# Patient Record
Sex: MALE | Race: WHITE | NOT HISPANIC OR LATINO | Employment: FULL TIME | URBAN - METROPOLITAN AREA
[De-identification: names, ages, dates, MRNs, and addresses within clinical notes are randomized per-mention and may not be internally consistent; named-entity substitution may affect disease eponyms.]

---

## 2017-01-16 DIAGNOSIS — E11.9 TYPE 2 DIABETES MELLITUS WITHOUT COMPLICATION, WITHOUT LONG-TERM CURRENT USE OF INSULIN (H): ICD-10-CM

## 2017-01-16 DIAGNOSIS — Z12.5 SPECIAL SCREENING FOR MALIGNANT NEOPLASM OF PROSTATE: ICD-10-CM

## 2017-01-16 LAB
HBA1C MFR BLD: 8.7 % (ref 4.3–6)
PSA SERPL-ACNC: 0.43 UG/L (ref 0–4)
TSH SERPL DL<=0.005 MIU/L-ACNC: 2.59 MU/L (ref 0.4–4)

## 2017-01-16 PROCEDURE — 36415 COLL VENOUS BLD VENIPUNCTURE: CPT | Performed by: INTERNAL MEDICINE

## 2017-01-16 PROCEDURE — 83036 HEMOGLOBIN GLYCOSYLATED A1C: CPT | Performed by: INTERNAL MEDICINE

## 2017-01-16 PROCEDURE — G0103 PSA SCREENING: HCPCS | Performed by: INTERNAL MEDICINE

## 2017-01-16 PROCEDURE — 84443 ASSAY THYROID STIM HORMONE: CPT | Performed by: INTERNAL MEDICINE

## 2017-01-20 DIAGNOSIS — E78.5 HYPERLIPIDEMIA LDL GOAL <100: ICD-10-CM

## 2017-01-20 DIAGNOSIS — E11.9 TYPE 2 DIABETES MELLITUS WITHOUT COMPLICATION, WITHOUT LONG-TERM CURRENT USE OF INSULIN (H): ICD-10-CM

## 2017-01-20 LAB
ALBUMIN SERPL-MCNC: 4.2 G/DL (ref 3.4–5)
ALP SERPL-CCNC: 79 U/L (ref 40–150)
ALT SERPL W P-5'-P-CCNC: 54 U/L (ref 0–70)
ANION GAP SERPL CALCULATED.3IONS-SCNC: 7 MMOL/L (ref 3–14)
AST SERPL W P-5'-P-CCNC: 38 U/L (ref 0–45)
BILIRUB SERPL-MCNC: 0.5 MG/DL (ref 0.2–1.3)
BUN SERPL-MCNC: 17 MG/DL (ref 7–30)
CALCIUM SERPL-MCNC: 9.2 MG/DL (ref 8.5–10.1)
CHLORIDE SERPL-SCNC: 107 MMOL/L (ref 94–109)
CHOLEST SERPL-MCNC: 109 MG/DL
CO2 SERPL-SCNC: 27 MMOL/L (ref 20–32)
CREAT SERPL-MCNC: 1 MG/DL (ref 0.66–1.25)
CREAT UR-MCNC: 237 MG/DL
GFR SERPL CREATININE-BSD FRML MDRD: 77 ML/MIN/1.7M2
GLUCOSE SERPL-MCNC: 193 MG/DL (ref 70–99)
HDLC SERPL-MCNC: 43 MG/DL
LDLC SERPL CALC-MCNC: 48 MG/DL
MICROALBUMIN UR-MCNC: 22 MG/L
MICROALBUMIN/CREAT UR: 9.37 MG/G CR (ref 0–17)
NONHDLC SERPL-MCNC: 66 MG/DL
POTASSIUM SERPL-SCNC: 4.5 MMOL/L (ref 3.4–5.3)
PROT SERPL-MCNC: 7.1 G/DL (ref 6.8–8.8)
SODIUM SERPL-SCNC: 141 MMOL/L (ref 133–144)
TRIGL SERPL-MCNC: 91 MG/DL

## 2017-01-20 PROCEDURE — 80061 LIPID PANEL: CPT | Performed by: INTERNAL MEDICINE

## 2017-01-20 PROCEDURE — 36415 COLL VENOUS BLD VENIPUNCTURE: CPT | Performed by: INTERNAL MEDICINE

## 2017-01-20 PROCEDURE — 82043 UR ALBUMIN QUANTITATIVE: CPT | Performed by: INTERNAL MEDICINE

## 2017-01-20 PROCEDURE — 80053 COMPREHEN METABOLIC PANEL: CPT | Performed by: INTERNAL MEDICINE

## 2017-01-30 ENCOUNTER — OFFICE VISIT (OUTPATIENT)
Dept: INTERNAL MEDICINE | Facility: CLINIC | Age: 57
End: 2017-01-30
Payer: COMMERCIAL

## 2017-01-30 VITALS
SYSTOLIC BLOOD PRESSURE: 134 MMHG | BODY MASS INDEX: 41.99 KG/M2 | WEIGHT: 293.3 LBS | HEART RATE: 89 BPM | DIASTOLIC BLOOD PRESSURE: 64 MMHG | HEIGHT: 70 IN | OXYGEN SATURATION: 95 % | TEMPERATURE: 97.8 F

## 2017-01-30 DIAGNOSIS — E78.5 HYPERLIPIDEMIA LDL GOAL <100: ICD-10-CM

## 2017-01-30 DIAGNOSIS — E66.01 MORBID OBESITY WITH BMI OF 40.0-44.9, ADULT (H): ICD-10-CM

## 2017-01-30 DIAGNOSIS — I10 BENIGN ESSENTIAL HYPERTENSION: ICD-10-CM

## 2017-01-30 DIAGNOSIS — E11.9 TYPE 2 DIABETES MELLITUS WITHOUT COMPLICATION, WITHOUT LONG-TERM CURRENT USE OF INSULIN (H): Primary | ICD-10-CM

## 2017-01-30 DIAGNOSIS — J98.01 POST-INFECTION BRONCHOSPASM: ICD-10-CM

## 2017-01-30 PROCEDURE — 99214 OFFICE O/P EST MOD 30 MIN: CPT | Performed by: INTERNAL MEDICINE

## 2017-01-30 RX ORDER — FENOFIBRATE 160 MG/1
160 TABLET ORAL DAILY
Qty: 90 TABLET | Refills: 0 | Status: SHIPPED | OUTPATIENT
Start: 2017-01-30 | End: 2017-05-18

## 2017-01-30 RX ORDER — LIRAGLUTIDE 6 MG/ML
1.8 INJECTION SUBCUTANEOUS DAILY
Qty: 27 ML | Refills: 0 | Status: SHIPPED | OUTPATIENT
Start: 2017-01-30 | End: 2017-04-21

## 2017-01-30 RX ORDER — PIOGLITAZONEHYDROCHLORIDE 45 MG/1
45 TABLET ORAL DAILY
Qty: 90 TABLET | Refills: 0 | Status: SHIPPED | OUTPATIENT
Start: 2017-01-30 | End: 2017-05-18

## 2017-01-30 RX ORDER — ALBUTEROL SULFATE 90 UG/1
2 AEROSOL, METERED RESPIRATORY (INHALATION) EVERY 4 HOURS PRN
Qty: 1 INHALER | Refills: 2 | Status: SHIPPED | OUTPATIENT
Start: 2017-01-30 | End: 2018-06-10

## 2017-01-30 NOTE — MR AVS SNAPSHOT
"              After Visit Summary   1/30/2017    Neymar Rhodes    MRN: [id number]           Patient Information     Date Of Birth          1960        Visit Information        Provider Department      1/30/2017 2:00 PM Jefry Harris MD [facility]        Today's Diagnoses     Type 2 diabetes mellitus without complication, without long-term current use of insulin (H)    -  1     Morbid obesity with BMI of 40.0-44.9, adult (H)         Post-infection bronchospasm         Benign essential hypertension         Hyperlipidemia LDL goal <100           Care Instructions    *  Continue all medications at the same doses.  Contact your usual pharmacy if you need refills.     *  Work hard on your diet to improve blood sugar control.     *  Return to see me in approximately 3 months, sooner if needed.  Please get nonfasting labs done in the [facility] lab 1-2 days before this appointment.  Call [phone] to schedule both appointments.       5 GOALS IN MANAGING DIABETES (i.e. to give the best chance to prevent diabetic complications and vascular disease):     1.  Aggressive blood pressure control, under 130/80 ideally.  Using medications if needed    Your blood pressure is under good control    BP Readings from Last 4 Encounters:   01/30/17 134/64   07/13/16 132/72   03/10/16 129/78   01/13/16 138/76       2.  Aggressive LDL cholesterol (bad cholesterol) lowering as needed.  Your goal is an LDL under 100 for sure, preferably under 70.     *  All patients with diabetes are recommended to be on a \"statin\" cholesterol lowering medication regardless of the cholesterol levels to give the best chance at avoiding vascular disease.      New guidelines identify four high-risk groups who could benefit from statins:   *people with pre-existing heart disease, such as those who have had a heart attack;   *people ages 40 to 75 who have diabetes of any type  *patients ages 40 to 75 with at least a 7.5% " risk of developing cardiovascular disease over the next decade, according to a formula described in the guidelines  *patients with the sort of super-high cholesterol that sometimes runs in families, as evidenced by an LDL of 190 milligrams per deciliter or higher    *  Your cholesterol levels are well controlled.    Recent Labs   Lab Test  01/20/17   0911  01/11/16   0727  01/26/15   0920  12/30/13   1315   CHOL  109  150  126  156   HDL  43  38*  45  41   LDL  48  73  55  Cannot estimate LDL when triglyceride exceeds 400 mg/dL  86   TRIG  91  194*  128  435*   CHOLHDLRATIO   --    --   2.8  3.8       3.  Aggressive diabetic management.  The target for A1C (3 months average blood sugar) is ideally below 6.5, preferably below 7.5    Your diabetes is NOT under good control.      A1C      8.7   1/16/2017  A1C      8.2   10/19/2016  A1C      8.7   6/30/2016  A1C      9.3   1/11/2016  A1C      7.7   7/17/2015  A1C      6.8   1/26/2015  A1C      6.4   10/16/2014  A1C      8.9   6/11/2014  A1C      7.8   12/30/2013  A1C      6.8   8/2/2013  A1C      7.0   2/14/2013  A1C      7.1   8/10/2012  A1C      8.4   3/30/2012  A1C      8.9   8/22/2011  A1C      8.4   3/8/2011  A1C      6.8   10/16/2010  A1C      6.8   4/9/2010  A1C      7.0   5/21/2009  A1C      7.0   12/23/2008  A1C      6.6   9/3/2008  A1C      7.1   6/9/2008  A1C      7.0   2/15/2008  A1C      7.2   10/24/2007  A1C      7.2   6/29/2007  A1C      6.9   3/29/2007  A1C      6.4   12/7/2006  A1C      6.6   6/1/2006  A1C      8.2   2/27/2006  A1C      8.3   12/15/2005  A1C      7.3   3/22/2005  A1C      7.3   11/29/2004  A1C      7.4   8/5/2004  A1C      6.9   10/27/2003  A1C      6.0   12/24/2002  A1C      6.0   8/7/2002    4.  No smoking    5.  Aspirin tablet once per day, every day unless there is a specific reason that you cannot take aspirin.       *You should take Aspirin 81 mg once per day.           Post infectious Bronchospasm  "(wheezing):  ===============================================      *  Bronchospasm is irritation of the airways that causes them to spasm and temporarily \"narrow\" which causes coughing (usually minimal sputum production), shortness of breath, dyspnea on exertion, wheezing.  Your airways will be more reactive to things such as temperature changes (too cold, too hot, too humid, etc.), activity, pollutants such as dander, smoke, air pollution, etc.  This will get better with time, but will produce lingering symptoms as described above for a couple weeks to a couple of months.     *  Antibiotics not indicated    *  Albuterol inhaler, use 2 puffs, 3-5 times per day as needed for any coughing, wheezing, tightness in the breathing, or shortness of breath.  This inhaled medication helps reduce the inflammation and spasm of the airways which will help the breathing become easier.  This is a similar medication we use to treat asthma, but you do not have asthma.      Consider using this inhaler before any known triggers for our coughing or wheezing (usually these include cold or hot temperatures, humidity, dust, air pollutants, smoke).      *  Expect that your airways may remain more easily irritated for a few weeks after upper respiratory infections.     If you require the albuterol rescue inhaler on a regular basis more than a few times per week, you may need additional medications to help control the breathing better.    These are the available forms of Albuterol, your insurance formulary will determine which one is preferred.  They all work the same.                 *  Cough suppressant Delsym, follow directions on bottle    *  Mucinex extended release, one or two tablets twice per day for the next 5-7 days.  This helps loosen the secretions to they can be passed more easily out of the body.     *  Be sure to drink lots of fluids.  If the appetite and intake of food is way down, then at leat try to eat soup.  Dehydration is " the thing that causes people to end up in the hospital with viral infections and the flu.     *  For sore throat:  Try lozenges (Cepostat, Cepacol, hard candies, Zinc lozenges, etc)    *  If you have thick secretions:  Mucinex extended release twice per day on a regular basis for the next few days, then twice per day as needed.  OK to take the regular Mucinex, you may just have to take it 2-3 times per day.      *  If you have dry nasal passages or frequent bloody noses during the winter time:  Saline nasal spray as often as needed for dry nose.     *  If you have a lot of congestion and/or runny noses:  OK to try decongestants as needed (e.g. pseudoephedrine or phenylephrine).  Be sure to take the lowest dose needed.  Take decongestants from only ONE source.  It is possible to inadvertantly take more than the recommended amounts if you take decongestants from multiple products (i.e. Do NOT take Mucinex-D and Claritin-D together)    *  If you have been told to NOT take decongestants or if you cannot tolerate decongestants due to effect on blood pressure, sleep or heart rate:  Try Coricidin HBP or Chlortrimeton (chlorpheniramine).  These can have a similar effect as some decongestants but will not affect your heart rate or blood pressure.      *  If you have SEVERE nasal congestion:  Affrin nasal spray as needed for severe nasal congestion (especially before the airplane trip), but do not use for more than one week.      *  Tylenol as needed for any headaches of low grade temperatures.     *  Ibuprofen as needed for body aches, fevers, headaches    *  Call the clinic if any changes in the symptoms such as worsening fevers, or the amount and quality of the sputum changes, or if you have any major difficulties breathing, or the symptoms fail to clear for a few weeks.    *  Most upper respiratory infection will clear 1-2 weeks, but it is not uncommon for post viral coughs to last for several weeks, even rarely the  "entire winter.                 Follow-ups after your visit        Future tests that were ordered for you today     Open Future Orders        Priority Expected Expires Ordered    Basic metabolic panel ASAP 2017    Hemoglobin A1c Routine 2017            Who to contact     If you have questions or need follow up information about today's clinic visit or your schedule please contact [facility] directly at [phone].  Normal or non-critical lab and imaging results will be communicated to you by [employer], letter or phone within 4 business days after the clinic has received the results. If you do not hear from us within 7 days, please contact the clinic through [employer] or phone. If you have a critical or abnormal lab result, we will notify you by phone as soon as possible.  Submit refill requests through [employer] or call your pharmacy and they will forward the refill request to us. Please allow 3 business days for your refill to be completed.          Additional Information About Your Visit        [employer] Information     [employer] lets you send messages to your doctor, view your test results, renew your prescriptions, schedule appointments and more. To sign up, go to [email or web address] . Click on \"Log in\" on the left side of the screen, which will take you to the Welcome page. Then click on \"Sign up Now\" on the right side of the page.     You will be asked to enter the access code listed below, as well as some personal information. Please follow the directions to create your username and password.     Your access code is: [email or web address]  Expires: 2017  3:46 PM     Your access code will  in 90 days. If you need help or a new code, please call your [place] clinic or [phone].        Care EveryWhere ID     This is your Care EveryWhere ID. This could be used by other organizations to access your [place] medical records  [id number]   " "     Your Vitals Were     Pulse Temperature Height BMI (Body Mass Index) Pulse Oximetry       89 97.8  F (36.6  C) (Oral) 5' 10\" (1.778 m) 42.08 kg/m2 95%        Blood Pressure from Last 3 Encounters:   01/30/17 134/64   07/13/16 132/72   03/10/16 129/78    Weight from Last 3 Encounters:   01/30/17 293 lb 4.8 oz (133.04 kg)   07/13/16 294 lb 1.6 oz (133.403 kg)   01/13/16 296 lb 12.8 oz (134.628 kg)                 Today's Medication Changes          These changes are accurate as of: 1/30/17  3:46 PM.  If you have any questions, ask your nurse or doctor.               Start taking these medicines.        Dose/Directions    albuterol 108 (90 BASE) MCG/ACT Inhaler   Commonly known as:  PROAIR HFA/PROVENTIL HFA/VENTOLIN HFA   Used for:  Post-infection bronchospasm   Started by:  Jefry Harris MD        Dose:  2 puff   Inhale 2 puffs into the lungs every 4 hours as needed for shortness of breath / dyspnea or wheezing   Quantity:  1 Inhaler   Refills:  2         These medicines have changed or have updated prescriptions.        Dose/Directions    metFORMIN 1000 MG tablet   Commonly known as:  GLUCOPHAGE   This may have changed:  additional instructions   Used for:  Type 2 diabetes mellitus without complication, without long-term current use of insulin (H)   Changed by:  Jefry Harris MD        Dose:  1000 mg   Take 1 tablet (1,000 mg) by mouth 2 times daily (with meals)   Quantity:  180 tablet   Refills:  0            Where to get your medicines      These medications were sent to [id number] - [place], [place] - [address]  [address] [address]     Phone:  [phone]    - albuterol 108 (90 BASE) MCG/ACT Inhaler  - fenofibrate 160 MG tablet  - liraglutide 18 MG/3ML soln  - metFORMIN 1000 MG tablet  - pioglitazone 45 MG tablet             Primary Care Provider Office Phone # Fax #    Jefry Harris [phone]       [facility]INIC [address] [facility] [address]        Thank you!     Thank you for choosing [facility]  for your care. Our goal is always to provide you with excellent care. Hearing back from our patients is one way we can continue to improve our services. Please take a few minutes to complete the written survey that you may receive in the mail after your visit with us. Thank you!             Your Updated Medication List - Protect others around you: Learn how to safely use, store and throw away your medicines at www.disposemymeds.org.          This list is accurate as of: 1/30/17  3:46 PM.  Always use your most recent med list.                   Brand Name Dispense Instructions for use    albuterol 108 (90 BASE) MCG/ACT Inhaler    PROAIR HFA/PROVENTIL HFA/VENTOLIN HFA    1 Inhaler    Inhale 2 puffs into the lungs every 4 hours as needed for shortness of breath / dyspnea or wheezing       aspirin 81 MG tablet      1 tab po QD (Once per day)       atorvastatin 80 MG tablet    LIPITOR    90 tablet    Take 0.5 tablets (40 mg) by mouth At Bedtime       * blood glucose calibration solution    no brand specified    1 Bottle    1 drop by Test Solution route every 30 days.       * blood glucose calibration solution    no brand specified    1 each    Use to calibrate blood glucose monitor as directed; ONE TOUCH       * blood glucose monitoring meter device kit    no brand specified    1 kit    1 each daily.       * blood glucose monitoring meter device kit     1 kit    Use to test blood sugar 1-3 times daily or as directed.       * blood glucose monitoring test strip    no brand specified    1 Box    by In Vitro route 2 times daily. Dispense whatever diabetic testing supplies as directed by formulary       * blood glucose monitoring test strip    ACCU-CHEK LUL PLUS    100 each    Use to test blood sugar 1-3 times daily or as directed.       * DIABETIC STERILE LANCETS device     1 Box    1 Device 2 times  "daily.       * blood glucose monitoring lancets     1 Box    Use to test blood sugar 1-3 times daily or as directed.       * blood glucose monitoring lancets     1 Box    Use to test blood sugar 1-3 times daily or as directed.       fenofibrate 160 MG tablet     90 tablet    Take 1 tablet (160 mg) by mouth daily       glipiZIDE 10 MG 24 hr tablet    GLUCOTROL XL    90 tablet    Take 1 tablet (10 mg) by mouth daily       indomethacin 25 MG capsule    INDOCIN    30 capsule    Take 1 capsule by mouth 2 times daily as needed. Take with meals       ketoconazole 2 % cream    NIZORAL    30 g    Apply topically 2 times daily Apply sparingly once or twice per day as needed to affected area until the skin is better, then stop       liraglutide 18 MG/3ML soln    VICTOZA    27 mL    Inject 1.8 mg Subcutaneous daily       lisinopril 20 MG tablet    PRINIVIL/ZESTRIL    90 tablet    Take 1 tablet (20 mg) by mouth daily       metFORMIN 1000 MG tablet    GLUCOPHAGE    180 tablet    Take 1 tablet (1,000 mg) by mouth 2 times daily (with meals)       * pen needles 5/16\" 31G X 8 MM Misc     100 each    1 each daily.       * insulin pen needle 29G X 12.7MM    BD ULTRA-FINE    100 each    1 each 2 times daily or as directed.       pioglitazone 45 MG tablet    ACTOS    90 tablet    Take 1 tablet (45 mg) by mouth daily       triamcinolone 0.5 % cream    KENALOG    30 g    Apply sparingly once or twice per day as needed to affected area until the skin is better, then stop       * Notice:  This list has 11 medication(s) that are the same as other medications prescribed for you. Read the directions carefully, and ask your doctor or other care provider to review them with you.      "

## 2017-01-30 NOTE — PATIENT INSTRUCTIONS
"*  Continue all medications at the same doses.  Contact your usual pharmacy if you need refills.     *  Work hard on your diet to improve blood sugar control.     *  Return to see me in approximately 3 months, sooner if needed.  Please get nonfasting labs done in the [facility] lab 1-2 days before this appointment.  Call [phone] to schedule both appointments.       5 GOALS IN MANAGING DIABETES (i.e. to give the best chance to prevent diabetic complications and vascular disease):     1.  Aggressive blood pressure control, under 130/80 ideally.  Using medications if needed    Your blood pressure is under good control    BP Readings from Last 4 Encounters:   01/30/17 134/64   07/13/16 132/72   03/10/16 129/78   01/13/16 138/76       2.  Aggressive LDL cholesterol (bad cholesterol) lowering as needed.  Your goal is an LDL under 100 for sure, preferably under 70.     *  All patients with diabetes are recommended to be on a \"statin\" cholesterol lowering medication regardless of the cholesterol levels to give the best chance at avoiding vascular disease.      New guidelines identify four high-risk groups who could benefit from statins:   *people with pre-existing heart disease, such as those who have had a heart attack;   *people ages 40 to 75 who have diabetes of any type  *patients ages 40 to 75 with at least a 7.5% risk of developing cardiovascular disease over the next decade, according to a formula described in the guidelines  *patients with the sort of super-high cholesterol that sometimes runs in families, as evidenced by an LDL of 190 milligrams per deciliter or higher    *  Your cholesterol levels are well controlled.    Recent Labs   Lab Test  01/20/17   0911  01/11/16   0727  01/26/15   0920  12/30/13   1315   CHOL  109  150  126  156   HDL  43  38*  45  41   LDL  48  73  55  Cannot estimate LDL when triglyceride exceeds 400 mg/dL  86   TRIG  91  194*  128  435*   CHOLHDLRATIO   --    --   2.8  3.8       3.  " "Aggressive diabetic management.  The target for A1C (3 months average blood sugar) is ideally below 6.5, preferably below 7.5    Your diabetes is NOT under good control.      A1C      8.7   1/16/2017  A1C      8.2   10/19/2016  A1C      8.7   6/30/2016  A1C      9.3   1/11/2016  A1C      7.7   7/17/2015  A1C      6.8   1/26/2015  A1C      6.4   10/16/2014  A1C      8.9   6/11/2014  A1C      7.8   12/30/2013  A1C      6.8   8/2/2013  A1C      7.0   2/14/2013  A1C      7.1   8/10/2012  A1C      8.4   3/30/2012  A1C      8.9   8/22/2011  A1C      8.4   3/8/2011  A1C      6.8   10/16/2010  A1C      6.8   4/9/2010  A1C      7.0   5/21/2009  A1C      7.0   12/23/2008  A1C      6.6   9/3/2008  A1C      7.1   6/9/2008  A1C      7.0   2/15/2008  A1C      7.2   10/24/2007  A1C      7.2   6/29/2007  A1C      6.9   3/29/2007  A1C      6.4   12/7/2006  A1C      6.6   6/1/2006  A1C      8.2   2/27/2006  A1C      8.3   12/15/2005  A1C      7.3   3/22/2005  A1C      7.3   11/29/2004  A1C      7.4   8/5/2004  A1C      6.9   10/27/2003  A1C      6.0   12/24/2002  A1C      6.0   8/7/2002    4.  No smoking    5.  Aspirin tablet once per day, every day unless there is a specific reason that you cannot take aspirin.       *You should take Aspirin 81 mg once per day.           Post infectious Bronchospasm (wheezing):  ===============================================      *  Bronchospasm is irritation of the airways that causes them to spasm and temporarily \"narrow\" which causes coughing (usually minimal sputum production), shortness of breath, dyspnea on exertion, wheezing.  Your airways will be more reactive to things such as temperature changes (too cold, too hot, too humid, etc.), activity, pollutants such as dander, smoke, air pollution, etc.  This will get better with time, but will produce lingering symptoms as described above for a couple weeks to a couple of months.     *  Antibiotics not indicated    *  Albuterol inhaler, use 2 " puffs, 3-5 times per day as needed for any coughing, wheezing, tightness in the breathing, or shortness of breath.  This inhaled medication helps reduce the inflammation and spasm of the airways which will help the breathing become easier.  This is a similar medication we use to treat asthma, but you do not have asthma.      Consider using this inhaler before any known triggers for our coughing or wheezing (usually these include cold or hot temperatures, humidity, dust, air pollutants, smoke).      *  Expect that your airways may remain more easily irritated for a few weeks after upper respiratory infections.     If you require the albuterol rescue inhaler on a regular basis more than a few times per week, you may need additional medications to help control the breathing better.    These are the available forms of Albuterol, your insurance formulary will determine which one is preferred.  They all work the same.                 *  Cough suppressant Delsym, follow directions on bottle    *  Mucinex extended release, one or two tablets twice per day for the next 5-7 days.  This helps loosen the secretions to they can be passed more easily out of the body.     *  Be sure to drink lots of fluids.  If the appetite and intake of food is way down, then at leat try to eat soup.  Dehydration is the thing that causes people to end up in the hospital with viral infections and the flu.     *  For sore throat:  Try lozenges (Cepostat, Cepacol, hard candies, Zinc lozenges, etc)    *  If you have thick secretions:  Mucinex extended release twice per day on a regular basis for the next few days, then twice per day as needed.  OK to take the regular Mucinex, you may just have to take it 2-3 times per day.      *  If you have dry nasal passages or frequent bloody noses during the winter time:  Saline nasal spray as often as needed for dry nose.     *  If you have a lot of congestion and/or runny noses:  OK to try decongestants as  needed (e.g. pseudoephedrine or phenylephrine).  Be sure to take the lowest dose needed.  Take decongestants from only ONE source.  It is possible to inadvertantly take more than the recommended amounts if you take decongestants from multiple products (i.e. Do NOT take Mucinex-D and Claritin-D together)    *  If you have been told to NOT take decongestants or if you cannot tolerate decongestants due to effect on blood pressure, sleep or heart rate:  Try Coricidin HBP or Chlortrimeton (chlorpheniramine).  These can have a similar effect as some decongestants but will not affect your heart rate or blood pressure.      *  If you have SEVERE nasal congestion:  Affrin nasal spray as needed for severe nasal congestion (especially before the airplane trip), but do not use for more than one week.      *  Tylenol as needed for any headaches of low grade temperatures.     *  Ibuprofen as needed for body aches, fevers, headaches    *  Call the clinic if any changes in the symptoms such as worsening fevers, or the amount and quality of the sputum changes, or if you have any major difficulties breathing, or the symptoms fail to clear for a few weeks.    *  Most upper respiratory infection will clear 1-2 weeks, but it is not uncommon for post viral coughs to last for several weeks, even rarely the entire winter.

## 2017-01-30 NOTE — PROGRESS NOTES
SUBJECTIVE:                                                    Neymar Rhodes is a 56 year old male who presents to clinic today for the following health issues:    Diabetes Follow-up      Patient is checking blood sugars: not at all    Diabetic concerns: None     Symptoms of hypoglycemia (low blood sugar): none     Paresthesias (numbness or burning in feet) or sores: No     Date of last diabetic eye exam: 9/2016    In regards specifically to the patient's diabetes, they reports no unusual symptoms.    No significnat or regular episodes of hypo or hyperglycemia    Medication compliance: compliant most of the time  Diabetic diet compliance: NOT compliant most of the time  Diabetic ROS: no polyuria or polydipsia, no chest pain, dyspnea or TIA's, no numbness, tingling or pain in extremities    Home blood sugar monitoring: are performed regulary    Review of patients self blood glucose monitoring shows fasting always < 130 and post prandial average under 170    Diabetic complications: none     Most  recent labs:    A1C      8.7   1/16/2017  A1C      8.2   10/19/2016  A1C      8.7   6/30/2016  A1C      9.3   1/11/2016  A1C      7.7   7/17/2015  A1C      6.8   1/26/2015  A1C      6.4   10/16/2014  A1C      8.9   6/11/2014  A1C      7.8   12/30/2013  A1C      6.8   8/2/2013  A1C      7.0   2/14/2013  A1C      7.1   8/10/2012  A1C      8.4   3/30/2012  A1C      8.9   8/22/2011  A1C      8.4   3/8/2011  A1C      6.8   10/16/2010  A1C      6.8   4/9/2010  A1C      7.0   5/21/2009  A1C      7.0   12/23/2008  A1C      6.6   9/3/2008  A1C      7.1   6/9/2008  A1C      7.0   2/15/2008  A1C      7.2   10/24/2007  A1C      7.2   6/29/2007  A1C      6.9   3/29/2007  A1C      6.4   12/7/2006  A1C      6.6   6/1/2006  A1C      8.2   2/27/2006  A1C      8.3   12/15/2005  A1C      7.3   3/22/2005  A1C      7.3   11/29/2004  A1C      7.4   8/5/2004  A1C      6.9   10/27/2003  A1C      6.0   12/24/2002  A1C      6.0   8/7/2002       Hyperlipidemia Follow-Up      Rate your low fat/cholesterol diet?: good    Taking statin?  Yes, no muscle aches from statin    Other lipid medications/supplements?:  None    Has history of hyperlipidemia.  On statin for this, denies any significant side effects of this medication.      Latest labs reviewed:    Recent Labs   Lab Test  01/20/17   0911  01/11/16   0727  01/26/15   0920  12/30/13   1315   CHOL  109  150  126  156   HDL  43  38*  45  41   LDL  48  73  55  Cannot estimate LDL when triglyceride exceeds 400 mg/dL  86   TRIG  91  194*  128  435*   CHOLHDLRATIO   --    --   2.8  3.8        AST       38   1/20/2017       Hypertension Follow-up      Outpatient blood pressures are not being checked.    Low Salt Diet: no added salt    Blood presure remains well controlled at home  Readings outside clinic are within normal limits.  Reviewed last 6 BP readings in chart:  BP Readings from Last 6 Encounters:   01/30/17 134/64   07/13/16 132/72   03/10/16 129/78   01/13/16 138/76   01/11/16 130/62   07/30/15 126/72     He has not experienced any significant side effects from medicaiotns for hypertension.    NO active cardiac complaints or symptoms with exercise.          Amount of exercise or physical activity: None    Problems taking medications regularly: No    Medication side effects: none  Diet: low salt, low fat/cholesterol and diabetic    Acute Illness   Acute illness concerns: cough  Onset: 1.5 weeks      Fever: no    Chills/Sweats: YES    Headache (location?): no    Sinus Pressure:YES    Conjunctivitis:  no    Ear Pain: no    Rhinorrhea: YES    Congestion: YES    Sore Throat: YES- dry, cotton mouth     Cough: YES-productive of clear sputum    Wheeze: no    Decreased Appetite: no    Nausea: no    Vomiting: no    Diarrhea:  YES- occ    Dysuria/Freq.: no    Fatigue/Achiness: no    Sick/Strep Exposure: no     Therapies Tried and outcome: vicks w/ relief          Problem list and histories reviewed & adjusted,  as indicated.  Additional history: as documented          Past Medical History:  ---------------------------  Past Medical History   Diagnosis Date     Gout, unspecified      Type II or unspecified type diabetes mellitus without mention of complication, not stated as uncontrolled      Diverticulosis of colon (without mention of hemorrhage)      Other and unspecified hyperlipidemia      Essential hypertension, benign      Morbid obesity (H)        Past Surgical History:  ---------------------------  Past Surgical History   Procedure Laterality Date     C appendectomy  1980's     Surgical history of -   2001     repair of colovesicular fistula       Current Medications:  ---------------------------  Current Outpatient Prescriptions   Medication Sig Dispense Refill     albuterol (PROAIR HFA/PROVENTIL HFA/VENTOLIN HFA) 108 (90 BASE) MCG/ACT Inhaler Inhale 2 puffs into the lungs every 4 hours as needed for shortness of breath / dyspnea or wheezing 1 Inhaler 2     fenofibrate 160 MG tablet Take 1 tablet (160 mg) by mouth daily 90 tablet 0     metFORMIN (GLUCOPHAGE) 1000 MG tablet Take 1 tablet (1,000 mg) by mouth 2 times daily (with meals) 180 tablet 0     pioglitazone (ACTOS) 45 MG tablet Take 1 tablet (45 mg) by mouth daily 90 tablet 0     liraglutide (VICTOZA) 18 MG/3ML soln Inject 1.8 mg Subcutaneous daily 27 mL 0     blood glucose monitoring (SOFTCLIX) lancets Use to test blood sugar 1-3 times daily or as directed. 1 Box 11     blood glucose monitoring (ACCU-CHEK LUL PLUS) test strip Use to test blood sugar 1-3 times daily or as directed. 100 each 11     blood glucose monitoring (ACCU-CHEK LUL PLUS) meter device kit Use to test blood sugar 1-3 times daily or as directed. 1 kit 0     blood glucose monitoring (ACCU-CHEK MULTICLIX) lancets Use to test blood sugar 1-3 times daily or as directed. 1 Box 11     atorvastatin (LIPITOR) 80 MG tablet Take 0.5 tablets (40 mg) by mouth At Bedtime 90 tablet 1     glipiZIDE  "(GLUCOTROL XL) 10 MG 24 hr tablet Take 1 tablet (10 mg) by mouth daily 90 tablet 1     lisinopril (PRINIVIL,ZESTRIL) 20 MG tablet Take 1 tablet (20 mg) by mouth daily 90 tablet 1     blood glucose calibration (NO BRAND SPECIFIED) solution Use to calibrate blood glucose monitor as directed; ONE TOUCH 1 each 3     triamcinolone (KENALOG) 0.5 % cream Apply sparingly once or twice per day as needed to affected area until the skin is better, then stop 30 g 0     ketoconazole (NIZORAL) 2 % cream Apply topically 2 times daily Apply sparingly once or twice per day as needed to affected area until the skin is better, then stop 30 g 0     indomethacin (INDOCIN) 25 MG capsule Take 1 capsule by mouth 2 times daily as needed. Take with meals 30 capsule 0     Blood Glucose Monitoring Suppl (BLOOD GLUCOSE METER) KIT 1 each daily. 1 kit 0     DIABETIC STERILE LANCETS device 1 Device 2 times daily. 1 Box 12     glucose blood VI test strips strip by In Vitro route 2 times daily. Dispense whatever diabetic testing supplies as directed by formulary 1 Box 12     Blood Glucose Calibration (GLUCOSE CONTROL) solution 1 drop by Test Solution route every 30 days. 1 Bottle 2     ASPIRIN 81 MG OR TABS 1 tab po QD (Once per day)       insulin pen needle (BD ULTRA-FINE) 29G X 12.7MM 1 each 2 times daily or as directed. 100 each 3     Insulin Pen Needle (PEN NEEDLES 5/16\") 31G X 8 MM MISC 1 each daily. 100 each 11       Allergies:  -------------  Allergies   Allergen Reactions     No Known Drug Allergies        Social History:  -------------------  Social History     Social History     Marital Status:      Spouse Name: N/A     Number of Children: N/A     Years of Education: N/A     Occupational History     Not on file.     Social History Main Topics     Smoking status: Never Smoker      Smokeless tobacco: Never Used     Alcohol Use: Yes      Comment: 1 glass of beer on weekend     Drug Use: No     Sexual Activity:     Partners: Female " "    Other Topics Concern     Not on file     Social History Narrative       Family Medical History:  ------------------------------  Family History   Problem Relation Age of Onset     CANCER Father 75     bladder cancer     Breast Cancer Sister      Breast Cancer Mother      Family History Negative Brother      Family History Negative Brother          ROS:  REVIEW OF SYSTEMS:    RESP: negative for  dyspnea, wheezing, hemoptysis; POS for nonproductive cough  CV: negative for chest pain, palpitations, PND, JOSHI, orthopnea; reports no changes in their ability to perform physical activity (from cardiovascular standpoint)  GI: negative for dysphagia, N/V, pain, melena, diarrhea and constipation  NEURO: negative for numbness/tingling, paralysis, incoordination, or focal weakness     OBJECTIVE:                                                    /64 mmHg  Pulse 89  Temp(Src) 97.8  F (36.6  C) (Oral)  Ht 5' 10\" (1.778 m)  Wt 293 lb 4.8 oz (133.04 kg)  BMI 42.08 kg/m2  SpO2 95%     GENERAL alert and no distress  EYES:  Normal sclera,conjunctiva, EOMI  HENT: oral and posterior pharynx without lesions or erythema, facies symmetric  NECK: Neck supple. No LAD, without thyroidmegaly or JVD., Carotids without bruits.  RESP: Scattered mild rhonci in all lung fields, no focal rales, scattered mild end expiraotry wheezes with forced expiration.  CV: RRR normal S1S2 without murmurs, rubs or gallops. PMI normal  LYMPH: no cervical lymph adenopathy appreciated  MS: extremities- no gross deformities of the visible extremities noted, no edema  PSYCH: Alert and oriented times 3; speech- coherent  SKIN:  No obvious significant skin lesions on visible portions of face          ASSESSMENT/PLAN:                                                      (E11.9) Type 2 diabetes mellitus without complication, without long-term current use of insulin (H)  (primary encounter diagnosis)  Comment: not controlled well. He is very resistant to " start insulin, even if it what i feel he needs.   He wants to retry diet changes because he admits that he ha not bee following low carb diet reliably.   Discussed low cabr diet, advised food choices, asked him to monitor the glucose readings.   contineu all meds.   He agreed that is he cannot lwoer this with diet and the same meds, then he would agree to insulin.   Discussed importance in compliance in all areas of diabetic control including diet, routine BS checks, absolute medication compliance, laboratory monitoring, and attending regular follow up appointments as ordered.  Failure to comply with instructions regarding diabetes will lead to a greater chance of poor diabetic control and therefore a greater chance of diabetes related complications such as CAD, CVA, PVD, and retinopathy/neuropathy/nephropathy.  Based on level of diabetes control: testing frequency THREE TIME PER DAY DUE TO POORLY CONTROLLED DIABETES AND HYPERGLYCEMIA.   Plan: fenofibrate 160 MG tablet, metFORMIN         (GLUCOPHAGE) 1000 MG tablet, liraglutide         (VICTOZA) 18 MG/3ML soln, Hemoglobin A1c, Basic        metabolic panel            (E66.01,  Z68.41) Morbid obesity with BMI of 40.0-44.9, adult (H)  Comment: The patient's current BMI is: Body mass index is 42.08 kg/(m^2).  Obesity is a biological preventable and treatable disease, which is associated with significantly increased risk of many acute and chronic health conditions. Obesity has now been recognized as a chronic disease by the American Medical Association.  A range of serious co-morbidities are associated with obesity including increased risk for hypertension, stroke, coronary artery disease, dyslipidemia, Type II diabetes, depression, sleep apnea, cancers of the colon, breast and endometrium, obstructive sleep apnea, osteoarthritis and female infertility. Therefore, obesity is not just a problem; it s a disease that warrants serious evidence based treatements.  Recommended  regular aerobic exercise, recommended improved diet aiming at lowering amount of processed foods, lower sugars, and lower wheat products, and moderation carbs and fat in the diet, establishing more regular meal times, always eating breakfast, front loading some of the calories and adding more protein to the diet.    Discussed the increased risks of developing or worsening all types of diabetes and other dysmetabolic syndromes.    Surgical therapy may be considered, especially in patients with BMI greater than or equal to 35 kg/m2 with multiple complications. Surgical treatments include lap-band, gastric sleeve or gastric bypass surgery.     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  discussed issues of bronchial disease/bronchospasm.  Recommended symptomatic treatment with bronchodilator (albuterol) as needed.  Continue to treat any congestion as needed with decongestants, any allergic components with nonsedating antihistamine.  If sx. recur or worsen, may need more chronic/regular medication such as Advair or similar.   Plan: albuterol (PROAIR HFA/PROVENTIL HFA/VENTOLIN         HFA) 108 (90 BASE) MCG/ACT Inhaler            (I10) Benign essential hypertension  Comment: This condition is currently controlled on the current medical regimen.  Continue current therapy.  Discussed hypertension in detail including JNC VIII guidelines for blood pressure goals.  Discussed indication for treatment and treatment options.  Discussed the importance for aggressive management of HTN to prevent vascular complications later.  Recommended lower fat, lower carbohydrate, and lower sodium (<2000 mg)diet.  Discussed required intervals for follow up on HTN, lab studies, and the need to aggresive management of other cardiac disease risk factors.  Recommened pt. follow their blood pressures outside the clinic to ensure that BPs are remaining within guidelines, and to contact me if the  readings are not within guidelines so we can adjust treatment as needed.   Plan:     (E78.5) Hyperlipidemia LDL goal <100  Comment: Discussed new guidelines recommending a statin cholesterol lowering medication for any patient with either diabetes and/or vascular disease, aiming for a LDL goal under 100 for sure, ideally under 70.    Reviewed statins and their side effects including muscle pain, muscle inflammation, GI upset.  Told the patient to stop the medication in question and to call if any side effects develop.   Recommended CoQ10 200-300 mg at the same time as taking the statin medication to help reduce the chance of muscle side effects from the statin.    Plan: fenofibrate 160 MG tablet, pioglitazone (ACTOS)        45 MG tablet                 See Patient Instructions    FRANCHESKA GRUBER M.D., MD  [facility]

## 2017-01-30 NOTE — NURSING NOTE
"Chief Complaint   Patient presents with     Hypertension     review recent labs     Diabetes     review recent labs     Lipids     review recent labs     Cough     X 1.5 weeks       Initial /64 mmHg  Pulse 89  Temp(Src) 97.8  F (36.6  C) (Oral)  Ht 5' 10\" (1.778 m)  Wt 293 lb 4.8 oz (133.04 kg)  BMI 42.08 kg/m2  SpO2 95% Estimated body mass index is 42.08 kg/(m^2) as calculated from the following:    Height as of this encounter: 5' 10\" (1.778 m).    Weight as of this encounter: 293 lb 4.8 oz (133.04 kg).  BP completed using cuff size: large\  Citlaly Carter CMA      "

## 2017-02-13 DIAGNOSIS — E11.9 TYPE 2 DIABETES MELLITUS WITHOUT COMPLICATION, WITHOUT LONG-TERM CURRENT USE OF INSULIN (H): Primary | ICD-10-CM

## 2017-02-13 DIAGNOSIS — I10 BENIGN ESSENTIAL HYPERTENSION: ICD-10-CM

## 2017-02-13 NOTE — TELEPHONE ENCOUNTER
glipizide xl extend rel 24 hr. 10mg         Last Written Prescription Date: 07/13/16  Last Fill Quantity: 90, # refills: 1  Last Office Visit with G, P or [facility] prescribing provider:  01/30/17        BP Readings from Last 3 Encounters:   01/30/17 134/64   07/13/16 132/72   03/10/16 129/78     Lab Results   Component Value Date    MICROL 22 01/20/2017     No results found for: MICROALBUMIN  Creatinine   Date Value Ref Range Status   01/20/2017 1.00 0.66 - 1.25 mg/dL Final   ]  GFR Estimate   Date Value Ref Range Status   01/20/2017 77 >60 mL/min/1.7m2 Final     Comment:     Non  GFR Calc   10/19/2016 >90  Non  GFR Calc   >60 mL/min/1.7m2 Final   01/11/2016 >90  Non  GFR Calc   >60 mL/min/1.7m2 Final     GFR Estimate If Black   Date Value Ref Range Status   01/20/2017 >90   GFR Calc   >60 mL/min/1.7m2 Final   10/19/2016 >90   GFR Calc   >60 mL/min/1.7m2 Final   01/11/2016 >90   GFR Calc   >60 mL/min/1.7m2 Final     Lab Results   Component Value Date    CHOL 109 01/20/2017     Lab Results   Component Value Date    HDL 43 01/20/2017     Lab Results   Component Value Date    LDL 48 01/20/2017    LDL 86 12/30/2013     Lab Results   Component Value Date    TRIG 91 01/20/2017     Lab Results   Component Value Date    CHOLHDLRATIO 2.8 01/26/2015     Lab Results   Component Value Date    AST 38 01/20/2017     Lab Results   Component Value Date    ALT 54 01/20/2017     Lab Results   Component Value Date    A1C 8.7 01/16/2017    A1C 8.2 10/19/2016    A1C 8.7 06/30/2016    A1C 9.3 01/11/2016    A1C 7.7 07/17/2015     Potassium   Date Value Ref Range Status   01/20/2017 4.5 3.4 - 5.3 mmol/L Final

## 2017-02-13 NOTE — TELEPHONE ENCOUNTER
lisinopril 20mg      Last Written Prescription Date: 07/13/16  Last Fill Quantity: 90, # refills: 1  Last Office Visit with G, P or [facility] prescribing provider: 01/30/17       Potassium   Date Value Ref Range Status   01/20/2017 4.5 3.4 - 5.3 mmol/L Final     Creatinine   Date Value Ref Range Status   01/20/2017 1.00 0.66 - 1.25 mg/dL Final     BP Readings from Last 3 Encounters:   01/30/17 134/64   07/13/16 132/72   03/10/16 129/78

## 2017-02-14 RX ORDER — GLIPIZIDE 10 MG/1
10 TABLET, FILM COATED, EXTENDED RELEASE ORAL DAILY
Qty: 90 TABLET | Refills: 0 | Status: SHIPPED | OUTPATIENT
Start: 2017-02-14 | End: 2017-05-30

## 2017-02-14 RX ORDER — LISINOPRIL 20 MG/1
20 TABLET ORAL DAILY
Qty: 90 TABLET | Refills: 3 | Status: SHIPPED | OUTPATIENT
Start: 2017-02-14 | End: 2017-12-22

## 2017-04-21 DIAGNOSIS — E11.9 TYPE 2 DIABETES MELLITUS WITHOUT COMPLICATION, WITHOUT LONG-TERM CURRENT USE OF INSULIN (H): ICD-10-CM

## 2017-04-21 RX ORDER — LIRAGLUTIDE 6 MG/ML
INJECTION SUBCUTANEOUS
Qty: 27 ML | Refills: 0 | Status: SHIPPED | OUTPATIENT
Start: 2017-04-21 | End: 2017-08-04

## 2017-04-21 NOTE — TELEPHONE ENCOUNTER
Medication is being filled for 1 time refill only due to:  Patient needs to be seen next month to check HgbA1C

## 2017-04-21 NOTE — TELEPHONE ENCOUNTER
keisha         Last Written Prescription Date: 01/30/17  Last Fill Quantity: 27mL, # refills: 0  Last Office Visit with G, P or [facility] prescribing provider:  01/30/17        BP Readings from Last 3 Encounters:   01/30/17 134/64   07/13/16 132/72   03/10/16 129/78     Lab Results   Component Value Date    MICROL 22 01/20/2017     Lab Results   Component Value Date    UMALCR 9.37 01/20/2017     Creatinine   Date Value Ref Range Status   01/20/2017 1.00 0.66 - 1.25 mg/dL Final   ]  GFR Estimate   Date Value Ref Range Status   01/20/2017 77 >60 mL/min/1.7m2 Final     Comment:     Non  GFR Calc   10/19/2016 >90  Non  GFR Calc   >60 mL/min/1.7m2 Final   01/11/2016 >90  Non  GFR Calc   >60 mL/min/1.7m2 Final     GFR Estimate If Black   Date Value Ref Range Status   01/20/2017 >90   GFR Calc   >60 mL/min/1.7m2 Final   10/19/2016 >90   GFR Calc   >60 mL/min/1.7m2 Final   01/11/2016 >90   GFR Calc   >60 mL/min/1.7m2 Final     Lab Results   Component Value Date    CHOL 109 01/20/2017     Lab Results   Component Value Date    HDL 43 01/20/2017     Lab Results   Component Value Date    LDL 48 01/20/2017     Lab Results   Component Value Date    TRIG 91 01/20/2017     Lab Results   Component Value Date    CHOLHDLRATIO 2.8 01/26/2015     Lab Results   Component Value Date    AST 38 01/20/2017     Lab Results   Component Value Date    ALT 54 01/20/2017     Lab Results   Component Value Date    A1C 8.7 01/16/2017    A1C 8.2 10/19/2016    A1C 8.7 06/30/2016    A1C 9.3 01/11/2016    A1C 7.7 07/17/2015     Potassium   Date Value Ref Range Status   01/20/2017 4.5 3.4 - 5.3 mmol/L Final

## 2017-05-15 DIAGNOSIS — E11.9 TYPE 2 DIABETES MELLITUS WITHOUT COMPLICATION, WITHOUT LONG-TERM CURRENT USE OF INSULIN (H): ICD-10-CM

## 2017-05-15 LAB
ANION GAP SERPL CALCULATED.3IONS-SCNC: 11 MMOL/L (ref 3–14)
BUN SERPL-MCNC: 15 MG/DL (ref 7–30)
CALCIUM SERPL-MCNC: 8.9 MG/DL (ref 8.5–10.1)
CHLORIDE SERPL-SCNC: 108 MMOL/L (ref 94–109)
CO2 SERPL-SCNC: 24 MMOL/L (ref 20–32)
CREAT SERPL-MCNC: 0.85 MG/DL (ref 0.66–1.25)
GFR SERPL CREATININE-BSD FRML MDRD: ABNORMAL ML/MIN/1.7M2
GLUCOSE SERPL-MCNC: 224 MG/DL (ref 70–99)
HBA1C MFR BLD: 9.3 % (ref 4.3–6)
POTASSIUM SERPL-SCNC: 4 MMOL/L (ref 3.4–5.3)
SODIUM SERPL-SCNC: 143 MMOL/L (ref 133–144)

## 2017-05-15 PROCEDURE — 83036 HEMOGLOBIN GLYCOSYLATED A1C: CPT | Performed by: INTERNAL MEDICINE

## 2017-05-15 PROCEDURE — 36415 COLL VENOUS BLD VENIPUNCTURE: CPT | Performed by: INTERNAL MEDICINE

## 2017-05-15 PROCEDURE — 80048 BASIC METABOLIC PNL TOTAL CA: CPT | Performed by: INTERNAL MEDICINE

## 2017-05-18 DIAGNOSIS — E78.5 HYPERLIPIDEMIA LDL GOAL <100: ICD-10-CM

## 2017-05-18 DIAGNOSIS — E11.9 TYPE 2 DIABETES MELLITUS WITHOUT COMPLICATION, WITHOUT LONG-TERM CURRENT USE OF INSULIN (H): ICD-10-CM

## 2017-05-18 RX ORDER — PIOGLITAZONEHYDROCHLORIDE 45 MG/1
45 TABLET ORAL DAILY
Qty: 30 TABLET | Refills: 0 | Status: SHIPPED | OUTPATIENT
Start: 2017-05-18 | End: 2017-06-11

## 2017-05-18 RX ORDER — FENOFIBRATE 160 MG/1
TABLET ORAL
Qty: 90 TABLET | Refills: 2 | Status: SHIPPED | OUTPATIENT
Start: 2017-05-18 | End: 2017-12-22

## 2017-05-18 NOTE — TELEPHONE ENCOUNTER
metformin         Last Written Prescription Date: 01/30/17  Last Fill Quantity: 180, # refills: 0  Last Office Visit with [facility], [facility] or  [facility] prescribing provider:  01/30/17        BP Readings from Last 3 Encounters:   01/30/17 134/64   07/13/16 132/72   03/10/16 129/78     Lab Results   Component Value Date    MICROL 22 01/20/2017     Lab Results   Component Value Date    UMALCR 9.37 01/20/2017     Creatinine   Date Value Ref Range Status   05/15/2017 0.85 0.66 - 1.25 mg/dL Final   ]  GFR Estimate   Date Value Ref Range Status   05/15/2017 >90  Non  GFR Calc   >60 mL/min/1.7m2 Final   01/20/2017 77 >60 mL/min/1.7m2 Final     Comment:     Non  GFR Calc   10/19/2016 >90  Non  GFR Calc   >60 mL/min/1.7m2 Final     GFR Estimate If Black   Date Value Ref Range Status   05/15/2017 >90   GFR Calc   >60 mL/min/1.7m2 Final   01/20/2017 >90   GFR Calc   >60 mL/min/1.7m2 Final   10/19/2016 >90   GFR Calc   >60 mL/min/1.7m2 Final     Lab Results   Component Value Date    CHOL 109 01/20/2017     Lab Results   Component Value Date    HDL 43 01/20/2017     Lab Results   Component Value Date    LDL 48 01/20/2017     Lab Results   Component Value Date    TRIG 91 01/20/2017     Lab Results   Component Value Date    CHOLHDLRATIO 2.8 01/26/2015     Lab Results   Component Value Date    AST 38 01/20/2017     Lab Results   Component Value Date    ALT 54 01/20/2017     Lab Results   Component Value Date    A1C 9.3 05/15/2017    A1C 8.7 01/16/2017    A1C 8.2 10/19/2016    A1C 8.7 06/30/2016    A1C 9.3 01/11/2016     Potassium   Date Value Ref Range Status   05/15/2017 4.0 3.4 - 5.3 mmol/L Final     fenofibrate       Last Written Prescription Date: 01/30/17  Last Fill Quantity: 90, # refills: 0    Last Office Visit with [facility], [facility] or  [facility] prescribing provider:  01/30/17   Future Office Visit:  0    Cholesterol   Date Value Ref Range Status    01/20/2017 109 <200 mg/dL Final     HDL Cholesterol   Date Value Ref Range Status   01/20/2017 43 >39 mg/dL Final     LDL Cholesterol Calculated   Date Value Ref Range Status   01/20/2017 48 <100 mg/dL Final     Comment:     Desirable:       <100 mg/dl     Triglycerides   Date Value Ref Range Status   01/20/2017 91 <150 mg/dL Final     Cholesterol/HDL Ratio   Date Value Ref Range Status   01/26/2015 2.8 0.0 - 5.0 Final     ALT   Date Value Ref Range Status   01/20/2017 54 0 - 70 U/L Final        pioglitazone         Last Written Prescription Date: 01/30/17  Last Fill Quantity: 90, # refills: 0  Last Office Visit with [facility] or [facility] prescribing provider:  01/30/17        BP Readings from Last 3 Encounters:   01/30/17 134/64   07/13/16 132/72   03/10/16 129/78     Lab Results   Component Value Date    MICROL 22 01/20/2017     Lab Results   Component Value Date    UMALCR 9.37 01/20/2017     Creatinine   Date Value Ref Range Status   05/15/2017 0.85 0.66 - 1.25 mg/dL Final   ]  GFR Estimate   Date Value Ref Range Status   05/15/2017 >90  Non  GFR Calc   >60 mL/min/1.7m2 Final   01/20/2017 77 >60 mL/min/1.7m2 Final     Comment:     Non  GFR Calc   10/19/2016 >90  Non  GFR Calc   >60 mL/min/1.7m2 Final     GFR Estimate If Black   Date Value Ref Range Status   05/15/2017 >90   GFR Calc   >60 mL/min/1.7m2 Final   01/20/2017 >90   GFR Calc   >60 mL/min/1.7m2 Final   10/19/2016 >90   GFR Calc   >60 mL/min/1.7m2 Final     Lab Results   Component Value Date    CHOL 109 01/20/2017     Lab Results   Component Value Date    HDL 43 01/20/2017     Lab Results   Component Value Date    LDL 48 01/20/2017     Lab Results   Component Value Date    TRIG 91 01/20/2017     Lab Results   Component Value Date    CHOLHDLRATIO 2.8 01/26/2015     Lab Results   Component Value Date    AST 38 01/20/2017     Lab Results   Component Value Date     ALT 54 01/20/2017     Lab Results   Component Value Date    A1C 9.3 05/15/2017    A1C 8.7 01/16/2017    A1C 8.2 10/19/2016    A1C 8.7 06/30/2016    A1C 9.3 01/11/2016     Potassium   Date Value Ref Range Status   05/15/2017 4.0 3.4 - 5.3 mmol/L Final

## 2017-05-18 NOTE — TELEPHONE ENCOUNTER
Metformin  Medication is being filled for 1 time refill only due to:  pt is due for 6 month follow-up. for diabetes.    Fenofibrate  Prescription approved per [facility] [place] [facility] Refill Protocol.    Pioglitazone  Medication is being filled for 1 time refill only due to:  pt is due for 6 month follow-up. for diabetes.

## 2017-05-30 DIAGNOSIS — E11.9 TYPE 2 DIABETES MELLITUS WITHOUT COMPLICATION, WITHOUT LONG-TERM CURRENT USE OF INSULIN (H): ICD-10-CM

## 2017-05-31 RX ORDER — GLIPIZIDE 10 MG/1
TABLET, EXTENDED RELEASE ORAL
Qty: 90 TABLET | Refills: 0 | Status: SHIPPED | OUTPATIENT
Start: 2017-05-31 | End: 2017-08-04

## 2017-05-31 NOTE — TELEPHONE ENCOUNTER
glipizide         Last Written Prescription Date: 02/14/17  Last Fill Quantity: 90, # refills: 0  Last Office Visit with G, [facility] or [facility] prescribing provider:  01/30/17        BP Readings from Last 3 Encounters:   01/30/17 134/64   07/13/16 132/72   03/10/16 129/78     Lab Results   Component Value Date    MICROL 22 01/20/2017     Lab Results   Component Value Date    UMALCR 9.37 01/20/2017     Creatinine   Date Value Ref Range Status   05/15/2017 0.85 0.66 - 1.25 mg/dL Final   ]  GFR Estimate   Date Value Ref Range Status   05/15/2017 >90  Non  GFR Calc   >60 mL/min/1.7m2 Final   01/20/2017 77 >60 mL/min/1.7m2 Final     Comment:     Non  GFR Calc   10/19/2016 >90  Non  GFR Calc   >60 mL/min/1.7m2 Final     GFR Estimate If Black   Date Value Ref Range Status   05/15/2017 >90   GFR Calc   >60 mL/min/1.7m2 Final   01/20/2017 >90   GFR Calc   >60 mL/min/1.7m2 Final   10/19/2016 >90   GFR Calc   >60 mL/min/1.7m2 Final     Lab Results   Component Value Date    CHOL 109 01/20/2017     Lab Results   Component Value Date    HDL 43 01/20/2017     Lab Results   Component Value Date    LDL 48 01/20/2017     Lab Results   Component Value Date    TRIG 91 01/20/2017     Lab Results   Component Value Date    CHOLHDLRATIO 2.8 01/26/2015     Lab Results   Component Value Date    AST 38 01/20/2017     Lab Results   Component Value Date    ALT 54 01/20/2017     Lab Results   Component Value Date    A1C 9.3 05/15/2017    A1C 8.7 01/16/2017    A1C 8.2 10/19/2016    A1C 8.7 06/30/2016    A1C 9.3 01/11/2016     Potassium   Date Value Ref Range Status   05/15/2017 4.0 3.4 - 5.3 mmol/L Final

## 2017-06-11 DIAGNOSIS — E11.9 TYPE 2 DIABETES MELLITUS WITHOUT COMPLICATION, WITHOUT LONG-TERM CURRENT USE OF INSULIN (H): Primary | ICD-10-CM

## 2017-06-11 DIAGNOSIS — E78.5 HYPERLIPIDEMIA LDL GOAL <100: ICD-10-CM

## 2017-06-11 NOTE — TELEPHONE ENCOUNTER
pioglitazone (ACTOS) 45 MG tablet         Last Written Prescription Date: 05/18/17  Last Fill Quantity: 30, # refills: 0  Last Office Visit with G, [facility] or [facility] prescribing provider:  01/30/17        BP Readings from Last 3 Encounters:   01/30/17 134/64   07/13/16 132/72   03/10/16 129/78     Lab Results   Component Value Date    MICROL 22 01/20/2017     Lab Results   Component Value Date    UMALCR 9.37 01/20/2017     Creatinine   Date Value Ref Range Status   05/15/2017 0.85 0.66 - 1.25 mg/dL Final   ]  GFR Estimate   Date Value Ref Range Status   05/15/2017 >90  Non  GFR Calc   >60 mL/min/1.7m2 Final   01/20/2017 77 >60 mL/min/1.7m2 Final     Comment:     Non  GFR Calc   10/19/2016 >90  Non  GFR Calc   >60 mL/min/1.7m2 Final     GFR Estimate If Black   Date Value Ref Range Status   05/15/2017 >90   GFR Calc   >60 mL/min/1.7m2 Final   01/20/2017 >90   GFR Calc   >60 mL/min/1.7m2 Final   10/19/2016 >90   GFR Calc   >60 mL/min/1.7m2 Final     Lab Results   Component Value Date    CHOL 109 01/20/2017     Lab Results   Component Value Date    HDL 43 01/20/2017     Lab Results   Component Value Date    LDL 48 01/20/2017     Lab Results   Component Value Date    TRIG 91 01/20/2017     Lab Results   Component Value Date    CHOLHDLRATIO 2.8 01/26/2015     Lab Results   Component Value Date    AST 38 01/20/2017     Lab Results   Component Value Date    ALT 54 01/20/2017     Lab Results   Component Value Date    A1C 9.3 05/15/2017    A1C 8.7 01/16/2017    A1C 8.2 10/19/2016    A1C 8.7 06/30/2016    A1C 9.3 01/11/2016     Potassium   Date Value Ref Range Status   05/15/2017 4.0 3.4 - 5.3 mmol/L Final

## 2017-06-11 NOTE — LETTER
[facility]  [address]  [address]  [phone]          Neymar Rhodes  [address] [facility] [address]      6/13/2017        Dear Mr. Neymar Rhodes:    In processing your recent request for a refill of Pioglitazone, I noticed that I have not seen you in an appointment since January 2017.  I sent you a letter about the abnormal labs in May and requested that you return to see me to review your diabetes.  Please return to see me.  I sent a one month prescription to your pharmacy, but will need to see you again before I can provide future refills.  Please return to see me in the next month or so, sooner if needed.  Call [phone] to schedule this appointment or schedule on [employer].      If you have any further questions or problems, please contact me via our nurse line at [phone].     Sincerely,          Jefry Harris M.D.  Department of Internal Medicine  [facility]

## 2017-06-13 RX ORDER — PIOGLITAZONEHYDROCHLORIDE 45 MG/1
45 TABLET ORAL DAILY
Qty: 30 TABLET | Refills: 0 | Status: SHIPPED | OUTPATIENT
Start: 2017-06-13 | End: 2017-07-14

## 2017-06-13 NOTE — TELEPHONE ENCOUNTER
1 month prescription sent electronically to the specified pharmacy.  LAST REFILL WITHOUT AN APPOINTMENT     Patient sent letter about ppoorly controlled DM in May, but has not returned yet.   LEtter sent again.

## 2017-07-14 DIAGNOSIS — E11.9 TYPE 2 DIABETES MELLITUS WITHOUT COMPLICATION, WITHOUT LONG-TERM CURRENT USE OF INSULIN (H): ICD-10-CM

## 2017-07-14 RX ORDER — PIOGLITAZONEHYDROCHLORIDE 45 MG/1
TABLET ORAL
Qty: 30 TABLET | Refills: 0 | Status: SHIPPED | OUTPATIENT
Start: 2017-07-14 | End: 2017-08-04

## 2017-07-14 NOTE — LETTER
[facility]  [address]  [address]  [phone]          Neymar Rhodes  [address] [facility] [address]      7/14/2017        Dear Mr. Neymar Rhodes:    In processing your recent request for a refill of Pioglitazone and Metformin, I noticed that I have not seen you in an appointment since January 2017.  Your labs from May 2017 showed that your diabetes was NOT under good control. You need to return to see me to discuss this further and make changes in your diabetic regimen.  I sent a one month prescription to your pharmacy, but will need to see you again before I can provide future refills.  Please return to see me in the next month or so, sooner if needed.  Call [phone] to schedule this appointment or schedule on [employer].  .     If you have any further questions or problems, please contact me via our nurse line at [phone].     Sincerely,          Jefry Harris M.D.  Department of Internal Medicine  [facility]

## 2017-07-14 NOTE — PATIENT INSTRUCTIONS
Please call the patient.     1 month prescription sent electronically to the specified pharmacy.  LAST REFILL WITHOUT AN APPOINTMENT     He needs to return to see me to discuss his diabetes, which is not under good control.     Letter sent

## 2017-07-14 NOTE — TELEPHONE ENCOUNTER
"Routing refill request to provider for review/approval because:  Patient needs to be seen.  Last appointment was cancelled.  Noted on last refill \"last refill without appointment.\"        "

## 2017-07-14 NOTE — TELEPHONE ENCOUNTER
pioglitazone         Last Written Prescription Date: 06/13/17  Last Fill Quantity: 30, # refills: 0  [facility] prescribing provider:  01/30/17        BP Readings from Last 3 Encounters:   01/30/17 134/64   07/13/16 132/72   03/10/16 129/78     Lab Results   Component Value Date    MICROL 22 01/20/2017     Lab Results   Component Value Date    UMALCR 9.37 01/20/2017     Creatinine   Date Value Ref Range Status   05/15/2017 0.85 0.66 - 1.25 mg/dL Final   ]  GFR Estimate   Date Value Ref Range Status   05/15/2017 >90  Non  GFR Calc   >60 mL/min/1.7m2 Final   01/20/2017 77 >60 mL/min/1.7m2 Final     Comment:     Non  GFR Calc   10/19/2016 >90  Non  GFR Calc   >60 mL/min/1.7m2 Final     GFR Estimate If Black   Date Value Ref Range Status   05/15/2017 >90   GFR Calc   >60 mL/min/1.7m2 Final   01/20/2017 >90   GFR Calc   >60 mL/min/1.7m2 Final   10/19/2016 >90   GFR Calc   >60 mL/min/1.7m2 Final     Lab Results   Component Value Date    CHOL 109 01/20/2017     Lab Results   Component Value Date    HDL 43 01/20/2017     Lab Results   Component Value Date    LDL 48 01/20/2017     Lab Results   Component Value Date    TRIG 91 01/20/2017     Lab Results   Component Value Date    CHOLHDLRATIO 2.8 01/26/2015     Lab Results   Component Value Date    AST 38 01/20/2017     Lab Results   Component Value Date    ALT 54 01/20/2017     Lab Results   Component Value Date    A1C 9.3 05/15/2017    A1C 8.7 01/16/2017    A1C 8.2 10/19/2016    A1C 8.7 06/30/2016    A1C 9.3 01/11/2016     Potassium   Date Value Ref Range Status   05/15/2017 4.0 3.4 - 5.3 mmol/L Final     metformin         Last Written Prescription Date: 06/20/17  Last Fill Quantity: 60, # refills: 0  Last Office [facility] or [facility] prescribing provider:  01/30/17        BP Readings from Last 3 Encounters:   01/30/17 134/64   07/13/16  132/72   03/10/16 129/78     Lab Results   Component Value Date    MICROL 22 01/20/2017     Lab Results   Component Value Date    UMALCR 9.37 01/20/2017     Creatinine   Date Value Ref Range Status   05/15/2017 0.85 0.66 - 1.25 mg/dL Final   ]  GFR Estimate   Date Value Ref Range Status   05/15/2017 >90  Non  GFR Calc   >60 mL/min/1.7m2 Final   01/20/2017 77 >60 mL/min/1.7m2 Final     Comment:     Non  GFR Calc   10/19/2016 >90  Non  GFR Calc   >60 mL/min/1.7m2 Final     GFR Estimate If Black   Date Value Ref Range Status   05/15/2017 >90   GFR Calc   >60 mL/min/1.7m2 Final   01/20/2017 >90   GFR Calc   >60 mL/min/1.7m2 Final   10/19/2016 >90   GFR Calc   >60 mL/min/1.7m2 Final     Lab Results   Component Value Date    CHOL 109 01/20/2017     Lab Results   Component Value Date    HDL 43 01/20/2017     Lab Results   Component Value Date    LDL 48 01/20/2017     Lab Results   Component Value Date    TRIG 91 01/20/2017     Lab Results   Component Value Date    CHOLHDLRATIO 2.8 01/26/2015     Lab Results   Component Value Date    AST 38 01/20/2017     Lab Results   Component Value Date    ALT 54 01/20/2017     Lab Results   Component Value Date    A1C 9.3 05/15/2017    A1C 8.7 01/16/2017    A1C 8.2 10/19/2016    A1C 8.7 06/30/2016    A1C 9.3 01/11/2016     Potassium   Date Value Ref Range Status   05/15/2017 4.0 3.4 - 5.3 mmol/L Final

## 2017-07-30 DIAGNOSIS — E11.9 TYPE 2 DIABETES MELLITUS WITHOUT COMPLICATION, WITHOUT LONG-TERM CURRENT USE OF INSULIN (H): ICD-10-CM

## 2017-08-01 ENCOUNTER — OFFICE VISIT (OUTPATIENT)
Dept: URGENT CARE | Facility: URGENT CARE | Age: 57
End: 2017-08-01
Payer: COMMERCIAL

## 2017-08-01 VITALS
SYSTOLIC BLOOD PRESSURE: 142 MMHG | BODY MASS INDEX: 41.21 KG/M2 | OXYGEN SATURATION: 98 % | WEIGHT: 287.2 LBS | HEART RATE: 70 BPM | TEMPERATURE: 98 F | DIASTOLIC BLOOD PRESSURE: 90 MMHG

## 2017-08-01 DIAGNOSIS — E11.9 TYPE 2 DIABETES MELLITUS WITHOUT COMPLICATION, WITHOUT LONG-TERM CURRENT USE OF INSULIN (H): ICD-10-CM

## 2017-08-01 DIAGNOSIS — K12.0 APHTHOUS ULCER OF MOUTH: Primary | ICD-10-CM

## 2017-08-01 PROCEDURE — 99214 OFFICE O/P EST MOD 30 MIN: CPT | Performed by: PHYSICIAN ASSISTANT

## 2017-08-01 RX ORDER — TRAMADOL HYDROCHLORIDE 50 MG/1
50-100 TABLET ORAL EVERY 6 HOURS PRN
Qty: 10 TABLET | Refills: 0 | Status: SHIPPED | OUTPATIENT
Start: 2017-08-01 | End: 2017-08-04

## 2017-08-01 NOTE — PROGRESS NOTES
SUBJECTIVE:   Neymar Rhodes is a 57 year old male presenting with a chief complaint of:  1) a painful sore in his mouth for the past week, worsening in the past few days.  Denies any trauma.    No fevers.    States that he was seen by his dentist yesterday.  Films were done and he was told it was not a dental issue.   2) elevated HgA1c 9.3 5/2017   Onset of symptoms was as above.  Course of illness is worsening.    Severity moderate  Current and Associated symptoms: as above  Treatment measures tried include salt water gargles.  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    Past Medical History:   Diagnosis Date     Diverticulosis of colon (without mention of hemorrhage)      Essential hypertension, benign      Gout, unspecified      Morbid obesity (H)      Other and unspecified hyperlipidemia      Type II or unspecified type diabetes mellitus without mention of complication, not stated as uncontrolled      Patient Active Problem List   Diagnosis     Diverticulosis of large intestine     Benign essential hypertension     Gout     Morbid obesity with BMI of 40.0-44.9, adult (H)     Type 2 diabetes mellitus without complication, without long-term current use of insulin (H)     HYPERLIPIDEMIA LDL GOAL <100     Social History   Substance Use Topics     Smoking status: Never Smoker     Smokeless tobacco: Never Used     Alcohol use Yes      Comment: 1 glass of beer on weekend       ROS:  CONSTITUTIONAL:NEGATIVE for fever, chills, change in weight  INTEGUMENTARY/SKIN: NEGATIVE for worrisome rashes, moles or lesions  ENT/MOUTH: as per HPI  RESP:NEGATIVE for significant cough or SOB  CV: NEGATIVE for chest pain, palpitations or peripheral edema    OBJECTIVE  :/90 (BP Location: Left arm, Patient Position: Chair, Cuff Size: Adult Regular)  Pulse 70  Temp 98  F (36.7  C) (Oral)  Wt 287 lb 3.2 oz (130.3 kg)  SpO2 98%  BMI 41.21 kg/m2  GENERAL APPEARANCE: healthy, alert  and no distress  HENT: ear canals and TM's normal.  Nose without erythema or lesions.  OP: shallow ulcerated lesion on left lower buccal mucosa 7mm x 3mm.    NECK: supple, with tender swelling at left jaw line approximately 1cm in diameter.    No other lymphadenopathy  SKIN: no suspicious lesions or rashes    (K12.0) Aphthous ulcer of mouth  (primary encounter diagnosis)  Comment:   Plan: traMADol (ULTRAM) 50 MG tablet        Avoid acidic foods.    Silver Nitrate treatment done today.   Follow up with dentist should symptoms persist or worsen.     (E11.9) Type 2 diabetes mellitus without complication, without long-term current use of insulin (H)  Comment: poorly controlled at this time, last HgA1c was 9.3 in May 2017  Plan: Follow up with Dr. Harris.  Nurse in  to make appointment for you.

## 2017-08-01 NOTE — MR AVS SNAPSHOT
After Visit Summary   8/1/2017    Neymar Rhodes    MRN: [id number]           Patient Information     Date Of Birth          1960        Visit Information        Provider Department      8/1/2017 9:20 AM Tosha Qiu PA-C [facility]        Today's Diagnoses     Aphthous ulcer of mouth    -  1    Type 2 diabetes mellitus without complication, without long-term current use of insulin (H)          Care Instructions    (K12.0) Aphthous ulcer of mouth  (primary encounter diagnosis)  Comment:   Plan: traMADol (ULTRAM) 50 MG tablet        Avoid acidic foods.    Silver Nitrate treatment done today.   Follow up with dentist should symptoms persist or worsen.     (E11.9) Type 2 diabetes mellitus without complication, without long-term current use of insulin (H)  Comment: poorly controlled at this time, last HgA1c was 9.3 in May 2017  Plan: Follow up with Dr. Harris.  Nurse in  to make appointment for you.                   Follow-ups after your visit        Who to contact     If you have questions or need follow up information about today's clinic visit or your schedule please contact [facility] directly at [phone].  Normal or non-critical lab and imaging results will be communicated to you by MyChart, letter or phone within 4 business days after the clinic has received the results. If you do not hear from us within 7 days, please contact the clinic through [employer] or phone. If you have a critical or abnormal lab result, we will notify you by phone as soon as possible.  Submit refill requests through [employer] or call your pharmacy and they will forward the refill request to us. Please allow 3 business days for your refill to be completed.          Additional Information About Your Visit        MyChart Information     [employer] lets you send messages to your doctor, view your test results, renew your prescriptions, schedule  "appointments and more. To sign up, go to [email or web address] . Click on \"Log in\" on the left side of the screen, which will take you to the Welcome page. Then click on \"Sign up Now\" on the right side of the page.     You will be asked to enter the access code listed below, as well as some personal information. Please follow the directions to create your username and password.     Your access code is: [email or web address]  Expires: 10/30/2017  9:57 AM     Your access code will  in 90 days. If you need help or a new code, please call your [place] clinic or [phone].        Care EveryWhere ID     This is your Care EveryWhere ID. This could be used by other organizations to access your [place] medical records  [id number]        Your Vitals Were     Pulse Temperature Pulse Oximetry BMI (Body Mass Index)          70 98  F (36.7  C) (Oral) 98% 41.21 kg/m2         Blood Pressure from Last 3 Encounters:   17 142/90   17 134/64   16 132/72    Weight from Last 3 Encounters:   17 287 lb 3.2 oz (130.3 kg)   17 293 lb 4.8 oz (133 kg)   16 294 lb 1.6 oz (133.4 kg)              We Performed the Following     Nursing Communication 1          Today's Medication Changes          These changes are accurate as of: 17  9:57 AM.  If you have any questions, ask your nurse or doctor.               Start taking these medicines.        Dose/Directions    traMADol 50 MG tablet   Commonly known as:  ULTRAM   Used for:  Aphthous ulcer of mouth   Started by:  Tosha Qiu PA-C        Dose:   mg   Take 1-2 tablets ( mg) by mouth every 6 hours as needed for pain maximum 2 tablet(s) per day   Quantity:  10 tablet   Refills:  0            Where to get your medicines      Some of these will need a paper prescription and others can be bought over the counter.  Ask your nurse if you have questions.     Bring a paper prescription for each of these medications     traMADol 50 MG " tablet                Primary Care Provider Office Phone # Fax #    Jefry Silvio Harris [phone]       [facility] [address] [facility] [address]        Equal Access to Services     WAYNE HERNANDES : Garuav lerma gweno Soewaali, waaxda luqadaha, qaybta kaalmada adeegyada, roberto montes mdaelin nolan. So [facility] [phone].    ATENCIÓN: Si habla español, tiene a garcia disposición servicios gratuitos de asistencia lingüística. Llame al [phone].    We comply with applicable federal civil rights laws and [place] laws. We do not discriminate on the basis of race, color, national origin, age, disability sex, sexual orientation or gender identity.            Thank you!     Thank you for choosing [facility]  for your care. Our goal is always to provide you with excellent care. Hearing back from our patients is one way we can continue to improve our services. Please take a few minutes to complete the written survey that you may receive in the mail after your visit with us. Thank you!             Your Updated Medication List - Protect others around you: Learn how to safely use, store and throw away your medicines at www.disposemymeds.org.          This list is accurate as of: 8/1/17  9:57 AM.  Always use your most recent med list.                   Brand Name Dispense Instructions for use Diagnosis    albuterol 108 (90 BASE) MCG/ACT Inhaler    PROAIR HFA/PROVENTIL HFA/VENTOLIN HFA    1 Inhaler    Inhale 2 puffs into the lungs every 4 hours as needed for shortness of breath / dyspnea or wheezing    Post-infection bronchospasm       aspirin 81 MG tablet      1 tab po QD (Once per day)    Essential hypertension, benign, Type II or unspecified type diabetes mellitus without mention of complication, not stated as uncontrolled, Type II or unspecified type diabetes mellitus without mention of complication, not stated as uncontrolled, Other and  unspecified hyperlipidemia, Other and unspecified hyperlipidemia       atorvastatin 80 MG tablet    LIPITOR    90 tablet    Take 0.5 tablets (40 mg) by mouth At Bedtime    Type 2 diabetes mellitus without complication (H), Hyperlipidemia LDL goal <100       * blood glucose calibration solution    no brand specified    1 Bottle    1 drop by Test Solution route every 30 days.    Type 2 diabetes, HbA1c goal < 7% (H)       * blood glucose calibration solution    no brand specified    1 each    Use to calibrate blood glucose monitor as directed; ONE TOUCH    Type 2 diabetes mellitus without complication (H)       * blood glucose monitoring meter device kit    no brand specified    1 kit    1 each daily.    Type 2 diabetes, HbA1c goal < 7% (H)       * blood glucose monitoring meter device kit     1 kit    Use to test blood sugar 1-3 times daily or as directed.    Type 2 diabetes mellitus without complication (H)       * blood glucose monitoring test strip    no brand specified    1 Box    by In Vitro route 2 times daily. Dispense whatever diabetic testing supplies as directed by formulary    Type 2 diabetes, HbA1c goal < 7% (H)       * blood glucose monitoring test strip    ACCU-CHEK LUL PLUS    100 each    Use to test blood sugar 1-3 times daily or as directed.    Type 2 diabetes mellitus without complication (H)       * DIABETIC STERILE LANCETS device     1 Box    1 Device 2 times daily.    Type 2 diabetes, HbA1c goal < 7% (H)       * blood glucose monitoring lancets     1 Box    Use to test blood sugar 1-3 times daily or as directed.    Type 2 diabetes mellitus without complication (H)       * blood glucose monitoring lancets     1 Box    Use to test blood sugar 1-3 times daily or as directed.    Type 2 diabetes mellitus without complication (H)       fenofibrate 160 MG tablet     90 tablet    TAKE 1 TABLET BY MOUTH DAILY    Hyperlipidemia LDL goal <100, Type 2 diabetes mellitus without complication, without  "long-term current use of insulin (H)       glipiZIDE XL 10 MG 24 hr tablet   Generic drug:  glipiZIDE     90 tablet    TAKE ONE TABLET BY MOUTH ONE TIME DAILY    Type 2 diabetes mellitus without complication, without long-term current use of insulin (H)       indomethacin 25 MG capsule    INDOCIN    30 capsule    Take 1 capsule by mouth 2 times daily as needed. Take with meals    Gout, unspecified       ketoconazole 2 % cream    NIZORAL    30 g    Apply topically 2 times daily Apply sparingly once or twice per day as needed to affected area until the skin is better, then stop    Tinea of the body       lisinopril 20 MG tablet    PRINIVIL/ZESTRIL    90 tablet    Take 1 tablet (20 mg) by mouth daily    Benign essential hypertension, Type 2 diabetes mellitus without complication, without long-term current use of insulin (H)       metFORMIN 1000 MG tablet    GLUCOPHAGE    60 tablet    Take 1 tablet (1,000 mg) by mouth 2 times daily (with meals) LAST REFILL WITHOUT AN APPOINTMENT    Type 2 diabetes mellitus without complication, without long-term current use of insulin (H)       * pen needles 5/16\" 31G X 8 MM Misc     100 each    1 each daily.    Type 2 diabetes, HbA1c goal < 7% (H)       * insulin pen needle 29G X 12.7MM    BD ULTRA-FINE    100 each    1 each 2 times daily or as directed.    Type 2 diabetes mellitus without complication (H)       pioglitazone 45 MG tablet    ACTOS    30 tablet    TAKE ONE TABLET BY MOUTH ONE TIME DAILY **last refill without an appointment**    Type 2 diabetes mellitus without complication, without long-term current use of insulin (H)       traMADol 50 MG tablet    ULTRAM    10 tablet    Take 1-2 tablets ( mg) by mouth every 6 hours as needed for pain maximum 2 tablet(s) per day    Aphthous ulcer of mouth       triamcinolone 0.5 % cream    KENALOG    30 g    Apply sparingly once or twice per day as needed to affected area until the skin is better, then stop    Eczema, unspecified " eczema       VICTOZA PEN 18 MG/3ML soln   Generic drug:  liraglutide     27 mL    INJECT 1.8 MG SUBCUTANEOUSLY DAILY    Type 2 diabetes mellitus without complication, without long-term current use of insulin (H)       * Notice:  This list has 11 medication(s) that are the same as other medications prescribed for you. Read the directions carefully, and ask your doctor or other care provider to review them with you.

## 2017-08-01 NOTE — TELEPHONE ENCOUNTER
keisha         Last Written Prescription Date: 04/21/17  Last Fill Quantity: 27mL, # refills: 0  Last Office Visit with G, P or [facility] prescribing provider:  01/30/17        BP Readings from Last 3 Encounters:   01/30/17 134/64   07/13/16 132/72   03/10/16 129/78     Lab Results   Component Value Date    MICROL 22 01/20/2017     Lab Results   Component Value Date    UMALCR 9.37 01/20/2017     Creatinine   Date Value Ref Range Status   05/15/2017 0.85 0.66 - 1.25 mg/dL Final   ]  GFR Estimate   Date Value Ref Range Status   05/15/2017 >90  Non  GFR Calc   >60 mL/min/1.7m2 Final   01/20/2017 77 >60 mL/min/1.7m2 Final     Comment:     Non  GFR Calc   10/19/2016 >90  Non  GFR Calc   >60 mL/min/1.7m2 Final     GFR Estimate If Black   Date Value Ref Range Status   05/15/2017 >90   GFR Calc   >60 mL/min/1.7m2 Final   01/20/2017 >90   GFR Calc   >60 mL/min/1.7m2 Final   10/19/2016 >90   GFR Calc   >60 mL/min/1.7m2 Final     Lab Results   Component Value Date    CHOL 109 01/20/2017     Lab Results   Component Value Date    HDL 43 01/20/2017     Lab Results   Component Value Date    LDL 48 01/20/2017     Lab Results   Component Value Date    TRIG 91 01/20/2017     Lab Results   Component Value Date    CHOLHDLRATIO 2.8 01/26/2015     Lab Results   Component Value Date    AST 38 01/20/2017     Lab Results   Component Value Date    ALT 54 01/20/2017     Lab Results   Component Value Date    A1C 9.3 05/15/2017    A1C 8.7 01/16/2017    A1C 8.2 10/19/2016    A1C 8.7 06/30/2016    A1C 9.3 01/11/2016     Potassium   Date Value Ref Range Status   05/15/2017 4.0 3.4 - 5.3 mmol/L Final

## 2017-08-01 NOTE — PATIENT INSTRUCTIONS
(K12.0) Aphthous ulcer of mouth  (primary encounter diagnosis)  Comment:   Plan: traMADol (ULTRAM) 50 MG tablet        Avoid acidic foods.    Silver Nitrate treatment done today.   Follow up with dentist should symptoms persist or worsen.     (E11.9) Type 2 diabetes mellitus without complication, without long-term current use of insulin (H)  Comment: poorly controlled at this time, last HgA1c was 9.3 in May 2017  Plan: Follow up with Dr. Harris.  Nurse in  to make appointment for you.

## 2017-08-01 NOTE — NURSING NOTE
"Chief Complaint   Patient presents with     Jaw Pain     pt states left side jaw swelling, pain and numbness 5x days        Initial /90 (BP Location: Left arm, Patient Position: Chair, Cuff Size: Adult Regular)  Pulse 70  Temp 98  F (36.7  C) (Oral)  Wt 287 lb 3.2 oz (130.3 kg)  SpO2 98%  BMI 41.21 kg/m2 Estimated body mass index is 41.21 kg/(m^2) as calculated from the following:    Height as of 1/30/17: 5' 10\" (1.778 m).    Weight as of this encounter: 287 lb 3.2 oz (130.3 kg).  Medication Reconciliation: complete    "

## 2017-08-04 ENCOUNTER — OFFICE VISIT (OUTPATIENT)
Dept: INTERNAL MEDICINE | Facility: CLINIC | Age: 57
End: 2017-08-04
Payer: COMMERCIAL

## 2017-08-04 VITALS
SYSTOLIC BLOOD PRESSURE: 118 MMHG | HEART RATE: 94 BPM | OXYGEN SATURATION: 98 % | WEIGHT: 281 LBS | DIASTOLIC BLOOD PRESSURE: 78 MMHG | BODY MASS INDEX: 40.32 KG/M2 | TEMPERATURE: 98.6 F

## 2017-08-04 DIAGNOSIS — E11.9 TYPE 2 DIABETES MELLITUS WITHOUT COMPLICATION, WITHOUT LONG-TERM CURRENT USE OF INSULIN (H): Primary | ICD-10-CM

## 2017-08-04 DIAGNOSIS — K12.0 APHTHOUS ULCER OF MOUTH: ICD-10-CM

## 2017-08-04 DIAGNOSIS — I10 BENIGN ESSENTIAL HYPERTENSION: ICD-10-CM

## 2017-08-04 DIAGNOSIS — K04.7 DENTAL INFECTION: ICD-10-CM

## 2017-08-04 PROCEDURE — 99214 OFFICE O/P EST MOD 30 MIN: CPT | Performed by: INTERNAL MEDICINE

## 2017-08-04 RX ORDER — TRAMADOL HYDROCHLORIDE 50 MG/1
50-100 TABLET ORAL EVERY 6 HOURS PRN
Qty: 10 TABLET | Refills: 0 | Status: SHIPPED | OUTPATIENT
Start: 2017-08-04 | End: 2017-08-04

## 2017-08-04 RX ORDER — PIOGLITAZONEHYDROCHLORIDE 45 MG/1
45 TABLET ORAL DAILY
Qty: 90 TABLET | Refills: 0 | Status: SHIPPED | OUTPATIENT
Start: 2017-08-04 | End: 2017-11-09

## 2017-08-04 RX ORDER — GLIPIZIDE 10 MG/1
10 TABLET, FILM COATED, EXTENDED RELEASE ORAL DAILY
Qty: 90 TABLET | Refills: 0 | Status: SHIPPED | OUTPATIENT
Start: 2017-08-04 | End: 2017-11-18

## 2017-08-04 RX ORDER — LIRAGLUTIDE 6 MG/ML
INJECTION SUBCUTANEOUS
Qty: 27 ML | Refills: 0 | OUTPATIENT
Start: 2017-08-04

## 2017-08-04 RX ORDER — LIRAGLUTIDE 6 MG/ML
1.8 INJECTION SUBCUTANEOUS DAILY
Qty: 27 ML | Refills: 0 | Status: SHIPPED | OUTPATIENT
Start: 2017-08-04 | End: 2017-10-21

## 2017-08-04 NOTE — MR AVS SNAPSHOT
"              After Visit Summary   8/4/2017    Neymar Rhodes    MRN: [id number]           Patient Information     Date Of Birth          1960        Visit Information        Provider Department      8/4/2017 2:40 PM Jefry Harris MD [facility]        Today's Diagnoses     Type 2 diabetes mellitus without complication, without long-term current use of insulin (H)    -  1    Aphthous ulcer of mouth        Benign essential hypertension        Dental infection          Care Instructions    *  Continue all medications at the same doses.  Contact your usual pharmacy if you need refills.     *  Return for nonfasting lab tests after 8/15/17    *  If the glucose levels do not come down with your better attempts at better diet, then we may need to consider insulin.     5 GOALS IN MANAGING DIABETES (i.e. to give the best chance to prevent diabetic complications and vascular disease):     1.  Aggressive blood pressure control, under 130/80 ideally.  Using medications if needed    Your blood pressure is under good control    BP Readings from Last 4 Encounters:   08/04/17 118/78   08/01/17 142/90   01/30/17 134/64   07/13/16 132/72       2.  Aggressive LDL cholesterol (bad cholesterol) lowering as needed.  Your goal is an LDL under 100 for sure, preferably under 70.     *  All patients with diabetes are recommended to be on a \"statin\" cholesterol lowering medication regardless of the cholesterol levels to give the best chance at avoiding vascular disease.      New guidelines identify four high-risk groups who could benefit from statins:   *people with pre-existing heart disease, such as those who have had a heart attack;   *people ages 40 to 75 who have diabetes of any type  *patients ages 40 to 75 with at least a 7.5% risk of developing cardiovascular disease over the next decade, according to a formula described in the guidelines  *patients with the sort of super-high cholesterol " that sometimes runs in families, as evidenced by an LDL of 190 milligrams per deciliter or higher    *  Your cholesterol levels are well controlled.    Recent Labs   Lab Test  01/20/17   0911  01/11/16   0727  01/26/15   0920  12/30/13   1315   CHOL  109  150  126  156   HDL  43  38*  45  41   LDL  48  73  55  Cannot estimate LDL when triglyceride exceeds 400 mg/dL  86   TRIG  91  194*  128  435*   CHOLHDLRATIO   --    --   2.8  3.8       3.  Aggressive diabetic management.  The target for A1C (3 months average blood sugar) is ideally below 6.5, preferably below 7.5    Your diabetes is NOT under good control.      *  Your diet is the most liekly reason for this, please try and improve it as much as you are able.      Lab Results   Component Value Date    A1C 9.3 05/15/2017    A1C 8.7 01/16/2017    A1C 8.2 10/19/2016    A1C 8.7 06/30/2016    A1C 9.3 01/11/2016    A1C 7.7 07/17/2015    A1C 6.8 01/26/2015    A1C 6.4 10/16/2014    A1C 8.9 06/11/2014    A1C 7.8 12/30/2013    A1C 6.8 08/02/2013    A1C 7.0 02/14/2013    A1C 7.1 08/10/2012    A1C 8.4 03/30/2012    A1C 8.9 08/22/2011    A1C 8.4 03/08/2011    A1C 6.8 10/16/2010    A1C 6.8 04/09/2010    A1C 7.0 05/21/2009    A1C 7.0 12/23/2008    A1C 6.6 09/03/2008    A1C 7.1 06/09/2008    A1C 7.0 02/15/2008    A1C 7.2 10/24/2007    A1C 7.2 06/29/2007    A1C 6.9 03/29/2007    A1C 6.4 12/07/2006    A1C 6.6 06/01/2006    A1C 8.2 02/27/2006    A1C 8.3 12/15/2005    A1C 7.3 03/22/2005    A1C 7.3 11/29/2004    A1C 7.4 08/05/2004    A1C 6.9 10/27/2003    A1C 6.0 12/24/2002    A1C 6.0 08/07/2002       4.  No smoking    5.  Aspirin tablet once per day, every day unless there is a specific reason that you cannot take aspirin.       *You should take Aspirin 81 mg once per day.                   Follow-ups after your visit        Future tests that were ordered for you today     Open Future Orders        Priority Expected Expires Ordered    Basic metabolic panel ASAP 8/16/2017 10/4/2017  "2017    Hemoglobin A1c Routine 2017 10/4/2017 2017            Who to contact     If you have questions or need follow up information about today's clinic visit or your schedule please contact [facility] directly at [phone].  Normal or non-critical lab and imaging results will be communicated to you by MyChart, letter or phone within 4 business days after the clinic has received the results. If you do not hear from us within 7 days, please contact the clinic through MyChart or phone. If you have a critical or abnormal lab result, we will notify you by phone as soon as possible.  Submit refill requests through [employer] or call your pharmacy and they will forward the refill request to us. Please allow 3 business days for your refill to be completed.          Additional Information About Your Visit        MyChart Information     [employer] lets you send messages to your doctor, view your test results, renew your prescriptions, schedule appointments and more. To sign up, go to [email or web address] . Click on \"Log in\" on the left side of the screen, which will take you to the Welcome page. Then click on \"Sign up Now\" on the right side of the page.     You will be asked to enter the access code listed below, as well as some personal information. Please follow the directions to create your username and password.     Your access code is: [email or web address]  Expires: 10/30/2017  9:57 AM     Your access code will  in 90 days. If you need help or a new code, please call your [facility] clinic or [phone].        Care EveryWhere ID     This is your Care EveryWhere ID. This could be used by other organizations to access your [facility] medical records  [id number]        Your Vitals Were     Pulse Temperature Pulse Oximetry BMI (Body Mass Index)          94 98.6  F (37  C) (Oral) 98% 40.32 kg/m2         Blood Pressure from Last 3 Encounters:   17 118/78   17 142/90   17 " 134/64    Weight from Last 3 Encounters:   08/04/17 281 lb (127.5 kg)   08/01/17 287 lb 3.2 oz (130.3 kg)   01/30/17 293 lb 4.8 oz (133 kg)                 Today's Medication Changes          These changes are accurate as of: 8/4/17  3:46 PM.  If you have any questions, ask your nurse or doctor.               These medicines have changed or have updated prescriptions.        Dose/Directions    glipiZIDE 10 MG 24 hr tablet   Commonly known as:  glipiZIDE XL   This may have changed:  See the new instructions.   Used for:  Type 2 diabetes mellitus without complication, without long-term current use of insulin (H)   Changed by:  Jefry Harris MD        Dose:  10 mg   Take 1 tablet (10 mg) by mouth daily   Quantity:  90 tablet   Refills:  0       liraglutide 18 MG/3ML soln   Commonly known as:  VICTOZA PEN   This may have changed:  See the new instructions.   Used for:  Type 2 diabetes mellitus without complication, without long-term current use of insulin (H)   Changed by:  Jefry Harris MD        Dose:  1.8 mg   Inject 1.8 mg Subcutaneous daily   Quantity:  27 mL   Refills:  0       metFORMIN 1000 MG tablet   Commonly known as:  GLUCOPHAGE   This may have changed:  additional instructions   Used for:  Type 2 diabetes mellitus without complication, without long-term current use of insulin (H)   Changed by:  Jefry Harris MD        Dose:  1000 mg   Take 1 tablet (1,000 mg) by mouth 2 times daily (with meals)   Quantity:  180 tablet   Refills:  0       pioglitazone 45 MG tablet   Commonly known as:  ACTOS   This may have changed:  See the new instructions.   Used for:  Type 2 diabetes mellitus without complication, without long-term current use of insulin (H)   Changed by:  Jefry Harris MD        Dose:  45 mg   Take 1 tablet (45 mg) by mouth daily   Quantity:  90 tablet   Refills:  0            Where to get your medicines      These medications were sent to [id number] -  [place], [place] - [address] [facility]  [address] [facility] [address]     Phone:  [phone]     blood glucose monitoring lancets    glipiZIDE 10 MG 24 hr tablet    liraglutide 18 MG/3ML soln    metFORMIN 1000 MG tablet    pioglitazone 45 MG tablet         Some of these will need a paper prescription and others can be bought over the counter.  Ask your nurse if you have questions.     Bring a paper prescription for each of these medications     traMADol 50 MG tablet                Primary Care Provider Office Phone # Fax #    Jefry Harris [phone]       [facility] [address]  [facility] [address]        Equal Access to Services     WAYNE HERNANDES : Hadii aad ku hadasho Soomaali, waaxda luqadaha, qaybta kaalmada adeegyada, roberto nolan. So [facility] [place] [phone].    ATENCIÓN: Si habla español, tiene a garcia disposición servicios gratuitos de asistencia lingüística. Llame al [phone].    We comply with applicable federal civil rights laws and [place] laws. We do not discriminate on the basis of race, color, national origin, age, disability sex, sexual orientation or gender identity.            Thank you!     Thank you for choosing [facility]  for your care. Our goal is always to provide you with excellent care. Hearing back from our patients is one way we can continue to improve our services. Please take a few minutes to complete the written survey that you may receive in the mail after your visit with us. Thank you!             Your Updated Medication List - Protect others around you: Learn how to safely use, store and throw away your medicines at www.disposemymeds.org.          This list is accurate as of: 8/4/17  3:46 PM.  Always use your most recent med list.                   Brand Name Dispense Instructions for use Diagnosis    albuterol 108 (90 BASE) MCG/ACT Inhaler    PROAIR HFA/PROVENTIL  HFA/VENTOLIN HFA    1 Inhaler    Inhale 2 puffs into the lungs every 4 hours as needed for shortness of breath / dyspnea or wheezing    Post-infection bronchospasm       aspirin 81 MG tablet      1 tab po QD (Once per day)    Essential hypertension, benign, Type II or unspecified type diabetes mellitus without mention of complication, not stated as uncontrolled, Type II or unspecified type diabetes mellitus without mention of complication, not stated as uncontrolled, Other and unspecified hyperlipidemia, Other and unspecified hyperlipidemia       atorvastatin 80 MG tablet    LIPITOR    90 tablet    Take 0.5 tablets (40 mg) by mouth At Bedtime    Type 2 diabetes mellitus without complication (H), Hyperlipidemia LDL goal <100       * blood glucose calibration solution    no brand specified    1 Bottle    1 drop by Test Solution route every 30 days.    Type 2 diabetes, HbA1c goal < 7% (H)       * blood glucose calibration solution    no brand specified    1 each    Use to calibrate blood glucose monitor as directed; ONE TOUCH    Type 2 diabetes mellitus without complication (H)       * blood glucose monitoring meter device kit    no brand specified    1 kit    1 each daily.    Type 2 diabetes, HbA1c goal < 7% (H)       * blood glucose monitoring meter device kit     1 kit    Use to test blood sugar 1-3 times daily or as directed.    Type 2 diabetes mellitus without complication (H)       * blood glucose monitoring test strip    no brand specified    1 Box    by In Vitro route 2 times daily. Dispense whatever diabetic testing supplies as directed by formulary    Type 2 diabetes, HbA1c goal < 7% (H)       * blood glucose monitoring test strip    ACCU-CHEK LUL PLUS    100 each    Use to test blood sugar 1-3 times daily or as directed.    Type 2 diabetes mellitus without complication (H)       * DIABETIC STERILE LANCETS device     1 Box    1 Device 2 times daily.    Type 2 diabetes, HbA1c goal < 7% (H)       * blood  "glucose monitoring lancets     100 each    Use to test blood sugar 1-3 times daily or as directed.    Type 2 diabetes mellitus without complication, without long-term current use of insulin (H)       fenofibrate 160 MG tablet     90 tablet    TAKE 1 TABLET BY MOUTH DAILY    Hyperlipidemia LDL goal <100, Type 2 diabetes mellitus without complication, without long-term current use of insulin (H)       glipiZIDE 10 MG 24 hr tablet    glipiZIDE XL    90 tablet    Take 1 tablet (10 mg) by mouth daily    Type 2 diabetes mellitus without complication, without long-term current use of insulin (H)       indomethacin 25 MG capsule    INDOCIN    30 capsule    Take 1 capsule by mouth 2 times daily as needed. Take with meals    Gout, unspecified       ketoconazole 2 % cream    NIZORAL    30 g    Apply topically 2 times daily Apply sparingly once or twice per day as needed to affected area until the skin is better, then stop    Tinea of the body       liraglutide 18 MG/3ML soln    VICTOZA PEN    27 mL    Inject 1.8 mg Subcutaneous daily    Type 2 diabetes mellitus without complication, without long-term current use of insulin (H)       lisinopril 20 MG tablet    PRINIVIL/ZESTRIL    90 tablet    Take 1 tablet (20 mg) by mouth daily    Benign essential hypertension, Type 2 diabetes mellitus without complication, without long-term current use of insulin (H)       metFORMIN 1000 MG tablet    GLUCOPHAGE    180 tablet    Take 1 tablet (1,000 mg) by mouth 2 times daily (with meals)    Type 2 diabetes mellitus without complication, without long-term current use of insulin (H)       * pen needles 5/16\" 31G X 8 MM Misc     100 each    1 each daily.    Type 2 diabetes, HbA1c goal < 7% (H)       * insulin pen needle 29G X 12.7MM    BD ULTRA-FINE    100 each    1 each 2 times daily or as directed.    Type 2 diabetes mellitus without complication (H)       pioglitazone 45 MG tablet    ACTOS    90 tablet    Take 1 tablet (45 mg) by mouth daily "    Type 2 diabetes mellitus without complication, without long-term current use of insulin (H)       traMADol 50 MG tablet    ULTRAM    10 tablet    Take 1-2 tablets ( mg) by mouth every 6 hours as needed for pain maximum 2 tablet(s) per day    Aphthous ulcer of mouth       triamcinolone 0.5 % cream    KENALOG    30 g    Apply sparingly once or twice per day as needed to affected area until the skin is better, then stop    Eczema, unspecified eczema       * Notice:  This list has 10 medication(s) that are the same as other medications prescribed for you. Read the directions carefully, and ask your doctor or other care provider to review them with you.

## 2017-08-04 NOTE — PROGRESS NOTES
"  SUBJECTIVE:                                                    Neymar Rhodes is a 57 year old male who presents to clinic today for the following health issues:      Diabetes Follow-up      Patient is checking blood sugars: rarely.  Results range from 160 to 200+    Diabetic concerns: blood sugar frequently over 200     Symptoms of hypoglycemia (low blood sugar): none     Paresthesias (numbness or burning in feet) or sores: No     Date of last diabetic eye exam: last yr    NOT following diabetei diet (eats \"poorly\" per his assessment)    Taking meds as ordered, including victoza.     Reviewed labs:    Lab Results   Component Value Date    A1C 9.3 05/15/2017    A1C 8.7 01/16/2017    A1C 8.2 10/19/2016    A1C 8.7 06/30/2016    A1C 9.3 01/11/2016    A1C 7.7 07/17/2015    A1C 6.8 01/26/2015    A1C 6.4 10/16/2014    A1C 8.9 06/11/2014    A1C 7.8 12/30/2013    A1C 6.8 08/02/2013    A1C 7.0 02/14/2013    A1C 7.1 08/10/2012    A1C 8.4 03/30/2012    A1C 8.9 08/22/2011    A1C 8.4 03/08/2011    A1C 6.8 10/16/2010    A1C 6.8 04/09/2010    A1C 7.0 05/21/2009    A1C 7.0 12/23/2008    A1C 6.6 09/03/2008    A1C 7.1 06/09/2008    A1C 7.0 02/15/2008    A1C 7.2 10/24/2007    A1C 7.2 06/29/2007    A1C 6.9 03/29/2007    A1C 6.4 12/07/2006    A1C 6.6 06/01/2006    A1C 8.2 02/27/2006    A1C 8.3 12/15/2005    A1C 7.3 03/22/2005    A1C 7.3 11/29/2004    A1C 7.4 08/05/2004    A1C 6.9 10/27/2003    A1C 6.0 12/24/2002    A1C 6.0 08/07/2002            Amount of exercise or physical activity: None    Problems taking medications regularly: No    Medication side effects: none  Diet: regular    ED/UC Followup:    [facility]ility[facility]  Date of visit: 8/1/17  Reason for visit: facial swelling/tooth pain  Current Status: swelling has receded, uses tramadol PRN, has root canal scheduled on 8/17         Blood presure remains well controlled at home  Readings outside clinic are within normal limits.  Reviewed last 6 BP readings in chart:  BP Readings " from Last 6 Encounters:   08/04/17 118/78   08/01/17 142/90   01/30/17 134/64   07/13/16 132/72   03/10/16 129/78   01/13/16 138/76     He has not experienced any significant side effects from medicaiotns for hypertension.    NO active cardiac complaints or symptoms with exercise.       Problem list and histories reviewed & adjusted, as indicated.  Additional history: as documented        Reviewed and updated as needed this visit by clinical staffTobacco  Allergies       Reviewed and updated as needed this visit by Provider           Past Medical History:  ---------------------------  Past Medical History:   Diagnosis Date     Diverticulosis of colon (without mention of hemorrhage)      Essential hypertension, benign      Gout, unspecified      Morbid obesity (H)      Other and unspecified hyperlipidemia      Type II or unspecified type diabetes mellitus without mention of complication, not stated as uncontrolled        Past Surgical History:  ---------------------------  Past Surgical History:   Procedure Laterality Date     C APPENDECTOMY  1980's     SURGICAL HISTORY OF -   2001    repair of colovesicular fistula       Current Medications:  ---------------------------  Current Outpatient Prescriptions   Medication Sig Dispense Refill     traMADol (ULTRAM) 50 MG tablet Take 1-2 tablets ( mg) by mouth every 6 hours as needed for pain maximum 2 tablet(s) per day 10 tablet 0     pioglitazone (ACTOS) 45 MG tablet TAKE ONE TABLET BY MOUTH ONE TIME DAILY **last refill without an appointment** 30 tablet 0     metFORMIN (GLUCOPHAGE) 1000 MG tablet Take 1 tablet (1,000 mg) by mouth 2 times daily (with meals) LAST REFILL WITHOUT AN APPOINTMENT 60 tablet 0     GLIPIZIDE XL 10 MG 24 hr tablet TAKE ONE TABLET BY MOUTH ONE TIME DAILY  90 tablet 0     fenofibrate 160 MG tablet TAKE 1 TABLET BY MOUTH DAILY 90 tablet 2     VICTOZA PEN 18 MG/3ML soln INJECT 1.8 MG SUBCUTANEOUSLY DAILY  27 mL 0     lisinopril  "(PRINIVIL/ZESTRIL) 20 MG tablet Take 1 tablet (20 mg) by mouth daily 90 tablet 3     albuterol (PROAIR HFA/PROVENTIL HFA/VENTOLIN HFA) 108 (90 BASE) MCG/ACT Inhaler Inhale 2 puffs into the lungs every 4 hours as needed for shortness of breath / dyspnea or wheezing 1 Inhaler 2     blood glucose monitoring (ACCU-CHEK LUL PLUS) test strip Use to test blood sugar 1-3 times daily or as directed. 100 each 11     blood glucose monitoring (ACCU-CHEK LUL PLUS) meter device kit Use to test blood sugar 1-3 times daily or as directed. 1 kit 0     blood glucose monitoring (ACCU-CHEK MULTICLIX) lancets Use to test blood sugar 1-3 times daily or as directed. 1 Box 11     atorvastatin (LIPITOR) 80 MG tablet Take 0.5 tablets (40 mg) by mouth At Bedtime 90 tablet 1     blood glucose calibration (NO BRAND SPECIFIED) solution Use to calibrate blood glucose monitor as directed; ONE TOUCH 1 each 3     insulin pen needle (BD ULTRA-FINE) 29G X 12.7MM 1 each 2 times daily or as directed. 100 each 3     triamcinolone (KENALOG) 0.5 % cream Apply sparingly once or twice per day as needed to affected area until the skin is better, then stop 30 g 0     ketoconazole (NIZORAL) 2 % cream Apply topically 2 times daily Apply sparingly once or twice per day as needed to affected area until the skin is better, then stop 30 g 0     indomethacin (INDOCIN) 25 MG capsule Take 1 capsule by mouth 2 times daily as needed. Take with meals 30 capsule 0     Insulin Pen Needle (PEN NEEDLES 5/16\") 31G X 8 MM MISC 1 each daily. 100 each 11     Blood Glucose Monitoring Suppl (BLOOD GLUCOSE METER) KIT 1 each daily. 1 kit 0     DIABETIC STERILE LANCETS device 1 Device 2 times daily. 1 Box 12     glucose blood VI test strips strip by In Vitro route 2 times daily. Dispense whatever diabetic testing supplies as directed by formulary 1 Box 12     Blood Glucose Calibration (GLUCOSE CONTROL) solution 1 drop by Test Solution route every 30 days. 1 Bottle 2     ASPIRIN 81 " MG OR TABS 1 tab po QD (Once per day)         Allergies:  -------------  Allergies   Allergen Reactions     No Known Drug Allergies        Social History:  -------------------  Social History     Social History     Marital status:      Spouse name: N/A     Number of children: N/A     Years of education: N/A     Occupational History     Not on file.     Social History Main Topics     Smoking status: Never Smoker     Smokeless tobacco: Never Used     Alcohol use Yes      Comment: 1 glass of beer on weekend     Drug use: No     Sexual activity: Yes     Partners: Female     Other Topics Concern     Not on file     Social History Narrative       Family Medical History:  ------------------------------  Family History   Problem Relation Age of Onset     CANCER Father 75     bladder cancer     Breast Cancer Sister      Breast Cancer Mother      Family History Negative Brother      Family History Negative Brother          ROS:  REVIEW OF SYSTEMS:    RESP: negative for cough, dyspnea, wheezing, hemoptysis  CV: negative for chest pain, palpitations, PND, JOSHI, orthopnea; reports no changes in their ability to perform physical activity (from cardiovascular standpoint)  GI: negative for dysphagia, N/V, pain, melena, diarrhea and constipation  NEURO: negative for numbness/tingling, paralysis, incoordination, or focal weakness     OBJECTIVE:                                                    /78  Pulse 94  Temp 98.6  F (37  C) (Oral)  Wt 281 lb (127.5 kg)  SpO2 98%  BMI 40.32 kg/m2     GENERAL alert and no distress  EYES:  Normal sclera,conjunctiva, EOMI  HENT: oral and posterior pharynx without lesions or erythema, facies symmetric  NECK: Neck supple. No LAD, without thyroidmegaly or JVD., Carotids without bruits.  RESP: Clear to ausculation bilaterally without wheezes or crackles. Normal BS in all fields.  CV: RRR normal S1S2 without murmurs, rubs or gallops. PMI normal  LYMPH: no cervical lymph adenopathy  "appreciated  MS: extremities- no gross deformities of the visible extremities noted, no edema  PSYCH: Alert and oriented times 3; speech- coherent  SKIN:  No obvious significant skin lesions on visible portions of face          ASSESSMENT/PLAN:                                                      (E11.9) Type 2 diabetes mellitus without complication, without long-term current use of insulin (H)  (primary encounter diagnosis)  Comment: NOT under good control.  Will do A1C after 3 months carlos, then advise further.   He admits his diet is horribel for diabetes.   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   I told him that using insulin should not interefere with any driving lisences that I am aware of, I would just have to sign special certificaiton for [place] for patients who use insulin, but that would mean that he has to follow our care plan (one of the quetsions on the form)  I todl him that continuing with the poor level of diabetes control will place him at much higher risk of diabetes related complciatiosn, inclkuding vascular events, nephropathy/renal failure, retinopathy, neuropathy.  He promised to work harder on his diet (he reports that when he \"eats right\" his glucose readings are usually 50-75 points lower.    I told him that there is no further to go on his oral medications ands victoza.   I told him that if he cannot lower the lucose readings suitably, then he will have no choice but to start insulin.    Plan: metFORMIN (GLUCOPHAGE) 1000 MG tablet,         pioglitazone (ACTOS) 45 MG tablet, liraglutide         (VICTOZA PEN) 18 MG/3ML soln, glipiZIDE         (GLIPIZIDE XL) 10 MG 24 hr tablet, blood         glucose monitoring (ACCU-CHEK MULTICLIX)         lancets, Hemoglobin A1c, Basic metabolic panel            (K12.0) Aphthous ulcer of mouth  Comment:   Plan: " DISCONTINUED: traMADol (ULTRAM) 50 MG tablet            (I10) Benign essential hypertension  Comment: This condition is currently controlled on the current medical regimen.  Continue current therapy.   Discussed hypertension in detail including JNC VIII guidelines for blood pressure goals.  Discussed indication for treatment and treatment options.  Discussed the importance for aggressive management of HTN to prevent vascular complications later.  Recommended lower fat, lower carbohydrate, and lower sodium (<2000 mg)diet.  Discussed required intervals for follow up on HTN, lab studies, and the need to aggresive management of other cardiac disease risk factors.  Recommened pt. follow their blood pressures outside the clinic to ensure that BPs are remaining within guidelines, and to contact me if the readings are not within guidelines so we can adjust treatment as needed.   Plan:     (K04.7) Dental infection  Comment: has dental appointment soon.   On oral abx already.   Will be getting root cnaal in veyr near future (which will raise the glucose levels temporarily, until this issue reoslved)  Plan:      See Patient Instructions    FRANCHESKA GRUBER M.D., MD  [facility]       Spent greater than 25 minutes with pt, greater than 50% of time was educational and counseling.

## 2017-08-04 NOTE — NURSING NOTE
"Chief Complaint   Patient presents with     ER F/U     UC f/u     Diabetes     f/u       Initial /78  Pulse 94  Temp 98.6  F (37  C) (Oral)  Wt 281 lb (127.5 kg)  SpO2 98%  BMI 40.32 kg/m2 Estimated body mass index is 40.32 kg/(m^2) as calculated from the following:    Height as of 1/30/17: 5' 10\" (1.778 m).    Weight as of this encounter: 281 lb (127.5 kg).  Medication Reconciliation: complete   Citlaly Carter CMA      "

## 2017-08-04 NOTE — PATIENT INSTRUCTIONS
"*  Continue all medications at the same doses.  Contact your usual pharmacy if you need refills.     *  Return for nonfasting lab tests after 8/15/17    *  If the glucose levels do not come down with your better attempts at better diet, then we may need to consider insulin.     5 GOALS IN MANAGING DIABETES (i.e. to give the best chance to prevent diabetic complications and vascular disease):     1.  Aggressive blood pressure control, under 130/80 ideally.  Using medications if needed    Your blood pressure is under good control    BP Readings from Last 4 Encounters:   08/04/17 118/78   08/01/17 142/90   01/30/17 134/64   07/13/16 132/72       2.  Aggressive LDL cholesterol (bad cholesterol) lowering as needed.  Your goal is an LDL under 100 for sure, preferably under 70.     *  All patients with diabetes are recommended to be on a \"statin\" cholesterol lowering medication regardless of the cholesterol levels to give the best chance at avoiding vascular disease.      New guidelines identify four high-risk groups who could benefit from statins:   *people with pre-existing heart disease, such as those who have had a heart attack;   *people ages 40 to 75 who have diabetes of any type  *patients ages 40 to 75 with at least a 7.5% risk of developing cardiovascular disease over the next decade, according to a formula described in the guidelines  *patients with the sort of super-high cholesterol that sometimes runs in families, as evidenced by an LDL of 190 milligrams per deciliter or higher    *  Your cholesterol levels are well controlled.    Recent Labs   Lab Test  01/20/17   0911  01/11/16   0727  01/26/15   0920  12/30/13   1315   CHOL  109  150  126  156   HDL  43  38*  45  41   LDL  48  73  55  Cannot estimate LDL when triglyceride exceeds 400 mg/dL  86   TRIG  91  194*  128  435*   CHOLHDLRATIO   --    --   2.8  3.8       3.  Aggressive diabetic management.  The target for A1C (3 months average blood sugar) is " ideally below 6.5, preferably below 7.5    Your diabetes is NOT under good control.      *  Your diet is the most liekly reason for this, please try and improve it as much as you are able.      Lab Results   Component Value Date    A1C 9.3 05/15/2017    A1C 8.7 01/16/2017    A1C 8.2 10/19/2016    A1C 8.7 06/30/2016    A1C 9.3 01/11/2016    A1C 7.7 07/17/2015    A1C 6.8 01/26/2015    A1C 6.4 10/16/2014    A1C 8.9 06/11/2014    A1C 7.8 12/30/2013    A1C 6.8 08/02/2013    A1C 7.0 02/14/2013    A1C 7.1 08/10/2012    A1C 8.4 03/30/2012    A1C 8.9 08/22/2011    A1C 8.4 03/08/2011    A1C 6.8 10/16/2010    A1C 6.8 04/09/2010    A1C 7.0 05/21/2009    A1C 7.0 12/23/2008    A1C 6.6 09/03/2008    A1C 7.1 06/09/2008    A1C 7.0 02/15/2008    A1C 7.2 10/24/2007    A1C 7.2 06/29/2007    A1C 6.9 03/29/2007    A1C 6.4 12/07/2006    A1C 6.6 06/01/2006    A1C 8.2 02/27/2006    A1C 8.3 12/15/2005    A1C 7.3 03/22/2005    A1C 7.3 11/29/2004    A1C 7.4 08/05/2004    A1C 6.9 10/27/2003    A1C 6.0 12/24/2002    A1C 6.0 08/07/2002       4.  No smoking    5.  Aspirin tablet once per day, every day unless there is a specific reason that you cannot take aspirin.       *You should take Aspirin 81 mg once per day.

## 2017-08-07 ENCOUNTER — TELEPHONE (OUTPATIENT)
Dept: INTERNAL MEDICINE | Facility: CLINIC | Age: 57
End: 2017-08-07

## 2017-08-07 DIAGNOSIS — K12.0 APHTHOUS ULCER OF MOUTH: ICD-10-CM

## 2017-08-07 RX ORDER — TRAMADOL HYDROCHLORIDE 50 MG/1
50-100 TABLET ORAL 3 TIMES DAILY PRN
Qty: 15 TABLET | Refills: 0 | Status: SHIPPED | OUTPATIENT
Start: 2017-08-07 | End: 2018-04-30

## 2017-08-07 NOTE — TELEPHONE ENCOUNTER
traMADol (ULTRAM) 50 MG tablet       Last Written Prescription Date:  08/04/2017  Last Fill Quantity: 10,   # refills: 0  Last Office Visit with [facility], [facility] or [facility] prescribing provider: 08/04/2017  Future Office visit:       Routing refill request to provider for review/approval because:  Drug not active on patient's medication list

## 2017-08-29 ENCOUNTER — TELEPHONE (OUTPATIENT)
Dept: INTERNAL MEDICINE | Facility: CLINIC | Age: 57
End: 2017-08-29

## 2017-08-29 DIAGNOSIS — Z23 NEED FOR SHINGLES VACCINE: Primary | ICD-10-CM

## 2017-08-29 NOTE — TELEPHONE ENCOUNTER
shingles shot; pt would like to get a shingles shot, he had the shingles last yr but because pt is under age 59yr; [facility] [address] [id number] will need rx if appropriate

## 2017-08-29 NOTE — TELEPHONE ENCOUNTER
I would agree.  The patient is at high risk for shingles given his diabetes.   I sent RX for shingles vaccine to [id number].    Be sure to check with the pharmacy to make sure that it is covered and that he knows what it will cost before he gets it.    They may have a very strict policy about not covering it below age 60.  This RX may be all they needed.

## 2017-09-02 DIAGNOSIS — E78.5 HYPERLIPIDEMIA LDL GOAL <100: ICD-10-CM

## 2017-09-02 DIAGNOSIS — E11.9 TYPE 2 DIABETES MELLITUS WITHOUT COMPLICATION (H): ICD-10-CM

## 2017-09-03 NOTE — TELEPHONE ENCOUNTER
atorvastatin (LIPITOR) 80 MG tablet     Last Written Prescription Date: 7/13/16  Last Fill Quantity: 90, # refills: 1  Last Office Visit with [facility], [facility] or [facility] prescribing provider: 8/4/17       Lab Results   Component Value Date    CHOL 109 01/20/2017     Lab Results   Component Value Date    HDL 43 01/20/2017     Lab Results   Component Value Date    LDL 48 01/20/2017     Lab Results   Component Value Date    TRIG 91 01/20/2017     Lab Results   Component Value Date    CHOLHDLRATIO 2.8 01/26/2015

## 2017-09-06 RX ORDER — ATORVASTATIN CALCIUM 80 MG/1
TABLET, FILM COATED ORAL
Qty: 45 TABLET | Refills: 0 | Status: SHIPPED | OUTPATIENT
Start: 2017-09-06 | End: 2017-12-22

## 2017-10-21 DIAGNOSIS — E11.9 TYPE 2 DIABETES MELLITUS WITHOUT COMPLICATION, WITHOUT LONG-TERM CURRENT USE OF INSULIN (H): ICD-10-CM

## 2017-10-24 RX ORDER — LIRAGLUTIDE 6 MG/ML
INJECTION SUBCUTANEOUS
Qty: 27 ML | Refills: 0 | Status: SHIPPED | OUTPATIENT
Start: 2017-10-24 | End: 2017-12-22

## 2017-11-09 DIAGNOSIS — E11.9 TYPE 2 DIABETES MELLITUS WITHOUT COMPLICATION, WITHOUT LONG-TERM CURRENT USE OF INSULIN (H): ICD-10-CM

## 2017-11-09 RX ORDER — PIOGLITAZONEHYDROCHLORIDE 45 MG/1
45 TABLET ORAL DAILY
Qty: 30 TABLET | Refills: 0 | Status: SHIPPED | OUTPATIENT
Start: 2017-11-09 | End: 2017-12-14

## 2017-11-09 NOTE — TELEPHONE ENCOUNTER
1 month prescription sent electronically to the specified pharmacy.  LAST REFILL WITHOUT AN APPOINTMENT     Overdue for follow up  Was supposed to have returned for labs after AUg[facility] appointment, but has not done so  Diabetes in poor control, needs meds adjusted and frankly needs insulin.     Return to see me in approximately 1 month, sooner if needed.  Please get nonfasting labs done in the [facility] lab 1-2 days before this appointment.  Call [phone] to schedule both appointments.

## 2017-11-18 DIAGNOSIS — E11.9 TYPE 2 DIABETES MELLITUS WITHOUT COMPLICATION, WITHOUT LONG-TERM CURRENT USE OF INSULIN (H): ICD-10-CM

## 2017-11-18 NOTE — LETTER
[facility]  [address] [id number] [phone]            Neymar Rhodes  [address] [place] [id number]        November 21, 2017    Dear Neymar,    While refilling your prescription today, we noticed that you are due to have labs drawn.  We will refill your prescription for 30 days, but a follow-up appointment must be made before any additional refills can be approved.     Taking care of your health is important to us and we look forward to seeing you in the near future.  Please call us at [phone] or [phone] (or use [place]) to schedule an appointment.     Please disregard this notice if you have already made an appointment.    Sincerely,        [facility]

## 2017-11-21 RX ORDER — GLIPIZIDE 10 MG/1
TABLET, EXTENDED RELEASE ORAL
Qty: 30 TABLET | Refills: 0 | Status: SHIPPED | OUTPATIENT
Start: 2017-11-21 | End: 2017-12-22

## 2017-11-21 NOTE — TELEPHONE ENCOUNTER
Medication is being filled for 1 time refill only due to:  Patient needs labs a1c.  letter sent.

## 2017-12-01 DIAGNOSIS — E11.9 TYPE 2 DIABETES MELLITUS WITHOUT COMPLICATION, WITHOUT LONG-TERM CURRENT USE OF INSULIN (H): ICD-10-CM

## 2017-12-01 LAB
ANION GAP SERPL CALCULATED.3IONS-SCNC: 8 MMOL/L (ref 3–14)
BUN SERPL-MCNC: 21 MG/DL (ref 7–30)
CALCIUM SERPL-MCNC: 9.4 MG/DL (ref 8.5–10.1)
CHLORIDE SERPL-SCNC: 108 MMOL/L (ref 94–109)
CO2 SERPL-SCNC: 26 MMOL/L (ref 20–32)
CREAT SERPL-MCNC: 0.94 MG/DL (ref 0.66–1.25)
GFR SERPL CREATININE-BSD FRML MDRD: 82 ML/MIN/1.7M2
GLUCOSE SERPL-MCNC: 143 MG/DL (ref 70–99)
HBA1C MFR BLD: 6.9 % (ref 4.3–6)
POTASSIUM SERPL-SCNC: 4.2 MMOL/L (ref 3.4–5.3)
SODIUM SERPL-SCNC: 142 MMOL/L (ref 133–144)

## 2017-12-01 PROCEDURE — 83036 HEMOGLOBIN GLYCOSYLATED A1C: CPT | Performed by: INTERNAL MEDICINE

## 2017-12-01 PROCEDURE — 80048 BASIC METABOLIC PNL TOTAL CA: CPT | Performed by: INTERNAL MEDICINE

## 2017-12-01 PROCEDURE — 36415 COLL VENOUS BLD VENIPUNCTURE: CPT | Performed by: INTERNAL MEDICINE

## 2017-12-14 DIAGNOSIS — E11.9 TYPE 2 DIABETES MELLITUS WITHOUT COMPLICATION, WITHOUT LONG-TERM CURRENT USE OF INSULIN (H): ICD-10-CM

## 2017-12-14 RX ORDER — PIOGLITAZONEHYDROCHLORIDE 45 MG/1
TABLET ORAL
Qty: 30 TABLET | Refills: 1 | Status: SHIPPED | OUTPATIENT
Start: 2017-12-14 | End: 2017-12-22

## 2017-12-22 ENCOUNTER — OFFICE VISIT (OUTPATIENT)
Dept: INTERNAL MEDICINE | Facility: CLINIC | Age: 57
End: 2017-12-22
Payer: COMMERCIAL

## 2017-12-22 VITALS
DIASTOLIC BLOOD PRESSURE: 70 MMHG | RESPIRATION RATE: 16 BRPM | WEIGHT: 285 LBS | HEART RATE: 61 BPM | OXYGEN SATURATION: 95 % | SYSTOLIC BLOOD PRESSURE: 130 MMHG | TEMPERATURE: 97.7 F | BODY MASS INDEX: 40.89 KG/M2

## 2017-12-22 DIAGNOSIS — E11.9 TYPE 2 DIABETES MELLITUS WITHOUT COMPLICATION, WITHOUT LONG-TERM CURRENT USE OF INSULIN (H): ICD-10-CM

## 2017-12-22 PROCEDURE — 99214 OFFICE O/P EST MOD 30 MIN: CPT | Performed by: INTERNAL MEDICINE

## 2017-12-22 NOTE — NURSING NOTE
"Chief Complaint   Patient presents with     Diabetes       Initial /70  Pulse 61  Temp 97.7  F (36.5  C) (Oral)  Resp 16  Wt 285 lb (129.3 kg)  SpO2 95%  BMI 40.89 kg/m2 Estimated body mass index is 40.89 kg/(m^2) as calculated from the following:    Height as of 1/30/17: 5' 10\" (1.778 m).    Weight as of this encounter: 285 lb (129.3 kg).  Blood pressure completed using cuff size: Large    "

## 2017-12-22 NOTE — MR AVS SNAPSHOT
"              After Visit Summary   12/22/2017    Neymar Rhodes    MRN: [id number]           Patient Information     Date Of Birth          1960        Visit Information        Provider Department      12/22/2017 10:20 AM Jefry Harris MD [facility]        Today's Diagnoses     Type 2 diabetes mellitus without complication, without long-term current use of insulin (H)          Care Instructions    *  Your diabetes is under MUCH better control, almost entirely due to your efforts in eating better.     *  Continue all medications at the same doses.  Contact your usual pharmacy if you need refills.     *  Return to see me in approximately 6 month, sooner if needed.  Please get fasting labs done in the [facility] lab 1-2 days before this appointment.  Eat nothing for at least 8 hours prior to having these labs drawn.  Call [phone] to schedule both appointments.         5 GOALS IN MANAGING DIABETES (i.e. to give the best chance to prevent diabetic complications and vascular disease):     1.  Aggressive blood pressure control, under 130/80 ideally.  Using medications if needed    Your blood pressure is under good control    BP Readings from Last 4 Encounters:   12/22/17 130/70   08/04/17 118/78   08/01/17 142/90   01/30/17 134/64       2.  Aggressive LDL cholesterol (bad cholesterol) lowering as needed.  Your goal is an LDL under 100 for sure, preferably under 70.     *  All patients with diabetes are recommended to be on a \"statin\" cholesterol lowering medication regardless of the cholesterol levels to give the best chance at avoiding vascular disease.      New guidelines identify four high-risk groups who could benefit from statins:   *people with pre-existing heart disease, such as those who have had a heart attack;   *people ages 40 to 75 who have diabetes of any type  *patients ages 40 to 75 with at least a 7.5% risk of developing cardiovascular disease over the next " decade, according to a formula described in the guidelines  *patients with the sort of super-high cholesterol that sometimes runs in families, as evidenced by an LDL of 190 milligrams per deciliter or higher    *  Your cholesterol levels are well controlled.    Recent Labs   Lab Test  01/20/17   0911  01/11/16   0727  01/26/15   0920  12/30/13   1315   CHOL  109  150  126  156   HDL  43  38*  45  41   LDL  48  73  55  Cannot estimate LDL when triglyceride exceeds 400 mg/dL  86   TRIG  91  194*  128  435*   CHOLHDLRATIO   --    --   2.8  3.8       3.  Aggressive diabetic management.  The target for A1C (3 months average blood sugar) is ideally below 6.5, preferably below 7.5    Your diabetes is under good control.      Lab Results   Component Value Date    A1C 6.9 12/01/2017    A1C 9.3 05/15/2017    A1C 8.7 01/16/2017    A1C 8.2 10/19/2016    A1C 8.7 06/30/2016    A1C 9.3 01/11/2016    A1C 7.7 07/17/2015    A1C 6.8 01/26/2015    A1C 6.4 10/16/2014    A1C 8.9 06/11/2014    A1C 7.8 12/30/2013    A1C 6.8 08/02/2013    A1C 7.0 02/14/2013    A1C 7.1 08/10/2012    A1C 8.4 03/30/2012    A1C 8.9 08/22/2011    A1C 8.4 03/08/2011    A1C 6.8 10/16/2010    A1C 6.8 04/09/2010    A1C 7.0 05/21/2009    A1C 7.0 12/23/2008    A1C 6.6 09/03/2008    A1C 7.1 06/09/2008    A1C 7.0 02/15/2008    A1C 7.2 10/24/2007    A1C 7.2 06/29/2007    A1C 6.9 03/29/2007    A1C 6.4 12/07/2006    A1C 6.6 06/01/2006    A1C 8.2 02/27/2006    A1C 8.3 12/15/2005    A1C 7.3 03/22/2005    A1C 7.3 11/29/2004    A1C 7.4 08/05/2004    A1C 6.9 10/27/2003    A1C 6.0 12/24/2002    A1C 6.0 08/07/2002       4.  No smoking    5.  Aspirin tablet once per day, every day unless there is a specific reason that you cannot take aspirin.       *You should take Aspirin 81 mg once per day.           Hemoglobin A1C (3 month average blood sugar)  "values:    5.0 97  5.5 111  6.0 126  ---------------  6.5 140  7.0 154  7.5 169  8.0 183  ---------------  8.5 197  9.0 215  9.5 226  10.0 240  10.5 255  11.0 269  11.5 283  12.0 298  12.5 312  13.0 326  13.5 341  14.0 355  >14 YIKES!            Follow-ups after your visit        Future tests that were ordered for you today     Open Future Orders        Priority Expected Expires Ordered    **A1C FUTURE 6mo Routine 6/22/2018 8/22/2018 12/22/2017    Lipid panel reflex to direct LDL Fasting Routine 6/22/2018 8/22/2018 12/22/2017    **Basic metabolic panel FUTURE 6mo Routine 6/22/2018 8/22/2018 12/22/2017    **ALT FUTURE 6mo Routine 6/22/2018 8/22/2018 12/22/2017    **TSH with free T4 reflex FUTURE 6mo Routine 6/22/2018 8/22/2018 12/22/2017    **CBC with platelets FUTURE 6mo Routine 6/22/2018 8/22/2018 12/22/2017            Who to contact     If you have questions or need follow up information about today's clinic visit or your schedule please contact [facility] directly at [phone].  Normal or non-critical lab and imaging results will be communicated to you by XYZEhart, letter or phone within 4 business days after the clinic has received the results. If you do not hear from us within 7 days, please contact the clinic through Jaspert or phone. If you have a critical or abnormal lab result, we will notify you by phone as soon as possible.  Submit refill requests through Sapheneia or call your pharmacy and they will forward the refill request to us. Please allow 3 business days for your refill to be completed.          Additional Information About Your Visit        XYZEhart Information     Sapheneia lets you send messages to your doctor, view your test results, renew your prescriptions, schedule appointments and more. To sign up, go to [email or web address] . Click on \"Log in\" on the left side of the screen, which will take you to the Welcome page. Then click on \"Sign up Now\" on the right side of " the page.     You will be asked to enter the access code listed below, as well as some personal information. Please follow the directions to create your username and password.     Your access code is: [email or web address]  Expires: 3/22/2018 11:08 AM     Your access code will  in 90 days. If you need help or a new code, please call your [place] clinic or [phone].        Care EveryWhere ID     This is your Care EveryWhere ID. This could be used by other organizations to access your [place] medical records  [id number]        Your Vitals Were     Pulse Temperature Respirations Pulse Oximetry BMI (Body Mass Index)       61 97.7  F (36.5  C) (Oral) 16 95% 40.89 kg/m2        Blood Pressure from Last 3 Encounters:   17 130/70   17 118/78   17 142/90    Weight from Last 3 Encounters:   17 285 lb (129.3 kg)   17 281 lb (127.5 kg)   17 287 lb 3.2 oz (130.3 kg)               Primary Care Provider Office Phone # Fax #    Jefry Harris [phone]       [address] [place] [id number]        Equal Access to Services     WAYNE HERNANDES AH: Hadii denae ku hadasho Soomaali, waaxda luqadaha, qaybta kaalmada adeegyada, waxay chelsie nolan. So [facility] [phone].    ATENCIÓN: Si habla español, tiene a garcia disposición servicios gratuitos de asistencia lingüística. Llame al [phone].    We comply with applicable federal civil rights laws and [place] laws. We do not discriminate on the basis of race, color, national origin, age, disability, sex, sexual orientation, or gender identity.            Thank you!     Thank you for choosing [facility]  for your care. Our goal is always to provide you with excellent care. Hearing back from our patients is one way we can continue to improve our services. Please take a few minutes to complete the written survey that you may receive in the mail after your visit with us. Thank you!              Your Updated Medication List - Protect others around you: Learn how to safely use, store and throw away your medicines at www.disposemymeds.org.          This list is accurate as of: 12/22/17 11:08 AM.  Always use your most recent med list.                   Brand Name Dispense Instructions for use Diagnosis    albuterol 108 (90 BASE) MCG/ACT Inhaler    PROAIR HFA/PROVENTIL HFA/VENTOLIN HFA    1 Inhaler    Inhale 2 puffs into the lungs every 4 hours as needed for shortness of breath / dyspnea or wheezing    Post-infection bronchospasm       aspirin 81 MG tablet      1 tab po QD (Once per day)    Essential hypertension, benign, Type II or unspecified type diabetes mellitus without mention of complication, not stated as uncontrolled, Type II or unspecified type diabetes mellitus without mention of complication, not stated as uncontrolled, Other and unspecified hyperlipidemia, Other and unspecified hyperlipidemia       atorvastatin 80 MG tablet    LIPITOR    45 tablet    take 1/2 tablet by mouth at bedtime    Type 2 diabetes mellitus without complication (H), Hyperlipidemia LDL goal <100       * blood glucose calibration solution    no brand specified    1 Bottle    1 drop by Test Solution route every 30 days.    Type 2 diabetes, HbA1c goal < 7% (H)       * blood glucose calibration solution    no brand specified    1 each    Use to calibrate blood glucose monitor as directed; ONE TOUCH    Type 2 diabetes mellitus without complication (H)       * blood glucose monitoring meter device kit    no brand specified    1 kit    1 each daily.    Type 2 diabetes, HbA1c goal < 7% (H)       * blood glucose monitoring meter device kit     1 kit    Use to test blood sugar 1-3 times daily or as directed.    Type 2 diabetes mellitus without complication (H)       * blood glucose monitoring test strip    no brand specified    1 Box    by In Vitro route 2 times daily. Dispense whatever diabetic testing supplies as directed by  "formulary    Type 2 diabetes, HbA1c goal < 7% (H)       * blood glucose monitoring test strip    ACCU-CHEK LUL PLUS    100 each    Use to test blood sugar 1-3 times daily or as directed.    Type 2 diabetes mellitus without complication (H)       * DIABETIC STERILE LANCETS device     1 Box    1 Device 2 times daily.    Type 2 diabetes, HbA1c goal < 7% (H)       * blood glucose monitoring lancets     100 each    Use to test blood sugar 1-3 times daily or as directed.    Type 2 diabetes mellitus without complication, without long-term current use of insulin (H)       fenofibrate 160 MG tablet     90 tablet    TAKE 1 TABLET BY MOUTH DAILY    Hyperlipidemia LDL goal <100, Type 2 diabetes mellitus without complication, without long-term current use of insulin (H)       glipiZIDE XL 10 MG 24 hr tablet   Generic drug:  glipiZIDE     30 tablet    Take 1 tablet (10mg) by mouth daily    Type 2 diabetes mellitus without complication, without long-term current use of insulin (H)       indomethacin 25 MG capsule    INDOCIN    30 capsule    Take 1 capsule by mouth 2 times daily as needed. Take with meals    Gout, unspecified       ketoconazole 2 % cream    NIZORAL    30 g    Apply topically 2 times daily Apply sparingly once or twice per day as needed to affected area until the skin is better, then stop    Tinea of the body       lisinopril 20 MG tablet    PRINIVIL/ZESTRIL    90 tablet    Take 1 tablet (20 mg) by mouth daily    Benign essential hypertension, Type 2 diabetes mellitus without complication, without long-term current use of insulin (H)       metFORMIN 1000 MG tablet    GLUCOPHAGE    60 tablet    TAKE ONE TABLET BY MOUTH TWICE A DAY WITH MEALS **last refill without an appt**    Type 2 diabetes mellitus without complication, without long-term current use of insulin (H)       * pen needles 5/16\" 31G X 8 MM Misc     100 each    1 each daily.    Type 2 diabetes, HbA1c goal < 7% (H)       * insulin pen needle 29G X 12.7MM "    BD ULTRA-FINE    100 each    1 each daily With Victoza pen    Type 2 diabetes mellitus without complication, without long-term current use of insulin (H)       pioglitazone 45 MG tablet    ACTOS    30 tablet    TAKE ONE TABLET BY MOUTH ONE TIME DAILY **last refill without an appt**    Type 2 diabetes mellitus without complication, without long-term current use of insulin (H)       traMADol 50 MG tablet    ULTRAM    15 tablet    Take 1-2 tablets ( mg) by mouth 3 times daily as needed for moderate pain or severe pain maximum 2 tablet(s) per day    Aphthous ulcer of mouth       triamcinolone 0.5 % cream    KENALOG    30 g    Apply sparingly once or twice per day as needed to affected area until the skin is better, then stop    Eczema, unspecified eczema       VICTOZA PEN 18 MG/3ML soln   Generic drug:  liraglutide     27 mL    Inject 1.8 mg under skin once daily    Type 2 diabetes mellitus without complication, without long-term current use of insulin (H)       * Notice:  This list has 10 medication(s) that are the same as other medications prescribed for you. Read the directions carefully, and ask your doctor or other care provider to review them with you.

## 2017-12-22 NOTE — PATIENT INSTRUCTIONS
"*  Your diabetes is under MUCH better control, almost entirely due to your efforts in eating better.     *  Continue all medications at the same doses.  Contact your usual pharmacy if you need refills.     *  Return to see me in approximately 6 month, sooner if needed.  [facility] this appointment.  Eat nothing for at least 8 hours prior to having these labs drawn.  Call [phone] to schedule both appointments.         5 GOALS IN MANAGING DIABETES (i.e. to give the best chance to prevent diabetic complications and vascular disease):     1.  Aggressive blood pressure control, under 130/80 ideally.  Using medications if needed    Your blood pressure is under good control    BP Readings from Last 4 Encounters:   12/22/17 130/70   08/04/17 118/78   08/01/17 142/90   01/30/17 134/64       2.  Aggressive LDL cholesterol (bad cholesterol) lowering as needed.  Your goal is an LDL under 100 for sure, preferably under 70.     *  All patients with diabetes are recommended to be on a \"statin\" cholesterol lowering medication regardless of the cholesterol levels to give the best chance at avoiding vascular disease.      New guidelines identify four high-risk groups who could benefit from statins:   *people with pre-existing heart disease, such as those who have had a heart attack;   *people ages 40 to 75 who have diabetes of any type  *patients ages 40 to 75 with at least a 7.5% risk of developing cardiovascular disease over the next decade, according to a formula described in the guidelines  *patients with the sort of super-high cholesterol that sometimes runs in families, as evidenced by an LDL of 190 milligrams per deciliter or higher    *  Your cholesterol levels are well controlled.    Recent Labs   Lab Test  01/20/17   0911  01/11/16   0727  01/26/15   0920  12/30/13   1315   CHOL  109  150  126  156   HDL  43  38*  45  41   LDL  48  73  55  Cannot estimate LDL when " triglyceride exceeds 400 mg/dL  86   TRIG  91  194*  128  435*   CHOLHDLRATIO   --    --   2.8  3.8       3.  Aggressive diabetic management.  The target for A1C (3 months average blood sugar) is ideally below 6.5, preferably below 7.5    Your diabetes is under good control.      Lab Results   Component Value Date    A1C 6.9 12/01/2017    A1C 9.3 05/15/2017    A1C 8.7 01/16/2017    A1C 8.2 10/19/2016    A1C 8.7 06/30/2016    A1C 9.3 01/11/2016    A1C 7.7 07/17/2015    A1C 6.8 01/26/2015    A1C 6.4 10/16/2014    A1C 8.9 06/11/2014    A1C 7.8 12/30/2013    A1C 6.8 08/02/2013    A1C 7.0 02/14/2013    A1C 7.1 08/10/2012    A1C 8.4 03/30/2012    A1C 8.9 08/22/2011    A1C 8.4 03/08/2011    A1C 6.8 10/16/2010    A1C 6.8 04/09/2010    A1C 7.0 05/21/2009    A1C 7.0 12/23/2008    A1C 6.6 09/03/2008    A1C 7.1 06/09/2008    A1C 7.0 02/15/2008    A1C 7.2 10/24/2007    A1C 7.2 06/29/2007    A1C 6.9 03/29/2007    A1C 6.4 12/07/2006    A1C 6.6 06/01/2006    A1C 8.2 02/27/2006    A1C 8.3 12/15/2005    A1C 7.3 03/22/2005    A1C 7.3 11/29/2004    A1C 7.4 08/05/2004    A1C 6.9 10/27/2003    A1C 6.0 12/24/2002    A1C 6.0 08/07/2002       4.  No smoking    5.  Aspirin tablet once per day, every day unless there is a specific reason that you cannot take aspirin.       *You should take Aspirin 81 mg once per day.           Hemoglobin A1C (3 month average blood sugar) values:    5.0 97  5.5 111  6.0 126  ---------------  6.5 140  7.0 154  7.5 169  8.0 183  ---------------  8.5 197  9.0 215  9.5 226  10.0 240  10.5 255  11.0 269  11.5 283  12.0 298  12.5 312  13.0 326  13.5 341  14.0 355  >14 YIKES!

## 2017-12-22 NOTE — PROGRESS NOTES
SUBJECTIVE:   Neymar Rhodes is a 57 year old male who presents to clinic today for the following health issues:            Diabetes Follow-up      Patient is checking blood sugars: not at all    Diabetic concerns: None     Symptoms of hypoglycemia (low blood sugar): none     Paresthesias (numbness or burning in feet) or sores: No     Date of last diabetic eye exam: 2017    BP Readings from Last 2 Encounters:   12/22/17 130/70   08/04/17 118/78     Hemoglobin A1C (%)   Date Value   12/01/2017 6.9 (H)   05/15/2017 9.3 (H)     LDL Cholesterol Calculated (mg/dL)   Date Value   01/20/2017 48   01/11/2016 73       Lab Results   Component Value Date    A1C 6.9 12/01/2017    A1C 9.3 05/15/2017    A1C 8.7 01/16/2017    A1C 8.2 10/19/2016    A1C 8.7 06/30/2016    A1C 9.3 01/11/2016    A1C 7.7 07/17/2015    A1C 6.8 01/26/2015    A1C 6.4 10/16/2014    A1C 8.9 06/11/2014    A1C 7.8 12/30/2013    A1C 6.8 08/02/2013    A1C 7.0 02/14/2013    A1C 7.1 08/10/2012    A1C 8.4 03/30/2012    A1C 8.9 08/22/2011    A1C 8.4 03/08/2011    A1C 6.8 10/16/2010    A1C 6.8 04/09/2010    A1C 7.0 05/21/2009    A1C 7.0 12/23/2008    A1C 6.6 09/03/2008    A1C 7.1 06/09/2008    A1C 7.0 02/15/2008    A1C 7.2 10/24/2007    A1C 7.2 06/29/2007    A1C 6.9 03/29/2007    A1C 6.4 12/07/2006    A1C 6.6 06/01/2006    A1C 8.2 02/27/2006    A1C 8.3 12/15/2005    A1C 7.3 03/22/2005    A1C 7.3 11/29/2004    A1C 7.4 08/05/2004    A1C 6.9 10/27/2003    A1C 6.0 12/24/2002    A1C 6.0 08/07/2002        2.    Blood presure remains well controlled at home  Readings outside clinic are within normal limits.  Reviewed last 6 BP readings in chart:  BP Readings from Last 6 Encounters:   12/22/17 130/70   08/04/17 118/78   08/01/17 142/90   01/30/17 134/64   07/13/16 132/72   03/10/16 129/78     He has not experienced any significant side effects from medicaiotns for hypertension.    NO active cardiac complaints or symptoms with exercise.     3.    The patient has had a  history of ongoing obesity.  Reviewed the weigth curves.   Their current BMI is:  Body mass index is 40.89 kg/(m^2).  Reviewed previous attempts at weight loss which have not been successful in producing prolonged weigth loss.   Discussed current eating and exercise habits.     Reviewed weights in chart:  Wt Readings from Last 10 Encounters:   12/22/17 285 lb (129.3 kg)   08/04/17 281 lb (127.5 kg)   08/01/17 287 lb 3.2 oz (130.3 kg)   01/30/17 293 lb 4.8 oz (133 kg)   07/13/16 294 lb 1.6 oz (133.4 kg)   01/13/16 296 lb 12.8 oz (134.6 kg)   01/11/16 (!) 302 lb 1.6 oz (137 kg)   07/30/15 288 lb 1.6 oz (130.7 kg)   01/28/15 290 lb 14.4 oz (132 kg)   06/30/14 296 lb 8 oz (134.5 kg)          Problem list and histories reviewed & adjusted, as indicated.  Additional history: as documented        Reviewed and updated as needed this visit by clinical staffTobacco  Allergies  Meds  Problems  Med Hx  Surg Hx  Fam Hx  Soc Hx        Reviewed and updated as needed this visit by Provider  Allergies  Meds  Problems           Past Medical History:  ---------------------------  Past Medical History:   Diagnosis Date     Diverticulosis of colon (without mention of hemorrhage)      Essential hypertension, benign      Gout, unspecified      Morbid obesity (H)      Other and unspecified hyperlipidemia      Type II or unspecified type diabetes mellitus without mention of complication, not stated as uncontrolled        Past Surgical History:  ---------------------------  Past Surgical History:   Procedure Laterality Date     C APPENDECTOMY  1980's     SURGICAL HISTORY OF -   2001    repair of colovesicular fistula       Current Medications:  ---------------------------  Current Outpatient Prescriptions   Medication Sig Dispense Refill     pioglitazone (ACTOS) 45 MG tablet TAKE ONE TABLET BY MOUTH ONE TIME DAILY **last refill without an appt** 30 tablet 1     metFORMIN (GLUCOPHAGE) 1000 MG tablet TAKE ONE TABLET BY MOUTH TWICE A  "DAY WITH MEALS **last refill without an appt** 60 tablet 1     GLIPIZIDE XL 10 MG 24 hr tablet Take 1 tablet (10mg) by mouth daily 30 tablet 0     VICTOZA PEN 18 MG/3ML soln Inject 1.8 mg under skin once daily 27 mL 0     atorvastatin (LIPITOR) 80 MG tablet take 1/2 tablet by mouth at bedtime 45 tablet 0     traMADol (ULTRAM) 50 MG tablet Take 1-2 tablets ( mg) by mouth 3 times daily as needed for moderate pain or severe pain maximum 2 tablet(s) per day 15 tablet 0     insulin pen needle (BD ULTRA-FINE) 29G X 12.7MM 1 each daily With Victoza pen 100 each 3     blood glucose monitoring (ACCU-CHEK MULTICLIX) lancets Use to test blood sugar 1-3 times daily or as directed. 100 each 11     fenofibrate 160 MG tablet TAKE 1 TABLET BY MOUTH DAILY 90 tablet 2     lisinopril (PRINIVIL/ZESTRIL) 20 MG tablet Take 1 tablet (20 mg) by mouth daily 90 tablet 3     albuterol (PROAIR HFA/PROVENTIL HFA/VENTOLIN HFA) 108 (90 BASE) MCG/ACT Inhaler Inhale 2 puffs into the lungs every 4 hours as needed for shortness of breath / dyspnea or wheezing 1 Inhaler 2     blood glucose monitoring (ACCU-CHEK LUL PLUS) test strip Use to test blood sugar 1-3 times daily or as directed. 100 each 11     blood glucose monitoring (ACCU-CHEK LUL PLUS) meter device kit Use to test blood sugar 1-3 times daily or as directed. 1 kit 0     blood glucose calibration (NO BRAND SPECIFIED) solution Use to calibrate blood glucose monitor as directed; ONE TOUCH 1 each 3     triamcinolone (KENALOG) 0.5 % cream Apply sparingly once or twice per day as needed to affected area until the skin is better, then stop 30 g 0     ketoconazole (NIZORAL) 2 % cream Apply topically 2 times daily Apply sparingly once or twice per day as needed to affected area until the skin is better, then stop 30 g 0     indomethacin (INDOCIN) 25 MG capsule Take 1 capsule by mouth 2 times daily as needed. Take with meals 30 capsule 0     Insulin Pen Needle (PEN NEEDLES 5/16\") 31G X 8 MM " MISC 1 each daily. 100 each 11     Blood Glucose Monitoring Suppl (BLOOD GLUCOSE METER) KIT 1 each daily. 1 kit 0     DIABETIC STERILE LANCETS device 1 Device 2 times daily. 1 Box 12     glucose blood VI test strips strip by In Vitro route 2 times daily. Dispense whatever diabetic testing supplies as directed by formulary 1 Box 12     Blood Glucose Calibration (GLUCOSE CONTROL) solution 1 drop by Test Solution route every 30 days. 1 Bottle 2     ASPIRIN 81 MG OR TABS 1 tab po QD (Once per day)         Allergies:  -------------  Allergies   Allergen Reactions     No Known Drug Allergies        Social History:  -------------------  Social History     Social History     Marital status:      Spouse name: N/A     Number of children: N/A     Years of education: N/A     Occupational History     Not on file.     Social History Main Topics     Smoking status: Never Smoker     Smokeless tobacco: Never Used     Alcohol use Yes      Comment: 1 glass of beer on weekend     Drug use: No     Sexual activity: Yes     Partners: Female     Other Topics Concern     Not on file     Social History Narrative       Family Medical History:  ------------------------------  Family History   Problem Relation Age of Onset     CANCER Father 75     bladder cancer     Breast Cancer Mother      Breast Cancer Sister      Family History Negative Brother      Family History Negative Brother          ROS:  REVIEW OF SYSTEMS:    RESP: negative for cough, dyspnea, wheezing, hemoptysis  CV: negative for chest pain, palpitations, PND, JOSHI, orthopnea; reports no changes in their ability to perform physical activity (from cardiovascular standpoint)  GI: negative for dysphagia, N/V, pain, melena, diarrhea and constipation  NEURO: negative for numbness/tingling, paralysis, incoordination, or focal weakness     OBJECTIVE:                                                    /70  Pulse 61  Temp 97.7  F (36.5  C) (Oral)  Resp 16  Wt 285 lb (129.3  kg)  SpO2 95%  BMI 40.89 kg/m2     GENERAL alert and no distress  EYES:  Normal sclera,conjunctiva, EOMI  HENT: oral and posterior pharynx without lesions or erythema, facies symmetric  NECK: Neck supple. No LAD, without thyroidmegaly or JVD., Carotids without bruits.  RESP: Clear to ausculation bilaterally without wheezes or crackles. Normal BS in all fields.  CV: RRR normal S1S2 without murmurs, rubs or gallops. PMI normal  LYMPH: no cervical lymph adenopathy appreciated  MS: extremities- no gross deformities of the visible extremities noted, no edema  PSYCH: Alert and oriented times 3; speech- coherent  SKIN:  No obvious significant skin lesions on visible portions of face          ASSESSMENT/PLAN:                                                      (E11.9) Type 2 diabetes mellitus without complication, without long-term current use of insulin (H)  Comment: doing much better since takignbetter control of his diet.   Discussed importance in compliance in all areas of diabetic control including diet, routine BS checks, absolute medication compliance, laboratory monitoring, and attending regular follow up appointments as ordered.  Failure to comply with instructions regarding diabetes will lead to a greater chance of poor diabetic control and therefore a greater chance of diabetes related complications such as CAD, CVA, PVD, and retinopathy/neuropathy/nephropathy.  Based on level of diabetes control: testing frequency ONE TIME PER DAY   Plan: **A1C FUTURE 6mo, Lipid panel reflex to direct         LDL Fasting, **Basic metabolic panel FUTURE         6mo, **ALT FUTURE 6mo, **TSH with free T4         reflex FUTURE 6mo, **CBC with platelets FUTURE         6mo, pioglitazone (ACTOS) 45 MG tablet,         metFORMIN (GLUCOPHAGE) 1000 MG tablet,         glipiZIDE (GLIPIZIDE XL) 10 MG 24 hr tablet,         liraglutide (VICTOZA PEN) 18 MG/3ML soln,         atorvastatin (LIPITOR) 80 MG tablet,         fenofibrate 160 MG  tablet, lisinopril         (PRINIVIL/ZESTRIL) 20 MG tablet              See Patient Instructions    FRANCHSEKA GRUBER M.D., MD  [facility]

## 2017-12-31 DIAGNOSIS — E11.9 TYPE 2 DIABETES MELLITUS WITHOUT COMPLICATION, WITHOUT LONG-TERM CURRENT USE OF INSULIN (H): ICD-10-CM

## 2018-01-01 RX ORDER — GLIPIZIDE 10 MG/1
TABLET, FILM COATED, EXTENDED RELEASE ORAL
Qty: 90 TABLET | Refills: 3 | Status: SHIPPED | OUTPATIENT
Start: 2018-01-01 | End: 2018-10-12

## 2018-01-01 RX ORDER — PIOGLITAZONEHYDROCHLORIDE 45 MG/1
TABLET ORAL
Qty: 90 TABLET | Refills: 1 | Status: ON HOLD | OUTPATIENT
Start: 2018-01-01 | End: 2018-06-13

## 2018-01-01 RX ORDER — FENOFIBRATE 160 MG/1
160 TABLET ORAL DAILY
Qty: 90 TABLET | Refills: 2 | Status: SHIPPED | OUTPATIENT
Start: 2018-01-01 | End: 2018-10-30

## 2018-01-01 RX ORDER — ATORVASTATIN CALCIUM 80 MG/1
TABLET, FILM COATED ORAL
Qty: 45 TABLET | Refills: 1 | Status: SHIPPED | OUTPATIENT
Start: 2018-01-01 | End: 2018-06-15

## 2018-01-01 RX ORDER — LIRAGLUTIDE 6 MG/ML
INJECTION SUBCUTANEOUS
Qty: 27 ML | Refills: 0 | Status: SHIPPED | OUTPATIENT
Start: 2018-01-01 | End: 2019-04-15

## 2018-01-01 RX ORDER — LISINOPRIL 20 MG/1
20 TABLET ORAL DAILY
Qty: 90 TABLET | Refills: 3 | Status: ON HOLD | OUTPATIENT
Start: 2018-01-01 | End: 2018-06-13

## 2018-01-02 RX ORDER — GLIPIZIDE 10 MG/1
TABLET, FILM COATED, EXTENDED RELEASE ORAL
Qty: 30 TABLET | Refills: 0 | OUTPATIENT
Start: 2018-01-02

## 2018-01-23 DIAGNOSIS — E11.9 TYPE 2 DIABETES MELLITUS WITHOUT COMPLICATION, WITHOUT LONG-TERM CURRENT USE OF INSULIN (H): ICD-10-CM

## 2018-01-23 NOTE — TELEPHONE ENCOUNTER
Requested Prescriptions   Pending Prescriptions Disp Refills     VICTOZA PEN 18 MG/3ML soln [Pharmacy Med Name: Victoza Subcutaneous Solution Pen-injector 18 MG/3ML] 27 mL 0     Sig: Inject 1.8 mg under skin once daily    GLP-1 Agonists Protocol Failed    1/23/2018  7:00 AM       Failed - LDL on file in past 12 months    Recent Labs   Lab Test  01/20/17   0911   LDL  48            Failed - Microalbumin on file in past 12 months    Recent Labs   Lab Test  01/20/17   0911   MICROL  22   UMALCR  9.37            Passed - Blood pressure less than 140/90 in past 6 months    BP Readings from Last 3 Encounters:   12/22/17 130/70   08/04/17 118/78   08/01/17 142/90     Last Written Prescription Date:  01/01/18  Last Fill Quantity: 27,  # refills: 0   Last Office Visit with G, P or [facility] prescribing provider:  12/22/17   Future Office Visit:   0           Passed - HgbA1C in past 3 or 6 months    Recent Labs   Lab Test  12/01/17   0847   A1C  6.9*            Passed - Patient is age 18 or older       Passed - A normal serum creatinine on file in past 12 months    Recent Labs   Lab Test  12/01/17   0847   CR  0.94            Passed - Recent visit with authorizing provider's specialty in past 6 months    IV to PO - Antibiotics     None

## 2018-01-24 RX ORDER — LIRAGLUTIDE 6 MG/ML
INJECTION SUBCUTANEOUS
Qty: 27 ML | Refills: 1 | Status: SHIPPED | OUTPATIENT
Start: 2018-01-24 | End: 2018-04-30

## 2018-04-30 ENCOUNTER — NURSE TRIAGE (OUTPATIENT)
Dept: NURSING | Facility: CLINIC | Age: 58
End: 2018-04-30

## 2018-04-30 ENCOUNTER — OFFICE VISIT (OUTPATIENT)
Dept: URGENT CARE | Facility: URGENT CARE | Age: 58
End: 2018-04-30
Payer: COMMERCIAL

## 2018-04-30 VITALS
DIASTOLIC BLOOD PRESSURE: 60 MMHG | SYSTOLIC BLOOD PRESSURE: 110 MMHG | RESPIRATION RATE: 22 BRPM | HEART RATE: 73 BPM | OXYGEN SATURATION: 96 % | BODY MASS INDEX: 40.75 KG/M2 | WEIGHT: 284 LBS | TEMPERATURE: 97.8 F

## 2018-04-30 DIAGNOSIS — R05.8 PRODUCTIVE COUGH: Primary | ICD-10-CM

## 2018-04-30 DIAGNOSIS — E11.9 TYPE 2 DIABETES MELLITUS WITHOUT COMPLICATION, WITHOUT LONG-TERM CURRENT USE OF INSULIN (H): ICD-10-CM

## 2018-04-30 DIAGNOSIS — R06.2 WHEEZING: ICD-10-CM

## 2018-04-30 DIAGNOSIS — Z91.148 NON COMPLIANCE W MEDICATION REGIMEN: ICD-10-CM

## 2018-04-30 DIAGNOSIS — R09.89 CHEST CONGESTION: ICD-10-CM

## 2018-04-30 PROCEDURE — 99214 OFFICE O/P EST MOD 30 MIN: CPT | Performed by: PHYSICIAN ASSISTANT

## 2018-04-30 RX ORDER — ALBUTEROL SULFATE 90 UG/1
2 AEROSOL, METERED RESPIRATORY (INHALATION) EVERY 6 HOURS
Qty: 1 INHALER | Refills: 0 | Status: ON HOLD | OUTPATIENT
Start: 2018-04-30 | End: 2018-06-11

## 2018-04-30 RX ORDER — CODEINE PHOSPHATE AND GUAIFENESIN 10; 100 MG/5ML; MG/5ML
5-10 SOLUTION ORAL
Qty: 120 ML | Refills: 0 | Status: SHIPPED | OUTPATIENT
Start: 2018-04-30 | End: 2018-10-12

## 2018-04-30 NOTE — MR AVS SNAPSHOT
"              After Visit Summary   4/30/2018    Neymar Rhodes    MRN: [id number]           Patient Information     Date Of Birth          1960        Visit Information        Provider Department      4/30/2018 10:30 AM Luis Partida PA-C [facility]        Today's Diagnoses     Productive cough    -  1    Chest congestion        Type 2 diabetes mellitus without complication, without long-term current use of insulin (H)        Non compliance w medication regimen        Wheezing           Follow-ups after your visit        Who to contact     If you have questions or need follow up information about today's clinic visit or your schedule please contact [facility] directly at [phone].  Normal or non-critical lab and imaging results will be communicated to you by MyChart, letter or phone within 4 business days after the clinic has received the results. If you do not hear from us within 7 days, please contact the clinic through [employer] or phone. If you have a critical or abnormal lab result, we will notify you by phone as soon as possible.  Submit refill requests through [email or web address] or call your pharmacy and they will forward the refill request to us. Please allow 3 business days for your refill to be completed.          Additional Information About Your Visit        MyChart Information     [email or web address] lets you send messages to your doctor, view your test results, renew your prescriptions, schedule appointments and more. To sign up, go to [email or web address] . Click on \"Log in\" on the left side of the screen, which will take you to the Welcome page. Then click on \"Sign up Now\" on the right side of the page.     You will be asked to enter the access code listed below, as well as some personal information. Please follow the directions to create your username and password.     Your access code is: [email or web address]  Expires: 7/29/2018  1:19 PM     Your access " code will  in 90 days. If you need help or a new code, please call your [place] [facility] clinic or [phone].        Care EveryWhere ID     This is your Care EveryWhere ID. This could be used by other organizations to access your [place] [facility] medical records  [id number]        Your Vitals Were     Pulse Temperature Respirations Pulse Oximetry BMI (Body Mass Index)       73 97.8  F (36.6  C) 22 96% 40.75 kg/m2        Blood Pressure from Last 3 Encounters:   18 110/60   17 130/70   17 118/78    Weight from Last 3 Encounters:   18 284 lb (128.8 kg)   17 285 lb (129.3 kg)   17 281 lb (127.5 kg)              Today, you had the following     No orders found for display         Today's Medication Changes          These changes are accurate as of 18  1:19 PM.  If you have any questions, ask your nurse or doctor.               Start taking these medicines.        Dose/Directions    amoxicillin-clavulanate 875-125 MG per tablet   Commonly known as:  AUGMENTIN   Used for:  Productive cough, Chest congestion   Started by:  Luis Partida PA-C        Dose:  1 tablet   Take 1 tablet by mouth 2 times daily   Quantity:  20 tablet   Refills:  0       guaiFENesin-codeine 100-10 MG/5ML Soln solution   Commonly known as:  ROBITUSSIN AC   Used for:  Chest congestion   Started by:  Luis Partida PA-C        Dose:  5-10 mL   Take 5-10 mLs by mouth nightly as needed for cough   Quantity:  120 mL   Refills:  0         These medicines have changed or have updated prescriptions.        Dose/Directions    * albuterol 108 (90 Base) MCG/ACT Inhaler   Commonly known as:  PROAIR HFA/PROVENTIL HFA/VENTOLIN HFA   This may have changed:  Another medication with the same name was added. Make sure you understand how and when to take each.   Used for:  Post-infection bronchospasm   Changed by:  Luis Partida PA-C        Dose:  2 puff   Inhale 2 puffs into the lungs every 4 hours as needed for shortness of  breath / dyspnea or wheezing   Quantity:  1 Inhaler   Refills:  2       * albuterol 108 (90 Base) MCG/ACT Inhaler   Commonly known as:  PROVENTIL HFA   This may have changed:  You were already taking a medication with the same name, and this prescription was added. Make sure you understand how and when to take each.   Used for:  Wheezing   Changed by:  Luis Partida PA-C        Dose:  2 puff   Inhale 2 puffs into the lungs every 6 hours   Quantity:  1 Inhaler   Refills:  0       * Notice:  This list has 2 medication(s) that are the same as other medications prescribed for you. Read the directions carefully, and ask your doctor or other care provider to review them with you.         Where to get your medicines      These medications were sent to [id number] - [place], [place] - [address]  [address] [address]     Phone:  [phone]     albuterol 108 (90 Base) MCG/ACT Inhaler    amoxicillin-clavulanate 875-125 MG per tablet         Some of these will need a paper prescription and others can be bought over the counter.  Ask your nurse if you have questions.     Bring a paper prescription for each of these medications     guaiFENesin-codeine 100-10 MG/5ML Soln solution                Primary Care Provider Office Phone # Fax #    Jefry Harris [phone]       [address] [facility] [address]        Equal Access to Services     [facility]: Hadii denae ku hadasho Soomaali, waaxda luqadaha, qaybta kaalmada adeegyada, roberto holguin haybella yusuf . So [facility] [phone].    ATENCIÓN: Si habla español, tiene a garcia disposición servicios gratuitos de asistencia lingüística. Llame al [phone].    We comply with applicable federal civil rights laws and [place] laws. We do not discriminate on the basis of race, color, national origin, age, disability, sex, sexual orientation, or gender identity.            Thank you!     Thank you for  choosing [facility]  for your care. Our goal is always to provide you with excellent care. Hearing back from our patients is one way we can continue to improve our services. Please take a few minutes to complete the written survey that you may receive in the mail after your visit with us. Thank you!             Your Updated Medication List - Protect others around you: Learn how to safely use, store and throw away your medicines at www.disposemymeds.org.          This list is accurate as of 4/30/18  1:19 PM.  Always use your most recent med list.                   Brand Name Dispense Instructions for use Diagnosis    * albuterol 108 (90 Base) MCG/ACT Inhaler    PROAIR HFA/PROVENTIL HFA/VENTOLIN HFA    1 Inhaler    Inhale 2 puffs into the lungs every 4 hours as needed for shortness of breath / dyspnea or wheezing    Post-infection bronchospasm       * albuterol 108 (90 Base) MCG/ACT Inhaler    PROVENTIL HFA    1 Inhaler    Inhale 2 puffs into the lungs every 6 hours    Wheezing       amoxicillin-clavulanate 875-125 MG per tablet    AUGMENTIN    20 tablet    Take 1 tablet by mouth 2 times daily    Productive cough, Chest congestion       aspirin 81 MG tablet      1 tab po QD (Once per day)    Essential hypertension, benign, Type II or unspecified type diabetes mellitus without mention of complication, not stated as uncontrolled, Type II or unspecified type diabetes mellitus without mention of complication, not stated as uncontrolled, Other and unspecified hyperlipidemia, Other and unspecified hyperlipidemia       atorvastatin 80 MG tablet    LIPITOR    45 tablet    take 1/2 tablet by mouth at bedtime    Type 2 diabetes mellitus without complication, without long-term current use of insulin (H)       * blood glucose calibration solution    no brand specified    1 Bottle    1 drop by Test Solution route every 30 days.    Type 2 diabetes, HbA1c goal < 7% (H)       * blood glucose calibration  solution    no brand specified    1 each    Use to calibrate blood glucose monitor as directed; ONE TOUCH    Type 2 diabetes mellitus without complication (H)       * blood glucose monitoring meter device kit    no brand specified    1 kit    1 each daily.    Type 2 diabetes, HbA1c goal < 7% (H)       * blood glucose monitoring meter device kit     1 kit    Use to test blood sugar 1-3 times daily or as directed.    Type 2 diabetes mellitus without complication (H)       * blood glucose monitoring test strip    no brand specified    1 Box    by In Vitro route 2 times daily. Dispense whatever diabetic testing supplies as directed by formulary    Type 2 diabetes, HbA1c goal < 7% (H)       * blood glucose monitoring test strip    ACCU-CHEK LUL PLUS    100 each    Use to test blood sugar 1-3 times daily or as directed.    Type 2 diabetes mellitus without complication (H)       * DIABETIC STERILE LANCETS device     1 Box    1 Device 2 times daily.    Type 2 diabetes, HbA1c goal < 7% (H)       * blood glucose monitoring lancets     100 each    Use to test blood sugar 1-3 times daily or as directed.    Type 2 diabetes mellitus without complication, without long-term current use of insulin (H)       fenofibrate 160 MG tablet     90 tablet    Take 1 tablet (160 mg) by mouth daily    Type 2 diabetes mellitus without complication, without long-term current use of insulin (H)       glipiZIDE 10 MG 24 hr tablet    glipiZIDE XL    90 tablet    Take 1 tablet (10mg) by mouth daily    Type 2 diabetes mellitus without complication, without long-term current use of insulin (H)       guaiFENesin-codeine 100-10 MG/5ML Soln solution    ROBITUSSIN AC    120 mL    Take 5-10 mLs by mouth nightly as needed for cough    Chest congestion       indomethacin 25 MG capsule    INDOCIN    30 capsule    Take 1 capsule by mouth 2 times daily as needed. Take with meals    Gout, unspecified       ketoconazole 2 % cream    NIZORAL    30 g    Apply  "topically 2 times daily Apply sparingly once or twice per day as needed to affected area until the skin is better, then stop    Tinea of the body       liraglutide 18 MG/3ML soln    VICTOZA PEN    27 mL    Inject 1.8 mg under skin once daily    Type 2 diabetes mellitus without complication, without long-term current use of insulin (H)       lisinopril 20 MG tablet    PRINIVIL/ZESTRIL    90 tablet    Take 1 tablet (20 mg) by mouth daily    Type 2 diabetes mellitus without complication, without long-term current use of insulin (H)       metFORMIN 1000 MG tablet    GLUCOPHAGE    180 tablet    TAKE ONE TABLET BY MOUTH TWICE A DAY WITH MEALS    Type 2 diabetes mellitus without complication, without long-term current use of insulin (H)       * pen needles 5/16\" 31G X 8 MM Misc     100 each    1 each daily.    Type 2 diabetes, HbA1c goal < 7% (H)       * insulin pen needle 29G X 12.7MM    BD ULTRA-FINE    100 each    1 each daily With Victoza pen    Type 2 diabetes mellitus without complication, without long-term current use of insulin (H)       pioglitazone 45 MG tablet    ACTOS    90 tablet    TAKE ONE TABLET BY MOUTH ONE TIME DAILY    Type 2 diabetes mellitus without complication, without long-term current use of insulin (H)       triamcinolone 0.5 % cream    KENALOG    30 g    Apply sparingly once or twice per day as needed to affected area until the skin is better, then stop    Eczema, unspecified eczema       * Notice:  This list has 12 medication(s) that are the same as other medications prescribed for you. Read the directions carefully, and ask your doctor or other care provider to review them with you.      "

## 2018-04-30 NOTE — TELEPHONE ENCOUNTER
[facility] pharmacy calling to state Albuterol inhaler ordered in  today is not in stock, requesting therapeutic substitution.  Will fill with either Proair or Ventolin, both equivalent per pharmacist.  Authorized per RN protocol.      Additional Information    Pharmacy calling with prescription question and triager answers question    Protocols used: MEDICATION QUESTION CALL-ADULT-

## 2018-04-30 NOTE — PROGRESS NOTES
SUBJECTIVE:   Neymar Rhodes is a 58 year old male presenting with a chief complaint of chest congestion, productive cough, wheezing.  Onset of symptoms was 5 day(s) ago.  Course of illness is worsening.    Severity moderate  Current and Associated symptoms: chest congestion, coughing  Treatment measures tried include OTC medications.  Predisposing factors include recent illness.    Past Medical History:   Diagnosis Date     Diverticulosis of colon (without mention of hemorrhage)      Essential hypertension, benign      Gout, unspecified      Morbid obesity (H)      Other and unspecified hyperlipidemia      Type II or unspecified type diabetes mellitus without mention of complication, not stated as uncontrolled         Allergies   Allergen Reactions     No Known Drug Allergies          Social History   Substance Use Topics     Smoking status: Never Smoker     Smokeless tobacco: Never Used     Alcohol use Yes      Comment: 1 glass of beer on weekend       ROS:  CONSTITUTIONAL:NEGATIVE for fever, chills, change in weight  INTEGUMENTARY/SKIN: NEGATIVE for worrisome rashes, moles or lesions  ENT/MOUTH: POSITIVE for sinus congestion, draingae  RESP:Postiive for coughing, chest congestion  CV: NEGATIVE for chest pain, palpitations or peripheral edema  GI: NEGATIVE for nausea, abdominal pain, heartburn, or change in bowel habits  MS: NEGATIVE for leg pain  NEURO: Negative for weakness or dizziness    OBJECTIVE  :/60  Pulse 73  Temp 97.8  F (36.6  C)  Resp 22  Wt 284 lb (128.8 kg)  SpO2 96%  BMI 40.75 kg/m2  GENERAL APPEARANCE: healthy, alert and no distress  EYES: EOMI,  PERRL, conjunctiva clear  HENT: TM's normal bilaterally, nasal turbinates erythematous, swollen, frontal sinus tenderness  and maxillary sinus tenderness   NECK: supple, nontender, no lymphadenopathy  RESP: Positive for mild wheezing  CV: regular rates and rhythm, normal S1 S2, no murmur noted  ABDOMEN:  soft, nontender, no HSM or masses and  bowel sounds normal  NEURO: Normal strength and tone, sensory exam grossly normal,  normal speech and mentation  SKIN: no suspicious lesions or rashes    ASSESSMENT/PLAN:    ICD-10-CM    1. Productive cough R05 amoxicillin-clavulanate (AUGMENTIN) 875-125 MG per tablet   2. Chest congestion R09.89 amoxicillin-clavulanate (AUGMENTIN) 875-125 MG per tablet     guaiFENesin-codeine (ROBITUSSIN AC) 100-10 MG/5ML SOLN solution   3. Type 2 diabetes mellitus without complication, without long-term current use of insulin (H) E11.9 Monitor blood sugar   4. Non compliance w medication regimen Z91.14 Take medications for diabetes as recommended by PCP    5. Wheezing R06.2 albuterol (PROVENTIL HFA) 108 (90 Base) MCG/ACT Inhaler         Take medications as directed by PCP for diabetes  Continue to monitor blood sugars  augmentin for infection  Robitussin ac for coughing  See orders in Epic

## 2018-06-10 ENCOUNTER — APPOINTMENT (OUTPATIENT)
Dept: GENERAL RADIOLOGY | Facility: CLINIC | Age: 58
DRG: 280 | End: 2018-06-10
Attending: EMERGENCY MEDICINE
Payer: COMMERCIAL

## 2018-06-10 ENCOUNTER — HOSPITAL ENCOUNTER (INPATIENT)
Facility: CLINIC | Age: 58
LOS: 2 days | Discharge: HOME | DRG: 280 | End: 2018-06-13
Attending: EMERGENCY MEDICINE | Admitting: INTERNAL MEDICINE
Payer: COMMERCIAL

## 2018-06-10 DIAGNOSIS — E11.9 TYPE 2 DIABETES MELLITUS WITHOUT COMPLICATION, WITHOUT LONG-TERM CURRENT USE OF INSULIN (H): ICD-10-CM

## 2018-06-10 DIAGNOSIS — R79.89 ELEVATED TROPONIN: ICD-10-CM

## 2018-06-10 DIAGNOSIS — R06.02 SHORTNESS OF BREATH: ICD-10-CM

## 2018-06-10 DIAGNOSIS — I25.5 ISCHEMIC CARDIOMYOPATHY: ICD-10-CM

## 2018-06-10 DIAGNOSIS — J96.01 ACUTE RESPIRATORY FAILURE WITH HYPOXIA (H): Primary | ICD-10-CM

## 2018-06-10 DIAGNOSIS — I10 ESSENTIAL HYPERTENSION, BENIGN: ICD-10-CM

## 2018-06-10 DIAGNOSIS — I42.8 NONISCHEMIC CARDIOMYOPATHY (H): ICD-10-CM

## 2018-06-10 LAB
ANION GAP SERPL CALCULATED.3IONS-SCNC: 16 MMOL/L (ref 3–14)
BASOPHILS # BLD AUTO: 0.1 10E9/L (ref 0–0.2)
BASOPHILS NFR BLD AUTO: 0.5 %
BUN SERPL-MCNC: 18 MG/DL (ref 7–30)
CALCIUM SERPL-MCNC: 9.2 MG/DL (ref 8.5–10.1)
CHLORIDE SERPL-SCNC: 109 MMOL/L (ref 94–109)
CO2 SERPL-SCNC: 18 MMOL/L (ref 20–32)
CREAT SERPL-MCNC: 1.11 MG/DL (ref 0.66–1.25)
DIFFERENTIAL METHOD BLD: NORMAL
EOSINOPHIL # BLD AUTO: 0.5 10E9/L (ref 0–0.7)
EOSINOPHIL NFR BLD AUTO: 4.1 %
ERYTHROCYTE [DISTWIDTH] IN BLOOD BY AUTOMATED COUNT: 13.6 % (ref 10–15)
GFR SERPL CREATININE-BSD FRML MDRD: 68 ML/MIN/1.7M2
GLUCOSE SERPL-MCNC: 310 MG/DL (ref 70–99)
HCT VFR BLD AUTO: 42.4 % (ref 40–53)
HGB BLD-MCNC: 14 G/DL (ref 13.3–17.7)
IMM GRANULOCYTES # BLD: 0.1 10E9/L (ref 0–0.4)
IMM GRANULOCYTES NFR BLD: 0.5 %
INTERPRETATION ECG - MUSE: NORMAL
LYMPHOCYTES # BLD AUTO: 4.9 10E9/L (ref 0.8–5.3)
LYMPHOCYTES NFR BLD AUTO: 44.9 %
MAGNESIUM SERPL-MCNC: 2 MG/DL (ref 1.6–2.3)
MCH RBC QN AUTO: 31.3 PG (ref 26.5–33)
MCHC RBC AUTO-ENTMCNC: 33 G/DL (ref 31.5–36.5)
MCV RBC AUTO: 95 FL (ref 78–100)
MONOCYTES # BLD AUTO: 0.7 10E9/L (ref 0–1.3)
MONOCYTES NFR BLD AUTO: 6.7 %
NEUTROPHILS # BLD AUTO: 4.7 10E9/L (ref 1.6–8.3)
NEUTROPHILS NFR BLD AUTO: 43.3 %
NRBC # BLD AUTO: 0 10*3/UL
NRBC BLD AUTO-RTO: 0 /100
NT-PROBNP SERPL-MCNC: 478 PG/ML (ref 0–900)
PLATELET # BLD AUTO: 218 10E9/L (ref 150–450)
POTASSIUM SERPL-SCNC: 3.4 MMOL/L (ref 3.4–5.3)
RBC # BLD AUTO: 4.47 10E12/L (ref 4.4–5.9)
SODIUM SERPL-SCNC: 143 MMOL/L (ref 133–144)
TROPONIN I BLD-MCNC: 0.07 UG/L (ref 0–0.1)
TROPONIN I SERPL-MCNC: 0.09 UG/L (ref 0–0.04)
WBC # BLD AUTO: 11 10E9/L (ref 4–11)

## 2018-06-10 PROCEDURE — 83880 ASSAY OF NATRIURETIC PEPTIDE: CPT | Performed by: EMERGENCY MEDICINE

## 2018-06-10 PROCEDURE — 25000128 H RX IP 250 OP 636

## 2018-06-10 PROCEDURE — 99292 CRITICAL CARE ADDL 30 MIN: CPT

## 2018-06-10 PROCEDURE — 25000132 ZZH RX MED GY IP 250 OP 250 PS 637

## 2018-06-10 PROCEDURE — 96365 THER/PROPH/DIAG IV INF INIT: CPT

## 2018-06-10 PROCEDURE — 25000128 H RX IP 250 OP 636: Performed by: EMERGENCY MEDICINE

## 2018-06-10 PROCEDURE — 96368 THER/DIAG CONCURRENT INF: CPT

## 2018-06-10 PROCEDURE — 40000275 ZZH STATISTIC RCP TIME EA 10 MIN

## 2018-06-10 PROCEDURE — 93005 ELECTROCARDIOGRAM TRACING: CPT | Mod: 76

## 2018-06-10 PROCEDURE — 93005 ELECTROCARDIOGRAM TRACING: CPT

## 2018-06-10 PROCEDURE — 84484 ASSAY OF TROPONIN QUANT: CPT

## 2018-06-10 PROCEDURE — 84484 ASSAY OF TROPONIN QUANT: CPT | Performed by: EMERGENCY MEDICINE

## 2018-06-10 PROCEDURE — 96366 THER/PROPH/DIAG IV INF ADDON: CPT

## 2018-06-10 PROCEDURE — 71045 X-RAY EXAM CHEST 1 VIEW: CPT

## 2018-06-10 PROCEDURE — 96375 TX/PRO/DX INJ NEW DRUG ADDON: CPT

## 2018-06-10 PROCEDURE — 83735 ASSAY OF MAGNESIUM: CPT | Performed by: EMERGENCY MEDICINE

## 2018-06-10 PROCEDURE — 99291 CRITICAL CARE FIRST HOUR: CPT | Mod: 25

## 2018-06-10 PROCEDURE — 85025 COMPLETE CBC W/AUTO DIFF WBC: CPT | Performed by: EMERGENCY MEDICINE

## 2018-06-10 PROCEDURE — 99223 1ST HOSP IP/OBS HIGH 75: CPT | Mod: AI | Performed by: INTERNAL MEDICINE

## 2018-06-10 PROCEDURE — 80048 BASIC METABOLIC PNL TOTAL CA: CPT | Performed by: EMERGENCY MEDICINE

## 2018-06-10 RX ORDER — ASPIRIN 81 MG/1
TABLET, CHEWABLE ORAL
Status: COMPLETED
Start: 2018-06-10 | End: 2018-06-10

## 2018-06-10 RX ORDER — ASPIRIN 300 MG/1
300 SUPPOSITORY RECTAL ONCE
Status: DISCONTINUED | OUTPATIENT
Start: 2018-06-10 | End: 2018-06-11

## 2018-06-10 RX ORDER — FUROSEMIDE 10 MG/ML
40 INJECTION INTRAMUSCULAR; INTRAVENOUS ONCE
Status: COMPLETED | OUTPATIENT
Start: 2018-06-10 | End: 2018-06-10

## 2018-06-10 RX ORDER — SULFAMETHOXAZOLE/TRIMETHOPRIM 800-160 MG
1 TABLET ORAL 2 TIMES DAILY
Qty: 20 TABLET | Refills: 0 | Status: SHIPPED | OUTPATIENT
Start: 2018-06-10 | End: 2018-06-10

## 2018-06-10 RX ORDER — NITROGLYCERIN 20 MG/100ML
INJECTION INTRAVENOUS
Status: COMPLETED
Start: 2018-06-10 | End: 2018-06-10

## 2018-06-10 RX ORDER — NITROGLYCERIN 20 MG/100ML
.07-1.55 INJECTION INTRAVENOUS CONTINUOUS
Status: DISCONTINUED | OUTPATIENT
Start: 2018-06-10 | End: 2018-06-11

## 2018-06-10 RX ORDER — MAGNESIUM SULFATE HEPTAHYDRATE 40 MG/ML
2 INJECTION, SOLUTION INTRAVENOUS ONCE
Status: COMPLETED | OUTPATIENT
Start: 2018-06-10 | End: 2018-06-10

## 2018-06-10 RX ORDER — CEPHALEXIN 500 MG/1
500 CAPSULE ORAL 4 TIMES DAILY
Qty: 40 CAPSULE | Refills: 0 | Status: SHIPPED | OUTPATIENT
Start: 2018-06-10 | End: 2018-06-10

## 2018-06-10 RX ADMIN — NITROGLYCERIN 0.1 MCG/KG/MIN: 20 INJECTION INTRAVENOUS at 20:44

## 2018-06-10 RX ADMIN — FUROSEMIDE 40 MG: 10 INJECTION, SOLUTION INTRAVENOUS at 20:51

## 2018-06-10 RX ADMIN — Medication 5000 UNITS: at 21:56

## 2018-06-10 RX ADMIN — HEPARIN SODIUM 1150 UNITS/HR: 10000 INJECTION, SOLUTION INTRAVENOUS at 21:56

## 2018-06-10 RX ADMIN — ASPIRIN 81 MG 324 MG: 81 TABLET ORAL at 20:39

## 2018-06-10 RX ADMIN — MAGNESIUM SULFATE HEPTAHYDRATE 2 G: 40 INJECTION, SOLUTION INTRAVENOUS at 20:51

## 2018-06-10 ASSESSMENT — ENCOUNTER SYMPTOMS
ABDOMINAL PAIN: 0
COUGH: 0
NAUSEA: 0
FEVER: 0
SHORTNESS OF BREATH: 1
DIAPHORESIS: 1
VOMITING: 0

## 2018-06-10 NOTE — IP AVS SNAPSHOT
MRN:[id number]                      After Visit Summary   6/10/2018    Neymar Rhodes    MRN: [id number]           Thank you!     Thank you for choosing [facility] for your care. Our goal is always to provide you with excellent care. Hearing back from our patients is one way we can continue to improve our services. Please take a few minutes to complete the written survey that you may receive in the mail after you visit with us. Thank you!        Patient Information     Date Of Birth          1960        Designated Caregiver       Most Recent Value    Caregiver    Will someone help with your care after discharge? no      About your hospital stay     You were admitted on:  June 11, 2018 You last received care in the:  [facility]    You were discharged on:  June 13, 2018        Reason for your hospital stay       Congestive Heart Failure                  Who to Call     For medical emergencies, please call 911.  For non-urgent questions about your medical care, please call your primary care provider or clinic, [phone]          Attending Provider     Provider Specialty    Farooq Barajas DO Emergency Medicine    Marcus, Luis Mitchell MD Internal Medicine       Primary Care Provider Office Phone # Fax #    Jefry Harris [phone]      After Care Instructions     Activity       Your activity upon discharge: activity as tolerated, no heavy lifting/aggressive activity            Diet       Follow this diet upon discharge:  Diabetic, 2 gram sodium restricted            Monitor and record       weight every day (If weight increases more than 4 lbs from base weight when you first get home and weigh yourself, contact cardiology)                  Follow-up Appointments     Follow-up and recommended labs and tests        Follow-up with cardiology, see their orders.  Also recommend you see your primary provider in the next 1-2 weeks.                   Your next 10 appointments already scheduled     Jun 13, 2018  3:30 PM CDT   IP Cardiac Rehab Satellite Treatment with Jose F Stone OT   [facility] ([facility])    [facility] [address]  [facility] [address]   [phone]            Jun 19, 2018 10:00 AM CDT   [facility]ef[facility]   [facility]    [address] [address] [phone]           Bring a current list of meds and any records pertaining to this visit. For Physicals, please bring immunization records and any forms needing to be filled out. Please arrive 10 minutes early to complete paperwork.            Jun 20, 2018 12:20 PM CDT   LAB with [facility]   [facility] [place] ([facility])    [address] [place] [address] [address]  [facility] [id number] [phone]           Please do not eat 10-12 hours before your appointment if you are coming in fasting for labs on lipids, cholesterol, or glucose (sugar). This does not apply to pregnant women. Water, hot tea and black coffee (with nothing added) are okay. Do not drink other fluids, diet soda or chew gum.            Jun 20, 2018  1:10 PM CDT   CORE Enrollment with ARMAND Field CNP   [facility] ([facility])    [address] [place] [address] [address]  [facility] [address] [phone] OPT 2            Jul 05, 2018 11:00 AM CDT   MR MYOCARDIUM W CONTRAST with SCIMR1   [facility] ([facility])    [address] [place] [address]  [address]  [facility] [address] [phone]           Take your medicines as usual, unless your doctor tells you not to. Bring a list of your current medicines to your exam (including vitamins, minerals and over-the-counter drugs).  You may or may not receive  intravenous (IV) contrast for this exam pending the discretion of the Radiologist.  You do not need to do anything special to prepare.  The MRI machine uses a strong magnet. Please wear clothes without metal (snaps, zippers). A sweatsuit works well, or we may give you a hospital gown.  Please remove any body piercings and hair extensions before you arrive. You will also remove watches, jewelry, hairpins, wallets, dentures, partial dental plates and hearing aids. You may wear contact lenses, and you may be able to wear your rings. We have a safe place to keep your personal items, but it is safer to leave them at home.  **IMPORTANT** THE INSTRUCTIONS BELOW ARE ONLY FOR THOSE PATIENTS WHO HAVE BEEN PRESCRIBED SEDATION OR GENERAL ANESTHESIA DURING THEIR MRI PROCEDURE:  IF YOUR DOCTOR PRESCRIBED ORAL SEDATION (take medicine to help you relax during your exam):   You must get the medicine from your doctor (oral medication) before you arrive. Bring the medicine to the exam. Do not take it at home. You ll be told when to take it upon arriving for your exam.   Arrive one hour early. Bring someone who can take you home after the test. Your medicine will make you sleepy. After the exam, you may not drive, take a bus or take a taxi by yourself.  IF YOUR DOCTOR PRESCRIBED IV SEDATION:   Arrive one hour early. Bring someone who can take you home after the test. Your medicine will make you sleepy. After the exam, you may not drive, take a bus or take a taxi by yourself.   No eating 6 hours before your exam. You may have clear liquids up until 4 hours before your exam. (Clear liquids include water, clear tea, black coffee and fruit juice without pulp.)  IF YOUR DOCTOR PRESCRIBED ANESTHESIA (be asleep for your exam):   Arrive 1 1/2 hours early. Bring someone who can take you home after the test. You may not drive, take a bus or take a taxi by yourself.   No eating 8 hours before your exam. You may have clear liquids up until 4 hours  before your exam. (Clear liquids include water, clear tea, black coffee and fruit juice without pulp.)   You will spend four to five hours in the recovery room.  Please call the Imaging Department at your exam site with any questions.              Additional Services     CARDIAC REHAB REFERRAL       Please be aware that coverage of these services is subject to the terms and limitations of your health insurance plan. Call member services at your health plan with any benefit or coverage questions.    Order is sent electronically to central rehab scheduling. Call [phone] if you haven't been contacted regarding these appointments within 2 business days of discharge.                  Future tests that were ordered for you     Basic metabolic panel           Hemoglobin       Last Lab Result: Hemoglobin (g/dL)       Date                     Value                 06/11/2018               12.1 (L)         ----------            MRI Cardiac w/contrast                 Further instructions from your care team       Cardiac Angiogram Discharge Instructions - Radial    After you go home:      Have an adult stay with you until tomorrow.    Drink extra fluids for 2 days.    You may resume your normal diet.    No smoking       For 24 hours - due to the sedation you received:    Relax and take it easy.    Do NOT make any important or legal decisions.    Do NOT drive or operate machines at home or at work.    Do NOT drink alcohol.    Care of Wrist Puncture Site:      For the first 24 hrs - check the puncture site every 1-2 hours while awake.    It is normal to have soreness at the puncture site and mild tingling in your hand for up to 3 days.    Remove the bandaid after 24 hours. If there is minor oozing, apply another bandaid and remove it after 12 hours.    You may shower tomorrow.  Do NOT take a bath, or use a hot tub or pool for at least 3 days. Do NOT scrub the site. Do not use lotion or powder near the puncture site.            Activity:        For 2 days:     do not use your hand or arm to support your weight (such as rising from a chair)     do not bend your wrist (such as lifting a garage door).    do not lift more than 5 pounds or exercise your arm (such as tennis, golf or bowling).    Do NOT do any heavy activity such as exercise, lifting, or straining.     Bleeding:      If you start bleeding from the site in your wrist, sit down and press firmly on/above the site for 10 minutes.     Once bleeding stops, keep arm still for 2 hours.     Call [facility] as soon as you can.       Call 911 right away if you have heavy bleeding or bleeding that does not stop.      Medicines:      If you are taking antiplatelet medications such as Plavix, Brilinta or Effient, do not stop taking it until you talk to your cardiologist.        If you are on Metformin (Glucophage), do not restart it until you have blood tests (within 2 to 3 days after discharge).  After you have your blood drawn, you may restart the Metformin.     Take your medications, including blood thinners, unless your provider tells you not to.  If you take Coumadin (Warfarin), have your INR checked by your provider in  3-5 days. Call your clinic to schedule this.    If you have stopped any medicines, check with your provider about when to restart them.    Follow Up Appointments:      Follow up with [facility] Nurse Practitioner at [facility] of patient preference in 7-10 days.    Call the clinic if:      You have a large or growing hard lump around the site.    The site is red, swollen, hot or tender.    Blood or fluid is draining from the site.    You have chills or a fever greater than 101 F (38 C).    Your arm feels numb, cool or changes color.    You have hives, a rash or unusual itching.    Any questions or concerns.          [facility] [place]:    [phone] [facility] (7 days a week)            General Recommendations To Control Heart Failure  When You Get Home       Heart Failure Instructions for Patients and Families: Please read and check off each of these important instructions as you do them when you get home.          Weight and Symptoms       ___ Put a scale in your bathroom.    ___ Post a weight chart or calendar next to your scale.    ___ Weigh yourself everyday as soon as you get up in the morning (before breakfast).  You should only be wearing your pajamas.  Write your weight on the chart/calendar.  ___ Bring your weight chart/calendar with you to all appointments.  ___ Call your doctor or nurse practitioner if you gain 2 pounds (in 1 day) or 5 pounds in (1 week) from your goal  good  weight.  Your good weight is also called your  dry  weight.  Your doctor or nurse will tell you what your good weight should be.    ___ Call your doctor or nurse practitioner if you have shortness of breath that gets worse over time, leg swelling or fatigue.       Medications and Diet       ___ Make sure to take your medication as prescribed.    ___ Bring a current list of your medication and all of your medicine bottles with you to all appointments.    ___ Limit fluids if you still have swelling or shortness of breath, or if your doctor tells you to do so.    ___ Eat less than 2000 mg of sodium (salt) every day. Read food labels, and do not add salt to meals. Remember, if you eat less salt you retain less fluid.  ___ Follow a heart healthy diet that is low in saturated fat.        Activity and Suggested Lifestyle Changes      ___ Stay active. Talk to your doctor about an exercise program that is safe for your heart.  ___ Stop smoking. Reduce alcohol use.      ___ Lose weight if you are overweight. Extra weight puts a lot of stress on the heart.                 Control for Leg Swelling     ___ Keep your legs elevated (raised) as needed for swelling. If swelling is uncomfortable or elevation doesn t help, ask your doctor about using ACE wraps or support stockings.         What is the [facility] Clinic?  Cardiomyopathy  Optimization  Rehabilitation  Education    The [facility].R[facility] Clinic is a heart failure specialty clinic within [facility].  It is an outpatient disease management program that is based on a phase-by-phase approach, which is tailored to each patient s individual needs.  The cardiologist, nurse practitioner, physician assistant and nurses provide an ongoing outpatient care and treatment plan that guides heart failure and cardiomyopathy patients from evaluation and education to stabilization. This team works with your current primary care doctor and cardiologist to help you:    - Avoid hospitalizations  - Slow the progression of the disease  - Improve length and quality of life  - Know who and when to call if heart failure symptoms appear  - Receive easy access to quality health care and advice  - Better understand your condition and treatment  - Decrease the tremendous cost burden of heart failure care  - Detect future heart problems before they become life threatening                                 Follow-up Appointments     ___ An appointment with the [facility] Clinic may already have been made for you (see section   Your next appointments already scheduled ).  If you have a question about your appointment, would like to speak with a C.O.R.E. nurse, or would like to become a [facility] Clinic patient, please call one of the following locations:     - [facility]):                                             [phone]  - [facility]):                                            [phone]  - [facility] ([place]):                 [phone]      ___ Your C.O.R.E. Clinic Team will continue to educate you on your heart failure and may adjust medications based on your vital signs, lab work, and how you are feeling.  Therefore, it is very important to bring the following to all  "C.O.R.E. appointments:    - An accurate list of your medications  - Your medicine bottles  - Your weight chart/calendar                   Pending Results     Date and Time Order Name Status Description    2018 0457 EKG 12-lead, tracing only Preliminary             Statement of Approval     Ordered          18 1044  I have reviewed and agree with all the recommendations and orders detailed in this document.  EFFECTIVE NOW     Approved and electronically signed by:  Oscar Paul DO             Admission Information     Date & Time Provider Department Dept. Phone    6/10/2018 Marcus, Luis Mitchell MD [facility] [phone]      Your Vitals Were     Blood Pressure Pulse Temperature Respirations Height Weight    123/68 128 98  F (36.7  C) (Oral) 18 1.803 m (5' 11\") 125.1 kg (275 lb 11.2 oz)    Pulse Oximetry BMI (Body Mass Index)                98% 38.45 kg/m2          MyChart Information     [employer] lets you send messages to your doctor, view your test results, renew your prescriptions, schedule appointments and more. To sign up, go to [email or web address] . Click on \"Log in\" on the left side of the screen, which will take you to the Welcome page. Then click on \"Sign up Now\" on the right side of the page.     You will be asked to enter the access code listed below, as well as some personal information. Please follow the directions to create your username and password.     Your access code is: [email or web address]  Expires: 2018  1:19 PM     Your access code will  in 90 days. If you need help or a new code, please call your [facility] clinic or [phone].        Care EveryWhere ID     This is your Care EveryWhere ID. This could be used by other organizations to access your [facility] medical records  [id number]        Equal Access to Services     WAYNE EHRNANDES : Gaurav Peraza, wakarolynda luqadaha, qaybta kaalmada francheska, roberto yusuf " ah. So [facility] [phone].    ATENCIÓN: Si cr doran, tiene a garcia disposición servicios gratuitos de asistencia lingüística. Yvette al [phone].    We comply with applicable federal civil rights laws and [place] laws. We do not discriminate on the basis of race, color, national origin, age, disability, sex, sexual orientation, or gender identity.               Review of your medicines      START taking        Dose / Directions    metoprolol succinate 25 MG 24 hr tablet   Commonly known as:  TOPROL-XL   Used for:  Nonischemic cardiomyopathy (H)        Dose:  25 mg   Take 1 tablet (25 mg) by mouth every evening   Quantity:  30 tablet   Refills:  1       spironolactone 25 MG tablet   Commonly known as:  ALDACTONE   Used for:  Nonischemic cardiomyopathy (H)        Dose:  25 mg   Start taking on:  6/14/2018   Take 1 tablet (25 mg) by mouth daily   Quantity:  30 tablet   Refills:  1         CONTINUE these medicines which may have CHANGED, or have new prescriptions. If we are uncertain of the size of tablets/capsules you have at home, strength may be listed as something that might have changed.        Dose / Directions    aspirin 81 MG tablet   This may have changed:  See the new instructions.   Used for:  Essential hypertension, benign        Dose:  81 mg   Take 1 tablet (81 mg) by mouth daily   Quantity:  30 tablet   Refills:  0       lisinopril 10 MG tablet   Commonly known as:  PRINIVIL/ZESTRIL   This may have changed:    - medication strength  - how much to take  - when to take this   Used for:  Type 2 diabetes mellitus without complication, without long-term current use of insulin (H)        Dose:  10 mg   Take 1 tablet (10 mg) by mouth 2 times daily   Quantity:  60 tablet   Refills:  1         CONTINUE these medicines which have NOT CHANGED        Dose / Directions    albuterol 108 (90 Base) MCG/ACT Inhaler   Commonly known as:  PROAIR HFA/PROVENTIL HFA/VENTOLIN HFA        Dose:  2 puff   Inhale 2 puffs into  the lungs every 6 hours as needed for shortness of breath / dyspnea or wheezing   Refills:  0       atorvastatin 80 MG tablet   Commonly known as:  LIPITOR   Used for:  Type 2 diabetes mellitus without complication, without long-term current use of insulin (H)        take 1/2 tablet by mouth at bedtime   Quantity:  45 tablet   Refills:  1       blood glucose monitoring test strip   Commonly known as:  ACCU-CHEK LUL PLUS   Used for:  Type 2 diabetes mellitus without complication (H)        Use to test blood sugar 1-3 times daily or as directed.   Quantity:  100 each   Refills:  11       fenofibrate 160 MG tablet   Used for:  Type 2 diabetes mellitus without complication, without long-term current use of insulin (H)        Dose:  160 mg   Take 1 tablet (160 mg) by mouth daily   Quantity:  90 tablet   Refills:  2       glipiZIDE 10 MG 24 hr tablet   Commonly known as:  glipiZIDE XL   Used for:  Type 2 diabetes mellitus without complication, without long-term current use of insulin (H)        Take 1 tablet (10mg) by mouth daily   Quantity:  90 tablet   Refills:  3       guaiFENesin-codeine 100-10 MG/5ML Soln solution   Commonly known as:  ROBITUSSIN AC   Used for:  Chest congestion        Dose:  5-10 mL   Take 5-10 mLs by mouth nightly as needed for cough   Quantity:  120 mL   Refills:  0       liraglutide 18 MG/3ML soln   Commonly known as:  VICTOZA PEN   Used for:  Type 2 diabetes mellitus without complication, without long-term current use of insulin (H)        Inject 1.8 mg under skin once daily   Quantity:  27 mL   Refills:  0       metFORMIN 1000 MG tablet   Commonly known as:  GLUCOPHAGE   Used for:  Type 2 diabetes mellitus without complication, without long-term current use of insulin (H)        TAKE ONE TABLET BY MOUTH TWICE A DAY WITH MEALS   Quantity:  180 tablet   Refills:  1       triamcinolone 0.5 % cream   Commonly known as:  KENALOG   Used for:  Eczema, unspecified eczema        Apply sparingly once  or twice per day as needed to affected area until the skin is better, then stop   Quantity:  30 g   Refills:  0         STOP taking     indomethacin 25 MG capsule   Commonly known as:  INDOCIN           ketoconazole 2 % cream   Commonly known as:  NIZORAL           pioglitazone 45 MG tablet   Commonly known as:  ACTOS                Where to get your medicines      These medications were sent to [facility] [place], [place] - [address]  [address], [place] [address]     Phone:  [phone]     lisinopril 10 MG tablet    metoprolol succinate 25 MG 24 hr tablet    spironolactone 25 MG tablet         Some of these will need a paper prescription and others can be bought over the counter. Ask your nurse if you have questions.     You don't need a prescription for these medications     aspirin 81 MG tablet                Protect others around you: Learn how to safely use, store and throw away your medicines at www.disposemymeds.org.             Medication List: This is a list of all your medications and when to take them. Check marks below indicate your daily home schedule. Keep this list as a reference.      Medications           Morning Afternoon Evening Bedtime As Needed    albuterol 108 (90 Base) MCG/ACT Inhaler   Commonly known as:  PROAIR HFA/PROVENTIL HFA/VENTOLIN HFA   Inhale 2 puffs into the lungs every 6 hours as needed for shortness of breath / dyspnea or wheezing                                   aspirin 81 MG tablet   Take 1 tablet (81 mg) by mouth daily   Last time this was given:  6/13/18  8:30am   Next Dose Due:  Tomorrow morning, 6/14/18                                   atorvastatin 80 MG tablet   Commonly known as:  LIPITOR   take 1/2 tablet by mouth at bedtime   Last time this was given:  40 mg on 6/13/2018  8:30 AM   Next Dose Due:  Tomorrow night at bedtime, 6/14/18                                   blood glucose monitoring test strip   Commonly known as:  ACCU-CHEK  LUL PLUS   Use to test blood sugar 1-3 times daily or as directed.                                fenofibrate 160 MG tablet   Take 1 tablet (160 mg) by mouth daily   Last time this was given:  160 mg on 6/11/2018  9:49 AM   Next Dose Due:  Tomorrow morning, 6/14/18                                   glipiZIDE 10 MG 24 hr tablet   Commonly known as:  glipiZIDE XL   Take 1 tablet (10mg) by mouth daily   Next Dose Due:  Tomorrow morning, 6/14/18                                   guaiFENesin-codeine 100-10 MG/5ML Soln solution   Commonly known as:  ROBITUSSIN AC   Take 5-10 mLs by mouth nightly as needed for cough                                   liraglutide 18 MG/3ML soln   Commonly known as:  VICTOZA PEN   Inject 1.8 mg under skin once daily   Next Dose Due:  Start back today, 6/13/18                                   lisinopril 10 MG tablet   Commonly known as:  PRINIVIL/ZESTRIL   Take 1 tablet (10 mg) by mouth 2 times daily   Last time this was given:  10 mg on 6/13/2018  8:30 AM   Next Dose Due:  Tonight at bedtime, 6/13/18                                      metFORMIN 1000 MG tablet   Commonly known as:  GLUCOPHAGE   TAKE ONE TABLET BY MOUTH TWICE A DAY WITH MEALS   Next Dose Due:  Start back tonight at dinner time, 6/13/18                                      metoprolol succinate 25 MG 24 hr tablet   Commonly known as:  TOPROL-XL   Take 1 tablet (25 mg) by mouth every evening   Next Dose Due:  Tonight at bedtime, 6/13/18                                   spironolactone 25 MG tablet   Commonly known as:  ALDACTONE   Take 1 tablet (25 mg) by mouth daily   Start taking on:  6/14/2018   Last time this was given:  25 mg on 6/13/2018  8:30 AM   Next Dose Due:  Tomorrow morning, 6/14/18                                   triamcinolone 0.5 % cream   Commonly known as:  KENALOG   Apply sparingly once or twice per day as needed to affected area until the skin is better, then stop                                              More Information        Metoprolol tablets  Brand Name: Lopressor  What is this medicine?  METOPROLOL (me TOE proe lole) is a beta-blocker. Beta-blockers reduce the workload on the heart and help it to beat more regularly. This medicine is used to treat high blood pressure and to prevent chest pain. It is also used to after a heart attack and to prevent an additional heart attack from occurring.  How should I use this medicine?  Take this medicine by mouth with a drink of water. Follow the directions on the prescription label. Take this medicine immediately after meals. Take your doses at regular intervals. Do not take more medicine than directed. Do not stop taking this medicine suddenly. This could lead to serious heart-related effects.  Talk to your pediatrician regarding the use of this medicine in children. Special care may be needed.  What side effects may I notice from receiving this medicine?  Side effects that you should report to your doctor or health care professional as soon as possible:    allergic reactions like skin rash, itching or hives    cold or numb hands or feet    depression    difficulty breathing    faint    fever with sore throat    irregular heartbeat, chest pain    rapid weight gain    swollen legs or ankles  Side effects that usually do not require medical attention (report to your doctor or health care professional if they continue or are bothersome):    anxiety or nervousness    change in sex drive or performance    dry skin    headache    nightmares or trouble sleeping    short term memory loss    stomach upset or diarrhea    unusually tired  What may interact with this medicine?  This medicine may interact with the following medications:    certain medicines for blood pressure, heart disease, irregular heart beat    certain medicines for depression like monoamine oxidase (MAO) inhibitors, fluoxetine, or  paroxetine    clonidine    dobutamine    epinephrine    isoproterenol    reserpine  What if I miss a dose?  If you miss a dose, take it as soon as you can. If it is almost time for your next dose, take only that dose. Do not take double or extra doses.  Where should I keep my medicine?  Keep out of the reach of children.  Store at room temperature between 15 and 30 degrees C (59 and 86 degrees F). Throw away any unused medicine after the expiration date.  What should I tell my health care provider before I take this medicine?  They need to know if you have any of these conditions:    diabetes    heart or vessel disease like slow heart rate, worsening heart failure, heart block, sick sinus syndrome or Raynaud's disease    kidney disease    liver disease    lung or breathing disease, like asthma or emphysema    pheochromocytoma    thyroid disease    an unusual or allergic reaction to metoprolol, other beta-blockers, medicines, foods, dyes, or preservatives    pregnant or trying to get pregnant    breast-feeding  What should I watch for while using this medicine?  Visit your doctor or health care professional for regular check ups. Contact your doctor right away if your symptoms worsen. Check your blood pressure and pulse rate regularly. Ask your health care professional what your blood pressure and pulse rate should be, and when you should contact them.  You may get drowsy or dizzy. Do not drive, use machinery, or do anything that needs mental alertness until you know how this medicine affects you. Do not sit or stand up quickly, especially if you are an older patient. This reduces the risk of dizzy or fainting spells. Contact your doctor if these symptoms continue. Alcohol may interfere with the effect of this medicine. Avoid alcoholic drinks.  NOTE:This sheet is a summary. It may not cover all possible information. If you have questions about this medicine, talk to your doctor, pharmacist, or health care provider.  Copyright  2018 [employer]                Spironolactone Oral tablet  What is this medicine?  SPIRONOLACTONE (michelle on [place] LAK tone) is a diuretic. It helps you make more urine and to lose excess water from your body. This medicine is used to treat high blood pressure, and edema or swelling from heart, kidney, or liver disease. It is also used to treat patients who make too much aldosterone or have low potassium.  This medicine may be used for other purposes; ask your health care provider or pharmacist if you have questions.  What should I tell my health care provider before I take this medicine?  They need to know if you have any of these conditions:    high blood level of potassium    kidney disease or trouble making urine    liver disease    an unusual or allergic reaction to spironolactone, other medicines, foods, dyes, or preservatives    pregnant or trying to get pregnant    breast-feeding  How should I use this medicine?  Take this medicine by mouth with a drink of water. Follow the directions on your prescription label. You can take it with or without food. If it upsets your stomach, take it with food. Do not take your medicine more often than directed. Remember that you will need to pass more urine after taking this medicine. Do not take your doses at a time of day that will cause you problems. Do not take at bedtime.  Talk to your pediatrician regarding the use of this medicine in children. While this drug may be prescribed for selected conditions, precautions do apply.  Overdosage: If you think you have taken too much of this medicine contact a poison control center or emergency room at once.  NOTE: This medicine is only for you. Do not share this medicine with others.  What if I miss a dose?  If you miss a dose, take it as soon as you can. If it is almost time for your next dose, take only that dose. Do not take double or extra doses.  What may interact with this medicine?  Do not take this medicine with  any of the following medications:    eplerenone  This medicine may also interact with the following medications:    corticosteroids    digoxin    lithium    medicines for high blood pressure like ACE inhibitors    skeletal muscle relaxants like tubocurarine    NSAIDs, medicines for pain and inflammation, like ibuprofen or naproxen    potassium products like salt substitute or supplements    pressor amines like norepinephrine    some diuretics  This list may not describe all possible interactions. Give your health care provider a list of all the medicines, herbs, non-prescription drugs, or dietary supplements you use. Also tell them if you smoke, drink alcohol, or use illegal drugs. Some items may interact with your medicine.  What should I watch for while using this medicine?  Visit your doctor or health care professional for regular checks on your progress. Check your blood pressure as directed. Ask your doctor what your blood pressure should be, and when you should contact them.  You may need to be on a special diet while taking this medicine. Ask your doctor. Also, ask how many glasses of fluid you need to drink a day. You must not get dehydrated.  This medicine may make you feel confused, dizzy or lightheaded. Drinking alcohol and taking some medicines can make this worse. Do not drive, use machinery, or do anything that needs mental alertness until you know how this medicine affects you. Do not sit or stand up quickly.  What side effects may I notice from receiving this medicine?  Side effects that you should report to your doctor or health care professional as soon as possible:    allergic reactions such as skin rash or itching, hives, swelling of the lips, mouth, tongue, or throat    black or tarry stools    fast, irregular heartbeat    fever    muscle pain, cramps    numbness, tingling in hands or feet    trouble breathing    trouble passing urine    unusual bleeding    unusually weak or tired  Side effects  that usually do not require medical attention (report to your doctor or health care professional if they continue or are bothersome):    change in voice or hair growth    confusion    dizzy, drowsy    dry mouth, increased thirst    enlarged or tender breasts    headache    irregular menstrual periods    sexual difficulty, unable to have an erection    stomach upset  This list may not describe all possible side effects. Call your doctor for medical advice about side effects. You may report side effects to FDA at 9-537-PHM-1791.  Where should I keep my medicine?  Keep out of the reach of children.  Store below 25 degrees C (77 degrees F). Throw away any unused medicine after the expiration date.  NOTE:This sheet is a summary. It may not cover all possible information. If you have questions about this medicine, talk to your doctor, pharmacist, or health care provider. Copyright  2016 [employer]                Discharge Instructions for Cardiomyopathy  Cardiomyopathy means that your heart is not working as it normally should. This condition can make it more difficult to do things that may have been easy for you in the past. But with proper treatment and some lifestyle changes, you and your healthcare provider can help your heart do its job.  Home care  Work hard to remove the salt from your diet. Here are tips:    Limit canned, dried, packaged, and fast foods.    Don t add salt to your food at the table.    Season foods with herbs instead of salt when you cook.    When you eat out, ask that the  not add any salt to your dish.    Don't eat fried or greasy foods.    Be careful of bottled beverages. They can contain a lot of salt.  Also check the labels of over-the-counter medicines and supplements. They may be high in sodium. Ask your pharmacist or provider if you need help finding a low-salt product.  Be as active as you can. Ask your healthcare provider how to get started:    Simple activities such as walking or  gardening can help.    Find activities you enjoy and make them a priority.    Cardiac rehabilitation programs can help you reach your activity goals. You exercise while staff closely watches the stress on your heart. These programs may be covered by insurance.  Other tips for home care:    Limit how much fluid you have each day. Your healthcare provider will tell you how much is safe.    Break the smoking habit. Enroll in a stop-smoking program to improve your chances of success. Join smoking cessation support groups or ask your healthcare provider about nicotine replacement products.    Take your medicines exactly as directed. Don t skip doses. Don t stop taking your medicines without talking to your healthcare provider first.    Some over-the-counter medicines and herbal supplements can increase your heart rate or blood pressure. This can put extra stress on your heart. Check with your pharmacist to see if products are heart-safe and won't interact with other medicines you take.    Visit your healthcare provider regularly. Mention any problems with your treatment plan. Together you can find a plan that works for you.    Weigh yourself at the same time each day. The best time is in the morning after you wake up and after urinating. Wear the same clothing each time. Keep a written record of your daily weight.    Limit how much alcohol you drink. Too much alcohol isn't good for the heart. Healthcare providers advise no more than 1 drink per day for women and 2 drinks per day for men.  Follow-up care  Make a follow-up appointment as directed by our staff.     When to call your healthcare provider  Call your healthcare provider right away if you have any of the following:    You gain more than 2 pounds in 1 day, more than 5 pounds in 1 week, or whatever weight gain you were told to report by your healthcare provider    New or increased chest pain that doesn't get better with medicine    New or increased shortness of  breath or coughing    Weakness in the muscles of your face, arms, or legs    Trouble speaking    Rapid pulse or pounding heartbeat    Fainting, or feeling dizzy or lightheaded    New or increased swelling in your hands, feet, or ankles   Date Last Reviewed: 2/1/2017 2000-2017 [employer]. [address] [place] [address]. All rights reserved. This information is not intended as a substitute for professional medical care. Always follow your healthcare professional's instructions.                Low-Salt Choices  Eating salt (sodium) can make your body retain too much water. Excess water makes your heart work harder. Canned, packaged, and frozen foods are easy to prepare. But they are often high in sodium. Here are some ideas for low-salt foods you can easily make yourself.    For breakfast    Fruit or 100% fruit juice    Whole-wheat bread or an English muffin. Look for sodium content on Nutrition Facts labels.    Low-fat milk or yogurt    Unsalted eggs    Shredded wheat    Corn tortillas    Unsalted steamed rice    Regular (not instant) hot cereal, made without salt  Stay away from:    Sausage, dial, and ham    Flour tortillas    Packaged muffins, pancakes, and biscuits    Instant hot cereals    Cottage cheese  For lunch and dinner    Fresh fish, chicken, turkey, or meat--baked, broiled, or roasted without salt    Dry beans, cooked without salt    Tofu, stir-fried without salt    Unsalted fresh fruit and vegetables, or frozen or canned fruit and vegetables with no added salt  Stay away from:    Lunch or deli meat that is cured or smoked    Cheese    Tomato juice and ketchup    Canned vegetables, soups, and fish not labeled as no-salt-added or reduced sodium    Packaged gravies and sauces    Olives, pickles, and relish    Bottled salad dressings  For snacks and desserts    Yogurt    Unsalted, air-popped popcorn    Unsalted nuts or seeds  Stay away from:    Pies and cakes    Packaged dessert  mixes    Pizza    Canned and packaged puddings    Pretzels, chips, crackers, and nuts--unless the label says unsalted  Date Last Reviewed: 6/1/2017 2000-2017 [employer]. [address] [place] [address], [place] [address]. All rights reserved. This information is not intended as a substitute for professional medical care. Always follow your healthcare professional's instructions.                Diabetes and Heart Disease     Take your medicines as directed each day, even if you feel fine.     If you have diabetes, you are two to four times more likely to have heart disease than someone without diabetes. This higher risk is due to diabetes, but it is also due to other risk factors for heart disease that happen in people with diabetes. But there s good news. You can help control your health risks by making some changes in your life. You can take steps to reduce your risk of heart disease by half--similar to the risk in people who don't have diabetes.  Your main risk factors  Three major risk factors for heart disease are high blood sugar, high blood pressure, and high levels of lipids. By keeping risk factors under control, you can help keep your heart and arteries healthy. This may reduce your chances of a heart attack.    Blood sugar. High blood sugar can make artery walls tough and rough. Plaque (waxy material in the blood) can then build up along the artery walls, making it harder for blood to flow through the arteries. Having high blood sugar increases the chances of having high blood pressure and high cholesterol.    Blood pressure. When blood pressure is high all the time it causes your heart to work harder to pump blood. Artery walls become damaged. This increases the risk for plaque build up.    Lipids. The body needs some lipids in the blood to stay healthy. But lipid levels that are too high can damage the artery walls. Lipids include cholesterol and triglycerides. There are two kinds of  cholesterol. LDL ( bad ) cholesterol can damage the arteries. But HDL ( good ) cholesterol helps clear LDL cholesterol from the blood vessels. This helps keep the arteries healthy. When blood sugar is high, the level of triglycerides in the blood may also be high. High blood triglyceride levels can cause plaque to form.   Other risk factors  Certain lifestyle factors can increase levels of your blood sugar, blood pressure, and lipids. Such increases raise your risk of heart disease:    Smoking damages the lining of your arteries. This allows plaque to build up in the artery walls. Smoking also constricts (narrows) the arteries. This can raise blood pressure and cause chest pain or angina. Smoking also increases your risk of getting type 2 diabetes.    Not being active makes it harder for your heart to do its work. Inactivity is linked to many other risk factors, such as high blood pressure and poor cholesterol levels. Inactivity also increases your risk of getting type 2 diabetes.    Being overweight makes it harder for your body to use insulin. It also makes your heart work too hard. Being overweight is also the main contributor to the development of type 2 diabetes,   Changes you can make  Following a few simple steps can help keep your risk factors under control. Work with your healthcare team to reach your goals.    Quitting smoking could save your life. Smoking damages the lining of the blood vessels and raises blood pressure. Smoking also affects how your body uses insulin. This makes it harder to keep blood sugar under control. If you smoke and need help quitting, talk to your healthcare team.     Testing your blood sugar is the only way to know whether it is under control. Be sure to test your blood sugar yourself. Also get your blood tested in the lab, as directed.    Monitoring your blood pressure and lipid levels can help you achieve safe levels. Visit your healthcare team as scheduled.    Taking  medicines as directed can help control blood sugar, blood pressure, blood clotting, and/or cholesterol levels.    Eating right can reduce your risk factors and help you lose weight. Try to limit the amount of processed or refined carbohydrates you eat at one time. Cut back on your total calorie intake. Eat foods low in saturated fat and cholesterol. Eat fiber, including vegetables and whole grains, and cut down on salt. A dietitian or diabetes educator can help form a meal plan that works for you--even if you are on a low budget.     Being active can help reduce your weight, strengthen your heart, and lower your lipid levels and blood pressure. Exercise and activity are good for your whole body. Talk to your healthcare team about increasing your activity safely over time.    Keeping your appointments with your healthcare provider helps you stay healthy. Go in for checkups and lab tests as scheduled.  Date Last Reviewed: 5/19/2016 2000-2017 [employer]. [address] [place] [address], [place] [address]. All rights reserved. This information is not intended as a substitute for professional medical care. Always follow your healthcare professional's instructions.

## 2018-06-10 NOTE — IP AVS SNAPSHOT
[facility]    [address]    [place] [address]    Phone:  [phone]                                       After Visit Summary   6/10/2018    Neymar Rhodes    MRN: [id number]           After Visit Summary Signature Page     I have received my discharge instructions, and my questions have been answered. I have discussed any challenges I see with this plan with the nurse or doctor.    ..........................................................................................................................................  Patient/Patient Representative Signature      ..........................................................................................................................................  Patient Representative Print Name and Relationship to Patient    ..................................................               ................................................  Date                                            Time    ..........................................................................................................................................  Reviewed by Signature/Title    ...................................................              ..............................................  Date                                                            Time

## 2018-06-10 NOTE — LETTER
[facility]  [address]  [place] [address]  [phone]    2018    Re: Neymar Rhodes  [address]  [facility] [address] [phone] (home) [phone] (work)    : 1960      To Whom It May Concern:      Neymar Rhodes was hospitalized from 6/10/2018 until 2018 due to medical illness.  He may return to work when cleared at outpatient follow up with restrictions to be determined by outpatient physician at follow up.  He should be off work at least 2 weeks.      Sincerely,        Oscar Paul DO, [facility]  Internal Medicine/Hospitalist

## 2018-06-11 ENCOUNTER — APPOINTMENT (OUTPATIENT)
Dept: CARDIOLOGY | Facility: CLINIC | Age: 58
DRG: 280 | End: 2018-06-11
Attending: INTERNAL MEDICINE
Payer: COMMERCIAL

## 2018-06-11 PROBLEM — I24.9 ACS (ACUTE CORONARY SYNDROME) (H): Status: ACTIVE | Noted: 2018-06-11

## 2018-06-11 PROBLEM — J96.01 ACUTE RESPIRATORY FAILURE WITH HYPOXIA (H): Status: ACTIVE | Noted: 2018-06-11

## 2018-06-11 LAB
ALBUMIN UR-MCNC: 10 MG/DL
ANION GAP SERPL CALCULATED.3IONS-SCNC: 7 MMOL/L (ref 3–14)
APPEARANCE UR: CLEAR
BILIRUB UR QL STRIP: NEGATIVE
BUN SERPL-MCNC: 19 MG/DL (ref 7–30)
CALCIUM SERPL-MCNC: 8.7 MG/DL (ref 8.5–10.1)
CHLORIDE SERPL-SCNC: 112 MMOL/L (ref 94–109)
CHOLEST SERPL-MCNC: 128 MG/DL
CO2 SERPL-SCNC: 25 MMOL/L (ref 20–32)
COLOR UR AUTO: YELLOW
CREAT SERPL-MCNC: 0.91 MG/DL (ref 0.66–1.25)
D DIMER PPP FEU-MCNC: 0.5 UG/ML FEU (ref 0–0.5)
ERYTHROCYTE [DISTWIDTH] IN BLOOD BY AUTOMATED COUNT: 13.8 % (ref 10–15)
GFR SERPL CREATININE-BSD FRML MDRD: 86 ML/MIN/1.7M2
GLUCOSE BLDC GLUCOMTR-MCNC: 106 MG/DL (ref 70–99)
GLUCOSE BLDC GLUCOMTR-MCNC: 115 MG/DL (ref 70–99)
GLUCOSE BLDC GLUCOMTR-MCNC: 120 MG/DL (ref 70–99)
GLUCOSE BLDC GLUCOMTR-MCNC: 131 MG/DL (ref 70–99)
GLUCOSE BLDC GLUCOMTR-MCNC: 142 MG/DL (ref 70–99)
GLUCOSE BLDC GLUCOMTR-MCNC: 162 MG/DL (ref 70–99)
GLUCOSE BLDC GLUCOMTR-MCNC: 217 MG/DL (ref 70–99)
GLUCOSE SERPL-MCNC: 140 MG/DL (ref 70–99)
GLUCOSE UR STRIP-MCNC: 150 MG/DL
HBA1C MFR BLD: 7.6 % (ref 0–5.6)
HCT VFR BLD AUTO: 35.9 % (ref 40–53)
HDLC SERPL-MCNC: 46 MG/DL
HGB BLD-MCNC: 12.1 G/DL (ref 13.3–17.7)
HGB UR QL STRIP: NEGATIVE
HYALINE CASTS #/AREA URNS LPF: 8 /LPF (ref 0–2)
KETONES UR STRIP-MCNC: NEGATIVE MG/DL
LDLC SERPL CALC-MCNC: 59 MG/DL
LEUKOCYTE ESTERASE UR QL STRIP: NEGATIVE
LMWH PPP CHRO-ACNC: 0.16 IU/ML
LMWH PPP CHRO-ACNC: 0.22 IU/ML
MAGNESIUM SERPL-MCNC: 2.1 MG/DL (ref 1.6–2.3)
MCH RBC QN AUTO: 31 PG (ref 26.5–33)
MCHC RBC AUTO-ENTMCNC: 33.7 G/DL (ref 31.5–36.5)
MCV RBC AUTO: 92 FL (ref 78–100)
NITRATE UR QL: NEGATIVE
NONHDLC SERPL-MCNC: 82 MG/DL
PH UR STRIP: 5 PH (ref 5–7)
PLATELET # BLD AUTO: 183 10E9/L (ref 150–450)
POTASSIUM SERPL-SCNC: 3.6 MMOL/L (ref 3.4–5.3)
RBC # BLD AUTO: 3.9 10E12/L (ref 4.4–5.9)
RBC #/AREA URNS AUTO: 0 /HPF (ref 0–2)
SODIUM SERPL-SCNC: 144 MMOL/L (ref 133–144)
SOURCE: ABNORMAL
SP GR UR STRIP: 1.01 (ref 1–1.03)
TRIGL SERPL-MCNC: 113 MG/DL
TROPONIN I SERPL-MCNC: 2.16 UG/L (ref 0–0.04)
TROPONIN I SERPL-MCNC: 3.79 UG/L (ref 0–0.04)
TSH SERPL DL<=0.005 MIU/L-ACNC: 1.78 MU/L (ref 0.4–4)
UROBILINOGEN UR STRIP-MCNC: NORMAL MG/DL (ref 0–2)
WBC # BLD AUTO: 8.8 10E9/L (ref 4–11)
WBC #/AREA URNS AUTO: 3 /HPF (ref 0–5)

## 2018-06-11 PROCEDURE — 93458 L HRT ARTERY/VENTRICLE ANGIO: CPT

## 2018-06-11 PROCEDURE — C1769 GUIDE WIRE: HCPCS

## 2018-06-11 PROCEDURE — 84443 ASSAY THYROID STIM HORMONE: CPT | Performed by: INTERNAL MEDICINE

## 2018-06-11 PROCEDURE — 25500064 ZZH RX 255 OP 636: Performed by: INTERNAL MEDICINE

## 2018-06-11 PROCEDURE — 85027 COMPLETE CBC AUTOMATED: CPT | Performed by: INTERNAL MEDICINE

## 2018-06-11 PROCEDURE — 99222 1ST HOSP IP/OBS MODERATE 55: CPT | Mod: 25 | Performed by: INTERNAL MEDICINE

## 2018-06-11 PROCEDURE — 27210856 ZZH ACCESS HEART CATH CR2

## 2018-06-11 PROCEDURE — 27211089 ZZH KIT ACIST INJECTOR CR3

## 2018-06-11 PROCEDURE — 4A023N7 MEASUREMENT OF CARDIAC SAMPLING AND PRESSURE, LEFT HEART, PERCUTANEOUS APPROACH: ICD-10-PCS | Performed by: INTERNAL MEDICINE

## 2018-06-11 PROCEDURE — 25000132 ZZH RX MED GY IP 250 OP 250 PS 637: Performed by: INTERNAL MEDICINE

## 2018-06-11 PROCEDURE — 36415 COLL VENOUS BLD VENIPUNCTURE: CPT | Performed by: INTERNAL MEDICINE

## 2018-06-11 PROCEDURE — 93010 ELECTROCARDIOGRAM REPORT: CPT | Performed by: INTERNAL MEDICINE

## 2018-06-11 PROCEDURE — 21000000 ZZH R&B IMCU HEART CARE

## 2018-06-11 PROCEDURE — 83735 ASSAY OF MAGNESIUM: CPT | Performed by: INTERNAL MEDICINE

## 2018-06-11 PROCEDURE — 00000146 ZZHCL STATISTIC GLUCOSE BY METER IP

## 2018-06-11 PROCEDURE — 27210914 ZZH SHEATH CR8

## 2018-06-11 PROCEDURE — 93458 L HRT ARTERY/VENTRICLE ANGIO: CPT | Mod: 26 | Performed by: INTERNAL MEDICINE

## 2018-06-11 PROCEDURE — B2111ZZ FLUOROSCOPY OF MULTIPLE CORONARY ARTERIES USING LOW OSMOLAR CONTRAST: ICD-10-PCS | Performed by: INTERNAL MEDICINE

## 2018-06-11 PROCEDURE — 85379 FIBRIN DEGRADATION QUANT: CPT | Performed by: INTERNAL MEDICINE

## 2018-06-11 PROCEDURE — 25000131 ZZH RX MED GY IP 250 OP 636 PS 637: Performed by: INTERNAL MEDICINE

## 2018-06-11 PROCEDURE — 40000886 ZZH STATISTIC STEP DOWN HRS DAY

## 2018-06-11 PROCEDURE — 80048 BASIC METABOLIC PNL TOTAL CA: CPT | Performed by: INTERNAL MEDICINE

## 2018-06-11 PROCEDURE — 27210787 ZZH MANIFOLD CR2

## 2018-06-11 PROCEDURE — 84484 ASSAY OF TROPONIN QUANT: CPT | Performed by: INTERNAL MEDICINE

## 2018-06-11 PROCEDURE — 80061 LIPID PANEL: CPT | Performed by: INTERNAL MEDICINE

## 2018-06-11 PROCEDURE — 25000125 ZZHC RX 250: Performed by: INTERNAL MEDICINE

## 2018-06-11 PROCEDURE — 99152 MOD SED SAME PHYS/QHP 5/>YRS: CPT | Performed by: INTERNAL MEDICINE

## 2018-06-11 PROCEDURE — 99152 MOD SED SAME PHYS/QHP 5/>YRS: CPT

## 2018-06-11 PROCEDURE — 25000128 H RX IP 250 OP 636: Performed by: INTERNAL MEDICINE

## 2018-06-11 PROCEDURE — 27210795 ZZH PAD DEFIB QUICK CR4

## 2018-06-11 PROCEDURE — 83036 HEMOGLOBIN GLYCOSYLATED A1C: CPT | Performed by: INTERNAL MEDICINE

## 2018-06-11 PROCEDURE — 85520 HEPARIN ASSAY: CPT | Performed by: INTERNAL MEDICINE

## 2018-06-11 PROCEDURE — 40000884 ZZH STATISTIC STEP DOWN HRS NIGHT

## 2018-06-11 PROCEDURE — 93306 TTE W/DOPPLER COMPLETE: CPT

## 2018-06-11 PROCEDURE — 93306 TTE W/DOPPLER COMPLETE: CPT | Mod: 26 | Performed by: INTERNAL MEDICINE

## 2018-06-11 PROCEDURE — 27210946 ZZH KIT HC TOTES DISP CR8

## 2018-06-11 PROCEDURE — 81001 URINALYSIS AUTO W/SCOPE: CPT | Performed by: INTERNAL MEDICINE

## 2018-06-11 PROCEDURE — 99233 SBSQ HOSP IP/OBS HIGH 50: CPT | Performed by: INTERNAL MEDICINE

## 2018-06-11 PROCEDURE — 93005 ELECTROCARDIOGRAM TRACING: CPT

## 2018-06-11 RX ORDER — CLOPIDOGREL BISULFATE 75 MG/1
75 TABLET ORAL
Status: DISCONTINUED | OUTPATIENT
Start: 2018-06-11 | End: 2018-06-11 | Stop reason: HOSPADM

## 2018-06-11 RX ORDER — ACETAMINOPHEN 325 MG/1
325-650 TABLET ORAL EVERY 4 HOURS PRN
Status: DISCONTINUED | OUTPATIENT
Start: 2018-06-11 | End: 2018-06-11

## 2018-06-11 RX ORDER — PROTAMINE SULFATE 10 MG/ML
25-100 INJECTION, SOLUTION INTRAVENOUS EVERY 5 MIN PRN
Status: DISCONTINUED | OUTPATIENT
Start: 2018-06-11 | End: 2018-06-11 | Stop reason: HOSPADM

## 2018-06-11 RX ORDER — PRASUGREL 10 MG/1
10-60 TABLET, FILM COATED ORAL
Status: DISCONTINUED | OUTPATIENT
Start: 2018-06-11 | End: 2018-06-11 | Stop reason: HOSPADM

## 2018-06-11 RX ORDER — NICARDIPINE HYDROCHLORIDE 2.5 MG/ML
100 INJECTION INTRAVENOUS
Status: DISCONTINUED | OUTPATIENT
Start: 2018-06-11 | End: 2018-06-11 | Stop reason: HOSPADM

## 2018-06-11 RX ORDER — FLUMAZENIL 0.1 MG/ML
0.2 INJECTION, SOLUTION INTRAVENOUS
Status: DISCONTINUED | OUTPATIENT
Start: 2018-06-11 | End: 2018-06-11 | Stop reason: HOSPADM

## 2018-06-11 RX ORDER — ATROPINE SULFATE 0.1 MG/ML
0.5 INJECTION INTRAVENOUS EVERY 5 MIN PRN
Status: DISCONTINUED | OUTPATIENT
Start: 2018-06-11 | End: 2018-06-11

## 2018-06-11 RX ORDER — SODIUM CHLORIDE 9 MG/ML
INJECTION, SOLUTION INTRAVENOUS CONTINUOUS
Status: DISCONTINUED | OUTPATIENT
Start: 2018-06-11 | End: 2018-06-11

## 2018-06-11 RX ORDER — POTASSIUM CHLORIDE 1500 MG/1
20-40 TABLET, EXTENDED RELEASE ORAL
Status: DISCONTINUED | OUTPATIENT
Start: 2018-06-11 | End: 2018-06-13 | Stop reason: HOSPADM

## 2018-06-11 RX ORDER — AMOXICILLIN 250 MG
1 CAPSULE ORAL 2 TIMES DAILY PRN
Status: DISCONTINUED | OUTPATIENT
Start: 2018-06-11 | End: 2018-06-13 | Stop reason: HOSPADM

## 2018-06-11 RX ORDER — FENTANYL CITRATE 50 UG/ML
25-50 INJECTION, SOLUTION INTRAMUSCULAR; INTRAVENOUS
Status: ACTIVE | OUTPATIENT
Start: 2018-06-11 | End: 2018-06-12

## 2018-06-11 RX ORDER — HYDROMORPHONE HYDROCHLORIDE 1 MG/ML
0.2 INJECTION, SOLUTION INTRAMUSCULAR; INTRAVENOUS; SUBCUTANEOUS
Status: DISCONTINUED | OUTPATIENT
Start: 2018-06-11 | End: 2018-06-13 | Stop reason: HOSPADM

## 2018-06-11 RX ORDER — DEXTROSE MONOHYDRATE 25 G/50ML
25-50 INJECTION, SOLUTION INTRAVENOUS
Status: DISCONTINUED | OUTPATIENT
Start: 2018-06-11 | End: 2018-06-12

## 2018-06-11 RX ORDER — HYDROCODONE BITARTRATE AND ACETAMINOPHEN 5; 325 MG/1; MG/1
1-2 TABLET ORAL EVERY 4 HOURS PRN
Status: DISCONTINUED | OUTPATIENT
Start: 2018-06-11 | End: 2018-06-11

## 2018-06-11 RX ORDER — LORAZEPAM 0.5 MG/1
0.5 TABLET ORAL
Status: DISCONTINUED | OUTPATIENT
Start: 2018-06-11 | End: 2018-06-11 | Stop reason: HOSPADM

## 2018-06-11 RX ORDER — NITROGLYCERIN 0.4 MG/1
0.4 TABLET SUBLINGUAL EVERY 5 MIN PRN
Status: DISCONTINUED | OUTPATIENT
Start: 2018-06-11 | End: 2018-06-11

## 2018-06-11 RX ORDER — POTASSIUM CL/LIDO/0.9 % NACL 10MEQ/0.1L
10 INTRAVENOUS SOLUTION, PIGGYBACK (ML) INTRAVENOUS
Status: DISCONTINUED | OUTPATIENT
Start: 2018-06-11 | End: 2018-06-13 | Stop reason: HOSPADM

## 2018-06-11 RX ORDER — SODIUM CHLORIDE 9 MG/ML
INJECTION, SOLUTION INTRAVENOUS CONTINUOUS
Status: DISCONTINUED | OUTPATIENT
Start: 2018-06-11 | End: 2018-06-12

## 2018-06-11 RX ORDER — DOBUTAMINE HYDROCHLORIDE 200 MG/100ML
2-20 INJECTION INTRAVENOUS CONTINUOUS PRN
Status: DISCONTINUED | OUTPATIENT
Start: 2018-06-11 | End: 2018-06-11 | Stop reason: HOSPADM

## 2018-06-11 RX ORDER — AMOXICILLIN 250 MG
2 CAPSULE ORAL 2 TIMES DAILY PRN
Status: DISCONTINUED | OUTPATIENT
Start: 2018-06-11 | End: 2018-06-11

## 2018-06-11 RX ORDER — LIDOCAINE 40 MG/G
CREAM TOPICAL
Status: DISCONTINUED | OUTPATIENT
Start: 2018-06-11 | End: 2018-06-11

## 2018-06-11 RX ORDER — ASPIRIN 81 MG/1
81 TABLET ORAL DAILY
Status: DISCONTINUED | OUTPATIENT
Start: 2018-06-12 | End: 2018-06-13 | Stop reason: HOSPADM

## 2018-06-11 RX ORDER — HEPARIN SODIUM 1000 [USP'U]/ML
1000-10000 INJECTION, SOLUTION INTRAVENOUS; SUBCUTANEOUS EVERY 5 MIN PRN
Status: DISCONTINUED | OUTPATIENT
Start: 2018-06-11 | End: 2018-06-11 | Stop reason: HOSPADM

## 2018-06-11 RX ORDER — ASPIRIN 81 MG/1
81 TABLET ORAL DAILY
Status: DISCONTINUED | OUTPATIENT
Start: 2018-06-11 | End: 2018-06-11

## 2018-06-11 RX ORDER — BISACODYL 10 MG
10 SUPPOSITORY, RECTAL RECTAL DAILY PRN
Status: DISCONTINUED | OUTPATIENT
Start: 2018-06-11 | End: 2018-06-11

## 2018-06-11 RX ORDER — CLOPIDOGREL 300 MG/1
300-600 TABLET, FILM COATED ORAL
Status: DISCONTINUED | OUTPATIENT
Start: 2018-06-11 | End: 2018-06-11 | Stop reason: HOSPADM

## 2018-06-11 RX ORDER — FENTANYL CITRATE 50 UG/ML
25-50 INJECTION, SOLUTION INTRAMUSCULAR; INTRAVENOUS
Status: DISCONTINUED | OUTPATIENT
Start: 2018-06-11 | End: 2018-06-11

## 2018-06-11 RX ORDER — NALOXONE HYDROCHLORIDE 0.4 MG/ML
.2-.4 INJECTION, SOLUTION INTRAMUSCULAR; INTRAVENOUS; SUBCUTANEOUS
Status: DISCONTINUED | OUTPATIENT
Start: 2018-06-11 | End: 2018-06-11

## 2018-06-11 RX ORDER — ASPIRIN 81 MG/1
81-324 TABLET, CHEWABLE ORAL
Status: DISCONTINUED | OUTPATIENT
Start: 2018-06-11 | End: 2018-06-11 | Stop reason: HOSPADM

## 2018-06-11 RX ORDER — NALOXONE HYDROCHLORIDE 0.4 MG/ML
.1-.4 INJECTION, SOLUTION INTRAMUSCULAR; INTRAVENOUS; SUBCUTANEOUS
Status: DISCONTINUED | OUTPATIENT
Start: 2018-06-11 | End: 2018-06-11

## 2018-06-11 RX ORDER — SODIUM CHLORIDE 9 MG/ML
INJECTION, SOLUTION INTRAVENOUS CONTINUOUS
Status: DISCONTINUED | OUTPATIENT
Start: 2018-06-11 | End: 2018-06-11 | Stop reason: HOSPADM

## 2018-06-11 RX ORDER — LORAZEPAM 2 MG/ML
0.5 INJECTION INTRAMUSCULAR
Status: DISCONTINUED | OUTPATIENT
Start: 2018-06-11 | End: 2018-06-11 | Stop reason: HOSPADM

## 2018-06-11 RX ORDER — METOPROLOL TARTRATE 1 MG/ML
2.5 INJECTION, SOLUTION INTRAVENOUS ONCE
Status: COMPLETED | OUTPATIENT
Start: 2018-06-11 | End: 2018-06-11

## 2018-06-11 RX ORDER — FLUMAZENIL 0.1 MG/ML
0.2 INJECTION, SOLUTION INTRAVENOUS
Status: ACTIVE | OUTPATIENT
Start: 2018-06-11 | End: 2018-06-12

## 2018-06-11 RX ORDER — ATROPINE SULFATE 0.1 MG/ML
0.5 INJECTION INTRAVENOUS EVERY 5 MIN PRN
Status: ACTIVE | OUTPATIENT
Start: 2018-06-11 | End: 2018-06-12

## 2018-06-11 RX ORDER — LORAZEPAM 2 MG/ML
.5-2 INJECTION INTRAMUSCULAR EVERY 4 HOURS PRN
Status: DISCONTINUED | OUTPATIENT
Start: 2018-06-11 | End: 2018-06-11 | Stop reason: HOSPADM

## 2018-06-11 RX ORDER — LISINOPRIL 10 MG/1
10 TABLET ORAL DAILY
Status: DISCONTINUED | OUTPATIENT
Start: 2018-06-11 | End: 2018-06-11

## 2018-06-11 RX ORDER — POTASSIUM CHLORIDE 1500 MG/1
20 TABLET, EXTENDED RELEASE ORAL
Status: COMPLETED | OUTPATIENT
Start: 2018-06-11 | End: 2018-06-11

## 2018-06-11 RX ORDER — BUPIVACAINE HYDROCHLORIDE 2.5 MG/ML
1-10 INJECTION, SOLUTION EPIDURAL; INFILTRATION; INTRACAUDAL
Status: DISCONTINUED | OUTPATIENT
Start: 2018-06-11 | End: 2018-06-11 | Stop reason: HOSPADM

## 2018-06-11 RX ORDER — LISINOPRIL 20 MG/1
20 TABLET ORAL DAILY
Status: DISCONTINUED | OUTPATIENT
Start: 2018-06-11 | End: 2018-06-12

## 2018-06-11 RX ORDER — PROTAMINE SULFATE 10 MG/ML
1-5 INJECTION, SOLUTION INTRAVENOUS
Status: DISCONTINUED | OUTPATIENT
Start: 2018-06-11 | End: 2018-06-11 | Stop reason: HOSPADM

## 2018-06-11 RX ORDER — PHENYLEPHRINE HCL IN 0.9% NACL 1 MG/10 ML
20-100 SYRINGE (ML) INTRAVENOUS
Status: DISCONTINUED | OUTPATIENT
Start: 2018-06-11 | End: 2018-06-11 | Stop reason: HOSPADM

## 2018-06-11 RX ORDER — LIDOCAINE 40 MG/G
CREAM TOPICAL
Status: DISCONTINUED | OUTPATIENT
Start: 2018-06-11 | End: 2018-06-11 | Stop reason: HOSPADM

## 2018-06-11 RX ORDER — ACETAMINOPHEN 325 MG/1
650 TABLET ORAL EVERY 4 HOURS PRN
Status: DISCONTINUED | OUTPATIENT
Start: 2018-06-11 | End: 2018-06-11

## 2018-06-11 RX ORDER — PROCHLORPERAZINE 25 MG
25 SUPPOSITORY, RECTAL RECTAL EVERY 12 HOURS PRN
Status: DISCONTINUED | OUTPATIENT
Start: 2018-06-11 | End: 2018-06-13 | Stop reason: HOSPADM

## 2018-06-11 RX ORDER — FLUMAZENIL 0.1 MG/ML
0.2 INJECTION, SOLUTION INTRAVENOUS
Status: DISCONTINUED | OUTPATIENT
Start: 2018-06-11 | End: 2018-06-11

## 2018-06-11 RX ORDER — FENTANYL CITRATE 50 UG/ML
25-50 INJECTION, SOLUTION INTRAMUSCULAR; INTRAVENOUS
Status: DISCONTINUED | OUTPATIENT
Start: 2018-06-11 | End: 2018-06-11 | Stop reason: HOSPADM

## 2018-06-11 RX ORDER — NALOXONE HYDROCHLORIDE 0.4 MG/ML
.1-.4 INJECTION, SOLUTION INTRAMUSCULAR; INTRAVENOUS; SUBCUTANEOUS
Status: DISCONTINUED | OUTPATIENT
Start: 2018-06-11 | End: 2018-06-13 | Stop reason: HOSPADM

## 2018-06-11 RX ORDER — MAGNESIUM SULFATE HEPTAHYDRATE 40 MG/ML
4 INJECTION, SOLUTION INTRAVENOUS EVERY 4 HOURS PRN
Status: DISCONTINUED | OUTPATIENT
Start: 2018-06-11 | End: 2018-06-13 | Stop reason: HOSPADM

## 2018-06-11 RX ORDER — NITROGLYCERIN 0.4 MG/1
0.4 TABLET SUBLINGUAL EVERY 5 MIN PRN
Status: DISCONTINUED | OUTPATIENT
Start: 2018-06-11 | End: 2018-06-11 | Stop reason: HOSPADM

## 2018-06-11 RX ORDER — NICOTINE POLACRILEX 4 MG
15-30 LOZENGE BUCCAL
Status: DISCONTINUED | OUTPATIENT
Start: 2018-06-11 | End: 2018-06-12

## 2018-06-11 RX ORDER — METOPROLOL TARTRATE 1 MG/ML
5 INJECTION, SOLUTION INTRAVENOUS EVERY 5 MIN PRN
Status: DISCONTINUED | OUTPATIENT
Start: 2018-06-11 | End: 2018-06-11 | Stop reason: HOSPADM

## 2018-06-11 RX ORDER — ACETAMINOPHEN 325 MG/1
325-650 TABLET ORAL EVERY 4 HOURS PRN
Status: DISCONTINUED | OUTPATIENT
Start: 2018-06-11 | End: 2018-06-13 | Stop reason: HOSPADM

## 2018-06-11 RX ORDER — NITROGLYCERIN 5 MG/ML
100-500 VIAL (ML) INTRAVENOUS
Status: COMPLETED | OUTPATIENT
Start: 2018-06-11 | End: 2018-06-11

## 2018-06-11 RX ORDER — MORPHINE SULFATE 4 MG/ML
1-2 INJECTION, SOLUTION INTRAMUSCULAR; INTRAVENOUS EVERY 5 MIN PRN
Status: DISCONTINUED | OUTPATIENT
Start: 2018-06-11 | End: 2018-06-11 | Stop reason: HOSPADM

## 2018-06-11 RX ORDER — ATORVASTATIN CALCIUM 40 MG/1
40 TABLET, FILM COATED ORAL DAILY
Status: DISCONTINUED | OUTPATIENT
Start: 2018-06-11 | End: 2018-06-13 | Stop reason: HOSPADM

## 2018-06-11 RX ORDER — NALOXONE HYDROCHLORIDE 0.4 MG/ML
.2-.4 INJECTION, SOLUTION INTRAMUSCULAR; INTRAVENOUS; SUBCUTANEOUS
Status: ACTIVE | OUTPATIENT
Start: 2018-06-11 | End: 2018-06-12

## 2018-06-11 RX ORDER — POLYETHYLENE GLYCOL 3350 17 G/17G
17 POWDER, FOR SOLUTION ORAL DAILY PRN
Status: DISCONTINUED | OUTPATIENT
Start: 2018-06-11 | End: 2018-06-13 | Stop reason: HOSPADM

## 2018-06-11 RX ORDER — IOPAMIDOL 755 MG/ML
105 INJECTION, SOLUTION INTRAVASCULAR ONCE
Status: COMPLETED | OUTPATIENT
Start: 2018-06-11 | End: 2018-06-11

## 2018-06-11 RX ORDER — ONDANSETRON 2 MG/ML
4 INJECTION INTRAMUSCULAR; INTRAVENOUS EVERY 4 HOURS PRN
Status: DISCONTINUED | OUTPATIENT
Start: 2018-06-11 | End: 2018-06-11 | Stop reason: HOSPADM

## 2018-06-11 RX ORDER — AMOXICILLIN 250 MG
1 CAPSULE ORAL 2 TIMES DAILY PRN
Status: DISCONTINUED | OUTPATIENT
Start: 2018-06-11 | End: 2018-06-11

## 2018-06-11 RX ORDER — NALOXONE HYDROCHLORIDE 0.4 MG/ML
0.4 INJECTION, SOLUTION INTRAMUSCULAR; INTRAVENOUS; SUBCUTANEOUS EVERY 5 MIN PRN
Status: DISCONTINUED | OUTPATIENT
Start: 2018-06-11 | End: 2018-06-11 | Stop reason: HOSPADM

## 2018-06-11 RX ORDER — ALUMINA, MAGNESIA, AND SIMETHICONE 2400; 2400; 240 MG/30ML; MG/30ML; MG/30ML
30 SUSPENSION ORAL EVERY 4 HOURS PRN
Status: DISCONTINUED | OUTPATIENT
Start: 2018-06-11 | End: 2018-06-13 | Stop reason: HOSPADM

## 2018-06-11 RX ORDER — POTASSIUM CHLORIDE 29.8 MG/ML
20 INJECTION INTRAVENOUS
Status: DISCONTINUED | OUTPATIENT
Start: 2018-06-11 | End: 2018-06-13 | Stop reason: HOSPADM

## 2018-06-11 RX ORDER — ALBUTEROL SULFATE 90 UG/1
2 AEROSOL, METERED RESPIRATORY (INHALATION) EVERY 6 HOURS PRN
Status: ON HOLD | COMMUNITY
End: 2023-01-01

## 2018-06-11 RX ORDER — ATROPINE SULFATE 0.1 MG/ML
.5-1 INJECTION INTRAVENOUS
Status: DISCONTINUED | OUTPATIENT
Start: 2018-06-11 | End: 2018-06-11 | Stop reason: HOSPADM

## 2018-06-11 RX ORDER — LIDOCAINE 40 MG/G
CREAM TOPICAL
Status: DISCONTINUED | OUTPATIENT
Start: 2018-06-11 | End: 2018-06-13 | Stop reason: HOSPADM

## 2018-06-11 RX ORDER — NITROGLYCERIN 20 MG/100ML
.07-1.56 INJECTION INTRAVENOUS CONTINUOUS PRN
Status: DISCONTINUED | OUTPATIENT
Start: 2018-06-11 | End: 2018-06-11 | Stop reason: HOSPADM

## 2018-06-11 RX ORDER — AMOXICILLIN 250 MG
2 CAPSULE ORAL 2 TIMES DAILY PRN
Status: DISCONTINUED | OUTPATIENT
Start: 2018-06-11 | End: 2018-06-13 | Stop reason: HOSPADM

## 2018-06-11 RX ORDER — PROCHLORPERAZINE MALEATE 5 MG
10 TABLET ORAL EVERY 6 HOURS PRN
Status: DISCONTINUED | OUTPATIENT
Start: 2018-06-11 | End: 2018-06-13 | Stop reason: HOSPADM

## 2018-06-11 RX ORDER — DEXTROSE MONOHYDRATE 25 G/50ML
12.5-5 INJECTION, SOLUTION INTRAVENOUS EVERY 30 MIN PRN
Status: DISCONTINUED | OUTPATIENT
Start: 2018-06-11 | End: 2018-06-11 | Stop reason: HOSPADM

## 2018-06-11 RX ORDER — NITROGLYCERIN 5 MG/ML
100-200 VIAL (ML) INTRAVENOUS
Status: DISCONTINUED | OUTPATIENT
Start: 2018-06-11 | End: 2018-06-11 | Stop reason: HOSPADM

## 2018-06-11 RX ORDER — POLYETHYLENE GLYCOL 3350 17 G/17G
17 POWDER, FOR SOLUTION ORAL DAILY PRN
Status: DISCONTINUED | OUTPATIENT
Start: 2018-06-11 | End: 2018-06-11

## 2018-06-11 RX ORDER — DOPAMINE HYDROCHLORIDE 160 MG/100ML
2-20 INJECTION, SOLUTION INTRAVENOUS CONTINUOUS PRN
Status: DISCONTINUED | OUTPATIENT
Start: 2018-06-11 | End: 2018-06-11 | Stop reason: HOSPADM

## 2018-06-11 RX ORDER — POTASSIUM CHLORIDE 7.45 MG/ML
10 INJECTION INTRAVENOUS
Status: DISCONTINUED | OUTPATIENT
Start: 2018-06-11 | End: 2018-06-13 | Stop reason: HOSPADM

## 2018-06-11 RX ORDER — ASPIRIN 325 MG
325 TABLET ORAL
Status: DISCONTINUED | OUTPATIENT
Start: 2018-06-11 | End: 2018-06-11 | Stop reason: HOSPADM

## 2018-06-11 RX ORDER — ENALAPRILAT 1.25 MG/ML
1.25-2.5 INJECTION INTRAVENOUS
Status: DISCONTINUED | OUTPATIENT
Start: 2018-06-11 | End: 2018-06-11 | Stop reason: HOSPADM

## 2018-06-11 RX ORDER — POTASSIUM CHLORIDE 29.8 MG/ML
20 INJECTION INTRAVENOUS
Status: DISCONTINUED | OUTPATIENT
Start: 2018-06-11 | End: 2018-06-11 | Stop reason: HOSPADM

## 2018-06-11 RX ORDER — EPINEPHRINE 1 MG/ML
0.3 INJECTION, SOLUTION, CONCENTRATE INTRAVENOUS
Status: DISCONTINUED | OUTPATIENT
Start: 2018-06-11 | End: 2018-06-11 | Stop reason: HOSPADM

## 2018-06-11 RX ORDER — ACETAMINOPHEN 650 MG/1
650 SUPPOSITORY RECTAL EVERY 4 HOURS PRN
Status: DISCONTINUED | OUTPATIENT
Start: 2018-06-11 | End: 2018-06-13 | Stop reason: HOSPADM

## 2018-06-11 RX ORDER — BISACODYL 10 MG
10 SUPPOSITORY, RECTAL RECTAL DAILY PRN
Status: DISCONTINUED | OUTPATIENT
Start: 2018-06-11 | End: 2018-06-13 | Stop reason: HOSPADM

## 2018-06-11 RX ORDER — POTASSIUM CHLORIDE 7.45 MG/ML
10 INJECTION INTRAVENOUS
Status: DISCONTINUED | OUTPATIENT
Start: 2018-06-11 | End: 2018-06-11 | Stop reason: HOSPADM

## 2018-06-11 RX ORDER — FENOFIBRATE 160 MG/1
160 TABLET ORAL DAILY
Status: DISCONTINUED | OUTPATIENT
Start: 2018-06-11 | End: 2018-06-11

## 2018-06-11 RX ORDER — HYDROCODONE BITARTRATE AND ACETAMINOPHEN 5; 325 MG/1; MG/1
1-2 TABLET ORAL EVERY 4 HOURS PRN
Status: DISCONTINUED | OUTPATIENT
Start: 2018-06-11 | End: 2018-06-13 | Stop reason: HOSPADM

## 2018-06-11 RX ORDER — POTASSIUM CHLORIDE 1.5 G/1.58G
20-40 POWDER, FOR SOLUTION ORAL
Status: DISCONTINUED | OUTPATIENT
Start: 2018-06-11 | End: 2018-06-13 | Stop reason: HOSPADM

## 2018-06-11 RX ORDER — ADENOSINE 3 MG/ML
12-12000 INJECTION, SOLUTION INTRAVENOUS
Status: DISCONTINUED | OUTPATIENT
Start: 2018-06-11 | End: 2018-06-11 | Stop reason: HOSPADM

## 2018-06-11 RX ORDER — DIPHENHYDRAMINE HYDROCHLORIDE 50 MG/ML
25-50 INJECTION INTRAMUSCULAR; INTRAVENOUS
Status: DISCONTINUED | OUTPATIENT
Start: 2018-06-11 | End: 2018-06-11 | Stop reason: HOSPADM

## 2018-06-11 RX ORDER — METOPROLOL TARTRATE 25 MG/1
25 TABLET, FILM COATED ORAL 2 TIMES DAILY
Status: DISCONTINUED | OUTPATIENT
Start: 2018-06-11 | End: 2018-06-12

## 2018-06-11 RX ORDER — SODIUM NITROPRUSSIDE 25 MG/ML
100-200 INJECTION INTRAVENOUS
Status: DISCONTINUED | OUTPATIENT
Start: 2018-06-11 | End: 2018-06-11 | Stop reason: HOSPADM

## 2018-06-11 RX ORDER — ACETAMINOPHEN 650 MG/1
650 SUPPOSITORY RECTAL EVERY 4 HOURS PRN
Status: DISCONTINUED | OUTPATIENT
Start: 2018-06-11 | End: 2018-06-11

## 2018-06-11 RX ORDER — NITROGLYCERIN 20 MG/100ML
.07-1.55 INJECTION INTRAVENOUS CONTINUOUS
Status: DISCONTINUED | OUTPATIENT
Start: 2018-06-11 | End: 2018-06-11

## 2018-06-11 RX ORDER — ASPIRIN 81 MG/1
81 TABLET ORAL DAILY
Status: DISCONTINUED | OUTPATIENT
Start: 2018-06-12 | End: 2018-06-11

## 2018-06-11 RX ORDER — VERAPAMIL HYDROCHLORIDE 2.5 MG/ML
1-2.5 INJECTION, SOLUTION INTRAVENOUS
Status: COMPLETED | OUTPATIENT
Start: 2018-06-11 | End: 2018-06-11

## 2018-06-11 RX ORDER — NIFEDIPINE 10 MG/1
10 CAPSULE ORAL
Status: DISCONTINUED | OUTPATIENT
Start: 2018-06-11 | End: 2018-06-11 | Stop reason: HOSPADM

## 2018-06-11 RX ORDER — METHYLPREDNISOLONE SODIUM SUCCINATE 125 MG/2ML
125 INJECTION, POWDER, LYOPHILIZED, FOR SOLUTION INTRAMUSCULAR; INTRAVENOUS
Status: DISCONTINUED | OUTPATIENT
Start: 2018-06-11 | End: 2018-06-11 | Stop reason: HOSPADM

## 2018-06-11 RX ORDER — LIDOCAINE HYDROCHLORIDE 10 MG/ML
1-10 INJECTION, SOLUTION INFILTRATION; PERINEURAL
Status: COMPLETED | OUTPATIENT
Start: 2018-06-11 | End: 2018-06-11

## 2018-06-11 RX ORDER — LIDOCAINE HYDROCHLORIDE 10 MG/ML
30 INJECTION, SOLUTION EPIDURAL; INFILTRATION; INTRACAUDAL; PERINEURAL
Status: DISCONTINUED | OUTPATIENT
Start: 2018-06-11 | End: 2018-06-11 | Stop reason: HOSPADM

## 2018-06-11 RX ORDER — HYDRALAZINE HYDROCHLORIDE 20 MG/ML
10-20 INJECTION INTRAMUSCULAR; INTRAVENOUS
Status: DISCONTINUED | OUTPATIENT
Start: 2018-06-11 | End: 2018-06-11 | Stop reason: HOSPADM

## 2018-06-11 RX ORDER — FUROSEMIDE 10 MG/ML
20-100 INJECTION INTRAMUSCULAR; INTRAVENOUS
Status: DISCONTINUED | OUTPATIENT
Start: 2018-06-11 | End: 2018-06-11 | Stop reason: HOSPADM

## 2018-06-11 RX ADMIN — ATORVASTATIN CALCIUM 40 MG: 40 TABLET, FILM COATED ORAL at 09:48

## 2018-06-11 RX ADMIN — FENTANYL CITRATE 50 MCG: 50 INJECTION, SOLUTION INTRAMUSCULAR; INTRAVENOUS at 15:23

## 2018-06-11 RX ADMIN — NITROGLYCERIN 200 MCG: 5 INJECTION, SOLUTION INTRAVENOUS at 15:14

## 2018-06-11 RX ADMIN — IOPAMIDOL 105 ML: 755 INJECTION, SOLUTION INTRAVASCULAR at 15:45

## 2018-06-11 RX ADMIN — INSULIN ASPART 2 UNITS: 100 INJECTION, SOLUTION INTRAVENOUS; SUBCUTANEOUS at 01:08

## 2018-06-11 RX ADMIN — LIDOCAINE HYDROCHLORIDE 1 ML: 10 INJECTION, SOLUTION INFILTRATION; PERINEURAL at 15:13

## 2018-06-11 RX ADMIN — SODIUM CHLORIDE: 9 INJECTION, SOLUTION INTRAVENOUS at 16:21

## 2018-06-11 RX ADMIN — FENOFIBRATE 160 MG: 160 TABLET ORAL at 09:49

## 2018-06-11 RX ADMIN — METOPROLOL TARTRATE 25 MG: 25 TABLET ORAL at 09:49

## 2018-06-11 RX ADMIN — SODIUM CHLORIDE: 9 INJECTION, SOLUTION INTRAVENOUS at 12:32

## 2018-06-11 RX ADMIN — METOPROLOL TARTRATE 25 MG: 25 TABLET ORAL at 00:53

## 2018-06-11 RX ADMIN — MIDAZOLAM 2 MG: 1 INJECTION INTRAMUSCULAR; INTRAVENOUS at 15:24

## 2018-06-11 RX ADMIN — METOPROLOL TARTRATE 2.5 MG: 5 INJECTION, SOLUTION INTRAVENOUS at 01:55

## 2018-06-11 RX ADMIN — HUMAN ALBUMIN MICROSPHERES AND PERFLUTREN 9 ML: 10; .22 INJECTION, SOLUTION INTRAVENOUS at 09:15

## 2018-06-11 RX ADMIN — HEPARIN SODIUM 5000 UNITS: 1000 INJECTION, SOLUTION INTRAVENOUS; SUBCUTANEOUS at 15:15

## 2018-06-11 RX ADMIN — POTASSIUM CHLORIDE 20 MEQ: 1500 TABLET, EXTENDED RELEASE ORAL at 12:56

## 2018-06-11 RX ADMIN — INSULIN ASPART 1 UNITS: 100 INJECTION, SOLUTION INTRAVENOUS; SUBCUTANEOUS at 10:03

## 2018-06-11 RX ADMIN — ASPIRIN 81 MG: 81 TABLET, COATED ORAL at 09:50

## 2018-06-11 RX ADMIN — LISINOPRIL 20 MG: 20 TABLET ORAL at 09:50

## 2018-06-11 RX ADMIN — VERAPAMIL HYDROCHLORIDE 2.5 MG: 2.5 INJECTION, SOLUTION INTRAVENOUS at 15:14

## 2018-06-11 RX ADMIN — METOPROLOL TARTRATE 25 MG: 25 TABLET ORAL at 20:31

## 2018-06-11 RX ADMIN — ASPIRIN 325 MG: 325 TABLET, DELAYED RELEASE ORAL at 12:57

## 2018-06-11 ASSESSMENT — PAIN DESCRIPTION - DESCRIPTORS: DESCRIPTORS: SORE;ACHING

## 2018-06-11 NOTE — H&P
[facility]    History and Physical  Hospitalist       Date of Admission:  6/10/2018    Assessment & Plan   Neymar Rhodes is a 58 year old male who presents with acute diaphoresis and shortness of breath found to have acute hypoxic respiratory failure with new left bundle branch block, significant hypertension concerning for flash pulmonary edema    Acute hypoxic respiratory failure: Presented with oxygen saturations in the 50s with systolic blood pressure in the 180s requiring initial BiPAP support for respiratory failure, and still requiring significant nasal cannula oxygen.  Appears to be consistent with flash pulmonary edema in the setting of significant hypertension, though given patient's description of time course, I do have concern that this represents an acute cardiac event.  Patient is a , and though he is on antihypertensive medications, states that his typical blood pressures in the 120 systolic range.  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  Presented with hypertension into the 180s as well as hypoxia concerning for flash pulmonary edema, though found to have a new left bundle branch block as compared to historical EKGs.  No chest pain.  -Nitroglycerin drip to off load and decrease blood pressure  -Attempt low-dose beta-blockade as blood pressure allows in an attempt to reduce cardiac metabolism.  If it appears that patient is having worsening related to decreased rate, consider need for additional cardiac output and would hold additional beta-blockade.  -Receive 40 mg IV Lasix in the emergency department.  No additional diuretic therapy currently ordered as patient's respiratory status is improving with improved control of blood pressure (more suspicious for flash pulmonary edema).  -Daily weights, intake and output    Acute myocardial infarction: Troponin elevation, new left bundle branch block, concern for cardiomegaly on chest x-ray.   Troponin elevation could be secondary to hypoxia, though it is concerning that patient was exerting himself when he had abrupt onset diaphoresis and respiratory distress with laboratory and EKG findings as described.  With new left bundle branch block, unable to adequately assess any localizing ST changes. Concerning presentation with BEE score indicating ~10% 6 month mortality, Heart score of 7 equating to an estimated 50-65% risk of all cause mortality, myocardial infarction, or coronary revascularization in the coming 6 weeks.  -Cardiology consulted in the setting of what could potentially represent an acute coronary event precipitating acute hypoxic respiratory failure and new left bundle branch block.  Both rounding cardiology service and interventional cardiology aware of patient from emergency department per ED signout.  -Heparin drip  -Nitroglycerin drip as above  -Continuing troponin trend.  -Recommend outpatient sleep study as below  -Lipid panel pending  -Continue Lipitor 40 milligrams daily, fenofibrate 160 daily  -Lisinopril 20 mg daily continued from home regimen  -TTE pending  -Continue daily aspirin, received a 300 mg suppository in the emergency department  -N.p.o. at this time anticipating early coronary angiography given risk profile as above  -Indocin 25 mg 3 times daily held  -Metoprolol tartrate 25 mg twice daily    Type 2 diabetes, fairly well controlled: Hemoglobin A1c of 7.6.  -Every 4 hour blood glucose checks with sliding scale insulin given n.p.o. status  -Prior to admission Victoza 1.8, metformin 1 g twice daily, Actos 45 mg, glipizide 10 milligrams all held    Obesity: Patient with a BMI of greater than 39.  Near morbid obesity.  Likely confers an increased risk of all cause mortality.  -Weight loss as able.  This was discussed with patient in the emergency department.  Patient states that he has been able to lose weight with dietary changes , and is no longer morbidly  obese  -Recommend outpatient sleep study no  Hypertension, hyperlipidemia:  -Continue prior to admission fenofibrate and Lipitor  -Continue lisinopril 20 mg daily  -Have added metoprolol tartrate 25 mg twice daily; monitor closely with this to ensure no need for additional cardiac output (indication to hold Bblocker)    # Pain Assessment:  Current Pain Score 6/10/2018   Pain score (0-10) 0   Neymar orozco pain level was assessed and he currently denies pain.        DVT Prophylaxis: currently on a heparin drip    Code Status: Full Code    Disposition: Expected discharge in likely 3 days pending cardiac evaluation including TTE and anticipated angiogram.    [facility]    Primary Care Physician   Jefry Harris    Chief Complaint   Shortness of breath    History is obtained from the patient, chart review, discussion with Dr. Barajas in the emergency department    History of Present Illness   Neymar Rhodes is a 58 year old male who presents with acute onset diaphoresis and shortness of breath.    Patient is an obese 58-year-old male with a history largely consistent with metabolic syndrome including gout, type 2 diabetes, hyperlipidemia, hypertension. Patient is unaware of any history of obstructive sleep apnea, though is told that he snores.     Patient works as a , and his hypertension is typically well controlled on lisinopril 20 mg daily in the 120 systolic range (patient undergoes annual DMV physicals to maintain his license).  Patient states that he is adherent with his medications.  Diabetes is well controlled.  Patient states that he was mowing his lawn today using his push mower as he typically does, was walking up a small hill in his yard when he developed sudden onset diaphoresis and shortness of breath.  Patient states that his diaphoresis was unlike anything he had previously experienced.  He immediately needed to stop and rest.  Patient's wife was at work, and as he was  experiencing extreme dyspnea with his diaphoresis, he called 911.  EMS arrived, patient with diffuse rhonchi throughout lung fields, transported lights and sirens to [facility] emergency department where he was treated in the stabilization room for acute respiratory failure.  Blood pressures were noted to be in the 180s systolic range, oxygen saturation in the 50% range.  Patient was placed on BiPAP as well as a nitroglycerin drip with improvement in blood pressure and oxygenation.  After approximately 2 hours, patient was actually able to be weaned off of BiPAP onto nasal cannula oxygen.  Blood pressures at this time improved to the 120-110 range.    EKG demonstrated left bundle branch block which is new from prior imaging.  Initial troponin was elevated in the 0.09 range.  Given new Left bundle branch block with acute onset symptoms and what appears to be cardiomegaly on chest x-ray, cardiology was contacted by emergency department provider and recommended discussion with interventional cardiology.  Dr. Barajas discussed with Dr. Almaguer of interventional cardiology who recommended supportive cares and heparin drip with cardiology to follow.    At baseline, patient states he is able to mow his yard with a push mower without similar shortness of breath or dyspnea, no prior or current chest discomfort.  States that he has 1.5 lots, equating to approximately 0.6-0.75 acres.  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  When asked if he would be able to walk up several flights of stairs as of a week ago, patient believes he would be able to walk up the entire 8 flights of stairs in the hospital going slowly without having to stop.  Believes he can walk 6 blocks without difficulty, and [place]  [place]y.  Again, patient's onset of symptoms was abrupt and he had not had similar symptoms in the past.  Patient has bilateral lower extremity edema likely related to his morbid obesity.  States that his swelling is stable and unchanged.  Has not noted orthopnea in the preceding days or any other respiratory symptoms other than acute onset of symptoms while mowing his lawn today.    Patient has a history of hypertension, type 2 diabetes.  Is a non-smoker, though his wife smokes.  Wife does not smoke in the house.  States that his parents did smoke.    Takes a daily aspirin 81 mg at baseline in the setting of Diabetes with hypertension/hyperlipidemia.  Denies any family history of coronary artery disease.  Patient has never undergone a stress test.  Tolerated historical operative interventions including hemicolectomy for diverticulitis and perforated appendicitis, though these procedures were multiple decades ago.    Past Medical History    I have reviewed this patient's medical history and updated it with pertinent information if needed.   Past Medical History:   Diagnosis Date     Diverticulosis of colon (without mention of hemorrhage)      Essential hypertension, benign      Gout, unspecified      Morbid obesity (H)      Other and unspecified hyperlipidemia      Type II or unspecified type diabetes mellitus without mention of complication, not stated as uncontrolled        Past Surgical History   I have reviewed this patient's surgical history and updated it with pertinent information if needed.  Past Surgical History:   Procedure Laterality Date     C APPENDECTOMY       SURGICAL HISTORY OF -       repair of colovesicular fistula       Prior to Admission Medications   Prior to Admission Medications   Prescriptions Last Dose Informant Patient Reported? Taking?   ASPIRIN 81 MG OR TABS   No No   Si tab po QD (Once per day)   albuterol (PROVENTIL HFA) 108 (90 Base) MCG/ACT Inhaler   No No   Sig:  Inhale 2 puffs into the lungs every 6 hours   atorvastatin (LIPITOR) 80 MG tablet   No No   Sig: take 1/2 tablet by mouth at bedtime   blood glucose monitoring (ACCU-CHEK LUL PLUS) test strip   No No   Sig: Use to test blood sugar 1-3 times daily or as directed.   fenofibrate 160 MG tablet   No No   Sig: Take 1 tablet (160 mg) by mouth daily   glipiZIDE (GLIPIZIDE XL) 10 MG 24 hr tablet   No No   Sig: Take 1 tablet (10mg) by mouth daily   guaiFENesin-codeine (ROBITUSSIN AC) 100-10 MG/5ML SOLN solution   No No   Sig: Take 5-10 mLs by mouth nightly as needed for cough   indomethacin (INDOCIN) 25 MG capsule   No No   Sig: Take 1 capsule by mouth 2 times daily as needed. Take with meals   ketoconazole (NIZORAL) 2 % cream   No No   Sig: Apply topically 2 times daily Apply sparingly once or twice per day as needed to affected area until the skin is better, then stop   liraglutide (VICTOZA PEN) 18 MG/3ML soln   No No   Sig: Inject 1.8 mg under skin once daily   lisinopril (PRINIVIL/ZESTRIL) 20 MG tablet   No No   Sig: Take 1 tablet (20 mg) by mouth daily   metFORMIN (GLUCOPHAGE) 1000 MG tablet   No No   Sig: TAKE ONE TABLET BY MOUTH TWICE A DAY WITH MEALS   pioglitazone (ACTOS) 45 MG tablet   No No   Sig: TAKE ONE TABLET BY MOUTH ONE TIME DAILY   triamcinolone (KENALOG) 0.5 % cream   No No   Sig: Apply sparingly once or twice per day as needed to affected area until the skin is better, then stop      Facility-Administered Medications: None     Allergies   Allergies   Allergen Reactions     No Known Drug Allergies        Social History   I have reviewed this patient's social history and updated it with pertinent information if needed. Neymar A Carmelo  reports that he has never smoked. He has never used smokeless tobacco. He reports that he drinks alcohol. He reports that he does not use illicit drugs.  Patient's wife smokes, though outside of the home.  Acute onset diaphoresis and    Family History   I have reviewed  this patient's family history and updated it with pertinent information if needed.   Family History   Problem Relation Age of Onset     CANCER Father 75     bladder cancer     Breast Cancer Mother      Breast Cancer Sister      Family History Negative Brother      Family History Negative Brother    Family history of colon cancer reported by patient.  No family history of coronary artery disease    Review of Systems   The 10 point Review of Systems is negative other than noted in the HPI or here.  Acute onset diaphoresis and dyspnea during exertion today which is not typical for him.  No fevers or chills  No cough or shortness of breath prior to today's events  Denies any change in chronic mild lower extremity swelling    Physical Exam   Temp: 98.5  F (36.9  C) Temp src: Oral BP: 105/76 Pulse: 128 Heart Rate: 112 Resp: 12 SpO2: 94 %      Vital Signs with Ranges  Temp:  [98.5  F (36.9  C)] 98.5  F (36.9  C)  Pulse:  [128] 128  Heart Rate:  [112-123] 112  Resp:  [7-34] 12  BP: ()/() 105/76  SpO2:  [91 %-99 %] 94 %  285 lbs 0 oz    Constitutional: no acute distress, alert, conversant morbidly obese male  Eyes: no scleral icterus or injection  HEENT: moist mucous membranes  Respiratory: breath sounds somewhat distant bilaterally.  Few inspiratory crackles in bases bilaterally without significant wheezes or rhonchi as initially reported.  No prolonged expiration phase.  Mild tachypnea on 5 L nasal cannula oxygen following patient being weaned from BiPAP.  No longer in extremis.  Cardiovascular: Patient is tachycardic to the 110 range, no appreciable murmur.  Shirley is distant to auscultation  GI: abdomen soft, non-tender, obese.  Bowel sounds present.  No palpable mass.  Patient with an umbilical hernia as well as multiple scars from prior laparotomy and appendectomy.  Lymph/Hematologic: Patient with 1+ pitting edema of bilateral lower extremities which he states is stable  Genitourinary: not examined  Skin: no  rashes  Musculoskeletal: muscular tone intact in all extremities  Neurologic: mental status grossly intact, no focal deficits, alert  Psychiatric: normal affect    Data   Data reviewed today:  I personally reviewed EKG with left bundle branch block.    Recent Labs  Lab 06/10/18  2103 06/10/18  2050   WBC  --  11.0   HGB  --  14.0   MCV  --  95   PLT  --  218   NA  --  143   POTASSIUM  --  3.4   CHLORIDE  --  109   CO2  --  18*   BUN  --  18   CR  --  1.11   ANIONGAP  --  16*   EDITH  --  9.2   GLC  --  310*   TROPI  --  0.091*   TROPONIN 0.07  --        Recent Results (from the past 24 hour(s))   Chest  XR, 1 view PORTABLE    Narrative    CHEST PORTABLE ONE VIEW     6/10/2018 8:56 PM     HISTORY: Shortness of breath.     COMPARISON: None.      Impression    IMPRESSION: Portable chest obtained. This accentuates heart size but  there does appear to be cardiomegaly. The patient is rotated to the  left. Mild pulmonary vascular prominence. No focal infiltrates or  definite pleural fluid. Recommend clinical correlation for possible  mild congestive heart failure.     DENI OLEA MD

## 2018-06-11 NOTE — PROVIDER NOTIFICATION
MD Notification    Notified Person: MD    Notified Person Name: Macrus    Notification Date/Time: 0530    Purpose of Notification: C/o left shoulder pain 2/10, denies CP or radiation of pain. EKG done, unable to titrate Nitro as BP's intermittently soft (SBP low 90's). Trop continuing to trend up.     Orders Received: [facility] cardiology to update.     Comments: Cardiology paged x3, spoke with Hira @ [phone].

## 2018-06-11 NOTE — CONSULTS
[facility]  Cardiology Consultation     Date of Admission:  6/10/2018    Primary Care Physician   Jefry Harris    Reason for Consult   Reason for consult: I was asked to evaluate this patient for flash pulmonary edema and positive troponins in the setting of a new left bundle branch block.    History of Present Illness   Neymar Rhodes is a 58 year old male with past medical history significant for diabetes mellitus, hypertension, hyperlipidemia and a family history of his father having a myocardial infarction in his 60s.  He also has obesity.      Yesterday he was mowing his lawn which is something he does normally without problems, it is a hilly lawn but yesterday care home through he suddenly developed shortness of breath, diaphoresis and called 911.  He then had some left upper arm discomfort which he attributed his to the blood pressure cuff that the paramedics were using. Iit did persist after blood pressure cuff was switched to the other arm.  In the emergency room is found to be in severe respiratory distress.  Initial saturations were only 50%.  EKG demonstrated a new left bundle branch block from last EKG of 2013.  It appears to be sinus tachycardia.  Patient was treated with BiPAP mask and diuresis with gradual improvement.  Troponins have turned positive now peaking at 3.8.  EKG still demonstrates normal sinus rhythm with left bundle branch block however heart rate is decreased from 120 now down to 72 bpm.  Patient is comfortable at this time.    Patient denies having any previous history of exertional chest discomfort or shortness of breath.  He is obese and does not exercise on a regular basis.  He is a .  He denies having any problems with peripheral edema or leg pain.        Assessment & Plan   Neymar Rhodes is a 58 year old male who was admitted on 6/10/2018.  With troponins positive for a non-ST segment elevation myocardial infarction.  He does have  multiple risk factors.  At this time I think we need to proceed to the cardiac catheterization lab for further evaluation and treatment.  His echocardiogram is still pending at this time.  I have discussed risks benefits and alternatives the patient appears to understand and desires to proceed.    Hypertension.  Blood pressure was initially quite elevated on admission but now is well controlled.    Hypercholesterolemia.  Total cholesterol is 128, HDL 46, LDL 59, triglycerides 113.  I will discontinue fenofibrate.  Does not appear to have any clinical benefits and has potential for significant side effects.  Continue atorvastatin.    Diabetes mellitus per hospitalist.    Respiratory distress.  Now much improved.  It appears that this was due to flash pulmonary edema indicating possibly severe coronary artery disease or poorly controlled hypertension which does not appear to be the case.  If this is not the case I will send off a d-dimer to look into the possibility of a pulmonary embolus given the fact that he is a .    Obesity.  I recommended patient walk on a daily basis.  This will help him with his hypertension, diabetes mellitus, obesity, and cardiovascular fitness.    Further evaluation treatment of depend upon the above results.    Harris Iniguez MD    Patient Active Problem List   Diagnosis     Diverticulosis of large intestine     Benign essential hypertension     Gout     Morbid obesity with BMI of 40.0-44.9, adult (H)     Type 2 diabetes mellitus without complication, without long-term current use of insulin (H)     HYPERLIPIDEMIA LDL GOAL <100     Acute respiratory failure with hypoxia (H)     ACS (acute coronary syndrome) (H)       Past Medical History   I have reviewed this patient's medical history and updated it with pertinent information if needed.   Past Medical History:   Diagnosis Date     Diverticulosis of colon (without mention of hemorrhage)      Essential hypertension,  benign      Gout, unspecified      Morbid obesity (H)      Other and unspecified hyperlipidemia      Type II or unspecified type diabetes mellitus without mention of complication, not stated as uncontrolled        Past Surgical History   I have reviewed this patient's surgical history and updated it with pertinent information if needed.  Past Surgical History:   Procedure Laterality Date     C APPENDECTOMY       SURGICAL HISTORY OF -       repair of colovesicular fistula       Prior to Admission Medications   Prior to Admission Medications   Prescriptions Last Dose Informant Patient Reported? Taking?   ASPIRIN 81 MG OR TABS  Self No Yes   Si tab po QD (Once per day)   albuterol (PROAIR HFA/PROVENTIL HFA/VENTOLIN HFA) 108 (90 Base) MCG/ACT Inhaler PRN Self Yes Yes   Sig: Inhale 2 puffs into the lungs every 6 hours as needed for shortness of breath / dyspnea or wheezing   atorvastatin (LIPITOR) 80 MG tablet  Self No Yes   Sig: take 1/2 tablet by mouth at bedtime   blood glucose monitoring (ACCU-CHEK LUL PLUS) test strip  Self No No   Sig: Use to test blood sugar 1-3 times daily or as directed.   fenofibrate 160 MG tablet  Self No Yes   Sig: Take 1 tablet (160 mg) by mouth daily   glipiZIDE (GLIPIZIDE XL) 10 MG 24 hr tablet  Self No Yes   Sig: Take 1 tablet (10mg) by mouth daily   guaiFENesin-codeine (ROBITUSSIN AC) 100-10 MG/5ML SOLN solution PRN Self No Yes   Sig: Take 5-10 mLs by mouth nightly as needed for cough   indomethacin (INDOCIN) 25 MG capsule  Self No Yes   Sig: Take 1 capsule by mouth 2 times daily as needed. Take with meals   ketoconazole (NIZORAL) 2 % cream PRN Self No No   Sig: Apply topically 2 times daily Apply sparingly once or twice per day as needed to affected area until the skin is better, then stop   Patient taking differently: Apply topically 2 times daily as needed Apply sparingly once or twice per day as needed to affected area until the skin is better, then stop   liraglutide  (VICTOZA PEN) 18 MG/3ML soln  Self No Yes   Sig: Inject 1.8 mg under skin once daily   lisinopril (PRINIVIL/ZESTRIL) 20 MG tablet  Self No Yes   Sig: Take 1 tablet (20 mg) by mouth daily   metFORMIN (GLUCOPHAGE) 1000 MG tablet  Self No Yes   Sig: TAKE ONE TABLET BY MOUTH TWICE A DAY WITH MEALS   pioglitazone (ACTOS) 45 MG tablet  Self No Yes   Sig: TAKE ONE TABLET BY MOUTH ONE TIME DAILY   triamcinolone (KENALOG) 0.5 % cream  Self No Yes   Sig: Apply sparingly once or twice per day as needed to affected area until the skin is better, then stop      Facility-Administered Medications: None     Current Facility-Administered Medications   Medication Dose Route Frequency     aspirin  81 mg Oral Daily     aspirin  300 mg Rectal Once     atorvastatin  40 mg Oral Daily     insulin aspart  1-6 Units Subcutaneous Q4H     lisinopril  20 mg Oral Daily     metoprolol tartrate  25 mg Oral BID     sodium chloride (PF)  10 mL Intravenous Once     Current Facility-Administered Medications   Medication Last Rate     - MEDICATION INSTRUCTIONS -       - MEDICATION INSTRUCTIONS -       HEParin 1,150 Units/hr (06/11/18 0810)     - MEDICATION INSTRUCTIONS -       nitroGLYcerin 0.07 mcg/kg/min (06/11/18 4858)     - MEDICATION INSTRUCTIONS -       - MEDICATION INSTRUCTIONS -       Allergies   Allergies   Allergen Reactions     No Known Drug Allergies        Social History    reports that he has never smoked. He has never used smokeless tobacco. He reports that he drinks alcohol. He reports that he does not use illicit drugs.    Family History   Family History   Problem Relation Age of Onset     CANCER Father 75     bladder cancer     Breast Cancer Mother      Breast Cancer Sister      Family History Negative Brother      Family History Negative Brother        Review of Systems   The comprehensive 10 point Review of Systems is negative other than noted in the HPI or here.     Physical Exam   Vital Signs with Ranges  Temp:  [98.5  F (36.9  " C)-99.8  F (37.7  C)] 99.8  F (37.7  C)  Pulse:  [128] 128  Heart Rate:  [] 63  Resp:  [7-34] 21  BP: ()/() 103/65  SpO2:  [91 %-99 %] 99 %  Wt Readings from Last 4 Encounters:   06/11/18 127.8 kg (281 lb 12 oz)   04/30/18 128.8 kg (284 lb)   12/22/17 129.3 kg (285 lb)   08/04/17 127.5 kg (281 lb)     I/O last 3 completed shifts:  In: -   Out: 1225 [Urine:1225]      Vitals: /65  Pulse 128  Temp 99.8  F (37.7  C) (Oral)  Resp 21  Ht 1.803 m (5' 11\")  Wt 127.8 kg (281 lb 12 oz)  SpO2 99%  BMI 39.3 kg/m2    Patient is comfortable and in no apparent distress. Awake, alert and oriented ×3  Pupils are equal round and reactive.  Sclerae anicteric conjunctiva is noninjected  Neck is supple there are no carotid bruits or jugular venous distention.  Chest there are a few crackles in the left base.    There is no kyphosis or scoliosis  Cardiac exam reveals a regular rate and rhythm I hear no murmur, rub or gallop.  Abdomen is obese soft nontender with normoactive bowel sounds.  Extremities have trace bilateral edema.  There are 2+ pulses.  Neurologic exam is nonfocal.  Skin is warm and dry.        Recent Labs  Lab 06/11/18 0420 06/11/18  0040 06/10/18  2050   TROPI 3.786* 2.156* 0.091*         Recent Labs  Lab 06/11/18  0420 06/11/18  0040 06/10/18  2050   WBC 8.8  --  11.0   HGB 12.1*  --  14.0   MCV 92  --  95     --  218     --  143   POTASSIUM 3.6  --  3.4   CHLORIDE 112*  --  109   CO2 25  --  18*   BUN 19  --  18   CR 0.91  --  1.11   GFRESTIMATED 86  --  68   GFRESTBLACK >90  --  82   ANIONGAP 7  --  16*   EDITH 8.7  --  9.2   *  --  310*   TROPI 3.786* 2.156* 0.091*     Recent Labs   Lab Test  06/11/18   0420  01/20/17   0911   01/26/15   0920  12/30/13   1315   CHOL  128  109   < >  126  156   HDL  46  43   < >  45  41   LDL  59  48   < >  55  Cannot estimate LDL when triglyceride exceeds 400 mg/dL  86   TRIG  113  91   < >  128  435*   CHOLHDLRATIO   --    --    --  "  2.8  3.8    < > = values in this interval not displayed.       Recent Labs  Lab 06/11/18  0420 06/10/18  2050   WBC 8.8 11.0   HGB 12.1* 14.0   HCT 35.9* 42.4   MCV 92 95    218     No results for input(s): PH, PHV, PO2, PO2V, SAT, PCO2, PCO2V, HCO3, HCO3V in the last 168 hours.    Recent Labs  Lab 06/10/18  2050   NTBNPI 478       Recent Labs  Lab 06/11/18 0420   DD 0.5     No results for input(s): SED, CRP in the last 168 hours.    Recent Labs  Lab 06/11/18  0420 06/10/18  2050    218       Recent Labs  Lab 06/11/18  0040   TSH 1.78       Recent Labs  Lab 06/11/18  0120   COLOR Yellow   APPEARANCE Clear   URINEGLC 150*   URINEBILI Negative   URINEKETONE Negative   SG 1.010   UBLD Negative   URINEPH 5.0   PROTEIN 10*   NITRITE Negative   LEUKEST Negative   RBCU 0   WBCU 3       Imaging:  Recent Results (from the past 48 hour(s))   Chest  XR, 1 view PORTABLE    Narrative    CHEST PORTABLE ONE VIEW     6/10/2018 8:56 PM     HISTORY: Shortness of breath.     COMPARISON: None.      Impression    IMPRESSION: Portable chest obtained. This accentuates heart size but  there does appear to be cardiomegaly. The patient is rotated to the  left. Mild pulmonary vascular prominence. No focal infiltrates or  definite pleural fluid. Recommend clinical correlation for possible  mild congestive heart failure.     DENI OLEA MD       Echo:  No results found for this or any previous visit (from the past 4320 hour(s)).

## 2018-06-11 NOTE — PLAN OF CARE
Problem: Patient Care Overview  Goal: Plan of Care/Patient Progress Review  Outcome: No Change  Pt A/O x4. Denies all pain. VSS with soft BP. Angio done today with no intervention. No issues with coronary arteries. RRA site CDI. Trace edema in legs. Fine crackles in bases of lungs. Up SBA/ independent. Echo shows EF at 42%. Metoprolol 25mg bid is new.

## 2018-06-11 NOTE — PROVIDER NOTIFICATION
Brief update:    Paged re: pt with L shoulder pain.    Troponin now in the 4 range.   Cardiology aware of patient from emergency department.     EKG still LBBB, but rate improved, O2 needs decreased    Request cardiology be updated re: shoulder pain.   Anticipate that this will not necessarily result in emergent angiography, though may be reasonable to consider possible coronary evaluation prior to awaiting TTE results later in the day.  Additionally, would request patient be evaluated earlier in the rounding schedule by cardiology team this morning.    Luis Marcus MD  5:37 AM

## 2018-06-11 NOTE — ED NOTES
Bed: ST03  Expected date:   Expected time:   Means of arrival:   Comments:  445 58m flash pulm edema

## 2018-06-11 NOTE — PROVIDER NOTIFICATION
MD Notification    Notified Person: MD    Notified Person Name: Hitesh     Notification Date/Time: paged [phone]    Purpose of Notification: Pt c/o Left shoulder pain 2/10, appears to have ST elevation in multiple leads     Orders Received:    Comments:

## 2018-06-11 NOTE — ED PROVIDER NOTES
History     Chief Complaint:  Shortness of Breath     HPI   Neymar Rhodes is a 58 year old male, with a history of diabetes, hypertension, and hyperlipidemia, who presents to the emergency room for evaluation of shortness of breath.  Per EMS, the patient developed acute onset of shortness of breath and diaphoresis while mowing the lawn just prior to arrival. He had diffuse wheezing and rhonchi. He was sating at 52% on room air and was placed on CPAP on EMS arrival. His blood pressure was 152 systolic and he was provided 2 Nitro. His blood pressure subsequently dropped to 64/22 per EMS. His blood glucose was 229. He was in sinus tachycardia. The patient had a left AV 18 gauge placed.     Upon evaluation, the patient reports that he was doing fine prior to onset of shortness of breath with no recent illness or cardiac history. He denies any chest pain, abdominal pain, rash, skin changes, leg swelling, new medications, eating new foods, long flights/trips, fever, cough, vomiting, or nausea. The patient denies any history of heart failure, heart problems, PE, DVT, or recent sickness. The patient denies a family history of heart disease. Of note, the patient reports he follows-up with Dr. Harris at [facility] every 3 months.  He is a never smoker.    CARDIAC RISK FACTORS:  Sex:    M  Tobacco:   No  Hypertension:   Yes  Hyperlipidemia:  Yes  Diabetes:   Yes  Family History:  No    PE/DVT RISK FACTORS:  Sex:    M  Hormones:   No  Tobacco:   No  Cancer:   No  Travel:   No  Surgery:   No  Other immobilization: No  Personal history:  No  Family history:  No    Allergies:  No known drug allergies    Medications:    Albuterol inhaler  Aspirin 81mg  Lipitor  Fenofibrate  Glipizide  Indocin  Victoza  Lisinopril  Metformin  Actos    Past Medical History:    Type 2 DM  HLD  Obesity  Gout  HTN  Diverticulosis     Past Surgical History:    Appendectomy  Colovesicular fistula repair     Family History:    Bladder cancer  Breast  cancer    Social History:  Smoking status: Never smoker   Alcohol use: Yes   Presents to ED alone    Marital Status:   [2]     Review of Systems   Constitutional: Positive for diaphoresis. Negative for fever.   Respiratory: Positive for shortness of breath. Negative for cough.    Cardiovascular: Negative for chest pain and leg swelling.   Gastrointestinal: Negative for abdominal pain, nausea and vomiting.   Skin: Negative for rash.   All other systems reviewed and are negative.    Physical Exam     Patient Vitals for the past 24 hrs:   BP Temp Temp src Pulse Heart Rate Resp SpO2 Weight   06/10/18 2300 117/79 - - - 121 25 91 % -   06/10/18 2255 115/75 - - - 115 - 98 % -   06/10/18 2250 107/78 - - - 116 21 98 % -   06/10/18 2245 114/71 - - - 112 17 98 % -   06/10/18 2240 126/77 - - - 116 - 99 % -   06/10/18 2235 102/68 - - - 112 - 99 % -   06/10/18 2230 104/72 - - - 113 26 99 % -   06/10/18 2225 106/70 - - - 112 (!) 7 99 % -   06/10/18 2220 121/72 - - - 112 16 98 % -   06/10/18 2215 124/69 - - - 112 - 99 % -   06/10/18 2200 109/66 - - - 112 - 98 % -   06/10/18 2135 99/66 - - - 112 - 99 % -   06/10/18 2057 121/83 - - - 113 22 99 % -   06/10/18 2054 (!) 151/109 - - - 118 (!) 34 97 % -   06/10/18 2051 151/87 - - - 123 (!) 31 97 % -   06/10/18 2048 (!) 141/93 - - - 122 26 96 % -   06/10/18 2037 (!) 178/104 98.5  F (36.9  C) Oral 128 - 30 92 % 129.3 kg (285 lb)     Physical Exam  General: Alert and cooperative with exam. Patient in severe respiratory distress. Normal mentation.  Head:  Scalp is NC/AT  Eyes:  No scleral icterus, PERRL   ENT:  The external nose and ears are normal. The oropharynx is normal and without erythema; mucus membranes are moist.   Neck:  Normal range of motion without rigidity.  CV:  Tachycardic, regular rhythm  Resp:  Diffuse coarse lung sounds.  CPAP in place    Moderately labored with associated retractions or accessory muscle use  GI:  Abdomen is soft, no distension, no tenderness. No  peritoneal signs.  Obese  MS:  No lower extremity edema   Skin:  Warm and diaphoretic, No rash or lesions noted.  Neuro: Oriented x 3. No gross motor deficits.       Emergency Department Course     ECG #1 (20:39:28):  Rate 129 bpm. MN interval *. QRS duration 176. QT/QTc 400/586. P-R-T axes * -46 94. Wide QRS tachycardia. Left axis deviation. Left bundle branch block. Abnormal ECG. Interpreted at 2039 by Farooq Barajas OD.    ECG #2 (20:47:05):  Rate 125 bpm. MN interval *. QRS duration 166. QT/QTc 404/583. P-R-T axes * -44 74. Wide QRS tachycardia. Left axis deviation. Left bundle branch block. Abnormal ECG. No significant change compared to EKG dated 6/10/18. Interpreted at 2047 by Farooq Barajas OD.    ECG #3 (21:00:55):  Rate 112 bpm. MN interval *. QRS duration 176. QT/QTc 446/608. P-R-T axes * -44 92. Wide QRS rhythm with frequent premature ventricular complexes. Left axis deviation. Left bundle branch block. Abnormal ECG. No significant change compared to EKG dated 6/10/18. Interpreted at 2110 by Farooq Barajas DO.    ECG #4 (21:59:21):  Rate 110 bpm. MN interval *. QRS duration 170. QT/QTc 458/619. P-R-T axes * -21 64. Wide QRS rhythm with occasional premature ventriclar complexes. Left bundle branch block. Abnormal ECG. No significant change compared to EKG dated 6/10/18. Interpreted at 2200 by Farooq Baraajs DO.    Imaging:  Radiographic findings were communicated with the patient who voiced understanding of the findings.    Chest XR, 1 view Portable  IMPRESSION: Portable chest obtained. This accentuates heart size but  there does appear to be cardiomegaly. The patient is rotated to the  left. Mild pulmonary vascular prominence. No focal infiltrates or  definite pleural fluid. Recommend clinical correlation for possible  mild congestive heart failure.  As read by Radiology.    Laboratory:  Troponin I (2050): 0.091 (H)  Troponin POCT (2103): 0.07  Nt  probnp inpatient (2050): 478  Magnesium: 2.0    BMP: Glucose 310 (H), Carbon Dioxide 18 (L), Anion Gap 16 (H), o/w WNL (Creatinine 1.11)  CBC: WNL (WBC 11.0, HGB 14.0, )     Interventions:  2039 Aspirin 324 mg PO  2044 Nitroglycerin 0.1 mcg/kg/min Infusion IV  2051 Magnesium sulfate 2 g IV  2051 Lasix 40 mg   2057 Nitroglycerin 0.2 mcg/kg/min Infusion IV  2153 Nitroglycerin 0.1 mcg/kg/min Infusion IV  2156 Heparin Loading Dose bolus 5,000 Units IV  2156 Heparin 1150 Units/hr Infusion IV     Emergency Department Course:  Patient arrived by EMS.     Upon arriving to the ED, the patient was immediately placed in a critical care room.    2032: I performed an exam of the patient and obtained history, as documented above.    2035: The patient was placed on continuous cardiac monitoring and pulse oximetry.    2040: Patient transitioned over to BiPAP.    2043: The patient had a portable chest x-ray while in the emergency department, findings above.    2044: Patient reports he feels improved.     2047: IV inserted and blood drawn.    2059: I performed a bedside portable ultrasound.     2123: I rechecked the patient. Explained findings to patient.    2137: I spoke with Dr. Mckenna of cardiology.     2147: I rechecked the patient.     2158: I spoke with Dr. Almaguer of interventional cardiology.     2241: I discussed the patient with the admitting hospitalist, Dr. Marcus.    2318: I rechecked the patient.     Findings and plan explained to the Patient who consents to admission. Discussed the patient with Dr. Marcus, who will admit the patient to a Inpt-CICU bed for further monitoring, evaluation, and treatment.     Impression & Plan      Medical Decision Making:  Patient is a 58-year-old male who presents by EMS with sudden onset diaphoresis and shortness of breath just prior to ED arrival; denies chest pain.  Patient's medical history and records were reviewed.  Initial consideration for, but not limited to, arrhythmia, ACS/MI,  flash pulmonary edema, infectious process, PE, pericardial effusion, cardiomyopathy, electrolyte abnormality, among others.  Labs, EKG, and imaging was obtained.  Patient was seen in the stabilization room and was transitioned to BiPAP immediately after arrival with noted improvement in symptoms.  He was very tachypneic, tachycardic, and diaphoretic on initial evaluation.  Informal bedside ultrasound demonstrates no evidence of pericardial effusion.  Lung sounds were extremely coarse and concerning for pulmonary edema.  Patient was noted to be hypertensive to the 180's systolic and started on a nitroglycerin infusion.  Additionally is provided 40 mg of Lasix and 324 mg Aspirin. Portable chest x-ray demonstrated cardiomegaly with vascular congestion.  Initial EKG demonstrates wide complex tachycardia with left bundle branch block which is new, as compared to EKG from 2013; additionally QTC was noted to be prolonged, provided 2 g IV magnesium.  Subsequent EKGs demonstrated improvement in heart rate without other significant changes.  I-STAT troponin was noted to be elevated at 0.07.  Patient continued to deny chest pain.  Case discussed with Dr. Mckenna of cardiology who recommended discussion with the cardiac intensivist given the sudden onset nature of symptoms.  Case was discussed with Dr. Almaguer of interventional cardiology who recommended continued supportive care, echocardiogram, and agreed with initiation of heparin infusion.  Patient demonstrated significant clinical improvement throughout ED course.   Patient will be admitted to CICU secondary to concern for new onset/decompensated cardiomyopathy; underlying ACS not excluded.  Patient admitted to the hospitalist service for further evaluation and care.  Prior to admission patient was able to be transitioned off of BiPAP and was noted to be breathing comfortably.    Critical Care time:  was 45 minutes for this patient excluding procedures.    Diagnosis:     ICD-10-CM    1. Acute respiratory failure with hypoxia (H) J96.01    2. Shortness of breath R06.02    3. Elevated troponin R74.8        Disposition:  Admitted to [facility]    Tutu Rubio  6/10/2018    EMERGENCY DEPARTMENT    Scribe Disclosure:  I, Tutu Rubio, am serving as a scribe at 8:32 PM on 6/10/2018 to document services personally performed by Farooq Barajas DO based on my observations and the provider's statements to me.        Farooq Barajas DO  06/11/18 0013

## 2018-06-11 NOTE — PROGRESS NOTES
Bilateral groin sites auscultated. No bruit noted. Pedal pulses normal strength, left weaker than right.

## 2018-06-11 NOTE — ED NOTES
Patient requested to be off bipap ,Dr De Paz notified and bipap was d/c for now.Patient voided 500 cc clear yellow urine.

## 2018-06-11 NOTE — PHARMACY-ADMISSION MEDICATION HISTORY
Admission medication history interview status for the 6/10/2018  admission is complete. See EPIC admission navigator for prior to admission medications     Medication history source reliability:Good    Actions taken by pharmacist (provider contacted, etc):interviewed pt     Additional medication history information not noted on PTA med list :None    Medication reconciliation/reorder completed by provider prior to medication history? Yes    Time spent in this activity: 10 minutes    Prior to Admission medications    Medication Sig Last Dose Taking? Auth Provider   albuterol (PROAIR HFA/PROVENTIL HFA/VENTOLIN HFA) 108 (90 Base) MCG/ACT Inhaler Inhale 2 puffs into the lungs every 6 hours as needed for shortness of breath / dyspnea or wheezing PRN Yes Unknown, Entered By History   ASPIRIN 81 MG OR TABS 1 tab po QD (Once per day)  Yes Jefry Harris MD   atorvastatin (LIPITOR) 80 MG tablet take 1/2 tablet by mouth at bedtime  Yes Jefry Harris MD   fenofibrate 160 MG tablet Take 1 tablet (160 mg) by mouth daily  Yes Jefry Harris MD   glipiZIDE (GLIPIZIDE XL) 10 MG 24 hr tablet Take 1 tablet (10mg) by mouth daily  Yes Jefry Harris MD   guaiFENesin-codeine (ROBITUSSIN AC) 100-10 MG/5ML SOLN solution Take 5-10 mLs by mouth nightly as needed for cough PRN Yes Luis Partida, PAPatriciaC   indomethacin (INDOCIN) 25 MG capsule Take 1 capsule by mouth 2 times daily as needed. Take with meals  Yes Jefry Harris MD   liraglutide (VICTOZA PEN) 18 MG/3ML soln Inject 1.8 mg under skin once daily  Yes Jefry Harris MD   lisinopril (PRINIVIL/ZESTRIL) 20 MG tablet Take 1 tablet (20 mg) by mouth daily  Yes Jefry Harris MD   metFORMIN (GLUCOPHAGE) 1000 MG tablet TAKE ONE TABLET BY MOUTH TWICE A DAY WITH MEALS  Yes Jefry Harris MD   pioglitazone (ACTOS) 45 MG tablet TAKE ONE TABLET BY MOUTH ONE TIME DAILY  Yes Jefry Harris MD   triamcinolone  (KENALOG) 0.5 % cream Apply sparingly once or twice per day as needed to affected area until the skin is better, then stop  Yes Jefry Harris MD   blood glucose monitoring (ACCU-CHEK LUL PLUS) test strip Use to test blood sugar 1-3 times daily or as directed.   Jefry Harris MD   ketoconazole (NIZORAL) 2 % cream Apply topically 2 times daily Apply sparingly once or twice per day as needed to affected area until the skin is better, then stop  Patient taking differently: Apply topically 2 times daily as needed Apply sparingly once or twice per day as needed to affected area until the skin is better, then stop PRN  Jefry Harris MD

## 2018-06-11 NOTE — PLAN OF CARE
"Problem: Patient Care Overview  Goal: Plan of Care/Patient Progress Review  CR/PT: Pt continued with work up  \"cardiac evaluation including TTE and anticipated angiogram.\" Hold CR evaluation until plan in place by cardiology and further work up completed.          "

## 2018-06-11 NOTE — PLAN OF CARE
Problem: Patient Care Overview  Goal: Plan of Care/Patient Progress Review  Outcome: No Change  Arrived in CCU from ED around 0020 with Heparin and Nitro gtt infusing. HR low 100's improved to 70's after initiation of p.o. Metoprolol, and 1x dose IV metoprolol. BP's soft at times. Denies CP/SOB but very JOSHI. O2 sats mid/upper 90's on 3L NC. A&O up with SBA. Pt c/o left shoulder pain, EKG done, Trops continuing to trend up (peak 3.78). Plan for: Cards consult, Echo, and probable Angio.

## 2018-06-11 NOTE — PROGRESS NOTES
[facility]    Hospitalist Progress Note    Assessment & Plan   Neymar Rhodes is a 58 year old male with a history of HTN who presented with acute diaphoresis and shortness of breath found to have acute hypoxic respiratory failure with new left bundle branch block, significant hypertension concerning for flash pulmonary edema     Acute hypoxic respiratory failure 2/2 suspected flash pulmonary edema.  Improving  Hypertensive emergency.  Resolved   NSTEMI, suspect type I   Presented with oxygen saturations in the 50s with systolic blood pressure in the 180s requiring initial BiPAP support for respiratory failure.  Appears to be consistent with flash pulmonary edema in the setting of significant hypertension though there were still concerns of an acute cardiac event.  Patient's initial tropoinin was 0.091 and benjamin to 3.786.  EKG is also concerning as he has a new left BBB.  Has been chest pain free while here.   On admission was started on nitro drip and was given Lasix 40 mg IV.   - Heparin drip   - Nitro drip   - Attempt low-dose beta-blockade as blood pressure allows in an attempt to reduce cardiac metabolism.  If it appears that patient is having worsening related to decreased rate, consider need for additional cardiac output and would hold additional beta-blockade  - Daily weights and strict I/Os  - Echo pending   - Cardiology following and appreciate their recommendations.  Will likely need angiogram    HLP   HTN   - PTA Lipitor 40 milligrams daily, Fenofibrate 160 daily  - PTA Lisinopril 20 mg   - Nitro drip as above   - Continue daily aspirin, received a 300 mg suppository in the emergency department  - Lopressor 25 mg BID      Type 2 diabetes, fairly well controlled  Hemoglobin A1c of 7.6.  PTA Victoza 1.8, metformin 1 g twice daily, Actos 45 mg, glipizide 10 milligrams  - Holding PTA meds  - SSI      Morbid Obesity  BMI of 39  - Weight loss as able.  This was discussed with patient in the  emergency department.  Patient states that he has been able to lose weight with dietary changes , and is no longer morbidly obese  - Will likely need outpatient sleep study      # Pain Assessment:  Current Pain Score 6/11/2018   Patient currently in pain? denies   Pain score (0-10) 0   Pain location -   Pain descriptors -   Neymar orozco pain level was assessed and he currently denies pain.        DVT Prophylaxis: Heparin drip   Code Status: Full Code    Disposition: Expected discharge TBD.  Still undergoing cardiac work-up.    Desmond Lynn DO  Text Page (7am to 6pm)    Interval History   Patient seen and examined.  Breathing has improved.  Continues to remain chest pain free.  No significant swelling.  No fevers or chills.     -Data reviewed today: I reviewed all new labs and imaging results over the last 24 hours. I personally reviewed no images or EKG's today.    Physical Exam   Temp: 99.8  F (37.7  C) Temp src: Oral BP: 107/70 Pulse: 128 Heart Rate: 70 Resp: 18 SpO2: 98 % O2 Device: Nasal cannula Oxygen Delivery: 2.5 LPM  Vitals:    06/10/18 2037 06/11/18 0019   Weight: 129.3 kg (285 lb) 127.8 kg (281 lb 12 oz)     Vital Signs with Ranges  Temp:  [98.5  F (36.9  C)-99.8  F (37.7  C)] 99.8  F (37.7  C)  Pulse:  [128] 128  Heart Rate:  [] 70  Resp:  [7-34] 18  BP: ()/() 107/70  SpO2:  [91 %-99 %] 98 %  I/O last 3 completed shifts:  In: -   Out: 1225 [Urine:1225]    Constitutional: Awake, alert, cooperative, no apparent distress  Respiratory: Faint crackles.  No wheezing.  No respiratory distress  Cardiovascular: Regular rate and rhythm, normal S1 and S2, and no murmur noted  GI: Normal bowel sounds, soft, non-distended, non-tender  Skin/Integumen: No rashes, no cyanosis  MSK: Trace lower extremity edema     Medications     - MEDICATION INSTRUCTIONS -       - MEDICATION INSTRUCTIONS -       HEParin 1,150 Units/hr (06/11/18 0846)     - MEDICATION INSTRUCTIONS -       nitroGLYcerin 0.07 mcg/kg/min  (06/11/18 0844)     - MEDICATION INSTRUCTIONS -       - MEDICATION INSTRUCTIONS -         aspirin  81 mg Oral Daily     aspirin  300 mg Rectal Once     atorvastatin  40 mg Oral Daily     fenofibrate  160 mg Oral Daily     insulin aspart  1-6 Units Subcutaneous Q4H     lisinopril  20 mg Oral Daily     metoprolol tartrate  25 mg Oral BID     sodium chloride (PF)  10 mL Intravenous Once     sodium chloride (PF)  3 mL Intracatheter Q8H       Data     Recent Labs  Lab 06/11/18  0420 06/11/18  0040 06/10/18  2103 06/10/18  2050   WBC 8.8  --   --  11.0   HGB 12.1*  --   --  14.0   MCV 92  --   --  95     --   --  218     --   --  143   POTASSIUM 3.6  --   --  3.4   CHLORIDE 112*  --   --  109   CO2 25  --   --  18*   BUN 19  --   --  18   CR 0.91  --   --  1.11   ANIONGAP 7  --   --  16*   EDITH 8.7  --   --  9.2   *  --   --  310*   TROPI 3.786* 2.156*  --  0.091*   TROPONIN  --   --  0.07  --        Imaging:   Recent Results (from the past 24 hour(s))   Chest  XR, 1 view PORTABLE    Narrative    CHEST PORTABLE ONE VIEW     6/10/2018 8:56 PM     HISTORY: Shortness of breath.     COMPARISON: None.      Impression    IMPRESSION: Portable chest obtained. This accentuates heart size but  there does appear to be cardiomegaly. The patient is rotated to the  left. Mild pulmonary vascular prominence. No focal infiltrates or  definite pleural fluid. Recommend clinical correlation for possible  mild congestive heart failure.     DENI OLEA MD

## 2018-06-12 ENCOUNTER — APPOINTMENT (OUTPATIENT)
Dept: OCCUPATIONAL THERAPY | Facility: CLINIC | Age: 58
DRG: 280 | End: 2018-06-12
Attending: INTERNAL MEDICINE
Payer: COMMERCIAL

## 2018-06-12 ENCOUNTER — APPOINTMENT (OUTPATIENT)
Dept: OCCUPATIONAL THERAPY | Facility: CLINIC | Age: 58
DRG: 280 | End: 2018-06-12
Payer: COMMERCIAL

## 2018-06-12 LAB
ANION GAP SERPL CALCULATED.3IONS-SCNC: 8 MMOL/L (ref 3–14)
BUN SERPL-MCNC: 19 MG/DL (ref 7–30)
CALCIUM SERPL-MCNC: 8.3 MG/DL (ref 8.5–10.1)
CHLORIDE SERPL-SCNC: 110 MMOL/L (ref 94–109)
CO2 SERPL-SCNC: 24 MMOL/L (ref 20–32)
CREAT SERPL-MCNC: 0.87 MG/DL (ref 0.66–1.25)
GFR SERPL CREATININE-BSD FRML MDRD: 90 ML/MIN/1.7M2
GLUCOSE BLDC GLUCOMTR-MCNC: 123 MG/DL (ref 70–99)
GLUCOSE BLDC GLUCOMTR-MCNC: 142 MG/DL (ref 70–99)
GLUCOSE BLDC GLUCOMTR-MCNC: 165 MG/DL (ref 70–99)
GLUCOSE BLDC GLUCOMTR-MCNC: 168 MG/DL (ref 70–99)
GLUCOSE SERPL-MCNC: 140 MG/DL (ref 70–99)
POTASSIUM SERPL-SCNC: 3.8 MMOL/L (ref 3.4–5.3)
SODIUM SERPL-SCNC: 142 MMOL/L (ref 133–144)

## 2018-06-12 PROCEDURE — 25000132 ZZH RX MED GY IP 250 OP 250 PS 637: Performed by: INTERNAL MEDICINE

## 2018-06-12 PROCEDURE — 99231 SBSQ HOSP IP/OBS SF/LOW 25: CPT | Performed by: INTERNAL MEDICINE

## 2018-06-12 PROCEDURE — 21000000 ZZH R&B IMCU HEART CARE

## 2018-06-12 PROCEDURE — 80048 BASIC METABOLIC PNL TOTAL CA: CPT | Performed by: INTERNAL MEDICINE

## 2018-06-12 PROCEDURE — 99207 ZZC CDG-MDM COMPONENT: MEETS LOW - DOWN CODED: CPT | Performed by: INTERNAL MEDICINE

## 2018-06-12 PROCEDURE — 99233 SBSQ HOSP IP/OBS HIGH 50: CPT | Performed by: INTERNAL MEDICINE

## 2018-06-12 PROCEDURE — 36415 COLL VENOUS BLD VENIPUNCTURE: CPT | Performed by: INTERNAL MEDICINE

## 2018-06-12 PROCEDURE — 40000133 ZZH STATISTIC OT WARD VISIT

## 2018-06-12 PROCEDURE — 97165 OT EVAL LOW COMPLEX 30 MIN: CPT | Mod: GO

## 2018-06-12 PROCEDURE — 97110 THERAPEUTIC EXERCISES: CPT | Mod: GO

## 2018-06-12 PROCEDURE — 00000146 ZZHCL STATISTIC GLUCOSE BY METER IP

## 2018-06-12 PROCEDURE — 97535 SELF CARE MNGMENT TRAINING: CPT | Mod: GO

## 2018-06-12 RX ORDER — METOPROLOL SUCCINATE 25 MG/1
25 TABLET, EXTENDED RELEASE ORAL EVERY EVENING
Status: DISCONTINUED | OUTPATIENT
Start: 2018-06-13 | End: 2018-06-13 | Stop reason: HOSPADM

## 2018-06-12 RX ORDER — SPIRONOLACTONE 25 MG/1
25 TABLET ORAL DAILY
Status: DISCONTINUED | OUTPATIENT
Start: 2018-06-12 | End: 2018-06-13 | Stop reason: HOSPADM

## 2018-06-12 RX ORDER — NICOTINE POLACRILEX 4 MG
15-30 LOZENGE BUCCAL
Status: DISCONTINUED | OUTPATIENT
Start: 2018-06-12 | End: 2018-06-13 | Stop reason: HOSPADM

## 2018-06-12 RX ORDER — LISINOPRIL 10 MG/1
10 TABLET ORAL 2 TIMES DAILY
Status: DISCONTINUED | OUTPATIENT
Start: 2018-06-12 | End: 2018-06-13 | Stop reason: HOSPADM

## 2018-06-12 RX ORDER — DEXTROSE MONOHYDRATE 25 G/50ML
25-50 INJECTION, SOLUTION INTRAVENOUS
Status: DISCONTINUED | OUTPATIENT
Start: 2018-06-12 | End: 2018-06-13 | Stop reason: HOSPADM

## 2018-06-12 RX ADMIN — ATORVASTATIN CALCIUM 40 MG: 40 TABLET, FILM COATED ORAL at 08:03

## 2018-06-12 RX ADMIN — METOPROLOL TARTRATE 25 MG: 25 TABLET ORAL at 08:03

## 2018-06-12 RX ADMIN — INSULIN ASPART 1 UNITS: 100 INJECTION, SOLUTION INTRAVENOUS; SUBCUTANEOUS at 05:22

## 2018-06-12 RX ADMIN — ASPIRIN 81 MG: 81 TABLET, COATED ORAL at 08:03

## 2018-06-12 RX ADMIN — LISINOPRIL 10 MG: 10 TABLET ORAL at 08:03

## 2018-06-12 RX ADMIN — LISINOPRIL 10 MG: 10 TABLET ORAL at 23:31

## 2018-06-12 RX ADMIN — SPIRONOLACTONE 25 MG: 25 TABLET ORAL at 08:03

## 2018-06-12 NOTE — PROGRESS NOTES
"   06/12/18 1100   Quick Adds   Type of Visit Initial Occupational Therapy Evaluation   Living Environment   Lives With spouse   Living Arrangements house   Home Accessibility stairs to enter home;stairs within home   Number of Stairs to Enter Home 8   Number of Stairs Within Home 20   Stair Railings at Home outside, present at both sides  (inside none on long set of stairs)   Transportation Available car;family or friend will provide   Self-Care   Usual Activity Tolerance good   Current Activity Tolerance moderate   Regular Exercise no   Equipment Currently Used at Home none   Functional Level Prior   Ambulation 0-->independent   Transferring 0-->independent   Toileting 0-->independent   Bathing 0-->independent   Dressing 0-->independent   Eating 0-->independent   Communication 0-->understands/communicates without difficulty   Swallowing 0-->swallows foods/liquids without difficulty   Cognition 0 - no cognition issues reported   Fall history within last six months no   Prior Functional Level Comment , 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 Indep all IADL, was mowing his lawn when had onset Sx.    General Information   Onset of Illness/Injury or Date of Surgery - Date 06/11/18   Referring Physician Dr. Luis Marcus   Patient/Family Goals Statement return home   Additional Occupational Profile Info/Pertinent History of Current Problem 59yo male admitted with onset SOB,diaphoresis while mowing his lawn, dx'd NSTEMI and underwent cardiac angio however no arterial ds found, EF 42% and dx'd with non-ischemic cardiomyopathy to be medically managed. PMH includes DM II, HLP, HTN, obesity.    Precautions/Limitations no known precautions/limitations   Cognitive Status Examination   Orientation orientation to person, place and time   Level of Consciousness alert   Able to Follow Commands WNL/WFL   Range of Motion (ROM)   ROM Quick Adds No deficits were identified " "  Strength   Manual Muscle Testing Quick Adds No deficits were identified   Mobility   Bed Mobility Comments Independent   Transfer Skills   Transfer Comments Independent   Activities of Daily Living Analysis   Impairments Contributing to Impaired Activities of Daily Living strength decreased  (endurance)   ADL Comments pt able to perform basic self-cares indep in room   General Therapy Interventions   Planned Therapy Interventions ADL retraining;home program guidelines;progressive activity/exercise;risk factor education   Clinical Impression   Criteria for Skilled Therapeutic Interventions Met yes, treatment indicated   OT Diagnosis decreased IADL performance   Influenced by the following impairments cardiac endurance   Assessment of Occupational Performance 1-3 Performance Deficits   Identified Performance Deficits work, social, exercise, household mgmt   Clinical Decision Making (Complexity) Low complexity   Therapy Frequency 2 times/day   Predicted Duration of Therapy Intervention (days/wks) 2 days   Anticipated Discharge Disposition Other (see comments)  (CORE clinic)   Risks and Benefits of Treatment have been explained. Yes   Patient, Family & other staff in agreement with plan of care Yes   [facility] TM \"6 Clicks\"   2016, Trustees of [facility], under license to [employer].  All rights reserved.   6 Clicks Short Forms Daily Activity Inpatient Short Form   [facility]  \"6 Clicks\" Daily Activity Inpatient Short Form   1. Putting on and taking off regular lower body clothing? 4 - None   2. Bathing (including washing, rinsing, drying)? 4 - None   3. Toileting, which includes using toilet, bedpan or urinal? 4 - None   4. Putting on and taking off regular upper body clothing? 4 - None   5. Taking care of personal grooming such as brushing teeth? 4 - None   6. Eating meals? 4 - None   Daily Activity Raw Score (Score out of 24.Lower scores equate to lower levels of function) 24 "   Total Evaluation Time   Total Evaluation Time (Minutes) 15

## 2018-06-12 NOTE — PLAN OF CARE
Problem: Patient Care Overview  Goal: Plan of Care/Patient Progress Review  Discharge Planner OT   Patient plan for discharge: home w/spouse and return to work on light duty (pt requesting note from MD)  Current status: Pt tolerated amb SBA approx 200ft, then 12min on treadmill at 1.5mph. During treadmill activity per monitor noted BBB w/occasional PVC, pt denied symptoms. /64, HR 76-87 throughout.   Barriers to return to prior living situation: none noted  Recommendations for discharge: home w/spouse and follow through with home walking program.  Would also benefit from participation in CORE clinic.   Rationale for recommendations: Pt has new dx of cardiomyopathy.        Entered by: Jose F Stone 06/12/2018 3:44 PM

## 2018-06-12 NOTE — PROGRESS NOTES
[facility]  Hospitalist Progress Note  Name: Neymar Rhodes    MRN: [id number]  Physician:  Oscar Paul DO [facility] (Text Page)    Assessment & Plan   Summary of Stay:  Neymar Rhodes is a 58 year old male who presents with acute diaphoresis and shortness of breath found to have acute hypoxic respiratory failure with new left bundle branch block, significant hypertension concerning for flash pulmonary edema.  After further evaluation he was found to have a non-ischemic cardiomyopathy.     Acute hypoxic respiratory failure with systolic CHF exacerbation:  -  Now improved, off oxygen.  Not on diuretics at this point.    Non-ischemic cardiomyopathy:  -  Cardiac cath this admission did not show any severe CAD.  Cardiology assistance appreciated.  On ASA, statin, ACEI, Beta Blocker, and spironolactone.  -  Cardiac rehab, had some hypotension this AM but did better this afternoon.  -  Metoprolol moved to the evening.  Given recent changes to meds we will watch him one more night in the hospital.    DM:  -  Has been off oral meds, resuming on metformin at discharge.  Continue SSI.  Holding glipizide and actos for now.      Obesity:  -  Weight loss encouraged this admission.      DVT Prophylaxis: Pneumatic Compression Devices  Code Status: Full Code    Disposition Plan   Expected discharge tomorrow if tolerates new med changes and continues to feel well.     Entered: Oscar Paul 06/12/2018, 5:12 PM     # Pain Assessment:  Current Pain Score 6/11/2018   Patient currently in pain? denies   Pain score (0-10) -   Pain location -   Pain descriptors -   Neymar orozco pain level was assessed and he currently denies pain.        Interval History   Assumed care, history reviewed.  No chest pain, sob, nausea.  He did not feel particularly dizzy this AM despite BP decline with rehab.    -Data reviewed today: I reviewed all new labs and imaging reports over the last 24 hours. I personally reviewed no images or  EKG's today.    Physical Exam   Temp: 98  F (36.7  C) Temp src: Oral BP: 147/64   Heart Rate: 87 Resp: 17 SpO2: 95 % O2 Device: None (Room air)    Vitals:    06/10/18 2037 06/11/18 0019 06/12/18 0500   Weight: 129.3 kg (285 lb) 127.8 kg (281 lb 12 oz) 126.1 kg (278 lb 1.6 oz)     Vital Signs with Ranges  Temp:  [98  F (36.7  C)-99  F (37.2  C)] 98  F (36.7  C)  Heart Rate:  [55-87] 87  Resp:  [7-27] 17  BP: ()/(41-83) 147/64  SpO2:  [86 %-99 %] 95 %  I/O last 3 completed shifts:  In: 857.63 [P.O.:200; I.V.:657.63]  Out: 1100 [Urine:1100]    GEN:  Alert, oriented x 3, appears comfortable, NAD.  HEENT:  Normocephalic/atraumatic, no scleral icterus, no nasal discharge, mouth moist.  CV:  Regular rate and rhythm, no loud murmur or rub.  LUNGS:  Clear to auscultation bilaterally without rales/rhonchi/wheezing/retractions.  Symmetric chest rise on inhalation noted.  ABD:  Active bowel sounds, soft, non-tender/non-distended.  No rebound/guarding/rigidity.  EXT:  Trace edema.  No cyanosis.  No acute joint synovitis noted.  SKIN:  Dry to touch, no exanthems noted in the visualized areas.    Medications     - MEDICATION INSTRUCTIONS -       - MEDICATION INSTRUCTIONS -       - MEDICATION INSTRUCTIONS -       - MEDICATION INSTRUCTIONS -       sodium chloride 75 mL/hr at 06/11/18 1756       aspirin  81 mg Oral Daily     atorvastatin  40 mg Oral Daily     insulin aspart  1-6 Units Subcutaneous Q4H     lisinopril  10 mg Oral BID     [START ON 6/13/2018] metoprolol succinate  25 mg Oral QPM     sodium chloride (PF)  3 mL Intracatheter Q8H     spironolactone  25 mg Oral Daily     Data       Recent Labs  Lab 06/11/18  0420 06/10/18  2050   WBC 8.8 11.0   HGB 12.1* 14.0   HCT 35.9* 42.4   MCV 92 95    218       Recent Labs  Lab 06/12/18  0535 06/11/18  0420 06/10/18  2050    144 143   POTASSIUM 3.8 3.6 3.4   CHLORIDE 110* 112* 109   CO2 24 25 18*   ANIONGAP 8 7 16*   * 140* 310*   BUN 19 19 18   CR 0.87 0.91  1.11   GFRESTIMATED 90 86 68   GFRESTBLACK >90 >90 82   EDITH 8.3* 8.7 9.2   MAG  --  2.1 2.0       Recent Labs  Lab 06/12/18  0535 06/12/18  0517 06/12/18  0052 06/11/18  2203 06/11/18  1946 06/11/18  1552  06/11/18  0420  06/10/18  2050   *  --   --   --   --   --   --  140*  --  310*   BGM  --  142* 123* 115* 142* 106*  < >  --   < >  --    < > = values in this interval not displayed.    No results found for this or any previous visit (from the past 24 hour(s)).

## 2018-06-12 NOTE — PLAN OF CARE
Problem: Cardiac: ACS (Acute Coronary Syndrome) (Adult)  Goal: Signs and Symptoms of Listed Potential Problems Will be Absent, Minimized or Managed (Cardiac: ACS)  Signs and symptoms of listed potential problems will be absent, minimized or managed by discharge/transition of care (reference Cardiac: ACS (Acute Coronary Syndrome) (Adult) CPG).   Outcome: Improving   No event overnight. Denies chest pain, tolerating care, BG Q4 hr, left wrist angio site CDI, -hematoma,+CMS. Nsg will cont to monitor.

## 2018-06-12 NOTE — PLAN OF CARE
Problem: Patient Care Overview  Goal: Plan of Care/Patient Progress Review  OT/cardiac rehab eval completed and treatment initiated in am.   Discharge Planner PT   Patient plan for discharge: home w/spouse, resumption of work possibly on light duty if cardiologist recommends  Current status: 59yo male admitted yesterday with onset SOB and diaphoresis when mowing his lawn, dx'd NSTEMI and underwent cardiac angio. No arterial ds found. New dx non-ischemic cardiomyopathy. 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 Today he tolerated 7min amb SBA, completed 20 steps, denied symptoms. BP did drop from 114/61 to 88/72 with this activity, recovered to 100/65 with 2min seated rest. HR 59-60. Education initiated in HEP, CAD S/S and risk factor mgmt.   Barriers to return to prior living situation: none noted  Recommendations for discharge: home - may benefit from CORE clinic   Rationale for recommendations: New dx cardiomyopathy       Entered by: Jose F Stone 06/12/2018 1:14 PM

## 2018-06-12 NOTE — PROGRESS NOTES
[facility]  Cardiology Progress Note    Date of Service (when I saw the patient): 06/12/2018  Primary Cardiologist: Establish care with CORE clinic  Rounding Cardiologist: Dr. Iniguez       Assessment & Plan   Neymaramry Rhodes is a 58 year old male who was admitted on 6/10/2018 with sudden onset of SOB.     NSTEMI   --Troponin     -- Admission ECG NSR with new onset of LBBB  -- Underwent coronary angiogram 6/11 which showed mild CAD without any obstructive lesions. LV gram showed EF 35-40%    Nonischemic CM   -- EF reduced around 40% with borderline concentric LVH with moderate global hypokinesis  -- No obstructive CAD to explain for reduction in EF  -- Continue with BB, Acei, and Spironolactone (BB and spironolactone new on this admission)  -- TSH and BNP normal  -- CORE consult  -- Plan for outpatient Cardiac MRI  -- Switch BB to metoprolol succinate 25 mg every evening  -- Repeat rehab this afternoon  -- Avoid heavy lifting at work until he is seen in cardiology clinic    Respiratory Distress  -- in the setting of flash pulmonary edema  -- IV Lasix 40 mg x1 (-8 Ibs since admission)  -- No significant CAD to explain for this   -- D-dimer negative  -- Continue with spironolactone    Mild to Moderate MR    HTN   -- controlled with current regimen    HLD   -- Last LDL 59   -- Continue with pta Atorvastatin  -- Fenofibrate discontinued 6/11    DMT2    Obesity      Interval History   Denies SOB, palpitations, or chest discomfort. Per nurse report, BP dropped significantly with activity. Patient had some nausea and diaphoresis. No other concerns or questions.    Physical Exam   Temp: 98.7  F (37.1  C) Temp src: Oral BP: 115/70   Heart Rate: 62 Resp: 17 SpO2: 95 % O2 Device: None (Room air)    Vitals:    06/10/18 2037 06/11/18 0019 06/12/18 0500   Weight: 129.3 kg (285 lb) 127.8 kg (281 lb 12 oz) 126.1 kg (278 lb 1.6 oz)     GEN:  NAD  HEENT: Mucous membranes moist.  NECK:  No JVD.  C/V:  Regular rate  and rhythm, no murmur, rub or gallop.   RESP: CTA, bilaterally.  GI: Abdomen soft, nontender, nondistended.    EXTREM: No LE edema.   NEURO: Alert and oriented, cooperative.   PSYCH: Normal affect.  SKIN: Warm and dry. No ecchymosis or hematoma at radial site.  VASC: 2+ peripheral pulses bilaterally.      Medications     - MEDICATION INSTRUCTIONS -       - MEDICATION INSTRUCTIONS -       - MEDICATION INSTRUCTIONS -       - MEDICATION INSTRUCTIONS -       sodium chloride 75 mL/hr at 06/11/18 1751       aspirin  81 mg Oral Daily     atorvastatin  40 mg Oral Daily     insulin aspart  1-6 Units Subcutaneous Q4H     lisinopril  10 mg Oral BID     metoprolol tartrate  25 mg Oral BID     sodium chloride (PF)  3 mL Intracatheter Q8H     spironolactone  25 mg Oral Daily       Data    Labs, imaging studies, ECG, and vitals reviewed by me.     Tele: Sinus bradycardia with LBBB      Mike Leger PA-C   6/12/2018  Pager: [phone]

## 2018-06-12 NOTE — CONSULTS
CORE Clinic Referral received from Dr. Iniguez. Patient is not currently established in the CORE Clinic. Patient has HFrEF. CORE education to be given by hospital nurse. Recommend nutrition consult. Hospital follow up appointments arranged.    CORE Clinic appointment made for:   6/20/18 at 1:10pm for CORE Enrollment appointment with Moses Perez NP and non-fasting lab appointment at  12:20pm   Location: [address], [place] [address]      Ana María Roque RN  CORE Clinic nurse  [phone]      CORE Clinic: Cardiomyopathy, Optimization, Rehabilitation, Education   The CORE Clinic is a heart failure specialty clinic within the [facility] where you will work with your cardiologist, nurse practitioners, physician assistants and registered nurses who specialize in heart failure care. They are dedicated to helping patients with heart failure to carefully adjust medications, receive education, and learn who and when to call if symptoms develop. They specialize in helping you better understand your condition, slow the progression of your disease, improve the length and quality of your life, help you detect future heart problems before they become life threatening, and avoid hospitalizations.

## 2018-06-13 ENCOUNTER — APPOINTMENT (OUTPATIENT)
Dept: OCCUPATIONAL THERAPY | Facility: CLINIC | Age: 58
DRG: 280 | End: 2018-06-13
Payer: COMMERCIAL

## 2018-06-13 VITALS
DIASTOLIC BLOOD PRESSURE: 68 MMHG | SYSTOLIC BLOOD PRESSURE: 123 MMHG | RESPIRATION RATE: 18 BRPM | BODY MASS INDEX: 38.6 KG/M2 | HEIGHT: 71 IN | WEIGHT: 275.7 LBS | OXYGEN SATURATION: 98 % | TEMPERATURE: 98 F | HEART RATE: 128 BPM

## 2018-06-13 LAB
ANION GAP SERPL CALCULATED.3IONS-SCNC: 10 MMOL/L (ref 3–14)
BUN SERPL-MCNC: 18 MG/DL (ref 7–30)
CALCIUM SERPL-MCNC: 8.9 MG/DL (ref 8.5–10.1)
CHLORIDE SERPL-SCNC: 105 MMOL/L (ref 94–109)
CO2 SERPL-SCNC: 23 MMOL/L (ref 20–32)
CREAT SERPL-MCNC: 0.67 MG/DL (ref 0.66–1.25)
GFR SERPL CREATININE-BSD FRML MDRD: >90 ML/MIN/1.7M2
GLUCOSE BLDC GLUCOMTR-MCNC: 147 MG/DL (ref 70–99)
GLUCOSE BLDC GLUCOMTR-MCNC: 164 MG/DL (ref 70–99)
GLUCOSE BLDC GLUCOMTR-MCNC: 172 MG/DL (ref 70–99)
GLUCOSE SERPL-MCNC: 219 MG/DL (ref 70–99)
MAGNESIUM SERPL-MCNC: 1.7 MG/DL (ref 1.6–2.3)
POTASSIUM SERPL-SCNC: 3.9 MMOL/L (ref 3.4–5.3)
SODIUM SERPL-SCNC: 138 MMOL/L (ref 133–144)

## 2018-06-13 PROCEDURE — 99239 HOSP IP/OBS DSCHRG MGMT >30: CPT | Performed by: INTERNAL MEDICINE

## 2018-06-13 PROCEDURE — 83735 ASSAY OF MAGNESIUM: CPT | Performed by: INTERNAL MEDICINE

## 2018-06-13 PROCEDURE — 25000132 ZZH RX MED GY IP 250 OP 250 PS 637: Performed by: INTERNAL MEDICINE

## 2018-06-13 PROCEDURE — 00000146 ZZHCL STATISTIC GLUCOSE BY METER IP

## 2018-06-13 PROCEDURE — 80048 BASIC METABOLIC PNL TOTAL CA: CPT | Performed by: INTERNAL MEDICINE

## 2018-06-13 PROCEDURE — 97535 SELF CARE MNGMENT TRAINING: CPT | Mod: GO

## 2018-06-13 PROCEDURE — 40000133 ZZH STATISTIC OT WARD VISIT

## 2018-06-13 PROCEDURE — 84132 ASSAY OF SERUM POTASSIUM: CPT | Performed by: INTERNAL MEDICINE

## 2018-06-13 PROCEDURE — 99232 SBSQ HOSP IP/OBS MODERATE 35: CPT | Performed by: INTERNAL MEDICINE

## 2018-06-13 PROCEDURE — 36415 COLL VENOUS BLD VENIPUNCTURE: CPT | Performed by: INTERNAL MEDICINE

## 2018-06-13 RX ORDER — SPIRONOLACTONE 25 MG/1
25 TABLET ORAL DAILY
Qty: 30 TABLET | Refills: 1 | Status: SHIPPED | OUTPATIENT
Start: 2018-06-14 | End: 2018-08-06

## 2018-06-13 RX ORDER — LISINOPRIL 10 MG/1
10 TABLET ORAL 2 TIMES DAILY
Qty: 60 TABLET | Refills: 1 | Status: SHIPPED | OUTPATIENT
Start: 2018-06-13 | End: 2018-07-09

## 2018-06-13 RX ORDER — METOPROLOL SUCCINATE 25 MG/1
25 TABLET, EXTENDED RELEASE ORAL EVERY EVENING
Qty: 30 TABLET | Refills: 1 | Status: SHIPPED | OUTPATIENT
Start: 2018-06-13 | End: 2018-08-06

## 2018-06-13 RX ADMIN — ASPIRIN 81 MG: 81 TABLET, COATED ORAL at 08:30

## 2018-06-13 RX ADMIN — SPIRONOLACTONE 25 MG: 25 TABLET ORAL at 08:30

## 2018-06-13 RX ADMIN — ATORVASTATIN CALCIUM 40 MG: 40 TABLET, FILM COATED ORAL at 08:30

## 2018-06-13 RX ADMIN — LISINOPRIL 10 MG: 10 TABLET ORAL at 08:30

## 2018-06-13 NOTE — PLAN OF CARE
Problem: Cardiac: ACS (Acute Coronary Syndrome) (Adult)  Goal: Signs and Symptoms of Listed Potential Problems Will be Absent, Minimized or Managed (Cardiac: ACS)  Signs and symptoms of listed potential problems will be absent, minimized or managed by discharge/transition of care (reference Cardiac: ACS (Acute Coronary Syndrome) (Adult) CPG).   Outcome: No Change  Pt is  Alert and oriented, stable except once that HR dropped to 49 from already known bradycardia (HR in 50's) but non consistent, asymptomatic,  bp soft at times. Possible plan for d/c today, SR/SB on the monitor. NSG to cont. To monitor.

## 2018-06-13 NOTE — PROGRESS NOTES
[facility]  Cardiology Progress Note    Date of Service (when I saw the patient): 06/13/2018  Primary Cardiologist: Dr. Iniguez recommends establishing care with CORE clinic  Rounding Cardiologist: Dr. Iniguez    Assessment:  Neymar Rhodes is a 58 year old male who was admitted on 6/10/2018 with sudden onset of SOB.     NSTEMI   --Troponin     -- Admission ECG NSR with new onset of LBBB  -- Underwent coronary angiogram 6/11 which showed mild CAD without any obstructive lesions. LV gram showed EF 35-40%    Nonischemic CM   -- EF reduced around 40% with borderline concentric LVH with moderate global hypokinesis  -- No obstructive CAD to explain for reduction in EF  -- Continue with BB, Acei, and Spironolactone (BB and spironolactone new on this admission)  -- TSH and BNP normal  -- No recent viral illness  -- IV Lasix 40 mg x1 and started on spironolactone  -- I/O: -1.5 L since admission  -- Wt: - 10 Ibs since admission      Respiratory Distress  -- in the setting of flash pulmonary edema  -- IV Lasix 40 mg x1   -- No significant CAD to explain for this   -- D-dimer negative  -- Continue with spironolactone    Mild to Moderate MR    Hx of HTN   -- Hypotensive intermittently on 6/12  -- Switched BB to metoprolol succinate 25 mg every evening 6/12 due to hypotension    HLD   -- Last LDL 59   -- Continue with pta Atorvastatin  -- Fenofibrate discontinued 6/11    DMT2    Obesity    Plan:   -- CORE consult and education  -- Consider further titration of cardiomyopathy medications if BP allows  -- Plan for outpatient Cardiac MRI in 1-2 weeks  -- Avoid heavy lifting at work until he is seen in cardiology clinic, provided work letter  -- Continue with Spironolactone    Interval History   No new symptoms or concerns. Eager to go home today.    Physical Exam   Temp: 97.9  F (36.6  C) Temp src: Oral BP: 107/70   Heart Rate: 87 Resp: 18 SpO2: 94 %      Vitals:    06/11/18 0019 06/12/18 0500 06/13/18 0545    Weight: 127.8 kg (281 lb 12 oz) 126.1 kg (278 lb 1.6 oz) 125.1 kg (275 lb 11.2 oz)     GEN:  NAD  HEENT: Mucous membranes moist.  NECK:  No JVD.  C/V:  Regular rate and rhythm, no murmur, rub or gallop.   RESP: CTA, bilaterally.  GI: Abdomen soft, nontender, nondistended.    EXTREM: No LE edema.   NEURO: Alert and oriented, cooperative.   PSYCH: Normal affect.  SKIN: Warm and dry. No ecchymosis or hematoma at radial site.  VASC: 2+ peripheral pulses bilaterally.      Medications     - MEDICATION INSTRUCTIONS -       - MEDICATION INSTRUCTIONS -       - MEDICATION INSTRUCTIONS -       - MEDICATION INSTRUCTIONS -         aspirin  81 mg Oral Daily     atorvastatin  40 mg Oral Daily     insulin aspart  1-7 Units Subcutaneous TID AC     insulin aspart  1-5 Units Subcutaneous At Bedtime     lisinopril  10 mg Oral BID     metoprolol succinate  25 mg Oral QPM     sodium chloride (PF)  3 mL Intracatheter Q8H     spironolactone  25 mg Oral Daily       Data    Labs, imaging studies, ECG, and vitals reviewed by me.     Tele: NSR with LBBB (bradycardic overnight with HR intermittently in the mid 40's, asymptomatic)      Mike Leger PA-C   6/13/2018  Pager: [phone]

## 2018-06-13 NOTE — PROGRESS NOTES
VSS. Tele shows SB/SR with 1st degree AVB and BBB. Pt denies any CP or SOB. Discharge instructions, medication/side effects, and follow up instructions discussed with pt. Understanding CAD booklet personalized and discussed with pt. HF pathway book given and discussed with pt. All questions answered. All pt belongings are accounted for and sent home w/ pt. Pt left floor via foot and was driven home by spouse.

## 2018-06-13 NOTE — PLAN OF CARE
Problem: Patient Care Overview  Goal: Plan of Care/Patient Progress Review  Discharge Planner OT  Cardiac Rehab  Patient plan for discharge: home today  Current status: Education provided in new diagnosis, S/S and risk factor mgmt of cardiomyopathy/heart failure.Self Help Guide for Patients with Heart Failure issued for reinforcement. Follow up provided to check for qualification of OPCR and as patient is privately insured (v. Medicare pt) it is likely that he will qualify. OT recommended that patient call his insurance company to verify and pt agreed to follow though. Pt would like to attend OPCR here at [facility].  Encouraged patient to ambulate on own on unit.   Barriers to return to prior living situation: none  Noted   Recommendations for discharge: home w/OPCR here at [facility] as well as CORE clinic participation  Rationale for recommendations: pt will benefit from progressive monitored exercise and further education in OPCR setting to maximize cardiac health       Entered by: Jose F Stone 06/13/2018 10:48 AM

## 2018-06-13 NOTE — DISCHARGE INSTRUCTIONS
Cardiac Angiogram Discharge Instructions - Radial    After you go home:      Have an adult stay with you until tomorrow.    Drink extra fluids for 2 days.    You may resume your normal diet.    No smoking       For 24 hours - due to the sedation you received:    Relax and take it easy.    Do NOT make any important or legal decisions.    Do NOT drive or operate machines at home or at work.    Do NOT drink alcohol.    Care of Wrist Puncture Site:      For the first 24 hrs - check the puncture site every 1-2 hours while awake.    It is normal to have soreness at the puncture site and mild tingling in your hand for up to 3 days.    Remove the bandaid after 24 hours. If there is minor oozing, apply another bandaid and remove it after 12 hours.    You may shower tomorrow.  Do NOT take a bath, or use a hot tub or pool for at least 3 days. Do NOT scrub the site. Do not use lotion or powder near the puncture site.           Activity:        For 2 days:     do not use your hand or arm to support your weight (such as rising from a chair)     do not bend your wrist (such as lifting a garage door).    do not lift more than 5 pounds or exercise your arm (such as tennis, golf or bowling).    Do NOT do any heavy activity such as exercise, lifting, or straining.     Bleeding:      If you start bleeding from the site in your wrist, sit down and press firmly on/above the site for 10 minutes.     Once bleeding stops, keep arm still for 2 hours.     Call [facility] as soon as you can.       Call 911 right away if you have heavy bleeding or bleeding that does not stop.      Medicines:      If you are taking antiplatelet medications such as Plavix, Brilinta or Effient, do not stop taking it until you talk to your cardiologist.        If you are on Metformin (Glucophage), do not restart it until you have blood tests (within 2 to 3 days after discharge).  After you have your blood drawn, you may restart the Metformin.     Take your  medications, including blood thinners, unless your provider tells you not to.  If you take Coumadin (Warfarin), have your INR checked by your provider in  3-5 days. Call your clinic to schedule this.    If you have stopped any medicines, check with your provider about when to restart them.    Follow Up Appointments:      Follow up with [facility] Nurse Practitioner at [facility] of patient preference in 7-10 days.    Call the clinic if:      You have a large or growing hard lump around the site.    The site is red, swollen, hot or tender.    Blood or fluid is draining from the site.    You have chills or a fever greater than 101 F (38 C).    Your arm feels numb, cool or changes color.    You have hives, a rash or unusual itching.    Any questions or concerns.          [facility] at [place]:    [phone] [facility] (7 days a week)

## 2018-06-13 NOTE — PROGRESS NOTES
Mr. Rhodes feels well.  Tolerated med changes without dizziness or marked hypotension.  Exam stable.  He wants to discharge today.

## 2018-06-13 NOTE — DISCHARGE SUMMARY
[facility]  Discharge Summary  Hospitalist    Date of Admission:  6/10/2018  Date of Discharge:  6/13/2018  1:53 PM  Provider:  Oscar Paul DO, [facility]    Discharge Diagnoses   1.  Nonischemic cardiomyopathy  2.  Initial acute hypoxic respiratory failure with flash pulmonary edema related to #1      Other medical issues:  Past Medical History:   Diagnosis Date     Diverticulosis of colon (without mention of hemorrhage)      Essential hypertension, benign      Gout, unspecified      Morbid obesity (H)      Other and unspecified hyperlipidemia      Type II or unspecified type diabetes mellitus without mention of complication, not stated as uncontrolled        History of Present Illness   Neymar Rhodes is an 58 year old male who presented with SOB.  Please see the admission history and physical for full details.    Hospital Course   Neymar Rhodes was admitted on 6/10/2018.  The following problems were addressed during his hospitalization:    Mr. Rhodes presented with findings concerning for acute pulmonary edema.  He initially required BiPAP and then transitioned to nasal canula oxygen which was weaned off by discharge.  He was initially felt to have a NSTEMI and treated with ASA, heparin, beta blockers, statin, and ACEI.  Cardiology saw him and an echo and eventual cardiac cath was completed.  The results of these are listed below.  He was found to not have major CAD and his cardiomyopathy appears non-ischemic.  He continued to improve but had some low normal range blood pressures with further titration of his medications.  By discharge he was maintining his BP and felt well walking around the unit.  He worked with cardiac rehab during the hospital stay.  For his diabetes, he was kept on insulin here but transitioned back toward his baseline medications at discharge.   The one exception was his actos which was stopped with his fairly severe CHF.  He was advised to remain off work a couple weeks  "and even once back needs to not perform any heavy lifting.  Follow-up is as outlined below.  The patient felt well and was comfortable with the plan for discharge.  All his questions were answered.      Significant Results and Procedures   Cardiac angiogram    Pending Results     Unresulted Labs Ordered in the Past 30 Days of this Admission     No orders found from 4/11/2018 to 6/11/2018.          Code Status   Full Code       Primary Care Physician   Jefry Harris    Blood pressure 123/68, pulse 128, temperature 98  F (36.7  C), temperature source Oral, resp. rate 18, height 1.803 m (5' 11\"), weight 125.1 kg (275 lb 11.2 oz), SpO2 98 %.    AO, appeared comfortable up walking in the unit today.  Heart regular, lungs clear.    Discharge Disposition   Discharged to home    Consultations This Hospital Stay   PHARMACY TO DOSE HEPARIN  CARDIAC REHAB IP CONSULT  CARDIOLOGY IP CONSULT  PHARMACY TO DOSE HEPARIN  PHARMACY IP CONSULT  PHARMACY IP CONSULT  PHARMACY IP CONSULT  PHARMACY IP CONSULT  CORE CLINIC EVALUATION IP CONSULT  SMOKING CESSATION PROGRAM IP CONSULT  SMOKING CESSATION PROGRAM IP CONSULT  SMOKING CESSATION PROGRAM IP CONSULT    Time Spent on this Encounter   I, Oscar Paul, personally saw the patient today and spent greater than 30 minutes discharging this patient.    Discharge Orders     MRI Cardiac w/contrast     Basic metabolic panel     Hemoglobin   Last Lab Result: Hemoglobin (g/dL)      Date                     Value                06/11/2018               12.1 (L)         ----------     CARDIAC REHAB REFERRAL     Reason for your hospital stay   Congestive Heart Failure     Follow-up and recommended labs and tests    Follow-up with cardiology, see their orders.  Also recommend you see your primary provider in the next 1-2 weeks.     Activity   Your activity upon discharge: activity as tolerated, no heavy lifting/aggressive activity     Monitor and record   weight every day (If weight " increases more than 4 lbs from base weight when you first get home and weigh yourself, contact cardiology)     Full Code     Diet   Follow this diet upon discharge:  Diabetic, 2 gram sodium restricted       Discharge Medications   Current Discharge Medication List      START taking these medications    Details   metoprolol succinate (TOPROL-XL) 25 MG 24 hr tablet Take 1 tablet (25 mg) by mouth every evening  Qty: 30 tablet, Refills: 1    Associated Diagnoses: Nonischemic cardiomyopathy (H)      spironolactone (ALDACTONE) 25 MG tablet Take 1 tablet (25 mg) by mouth daily  Qty: 30 tablet, Refills: 1    Associated Diagnoses: Nonischemic cardiomyopathy (H)         CONTINUE these medications which have CHANGED    Details   aspirin 81 MG tablet Take 1 tablet (81 mg) by mouth daily  Qty: 30 tablet    Associated Diagnoses: Essential hypertension, benign      lisinopril (PRINIVIL/ZESTRIL) 10 MG tablet Take 1 tablet (10 mg) by mouth 2 times daily  Qty: 60 tablet, Refills: 1    Associated Diagnoses: Type 2 diabetes mellitus without complication, without long-term current use of insulin (H)         CONTINUE these medications which have NOT CHANGED    Details   albuterol (PROAIR HFA/PROVENTIL HFA/VENTOLIN HFA) 108 (90 Base) MCG/ACT Inhaler Inhale 2 puffs into the lungs every 6 hours as needed for shortness of breath / dyspnea or wheezing      atorvastatin (LIPITOR) 80 MG tablet take 1/2 tablet by mouth at bedtime  Qty: 45 tablet, Refills: 1    Associated Diagnoses: Type 2 diabetes mellitus without complication, without long-term current use of insulin (H)      fenofibrate 160 MG tablet Take 1 tablet (160 mg) by mouth daily  Qty: 90 tablet, Refills: 2    Associated Diagnoses: Type 2 diabetes mellitus without complication, without long-term current use of insulin (H)      glipiZIDE (GLIPIZIDE XL) 10 MG 24 hr tablet Take 1 tablet (10mg) by mouth daily  Qty: 90 tablet, Refills: 3    Associated Diagnoses: Type 2 diabetes mellitus  without complication, without long-term current use of insulin (H)      guaiFENesin-codeine (ROBITUSSIN AC) 100-10 MG/5ML SOLN solution Take 5-10 mLs by mouth nightly as needed for cough  Qty: 120 mL, Refills: 0    Associated Diagnoses: Chest congestion      liraglutide (VICTOZA PEN) 18 MG/3ML soln Inject 1.8 mg under skin once daily  Qty: 27 mL, Refills: 0    Associated Diagnoses: Type 2 diabetes mellitus without complication, without long-term current use of insulin (H)      metFORMIN (GLUCOPHAGE) 1000 MG tablet TAKE ONE TABLET BY MOUTH TWICE A DAY WITH MEALS  Qty: 180 tablet, Refills: 1    Associated Diagnoses: Type 2 diabetes mellitus without complication, without long-term current use of insulin (H)      triamcinolone (KENALOG) 0.5 % cream Apply sparingly once or twice per day as needed to affected area until the skin is better, then stop  Qty: 30 g, Refills: 0    Associated Diagnoses: Eczema, unspecified eczema      blood glucose monitoring (ACCU-CHEK LUL PLUS) test strip Use to test blood sugar 1-3 times daily or as directed.  Qty: 100 each, Refills: 11    Associated Diagnoses: Type 2 diabetes mellitus without complication (H)         STOP taking these medications       indomethacin (INDOCIN) 25 MG capsule Comments:   Reason for Stopping:         ketoconazole (NIZORAL) 2 % cream Comments:   Reason for Stopping:         pioglitazone (ACTOS) 45 MG tablet Comments:   Reason for Stopping:               Allergies   Allergies   Allergen Reactions     No Known Drug Allergies      Data     Recent Labs  Lab 06/11/18 0420 06/10/18  2050   WBC 8.8 11.0   HGB 12.1* 14.0   HCT 35.9* 42.4   MCV 92 95    218       Recent Labs  Lab 06/13/18  1132 06/13/18  0845 06/13/18  0721 06/13/18  0153 06/12/18  2102 06/12/18  1732 06/12/18  0535  06/11/18  0420  06/10/18  2050   GLC  --  219*  --   --   --   --  140*  --  140*  --  310*   *  --  164* 147* 165* 168*  --   < >  --   < >  --    < > = values in this  interval not displayed.    Recent Labs  Lab 06/11/18  0420 06/11/18  0040 06/10/18  2103 06/10/18  2050   TROPONIN  --   --  0.07  --    TROPI 3.786* 2.156*  --  0.091*         Recent Labs  Lab 06/13/18  0845 06/12/18  0535 06/11/18  0420 06/10/18  2050    142 144 143   POTASSIUM 3.9 3.8 3.6 3.4   CHLORIDE 105 110* 112* 109   CO2 23 24 25 18*   ANIONGAP 10 8 7 16*   * 140* 140* 310*   BUN 18 19 19 18   CR 0.67 0.87 0.91 1.11   GFRESTIMATED >90 90 86 68   GFRESTBLACK >90 >90 >90 82   EDITH 8.9 8.3* 8.7 9.2   MAG 1.7  --  2.1 2.0     Results for orders placed or performed during the hospital encounter of 06/10/18   Chest  XR, 1 view PORTABLE    Narrative    CHEST PORTABLE ONE VIEW     6/10/2018 8:56 PM     HISTORY: Shortness of breath.     COMPARISON: None.      Impression    IMPRESSION: Portable chest obtained. This accentuates heart size but  there does appear to be cardiomegaly. The patient is rotated to the  left. Mild pulmonary vascular prominence. No focal infiltrates or  definite pleural fluid. Recommend clinical correlation for possible  mild congestive heart failure.     DENI OLEA MD       Echo:  Interpretation Summary     The ascending aorta is Mildly dilated.  The visual ejection fraction is estimated at 42%.  There are regional wall motion abnormalities as specified.  There is mild to moderate (1-2+) mitral regurgitation.  The study was technically difficult.    Cardiac Cath:  Procedure note:  6 F slender sheath was placed in the left radial  artery with ultrasound guidance. 6F JR4 and JL4 and pigtail diagnostic  catheters were used. Conscience sedation was used throughout the  procedure under my supervision with 2mg Versed and 50mcg Fentanyl  given over 20 minutes. 2.5 Verapamil and 2 Nitroglycerin were given  through the side arm of the sheath and 5000U of heparin was given at  the start of the case. There were no complications.     SUMMARY OF CORONARY ANGIOGRAM:  1) Normal left main  2)  LAD is a large vessel that gives rise to D1 and D2, all without  significant disease.    3) Circumflex gives rise to OM1 and OM2 and distal circumflex, all  without disease  4) RCA is dominant. Gives rise to PDA and PLB. There is 10% disease  proximal and distal RCA.     SUMMARY OF [facility]:  1) LVEDP 15 mmHg, no gradient on pull-back  LVEF 35-40% with global hypokinesis    PLAN OF CARE:  Medical management of non-ischemic cardiomyopathy

## 2018-06-14 ENCOUNTER — CARE COORDINATION (OUTPATIENT)
Dept: CARDIOLOGY | Facility: CLINIC | Age: 58
End: 2018-06-14

## 2018-06-14 ENCOUNTER — TELEPHONE (OUTPATIENT)
Dept: INTERNAL MEDICINE | Facility: CLINIC | Age: 58
End: 2018-06-14

## 2018-06-14 LAB — INTERPRETATION ECG - MUSE: NORMAL

## 2018-06-14 NOTE — PROGRESS NOTES
Patient was evaluated by cardiology while inpatient for NSTEMI and non-ischemic CM. Patient underwent coronary angiogram that did not result in any mechanical intervention.. Called patient to discuss any post hospital d/c questions he may have, review medication changes, and confirm f/u appts. Patient denied any questions regarding new medications or changes to some of his current medications that he was taking prior to admission. Patient denied any increased SOB, chest pain, or light headedness. RN reviewed with patient the importance of weighing himself daily, recording weight, and bringing record of weights to apt. RN also reminded patient to call clinic with a weight gain of >2lb overnight or 5lb in a week. RN confirmed with patient that he has an apt scheduled on 6/20/18 with SERGIO Moses Perez in our CORE clinic (1220pm lab and 110pm). Patient advised to call clinic with any cardiac related questions or concerns prior to this sergio't. Patient verbalized understanding and agreed with plan.

## 2018-06-14 NOTE — TELEPHONE ENCOUNTER
"Hospital/TCU/ED for chronic condition Discharge Protocol    \"Hi, my name is Johanna Whitney, a registered nurse, and I am calling from [facility] ([facility]  I am calling to follow up and see how things are going for you after your recent emergency visit/hospital/TCU stay.\"    Tell me how you are doing now that you are home?\" Patient stated to be feeling well.  Denies SOB or diaphoresis.        Discharge Instructions    \"Let's review your discharge instructions.  What is/are the follow-up recommendations?  Pt. Response: Follow up with cardiologist, cardiac rehab, Dr. Harris, and have a cardiac MRI    \"Has an appointment with your primary care provider been scheduled?\"   Yes. (confirm)    \"When you see the provider, I would recommend that you bring your medications with you.\"    Medications    \"Tell me what changed about your medicines when you discharged?\"    Changes to chronic meds?    2 or more - Epic MTM referral needed    \"What questions do you have about your medications?\"    None     New diagnoses of heart failure, COPD, diabetes, or MI?    No     On insulin: \"Did you start on insulin in the hospital or did you have your insulin dose changed?\"  No         Medication reconciliation completed? Yes  Was MTM referral placed (*Make sure to put transitions as reason for referral)?   No    Call Summary    \"What questions or concerns do you have about your recent visit and your follow-up care?\"     none    \"If you have questions or things don't continue to improve, we encourage you contact us through the main clinic number (give number).  Even if the clinic is not open, triage nurses are available 24/7 to help you.     We would like you to know that our clinic has extended hours (provide information).  We also have urgent care (provide details on closest location and hours/contact info)\"      \"Thank you for your time and take care!\"             "

## 2018-06-15 ENCOUNTER — OFFICE VISIT (OUTPATIENT)
Dept: INTERNAL MEDICINE | Facility: CLINIC | Age: 58
End: 2018-06-15
Payer: COMMERCIAL

## 2018-06-15 VITALS
SYSTOLIC BLOOD PRESSURE: 112 MMHG | TEMPERATURE: 98.1 F | BODY MASS INDEX: 39.2 KG/M2 | WEIGHT: 273.8 LBS | HEIGHT: 70 IN | HEART RATE: 59 BPM | OXYGEN SATURATION: 98 % | DIASTOLIC BLOOD PRESSURE: 60 MMHG

## 2018-06-15 DIAGNOSIS — E66.01 MORBID OBESITY WITH BMI OF 40.0-44.9, ADULT (H): ICD-10-CM

## 2018-06-15 DIAGNOSIS — B35.4 DERMATOPHYTOSIS OF BODY: Primary | ICD-10-CM

## 2018-06-15 DIAGNOSIS — E11.9 TYPE 2 DIABETES MELLITUS WITHOUT COMPLICATION, WITHOUT LONG-TERM CURRENT USE OF INSULIN (H): ICD-10-CM

## 2018-06-15 PROCEDURE — 99213 OFFICE O/P EST LOW 20 MIN: CPT | Performed by: INTERNAL MEDICINE

## 2018-06-15 RX ORDER — CLOTRIMAZOLE 1 %
CREAM (GRAM) TOPICAL 2 TIMES DAILY
Qty: 15 G | Refills: 1 | Status: SHIPPED | OUTPATIENT
Start: 2018-06-15 | End: 2019-10-08

## 2018-06-15 RX ORDER — ATORVASTATIN CALCIUM 80 MG/1
TABLET, FILM COATED ORAL
Qty: 45 TABLET | Refills: 0 | Status: SHIPPED | OUTPATIENT
Start: 2018-06-15 | End: 2018-12-15

## 2018-06-15 NOTE — PROGRESS NOTES
"  SUBJECTIVE:   Neymar Rhodes is a 58 year old male who presents to clinic today for the following health issues:      Patient is here for 4 day history of a left axillary rash.  He states he was recently hospitalized had angiography done through the left radial artery and noted this rash when he got home.  He states it is minimally pruritic but otherwise nonpainful.    Rash      Duration: 4 days    Description  Location: Under left arm   Itching: no    Intensity:  moderate    Accompanying signs and symptoms: Tingly and weird feeling and has a smell of \"raw meat\"    History (similar episodes/previous evaluation): None    Precipitating or alleviating factors:  New exposures:  None  Recent travel: no      Therapies tried and outcome: none    Problem list and histories reviewed & adjusted, as indicated.  Additional history: as documented    Reviewed and updated as needed this visit by clinical staff  Allergies  Meds       Reviewed and updated as needed this visit by Provider         ROS:  Constitutional, HEENT, cardiovascular, pulmonary, gi and gu systems are negative, except as otherwise noted.    OBJECTIVE:                                                    /60 (Cuff Size: Adult Regular)  Pulse 59  Temp 98.1  F (36.7  C) (Oral)  Ht 5' 10\" (1.778 m)  Wt 273 lb 12.8 oz (124.2 kg)  SpO2 98%  BMI 39.29 kg/m2  Body mass index is 39.29 kg/(m^2).  GENERAL APPEARANCE: alert, no distress and over weight  SKIN: Left axilla has multiple skin tags.  There is no edema swelling or lymphadenopathy.  There is a mildly erythematous but not warm rash is more darker appearance along the edge.    Diagnostic test results:  none      ASSESSMENT/PLAN:                                                    1. Dermatophytosis of body  This could be just a heat rash or mild dermatophyte infection   It is not worrisome at all and I recommended use some over-the-counter antifungal cream.  If this did not help but he has follow-up " next week with his PCP and they can take a another look at it.  - clotrimazole (LOTRIMIN) 1 % cream; Apply topically 2 times daily To axillary rash  Dispense: 15 g; Refill: 1    Mitchell Joseph MD  [facility]

## 2018-06-15 NOTE — MR AVS SNAPSHOT
After Visit Summary   6/15/2018    Neymar Rhodes    MRN: [id number]           Patient Information     Date Of Birth          1960        Visit Information        Provider Department      6/15/2018 7:40 AM Mitchell Joseph MD [facility]        Today's Diagnoses     Dermatophytosis of body    -  1       Follow-ups after your visit        Your next 10 appointments already scheduled     Jun 19, 2018 10:00 AM CDT   Office Visit with Jefry Harris MD   [facility]    [address] [id number] [phone]           Bring a current list of meds and any records pertaining to this visit. For Physicals, please bring immunization records and any forms needing to be filled out. Please arrive 10 minutes early to complete paperwork.            Jun 20, 2018 12:20 PM CDT   LAB with [facility]   [facility] [place] ([facility])    [address] [place] [address] [address]  [facility] [id number] [phone]           Please do not eat 10-12 hours before your appointment if you are coming in fasting for labs on lipids, cholesterol, or glucose (sugar). This does not apply to pregnant women. Water, hot tea and black coffee (with nothing added) are okay. Do not drink other fluids, diet soda or chew gum.            Jun 20, 2018  1:10 PM CDT   CORE Enrollment with ARMAND Field CNP   [facility] ([facility])    [address] [facility] [address]  [facility] [id number] [phone] OPT 2            Jul 05, 2018 11:00 AM CDT   MR MYOCARDIUM W CONTRAST with SCIMR1   [facility] ([facility])    [address] [place] [address] [address]  [facility] [id number] [phone]           Take your medicines as usual, unless your doctor tells you not to. Bring a list of  your current medicines to your exam (including vitamins, minerals and over-the-counter drugs).  You may or may not receive intravenous (IV) contrast for this exam pending the discretion of the Radiologist.  You do not need to do anything special to prepare.  The MRI machine uses a strong magnet. Please wear clothes without metal (snaps, zippers). A sweatsuit works well, or we may give you a hospital gown.  Please remove any body piercings and hair extensions before you arrive. You will also remove watches, jewelry, hairpins, wallets, dentures, partial dental plates and hearing aids. You may wear contact lenses, and you may be able to wear your rings. We have a safe place to keep your personal items, but it is safer to leave them at home.  **IMPORTANT** THE INSTRUCTIONS BELOW ARE ONLY FOR THOSE PATIENTS WHO HAVE BEEN PRESCRIBED SEDATION OR GENERAL ANESTHESIA DURING THEIR MRI PROCEDURE:  IF YOUR DOCTOR PRESCRIBED ORAL SEDATION (take medicine to help you relax during your exam):   You must get the medicine from your doctor (oral medication) before you arrive. Bring the medicine to the exam. Do not take it at home. You ll be told when to take it upon arriving for your exam.   Arrive one hour early. Bring someone who can take you home after the test. Your medicine will make you sleepy. After the exam, you may not drive, take a bus or take a taxi by yourself.  IF YOUR DOCTOR PRESCRIBED IV SEDATION:   Arrive one hour early. Bring someone who can take you home after the test. Your medicine will make you sleepy. After the exam, you may not drive, take a bus or take a taxi by yourself.   No eating 6 hours before your exam. You may have clear liquids up until 4 hours before your exam. (Clear liquids include water, clear tea, black coffee and fruit juice without pulp.)  IF YOUR DOCTOR PRESCRIBED ANESTHESIA (be asleep for your exam):   Arrive 1 1/2 hours early. Bring someone who can take you home after the test. You may not  "drive, take a bus or take a taxi by yourself.   No eating 8 hours before your exam. You may have clear liquids up until 4 hours before your exam. (Clear liquids include water, clear tea, black coffee and fruit juice without pulp.)   You will spend four to five hours in the recovery room.  Please call the Imaging Department at your exam site with any questions.              Who to contact     If you have questions or need follow up information about today's clinic visit or your schedule please contact [facility] directly at [phone].  Normal or non-critical lab and imaging results will be communicated to you by MyChart, letter or phone within 4 business days after the clinic has received the results. If you do not hear from us within 7 days, please contact the clinic through MyChart or phone. If you have a critical or abnormal lab result, we will notify you by phone as soon as possible.  Submit refill requests through [employer] or call your pharmacy and they will forward the refill request to us. Please allow 3 business days for your refill to be completed.          Additional Information About Your Visit        Care EveryWhere ID     This is your Care EveryWhere ID. This could be used by other organizations to access your [facility] medical records  [id number]        Your Vitals Were     Pulse Temperature Height Pulse Oximetry BMI (Body Mass Index)       59 98.1  F (36.7  C) (Oral) 5' 10\" (1.778 m) 98% 39.29 kg/m2        Blood Pressure from Last 3 Encounters:   06/15/18 112/60   06/13/18 123/68   04/30/18 110/60    Weight from Last 3 Encounters:   06/15/18 273 lb 12.8 oz (124.2 kg)   06/13/18 275 lb 11.2 oz (125.1 kg)   04/30/18 284 lb (128.8 kg)              Today, you had the following     No orders found for display         Today's Medication Changes          These changes are accurate as of 6/15/18  8:27 AM.  If you have any questions, ask your nurse or doctor.               Start " taking these medicines.        Dose/Directions    clotrimazole 1 % cream   Commonly known as:  LOTRIMIN   Used for:  Dermatophytosis of body   Started by:  Mitchell Joseph MD        Apply topically 2 times daily To axillary rash   Quantity:  15 g   Refills:  1            Where to get your medicines      These medications were sent to [id number] - [place], [place] - [address]  [address] [facility] [address]     Phone:  [phone]     clotrimazole 1 % cream                Primary Care Provider Office Phone # Fax #    Jefry Silvio Harris [phone]       [address] [facility] [address]        Equal Access to Services     WAYNE HERNANDES : Hadii denae ku hadasho Soomaali, waaxda luqadaha, qaybta kaalmada adeegyada, roberto yusuf . So [facility] [phone].    ATENCIÓN: Si habla español, tiene a garcia disposición servicios gratuitos de asistencia lingüística. [facility] [phone].    We comply with applicable federal civil rights laws and [place] laws. We do not discriminate on the basis of race, color, national origin, age, disability, sex, sexual orientation, or gender identity.            Thank you!     Thank you for choosing [facility]  for your care. Our goal is always to provide you with excellent care. Hearing back from our patients is one way we can continue to improve our services. Please take a few minutes to complete the written survey that you may receive in the mail after your visit with us. Thank you!             Your Updated Medication List - Protect others around you: Learn how to safely use, store and throw away your medicines at www.disposemymeds.org.          This list is accurate as of 6/15/18  8:27 AM.  Always use your most recent med list.                   Brand Name Dispense Instructions for use Diagnosis    albuterol 108 (90 Base) MCG/ACT Inhaler    PROAIR HFA/PROVENTIL HFA/VENTOLIN HFA      Inhale 2 puffs into the lungs every 6 hours as needed for shortness of breath / dyspnea or wheezing        aspirin 81 MG tablet     30 tablet    Take 1 tablet (81 mg) by mouth daily    Essential hypertension, benign       atorvastatin 80 MG tablet    LIPITOR    45 tablet    take 1/2 tablet by mouth at bedtime    Type 2 diabetes mellitus without complication, without long-term current use of insulin (H)       blood glucose monitoring test strip    ACCU-CHEK LUL PLUS    100 each    Use to test blood sugar 1-3 times daily or as directed.    Type 2 diabetes mellitus without complication (H)       clotrimazole 1 % cream    LOTRIMIN    15 g    Apply topically 2 times daily To axillary rash    Dermatophytosis of body       fenofibrate 160 MG tablet     90 tablet    Take 1 tablet (160 mg) by mouth daily    Type 2 diabetes mellitus without complication, without long-term current use of insulin (H)       glipiZIDE 10 MG 24 hr tablet    glipiZIDE XL    90 tablet    Take 1 tablet (10mg) by mouth daily    Type 2 diabetes mellitus without complication, without long-term current use of insulin (H)       guaiFENesin-codeine 100-10 MG/5ML Soln solution    ROBITUSSIN AC    120 mL    Take 5-10 mLs by mouth nightly as needed for cough    Chest congestion       liraglutide 18 MG/3ML soln    VICTOZA PEN    27 mL    Inject 1.8 mg under skin once daily    Type 2 diabetes mellitus without complication, without long-term current use of insulin (H)       lisinopril 10 MG tablet    PRINIVIL/ZESTRIL    60 tablet    Take 1 tablet (10 mg) by mouth 2 times daily    Type 2 diabetes mellitus without complication, without long-term current use of insulin (H)       metFORMIN 1000 MG tablet    GLUCOPHAGE    180 tablet    TAKE ONE TABLET BY MOUTH TWICE A DAY WITH MEALS    Type 2 diabetes mellitus without complication, without long-term current use of insulin (H)       metoprolol succinate 25 MG 24 hr tablet    TOPROL-XL    30 tablet    Take 1 tablet  (25 mg) by mouth every evening    Nonischemic cardiomyopathy (H)       spironolactone 25 MG tablet    ALDACTONE    30 tablet    Take 1 tablet (25 mg) by mouth daily    Nonischemic cardiomyopathy (H)       triamcinolone 0.5 % cream    KENALOG    30 g    Apply sparingly once or twice per day as needed to affected area until the skin is better, then stop    Eczema, unspecified eczema

## 2018-06-15 NOTE — TELEPHONE ENCOUNTER
"Requested Prescriptions   Pending Prescriptions Disp Refills     atorvastatin (LIPITOR) 80 MG tablet [Pharmacy Med Name: Atorvastatin Calcium Oral Tablet 80 MG] 45 tablet 0      Last Written Prescription Date:  01/01/18  Last Fill Quantity: 45,  # refills: 1   Last office visit: No previous visit found with prescribing provider:  06/15/18   Future Office Visit: 06/19/18  Next 5 appointments (look out 90 days)     Jun 19, 2018 10:00 AM CDT   Office Visit with Jefry Harris MD   [facility]    [address] [address] [phone]                Sig: take one-half tablet by mouth at bedtime    Statins Protocol Passed    6/15/2018  7:01 AM       Passed - LDL on file in past 12 months    Recent Labs   Lab Test  06/11/18   0420   LDL  59            Passed - No abnormal creatine kinase in past 12 months    No lab results found.            Passed - Recent (12 mo) or future (30 days) visit within the authorizing provider's specialty    Patient had office visit in the last 12 months or has a visit in the next 30 days with authorizing provider or within the authorizing provider's specialty.  See \"Patient Info\" tab in inbasket, or \"Choose Columns\" in Meds & Orders section of the refill encounter.           Passed - Patient is age 18 or older        "

## 2018-06-19 ENCOUNTER — OFFICE VISIT (OUTPATIENT)
Dept: INTERNAL MEDICINE | Facility: CLINIC | Age: 58
End: 2018-06-19
Payer: COMMERCIAL

## 2018-06-19 VITALS
SYSTOLIC BLOOD PRESSURE: 122 MMHG | OXYGEN SATURATION: 98 % | BODY MASS INDEX: 39.31 KG/M2 | HEART RATE: 58 BPM | WEIGHT: 274 LBS | DIASTOLIC BLOOD PRESSURE: 62 MMHG | TEMPERATURE: 98.3 F

## 2018-06-19 DIAGNOSIS — I42.8 NONISCHEMIC CARDIOMYOPATHY (H): ICD-10-CM

## 2018-06-19 DIAGNOSIS — J96.01 ACUTE RESPIRATORY FAILURE WITH HYPOXIA (H): Primary | ICD-10-CM

## 2018-06-19 DIAGNOSIS — I10 BENIGN ESSENTIAL HYPERTENSION: ICD-10-CM

## 2018-06-19 DIAGNOSIS — E11.9 TYPE 2 DIABETES MELLITUS WITHOUT COMPLICATION, WITHOUT LONG-TERM CURRENT USE OF INSULIN (H): ICD-10-CM

## 2018-06-19 PROCEDURE — 99496 TRANSJ CARE MGMT HIGH F2F 7D: CPT | Performed by: INTERNAL MEDICINE

## 2018-06-19 NOTE — PATIENT INSTRUCTIONS
*  Continue all medications at the same doses.  Contact your usual pharmacy if you need refills.     *  Follow up with the Cardiology Clinic as planned    *  Do the Cardiac rehab as planned.     *  MRI of the heart as planned in July    *  Probable return to work July 9    *  Return to see either the Cardiology Clinic or myself in 2-3 months, sooner if needed.  Call [phone] to schedule this appointment.        5 GOALS IN MANAGING DIABETES (i.e. to give the best chance to prevent diabetic complications and vascular disease):     1.  Aggressive diabetic management.  The target for A1C (3 months average blood sugar) is ideally below 6.5, preferably below 7.5    Your diabetes is under good control.      Lab Results   Component Value Date    A1C 7.6 06/11/2018    A1C 6.9 12/01/2017    A1C 9.3 05/15/2017    A1C 8.7 01/16/2017    A1C 8.2 10/19/2016    A1C 8.7 06/30/2016    A1C 9.3 01/11/2016    A1C 7.7 07/17/2015    A1C 6.8 01/26/2015    A1C 6.4 10/16/2014    A1C 8.9 06/11/2014    A1C 7.8 12/30/2013    A1C 6.8 08/02/2013    A1C 7.0 02/14/2013    A1C 7.1 08/10/2012    A1C 8.4 03/30/2012    A1C 8.9 08/22/2011    A1C 8.4 03/08/2011    A1C 6.8 10/16/2010    A1C 6.8 04/09/2010    A1C 7.0 05/21/2009    A1C 7.0 12/23/2008    A1C 6.6 09/03/2008    A1C 7.1 06/09/2008    A1C 7.0 02/15/2008    A1C 7.2 10/24/2007    A1C 7.2 06/29/2007    A1C 6.9 03/29/2007    A1C 6.4 12/07/2006    A1C 6.6 06/01/2006    A1C 8.2 02/27/2006    A1C 8.3 12/15/2005    A1C 7.3 03/22/2005    A1C 7.3 11/29/2004    A1C 7.4 08/05/2004    A1C 6.9 10/27/2003    A1C 6.0 12/24/2002    A1C 6.0 08/07/2002         2.  Aggressive blood pressure control, under 130/80 ideally.  Using medications if needed.    Your blood pressure is under good control    BP Readings from Last 4 Encounters:   06/19/18 122/62   06/15/18 112/60   06/13/18 123/68   04/30/18 110/60       3.  Aggressive LDL cholesterol (bad cholesterol) lowering as needed.  Your goal is an LDL under 100 for  "sure, preferably under 70.     *  All patients with diabetes are recommended to be on a \"statin\" cholesterol lowering medication regardless of the cholesterol levels to give the best chance at avoiding vascular disease.      New guidelines identify four high-risk groups who could benefit from statins:   *people with pre-existing heart disease, such as those who have had a heart attack;   *people ages 40 to 75 who have diabetes of any type  *patients ages 40 to 75 with at least a 7.5% risk of developing cardiovascular disease over the next decade, according to a formula described in the guidelines  *patients with the sort of super-high cholesterol that sometimes runs in families, as evidenced by an LDL of 190 milligrams per deciliter or higher    *  Your cholesterol levels are well controlled.    Recent Labs   Lab Test  06/11/18   0420  01/20/17   0911   01/26/15   0920  12/30/13   1315   CHOL  128  109   < >  126  156   HDL  46  43   < >  45  41   LDL  59  48   < >  55  Cannot estimate LDL when triglyceride exceeds 400 mg/dL  86   TRIG  113  91   < >  128  435*   CHOLHDLRATIO   --    --    --   2.8  3.8    < > = values in this interval not displayed.       4.  No smoking    5.  Aspirin tablet once per day, every day unless there is a specific reason that you cannot take aspirin.       *You should take Aspirin 81 mg once per day, unless you cannot tolerate it.           "

## 2018-06-19 NOTE — PROGRESS NOTES
"  SUBJECTIVE:   Neymar Rhodes is a 58 year old male who presents to clinic today for the following health issues:      ED/UC Followup:    Facility:  [facility]   Date of visit: 6/10/18  Reason for visit: SOB   Current Status: No more SOB, \"feeling back to myself\"     Hospital Follow-up Visit:    Hospital/Nursing Home/IP Rehab Facility: [facility]  Date of Admission: 6/10/18  Date of Discharge: 6/13/18  Reason(s) for Admission: acute pulmonary edema, respiratory failure            Problems taking medications regularly:  None       Medication changes since discharge: None       Problems adhering to non-medication therapy:  None      Summary of hospitalization:  [facility] discharge summary reviewed  Diagnostic Tests/Treatments reviewed.  Follow up needed: none  Other Healthcare Providers Involved in Patient s Care:         None  Update since discharge: improved.     Post Discharge Medication Reconciliation: discharge medications reconciled, continue medications without change.  Plan of care communicated with patient     Coding guidelines for this visit:  Type of Medical   Decision Making Face-to-Face Visit       within 7 Days of discharge Face-to-Face Visit        within 14 days of discharge   Moderate Complexity [id number]   High Complexity [id number]                   In regards specifically to the patient's diabetes, they reports no unusual symptoms.    No significnat or regular episodes of hypo or hyperglycemia    Medication compliance: compliant most of the time  Diabetic diet compliance: compliant most of the time  Diabetic ROS: no polyuria or polydipsia, no chest pain, dyspnea or TIA's, no numbness, tingling or pain in extremities    Home blood sugar monitoring: are performed regulary    Review of patients self blood glucose monitoring shows fasting always < 130 and post prandial average under 170      Most  recent labs:    Lab Results   Component Value Date    A1C " 7.6 06/11/2018    A1C 6.9 12/01/2017    A1C 9.3 05/15/2017    A1C 8.7 01/16/2017    A1C 8.2 10/19/2016    A1C 8.7 06/30/2016    A1C 9.3 01/11/2016    A1C 7.7 07/17/2015    A1C 6.8 01/26/2015    A1C 6.4 10/16/2014    A1C 8.9 06/11/2014    A1C 7.8 12/30/2013    A1C 6.8 08/02/2013    A1C 7.0 02/14/2013    A1C 7.1 08/10/2012    A1C 8.4 03/30/2012    A1C 8.9 08/22/2011    A1C 8.4 03/08/2011    A1C 6.8 10/16/2010    A1C 6.8 04/09/2010    A1C 7.0 05/21/2009    A1C 7.0 12/23/2008    A1C 6.6 09/03/2008    A1C 7.1 06/09/2008    A1C 7.0 02/15/2008    A1C 7.2 10/24/2007    A1C 7.2 06/29/2007    A1C 6.9 03/29/2007    A1C 6.4 12/07/2006    A1C 6.6 06/01/2006    A1C 8.2 02/27/2006    A1C 8.3 12/15/2005    A1C 7.3 03/22/2005    A1C 7.3 11/29/2004    A1C 7.4 08/05/2004    A1C 6.9 10/27/2003    A1C 6.0 12/24/2002    A1C 6.0 08/07/2002          3.    The patient has had a history of ongoing obesity.  Reviewed the weigth curves.   Their current BMI is:  Body mass index is 39.31 kg/(m^2).  Reviewed previous attempts at weight loss which have not been successful in producing prolonged weigth loss.   Discussed current eating and exercise habits.     Reviewed weights in chart:  Wt Readings from Last 10 Encounters:   06/20/18 271 lb 6.4 oz (123.1 kg)   06/19/18 274 lb (124.3 kg)   06/15/18 273 lb 12.8 oz (124.2 kg)   06/13/18 275 lb 11.2 oz (125.1 kg)   04/30/18 284 lb (128.8 kg)   12/22/17 285 lb (129.3 kg)   08/04/17 281 lb (127.5 kg)   08/01/17 287 lb 3.2 oz (130.3 kg)   01/30/17 293 lb 4.8 oz (133 kg)   07/13/16 294 lb 1.6 oz (133.4 kg)        4.    Blood presure remains well controlled at home  Readings outside clinic are within normal limits.  Reviewed last 6 BP readings in chart:  BP Readings from Last 6 Encounters:   06/20/18 102/64   06/19/18 122/62   06/15/18 112/60   06/13/18 123/68   04/30/18 110/60   12/22/17 130/70     He has not experienced any significant side effects from medicaiotns for hypertension.    NO active cardiac  complaints or symptoms with exercise.         Problem list and histories reviewed & adjusted, as indicated.  Additional history: as documented        Reviewed and updated as needed this visit by clinical staff       Reviewed and updated as needed this visit by Provider           Past Medical History:  ---------------------------  Past Medical History:   Diagnosis Date     Diverticulosis of colon (without mention of hemorrhage)      Essential hypertension, benign      Gout, unspecified      Morbid obesity (H)      Other and unspecified hyperlipidemia      Type II or unspecified type diabetes mellitus without mention of complication, not stated as uncontrolled        Past Surgical History:  ---------------------------  Past Surgical History:   Procedure Laterality Date     C APPENDECTOMY  1980's     HEART CATH CORONARY ANGIOGRAM WITHOUT PRESSURES  06/10/2018    normal coronary angiogram, nothing more than 10%     SURGICAL HISTORY OF -   2001    repair of colovesicular fistula       Current Medications:  ---------------------------  Current Outpatient Prescriptions   Medication Sig Dispense Refill     albuterol (PROAIR HFA/PROVENTIL HFA/VENTOLIN HFA) 108 (90 Base) MCG/ACT Inhaler Inhale 2 puffs into the lungs every 6 hours as needed for shortness of breath / dyspnea or wheezing       aspirin 81 MG tablet Take 1 tablet (81 mg) by mouth daily 30 tablet      atorvastatin (LIPITOR) 80 MG tablet take one-half tablet by mouth at bedtime 45 tablet 0     blood glucose monitoring (ACCU-CHEK LUL PLUS) test strip Use to test blood sugar 1-3 times daily or as directed. 100 each 11     clotrimazole (LOTRIMIN) 1 % cream Apply topically 2 times daily To axillary rash 15 g 1     fenofibrate 160 MG tablet Take 1 tablet (160 mg) by mouth daily 90 tablet 2     glipiZIDE (GLIPIZIDE XL) 10 MG 24 hr tablet Take 1 tablet (10mg) by mouth daily 90 tablet 3     guaiFENesin-codeine (ROBITUSSIN AC) 100-10 MG/5ML SOLN solution Take 5-10 mLs by  mouth nightly as needed for cough 120 mL 0     liraglutide (VICTOZA PEN) 18 MG/3ML soln Inject 1.8 mg under skin once daily 27 mL 0     lisinopril (PRINIVIL/ZESTRIL) 10 MG tablet Take 1 tablet (10 mg) by mouth 2 times daily 60 tablet 1     metoprolol succinate (TOPROL-XL) 25 MG 24 hr tablet Take 1 tablet (25 mg) by mouth every evening 30 tablet 1     spironolactone (ALDACTONE) 25 MG tablet Take 1 tablet (25 mg) by mouth daily 30 tablet 1     triamcinolone (KENALOG) 0.5 % cream Apply sparingly once or twice per day as needed to affected area until the skin is better, then stop (Patient not taking: Reported on 6/20/2018) 30 g 0     BD ULTRA-FINE 29G X 12.7MM insulin pen needle use once daily with victoza pen 100 each 2     metFORMIN (GLUCOPHAGE) 1000 MG tablet TAKE ONE TABLET BY MOUTH TWICE A DAY WITH MEALS  60 tablet 0     pioglitazone (ACTOS) 45 MG tablet TAKE ONE TABLET BY MOUTH ONE TIME DAILY  90 tablet 0       Allergies:  -------------  Allergies   Allergen Reactions     No Known Drug Allergies        Social History:  -------------------  Social History     Social History     Marital status:      Spouse name: N/A     Number of children: N/A     Years of education: N/A     Occupational History     Not on file.     Social History Main Topics     Smoking status: Never Smoker     Smokeless tobacco: Never Used     Alcohol use Yes      Comment: 1 glass of beer on Fri     Drug use: No     Sexual activity: Yes     Partners: Female     Other Topics Concern     Not on file     Social History Narrative       Family Medical History:  ------------------------------  Family History   Problem Relation Age of Onset     Cancer Father 75     bladder cancer     Myocardial Infarction Father      Other - See Comments Father      smoking     Breast Cancer Mother      Breast Cancer Sister      Family History Negative Brother      Family History Negative Son      Family History Negative Son      fluttering/murmur     Asthma Son           ROS:  REVIEW OF SYSTEMS:  (since discharge)    RESP: negative for cough, dyspnea, wheezing, hemoptysis  CV: negative for chest pain, palpitations, PND, JOSHI, orthopnea; reports no changes in their ability to perform physical activity (from cardiovascular standpoint)  GI: negative for dysphagia, N/V, pain, melena, diarrhea and constipation  NEURO: negative for numbness/tingling, paralysis, incoordination, or focal weakness     OBJECTIVE:                                                    /62  Pulse 58  Temp 98.3  F (36.8  C) (Oral)  Wt 274 lb (124.3 kg)  SpO2 98%  BMI 39.31 kg/m2     GENERAL alert and no distress  EYES:  Normal sclera,conjunctiva, EOMI  HENT: oral and posterior pharynx without lesions or erythema, facies symmetric  NECK: Neck supple. No LAD, without thyroidmegaly or JVD., Carotids without bruits.  RESP: Clear to ausculation bilaterally without wheezes or crackles. Normal BS in all fields.  CV: RRR normal S1S2 without murmurs, rubs or gallops. PMI normal  LYMPH: no cervical lymph adenopathy appreciated  MS: extremities- no gross deformities of the visible extremities noted, no edema  PSYCH: Alert and oriented times 3; speech- coherent  SKIN:  No obvious significant skin lesions on visible portions of face          ASSESSMENT/PLAN:                                                      (J96.01) Acute respiratory failure with hypoxia (H)  (primary encounter diagnosis)  Comment: resolved.   Plan:     *  Continue all medications at the same doses.  Contact your usual pharmacy if you need refills.     *  Follow up with the Cardiology Clinic as planned    *  Do the Cardiac rehab as planned.     *  MRI of the heart as planned in July    *  Probable return to work July 9    *  Return to see either the Cardiology Clinic or myself in 2-3 months, sooner if needed.  Call [phone] to schedule this appointment.        (Q08.0) Nonischemic cardiomyopathy (H)  Comment: reviewed data  Has cardiology  follow up next week.   Plan:     (E11.9) Type 2 diabetes mellitus without complication, without long-term current use of insulin (H)  Comment: stopped actos due to possible role in heart failure.   Will continue off this medication for now.   He is very motivated to continue lowering the cabrs in his diet and has noticed susbstantial improvement in his glucose radings, weight,s and waist size.   Discussed importance in compliance in all areas of diabetic control including diet, routine BS checks, absolute medication compliance, laboratory monitoring, and attending regular follow up appointments as ordered.  Failure to comply with instructions regarding diabetes will lead to a greater chance of poor diabetic control and therefore a greater chance of diabetes related complications such as CAD, CVA, PVD, and retinopathy/neuropathy/nephropathy.  Based on level of diabetes control: testing frequency ONE TIME PER DAY   Plan:     (I10) Benign essential hypertension  Comment: This condition is currently controlled on the current medical regimen.  Continue current therapy.  Discussed current hypertension treatment guidelines, including indications for treatment and treatment options.  Discussed the importance for aggressive management of HTN to prevent vascular complications later.  Recommended lower fat, lower carbohydrate, and lower sodium (<2000 mg)diet.  Discussed required intervals for follow up on HTN, lab studies.  Recommened pt. follow their blood pressures outside the clinic to ensure that BPs are remaining within guidelines, and to contact me if the readings are not within guidelines on a regular basis so we can adjust treatment as needed.   Plan:       See Patient Instructions    FRANCHESKA GRUBER M.D., MD  [facility]

## 2018-06-19 NOTE — LETTER
June 19, 2018      [facility]  [id number] [facility] [id number]        To whom it may concern,     I am writing on behalf of Neymar Rhodes who was hospitalized for an acute cardiac event.  He has responded well to his therapies and continues to improve.  Due to the extent of his symptoms and expected recovery, I would recommend remaining out of work until Monday July 9, 2018.  Assuming that he continues to do well, he may return to work without restrictions on Monday July 9, 2018    If you have any questions or concerns, please call the clinic at the number listed above.       Sincerely,        Jefry Harris MD

## 2018-06-19 NOTE — MR AVS SNAPSHOT
After Visit Summary   6/19/2018    Neymar Rhodes    MRN: [id number]           Patient Information     Date Of Birth          1960        Visit Information        Provider Department      6/19/2018 10:00 AM Jefry Harris MD [facility]        Today's Diagnoses     Acute respiratory failure with hypoxia (H)    -  1    Nonischemic cardiomyopathy (H)        Type 2 diabetes mellitus without complication, without long-term current use of insulin (H)        Benign essential hypertension          Care Instructions    *  Continue all medications at the same doses.  Contact your usual pharmacy if you need refills.     *  Follow up with the Cardiology Clinic as planned    *  Do the Cardiac rehab as planned.     *  MRI of the heart as planned in July    *  Probable return to work July 9    *  Return to see either the Cardiology Clinic or myself in 2-3 months, sooner if needed.  Call [phone] to schedule this appointment.        5 GOALS IN MANAGING DIABETES (i.e. to give the best chance to prevent diabetic complications and vascular disease):     1.  Aggressive diabetic management.  The target for A1C (3 months average blood sugar) is ideally below 6.5, preferably below 7.5    Your diabetes is under good control.      Lab Results   Component Value Date    A1C 7.6 06/11/2018    A1C 6.9 12/01/2017    A1C 9.3 05/15/2017    A1C 8.7 01/16/2017    A1C 8.2 10/19/2016    A1C 8.7 06/30/2016    A1C 9.3 01/11/2016    A1C 7.7 07/17/2015    A1C 6.8 01/26/2015    A1C 6.4 10/16/2014    A1C 8.9 06/11/2014    A1C 7.8 12/30/2013    A1C 6.8 08/02/2013    A1C 7.0 02/14/2013    A1C 7.1 08/10/2012    A1C 8.4 03/30/2012    A1C 8.9 08/22/2011    A1C 8.4 03/08/2011    A1C 6.8 10/16/2010    A1C 6.8 04/09/2010    A1C 7.0 05/21/2009    A1C 7.0 12/23/2008    A1C 6.6 09/03/2008    A1C 7.1 06/09/2008    A1C 7.0 02/15/2008    A1C 7.2 10/24/2007    A1C 7.2 06/29/2007    A1C 6.9 03/29/2007    A1C 6.4  "12/07/2006    A1C 6.6 06/01/2006    A1C 8.2 02/27/2006    A1C 8.3 12/15/2005    A1C 7.3 03/22/2005    A1C 7.3 11/29/2004    A1C 7.4 08/05/2004    A1C 6.9 10/27/2003    A1C 6.0 12/24/2002    A1C 6.0 08/07/2002         2.  Aggressive blood pressure control, under 130/80 ideally.  Using medications if needed.    Your blood pressure is under good control    BP Readings from Last 4 Encounters:   06/19/18 122/62   06/15/18 112/60   06/13/18 123/68   04/30/18 110/60       3.  Aggressive LDL cholesterol (bad cholesterol) lowering as needed.  Your goal is an LDL under 100 for sure, preferably under 70.     *  All patients with diabetes are recommended to be on a \"statin\" cholesterol lowering medication regardless of the cholesterol levels to give the best chance at avoiding vascular disease.      New guidelines identify four high-risk groups who could benefit from statins:   *people with pre-existing heart disease, such as those who have had a heart attack;   *people ages 40 to 75 who have diabetes of any type  *patients ages 40 to 75 with at least a 7.5% risk of developing cardiovascular disease over the next decade, according to a formula described in the guidelines  *patients with the sort of super-high cholesterol that sometimes runs in families, as evidenced by an LDL of 190 milligrams per deciliter or higher    *  Your cholesterol levels are well controlled.    Recent Labs   Lab Test  06/11/18   0420  01/20/17   0911   01/26/15   0920  12/30/13   1315   CHOL  128  109   < >  126  156   HDL  46  43   < >  45  41   LDL  59  48   < >  55  Cannot estimate LDL when triglyceride exceeds 400 mg/dL  86   TRIG  113  91   < >  128  435*   CHOLHDLRATIO   --    --    --   2.8  3.8    < > = values in this interval not displayed.       4.  No smoking    5.  Aspirin tablet once per day, every day unless there is a specific reason that you cannot take aspirin.       *You should take Aspirin 81 mg once per day, unless you cannot " tolerate it.                   Follow-ups after your visit        Follow-up notes from your care team     Return in about 2 months (around 8/19/2018).      Your next 10 appointments already scheduled     Jun 20, 2018 12:20 PM CDT   LAB with [facility]   [facility] [place] ([facility])    [address]  [place]  [place] [address]   [phone]           Please do not eat 10-12 hours before your appointment if you are coming in fasting for labs on lipids, cholesterol, or glucose (sugar). This does not apply to pregnant women. Water, hot tea and black coffee (with nothing added) are okay. Do not drink other fluids, diet soda or chew gum.            Jun 20, 2018  1:10 PM CDT   CORE Enrollment with ARMAND Field CNP   [facility] ([facility])    [address]  [place] [address]   [phone] OPT 2            Jun 28, 2018  9:30 AM CDT   Cardiac Evaluation with  Cardiac Rehab 3   [facility] ([facility])    [address]  [facility] [address]   [phone]            Jul 05, 2018 11:00 AM CDT   MR MYOCARDIUM W CONTRAST with SCIMR1   [facility] ([facility] at [facility])    [address]  [address]  [place] [address]   [phone]           Take your medicines as usual, unless your doctor tells you not to. Bring a list of your current medicines to your exam (including vitamins, minerals and over-the-counter drugs).  You may or may not receive intravenous (IV) contrast for this exam pending the discretion of the Radiologist.  You do not need to do anything special to prepare.  The MRI machine uses a strong magnet. Please wear clothes without metal (snaps, zippers). A sweatsuit works well, or we may give you a hospital gown.  Please remove any body piercings and hair extensions  before you arrive. You will also remove watches, jewelry, hairpins, wallets, dentures, partial dental plates and hearing aids. You may wear contact lenses, and you may be able to wear your rings. We have a safe place to keep your personal items, but it is safer to leave them at home.  **IMPORTANT** THE INSTRUCTIONS BELOW ARE ONLY FOR THOSE PATIENTS WHO HAVE BEEN PRESCRIBED SEDATION OR GENERAL ANESTHESIA DURING THEIR MRI PROCEDURE:  IF YOUR DOCTOR PRESCRIBED ORAL SEDATION (take medicine to help you relax during your exam):   You must get the medicine from your doctor (oral medication) before you arrive. Bring the medicine to the exam. Do not take it at home. You ll be told when to take it upon arriving for your exam.   Arrive one hour early. Bring someone who can take you home after the test. Your medicine will make you sleepy. After the exam, you may not drive, take a bus or take a taxi by yourself.  IF YOUR DOCTOR PRESCRIBED IV SEDATION:   Arrive one hour early. Bring someone who can take you home after the test. Your medicine will make you sleepy. After the exam, you may not drive, take a bus or take a taxi by yourself.   No eating 6 hours before your exam. You may have clear liquids up until 4 hours before your exam. (Clear liquids include water, clear tea, black coffee and fruit juice without pulp.)  IF YOUR DOCTOR PRESCRIBED ANESTHESIA (be asleep for your exam):   Arrive 1 1/2 hours early. Bring someone who can take you home after the test. You may not drive, take a bus or take a taxi by yourself.   No eating 8 hours before your exam. You may have clear liquids up until 4 hours before your exam. (Clear liquids include water, clear tea, black coffee and fruit juice without pulp.)   You will spend four to five hours in the recovery room.  Please call the Imaging Department at your exam site with any questions.              Who to contact     If you have questions or need follow up information about today's clinic  visit or your schedule please contact [facility] directly at [phone].  Normal or non-critical lab and imaging results will be communicated to you by MyChart, letter or phone within 4 business days after the clinic has received the results. If you do not hear from us within 7 days, please contact the clinic through MyChart or phone. If you have a critical or abnormal lab result, we will notify you by phone as soon as possible.  Submit refill requests through [employer] or call your pharmacy and they will forward the refill request to us. Please allow 3 business days for your refill to be completed.          Additional Information About Your Visit        Care EveryWhere ID     This is your Care EveryWhere ID. This could be used by other organizations to access your [place] medical records  [id number]        Your Vitals Were     Pulse Temperature Pulse Oximetry BMI (Body Mass Index)          58 98.3  F (36.8  C) (Oral) 98% 39.31 kg/m2         Blood Pressure from Last 3 Encounters:   06/19/18 122/62   06/15/18 112/60   06/13/18 123/68    Weight from Last 3 Encounters:   06/19/18 274 lb (124.3 kg)   06/15/18 273 lb 12.8 oz (124.2 kg)   06/13/18 275 lb 11.2 oz (125.1 kg)              Today, you had the following     No orders found for display       Primary Care Provider Office Phone # Fax #    Jefry Harris [phone]       [address] [facility] [address]        Equal Access to Services     WAYNE HERNANDES : Hadii aad ku hadasho Soomaali, waaxda luqadaha, qaybta kaalmada adeegyada, roberto yusuf . So [facility] [phone].    ATENCIÓN: Si habla español, tiene a garcia disposición servicios gratuitos de asistencia lingüística. Llame al [phone].    We comply with applicable federal civil rights laws and [place] laws. We do not discriminate on the basis of race, color, national origin, age, disability, sex, sexual orientation, or gender  identity.            Thank you!     Thank you for choosing [facility]  for your care. Our goal is always to provide you with excellent care. Hearing back from our patients is one way we can continue to improve our services. Please take a few minutes to complete the written survey that you may receive in the mail after your visit with us. Thank you!             Your Updated Medication List - Protect others around you: Learn how to safely use, store and throw away your medicines at www.disposemymeds.org.          This list is accurate as of 6/19/18 10:54 AM.  Always use your most recent med list.                   Brand Name Dispense Instructions for use Diagnosis    albuterol 108 (90 Base) MCG/ACT Inhaler    PROAIR HFA/PROVENTIL HFA/VENTOLIN HFA     Inhale 2 puffs into the lungs every 6 hours as needed for shortness of breath / dyspnea or wheezing        aspirin 81 MG tablet     30 tablet    Take 1 tablet (81 mg) by mouth daily    Essential hypertension, benign       atorvastatin 80 MG tablet    LIPITOR    45 tablet    take one-half tablet by mouth at bedtime    Type 2 diabetes mellitus without complication, without long-term current use of insulin (H)       blood glucose monitoring test strip    ACCU-CHEK LUL PLUS    100 each    Use to test blood sugar 1-3 times daily or as directed.    Type 2 diabetes mellitus without complication (H)       clotrimazole 1 % cream    LOTRIMIN    15 g    Apply topically 2 times daily To axillary rash    Dermatophytosis of body       fenofibrate 160 MG tablet     90 tablet    Take 1 tablet (160 mg) by mouth daily    Type 2 diabetes mellitus without complication, without long-term current use of insulin (H)       glipiZIDE 10 MG 24 hr tablet    glipiZIDE XL    90 tablet    Take 1 tablet (10mg) by mouth daily    Type 2 diabetes mellitus without complication, without long-term current use of insulin (H)       guaiFENesin-codeine 100-10 MG/5ML Soln solution     ROBITUSSIN AC    120 mL    Take 5-10 mLs by mouth nightly as needed for cough    Chest congestion       liraglutide 18 MG/3ML soln    VICTOZA PEN    27 mL    Inject 1.8 mg under skin once daily    Type 2 diabetes mellitus without complication, without long-term current use of insulin (H)       lisinopril 10 MG tablet    PRINIVIL/ZESTRIL    60 tablet    Take 1 tablet (10 mg) by mouth 2 times daily    Type 2 diabetes mellitus without complication, without long-term current use of insulin (H)       metFORMIN 1000 MG tablet    GLUCOPHAGE    180 tablet    TAKE ONE TABLET BY MOUTH TWICE A DAY WITH MEALS    Type 2 diabetes mellitus without complication, without long-term current use of insulin (H)       metoprolol succinate 25 MG 24 hr tablet    TOPROL-XL    30 tablet    Take 1 tablet (25 mg) by mouth every evening    Nonischemic cardiomyopathy (H)       spironolactone 25 MG tablet    ALDACTONE    30 tablet    Take 1 tablet (25 mg) by mouth daily    Nonischemic cardiomyopathy (H)       triamcinolone 0.5 % cream    KENALOG    30 g    Apply sparingly once or twice per day as needed to affected area until the skin is better, then stop    Eczema, unspecified eczema

## 2018-06-20 ENCOUNTER — OFFICE VISIT (OUTPATIENT)
Dept: CARDIOLOGY | Facility: CLINIC | Age: 58
End: 2018-06-20
Payer: COMMERCIAL

## 2018-06-20 VITALS
HEART RATE: 86 BPM | SYSTOLIC BLOOD PRESSURE: 102 MMHG | BODY MASS INDEX: 38.85 KG/M2 | WEIGHT: 271.4 LBS | HEIGHT: 70 IN | DIASTOLIC BLOOD PRESSURE: 64 MMHG | OXYGEN SATURATION: 98 %

## 2018-06-20 DIAGNOSIS — I50.22 CHRONIC SYSTOLIC HEART FAILURE (H): Primary | ICD-10-CM

## 2018-06-20 DIAGNOSIS — I25.5 ISCHEMIC CARDIOMYOPATHY: ICD-10-CM

## 2018-06-20 LAB
ANION GAP SERPL CALCULATED.3IONS-SCNC: 14.6 MMOL/L (ref 6–17)
BUN SERPL-MCNC: 26 MG/DL (ref 7–30)
CALCIUM SERPL-MCNC: 10.4 MG/DL (ref 8.5–10.5)
CHLORIDE SERPL-SCNC: 104 MMOL/L (ref 98–107)
CO2 SERPL-SCNC: 22 MMOL/L (ref 23–29)
CREAT SERPL-MCNC: 0.96 MG/DL (ref 0.7–1.3)
GFR SERPL CREATININE-BSD FRML MDRD: 80 ML/MIN/1.7M2
GLUCOSE SERPL-MCNC: 210 MG/DL (ref 70–105)
HGB BLD-MCNC: 13.4 G/DL (ref 13.3–17.7)
POTASSIUM SERPL-SCNC: 4.6 MMOL/L (ref 3.5–5.1)
SODIUM SERPL-SCNC: 136 MMOL/L (ref 136–145)

## 2018-06-20 PROCEDURE — 80048 BASIC METABOLIC PNL TOTAL CA: CPT | Performed by: NURSE PRACTITIONER

## 2018-06-20 PROCEDURE — 85018 HEMOGLOBIN: CPT | Performed by: NURSE PRACTITIONER

## 2018-06-20 PROCEDURE — 36415 COLL VENOUS BLD VENIPUNCTURE: CPT | Performed by: NURSE PRACTITIONER

## 2018-06-20 PROCEDURE — 99214 OFFICE O/P EST MOD 30 MIN: CPT | Performed by: NURSE PRACTITIONER

## 2018-06-20 NOTE — PATIENT INSTRUCTIONS
Call CORE nurse for any questions or concerns:  [phone]   *If you have concerns after hours, please call [phone], option 2 to speak with on call Cardiologist.    1. Medication changes from today:  No changes     2. Weigh yourself daily and write it down. Call if weight > 275#     3. Call CORE nurse if your weight is up more than 2 pounds in one day or 5 pounds in one week.     4. Call CORE nurse if you feel more short of breath, have more abdominal bloating, or leg swelling.     5. Continue low sodium diet (less than 2000 mg daily). If you eat less salt, you will retain less fluid.     6. Alcohol can weaken your heart further. You should avoid alcohol or limit its use to special times, such as a holiday or birthday.      7. Do NOT take Aleve or ibuprofen without talking to your doctor first.      8. Lab Results: **     [facility]: Cardiomyopathy, Optimization, Rehabilitation, Education  The [facility] Clinic is a heart failure specialty clinic within the [facility] where you will work with specialized nurse practitioners, physician assistants, doctors, and registered nurses. They are dedicated to helping patients with heart failure to carefully adjust medications, receive education, and learn who and when to call if symptoms develop. They specialize in helping you better understand your condition, slow the progression of your disease, improve the length and quality of your life, help you detect future heart problems before they become life threatening, and avoid hospitalizations.

## 2018-06-20 NOTE — LETTER
6/20/2018    Jefry Harris MD  [address] [facility] [address]    RE: Neymar Rhodes       Dear Colleague,    I had the pleasure of seeing Neymar Rhodes in the [facility].    HPI and Plan:   See [id number]    Orders Placed This Encounter   Procedures     Basic metabolic panel     Basic metabolic panel     N terminal pro BNP outpatient     Lipid Profile     ALT     Follow-Up with [facility]     Follow-Up with Cardiologist       No orders of the defined types were placed in this encounter.      There are no discontinued medications.      Encounter Diagnosis   Name Primary?     Chronic systolic heart failure (H) Yes       CURRENT MEDICATIONS:  Current Outpatient Prescriptions   Medication Sig Dispense Refill     albuterol (PROAIR HFA/PROVENTIL HFA/VENTOLIN HFA) 108 (90 Base) MCG/ACT Inhaler Inhale 2 puffs into the lungs every 6 hours as needed for shortness of breath / dyspnea or wheezing       aspirin 81 MG tablet Take 1 tablet (81 mg) by mouth daily 30 tablet      atorvastatin (LIPITOR) 80 MG tablet take one-half tablet by mouth at bedtime 45 tablet 0     blood glucose monitoring (ACCU-CHEK LUL PLUS) test strip Use to test blood sugar 1-3 times daily or as directed. 100 each 11     clotrimazole (LOTRIMIN) 1 % cream Apply topically 2 times daily To axillary rash 15 g 1     fenofibrate 160 MG tablet Take 1 tablet (160 mg) by mouth daily 90 tablet 2     glipiZIDE (GLIPIZIDE XL) 10 MG 24 hr tablet Take 1 tablet (10mg) by mouth daily 90 tablet 3     guaiFENesin-codeine (ROBITUSSIN AC) 100-10 MG/5ML SOLN solution Take 5-10 mLs by mouth nightly as needed for cough 120 mL 0     liraglutide (VICTOZA PEN) 18 MG/3ML soln Inject 1.8 mg under skin once daily 27 mL 0     lisinopril (PRINIVIL/ZESTRIL) 10 MG tablet Take 1 tablet (10 mg) by mouth 2 times daily 60 tablet 1     metFORMIN (GLUCOPHAGE) 1000 MG tablet TAKE ONE TABLET BY MOUTH TWICE A DAY WITH MEALS 180 tablet 1      metoprolol succinate (TOPROL-XL) 25 MG 24 hr tablet Take 1 tablet (25 mg) by mouth every evening 30 tablet 1     spironolactone (ALDACTONE) 25 MG tablet Take 1 tablet (25 mg) by mouth daily 30 tablet 1     triamcinolone (KENALOG) 0.5 % cream Apply sparingly once or twice per day as needed to affected area until the skin is better, then stop (Patient not taking: Reported on 6/20/2018) 30 g 0       ALLERGIES     Allergies   Allergen Reactions     No Known Drug Allergies        PAST MEDICAL HISTORY:  Past Medical History:   Diagnosis Date     Diverticulosis of colon (without mention of hemorrhage)      Essential hypertension, benign      Gout, unspecified      Morbid obesity (H)      Other and unspecified hyperlipidemia      Type II or unspecified type diabetes mellitus without mention of complication, not stated as uncontrolled        PAST SURGICAL HISTORY:  Past Surgical History:   Procedure Laterality Date     C APPENDECTOMY  1980's     HEART CATH CORONARY ANGIOGRAM WITHOUT PRESSURES  06/10/2018    normal coronary angiogram, nothing more than 10%     SURGICAL HISTORY OF -   2001    repair of colovesicular fistula       FAMILY HISTORY:  Family History   Problem Relation Age of Onset     Cancer Father 75     bladder cancer     Myocardial Infarction Father      Other - See Comments Father      smoking     Breast Cancer Mother      Breast Cancer Sister      Family History Negative Brother      Family History Negative Son      Family History Negative Son      fluttering/murmur     Asthma Son        SOCIAL HISTORY:  Social History     Social History     Marital status:      Spouse name: N/A     Number of children: N/A     Years of education: N/A     Social History Main Topics     Smoking status: Never Smoker     Smokeless tobacco: Never Used     Alcohol use Yes      Comment: 1 glass of beer on Fri     Drug use: No     Sexual activity: Yes     Partners: Female     Other Topics Concern     None     Social History  "Narrative       Review of Systems:  Skin:  Negative       Eyes:  Negative      ENT:  Negative      Respiratory:  Negative       Cardiovascular:  Negative      Gastroenterology: Negative      Genitourinary:  Negative      Musculoskeletal:  Negative      Neurologic:  Negative      Psychiatric:  Negative      Heme/Lymph/Imm:  Negative      Endocrine:  Negative        Physical Exam:  Vitals: /64  Pulse 86  Ht 1.778 m (5' 10\")  Wt 123.1 kg (271 lb 6.4 oz)  SpO2 98%  BMI 38.94 kg/m2    Constitutional:  cooperative, alert and oriented, well developed, well nourished, in no acute distress        Skin:  warm and dry to the touch, no apparent skin lesions or masses noted          Head:  normocephalic, no masses or lesions        Eyes:  pupils equal and round, conjunctivae and lids unremarkable, sclera white, no xanthalasma, EOMS intact, no nystagmus        Lymph:      ENT:           Neck:  carotid pulses are full and equal bilaterally, JVP normal, no carotid bruit        Respiratory:  clear to auscultation         Cardiac: regular rhythm, normal S1/S2, no S3 or S4, apical impulse not displaced, no murmurs, gallops or rubs                                                         GI:  abdomen soft, non-tender, BS normoactive, no mass, no HSM, no bruits        Extremities and Muscular Skeletal:  no deformities, clubbing, cyanosis, erythema observed              Neurological:  no gross motor deficits        Psych:  Alert and Oriented x 3        CC  No referring provider defined for this encounter.                Service Date: 06/20/2018      HISTORY OF PRESENT ILLNESS:  Neymar Rhodes is a pleasant 58-year-old male with a past medical history significant for diabetes mellitus, hypertension, hyperlipidemia and a family history of his father having myocardial infarction in his 60s.  Neymar also has obesity.      He recently was mowing his lawn which he normally does without problems and he was going up a hill when he " suddenly developed shortness of breath, diaphoresis and walked in the house and called 911.  He then had some left upper arm discomfort which he attributed to his blood pressure cuff that the paramedics were using.  It did persist and the blood pressure cuff was moved to the other side.  In the emergency room, he was having severe respiratory distress.  Initial saturations were only in the 50s.  EKG demonstrated a new left bundle branch block from last EKG of 2013.  It appeared to be sinus tachycardia.  The patient was treated with BiPAP mask and diuresis with gradual improvement.  He was diagnosed with a non-ST segment elevation myocardial infarction with troponins positive.  He does have multiple risk factors.  He did proceed with coronary angiography which showed mild coronary artery disease.  At discharge, Dr. Iniguez, the consulting cardiologist, suggested an outpatient cardiac MRI to look for etiology.      He was started on heart failure medications.  He had been on lisinopril in the past, but was discontinued in the past.  Actos was discontinued.  He has been referred to us for sleep study in the past, but has declined.      [facility].  He states that his admission weight was 285 pounds and discharge weight was 275 pounds.  His home weight today is 267 pounds.  He is reading every label, he is weighing every day and setting up his medications without any difficulties.  He denies shortness of breath at rest or with exertion.  He denies orthopnea or PND.  He denies syncope or near-syncope.      In the hospital, his echocardiogram showed an LVEF of 42%.  Mild to moderate 1-2+ mitral regurgitation.  His right heart showed normal systolic function and normal size.  His LV end-diastolic dimension was measured at 6.8.  His EKG showed a new left bundle branch block in normal sinus rhythm with a QRS duration at 160 milliseconds.      PHYSICAL EXAMINATION:   VITAL SIGNS:  Blood pressure  102/64, pulse is 86, weight is 271 pounds in the clinic and 267 pounds at home.        Please see complete physical examination below.      ASSESSMENT:  Mr. Rhodes is a pleasant 58-year-old patient who was recently hospitalized for shortness of breath.  It was initially felt he was a non-STEMI and treated with aspirin, heparin, beta blocker, statin and ACE inhibitor.  Dr. Iniguez was consulted.  Echocardiogram showed the above results and eventually he proceeded with coronary angiography showing mild coronary artery disease.  It appeared it was nonischemic, however, still unknown etiology.  Dr. Iniguez suggested an outpatient cardiac MRI to look for etiology.  He does have difficulty sleeping.  I spoke to him in length about a sleep study.  He declines at this time.  He recently was seen by his primary care doctor for his diabetes.  Actos was stopped in the hospital.      He is currently on lisinopril 10 mg twice a day and metoprolol XL and spironolactone.  His potassium has increased from 3.9 to 4.6.  He is not on a diuretic, only spironolactone 25 mg a day.  He will have a repeat basic metabolic panel in 2 weeks to look at his potassium again.  I have made no medication changes today.      PLAN:    1.  Possible sleep apnea.  Referral in the future hopefully.   2.  Cardiomyopathy, etiology unknown.  Referral for cardiac MRI.   3.  Diabetes mellitus, follows with Dr. Harris.     4.  Repeat basic metabolic panel on .     5.  Follow up with myself, Msoes Perez CNP on .         ARMAND BRITT, CNP             D: 2018   T: 2018   MT: CHIRAG      Name:     JACI RHODES   MRN:      [id number]        Account:      [id number]   :      1960           Service Date: 2018      Document: [id number]       Thank you for allowing me to participate in the care of your patient.      Sincerely,     ARMAND Britt CNP     [facility]  Care    cc:   No referring provider defined for this encounter.

## 2018-06-20 NOTE — PROGRESS NOTES
Service Date: 06/20/2018      HISTORY OF PRESENT ILLNESS:  Neymar Rhodes is a pleasant 58-year-old male with a past medical history significant for diabetes mellitus, hypertension, hyperlipidemia and a family history of his father having myocardial infarction in his 60s.  Neymar also has obesity.      He recently was mowing his lawn which he normally does without problems and he was going up a hill when he suddenly developed shortness of breath, diaphoresis and walked in the house and called 911.  He then had some left upper arm discomfort which he attributed to his blood pressure cuff that the paramedics were using.  It did persist and the blood pressure cuff was moved to the other side.  In the emergency room, he was having severe respiratory distress.  Initial saturations were only in the 50s.  EKG demonstrated a new left bundle branch block from last EKG of 2013.  It appeared to be sinus tachycardia.  The patient was treated with BiPAP mask and diuresis with gradual improvement.  He was diagnosed with a non-ST segment elevation myocardial infarction with troponins positive.  He does have multiple risk factors.  He did proceed with coronary angiography which showed mild coronary artery disease.  At discharge, Dr. Iniguez, the consulting cardiologist suggested an outpatient cardiac MRI to look for etiology.     Admission weight was 285 pounds and discharge weight was 275 pounds.     He was started on heart failure medications.  He had been on lisinopril in the past, but was discontinued in the recent past.  Actos was discontinued.  He has been referred to us for sleep study in the past, but has declined.      [facility] enroll[facility] in the [facility] today.  Home weight today is 267 pounds.  He is reading every label, he is weighing every day and setting up his medications without any difficulties.  He denies shortness of breath at rest or with exertion.  He denies orthopnea or PND.  He denies syncope or near-syncope.       In the hospital, his echocardiogram showed an LVEF of 42%.  Mild to moderate 1-2+ mitral regurgitation.  His right heart showed normal systolic function and normal size.  His LV end-diastolic dimension was measured at 6.8.  His EKG showed a new left bundle branch block in normal sinus rhythm with a QRS duration at 160 milliseconds.      PHYSICAL EXAMINATION:   VITAL SIGNS:  Blood pressure 102/64, pulse is 86, weight is 271 pounds in the clinic and 267 pounds at home.        Please see complete physical examination below.      ASSESSMENT:  Mr. Rhodes is a pleasant 58-year-old patient who was recently hospitalized for shortness of breath.  It was initially felt he was a non-STEMI and treated with aspirin, heparin, beta blocker, statin and ACE inhibitor.  Dr. Iniguez was consulted.  Echocardiogram showed the above results and eventually he proceeded with coronary angiography showing mild coronary artery disease.  It appeared it was nonischemic, however, still unknown etiology.  Dr. Iniguez suggested an outpatient cardiac MRI to look for etiology.  He does have difficulty sleeping.  I spoke to him in length about a sleep study.  He declines at this time.  He recently was seen by his primary care doctor for his diabetes.  Actos was stopped in the hospital.      He is currently on lisinopril 10 mg twice a day and metoprolol XL and spironolactone.  His potassium has increased from 3.9 to 4.6.  He is not on a diuretic, only spironolactone 25 mg a day.  He will have a repeat basic metabolic panel in 2 weeks to look at his potassium again.  I have made no medication changes today.      PLAN:    1.  Possible sleep apnea.  Referral in the future hopefully.   2.  Cardiomyopathy, etiology unknown.  Referral for cardiac MRI.   3.  Diabetes mellitus, follows with Dr. Harris.     4.  Repeat basic metabolic panel on 07/02.     5.  Follow up with myself, Moses Perez, KARLO on 07/11.         AMBER PEREZ, ARMAND, CNP              D: 2018   T: 2018   MT: CHIRAG      Name:     JACI WICK   MRN:      [id number]        Account:      [id number]   :      1960           Service Date: 2018      Document: [id number]

## 2018-06-20 NOTE — MR AVS SNAPSHOT
After Visit Summary   6/20/2018    Neymar Rhodes    MRN: [id number]           Patient Information     Date Of Birth          1960        Visit Information        Provider Department      6/20/2018 1:10 PM Latanya Perez APRN CNP [facility]        Today's Diagnoses     Chronic systolic heart failure (H)    -  1      Care Instructions    Call C[facility] nurse for any questions or concerns:  [phone]   *If you have concerns after hours, please call [phone], option 2 to speak with on call Cardiologist.    1. Medication changes from today:  No changes     2. Weigh yourself daily and write it down. Call if weight > 275#     3. Call C[facility] nurse if your weight is up more than 2 pounds in one day or 5 pounds in one week.     4. Call CORE nurse if you feel more short of breath, have more abdominal bloating, or leg swelling.     5. Continue low sodium diet (less than 2000 mg daily). If you eat less salt, you will retain less fluid.     6. Alcohol can weaken your heart further. You should avoid alcohol or limit its use to special times, such as a holiday or birthday.      7. Do NOT take Aleve or ibuprofen without talking to your doctor first.      8. Lab Results: **     [facility]: Cardiomyopathy, Optimization, Rehabilitation, Education  The [facility] is a heart failure specialty clinic within the [facility] where you will work with specialized nurse practitioners, physician assistants, doctors, and registered nurses. They are dedicated to helping patients with heart failure to carefully adjust medications, receive education, and learn who and when to call if symptoms develop. They specialize in helping you better understand your condition, slow the progression of your disease, improve the length and quality of your life, help you detect future heart problems before they become life threatening, and avoid hospitalizations.               Follow-ups after your visit        Additional Services     Follow-Up with [facility] Clinic           Follow-Up with Cardiologist                 Your next 10 appointments already scheduled     Jun 28, 2018  9:30 AM CDT   Cardiac Evaluation with  Cardiac Rehab 3   [facility] ([facility])    [address], [address]  [place] [address]   [phone]            Jul 05, 2018 11:00 AM CDT   MR MYOCARDIUM W CONTRAST with SCIMR1   [facility] (Cardiovascular Imaging at [facility])    [address]  [address]  [place] [address]   [phone]           Take your medicines as usual, unless your doctor tells you not to. Bring a list of your current medicines to your exam (including vitamins, minerals and over-the-counter drugs).  You may or may not receive intravenous (IV) contrast for this exam pending the discretion of the Radiologist.  You do not need to do anything special to prepare.  The MRI machine uses a strong magnet. Please wear clothes without metal (snaps, zippers). A sweatsuit works well, or we may give you a hospital gown.  Please remove any body piercings and hair extensions before you arrive. You will also remove watches, jewelry, hairpins, wallets, dentures, partial dental plates and hearing aids. You may wear contact lenses, and you may be able to wear your rings. We have a safe place to keep your personal items, but it is safer to leave them at home.  **IMPORTANT** THE INSTRUCTIONS BELOW ARE ONLY FOR THOSE PATIENTS WHO HAVE BEEN PRESCRIBED SEDATION OR GENERAL ANESTHESIA DURING THEIR MRI PROCEDURE:  IF YOUR DOCTOR PRESCRIBED ORAL SEDATION (take medicine to help you relax during your exam):   You must get the medicine from your doctor (oral medication) before you arrive. Bring the medicine to the exam. Do not take it at home. You ll be told when to take it upon arriving for your exam.   Arrive one hour early. Bring  someone who can take you home after the test. Your medicine will make you sleepy. After the exam, you may not drive, take a bus or take a taxi by yourself.  IF YOUR DOCTOR PRESCRIBED IV SEDATION:   Arrive one hour early. Bring someone who can take you home after the test. Your medicine will make you sleepy. After the exam, you may not drive, take a bus or take a taxi by yourself.   No eating 6 hours before your exam. You may have clear liquids up until 4 hours before your exam. (Clear liquids include water, clear tea, black coffee and fruit juice without pulp.)  IF YOUR DOCTOR PRESCRIBED ANESTHESIA (be asleep for your exam):   Arrive 1 1/2 hours early. Bring someone who can take you home after the test. You may not drive, take a bus or take a taxi by yourself.   No eating 8 hours before your exam. You may have clear liquids up until 4 hours before your exam. (Clear liquids include water, clear tea, black coffee and fruit juice without pulp.)   You will spend four to five hours in the recovery room.  Please call the Imaging Department at your exam site with any questions.              Future tests that were ordered for you today     Open Future Orders        Priority Expected Expires Ordered    Basic metabolic panel Routine 9/18/2018 6/20/2019 6/20/2018    N terminal pro BNP outpatient Routine 9/18/2018 6/20/2019 6/20/2018    Lipid Profile Routine 9/18/2018 6/20/2019 6/20/2018    ALT Routine 9/18/2018 6/20/2019 6/20/2018    Follow-Up with Cardiologist Routine 9/18/2018 6/20/2019 6/20/2018    Basic metabolic panel Routine 7/10/2018 6/20/2019 6/20/2018    Follow-Up with [facility] Clinic Routine 7/10/2018 6/20/2019 6/20/2018            Who to contact     If you have questions or need follow up information about today's clinic visit or your schedule please contact [facility] directly at [phone].  Normal or non-critical lab and imaging results will be communicated to you by Jairo  "letter or phone within 4 business days after the clinic has received the results. If you do not hear from us within 7 days, please contact the clinic through [employer] or phone. If you have a critical or abnormal lab result, we will notify you by phone as soon as possible.  Submit refill requests through [employer] or call your pharmacy and they will forward the refill request to us. Please allow 3 business days for your refill to be completed.          Additional Information About Your Visit        Care EveryWhere ID     This is your Care EveryWhere ID. This could be used by other organizations to access your [facility] medical records  [id number]        Your Vitals Were     Pulse Height Pulse Oximetry BMI (Body Mass Index)          86 1.778 m (5' 10\") 98% 38.94 kg/m2         Blood Pressure from Last 3 Encounters:   06/20/18 102/64   06/19/18 122/62   06/15/18 112/60    Weight from Last 3 Encounters:   06/20/18 123.1 kg (271 lb 6.4 oz)   06/19/18 124.3 kg (274 lb)   06/15/18 124.2 kg (273 lb 12.8 oz)               Primary Care Provider Office Phone # Fax #    Jefry Harris [phone]       [address] [place] [address]        Equal Access to Services     WAYNE HERNANDES AH: Hadii denae ku hadasho Soomaali, waaxda luqadaha, qaybta kaalmada adeegyada, roberto nolan. So [facility] [phone].    ATENCIÓN: Si habla español, tiene a garcia disposición servicios gratuitos de asistencia lingüística. ame al [phone].    We comply with applicable federal civil rights laws and [place] laws. We do not discriminate on the basis of race, color, national origin, age, disability, sex, sexual orientation, or gender identity.            Thank you!     Thank you for choosing [facility]  for your care. Our goal is always to provide you with excellent care. Hearing back from our patients is one way we can continue to improve our services. Please " take a few minutes to complete the written survey that you may receive in the mail after your visit with us. Thank you!             Your Updated Medication List - Protect others around you: Learn how to safely use, store and throw away your medicines at www.disposemymeds.org.          This list is accurate as of 6/20/18  1:50 PM.  Always use your most recent med list.                   Brand Name Dispense Instructions for use Diagnosis    albuterol 108 (90 Base) MCG/ACT Inhaler    PROAIR HFA/PROVENTIL HFA/VENTOLIN HFA     Inhale 2 puffs into the lungs every 6 hours as needed for shortness of breath / dyspnea or wheezing        aspirin 81 MG tablet     30 tablet    Take 1 tablet (81 mg) by mouth daily    Essential hypertension, benign       atorvastatin 80 MG tablet    LIPITOR    45 tablet    take one-half tablet by mouth at bedtime    Type 2 diabetes mellitus without complication, without long-term current use of insulin (H)       blood glucose monitoring test strip    ACCU-CHEK LUL PLUS    100 each    Use to test blood sugar 1-3 times daily or as directed.    Type 2 diabetes mellitus without complication (H)       clotrimazole 1 % cream    LOTRIMIN    15 g    Apply topically 2 times daily To axillary rash    Dermatophytosis of body       fenofibrate 160 MG tablet     90 tablet    Take 1 tablet (160 mg) by mouth daily    Type 2 diabetes mellitus without complication, without long-term current use of insulin (H)       glipiZIDE 10 MG 24 hr tablet    glipiZIDE XL    90 tablet    Take 1 tablet (10mg) by mouth daily    Type 2 diabetes mellitus without complication, without long-term current use of insulin (H)       guaiFENesin-codeine 100-10 MG/5ML Soln solution    ROBITUSSIN AC    120 mL    Take 5-10 mLs by mouth nightly as needed for cough    Chest congestion       liraglutide 18 MG/3ML soln    VICTOZA PEN    27 mL    Inject 1.8 mg under skin once daily    Type 2 diabetes mellitus without complication, without  long-term current use of insulin (H)       lisinopril 10 MG tablet    PRINIVIL/ZESTRIL    60 tablet    Take 1 tablet (10 mg) by mouth 2 times daily    Type 2 diabetes mellitus without complication, without long-term current use of insulin (H)       metFORMIN 1000 MG tablet    GLUCOPHAGE    180 tablet    TAKE ONE TABLET BY MOUTH TWICE A DAY WITH MEALS    Type 2 diabetes mellitus without complication, without long-term current use of insulin (H)       metoprolol succinate 25 MG 24 hr tablet    TOPROL-XL    30 tablet    Take 1 tablet (25 mg) by mouth every evening    Nonischemic cardiomyopathy (H)       spironolactone 25 MG tablet    ALDACTONE    30 tablet    Take 1 tablet (25 mg) by mouth daily    Nonischemic cardiomyopathy (H)       triamcinolone 0.5 % cream    KENALOG    30 g    Apply sparingly once or twice per day as needed to affected area until the skin is better, then stop    Eczema, unspecified eczema

## 2018-06-20 NOTE — LETTER
6/20/2018      Jefry Harris MD  [address] [facility] [address]      RE: Neymar Rhodes       Dear Colleague,    I had the pleasure of seeing Neymar Rhodes in the [facility].    Service Date: 06/20/2018      HISTORY OF PRESENT ILLNESS:  Neymar Rhodes is a pleasant 58-year-old male with a past medical history significant for diabetes mellitus, hypertension, hyperlipidemia and a family history of his father having myocardial infarction in his 60s.  Neymar also has obesity.      He recently was mowing his lawn which he normally does without problems and he was going up a hill when he suddenly developed shortness of breath, diaphoresis and walked in the house and called 911.  He then had some left upper arm discomfort which he attributed to his blood pressure cuff that the paramedics were using.  It did persist and the blood pressure cuff was moved to the other side.  In the emergency room, he was having severe respiratory distress.  Initial saturations were only in the 50s.  EKG demonstrated a new left bundle branch block from last EKG of 2013.  It appeared to be sinus tachycardia.  The patient was treated with BiPAP mask and diuresis with gradual improvement.  He was diagnosed with a non-ST segment elevation myocardial infarction with troponins positive.  He does have multiple risk factors.  He did proceed with coronary angiography which showed mild coronary artery disease.  At discharge, Dr. Iniguez, the consulting cardiologist suggested an outpatient cardiac MRI to look for etiology.     Admission weight was 285 pounds and discharge weight was 275 pounds.     He was started on heart failure medications.  He had been on lisinopril in the past, but was discontinued in the recent past.  Actos was discontinued.  He has been referred to us for sleep study in the past, but has declined.      He [facility] in the [facility] today.  Home weight today is 267 pounds.   He is reading every label, he is weighing every day and setting up his medications without any difficulties.  He denies shortness of breath at rest or with exertion.  He denies orthopnea or PND.  He denies syncope or near-syncope.      In the hospital, his echocardiogram showed an LVEF of 42%.  Mild to moderate 1-2+ mitral regurgitation.  His right heart showed normal systolic function and normal size.  His LV end-diastolic dimension was measured at 6.8.  His EKG showed a new left bundle branch block in normal sinus rhythm with a QRS duration at 160 milliseconds.      PHYSICAL EXAMINATION:   VITAL SIGNS:  Blood pressure 102/64, pulse is 86, weight is 271 pounds in the clinic and 267 pounds at home.        Please see complete physical examination below.      ASSESSMENT:  Mr. Rhodes is a pleasant 58-year-old patient who was recently hospitalized for shortness of breath.  It was initially felt he was a non-STEMI and treated with aspirin, heparin, beta blocker, statin and ACE inhibitor.  Dr. Iniguez was consulted.  Echocardiogram showed the above results and eventually he proceeded with coronary angiography showing mild coronary artery disease.  It appeared it was nonischemic, however, still unknown etiology.  Dr. Iniguez suggested an outpatient cardiac MRI to look for etiology.  He does have difficulty sleeping.  I spoke to him in length about a sleep study.  He declines at this time.  He recently was seen by his primary care doctor for his diabetes.  Actos was stopped in the hospital.      He is currently on lisinopril 10 mg twice a day and metoprolol XL and spironolactone.  His potassium has increased from 3.9 to 4.6.  He is not on a diuretic, only spironolactone 25 mg a day.  He will have a repeat basic metabolic panel in 2 weeks to look at his potassium again.  I have made no medication changes today.      PLAN:    1.  Possible sleep apnea.  Referral in the future hopefully.   2.  Cardiomyopathy, etiology  unknown.  Referral for cardiac MRI.   3.  Diabetes mellitus, follows with Dr. Harris.     4.  Repeat basic metabolic panel on .     5.  Follow up with myself, Moses Perez CNP on .         ARMAND MONIQUE, CNP             D: 2018   T: 2018   MT: CHIRAG      Name:     JACI WICK   MRN:      [id number]        Account:      [id number]   :      1960           Service Date: 2018      Document: [id number]           Outpatient Encounter Prescriptions as of 2018   Medication Sig Dispense Refill     albuterol (PROAIR HFA/PROVENTIL HFA/VENTOLIN HFA) 108 (90 Base) MCG/ACT Inhaler Inhale 2 puffs into the lungs every 6 hours as needed for shortness of breath / dyspnea or wheezing       aspirin 81 MG tablet Take 1 tablet (81 mg) by mouth daily 30 tablet      atorvastatin (LIPITOR) 80 MG tablet take one-half tablet by mouth at bedtime 45 tablet 0     blood glucose monitoring (ACCU-CHEK LUL PLUS) test strip Use to test blood sugar 1-3 times daily or as directed. 100 each 11     clotrimazole (LOTRIMIN) 1 % cream Apply topically 2 times daily To axillary rash 15 g 1     fenofibrate 160 MG tablet Take 1 tablet (160 mg) by mouth daily 90 tablet 2     glipiZIDE (GLIPIZIDE XL) 10 MG 24 hr tablet Take 1 tablet (10mg) by mouth daily 90 tablet 3     guaiFENesin-codeine (ROBITUSSIN AC) 100-10 MG/5ML SOLN solution Take 5-10 mLs by mouth nightly as needed for cough 120 mL 0     liraglutide (VICTOZA PEN) 18 MG/3ML soln Inject 1.8 mg under skin once daily 27 mL 0     lisinopril (PRINIVIL/ZESTRIL) 10 MG tablet Take 1 tablet (10 mg) by mouth 2 times daily 60 tablet 1     metoprolol succinate (TOPROL-XL) 25 MG 24 hr tablet Take 1 tablet (25 mg) by mouth every evening 30 tablet 1     spironolactone (ALDACTONE) 25 MG tablet Take 1 tablet (25 mg) by mouth daily 30 tablet 1     [DISCONTINUED] metFORMIN (GLUCOPHAGE) 1000 MG tablet TAKE ONE TABLET BY MOUTH TWICE A DAY WITH MEALS 180 tablet 1      triamcinolone (KENALOG) 0.5 % cream Apply sparingly once or twice per day as needed to affected area until the skin is better, then stop (Patient not taking: Reported on 6/20/2018) 30 g 0     No facility-administered encounter medications on file as of 6/20/2018.        Again, thank you for allowing me to participate in the care of your patient.      Sincerely,    ARMAND [facility]

## 2018-06-20 NOTE — PROGRESS NOTES
HPI and Plan:   See [id number]    Orders Placed This Encounter   Procedures     Basic metabolic panel     Basic metabolic panel     N terminal pro BNP outpatient     Lipid Profile     ALT     Follow-Up with [facility]     Follow-Up with Cardiologist       No orders of the defined types were placed in this encounter.      There are no discontinued medications.      Encounter Diagnosis   Name Primary?     Chronic systolic heart failure (H) Yes       CURRENT MEDICATIONS:  Current Outpatient Prescriptions   Medication Sig Dispense Refill     albuterol (PROAIR HFA/PROVENTIL HFA/VENTOLIN HFA) 108 (90 Base) MCG/ACT Inhaler Inhale 2 puffs into the lungs every 6 hours as needed for shortness of breath / dyspnea or wheezing       aspirin 81 MG tablet Take 1 tablet (81 mg) by mouth daily 30 tablet      atorvastatin (LIPITOR) 80 MG tablet take one-half tablet by mouth at bedtime 45 tablet 0     blood glucose monitoring (ACCU-CHEK LUL PLUS) test strip Use to test blood sugar 1-3 times daily or as directed. 100 each 11     clotrimazole (LOTRIMIN) 1 % cream Apply topically 2 times daily To axillary rash 15 g 1     fenofibrate 160 MG tablet Take 1 tablet (160 mg) by mouth daily 90 tablet 2     glipiZIDE (GLIPIZIDE XL) 10 MG 24 hr tablet Take 1 tablet (10mg) by mouth daily 90 tablet 3     guaiFENesin-codeine (ROBITUSSIN AC) 100-10 MG/5ML SOLN solution Take 5-10 mLs by mouth nightly as needed for cough 120 mL 0     liraglutide (VICTOZA PEN) 18 MG/3ML soln Inject 1.8 mg under skin once daily 27 mL 0     lisinopril (PRINIVIL/ZESTRIL) 10 MG tablet Take 1 tablet (10 mg) by mouth 2 times daily 60 tablet 1     metFORMIN (GLUCOPHAGE) 1000 MG tablet TAKE ONE TABLET BY MOUTH TWICE A DAY WITH MEALS 180 tablet 1     metoprolol succinate (TOPROL-XL) 25 MG 24 hr tablet Take 1 tablet (25 mg) by mouth every evening 30 tablet 1     spironolactone (ALDACTONE) 25 MG tablet Take 1 tablet (25 mg) by mouth daily 30 tablet 1     triamcinolone  (KENALOG) 0.5 % cream Apply sparingly once or twice per day as needed to affected area until the skin is better, then stop (Patient not taking: Reported on 6/20/2018) 30 g 0       ALLERGIES     Allergies   Allergen Reactions     No Known Drug Allergies        PAST MEDICAL HISTORY:  Past Medical History:   Diagnosis Date     Diverticulosis of colon (without mention of hemorrhage)      Essential hypertension, benign      Gout, unspecified      Morbid obesity (H)      Other and unspecified hyperlipidemia      Type II or unspecified type diabetes mellitus without mention of complication, not stated as uncontrolled        PAST SURGICAL HISTORY:  Past Surgical History:   Procedure Laterality Date     C APPENDECTOMY  1980's     HEART CATH CORONARY ANGIOGRAM WITHOUT PRESSURES  06/10/2018    normal coronary angiogram, nothing more than 10%     SURGICAL HISTORY OF -   2001    repair of colovesicular fistula       FAMILY HISTORY:  Family History   Problem Relation Age of Onset     Cancer Father 75     bladder cancer     Myocardial Infarction Father      Other - See Comments Father      smoking     Breast Cancer Mother      Breast Cancer Sister      Family History Negative Brother      Family History Negative Son      Family History Negative Son      fluttering/murmur     Asthma Son        SOCIAL HISTORY:  Social History     Social History     Marital status:      Spouse name: N/A     Number of children: N/A     Years of education: N/A     Social History Main Topics     Smoking status: Never Smoker     Smokeless tobacco: Never Used     Alcohol use Yes      Comment: 1 glass of beer on Fri     Drug use: No     Sexual activity: Yes     Partners: Female     Other Topics Concern     None     Social History Narrative       Review of Systems:  Skin:  Negative       Eyes:  Negative      ENT:  Negative      Respiratory:  Negative       Cardiovascular:  Negative      Gastroenterology: Negative      Genitourinary:  Negative     "  Musculoskeletal:  Negative      Neurologic:  Negative      Psychiatric:  Negative      Heme/Lymph/Imm:  Negative      Endocrine:  Negative        Physical Exam:  Vitals: /64  Pulse 86  Ht 1.778 m (5' 10\")  Wt 123.1 kg (271 lb 6.4 oz)  SpO2 98%  BMI 38.94 kg/m2    Constitutional:  cooperative, alert and oriented, well developed, well nourished, in no acute distress        Skin:  warm and dry to the touch, no apparent skin lesions or masses noted          Head:  normocephalic, no masses or lesions        Eyes:  pupils equal and round, conjunctivae and lids unremarkable, sclera white, no xanthalasma, EOMS intact, no nystagmus        Lymph:      ENT:           Neck:  carotid pulses are full and equal bilaterally, JVP normal, no carotid bruit        Respiratory:  clear to auscultation         Cardiac: regular rhythm, normal S1/S2, no S3 or S4, apical impulse not displaced, no murmurs, gallops or rubs                                                         GI:  abdomen soft, non-tender, BS normoactive, no mass, no HSM, no bruits        Extremities and Muscular Skeletal:  no deformities, clubbing, cyanosis, erythema observed              Neurological:  no gross motor deficits        Psych:  Alert and Oriented x 3        CC  No referring provider defined for this encounter.              "

## 2018-06-21 ENCOUNTER — CARE COORDINATION (OUTPATIENT)
Dept: CARDIOLOGY | Facility: CLINIC | Age: 58
End: 2018-06-21

## 2018-06-21 NOTE — PROGRESS NOTES
Moses Perez CNP ordered a BMP for 7/2/18 because of spironolactone recently being started. Called patient and reviewed the recommendation. Patient agreed with the plan. BMP scheduled for 7/2/18 in [place] and a confirmation letter was mailed. Dangelo TAYLOR

## 2018-06-28 ENCOUNTER — HOSPITAL ENCOUNTER (OUTPATIENT)
Dept: CARDIAC REHAB | Facility: CLINIC | Age: 58
End: 2018-06-28
Attending: INTERNAL MEDICINE
Payer: COMMERCIAL

## 2018-06-28 DIAGNOSIS — I25.5 ISCHEMIC CARDIOMYOPATHY: ICD-10-CM

## 2018-06-28 PROCEDURE — 93797 PHYS/QHP OP CAR RHAB WO ECG: CPT | Mod: XU | Performed by: OCCUPATIONAL THERAPIST

## 2018-06-28 PROCEDURE — 93798 PHYS/QHP OP CAR RHAB W/ECG: CPT | Performed by: OCCUPATIONAL THERAPIST

## 2018-06-28 PROCEDURE — 40000575 ZZH STATISTIC OP CARDIAC VISIT #2: Performed by: OCCUPATIONAL THERAPIST

## 2018-06-28 PROCEDURE — 40000116 ZZH STATISTIC OP CR VISIT: Performed by: OCCUPATIONAL THERAPIST

## 2018-07-02 DIAGNOSIS — I50.22 CHRONIC SYSTOLIC HEART FAILURE (H): ICD-10-CM

## 2018-07-02 LAB
ANION GAP SERPL CALCULATED.3IONS-SCNC: 13.6 MMOL/L (ref 6–17)
BUN SERPL-MCNC: 28 MG/DL (ref 7–30)
CALCIUM SERPL-MCNC: 10.2 MG/DL (ref 8.5–10.5)
CHLORIDE SERPL-SCNC: 103 MMOL/L (ref 98–107)
CO2 SERPL-SCNC: 25 MMOL/L (ref 23–29)
CREAT SERPL-MCNC: 1.01 MG/DL (ref 0.7–1.3)
GFR SERPL CREATININE-BSD FRML MDRD: 76 ML/MIN/1.7M2
GLUCOSE SERPL-MCNC: 199 MG/DL (ref 70–105)
POTASSIUM SERPL-SCNC: 4.6 MMOL/L (ref 3.5–5.1)
SODIUM SERPL-SCNC: 137 MMOL/L (ref 136–145)

## 2018-07-02 PROCEDURE — 80048 BASIC METABOLIC PNL TOTAL CA: CPT | Performed by: NURSE PRACTITIONER

## 2018-07-02 PROCEDURE — 36415 COLL VENOUS BLD VENIPUNCTURE: CPT | Performed by: NURSE PRACTITIONER

## 2018-07-05 ENCOUNTER — HOSPITAL ENCOUNTER (OUTPATIENT)
Dept: CARDIOLOGY | Facility: CLINIC | Age: 58
Discharge: HOME | End: 2018-07-05
Attending: PHYSICIAN ASSISTANT | Admitting: PHYSICIAN ASSISTANT
Payer: COMMERCIAL

## 2018-07-05 DIAGNOSIS — I42.8 NONISCHEMIC CARDIOMYOPATHY (H): ICD-10-CM

## 2018-07-05 PROCEDURE — A9585 GADOBUTROL INJECTION: HCPCS | Performed by: PHYSICIAN ASSISTANT

## 2018-07-05 PROCEDURE — 75561 CARDIAC MRI FOR MORPH W/DYE: CPT | Mod: 26 | Performed by: INTERNAL MEDICINE

## 2018-07-05 PROCEDURE — 25000128 H RX IP 250 OP 636: Performed by: PHYSICIAN ASSISTANT

## 2018-07-05 PROCEDURE — 75561 CARDIAC MRI FOR MORPH W/DYE: CPT

## 2018-07-05 RX ORDER — GADOBUTROL 604.72 MG/ML
5-65 INJECTION INTRAVENOUS ONCE
Status: COMPLETED | OUTPATIENT
Start: 2018-07-05 | End: 2018-07-05

## 2018-07-05 RX ADMIN — GADOBUTROL 16 ML: 604.72 INJECTION INTRAVENOUS at 12:21

## 2018-07-05 NOTE — PROGRESS NOTES
Cardiac Core protocol with T2FS and T2* images  Contrast administered per weight based protocol.  Per Dr Rowland

## 2018-07-09 ENCOUNTER — TELEPHONE (OUTPATIENT)
Dept: INTERNAL MEDICINE | Facility: CLINIC | Age: 58
End: 2018-07-09

## 2018-07-09 ENCOUNTER — CARE COORDINATION (OUTPATIENT)
Dept: CARDIOLOGY | Facility: CLINIC | Age: 58
End: 2018-07-09

## 2018-07-09 DIAGNOSIS — I10 BENIGN ESSENTIAL HYPERTENSION: Primary | ICD-10-CM

## 2018-07-09 DIAGNOSIS — E11.9 TYPE 2 DIABETES MELLITUS WITHOUT COMPLICATION, WITHOUT LONG-TERM CURRENT USE OF INSULIN (H): ICD-10-CM

## 2018-07-09 DIAGNOSIS — I24.9 ACS (ACUTE CORONARY SYNDROME) (H): ICD-10-CM

## 2018-07-09 RX ORDER — LISINOPRIL 20 MG/1
10 TABLET ORAL 2 TIMES DAILY
Qty: 90 TABLET | Refills: 1 | Status: SHIPPED | OUTPATIENT
Start: 2018-07-09 | End: 2019-04-15 | Stop reason: DRUGHIGH

## 2018-07-09 NOTE — TELEPHONE ENCOUNTER
Chane Lisinopril to 10 mg ( 1/2 of 20 mg tablet) twice per day.   New RX sent.   This should cost no more than $4 for one month, or $10 for three months.   If he is paying more than that, then get this RX at [facility].   The cash price for this is $4/month, or $10/3 month.

## 2018-07-09 NOTE — TELEPHONE ENCOUNTER
Pharmacy called and stated insurance will not cover rx for lisinopril (PRINIVIL/ZESTRIL) 10 MG tablet as prescribed per hospital MD so pharmacy is asking if pcp would like to do 20mg 1/2 tab daily or go back to 1 tab daily. Please advise. Call back # is [phone]

## 2018-07-09 NOTE — PROGRESS NOTES
[facility] received VM from a Marcella from [facility] asking for information related to Pt's EF and treatment plan for cardiomyopathy. Voice mail stated pt was in denial about having heart condition as well as a MI. Writer called and spoke to pt's wife. She is not aware of ins calling pt. He is at work and she will ask him when he gets home. Writer asked wife to have pt call CORE valdislav. Wife states she will give him the message.  Fay Huerta RN 4:24 PM 07/09/18

## 2018-07-09 NOTE — PROGRESS NOTES
Received VM from BRANDON Lawrence with [employer]. Requesting call back to review pt's diagnosis and LVEF, stating patient confused about his dx and doesn't seem to be aware that he has HF or had an MI. She would like to facilitate teaching with him but needs more info on patient's dx first. Call back number is [phone].

## 2018-07-23 ENCOUNTER — DOCUMENTATION ONLY (OUTPATIENT)
Dept: CARDIOLOGY | Facility: CLINIC | Age: 58
End: 2018-07-23

## 2018-07-23 NOTE — TELEPHONE ENCOUNTER
Sorry I thought you were questioning whether we needed it. Yes refer him as he has a CM disproportionate to CAD. Thanks

## 2018-08-06 DIAGNOSIS — I42.8 NONISCHEMIC CARDIOMYOPATHY (H): ICD-10-CM

## 2018-08-07 NOTE — TELEPHONE ENCOUNTER
"Requested Prescriptions   Pending Prescriptions Disp Refills     spironolactone (ALDACTONE) 25 MG tablet  Last Written Prescription Date:  06/14/2018  Last Fill Quantity: 30,  # refills: 01   Last office visit: 6/19/2018 with prescribing provider:  06/19/2018   Future Office Visit:   Next 5 appointments (look out 90 days)     Oct 02, 2018  4:15 PM CDT   Return Visit with Harris Iniguez MD   [facility] ([facility])    [address] [place] [id number] [phone] OPT 2                  30 tablet 1     Sig: Take 1 tablet (25 mg) by mouth daily    Diuretics (Including Combos) Protocol Passed    8/6/2018  8:09 PM       Passed - Blood pressure under 140/90 in past 12 months    BP Readings from Last 3 Encounters:   06/20/18 102/64   06/19/18 122/62   06/15/18 112/60                Passed - Recent (12 mo) or future (30 days) visit within the authorizing provider's specialty    Patient had office visit in the last 12 months or has a visit in the next 30 days with authorizing provider or within the authorizing provider's specialty.  See \"Patient Info\" tab in inbasket, or \"Choose Columns\" in Meds & Orders section of the refill encounter.           Passed - Patient is age 18 or older       Passed - Normal serum creatinine on file in past 12 months    Recent Labs   Lab Test  07/02/18   0925   CR  1.01             Passed - Normal serum potassium on file in past 12 months    Recent Labs   Lab Test  07/02/18   0925   POTASSIUM  4.6                   Passed - Normal serum sodium on file in past 12 months    Recent Labs   Lab Test  07/02/18   0925   NA  137              metoprolol succinate (TOPROL-XL) 25 MG 24 hr tablet  Last Written Prescription Date:  06/13/2018  Last Fill Quantity: 30,  # refills: 01   Last office visit: 6/19/2018 with prescribing provider:  06/19/2018   Future Office Visit:   Next 5 appointments (look out 90 days)     Oct 02, 2018  4:15 " "PM CDT   Return Visit with Harris Iniguez MD   [facility] ([facility])    [address] [address]  [place] [id number] [phone] OPT 2                  30 tablet 1     Sig: Take 1 tablet (25 mg) by mouth every evening    Beta-Blockers Protocol Passed    8/6/2018  8:09 PM       Passed - Blood pressure under 140/90 in past 12 months    BP Readings from Last 3 Encounters:   06/20/18 102/64   06/19/18 122/62   06/15/18 112/60                Passed - Patient is age 6 or older       Passed - Recent (12 mo) or future (30 days) visit within the authorizing provider's specialty    Patient had office visit in the last 12 months or has a visit in the next 30 days with authorizing provider or within the authorizing provider's specialty.  See \"Patient Info\" tab in inbasket, or \"Choose Columns\" in Meds & Orders section of the refill encounter.              "

## 2018-08-08 RX ORDER — METOPROLOL SUCCINATE 25 MG/1
25 TABLET, EXTENDED RELEASE ORAL EVERY EVENING
Qty: 30 TABLET | Refills: 9 | Status: SHIPPED | OUTPATIENT
Start: 2018-08-08 | End: 2019-05-08

## 2018-08-08 RX ORDER — SPIRONOLACTONE 25 MG/1
25 TABLET ORAL DAILY
Qty: 30 TABLET | Refills: 9 | Status: SHIPPED | OUTPATIENT
Start: 2018-08-08 | End: 2019-05-08

## 2018-08-30 DIAGNOSIS — E11.9 TYPE 2 DIABETES MELLITUS WITHOUT COMPLICATION, WITHOUT LONG-TERM CURRENT USE OF INSULIN (H): ICD-10-CM

## 2018-08-30 NOTE — ADDENDUM NOTE
Encounter addended by: Acacia Mcgill on: 8/30/2018  8:16 AM<BR>     Actions taken: Sign clinical note

## 2018-08-30 NOTE — TELEPHONE ENCOUNTER
Requested Prescriptions   Pending Prescriptions Disp Refills     metFORMIN (GLUCOPHAGE) 1000 MG tablet [Pharmacy Med Name: MetFORMIN HCl Oral Tablet 1000 MG]  Last Written Prescription Date:  06/21/2018  Last Fill Quantity: 60,  # refills: 0   Last Office Visit: 6/19/2018   Future Office Visit:    Next 5 appointments (look out 90 days)     Oct 02, 2018  4:15 PM CDT   Return Visit with Harris Iniguez MD   [facility] ([facility])    [address] [place] [id number] [phone] OPT 2                  60 tablet 0     Sig: TAKE ONE TABLET BY MOUTH TWICE A DAY WITH MEALS    Biguanide Agents Failed    8/30/2018  7:02 AM       Failed - Patient has had a Microalbumin in the past 15 mos.    Recent Labs   Lab Test  01/20/17   0911   MICROL  22   UMALCR  9.37            Passed - Blood pressure less than 140/90 in past 6 months    BP Readings from Last 3 Encounters:   06/20/18 102/64   06/19/18 122/62   06/15/18 112/60                Passed - Patient has documented LDL within the past 12 mos.    Recent Labs   Lab Test  06/11/18   0420   LDL  59            Passed - Patient is age 10 or older       Passed - Patient has documented A1c within the specified period of time.    If HgbA1C is 8 or greater, it needs to be on file within the past 3 months.  If less than 8, must be on file within the past 6 months.     Recent Labs   Lab Test  06/11/18   0040   A1C  7.6*            Passed - Patient's CR is NOT>1.4 OR Patient's EGFR is NOT<45 within past 12 mos.    Recent Labs   Lab Test  07/02/18   0925   GFRESTIMATED  76   GFRESTBLACK  >90       Recent Labs   Lab Test  07/02/18   0925   CR  1.01            Passed - Patient does NOT have a diagnosis of CHF.       Passed - Recent (6 mo) or future (30 days) visit within the authorizing provider's specialty    Patient had office visit in the last 6 months or has a visit in the next 30 days with authorizing provider or  "within the authorizing provider's specialty.  See \"Patient Info\" tab in inbasket, or \"Choose Columns\" in Meds & Orders section of the refill encounter.              "

## 2018-08-30 NOTE — PROGRESS NOTES
Cardiac Rehab Discharge Summary    Reason for discharge:    Patient/family request discontinuation of services.    Progress towards goals:  Goals partially met.  Barriers to achieving goals:   discharge from facility.    Recommendation(s):    Continue home exercise program.

## 2018-09-17 DIAGNOSIS — E11.9 TYPE 2 DIABETES MELLITUS WITHOUT COMPLICATION, WITHOUT LONG-TERM CURRENT USE OF INSULIN (H): ICD-10-CM

## 2018-09-18 RX ORDER — LIRAGLUTIDE 6 MG/ML
INJECTION SUBCUTANEOUS
Qty: 27 ML | Refills: 0 | Status: SHIPPED | OUTPATIENT
Start: 2018-09-18 | End: 2018-10-12

## 2018-09-18 NOTE — TELEPHONE ENCOUNTER
"Requested Prescriptions   Pending Prescriptions Disp Refills     VICTOZA PEN 18 MG/3ML soln [Pharmacy Med Name: Victoza Subcutaneous Solution Pen-injector 18 MG/3ML]  Last Written Prescription Date:  01/01/2018  Last Fill Quantity: 27mL,  # refills: 0   Last Office Visit: 6/19/2018   Future Office Visit:    Next 5 appointments (look out 90 days)     Oct 02, 2018  4:15 PM CDT   Return Visit with Harris Iniguez MD   [facility] ([facility])    [address] [place] [id number] [phone] OPT 2                  27 mL 0     Sig: Inject 1.8 mg under skin once daily    GLP-1 Agonists Protocol Failed    9/17/2018  7:00 AM       Failed - Microalbumin on file in past 12 months    Recent Labs   Lab Test  01/20/17   0911   MICROL  22   UMALCR  9.37            Passed - Blood pressure less than 140/90 in past 6 months    BP Readings from Last 3 Encounters:   06/20/18 102/64   06/19/18 122/62   06/15/18 112/60                Passed - LDL on file in past 12 months    Recent Labs   Lab Test  06/11/18   0420   LDL  59            Passed - HgbA1C in past 3 or 6 months    If HgbA1C is 8 or greater, it needs to be on file within the past 3 months.  If less than 8, must be on file within the past 6 months.     Recent Labs   Lab Test  06/11/18   0040   A1C  7.6*            Passed - Patient is age 18 or older       Passed - Normal serum creatinine on file in past 12 months    Recent Labs   Lab Test  07/02/18   0925   CR  1.01            Passed - Recent (6 mo) or future (30 days) visit within the authorizing provider's specialty    Patient had office visit in the last 6 months or has a visit in the next 30 days with authorizing provider.  See \"Patient Info\" tab in inbasket, or \"Choose Columns\" in Meds & Orders section of the refill encounter.              "

## 2018-09-21 ENCOUNTER — PRE VISIT (OUTPATIENT)
Dept: CARDIOLOGY | Facility: CLINIC | Age: 58
End: 2018-09-21

## 2018-09-21 DIAGNOSIS — I34.0 NONRHEUMATIC MITRAL VALVE REGURGITATION: ICD-10-CM

## 2018-09-21 DIAGNOSIS — I42.8 NON-ISCHEMIC CARDIOMYOPATHY (H): ICD-10-CM

## 2018-09-21 DIAGNOSIS — I44.7 LBBB (LEFT BUNDLE BRANCH BLOCK): ICD-10-CM

## 2018-09-21 DIAGNOSIS — I77.810 ASCENDING AORTA DILATATION (H): ICD-10-CM

## 2018-09-21 DIAGNOSIS — I21.4 NSTEMI (NON-ST ELEVATED MYOCARDIAL INFARCTION) (H): ICD-10-CM

## 2018-09-26 DIAGNOSIS — E11.9 TYPE 2 DIABETES MELLITUS WITHOUT COMPLICATION, WITHOUT LONG-TERM CURRENT USE OF INSULIN (H): ICD-10-CM

## 2018-09-26 NOTE — TELEPHONE ENCOUNTER
1 month prescription sent electronically to the specified pharmacy.  LAST REFILL WITHOUT AN APPOINTMENT   OVERDUE FOR OFFICE VISIT

## 2018-09-26 NOTE — TELEPHONE ENCOUNTER
"Requested Prescriptions   Pending Prescriptions Disp Refills     metFORMIN (GLUCOPHAGE) 1000 MG tablet [Pharmacy Med Name: MetFORMIN HCl Oral Tablet 1000 MG] 60 tablet 0     Sig: TAKE ONE TABLET BY MOUTH TWICE A DAY WITH MEALS    Biguanide Agents Failed    9/26/2018  5:53 AM       Failed - Patient has had a Microalbumin in the past 15 mos.    Recent Labs   Lab Test  01/20/17   0911   MICROL  22   UMALCR  9.37            Passed - Blood pressure less than 140/90 in past 6 months    BP Readings from Last 3 Encounters:   06/20/18 102/64   06/19/18 122/62   06/15/18 112/60                Passed - Patient has documented LDL within the past 12 mos.    Recent Labs   Lab Test  06/11/18   0420   LDL  59            Passed - Patient is age 10 or older       Passed - Patient has documented A1c within the specified period of time.    If HgbA1C is 8 or greater, it needs to be on file within the past 3 months.  If less than 8, must be on file within the past 6 months.     Recent Labs   Lab Test  06/11/18   0040   A1C  7.6*            Passed - Patient's CR is NOT>1.4 OR Patient's EGFR is NOT<45 within past 12 mos.    Recent Labs   Lab Test  07/02/18   0925   GFRESTIMATED  76   GFRESTBLACK  >90       Recent Labs   Lab Test  07/02/18   0925   CR  1.01            Passed - Patient does NOT have a diagnosis of CHF.       Passed - Recent (6 mo) or future (30 days) visit within the authorizing provider's specialty    Patient had office visit in the last 6 months or has a visit in the next 30 days with authorizing provider or within the authorizing provider's specialty.  See \"Patient Info\" tab in inbasket, or \"Choose Columns\" in Meds & Orders section of the refill encounter.            Last Written Prescription Date:  08/31/2018  Last Fill Quantity: 60,  # refills: 0   Last office visit: 6/19/2018 with prescribing provider:  Dr. Harris   Future Office Visit:   Next 5 appointments (look out 90 days)     Oct 02, 2018  4:15 PM CDT   Return " Visit with Harris Iniguez MD   [facility] ([facility])    [address]  [facility] [address] [phone] OPT 2

## 2018-09-26 NOTE — TELEPHONE ENCOUNTER
Routing refill request to provider for review/approval because:  Labs not current:  Microalbumin  Patient also due for diabetes follow up, per 6/19/2018 LOV note.    Debbie SETHI RN, BSN, PHN

## 2018-10-03 ENCOUNTER — OFFICE VISIT (OUTPATIENT)
Dept: URGENT CARE | Facility: URGENT CARE | Age: 58
End: 2018-10-03
Payer: COMMERCIAL

## 2018-10-03 VITALS
BODY MASS INDEX: 39.95 KG/M2 | WEIGHT: 278.4 LBS | RESPIRATION RATE: 20 BRPM | TEMPERATURE: 97.2 F | HEART RATE: 63 BPM | SYSTOLIC BLOOD PRESSURE: 120 MMHG | DIASTOLIC BLOOD PRESSURE: 70 MMHG | OXYGEN SATURATION: 97 %

## 2018-10-03 DIAGNOSIS — I50.22 CHRONIC SYSTOLIC HEART FAILURE (H): ICD-10-CM

## 2018-10-03 DIAGNOSIS — R05.8 PRODUCTIVE COUGH: Primary | ICD-10-CM

## 2018-10-03 DIAGNOSIS — E11.9 TYPE 2 DIABETES MELLITUS WITHOUT COMPLICATION, WITHOUT LONG-TERM CURRENT USE OF INSULIN (H): ICD-10-CM

## 2018-10-03 DIAGNOSIS — R06.2 WHEEZING: ICD-10-CM

## 2018-10-03 LAB
ALT SERPL W P-5'-P-CCNC: 33 U/L (ref 0–70)
ANION GAP SERPL CALCULATED.3IONS-SCNC: 6 MMOL/L (ref 3–14)
BUN SERPL-MCNC: 19 MG/DL (ref 7–30)
CALCIUM SERPL-MCNC: 8.9 MG/DL (ref 8.5–10.1)
CHLORIDE SERPL-SCNC: 104 MMOL/L (ref 94–109)
CHOLEST SERPL-MCNC: 104 MG/DL
CO2 SERPL-SCNC: 26 MMOL/L (ref 20–32)
CREAT SERPL-MCNC: 0.77 MG/DL (ref 0.66–1.25)
GFR SERPL CREATININE-BSD FRML MDRD: >90 ML/MIN/1.7M2
GLUCOSE SERPL-MCNC: 262 MG/DL (ref 70–99)
HBA1C MFR BLD: 8.7 % (ref 0–5.6)
HDLC SERPL-MCNC: 48 MG/DL
LDLC SERPL CALC-MCNC: 40 MG/DL
NONHDLC SERPL-MCNC: 56 MG/DL
POTASSIUM SERPL-SCNC: 4.7 MMOL/L (ref 3.4–5.3)
SODIUM SERPL-SCNC: 136 MMOL/L (ref 133–144)
TRIGL SERPL-MCNC: 81 MG/DL
TSH SERPL DL<=0.005 MIU/L-ACNC: 3.5 MU/L (ref 0.4–4)

## 2018-10-03 PROCEDURE — 84443 ASSAY THYROID STIM HORMONE: CPT | Performed by: INTERNAL MEDICINE

## 2018-10-03 PROCEDURE — 83036 HEMOGLOBIN GLYCOSYLATED A1C: CPT | Performed by: INTERNAL MEDICINE

## 2018-10-03 PROCEDURE — 36415 COLL VENOUS BLD VENIPUNCTURE: CPT | Performed by: INTERNAL MEDICINE

## 2018-10-03 PROCEDURE — 99214 OFFICE O/P EST MOD 30 MIN: CPT | Mod: 25 | Performed by: PHYSICIAN ASSISTANT

## 2018-10-03 PROCEDURE — 94640 AIRWAY INHALATION TREATMENT: CPT | Performed by: PHYSICIAN ASSISTANT

## 2018-10-03 PROCEDURE — 80048 BASIC METABOLIC PNL TOTAL CA: CPT | Performed by: INTERNAL MEDICINE

## 2018-10-03 PROCEDURE — 84460 ALANINE AMINO (ALT) (SGPT): CPT | Performed by: INTERNAL MEDICINE

## 2018-10-03 PROCEDURE — 80061 LIPID PANEL: CPT | Performed by: INTERNAL MEDICINE

## 2018-10-03 RX ORDER — DOXYCYCLINE 100 MG/1
100 CAPSULE ORAL 2 TIMES DAILY
Qty: 28 CAPSULE | Refills: 0 | Status: SHIPPED | OUTPATIENT
Start: 2018-10-03 | End: 2019-04-15

## 2018-10-03 RX ORDER — PREDNISONE 20 MG/1
20 TABLET ORAL DAILY
Qty: 5 TABLET | Refills: 0 | Status: SHIPPED | OUTPATIENT
Start: 2018-10-03 | End: 2018-10-12

## 2018-10-03 RX ORDER — ALBUTEROL SULFATE 90 UG/1
2 AEROSOL, METERED RESPIRATORY (INHALATION) EVERY 6 HOURS PRN
Qty: 1 INHALER | Refills: 0 | Status: SHIPPED | OUTPATIENT
Start: 2018-10-03 | End: 2018-10-12

## 2018-10-03 RX ORDER — IPRATROPIUM BROMIDE AND ALBUTEROL SULFATE 2.5; .5 MG/3ML; MG/3ML
SOLUTION RESPIRATORY (INHALATION)
Qty: 3 ML | Refills: 0
Start: 2018-10-03 | End: 2018-10-12

## 2018-10-03 NOTE — PATIENT INSTRUCTIONS
(R05) Productive cough  (primary encounter diagnosis)  Comment:   Plan: doxycycline monohydrate 100 MG capsule            (R06.2) Wheezing  Comment:   Plan: INHALATION/NEBULIZER TREATMENT, INITIAL,         ipratropium - albuterol 0.5 mg/2.5 mg/3 mL         (DUONEB) 0.5-2.5 (3) MG/3ML neb solution,         ALBUTEROL/IPRATROPIUM 3ML NEB, predniSONE         (DELTASONE) 20 MG tablet, albuterol (PROAIR         HFA/PROVENTIL HFA/VENTOLIN HFA) 108 (90 Base)         MCG/ACT inhaler            Rest.    Follow up with primary clinic for re-check in one week, sooner should symptoms persist or worsen.

## 2018-10-03 NOTE — LETTER
[place] [facility]  [address] [place] [address] [address]  [phone]      October 3, 2018    RE:  Neymar Rhodes                                                                                                                                                       [address]  [facility] [address]            To whom it may concern:    Neymar Rhodes was seen in clinic today for illness.  He may return to work when he has been fever free for 24 hours.        Sincerely,        Tosha Haro [facility]

## 2018-10-03 NOTE — MR AVS SNAPSHOT
After Visit Summary   10/3/2018    Neymar Rhodes    MRN: [id number]           Patient Information     Date Of Birth          1960        Visit Information        Provider Department      10/3/2018 9:15 AM Tosha Qiu PA-C [facility]        Today's Diagnoses     Productive cough    -  1    Wheezing          Care Instructions    (R05) Productive cough  (primary encounter diagnosis)  Comment:   Plan: doxycycline monohydrate 100 MG capsule            (R06.2) Wheezing  Comment:   Plan: INHALATION/NEBULIZER TREATMENT, INITIAL,         ipratropium - albuterol 0.5 mg/2.5 mg/3 mL         (DUONEB) 0.5-2.5 (3) MG/3ML neb solution,         ALBUTEROL/IPRATROPIUM 3ML NEB, predniSONE         (DELTASONE) 20 MG tablet, albuterol (PROAIR         HFA/PROVENTIL HFA/VENTOLIN HFA) 108 (90 Base)         MCG/ACT inhaler            Rest.    Follow up with primary clinic for re-check in one week, sooner should symptoms persist or worsen.                  Follow-ups after your visit        Who to contact     If you have questions or need follow up information about today's clinic visit or your schedule please contact [facility] directly at [phone].  Normal or non-critical lab and imaging results will be communicated to you by MyChart, letter or phone within 4 business days after the clinic has received the results. If you do not hear from us within 7 days, please contact the clinic through MyChart or phone. If you have a critical or abnormal lab result, we will notify you by phone as soon as possible.  Submit refill requests through [employer] or call your pharmacy and they will forward the refill request to us. Please allow 3 business days for your refill to be completed.          Additional Information About Your Visit        Care EveryWhere ID     This is your Care EveryWhere ID. This could be used by other organizations to access your  [facility] medical records  [id number]        Your Vitals Were     Pulse Temperature Respirations Pulse Oximetry BMI (Body Mass Index)       63 97.2  F (36.2  C) 20 97% 39.95 kg/m2        Blood Pressure from Last 3 Encounters:   10/03/18 120/70   06/20/18 102/64   06/19/18 122/62    Weight from Last 3 Encounters:   10/03/18 278 lb 6.4 oz (126.3 kg)   06/20/18 271 lb 6.4 oz (123.1 kg)   06/19/18 274 lb (124.3 kg)              We Performed the Following     ALBUTEROL/IPRATROPIUM 3ML NEB     INHALATION/NEBULIZER TREATMENT, INITIAL          Today's Medication Changes          These changes are accurate as of 10/3/18 10:58 AM.  If you have any questions, ask your nurse or doctor.               Start taking these medicines.        Dose/Directions    doxycycline monohydrate 100 MG capsule   Used for:  Productive cough   Started by:  Tosha Qiu PA-C        Dose:  100 mg   Take 1 capsule (100 mg) by mouth 2 times daily for 14 days   Quantity:  28 capsule   Refills:  0       ipratropium - albuterol 0.5 mg/2.5 mg/3 mL 0.5-2.5 (3) MG/3ML neb solution   Commonly known as:  DUONEB   Used for:  Wheezing   Started by:  Tosha Qiu PA-C        One in neb in clinic   Quantity:  3 mL   Refills:  0       predniSONE 20 MG tablet   Commonly known as:  DELTASONE   Used for:  Wheezing   Started by:  Tosha Qiu PA-C        Dose:  20 mg   Take 1 tablet (20 mg) by mouth daily   Quantity:  5 tablet   Refills:  0         These medicines have changed or have updated prescriptions.        Dose/Directions    * albuterol 108 (90 Base) MCG/ACT inhaler   Commonly known as:  PROAIR HFA/PROVENTIL HFA/VENTOLIN HFA   This may have changed:  Another medication with the same name was added. Make sure you understand how and when to take each.   Changed by:  Tosha Qiu PA-C        Dose:  2 puff   Inhale 2 puffs into the lungs every 6 hours as needed for shortness of breath / dyspnea or wheezing    Refills:  0       * albuterol 108 (90 Base) MCG/ACT inhaler   Commonly known as:  PROAIR HFA/PROVENTIL HFA/VENTOLIN HFA   This may have changed:  You were already taking a medication with the same name, and this prescription was added. Make sure you understand how and when to take each.   Used for:  Wheezing   Changed by:  Tosha Qiu PA-C        Dose:  2 puff   Inhale 2 puffs into the lungs every 6 hours as needed for shortness of breath / dyspnea or wheezing   Quantity:  1 Inhaler   Refills:  0       * Notice:  This list has 2 medication(s) that are the same as other medications prescribed for you. Read the directions carefully, and ask your doctor or other care provider to review them with you.         Where to get your medicines      These medications were sent to [id number] - [place], [place] - [address]  [address] [address]     Phone:  [phone]     albuterol 108 (90 Base) MCG/ACT inhaler    doxycycline monohydrate 100 MG capsule    predniSONE 20 MG tablet         Some of these will need a paper prescription and others can be bought over the counter.  Ask your nurse if you have questions.     You don't need a prescription for these medications     ipratropium - albuterol 0.5 mg/2.5 mg/3 mL 0.5-2.5 (3) MG/3ML neb solution                Primary Care Provider Office Phone # Fax #    Jefry Harris [phone]       [address] [facility] [address]        Equal Access to Services     [facility] HospitalZHANE AH: Hadii aad ku hadasho Soomaali, waaxda luqadaha, qaybta kaalmada adeegyada, roberto yusuf . So [facility] [phone].    ATENCIÓN: Si habla español, tiene a garcia disposición servicios gratuitos de asistencia lingüística. Yvette al [phone].    We comply with applicable federal civil rights laws and [place] laws. We do not discriminate on the basis of race, color, national origin, age, disability, sex,  sexual orientation, or gender identity.            Thank you!     Thank you for choosing [place] [facility]  for your care. Our goal is always to provide you with excellent care. Hearing back from our patients is one way we can continue to improve our services. Please take a few minutes to complete the written survey that you may receive in the mail after your visit with us. Thank you!             Your Updated Medication List - Protect others around you: Learn how to safely use, store and throw away your medicines at www.disposemymeds.org.          This list is accurate as of 10/3/18 10:58 AM.  Always use your most recent med list.                   Brand Name Dispense Instructions for use Diagnosis    * albuterol 108 (90 Base) MCG/ACT inhaler    PROAIR HFA/PROVENTIL HFA/VENTOLIN HFA     Inhale 2 puffs into the lungs every 6 hours as needed for shortness of breath / dyspnea or wheezing        * albuterol 108 (90 Base) MCG/ACT inhaler    PROAIR HFA/PROVENTIL HFA/VENTOLIN HFA    1 Inhaler    Inhale 2 puffs into the lungs every 6 hours as needed for shortness of breath / dyspnea or wheezing    Wheezing       aspirin 81 MG tablet     30 tablet    Take 1 tablet (81 mg) by mouth daily    Essential hypertension, benign       atorvastatin 80 MG tablet    LIPITOR    45 tablet    take one-half tablet by mouth at bedtime    Type 2 diabetes mellitus without complication, without long-term current use of insulin (H)       BD ULTRA-FINE 29G X 12.7MM   Generic drug:  insulin pen needle     100 each    use once daily with victoza pen    Type 2 diabetes mellitus without complication, without long-term current use of insulin (H)       blood glucose monitoring test strip    ACCU-CHEK LUL PLUS    100 each    Use to test blood sugar 1-3 times daily or as directed.    Type 2 diabetes mellitus without complication (H)       clotrimazole 1 % cream    LOTRIMIN    15 g    Apply topically 2 times daily To axillary rash     Dermatophytosis of body       doxycycline monohydrate 100 MG capsule     28 capsule    Take 1 capsule (100 mg) by mouth 2 times daily for 14 days    Productive cough       fenofibrate 160 MG tablet     90 tablet    Take 1 tablet (160 mg) by mouth daily    Type 2 diabetes mellitus without complication, without long-term current use of insulin (H)       glipiZIDE 10 MG 24 hr tablet    glipiZIDE XL    90 tablet    Take 1 tablet (10mg) by mouth daily    Type 2 diabetes mellitus without complication, without long-term current use of insulin (H)       guaiFENesin-codeine 100-10 MG/5ML Soln solution    ROBITUSSIN AC    120 mL    Take 5-10 mLs by mouth nightly as needed for cough    Chest congestion       ipratropium - albuterol 0.5 mg/2.5 mg/3 mL 0.5-2.5 (3) MG/3ML neb solution    DUONEB    3 mL    One in neb in clinic    Wheezing       * liraglutide 18 MG/3ML soln    VICTOZA PEN    27 mL    Inject 1.8 mg under skin once daily    Type 2 diabetes mellitus without complication, without long-term current use of insulin (H)       * VICTOZA PEN 18 MG/3ML soln   Generic drug:  liraglutide     27 mL    Inject 1.8 mg under skin once daily    Type 2 diabetes mellitus without complication, without long-term current use of insulin (H)       lisinopril 20 MG tablet    PRINIVIL/ZESTRIL    90 tablet    Take 0.5 tablets (10 mg) by mouth 2 times daily    Type 2 diabetes mellitus without complication, without long-term current use of insulin (H), Benign essential hypertension, ACS (acute coronary syndrome) (H)       metFORMIN 1000 MG tablet    GLUCOPHAGE    60 tablet    Take 1 tablet (1,000 mg) by mouth 2 times daily (with meals) LAST REFILL WITHOUT AN APPOINTMENT    Type 2 diabetes mellitus without complication, without long-term current use of insulin (H)       metoprolol succinate 25 MG 24 hr tablet    TOPROL-XL    30 tablet    Take 1 tablet (25 mg) by mouth every evening    Nonischemic cardiomyopathy (H)       pioglitazone 45 MG  tablet    ACTOS    90 tablet    TAKE ONE TABLET BY MOUTH ONE TIME DAILY    Type 2 diabetes mellitus without complication, without long-term current use of insulin (H)       predniSONE 20 MG tablet    DELTASONE    5 tablet    Take 1 tablet (20 mg) by mouth daily    Wheezing       spironolactone 25 MG tablet    ALDACTONE    30 tablet    Take 1 tablet (25 mg) by mouth daily    Nonischemic cardiomyopathy (H)       triamcinolone 0.5 % cream    KENALOG    30 g    Apply sparingly once or twice per day as needed to affected area until the skin is better, then stop    Eczema, unspecified eczema       * Notice:  This list has 4 medication(s) that are the same as other medications prescribed for you. Read the directions carefully, and ask your doctor or other care provider to review them with you.

## 2018-10-03 NOTE — PROGRESS NOTES
SUBJECTIVE:   Neymar Rhodes is a 58 year old male presenting with a chief complaint of:  1) watery eyes, itching  2) runny nose and sneezing  3) productive cough for over a week.    Onset of symptoms was as above.  Course of illness is worsening.    Severity moderate  Current and Associated symptoms: as above  Treatment measures tried include otc meds.  Predisposing factors include multiple underlying medical problems.    Non smoker..    Past Medical History:   Diagnosis Date     Ascending aorta dilatation (H)      Diverticulosis of colon (without mention of hemorrhage)      Essential hypertension, benign      Gout, unspecified      LBBB (left bundle branch block)      Morbid obesity (H)      Non-ischemic cardiomyopathy (H)      Nonrheumatic mitral valve regurgitation      NSTEMI (non-ST elevated myocardial infarction) (H)     cardiac cath 6/2018: mild non-obstructive CAD     Other and unspecified hyperlipidemia      Type II or unspecified type diabetes mellitus without mention of complication, not stated as uncontrolled      Patient Active Problem List   Diagnosis     Diverticulosis of large intestine     Benign essential hypertension     Gout     Morbid obesity with BMI of 40.0-44.9, adult (H)     Type 2 diabetes mellitus without complication, without long-term current use of insulin (H)     Mixed hyperlipidemia     Acute respiratory failure with hypoxia (H)     NSTEMI (non-ST elevated myocardial infarction) (H)     Non-ischemic cardiomyopathy (H)     LBBB (left bundle branch block)     Nonrheumatic mitral valve regurgitation     Ascending aorta dilatation (H)     Social History   Substance Use Topics     Smoking status: Never Smoker     Smokeless tobacco: Never Used     Alcohol use Yes      Comment: 1 glass of beer on Fri       ROS:  CONSTITUTIONAL:NEGATIVE for fever, chills, change in weight  INTEGUMENTARY/SKIN: NEGATIVE for worrisome rashes, moles or lesions  EYES: NEGATIVE for vision changes or  irritation  ENT/MOUTH: as per HPI  RESP:as per HPI  CV: Negative  GI: NEGATIVE for nausea, abdominal pain, heartburn, or change in bowel habits  MUSCULOSKELETAL: NEGATIVE for significant arthralgias or myalgia  Review of systems negative except as stated above.    OBJECTIVE  :/70  Pulse 63  Temp 97.2  F (36.2  C)  Resp 20  Wt 278 lb 6.4 oz (126.3 kg)  SpO2 97%  BMI 39.95 kg/m2  GENERAL APPEARANCE: healthy, alert and no distress  EYES: EOMI,  PERRL, conjunctiva clear  HENT: ear canals and TM's normal.  Nose and mouth without ulcers, erythema or lesions  NECK: supple, nontender, no lymphadenopathy  RESP: wheezing throughout which improves but does not clear completely with neb in clinic  CV: regular rates and rhythm, normal S1 S2, no murmur noted  ABDOMEN:  soft, nontender, no HSM or masses and bowel sounds normal  NEURO: Normal strength and tone, sensory exam grossly normal,  normal speech and mentation  SKIN: no suspicious lesions or rashes    (R05) Productive cough  (primary encounter diagnosis)  Comment:   Plan: doxycycline monohydrate 100 MG capsule            (R06.2) Wheezing  Comment:   Plan: INHALATION/NEBULIZER TREATMENT, INITIAL,         ipratropium - albuterol 0.5 mg/2.5 mg/3 mL         (DUONEB) 0.5-2.5 (3) MG/3ML neb solution,         ALBUTEROL/IPRATROPIUM 3ML NEB, predniSONE         (DELTASONE) 20 MG tablet, albuterol (PROAIR         HFA/PROVENTIL HFA/VENTOLIN HFA) 108 (90 Base)         MCG/ACT inhaler            Rest.    Follow up with primary clinic for re-check in one week, sooner should symptoms persist or worsen.

## 2018-10-09 ENCOUNTER — TELEPHONE (OUTPATIENT)
Dept: CARDIOLOGY | Facility: CLINIC | Age: 58
End: 2018-10-09

## 2018-10-09 NOTE — LETTER
October 11, 2018       TO: Neymar ELADIO Carmelo   [address]  [place] [address]       Dear Mr. Rhodes,    The results of your recent basic metabolic panel and lipid panel have been reviewed by Latanya Perez CNP and are copied below. Please repeat a basic metabolic panel on 11/28/18 as planned.    Component      Latest Ref Rng & Units 10/3/2018   Cholesterol      <200 mg/dL 104   Triglycerides      <150 mg/dL 81   HDL Cholesterol      >39 mg/dL 48   LDL Cholesterol Calculated      <100 mg/dL 40   Non HDL Cholesterol      <130 mg/dL 56       Component      Latest Ref Rng & Units 10/3/2018   Sodium      133 - 144 mmol/L 136   Potassium      3.4 - 5.3 mmol/L 4.7   Chloride      94 - 109 mmol/L 104   Carbon Dioxide      20 - 32 mmol/L 26   Anion Gap      3 - 14 mmol/L 6   Glucose      70 - 99 mg/dL 262 (H)   Urea Nitrogen      7 - 30 mg/dL 19   Creatinine      0.66 - 1.25 mg/dL 0.77   GFR Estimate      >60 mL/min/1.7m2 >90   GFR Estimate If Black      >60 mL/min/1.7m2 >90   Calcium      8.5 - 10.1 mg/dL 8.9             Sincerely,    [facility]

## 2018-10-09 NOTE — TELEPHONE ENCOUNTER
Called patient with BMP results. Left a message requesting a return phone call. BMP was drawn at PCP visit and Latanya Perez CNP requests recheck at office visit as planned. BMP order placed for office visit 11/28/18 with Dr. Iniguez as already scheduled. Dangelo TAYLOR

## 2018-10-12 ENCOUNTER — OFFICE VISIT (OUTPATIENT)
Dept: INTERNAL MEDICINE | Facility: CLINIC | Age: 58
End: 2018-10-12
Payer: COMMERCIAL

## 2018-10-12 VITALS
DIASTOLIC BLOOD PRESSURE: 68 MMHG | SYSTOLIC BLOOD PRESSURE: 116 MMHG | OXYGEN SATURATION: 97 % | WEIGHT: 264.9 LBS | HEART RATE: 65 BPM | TEMPERATURE: 98.1 F | BODY MASS INDEX: 38.01 KG/M2

## 2018-10-12 DIAGNOSIS — E11.9 TYPE 2 DIABETES MELLITUS WITHOUT COMPLICATION, WITHOUT LONG-TERM CURRENT USE OF INSULIN (H): Primary | ICD-10-CM

## 2018-10-12 DIAGNOSIS — Z13.89 SCREENING FOR DIABETIC PERIPHERAL NEUROPATHY: ICD-10-CM

## 2018-10-12 DIAGNOSIS — E66.01 MORBID OBESITY WITH BMI OF 40.0-44.9, ADULT (H): ICD-10-CM

## 2018-10-12 DIAGNOSIS — I42.8 NON-ISCHEMIC CARDIOMYOPATHY (H): ICD-10-CM

## 2018-10-12 DIAGNOSIS — I10 BENIGN ESSENTIAL HYPERTENSION: ICD-10-CM

## 2018-10-12 PROCEDURE — 99214 OFFICE O/P EST MOD 30 MIN: CPT | Performed by: INTERNAL MEDICINE

## 2018-10-12 RX ORDER — LISINOPRIL 10 MG/1
10 TABLET ORAL 2 TIMES DAILY
Qty: 180 TABLET | Refills: 1 | Status: SHIPPED | OUTPATIENT
Start: 2018-10-12 | End: 2018-10-15 | Stop reason: ALTCHOICE

## 2018-10-12 RX ORDER — GLIPIZIDE 10 MG/1
10 TABLET, FILM COATED, EXTENDED RELEASE ORAL DAILY
Qty: 90 TABLET | Refills: 1 | Status: SHIPPED | OUTPATIENT
Start: 2018-10-12 | End: 2019-06-03

## 2018-10-12 RX ORDER — FLASH GLUCOSE SENSOR
KIT MISCELLANEOUS
Qty: 3 EACH | Refills: 3 | Status: SHIPPED | OUTPATIENT
Start: 2018-10-12

## 2018-10-12 RX ORDER — LIRAGLUTIDE 6 MG/ML
1.8 INJECTION SUBCUTANEOUS DAILY
Qty: 27 ML | Refills: 1 | Status: SHIPPED | OUTPATIENT
Start: 2018-10-12 | End: 2019-08-14

## 2018-10-12 NOTE — PROGRESS NOTES
SUBJECTIVE:   Neymar Rhodes is a 58 year old male who presents to clinic today for the following health issues:      ED/UC Followup:    Facility:  [facility]   Date of visit: 10/03/18  Reason for visit: cough   Current Status: breathing is better, cough is better still kind of stuffy        1.  In regards specifically to the patient's diabetes, they reports no unusual symptoms.    No significnat or regular episodes of hypo or hyperglycemia    Medication compliance: compliant most of the time  Diabetic diet compliance: compliant most of the time  Diabetic ROS: no polyuria or polydipsia, no chest pain, dyspnea or TIA's, no numbness, tingling or pain in extremities    **patient stopped Glipizide instead of Pioglitazone    Home blood sugar monitoring: are performed regulary    Review of patients self blood glucose monitoring shows fasting always < 130 and post prandial average under 170    Diabetic complications: cardiomyopathy     Most  recent labs:    Lab Results   Component Value Date    A1C 8.7 10/03/2018    A1C 7.6 06/11/2018    A1C 6.9 12/01/2017    A1C 9.3 05/15/2017    A1C 8.7 01/16/2017    A1C 8.2 10/19/2016    A1C 8.7 06/30/2016    A1C 9.3 01/11/2016    A1C 7.7 07/17/2015    A1C 6.8 01/26/2015    A1C 6.4 10/16/2014    A1C 8.9 06/11/2014    A1C 7.8 12/30/2013    A1C 6.8 08/02/2013    A1C 7.0 02/14/2013    A1C 7.1 08/10/2012    A1C 8.4 03/30/2012    A1C 8.9 08/22/2011    A1C 8.4 03/08/2011    A1C 6.8 10/16/2010    A1C 6.8 04/09/2010    A1C 7.0 05/21/2009    A1C 7.0 12/23/2008    A1C 6.6 09/03/2008    A1C 7.1 06/09/2008    A1C 7.0 02/15/2008    A1C 7.2 10/24/2007    A1C 7.2 06/29/2007    A1C 6.9 03/29/2007    A1C 6.4 12/07/2006    A1C 6.6 06/01/2006    A1C 8.2 02/27/2006    A1C 8.3 12/15/2005    A1C 7.3 03/22/2005    A1C 7.3 11/29/2004    A1C 7.4 08/05/2004    A1C 6.9 10/27/2003    A1C 6.0 12/24/2002    A1C 6.0 08/07/2002                Problem list and histories reviewed & adjusted, as  indicated.  Additional history: as documented        Reviewed and updated as needed this visit by clinical staff       Reviewed and updated as needed this visit by Provider           Past Medical History:  ---------------------------  Past Medical History:   Diagnosis Date     Ascending aorta dilatation (H)      Diverticulosis of colon (without mention of hemorrhage)      Essential hypertension, benign      Gout, unspecified      LBBB (left bundle branch block)      Morbid obesity (H)      Non-ischemic cardiomyopathy (H)      Nonrheumatic mitral valve regurgitation      NSTEMI (non-ST elevated myocardial infarction) (H)     cardiac cath 6/2018: mild non-obstructive CAD     Other and unspecified hyperlipidemia      Type II or unspecified type diabetes mellitus without mention of complication, not stated as uncontrolled        Past Surgical History:  ---------------------------  Past Surgical History:   Procedure Laterality Date     C APPENDECTOMY  1980's     HEART CATH CORONARY ANGIOGRAM WITHOUT PRESSURES  06/10/2018    normal coronary angiogram, nothing more than 10%     SURGICAL HISTORY OF -   2001    repair of colovesicular fistula       Current Medications:  ---------------------------  Current Outpatient Prescriptions   Medication Sig Dispense Refill     aspirin 81 MG tablet Take 1 tablet (81 mg) by mouth daily 30 tablet      atorvastatin (LIPITOR) 80 MG tablet take one-half tablet by mouth at bedtime 45 tablet 0     BD ULTRA-FINE 29G X 12.7MM insulin pen needle use once daily with victoza pen 100 each 2     blood glucose monitoring (ACCU-CHEK LUL PLUS) test strip Use to test blood sugar 1-3 times daily or as directed. 100 each 11     clotrimazole (LOTRIMIN) 1 % cream Apply topically 2 times daily To axillary rash 15 g 1     doxycycline monohydrate 100 MG capsule Take 1 capsule (100 mg) by mouth 2 times daily for 14 days 28 capsule 0     fenofibrate 160 MG tablet Take 1 tablet (160 mg) by mouth daily 90 tablet  2     liraglutide (VICTOZA PEN) 18 MG/3ML soln Inject 1.8 mg under skin once daily 27 mL 0     lisinopril (PRINIVIL/ZESTRIL) 20 MG tablet Take 0.5 tablets (10 mg) by mouth 2 times daily 90 tablet 1     metFORMIN (GLUCOPHAGE) 1000 MG tablet Take 1 tablet (1,000 mg) by mouth 2 times daily (with meals) LAST REFILL WITHOUT AN APPOINTMENT 60 tablet 0     metoprolol succinate (TOPROL-XL) 25 MG 24 hr tablet Take 1 tablet (25 mg) by mouth every evening 30 tablet 9     pioglitazone (ACTOS) 45 MG tablet TAKE ONE TABLET BY MOUTH ONE TIME DAILY  90 tablet 0     spironolactone (ALDACTONE) 25 MG tablet Take 1 tablet (25 mg) by mouth daily 30 tablet 9     triamcinolone (KENALOG) 0.5 % cream Apply sparingly once or twice per day as needed to affected area until the skin is better, then stop 30 g 0     VICTOZA PEN 18 MG/3ML soln Inject 1.8 mg under skin once daily  27 mL 0     albuterol (PROAIR HFA/PROVENTIL HFA/VENTOLIN HFA) 108 (90 Base) MCG/ACT Inhaler Inhale 2 puffs into the lungs every 6 hours as needed for shortness of breath / dyspnea or wheezing       glipiZIDE (GLIPIZIDE XL) 10 MG 24 hr tablet Take 1 tablet (10mg) by mouth daily (Patient not taking: Reported on 10/12/2018) 90 tablet 3     [DISCONTINUED] albuterol (PROAIR HFA/PROVENTIL HFA/VENTOLIN HFA) 108 (90 Base) MCG/ACT inhaler Inhale 2 puffs into the lungs every 6 hours as needed for shortness of breath / dyspnea or wheezing 1 Inhaler 0       Allergies:  -------------  Allergies   Allergen Reactions     No Known Drug Allergies        Social History:  -------------------  Social History     Social History     Marital status:      Spouse name: N/A     Number of children: N/A     Years of education: N/A     Occupational History     Not on file.     Social History Main Topics     Smoking status: Never Smoker     Smokeless tobacco: Never Used     Alcohol use Yes      Comment: 1 glass of beer on Fri     Drug use: No     Sexual activity: Yes     Partners: Female      Other Topics Concern     Not on file     Social History Narrative       Family Medical History:  ------------------------------  Family History   Problem Relation Age of Onset     Cancer Father 75     bladder cancer     Myocardial Infarction Father      Other - See Comments Father      smoking     Breast Cancer Mother      Breast Cancer Sister      Family History Negative Brother      Family History Negative Son      Family History Negative Son      fluttering/murmur     Asthma Son          ROS:  REVIEW OF SYSTEMS:    RESP: negative for cough, dyspnea, wheezing, hemoptysis  CV: negative for chest pain, palpitations, PND, JOSHI, orthopnea; reports no changes in their ability to perform physical activity (from cardiovascular standpoint)  GI: negative for dysphagia, N/V, pain, melena, diarrhea and constipation  NEURO: negative for numbness/tingling, paralysis, incoordination, or focal weakness     OBJECTIVE:                                                    /68  Pulse 65  Temp 98.1  F (36.7  C) (Oral)  Wt 264 lb 14.4 oz (120.2 kg)  SpO2 97%  BMI 38.01 kg/m2     GENERAL alert and no distress  EYES:  Normal sclera,conjunctiva, EOMI  HENT: oral and posterior pharynx without lesions or erythema, facies symmetric  NECK: Neck supple. No LAD, without thyroidmegaly or JVD., Carotids without bruits.  RESP: Clear to ausculation bilaterally without wheezes or crackles. Normal BS in all fields.  CV: RRR normal S1S2 without murmurs, rubs or gallops. PMI normal  LYMPH: no cervical lymph adenopathy appreciated  MS: extremities- no gross deformities of the visible extremities noted, no edema  PSYCH: Alert and oriented times 3; speech- coherent  SKIN:  No obvious significant skin lesions on visible portions of face          ASSESSMENT/PLAN:                                                      (E11.9) Type 2 diabetes mellitus without complication, without long-term current use of insulin (H)  (primary encounter  diagnosis)  Comment:     *  Stop the Pioglitazone, this medication should not be used with any kind of heart failure.     *  Resume Glipizide 10 mg once per day with the largest meal of the day.     *  Continue all other medications at the same doses.  Contact your usual pharmacy if you need refills.     *  Consider the Freestyle Jeffrey monitor system that will also more detailed glucose monitoring at home.  I would anticipate using this 10 days every 1-2 months.     *  Contact me with an update on the glucose levels in 3-4 weeks.      *  Return to see me in approximately 6 months, sooner if needed.  Please get nonfasting labs done in the [facility] any other [facility] lab 1-2 days before this appointment.  If you get the labs done at another clinic, make arrangements with that clinic directly.  The orders will be in place.  Call [phone] to schedule appointments at [facility].              Plan: glipiZIDE (GLIPIZIDE XL) 10 MG 24 hr tablet,         liraglutide (VICTOZA PEN) 18 MG/3ML soln,         metFORMIN (GLUCOPHAGE) 1000 MG tablet,         continuous blood glucose monitoring (FREESTYLE         JEFFREY) sensor, DISCONTINUED: lisinopril         (PRINIVIL/ZESTRIL) 10 MG tablet            (I42.8) Non-ischemic cardiomyopathy (H)  Comment: This condition is currently controlled on the current medical regimen.  Continue current therapy.   NO signs and symptoms OF CHF at this time  Plan:     (E66.01,  Z68.41) Morbid obesity with BMI of 40.0-44.9, adult (H)  Comment: The patient's current BMI is: Body mass index is 38.01 kg/(m^2).  Obesity is a biological preventable and treatable disease, which is associated with significantly increased risk of many acute and chronic health conditions. Obesity has now been recognized as a chronic disease by the American Medical Association.  A range of serious co-morbidities are associated with obesity including increased risk for hypertension, stroke, coronary artery  disease, dyslipidemia, Type II diabetes, depression, sleep apnea, cancers of the colon, breast and endometrium, obstructive sleep apnea, osteoarthritis and female infertility. Therefore, obesity is not just a problem; it s a disease that warrants serious evidence based treatements.  Recommended regular aerobic exercise, recommended improved diet aiming at lowering amount of processed foods, lower sugars, and lower wheat products, and moderation carbs and fat in the diet, establishing more regular meal times, always eating breakfast, front loading some of the calories and adding more protein to the diet.    Discussed the increased risks of developing or worsening all types of diabetes and other dysmetabolic syndromes.    Surgical therapy may be considered, especially in patients with BMI greater than or equal to 35 kg/m2 with multiple complications. Surgical treatments include lap-band, gastric sleeve or gastric bypass surgery.     Plan:     (Z13.89) Screening for diabetic peripheral neuropathy  Comment:   Plan:     (I10) Benign essential hypertension  Comment: This condition is currently controlled on the current medical regimen.  Continue current therapy.  Discussed current hypertension treatment guidelines, including indications for treatment and treatment options.  Discussed the importance for aggressive management of HTN to prevent vascular complications later.  Recommended lower fat, lower carbohydrate, and lower sodium (<2000 mg)diet.  Discussed required intervals for follow up on HTN, lab studies.  Recommened pt. follow their blood pressures outside the clinic to ensure that BPs are remaining within guidelines, and to contact me if the readings are not within guidelines on a regular basis so we can adjust treatment as needed.   Plan:        See Patient Instructions    FRANCHESKA GRUBER M.D., MD  [facility]

## 2018-10-12 NOTE — MR AVS SNAPSHOT
After Visit Summary   10/12/2018    Neymar Rhodes    MRN: [id number]           Patient Information     Date Of Birth          1960        Visit Information        Provider Department      10/12/2018 3:20 PM Jefry Harris MD [facility]        Today's Diagnoses     Type 2 diabetes mellitus without complication, without long-term current use of insulin (H)    -  1    Non-ischemic cardiomyopathy (H)        Morbid obesity with BMI of 40.0-44.9, adult (H)        Screening for diabetic peripheral neuropathy        Benign essential hypertension          Care Instructions    *  Stop the Pioglitazone, this medication should not be used with any kind of heart failure.     *  Resume Glipizide 10 mg once per day with the largest meal of the day.     *  Continue all other medications at the same doses.  Contact your usual pharmacy if you need refills.     *  Consider the Freestyle Jeffrey monitor system that will also more detailed glucose monitoring at home.  I would anticipate using this 10 days every 1-2 months.     *  Contact me with an update on the glucose levels in 3-4 weeks.      *  Return to see me in approximately 6 months, sooner if needed.  Please get nonfasting labs done in the [facility] any other [facility] lab 1-2 days before this appointment.  If you get the labs done at another clinic, make arrangements with that clinic directly.  The orders will be in place.  Call [phone] to schedule appointments at [facility].                    Follow-ups after your visit        Your next 10 appointments already scheduled     Nov 28, 2018 10:10 AM CST   LAB with [facility]   [facility] [place] ([facility])    [address] [place] [address] [phone]           Please do not eat 10-12 hours before your appointment if you are coming in fasting for labs on lipids, cholesterol, or glucose  "(sugar). This does not apply to pregnant women. Water, hot tea and black coffee (with nothing added) are okay. Do not drink other fluids, diet soda or chew gum.            2018 11:15 AM CST   Return Visit with Harris Iniguez MD   [facility] ([facility])    [address] [place], [place] [address]  [facility] [address] [phone] OPT 2              Who to contact     If you have questions or need follow up information about today's clinic visit or your schedule please contact [facility] directly at [phone].  Normal or non-critical lab and imaging results will be communicated to you by MyChart, letter or phone within 4 business days after the clinic has received the results. If you do not hear from us within 7 days, please contact the clinic through MyChart or phone. If you have a critical or abnormal lab result, we will notify you by phone as soon as possible.  Submit refill requests through [employer] or call your pharmacy and they will forward the refill request to us. Please allow 3 business days for your refill to be completed.          Additional Information About Your Visit        MyChart Information     [employer] lets you send messages to your doctor, view your test results, renew your prescriptions, schedule appointments and more. To sign up, go to [email or web address] . Click on \"Log in\" on the left side of the screen, which will take you to the Welcome page. Then click on \"Sign up Now\" on the right side of the page.     You will be asked to enter the access code listed below, as well as some personal information. Please follow the directions to create your username and password.     Your access code is: [email or web address]  Expires: 1/10/2019  4:10 PM     Your access code will  in 90 days. If you need help or a new code, please call your [place] clinic or [phone].        Care EveryWhere ID     This is your Care " EveryWhere ID. This could be used by other organizations to access your [facility] medical records  [id number]        Your Vitals Were     Pulse Temperature Pulse Oximetry BMI (Body Mass Index)          65 98.1  F (36.7  C) (Oral) 97% 38.01 kg/m2         Blood Pressure from Last 3 Encounters:   10/12/18 116/68   10/03/18 120/70   06/20/18 102/64    Weight from Last 3 Encounters:   10/12/18 264 lb 14.4 oz (120.2 kg)   10/03/18 278 lb 6.4 oz (126.3 kg)   06/20/18 271 lb 6.4 oz (123.1 kg)              Today, you had the following     No orders found for display         Today's Medication Changes          These changes are accurate as of 10/12/18  4:15 PM.  If you have any questions, ask your nurse or doctor.               Start taking these medicines.        Dose/Directions    continuous blood glucose monitoring sensor   Used for:  Type 2 diabetes mellitus without complication, without long-term current use of insulin (H)   Started by:  Jefry Harris MD        For use with Freestyle Jeffrey Flash  for continuous monitioring of blood glucose levels. Replace sensor every 10 days.   Quantity:  3 each   Refills:  3         These medicines have changed or have updated prescriptions.        Dose/Directions    glipiZIDE 10 MG 24 hr tablet   Commonly known as:  glipiZIDE XL   This may have changed:    - how much to take  - how to take this  - when to take this  - additional instructions   Used for:  Type 2 diabetes mellitus without complication, without long-term current use of insulin (H)   Changed by:  Jefry Harris MD        Dose:  10 mg   Take 1 tablet (10 mg) by mouth daily TAKE WITH LARGEST MEAL OF THE DAY; ALWAYS TAKE WITH FOOD   Quantity:  90 tablet   Refills:  1       * liraglutide 18 MG/3ML soln   Commonly known as:  VICTOZA PEN   This may have changed:  Another medication with the same name was changed. Make sure you understand how and when to take each.   Used for:  Type 2 diabetes  mellitus without complication, without long-term current use of insulin (H)   Changed by:  Jefry Harris MD        Inject 1.8 mg under skin once daily   Quantity:  27 mL   Refills:  0       * liraglutide 18 MG/3ML soln   Commonly known as:  VICTOZA PEN   This may have changed:  See the new instructions.   Used for:  Type 2 diabetes mellitus without complication, without long-term current use of insulin (H)   Changed by:  Jefry Harris MD        Dose:  1.8 mg   Inject 1.8 mg Subcutaneous daily   Quantity:  27 mL   Refills:  1       * lisinopril 20 MG tablet   Commonly known as:  PRINIVIL/ZESTRIL   This may have changed:  Another medication with the same name was added. Make sure you understand how and when to take each.   Used for:  Type 2 diabetes mellitus without complication, without long-term current use of insulin (H), Benign essential hypertension, ACS (acute coronary syndrome) (H)   Changed by:  Jefry Harris MD        Dose:  10 mg   Take 0.5 tablets (10 mg) by mouth 2 times daily   Quantity:  90 tablet   Refills:  1       * lisinopril 10 MG tablet   Commonly known as:  PRINIVIL/ZESTRIL   This may have changed:  You were already taking a medication with the same name, and this prescription was added. Make sure you understand how and when to take each.   Used for:  Type 2 diabetes mellitus without complication, without long-term current use of insulin (H)   Changed by:  Jefry Harris MD        Dose:  10 mg   Take 1 tablet (10 mg) by mouth 2 times daily   Quantity:  180 tablet   Refills:  1       metFORMIN 1000 MG tablet   Commonly known as:  GLUCOPHAGE   This may have changed:  additional instructions   Used for:  Type 2 diabetes mellitus without complication, without long-term current use of insulin (H)   Changed by:  Jefry Harris MD        Dose:  1000 mg   Take 1 tablet (1,000 mg) by mouth 2 times daily (with meals)   Quantity:  180 tablet   Refills:  1        * Notice:  This list has 4 medication(s) that are the same as other medications prescribed for you. Read the directions carefully, and ask your doctor or other care provider to review them with you.      Stop taking these medicines if you haven't already. Please contact your care team if you have questions.     pioglitazone 45 MG tablet   Commonly known as:  ACTOS   Stopped by:  Jefry Harris MD                Where to get your medicines      These medications were sent to [id number] - [place], [place] - [address]  [address] [address]     Phone:  [phone]     glipiZIDE 10 MG 24 hr tablet    liraglutide 18 MG/3ML soln    lisinopril 10 MG tablet    metFORMIN 1000 MG tablet         Some of these will need a paper prescription and others can be bought over the counter.  Ask your nurse if you have questions.     Bring a paper prescription for each of these medications     continuous blood glucose monitoring sensor                Primary Care Provider Office Phone # Fax #    Jefry Harris [phone]       [address] [address]        Equal Access to Services     SANNA [facility] SystemZHANE : Hadii aad ku hadasho Soomaali, waaxda luqadaha, qaybta kaalmada adeegyada, waxhannah yusuf . So [facility] [phone].    ATENCIÓN: Si habla español, tiene a garcia disposición servicios gratuitos de asistencia lingüística. [facility] [phone].    We comply with applicable federal civil rights laws and [place] laws. We do not discriminate on the basis of race, color, national origin, age, disability, sex, sexual orientation, or gender identity.            Thank you!     [facility] you [facility] choosing [facility]. Our goal is always to provide you with excellent care. Hearing back from our patients is one way we can continue to improve our services. Please take a few minutes to complete the  written survey that you may receive in the mail after your visit with us. Thank you!             Your Updated Medication List - Protect others around you: Learn how to safely use, store and throw away your medicines at www.disposemymeds.org.          This list is accurate as of 10/12/18  4:15 PM.  Always use your most recent med list.                   Brand Name Dispense Instructions for use Diagnosis    albuterol 108 (90 Base) MCG/ACT inhaler    PROAIR HFA/PROVENTIL HFA/VENTOLIN HFA     Inhale 2 puffs into the lungs every 6 hours as needed for shortness of breath / dyspnea or wheezing        aspirin 81 MG tablet     30 tablet    Take 1 tablet (81 mg) by mouth daily    Essential hypertension, benign       atorvastatin 80 MG tablet    LIPITOR    45 tablet    take one-half tablet by mouth at bedtime    Type 2 diabetes mellitus without complication, without long-term current use of insulin (H)       BD ULTRA-FINE 29G X 12.7MM   Generic drug:  insulin pen needle     100 each    use once daily with victoza pen    Type 2 diabetes mellitus without complication, without long-term current use of insulin (H)       blood glucose monitoring test strip    ACCU-CHEK LUL PLUS    100 each    Use to test blood sugar 1-3 times daily or as directed.    Type 2 diabetes mellitus without complication (H)       clotrimazole 1 % cream    LOTRIMIN    15 g    Apply topically 2 times daily To axillary rash    Dermatophytosis of body       continuous blood glucose monitoring sensor     3 each    For use with Freestyle Jeffrey Flash  for continuous monitioring of blood glucose levels. Replace sensor every 10 days.    Type 2 diabetes mellitus without complication, without long-term current use of insulin (H)       doxycycline monohydrate 100 MG capsule     28 capsule    Take 1 capsule (100 mg) by mouth 2 times daily for 14 days    Productive cough       fenofibrate 160 MG tablet     90 tablet    Take 1 tablet (160 mg) by mouth daily     Type 2 diabetes mellitus without complication, without long-term current use of insulin (H)       glipiZIDE 10 MG 24 hr tablet    glipiZIDE XL    90 tablet    Take 1 tablet (10 mg) by mouth daily TAKE WITH LARGEST MEAL OF THE DAY; ALWAYS TAKE WITH FOOD    Type 2 diabetes mellitus without complication, without long-term current use of insulin (H)       * liraglutide 18 MG/3ML soln    VICTOZA PEN    27 mL    Inject 1.8 mg under skin once daily    Type 2 diabetes mellitus without complication, without long-term current use of insulin (H)       * liraglutide 18 MG/3ML soln    VICTOZA PEN    27 mL    Inject 1.8 mg Subcutaneous daily    Type 2 diabetes mellitus without complication, without long-term current use of insulin (H)       * lisinopril 20 MG tablet    PRINIVIL/ZESTRIL    90 tablet    Take 0.5 tablets (10 mg) by mouth 2 times daily    Type 2 diabetes mellitus without complication, without long-term current use of insulin (H), Benign essential hypertension, ACS (acute coronary syndrome) (H)       * lisinopril 10 MG tablet    PRINIVIL/ZESTRIL    180 tablet    Take 1 tablet (10 mg) by mouth 2 times daily    Type 2 diabetes mellitus without complication, without long-term current use of insulin (H)       metFORMIN 1000 MG tablet    GLUCOPHAGE    180 tablet    Take 1 tablet (1,000 mg) by mouth 2 times daily (with meals)    Type 2 diabetes mellitus without complication, without long-term current use of insulin (H)       metoprolol succinate 25 MG 24 hr tablet    TOPROL-XL    30 tablet    Take 1 tablet (25 mg) by mouth every evening    Nonischemic cardiomyopathy (H)       spironolactone 25 MG tablet    ALDACTONE    30 tablet    Take 1 tablet (25 mg) by mouth daily    Nonischemic cardiomyopathy (H)       triamcinolone 0.5 % cream    KENALOG    30 g    Apply sparingly once or twice per day as needed to affected area until the skin is better, then stop    Eczema, unspecified eczema       * Notice:  This list has 4  medication(s) that are the same as other medications prescribed for you. Read the directions carefully, and ask your doctor or other care provider to review them with you.

## 2018-10-15 ENCOUNTER — TELEPHONE (OUTPATIENT)
Dept: INTERNAL MEDICINE | Facility: CLINIC | Age: 58
End: 2018-10-15

## 2018-10-15 DIAGNOSIS — I42.8 NON-ISCHEMIC CARDIOMYOPATHY (H): Primary | ICD-10-CM

## 2018-10-15 DIAGNOSIS — E11.9 TYPE 2 DIABETES MELLITUS WITHOUT COMPLICATION, WITHOUT LONG-TERM CURRENT USE OF INSULIN (H): ICD-10-CM

## 2018-10-15 DIAGNOSIS — I10 BENIGN ESSENTIAL HYPERTENSION: ICD-10-CM

## 2018-10-15 RX ORDER — LISINOPRIL 20 MG/1
20 TABLET ORAL DAILY
Qty: 90 TABLET | Refills: 3 | Status: SHIPPED | OUTPATIENT
Start: 2018-10-15 | End: 2019-09-14

## 2018-10-15 NOTE — TELEPHONE ENCOUNTER
Pharmacy called and stated that patients insurance company will not cover the 10 mg twice a day but will cover 20 mg once a day.  Please call pharmacy with new script. [phone]

## 2018-10-30 DIAGNOSIS — E11.9 TYPE 2 DIABETES MELLITUS WITHOUT COMPLICATION, WITHOUT LONG-TERM CURRENT USE OF INSULIN (H): ICD-10-CM

## 2018-10-30 RX ORDER — FENOFIBRATE 160 MG/1
TABLET ORAL
Qty: 90 TABLET | Refills: 3 | Status: SHIPPED | OUTPATIENT
Start: 2018-10-30 | End: 2019-10-18

## 2018-10-30 NOTE — TELEPHONE ENCOUNTER
"Requested Prescriptions   Pending Prescriptions Disp Refills     fenofibrate 160 MG tablet [Pharmacy Med Name: Fenofibrate Oral Tablet 160 MG]  Last Written Prescription Date:  01/01/2018  Last Fill Quantity: 90,  # refills: 2   Last office visit: 10/12/2018 with prescribing provider:   ALLYN  Future Office Visit:   Next 5 appointments (look out 90 days)     Nov 28, 2018 11:15 AM CST   Return Visit with Harris Iniguez MD   [facility] ([facility])    [address] [place] [address] [phone] OPT 2                  90 tablet 1     Sig: TAKE ONE TABLET BY MOUTH ONE TIME DAILY    Fibrates Passed    10/30/2018  7:00 AM       Passed - Lipid panel on file in past 12 months    Recent Labs   Lab Test  10/03/18   0849   01/26/15   0920   CHOL  104   < >  126   TRIG  81   < >  128   HDL  48   < >  45   LDL  40   < >  55   NHDL  56   < >   --    VLDL   --    --   26   CHOLHDLRATIO   --    --   2.8    < > = values in this interval not displayed.              Passed - No abnormal creatine kinase in past 12 months    No lab results found.            Passed - Recent (12 mo) or future (30 days) visit within the authorizing provider's specialty    Patient had office visit in the last 12 months or has a visit in the next 30 days with authorizing provider or within the authorizing provider's specialty.  See \"Patient Info\" tab in inbasket, or \"Choose Columns\" in Meds & Orders section of the refill encounter.             Passed - Patient is age 18 or older          "

## 2018-11-15 ENCOUNTER — PRE VISIT (OUTPATIENT)
Dept: CARDIOLOGY | Facility: CLINIC | Age: 58
End: 2018-11-15

## 2018-11-15 PROBLEM — I25.10 CORONARY ARTERY DISEASE INVOLVING NATIVE CORONARY ARTERY OF NATIVE HEART WITHOUT ANGINA PECTORIS: Status: ACTIVE | Noted: 2018-11-15

## 2018-12-15 DIAGNOSIS — E11.9 TYPE 2 DIABETES MELLITUS WITHOUT COMPLICATION, WITHOUT LONG-TERM CURRENT USE OF INSULIN (H): ICD-10-CM

## 2018-12-15 NOTE — TELEPHONE ENCOUNTER
"Requested Prescriptions   Pending Prescriptions Disp Refills     atorvastatin (LIPITOR) 80 MG tablet [Pharmacy Med Name: Atorvastatin Calcium Oral Tablet 80 MG] 45 tablet 0    Last Written Prescription Date:  6/15/2018  Last Fill Quantity: 45,  # refills: 0   Last office visit: 10/12/2018 with prescribing provider:  10/12/2018   Future Office Visit:     Sig: Take 1/2 (one-half) tablet by mouth at bedtime.    Statins Protocol Passed - 12/15/2018 10:21 AM       Passed - LDL on file in past 12 months    Recent Labs   Lab Test 10/03/18  0849   LDL 40            Passed - No abnormal creatine kinase in past 12 months    No lab results found.            Passed - Recent (12 mo) or future (30 days) visit within the authorizing provider's specialty    Patient had office visit in the last 12 months or has a visit in the next 30 days with authorizing provider or within the authorizing provider's specialty.  See \"Patient Info\" tab in inbasket, or \"Choose Columns\" in Meds & Orders section of the refill encounter.             Passed - Patient is age 18 or older          "

## 2018-12-17 RX ORDER — ATORVASTATIN CALCIUM 80 MG/1
TABLET, FILM COATED ORAL
Qty: 45 TABLET | Refills: 1 | Status: SHIPPED | OUTPATIENT
Start: 2018-12-17 | End: 2019-08-23

## 2019-02-19 ENCOUNTER — APPOINTMENT (OUTPATIENT)
Dept: LAB | Facility: CLINIC | Age: 59
End: 2019-02-19
Payer: COMMERCIAL

## 2019-03-14 DIAGNOSIS — E11.9 TYPE 2 DIABETES MELLITUS WITHOUT COMPLICATION, WITHOUT LONG-TERM CURRENT USE OF INSULIN (H): ICD-10-CM

## 2019-03-14 RX ORDER — PEN NEEDLE, DIABETIC 29 G X1/2"
NEEDLE, DISPOSABLE MISCELLANEOUS
Qty: 100 EACH | Refills: 1 | Status: SHIPPED | OUTPATIENT
Start: 2019-03-14 | End: 2020-03-06

## 2019-03-14 NOTE — TELEPHONE ENCOUNTER
"Requested Prescriptions   Pending Prescriptions Disp Refills     BD ULTRA-FINE 29G X 12.7MM insulin pen needle [Pharmacy Med Name: BD Pen Needle Original U/F Miscellaneous 29G X 12.7MM]  Last Written Prescription Date:  06/21/2018  Last Fill Quantity: 100,  # refills: 02   Last Office Visit: 10/12/2018   Future Office Visit:    Next 5 appointments (look out 90 days)    Apr 15, 2019  7:20 AM CDT  Office Visit with Jefry Harris MD  [facility] ([facility]) [address] [address] [phone]          100 each 1     Sig: use once daily with victoza pen    Diabetic Supplies Protocol Passed - 3/14/2019  7:01 AM       Passed - Medication is active on med list       Passed - Patient is 18 years of age or older       Passed - Recent (6 mo) or future (30 days) visit within the authorizing provider's specialty    Patient had office visit in the last 6 months or has a visit in the next 30 days with authorizing provider.  See \"Patient Info\" tab in inbasket, or \"Choose Columns\" in Meds & Orders section of the refill encounter.              "

## 2019-04-06 ENCOUNTER — DOCUMENTATION ONLY (OUTPATIENT)
Dept: LAB | Facility: CLINIC | Age: 59
End: 2019-04-06

## 2019-04-06 DIAGNOSIS — E11.9 TYPE 2 DIABETES MELLITUS WITHOUT COMPLICATION, WITHOUT LONG-TERM CURRENT USE OF INSULIN (H): Primary | ICD-10-CM

## 2019-04-06 DIAGNOSIS — I25.10 CORONARY ARTERY DISEASE INVOLVING NATIVE CORONARY ARTERY OF NATIVE HEART WITHOUT ANGINA PECTORIS: ICD-10-CM

## 2019-04-06 DIAGNOSIS — E66.01 MORBID OBESITY WITH BMI OF 40.0-44.9, ADULT (H): ICD-10-CM

## 2019-04-10 DIAGNOSIS — E11.9 TYPE 2 DIABETES MELLITUS WITHOUT COMPLICATION, WITHOUT LONG-TERM CURRENT USE OF INSULIN (H): ICD-10-CM

## 2019-04-10 DIAGNOSIS — I25.10 CORONARY ARTERY DISEASE INVOLVING NATIVE CORONARY ARTERY OF NATIVE HEART WITHOUT ANGINA PECTORIS: ICD-10-CM

## 2019-04-10 DIAGNOSIS — E66.01 MORBID OBESITY WITH BMI OF 40.0-44.9, ADULT (H): ICD-10-CM

## 2019-04-10 LAB
ANION GAP SERPL CALCULATED.3IONS-SCNC: 7 MMOL/L (ref 3–14)
BUN SERPL-MCNC: 21 MG/DL (ref 7–30)
CALCIUM SERPL-MCNC: 9.1 MG/DL (ref 8.5–10.1)
CHLORIDE SERPL-SCNC: 108 MMOL/L (ref 94–109)
CO2 SERPL-SCNC: 24 MMOL/L (ref 20–32)
CREAT SERPL-MCNC: 0.91 MG/DL (ref 0.66–1.25)
CREAT UR-MCNC: 128 MG/DL
GFR SERPL CREATININE-BSD FRML MDRD: >90 ML/MIN/{1.73_M2}
GLUCOSE SERPL-MCNC: 249 MG/DL (ref 70–99)
HBA1C MFR BLD: 9.7 % (ref 0–5.6)
MICROALBUMIN UR-MCNC: 10 MG/L
MICROALBUMIN/CREAT UR: 7.71 MG/G CR (ref 0–17)
POTASSIUM SERPL-SCNC: 4.4 MMOL/L (ref 3.4–5.3)
SODIUM SERPL-SCNC: 139 MMOL/L (ref 133–144)

## 2019-04-10 PROCEDURE — 36415 COLL VENOUS BLD VENIPUNCTURE: CPT | Performed by: INTERNAL MEDICINE

## 2019-04-10 PROCEDURE — 82043 UR ALBUMIN QUANTITATIVE: CPT | Performed by: INTERNAL MEDICINE

## 2019-04-10 PROCEDURE — 83036 HEMOGLOBIN GLYCOSYLATED A1C: CPT | Performed by: INTERNAL MEDICINE

## 2019-04-10 PROCEDURE — 80048 BASIC METABOLIC PNL TOTAL CA: CPT | Performed by: INTERNAL MEDICINE

## 2019-04-14 DIAGNOSIS — E11.9 TYPE 2 DIABETES MELLITUS WITHOUT COMPLICATION, WITHOUT LONG-TERM CURRENT USE OF INSULIN (H): ICD-10-CM

## 2019-04-14 NOTE — TELEPHONE ENCOUNTER
Requested Prescriptions   Pending Prescriptions Disp Refills     metFORMIN (GLUCOPHAGE) 1000 MG tablet [Pharmacy Med Name: metFORMIN HCl Oral Tablet 1000 MG] 180 tablet 0     Sig: Take 1 tablet (1,000 mg) by mouth 2 times daily (with meals)   Last Written Prescription Date:  10/12/2018  Last Fill Quantity: 180,  # refills: 1   Last office visit: 10/12/2018 with prescribing provider:  10/12/2018   Future Office Visit:   Next 5 appointments (look out 90 days)    Apr 15, 2019  8:20 AM CDT  Office Visit with Jefry Harris MD  [facility] ([facility] [address] [address]  [phone]             Biguanide Agents Failed - 4/14/2019  7:01 AM        Failed - Blood pressure less than 140/90 in past 6 months     BP Readings from Last 3 Encounters:   10/12/18 116/68   10/03/18 120/70   06/20/18 102/64                 Passed - Patient has documented LDL within the past 12 mos.     Recent Labs   Lab Test 10/03/18  0849   LDL 40             Passed - Patient has had a Microalbumin in the past 15 mos.     Recent Labs   Lab Test 04/10/19  0729   MICROL 10   UMALCR 7.71             Passed - Patient is age 10 or older        Passed - Patient has documented A1c within the specified period of time.     If HgbA1C is 8 or greater, it needs to be on file within the past 3 months.  If less than 8, must be on file within the past 6 months.     Recent Labs   Lab Test 04/10/19  0728   A1C 9.7*             Passed - Patient's CR is NOT>1.4 OR Patient's EGFR is NOT<45 within past 12 mos.     Recent Labs   Lab Test 04/10/19  0728   GFRESTIMATED >90   GFRESTBLACK >90       Recent Labs   Lab Test 04/10/19  0728   CR 0.91             Passed - Patient does NOT have a diagnosis of CHF.        Passed - Medication is active on med list        Passed - Recent (6 mo) or future (30 days) visit within the authorizing provider's specialty     Patient had office visit in  "the last 6 months or has a visit in the next 30 days with authorizing provider or within the authorizing provider's specialty.  See \"Patient Info\" tab in inbasket, or \"Choose Columns\" in Meds & Orders section of the refill encounter.              "

## 2019-04-15 ENCOUNTER — OFFICE VISIT (OUTPATIENT)
Dept: INTERNAL MEDICINE | Facility: CLINIC | Age: 59
End: 2019-04-15
Payer: COMMERCIAL

## 2019-04-15 ENCOUNTER — TELEPHONE (OUTPATIENT)
Dept: INTERNAL MEDICINE | Facility: CLINIC | Age: 59
End: 2019-04-15

## 2019-04-15 VITALS
DIASTOLIC BLOOD PRESSURE: 60 MMHG | HEART RATE: 68 BPM | WEIGHT: 280 LBS | HEIGHT: 70 IN | RESPIRATION RATE: 16 BRPM | SYSTOLIC BLOOD PRESSURE: 108 MMHG | BODY MASS INDEX: 40.09 KG/M2 | TEMPERATURE: 98.1 F | OXYGEN SATURATION: 98 %

## 2019-04-15 DIAGNOSIS — E11.9 TYPE 2 DIABETES MELLITUS WITHOUT COMPLICATION, WITHOUT LONG-TERM CURRENT USE OF INSULIN (H): ICD-10-CM

## 2019-04-15 DIAGNOSIS — I10 BENIGN ESSENTIAL HYPERTENSION: ICD-10-CM

## 2019-04-15 DIAGNOSIS — I25.10 CORONARY ARTERY DISEASE INVOLVING NATIVE CORONARY ARTERY OF NATIVE HEART WITHOUT ANGINA PECTORIS: ICD-10-CM

## 2019-04-15 DIAGNOSIS — E66.01 MORBID OBESITY WITH BMI OF 40.0-44.9, ADULT (H): ICD-10-CM

## 2019-04-15 DIAGNOSIS — E11.9 TYPE 2 DIABETES MELLITUS WITHOUT COMPLICATION, WITH LONG-TERM CURRENT USE OF INSULIN (H): Primary | ICD-10-CM

## 2019-04-15 DIAGNOSIS — Z11.4 SCREENING FOR HIV (HUMAN IMMUNODEFICIENCY VIRUS): ICD-10-CM

## 2019-04-15 DIAGNOSIS — Z13.89 SCREENING FOR DIABETIC PERIPHERAL NEUROPATHY: ICD-10-CM

## 2019-04-15 DIAGNOSIS — Z12.11 SCREEN FOR COLON CANCER: ICD-10-CM

## 2019-04-15 DIAGNOSIS — Z79.4 TYPE 2 DIABETES MELLITUS WITHOUT COMPLICATION, WITH LONG-TERM CURRENT USE OF INSULIN (H): Primary | ICD-10-CM

## 2019-04-15 DIAGNOSIS — I42.8 NON-ISCHEMIC CARDIOMYOPATHY (H): ICD-10-CM

## 2019-04-15 DIAGNOSIS — I77.810 ASCENDING AORTA DILATATION (H): ICD-10-CM

## 2019-04-15 DIAGNOSIS — E78.5 HYPERLIPIDEMIA LDL GOAL <100: ICD-10-CM

## 2019-04-15 PROCEDURE — 99215 OFFICE O/P EST HI 40 MIN: CPT | Performed by: INTERNAL MEDICINE

## 2019-04-15 PROCEDURE — 99207 C FOOT EXAM  NO CHARGE: CPT | Mod: 25 | Performed by: INTERNAL MEDICINE

## 2019-04-15 ASSESSMENT — MIFFLIN-ST. JEOR: SCORE: 2086.32

## 2019-04-15 NOTE — TELEPHONE ENCOUNTER
Pharmacy is calling about RX for:  insulin glargine (LANTUS SOLOSTAR PEN) 100 UNIT/ML pen.  This brand, (Lantus Solostar), is not covered by insurance. But either Basaglar or Levemir are covered, so they need a new RX sent over for either Basaglar or Levemir.

## 2019-04-15 NOTE — PATIENT INSTRUCTIONS
"*  Diabetes not well controlled    *  Start Lantus insulin 10 units once per day (either at supper or bedtime)    *  Continue all other medications at the same doses.  Contact your usual pharmacy if you need refills.     *  Work hard on your diet, reduce the intake of \"simple carbohydrates\" (e.g. White bread, white rice, pasta, noodles, potatoes, snack foods, regular soda, juices (except fresh squeezed), cakes, cookies, candy, etc.) as much as possible.      *  Diabetes educators:      --[facility] Diabetes Education - All [facility] [phone]   [email or web address]     *  They diabetes educators will place a continuous glucose monitor.    *  Please see Cardiology Clinic this spring, make an appointment:  [facility] [formerly [place] [facility]] phone [phone]  (Fax [phone])     *  Please get Echocardiogram before the cardiology appointment to recheck the heart pumping function.     *  Return to see me in approximately 3 months regardless of how you feel, sooner if needed.  Please get nonfasting labs done in the [facility] lab 1-2 days before this appointment.  Call [phone] to schedule both appointments.        COLONOSCOPY:     *  All patients over age 50 are recommended to have formal colon cancer screening (starting in the early 40's if there is a family history of colon cancer, 1 first degree relative or 2 second degree relatives)  *  I would recommend a colonoscopy at [facility]  for colon cancer.  They will contact you to arrange this at your convenience.    If you have not been contacted by them within a week, then you can contact them [facility] KARI Danielson [phone] .  *  Colonoscopy is the gold standard recommended procedure for colon cancer screening.    *  If the colonoscopy is normal and no family history of colon cancer, then repeat colonoscopy " "every 10 years.   *  Colonoscopy recommended every 5 years with any family history of colon cancer, regardless of the findings on your colonoscopy.  *  Colonoscopy may need to be done at shorter intervals if any pre-cancerous polyps are found.          Consider using the SuPrep colonoscopy prep to prepare the colon for the colonoscopy.  This requires much less liquid to be drunk to clean you out.  It may not be covered by insurance (approx $60-65), but it is much better tolerated and easier to use, therefore may be worth the extra expense.            --Good Grains:  Oats, brown rice, Quinoa (these do not raise the blood sugar as much)     --Bad grains:  Anything made from wheat or white rice     (because these raise the blood sugars significantly, and the possible gluten issue from wheat for some people).      --Proteins:  Aim for \"lean proteins\" including chicken, fish, seafood, pork, turkey, and eggs (in moderation); Eat red meat only occasionally      "

## 2019-04-15 NOTE — PROGRESS NOTES
SUBJECTIVE:   Neymar Rhodes is a 59 year old male who presents to clinic today for the following   health issues:   not sure what meds he is taking     Diabetes Follow-up      Patient is checking blood sugars: not at all    Diabetic concerns: None     Symptoms of hypoglycemia (low blood sugar): none     Paresthesias (numbness or burning in feet) or sores: No     Date of last diabetic eye exam: last year    In regards specifically to the patient's diabetes, they reports no unusual symptoms.    No significnat or regular episodes of hypo or hyperglycemia    Medication compliance: compliant most of the time  Diabetic diet compliance: NOT compliant most of the time  Diabetic ROS: no polyuria or polydipsia, no chest pain, dyspnea or TIA's, no numbness, tingling or pain in extremities    Does not check his glucose readings at home.    Diabetic complications: coronary artery disease     Most  recent labs:    Lab Results   Component Value Date    A1C 9.7 04/10/2019    A1C 8.7 10/03/2018    A1C 7.6 06/11/2018    A1C 6.9 12/01/2017    A1C 9.3 05/15/2017    A1C 8.7 01/16/2017    A1C 8.2 10/19/2016    A1C 8.7 06/30/2016    A1C 9.3 01/11/2016    A1C 7.7 07/17/2015    A1C 6.8 01/26/2015    A1C 6.4 10/16/2014    A1C 8.9 06/11/2014    A1C 7.8 12/30/2013    A1C 6.8 08/02/2013    A1C 7.0 02/14/2013    A1C 7.1 08/10/2012    A1C 8.4 03/30/2012    A1C 8.9 08/22/2011    A1C 8.4 03/08/2011    A1C 6.8 10/16/2010    A1C 6.8 04/09/2010    A1C 7.0 05/21/2009    A1C 7.0 12/23/2008    A1C 6.6 09/03/2008    A1C 7.1 06/09/2008    A1C 7.0 02/15/2008    A1C 7.2 10/24/2007    A1C 7.2 06/29/2007    A1C 6.9 03/29/2007    A1C 6.4 12/07/2006    A1C 6.6 06/01/2006    A1C 8.2 02/27/2006    A1C 8.3 12/15/2005    A1C 7.3 03/22/2005    A1C 7.3 11/29/2004    A1C 7.4 08/05/2004    A1C 6.9 10/27/2003    A1C 6.0 12/24/2002    A1C 6.0 08/07/2002          Diabetes Management Resources    Hyperlipidemia Follow-Up      Rate your low fat/cholesterol diet?:  good    Taking statin?  Yes, no muscle aches from statin    Other lipid medications/supplements?:  None    Has history of hyperlipidemia.  On statin for this, denies any significant side effects of this medication.      Latest labs reviewed:    Recent Labs   Lab Test 10/03/18  0849 06/11/18  0420  01/26/15  0920 12/30/13  1315   CHOL 104 128   < > 126 156   HDL 48 46   < > 45 41   LDL 40 59   < > 55 Cannot estimate LDL when triglyceride exceeds 400 mg/dL  86   TRIG 81 113   < > 128 435*   CHOLHDLRATIO  --   --   --  2.8 3.8    < > = values in this interval not displayed.        Lab Results   Component Value Date    AST 38 01/20/2017         Hypertension Follow-up      Outpatient blood pressures are being checked at home.  Results are 120/68.    Low Salt Diet: no added salt    BP Readings from Last 2 Encounters:   10/12/18 116/68   10/03/18 120/70     Hemoglobin A1C (%)   Date Value   04/10/2019 9.7 (H)   10/03/2018 8.7 (H)     LDL Cholesterol Calculated (mg/dL)   Date Value   10/03/2018 40   06/11/2018 59       Amount of exercise or physical activity: None    Problems taking medications regularly: No    Medication side effects: none    Diet: regular (no restrictions)      4.  History of cardiomyopathy not due to coronary artery disease (angiogram normal).  Denies orthopnea, no PND, no fluid retention.  Denies shortness of breath with activity.  He is overdue for cardiology follow-up appointment.    5.  Ascending aorta mildly dilated according to last echocardiogram of June 2018.      Additional history: as documented    Reviewed  and updated as needed this visit by clinical staff         Reviewed and updated as needed this visit by Provider               Past Medical History:  ---------------------------  Past Medical History:   Diagnosis Date     Ascending aorta dilatation (H)      Diverticulosis of colon (without mention of hemorrhage)      Essential hypertension, benign      Gout, unspecified      LBBB (left  bundle branch block)      Morbid obesity (H)      Non-ischemic cardiomyopathy (H)      Nonrheumatic mitral valve regurgitation      NSTEMI (non-ST elevated myocardial infarction) (H)     cardiac cath 6/2018: mild non-obstructive CAD     Other and unspecified hyperlipidemia      Type II or unspecified type diabetes mellitus without mention of complication, not stated as uncontrolled        Past Surgical History:  ---------------------------  Past Surgical History:   Procedure Laterality Date     C APPENDECTOMY  1980's     HEART CATH CORONARY ANGIOGRAM WITHOUT PRESSURES  06/10/2018    normal coronary angiogram, nothing more than 10%     SURGICAL HISTORY OF -   2001    repair of colovesicular fistula       Current Medications:  ---------------------------  Current Outpatient Medications   Medication Sig Dispense Refill     albuterol (PROAIR HFA/PROVENTIL HFA/VENTOLIN HFA) 108 (90 Base) MCG/ACT Inhaler Inhale 2 puffs into the lungs every 6 hours as needed for shortness of breath / dyspnea or wheezing       aspirin 81 MG tablet Take 1 tablet (81 mg) by mouth daily 30 tablet      atorvastatin (LIPITOR) 80 MG tablet Take 1/2 (one-half) tablet by mouth at bedtime. 45 tablet 1     BD ULTRA-FINE 29G X 12.7MM insulin pen needle use once daily with victoza pen 100 each 1     blood glucose monitoring (ACCU-CHEK LUL PLUS) test strip Use to test blood sugar 1-3 times daily or as directed. 100 each 11     clotrimazole (LOTRIMIN) 1 % cream Apply topically 2 times daily To axillary rash 15 g 1     continuous blood glucose monitoring (FREESTYLE JOCELIN) sensor For use with Freestyle Jocelin Flash  for continuous monitioring of blood glucose levels. Replace sensor every 10 days. 3 each 3     fenofibrate 160 MG tablet TAKE ONE TABLET BY MOUTH ONE TIME DAILY  90 tablet 3     glipiZIDE (GLIPIZIDE XL) 10 MG 24 hr tablet Take 1 tablet (10 mg) by mouth daily TAKE WITH LARGEST MEAL OF THE DAY; ALWAYS TAKE WITH FOOD 90 tablet 1             liraglutide (VICTOZA PEN) 18 MG/3ML soln Inject 1.8 mg Subcutaneous daily 27 mL 1     lisinopril (PRINIVIL/ZESTRIL) 20 MG tablet Take 1 tablet (20 mg) by mouth daily 90 tablet 3     metFORMIN (GLUCOPHAGE) 1000 MG tablet Take 1 tablet (1,000 mg) by mouth 2 times daily (with meals) 180 tablet 1     metoprolol succinate (TOPROL-XL) 25 MG 24 hr tablet Take 1 tablet (25 mg) by mouth every evening 30 tablet 9     spironolactone (ALDACTONE) 25 MG tablet Take 1 tablet (25 mg) by mouth daily 30 tablet 9     triamcinolone (KENALOG) 0.5 % cream Apply sparingly once or twice per day as needed to affected area until the skin is better, then stop 30 g 0       Allergies:  -------------  Allergies   Allergen Reactions     No Known Drug Allergies        Social History:  -------------------  Social History     Socioeconomic History     Marital status:      Spouse name: Not on file     Number of children: Not on file     Years of education: Not on file     Highest education level: Not on file   Occupational History     Not on file   Social Needs     Financial resource strain: Not on file     Food insecurity:     Worry: Not on file     Inability: Not on file     Transportation needs:     Medical: Not on file     Non-medical: Not on file   Tobacco Use     Smoking status: Never Smoker     Smokeless tobacco: Never Used   Substance and Sexual Activity     Alcohol use: Yes     Comment: 1 glass of beer on Fri     Drug use: No     Sexual activity: Yes     Partners: Female   Lifestyle     Physical activity:     Days per week: Not on file     Minutes per session: Not on file     Stress: Not on file   Relationships     Social connections:     Talks on phone: Not on file     Gets together: Not on file     Attends [employer] service: Not on file     Active member of club or organization: Not on file     Attends meetings of clubs or organizations: Not on file     Relationship status: Not on file     Intimate partner violence:     Fear of  "current or ex partner: Not on file     Emotionally abused: Not on file     Physically abused: Not on file     Forced sexual activity: Not on file   Other Topics Concern     Parent/sibling w/ CABG, MI or angioplasty before 65F 55M? Not Asked   Social History Narrative     Not on file       Family Medical History:  ------------------------------  Family History   Problem Relation Age of Onset     Cancer Father 75        bladder cancer     Myocardial Infarction Father      Other - See Comments Father         smoking     Breast Cancer Mother      Breast Cancer Sister      Family History Negative Brother      Family History Negative Son      Family History Negative Son         fluttering/murmur     Asthma Son          ROS:  REVIEW OF SYSTEMS:    REVIEW OF SYSTEMS:    CONSTITUTIONAL:negative for fever, chills, sweats, weight loss  EYES: negative for double vision, pain, blurring  ENT/MOUTH: negative for congestion, rhinorrhea, PND, ear pain, sore throat  RESP: negative for cough, dyspnea, wheezing, hemoptysis  CV: negative for chest pain, palpitations, PND, JOSHI, orthopnea, palpitations  GI: negative for dysphagia, N/V, pain, melena, diarrhea and constipation  : negative for dysuria, polyuria, hematuria, genital d/c  MUSCULOSKELATAL: negative for arthralgias, joint swelling or stiffness, weakness, muscular weakness  INTEGUMENTARY/SKIN: negative for new rashes, pruritus, scaling, alopecia  NEURO: negative for numbness/tingling, paralysis, incoordination or weakness  ENDOCRINE: negative for goiter, cold or heat intolerance, polydipsia, night sweats.     OBJECTIVE:                                                    /60   Pulse 68   Temp 98.1  F (36.7  C) (Oral)   Resp 16   Ht 1.778 m (5' 10\")   Wt 127 kg (280 lb)   SpO2 98%   BMI 40.18 kg/m       GENERAL alert and no distress  EYES:  Normal sclera,conjunctiva, EOMI  HENT: oral and posterior pharynx without lesions or erythema, facies symmetric  NECK: Neck " "supple. No LAD, without thyroidmegaly.  RESP: Clear to ausculation bilaterally without wheezes or crackles. Normal BS in all fields.  CV: RRR normal S1S2 without murmurs, rubs or gallops.  LYMPH: no cervical lymph adenopathy appreciated  MS: extremities- no gross deformities of the visible extremities noted,   EXT:  no lower extremity edema  PSYCH: Alert and oriented times 3; speech- coherent  SKIN:  No obvious significant skin lesions on visible portions of face          ASSESSMENT/PLAN:                                                      (E11.9,  Z79.4) Type 2 diabetes mellitus without complication, with long-term current use of insulin (H)  (primary encounter diagnosis)  Comment:   *  Diabetes not well controlled despite numerous medications including Victoza.    *There is no way were going to control his diabetes without the use of insulin.  Patient was somewhat distraught by this, spoke at great length about the use of insulin and why it is helpful.    *  Start Lantus (or similar formulary alternative)) insulin 10 units once per day (either at supper or bedtime)    *  Continue all other medications at the same doses.  Contact your usual pharmacy if you need refills.     *  Work hard on your diet, reduce the intake of \"simple carbohydrates\" (e.g. White bread, white rice, pasta, noodles, potatoes, snack foods, regular soda, juices (except fresh squeezed), cakes, cookies, candy, etc.) as much as possible.      *  Diabetes educators:      --[facility] Diabetes Education - All [facility] [phone]   [email or web address]     *  They diabetes educators will place a continuous glucose monitor.    Discussed importance in compliance in all areas of diabetic control including diet, routine BS checks, absolute medication compliance, laboratory monitoring, and attending regular follow up appointments as ordered.  Failure to comply with instructions regarding " diabetes will lead to a greater chance of poor diabetic control and therefore a greater chance of diabetes related complications such as CAD, CVA, PVD, and retinopathy/neuropathy/nephropathy.  Based on level of diabetes control: testing frequency THREE TIME PER DAY   REASON FOR MORE FREQUENT TESTING: FLUCTUATING GLUCOSE LEVELS WITH RECURRENT HYPER- AND/OR HYPOGLYCEMIA, ATTEMPTING LIFESTYLE CHANGES, MEDICATION ADJUSTMENTS   Plan: FOOT EXAM  NO CHARGE [83205.456], Hemoglobin         A1c, Basic metabolic panel, DIABETES EDUCATOR         REFERRAL, insulin detemir (LEVEMIR PEN) 100         UNIT/ML pen, DISCONTINUED: insulin glargine         (LANTUS SOLOSTAR PEN) 100 UNIT/ML pen                *  Return to see me in approximately 3 months regardless of how you feel, sooner if needed.  Please get nonfasting labs done in the [facility] lab 1-2 days before this appointment.  Call [phone] to schedule both appointments.          (E66.87,  E09.99) Obesity with BMI of 40.0-44.9, adult (H)  Comment: The patient's current BMI is: Body mass index is 40.18 kg/m .  Obesity is a biological preventable and treatable disease, which is associated with significantly increased risk of many acute and chronic health conditions. Obesity has now been recognized as a chronic disease by the American Medical Association.  A range of serious co-morbidities are associated with obesity including increased risk for hypertension, stroke, coronary artery disease, dyslipidemia, Type II diabetes, depression, sleep apnea, cancers of the colon, breast and endometrium, obstructive sleep apnea, osteoarthritis and female infertility. Therefore, obesity is not just a problem; it s a disease that warrants serious evidence based treatements.  Recommended regular aerobic exercise, recommended improved diet aiming at lowering amount of processed foods, lower sugars, and lower wheat products, and moderation carbs and fat in the diet, establishing more regular meal  times, always eating breakfast, front loading some of the calories and adding more protein to the diet.    Discussed the increased risks of developing or worsening all types of diabetes and other dysmetabolic syndromes.    Surgical therapy may be considered, especially in patients with BMI greater than or equal to 35 kg/m2 with multiple complications. Surgical treatments include lap-band, gastric sleeve or gastric bypass surgery.      Plan:     (I42.8) Non-ischemic cardiomyopathy (H)  Comment: *  Please see Cardiology Clinic this spring, make an appointment:  [facility] [formerly [facility]] phone [phone]  (Fax [phone])     *  Please get Echocardiogram before the cardiology appointment to recheck the heart pumping function.   Denies signs and symptoms of CHF  Plan:     (I68.050) Ascending aorta dilatation (H)  Comment: repeat Echocardiogram, monitor for any changes.   Plan:     (I10) Benign essential hypertension  Comment: This condition is currently controlled on the current medical regimen.  Continue current therapy.   Discussed current hypertension treatment guidelines, including indications for treatment and treatment options.  Discussed the importance for aggressive management of HTN to prevent vascular complications later.  Recommended lower fat, lower carbohydrate, and lower sodium (<2000 mg)diet.  Discussed required intervals for follow up on HTN, lab studies.  Recommened pt. follow their blood pressures outside the clinic to ensure that BPs are remaining within guidelines, and to contact me if the readings are not within guidelines on a regular basis so we can adjust treatment as needed.   Plan:     (I25.10) Coronary artery disease involving native coronary artery of native heart without angina pectoris  Comment: The patient does not report any signs or symptoms of angina or active cardiac ischemia.   They do not report any relative changes in  their ability to perform physical activities over the past year.    We discussed aggressive secondary risk factor modification, including aggressive BP control (under 130/ideally), aggressive LDL lowering with statin (goal under 100 for sure/under 70 ideally), no smoking, diabetes prevention/management, no smoking, and use of either ASA or similar anti platelet agent if tolerated.     Plan:     (E78.5) Hyperlipidemia LDL goal <100  Comment: Discussed guidelines recommending a statin cholesterol lowering medication for any patient with either diabetes and/or vascular disease, aiming for a LDL goal under 100 for sure, ideally under 70.    Reviewed statins and their side effects including muscle pain, muscle inflammation, GI upset.  Told the patient to stop the medication in question and to call if any side effects develop.   Recommended CoQ10 200-300 mg at the same time as taking the statin medication to help reduce the chance of muscle side effects from the statin.    Plan:     (Z11.4) Screening for HIV (human immunodeficiency virus)  Comment:   Plan: **HIV Antigen Antibody Combo FUTURE anytime,         CANCELED: HIV Screening            (Z13.89) Screening for diabetic peripheral neuropathy  Comment:   Plan: FOOT EXAM  NO CHARGE [13941.114]            (Z12.11) Screen for colon cancer  Comment: Discussed current colon cancer screening recommendations calling for colonoscopy as gold standard for colon cancer screening.  Instructed them to check with their insurance coverage to see what is covered, and then referral given for colonoscopy.    If colonoscopy not covered or the patient does not want to do this study, then we can do a FIT test annually, but I told them that this is a much inferior screening method for colon cancer.     Plan: GASTROENTEROLOGY ADULT REF PROCEDURE ONLY         Mahesh Knapp [phone]; No Provider        Preference                 See Patient Instructions    FRANCHESKA GRUBER M.D.,  MD  [facility]    I spent greater than 45 minutes with pt, greater than 50% of time was educational and counseling.     (Chart documentation may have been completed, in part, with [employer] voice-recognition software. Even though reviewed, some grammatical, spelling, and word errors may remain.)

## 2019-04-17 PROBLEM — J96.01 ACUTE RESPIRATORY FAILURE WITH HYPOXIA (H): Status: RESOLVED | Noted: 2018-06-11 | Resolved: 2019-04-17

## 2019-04-25 ENCOUNTER — TELEPHONE (OUTPATIENT)
Dept: INTERNAL MEDICINE | Facility: CLINIC | Age: 59
End: 2019-04-25

## 2019-04-25 NOTE — TELEPHONE ENCOUNTER
Noted.     I am disappointed, I have been really hounding him to be seen by diabetes education because he clearly needs it.     not much I can do to get him to go if he does not want to, Ill just keep bothering him.

## 2019-04-25 NOTE — TELEPHONE ENCOUNTER
Diabetes Education Scheduling Outreach #1:    Call to patient to schedule. Patient declined to schedule and would like to be called back in June.        Frederick Shane  [facility]  Diabetes and Nutrition Scheduling

## 2019-05-08 DIAGNOSIS — I42.8 NONISCHEMIC CARDIOMYOPATHY (H): ICD-10-CM

## 2019-05-08 RX ORDER — METOPROLOL SUCCINATE 25 MG/1
TABLET, EXTENDED RELEASE ORAL
Qty: 30 TABLET | Refills: 10 | Status: SHIPPED | OUTPATIENT
Start: 2019-05-08 | End: 2020-03-13

## 2019-05-08 RX ORDER — SPIRONOLACTONE 25 MG/1
TABLET ORAL
Qty: 30 TABLET | Refills: 10 | Status: SHIPPED | OUTPATIENT
Start: 2019-05-08 | End: 2019-10-09

## 2019-05-08 NOTE — TELEPHONE ENCOUNTER
"Requested Prescriptions   Pending Prescriptions Disp Refills     spironolactone (ALDACTONE) 25 MG tablet [Pharmacy Med Name: Spironolactone Oral Tablet 25 MG] 30 tablet 8     Sig: TAKE ONE TABLET BY MOUTH ONE TIME DAILY       Diuretics (Including Combos) Protocol Passed - 5/8/2019  7:01 AM        Passed - Blood pressure under 140/90 in past 12 months     BP Readings from Last 3 Encounters:   04/15/19 108/60   10/12/18 116/68   10/03/18 120/70                 Passed - Recent (12 mo) or future (30 days) visit within the authorizing provider's specialty     Patient had office visit in the last 12 months or has a visit in the next 30 days with authorizing provider or within the authorizing provider's specialty.  See \"Patient Info\" tab in inbasket, or \"Choose Columns\" in Meds & Orders section of the refill encounter.              Passed - Medication is active on med list        Passed - Patient is age 18 or older        Passed - Normal serum creatinine on file in past 12 months     Recent Labs   Lab Test 04/10/19  0728   CR 0.91              Passed - Normal serum potassium on file in past 12 months     Recent Labs   Lab Test 04/10/19  0728   POTASSIUM 4.4                    Passed - Normal serum sodium on file in past 12 months     Recent Labs   Lab Test 04/10/19  0728                 metoprolol succinate ER (TOPROL-XL) 25 MG 24 hr tablet [Pharmacy Med Name: Metoprolol Succinate ER Oral Tablet Extended Release 24 Hour 25 MG] 30 tablet 8     Sig: TAKE ONE TABLET BY MOUTH IN THE EVENING       Beta-Blockers Protocol Passed - 5/8/2019  7:01 AM        Passed - Blood pressure under 140/90 in past 12 months     BP Readings from Last 3 Encounters:   04/15/19 108/60   10/12/18 116/68   10/03/18 120/70                 Passed - Patient is age 6 or older        Passed - Recent (12 mo) or future (30 days) visit within the authorizing provider's specialty     Patient had office visit in the last 12 months or has a visit in the " "next 30 days with authorizing provider or within the authorizing provider's specialty.  See \"Patient Info\" tab in inbasket, or \"Choose Columns\" in Meds & Orders section of the refill encounter.              Passed - Medication is active on med list          "

## 2019-06-01 DIAGNOSIS — E11.9 TYPE 2 DIABETES MELLITUS WITHOUT COMPLICATION, WITHOUT LONG-TERM CURRENT USE OF INSULIN (H): ICD-10-CM

## 2019-06-02 NOTE — TELEPHONE ENCOUNTER
"Requested Prescriptions   Pending Prescriptions Disp Refills     BD ULTRA-FINE 29G X 12.7MM insulin pen needle [Pharmacy Med Name: BD Pen Needle Original U/F Miscellaneous 29G X 12.7MM] 100 each 1     Sig: use once daily with victoza pen   Last Written Prescription Date:  3/14/2019  Last Fill Quantity: 100,  # refills: 1   Last Office Visit: 4/15/2019   Future Office Visit:         Diabetic Supplies Protocol Passed - 6/1/2019 10:02 AM        Passed - Medication is active on med list        Passed - Patient is 18 years of age or older        Passed - Recent (6 mo) or future (30 days) visit within the authorizing provider's specialty     Patient had office visit in the last 6 months or has a visit in the next 30 days with authorizing provider.  See \"Patient Info\" tab in inbasket, or \"Choose Columns\" in Meds & Orders section of the refill encounter.              "

## 2019-06-03 DIAGNOSIS — E11.9 TYPE 2 DIABETES MELLITUS WITHOUT COMPLICATION, WITHOUT LONG-TERM CURRENT USE OF INSULIN (H): ICD-10-CM

## 2019-06-04 RX ORDER — PEN NEEDLE, DIABETIC 29 G X1/2"
NEEDLE, DISPOSABLE MISCELLANEOUS
Qty: 100 EACH | Refills: 1 | Status: SHIPPED | OUTPATIENT
Start: 2019-06-04 | End: 2019-10-26

## 2019-06-04 RX ORDER — GLIPIZIDE 10 MG/1
10 TABLET, FILM COATED, EXTENDED RELEASE ORAL DAILY
Qty: 90 TABLET | Refills: 0 | Status: SHIPPED | OUTPATIENT
Start: 2019-06-04 | End: 2019-09-10

## 2019-06-04 NOTE — TELEPHONE ENCOUNTER
"Requested Prescriptions   Pending Prescriptions Disp Refills     glipiZIDE (GLUCOTROL XL) 10 MG 24 hr tablet [Pharmacy Med Name: glipiZIDE ER Oral Tablet Extended Release 24 Hour 10 MG]  Last Written Prescription Date:  10/12/2018  Last Fill Quantity: 90,  # refills: 01   Last Office Visit: 4/15/2019   Future Office Visit:      90 tablet 0     Sig: TAKE 1 TABLET (10 MG) BY MOUTH DAILY. TAKE WITH LARGEST MEAL OF THE DAY. ALWAYS TAKE WITH FOOD       Sulfonylurea Agents Passed - 6/3/2019  7:00 AM        Passed - Blood pressure less than 140/90 in past 6 months     BP Readings from Last 3 Encounters:   04/15/19 108/60   10/12/18 116/68   10/03/18 120/70                 Passed - Patient has documented LDL within the past 12 mos.     Recent Labs   Lab Test 10/03/18  0849   LDL 40             Passed - Patient has had a Microalbumin in the past 15 mos.     Recent Labs   Lab Test 04/10/19  0729   MICROL 10   UMALCR 7.71             Passed - Patient has documented A1c within the specified period of time.     If HgbA1C is 8 or greater, it needs to be on file within the past 3 months.  If less than 8, must be on file within the past 6 months.     Recent Labs   Lab Test 04/10/19  0728   A1C 9.7*             Passed - Medication is active on med list        Passed - Patient is age 18 or older        Passed - Patient has a recent creatinine (normal) within the past 12 mos.     Recent Labs   Lab Test 04/10/19  0728   CR 0.91             Passed - Recent (6 mo) or future (30 days) visit within the authorizing provider's specialty     Patient had office visit in the last 6 months or has a visit in the next 30 days with authorizing provider or within the authorizing provider's specialty.  See \"Patient Info\" tab in inbasket, or \"Choose Columns\" in Meds & Orders section of the refill encounter.              "

## 2019-07-02 ENCOUNTER — ANCILLARY PROCEDURE (OUTPATIENT)
Dept: GENERAL RADIOLOGY | Facility: CLINIC | Age: 59
End: 2019-07-02
Attending: PHYSICIAN ASSISTANT
Payer: COMMERCIAL

## 2019-07-02 ENCOUNTER — OFFICE VISIT (OUTPATIENT)
Dept: URGENT CARE | Facility: URGENT CARE | Age: 59
End: 2019-07-02
Payer: COMMERCIAL

## 2019-07-02 VITALS
WEIGHT: 280 LBS | DIASTOLIC BLOOD PRESSURE: 58 MMHG | OXYGEN SATURATION: 98 % | RESPIRATION RATE: 18 BRPM | BODY MASS INDEX: 40.18 KG/M2 | HEART RATE: 75 BPM | SYSTOLIC BLOOD PRESSURE: 102 MMHG | TEMPERATURE: 98.4 F

## 2019-07-02 DIAGNOSIS — J01.90 ACUTE SINUSITIS WITH COEXISTING CONDITION, NEED PROPHYLACTIC TREATMENT: ICD-10-CM

## 2019-07-02 DIAGNOSIS — R05.8 PRODUCTIVE COUGH: ICD-10-CM

## 2019-07-02 DIAGNOSIS — R05.8 PRODUCTIVE COUGH: Primary | ICD-10-CM

## 2019-07-02 PROCEDURE — 71046 X-RAY EXAM CHEST 2 VIEWS: CPT

## 2019-07-02 PROCEDURE — 99214 OFFICE O/P EST MOD 30 MIN: CPT | Performed by: PHYSICIAN ASSISTANT

## 2019-07-02 RX ORDER — CODEINE PHOSPHATE AND GUAIFENESIN 10; 100 MG/5ML; MG/5ML
1-2 SOLUTION ORAL EVERY 4 HOURS PRN
Qty: 240 ML | Refills: 0 | Status: SHIPPED | OUTPATIENT
Start: 2019-07-02 | End: 2019-10-01

## 2019-07-02 RX ORDER — FLUTICASONE PROPIONATE 50 MCG
2 SPRAY, SUSPENSION (ML) NASAL DAILY
Qty: 16 G | Refills: 0 | Status: SHIPPED | OUTPATIENT
Start: 2019-07-02 | End: 2022-01-14

## 2019-07-02 RX ORDER — DOXYCYCLINE 100 MG/1
100 CAPSULE ORAL 2 TIMES DAILY
Qty: 20 CAPSULE | Refills: 0 | Status: SHIPPED | OUTPATIENT
Start: 2019-07-02 | End: 2019-10-01

## 2019-07-02 NOTE — PROGRESS NOTES
Patient presents with:  URI: Pt states he has been having a lot of green mucus and coughing X 1 week. OTC medications have not been working       SUBJECTIVE:   Neymar Rhodes is a 59 year old male presenting with a chief complaint of   1)  sinus congestion for over a week, now with sinus pressure  2) productive cough for a week, purulent phlegm.    No fevers  Denies any shortness of breath.      Onset of symptoms was as above.  Course of illness is worsening.    Severity moderate  Current and Associated symptoms: as above  Treatment measures tried include None tried.  Predisposing factors include multiple medical diagnoses.    Past Medical History:   Diagnosis Date     Ascending aorta dilatation (H)      Diverticulosis of colon (without mention of hemorrhage)      Essential hypertension, benign      Gout, unspecified      LBBB (left bundle branch block)      Morbid obesity (H)      Non-ischemic cardiomyopathy (H)      Nonrheumatic mitral valve regurgitation      NSTEMI (non-ST elevated myocardial infarction) (H)     cardiac cath 6/2018: mild non-obstructive CAD     Other and unspecified hyperlipidemia      Type II or unspecified type diabetes mellitus without mention of complication, not stated as uncontrolled      Patient Active Problem List   Diagnosis     Diverticulosis of large intestine     Benign essential hypertension     Gout     Obesity with BMI of 40.0-44.9, adult (H)     Type 2 diabetes mellitus without complication, without long-term current use of insulin (H)     Hyperlipidemia LDL goal <100     NSTEMI (non-ST elevated myocardial infarction) (H)     Non-ischemic cardiomyopathy (H)     LBBB (left bundle branch block)     Nonrheumatic mitral valve regurgitation     Ascending aorta dilatation (H)     Coronary artery disease involving native coronary artery of native heart without angina pectoris     Social History     Tobacco Use     Smoking status: Never Smoker     Smokeless tobacco: Never Used    Substance Use Topics     Alcohol use: Yes     Comment: 1 glass of beer on Fri       ROS:  CONSTITUTIONAL:NEGATIVE for fever, chills, change in weight  INTEGUMENTARY/SKIN: NEGATIVE for worrisome rashes, moles or lesions  EYES: NEGATIVE for vision changes or irritation  ENT/MOUTH: as per HPI  RESP:as per HPI  CV: NEGATIVE for chest pain, palpitations or peripheral edema  GI: NEGATIVE for nausea, abdominal pain, heartburn, or change in bowel habits  : negative for dysuria, hematuria, decreased urinary stream, erectile dysfunction  MUSCULOSKELETAL: NEGATIVE for significant arthralgias or myalgia  NEURO: NEGATIVE for weakness, dizziness or paresthesias  Review of systems negative except as stated above.    OBJECTIVE  :/58 (BP Location: Right arm, Patient Position: Sitting, Cuff Size: Adult Regular)   Pulse 75   Temp 98.4  F (36.9  C) (Oral)   Resp 18   Wt 127 kg (280 lb)   SpO2 98%   BMI 40.18 kg/m    GENERAL APPEARANCE: healthy, alert and no distress  EYES: EOMI,  PERRL, conjunctiva clear  HENT: ear canals and TM's normal.  Nose and mouth without ulcers, erythema or lesions  HENT: nasal turbinates erythematous, swollen and rhinorrhea yellow  NECK: supple, nontender, no lymphadenopathy  RESP: lungs clear to auscultation - no rales, rhonchi or wheezes  CV: regular rates and rhythm, normal S1 S2, no murmur noted  ABDOMEN:  soft, nontender, no HSM or masses and bowel sounds normal  NEURO: Normal strength and tone, sensory exam grossly normal,  normal speech and mentation  SKIN: no suspicious lesions or rashes    (R05) Productive cough  (primary encounter diagnosis)  Comment:   Plan: XR Chest 2 Views, doxycycline hyclate         (VIBRAMYCIN) 100 MG capsule,         guaiFENesin-codeine (ROBITUSSIN AC) 100-10         MG/5ML solution            (J01.90) Acute sinusitis with coexisting condition, need prophylactic treatment  Comment:   Plan: fluticasone (FLONASE) 50 MCG/ACT nasal spray,         doxycycline  hyclate (VIBRAMYCIN) 100 MG capsule            F/U with PCP for re-check in one week, sooner should symptoms persist or worsen.    Patient expresses understanding and agreement with the assessment and plan as above.  .

## 2019-07-02 NOTE — PATIENT INSTRUCTIONS
(R05) Productive cough  (primary encounter diagnosis)  Comment:   Plan: XR Chest 2 Views, doxycycline hyclate         (VIBRAMYCIN) 100 MG capsule,         guaiFENesin-codeine (ROBITUSSIN AC) 100-10         MG/5ML solution            (J01.90) Acute sinusitis with coexisting condition, need prophylactic treatment  Comment:   Plan: fluticasone (FLONASE) 50 MCG/ACT nasal spray,         doxycycline hyclate (VIBRAMYCIN) 100 MG capsule

## 2019-08-14 DIAGNOSIS — E11.9 TYPE 2 DIABETES MELLITUS WITHOUT COMPLICATION, WITHOUT LONG-TERM CURRENT USE OF INSULIN (H): ICD-10-CM

## 2019-08-14 RX ORDER — LIRAGLUTIDE 6 MG/ML
1.8 INJECTION SUBCUTANEOUS DAILY
Qty: 9 ML | Refills: 0 | Status: SHIPPED | OUTPATIENT
Start: 2019-08-14 | End: 2019-09-14

## 2019-08-14 NOTE — LETTER
[facility]  [address] [id number] [phone]            Neymar Rhodes  [address] [place] [id number]        August 14, 2019    Dear Neymar,    While refilling your prescription today, we noticed that you are due to have labs drawn.  We will refill your prescription for 30 days, but a follow-up appointment must be made before any additional refills can be approved.     Taking care of your health is important to us and we look forward to seeing you in the near future.  Please call us at [phone] or [phone] (or use [employer]) to schedule an appointment.     Please disregard this notice if you have already made an appointment.    Sincerely,        [facility]

## 2019-08-14 NOTE — TELEPHONE ENCOUNTER
"Requested Prescriptions   Pending Prescriptions Disp Refills     VICTOZA PEN 18 MG/3ML soln [Pharmacy Med Name: Victoza Subcutaneous Solution Pen-injector 18 MG/3ML] 27 mL 0     Sig: Inject 1.8 mg under the skin daily       GLP-1 Agonists Protocol Failed - 8/14/2019  5:47 AM        Failed - HgbA1C in past 3 or 6 months     If HgbA1C is 8 or greater, it needs to be on file within the past 3 months.  If less than 8, must be on file within the past 6 months.     Recent Labs   Lab Test 04/10/19  0728   A1C 9.7*             Passed - Blood pressure less than 140/90 in past 6 months     BP Readings from Last 3 Encounters:   07/02/19 102/58   04/15/19 108/60   10/12/18 116/68                 Passed - LDL on file in past 12 months     Recent Labs   Lab Test 10/03/18  0849   LDL 40             Passed - Microalbumin on file in past 12 months     Recent Labs   Lab Test 04/10/19  0729   MICROL 10   UMALCR 7.71             Passed - Medication is active on med list        Passed - Patient is age 18 or older        Passed - Normal serum creatinine on file in past 12 months     Recent Labs   Lab Test 04/10/19  0728   CR 0.91             Passed - Recent (6 mo) or future (30 days) visit within the authorizing provider's specialty     Patient had office visit in the last 6 months or has a visit in the next 30 days with authorizing provider.  See \"Patient Info\" tab in inbasket, or \"Choose Columns\" in Meds & Orders section of the refill encounter.            Medication is being filled for 1 time refill only due to:  Patient needs labs a1c.    "

## 2019-08-23 DIAGNOSIS — E11.9 TYPE 2 DIABETES MELLITUS WITHOUT COMPLICATION, WITHOUT LONG-TERM CURRENT USE OF INSULIN (H): ICD-10-CM

## 2019-08-23 RX ORDER — ATORVASTATIN CALCIUM 80 MG/1
TABLET, FILM COATED ORAL
Qty: 45 TABLET | Refills: 0 | Status: SHIPPED | OUTPATIENT
Start: 2019-08-23 | End: 2019-11-09

## 2019-08-23 NOTE — TELEPHONE ENCOUNTER
Prescription approved per [facility] Refill Protocol.    Debbie SETHI RN, BSN, PHN

## 2019-08-23 NOTE — TELEPHONE ENCOUNTER
"Requested Prescriptions   Pending Prescriptions Disp Refills     atorvastatin (LIPITOR) 80 MG tablet [Pharmacy Med Name: Atorvastatin Calcium Oral Tablet 80 MG]  Last Written Prescription Date:  12/17/2018  Last Fill Quantity: 45,  # refills: 01   Last Office Visit: 4/15/2019   Future Office Visit:      45 tablet 0     Sig: Take 1/2 (one-half) tablet by mouth at bedtime.       Statins Protocol Passed - 8/23/2019  5:50 AM        Passed - LDL on file in past 12 months     Recent Labs   Lab Test 10/03/18  0849   LDL 40             Passed - No abnormal creatine kinase in past 12 months     No lab results found.             Passed - Recent (12 mo) or future (30 days) visit within the authorizing provider's specialty     Patient had office visit in the last 12 months or has a visit in the next 30 days with authorizing provider or within the authorizing provider's specialty.  See \"Patient Info\" tab in inbasket, or \"Choose Columns\" in Meds & Orders section of the refill encounter.              Passed - Medication is active on med list        Passed - Patient is age 18 or older          "

## 2019-08-26 ENCOUNTER — TELEPHONE (OUTPATIENT)
Dept: INTERNAL MEDICINE | Facility: CLINIC | Age: 59
End: 2019-08-26

## 2019-08-26 DIAGNOSIS — Z11.4 SCREENING FOR HIV (HUMAN IMMUNODEFICIENCY VIRUS): ICD-10-CM

## 2019-08-26 DIAGNOSIS — E11.9 TYPE 2 DIABETES MELLITUS WITHOUT COMPLICATION, WITH LONG-TERM CURRENT USE OF INSULIN (H): ICD-10-CM

## 2019-08-26 DIAGNOSIS — Z79.4 TYPE 2 DIABETES MELLITUS WITHOUT COMPLICATION, WITH LONG-TERM CURRENT USE OF INSULIN (H): ICD-10-CM

## 2019-08-26 DIAGNOSIS — Z79.4 TYPE 2 DIABETES MELLITUS WITHOUT COMPLICATION, WITH LONG-TERM CURRENT USE OF INSULIN (H): Primary | ICD-10-CM

## 2019-08-26 DIAGNOSIS — E11.9 TYPE 2 DIABETES MELLITUS WITHOUT COMPLICATION, WITH LONG-TERM CURRENT USE OF INSULIN (H): Primary | ICD-10-CM

## 2019-08-26 LAB
ANION GAP SERPL CALCULATED.3IONS-SCNC: 5 MMOL/L (ref 3–14)
BUN SERPL-MCNC: 21 MG/DL (ref 7–30)
CALCIUM SERPL-MCNC: 9.1 MG/DL (ref 8.5–10.1)
CHLORIDE SERPL-SCNC: 109 MMOL/L (ref 94–109)
CO2 SERPL-SCNC: 24 MMOL/L (ref 20–32)
CREAT SERPL-MCNC: 0.94 MG/DL (ref 0.66–1.25)
GFR SERPL CREATININE-BSD FRML MDRD: 87 ML/MIN/{1.73_M2}
GLUCOSE SERPL-MCNC: 247 MG/DL (ref 70–99)
HBA1C MFR BLD: 8.8 % (ref 0–5.6)
POTASSIUM SERPL-SCNC: 4.7 MMOL/L (ref 3.4–5.3)
SODIUM SERPL-SCNC: 138 MMOL/L (ref 133–144)

## 2019-08-26 PROCEDURE — 36415 COLL VENOUS BLD VENIPUNCTURE: CPT | Performed by: INTERNAL MEDICINE

## 2019-08-26 PROCEDURE — 80048 BASIC METABOLIC PNL TOTAL CA: CPT | Performed by: INTERNAL MEDICINE

## 2019-08-26 PROCEDURE — 83036 HEMOGLOBIN GLYCOSYLATED A1C: CPT | Performed by: INTERNAL MEDICINE

## 2019-08-26 PROCEDURE — 87389 HIV-1 AG W/HIV-1&-2 AB AG IA: CPT | Performed by: INTERNAL MEDICINE

## 2019-08-26 NOTE — TELEPHONE ENCOUNTER
"Please call the patient.  His recent blood test showed that his diabetes is slightly better controlled but still needs additional medication.  Increase the dose of Levemir insulin to 20 units once per day.  Continue all other medications at the same doses.  Contact your usual pharmacy if you need refills.     Return to see me in 3 months, sooner if needed.  Please get fasting labs done at the [facility] or any other [facility] Lab lab 1-2 days before this appointment (schedule a \"lab appointment\").  If you get the labs done at another clinic, make arrangements with them directly.  The orders will be in place.  Eat nothing for at least 8 hours prior to having these labs drawn.  Use [employer] or Call [phone] to schedule the appointment with me and lab appointment.         "

## 2019-08-26 NOTE — LETTER
"August 26, 2019      Neymar Rhodes  [address]  [place] [place] [facility] [id number]        Dear MichelleCarmelo,    We are writing to inform you of your test results.    Test results indicate you may require additional follow up, see comment below.    The diabetes is slightly better controlled but still needs additional medication.  Increase the dose of Levemir insulin to 20 units once per day.  Continue all other medications at the same doses.  Contact your usual pharmacy if you need refills.     Return to see me in 3 months, sooner if needed.  Please get fasting labs done at the [facility] or any other [facility] Lab lab 1-2 days before this appointment (schedule a \"lab appointment\").  If you get the labs done at another clinic, make arrangements with them directly.  The orders will be in place.  Eat nothing for at least 8 hours prior to having these labs drawn.  Use [employer] or Call [phone] to schedule the appointment with me and lab appointment.               Resulted Orders   Basic metabolic panel   Result Value Ref Range    Sodium 138 133 - 144 mmol/L    Potassium 4.7 3.4 - 5.3 mmol/L    Chloride 109 94 - 109 mmol/L    Carbon Dioxide 24 20 - 32 mmol/L    Anion Gap 5 3 - 14 mmol/L    Glucose 247 (H) 70 - 99 mg/dL    Urea Nitrogen 21 7 - 30 mg/dL    Creatinine 0.94 0.66 - 1.25 mg/dL    GFR Estimate 87 >60 mL/min/[1.73_m2]      Comment:      Non  GFR Calc  Starting 12/18/2018, serum creatinine based estimated GFR (eGFR) will be   calculated using the Chronic Kidney Disease Epidemiology Collaboration   (CKD-EPI) equation.      GFR Estimate If Black >90 >60 mL/min/[1.73_m2]      Comment:       GFR Calc  Starting 12/18/2018, serum creatinine based estimated GFR (eGFR) will be   calculated using the Chronic Kidney Disease Epidemiology Collaboration   (CKD-EPI) equation.      Calcium 9.1 8.5 - 10.1 mg/dL   Hemoglobin A1c   Result Value Ref Range    Hemoglobin A1C 8.8 (H) 0 " - 5.6 %      Comment:      Normal <5.7% Prediabetes 5.7-6.4%  Diabetes 6.5% or higher - adopted from ADA   consensus guidelines.         If you have any questions or concerns, please call the clinic at the number listed above.       Sincerely,        Jefry Harris MD

## 2019-08-27 LAB — HIV 1+2 AB+HIV1 P24 AG SERPL QL IA: NONREACTIVE

## 2019-08-28 NOTE — TELEPHONE ENCOUNTER
Patient informed. He is wondering what time to take his insulin. He was taking 10 units usually at bedtime-right before bed. Informed to increase to 20 units Levemir at bedtime. This is how sig is written as well.

## 2019-08-30 ENCOUNTER — OFFICE VISIT (OUTPATIENT)
Dept: URGENT CARE | Facility: URGENT CARE | Age: 59
End: 2019-08-30
Payer: COMMERCIAL

## 2019-08-30 VITALS
WEIGHT: 280 LBS | HEART RATE: 66 BPM | DIASTOLIC BLOOD PRESSURE: 60 MMHG | SYSTOLIC BLOOD PRESSURE: 114 MMHG | OXYGEN SATURATION: 98 % | BODY MASS INDEX: 40.18 KG/M2 | RESPIRATION RATE: 16 BRPM | TEMPERATURE: 98.8 F

## 2019-08-30 DIAGNOSIS — H61.21 IMPACTED CERUMEN OF RIGHT EAR: Primary | ICD-10-CM

## 2019-08-30 PROCEDURE — 99213 OFFICE O/P EST LOW 20 MIN: CPT | Performed by: PHYSICIAN ASSISTANT

## 2019-08-30 NOTE — PROGRESS NOTES
SUBJECTIVE:  Neymar Rhodes is a 59 year old male who presents with right ear fullness and hearing loss for 4 day(s). Has a history of wax build up and tried to remove it at home. Now has fullness sensation into his jaw.   Severity: moderate   Timing:gradual onset  Additional symptoms include none.      Patient denies fever, chills, drainage from ears, tinnitus, pain on the outer ear, recent URI symptoms or recent swimming.       Past Medical History:   Diagnosis Date     Ascending aorta dilatation (H)      Diverticulosis of colon (without mention of hemorrhage)      Essential hypertension, benign      Gout, unspecified      LBBB (left bundle branch block)      Morbid obesity (H)      Non-ischemic cardiomyopathy (H)      Nonrheumatic mitral valve regurgitation      NSTEMI (non-ST elevated myocardial infarction) (H)     cardiac cath 6/2018: mild non-obstructive CAD     Other and unspecified hyperlipidemia      Type II or unspecified type diabetes mellitus without mention of complication, not stated as uncontrolled      Current Outpatient Medications   Medication Sig Dispense Refill     albuterol (PROAIR HFA/PROVENTIL HFA/VENTOLIN HFA) 108 (90 Base) MCG/ACT Inhaler Inhale 2 puffs into the lungs every 6 hours as needed for shortness of breath / dyspnea or wheezing       aspirin 81 MG tablet Take 1 tablet (81 mg) by mouth daily 30 tablet      atorvastatin (LIPITOR) 80 MG tablet Take 1/2 (one-half) tablet by mouth at bedtime. 45 tablet 0     BD ULTRA-FINE 29G X 12.7MM insulin pen needle use once daily with victoza pen 100 each 1     BD ULTRA-FINE 29G X 12.7MM insulin pen needle use once daily with victoza pen 100 each 1     blood glucose monitoring (ACCU-CHEK LUL PLUS) test strip Use to test blood sugar 1-3 times daily or as directed. 100 each 11     clotrimazole (LOTRIMIN) 1 % cream Apply topically 2 times daily To axillary rash 15 g 1     continuous blood glucose monitoring (FREESTYLE JOCELIN) sensor For use with  Freestyle Jeffrey Flash  for continuous monitioring of blood glucose levels. Replace sensor every 10 days. 3 each 3     fenofibrate 160 MG tablet TAKE ONE TABLET BY MOUTH ONE TIME DAILY  90 tablet 3     fluticasone (FLONASE) 50 MCG/ACT nasal spray Spray 2 sprays into both nostrils daily 16 g 0     glipiZIDE (GLUCOTROL XL) 10 MG 24 hr tablet TAKE 1 TABLET (10 MG) BY MOUTH DAILY. TAKE WITH LARGEST MEAL OF THE DAY. ALWAYS TAKE WITH FOOD 90 tablet 0     guaiFENesin-codeine (ROBITUSSIN AC) 100-10 MG/5ML solution Take 5-10 mLs by mouth every 4 hours as needed for cough 240 mL 0     insulin detemir (LEVEMIR PEN) 100 UNIT/ML pen Inject 20 Units Subcutaneous At Bedtime 18 mL 0     liraglutide (VICTOZA PEN) 18 MG/3ML solution Inject 1.8 mg Subcutaneous daily 9 mL 0     lisinopril (PRINIVIL/ZESTRIL) 20 MG tablet Take 1 tablet (20 mg) by mouth daily 90 tablet 3     metFORMIN (GLUCOPHAGE) 1000 MG tablet Take 1 tablet (1,000 mg) by mouth 2 times daily (with meals) 180 tablet 3     metoprolol succinate ER (TOPROL-XL) 25 MG 24 hr tablet TAKE ONE TABLET BY MOUTH IN THE EVENING  30 tablet 10     spironolactone (ALDACTONE) 25 MG tablet TAKE ONE TABLET BY MOUTH ONE TIME DAILY  30 tablet 10     triamcinolone (KENALOG) 0.5 % cream Apply sparingly once or twice per day as needed to affected area until the skin is better, then stop 30 g 0     Social History     Tobacco Use     Smoking status: Never Smoker     Smokeless tobacco: Never Used   Substance Use Topics     Alcohol use: Yes     Comment: 1 glass of beer on Fri       ROS:   Review of systems negative except as stated above.    OBJECTIVE:  /60 (BP Location: Left arm, Patient Position: Sitting, Cuff Size: Adult Regular)   Pulse 66   Temp 98.8  F (37.1  C) (Oral)   Resp 16   Wt 127 kg (280 lb)   SpO2 98%   BMI 40.18 kg/m     EXAM:  The right TM is not visualized secondary to cerumen     The right auditory canal is obstructed with cerumen  The left TM is normal: no  effusions, no erythema, and normal landmarks  The left auditory canal is normal and without drainage, edema or erythema  Oropharynx exam is normal: no lesions, erythema, adenopathy or exudate.  GENERAL: no acute distress  EYES: EOMI,  PERRL, conjunctiva clear  NECK: supple, non-tender to palpation, no adenopathy noted  RESP: lungs clear to auscultation - no rales, rhonchi or wheezes  CV: regular rates and rhythm, normal S1 S2, no murmur noted  SKIN: no suspicious lesions or rashes     ASSESSMENT / PLAN:  1. Impacted cerumen of right ear  Removed in clinic by nurse. Patient tolerated procedure well.    Farhana Ramsey PA-C

## 2019-09-10 DIAGNOSIS — E11.9 TYPE 2 DIABETES MELLITUS WITHOUT COMPLICATION, WITHOUT LONG-TERM CURRENT USE OF INSULIN (H): ICD-10-CM

## 2019-09-11 RX ORDER — GLIPIZIDE 10 MG/1
10 TABLET, FILM COATED, EXTENDED RELEASE ORAL DAILY
Qty: 90 TABLET | Refills: 0 | Status: SHIPPED | OUTPATIENT
Start: 2019-09-11 | End: 2020-03-11

## 2019-09-11 NOTE — TELEPHONE ENCOUNTER
"Requested Prescriptions   Pending Prescriptions Disp Refills     glipiZIDE (GLUCOTROL XL) 10 MG 24 hr tablet [Pharmacy Med Name: glipiZIDE ER Oral Tablet Extended Release 24 Hour 10 MG] 90 tablet 0     Sig: TAKE 1 TABLET (10 MG) BY MOUTH DAILY. TAKE WITH LARGEST MEAL OF THE DAY. ALWAYS TAKE WITH FOOD       Sulfonylurea Agents Passed - 9/10/2019  9:15 PM        Passed - Blood pressure less than 140/90 in past 6 months     BP Readings from Last 3 Encounters:   08/30/19 114/60   07/02/19 102/58   04/15/19 108/60                 Passed - Patient has documented LDL within the past 12 mos.     Recent Labs   Lab Test 10/03/18  0849   LDL 40             Passed - Patient has had a Microalbumin in the past 15 mos.     Recent Labs   Lab Test 04/10/19  0729   MICROL 10   UMALCR 7.71             Passed - Patient has documented A1c within the specified period of time.     If HgbA1C is 8 or greater, it needs to be on file within the past 3 months.  If less than 8, must be on file within the past 6 months.     Recent Labs   Lab Test 08/26/19  0832   A1C 8.8*             Passed - Medication is active on med list        Passed - Patient is age 18 or older        Passed - Patient has a recent creatinine (normal) within the past 12 mos.     Recent Labs   Lab Test 08/26/19  0832   CR 0.94             Passed - Recent (6 mo) or future (30 days) visit within the authorizing provider's specialty     Patient had office visit in the last 6 months or has a visit in the next 30 days with authorizing provider or within the authorizing provider's specialty.  See \"Patient Info\" tab in inbasket, or \"Choose Columns\" in Meds & Orders section of the refill encounter.              "

## 2019-09-14 DIAGNOSIS — I10 BENIGN ESSENTIAL HYPERTENSION: ICD-10-CM

## 2019-09-14 DIAGNOSIS — E11.9 TYPE 2 DIABETES MELLITUS WITHOUT COMPLICATION, WITHOUT LONG-TERM CURRENT USE OF INSULIN (H): ICD-10-CM

## 2019-09-14 DIAGNOSIS — I42.8 NON-ISCHEMIC CARDIOMYOPATHY (H): ICD-10-CM

## 2019-09-14 NOTE — TELEPHONE ENCOUNTER
"Requested Prescriptions   Pending Prescriptions Disp Refills     VICTOZA PEN 18 MG/3ML soln [Pharmacy Med Name: Victoza Subcutaneous Solution Pen-injector 18 MG/3ML] 9 mL 0     Sig: Inject 1.8 mg Subcutaneous daily (PATIENT NEEDS NONFASTING LABS)   Last Written Prescription Date:  8/14/2019  Last Fill Quantity: 9mL,  # refills: 0   Last Office Visit: 4/15/2019   Future Office Visit:         GLP-1 Agonists Protocol Passed - 9/14/2019  9:39 AM        Passed - Blood pressure less than 140/90 in past 6 months     BP Readings from Last 3 Encounters:   08/30/19 114/60   07/02/19 102/58   04/15/19 108/60                 Passed - LDL on file in past 12 months     Recent Labs   Lab Test 10/03/18  0849   LDL 40             Passed - Microalbumin on file in past 12 months     Recent Labs   Lab Test 04/10/19  0729   MICROL 10   UMALCR 7.71             Passed - HgbA1C in past 3 or 6 months     If HgbA1C is 8 or greater, it needs to be on file within the past 3 months.  If less than 8, must be on file within the past 6 months.     Recent Labs   Lab Test 08/26/19  0832   A1C 8.8*             Passed - Medication is active on med list        Passed - Patient is age 18 or older        Passed - Normal serum creatinine on file in past 12 months     Recent Labs   Lab Test 08/26/19  0832   CR 0.94             Passed - Recent (6 mo) or future (30 days) visit within the authorizing provider's specialty     Patient had office visit in the last 6 months or has a visit in the next 30 days with authorizing provider.  See \"Patient Info\" tab in inbasket, or \"Choose Columns\" in Meds & Orders section of the refill encounter.            lisinopril (PRINIVIL/ZESTRIL) 20 MG tablet [Pharmacy Med Name: Lisinopril Oral Tablet 20 MG] 90 tablet 2     Sig: Take 1 tablet (20 mg) by mouth daily   Last Written Prescription Date:  10/15/2018  Last Fill Quantity: 90,  # refills: 3   Last Office Visit: 4/15/2019   Future Office Visit:         ACE Inhibitors " "(Including Combos) Protocol Passed - 9/14/2019  9:39 AM        Passed - Blood pressure under 140/90 in past 12 months     BP Readings from Last 3 Encounters:   08/30/19 114/60   07/02/19 102/58   04/15/19 108/60                 Passed - Recent (12 mo) or future (30 days) visit within the authorizing provider's specialty     Patient had office visit in the last 12 months or has a visit in the next 30 days with authorizing provider or within the authorizing provider's specialty.  See \"Patient Info\" tab in inbasket, or \"Choose Columns\" in Meds & Orders section of the refill encounter.              Passed - Medication is active on med list        Passed - Patient is age 18 or older        Passed - Normal serum creatinine on file in past 12 months     Recent Labs   Lab Test 08/26/19  0832   CR 0.94             Passed - Normal serum potassium on file in past 12 months     Recent Labs   Lab Test 08/26/19  0832   POTASSIUM 4.7               "

## 2019-09-16 RX ORDER — LIRAGLUTIDE 6 MG/ML
1.8 INJECTION SUBCUTANEOUS DAILY
Qty: 9 ML | Refills: 0 | Status: SHIPPED | OUTPATIENT
Start: 2019-09-16 | End: 2019-12-17

## 2019-09-16 RX ORDER — LISINOPRIL 20 MG/1
20 TABLET ORAL DAILY
Qty: 90 TABLET | Refills: 1 | Status: SHIPPED | OUTPATIENT
Start: 2019-09-16 | End: 2019-10-08

## 2019-10-01 ENCOUNTER — OFFICE VISIT (OUTPATIENT)
Dept: URGENT CARE | Facility: URGENT CARE | Age: 59
End: 2019-10-01
Payer: COMMERCIAL

## 2019-10-01 ENCOUNTER — HOSPITAL ENCOUNTER (INPATIENT)
Facility: CLINIC | Age: 59
LOS: 4 days | Discharge: HOME | DRG: 225 | End: 2019-10-05
Attending: EMERGENCY MEDICINE | Admitting: HOSPITALIST
Payer: COMMERCIAL

## 2019-10-01 ENCOUNTER — APPOINTMENT (OUTPATIENT)
Dept: GENERAL RADIOLOGY | Facility: CLINIC | Age: 59
DRG: 225 | End: 2019-10-01
Attending: EMERGENCY MEDICINE
Payer: COMMERCIAL

## 2019-10-01 VITALS
HEART RATE: 76 BPM | SYSTOLIC BLOOD PRESSURE: 100 MMHG | OXYGEN SATURATION: 99 % | WEIGHT: 275.8 LBS | BODY MASS INDEX: 39.57 KG/M2 | DIASTOLIC BLOOD PRESSURE: 66 MMHG

## 2019-10-01 DIAGNOSIS — I44.7 LBBB (LEFT BUNDLE BRANCH BLOCK): ICD-10-CM

## 2019-10-01 DIAGNOSIS — I47.29 PAROXYSMAL VENTRICULAR TACHYCARDIA (H): ICD-10-CM

## 2019-10-01 DIAGNOSIS — R06.00 DYSPNEA, UNSPECIFIED TYPE: ICD-10-CM

## 2019-10-01 DIAGNOSIS — R51.9 ACUTE NONINTRACTABLE HEADACHE, UNSPECIFIED HEADACHE TYPE: ICD-10-CM

## 2019-10-01 DIAGNOSIS — R06.09 EXERTIONAL DYSPNEA: ICD-10-CM

## 2019-10-01 DIAGNOSIS — I42.8 NON-ISCHEMIC CARDIOMYOPATHY (H): Primary | ICD-10-CM

## 2019-10-01 DIAGNOSIS — R55 NEAR SYNCOPE: ICD-10-CM

## 2019-10-01 DIAGNOSIS — H53.9 VISUAL DISTURBANCE: Primary | ICD-10-CM

## 2019-10-01 DIAGNOSIS — I21.4 NSTEMI (NON-ST ELEVATED MYOCARDIAL INFARCTION) (H): ICD-10-CM

## 2019-10-01 DIAGNOSIS — R42 DIZZINESS: ICD-10-CM

## 2019-10-01 LAB
ALBUMIN SERPL-MCNC: 3.9 G/DL (ref 3.4–5)
ALP SERPL-CCNC: 52 U/L (ref 40–150)
ALT SERPL W P-5'-P-CCNC: 43 U/L (ref 0–70)
ANION GAP SERPL CALCULATED.3IONS-SCNC: 5 MMOL/L (ref 3–14)
AST SERPL W P-5'-P-CCNC: 25 U/L (ref 0–45)
BASOPHILS # BLD AUTO: 0 10E9/L (ref 0–0.2)
BASOPHILS NFR BLD AUTO: 0.6 %
BILIRUB SERPL-MCNC: 0.4 MG/DL (ref 0.2–1.3)
BUN SERPL-MCNC: 23 MG/DL (ref 7–30)
CALCIUM SERPL-MCNC: 9 MG/DL (ref 8.5–10.1)
CHLORIDE SERPL-SCNC: 112 MMOL/L (ref 94–109)
CO2 SERPL-SCNC: 21 MMOL/L (ref 20–32)
CREAT SERPL-MCNC: 0.95 MG/DL (ref 0.66–1.25)
D DIMER PPP FEU-MCNC: 0.3 UG/ML FEU (ref 0–0.5)
DIFFERENTIAL METHOD BLD: ABNORMAL
EOSINOPHIL # BLD AUTO: 0.2 10E9/L (ref 0–0.7)
EOSINOPHIL NFR BLD AUTO: 3.7 %
ERYTHROCYTE [DISTWIDTH] IN BLOOD BY AUTOMATED COUNT: 13.2 % (ref 10–15)
GFR SERPL CREATININE-BSD FRML MDRD: 87 ML/MIN/{1.73_M2}
GLUCOSE BLDC GLUCOMTR-MCNC: 109 MG/DL (ref 70–99)
GLUCOSE BLDC GLUCOMTR-MCNC: 111 MG/DL (ref 70–99)
GLUCOSE SERPL-MCNC: 178 MG/DL (ref 70–99)
HCT VFR BLD AUTO: 34.7 % (ref 40–53)
HGB BLD-MCNC: 11.7 G/DL (ref 13.3–17.7)
IMM GRANULOCYTES # BLD: 0 10E9/L (ref 0–0.4)
IMM GRANULOCYTES NFR BLD: 0.2 %
INTERPRETATION ECG - MUSE: NORMAL
LMWH PPP CHRO-ACNC: 0.24 IU/ML
LYMPHOCYTES # BLD AUTO: 1.7 10E9/L (ref 0.8–5.3)
LYMPHOCYTES NFR BLD AUTO: 31.1 %
MAGNESIUM SERPL-MCNC: 1.2 MG/DL (ref 1.6–2.3)
MCH RBC QN AUTO: 31.1 PG (ref 26.5–33)
MCHC RBC AUTO-ENTMCNC: 33.7 G/DL (ref 31.5–36.5)
MCV RBC AUTO: 92 FL (ref 78–100)
MONOCYTES # BLD AUTO: 0.4 10E9/L (ref 0–1.3)
MONOCYTES NFR BLD AUTO: 7.8 %
NEUTROPHILS # BLD AUTO: 3 10E9/L (ref 1.6–8.3)
NEUTROPHILS NFR BLD AUTO: 56.6 %
NRBC # BLD AUTO: 0 10*3/UL
NRBC BLD AUTO-RTO: 0 /100
NT-PROBNP SERPL-MCNC: 957 PG/ML (ref 0–900)
PLATELET # BLD AUTO: 196 10E9/L (ref 150–450)
POTASSIUM SERPL-SCNC: 4.7 MMOL/L (ref 3.4–5.3)
PROT SERPL-MCNC: 6.8 G/DL (ref 6.8–8.8)
RBC # BLD AUTO: 3.76 10E12/L (ref 4.4–5.9)
SODIUM SERPL-SCNC: 138 MMOL/L (ref 133–144)
TROPONIN I SERPL-MCNC: 0.07 UG/L (ref 0–0.04)
WBC # BLD AUTO: 5.4 10E9/L (ref 4–11)

## 2019-10-01 PROCEDURE — 71046 X-RAY EXAM CHEST 2 VIEWS: CPT

## 2019-10-01 PROCEDURE — 83880 ASSAY OF NATRIURETIC PEPTIDE: CPT | Performed by: EMERGENCY MEDICINE

## 2019-10-01 PROCEDURE — 93005 ELECTROCARDIOGRAM TRACING: CPT

## 2019-10-01 PROCEDURE — 25000128 H RX IP 250 OP 636: Performed by: EMERGENCY MEDICINE

## 2019-10-01 PROCEDURE — 21000001 ZZH R&B HEART CARE

## 2019-10-01 PROCEDURE — 80053 COMPREHEN METABOLIC PANEL: CPT | Performed by: EMERGENCY MEDICINE

## 2019-10-01 PROCEDURE — 25000132 ZZH RX MED GY IP 250 OP 250 PS 637: Performed by: HOSPITALIST

## 2019-10-01 PROCEDURE — 83735 ASSAY OF MAGNESIUM: CPT | Performed by: EMERGENCY MEDICINE

## 2019-10-01 PROCEDURE — 00000146 ZZHCL STATISTIC GLUCOSE BY METER IP

## 2019-10-01 PROCEDURE — 99207 ZZC OFFICE-HOSPITAL ADMIT: CPT | Performed by: PHYSICIAN ASSISTANT

## 2019-10-01 PROCEDURE — 36415 COLL VENOUS BLD VENIPUNCTURE: CPT | Performed by: HOSPITALIST

## 2019-10-01 PROCEDURE — 84484 ASSAY OF TROPONIN QUANT: CPT | Performed by: EMERGENCY MEDICINE

## 2019-10-01 PROCEDURE — 84484 ASSAY OF TROPONIN QUANT: CPT | Performed by: HOSPITALIST

## 2019-10-01 PROCEDURE — 99223 1ST HOSP IP/OBS HIGH 75: CPT | Mod: AI | Performed by: HOSPITALIST

## 2019-10-01 PROCEDURE — 25000128 H RX IP 250 OP 636: Performed by: HOSPITALIST

## 2019-10-01 PROCEDURE — 85520 HEPARIN ASSAY: CPT | Performed by: HOSPITALIST

## 2019-10-01 PROCEDURE — 99285 EMERGENCY DEPT VISIT HI MDM: CPT | Mod: 25

## 2019-10-01 PROCEDURE — 85025 COMPLETE CBC W/AUTO DIFF WBC: CPT | Performed by: EMERGENCY MEDICINE

## 2019-10-01 PROCEDURE — 85379 FIBRIN DEGRADATION QUANT: CPT | Performed by: EMERGENCY MEDICINE

## 2019-10-01 PROCEDURE — 96374 THER/PROPH/DIAG INJ IV PUSH: CPT

## 2019-10-01 PROCEDURE — 25000132 ZZH RX MED GY IP 250 OP 250 PS 637: Performed by: EMERGENCY MEDICINE

## 2019-10-01 RX ORDER — SPIRONOLACTONE 25 MG/1
25 TABLET ORAL DAILY
Status: DISCONTINUED | OUTPATIENT
Start: 2019-10-02 | End: 2019-10-05 | Stop reason: HOSPADM

## 2019-10-01 RX ORDER — ACETAMINOPHEN 325 MG/1
650 TABLET ORAL EVERY 4 HOURS PRN
Status: DISCONTINUED | OUTPATIENT
Start: 2019-10-01 | End: 2019-10-02

## 2019-10-01 RX ORDER — ASPIRIN 81 MG/1
81 TABLET ORAL DAILY
Status: DISCONTINUED | OUTPATIENT
Start: 2019-10-02 | End: 2019-10-05 | Stop reason: HOSPADM

## 2019-10-01 RX ORDER — AMOXICILLIN 250 MG
2 CAPSULE ORAL 2 TIMES DAILY PRN
Status: DISCONTINUED | OUTPATIENT
Start: 2019-10-01 | End: 2019-10-05 | Stop reason: HOSPADM

## 2019-10-01 RX ORDER — METOPROLOL SUCCINATE 25 MG/1
25 TABLET, EXTENDED RELEASE ORAL EVERY EVENING
Status: DISCONTINUED | OUTPATIENT
Start: 2019-10-01 | End: 2019-10-02

## 2019-10-01 RX ORDER — LIDOCAINE 40 MG/G
CREAM TOPICAL
Status: DISCONTINUED | OUTPATIENT
Start: 2019-10-01 | End: 2019-10-02

## 2019-10-01 RX ORDER — PROCHLORPERAZINE 25 MG
25 SUPPOSITORY, RECTAL RECTAL EVERY 12 HOURS PRN
Status: DISCONTINUED | OUTPATIENT
Start: 2019-10-01 | End: 2019-10-05 | Stop reason: HOSPADM

## 2019-10-01 RX ORDER — LISINOPRIL 20 MG/1
20 TABLET ORAL DAILY
Status: DISCONTINUED | OUTPATIENT
Start: 2019-10-02 | End: 2019-10-02

## 2019-10-01 RX ORDER — NICOTINE POLACRILEX 4 MG
15-30 LOZENGE BUCCAL
Status: DISCONTINUED | OUTPATIENT
Start: 2019-10-01 | End: 2019-10-05 | Stop reason: HOSPADM

## 2019-10-01 RX ORDER — ASPIRIN 81 MG/1
162 TABLET, CHEWABLE ORAL ONCE
Status: COMPLETED | OUTPATIENT
Start: 2019-10-01 | End: 2019-10-01

## 2019-10-01 RX ORDER — FENOFIBRATE 160 MG/1
160 TABLET ORAL DAILY
Status: DISCONTINUED | OUTPATIENT
Start: 2019-10-02 | End: 2019-10-05 | Stop reason: HOSPADM

## 2019-10-01 RX ORDER — AMOXICILLIN 250 MG
1 CAPSULE ORAL 2 TIMES DAILY PRN
Status: DISCONTINUED | OUTPATIENT
Start: 2019-10-01 | End: 2019-10-05 | Stop reason: HOSPADM

## 2019-10-01 RX ORDER — ONDANSETRON 2 MG/ML
4 INJECTION INTRAMUSCULAR; INTRAVENOUS EVERY 6 HOURS PRN
Status: DISCONTINUED | OUTPATIENT
Start: 2019-10-01 | End: 2019-10-05 | Stop reason: HOSPADM

## 2019-10-01 RX ORDER — PROCHLORPERAZINE MALEATE 5 MG
10 TABLET ORAL EVERY 6 HOURS PRN
Status: DISCONTINUED | OUTPATIENT
Start: 2019-10-01 | End: 2019-10-05 | Stop reason: HOSPADM

## 2019-10-01 RX ORDER — ALBUTEROL SULFATE 90 UG/1
2 AEROSOL, METERED RESPIRATORY (INHALATION) EVERY 6 HOURS PRN
Status: DISCONTINUED | OUTPATIENT
Start: 2019-10-01 | End: 2019-10-05 | Stop reason: HOSPADM

## 2019-10-01 RX ORDER — BISACODYL 10 MG
10 SUPPOSITORY, RECTAL RECTAL DAILY PRN
Status: DISCONTINUED | OUTPATIENT
Start: 2019-10-01 | End: 2019-10-05 | Stop reason: HOSPADM

## 2019-10-01 RX ORDER — NALOXONE HYDROCHLORIDE 0.4 MG/ML
.1-.4 INJECTION, SOLUTION INTRAMUSCULAR; INTRAVENOUS; SUBCUTANEOUS
Status: DISCONTINUED | OUTPATIENT
Start: 2019-10-01 | End: 2019-10-02

## 2019-10-01 RX ORDER — ONDANSETRON 4 MG/1
4 TABLET, ORALLY DISINTEGRATING ORAL EVERY 6 HOURS PRN
Status: DISCONTINUED | OUTPATIENT
Start: 2019-10-01 | End: 2019-10-05 | Stop reason: HOSPADM

## 2019-10-01 RX ORDER — DEXTROSE MONOHYDRATE 25 G/50ML
25-50 INJECTION, SOLUTION INTRAVENOUS
Status: DISCONTINUED | OUTPATIENT
Start: 2019-10-01 | End: 2019-10-05 | Stop reason: HOSPADM

## 2019-10-01 RX ORDER — ATORVASTATIN CALCIUM 40 MG/1
40 TABLET, FILM COATED ORAL EVERY EVENING
Status: DISCONTINUED | OUTPATIENT
Start: 2019-10-01 | End: 2019-10-05 | Stop reason: HOSPADM

## 2019-10-01 RX ORDER — MAGNESIUM SULFATE HEPTAHYDRATE 40 MG/ML
4 INJECTION, SOLUTION INTRAVENOUS EVERY 4 HOURS PRN
Status: DISCONTINUED | OUTPATIENT
Start: 2019-10-01 | End: 2019-10-05 | Stop reason: HOSPADM

## 2019-10-01 RX ADMIN — MAGNESIUM SULFATE HEPTAHYDRATE 4 G: 40 INJECTION, SOLUTION INTRAVENOUS at 22:00

## 2019-10-01 RX ADMIN — HEPARIN SODIUM 1100 UNITS/HR: 10000 INJECTION, SOLUTION INTRAVENOUS at 14:53

## 2019-10-01 RX ADMIN — ASPIRIN 81 MG 162 MG: 81 TABLET ORAL at 12:18

## 2019-10-01 RX ADMIN — METOPROLOL SUCCINATE 25 MG: 25 TABLET, EXTENDED RELEASE ORAL at 19:45

## 2019-10-01 RX ADMIN — Medication 5600 UNITS: at 14:52

## 2019-10-01 RX ADMIN — ATORVASTATIN CALCIUM 40 MG: 40 TABLET, FILM COATED ORAL at 19:45

## 2019-10-01 ASSESSMENT — ENCOUNTER SYMPTOMS
LIGHT-HEADEDNESS: 1
WEAKNESS: 0
NAUSEA: 0
DIARRHEA: 0
SHORTNESS OF BREATH: 1
CONSTIPATION: 0
FEVER: 0
HEADACHES: 1
CHILLS: 0
DIAPHORESIS: 1
HEADACHES: 1
SHORTNESS OF BREATH: 1
ABDOMINAL PAIN: 0
DIZZINESS: 1
DIARRHEA: 0
FOCAL WEAKNESS: 0
VOMITING: 0
CHEST TIGHTNESS: 0
ABDOMINAL PAIN: 0

## 2019-10-01 ASSESSMENT — ACTIVITIES OF DAILY LIVING (ADL)
TRANSFERRING: 0-->INDEPENDENT
RETIRED_EATING: 0-->INDEPENDENT
DRESS: 0-->INDEPENDENT
COGNITION: 0 - NO COGNITION ISSUES REPORTED
ADLS_ACUITY_SCORE: 11
AMBULATION: 0-->INDEPENDENT
BATHING: 0-->INDEPENDENT
TOILETING: 0-->INDEPENDENT
FALL_HISTORY_WITHIN_LAST_SIX_MONTHS: NO
RETIRED_COMMUNICATION: 0-->UNDERSTANDS/COMMUNICATES WITHOUT DIFFICULTY
SWALLOWING: 0-->SWALLOWS FOODS/LIQUIDS WITHOUT DIFFICULTY

## 2019-10-01 NOTE — PHARMACY-ADMISSION MEDICATION HISTORY
Admission medication history interview status for the 10/1/2019  admission is complete. See EPIC admission navigator for prior to admission medications     Medication history source reliability:Good    Actions taken by pharmacist (provider contacted, etc): interviewed patient, sure scripts     Additional medication history information not noted on PTA med list :None    Medication reconciliation/reorder completed by provider prior to medication history? No    Time spent in this activity: 25 min    Prior to Admission medications    Medication Sig Last Dose Taking? Auth Provider   albuterol (PROAIR HFA/PROVENTIL HFA/VENTOLIN HFA) 108 (90 Base) MCG/ACT Inhaler Inhale 2 puffs into the lungs every 6 hours as needed for shortness of breath / dyspnea or wheezing prn Yes Unknown, Entered By History   aspirin 81 MG tablet Take 1 tablet (81 mg) by mouth daily 10/1/2019 at Unknown time Yes Oscar Paul,    atorvastatin (LIPITOR) 80 MG tablet Take 1/2 (one-half) tablet by mouth at bedtime. 9/30/2019 at hs Yes Jefry Harris MD   clotrimazole (LOTRIMIN) 1 % cream Apply topically 2 times daily To axillary rash  Patient taking differently: Apply topically 2 times daily as needed To axillary rash prn Yes Mitchell Joseph MD   fenofibrate 160 MG tablet TAKE ONE TABLET BY MOUTH ONE TIME DAILY  10/1/2019 at am Yes Jefry Harris MD   fluticasone (FLONASE) 50 MCG/ACT nasal spray Spray 2 sprays into both nostrils daily  Patient taking differently: Spray 2 sprays into both nostrils daily as needed  prn Yes Tosha Qiu, PAPatriciaC   glipiZIDE (GLUCOTROL XL) 10 MG 24 hr tablet TAKE 1 TABLET (10 MG) BY MOUTH DAILY. TAKE WITH LARGEST MEAL OF THE DAY. ALWAYS TAKE WITH FOOD 10/1/2019 at Unknown time Yes Jefry Harris MD   liraglutide (VICTOZA PEN) 18 MG/3ML solution Inject 1.8 mg Subcutaneous daily 10/1/2019 at Unknown time Yes Jefry Harris MD   lisinopril (PRINIVIL/ZESTRIL) 20 MG  tablet Take 1 tablet (20 mg) by mouth daily 10/1/2019 at Unknown time Yes Jefry Harris MD   metFORMIN (GLUCOPHAGE) 1000 MG tablet Take 1 tablet (1,000 mg) by mouth 2 times daily (with meals) 10/1/2019 at Unknown time Yes Jefry Harris MD   metoprolol succinate ER (TOPROL-XL) 25 MG 24 hr tablet TAKE ONE TABLET BY MOUTH IN THE EVENING  9/30/2019 at Unknown time Yes Jefry Harris MD   spironolactone (ALDACTONE) 25 MG tablet TAKE ONE TABLET BY MOUTH ONE TIME DAILY  10/1/2019 at Unknown time Yes Jefry Harris MD   triamcinolone (KENALOG) 0.5 % cream Apply sparingly once or twice per day as needed to affected area until the skin is better, then stop prn Yes Jefry Harris MD   BD ULTRA-FINE 29G X 12.7MM insulin pen needle use once daily with vicMelva Gautam MD   BD ULTRA-FINE 29G X 12.7MM insulin pen needle use once daily with victoza pen   Melva Bernal MD   blood glucose monitoring (ACCU-CHEK LUL PLUS) test strip Use to test blood sugar 1-3 times daily or as directed.   Jefry Harris MD   continuous blood glucose monitoring (FREESTYLE JEFFREY) sensor For use with Freestyle Jeffrey Flash  for continuous monitioring of blood glucose levels. Replace sensor every 10 days.   Jefry Harris MD

## 2019-10-01 NOTE — ED NOTES
"[facility]  ED Nurse Handoff Report    ED Chief complaint: Loss of Consciousness      ED Diagnosis:   Final diagnoses:   NSTEMI (non-ST elevated myocardial infarction) (H)   Exertional dyspnea   Near syncope       Code Status: Full Code    Allergies:   Allergies   Allergen Reactions     No Known Drug Allergies        Activity level - Baseline/Home:  Independent  Activity Level - Current:   Independent    Patient's Preferred language: engl   Needed?: No    Isolation: No  Infection: Not Applicable  Bariatric?: No    Vital Signs:   Vitals:    10/01/19 1109 10/01/19 1219   BP: 107/58    Pulse: 68    Resp: 18    Temp: 98  F (36.7  C)    TempSrc: Oral    SpO2: 98%    Weight:  125.1 kg (275 lb 12.8 oz)       Cardiac Rhythm: ,        Pain level:      Is this patient confused?: No   Does this patient have a guardian?  No         If yes, is there guardianship documents in the Epic \"Code/ACP\" activity?  N/A         Guardian Notified?  N/A  [place] - Suicide Severity Rating Scale Completed?  Yes  If yes, what color did the patient score?  White    Patient Report: Initial Complaint: 59 year old type II diabetic male with a history of a myocardia infarction and CAD who presents to the emergency department today for evaluation of shortness of breath. The patient reports an onset of shortness of breath three days ago while walking to a concert. He says he felt like he \"couldn't get enough air in\", he denies any chest pain or tightness.  Over the past few days the patient reports continuing to experience shortness of breath that is alleviated by relaxing.  Focused Assessment: A/O x4 no Cp, no SOB, lungs clear, BP low normal but asymptomatic  Tests Performed: labs, ekg, chest xray  Abnormal Results:   Results for orders placed or performed during the hospital encounter of 10/01/19 (from the past 24 hour(s))   EKG 12-lead, tracing only   Result Value Ref Range    Interpretation ECG Click View Image link to " view waveform and result    CBC with platelets differential   Result Value Ref Range    WBC 5.4 4.0 - 11.0 10e9/L    RBC Count 3.76 (L) 4.4 - 5.9 10e12/L    Hemoglobin 11.7 (L) 13.3 - 17.7 g/dL    Hematocrit 34.7 (L) 40.0 - 53.0 %    MCV 92 78 - 100 fl    MCH 31.1 26.5 - 33.0 pg    MCHC 33.7 31.5 - 36.5 g/dL    RDW 13.2 10.0 - 15.0 %    Platelet Count 196 150 - 450 10e9/L    Diff Method Automated Method     % Neutrophils 56.6 %    % Lymphocytes 31.1 %    % Monocytes 7.8 %    % Eosinophils 3.7 %    % Basophils 0.6 %    % Immature Granulocytes 0.2 %    Nucleated RBCs 0 0 /100    Absolute Neutrophil 3.0 1.6 - 8.3 10e9/L    Absolute Lymphocytes 1.7 0.8 - 5.3 10e9/L    Absolute Monocytes 0.4 0.0 - 1.3 10e9/L    Absolute Eosinophils 0.2 0.0 - 0.7 10e9/L    Absolute Basophils 0.0 0.0 - 0.2 10e9/L    Abs Immature Granulocytes 0.0 0 - 0.4 10e9/L    Absolute Nucleated RBC 0.0    Comprehensive metabolic panel   Result Value Ref Range    Sodium 138 133 - 144 mmol/L    Potassium 4.7 3.4 - 5.3 mmol/L    Chloride 112 (H) 94 - 109 mmol/L    Carbon Dioxide 21 20 - 32 mmol/L    Anion Gap 5 3 - 14 mmol/L    Glucose 178 (H) 70 - 99 mg/dL    Urea Nitrogen 23 7 - 30 mg/dL    Creatinine 0.95 0.66 - 1.25 mg/dL    GFR Estimate 87 >60 mL/min/[1.73_m2]    GFR Estimate If Black >90 >60 mL/min/[1.73_m2]    Calcium 9.0 8.5 - 10.1 mg/dL    Bilirubin Total 0.4 0.2 - 1.3 mg/dL    Albumin 3.9 3.4 - 5.0 g/dL    Protein Total 6.8 6.8 - 8.8 g/dL    Alkaline Phosphatase 52 40 - 150 U/L    ALT 43 0 - 70 U/L    AST 25 0 - 45 U/L   Magnesium   Result Value Ref Range    Magnesium 1.2 (L) 1.6 - 2.3 mg/dL   Nt probnp inpatient (BNP)   Result Value Ref Range    N-Terminal Pro BNP Inpatient 957 (H) 0 - 900 pg/mL   Troponin I   Result Value Ref Range    Troponin I ES 0.069 (H) 0.000 - 0.045 ug/L   D dimer quantitative   Result Value Ref Range    D Dimer 0.3 0.0 - 0.50 ug/ml FEU     Treatments provided: heparin    Family Comments: wife at bedside    OBS  brochure/video discussed/provided to patient/family: Yes              Name of person given brochure if not patient:               Relationship to patient:     ED Medications:   Medications   heparin Loading Dose bolus dose from infusion pump 5,600 Units (has no administration in time range)   HEParin drip 25,000 units in 0.45% NaCl 250 mL (has no administration in time range)   aspirin (ASA) chewable tablet 162 mg (162 mg Oral Given 10/1/19 1218)       Drips infusing?:  Yes    For the majority of the shift this patient was Green.   Interventions performed were.    Severe Sepsis OR Septic Shock Diagnosis Present: No    To be done/followed up on inpatient unit:      ED NURSE PHONE NUMBER: *[phone]

## 2019-10-01 NOTE — H&P
"Admitted:     10/01/2019      PRIMARY CARE PHYSICIAN:  Jefry Harris MD      CHIEF COMPLAINT:  Shortness of breath.      HISTORY OF PRESENT ILLNESS:  History was reviewed from the patient and his wife present at the bedside.  Mr. Rhodes is a 59-year-old pleasant, morbidly obese male with diabetes, hypertension, dyslipidemia, nonischemic cardiomyopathy, and chronic left bundle branch block who presented to the ER today with complaints of shortness of breath.  He states about 3 days ago, he was going to a concert when he felt short of breath while walking.  Then yesterday, he reported feeling dizzy and felt like his \"brain was on fire.\"  He simultaneously had some difficulty breathing, felt diaphoretic.  He tried to relax, and the symptoms subsided on their own.  He denied any chest pain or palpitation.  No fever, chills or rigors.  Denies pain in abdomen.  Reports a normal bowel and bladder habit.  Denies any leg swelling but has been having on and off shortness of breath, sometimes aggravated by exertion over the past few days, so he presented to the ER for further evaluation.  Here in the ER, he was seen by Dr. Barcenas.  Routine workup was noted with elevated troponin.  He was started on heparin drip, given aspirin, and hospitalist was requested admission for further evaluation.      Of note, he was admitted in 06/2018 with flash pulmonary edema.  Had cardiac catheterization done at that time, which showed nonischemic cardiomyopathy.  He subsequently had a cardiac MRI done, which showed EF of 40% and moderate global hypokinesis, suggestive of ischemic etiology.  It seems he last followed up with Cardiology after that, and he reports being compliant with his medications except that he does not want insulin as recommended.      REVIEW OF SYSTEMS:  A 10-point review of system was done and was negative apart from those mentioned in the history of present illness.      PAST MEDICAL HISTORY:   1.  Diabetes mellitus type " 2.  Recent A1c from 08/2019 was 8.8.   2.  Hypertension.   3.  Dyslipidemia.   4.  Nonischemic cardiomyopathy.  Cardiac catheterization and cardiac MRI in 06/2018.   5.  Chronic left bundle branch block.   6.  Morbid obesity.      MEDICATIONS PRIOR TO ADMISSION:  Medications Prior to Admission   Medication Sig Dispense Refill Last Dose     albuterol (PROAIR HFA/PROVENTIL HFA/VENTOLIN HFA) 108 (90 Base) MCG/ACT Inhaler Inhale 2 puffs into the lungs every 6 hours as needed for shortness of breath / dyspnea or wheezing   prn     aspirin 81 MG tablet Take 1 tablet (81 mg) by mouth daily 30 tablet  10/1/2019 at Unknown time     atorvastatin (LIPITOR) 80 MG tablet Take 1/2 (one-half) tablet by mouth at bedtime. 45 tablet 0 9/30/2019 at hs     clotrimazole (LOTRIMIN) 1 % cream Apply topically 2 times daily To axillary rash (Patient taking differently: Apply topically 2 times daily as needed To axillary rash) 15 g 1 prn     fenofibrate 160 MG tablet TAKE ONE TABLET BY MOUTH ONE TIME DAILY  90 tablet 3 10/1/2019 at am     fluticasone (FLONASE) 50 MCG/ACT nasal spray Spray 2 sprays into both nostrils daily (Patient taking differently: Spray 2 sprays into both nostrils daily as needed ) 16 g 0 prn     glipiZIDE (GLUCOTROL XL) 10 MG 24 hr tablet TAKE 1 TABLET (10 MG) BY MOUTH DAILY. TAKE WITH LARGEST MEAL OF THE DAY. ALWAYS TAKE WITH FOOD 90 tablet 0 10/1/2019 at Unknown time     liraglutide (VICTOZA PEN) 18 MG/3ML solution Inject 1.8 mg Subcutaneous daily 9 mL 0 10/1/2019 at Unknown time     lisinopril (PRINIVIL/ZESTRIL) 20 MG tablet Take 1 tablet (20 mg) by mouth daily 90 tablet 1 10/1/2019 at Unknown time     metFORMIN (GLUCOPHAGE) 1000 MG tablet Take 1 tablet (1,000 mg) by mouth 2 times daily (with meals) 180 tablet 3 10/1/2019 at Unknown time     metoprolol succinate ER (TOPROL-XL) 25 MG 24 hr tablet TAKE ONE TABLET BY MOUTH IN THE EVENING  30 tablet 10 9/30/2019 at Unknown time     spironolactone (ALDACTONE) 25 MG tablet  TAKE ONE TABLET BY MOUTH ONE TIME DAILY  30 tablet 10 10/1/2019 at Unknown time     triamcinolone (KENALOG) 0.5 % cream Apply sparingly once or twice per day as needed to affected area until the skin is better, then stop 30 g 0 prn     BD ULTRA-FINE 29G X 12.7MM insulin pen needle use once daily with victoza pen 100 each 1 Taking     BD ULTRA-FINE 29G X 12.7MM insulin pen needle use once daily with victoza pen 100 each 1 Taking     blood glucose monitoring (ACCU-CHEK LUL PLUS) test strip Use to test blood sugar 1-3 times daily or as directed. 100 each 11 Taking     continuous blood glucose monitoring (FREESTYLE JOCELIN) sensor For use with Freestyle Jocelin Flash  for continuous monitioring of blood glucose levels. Replace sensor every 10 days. 3 each 3 Taking         ALLERGIES:  NO KNOWN DRUG ALLERGIES.      SOCIAL HISTORY:  He denies smoking, takes occasional beer.  No illicit drug use.      FAMILY HISTORY:  Reviewed and not pertinent to current presentation.      PHYSICAL EXAMINATION:   GENERAL:  The patient is conscious, alert, oriented x3, lying comfortably in bed in no apparent distress.   VITAL SIGNS:  Temperature 98 degrees Fahrenheit, heart rate of 76, blood pressure 107/58, saturation 98% on room air.   HEENT:  Pupils are equal and reactive to light and accommodation.  Extraocular movements are intact.  Oral mucosa is moist.   NECK:  Supple, no raised JVD.   RESPIRATORY:  Lung sounds bilaterally clear to auscultation, no wheezes or crepitation.   CARDIOVASCULAR:  Normal S1-S2, regular rate and rhythm, no murmur.   ABDOMEN:  Soft, nontender, nondistended.  No guarding, rigidity or rebound tenderness.   LOWER EXTREMITIES:  With no edema.   NEUROLOGIC:  No focal neurological deficits noted.  Cranial nerves II-XII grossly intact.   PSYCHIATRIC:  Normal mood and affect.      LABORATORY AND IMAGING:  Reviewed in Epic.  CMP mostly unremarkable except for slightly elevated troponin of 0.069.  BNP within  normal limits in the 900s.  Magnesium low at 1.2.  Potassium normal at 4.7.  Blood glucose 178.  CBC with WBC of 5.4, hemoglobin 11.7, platelet 196.  D-dimer normal.  Chest x-ray reviewed by me shows no acute abnormality, no acute, infiltrate or pneumothorax.  EKG reviewed by me shows normal sinus rhythm, left bundle branch block, no significant change from prior EKG.      ASSESSMENT AND PLAN:  Mr. Neymar Rhodes is a 59-year-old male with morbid obesity, diabetes, hypertension, dyslipidemia, nonischemic cardiomyopathy, chronic left bundle branch block, who presented to the ER with atypical symptoms of near-syncope, exertional shortness of breath.     1.  Near-syncope, intermittent exertional shortness of breath.     2.  Elevated troponin.   3.  History of nonischemic cardiomyopathy.    - As noted in HPI, he was admitted with nonischemic cardiomyopathy in 06/2018.  Cardiac catheterization at that time showed nonobstructive coronary artery disease (CAD), but subsequent cardiac MRI was noted with ejection fraction (EF) of 40% and moderate global hypokinesia.  Will admit him to [facility] as inpatient, monitor on telemetry, follow serial troponins.  Will continue aspirin, heparin drip, pharmacy to dose.  We will consult Cardiology.  We will obtain an echocardiogram.  Clinically, he does not look volume overloaded.  We will keep him n.p.o. after midnight for probable cardiac catheterization in a.m.  We will also check orthostatic vitals.  Continue with lisinopril, Toprol-XL, Lipitor and fibrate.     4.  Hypomagnesemia.    - Magnesium 1.2.  We will supplement.  Monitor on telemetry and monitor BMP.     5.  Diabetes mellitus.    - Recent A1c was 8.8.  Will hold off on his oral hypoglycemics including glipizide, metformin and will also hold off on Victoza for now as he will be n.p.o. after midnight.  Will start him on sliding scale insulin.     6.  Hypertension.  Continue lisinopril, Toprol-XL and Aldactone.   7.  Deep venous  thrombosis prophylaxis, on heparin for likely non-ST elevation myocardial infarction.      CODE STATUS:  Full code.         CHASE FORREST MD             D: 10/01/2019   T: 10/01/2019   MT:       Name:     JACI WICK   MRN:      [id number]        Account:      [id number]   :      1960        Admitted:     10/01/2019                   Document: [id number]       cc: Jefry Harris MD

## 2019-10-01 NOTE — ED PROVIDER NOTES
"  History     Chief Complaint:  Shortness of breath     The history is provided by the patient.      Neymar Rhodes is a 59 year old type II diabetic male with a history of a myocardia infarction and CAD who presents to the emergency department today for evaluation of shortness of breath. The patient reports an onset of shortness of breath three days ago while walking to a concert. He says he felt like he \"couldn't get enough air in\", he says his friends stated he appeared hypoglycemic, and he denies any chest pain or tightness. The patient reports he hadn't eaten much in the hours leading up to the onset, and he says drinking a Red Bull and water helped ease the shortness of breath at that time. Over the past few days the patient reports continuing to experience shortness of breath that is alleviated by relaxing. Last night the patient describes an episode where his \"brain was on fire\" for approximately five minutes. He says he had a difficult time breathing, and that he had to sit down until his symptoms subsided and his \"brain could cool down\". He took his blood sugar at that time which was 200, he reports that his sugars tend to \"go up and down a bit\", but rarely below 110. The patient also endorses some lightheadedness with exertion and standing up. He reports he insulin for a month recently, but he's since stopped and is only using PO medications to control his diabetes. The patient denies abdominal pain, constipation, diarrhea, urinary symptoms, leg pain or swelling, weakness, vision changes, or tingling.  He states he has no symptoms of shortness of breath or chest discomfort at this time.     Allergies:  No Known Drug Allergies     Medications:    Albuterol inhaler  Aspirin  Atorvastatin  Fenofibrate  Glipizide  Spironolactone  Metoprolol  Metformin  Lisinopril  Victoza    Past Medical History:    Ascending aorta dilation  Diverticulosis  Hypertension  Gout  Left bundle branch " block  Obesity  Non-ischemic cardiomyopathy  Non rheumatic mitral valve regurgitation  NSTEMI  Hyperlipidemia  Diabetes, type II  CAD    Past Surgical History:    Appendectomy  Colovesicular fistula repair  Coronary angiogram    Family History:    Cancer- bladder, breast  Myocardia Infarction; father  Asthma     Social History:  The patient was accompanied to the ED by his wife.  The patient works as a .   Smoking Status: Never Smoker  Smokeless Tobacco: Never Used  Alcohol Use: Positive   Drug Use: Negative   Marital Status:       Review of Systems   Eyes: Negative for visual disturbance.   Respiratory: Positive for shortness of breath. Negative for chest tightness.    Cardiovascular: Negative for chest pain and leg swelling.   Gastrointestinal: Negative for abdominal pain, constipation and diarrhea.   Musculoskeletal:        No leg pain   Neurological: Positive for light-headedness and headaches. Negative for weakness.        No tingling   All other systems reviewed and are negative.    Physical Exam     Patient Vitals for the past 24 hrs:   BP Temp Temp src Pulse Heart Rate Resp SpO2 Weight   10/01/19 1219 -- -- -- -- -- -- -- 125.1 kg (275 lb 12.8 oz)   10/01/19 1109 107/58 98  F (36.7  C) Oral 68 68 18 98 % --      Physical Exam  General: Well-nourished, appears to be resting comfortably when I enter the room  Eyes: PERRL, conjunctivae pink no scleral icterus or conjunctival injection  ENT:  Moist mucus membranes, posterior oropharynx clear without erythema or exudates  Respiratory:  Lungs clear to auscultation bilaterally, no crackles/rubs/wheezes.  Good air movement  CV: Normal rate and rhythm, no murmurs/rubs/gallops  GI:  Abdomen soft and non-distended.  Normoactive BS.  No tenderness, guarding or rebound  Skin: Warm, dry.  No rashes or petechiae  Musculoskeletal: No peripheral edema or calf tenderness  Neuro: Alert and oriented to person/place/time  Psychiatric: Normal  affect      Emergency Department Course     ECG:  ECG taken at 1154, ECG read at 1155  Sinus rhythm with 1st degree AV block  Left axis deviation  Left bundle branch block  Abnormal ECG   Rate 70 bpm. OR interval 260. QRS duration 174. QT/QTc 460/496. P-R-T axes 2 -50 39.  No significant change compared to 06/11/2018.     Imaging:  Radiology findings were communicated with the patient who voiced understanding of the findings.  XR, Chest PA & LAT  No radiographic evidence of acute chest abnormality.  Reading per radiology    Laboratory:  Laboratory findings were communicated with the patient who voiced understanding of the findings.  CBC: WNL (WBC 5.4, HGB 11.7, )  CMP: Chloride 112 (H), Glucose 178 (H). O/w WNL (Creatinine 0.95)  Troponin (Collected 1209): 0.069 (H)   BNP: 957 (H)  Magnesium: 1.2 (L)  D Dimer (Collected 1209): 0.3     Interventions:  1218 Aspirin 162 mg PO  1452 Heparin loading dose bolus 5,600 units IV  1453 Heparin drip 1,100 units / hour IV    Emergency Department Course:  1115 Nursing notes and vitals reviewed.  1125 I performed an exam of the patient as documented above.   1154 An ECG was performed, results above.   1209 IV was inserted and blood was drawn for laboratory testing, results above.   1410 Patient was rechecked and updated.   1415 I spoke with the Hospitalist, Dr. Mcnamara from [facility] regarding patient's presentation, findings, and plan of care.    1434 The patient was sent for a chest x-ray while in the emergency department, results above.    1455 Patient was rechecked and updated with laboratory and imaging results as well as the plan for admission.    Findings and plan explained to the Patient and spouse who consents to admission. Discussed the patient with Dr. Mcnamara, who will admit the patient to a cardiac telemetry bed for further monitoring, evaluation, and treatment.     I personally reviewed the laboratory and imaging results with the Patient and spouse and  answered all related questions prior to admission.    Impression & Plan      Medical Decision Making:  Neymar Rhodes is a 59 year old male who presents with intermittent exertional dyspnea, diaphoresis and near syncope. His history and risk factor analysis are significant for known CAD, NSTEMI, cardiomyopathy, hypertension, mitral valve regurgitation, dyslipidemia and diabetes.  The workup in the Emergency Department (see above for cardiac enzymes and EKG) shows a pre-existing left bundle branch block.  It EKG does not meet Sgarbossa criteria and he has no ongoing chest pain or shortness of breath.  Troponin is slightly elevated.  Given his significant risk factors and the nature of his symptoms I am very concerned that this represents unstable angina.  We started him on a heparin drip.  We gave him some additional aspirin.  He will be admitted to the hospital for further evaluation and cardiology consultation. There is no clinical, laboratory, or radiographic evidence of pulmonary embolism, AAA, aortic dissection, pneumonia or pneumothorax.  The patient agrees to be admitted and all questions were answered.      Total Critical Care time: was 30 minutes for this patient excluding procedures.     Diagnosis:    ICD-10-CM    1. NSTEMI (non-ST elevated myocardial infarction) (H) I21.4    2. Exertional dyspnea R06.09    3. Near syncope R55        Disposition:  The patient is admitted into the care of Dr. Mcnamara.     Scribe Disclosure:  Priya GONSALVES, am serving as a scribe at 11:16 AM on 10/1/2019 to document services personally performed by Melanie Barcenas MD based on my observations and the provider's statements to me.    10/1/2019    EMERGENCY DEPARTMENT       Melanie Barcenas MD  10/01/19 2632

## 2019-10-01 NOTE — PROGRESS NOTES
"HPI  October 1, 2019    HPI: Neymar Rhodes is a 59 year old male with hx DM, HDL, HTN, CAD, NSTEMI, & ascending aorta dilatation who complains of 2 episodes of dizziness and SOB over the past 3 days. The first occurred when he was walking to a concert on Saturday night where he felt hot & sweaty, SOB, and dizzy. He reports he drank a Red Bull and felt better after that. Then yesterday he was working outside and got a sudden pain in his head like his \"brain was on fire\", as well as feeling SOB and dizzy, and his vision \"went white\" temporarily. After he took a seat and rested, symptoms resolved. Reports he checked his BS yesterday after that episode and it was about 200.     A1c was 8.8% on 8/26/19. Pt states his PCP wants him to start insulin but he is hesitant to do so.    Past Medical History:   Diagnosis Date     Ascending aorta dilatation (H)      Diverticulosis of colon (without mention of hemorrhage)      Essential hypertension, benign      Gout, unspecified      LBBB (left bundle branch block)      Morbid obesity (H)      Non-ischemic cardiomyopathy (H)      Nonrheumatic mitral valve regurgitation      NSTEMI (non-ST elevated myocardial infarction) (H)     cardiac cath 6/2018: mild non-obstructive CAD     Other and unspecified hyperlipidemia      Type II or unspecified type diabetes mellitus without mention of complication, not stated as uncontrolled      Past Surgical History:   Procedure Laterality Date     C APPENDECTOMY  1980's     HEART CATH CORONARY ANGIOGRAM WITHOUT PRESSURES  06/10/2018    normal coronary angiogram, nothing more than 10%     SURGICAL HISTORY OF -   2001    repair of colovesicular fistula     Social History     Tobacco Use     Smoking status: Never Smoker     Smokeless tobacco: Never Used   Substance Use Topics     Alcohol use: Yes     Comment: 1 glass of beer on Fri     Drug use: No     Patient Active Problem List   Diagnosis     Diverticulosis of large intestine     Benign " essential hypertension     Gout     Obesity with BMI of 40.0-44.9, adult (H)     Type 2 diabetes mellitus without complication, without long-term current use of insulin (H)     Hyperlipidemia LDL goal <100     NSTEMI (non-ST elevated myocardial infarction) (H)     Non-ischemic cardiomyopathy (H)     LBBB (left bundle branch block)     Nonrheumatic mitral valve regurgitation     Ascending aorta dilatation (H)     Coronary artery disease involving native coronary artery of native heart without angina pectoris     Family History   Problem Relation Age of Onset     Cancer Father 75        bladder cancer     Myocardial Infarction Father      Other - See Comments Father         smoking     Breast Cancer Mother      Breast Cancer Sister      Family History Negative Brother      Family History Negative Son      Family History Negative Son         fluttering/murmur     Asthma Son         Problem list, Medication list, Allergies, and Medical/Social/Surgical histories reviewed in [facility] and updated as appropriate.        Review of Systems   Constitutional: Positive for diaphoresis. Negative for chills and fever.   Eyes:        Visual disturbance     Respiratory: Positive for shortness of breath.    Cardiovascular: Negative for chest pain.   Gastrointestinal: Negative for abdominal pain, diarrhea, nausea and vomiting.   Skin: Negative for rash.   Neurological: Positive for dizziness and headaches. Negative for focal weakness.   All other systems reviewed and are negative.        Physical Exam  Vitals signs and nursing note reviewed.   HENT:      Head: Normocephalic and atraumatic.   Eyes:      Extraocular Movements: Extraocular movements intact.      Pupils: Pupils are equal, round, and reactive to light.   Cardiovascular:      Rate and Rhythm: Normal rate and regular rhythm.      Heart sounds: Normal heart sounds.   Pulmonary:      Effort: Pulmonary effort is normal.      Breath sounds: Normal breath sounds.   Musculoskeletal:  Normal range of motion.   Skin:     General: Skin is warm and dry.   Neurological:      Mental Status: He is alert and oriented to person, place, and time.      Sensory: Sensation is intact.      Motor: Motor function is intact.      Coordination: Coordination is intact.      Gait: Gait is intact.       Vital Signs  /66   Pulse 76   Wt 125.1 kg (275 lb 12.8 oz)   SpO2 99%   BMI 39.57 kg/m       Diagnostic Test Results:  none     ASSESSMENT/PLAN      ICD-10-CM    1. Visual disturbance H53.9    2. Acute nonintractable headache, unspecified headache type R51    3. Dizziness R42    4. Dyspnea, unspecified type R06.00       Pt with significant comorbidities and concerning symptoms such as visual disturbances, dizziness, diaphoresis, and SOB. I have advised he be seen in the ER at [facility] to have acute abnormalities such as brain aneurysm, intracranial bleed, ACS, and electrolyte abnormalities ruled out. He will go by private care as he is stable currently and reports feeling fine right now.      I have discussed any lab or imaging results, the patient's diagnosis, and my plan of treatment with the patient and/or family. Patient is aware to come back in if with worsening symptoms or if no relief despite treatment plan.  Patient voiced understanding and had no further questions.       Follow Up: Return in about 2 weeks (around 10/15/2019) for Follow up w/ primary care provider after ER visit.    DANA Chamberlain, PAMOISÉS  [facility]

## 2019-10-01 NOTE — PLAN OF CARE
RECEIVING UNIT ED HANDOFF REVIEW    ED Nurse Handoff Report was reviewed by: Edgard Ruiz RN on October 1, 2019 at 3:34 PM

## 2019-10-02 ENCOUNTER — SURGERY (OUTPATIENT)
Age: 59
End: 2019-10-02
Payer: COMMERCIAL

## 2019-10-02 ENCOUNTER — APPOINTMENT (OUTPATIENT)
Dept: CARDIOLOGY | Facility: CLINIC | Age: 59
DRG: 225 | End: 2019-10-02
Attending: HOSPITALIST
Payer: COMMERCIAL

## 2019-10-02 PROBLEM — I47.29 PAROXYSMAL VENTRICULAR TACHYCARDIA (H): Status: ACTIVE | Noted: 2019-10-01

## 2019-10-02 LAB
ALBUMIN SERPL-MCNC: 3.4 G/DL (ref 3.4–5)
ALP SERPL-CCNC: 36 U/L (ref 40–150)
ALT SERPL W P-5'-P-CCNC: 56 U/L (ref 0–70)
ANION GAP SERPL CALCULATED.3IONS-SCNC: 8 MMOL/L (ref 3–14)
AST SERPL W P-5'-P-CCNC: 65 U/L (ref 0–45)
BILIRUB SERPL-MCNC: 0.6 MG/DL (ref 0.2–1.3)
BUN SERPL-MCNC: 23 MG/DL (ref 7–30)
CA-I SERPL ISE-MCNC: 4.7 MG/DL (ref 4.4–5.2)
CALCIUM SERPL-MCNC: 8.6 MG/DL (ref 8.5–10.1)
CHLORIDE SERPL-SCNC: 106 MMOL/L (ref 94–109)
CO2 BLDCOV-SCNC: 19 MMOL/L (ref 21–28)
CO2 BLDCOV-SCNC: 21 MMOL/L (ref 21–28)
CO2 SERPL-SCNC: 22 MMOL/L (ref 20–32)
CREAT SERPL-MCNC: 0.81 MG/DL (ref 0.66–1.25)
CREAT SERPL-MCNC: 1.06 MG/DL (ref 0.66–1.25)
ERYTHROCYTE [DISTWIDTH] IN BLOOD BY AUTOMATED COUNT: 13.2 % (ref 10–15)
GFR SERPL CREATININE-BSD FRML MDRD: 76 ML/MIN/{1.73_M2}
GFR SERPL CREATININE-BSD FRML MDRD: >90 ML/MIN/{1.73_M2}
GLUCOSE BLDC GLUCOMTR-MCNC: 183 MG/DL (ref 70–99)
GLUCOSE BLDC GLUCOMTR-MCNC: 230 MG/DL (ref 70–99)
GLUCOSE BLDC GLUCOMTR-MCNC: 233 MG/DL (ref 70–99)
GLUCOSE BLDC GLUCOMTR-MCNC: 255 MG/DL (ref 70–99)
GLUCOSE SERPL-MCNC: 150 MG/DL (ref 70–99)
GLUCOSE SERPL-MCNC: 260 MG/DL (ref 70–99)
HCT VFR BLD AUTO: 35.1 % (ref 40–53)
HGB BLD-MCNC: 11.6 G/DL (ref 13.3–17.7)
LACTATE BLD-SCNC: 2.9 MMOL/L (ref 0.7–2)
LMWH PPP CHRO-ACNC: 0.14 IU/ML
MAGNESIUM SERPL-MCNC: 2.1 MG/DL (ref 1.6–2.3)
MAGNESIUM SERPL-MCNC: 2.4 MG/DL (ref 1.6–2.3)
MCH RBC QN AUTO: 30.6 PG (ref 26.5–33)
MCHC RBC AUTO-ENTMCNC: 33 G/DL (ref 31.5–36.5)
MCV RBC AUTO: 93 FL (ref 78–100)
PCO2 BLDV: 35 MM HG (ref 40–50)
PCO2 BLDV: 39 MM HG (ref 40–50)
PH BLDV: 7.34 PH (ref 7.32–7.43)
PH BLDV: 7.35 PH (ref 7.32–7.43)
PHOSPHATE SERPL-MCNC: 4 MG/DL (ref 2.5–4.5)
PLATELET # BLD AUTO: 194 10E9/L (ref 150–450)
PO2 BLDV: 108 MM HG (ref 25–47)
PO2 BLDV: 34 MM HG (ref 25–47)
POTASSIUM SERPL-SCNC: 4.1 MMOL/L (ref 3.4–5.3)
PROT SERPL-MCNC: 6.4 G/DL (ref 6.8–8.8)
RBC # BLD AUTO: 3.79 10E12/L (ref 4.4–5.9)
SAO2 % BLDV FROM PO2: 63 %
SAO2 % BLDV FROM PO2: 98 %
SODIUM SERPL-SCNC: 136 MMOL/L (ref 133–144)
WBC # BLD AUTO: 5.9 10E9/L (ref 4–11)

## 2019-10-02 PROCEDURE — 83735 ASSAY OF MAGNESIUM: CPT | Performed by: NURSE PRACTITIONER

## 2019-10-02 PROCEDURE — 85520 HEPARIN ASSAY: CPT | Performed by: HOSPITALIST

## 2019-10-02 PROCEDURE — C1894 INTRO/SHEATH, NON-LASER: HCPCS | Performed by: INTERNAL MEDICINE

## 2019-10-02 PROCEDURE — 00000146 ZZHCL STATISTIC GLUCOSE BY METER IP

## 2019-10-02 PROCEDURE — 20000003 ZZH R&B ICU

## 2019-10-02 PROCEDURE — 99207 ZZC CDG-MDM COMPONENT: MEETS MODERATE - UP CODED: CPT | Performed by: HOSPITALIST

## 2019-10-02 PROCEDURE — 85027 COMPLETE CBC AUTOMATED: CPT | Performed by: NURSE PRACTITIONER

## 2019-10-02 PROCEDURE — 25000132 ZZH RX MED GY IP 250 OP 250 PS 637: Performed by: INTERNAL MEDICINE

## 2019-10-02 PROCEDURE — 25000131 ZZH RX MED GY IP 250 OP 636 PS 637: Performed by: HOSPITALIST

## 2019-10-02 PROCEDURE — 25000128 H RX IP 250 OP 636: Performed by: NURSE PRACTITIONER

## 2019-10-02 PROCEDURE — 99152 MOD SED SAME PHYS/QHP 5/>YRS: CPT

## 2019-10-02 PROCEDURE — 82803 BLOOD GASES ANY COMBINATION: CPT

## 2019-10-02 PROCEDURE — 25500064 ZZH RX 255 OP 636: Performed by: HOSPITALIST

## 2019-10-02 PROCEDURE — 25000125 ZZHC RX 250: Performed by: INTERNAL MEDICINE

## 2019-10-02 PROCEDURE — 93458 L HRT ARTERY/VENTRICLE ANGIO: CPT | Performed by: INTERNAL MEDICINE

## 2019-10-02 PROCEDURE — 99233 SBSQ HOSP IP/OBS HIGH 50: CPT | Performed by: HOSPITALIST

## 2019-10-02 PROCEDURE — 5A2204Z RESTORATION OF CARDIAC RHYTHM, SINGLE: ICD-10-PCS | Performed by: NURSE PRACTITIONER

## 2019-10-02 PROCEDURE — 25000128 H RX IP 250 OP 636: Performed by: INTERNAL MEDICINE

## 2019-10-02 PROCEDURE — 25000128 H RX IP 250 OP 636: Performed by: HOSPITALIST

## 2019-10-02 PROCEDURE — 25800030 ZZH RX IP 258 OP 636: Performed by: INTERNAL MEDICINE

## 2019-10-02 PROCEDURE — 82565 ASSAY OF CREATININE: CPT | Performed by: INTERNAL MEDICINE

## 2019-10-02 PROCEDURE — 4A023N7 MEASUREMENT OF CARDIAC SAMPLING AND PRESSURE, LEFT HEART, PERCUTANEOUS APPROACH: ICD-10-PCS | Performed by: INTERNAL MEDICINE

## 2019-10-02 PROCEDURE — B2111ZZ FLUOROSCOPY OF MULTIPLE CORONARY ARTERIES USING LOW OSMOLAR CONTRAST: ICD-10-PCS | Performed by: INTERNAL MEDICINE

## 2019-10-02 PROCEDURE — 99291 CRITICAL CARE FIRST HOUR: CPT | Performed by: NURSE PRACTITIONER

## 2019-10-02 PROCEDURE — 25000132 ZZH RX MED GY IP 250 OP 250 PS 637: Performed by: HOSPITALIST

## 2019-10-02 PROCEDURE — 82565 ASSAY OF CREATININE: CPT | Performed by: HOSPITALIST

## 2019-10-02 PROCEDURE — 4A023N6 MEASUREMENT OF CARDIAC SAMPLING AND PRESSURE, RIGHT HEART, PERCUTANEOUS APPROACH: ICD-10-PCS | Performed by: INTERNAL MEDICINE

## 2019-10-02 PROCEDURE — 93451 RIGHT HEART CATH: CPT | Mod: 26 | Performed by: INTERNAL MEDICINE

## 2019-10-02 PROCEDURE — 80053 COMPREHEN METABOLIC PANEL: CPT | Performed by: NURSE PRACTITIONER

## 2019-10-02 PROCEDURE — 83605 ASSAY OF LACTIC ACID: CPT | Performed by: NURSE PRACTITIONER

## 2019-10-02 PROCEDURE — 82330 ASSAY OF CALCIUM: CPT | Performed by: NURSE PRACTITIONER

## 2019-10-02 PROCEDURE — 99152 MOD SED SAME PHYS/QHP 5/>YRS: CPT | Performed by: INTERNAL MEDICINE

## 2019-10-02 PROCEDURE — 82947 ASSAY GLUCOSE BLOOD QUANT: CPT | Performed by: HOSPITALIST

## 2019-10-02 PROCEDURE — 36415 COLL VENOUS BLD VENIPUNCTURE: CPT | Performed by: HOSPITALIST

## 2019-10-02 PROCEDURE — 83735 ASSAY OF MAGNESIUM: CPT | Performed by: HOSPITALIST

## 2019-10-02 PROCEDURE — 93458 L HRT ARTERY/VENTRICLE ANGIO: CPT | Mod: XE

## 2019-10-02 PROCEDURE — C1760 CLOSURE DEV, VASC: HCPCS | Performed by: INTERNAL MEDICINE

## 2019-10-02 PROCEDURE — 93458 L HRT ARTERY/VENTRICLE ANGIO: CPT | Mod: 26 | Performed by: INTERNAL MEDICINE

## 2019-10-02 PROCEDURE — 84100 ASSAY OF PHOSPHORUS: CPT | Performed by: NURSE PRACTITIONER

## 2019-10-02 PROCEDURE — 25800030 ZZH RX IP 258 OP 636: Performed by: NURSE PRACTITIONER

## 2019-10-02 PROCEDURE — 99222 1ST HOSP IP/OBS MODERATE 55: CPT | Mod: 25 | Performed by: INTERNAL MEDICINE

## 2019-10-02 PROCEDURE — 93306 TTE W/DOPPLER COMPLETE: CPT | Mod: 26 | Performed by: INTERNAL MEDICINE

## 2019-10-02 PROCEDURE — 93451 RIGHT HEART CATH: CPT | Performed by: INTERNAL MEDICINE

## 2019-10-02 PROCEDURE — 27210794 ZZH OR GENERAL SUPPLY STERILE: Performed by: INTERNAL MEDICINE

## 2019-10-02 PROCEDURE — 40000264 ECHOCARDIOGRAM COMPLETE

## 2019-10-02 RX ORDER — ATROPINE SULFATE 0.1 MG/ML
0.5 INJECTION INTRAVENOUS EVERY 5 MIN PRN
Status: DISCONTINUED | OUTPATIENT
Start: 2019-10-02 | End: 2019-10-02

## 2019-10-02 RX ORDER — LISINOPRIL 20 MG/1
20 TABLET ORAL DAILY
Status: DISCONTINUED | OUTPATIENT
Start: 2019-10-03 | End: 2019-10-05 | Stop reason: HOSPADM

## 2019-10-02 RX ORDER — LORAZEPAM 0.5 MG/1
0.5 TABLET ORAL
Status: DISCONTINUED | OUTPATIENT
Start: 2019-10-02 | End: 2019-10-02 | Stop reason: HOSPADM

## 2019-10-02 RX ORDER — LORAZEPAM 2 MG/ML
0.5 INJECTION INTRAMUSCULAR
Status: DISCONTINUED | OUTPATIENT
Start: 2019-10-02 | End: 2019-10-02

## 2019-10-02 RX ORDER — NALOXONE HYDROCHLORIDE 0.4 MG/ML
.2-.4 INJECTION, SOLUTION INTRAMUSCULAR; INTRAVENOUS; SUBCUTANEOUS
Status: DISCONTINUED | OUTPATIENT
Start: 2019-10-02 | End: 2019-10-02

## 2019-10-02 RX ORDER — FUROSEMIDE 40 MG
40 TABLET ORAL DAILY
Status: DISCONTINUED | OUTPATIENT
Start: 2019-10-02 | End: 2019-10-04

## 2019-10-02 RX ORDER — DOBUTAMINE HYDROCHLORIDE 200 MG/100ML
2-20 INJECTION INTRAVENOUS CONTINUOUS PRN
Status: DISCONTINUED | OUTPATIENT
Start: 2019-10-02 | End: 2019-10-02 | Stop reason: HOSPADM

## 2019-10-02 RX ORDER — AMOXICILLIN 250 MG
2 CAPSULE ORAL 2 TIMES DAILY PRN
Status: DISCONTINUED | OUTPATIENT
Start: 2019-10-02 | End: 2019-10-02

## 2019-10-02 RX ORDER — LISINOPRIL 20 MG/1
20 TABLET ORAL 2 TIMES DAILY
Status: DISCONTINUED | OUTPATIENT
Start: 2019-10-02 | End: 2019-10-02

## 2019-10-02 RX ORDER — EPTIFIBATIDE 2 MG/ML
180 INJECTION, SOLUTION INTRAVENOUS EVERY 10 MIN PRN
Status: DISCONTINUED | OUTPATIENT
Start: 2019-10-02 | End: 2019-10-02 | Stop reason: HOSPADM

## 2019-10-02 RX ORDER — IOPAMIDOL 755 MG/ML
INJECTION, SOLUTION INTRAVASCULAR
Status: DISCONTINUED | OUTPATIENT
Start: 2019-10-02 | End: 2019-10-02 | Stop reason: HOSPADM

## 2019-10-02 RX ORDER — NALOXONE HYDROCHLORIDE 0.4 MG/ML
.2-.4 INJECTION, SOLUTION INTRAMUSCULAR; INTRAVENOUS; SUBCUTANEOUS
Status: ACTIVE | OUTPATIENT
Start: 2019-10-02 | End: 2019-10-03

## 2019-10-02 RX ORDER — SODIUM CHLORIDE 9 MG/ML
INJECTION, SOLUTION INTRAVENOUS CONTINUOUS
Status: DISCONTINUED | OUTPATIENT
Start: 2019-10-02 | End: 2019-10-02

## 2019-10-02 RX ORDER — PROCHLORPERAZINE 25 MG
25 SUPPOSITORY, RECTAL RECTAL EVERY 12 HOURS PRN
Status: DISCONTINUED | OUTPATIENT
Start: 2019-10-02 | End: 2019-10-02

## 2019-10-02 RX ORDER — POLYETHYLENE GLYCOL 3350 17 G/17G
17 POWDER, FOR SOLUTION ORAL DAILY PRN
Status: DISCONTINUED | OUTPATIENT
Start: 2019-10-02 | End: 2019-10-05 | Stop reason: HOSPADM

## 2019-10-02 RX ORDER — EPTIFIBATIDE 2 MG/ML
2 INJECTION, SOLUTION INTRAVENOUS CONTINUOUS PRN
Status: DISCONTINUED | OUTPATIENT
Start: 2019-10-02 | End: 2019-10-02 | Stop reason: HOSPADM

## 2019-10-02 RX ORDER — NOREPINEPHRINE BITARTRATE 0.06 MG/ML
.03-.4 INJECTION, SOLUTION INTRAVENOUS CONTINUOUS PRN
Status: DISCONTINUED | OUTPATIENT
Start: 2019-10-02 | End: 2019-10-02 | Stop reason: HOSPADM

## 2019-10-02 RX ORDER — LIRAGLUTIDE 6 MG/ML
1.8 INJECTION SUBCUTANEOUS DAILY
Status: DISCONTINUED | OUTPATIENT
Start: 2019-10-02 | End: 2019-10-02

## 2019-10-02 RX ORDER — PROCHLORPERAZINE MALEATE 5 MG
10 TABLET ORAL EVERY 6 HOURS PRN
Status: DISCONTINUED | OUTPATIENT
Start: 2019-10-02 | End: 2019-10-02

## 2019-10-02 RX ORDER — LORAZEPAM 2 MG/ML
0.5 INJECTION INTRAMUSCULAR
Status: DISCONTINUED | OUTPATIENT
Start: 2019-10-02 | End: 2019-10-02 | Stop reason: HOSPADM

## 2019-10-02 RX ORDER — LIDOCAINE 40 MG/G
CREAM TOPICAL
Status: DISCONTINUED | OUTPATIENT
Start: 2019-10-02 | End: 2019-10-02

## 2019-10-02 RX ORDER — NALOXONE HYDROCHLORIDE 0.4 MG/ML
.1-.4 INJECTION, SOLUTION INTRAMUSCULAR; INTRAVENOUS; SUBCUTANEOUS
Status: DISCONTINUED | OUTPATIENT
Start: 2019-10-02 | End: 2019-10-02

## 2019-10-02 RX ORDER — ARGATROBAN 1 MG/ML
350 INJECTION, SOLUTION INTRAVENOUS
Status: DISCONTINUED | OUTPATIENT
Start: 2019-10-02 | End: 2019-10-02 | Stop reason: HOSPADM

## 2019-10-02 RX ORDER — POTASSIUM CHLORIDE 1500 MG/1
20 TABLET, EXTENDED RELEASE ORAL
Status: DISCONTINUED | OUTPATIENT
Start: 2019-10-02 | End: 2019-10-02 | Stop reason: HOSPADM

## 2019-10-02 RX ORDER — ACETAMINOPHEN 325 MG/1
650 TABLET ORAL EVERY 4 HOURS PRN
Status: DISCONTINUED | OUTPATIENT
Start: 2019-10-02 | End: 2019-10-02

## 2019-10-02 RX ORDER — NITROGLYCERIN 20 MG/100ML
.07-1.6 INJECTION INTRAVENOUS CONTINUOUS PRN
Status: DISCONTINUED | OUTPATIENT
Start: 2019-10-02 | End: 2019-10-02 | Stop reason: HOSPADM

## 2019-10-02 RX ORDER — LIDOCAINE 40 MG/G
CREAM TOPICAL
Status: DISCONTINUED | OUTPATIENT
Start: 2019-10-02 | End: 2019-10-02 | Stop reason: HOSPADM

## 2019-10-02 RX ORDER — FENTANYL CITRATE 50 UG/ML
INJECTION, SOLUTION INTRAMUSCULAR; INTRAVENOUS
Status: DISCONTINUED
Start: 2019-10-02 | End: 2019-10-02 | Stop reason: HOSPADM

## 2019-10-02 RX ORDER — DIAZEPAM 5 MG
5 TABLET ORAL
Status: DISCONTINUED | OUTPATIENT
Start: 2019-10-02 | End: 2019-10-05 | Stop reason: HOSPADM

## 2019-10-02 RX ORDER — ARGATROBAN 1 MG/ML
150 INJECTION, SOLUTION INTRAVENOUS
Status: DISCONTINUED | OUTPATIENT
Start: 2019-10-02 | End: 2019-10-02 | Stop reason: HOSPADM

## 2019-10-02 RX ORDER — GLIPIZIDE 10 MG/1
10 TABLET, FILM COATED, EXTENDED RELEASE ORAL DAILY
Status: DISCONTINUED | OUTPATIENT
Start: 2019-10-02 | End: 2019-10-02

## 2019-10-02 RX ORDER — POTASSIUM CHLORIDE 1500 MG/1
20 TABLET, EXTENDED RELEASE ORAL
Status: DISCONTINUED | OUTPATIENT
Start: 2019-10-02 | End: 2019-10-02

## 2019-10-02 RX ORDER — LORAZEPAM 0.5 MG/1
0.5 TABLET ORAL
Status: DISCONTINUED | OUTPATIENT
Start: 2019-10-02 | End: 2019-10-02

## 2019-10-02 RX ORDER — NALOXONE HYDROCHLORIDE 0.4 MG/ML
.1-.4 INJECTION, SOLUTION INTRAMUSCULAR; INTRAVENOUS; SUBCUTANEOUS
Status: DISCONTINUED | OUTPATIENT
Start: 2019-10-02 | End: 2019-10-05 | Stop reason: HOSPADM

## 2019-10-02 RX ORDER — PHENYLEPHRINE HCL IN 0.9% NACL 50MG/250ML
.5-6 PLASTIC BAG, INJECTION (ML) INTRAVENOUS CONTINUOUS PRN
Status: DISCONTINUED | OUTPATIENT
Start: 2019-10-02 | End: 2019-10-02 | Stop reason: HOSPADM

## 2019-10-02 RX ORDER — FLUMAZENIL 0.1 MG/ML
0.2 INJECTION, SOLUTION INTRAVENOUS
Status: DISCONTINUED | OUTPATIENT
Start: 2019-10-02 | End: 2019-10-02

## 2019-10-02 RX ORDER — EPTIFIBATIDE 2 MG/ML
15 INJECTION, SOLUTION INTRAVENOUS CONTINUOUS PRN
Status: DISCONTINUED | OUTPATIENT
Start: 2019-10-02 | End: 2019-10-02 | Stop reason: HOSPADM

## 2019-10-02 RX ORDER — ATROPINE SULFATE 0.1 MG/ML
0.5 INJECTION INTRAVENOUS EVERY 5 MIN PRN
Status: ACTIVE | OUTPATIENT
Start: 2019-10-02 | End: 2019-10-03

## 2019-10-02 RX ORDER — AMOXICILLIN 250 MG
1 CAPSULE ORAL 2 TIMES DAILY PRN
Status: DISCONTINUED | OUTPATIENT
Start: 2019-10-02 | End: 2019-10-02

## 2019-10-02 RX ORDER — FENTANYL CITRATE 50 UG/ML
25-50 INJECTION, SOLUTION INTRAMUSCULAR; INTRAVENOUS
Status: DISCONTINUED | OUTPATIENT
Start: 2019-10-02 | End: 2019-10-02

## 2019-10-02 RX ORDER — LIDOCAINE 40 MG/G
CREAM TOPICAL
Status: DISCONTINUED | OUTPATIENT
Start: 2019-10-02 | End: 2019-10-05 | Stop reason: HOSPADM

## 2019-10-02 RX ORDER — LIDOCAINE 40 MG/G
CREAM TOPICAL
Status: DISCONTINUED | OUTPATIENT
Start: 2019-10-02 | End: 2019-10-03

## 2019-10-02 RX ORDER — DOPAMINE HYDROCHLORIDE 160 MG/100ML
2-20 INJECTION, SOLUTION INTRAVENOUS CONTINUOUS PRN
Status: DISCONTINUED | OUTPATIENT
Start: 2019-10-02 | End: 2019-10-02 | Stop reason: HOSPADM

## 2019-10-02 RX ORDER — FLUMAZENIL 0.1 MG/ML
0.2 INJECTION, SOLUTION INTRAVENOUS
Status: ACTIVE | OUTPATIENT
Start: 2019-10-02 | End: 2019-10-03

## 2019-10-02 RX ORDER — FENTANYL CITRATE 50 UG/ML
INJECTION, SOLUTION INTRAMUSCULAR; INTRAVENOUS
Status: DISCONTINUED | OUTPATIENT
Start: 2019-10-02 | End: 2019-10-02 | Stop reason: HOSPADM

## 2019-10-02 RX ORDER — ACETAMINOPHEN 325 MG/1
650 TABLET ORAL EVERY 4 HOURS PRN
Status: DISCONTINUED | OUTPATIENT
Start: 2019-10-02 | End: 2019-10-05 | Stop reason: HOSPADM

## 2019-10-02 RX ORDER — DIAZEPAM 5 MG
5 TABLET ORAL
Status: DISCONTINUED | OUTPATIENT
Start: 2019-10-02 | End: 2019-10-02 | Stop reason: HOSPADM

## 2019-10-02 RX ORDER — ASPIRIN 81 MG/1
244 TABLET ORAL DAILY
Status: DISCONTINUED | OUTPATIENT
Start: 2019-10-02 | End: 2019-10-02

## 2019-10-02 RX ORDER — FENTANYL CITRATE 50 UG/ML
25-50 INJECTION, SOLUTION INTRAMUSCULAR; INTRAVENOUS
Status: ACTIVE | OUTPATIENT
Start: 2019-10-02 | End: 2019-10-03

## 2019-10-02 RX ORDER — SODIUM CHLORIDE 9 MG/ML
INJECTION, SOLUTION INTRAVENOUS CONTINUOUS
Status: DISCONTINUED | OUTPATIENT
Start: 2019-10-02 | End: 2019-10-03

## 2019-10-02 RX ADMIN — MIDAZOLAM 1 MG: 1 INJECTION INTRAMUSCULAR; INTRAVENOUS at 10:54

## 2019-10-02 RX ADMIN — LISINOPRIL 20 MG: 20 TABLET ORAL at 09:23

## 2019-10-02 RX ADMIN — ATORVASTATIN CALCIUM 40 MG: 40 TABLET, FILM COATED ORAL at 19:53

## 2019-10-02 RX ADMIN — FENTANYL CITRATE 50 MCG: 50 INJECTION, SOLUTION INTRAMUSCULAR; INTRAVENOUS at 11:03

## 2019-10-02 RX ADMIN — IOPAMIDOL 40 ML: 755 INJECTION, SOLUTION INTRAVENOUS at 15:12

## 2019-10-02 RX ADMIN — HEPARIN SODIUM 1200 UNITS/HR: 10000 INJECTION, SOLUTION INTRAVENOUS at 07:21

## 2019-10-02 RX ADMIN — FUROSEMIDE 40 MG: 40 TABLET ORAL at 12:37

## 2019-10-02 RX ADMIN — LIDOCAINE HYDROCHLORIDE 10 ML: 10 INJECTION, SOLUTION EPIDURAL; INFILTRATION; INTRACAUDAL; PERINEURAL at 10:58

## 2019-10-02 RX ADMIN — SPIRONOLACTONE 25 MG: 25 TABLET ORAL at 09:23

## 2019-10-02 RX ADMIN — SODIUM CHLORIDE: 9 INJECTION, SOLUTION INTRAVENOUS at 20:40

## 2019-10-02 RX ADMIN — AMIODARONE HYDROCHLORIDE 0.5 MG/MIN: 50 INJECTION, SOLUTION INTRAVENOUS at 20:37

## 2019-10-02 RX ADMIN — FENTANYL CITRATE 50 MCG: 50 INJECTION, SOLUTION INTRAMUSCULAR; INTRAVENOUS at 10:53

## 2019-10-02 RX ADMIN — ACETAMINOPHEN 650 MG: 325 TABLET, FILM COATED ORAL at 12:37

## 2019-10-02 RX ADMIN — MIDAZOLAM 1 MG: 1 INJECTION INTRAMUSCULAR; INTRAVENOUS at 11:03

## 2019-10-02 RX ADMIN — HUMAN ALBUMIN MICROSPHERES AND PERFLUTREN 9 ML: 10; .22 INJECTION, SOLUTION INTRAVENOUS at 09:11

## 2019-10-02 RX ADMIN — FENOFIBRATE 160 MG: 160 TABLET ORAL at 09:23

## 2019-10-02 RX ADMIN — ASPIRIN 81 MG: 81 TABLET, COATED ORAL at 09:23

## 2019-10-02 RX ADMIN — INSULIN ASPART 2 UNITS: 100 INJECTION, SOLUTION INTRAVENOUS; SUBCUTANEOUS at 16:59

## 2019-10-02 RX ADMIN — LIDOCAINE HYDROCHLORIDE 10 ML: 10 INJECTION, SOLUTION EPIDURAL; INFILTRATION; INTRACAUDAL; PERINEURAL at 14:58

## 2019-10-02 ASSESSMENT — ACTIVITIES OF DAILY LIVING (ADL)
ADLS_ACUITY_SCORE: 11

## 2019-10-02 NOTE — CONSULTS
[facility]    Cardiology Consultation     Date of Admission:  10/1/2019    Assessment & Plan   Neymar Rhodes is a 59 year old male who was admitted on 10/1/2019.    At this point I have reviewed the echocardiography images.  The LV is clearly dilated.  The EF is close to around 20-25%.  I am awaiting the formal read.  Right ventricle is not dilated and is not terrible.  There is some degree of mitral regurgitation.  Image quality is not the best.  In either case there is apical rocking and signs are consistent with left bundle branch block and chronic nonischemic cardiomyopathy.  Patient's volume status is quite difficult to discern.  He does not have any classic symptoms of angina and his coronary angina last year was nonobstructive with mild disease.  His troponin is minimally elevated and nonspecific non-ACS pattern.    Differential diagnosis includes worsening left bundle branch block related cardiomyopathy versus underlying sarcoidosis.  In any case at this point I recommend a right heart catheterization to objectively assess his hemodynamics.  In addition to that I also want to repeat a cardiac MRI to see what his LV size and function is and to see if there is any new developments of LV scarring that would favor more diagnosis of infiltrative disease such as sarcoidosis.  The patient may need a PET scan.    In either case given the fact that he has had deterioration in his LV size and function over the last 1 year despite being on triple therapy with metoprolol lisinopril and spironolactone, this patient qualifies for getting a CRT with a defibrillator.  I will place an EP consultation.  I would like to do this device after the MRI is done.  We will try to get the MRI done in a day or so.     We will follow-up the results of these testing today. Cardiology will continue to follow along.    Risks and benefits of the procedure were discussed with the patient (and/or his family) in detail  including but not limited to the risk of neck discomfort, vascular arterial injury and stroke, bleeding, need for blood transfusion, pneumothorax needing urgent interventions, heart block needing temporary or permanent pacemaker, cardiac perforation needing emergent interventions, PA rupture needing emergent surgery, sedation related complications, and death. In certain occassions, there may be a need to change access site from neck to groin or right to left or vice-versa. Patient understands the overall risks of the procedure and wishes to proceed.    Umm Hernandes MD    Primary Care Physician   Jefry Harris    Reason for Consult   Reason for consult: I was asked by  to evaluate this patient for shortness of breath.    History of Present Illness   Neymar Rhodes is a 59 year old male who presents with shortness of breath.  The patient has a history of cardiomyopathy diagnosed in 2018.  He was seen by Dr. Rosen at that time.  He has a history of hypertension and diabetes and morbid obesity. Last year in June 2018 he was diagnosed to have an EF of 40%.  He had a wide left bundle branch block.  He was started on heart failure therapy but due to work and scheduling issues could not follow-up in the core clinic for scheduled follow-ups after that.  In any case coronary angiography done during that admission showed minimal disease.  No history of heavy alcohol use.  Subsequently underwent a cardiac MRI which showed some scarring and EF of close to 40%.  There was delayed gadolinium enhancement consistent with signs of possible sarcoidosis.  Again this was not followed up due to patient's work schedules.    Over the last year the patient says he is done okay but over the last 1 week has been having progressive dyspnea.  Denies syncope or presyncope but had one episode of lightheadedness.  He intermittently takes his blood pressure at home and his readings are in the 120s.  He has been compliant with  metoprolol lisinopril and spironolactone at home.  No symptoms consistent with angina.  Now he is  admitted with worsening shortness of breath slightly elevated BNP levels and signs of volume overload and nonspecific troponin elevation without classic symptoms of angina.  ECG showed chronic left bundle branch block in the setting of normal sinus rhythm.  Repeat echocardiography done this morning shows worsening EF and cardiology has been consulted.    Patient Active Problem List   Diagnosis     Diverticulosis of large intestine     Benign essential hypertension     Gout     Obesity with BMI of 40.0-44.9, adult (H)     Type 2 diabetes mellitus without complication, without long-term current use of insulin (H)     Hyperlipidemia LDL goal <100     NSTEMI (non-ST elevated myocardial infarction) (H)     Non-ischemic cardiomyopathy (H)     LBBB (left bundle branch block)     Nonrheumatic mitral valve regurgitation     Ascending aorta dilatation (H)     Coronary artery disease involving native coronary artery of native heart without angina pectoris     Near syncope       Past Medical History   I have reviewed this patient's medical history and updated it with pertinent information if needed.   Past Medical History:   Diagnosis Date     Ascending aorta dilatation (H)      Diverticulosis of colon (without mention of hemorrhage)      Essential hypertension, benign      Gout, unspecified      LBBB (left bundle branch block)      Morbid obesity (H)      Non-ischemic cardiomyopathy (H)      Nonrheumatic mitral valve regurgitation      NSTEMI (non-ST elevated myocardial infarction) (H)     cardiac cath 6/2018: mild non-obstructive CAD     Other and unspecified hyperlipidemia      Type II or unspecified type diabetes mellitus without mention of complication, not stated as uncontrolled        Past Surgical History   I have reviewed this patient's surgical history and updated it with pertinent information if needed.  Past Surgical  History:   Procedure Laterality Date     C APPENDECTOMY  1980's     HEART CATH CORONARY ANGIOGRAM WITHOUT PRESSURES  06/10/2018    normal coronary angiogram, nothing more than 10%     SURGICAL HISTORY OF -   2001    repair of colovesicular fistula       Prior to Admission Medications   Prior to Admission Medications   Prescriptions Last Dose Informant Patient Reported? Taking?   BD ULTRA-FINE 29G X 12.7MM insulin pen needle  Self No No   Sig: use once daily with victoza pen   BD ULTRA-FINE 29G X 12.7MM insulin pen needle  Self No No   Sig: use once daily with victoza pen   albuterol (PROAIR HFA/PROVENTIL HFA/VENTOLIN HFA) 108 (90 Base) MCG/ACT Inhaler prn Self Yes Yes   Sig: Inhale 2 puffs into the lungs every 6 hours as needed for shortness of breath / dyspnea or wheezing   aspirin 81 MG tablet 10/1/2019 at Unknown time Self No Yes   Sig: Take 1 tablet (81 mg) by mouth daily   atorvastatin (LIPITOR) 80 MG tablet 9/30/2019 at hs Self No Yes   Sig: Take 1/2 (one-half) tablet by mouth at bedtime.   blood glucose monitoring (ACCU-CHEK LUL PLUS) test strip  Self No No   Sig: Use to test blood sugar 1-3 times daily or as directed.   clotrimazole (LOTRIMIN) 1 % cream prn Self No Yes   Sig: Apply topically 2 times daily To axillary rash   Patient taking differently: Apply topically 2 times daily as needed To axillary rash   continuous blood glucose monitoring (FREESTYLE JOCELIN) sensor  Self No No   Sig: For use with Freestyle Jocelin Flash  for continuous monitioring of blood glucose levels. Replace sensor every 10 days.   fenofibrate 160 MG tablet 10/1/2019 at am Self No Yes   Sig: TAKE ONE TABLET BY MOUTH ONE TIME DAILY    fluticasone (FLONASE) 50 MCG/ACT nasal spray prn Self No Yes   Sig: Spray 2 sprays into both nostrils daily   Patient taking differently: Spray 2 sprays into both nostrils daily as needed    glipiZIDE (GLUCOTROL XL) 10 MG 24 hr tablet 10/1/2019 at Unknown time Self No Yes   Sig: TAKE 1 TABLET  (10 MG) BY MOUTH DAILY. TAKE WITH LARGEST MEAL OF THE DAY. ALWAYS TAKE WITH FOOD   liraglutide (VICTOZA PEN) 18 MG/3ML solution 10/1/2019 at Unknown time Self No Yes   Sig: Inject 1.8 mg Subcutaneous daily   lisinopril (PRINIVIL/ZESTRIL) 20 MG tablet 10/1/2019 at Unknown time Self No Yes   Sig: Take 1 tablet (20 mg) by mouth daily   metFORMIN (GLUCOPHAGE) 1000 MG tablet 10/1/2019 at Unknown time Self No Yes   Sig: Take 1 tablet (1,000 mg) by mouth 2 times daily (with meals)   metoprolol succinate ER (TOPROL-XL) 25 MG 24 hr tablet 9/30/2019 at Unknown time Self No Yes   Sig: TAKE ONE TABLET BY MOUTH IN THE EVENING    spironolactone (ALDACTONE) 25 MG tablet 10/1/2019 at Unknown time Self No Yes   Sig: TAKE ONE TABLET BY MOUTH ONE TIME DAILY    triamcinolone (KENALOG) 0.5 % cream prn Self No Yes   Sig: Apply sparingly once or twice per day as needed to affected area until the skin is better, then stop      Facility-Administered Medications: None     Current Facility-Administered Medications   Medication Dose Route Frequency     aspirin  81 mg Oral Daily     atorvastatin  40 mg Oral QPM     fenofibrate  160 mg Oral Daily     insulin aspart  1-7 Units Subcutaneous TID AC     insulin aspart  1-5 Units Subcutaneous At Bedtime     lisinopril  20 mg Oral Daily     metoprolol succinate ER  25 mg Oral QPM     sodium chloride (PF)  3 mL Intracatheter Q8H     spironolactone  25 mg Oral Daily     Current Facility-Administered Medications   Medication Last Rate     heparin 100 unit/mL in 0.45% NaCl 1,200 Units/hr (10/02/19 0722)     - MEDICATION INSTRUCTIONS -       Allergies   Allergies   Allergen Reactions     No Known Drug Allergies        Social History    reports that he has never smoked. He has never used smokeless tobacco. He reports current alcohol use. He reports that he does not use drugs.    Family History   Family History   Problem Relation Age of Onset     Cancer Father 75        bladder cancer     Myocardial  Infarction Father      Other - See Comments Father         smoking     Breast Cancer Mother      Breast Cancer Sister      Family History Negative Brother      Family History Negative Son      Family History Negative Son         fluttering/murmur     Asthma Son        Review of Systems   The comprehensive 10 point Review of Systems is negative other than noted in the HPI or here.     Physical Exam   Vital Signs with Ranges  Temp:  [98  F (36.7  C)-98.7  F (37.1  C)] 98.7  F (37.1  C)  Pulse:  [62-72] 68  Heart Rate:  [60-68] 65  Resp:  [0-25] 16  BP: ()/(58-87) 101/65  SpO2:  [98 %-99 %] 98 %  Wt Readings from Last 4 Encounters:   10/02/19 120.6 kg (265 lb 14.4 oz)   10/01/19 125.1 kg (275 lb 12.8 oz)   08/30/19 127 kg (280 lb)   07/02/19 127 kg (280 lb)     No intake/output data recorded.      Vitals: /65 (BP Location: Right arm)   Pulse 68   Temp 98.7  F (37.1  C) (Oral)   Resp 16   Wt 120.6 kg (265 lb 14.4 oz)   SpO2 98%   BMI 38.15 kg/m      General alert oriented x3 no acute distress   Neck is supple JVP is difficult to see given the neck size  Cardiac sounds are regular there is a soft systolic murmur without rubs or gallops but heart sounds are somewhat distant   Abdomen is nontender and obese  Lungs are relatively clear with minimal bibasilar rales   Psych appropriate affect  Extremities are warm without edema  HEENT no pallor  Neuro limited non focal motor      Recent Labs   Lab 10/01/19  2121 10/01/19  1745 10/01/19  1209   TROPI 0.071* 0.075* 0.069*       Recent Labs   Lab 10/02/19  0143 10/01/19  2121 10/01/19  1745 10/01/19  1209   WBC  --   --   --  5.4   HGB  --   --   --  11.7*   MCV  --   --   --  92   PLT  --   --   --  196   NA  --   --   --  138   POTASSIUM  --   --   --  4.7   CHLORIDE  --   --   --  112*   CO2  --   --   --  21   BUN  --   --   --  23   CR  --   --   --  0.95   GFRESTIMATED  --   --   --  87   GFRESTBLACK  --   --   --  >90   ANIONGAP  --   --   --  5   EDITH   --   --   --  9.0   *  --   --  178*   ALBUMIN  --   --   --  3.9   PROTTOTAL  --   --   --  6.8   BILITOTAL  --   --   --  0.4   ALKPHOS  --   --   --  52   ALT  --   --   --  43   AST  --   --   --  25   TROPI  --  0.071* 0.075* 0.069*     Recent Labs   Lab Test 10/03/18  0849 06/11/18  0420  01/26/15  0920 12/30/13  1315   CHOL 104 128   < > 126 156   HDL 48 46   < > 45 41   LDL 40 59   < > 55 Cannot estimate LDL when triglyceride exceeds 400 mg/dL  86   TRIG 81 113   < > 128 435*   CHOLHDLRATIO  --   --   --  2.8 3.8    < > = values in this interval not displayed.     Recent Labs   Lab 10/01/19  1209   WBC 5.4   HGB 11.7*   HCT 34.7*   MCV 92        No results for input(s): PH, PHV, PO2, PO2V, SAT, PCO2, PCO2V, HCO3, HCO3V in the last 168 hours.  Recent Labs   Lab 10/01/19  1209   NTBNPI 957*     Recent Labs   Lab 10/01/19  1209   DD 0.3     No results for input(s): SED, CRP in the last 168 hours.  Recent Labs   Lab 10/01/19  1209        No results for input(s): TSH in the last 168 hours.  No results for input(s): COLOR, APPEARANCE, URINEGLC, URINEBILI, URINEKETONE, SG, UBLD, URINEPH, PROTEIN, UROBILINOGEN, NITRITE, LEUKEST, RBCU, WBCU in the last 168 hours.    Imaging:  Recent Results (from the past 48 hour(s))   Chest XR,  PA & LAT    Narrative    CHEST TWO VIEWS October 1, 2019 2:44 PM     HISTORY: Shortness of breath.    COMPARISON: 7/2/2019.    FINDINGS: Mild cardiomegaly. Pulmonary vascularity is within normal  limits. The lungs are clear. No pneumothorax or pleural effusion.       Impression    IMPRESSION: No radiographic evidence of acute chest abnormality.     JENNY MOLINA MD       Echo:  No results found for this or any previous visit (from the past 4320 hour(s)).

## 2019-10-02 NOTE — PLAN OF CARE
[id number]:    Neymar denies chest pain, nausea, dyspnea. He did have one 9 beat run of v-tach overnight which resolved spontaneously. Otherwise his tele reads SR with AVB and BBB. He is up independently. Magnesium check after supplementation was WDL. VSS on room air. NPO for possible intervention today.

## 2019-10-02 NOTE — PROGRESS NOTES
Angiogram was non obstructive as expected. Continue amiodarone IV at 1 mg/min for 6 hours and then 0.5 mg/min for 18 hours. EP to see tomorrow. MRI tomorrow. If more VT overnight, please call on call cardiology. IV lidocaine vs. Sedation/intubation +/- IABP (although his EDP after VT in the cath lab now was 21 only, was 15 on the wedge earlier this am with low normal CI). If more VT happens, IV steroids should be considered for presumed diagnosis of CS. Depending on the course will discuss inpatient biopsy vs. Outpatient PET scan etc and sarcoid clinic follow up. Keep in unit overnight. Back of lisinopril to 20 mg daily (had increased to 20 BID this am). PICC line implantation may be reasonable given relatively tenuous state.

## 2019-10-02 NOTE — CONSULTS
[facility]  EP Cardiology Progress Note  Date of Service: 10/02/2019   Cardiologist: Dr. Hernandes      Assessment & Plan    Neymar ELADIO Rhodes is a 59 year old male with past medical history significant for mitral valve regurgitation, cardiomyopathy, ascending aorta dilation, HTN, LBBB, morbid obesity, DMII, coronary artery disease admitted on 10/1/2019 for unstable angina due to a 3 day history of increasing shortness of breath, diaphoresis, and presyncope. At admission his ECG revealed sinus rhythm with LBBB of 174 ms.  Since admission, he has had asymptomatic run of NSVT.  General cardiology was consulted to manage cardiomyopathy, they recommended ECHO, R heart cath, cardiac MRI to determine function and development of scarring.  EP was consulted to determine if patient is appropriate for CRT-D as he has been on guideline directed medical therapy for at least 1 year with no improvement in his EF        On 10/2/19, pt underwent a RHC which revealed mildly elevated wedge and RA pressure. Pt was having NSVT, on 10/2/19 he had sustained monomorphic VT, a code was called and patient was shocked, converted to NSR, and he subsequently went to a coronary angiogram which revealed mild coronary artery disease.     Social history: He is , non smoker and social drinker of alcohol.    Family history: Father had an MI in this 60s.     Assessment  1.  Non ischemic cardiomyopathy    Coronary catheterization (2018) revealed no significant coronary disease.  Distal and proximal RCA had 10% stenosis.      MRI (7/2018) revealed LVEF 40%, RVEF is 57%, Both atria are mildly increased in size, mild mitral insufficiency, moderate global hypokinesis and dyssynchronous septal motion    On guideline directed medical therapy of lisinopril 20 mg daily, metoprolol XL 25 mg daily, and spironolactone 25 mg daily.  His blood pressure does not allow for more medication.      ECG on 10/1/19 revealed sinus rhythm with LBBB and  QRS of 174 ms.      Pt being followed by Dr. Hernandes who has recommended a PET scan for possible cardiac sarcoidosis.      2. Sustained VT    Has had 9 runs of NSVT.  Longest 19 beat run of NSVT at 198 bpm     On 10/2/19 had sustained monomorphic VT at 180-200 bpm, 1 shock at 200 J, converted to NSR    Currently taking metoprolol XL     Plan for MRI    On Amiodarone IV, if no VT change to oral per general cardiology    3. Amiodarone therapy    TSH 3.5 (10/3/2019)    ALT 56 (10/2/2019)    AST 65 (10/2/2019)      Plan  1. Once VT controlled medically and MRI completed will plan on CRT-D          Sandie Wisdom, APRN CNP  [facility]  Text Page  (M-F 7:30 am - 4:00 pm)        Physical Exam   Temp: 98.7  F (37.1  C) Temp src: Oral BP: 101/65 Pulse: 68 Heart Rate: 65 Resp: 16 SpO2: 98 % O2 Device: None (Room air)    Vitals:    10/01/19 1219 10/02/19 0546   Weight: 125.1 kg (275 lb 12.8 oz) 120.6 kg (265 lb 14.4 oz)       GEN:  In general, this is a well nourished male in no acute distress.    HEENT:  Pupils normal size, equal, and nround. Sclerae nonicteric. Clear oropharynx. Mucous membranes moist,  no cyanosis.  NECK: Supple, no asymmetry, masses, or scars appreciated. Trachea midline.  C/V:  Regular rate and rhythm, no murmur, rub or gallop. No S3 or RV heave.   RESP: Respirations are unlabored. No use of accessory muscles. Clear to auscultation bilaterally without wheezing, rales, or rhonchi.  GI: Abdomen soft, nontender, nondistended. No hepatosplenomegaly appreciated. No masses palpable, bowel sounds normal.  EXTREM: no LE edema. No cyanosis or clubbing.  MS: Large joints without obvious swelling or erythema.   VASC: Radial and dorsalis pedis are normal in volumes and symmetric bilaterally.   NEURO: Alert and oriented, cooperative.  No obvious focal deficits.   PSYCH: Normal affect.  SKIN: Warm and dry. No rashes or petechiae appreciated.  Medications     - MEDICATION INSTRUCTIONS -         aspirin  81 mg Oral Daily      atorvastatin  40 mg Oral QPM     fenofibrate  160 mg Oral Daily     insulin aspart  1-7 Units Subcutaneous TID AC     insulin aspart  1-5 Units Subcutaneous At Bedtime     lisinopril  20 mg Oral Daily     metoprolol succinate ER  25 mg Oral QPM     sodium chloride (PF)  3 mL Intracatheter Q8H     spironolactone  25 mg Oral Daily       Data   Most Recent 3 CBC's:  Recent Labs   Lab Test 10/01/19  1209 06/20/18  1212 06/11/18  0420 06/10/18  2050   WBC 5.4  --  8.8 11.0   HGB 11.7* 13.4 12.1* 14.0   MCV 92  --  92 95     --  183 218     Most Recent 3 BMP's:  Recent Labs   Lab Test 10/02/19  0143 10/01/19  1209 08/26/19  0832 04/10/19  0728   NA  --  138 138 139   POTASSIUM  --  4.7 4.7 4.4   CHLORIDE  --  112* 109 108   CO2  --  21 24 24   BUN  --  23 21 21   CR  --  0.95 0.94 0.91   ANIONGAP  --  5 5 7   EDITH  --  9.0 9.1 9.1   * 178* 247* 249*     Most Recent 3 BNP's:  Recent Labs   Lab Test 10/01/19  1209 06/10/18  2050   NTBNPI 957* 478     Most Recent D-dimer:  Recent Labs   Lab Test 10/01/19  1209   DD 0.3     Most Recent TSH and T4:  Recent Labs   Lab Test 10/03/18  0849  01/11/16  0727   TSH 3.50   < > 4.04*   T4  --   --  1.13    < > = values in this interval not displayed.

## 2019-10-02 NOTE — PLAN OF CARE
Pt is a new admit today. A&OX4, VSS on RA. BP soft. Orthostatic BP negative. Denies chest pain, SOB, dizziness and nausea. Up independently. Tele has been SR with 1st degree AVB and BBB. Few runs of V-tach this shift. Cardiology aware. Mg+2 low at 1.2. Being replaced now.Needs to be rechecked 2 hrs after completion. Night RN to follow up. On mod carb diet. Most recent BG of 111.  Hep gtt at 11mL/hr. Next recheck in the AM. Trops trending up with most recent level at 0.075.NPO after midnight. Echo and Cards consult pending. Continue to monitor.

## 2019-10-02 NOTE — CODE/RAPID RESPONSE
[facility]    Code Blue Note  10/2/2019   Time Called: 1420      Assessment & Plan     At 1420 I was called to a code blue for unstable monomorphic ventricular tachycardia rates 180-200 with a pulse. Patient was very drowsy with low O2 saturations at time of event however he continued to respond to questions. Patient was very diaphoretic throughout event with hypotension. Synchronized cardioversion at 200J performed with successful conversion to sinus rhythm with a first degree AVB, plan to proceed to cath lab for left heart cath. Patient had improved respiratory effort after cardioversion however he continued to appear shocky. Blood glucose 255. I had planned to electively intubate this patient prior to transfer to cath lab for left heart cath but Neymar was adamant that he did not want to be intubated. He was able to maintain his O2 saturations on 2L O2 per NC >95% so plan to hold off on intubation at this time. Neymar was agreeable that if he were to decompensate from a respiratory standpoint that he would want to be intubated. Patient transported to cath lab.    INTERVENTIONS:  -Synchronized cardioversion 200J  -Amiodarone infusion protocol  -Magnesium 2gm IV once  -CBC, CMP, Mg, Phos, Ionized Calcium, Lactic Acid, ABG  -Glipizide 10mg PO discontinued  -Liraguitide 1.8mg subcutaneously discontinue    At the end of the code patient transported to cath lab and ICU.    Discussed with and defer further cares to Hospitalist and Cardiology.    Interval History     Neymar Rhodes is a 59 year old male who was admitted on 10/1/2019 for near syncope and exertional shortness of breath.    Medical history significant for:   Past Medical History:   Diagnosis Date     Ascending aorta dilatation (H)      Diverticulosis of colon (without mention of hemorrhage)      Essential hypertension, benign      Gout, unspecified      LBBB (left bundle branch block)      Morbid obesity (H)      Non-ischemic  cardiomyopathy (H)      Nonrheumatic mitral valve regurgitation      NSTEMI (non-ST elevated myocardial infarction) (H)     cardiac cath 6/2018: mild non-obstructive CAD     Other and unspecified hyperlipidemia      Type II or unspecified type diabetes mellitus without mention of complication, not stated as uncontrolled      Past Surgical History:   Procedure Laterality Date     C APPENDECTOMY  1980's     CV RIGHT HEART CATH N/A 10/2/2019    Procedure: Right Heart Cath;  Surgeon: Kathy Almaguer MD;  Location:  HEART CARDIAC CATH LAB     HEART CATH CORONARY ANGIOGRAM WITHOUT PRESSURES  06/10/2018    normal coronary angiogram, nothing more than 10%     SURGICAL HISTORY OF -   2001    repair of colovesicular fistula       Code Status: Full Code    Allergies   Allergies   Allergen Reactions     No Known Drug Allergies        Physical Exam   Vital Signs with Ranges:  Temp:  [98.4  F (36.9  C)-98.7  F (37.1  C)] 98.7  F (37.1  C)  Pulse:  [59-73] 73  Heart Rate:  [67] 67  Resp:  [13-19] (P) 18  BP: ()/(63-87) 113/72  SpO2:  [95 %-99 %] 97 %  No intake/output data recorded.    Physical Exam  Constitutional:       Appearance: He is ill-appearing, toxic-appearing and diaphoretic.   HENT:      Head: Normocephalic and atraumatic.      Mouth/Throat:      Mouth: Mucous membranes are moist.   Eyes:      Extraocular Movements: Extraocular movements intact.   Cardiovascular:      Rate and Rhythm: Normal rate and regular rhythm.      Heart sounds: Normal heart sounds. No murmur.      Comments: After cardioversion    Pulmonary:      Effort: Pulmonary effort is normal.      Breath sounds: Normal breath sounds.   Abdominal:      Palpations: Abdomen is soft.   Musculoskeletal: Normal range of motion.   Skin:     Comments: Right femoral groin site, intact without sign of hematoma   Neurological:      Mental Status: He is oriented to person, place, and time.   Psychiatric:         Thought Content: Thought content normal.        Data     EKG:  Interpreted by ARMAND Connell CNP    Telemetry:  Monomorphic VT to SR with 1 degree AVB      Troponin:    Recent Labs   Lab Test 10/01/19  2121  06/10/18  2103   TROPI 0.071*   < >  --    TROPONIN  --   --  0.07    < > = values in this interval not displayed.       CBC with Diff:  Recent Labs   Lab Test 10/02/19  1506   WBC 5.9   HGB 11.6*   MCV 93           Lactic Acid:    Lab Results   Component Value Date    LACT 2.9 10/02/2019           Comprehensive Metabolic Panel:  Recent Labs   Lab 10/02/19  1506      POTASSIUM 4.1   CHLORIDE 106   CO2 22   ANIONGAP 8   *   BUN 23   CR 1.06   GFRESTIMATED 76   GFRESTBLACK 88   EDITH 8.6   MAG 2.4*   PHOS 4.0   PROTTOTAL 6.4*   ALBUMIN 3.4   BILITOTAL 0.6   ALKPHOS 36*   AST 65*   ALT 56           Time Spent on this Encounter   I spent 60 minutes of critical care time on the unit/floor managing the care of Neymar Rhodes. Upon evaluation, this patient had a high probability of imminent or life-threatening deterioration due to VT, which required my direct attention, intervention, and personal management. 100% of my time was spent at the bedside counseling the patient and/or coordinating care regarding services listed in this note.    ARMAND Connell CNP

## 2019-10-02 NOTE — PROGRESS NOTES
C data reviewed. Will start lasix 40 mg daily. Increase lisinopril to 20 mg daily. Mildly elevated wedge and RA pressure in the NPO state. MRI and EP consultation for CRT-D consultation. If MRI shows DGE, may need [facility] sarcoid consultation with possible PET scan. We will follow along tomorrow.

## 2019-10-02 NOTE — PROVIDER NOTIFICATION
Pt having runs of V tach. Asymptomatic and stable. SAMMY Brambila from Cardiology notified. Scheduled metoprolol given early and will continue to monitor.

## 2019-10-02 NOTE — PROGRESS NOTES
Around 2:15 PM this afternoon patient went into fast sustained monomorphic ventricular tachycardia.  He did not lose a pulse and CPR was not done but code was called.  I was emergently called by my nurse practitioner at the bedside.  Code team was already there and anesthesia was there at that time.  The patient was conversive but in fast VT that was monomorphic at 180 to 200 bpm.  After little bit of sedation anesthesia tried to bag the patient but could not get the sats up.  Patient started becoming confused and almost lost consciousness and at that point 200 J of synchronized shock was given.      The VT spontaneously terminated into normal sinus rhythm with first-degree AV block and left bundle branch block after a temporary pause and a little bit of heart block immediately after shock.  Patient did not have to be intubated and he was quite conversive and with it immediately after cardioversion.  He denies chest discomfort except for the shocking part.  I discussed with him and with interventional cardiologist.  The odds of this being coronary disease related is quite low given that he had normal coronaries last year.  However we will do an emergent coronary angiography to rule out obstructive CAD.  My guess is that this is nonischemic cardiomyopathy related VT from potentially sarcoid.      At this point I do need EP consultation.  We will keep him in the ICU overnight despite the fact that is not intubated and he is on a few liters of nasal oxygen.  We will leave him on IV amiodarone overnight.  We will plan on cardiac MRI tomorrow or day after. And perhaps even a chest CT to look for lymphadenopathy and other etiologies for possible sarcoid.  He may need pulmonary consultation as well but will see how things shape.  Irrespectively he will need a defibrillator this admission.      Critical care time in direct patient care and coordinating care and to the Cath Lab.  Family has been updated.    Risks and  benefits of the procedure were discussed with the patient in detail that includes but not limited to the risk of stroke, heart attack, death, cardiac or vascular perforation, emergent stenting or bypass, contrast induced allergic reaction, renal dysfunction (including risks of temporary or permanent dialysis), and pulmonary, sedation, and vascular complications (including bleeding and transfusion). Patient denies any major active bleeding issues and is willing to take and comply with dual antiplatelet therapy and understands associated bleeding risks. Patient understands the overall risks of the procedure and wishes to proceed.

## 2019-10-02 NOTE — PROGRESS NOTES
"[facility]  Hospitalist Progress Note        Jose Mcnamara MD   10/02/2019        Interval History:      - had brief run of V tach yesterday which spontaneously subsided; had right heart cath today; denies any chest pain or shortness of breath; no other acute issues overnight    - Code/RRT called around 1400 for sustained monomorphic Vtach          Assessment and Plan:        Mr. Neymar Rhodes is a 59-year-old male with morbid obesity, diabetes, hypertension, dyslipidemia, nonischemic cardiomyopathy, chronic left bundle branch block, who presented to the ER with atypical symptoms of near-syncope, exertional shortness of breath.     # Near-syncope, intermittent exertional shortness of breath.     # Elevated troponin.   # Likely LBBB related nonischemic cardiomyopathy, decline in EF to 20 %  - he has h/o NICM in 06/2018, cardiac cath at that time showed nonobstructive coronary artery disease (CAD), but subsequent cardiac MRI was noted with ejection fraction (EF) of 40% and moderate global hypokinesia  - elevated troponin on admission but subsequent trops flat with no significant trend; discontinued heparin drip  - ECHO noted with severely dilated left ventricle and decline in EF to 20 %  - cardiology following; had RHC 10/2/19, noted \"normal filling pressures along with PA pressure.  Upper normal pulmonary capillary wedge pressure\"-- started on Lasix 40 mg po daily (not on diuretics PTA), increased Lisinopril to 20 mg po bid  - d/w Dr Hernandes, likely LBBB related non-ischemic cardiomyopathy; will probably need CRT ; EP consulted  - planned for cardiac MRI 10/2/19   - noted brief run of NSVT 10/1, likely related to above; supplementing Mg; continue telemetry  - continue ASA, Lipitor and fibrate.     # Ventricular tachycardia  - around 1400 on 10/2/19 he was noted with sustained monomorphic Vtach, did not loose a pulse; RRT/Code was called, did not require CPR (see code note)  - Dr Hernandes was " present during the code, amiodarone bolus given and started on drip  - taken emergently to cath lab again    #  Hypomagnesemia  - corrected with supple protocol; Mg 1.2---2.1  - monitor BMP with start of diuresis    # Diabetes mellitus.    - Recent A1c was 8.8  - refusing sliding scale insulin; will resume PTA glipizide and Victoza  - continue to hold PTA metformin    # Hypertension.  Continue lisinopril, Toprol-XL and Aldactone.   # DVT prophylaxis: will start PCDs     CODE STATUS:  Full code.     Disposition: likely in 1-2 days pending cardiology clearance                     Physical Exam:      Blood pressure 103/66, pulse 63, temperature 98.7  F (37.1  C), temperature source Oral, resp. rate 13, weight 120.6 kg (265 lb 14.4 oz), SpO2 97 %.  Vitals:    10/01/19 1219 10/02/19 0546   Weight: 125.1 kg (275 lb 12.8 oz) 120.6 kg (265 lb 14.4 oz)     Vital Signs with Ranges  Temp:  [98.4  F (36.9  C)-98.7  F (37.1  C)] 98.7  F (37.1  C)  Pulse:  [62-72] 63  Heart Rate:  [60-67] 67  Resp:  [0-25] 13  BP: ()/(63-87) 103/66  SpO2:  [96 %-99 %] 97 %  I/O's Last 24 hours  No intake/output data recorded.    Constitutional: Alert, awake and oriented X 3; lying comfortably in bed in no apparent distress   HEENT: Pupils equal and reactive to light and accomodation, EOMI intact; neck supple no raised JVD or rigidity    Oral cavity: Moist mucosa   Cardiovascular: Normal s1 s2, regular rate and rhythm, no murmur   Lungs: B/l clear to auscultation, no wheezes or crepitations   Abdomen: Soft, nt, nd, no guarding, rigidity or rebound; BS +   LE : No edema   Musculoskeletal: Power 5/5 in all extremities   Neuro: No focal neurological deficits noted, CN II to XII grossly intact   Psychiatry: normal mood and affect                Medications:          aspirin  81 mg Oral Daily     atorvastatin  40 mg Oral QPM     fenofibrate  160 mg Oral Daily     furosemide  40 mg Oral Daily     insulin aspart  1-7 Units Subcutaneous TID AC      insulin aspart  1-5 Units Subcutaneous At Bedtime     lisinopril  20 mg Oral BID     metoprolol succinate ER  25 mg Oral QPM     sodium chloride (PF)  3 mL Intracatheter Q8H     spironolactone  25 mg Oral Daily     PRN Meds: acetaminophen, albuterol, atropine, bisacodyl, glucose **OR** dextrose **OR** glucagon, fentaNYL, flumazenil, HOLD MEDICATION, lidocaine 4%, lidocaine (buffered or not buffered), magnesium hydroxide, magnesium sulfate, melatonin, midazolam, naloxone, ondansetron **OR** ondansetron, - MEDICATION INSTRUCTIONS -, prochlorperazine **OR** prochlorperazine **OR** prochlorperazine, senna-docusate **OR** senna-docusate, sodium chloride (PF)         Data:      All new lab and imaging data was reviewed.   Recent Labs   Lab Test 10/01/19  1209 06/20/18  1212 06/11/18  0420 06/10/18  2050   WBC 5.4  --  8.8 11.0   HGB 11.7* 13.4 12.1* 14.0   MCV 92  --  92 95     --  183 218      Recent Labs   Lab Test 10/02/19  0540 10/02/19  0143 10/01/19  1209 08/26/19  0832 04/10/19  0728   NA  --   --  138 138 139   POTASSIUM  --   --  4.7 4.7 4.4   CHLORIDE  --   --  112* 109 108   CO2  --   --  21 24 24   BUN  --   --  23 21 21   CR 0.81  --  0.95 0.94 0.91   ANIONGAP  --   --  5 5 7   EDITH  --   --  9.0 9.1 9.1   GLC  --  150* 178* 247* 249*     Recent Labs   Lab Test 10/01/19  2121 10/01/19  1745 10/01/19  1209  06/10/18  2103   TROPI 0.071* 0.075* 0.069*   < >  --    TROPONIN  --   --   --   --  0.07    < > = values in this interval not displayed.

## 2019-10-03 ENCOUNTER — APPOINTMENT (OUTPATIENT)
Dept: CARDIOLOGY | Facility: CLINIC | Age: 59
DRG: 225 | End: 2019-10-03
Attending: INTERNAL MEDICINE
Payer: COMMERCIAL

## 2019-10-03 LAB
ANION GAP SERPL CALCULATED.3IONS-SCNC: 2 MMOL/L (ref 3–14)
BUN SERPL-MCNC: 20 MG/DL (ref 7–30)
CALCIUM SERPL-MCNC: 8.6 MG/DL (ref 8.5–10.1)
CHLORIDE SERPL-SCNC: 111 MMOL/L (ref 94–109)
CO2 SERPL-SCNC: 26 MMOL/L (ref 20–32)
CREAT SERPL-MCNC: 0.9 MG/DL (ref 0.66–1.25)
GFR SERPL CREATININE-BSD FRML MDRD: >90 ML/MIN/{1.73_M2}
GLUCOSE BLDC GLUCOMTR-MCNC: 192 MG/DL (ref 70–99)
GLUCOSE BLDC GLUCOMTR-MCNC: 214 MG/DL (ref 70–99)
GLUCOSE SERPL-MCNC: 214 MG/DL (ref 70–99)
POTASSIUM SERPL-SCNC: 4.4 MMOL/L (ref 3.4–5.3)
SODIUM SERPL-SCNC: 139 MMOL/L (ref 133–144)

## 2019-10-03 PROCEDURE — 25800030 ZZH RX IP 258 OP 636: Performed by: NURSE PRACTITIONER

## 2019-10-03 PROCEDURE — 25500064 ZZH RX 255 OP 636: Performed by: HOSPITALIST

## 2019-10-03 PROCEDURE — A9585 GADOBUTROL INJECTION: HCPCS | Performed by: HOSPITALIST

## 2019-10-03 PROCEDURE — 75561 CARDIAC MRI FOR MORPH W/DYE: CPT

## 2019-10-03 PROCEDURE — 25000132 ZZH RX MED GY IP 250 OP 250 PS 637: Performed by: INTERNAL MEDICINE

## 2019-10-03 PROCEDURE — 00000146 ZZHCL STATISTIC GLUCOSE BY METER IP

## 2019-10-03 PROCEDURE — 21000001 ZZH R&B HEART CARE

## 2019-10-03 PROCEDURE — 99233 SBSQ HOSP IP/OBS HIGH 50: CPT | Performed by: INTERNAL MEDICINE

## 2019-10-03 PROCEDURE — 80048 BASIC METABOLIC PNL TOTAL CA: CPT | Performed by: HOSPITALIST

## 2019-10-03 PROCEDURE — 99233 SBSQ HOSP IP/OBS HIGH 50: CPT | Mod: 25 | Performed by: INTERNAL MEDICINE

## 2019-10-03 PROCEDURE — 75561 CARDIAC MRI FOR MORPH W/DYE: CPT | Mod: 26 | Performed by: INTERNAL MEDICINE

## 2019-10-03 PROCEDURE — 25000132 ZZH RX MED GY IP 250 OP 250 PS 637: Performed by: HOSPITALIST

## 2019-10-03 PROCEDURE — 25000128 H RX IP 250 OP 636: Performed by: NURSE PRACTITIONER

## 2019-10-03 PROCEDURE — 36415 COLL VENOUS BLD VENIPUNCTURE: CPT | Performed by: HOSPITALIST

## 2019-10-03 RX ORDER — GADOBUTROL 604.72 MG/ML
5-65 INJECTION INTRAVENOUS ONCE
Status: COMPLETED | OUTPATIENT
Start: 2019-10-03 | End: 2019-10-03

## 2019-10-03 RX ORDER — AMIODARONE HYDROCHLORIDE 200 MG/1
400 TABLET ORAL 2 TIMES DAILY
Status: DISCONTINUED | OUTPATIENT
Start: 2019-10-03 | End: 2019-10-05 | Stop reason: HOSPADM

## 2019-10-03 RX ADMIN — ATORVASTATIN CALCIUM 40 MG: 40 TABLET, FILM COATED ORAL at 21:56

## 2019-10-03 RX ADMIN — FENOFIBRATE 160 MG: 160 TABLET ORAL at 09:40

## 2019-10-03 RX ADMIN — GADOBUTROL 16 ML: 604.72 INJECTION INTRAVENOUS at 13:59

## 2019-10-03 RX ADMIN — AMIODARONE HYDROCHLORIDE 0.5 MG/MIN: 50 INJECTION, SOLUTION INTRAVENOUS at 04:44

## 2019-10-03 RX ADMIN — AMIODARONE HYDROCHLORIDE 400 MG: 200 TABLET ORAL at 21:56

## 2019-10-03 RX ADMIN — FUROSEMIDE 40 MG: 40 TABLET ORAL at 09:40

## 2019-10-03 RX ADMIN — LISINOPRIL 20 MG: 20 TABLET ORAL at 09:40

## 2019-10-03 RX ADMIN — ACETAMINOPHEN 650 MG: 160 SUSPENSION ORAL at 17:09

## 2019-10-03 RX ADMIN — ASPIRIN 81 MG: 81 TABLET, COATED ORAL at 09:40

## 2019-10-03 RX ADMIN — AMIODARONE HYDROCHLORIDE 400 MG: 200 TABLET ORAL at 09:40

## 2019-10-03 RX ADMIN — SPIRONOLACTONE 25 MG: 25 TABLET ORAL at 09:40

## 2019-10-03 ASSESSMENT — ACTIVITIES OF DAILY LIVING (ADL)
ADLS_ACUITY_SCORE: 11

## 2019-10-03 NOTE — PROGRESS NOTES
Pt received to ICU by cart accompanied by cath lab staff.  Pt A&Ox3, neuro intact.  Pt denies pain, palpitations or SOB.  VSS, on 5L NC.  R groin site dressing CDI, site wnl, cms to RLE wnl.  Rhythm sinus w/1st degree av block, BBB, and prolonged QT.  Dr. Abreu notified of pt transfer to ICU.  Pt to remain on bed rest until 1730.

## 2019-10-03 NOTE — PLAN OF CARE
Pt VSS, sinus rhythm 1st degree AVB, BBB rate 65-90.  Pt denies pain.  Pt tolerated regular diet, plan for NPO after MN for AICD placement tomorrow.  Pt up to BSC w/standby x3, asymptomatic.

## 2019-10-03 NOTE — PROGRESS NOTES
"[facility]  Hospitalist Progress Note  Denis Desai MD  10/03/2019    Assessment & Plan   Mr. Neymar Rhodes is a 59-year-old male with morbid obesity, diabetes, hypertension, dyslipidemia, nonischemic cardiomyopathy, chronic left bundle branch block, who presented to the ER with atypical symptoms of near-syncope, exertional shortness of breath.     1.  Non-ischemic CMO     -Elevated troponin.   -chronic LBBB related nonischemic cardiomyopathy with EF to 20 %  - he has h/o NICM in 06/2018, cardiac cath at that time showed nonobstructive coronary artery disease (CAD), but subsequent cardiac MRI was noted with ejection fraction (EF) of 40% and moderate global hypokinesia  - elevated troponin on admission but subsequent trops flat with no significant trend;   - ECHO noted with severely dilated left ventricle and decline in EF to 20 %  - cardiology following; had RHC 10/2/19, noted \"normal filling pressures along with PA pressure.  Upper normal pulmonary capillary wedge pressure\"-- started on Lasix 40 mg po daily (not on diuretics PTA), increased Lisinopril to 20 mg po bid  - d/w Dr Hernandes, likely LBBB related non-ischemic cardiomyopathy; will probably need CRT ; EP consulted  - noted brief run of NSVT 10/1, likely related to above; supplementing Mg; continue telemetry  - continue ASA, Lipitor and fibrate.   - after run of monomorphic VT that required shock, underwent LHC again which shows no significant coronary stenosis.  - cardiac MRI today, to rule out infiltrative disease, may get cardiac biopsy as well  - continue daily lasix, ace, bb     2. Ventricular tachycardia  - around 1400 on 10/2/19 he was noted with sustained monomorphic Vtach, did not loose a pulse; RRT/Code was called, did not require CPR (see code note)  - transitioned to oral amiodarone  - EP consulted  - taken emergently to cath lab again with minimal non-obstructive CAD  - plan for CRT- D on 10/4     3.  Hypomagnesemia  - " corrected with supple protocol; Mg 1.2---2.1  - monitor BMP with diuresis     4. Diabetes mellitus.    - Recent A1c was 8.8  - refusing sliding scale insulin; will resume PTA glipizide and Victoza once studies completed  - continue to hold PTA metformin  - continue SSI     5. Hypertension.   - Continue lisinopril, Toprol-XL and Aldactone.      6. DVT prophylaxis:  - continue PCDs     CODE STATUS:  Full code.      Disposition:   -- anticipate in 2 days      Interval History   -- chart reviewed  -- discussed with RN  -- no acute overnight events  -- denies any chest pain or sob or nausea    -Data reviewed today: I reviewed all new labs and imaging over the last 24 hours. I personally reviewed no images or EKG's today.    Physical Exam   Heart Rate: 65, Blood pressure 118/67, pulse 57, temperature 98  F (36.7  C), temperature source Oral, resp. rate 18, weight 120.8 kg (266 lb 5.1 oz), SpO2 99 %.  Vitals:    10/02/19 0546 10/02/19 1600 10/03/19 0445   Weight: 120.6 kg (265 lb 14.4 oz) 121.9 kg (268 lb 11.9 oz) 120.8 kg (266 lb 5.1 oz)     Vital Signs with Ranges  Temp:  [97.7  F (36.5  C)-98.9  F (37.2  C)] 98  F (36.7  C)  Pulse:  [56-79] 57  Heart Rate:  [57-78] 65  Resp:  [9-38] 18  BP: ()/(49-79) 118/67  SpO2:  [95 %-100 %] 99 %  I/O's Last 24 hours  I/O last 3 completed shifts:  In: 1201.5 [I.V.:1201.5]  Out: 1100 [Urine:1100]    Constitutional: Awake, alert, cooperative, no apparent distress  Respiratory: Clear to auscultation bilaterally, no crackles or wheezing  Cardiovascular: Regular rate and rhythm, normal S1 and S2, and no murmur noted  GI: Normal bowel sounds, soft, non-distended, non-tender  Skin/Integumen: No rashes, no cyanosis, no edema  Other:      Medications   All medications were reviewed.    - MEDICATION INSTRUCTIONS -       - MEDICATION INSTRUCTIONS -         amiodarone  400 mg Oral BID     aspirin  81 mg Oral Daily     atorvastatin  40 mg Oral QPM     fenofibrate  160 mg Oral Daily      furosemide  40 mg Oral Daily     insulin aspart  1-7 Units Subcutaneous TID AC     insulin aspart  1-5 Units Subcutaneous At Bedtime     lisinopril  20 mg Oral Daily     metoprolol succinate ER  12.5 mg Oral Daily     sodium chloride (PF)  10 mL Intravenous Once     sodium chloride (PF)  3 mL Intracatheter Q8H     spironolactone  25 mg Oral Daily        Data   Recent Labs   Lab 10/03/19  0556 10/02/19  1506 10/02/19  0540 10/02/19  0143 10/01/19  2121 10/01/19  1745 10/01/19  1209   WBC  --  5.9  --   --   --   --  5.4   HGB  --  11.6*  --   --   --   --  11.7*   MCV  --  93  --   --   --   --  92   PLT  --  194  --   --   --   --  196    136  --   --   --   --  138   POTASSIUM 4.4 4.1  --   --   --   --  4.7   CHLORIDE 111* 106  --   --   --   --  112*   CO2 26 22  --   --   --   --  21   BUN 20 23  --   --   --   --  23   CR 0.90 1.06 0.81  --   --   --  0.95   ANIONGAP 2* 8  --   --   --   --  5   EDITH 8.6 8.6  --   --   --   --  9.0   * 260*  --  150*  --   --  178*   ALBUMIN  --  3.4  --   --   --   --  3.9   PROTTOTAL  --  6.4*  --   --   --   --  6.8   BILITOTAL  --  0.6  --   --   --   --  0.4   ALKPHOS  --  36*  --   --   --   --  52   ALT  --  56  --   --   --   --  43   AST  --  65*  --   --   --   --  25   TROPI  --   --   --   --  0.071* 0.075* 0.069*       No results found for this or any previous visit (from the past 24 hour(s)).    Denis Desai MD  Text Page  (7am to 6pm)

## 2019-10-03 NOTE — PLAN OF CARE
Remains SR w/ 1st Degree AVB and LBBB.  No ectopy.  BP soft at times, but maintains a stable MAP.  C/o headache x1 with relief after receiving Tylenol.  Up independently.  Spouse, an RN, in the room with patient and appears supportive.  Cardiac MRI today.  Possible ICD tomorrow-NPO after midnight.

## 2019-10-03 NOTE — CONSULTS
[facility] evaluation referral received from Greta.     Patient is currently established in the [facility]. He was last seen 6/2018 by Moses Perez.    [facility] appointment made for:  10/9/2019  9:10 AM RODRIGUEZ LAB SULAB [facility]   10/9/2019 10:10 AM Nicole Hayes PA-C SUUMHT [facility]     We look forward to seeing him in the clinic.   Please call with questions.     Ting Castle RN  [facility] RN Care Coordinator  [facility]  [phone]    [facility]: Cardiomyopathy, Optimization, Rehabilitation, Education   The [facility] is a heart failure specialty clinic within the [facility] where you will work with your cardiologist, nurse practitioners, physician assistants and registered nurses who specialize in heart failure care. They are dedicated to helping patients with heart failure to carefully adjust medications, receive education, and learn who and when to call if symptoms develop. They specialize in helping you better understand your condition, slow the progression of your disease, improve the length and quality of your life, help you detect future heart problems before they become life threatening, and avoid hospitalizations.

## 2019-10-03 NOTE — PROGRESS NOTES
Assessment and Plan:     This a very pleasant 59-year-old gentleman who was first seen in 2018 with new diagnosis of heart failure with reduced ejection fraction when his EF was noted to be around 40% by Dr. Iniguez.  Coronary angiography at that time had shown nonobstructive coronary artery disease.  MRI had confirmed an EF of 40% in the setting of normal sinus rhythm with a large left bundle branch block.  He was started on guideline directed therapy with beta-blockers ACE inhibitors and mineralocorticoid receptor antagonist.  He had an MRI subsequently which showed that he had scar concerning for sarcoidosis.  He never followed up.  While on guideline directed medical therapy for heart failure a year later he started having more shortness of breath because of which he was admitted to the hospital on 10/1/2019.  He had one episode of lightheadedness but mainly his symptoms or shortness of breath.      During this admission he initially underwent a right heart catheterization which showed mildly elevated filling pressures and a low normal cardiac output.  Initial plans were to not do a coronary angiogram because he had one done last year which was negative.  Unfortunately on 10/2/2019 at rest the patient went into fast sustained monomorphic  bpm.  This was cardioverted under mild to moderate sedation emergently into normal sinus rhythm with first shock at 200 J. No CPR or intubation was needed.  Emergent coronary angiography was unchanged from last year.    1. Severe nonischemic cardia myopathy with EF of 15% with marked LV dilatation  2. Sustained monomorphic ventricular tachycardia  3. Potential cardiac sarcoidosis  4. Large LBBB likely contributing to if not the reason for cardiomyopathy  5. Nonobstructive coronary artery disease in 2018 and in 2019 this admission  6. Mildly/ borderline elevated biventricular filling pressures with normal cardiac output on right heart catheterization  10/2/2019  7. Obesity    Plan  1. EP has seen him today and plans for a CRT-D implantation this admission.  2. Patient is getting a cardiac MRI later this afternoon.  With contrast without stress.  3. Depending on the cardiac MRI findings we may consider doing outpatient PET scan at the [facility] ER or potentially a cardiac biopsy tomorrow morning prior to CRT-D implant.  We will make those decisions later.  4. Change IV amiodarone to oral amiodarone per my discussion with cardiac EP  5. Continue other heart failure therapy.  6. Patient can come out of the ICU into stepdown    Umm Hernandes MD        Interval History:   No overnight issues.  Patient denies shortness of breath or chest pain.  No further ventricular tachycardia on intravenous amiodarone infusion.          Medications:       aspirin  81 mg Oral Daily     atorvastatin  40 mg Oral QPM     fenofibrate  160 mg Oral Daily     furosemide  40 mg Oral Daily     insulin aspart  1-7 Units Subcutaneous TID AC     insulin aspart  1-5 Units Subcutaneous At Bedtime     lisinopril  20 mg Oral Daily     sodium chloride (PF)  10 mL Intravenous Once     sodium chloride (PF)  3 mL Intracatheter Q8H     spironolactone  25 mg Oral Daily            Physical Exam:     Patient Vitals for the past 24 hrs:   BP Temp Temp src Pulse Heart Rate Resp SpO2 Weight   10/03/19 0800 104/65 98  F (36.7  C) Oral 57 64 17 98 % --   10/03/19 0700 110/79 -- -- 67 66 15 99 % --   10/03/19 0600 103/62 -- -- 56 62 17 98 % --   10/03/19 0500 97/70 -- -- 59 61 17 100 % --   10/03/19 0445 -- -- -- -- -- -- -- 120.8 kg (266 lb 5.1 oz)   10/03/19 0400 -- 97.7  F (36.5  C) Oral 58 57 13 97 % --   10/03/19 0300 103/66 -- -- -- 70 18 99 % --   10/03/19 0200 99/63 -- -- 57 57 14 99 % --   10/03/19 0100 107/66 -- -- 59 58 9 100 % --   10/03/19 0000 101/66 98.5  F (36.9  C) Oral 57 57 16 98 % --   10/02/19 2300 96/61 -- -- 59 63 16 99 % --   10/02/19 2200 99/59 -- -- 64 66 13 99 % --   10/02/19  2100 97/57 98.6  F (37  C) Oral 62 64 29 99 % --   10/02/19 2000 94/57 -- -- 62 64 22 98 % --   10/02/19 1930 -- -- -- -- -- -- 99 % --   10/02/19 1900 90/76 -- -- 68 71 16 99 % --   10/02/19 1830 94/64 -- -- -- 65 -- 99 % --   10/02/19 1800 95/61 -- -- -- 65 16 -- --   10/02/19 1730 95/59 -- -- -- 63 15 98 % --   10/02/19 1700 90/56 -- -- -- 66 12 98 % --   10/02/19 1645 91/52 -- -- -- 68 -- 98 % --   10/02/19 1630 (!) 88/49 -- -- -- 75 -- 95 % --   10/02/19 1615 (!) 86/53 -- -- -- 78 -- 96 % --   10/02/19 1600 (!) 75/55 98.9  F (37.2  C) -- 79 78 12 -- 121.9 kg (268 lb 11.9 oz)   10/02/19 1545 (!) 89/55 -- -- -- 70 -- 97 % --   10/02/19 1400 107/60 -- -- 73 -- -- -- --   10/02/19 1330 113/72 -- -- 73 -- -- -- --   10/02/19 1300 100/69 -- -- 60 -- -- -- --   10/02/19 1230 103/66 -- -- 63 -- 18 97 % --   10/02/19 1200 103/64 -- -- 59 -- 18 95 % --   10/02/19 1145 102/67 -- -- 62 -- 18 97 % --   10/02/19 1130 103/66 -- -- 63 -- 13 98 % --   10/02/19 1120 96/63 -- -- -- 67 14 96 % --     Vitals:    10/01/19 1219 10/02/19 0546 10/02/19 1600 10/03/19 0445   Weight: 125.1 kg (275 lb 12.8 oz) 120.6 kg (265 lb 14.4 oz) 121.9 kg (268 lb 11.9 oz) 120.8 kg (266 lb 5.1 oz)         Intake/Output Summary (Last 24 hours) at 10/3/2019 0902  Last data filed at 10/3/2019 0800  Gross per 24 hour   Intake 1411.5 ml   Output 1500 ml   Net -88.5 ml       09/28 0700 - 10/03 0659  In: 1201.5 [I.V.:1201.5]  Out: 1100 [Urine:1100]  Net: 101.5    Exam:  GENERAL APPEARANCE ADULT: Alert, in no acute distress  RESP: lungs clear to auscultation   CV: regular rate and rhythm, no murmur, rub, or gallop.  Soft systolic murmur is noted.  ABDOMEN: no guarding or rebound  EXTREMITIES: No edema         Data:   LABS (Last four results)  CMP  Recent Labs   Lab 10/03/19  0556 10/02/19  1506 10/02/19  0540 10/02/19  0143 10/01/19  1209    136  --   --  138   POTASSIUM 4.4 4.1  --   --  4.7   CHLORIDE 111* 106  --   --  112*   CO2 26 22  --   --  21    ANIONGAP 2* 8  --   --  5   * 260*  --  150* 178*   BUN 20 23  --   --  23   CR 0.90 1.06 0.81  --  0.95   GFRESTIMATED >90 76 >90  --  87   GFRESTBLACK >90 88 >90  --  >90   EDITH 8.6 8.6  --   --  9.0   MAG  --  2.4*  --  2.1 1.2*   PHOS  --  4.0  --   --   --    PROTTOTAL  --  6.4*  --   --  6.8   ALBUMIN  --  3.4  --   --  3.9   BILITOTAL  --  0.6  --   --  0.4   ALKPHOS  --  36*  --   --  52   AST  --  65*  --   --  25   ALT  --  56  --   --  43     CBC  Recent Labs   Lab 10/02/19  1506 10/01/19  1209   WBC 5.9 5.4   RBC 3.79* 3.76*   HGB 11.6* 11.7*   HCT 35.1* 34.7*   MCV 93 92   MCH 30.6 31.1   MCHC 33.0 33.7   RDW 13.2 13.2    196     INRNo lab results found in last 7 days.  TROPONINS   Lab Results   Component Value Date    TROPI 0.071 (H) 10/01/2019    TROPI 0.075 (H) 10/01/2019    TROPI 0.069 (H) 10/01/2019    TROPI 3.786 (HH) 06/11/2018    TROPI 2.156 (HH) 06/11/2018    TROPONIN 0.07 06/10/2018                                                                                                               Umm Hernandes MD

## 2019-10-03 NOTE — PROGRESS NOTES
Reviewed with tam Hooper seen and examined on 10/1/2019.  Admitted for SOB. Diagnosed to have CHF. Had fast VT on 10/2/2019. Had a cardioversion shock (200 J) around 3:30 PM on 10/2/2019. Telemetry tracings showed multiple runs of very fast VT. Now on iv amiodarone. No  Major complaints. Vitals stable.  First diagnosed to have nonischemic cardiomyopathy in 6/2018. Did not return for regular cardiology follow up.  SOB improved after the admission. Now on po Lasix 40 mg daily.  LBBB.  Coronary angiography showed no apparent CAD.  EF<20%.  History of binge drinking, obesity, diverticulosis, hypertension, gout, type II diabetes.    Ok for switching from iv to po amiodarone.  Plan for biv ICD implantation tomorrow if he has no recurrent VT on po amiodarone.

## 2019-10-03 NOTE — PLAN OF CARE
Pt is A&O. Denies CP/SOB at beginning of shift. Tele showed SR with 1st degree AVB and BBB. Pt sent to cath lab and returned on bedrest with venous access to R groin. Groin site stable and pt off bedrest about 1400. Pt up to bathroom with marita CELESTE RN noted VT on tele monitor. Pt was alert but diaphoretic and very pale. Assisted pt to sit and RRT called. Pt to cath lab for emergent L heart cath then transferred to ICU. All pt's belonging sent to ICU. Pt's family updated.

## 2019-10-03 NOTE — PROGRESS NOTES
Pt VSS, sinus rhythm 1st degree AVB, BBB rate 65-80.  Pt denies pain.  Pt NPO after 0000 for AICD placement tomorrow.  Pt up to BSC w/standby x1, asymptomatic. Will CTM and support. Maria Elena Mcmahan RN on 10/3/2019 at 5:43 AM

## 2019-10-04 ENCOUNTER — SURGERY (OUTPATIENT)
Age: 59
End: 2019-10-04
Payer: COMMERCIAL

## 2019-10-04 LAB
ANION GAP SERPL CALCULATED.3IONS-SCNC: 7 MMOL/L (ref 3–14)
B-HCG SERPL-ACNC: 1 IU/L
BUN SERPL-MCNC: 30 MG/DL (ref 7–30)
CALCIUM SERPL-MCNC: 9.2 MG/DL (ref 8.5–10.1)
CHLORIDE SERPL-SCNC: 108 MMOL/L (ref 94–109)
CO2 SERPL-SCNC: 23 MMOL/L (ref 20–32)
CREAT SERPL-MCNC: 1.18 MG/DL (ref 0.66–1.25)
ERYTHROCYTE [DISTWIDTH] IN BLOOD BY AUTOMATED COUNT: 13.2 % (ref 10–15)
ERYTHROCYTE [DISTWIDTH] IN BLOOD BY AUTOMATED COUNT: 13.2 % (ref 10–15)
GFR SERPL CREATININE-BSD FRML MDRD: 66 ML/MIN/{1.73_M2}
GLUCOSE BLDC GLUCOMTR-MCNC: 170 MG/DL (ref 70–99)
GLUCOSE BLDC GLUCOMTR-MCNC: 195 MG/DL (ref 70–99)
GLUCOSE BLDC GLUCOMTR-MCNC: 207 MG/DL (ref 70–99)
GLUCOSE BLDC GLUCOMTR-MCNC: 312 MG/DL (ref 70–99)
GLUCOSE BLDC GLUCOMTR-MCNC: 327 MG/DL (ref 70–99)
GLUCOSE SERPL-MCNC: 203 MG/DL (ref 70–99)
HCT VFR BLD AUTO: 35.1 % (ref 40–53)
HCT VFR BLD AUTO: 37.8 % (ref 40–53)
HGB BLD-MCNC: 11.9 G/DL (ref 13.3–17.7)
HGB BLD-MCNC: 12.7 G/DL (ref 13.3–17.7)
INR PPP: 1.16 (ref 0.86–1.14)
MCH RBC QN AUTO: 30.8 PG (ref 26.5–33)
MCH RBC QN AUTO: 31.2 PG (ref 26.5–33)
MCHC RBC AUTO-ENTMCNC: 33.6 G/DL (ref 31.5–36.5)
MCHC RBC AUTO-ENTMCNC: 33.9 G/DL (ref 31.5–36.5)
MCV RBC AUTO: 92 FL (ref 78–100)
MCV RBC AUTO: 92 FL (ref 78–100)
PLATELET # BLD AUTO: 188 10E9/L (ref 150–450)
PLATELET # BLD AUTO: 217 10E9/L (ref 150–450)
POTASSIUM SERPL-SCNC: 4.3 MMOL/L (ref 3.4–5.3)
RBC # BLD AUTO: 3.81 10E12/L (ref 4.4–5.9)
RBC # BLD AUTO: 4.13 10E12/L (ref 4.4–5.9)
SODIUM SERPL-SCNC: 138 MMOL/L (ref 133–144)
WBC # BLD AUTO: 6.6 10E9/L (ref 4–11)
WBC # BLD AUTO: 6.7 10E9/L (ref 4–11)

## 2019-10-04 PROCEDURE — 21000001 ZZH R&B HEART CARE

## 2019-10-04 PROCEDURE — 33249 INSJ/RPLCMT DEFIB W/LEAD(S): CPT | Performed by: INTERNAL MEDICINE

## 2019-10-04 PROCEDURE — C1898 LEAD, PMKR, OTHER THAN TRANS: HCPCS | Performed by: INTERNAL MEDICINE

## 2019-10-04 PROCEDURE — 85027 COMPLETE CBC AUTOMATED: CPT | Performed by: INTERNAL MEDICINE

## 2019-10-04 PROCEDURE — 25000132 ZZH RX MED GY IP 250 OP 250 PS 637: Performed by: INTERNAL MEDICINE

## 2019-10-04 PROCEDURE — C1769 GUIDE WIRE: HCPCS | Performed by: INTERNAL MEDICINE

## 2019-10-04 PROCEDURE — 02HL3KZ INSERTION OF DEFIBRILLATOR LEAD INTO LEFT VENTRICLE, PERCUTANEOUS APPROACH: ICD-10-PCS | Performed by: INTERNAL MEDICINE

## 2019-10-04 PROCEDURE — 84702 CHORIONIC GONADOTROPIN TEST: CPT | Performed by: INTERNAL MEDICINE

## 2019-10-04 PROCEDURE — 40000893 ZZH STATISTIC PT IP EVAL DEFER

## 2019-10-04 PROCEDURE — 36415 COLL VENOUS BLD VENIPUNCTURE: CPT | Performed by: INTERNAL MEDICINE

## 2019-10-04 PROCEDURE — 25000128 H RX IP 250 OP 636: Performed by: INTERNAL MEDICINE

## 2019-10-04 PROCEDURE — C1777 LEAD, AICD, ENDO SINGLE COIL: HCPCS | Performed by: INTERNAL MEDICINE

## 2019-10-04 PROCEDURE — 02H63KZ INSERTION OF DEFIBRILLATOR LEAD INTO RIGHT ATRIUM, PERCUTANEOUS APPROACH: ICD-10-PCS | Performed by: INTERNAL MEDICINE

## 2019-10-04 PROCEDURE — 27210794 ZZH OR GENERAL SUPPLY STERILE: Performed by: INTERNAL MEDICINE

## 2019-10-04 PROCEDURE — C1900 LEAD, CORONARY VENOUS: HCPCS | Performed by: INTERNAL MEDICINE

## 2019-10-04 PROCEDURE — 85610 PROTHROMBIN TIME: CPT | Performed by: INTERNAL MEDICINE

## 2019-10-04 PROCEDURE — 33225 L VENTRIC PACING LEAD ADD-ON: CPT | Performed by: INTERNAL MEDICINE

## 2019-10-04 PROCEDURE — 25500064 ZZH RX 255 OP 636: Performed by: INTERNAL MEDICINE

## 2019-10-04 PROCEDURE — 25800029 ZZH RX IP 258 OP 250: Performed by: INTERNAL MEDICINE

## 2019-10-04 PROCEDURE — 99152 MOD SED SAME PHYS/QHP 5/>YRS: CPT | Performed by: INTERNAL MEDICINE

## 2019-10-04 PROCEDURE — 0JH609Z INSERTION OF CARDIAC RESYNCHRONIZATION DEFIBRILLATOR PULSE GENERATOR INTO CHEST SUBCUTANEOUS TISSUE AND FASCIA, OPEN APPROACH: ICD-10-PCS | Performed by: INTERNAL MEDICINE

## 2019-10-04 PROCEDURE — 99233 SBSQ HOSP IP/OBS HIGH 50: CPT | Mod: 25 | Performed by: INTERNAL MEDICINE

## 2019-10-04 PROCEDURE — 00000146 ZZHCL STATISTIC GLUCOSE BY METER IP

## 2019-10-04 PROCEDURE — C1887 CATHETER, GUIDING: HCPCS | Performed by: INTERNAL MEDICINE

## 2019-10-04 PROCEDURE — 99153 MOD SED SAME PHYS/QHP EA: CPT | Performed by: INTERNAL MEDICINE

## 2019-10-04 PROCEDURE — C1882 AICD, OTHER THAN SING/DUAL: HCPCS | Performed by: INTERNAL MEDICINE

## 2019-10-04 PROCEDURE — 02HK3KZ INSERTION OF DEFIBRILLATOR LEAD INTO RIGHT VENTRICLE, PERCUTANEOUS APPROACH: ICD-10-PCS | Performed by: INTERNAL MEDICINE

## 2019-10-04 PROCEDURE — 25000125 ZZHC RX 250: Performed by: INTERNAL MEDICINE

## 2019-10-04 PROCEDURE — C1730 CATH, EP, 19 OR FEW ELECT: HCPCS | Performed by: INTERNAL MEDICINE

## 2019-10-04 PROCEDURE — C1894 INTRO/SHEATH, NON-LASER: HCPCS | Performed by: INTERNAL MEDICINE

## 2019-10-04 PROCEDURE — 80048 BASIC METABOLIC PNL TOTAL CA: CPT | Performed by: INTERNAL MEDICINE

## 2019-10-04 PROCEDURE — 99232 SBSQ HOSP IP/OBS MODERATE 35: CPT | Performed by: INTERNAL MEDICINE

## 2019-10-04 DEVICE — LEAD RELIANCE GORE DF4 0292: Type: IMPLANTABLE DEVICE | Status: FUNCTIONAL

## 2019-10-04 DEVICE — CRT-D RESONATE X4 IS4 DF4 G447: Type: IMPLANTABLE DEVICE | Status: FUNCTIONAL

## 2019-10-04 DEVICE — IMPLANTABLE DEVICE: Type: IMPLANTABLE DEVICE | Status: FUNCTIONAL

## 2019-10-04 RX ORDER — GLIPIZIDE 10 MG/1
10 TABLET, FILM COATED, EXTENDED RELEASE ORAL DAILY
Status: DISCONTINUED | OUTPATIENT
Start: 2019-10-05 | End: 2019-10-05 | Stop reason: HOSPADM

## 2019-10-04 RX ORDER — FLUTICASONE PROPIONATE 50 MCG
2 SPRAY, SUSPENSION (ML) NASAL DAILY PRN
Status: DISCONTINUED | OUTPATIENT
Start: 2019-10-04 | End: 2019-10-05 | Stop reason: HOSPADM

## 2019-10-04 RX ORDER — BUPIVACAINE HYDROCHLORIDE 2.5 MG/ML
INJECTION, SOLUTION EPIDURAL; INFILTRATION; INTRACAUDAL
Status: DISCONTINUED | OUTPATIENT
Start: 2019-10-04 | End: 2019-10-04 | Stop reason: HOSPADM

## 2019-10-04 RX ORDER — LIRAGLUTIDE 6 MG/ML
1.8 INJECTION SUBCUTANEOUS DAILY
Status: DISCONTINUED | OUTPATIENT
Start: 2019-10-05 | End: 2019-10-05 | Stop reason: HOSPADM

## 2019-10-04 RX ORDER — SODIUM CHLORIDE 450 MG/100ML
INJECTION, SOLUTION INTRAVENOUS CONTINUOUS
Status: DISCONTINUED | OUTPATIENT
Start: 2019-10-04 | End: 2019-10-04 | Stop reason: HOSPADM

## 2019-10-04 RX ORDER — FUROSEMIDE 20 MG
20 TABLET ORAL DAILY
Status: DISCONTINUED | OUTPATIENT
Start: 2019-10-05 | End: 2019-10-05 | Stop reason: HOSPADM

## 2019-10-04 RX ORDER — LIDOCAINE 40 MG/G
CREAM TOPICAL
Status: DISCONTINUED | OUTPATIENT
Start: 2019-10-04 | End: 2019-10-04 | Stop reason: HOSPADM

## 2019-10-04 RX ORDER — NALOXONE HYDROCHLORIDE 0.4 MG/ML
.1-.4 INJECTION, SOLUTION INTRAMUSCULAR; INTRAVENOUS; SUBCUTANEOUS
Status: DISCONTINUED | OUTPATIENT
Start: 2019-10-04 | End: 2019-10-05 | Stop reason: HOSPADM

## 2019-10-04 RX ORDER — FENTANYL CITRATE 50 UG/ML
INJECTION, SOLUTION INTRAMUSCULAR; INTRAVENOUS
Status: DISCONTINUED | OUTPATIENT
Start: 2019-10-04 | End: 2019-10-04 | Stop reason: HOSPADM

## 2019-10-04 RX ORDER — CEFAZOLIN SODIUM 2 G/100ML
2 INJECTION, SOLUTION INTRAVENOUS
Status: COMPLETED | OUTPATIENT
Start: 2019-10-04 | End: 2019-10-04

## 2019-10-04 RX ORDER — LIDOCAINE 40 MG/G
CREAM TOPICAL
Status: DISCONTINUED | OUTPATIENT
Start: 2019-10-04 | End: 2019-10-05 | Stop reason: HOSPADM

## 2019-10-04 RX ORDER — OXYCODONE AND ACETAMINOPHEN 5; 325 MG/1; MG/1
1 TABLET ORAL EVERY 4 HOURS PRN
Status: DISCONTINUED | OUTPATIENT
Start: 2019-10-04 | End: 2019-10-05 | Stop reason: HOSPADM

## 2019-10-04 RX ADMIN — FENOFIBRATE 160 MG: 160 TABLET ORAL at 08:36

## 2019-10-04 RX ADMIN — FUROSEMIDE 40 MG: 40 TABLET ORAL at 08:37

## 2019-10-04 RX ADMIN — FENTANYL CITRATE 50 MCG: 50 INJECTION, SOLUTION INTRAMUSCULAR; INTRAVENOUS at 11:50

## 2019-10-04 RX ADMIN — FENTANYL CITRATE 50 MCG: 50 INJECTION, SOLUTION INTRAMUSCULAR; INTRAVENOUS at 11:25

## 2019-10-04 RX ADMIN — LIDOCAINE HYDROCHLORIDE 15 ML: 10 INJECTION, SOLUTION INFILTRATION; PERINEURAL at 11:55

## 2019-10-04 RX ADMIN — INSULIN ASPART 2 UNITS: 100 INJECTION, SOLUTION INTRAVENOUS; SUBCUTANEOUS at 08:41

## 2019-10-04 RX ADMIN — Medication 5 ML: at 12:26

## 2019-10-04 RX ADMIN — INSULIN ASPART 1 UNITS: 100 INJECTION, SOLUTION INTRAVENOUS; SUBCUTANEOUS at 14:46

## 2019-10-04 RX ADMIN — CEFAZOLIN SODIUM 2 G: 2 INJECTION, SOLUTION INTRAVENOUS at 10:28

## 2019-10-04 RX ADMIN — MIDAZOLAM 1 MG: 1 INJECTION INTRAMUSCULAR; INTRAVENOUS at 11:50

## 2019-10-04 RX ADMIN — ACETAMINOPHEN 650 MG: 325 TABLET, FILM COATED ORAL at 21:58

## 2019-10-04 RX ADMIN — MIDAZOLAM 1 MG: 1 INJECTION INTRAMUSCULAR; INTRAVENOUS at 11:20

## 2019-10-04 RX ADMIN — ATORVASTATIN CALCIUM 40 MG: 40 TABLET, FILM COATED ORAL at 20:39

## 2019-10-04 RX ADMIN — ASPIRIN 81 MG: 81 TABLET, COATED ORAL at 08:36

## 2019-10-04 RX ADMIN — ACETAMINOPHEN 650 MG: 325 TABLET, FILM COATED ORAL at 17:53

## 2019-10-04 RX ADMIN — BUPIVACAINE HYDROCHLORIDE 15 ML: 2.5 INJECTION, SOLUTION EPIDURAL; INFILTRATION; INTRACAUDAL at 11:55

## 2019-10-04 RX ADMIN — MIDAZOLAM 2 MG: 1 INJECTION INTRAMUSCULAR; INTRAVENOUS at 12:10

## 2019-10-04 RX ADMIN — AMIODARONE HYDROCHLORIDE 400 MG: 200 TABLET ORAL at 08:35

## 2019-10-04 RX ADMIN — SODIUM CHLORIDE: 4.5 INJECTION, SOLUTION INTRAVENOUS at 09:40

## 2019-10-04 RX ADMIN — Medication 25000 UNITS: at 12:31

## 2019-10-04 RX ADMIN — INSULIN ASPART 4 UNITS: 100 INJECTION, SOLUTION INTRAVENOUS; SUBCUTANEOUS at 18:52

## 2019-10-04 RX ADMIN — AMIODARONE HYDROCHLORIDE 400 MG: 200 TABLET ORAL at 20:39

## 2019-10-04 RX ADMIN — MIDAZOLAM 2 MG: 1 INJECTION INTRAMUSCULAR; INTRAVENOUS at 12:25

## 2019-10-04 NOTE — PROGRESS NOTES
Cardiology Progress Note          Assessment and Plan:   No more VT since switching from iv to po amiodarone. No complaints. Vitals stable.  BW changed from 275 to 262 lb.   Aware of cardiac MRI results, EF 25%, 13% myocardial scar. Suspected but unconfirmed cardiac sarcoidosis.    Admitted for SOB. Diagnosed to have CHF. Had fast VT on 10/2/2019. Had a cardioversion shock (200 J) around 3:30 PM on 10/2/2019. Telemetry tracings showed multiple runs of very fast VT.   First diagnosed to have nonischemic cardiomyopathy in 6/2018. Did not return for regular cardiology follow up.  SOB improved after the admission. Now on po Lasix 40 mg daily.  LBBB.  Coronary angiography showed no apparent CAD.  EF<20%.  History of binge drinking, obesity, diverticulosis, hypertension, gout, type II diabetes.     Agree for biv ICD implantation. Risk and benefit explained. Scheduled for today. Will delay ICD implantation if pt is to have cardiac biopsy today.  Thierry Stewart MD  Cardiology   [phone]                Diagnosis:     Patient Active Problem List   Diagnosis     Diverticulosis of large intestine     Benign essential hypertension     Gout     Obesity with BMI of 40.0-44.9, adult (H)     Type 2 diabetes mellitus without complication, without long-term current use of insulin (H)     Hyperlipidemia LDL goal <100     NSTEMI (non-ST elevated myocardial infarction) (H)     Non-ischemic cardiomyopathy (H)     LBBB (left bundle branch block)     Nonrheumatic mitral valve regurgitation     Ascending aorta dilatation (H)     Coronary artery disease involving native coronary artery of native heart without angina pectoris     Near syncope     Paroxysmal ventricular tachycardia (H)                Physical Exam:   Blood pressure 102/64, pulse 60, temperature 98.2  F (36.8  C), temperature source Oral, resp. rate 14, weight 118.9 kg (262 lb 1.6 oz), SpO2 100 %.  Wt Readings from Last 4 Encounters:   10/04/19 118.9 kg (262 lb 1.6 oz)    10/01/19 125.1 kg (275 lb 12.8 oz)   08/30/19 127 kg (280 lb)   07/02/19 127 kg (280 lb)      I/O last 3 completed shifts:  In: 570 [P.O.:360; I.V.:210]  Out: 700 [Urine:700]    Constitutional: Alert, no apparent distress,    Lungs: No crackles or wheezing,    Cardiovascular: Regular rate and rhythm, normal S1 and S2, and no murmur,    Lymphm node  Neck  ENT  Neurologic  Abdomen: No enlargement  No jugular vein extension or carotid bruit  No apparent abnormality  No focal deficit  Normal bowel sounds, soft, no distension, no tender   Skin: No rashes, no cyanosis   Extremity: No edema          Medications:     Current Facility-Administered Medications:      acetaminophen (TYLENOL) tablet 650 mg, 650 mg, Oral, Q4H PRN **OR** acetaminophen (TYLENOL) solution 650 mg, 650 mg, Per NG tube, Q4H PRN, Denis Desai MD, 650 mg at 10/03/19 1709     albuterol (PROAIR HFA/PROVENTIL HFA/VENTOLIN HFA) 108 (90 Base) MCG/ACT inhaler 2 puff, 2 puff, Inhalation, Q6H PRN, Denis Desai MD     amiodarone (PACERONE/CODARONE) tablet 400 mg, 400 mg, Oral, BID, Denis Desai MD, 400 mg at 10/03/19 2156     aspirin EC tablet 81 mg, 81 mg, Oral, Daily, Denis Desai MD, 81 mg at 10/03/19 0940     atorvastatin (LIPITOR) tablet 40 mg, 40 mg, Oral, QPM, Denis Desai MD, 40 mg at 10/03/19 2156     bisacodyl (DULCOLAX) Suppository 10 mg, 10 mg, Rectal, Daily PRN, Denis Desai MD     glucose gel 15-30 g, 15-30 g, Oral, Q15 Min PRN **OR** dextrose 50 % injection 25-50 mL, 25-50 mL, Intravenous, Q15 Min PRN **OR** glucagon injection 1 mg, 1 mg, Subcutaneous, Q15 Min PRN, Denis Desai MD     diazepam (VALIUM) tablet 5 mg, 5 mg, Oral, Once PRN, Denis Desai MD     fenofibrate (TRIGLIDE/LOFIBRA) tablet 160 mg, 160 mg, Oral, Daily, Denis Desai MD, 160 mg at 10/03/19 0940     furosemide (LASIX) tablet 40 mg, 40 mg, Oral, Daily, Denis Desai MD, 40 mg at 10/03/19 0940     HOLD:  Metformin  and metformin containing medications if patient received IV contrast with acute kidney injury or severe chronic kidney disease (stage IV or stage V; i.e., eGFR less than 30), , Does not apply, Giselle HOFFMAN Paul Dayan, MD     HOLD:  Metformin and metformin containing medications if patient received IV contrast with acute kidney injury or severe chronic kidney disease (stage IV or stage V; i.e., eGFR less than 30), , Does not apply, Giselle HOFFMAN Paul Dayan, MD     IF patient diabetic - HOLD: ALL ORAL HYPOGLYCEMICS and include: glipizide, glyburide, glimepiride, gliclazide, metformin (Glucophage), any metformin (Glucophage) containing medication, rosiglitazone (Avandia), pioglitazone (Actos), or sitagliptin (Januvia) on day of the procedure, , Does not apply, Giselle HOFFMAN Paul Dayan, MD     insulin aspart (NovoLOG) inj (RAPID ACTING), 1-7 Units, Subcutaneous, TID AC, Denis Desai MD, 2 Units at 10/02/19 1659     insulin aspart (NovoLOG) inj (RAPID ACTING), 1-5 Units, Subcutaneous, At Bedtime, Denis Desai MD, 1 Units at 10/03/19 2157     lidocaine (LMX4) cream, , Topical, Q1H PRN, Denis Desai MD     lidocaine 1 % 0.1-1 mL, 0.1-1 mL, Other, Q1H PRN, Denis Desai MD     lisinopril (PRINIVIL/ZESTRIL) tablet 20 mg, 20 mg, Oral, Daily, Denis Desai MD, 20 mg at 10/03/19 0940     magnesium hydroxide (MILK OF MAGNESIA) suspension 30 mL, 30 mL, Oral, Daily PRN, Denis Desai MD     magnesium sulfate 4 g in 100 mL sterile water (premade), 4 g, Intravenous, Q4H PRN, Denis Desai MD, 4 g at 10/01/19 2200     melatonin tablet 1 mg, 1 mg, Oral, At Bedtime PRN, Denis Desai MD     metoprolol succinate (TOPROL-XL) half-tab 12.5 mg, 12.5 mg, Oral, Daily, Denis Desai MD     naloxone (NARCAN) injection 0.1-0.4 mg, 0.1-0.4 mg, Intravenous, Q2 Min PRN, Denis Desai MD     ondansetron (ZOFRAN-ODT) ODT tab 4 mg, 4 mg, Oral, Q6H PRN **OR** ondansetron (ZOFRAN)  injection 4 mg, 4 mg, Intravenous, Q6H PRN, Denis Desai MD     Patient is already receiving anticoagulation with heparin, enoxaparin (LOVENOX), warfarin (COUMADIN)  or other anticoagulant medication, , Does not apply, Continuous PRN, Denis Desai MD     Patient is already receiving anticoagulation with heparin, enoxaparin (LOVENOX), warfarin (COUMADIN)  or other anticoagulant medication, , Does not apply, Continuous PRN, Denis Desai MD     polyethylene glycol (MIRALAX/GLYCOLAX) Packet 17 g, 17 g, Oral, Daily PRN, Denis Desai MD     prochlorperazine (COMPAZINE) injection 10 mg, 10 mg, Intravenous, Q6H PRN **OR** prochlorperazine (COMPAZINE) tablet 10 mg, 10 mg, Oral, Q6H PRN **OR** prochlorperazine (COMPAZINE) Suppository 25 mg, 25 mg, Rectal, Q12H PRN, Denis Desai MD     senna-docusate (SENOKOT-S/PERICOLACE) 8.6-50 MG per tablet 1 tablet, 1 tablet, Oral, BID PRN **OR** senna-docusate (SENOKOT-S/PERICOLACE) 8.6-50 MG per tablet 2 tablet, 2 tablet, Oral, BID PRN, Denis Desai MD     sodium chloride (PF) 0.9% PF flush 10 mL, 10 mL, Intravenous, Q10 Min PRN, Denis Desai MD     sodium chloride (PF) 0.9% PF flush 10 mL, 10 mL, Intravenous, Once, Denis Desai MD     sodium chloride (PF) 0.9% PF flush 3 mL, 3 mL, Intracatheter, q1 min prn, Denis Desai MD     sodium chloride (PF) 0.9% PF flush 3 mL, 3 mL, Intracatheter, Q8H, Denis Desai MD, 3 mL at 10/04/19 0137     spironolactone (ALDACTONE) tablet 25 mg, 25 mg, Oral, Daily, Denis Desai MD, 25 mg at 10/03/19 0940           Lab results:        Recent Labs   Lab Test 10/04/19  0516 10/03/19  0556 10/02/19  1506    139 136   POTASSIUM 4.3 4.4 4.1   CHLORIDE 108 111* 106   EDITH 9.2 8.6 8.6   CO2 23 26 22   BUN 30 20 23   CR 1.18 0.90 1.06   * 214* 260*     Recent Labs   Lab Test 10/04/19  0516 10/02/19  1506 10/01/19  1209   HGB 11.9* 11.6* 11.7*   WBC 6.6 5.9 5.4    194 196      No lab results found.  Recent Labs   Lab Test 10/01/19  2121 10/01/19  1745 10/01/19  1209  06/10/18  2103   TROPONIN  --   --   --   --  0.07   TROPI 0.071* 0.075* 0.069*   < >  --     < > = values in this interval not displayed.

## 2019-10-04 NOTE — PROCEDURES
[facility]    Procedure: *Device Implantation  Date/Time: 10/4/2019 12:40 PM  Performed by: Jayy Dorman MD  Authorized by: Jayy Dorman MD     UNIVERSAL PROTOCOL   Site Marked: Yes  Prior Images Obtained and Reviewed:  Yes  Required items: Required blood products, implants, devices and special equipment available    Patient identity confirmed:  Verbally with patient  Patient was reevaluated immediately before administering moderate or deep sedation or anesthesia  Confirmation Checklist:  Patient's identity using two indicators, relevant allergies, procedure was appropriate and matched the consent or emergent situation and correct equipment/implants were available  Time out: Immediately prior to the procedure a time out was called    Preparation: Patient was prepped and draped in usual sterile fashion       ANESTHESIA    Anesthesia: See MAR for details      SEDATION    Patient Sedated: Yes    Vital signs: Vital signs monitored during sedation    PROCEDURE   Patient Tolerance:  Patient tolerated the procedure well with no immediate complications  Describe Procedure: Uneventful implantation of a CRT-D.  Time of Sedation in Minutes by Physician:  45

## 2019-10-04 NOTE — PLAN OF CARE
Patient A/O. Up independently. Voiding. VSS but can be soft. Amio gtt switched to oral. Npo since mid.  and 195. R. Groin site C/D/I. Pulses weak in bilat LE. Denies CP, and SOB. LS clear/dimin. Tele in SR w/ 1 AVB and BBB. HR been in 60's-80's. Plan is for possible biopsy and ICD placement today. Will continue to monitor.

## 2019-10-04 NOTE — PLAN OF CARE
Discharge Planner PT   Patient plan for discharge: Home  Current status: PT orders received, chart reviewed, spoke with RN caring for pt and PT screen performed with patient. Patient appears to be at baseline level of function, denies any difficulty with bed mobility and demonstrates ability to move around room independently and tolerate functional challenges to balance. Pt is to have ICD placement today and then likely discharge to home. Pt with questions about restrictions after ICD placement, encouraged pt to speak with doctor about specific work and driving restrictions. No skilled PT indicated at this time, orders completed. Please re-consult PT if pt's functional status changes after ICD placement.  Barriers to return to prior living situation: Defer to medical team  Recommendations for discharge: Defer to medical team, anticipate discharge to home  Rationale for recommendations: Pt appears to be at baseline level of function, no skilled PT indicated at this time, orders completed.       Entered by: Surekha Stein 10/04/2019 9:23 AM

## 2019-10-04 NOTE — PROGRESS NOTES
"[facility]  Hospitalist Progress Note  Denis Desai MD  10/04/2019    Assessment & Plan   Mr. Neymar Rhodes is a 59-year-old male with morbid obesity, diabetes, hypertension, dyslipidemia, nonischemic cardiomyopathy, chronic left bundle branch block, who presented to the ER with atypical symptoms of near-syncope, exertional shortness of breath.     1.  Non-ischemic CMO     -Elevated troponin.   -chronic LBBB related nonischemic cardiomyopathy with EF to 20 %  -rule out cardiac sarcoid  - he has h/o NICM in 06/2018, cardiac cath at that time showed nonobstructive coronary artery disease (CAD), but subsequent cardiac MRI was noted with ejection fraction (EF) of 40% and moderate global hypokinesia  - elevated troponin on admission but subsequent trops flat with no significant trend;   - ECHO noted with severely dilated left ventricle and decline in EF to 20 %  - cardiology following; had RHC 10/2/19, noted \"normal filling pressures along with PA pressure.  Upper normal pulmonary capillary wedge pressure\"-- started on Lasix 40 mg po daily (not on diuretics PTA), increased Lisinopril to 20 mg po bid  - d/w Dr Hernandes, likely LBBB related non-ischemic cardiomyopathy; will probably need CRT ; EP consulted  - noted brief run of NSVT 10/1, likely related to above; supplementing Mg; continue telemetry  - continue ASA, Lipitor and fibrate.   - after run of monomorphic VT that required shock, underwent LHC again which shows no significant coronary stenosis.  - cardiac MRI today, to rule out infiltrative disease, may get cardiac biopsy as well  - continue daily  -decrased lasix dose  - continue metoprolol, lisinopril  - plan for outpatient PET, sarcoid clinic as outpatient     2. Ventricular tachycardia  S/p CRT-D placement  - around 1400 on 10/2/19 he was noted with sustained monomorphic Vtach, did not loose a pulse; RRT/Code was called, did not require CPR (see code note)  - transitioned to oral " amiodarone 400 mg bid x7 days and then daily  - taken emergently to cath lab again with minimal non-obstructive CAD  - EP follow up in AM     3.  Hypomagnesemia  - corrected with supple protocol  - monitor BMP with diuresis     4. Diabetes mellitus.    - Recent A1c was 8.8  - resume PTA glipizide and Victoza    - continue to hold PTA metformin  - continue SSI     5. Hypertension.   - Continue lisinopril, Toprol-XL and Aldactone.      6. DVT prophylaxis:  - continue PCDs     CODE STATUS:  Full code.      Disposition:   -- anticipate home tomorrow      Interval History   -- denies any chest pain or sob or nausea  -- tolerating diet    -Data reviewed today: I reviewed all new labs and imaging over the last 24 hours. I personally reviewed no images or EKG's today.    Physical Exam   Heart Rate: 60, Blood pressure 96/71, pulse 60, temperature 97.7  F (36.5  C), temperature source Oral, resp. rate 14, weight 118.9 kg (262 lb 1.6 oz), SpO2 98 %.  Vitals:    10/02/19 1600 10/03/19 0445 10/04/19 0256   Weight: 121.9 kg (268 lb 11.9 oz) 120.8 kg (266 lb 5.1 oz) 118.9 kg (262 lb 1.6 oz)     Vital Signs with Ranges  Temp:  [97.7  F (36.5  C)-98.6  F (37  C)] 97.7  F (36.5  C)  Pulse:  [60-73] 60  Heart Rate:  [58-81] 60  Resp:  [8-20] 14  BP: ()/(50-71) 96/71  SpO2:  [97 %-100 %] 98 %  I/O's Last 24 hours  I/O last 3 completed shifts:  In: 360 [P.O.:360]  Out: -     Constitutional: Awake, alert, cooperative, no apparent distress  Respiratory: Clear to auscultation bilaterally, no crackles or wheezing  Cardiovascular: Regular rate and rhythm, normal S1 and S2, and no murmur noted  GI: Normal bowel sounds, soft, non-distended, non-tender  Skin/Integumen: No rashes, no cyanosis, no edema  Other:      Medications   All medications were reviewed.    - MEDICATION INSTRUCTIONS -       - MEDICATION INSTRUCTIONS -         amiodarone  400 mg Oral BID     aspirin  81 mg Oral Daily     atorvastatin  40 mg Oral QPM     fenofibrate   160 mg Oral Daily     [START ON 10/5/2019] furosemide  20 mg Oral Daily     insulin aspart  1-7 Units Subcutaneous TID AC     insulin aspart  1-5 Units Subcutaneous At Bedtime     lisinopril  20 mg Oral Daily     metoprolol succinate ER  12.5 mg Oral Daily     sodium chloride (PF)  10 mL Intravenous Once     sodium chloride (PF)  3 mL Intracatheter Q8H     spironolactone  25 mg Oral Daily        Data   Recent Labs   Lab 10/04/19  0948 10/04/19  0516 10/03/19  0556 10/02/19  1506  10/01/19  2121 10/01/19  1745 10/01/19  1209   WBC 6.7 6.6  --  5.9  --   --   --  5.4   HGB 12.7* 11.9*  --  11.6*  --   --   --  11.7*   MCV 92 92  --  93  --   --   --  92    188  --  194  --   --   --  196   INR 1.16*  --   --   --   --   --   --   --    NA  --  138 139 136  --   --   --  138   POTASSIUM  --  4.3 4.4 4.1  --   --   --  4.7   CHLORIDE  --  108 111* 106  --   --   --  112*   CO2  --  23 26 22  --   --   --  21   BUN  --  30 20 23  --   --   --  23   CR  --  1.18 0.90 1.06   < >  --   --  0.95   ANIONGAP  --  7 2* 8  --   --   --  5   EDITH  --  9.2 8.6 8.6  --   --   --  9.0   GLC  --  203* 214* 260*   < >  --   --  178*   ALBUMIN  --   --   --  3.4  --   --   --  3.9   PROTTOTAL  --   --   --  6.4*  --   --   --  6.8   BILITOTAL  --   --   --  0.6  --   --   --  0.4   ALKPHOS  --   --   --  36*  --   --   --  52   ALT  --   --   --  56  --   --   --  43   AST  --   --   --  65*  --   --   --  25   TROPI  --   --   --   --   --  0.071* 0.075* 0.069*    < > = values in this interval not displayed.       No results found for this or any previous visit (from the past 24 hour(s)).    Denis Desai MD  Text Page  (7am to 6pm)

## 2019-10-04 NOTE — PLAN OF CARE
Discharge Planner OT   Patient plan for discharge: home  Current status: Orders received and patient seen by physical therapy who reported that patient is up independently in room and is performing his self-cares independently as well. Therefore no skilled OT intervention needed at this time; OT will sign off and complete order.   Barriers to return to prior living situation: none anticipated  Recommendations for discharge: plan is home  Rationale for recommendations: see PT note       Entered by: Jose F Stone 10/04/2019 9:22 AM

## 2019-10-04 NOTE — PROGRESS NOTES
Assessment and Plan:     This a very pleasant 59-year-old gentleman who was first seen in 2018 with new diagnosis of heart failure with reduced ejection fraction when his EF was noted to be around 40% by Dr. Iniguez.  Coronary angiography at that time had shown nonobstructive coronary artery disease.  MRI had confirmed an EF of 40% in the setting of normal sinus rhythm with a large left bundle branch block.  He was started on guideline directed therapy with beta-blockers ACE inhibitors and mineralocorticoid receptor antagonist.  He had an MRI subsequently which showed that he had scar concerning for sarcoidosis.  He never followed up.  While on guideline directed medical therapy for heart failure a year later he started having more shortness of breath because of which he was admitted to the hospital on 10/1/2019.  He had one episode of lightheadedness but mainly his symptoms or shortness of breath.      During this admission he initially underwent a right heart catheterization which showed mildly elevated filling pressures and a low normal cardiac output.  Initial plans were to not do a coronary angiogram because he had one done last year which was negative.  Unfortunately on 10/2/2019 at rest the patient went into fast sustained monomorphic  bpm.  This was cardioverted under mild to moderate sedation emergently into normal sinus rhythm with first shock at 200 J. No CPR or intubation was needed.  Emergent coronary angiography was unchanged from last year. Cardiac MRI showed worsening scar burden to 13% on DGE.     1. Severe nonischemic cardia myopathy with EF of 15% with marked LV dilatation  2. Sustained monomorphic ventricular tachycardia  3. Potential cardiac sarcoidosis  4. Large LBBB likely contributing to if not the reason for cardiomyopathy  5. Nonobstructive coronary artery disease in 2018 and in 2019 this admission  6. Mildly/ borderline elevated biventricular filling pressures with normal  cardiac output on right heart catheterization 10/2/2019  7. Obesity    Plan  1. EP has seen him today and plans for a CRT-D.  2. Putpatient PET scan at the [facility] and follow up in sarcoid clinic there and with me at [facility] in HF clinic.   3. Oral amiodarone per my discussion with cardiac  mg BID for 7 days and then 200 mg daily thereafter.   4. Continue other heart failure therapy. Back off lasix to 20 mg daily at discharge. Mild creatinine bump.  5. After CRT-D today, EP will follow up patient tomorrow. Cardiology will sign off. Core clinic in 1 week at discharge.     Umm Hernandes MD        Interval History:   No overnight issues.  Patient denies shortness of breath or chest pain.  No further ventricular tachycardia on PO amiodarone.          Medications:       amiodarone  400 mg Oral BID     aspirin  81 mg Oral Daily     atorvastatin  40 mg Oral QPM     fenofibrate  160 mg Oral Daily     [START ON 10/5/2019] furosemide  20 mg Oral Daily     insulin aspart  1-7 Units Subcutaneous TID AC     insulin aspart  1-5 Units Subcutaneous At Bedtime     lisinopril  20 mg Oral Daily     metoprolol succinate ER  12.5 mg Oral Daily     sodium chloride (PF)  10 mL Intravenous Once     sodium chloride (PF)  3 mL Intracatheter Q8H     spironolactone  25 mg Oral Daily            Physical Exam:     Patient Vitals for the past 24 hrs:   BP Temp Temp src Pulse Heart Rate Resp SpO2 Weight   10/04/19 0837 96/63 -- -- -- 67 -- -- --   10/04/19 0819 -- 98.2  F (36.8  C) Oral -- -- -- -- --   10/04/19 0400 102/64 -- -- 60 58 14 -- --   10/04/19 0256 -- -- -- -- -- -- -- 118.9 kg (262 lb 1.6 oz)   10/04/19 0135 105/64 98.3  F (36.8  C) Oral 68 69 10 100 % --   10/03/19 1936 92/50 98.6  F (37  C) Oral -- 81 20 -- --   10/03/19 1800 (!) 80/57 -- -- 73 74 13 100 % --   10/03/19 1559 (!) 84/52 98  F (36.7  C) Oral 77 80 21 100 % --   10/03/19 1412 102/63 98  F (36.7  C) Oral 66 68 20 99 % --   10/03/19 1345 91/59 --  -- -- 69 -- 95 % --   10/03/19 1337 (!) 84/50 -- -- -- 68 -- 97 % --   10/03/19 1320 (!) 88/54 -- -- -- 62 -- 99 % --   10/03/19 1313 91/52 -- -- -- 65 -- 95 % --   10/03/19 1144 104/60 98  F (36.7  C) Oral 67 67 16 100 % --   10/03/19 1000 118/67 -- -- -- 65 18 99 % --     Vitals:    10/01/19 1219 10/02/19 0546 10/02/19 1600 10/03/19 0445   Weight: 125.1 kg (275 lb 12.8 oz) 120.6 kg (265 lb 14.4 oz) 121.9 kg (268 lb 11.9 oz) 120.8 kg (266 lb 5.1 oz)    10/04/19 0256   Weight: 118.9 kg (262 lb 1.6 oz)         Intake/Output Summary (Last 24 hours) at 10/3/2019 0902  Last data filed at 10/3/2019 0800  Gross per 24 hour   Intake 1411.5 ml   Output 1500 ml   Net -88.5 ml       09/29 0700 - 10/04 0659  In: 1771.5 [P.O.:360; I.V.:1411.5]  Out: 1800 [Urine:1800]  Net: -28.5    Exam:  GENERAL APPEARANCE ADULT: Alert, in no acute distress  RESP: lungs clear to auscultation   CV: regular rate and rhythm, no murmur, rub, or gallop.  Soft systolic murmur is noted.  ABDOMEN: no guarding or rebound  EXTREMITIES: No edema         Data:   LABS (Last four results)  CMP  Recent Labs   Lab 10/04/19  0516 10/03/19  0556 10/02/19  1506 10/02/19  0540 10/02/19  0143 10/01/19  1209    139 136  --   --  138   POTASSIUM 4.3 4.4 4.1  --   --  4.7   CHLORIDE 108 111* 106  --   --  112*   CO2 23 26 22  --   --  21   ANIONGAP 7 2* 8  --   --  5   * 214* 260*  --  150* 178*   BUN 30 20 23  --   --  23   CR 1.18 0.90 1.06 0.81  --  0.95   GFRESTIMATED 66 >90 76 >90  --  87   GFRESTBLACK 77 >90 88 >90  --  >90   EDITH 9.2 8.6 8.6  --   --  9.0   MAG  --   --  2.4*  --  2.1 1.2*   PHOS  --   --  4.0  --   --   --    PROTTOTAL  --   --  6.4*  --   --  6.8   ALBUMIN  --   --  3.4  --   --  3.9   BILITOTAL  --   --  0.6  --   --  0.4   ALKPHOS  --   --  36*  --   --  52   AST  --   --  65*  --   --  25   ALT  --   --  56  --   --  43     CBC  Recent Labs   Lab 10/04/19  0516 10/02/19  1506 10/01/19  1209   WBC 6.6 5.9 5.4   RBC 3.81* 3.79*  3.76*   HGB 11.9* 11.6* 11.7*   HCT 35.1* 35.1* 34.7*   MCV 92 93 92   MCH 31.2 30.6 31.1   MCHC 33.9 33.0 33.7   RDW 13.2 13.2 13.2    194 196     INRNo lab results found in last 7 days.  TROPONINS   Lab Results   Component Value Date    TROPI 0.071 (H) 10/01/2019    TROPI 0.075 (H) 10/01/2019    TROPI 0.069 (H) 10/01/2019    TROPI 3.786 (HH) 06/11/2018    TROPI 2.156 (HH) 06/11/2018    TROPONIN 0.07 06/10/2018                                                                                                               Umm Hernandes MD

## 2019-10-04 NOTE — PLAN OF CARE
A&OX4, Vss, denies any pain, Teli SR with 1 degree AVB. Pt had ICD Placed on left upper chest.  & 170 Covered appropriately. Plan to discharge home in 1-2 days.

## 2019-10-05 ENCOUNTER — APPOINTMENT (OUTPATIENT)
Dept: GENERAL RADIOLOGY | Facility: CLINIC | Age: 59
DRG: 225 | End: 2019-10-05
Attending: INTERNAL MEDICINE
Payer: COMMERCIAL

## 2019-10-05 VITALS
BODY MASS INDEX: 37.23 KG/M2 | DIASTOLIC BLOOD PRESSURE: 62 MMHG | WEIGHT: 259.5 LBS | OXYGEN SATURATION: 95 % | HEART RATE: 70 BPM | SYSTOLIC BLOOD PRESSURE: 104 MMHG | RESPIRATION RATE: 21 BRPM | TEMPERATURE: 98.1 F

## 2019-10-05 LAB
ANION GAP SERPL CALCULATED.3IONS-SCNC: 5 MMOL/L (ref 3–14)
BUN SERPL-MCNC: 31 MG/DL (ref 7–30)
CALCIUM SERPL-MCNC: 9.4 MG/DL (ref 8.5–10.1)
CHLORIDE SERPL-SCNC: 108 MMOL/L (ref 94–109)
CO2 SERPL-SCNC: 24 MMOL/L (ref 20–32)
CREAT SERPL-MCNC: 0.99 MG/DL (ref 0.66–1.25)
ERYTHROCYTE [DISTWIDTH] IN BLOOD BY AUTOMATED COUNT: 13 % (ref 10–15)
GFR SERPL CREATININE-BSD FRML MDRD: 82 ML/MIN/{1.73_M2}
GLUCOSE BLDC GLUCOMTR-MCNC: 213 MG/DL (ref 70–99)
GLUCOSE BLDC GLUCOMTR-MCNC: 280 MG/DL (ref 70–99)
GLUCOSE SERPL-MCNC: 227 MG/DL (ref 70–99)
HCT VFR BLD AUTO: 35.9 % (ref 40–53)
HGB BLD-MCNC: 12.3 G/DL (ref 13.3–17.7)
MCH RBC QN AUTO: 31.2 PG (ref 26.5–33)
MCHC RBC AUTO-ENTMCNC: 34.3 G/DL (ref 31.5–36.5)
MCV RBC AUTO: 91 FL (ref 78–100)
PLATELET # BLD AUTO: 176 10E9/L (ref 150–450)
POTASSIUM SERPL-SCNC: 4.5 MMOL/L (ref 3.4–5.3)
RBC # BLD AUTO: 3.94 10E12/L (ref 4.4–5.9)
SODIUM SERPL-SCNC: 137 MMOL/L (ref 133–144)
WBC # BLD AUTO: 7.1 10E9/L (ref 4–11)

## 2019-10-05 PROCEDURE — 85027 COMPLETE CBC AUTOMATED: CPT | Performed by: INTERNAL MEDICINE

## 2019-10-05 PROCEDURE — 25000125 ZZHC RX 250: Performed by: INTERNAL MEDICINE

## 2019-10-05 PROCEDURE — 40000986 XR CHEST 2 VW

## 2019-10-05 PROCEDURE — 25000132 ZZH RX MED GY IP 250 OP 250 PS 637: Performed by: INTERNAL MEDICINE

## 2019-10-05 PROCEDURE — 00000146 ZZHCL STATISTIC GLUCOSE BY METER IP

## 2019-10-05 PROCEDURE — 80048 BASIC METABOLIC PNL TOTAL CA: CPT | Performed by: INTERNAL MEDICINE

## 2019-10-05 PROCEDURE — 93010 ELECTROCARDIOGRAM REPORT: CPT | Performed by: INTERNAL MEDICINE

## 2019-10-05 PROCEDURE — 36415 COLL VENOUS BLD VENIPUNCTURE: CPT | Performed by: INTERNAL MEDICINE

## 2019-10-05 PROCEDURE — 99239 HOSP IP/OBS DSCHRG MGMT >30: CPT | Performed by: INTERNAL MEDICINE

## 2019-10-05 PROCEDURE — 93005 ELECTROCARDIOGRAM TRACING: CPT

## 2019-10-05 PROCEDURE — 99232 SBSQ HOSP IP/OBS MODERATE 35: CPT | Performed by: INTERNAL MEDICINE

## 2019-10-05 RX ORDER — AMIODARONE HYDROCHLORIDE 400 MG/1
400 TABLET ORAL 2 TIMES DAILY
Qty: 14 TABLET | Refills: 0 | Status: SHIPPED | OUTPATIENT
Start: 2019-10-05 | End: 2019-10-17

## 2019-10-05 RX ORDER — FUROSEMIDE 20 MG
20 TABLET ORAL DAILY
Qty: 30 TABLET | Refills: 1 | Status: SHIPPED | OUTPATIENT
Start: 2019-10-06 | End: 2019-10-09

## 2019-10-05 RX ORDER — AMIODARONE HYDROCHLORIDE 400 MG/1
400 TABLET ORAL DAILY
Qty: 30 TABLET | Refills: 1 | Status: SHIPPED | OUTPATIENT
Start: 2019-10-13 | End: 2019-10-17

## 2019-10-05 RX ORDER — AMIODARONE HYDROCHLORIDE 200 MG/1
400 TABLET ORAL DAILY
Status: DISCONTINUED | OUTPATIENT
Start: 2019-10-13 | End: 2019-10-05 | Stop reason: HOSPADM

## 2019-10-05 RX ADMIN — LISINOPRIL 20 MG: 20 TABLET ORAL at 11:54

## 2019-10-05 RX ADMIN — METOPROLOL SUCCINATE 12.5 MG: 25 TABLET, EXTENDED RELEASE ORAL at 08:38

## 2019-10-05 RX ADMIN — GLIPIZIDE 10 MG: 10 TABLET, FILM COATED, EXTENDED RELEASE ORAL at 08:39

## 2019-10-05 RX ADMIN — INSULIN ASPART 3 UNITS: 100 INJECTION, SOLUTION INTRAVENOUS; SUBCUTANEOUS at 08:52

## 2019-10-05 RX ADMIN — FUROSEMIDE 20 MG: 20 TABLET ORAL at 08:39

## 2019-10-05 RX ADMIN — LIRAGLUTIDE 1.8 MG: 6 INJECTION SUBCUTANEOUS at 08:53

## 2019-10-05 RX ADMIN — ACETAMINOPHEN 650 MG: 325 TABLET, FILM COATED ORAL at 05:38

## 2019-10-05 RX ADMIN — SPIRONOLACTONE 25 MG: 25 TABLET ORAL at 08:39

## 2019-10-05 RX ADMIN — FENOFIBRATE 160 MG: 160 TABLET ORAL at 08:39

## 2019-10-05 RX ADMIN — ASPIRIN 81 MG: 81 TABLET, COATED ORAL at 08:39

## 2019-10-05 RX ADMIN — AMIODARONE HYDROCHLORIDE 400 MG: 200 TABLET ORAL at 08:39

## 2019-10-05 NOTE — PROGRESS NOTES
[facility] DISCHARGE NOTE    Patient discharged to home at 4:51 PM via ambulation. Accompanied by spouse and staff. Discharge instructions reviewed with patient and spouse, opportunity offered to ask questions. Prescriptions sent to patients preferred pharmacy. All belongings sent with patient.    Megan Queen RN

## 2019-10-05 NOTE — DISCHARGE INSTRUCTIONS
Cardioversion Discharge Instructions    After you go home:       For 24 hours - due to the sedation you received:      Have an adult stay with you for 24 hours.     Relax and take it easy.    Do NOT make any important or legal decisions.    Do NOT drive or operate machines at home or at work.    Do NOT drink alcohol.    Diet:      Start with clear liquids and progress to your normal diet as you feel able.    Medicines:      Take your medications, including blood thinners, unless your provider tells you not to.    If you have stopped any medications, check with your provider about when to restart them.    Follow Up Appointments:      Follow up with your cardiologist at [facility] of patient preference as instructed.    Follow up with your primary care provider as needed.    Post cardioversion:    The skin on your chest or back may feel tender for 48 hours.  If your skin is tender, you may:      Use a cold pack on the site. Never use ice directly on your skin. Use the cold pack for 20 minutes. Remove it for at least 30 minutes before re-using.    Apply 1% hydrocortisone cream to the skin (sold at drug stores)    Take Advil (Ibuprofen) or Tylenol (Acetaminophen) per your provider's recommendations.      Call your provider if you have:      Weakness, dizziness, lightheadedness, or fainting.    Shortness of breath.    Irregular heartbeat, feelings of your heart fluttering or beating fast, hard or palpitations.     More than minor skin discomfort or redness where the cardioversion pads were placed.    Questions or concerns.      Call 911 if you have:      Pain in your chest, arm, shoulder, neck, or upper back.    You have problems speaking or seeing.    Weakness in your arm or leg.    You are unable to move your arm or leg.    You have uncontrolled bleeding.         [facility] [place]:    [phone] [facility] (7 days a         Home Care After  Coronary Angiography     The catheter can  be placed into the groin, arm, or wrist.     Angiography is a special type of X-ray that lets your doctor view your coronary arteries to see if the blood vessels to your heart are narrowed or blocked.   Before the procedure    Tell your doctor what medicines you take and any allergies you may have.    Tell your doctor if you've had a reaction to contrast dye or have had any kidney problems.    Don t eat or drink anything for at least 6 to 8 hours before the procedure. You will likely be told not to have anything after midnight, the night before the procedure.    A nurse will place an IV catheter in your vein to give fluids, and medicine to relieve pain and help you feel less anxious.    He or she will clean your skin and, if necessary, shave the area where the catheter will be inserted.  During the procedure    Your doctor will place a long, thin tube called a catheter inside an artery in your groin or arm and guide it into your heart.    He or she will inject a contrast dye through the catheter into your blood vessels or heart chambers.    X-rays are taken to show images of the inside of your heart and coronary arteries.  After the procedure    Your doctor or nurse will tell you how long to lie down and keep the insertion site still.    If the insertion site was in your groin, you may need to lie down with your leg still for several hours. If bleeding occurs, a nurse will apply pressure to the area to control it.    A nurse will check your blood pressure and the insertion site frequently to make sure you remain stable after the procedure.    You may be asked to drink fluid to help flush the contrast liquid out of your system.    Have someone drive you home from the hospital.    If your doctor uses angioplasty to treat a blocked artery, you will stay the night in the hospital.    It s normal to find a small bruise or lump at the insertion site. The lump may be the collagen plug or stitch that you feel, or a small  bruise. These common side effects should disappear within a few weeks.  When to call your healthcare provider  Contact your healthcare provider right away if you have any of these:    Chest pain    Pain, swelling, redness, bleeding, or drainage at the insertion site    Severe pain, coldness, or a bluish color in the leg or arm that held the catheter    Fever over 100.4 F (38 C)   Date Last Reviewed: 6/1/2016 2000-2018 [employer]. [address] [place] [address]. All rights reserved. This information is not intended as a substitute for professional medical care. Always follow your healthcare professional's instructions.       a Pacemaker or ICD Procedure_FSH     For informational purposes only. Not to replace the advice of your health care provider.  Copyright   2018 [employer] All rights reserved.

## 2019-10-05 NOTE — PROGRESS NOTES
Cardiac Electrophysiology Progress Note          Assessment and Plan:     Non-ischemic cardiomyopathy (H)    LBBB (left bundle branch block)    Near syncope    Paroxysmal ventricular tachycardia (H) required acute defibrillation  S/p CRT-D    No events overnight. Normal device check. cXR pending. Continue with amio po loading at 400 mg bid for 7 days then daily until further notice. VT detection reduced to 180 bpm from 200 bpm with longer duration of 20 seconds due to possibility of slower VT on amio. Ok to be discharged provided normal cxr. F/u appts have been arranged, if not we will contact pt next week for them. Continue all current cardiac meds for now.                 Interval History:   doing well; no cp, sob, n/v/d, or abd pain.              Review of Systems:   As per subjective, otherwise 5 systems reviewed and negative.           Physical Exam:   Blood pressure 100/67, pulse 68, temperature 97.9  F (36.6  C), temperature source Oral, resp. rate 27, weight 117.7 kg (259 lb 8 oz), SpO2 98 %.          Intake/Output Summary (Last 24 hours) at 10/5/2019 1244  Last data filed at 10/5/2019 1240  Gross per 24 hour   Intake 620 ml   Output --   Net 620 ml       Constitutional:   NAD   Skin:   Warm and dry   Head:   Nontraumatic   Neck:   Supple, symmetrical, trachea midline, no adenopathy, thyroid symmetric, not enlarged and no tenderness, skin normal   Lungs:   normal   Cardiovascular:   Normal apical impulse, regular rate and rhythm, normal S1 and S2, no S3 or S4, and no murmur noted    Abdomen:   Benign   Extremities and Back:   Symmetric, no curvature, spinous processes are non-tender on palpation, paraspinous muscles are non-tender on palpation, no costal vertebral tenderness   Neurological:   Grossly nonfocal            Medications:       [START ON 10/13/2019] amiodarone  400 mg Oral Daily     amiodarone  400 mg Oral BID     aspirin  81 mg Oral Daily     atorvastatin  40 mg Oral QPM     fenofibrate  160 mg  Oral Daily     furosemide  20 mg Oral Daily     glipiZIDE  10 mg Oral Daily     insulin aspart  1-7 Units Subcutaneous TID AC     insulin aspart  1-5 Units Subcutaneous At Bedtime     liraglutide  1.8 mg Subcutaneous Daily     lisinopril  20 mg Oral Daily     metFORMIN  1,000 mg Oral BID w/meals     metoprolol succinate ER  12.5 mg Oral Daily     sodium chloride (PF)  10 mL Intravenous Once     sodium chloride (PF)  3 mL Intracatheter Q8H     spironolactone  25 mg Oral Daily     Orders Only on 08/26/2019   Component Date Value Ref Range Status     HIV Antigen Antibody Combo 08/26/2019 Nonreactive  NR^Nonreactive     Final    HIV-1 p24 Ag & HIV-1/HIV-2 Ab Not Detected     Sodium 08/26/2019 138  133 - 144 mmol/L Final     Potassium 08/26/2019 4.7  3.4 - 5.3 mmol/L Final     Chloride 08/26/2019 109  94 - 109 mmol/L Final     Carbon Dioxide 08/26/2019 24  20 - 32 mmol/L Final     Anion Gap 08/26/2019 5  3 - 14 mmol/L Final     Glucose 08/26/2019 247* 70 - 99 mg/dL Final     Urea Nitrogen 08/26/2019 21  7 - 30 mg/dL Final     Creatinine 08/26/2019 0.94  0.66 - 1.25 mg/dL Final     GFR Estimate 08/26/2019 87  >60 mL/min/[1.73_m2] Final    Comment: Non  GFR Calc  Starting 12/18/2018, serum creatinine based estimated GFR (eGFR) will be   calculated using the Chronic Kidney Disease Epidemiology Collaboration   (CKD-EPI) equation.       GFR Estimate If Black 08/26/2019 >90  >60 mL/min/[1.73_m2] Final    Comment:  GFR Calc  Starting 12/18/2018, serum creatinine based estimated GFR (eGFR) will be   calculated using the Chronic Kidney Disease Epidemiology Collaboration   (CKD-EPI) equation.       Calcium 08/26/2019 9.1  8.5 - 10.1 mg/dL Final     Hemoglobin A1C 08/26/2019 8.8* 0 - 5.6 % Final    Comment: Normal <5.7% Prediabetes 5.7-6.4%  Diabetes 6.5% or higher - adopted from ADA   consensus guidelines.                    Jayy Dorman MD

## 2019-10-05 NOTE — PLAN OF CARE
Patient A&O x4 Denies chest pain/tightness/pressure but L chest site sore and Tylenol 650 mg given,  on tele 100 % V paced and VSS,  Lungs are clear and bases are diminished, up IND in room , on cardiac diet, skin is R groin C/D/I and L chest site with pressure dressing on , voiding well and last BM 10/3. IV is in R arm x2 and SL. Plan to continue to monitor.

## 2019-10-05 NOTE — DISCHARGE SUMMARY
"   [facility]  Discharge Summary        Neymar Rhodes MRN# [id number]   YOB: 1960 Age: 59 year old     Date of Admission:  10/1/2019  Date of Discharge:  10/5/2019  Admitting Physician:  Jose Mcnamara MD  Discharge Physician: Denis Desai MD  Discharging Service: Hospitalist     Primary Provider:  Jefry Harris  Primary Care Physician Phone Number: [phone]         Discharge Diagnoses/Problem Oriented Hospital Course (Providers):    Neymar Rhodes was admitted on 10/1/2019 by Jose Mcnamara MD and I would refer you to their history and physical.  The following problems were addressed during his hospitalization:    Mr. Neymar Rhodes is a 59-year-old male with morbid obesity, diabetes, hypertension, dyslipidemia, nonischemic cardiomyopathy, chronic left bundle branch block, who presented to the ER with atypical symptoms of near-syncope, exertional shortness of breath.     1.  Non-ischemic CMO     -Elevated troponin.   -chronic LBBB related nonischemic cardiomyopathy with EF to 20 %  -rule out cardiac sarcoid  - he has h/o NICM in 06/2018, cardiac cath at that time showed nonobstructive coronary artery disease (CAD), but subsequent cardiac MRI was noted with ejection fraction (EF) of 40% and moderate global hypokinesia  - elevated troponin on admission but subsequent trops flat with no significant trend;   - ECHO noted with severely dilated left ventricle and decline in EF to 20 %  - cardiology following; had RHC 10/2/19, noted \"normal filling pressures along with PA pressure.  Upper normal pulmonary capillary wedge pressure\"-- started on Lasix 40 mg po daily (not on diuretics PTA), increased Lisinopril to 20 mg po bid  - likely LBBB related non-ischemic cardiomyopathy; will probably need CRT ; EP consulted  - noted brief run of NSVT 10/1, likely related to above; supplementing Mg; continue telemetry  - continue ASA, Lipitor and fibrate.   - " after run of monomorphic VT that required shock, underwent LHC again which shows no significant coronary stenosis.  - cardiac MRI showed   Late gadolinium enhancement is present in the septal. inferior and lateral base. There is non-CAD scar in  the anterolateral mid-wall. Scar is more extensive (13%) as compared to the prior MRI (6%).  The LV is severely dilated and the global systolic function is moderately severely to severely reduced with  an LVEF of 25%. There is severe global LV dysfunction with dyssynchronous septal motion consistent with a  LBBB.  Collectively, these findings are most consistent with a non-ischemic cardiomyopathy. Possible etiologies  include prior LBBB, cardiac sarcoidosis or prior myocarditis.    - decreased lasix dose with low Na diet  - continue metoprolol, lisinopril  - plan for outpatient PET, sarcoid clinic as outpatient     2. Ventricular tachycardia  S/p CRT-D placement  - around 1400 on 10/2/19 he was noted with sustained monomorphic Vtach, did not loose a pulse; RRT/Code was called, did not require CPR (see code note)  - transitioned to oral amiodarone 400 mg bid x7 days and then daily  - taken emergently to cath lab again with minimal non-obstructive CAD  - follow cxr ok     3.  Hypomagnesemia  - corrected with supple protocol     4. Diabetes mellitus.    - Recent A1c was 8.8  - resume PTA glipizide and Victoza    - resume metformin     5. Hypertension.   - Continue lisinopril, Toprol-XL and Aldactone.          Code Status:      Full Code         Important Results:      NAD  HEENT NORMAL  PULM CTA  CV RRR  GI SOFT OBESE +BS  MS NO EDEMA  NEURO NON FOCAL  DERM WARM AND PERFUSED         Pending Results:        Unresulted Labs Ordered in the Past 30 Days of this Admission     No orders found from 9/1/2019 to 10/2/2019.               Discharge Instructions and Follow-Up:      Follow-up Appointments     Follow-up and recommended labs and tests       Follow up with primary care  provider, Jefry Harris, within 7   days for hospital follow- up.  No follow up labs or test are needed.  Follow up with cardiology as directed  Follow up with EP as directed at device clinic                  Discharge Disposition:      Discharged to home         Discharge Medications:        Current Discharge Medication List      START taking these medications    Details   !! amiodarone (PACERONE) 400 MG tablet Take 1 tablet (400 mg) by mouth 2 times daily for 7 days  Qty: 14 tablet, Refills: 0    Associated Diagnoses: Non-ischemic cardiomyopathy (H)      !! amiodarone (PACERONE) 400 MG tablet Take 1 tablet (400 mg) by mouth daily  Qty: 30 tablet, Refills: 1    Comments: To be started after 400 mg of po bid for 7 days.  Associated Diagnoses: Non-ischemic cardiomyopathy (H)      furosemide (LASIX) 20 MG tablet Take 1 tablet (20 mg) by mouth daily  Qty: 30 tablet, Refills: 1    Associated Diagnoses: Non-ischemic cardiomyopathy (H)       !! - Potential duplicate medications found. Please discuss with provider.      CONTINUE these medications which have NOT CHANGED    Details   albuterol (PROAIR HFA/PROVENTIL HFA/VENTOLIN HFA) 108 (90 Base) MCG/ACT Inhaler Inhale 2 puffs into the lungs every 6 hours as needed for shortness of breath / dyspnea or wheezing      aspirin 81 MG tablet Take 1 tablet (81 mg) by mouth daily  Qty: 30 tablet    Associated Diagnoses: Essential hypertension, benign      atorvastatin (LIPITOR) 80 MG tablet Take 1/2 (one-half) tablet by mouth at bedtime.  Qty: 45 tablet, Refills: 0    Associated Diagnoses: Type 2 diabetes mellitus without complication, without long-term current use of insulin (H)      clotrimazole (LOTRIMIN) 1 % cream Apply topically 2 times daily To axillary rash  Qty: 15 g, Refills: 1    Associated Diagnoses: Dermatophytosis of body      fenofibrate 160 MG tablet TAKE ONE TABLET BY MOUTH ONE TIME DAILY   Qty: 90 tablet, Refills: 3    Associated Diagnoses: Type 2 diabetes  mellitus without complication, without long-term current use of insulin (H)      fluticasone (FLONASE) 50 MCG/ACT nasal spray Spray 2 sprays into both nostrils daily  Qty: 16 g, Refills: 0    Associated Diagnoses: Acute sinusitis with coexisting condition, need prophylactic treatment      glipiZIDE (GLUCOTROL XL) 10 MG 24 hr tablet TAKE 1 TABLET (10 MG) BY MOUTH DAILY. TAKE WITH LARGEST MEAL OF THE DAY. ALWAYS TAKE WITH FOOD  Qty: 90 tablet, Refills: 0    Associated Diagnoses: Type 2 diabetes mellitus without complication, without long-term current use of insulin (H)      liraglutide (VICTOZA PEN) 18 MG/3ML solution Inject 1.8 mg Subcutaneous daily  Qty: 9 mL, Refills: 0    Associated Diagnoses: Type 2 diabetes mellitus without complication, without long-term current use of insulin (H)      lisinopril (PRINIVIL/ZESTRIL) 20 MG tablet Take 1 tablet (20 mg) by mouth daily  Qty: 90 tablet, Refills: 1    Associated Diagnoses: Type 2 diabetes mellitus without complication, without long-term current use of insulin (H); Non-ischemic cardiomyopathy (H); Benign essential hypertension      metFORMIN (GLUCOPHAGE) 1000 MG tablet Take 1 tablet (1,000 mg) by mouth 2 times daily (with meals)  Qty: 180 tablet, Refills: 3    Associated Diagnoses: Type 2 diabetes mellitus without complication, without long-term current use of insulin (H)      metoprolol succinate ER (TOPROL-XL) 25 MG 24 hr tablet TAKE ONE TABLET BY MOUTH IN THE EVENING   Qty: 30 tablet, Refills: 10    Associated Diagnoses: Nonischemic cardiomyopathy (H)      spironolactone (ALDACTONE) 25 MG tablet TAKE ONE TABLET BY MOUTH ONE TIME DAILY   Qty: 30 tablet, Refills: 10    Associated Diagnoses: Nonischemic cardiomyopathy (H)      triamcinolone (KENALOG) 0.5 % cream Apply sparingly once or twice per day as needed to affected area until the skin is better, then stop  Qty: 30 g, Refills: 0    Associated Diagnoses: Eczema, unspecified eczema      !! BD ULTRA-FINE 29G X 12.7MM  insulin pen needle use once daily with victoza pen  Qty: 100 each, Refills: 1    Associated Diagnoses: Type 2 diabetes mellitus without complication, without long-term current use of insulin (H)      !! BD ULTRA-FINE 29G X 12.7MM insulin pen needle use once daily with victoza pen  Qty: 100 each, Refills: 1    Associated Diagnoses: Type 2 diabetes mellitus without complication, without long-term current use of insulin (H)      blood glucose monitoring (ACCU-CHEK LUL PLUS) test strip Use to test blood sugar 1-3 times daily or as directed.  Qty: 100 each, Refills: 11    Associated Diagnoses: Type 2 diabetes mellitus without complication (H)      continuous blood glucose monitoring (FREESTYLE JOCELIN) sensor For use with Freestyle Jocelin Flash  for continuous monitioring of blood glucose levels. Replace sensor every 10 days.  Qty: 3 each, Refills: 3    Associated Diagnoses: Type 2 diabetes mellitus without complication, without long-term current use of insulin (H)       !! - Potential duplicate medications found. Please discuss with provider.               Allergies:         Allergies   Allergen Reactions     No Known Drug Allergies             Consultations This Hospital Stay:      Consultation during this admission received from cardiology          Discharge Orders       After Care Instructions     Activity      Your activity upon discharge: activity as tolerated         Diet      Follow this diet upon discharge: Orders Placed This Encounter      2 gram Na, diabetic diet         Discharge Instructions - Follow up with Device Check RN       Follow up with Device Check RN in 7-10 days.         Discharge Instructions - Keep incision dry for 72 hours      Keep incision dry for 72 hours (unless Derma Sarmiento was applied)             Future tests that were ordered for you     Basic metabolic panel      N terminal pro BNP outpatient      TSH with free T4 reflex      Last Lab Result: TSH (mU/L)       Date                      Value                 10/03/2018               3.50             ----------                  Rehab orders    Referrals     Future Labs/Procedures    Follow-Up with [facility]              Discharge Time:      Greater than 30 minutes.        Image Results From This Hospital Stay (For Non-EPIC Providers):        Results for orders placed or performed during the hospital encounter of 10/01/19   Chest XR,  PA & LAT    Narrative    CHEST TWO VIEWS 2019 2:44 PM     HISTORY: Shortness of breath.    COMPARISON: 2019.    FINDINGS: Mild cardiomegaly. Pulmonary vascularity is within normal  limits. The lungs are clear. No pneumothorax or pleural effusion.       Impression    IMPRESSION: No radiographic evidence of acute chest abnormality.     JENNY MOLINA MD   MRI Cardiac w/contrast    Narrative                                                  [facility]                                                     CMR Report      MRN:                  [id number]                                  Name:         JACI WICK                                  :                  1960                                  Scan Date:   2019-10-03 12:50:43                                  Electronically signed by Taylor Rowland 2019-Oct-03 17:31:29    SUMMARY   ==========================================================================================================    Clinical history: 59 year old man with LBBB, progressive cardiomyopathy (LVEF 15%) and nonobstructive CAD  on angiogram has had monomorphic VT in the hospital.  Comparison CMR:  2018.    1. The LV is severely dilated. The global systolic function is moderately severely to severely reduced. The  LVEF is 25%. There is severe global LV dysfunction with dyssynchronous septal motion consistent with a  LBBB.    2. The RV is normal in cavity size. The global systolic function is normal. The RVEF is 57%.     3. Both atria are dilated.    4. There  is mild mitral insufficiency.     5. Late gadolinium enhancement is present in the septal. inferior and lateral base. There is non-CAD scar  in the anterolateral mid-wall. Scar is more extensive (13%) as compared to the prior MRI (6%).    CONCLUSIONS:   Late gadolinium enhancement is present in the septal. inferior and lateral base. There is non-CAD scar in  the anterolateral mid-wall. Scar is more extensive (13%) as compared to the prior MRI (6%).  The LV is severely dilated and the global systolic function is moderately severely to severely reduced with  an LVEF of 25%. There is severe global LV dysfunction with dyssynchronous septal motion consistent with a  LBBB.  Collectively, these findings are most consistent with a non-ischemic cardiomyopathy. Possible etiologies  include prior LBBB, cardiac sarcoidosis or prior myocarditis.    Results called to [facility] [place] cardiologist.    CORE EXAM   ==========================================================================================================    MEASUREMENTS   ----------------------------------------------------------------------------------------      VOLUMETRIC ANALYSIS       ----------------------------------------------  .-----------------------------------------------------------.                    LV     Reference   RV    Reference    +------+-----------+-------+------------+------+------------+   EDV   ml           443   (109-191)   175   (105-205)          ml/m^2     188.5   (60-95)    74.5   ()     ESV   ml           332   (27-72)      75   (20-80)            ml/m^2     141.3   (14-36)    31.9   (11-40)      CO    L/min       6.88              6.20                      L/min/m^2    2.9               2.6                MASS  g            260   (107-183)                            g/m^2      110.6   (57-90)                        SV    ml           111   ()    100   ()            ml/m^2      47.2   (40-64)    42.6   (37-69)      EF    %             25   (58-76)      57   (55-81)     '------+-----------+-------+------------+------+------------'            CARDIAC OUTPUT HR:  62 BPM      LV DIMENSIONS       ----------------------------------------------          WALL THICKNESS - ANTEROSEPTAL:  9 cm          WALL THICKNESS - INFEROLATERAL:  10 cm          WALL THICKNESS - MAXIMUM:  13 cm        LA DIMENSIONS (LV SYSTOLE)       ----------------------------------------------          DIAMETER:  46 cm        AORTIC ROOT DIMENSIONS       ----------------------------------------------          ANNULUS:  28 cm          SINUS OF VALSALVA:  39 cm          SINOTUBULAR JUNCTION:  29 cm          AORTIC ROOT SIZE:  Normal        IRON QUANTIFICATION       ----------------------------------------------          MYOCARDIAL T2*:  47 msec          LIVER T2*:  37 msec      VALVES   ----------------------------------------------------------------------------------------      MITRAL VALVE       ----------------------------------------------          MITRAL REGURGITATION:  MILD        TRICUSPID VALVE       ----------------------------------------------          TRICUSPID REGURGITATION:  Trivial      17 SEGMENT   ----------------------------------------------------------------------------------------  .------------------------------------------------------------------------------------------.   Segments            Wall Motion   Hyperenhancement  Stress Perfusion  Interpretation   +--------------------+--------------+------------------+------------------+----------------+   Base Anterior       Severe Hypo                                                         Base Anteroseptal   Severe Hypo                                                         Base Inferoseptal   Severe Hypo   26-50%                                                Base Inferior       Severe Hypo    26-50%                                                Base Inferolateral  Severe Hypo   26-50%                                                Base Anterolateral  Severe Hypo   51-75%                                                Mid Anterior        Severe Hypo                                                         Mid Anteroseptal    Severe Hypo                                                         Mid Inferoseptal    Severe Hypo                                                         Mid Inferior        Severe Hypo                                                         Mid Inferolateral   Severe Hypo   26-50%                                                Mid Anterolateral   Severe Hypo   1-25%                                                 Apical Anterior     Severe Hypo                                                         Apical Septal       Severe Hypo                                                         Apical Inferior     Severe Hypo                                                         Apical Lateral      Severe Hypo                                                         [place]                Severe Hypo                                                        +--------------------+--------------+------------------+------------------+----------------+   RV Segments         Wall Motion   Hyperenhancement  Stress Perfusion  Interpretation   +--------------------+--------------+------------------+------------------+----------------+   RV Basal Anterior   Normal/Hyper                                                        RV Basal Inferior   Normal/Hyper                                                        RV Mid              Normal/Hyper                                                        RV Apical           Normal/Hyper                                                        '--------------------+--------------+------------------+------------------+----------------'        FINDINGS       ----------------------------------------------          INFARCT/SCAR SIZE:  13 %      SCAN INFO   ==========================================================================================================    GENERAL   ----------------------------------------------------------------------------------------      CONTRAST AGENT       ----------------------------------------------          TYPE:  Gadavist          VOLUME ADMINISTERED:  16 ml          DOSAGE FOR 0.5M:  0.07 mmol/kg        VITALS       ----------------------------------------------          HEIGHT:  70.00 in          HEIGHT:  177.80 cm          WEIGHT:  265.00 lbs          WEIGHT:  120.20 kgs          BSA:  2.35 m^2        PULSE SEQUENCES       ----------------------------------------------          IR SSFP - Single Shot, T2 Turbo Spin Echo w/fat sat, T2 Turbo Spin Echo, T1 Turbo Spin Echo, SSFP          Cine         SETUP       ----------------------------------------------          REASON(S) FOR SCAN:  Cardiomyopathy           REFERRING PHYSICIAN:  BRIDGETT MCKINLEY          ATTENDING PHYSICIAN:  DAVID JUARES      Report generated by [employer], a product of [employer]   XR Chest 2 Views    Narrative    CHEST TWO VIEWS    10/5/2019 1:02 PM     HISTORY: Check for pneumothorax.    COMPARISON: Chest x-rays dated 10/1/2019.    FINDINGS:  Interval placement of left chest wall battery  pack/generator and three cardiac leads. Lungs are clear. Heart size is  top normal. Mediastinum and pulmonary vascularity are within normal  limits. No pneumothorax, significant pleural fluid collection, or  acute fracture.      Impression    IMPRESSION: No pneumothorax status post defibrillator/pacemaker  placement.           Most Recent Lab Results In EPIC (For Non-EPIC Providers):    Most Recent 3 CBC's:  Recent Labs   Lab Test 10/05/19  0526  10/04/19  0948 10/04/19  0516   WBC 7.1 6.7 6.6   HGB 12.3* 12.7* 11.9*   MCV 91 92 92    217 188      Most Recent 3 BMP's:  Recent Labs   Lab Test 10/05/19  0527 10/04/19  0516 10/03/19  0556    138 139   POTASSIUM 4.5 4.3 4.4   CHLORIDE 108 108 111*   CO2 24 23 26   BUN 31* 30 20   CR 0.99 1.18 0.90   ANIONGAP 5 7 2*   EDITH 9.4 9.2 8.6   * 203* 214*     Most Recent 3 Troponin's:  Recent Labs   Lab Test 10/01/19  2121 10/01/19  1745 10/01/19  1209  06/10/18  2103   TROPI 0.071* 0.075* 0.069*   < >  --    TROPONIN  --   --   --   --  0.07    < > = values in this interval not displayed.     Most Recent 3 INR's:  Recent Labs   Lab Test 10/04/19  0948   INR 1.16*     Most Recent 2 LFT's:  Recent Labs   Lab Test 10/02/19  1506 10/01/19  1209   AST 65* 25   ALT 56 43   ALKPHOS 36* 52   BILITOTAL 0.6 0.4     Most Recent Cholesterol Panel:  Recent Labs   Lab Test 10/03/18  0849   CHOL 104   LDL 40   HDL 48   TRIG 81     Most Recent 6 Bacteria Isolates From Any Culture (See EPIC Reports for Culture Details):No lab results found.  Most Recent TSH, T4 and HgbA1c:  Recent Labs   Lab Test 08/26/19  0832  10/03/18  0849  01/11/16  0727   TSH  --   --  3.50   < > 4.04*   T4  --   --   --   --  1.13   A1C 8.8*   < > 8.7*   < > 9.3*    < > = values in this interval not displayed.

## 2019-10-05 NOTE — PLAN OF CARE
Pt complains of soreness to his pacer site, taking tylenol with good relief, up independently, voiding, angio site is CD&I, pacer site is covered, VSS, afebrile, Tele 100% V paced, possible discharge today after chest X ray.

## 2019-10-05 NOTE — PLAN OF CARE
A&OX4, denies pain, Teli 100% V-paced, Vss. Up independently in room. Left upper chest/ ICD site is CDI. Post CXR was normal. Plan to discharge home today.

## 2019-10-07 ENCOUNTER — TELEPHONE (OUTPATIENT)
Dept: CARDIOLOGY | Facility: CLINIC | Age: 59
End: 2019-10-07

## 2019-10-07 ENCOUNTER — TELEPHONE (OUTPATIENT)
Dept: INTERNAL MEDICINE | Facility: CLINIC | Age: 59
End: 2019-10-07

## 2019-10-07 DIAGNOSIS — I21.4 NSTEMI (NON-ST ELEVATED MYOCARDIAL INFARCTION) (H): Primary | ICD-10-CM

## 2019-10-07 DIAGNOSIS — Z95.810 ICD (IMPLANTABLE CARDIOVERTER-DEFIBRILLATOR) IN PLACE: Primary | ICD-10-CM

## 2019-10-07 NOTE — TELEPHONE ENCOUNTER
Post device implant discharge phone call.  Reviewed the following:  No raising arm above shoulder on the side of implant for 3 weeks (10-26-19)  Remove outer dressing 3 days after implant.   May shower after outer dressing removed. Leave steri-strips in place, will be removed at 1 week device check  Limit driving for: 3 days  Watch for redness, drainage, warmth, or fever. Call device clinic if any signs of infection.     1 week device check scheduled: Friday October 11th, @ 1pm in [place]     Pt states understanding of all instructions.     [email or web address]

## 2019-10-07 NOTE — TELEPHONE ENCOUNTER
"Hospital/TCU/ED for chronic condition Discharge Protocol    \"Hi, my name is Debbie Negrete RN, a registered nurse, and I am calling from [facility], [place].  I am calling to follow up and see how things are going for you after your recent emergency visit/hospital/TCU stay.\"    Tell me how you are doing now that you are home?\" yeah they put a defibrillator on me. No dizziness. No tenderness. Taking tylenol for mild discomfort. Tolerating medication pretty well. No reports of nausea or vomiting. Reports some excoriated skin under arms and using old Lotrimin cream. Advised to f/u with pcp to discuss and eval. May require stronger steroid cream- patient expressed understanding.      Discharge Instructions    \"Let's review your discharge instructions.  What is/are the follow-up recommendations?  Pt. Response: Follow-up Appointments     Follow-up and recommended labs and tests       Follow up with primary care provider, Jefry Harris, within 7   days for hospital follow- up.  No follow up labs or test are needed.  Follow up with cardiology as directed  Follow up with EP as directed at device clinic       \"Has an appointment with your primary care provider been scheduled?\"   Yes. (confirm)    \"When you see the provider, I would recommend that you bring your medications with you.\"    Medications    \"Tell me what changed about your medicines when you discharged?\"    Changes to chronic meds?    0-1    \"What questions do you have about your medications?\"    None     New diagnoses of heart failure, COPD, diabetes, or MI?    Yes - Care Coordination Referral needed- [facility] f/u scheduled for Wednesday.              Post Discharge Medication Reconciliation Status: discharge medications reconciled and changed, per note/orders (see AVS).    Was MTM referral placed (*Make sure to put transitions as reason for referral)?   No    Call Summary    \"What questions or concerns do you have about your recent visit and " "your follow-up care?\"     none    \"If you have questions or things don't continue to improve, we encourage you contact us through the main clinic number (give number).  Even if the clinic is not open, triage nurses are available 24/7 to help you.     We would like you to know that our clinic has extended hours (provide information).  We also have urgent care (provide details on closest location and hours/contact info)\"      \"Thank you for your time and take care!\"           Debbie SETHI RN, BSN, PHN      "

## 2019-10-08 ENCOUNTER — OFFICE VISIT (OUTPATIENT)
Dept: INTERNAL MEDICINE | Facility: CLINIC | Age: 59
End: 2019-10-08
Payer: COMMERCIAL

## 2019-10-08 VITALS
DIASTOLIC BLOOD PRESSURE: 58 MMHG | OXYGEN SATURATION: 97 % | HEART RATE: 63 BPM | HEIGHT: 69 IN | SYSTOLIC BLOOD PRESSURE: 90 MMHG | TEMPERATURE: 96.6 F | BODY MASS INDEX: 38.76 KG/M2 | WEIGHT: 261.7 LBS

## 2019-10-08 DIAGNOSIS — I21.4 NSTEMI (NON-ST ELEVATED MYOCARDIAL INFARCTION) (H): ICD-10-CM

## 2019-10-08 DIAGNOSIS — E11.9 TYPE 2 DIABETES MELLITUS WITHOUT COMPLICATION, WITH LONG-TERM CURRENT USE OF INSULIN (H): ICD-10-CM

## 2019-10-08 DIAGNOSIS — B35.4 DERMATOPHYTOSIS OF BODY: ICD-10-CM

## 2019-10-08 DIAGNOSIS — Z79.4 TYPE 2 DIABETES MELLITUS WITHOUT COMPLICATION, WITH LONG-TERM CURRENT USE OF INSULIN (H): ICD-10-CM

## 2019-10-08 DIAGNOSIS — I47.29 PAROXYSMAL VENTRICULAR TACHYCARDIA (H): ICD-10-CM

## 2019-10-08 DIAGNOSIS — I10 BENIGN ESSENTIAL HYPERTENSION: ICD-10-CM

## 2019-10-08 DIAGNOSIS — E66.01 SEVERE OBESITY (BMI 35.0-39.9) WITH COMORBIDITY (H): ICD-10-CM

## 2019-10-08 DIAGNOSIS — I42.8 NON-ISCHEMIC CARDIOMYOPATHY (H): Primary | ICD-10-CM

## 2019-10-08 DIAGNOSIS — Z12.11 SCREEN FOR COLON CANCER: ICD-10-CM

## 2019-10-08 DIAGNOSIS — I77.810 ASCENDING AORTA DILATATION (H): ICD-10-CM

## 2019-10-08 LAB — INTERPRETATION ECG - MUSE: NORMAL

## 2019-10-08 PROCEDURE — 99495 TRANSJ CARE MGMT MOD F2F 14D: CPT | Performed by: INTERNAL MEDICINE

## 2019-10-08 RX ORDER — FLASH GLUCOSE SENSOR
1 KIT MISCELLANEOUS
Qty: 6 EACH | Refills: 3 | Status: SHIPPED | OUTPATIENT
Start: 2019-10-08 | End: 2020-11-12

## 2019-10-08 RX ORDER — CLOTRIMAZOLE 1 %
CREAM (GRAM) TOPICAL
Qty: 30 G | Refills: 1 | Status: SHIPPED | OUTPATIENT
Start: 2019-10-08 | End: 2022-06-24

## 2019-10-08 RX ORDER — FLASH GLUCOSE SCANNING READER
1 EACH MISCELLANEOUS SEE ADMIN INSTRUCTIONS
Qty: 1 DEVICE | Refills: 0 | Status: SHIPPED | OUTPATIENT
Start: 2019-10-08 | End: 2019-12-18

## 2019-10-08 RX ORDER — LISINOPRIL 20 MG/1
10 TABLET ORAL DAILY
Qty: 90 TABLET | Refills: 1
Start: 2019-10-08 | End: 2019-10-17

## 2019-10-08 ASSESSMENT — MIFFLIN-ST. JEOR: SCORE: 1987.44

## 2019-10-08 NOTE — PROGRESS NOTES
Subjective     Neymar Rhodes is a 59 year old male who presents to clinic today for the following health issues:    HPI       Hospital Follow-up Visit:    Hospital/Nursing Home/IP Rehab Facility: [facility]  Date of Admission: 10/1/2019  Date of Discharge: 10/5/2019  Reason(s) for Admission:   1.  Non-ischemic CMO     -Elevated troponin.   -chronic LBBB related nonischemic cardiomyopathy with EF to 20 %  -rule out cardiac sarcoid  2. Ventricular tachycardia  S/p CRT-D placement  3.  Hypomagnesemia  4. Diabetes mellitus.    5. Hypertension.           Problems taking medications regularly:  None       Medication changes since discharge: Lasix 20mg and Pacerone 400mg       Problems adhering to non-medication therapy:  None    Summary of hospitalization:  [facility] discharge summary reviewed  Diagnostic Tests/Treatments reviewed.  Follow up needed: none  Other Healthcare Providers Involved in Patient s Care:         None  Update since discharge: improved.     Post Discharge Medication Reconciliation: discharge medications reconciled, continue medications without change.  Plan of care communicated with patient     Coding guidelines for this visit:  Type of Medical   Decision Making Face-to-Face Visit       within 7 Days of discharge Face-to-Face Visit        within 14 days of discharge   Moderate Complexity [id number]   High Complexity [id number]     Since home from hospital, they report that they are feeling better.   Energy level and stamina are slowly improving.    Appetite and intake are improving.   No fevers, no chills  No shortness of breath.   No abdominal pain.   They have not returned to prior routine and activities.           2.  In regards specifically to the patient's diabetes, they reports no unusual symptoms.    *  Was receiving sliding scale insulin while in hiospital  Was only ttaking 10 units once per day priro to hospitla, not specifically resumed at discharge.        Medication compliance: compliant most of the time  Diabetic diet compliance: compliant most of the time  Diabetic ROS: no polyuria or polydipsia, no chest pain, dyspnea or TIA's, no numbness, tingling or pain in extremities    Home blood sugar monitoring: are performed regularly    No significant or regular episodes of hypo- or hyperglycemia    Diabetic complications: coronary artery disease      Most  recent labs:    Lab Results   Component Value Date    A1C 8.8 08/26/2019    A1C 9.7 04/10/2019    A1C 8.7 10/03/2018    A1C 7.6 06/11/2018    A1C 6.9 12/01/2017    A1C 9.3 05/15/2017    A1C 8.7 01/16/2017    A1C 8.2 10/19/2016    A1C 8.7 06/30/2016    A1C 9.3 01/11/2016    A1C 7.7 07/17/2015    A1C 6.8 01/26/2015    A1C 6.4 10/16/2014    A1C 8.9 06/11/2014    A1C 7.8 12/30/2013    A1C 6.8 08/02/2013    A1C 7.0 02/14/2013    A1C 7.1 08/10/2012    A1C 8.4 03/30/2012    A1C 8.9 08/22/2011    A1C 8.4 03/08/2011    A1C 6.8 10/16/2010    A1C 6.8 04/09/2010    A1C 7.0 05/21/2009    A1C 7.0 12/23/2008    A1C 6.6 09/03/2008    A1C 7.1 06/09/2008    A1C 7.0 02/15/2008    A1C 7.2 10/24/2007    A1C 7.2 06/29/2007    A1C 6.9 03/29/2007    A1C 6.4 12/07/2006    A1C 6.6 06/01/2006    A1C 8.2 02/27/2006    A1C 8.3 12/15/2005    A1C 7.3 03/22/2005    A1C 7.3 11/29/2004    A1C 7.4 08/05/2004    A1C 6.9 10/27/2003    A1C 6.0 12/24/2002    A1C 6.0 08/07/2002        3.    The patient has had a history of ongoing obesity.  Reviewed the weigth curves.   Their current BMI is:  Body mass index is 38.65 kg/m .  Reviewed previous attempts at weight loss which have not been successful in producing prolonged weigth loss.   Discussed current eating and exercise habits.     Reviewed weights in chart:  Wt Readings from Last 10 Encounters:   10/09/19 117.9 kg (260 lb)   10/08/19 118.7 kg (261 lb 11.2 oz)   10/05/19 117.7 kg (259 lb 8 oz)   10/01/19 125.1 kg (275 lb 12.8 oz)   08/30/19 127 kg (280 lb)   07/02/19 127 kg (280 lb)   04/15/19 127 kg  "(280 lb)   10/12/18 120.2 kg (264 lb 14.4 oz)   10/03/18 126.3 kg (278 lb 6.4 oz)   06/20/18 123.1 kg (271 lb 6.4 oz)        4.    Blood presure remains well controlled at home  Readings outside clinic are within normal limits.  Reviewed last 6 BP readings in chart:  BP Readings from Last 6 Encounters:   10/09/19 96/60   10/08/19 90/58   10/05/19 104/62   10/01/19 100/66   08/30/19 114/60   07/02/19 102/58     He has not experienced any significant side effects from medicaiotns for hypertension.    NO active cardiac complaints or symptoms with exercise.       Reviewed and updated as needed this visit by Provider         Review of Systems         Objective    BP 90/58   Pulse 63   Temp 96.6  F (35.9  C) (Temporal)   Ht 1.753 m (5' 9\")   Wt 118.7 kg (261 lb 11.2 oz)   SpO2 97%   BMI 38.65 kg/m    Body mass index is 38.65 kg/m .  Physical Exam                 Past Medical History:  ---------------------------  Past Medical History:   Diagnosis Date     Ascending aorta dilatation (H)      Diverticulosis of colon (without mention of hemorrhage)      Essential hypertension, benign      Gout, unspecified      LBBB (left bundle branch block)      Morbid obesity (H)      Non-ischemic cardiomyopathy (H)      Nonrheumatic mitral valve regurgitation      NSTEMI (non-ST elevated myocardial infarction) (H)     cardiac cath 6/2018: mild non-obstructive CAD     Other and unspecified hyperlipidemia      Type II or unspecified type diabetes mellitus without mention of complication, not stated as uncontrolled        Past Surgical History:  ---------------------------  Past Surgical History:   Procedure Laterality Date     C APPENDECTOMY  1980's     CV RIGHT HEART CATH N/A 10/2/2019    Procedure: Right Heart Cath;  Surgeon: Kathy Almaguer MD;  Location: [facility] CARDIAC CATH LAB     EP ICD N/A 10/4/2019    Procedure: EP ICD;  Surgeon: Jayy Dorman MD;  Location:  HEART CARDIAC CATH LAB     EP LEAD PLCMNT LV NEW ICD " N/A 10/4/2019    Procedure: EP Lead Plcmnt LV New ICD;  Surgeon: Jayy Dorman MD;  Location:  HEART CARDIAC CATH LAB     HEART CATH CORONARY ANGIOGRAM WITHOUT PRESSURES  06/10/2018    normal coronary angiogram, nothing more than 10%     SURGICAL HISTORY OF -   2001    repair of colovesicular fistula       Current Medications:  ---------------------------  Current Outpatient Medications   Medication Sig Dispense Refill     albuterol (PROAIR HFA/PROVENTIL HFA/VENTOLIN HFA) 108 (90 Base) MCG/ACT Inhaler Inhale 2 puffs into the lungs every 6 hours as needed for shortness of breath / dyspnea or wheezing       amiodarone (PACERONE) 400 MG tablet Take 1 tablet (400 mg) by mouth 2 times daily for 7 days 14 tablet 0     [START ON 10/13/2019] amiodarone (PACERONE) 400 MG tablet Take 1 tablet (400 mg) by mouth daily 30 tablet 1     aspirin 81 MG tablet Take 1 tablet (81 mg) by mouth daily 30 tablet      atorvastatin (LIPITOR) 80 MG tablet Take 1/2 (one-half) tablet by mouth at bedtime. 45 tablet 0     BD ULTRA-FINE 29G X 12.7MM insulin pen needle use once daily with victoza pen 100 each 1     blood glucose monitoring (ACCU-CHEK LUL PLUS) test strip Use to test blood sugar 1-3 times daily or as directed. 100 each 11     clotrimazole (LOTRIMIN) 1 % cream Apply topically 2 times daily To axillary rash (Patient taking differently: Apply topically 2 times daily as needed To axillary rash) 15 g 1     continuous blood glucose monitoring (FREESTYLE JOCELIN) sensor For use with Freestyle Jocelin Flash  for continuous monitioring of blood glucose levels. Replace sensor every 10 days. 3 each 3     fenofibrate 160 MG tablet TAKE ONE TABLET BY MOUTH ONE TIME DAILY  90 tablet 3     fluticasone (FLONASE) 50 MCG/ACT nasal spray Spray 2 sprays into both nostrils daily (Patient taking differently: Spray 2 sprays into both nostrils daily as needed ) 16 g 0     furosemide (LASIX) 20 MG tablet Take 1 tablet (20 mg) by mouth daily 30  tablet 1     glipiZIDE (GLUCOTROL XL) 10 MG 24 hr tablet TAKE 1 TABLET (10 MG) BY MOUTH DAILY. TAKE WITH LARGEST MEAL OF THE DAY. ALWAYS TAKE WITH FOOD 90 tablet 0     liraglutide (VICTOZA PEN) 18 MG/3ML solution Inject 1.8 mg Subcutaneous daily 9 mL 0     lisinopril (PRINIVIL/ZESTRIL) 20 MG tablet Take 1 tablet (20 mg) by mouth daily 90 tablet 1     metFORMIN (GLUCOPHAGE) 1000 MG tablet Take 1 tablet (1,000 mg) by mouth 2 times daily (with meals) 180 tablet 3     metoprolol succinate ER (TOPROL-XL) 25 MG 24 hr tablet TAKE ONE TABLET BY MOUTH IN THE EVENING  30 tablet 10     spironolactone (ALDACTONE) 25 MG tablet TAKE ONE TABLET BY MOUTH ONE TIME DAILY  30 tablet 10     triamcinolone (KENALOG) 0.5 % cream Apply sparingly once or twice per day as needed to affected area until the skin is better, then stop 30 g 0     BD ULTRA-FINE 29G X 12.7MM insulin pen needle use once daily with victoza pen 100 each 1       Allergies:  -------------  Allergies   Allergen Reactions     No Known Drug Allergies        Social History:  -------------------  Social History     Socioeconomic History     Marital status:      Spouse name: Not on file     Number of children: Not on file     Years of education: Not on file     Highest education level: Not on file   Occupational History     Not on file   Social Needs     Financial resource strain: Not on file     Food insecurity:     Worry: Not on file     Inability: Not on file     Transportation needs:     Medical: Not on file     Non-medical: Not on file   Tobacco Use     Smoking status: Never Smoker     Smokeless tobacco: Never Used   Substance and Sexual Activity     Alcohol use: Yes     Comment: 1 glass of beer on Fri     Drug use: No     Sexual activity: Yes     Partners: Female   Lifestyle     Physical activity:     Days per week: Not on file     Minutes per session: Not on file     Stress: Not on file   Relationships     Social connections:     Talks on phone: Not on file      "Gets together: Not on file     Attends [employer] service: Not on file     Active member of club or organization: Not on file     Attends meetings of clubs or organizations: Not on file     Relationship status: Not on file     Intimate partner violence:     Fear of current or ex partner: Not on file     Emotionally abused: Not on file     Physically abused: Not on file     Forced sexual activity: Not on file   Other Topics Concern     Parent/sibling w/ CABG, MI or angioplasty before 65F 55M? Not Asked   Social History Narrative     Not on file       Family Medical History:  ------------------------------  Family History   Problem Relation Age of Onset     Cancer Father 75        bladder cancer     Myocardial Infarction Father      Other - See Comments Father         smoking     Breast Cancer Mother      Breast Cancer Sister      Family History Negative Brother      Family History Negative Son      Family History Negative Son         fluttering/murmur     Asthma Son          ROS:  REVIEW OF SYSTEMS: (since discharge from hospital)    RESP: negative for cough, dyspnea, wheezing, hemoptysis  CV: negative for chest pain, palpitations, PND, JOSHI, orthopnea; reports no significant changes in their ability to perform physical activity (from cardiovascular standpoint)  GI: negative for dysphagia, N/V, pain, melena, diarrhea and constipation  NEURO: negative for new numbness/tingling, paralysis, incoordination, or focal weakness     OBJECTIVE:                                                    BP 90/58   Pulse 63   Temp 96.6  F (35.9  C) (Temporal)   Ht 1.753 m (5' 9\")   Wt 118.7 kg (261 lb 11.2 oz)   SpO2 97%   BMI 38.65 kg/m       GENERAL alert and no distress  EYES:  Normal sclera,conjunctiva, EOMI  HENT: oral and posterior pharynx without lesions or erythema, facies symmetric  NECK: Neck supple. No LAD, without thyroidmegaly.  RESP: Clear to ausculation bilaterally without wheezes or crackles. Normal BS in all " fields.  CV: RRR normal S1S2 without murmurs, rubs or gallops.  LYMPH: no cervical lymph adenopathy appreciated  MS: extremities- no gross deformities of the visible extremities noted,   EXT:  no lower extremity edema  PSYCH: Alert and oriented times 3; speech- coherent  SKIN:  No obvious significant skin lesions on visible portions of face          ASSESSMENT/PLAN:                                                      (I42.8) Non-ischemic cardiomyopathy (H)  (primary encounter diagnosis)  Comment: This condition is currently controlled on the current medical regimen.  Continue current therapy.  No CHF at this time./   Continue all medications at the same doses.  Contact your usual pharmacy if you need refills.   Plan: lisinopril (PRINIVIL/ZESTRIL) 20 MG tablet            (E11.9,  Z79.4) Type 2 diabetes mellitus without complication, with long-term current use of insulin (H)  Comment: Reduce Levemir, watch glucose levels closely.  Otherwise relatively well controlled  Discussed importance in compliance in all areas of diabetic control including diet, routine BS checks, absolute medication compliance, laboratory monitoring, and attending regular follow up appointments as ordered.  Failure to comply with instructions regarding diabetes will lead to a greater chance of poor diabetic control and therefore a greater chance of diabetes related complications such as CAD, CVA, PVD, and retinopathy/neuropathy/nephropathy.  Based on level of diabetes control: testing frequency UP TO THREE TIME PER DAY   Plan: insulin detemir (LEVEMIR PEN) 100 UNIT/ML pen,         Continuous Blood Gluc  (FREESTYLE JOCELIN        14 DAY READER) LUTHER, Continuous Blood Gluc         Sensor (FREESTYLE JOCELIN 14 DAY SENSOR) MISC,         Hemoglobin A1c, Basic metabolic panel            (I10) Benign essential hypertension  Comment: Reduce dose of lisinopril.  Continue all other medications at the same doses.  Discussed current hypertension  treatment guidelines, including indications for treatment and treatment options.  Discussed the importance for aggressive management of HTN to prevent vascular complications later.  Recommended lower fat, lower carbohydrate, and lower sodium (<2000 mg)diet.  Discussed required intervals for follow up on HTN, lab studies.  Recommened pt. follow their blood pressures outside the clinic to ensure that BPs are remaining within guidelines, and to contact me if the readings are not within guidelines on a regular basis so we can adjust treatment as needed.   Plan: lisinopril (PRINIVIL/ZESTRIL) 20 MG tablet            (I77.810) Ascending aorta dilatation (H)  Comment: continue to monitor, ,Discussed secondary risk factor modification and recommended continuing aggressive management of these items.   Plan:     (I47.2) Paroxysmal ventricular tachycardia (H)  Comment: This condition is currently controlled on the current medical regimen.  Continue current therapy.   AICD in place now.   Plan:     (E66.01) Severe obesity (BMI 35.0-39.9) with comorbidity (H)  Comment: The patient's current BMI is: Body mass index is 38.65 kg/m ..  Reviewed their weight patterns.   Discussed obesity as a biological preventable and treatable disease, which is associated with significantly increased risk of many acute and chronic health conditions. Obesity has now been recognized as a chronic disease by the American Medical Association.  Discussed serious co-morbidities associated with obesity including increased risk for hypertension, stroke, coronary artery disease, dyslipidemia, Type II diabetes, depression, sleep apnea, cancers of the colon, breast and endometrium, obstructive sleep apnea, osteoarthritis and female infertility.  Recommended regular aerobic exercise, recommended improved diet aiming at lowering amount of processed foods, lower sugars, and lower wheat products, and moderation carbs and fat in the diet, establishing more regular  meal times, always eating breakfast, front loading some of the calories and adding more protein to the diet.     Briefly discussed bariatric surgery as a consideration, especially in patients with BMI greater than or equal to 35 kg/m2 with multiple complications.     Plan:     (I21.4) NSTEMI (non-ST elevated myocardial infarction) (H)  Comment: resovled, Discussed secondary risk factor modification and recommended continuing aggressive management of these items.   Plan:     (B35.4) Dermatophytosis of body  Comment:   Plan: clotrimazole (LOTRIMIN) 1 % external cream            (Z12.11) Screen for colon cancer  Comment: Discussed current colon cancer screening recommendations.  Recommended colonoscopy as the gold standard for colon cancer screening.  Instructed them to check with their insurance coverage to see what is covered, and then referral given for colonoscopy.    If colonoscopy not covered or the patient does not want to do this study and the patient is average risk for colon cancer, then I recommended either the ColoGuard stool test every 3 years and the FIT test annually.  I explained that a positive test for either of these tests does not necessarily mean colon cancer, it just means that they will need a more advanced test like colonoscopy.      I discussed current recommendations about colon cancer screening recommending colonoscopy as gold standard test, starting age 50 (age 40 for family history of colon cancer).  The patient is declining to do colonoscopy at this time.   They will agree to FIT testing annually.   Would reconsider colonoscopy or ColoGuard if the FIT test is positive.      Plan:        See Patient Instructions    FRANCHESKA GRUBER M.D., MD  [facility]    (Chart documentation may have been completed, in part, with [employer] voice-recognition software. Even though reviewed, some grammatical, spelling, and word errors may remain.)

## 2019-10-08 NOTE — NURSING NOTE
"Chief Complaint   Patient presents with     Hospital F/U     BP 90/58   Pulse 63   Temp 96.6  F (35.9  C) (Temporal)   Ht 1.753 m (5' 9\")   Wt 118.7 kg (261 lb 11.2 oz)   SpO2 97%   BMI 38.65 kg/m   Estimated body mass index is 38.65 kg/m  as calculated from the following:    Height as of this encounter: 1.753 m (5' 9\").    Weight as of this encounter: 118.7 kg (261 lb 11.2 oz).  Medication Reconciliation: complete      Health Maintenance that is potentially due pending provider review:  Colonoscopy/FIT    Pt will discuss Colonoscopy with provider.    RODRIGUEZ Panchal  "

## 2019-10-08 NOTE — PATIENT INSTRUCTIONS
*  Reduce the Lisinopril to 10 mg once per day    *  Continue the Levemir Insulin 10 units once per day    *  Monitor the glucose levels closely.  Let us know if they are below 100 or above 200 on a regular basis.     *  Continue all other medications at the same doses.  Contact your usual pharmacy if you need refills.     *  Investigate Freestyle Jeffrey glucose monitoring system.  This system allows for continuous glucose monitoring up to 10 or 14 days and will show you the ways your glucose will fluctuate over this period by measuring your glucose every minute.  The sensor is applied to the back of your arm, and sits in place for up to 10 or 14 days, depending on which version you get.  You can really see how your diet and lifestyle changes affect your glucose levels.    If covered or reasonable expense, please consider getting it.    Check their web site for more details:  [email or web address]     *  Follow up with the Cardiology Clinic as planned tomorrow.        Colon Cancer Screening:  ============================================================  *  All men and women are recommended to have some sort of formal colon cancer screening starting at age of 50 (or earlier if indicated based on family history of colon cancer.     *  Colon cancer is a preventable type of cancer that if caught early can be easily treated even before it becomes cancer by removing the precancerous.      *  Common Colon cancer screening options:     --Colonoscopy (every 10 years if normal exam, no family history of colon cancer)  I place order and you will be contacted to arrange this procedure.   Pros: most complete and thorough exam, identify and remove any pre-cancerous polyps.   Cons: prep before procedure, sedation required, possible cost     --ColoGuard Stool test every 3 years (be sure to confirm coverage by insurance).  This test checks for colon cancer specific DNA in the stool.  You will receive the collection kit in  "the mail.  Return the samples via mail.  If positive, you do not necessarily have colon cancer, but will need a colonoscopy.  Most insurance cover this test, but please check with your insurance to confirm this.    Go their web site for more information:  [email or web address]  Pros: easy to collect and return, decent specific screening test, newer test  Cons: cost (if not covered by insurance)         --\"FIT\" Stool test once per year.    Return the \"FIT\" stool card test to us via mail.  This is a basic level screening for colon cancer by detecting trace amount of occult blood in the intestinal tract.  If the FIT test shows any traces of occult blood, it does NOT necessarily mean that you have colon cancer, it just means that we should probably consider doing the colonoscopy.   Pros: easy to collect, cheap  Cons: not very specific, high false positive rate          "

## 2019-10-09 ENCOUNTER — OFFICE VISIT (OUTPATIENT)
Dept: CARDIOLOGY | Facility: CLINIC | Age: 59
End: 2019-10-09
Payer: COMMERCIAL

## 2019-10-09 ENCOUNTER — CARE COORDINATION (OUTPATIENT)
Dept: CARDIOLOGY | Facility: CLINIC | Age: 59
End: 2019-10-09

## 2019-10-09 ENCOUNTER — PATIENT OUTREACH (OUTPATIENT)
Dept: CARE COORDINATION | Facility: CLINIC | Age: 59
End: 2019-10-09

## 2019-10-09 VITALS
DIASTOLIC BLOOD PRESSURE: 60 MMHG | SYSTOLIC BLOOD PRESSURE: 96 MMHG | WEIGHT: 260 LBS | HEART RATE: 60 BPM | OXYGEN SATURATION: 98 % | HEIGHT: 69 IN | BODY MASS INDEX: 38.51 KG/M2

## 2019-10-09 DIAGNOSIS — Z79.899 ON AMIODARONE THERAPY: Primary | ICD-10-CM

## 2019-10-09 DIAGNOSIS — I42.8 NONISCHEMIC CARDIOMYOPATHY (H): ICD-10-CM

## 2019-10-09 DIAGNOSIS — I10 BENIGN ESSENTIAL HYPERTENSION: ICD-10-CM

## 2019-10-09 DIAGNOSIS — I47.29 PAROXYSMAL VENTRICULAR TACHYCARDIA (H): ICD-10-CM

## 2019-10-09 DIAGNOSIS — I42.8 NON-ISCHEMIC CARDIOMYOPATHY (H): Primary | ICD-10-CM

## 2019-10-09 DIAGNOSIS — I42.8 NON-ISCHEMIC CARDIOMYOPATHY (H): ICD-10-CM

## 2019-10-09 DIAGNOSIS — I42.8 NONISCHEMIC CARDIOMYOPATHY (H): Primary | ICD-10-CM

## 2019-10-09 LAB
ANION GAP SERPL CALCULATED.3IONS-SCNC: 13.2 MMOL/L (ref 6–17)
BUN SERPL-MCNC: 55 MG/DL (ref 7–30)
CALCIUM SERPL-MCNC: 10.5 MG/DL (ref 8.5–10.5)
CHLORIDE SERPL-SCNC: 104 MMOL/L (ref 98–107)
CO2 SERPL-SCNC: 22 MMOL/L (ref 23–29)
CREAT SERPL-MCNC: 1.57 MG/DL (ref 0.7–1.3)
GFR SERPL CREATININE-BSD FRML MDRD: 45 ML/MIN/{1.73_M2}
GLUCOSE SERPL-MCNC: 219 MG/DL (ref 70–105)
NT-PROBNP SERPL-MCNC: 589 PG/ML (ref 0–125)
POTASSIUM SERPL-SCNC: 5.2 MMOL/L (ref 3.5–5.1)
SODIUM SERPL-SCNC: 134 MMOL/L (ref 136–145)
T4 FREE SERPL-MCNC: 1.14 NG/DL (ref 0.76–1.46)
TSH SERPL DL<=0.005 MIU/L-ACNC: 6.88 MU/L (ref 0.4–4)

## 2019-10-09 PROCEDURE — 36415 COLL VENOUS BLD VENIPUNCTURE: CPT | Performed by: PHYSICIAN ASSISTANT

## 2019-10-09 PROCEDURE — 83880 ASSAY OF NATRIURETIC PEPTIDE: CPT | Performed by: PHYSICIAN ASSISTANT

## 2019-10-09 PROCEDURE — 84443 ASSAY THYROID STIM HORMONE: CPT | Performed by: PHYSICIAN ASSISTANT

## 2019-10-09 PROCEDURE — 99214 OFFICE O/P EST MOD 30 MIN: CPT | Performed by: PHYSICIAN ASSISTANT

## 2019-10-09 PROCEDURE — 84439 ASSAY OF FREE THYROXINE: CPT | Performed by: PHYSICIAN ASSISTANT

## 2019-10-09 PROCEDURE — 80048 BASIC METABOLIC PNL TOTAL CA: CPT | Performed by: PHYSICIAN ASSISTANT

## 2019-10-09 RX ORDER — SPIRONOLACTONE 25 MG/1
12.5 TABLET ORAL DAILY
Qty: 30 TABLET | Refills: 10 | COMMUNITY
Start: 2019-10-09 | End: 2019-10-17

## 2019-10-09 RX ORDER — FUROSEMIDE 20 MG
10 TABLET ORAL DAILY
Qty: 30 TABLET | Refills: 1 | COMMUNITY
Start: 2019-10-09 | End: 2019-10-24

## 2019-10-09 ASSESSMENT — MIFFLIN-ST. JEOR: SCORE: 1979.73

## 2019-10-09 NOTE — PROGRESS NOTES
[facility]    Received VORB from Nicole CHRISTIANSON for patient to have PET scan to eval for cardiac sarcoidosis in setting of abnormal CMRI, with follow-up with Dr. Cedeno to review imaging and plan. Orders placed for same.    Messaged Collins Foley CMA at [facility] to request his help with making arrangements for above. Appreciate help of [facility] team!    Adelita Messer RN BSN   2:10 PM 10/09/19      Received response from Collins HAYES w/ [facility]: he agreed to assist with above.     Adelita Messer RN BSN   5:00 PM 10/09/19

## 2019-10-09 NOTE — PROGRESS NOTES
Cardiology Clinic Progress Note    Neymar Rhodes MRN# [id number]   YOB: 1960 Age: 59 year old   Primary cardiologist: Dr. Hernandes         Assessment and Plan:     In summary, Neymar Rhodes presents today for a CORE clinic enrollment and hospital f/u visit. He was just discharged four days ago on his PTA meds + Lasix 20. His lisinopril was reduced yesterday by his PCP due to hypotension. Today, he remains mildly hypotensive, and labs are suggestive of pre-renal azotemia. He's asymptomatic with this. He has significantly altered his diet and also mistakenly fasted for his labs today which is probably contributing.    1. HFrEF    CMR: EF 25%, LVEDd 6.9 cm    ACC/AHA Stage: D; FC II-III     Etiology: suspect sarcoid; ETOH, LBBB likely contributing.    RV: nml    Valves: OK    Volume: Dry  - Admit Wt: 275 lbs  - Current Wt: 260 lbs   - Dry Wt: suspect ~ 263-265 lbs  - Diuretics: Lasix 20 mg daily, Cameron 25  - proBNP: 589    Rx: lisinopril 10 (reduced 10/8), Toprol 25 at bedtime, Cameron 25    Rhythm: SR / paroxysmal VT - on AMIO    QRS: native wide 174 ms (LBBB)    Device? CRT-D    Code status: Full    Creatinine: 1.5 (baseline ~1), K+ 5.2 - lisinopril reduced 10/8    CHF Admissions: 6/2018, 10/2019    Contributing  - Anemia / Fe studies: Mildly anemic, no Fe studies noted  - TFT's: TSH 6.8 (high), T4F WNL  - BELA: unknown - pt declined PSG in past    Plan:  - Reduce Lasix to 10 and Mcclusky to 12.5 mg daily for now.    Next visit will aim to get him back on his PTA regimen of lisinopril 20 + Toprol 25 + Mcclusky 25. He may not need Lasix long-term if he remains compliant with dietary recommendations.     - Device check 10/11. May need to empirically start steroids if he's having more arrhythmias. No symptoms to suggest this.   - Will review TFT's with Dr. Hernandes. Plan is to reduce amio to 200 mg daily on 10/13.  - Will order PET scan and referral to Dr. Cedeno for probable cardiac sarcoidosis.  - Must  abstain from driving x 3 months. Advised to obtain short-term disability paperwork from his employer.   - Complete ETOH cessation reinforced. Low sodium diet, daily wts. He will call me with 2+ lb wt gain overnight.     Follow-up:  - Myself or Marika in 1 week + BMP. Advised not to fast and to take all meds prior to that visit.  - Dr. Hernandes on 12/2.         History of Presenting Illness:      Neymar Rhodes is a pleasant 59 year old patient who presents today for a CORE clinic enrollment visit.    The patient has a history of the following -   # Severe nonischemic cardiomyopathy with EF of 15% with marked LV dilatation  # Sustained monomorphic ventricular tachycardia, s/p CRT-D  # Potential cardiac sarcoidosis  # Large LBBB  # Nonobstructive coronary artery disease  # DMII  # Obesity - Body mass index is 38.4 kg/m .    Neymar was first seen in 2018 with new diagnosis of heart failure with reduced ejection fraction when his EF was noted to be around 40% by Dr. Iniguez.  Coronary angiography at that time had shown nonobstructive coronary artery disease.  MRI had confirmed an EF of 40% in the setting of normal sinus rhythm with a large left bundle branch block.  He was started on guideline directed therapy with beta-blockers, ACE inhibitors and mineralocorticoid receptor antagonist.  He had an MRI subsequently which showed that he had scar concerning for sarcoidosis.  He failed to follow up after that as he was feeling fairly well, but did remain on his medications. He did well for about a year until he began to develop progressive dyspnea which resulted in his admission on 10/1. ECHO showed an EF<20% with an LVEDd of 6.9 cm. The following day, while seated, he developed a sustained monomorphic VT ~180 bpm.  This was cardioverted emergently into normal sinus rhythm with first shock at 200J. From there he went to the cath lab where his coronary anatomy was unchanged. RHC showed mildly elevated filling pressures with  "an LVEDP of 21 and a low-normal cardiac output. Cardiac MRI was repeated and showed LGE in septal, inferior, lateral base with a non-CAD scar in the anterolateral mid-wall with a worsening scar burden to 13%. He was loaded with amiodarone and received CRT-D on 10/4. He was discharged the following day on his PTA regimen with the addition of furosemide 20 mg daily.    Yesterday he saw his PCP who reduced his lisinopril from 20 to 10 mg daily due to hypotension.     Today, he presents with his wife Julisa. He is mildly hypotensive and hasn't taken any of his meds yet today. His weight has slowly declined since he's been home. He is understandably a little deconditioned from his hospital stay but was able to do some yardwork yesterday without difficulty. He denies chest pain, PND, orthopnea, edema, claudication, palpitations, near syncope or syncope since discharge. His pacemaker site is healing well. He's significantly altered his diet since home. For example, he was eating 4 pieces of toast with peanut butter for breakfast PTA. Now he's eating oatmeal for breakfast with a little sugar. He is reading labels / monitoring his sodium intake with the help of his wife, and weighing himself daily. He's avoiding ETOH, but prior to admission was drinking heavily (sallie aguilar and at least 20 beers every weekend - tells me this is after he \"cut back\"). He works as a .  BMP today shows a creatinine of 1.5 (up from 1) and a BUN of 55. K+ is 5.2, Na 134. TSH is a little high at 6.8, which is up from 3.5 during admission. T4F WNL.         Review of Systems:     12-pt ROS is negative except for as noted in the HPI.          Physical Exam:     Vitals: BP 96/60   Pulse 60   Ht 1.753 m (5' 9\")   Wt 117.9 kg (260 lb)   SpO2 98%   BMI 38.40 kg/m    Wt Readings from Last 10 Encounters:   10/09/19 117.9 kg (260 lb)   10/08/19 118.7 kg (261 lb 11.2 oz)   10/05/19 117.7 kg (259 lb 8 oz)   10/01/19 125.1 kg (275 lb 12.8 " oz)   08/30/19 127 kg (280 lb)   07/02/19 127 kg (280 lb)   04/15/19 127 kg (280 lb)   10/12/18 120.2 kg (264 lb 14.4 oz)   10/03/18 126.3 kg (278 lb 6.4 oz)   06/20/18 123.1 kg (271 lb 6.4 oz)       Constitutional:  Patient is pleasant, alert, cooperative, and in NAD.  HEENT:  NCAT. PERRLA. EOM's intact.   Neck:  CVP is difficult to assess due to body habitus.    Pulmonary: Normal respiratory effort. CTAB.   Cardiac: RRR, normal S1/S2, no S3/S4, no murmur or rub. Distant heart tones. L upper chest device incision C/D/I, with surrounding ecchymosis but no erythema or edema, minimal TTP.   Abdomen:  Non-tender abdomen, no hepatosplenomegaly appreciated.   Vascular: Pulses in the upper and lower extremities are 2+ and equal bilaterally.  Extremities: No edema, erythema, cyanosis or tenderness appreciated.  Skin:  No rashes or lesions appreciated.   Neurological:  No gross motor or sensory deficits.   Psych: Appropriate affect.        Data:     Labs reviewed:  Recent Labs   Lab Test 10/03/18  0849 06/11/18  0420 06/11/18  0040 01/20/17  0911 01/16/17  1408   LDL 40 59  --  48  --    HDL 48 46  --  43  --    NHDL 56 82  --  66  --    CHOL 104 128  --  109  --    TRIG 81 113  --  91  --    TSH 3.50  --  1.78  --  2.59       Lab Results   Component Value Date    WBC 7.1 10/05/2019    RBC 3.94 (L) 10/05/2019    HGB 12.3 (L) 10/05/2019    HCT 35.9 (L) 10/05/2019    MCV 91 10/05/2019    MCH 31.2 10/05/2019    MCHC 34.3 10/05/2019    RDW 13.0 10/05/2019     10/05/2019       Lab Results   Component Value Date     (L) 10/09/2019    POTASSIUM 5.2 (H) 10/09/2019    CHLORIDE 104 10/09/2019    CO2 22 (L) 10/09/2019    ANIONGAP 13.2 10/09/2019     (H) 10/09/2019    BUN 55 (H) 10/09/2019    CR 1.57 (H) 10/09/2019    GFRESTIMATED 45 (L) 10/09/2019    GFRESTBLACK 55 (L) 10/09/2019    EDITH 10.5 10/09/2019      Lab Results   Component Value Date    AST 65 (H) 10/02/2019    ALT 56 10/02/2019       Lab Results    Component Value Date    A1C 8.8 (H) 08/26/2019       Lab Results   Component Value Date    INR 1.16 (H) 10/04/2019           Problem List:     Patient Active Problem List   Diagnosis     Diverticulosis of large intestine     Benign essential hypertension     Gout     Severe obesity (BMI 35.0-39.9) with comorbidity (H)     Type 2 diabetes mellitus without complication, without long-term current use of insulin (H)     Hyperlipidemia LDL goal <100     NSTEMI (non-ST elevated myocardial infarction) (H)     Non-ischemic cardiomyopathy (H)     LBBB (left bundle branch block)     Nonrheumatic mitral valve regurgitation     Ascending aorta dilatation (H)     Coronary artery disease involving native coronary artery of native heart without angina pectoris     Near syncope     Paroxysmal ventricular tachycardia (H)           Medications:     Current Outpatient Medications   Medication Sig Dispense Refill     amiodarone (PACERONE) 400 MG tablet Take 1 tablet (400 mg) by mouth 2 times daily for 7 days 14 tablet 0     aspirin 81 MG tablet Take 1 tablet (81 mg) by mouth daily 30 tablet      atorvastatin (LIPITOR) 80 MG tablet Take 1/2 (one-half) tablet by mouth at bedtime. 45 tablet 0     BD ULTRA-FINE 29G X 12.7MM insulin pen needle use once daily with victoza pen 100 each 1     BD ULTRA-FINE 29G X 12.7MM insulin pen needle use once daily with victoza pen 100 each 1     blood glucose monitoring (ACCU-CHEK LUL PLUS) test strip Use to test blood sugar 1-3 times daily or as directed. 100 each 11     clotrimazole (LOTRIMIN) 1 % external cream Apply sparingly once or twice per day as needed to affected area until the skin is better, then stop; REPEAT AS NEEDED 30 g 1     Continuous Blood Gluc  (FREESTYLE JOCELIN 14 DAY READER) LUTHER 1 each See Admin Instructions 1 Device 0     Continuous Blood Gluc Sensor (FREESTYLE JOCELIN 14 DAY SENSOR) MISC 1 each every 14 days 6 each 3     continuous blood glucose monitoring (FREESTYLE  JOCELIN) sensor For use with Freestyle Jocelin Flash  for continuous monitioring of blood glucose levels. Replace sensor every 10 days. 3 each 3     fenofibrate 160 MG tablet TAKE ONE TABLET BY MOUTH ONE TIME DAILY  90 tablet 3     fluticasone (FLONASE) 50 MCG/ACT nasal spray Spray 2 sprays into both nostrils daily (Patient taking differently: Spray 2 sprays into both nostrils daily as needed ) 16 g 0     furosemide (LASIX) 20 MG tablet Take 1 tablet (20 mg) by mouth daily 30 tablet 1     glipiZIDE (GLUCOTROL XL) 10 MG 24 hr tablet TAKE 1 TABLET (10 MG) BY MOUTH DAILY. TAKE WITH LARGEST MEAL OF THE DAY. ALWAYS TAKE WITH FOOD 90 tablet 0     insulin detemir (LEVEMIR PEN) 100 UNIT/ML pen Inject 10 Units Subcutaneous At Bedtime 18 mL 0     liraglutide (VICTOZA PEN) 18 MG/3ML solution Inject 1.8 mg Subcutaneous daily 9 mL 0     lisinopril (PRINIVIL/ZESTRIL) 20 MG tablet Take 0.5 tablets (10 mg) by mouth daily 90 tablet 1     metFORMIN (GLUCOPHAGE) 1000 MG tablet Take 1 tablet (1,000 mg) by mouth 2 times daily (with meals) 180 tablet 3     metoprolol succinate ER (TOPROL-XL) 25 MG 24 hr tablet TAKE ONE TABLET BY MOUTH IN THE EVENING  30 tablet 10     spironolactone (ALDACTONE) 25 MG tablet TAKE ONE TABLET BY MOUTH ONE TIME DAILY  30 tablet 10     triamcinolone (KENALOG) 0.5 % cream Apply sparingly once or twice per day as needed to affected area until the skin is better, then stop 30 g 0     albuterol (PROAIR HFA/PROVENTIL HFA/VENTOLIN HFA) 108 (90 Base) MCG/ACT Inhaler Inhale 2 puffs into the lungs every 6 hours as needed for shortness of breath / dyspnea or wheezing       [START ON 10/13/2019] amiodarone (PACERONE) 400 MG tablet Take 1 tablet (400 mg) by mouth daily (Patient not taking: Reported on 10/9/2019) 30 tablet 1           Past Medical History:     Past Medical History:   Diagnosis Date     Ascending aorta dilatation (H)      Diverticulosis of colon (without mention of hemorrhage)      Essential  hypertension, benign      Gout, unspecified      LBBB (left bundle branch block)      Morbid obesity (H)      Non-ischemic cardiomyopathy (H)      Nonrheumatic mitral valve regurgitation      NSTEMI (non-ST elevated myocardial infarction) (H)     cardiac cath 6/2018: mild non-obstructive CAD     Other and unspecified hyperlipidemia      Type II or unspecified type diabetes mellitus without mention of complication, not stated as uncontrolled      Past Surgical History:   Procedure Laterality Date     C APPENDECTOMY  1980's     CV RIGHT HEART CATH N/A 10/2/2019    Procedure: Right Heart Cath;  Surgeon: Kathy Almaguer MD;  Location:  HEART CARD[facility] CATH LAB     EP ICD N/A 10/4/2019    Procedure: EP ICD;  Surgeon: Jayy Dorman MD;  Location:  [facility]     EP LEAD PLCMNT LV NEW ICD N/A 10/4/2019    Procedure: EP Lead Plcmnt LV New ICD;  Surgeon: Jayy Dorman MD;  Location:  HE[facility]ARD[facility] CATH LAB     HEART CATH CORONARY ANGIOGRAM WITHOUT PRESSURES  06/10/2018    normal coronary angiogram, nothing more than 10%     SURGICAL HISTORY OF -   2001    repair of colovesicular fistula     Family History   Problem Relation Age of Onset     Cancer Father 75        bladder cancer     Myocardial Infarction Father      Other - See Comments Father         smoking     Breast Cancer Mother      Breast Cancer Sister      Family History Negative Brother      Family History Negative Son      Family History Negative Son         fluttering/murmur     Asthma Son      Colon Cancer No family hx of      Social History     Socioeconomic History     Marital status:      Spouse name: Not on file     Number of children: Not on file     Years of education: Not on file     Highest education level: Not on file   Occupational History     Not on file   Social Needs     Financial resource strain: Not on file     Food insecurity:     Worry: Not on file     Inability: Not on file     Transportation needs:      Medical: Not on file     Non-medical: Not on file   Tobacco Use     Smoking status: Never Smoker     Smokeless tobacco: Never Used   Substance and Sexual Activity     Alcohol use: Yes     Comment: 1 glass of beer on Fri     Drug use: No     Sexual activity: Yes     Partners: Female   Lifestyle     Physical activity:     Days per week: Not on file     Minutes per session: Not on file     Stress: Not on file   Relationships     Social connections:     Talks on phone: Not on file     Gets together: Not on file     Attends [employer] service: Not on file     Active member of club or organization: Not on file     Attends meetings of clubs or organizations: Not on file     Relationship status: Not on file     Intimate partner violence:     Fear of current or ex partner: Not on file     Emotionally abused: Not on file     Physically abused: Not on file     Forced sexual activity: Not on file   Other Topics Concern     Parent/sibling w/ CABG, MI or angioplasty before 65F 55M? Not Asked   Social History Narrative     Not on file           Allergies:   No known drug allergies      Nicole Hayes PA-C, ANNABELLA  [facility]  Pager: [phone]

## 2019-10-09 NOTE — LETTER
10/9/2019    Jefry Harris MD  [address] [facility] [address]    RE: Neymar Rhodes       Dear Colleague,    I had the pleasure of seeing Neymar Rhodes in the [facility].      Cardiology Clinic Progress Note    Neymar Rhodes MRN# [id number]   YOB: 1960 Age: 59 year old   Primary cardiologist: Dr. Hernandes         Assessment and Plan:     In summary, Neymar Rhodes presents today for a CORE clinic enrollment and hospital f/u visit. He was just discharged four days ago on his PTA meds + Lasix 20. His lisinopril was reduced yesterday by his PCP due to hypotension. Today, he remains mildly hypotensive, and labs are suggestive of pre-renal azotemia. He's asymptomatic with this. He has significantly altered his diet and also mistakenly fasted for his labs today which is probably contributing.    1. HFrEF    CMR: EF 25%, LVEDd 6.9 cm    ACC/AHA Stage: D; FC II-III     Etiology: suspect sarcoid; ETOH, LBBB likely contributing.    RV: nml    Valves: OK    Volume: Dry  - Admit Wt: 275 lbs  - Current Wt: 260 lbs   - Dry Wt: suspect ~ 263-265 lbs  - Diuretics: Lasix 20 mg daily, Cameron 25  - proBNP: 589    Rx: lisinopril 10 (reduced 10/8), Toprol 25 at bedtime, Cameron 25    Rhythm: SR / paroxysmal VT - on AMIO    QRS: native wide 174 ms (LBBB)    Device? CRT-D    Code status: Full    Creatinine: 1.5 (baseline ~1), K+ 5.2 - lisinopril reduced 10/8    CHF Admissions: 6/2018, 10/2019    Contributing  - Anemia / Fe studies: Mildly anemic, no Fe studies noted  - TFT's: TSH 6.8 (high), T4F WNL  - BELA: unknown - pt declined PSG in past    Plan:  - Reduce Lasix to 10 and Cameron to 12.5 mg daily for now.    Next visit will aim to get him back on his PTA regimen of lisinopril 20 + Toprol 25 + [place] 25. He may not need Lasix long-term if he remains compliant with dietary recommendations.     - Device check 10/11. May need to empirically start steroids if he's having more  arrhythmias. No symptoms to suggest this.   - Will review TFT's with Dr. Hernandes. Plan is to reduce amio to 200 mg daily on 10/13.  - Will order PET scan and referral to Dr. Cedeno for probable cardiac sarcoidosis.  - Must abstain from driving x 3 months. Advised to obtain short-term disability paperwork from his employer.   - Complete ETOH cessation reinforced. Low sodium diet, daily wts. He will call me with 2+ lb wt gain overnight.     Follow-up:  - Myself or Marika in 1 week + BMP. Advised not to fast and to take all meds prior to that visit.  - Dr. Hernandes on 12/2.         History of Presenting Illness:      Neymar Rhodes is a pleasant 59 year old patient who presents today for a CORE clinic enrollment visit.    The patient has a history of the following -   # Severe nonischemic cardiomyopathy with EF of 15% with marked LV dilatation  # Sustained monomorphic ventricular tachycardia, s/p CRT-D  # Potential cardiac sarcoidosis  # Large LBBB  # Nonobstructive coronary artery disease  # DMII  # Obesity - Body mass index is 38.4 kg/m .    Neymar was first seen in 2018 with new diagnosis of heart failure with reduced ejection fraction when his EF was noted to be around 40% by Dr. Iniguez.  Coronary angiography at that time had shown nonobstructive coronary artery disease.  MRI had confirmed an EF of 40% in the setting of normal sinus rhythm with a large left bundle branch block.  He was started on guideline directed therapy with beta-blockers, ACE inhibitors and mineralocorticoid receptor antagonist.  He had an MRI subsequently which showed that he had scar concerning for sarcoidosis.  He  failed to follow up after that as he was feeling fairly well, but did remain on his medications. He did well for about a year until he began to develop progressive dyspnea which resulted in his admission on 10/1. ECHO showed an EF<20% with an LVEDd of 6.9 cm. The following day, while seated, he developed a sustained monomorphic  "VT ~180 bpm.  This was cardioverted emergently into normal sinus rhythm with first shock at 200J. From there he went to the cath lab where his coronary anatomy was unchanged. RHC showed mildly elevated filling pressures with an LVEDP of 21 and a low-normal cardiac output. Cardiac MRI was repeated and showed LGE in septal, inferior, lateral base with a non-CAD scar in the anterolateral mid-wall with a worsening scar burden to 13%.  He was loaded with amiodarone and received CRT-D on 10/4. He was discharged the following day on his PTA regimen with the addition of furosemide 20 mg daily.    Yesterday he saw his PCP who reduced his lisinopril from 20 to 10 mg daily due to hypotension.     Today, he presents with his wife Julisa. He is mildly hypotensive and hasn't taken any of his meds yet today. His weight has slowly declined since he's been home. He is understandably a little deconditioned from his hospital stay but was able to do some yardwork yesterday without difficulty. He denies chest pain, PND, orthopnea, edema, claudication, palpitations, near syncope or syncope since discharge. His pacemaker site is healing well. He's significantly altered his diet since home. For example, he was eating 4 pieces of toast with peanut butter for breakfast PTA. Now he's eating oatmeal for breakfast with a little sugar. He is reading labels / monitoring his sodium intake with the help of his wife, and weighing himself daily. He's avoiding ETOH, but prior to admission was drinking heavily (sallie aguilar and at least 20 beers every weekend - tells me this is after he \"cut back\"). He works as a .  BMP today shows a creatinine of 1.5 (up from 1) and a BUN of 55. K+ is 5.2, Na 134. TSH is a little high at 6.8, which is up from 3.5 during admission. T4F WNL.         Review of Systems:     12-pt ROS is negative except for as noted in the HPI.          Physical Exam:     Vitals: BP 96/60   Pulse 60   Ht 1.753 m (5' 9\")   " Wt 117.9 kg (260 lb)   SpO2 98%   BMI 38.40 kg/m     Wt Readings from Last 10 Encounters:   10/09/19 117.9 kg (260 lb)   10/08/19 118.7 kg (261 lb 11.2 oz)   10/05/19 117.7 kg (259 lb 8 oz)   10/01/19 125.1 kg (275 lb 12.8 oz)   08/30/19 127 kg (280 lb)   07/02/19 127 kg (280 lb)   04/15/19 127 kg (280 lb)   10/12/18 120.2 kg (264 lb 14.4 oz)   10/03/18 126.3 kg (278 lb 6.4 oz)   06/20/18 123.1 kg (271 lb 6.4 oz)       Constitutional:  Patient is pleasant, alert, cooperative, and in NAD.  HEENT:  NCAT. PERRLA. EOM's intact.   Neck:  CVP is difficult to assess due to body habitus.    Pulmonary: Normal respiratory effort. CTAB.   Cardiac: RRR, normal S1/S2, no S3/S4, no murmur or rub. Distant heart tones. L upper chest device incision C/D/I, with surrounding ecchymosis but no erythema or edema, minimal TTP.   Abdomen:  Non-tender abdomen, no hepatosplenomegaly appreciated.   Vascular: Pulses in the upper and lower extremities are 2+ and equal bilaterally.  Extremities: No edema, erythema, cyanosis or tenderness appreciated.  Skin:  No rashes or lesions appreciated.   Neurological:  No gross motor or sensory deficits.   Psych: Appropriate affect.        Data:     Labs reviewed:  Recent Labs   Lab Test 10/03/18  0849 06/11/18  0420 06/11/18  0040 01/20/17  0911 01/16/17  1408   LDL 40 59  --  48  --    HDL 48 46  --  43  --    NHDL 56 82  --  66  --    CHOL 104 128  --  109  --    TRIG 81 113  --  91  --    TSH 3.50  --  1.78  --  2.59       Lab Results   Component Value Date    WBC 7.1 10/05/2019    RBC 3.94 (L) 10/05/2019    HGB 12.3 (L) 10/05/2019    HCT 35.9 (L) 10/05/2019    MCV 91 10/05/2019    MCH 31.2 10/05/2019    MCHC 34.3 10/05/2019    RDW 13.0 10/05/2019     10/05/2019       Lab Results   Component Value Date     (L) 10/09/2019    POTASSIUM 5.2 (H) 10/09/2019    CHLORIDE 104 10/09/2019    CO2 22 (L) 10/09/2019    ANIONGAP 13.2 10/09/2019     (H) 10/09/2019    BUN 55 (H) 10/09/2019    CR  1.57 (H) 10/09/2019    GFRESTIMATED 45 (L) 10/09/2019    GFRESTBLACK 55 (L) 10/09/2019    EDITH 10.5 10/09/2019      Lab Results   Component Value Date    AST 65 (H) 10/02/2019    ALT 56 10/02/2019       Lab Results   Component Value Date    A1C 8.8 (H) 08/26/2019       Lab Results   Component Value Date    INR 1.16 (H) 10/04/2019           Problem List:     Patient Active Problem List   Diagnosis     Diverticulosis of large intestine     Benign essential hypertension     Gout     Severe obesity (BMI 35.0-39.9) with comorbidity (H)     Type 2 diabetes mellitus without complication, without long-term current use of insulin (H)     Hyperlipidemia LDL goal <100     NSTEMI (non-ST elevated myocardial infarction) (H)     Non-ischemic cardiomyopathy (H)     LBBB (left bundle branch block)     Nonrheumatic mitral valve regurgitation     Ascending aorta dilatation (H)     Coronary artery disease involving native coronary artery of native heart without angina pectoris     Near syncope     Paroxysmal ventricular tachycardia (H)           Medications:     Current Outpatient Medications   Medication Sig Dispense Refill     amiodarone (PACERONE) 400 MG tablet Take 1 tablet (400 mg) by mouth 2 times daily for 7 days 14 tablet 0     aspirin 81 MG tablet Take 1 tablet (81 mg) by mouth daily 30 tablet      atorvastatin (LIPITOR) 80 MG tablet Take 1/2 (one-half) tablet by mouth at bedtime. 45 tablet 0     BD ULTRA-FINE 29G X 12.7MM insulin pen needle use once daily with victoza pen 100 each 1     BD ULTRA-FINE 29G X 12.7MM insulin pen needle use once daily with victoza pen 100 each 1     blood glucose monitoring (ACCU-CHEK LUL PLUS) test strip Use to test blood sugar 1-3 times daily or as directed. 100 each 11     clotrimazole (LOTRIMIN) 1 % external cream Apply sparingly once or twice per day as needed to affected area until the skin is better, then stop; REPEAT AS NEEDED 30 g 1     Continuous Blood Gluc  (FREESTYLE JOCELIN 14  DAY READER) LUTHER 1 each See Admin Instructions 1 Device 0     Continuous Blood Gluc Sensor (FREESTYLE JOCELIN 14 DAY SENSOR) [facility] 1 each every 14 days 6 each 3     continuous blood glucose monitoring (FREESTYLE JOCELIN) sensor For use with Freestyle Jocelin Flash  for continuous monitioring of blood glucose levels. Replace sensor every 10 days. 3 each 3     fenofibrate 160 MG tablet TAKE ONE TABLET BY MOUTH ONE TIME DAILY  90 tablet 3     fluticasone (FLONASE) 50 MCG/ACT nasal spray Spray 2 sprays into both nostrils daily (Patient taking differently: Spray 2 sprays into both nostrils daily as needed ) 16 g 0     furosemide (LASIX) 20 MG tablet Take 1 tablet (20 mg) by mouth daily 30 tablet 1     glipiZIDE (GLUCOTROL XL) 10 MG 24 hr tablet TAKE 1 TABLET (10 MG) BY MOUTH DAILY. TAKE WITH LARGEST MEAL OF THE DAY. ALWAYS TAKE WITH FOOD 90 tablet 0     insulin detemir (LEVEMIR PEN) 100 UNIT/ML pen Inject 10 Units Subcutaneous At Bedtime 18 mL 0     liraglutide (VICTOZA PEN) 18 MG/3ML solution Inject 1.8 mg Subcutaneous daily 9 mL 0     lisinopril (PRINIVIL/ZESTRIL) 20 MG tablet Take 0.5 tablets (10 mg) by mouth daily 90 tablet 1     metFORMIN (GLUCOPHAGE) 1000 MG tablet Take 1 tablet (1,000 mg) by mouth 2 times daily (with meals) 180 tablet 3     metoprolol succinate ER (TOPROL-XL) 25 MG 24 hr tablet TAKE ONE TABLET BY MOUTH IN THE EVENING  30 tablet 10     spironolactone (ALDACTONE) 25 MG tablet TAKE ONE TABLET BY MOUTH ONE TIME DAILY  30 tablet 10     triamcinolone (KENALOG) 0.5 % cream Apply sparingly once or twice per day as needed to affected area until the skin is better, then stop 30 g 0     albuterol (PROAIR HFA/PROVENTIL HFA/VENTOLIN HFA) 108 (90 Base) MCG/ACT Inhaler Inhale 2 puffs into the lungs every 6 hours as needed for shortness of breath / dyspnea or wheezing       [START ON 10/13/2019] amiodarone (PACERONE) 400 MG tablet Take 1 tablet (400 mg) by mouth daily (Patient not taking: Reported on 10/9/2019)  30 tablet 1           Past Medical History:     Past Medical History:   Diagnosis Date     Ascending aorta dilatation (H)      Diverticulosis of colon (without mention of hemorrhage)      Essential hypertension, benign      Gout, unspecified      LBBB (left bundle branch block)      Morbid obesity (H)      Non-ischemic cardiomyopathy (H)      Nonrheumatic mitral valve regurgitation      NSTEMI (non-ST elevated myocardial infarction) (H)     cardiac cath 6/2018: mild non-obstructive CAD     Other and unspecified hyperlipidemia      Type II or unspecified type diabetes mellitus without mention of complication, not stated as uncontrolled      Past Surgical History:   Procedure Laterality Date     C APPENDECTOMY  1980's     CV RIGHT HEART CATH N/A 10/2/2019    Procedure: Right Heart Cath;  Surgeon: Kathy Almaguer MD;  Location:  HEART CARDIAC CATH LAB     EP ICD N/A 10/4/2019    Procedure: EP ICD;  Surgeon: Jayy Dorman MD;  Location:  HEART CARDIAC CATH LAB     EP LEAD PLCMNT LV NEW ICD N/A 10/4/2019    Procedure: EP Lead Plcmnt LV New ICD;  Surgeon: Jayy Dorman MD;  Location:  HEART CARDIAC CATH LAB     HEART CATH CORONARY ANGIOGRAM WITHOUT PRESSURES  06/10/2018    normal coronary angiogram, nothing more than 10%     SURGICAL HISTORY OF -   2001    repair of colovesicular fistula     Family History   Problem Relation Age of Onset     Cancer Father 75        bladder cancer     Myocardial Infarction Father      Other - See Comments Father         smoking     Breast Cancer Mother      Breast Cancer Sister      Family History Negative Brother      Family History Negative Son      Family History Negative Son         fluttering/murmur     Asthma Son      Colon Cancer No family hx of      Social History     Socioeconomic History     Marital status:      Spouse name: Not on file     Number of children: Not on file     Years of education: Not on file     Highest education level: Not on file    Occupational History     Not on file   Social Needs     Financial resource strain: Not on file     Food insecurity:     Worry: Not on file     Inability: Not on file     Transportation needs:     Medical: Not on file     Non-medical: Not on file   Tobacco Use     Smoking status: Never Smoker     Smokeless tobacco: Never Used   Substance and Sexual Activity     Alcohol use: Yes     Comment: 1 glass of beer on Fri     Drug use: No     Sexual activity: Yes     Partners: Female   Lifestyle     Physical activity:     Days per week: Not on file     Minutes per session: Not on file     Stress: Not on file   Relationships     Social connections:     Talks on phone: Not on file     Gets together: Not on file     Attends Mandaeism service: Not on file     Active member of club or organization: Not on file     Attends meetings of clubs or organizations: Not on file     Relationship status: Not on file     Intimate partner violence:     Fear of current or ex partner: Not on file     Emotionally abused: Not on file     Physically abused: Not on file     Forced sexual activity: Not on file   Other Topics Concern     Parent/sibling w/ CABG, MI or angioplasty before 65F 55M? Not Asked   Social History Narrative     Not on file           Allergies:   No known drug allergies      Nicole Hayes PA-C, ANNABELLA  [facility]  Pager: [phone]    Thank you for allowing me to participate in the care of your patient.    Sincerely,     Nicole Hayes PA-C     [facility]

## 2019-10-09 NOTE — PATIENT INSTRUCTIONS
"Today, we discussed the following:   - Results: The blood test looks like you are a little \"dry.\"    - Medication changes:     Please cut the Lasix and Spironolactone in half for the time being.    Reduce the amiodarone to 200 mg daily on the .    - Follow up:    Device check 10/11.   Come back in one week to see myself or Marika with a repeat blood test. Don't fast for those labs. Take all of your medications at least one hour before your appointment.   We will get you set up also for a PET scan (which checks the heart for sarcoidosis) and follow up at the [place] with a sarcoidosis specialist there.    Return to see Dr. Hernandes on .     At your convenience ask your workplace to send your paperwork for short-term disability.     Please, remember to continue the followin.  Weigh yourself daily. Call if your weight is up > than 2 pounds in one day, or 5 pounds in one week; if you feel more short of breath or have worsening swelling in your legs or abdomen.  2.  Eat a low sodium diet (less than 2,000mg or 2g daily). If you eat less salt, you will retain less fluid.   3.  Avoid alcohol, as this can worsen heart failure.   4.  Avoid NSAIDs as able (For example, Ibuprofen / aleve / advil / naprosen / diclofenac).    Please call CORE nurse for any questions or concerns Mon-Fri 8am-4pm:   [phone]  For concerns after hours:   [phone]   Scheduling phone number:   [phone]    Thank you for visiting with us today.   Nicole Hayes PA-C  ______________________________________________________________  "

## 2019-10-09 NOTE — NURSING NOTE
"[facility]    Met with Neymar and his wife Julisa after their visit with Nicole CHRISTIANSON. Neymar is a  and is currently on medical leave after recent admission. Neymar and Julisa have been together for 35 years and have 2 adult sons. Neymar likes to listen to live music.    Living situation:   With spouse in a house    Med set up:   Wife Julisa sets up    Barriers to HF follow-up:   Previous non compliance with follow-up. Pt noted that he stopped following up because appointments were scheduled at times that interfered with his work. We discussed option of intermittent FMLA once he's back at work, so that he can come to follow up as needed; and working with him to utilize appt dates/times that fit with his schedule when possible.    CM/HF education topics we reviewed:   Sodium: has likely been eating high salt diet, frequent fast food; we reviewed realistic, sustainable strategies to decrease salt intake (decreasing portions, limiting sauces, having a plan before eating out). He told me that he's started using Mrs. Suresh and asked him to stop as it can have added potassium (K today 5.2, on spironolactone) and use spices ala carte. Updated Nicole CHRISTIANSON on Mrs. Dash usage.    ETOH: pt admitted to drinking \"a gallon\" of Flaco Martínez most weekends prior to hospitalization, he had recently cut back to ~8-10 beers and 3-4 shots on Fridays and Saturdays and is now abstaining. He did not that he plans to resume ETOH use when he becomes stable. We reviewed the effects of ETOH on the heart and I asked him to consider limiting as he's able and be aware of how he feels after drinking if he does resume.   Daily weights   Symptoms of HF to report to CORE    Education materials provided:   Guide to HF   HF stoplight tool      Return appointment:   Future Appointments   Date Time Provider Department Center   10/11/2019  1:00 PM RODRIGUEZ DCR2 [facility] PSA CLIN   10/17/2019 10:00 AM RODRIGUEZ LAB " [facility] PSA CLIN   10/17/2019 10:50 AM Nicole Hayes PA-C [facility] PSA CLIN   12/2/2019  8:10 AM [facility]   12/2/2019  9:15 AM Umm Hernandes MD [facility] PSA CLIN          Medication changes:   See patient instructions    Labs: Lab results from today were reviewed with the patient during the office visit. A copy of the results were provided on the After Visit Summary.     It was a pleasure meeting with Chan ludwig.     Adelita Messer RN BSN   5:30 PM 10/09/19

## 2019-10-09 NOTE — LETTER
Health Care Home - Access Care Plan    About Me:    Patient Name:  Neymar Rhodes    YOB: 1960  Age:                      59 year old   Swathi MRN:     [id number] Telephone Information:   Home Phone [phone]   Mobile [phone]       [place]  [address] [facility] [address] Email address:  No e-mail address on record      Emergency Contact(s)   Name Relationship Lgl Grd Work Phone Home Phone Mobile Phone   1. CHANDRA RHODES Spouse  [phone]             Health Maintenance: Routine Health maintenance Reviewed: Due/Overdue   Health Maintenance Due   Topic Date Due     HF ACTION PLAN  1960     ADVANCE CARE PLANNING  1960     EYE EXAM  1960     COLONOSCOPY  02/27/1969     PREVENTIVE CARE VISIT  12/13/2003     ZOSTER IMMUNIZATION (2 of 3) 10/26/2017     PHQ-2  01/01/2019     LIPID  10/03/2019     My Access Plan  Medical Emergency 911   Questions or concerns during clinic hours Primary Clinic Line, I will call the clinic directly: [facility] - [phone]   24 Hour Appointment Line [phone] or  [phone]) (toll free)   24 Hour Nurse Line [phone] (toll free)   Questions or concerns outside clinic hours 24 Hour Appointment Line, I will call the after-hours on-call line:   [facility] [phone] or [phone]) (toll-free)   Preferred Urgent Care [facility], [phone]   Preferred Hospital [facility]  [phone]   Preferred Pharmacy [id number] - [place], [place] - [address]     Behavioral Health Crisis Line The National Suicide Prevention Lifeline at 1-857.743.1230 or 912       My Care Team Members  Patient Care Team       Relationship Specialty Notifications Start End    Jefry Harris MD PCP - General   2/15/02     Phone: [phone] Pager: [phone] Fax: [phone]         [address] [place] [address]    Jefry Harris MD Assigned PCP   10/4/12     Phone: [phone] Pager: [phone] Fax: [phone]        [address] [place] [address]    Umm Hernandes MD MD Cardiology  10/11/19     Phone: [phone] Pager: [phone] Fax: [phone] [address] [facility] [address]           My Medical and Care Information  Problem List   Patient Active Problem List   Diagnosis     Diverticulosis of large intestine     Benign essential hypertension     Gout     Severe obesity (BMI 35.0-39.9) with comorbidity (H)     Type 2 diabetes mellitus without complication, without long-term current use of insulin (H)     Hyperlipidemia LDL goal <100     NSTEMI (non-ST elevated myocardial infarction) (H)     Non-ischemic cardiomyopathy (H)     LBBB (left bundle branch block)     Nonrheumatic mitral valve regurgitation     Ascending aorta dilatation (H)     Coronary artery disease involving native coronary artery of native heart without angina pectoris     Near syncope     Paroxysmal ventricular tachycardia (H)      Current Medications:  Current Outpatient Medications   Medication     albuterol (PROAIR HFA/PROVENTIL HFA/VENTOLIN HFA) 108 (90 Base) MCG/ACT Inhaler     amiodarone (PACERONE) 400 MG tablet     [START ON 10/13/2019] amiodarone (PACERONE) 400 MG tablet     aspirin 81 MG tablet     atorvastatin (LIPITOR) 80 MG tablet     BD ULTRA-FINE 29G X 12.7MM insulin pen needle     BD ULTRA-FINE 29G X 12.7MM insulin pen needle     blood glucose monitoring (ACCU-CHEK LUL PLUS) test strip     clotrimazole (LOTRIMIN) 1 % external cream     Continuous Blood Gluc  (FREESTYLE JOCELIN 14 DAY READER) LUTHER     Continuous Blood Gluc Sensor (FREESTYLE JOCELIN 14 DAY SENSOR) MISC     continuous blood glucose monitoring (FREESTYLE JOCELIN) sensor     fenofibrate 160 MG tablet     fluticasone (FLONASE) 50 MCG/ACT nasal spray     furosemide (LASIX) 20 MG tablet     glipiZIDE  (GLUCOTROL XL) 10 MG 24 hr tablet     insulin detemir (LEVEMIR PEN) 100 UNIT/ML pen     liraglutide (VICTOZA PEN) 18 MG/3ML solution     lisinopril (PRINIVIL/ZESTRIL) 20 MG tablet     metFORMIN (GLUCOPHAGE) 1000 MG tablet     metoprolol succinate ER (TOPROL-XL) 25 MG 24 hr tablet     spironolactone (ALDACTONE) 25 MG tablet     triamcinolone (KENALOG) 0.5 % cream     No current facility-administered medications for this visit.

## 2019-10-09 NOTE — PROGRESS NOTES
Reviewed TFT results with Dr. Hernandes. He recommends reducing AMIO dose to 200 mg daily now, repeat TSH/T4F in 2-3 weeks. It appears Dr. Stewart will be his electrophysiologist, so will also route this to him for ongoing management of amio/VT. Will need f/u arranged in his clinic. Pt returns for device check in two days. Thank you.

## 2019-10-09 NOTE — LETTER
[place] CARE COORDINATION  [facility]  [address], [place] [address]    October 11, 2019    Neymar Rhodes  [address]  [facility] [address]      Dear Neymar,    I am a clinic care coordinator who works with Jefry Harris MD at [facility]. I wanted to thank you for spending the time to talk with me and provide you with my contact information so that you can call me with questions or concerns about your health care. Below is a description of clinic care coordination and how I can further assist you.     The clinic care coordinator is a registered nurse and/or  who understand the health care system. The goal of clinic care coordination is to help you manage your health and improve access to the [facility] system in the most efficient manner. The registered nurse can assist you in meeting your health care goals by providing education, coordinating services, and strengthening the communication among your providers. The  can assist you with financial, behavioral, psychosocial, chemical dependency, counseling, and/or psychiatric resources.    Please feel free to contact me at [phone] with any questions or concerns. We at [facility] are focused on providing you with the highest-quality healthcare experience possible and that all starts with you.     Sincerely,     Lalo Salinas RN  Clinic Care Coordinator  [facility]  Ph: [phone]      Enclosed: I have enclosed a copy of a 24 Hour Access Plan. This has helpful phone numbers for you to call when needed. Please keep this in an easy to access place to use as needed.

## 2019-10-09 NOTE — PROGRESS NOTES
"Called pt to review Nicole CHRISTIANSON's msg:  \"Reviewed TFT results with Dr. Hernandes. He recommends reducing AMIO dose to 200 mg daily now, repeat TSH/T4F in 2-3 weeks. It appears Dr. Stewart will be his electrophysiologist, so will also route this to him for ongoing management of amio/VT. Will need f/u arranged in his clinic. Pt returns for device check in two days. Thank you.\"    No answer at both pt's and his wife Julisa's numbers; left VMs at both numbers requesting call back to review new recommendations.     Will not update med list until change confirmed w/ pt. Placed order for TSH w/ reflex to be drawn ~2 wks.     Adelita Messer RN BSN   3:01 PM 10/09/19        "

## 2019-10-09 NOTE — PROGRESS NOTES
Clinic Care Coordination Contact  [facility] [[email or web address]]/Voicemail    Referral Source: PCP  New MI diagnosis (NSTEMI, s/p ICD placement)    Clinical Data: Care Coordinator Outreach  Outreach attempted x 1.  Left message on patient's voicemail with call back information and requested return call.  Plan: Care Coordinator will try to reach patient again in 1-2 business days.    Lalo Salinas RN  Clinic Care Coordinator  [facility]  Ph: [phone]

## 2019-10-10 ENCOUNTER — CARE COORDINATION (OUTPATIENT)
Dept: CARDIOLOGY | Facility: CLINIC | Age: 59
End: 2019-10-10

## 2019-10-10 NOTE — PROGRESS NOTES
Referral- Heart Failure      [facility] Notes:       October 10, 2019 Received Staff message regarding pt. He is a CORE patient of Dr. Hernandes and Nicole CHRISTIANSON with suspected cardiac amyloidosis. PET has been ordered.    Need to schedule PET and appointment with Dr. Cedeno.     Upcoming dates that will not work is Thursday the 17th and Mon the 28th.      Insurance - Entered    Patient Contact - LVM on cell phone to call back.      Sending to [facility] Nurse to Triage    [facility]  CORE/Heart Failure   Heart Failure Referral Coordinator  Phone: [phone]

## 2019-10-10 NOTE — PROGRESS NOTES
Called and spoke with patient about Nicole Hayes' recommendations of reducing amio to 200mg daily. Patient and home care nurse confirmed understanding. No other questions at this time, patient will call if any issues or concerns.     Future Appointments   Date Time Provider Department Center   10/11/2019  1:00 PM [facility]   10/17/2019 10:00 AM RODRIGUEZ LAB [facility] CL[facility]   10/17/2019 10:50 AM Nicole Hayes PA-C [facility] PSA CLIN   12/2/2019  8:10 AM [facility]   12/2/2019  9:15 AM Umm Hernandes MD [facility] PSA CLIN          BRANDON Chatman October 10, 2019 9:40 AM

## 2019-10-11 ENCOUNTER — ANCILLARY PROCEDURE (OUTPATIENT)
Dept: CARDIOLOGY | Facility: CLINIC | Age: 59
End: 2019-10-11
Attending: INTERNAL MEDICINE
Payer: COMMERCIAL

## 2019-10-11 DIAGNOSIS — Z95.810 ICD (IMPLANTABLE CARDIOVERTER-DEFIBRILLATOR) IN PLACE: ICD-10-CM

## 2019-10-11 DIAGNOSIS — I42.8 NON-ISCHEMIC CARDIOMYOPATHY (H): Primary | ICD-10-CM

## 2019-10-11 PROCEDURE — 93284 PRGRMG EVAL IMPLANTABLE DFB: CPT | Performed by: INTERNAL MEDICINE

## 2019-10-11 ASSESSMENT — ACTIVITIES OF DAILY LIVING (ADL): DEPENDENT_IADLS:: INDEPENDENT

## 2019-10-11 NOTE — PROGRESS NOTES
Clinic Care Coordination Contact    Clinic Care Coordination Contact  OUTREACH    Referral Information:  Referral Source: PCP  Primary Diagnosis: Cardiovascular - other  Chief Complaint   Patient presents with     Clinic Care Coordination - Initial     PCP referral: new MI diagnosis     Clinical Concerns:  CCC RN outreach to patient to follow-up on recent hospitalization for chest pain and ventricular tachycardia; he underwent a cardiac MRI and was found to have severe LV dysfunction.  He had an ICD placed during his hospitalization.  He was discharged home with instructions to follow up with PCP and Cardiology.  He will undergo outpatient work-up for sarcoid.    Today the patient reports to be feeling well.  He denied any new/worsening symptoms at this time.  He saw PCP on 10/8/19 and Cardiology on 10/9/19 and denied questions from either appointment.    The patient confirmed he is taking his medications daily as prescribed.  Additionally, he is checking his BP, weight, BG, HR, and O2 daily.  He reported today's BP of 100/55 prior to taking his medications.  CCC RN will clarify with Cardiology if patient has any BP parameters for his medications.    At this time, the patient reports adequate support and close follow-up with Cardiology/CORE clinic.  He denied outstanding questions/concerns or need for additional assistance from Care Coordination, but agreed to contact CCC RN should any issues arise.    Medication Management:  The patient's wife, who is a nurse, helps him to manage his medications.    Functional Status:  Independent    Living Situation:  Current living arrangement: I live in a private home with spouse    Diet/Exercise/Sleep:  Diet: Diabetic diet, No added salt (2 gm/day salt)  The patient reports his appetite has been fairly good.  He is trying to make good choices.  This was discussed with him in detail at his Cardiology appointment.    Transportation:  Transportation concerns (GOAL): No  The  patient is not to drive for several weeks.      Psychosocial:  Informal Support system: Spouse  The patient is a .  Due to restrictions following his hospitalization, the patient won't be able to return to work, most likely, for at least several weeks.  He updated that he did leave a message for his work to discuss short-term disability and is awaiting a call back.     Resources and Interventions:  Community Resources: [facility] Clinic  Supplies used at home: Diabetic Supplies  Equipment Currently Used at Home: glucometer    Patient/Caregiver understanding: Yes     Future Appointments              Today RODRIGUEZ DCR2 [facility] PSA CLIN    In 6 days RODRIGUEZ LAB [facility] PSA CLIN    In 6 days Nicole Hayes PA-C [facility] PSA CLIN    In 1 month RODRIGUEZ LAB [facility] PSA CLIN    In 1 month Umm Hernandes MD [facility] PSA CLIN        Plan: CCC RN will inquire with Cardiology about whether or not the patient has BP parameters for any of his medications.  The patient will continue taking his medications daily as prescribed and will update his care team with any changes or concerns.  He will attend his upcoming appointments as scheduled.  The patient will contact CCC RN with additional questions or concerns.  CCC RN will make no further scheduled patient outreaches at this time but will remain available should any patient needs arise.    Lalo Salinas RN  Clinic Care Coordinator  [facility]  Ph: [phone]

## 2019-10-16 ENCOUNTER — CARE COORDINATION (OUTPATIENT)
Dept: CARDIOLOGY | Facility: CLINIC | Age: 59
End: 2019-10-16

## 2019-10-16 NOTE — PROGRESS NOTES
Referral for Advance Heart Failure for follow up. Reviewed with provider. Patient has scheduled appointment on November 2nd with Dr. Batres, PET scan will be ordered following appointment. See note below.             BRANDON Chatman October 16, 2019 4:32 PM

## 2019-10-17 ENCOUNTER — OFFICE VISIT (OUTPATIENT)
Dept: CARDIOLOGY | Facility: CLINIC | Age: 59
End: 2019-10-17
Payer: COMMERCIAL

## 2019-10-17 VITALS
BODY MASS INDEX: 38.72 KG/M2 | HEART RATE: 62 BPM | WEIGHT: 261.4 LBS | OXYGEN SATURATION: 99 % | DIASTOLIC BLOOD PRESSURE: 40 MMHG | SYSTOLIC BLOOD PRESSURE: 94 MMHG | HEIGHT: 69 IN

## 2019-10-17 DIAGNOSIS — Z79.899 ON AMIODARONE THERAPY: ICD-10-CM

## 2019-10-17 DIAGNOSIS — I50.22 CHRONIC SYSTOLIC HEART FAILURE (H): ICD-10-CM

## 2019-10-17 DIAGNOSIS — I47.29 PAROXYSMAL VENTRICULAR TACHYCARDIA (H): ICD-10-CM

## 2019-10-17 DIAGNOSIS — I10 BENIGN ESSENTIAL HYPERTENSION: Primary | ICD-10-CM

## 2019-10-17 DIAGNOSIS — I42.8 NON-ISCHEMIC CARDIOMYOPATHY (H): ICD-10-CM

## 2019-10-17 LAB
ANION GAP SERPL CALCULATED.3IONS-SCNC: 15.3 MMOL/L (ref 6–17)
BUN SERPL-MCNC: 40 MG/DL (ref 7–30)
CALCIUM SERPL-MCNC: 10.6 MG/DL (ref 8.5–10.5)
CHLORIDE SERPL-SCNC: 104 MMOL/L (ref 98–107)
CO2 SERPL-SCNC: 24 MMOL/L (ref 23–29)
CREAT SERPL-MCNC: 1.64 MG/DL (ref 0.7–1.3)
GFR SERPL CREATININE-BSD FRML MDRD: 43 ML/MIN/{1.73_M2}
GLUCOSE SERPL-MCNC: 159 MG/DL (ref 70–105)
POTASSIUM SERPL-SCNC: 5.3 MMOL/L (ref 3.5–5.1)
SODIUM SERPL-SCNC: 138 MMOL/L (ref 136–145)
T4 FREE SERPL-MCNC: 1.02 NG/DL (ref 0.76–1.46)
TSH SERPL DL<=0.005 MIU/L-ACNC: 9.74 MU/L (ref 0.4–4)

## 2019-10-17 PROCEDURE — 84443 ASSAY THYROID STIM HORMONE: CPT | Performed by: PHYSICIAN ASSISTANT

## 2019-10-17 PROCEDURE — 84439 ASSAY OF FREE THYROXINE: CPT | Performed by: PHYSICIAN ASSISTANT

## 2019-10-17 PROCEDURE — 99215 OFFICE O/P EST HI 40 MIN: CPT | Mod: 24 | Performed by: PHYSICIAN ASSISTANT

## 2019-10-17 PROCEDURE — 80048 BASIC METABOLIC PNL TOTAL CA: CPT | Performed by: PHYSICIAN ASSISTANT

## 2019-10-17 PROCEDURE — 36415 COLL VENOUS BLD VENIPUNCTURE: CPT | Performed by: PHYSICIAN ASSISTANT

## 2019-10-17 RX ORDER — LISINOPRIL 10 MG/1
10 TABLET ORAL DAILY
Qty: 30 TABLET | Refills: 5 | Status: SHIPPED | OUTPATIENT
Start: 2019-10-17 | End: 2019-11-20

## 2019-10-17 RX ORDER — AMIODARONE HYDROCHLORIDE 200 MG/1
200 TABLET ORAL DAILY
Qty: 30 TABLET | Refills: 1 | Status: SHIPPED | OUTPATIENT
Start: 2019-10-17 | End: 2019-12-18

## 2019-10-17 ASSESSMENT — MIFFLIN-ST. JEOR: SCORE: 1986.08

## 2019-10-17 NOTE — PROGRESS NOTES
Cardiology Clinic Progress Note    Neymar Rhodes MRN# [id number]   YOB: 1960 Age: 59 year old   Primary cardiologist: Dr. Hernandes         Assessment and Plan:     In summary, Neymar Rhodes presents today for a CORE clinic f/u visit. He recently developed hypotension and pre-renal azotemia following inpatient diuresis. His lisinopril, Lasix and spironolactone were reduced. However he's mistakenly taking a higher dose of lisinopril. His presentation today is essentially stable.     1. HFrEF    CMR: EF 25%, LVEDd 6.9 cm    ACC/AHA Stage: D; FC II-III     Etiology: suspect sarcoid; ETOH, LBBB likely contributing.    RV: nml    Valves: OK    Volume: Dry  - Admit Wt: 275 lbs  - Current Wt: 261 lbs   - Dry Wt: suspect ~ 263-265 lbs  - Diuretics: Lasix 10 mg daily, [place] 12.5  - proBNP: not performed today    Rx: lisinopril 10 (mistakenly taking 20), Toprol 25 at bedtime, [place] 12.5    Rhythm: SR / paroxysmal VT - on AMIO    QRS: native wide 174 ms (LBBB)    Device? CRT-D - no arrhythmias noted on 10/11 device check    Code status: Full    Creatinine: 1.6 (baseline 1.1)    CHF Admissions: 6/2018, 10/2019    Contributing  - Anemia / Fe studies: Mildly anemic, no Fe studies noted  - TFT's: TSH 6.8 (high), T4F WNL - AMIO reduced 10/10 to 200 mg daily  - BELA: unknown - pt declined PSG in past    Plan:  - Stop spironolactone. Go back to lisinopril 10 mg daily.  - Must abstain from driving x 3 months. Will fill out disability paperwork.  - Advised to push an ICD transmission in the case of recurrent chest tightness.   - Complete ETOH cessation reinforced. Low sodium diet, daily wts. He will call me with 2+ lb wt gain overnight.     Follow-up:  - Return to see me in one week with repeat BMP and Fe studies.   - Repeat TFT's and visit with Dr. Stewart in two weeks for ongoing management of AMIO/VT.  - [facility]res [facility]. PET scan will be arranged at that time.   - Dr. Hernandes on 12/2.         History  "of Presenting Illness:      Neymar Rhodes is a pleasant 59 year old patient who presents today for a CORE clinic f/u  visit.    The patient has a history of the following -   # Severe nonischemic cardiomyopathy with EF of 15% with marked LV dilatation  # Sustained monomorphic ventricular tachycardia, s/p CRT-D  # Potential cardiac sarcoidosis  # Large LBBB  # Nonobstructive coronary artery disease  # DMII  # Long-standing ETOH abuse - prior to admission was drinking heavily (sallie aguilar and at least 20 beers every weekend - tells me this is after he \"cut back\").  # Obesity - Body mass index is 38.6 kg/m .   # Social - Lives with wife Julisa. He works as a . He is reading labels / monitoring his sodium intake with the help of his wife, and weighing himself daily.     Neymar was first seen in 2018 with new diagnosis of heart failure with reduced ejection fraction when his EF was noted to be around 40% by Dr. Iniguez.  Coronary angiography at that time had shown nonobstructive coronary artery disease.  MRI had confirmed an EF of 40% in the setting of normal sinus rhythm with a large left bundle branch block.  He was started on guideline directed therapy with beta-blockers, ACE inhibitors and mineralocorticoid receptor antagonist.  He had an MRI subsequently which showed that he had scar concerning for sarcoidosis.  He failed to follow up after that as he was feeling fairly well, but did remain on his medications. He did well for about a year until he began to develop progressive dyspnea which resulted in his admission on 10/1/19. ECHO showed an EF<20% with an LVEDd of 6.9 cm. The following day, while seated, he developed a sustained monomorphic VT ~180 bpm.  This was cardioverted emergently into normal sinus rhythm with first shock at 200J. From there he went to the cath lab where his coronary anatomy was unchanged. RHC showed mildly elevated filling pressures with an LVEDP of 21 and a low-normal " "cardiac output. Cardiac MRI was repeated and showed LGE in septal, inferior, lateral base with a non-CAD scar in the anterolateral mid-wall with a worsening scar burden to 13%. He was loaded with amiodarone and received CRT-D on 10/4. He was discharged the following day on his PTA regimen with the addition of spironolactone 25 mg and furosemide 20 mg daily.    After that, he became hypotensive, and his PCP reduced his lisinopril. Despite this he was still hypotensive and labs showed YASMEEN with hyperkalemia when I met him on 10/9. Significant alteration in his diet (ETOH and sodium reduction) was likely contributing. I reduced both Lasix and Spironolactone by half.     Of note, TSH was high at 6.8, T4F WNL. On 10/11, he had a device check which showed no arrhythmias and 97% BiV pace. His AMIO was reduced from 400 BID to 200 mg daily.     Today, he presents with his wife Julisa for close follow-up. He remains hypotensive. His K+ remains high at 5.3, creat at 1.6 and BUN at 40. Weight is stable. He mistakenly went back to the higher dose of lisinopril 20 mg daily, he tells me, unclear why. Overall, he feels about the same - still deconditioned. He's been tolerating some light yardwork at home. He gets a little lightheaded when bending down and standing up quickly, but otherwise has had no near-syncope. He tells me he had one episode of \"tightness in his lungs\" while talking on the phone to his boss the other day. It resolved after he hung up and lay down in bed. He denies PND, orthopnea, edema, claudication, palpitations.          Review of Systems:     12-pt ROS is negative except for as noted in the HPI.          Physical Exam:     Vitals: BP 94/40   Pulse 62   Ht 1.753 m (5' 9\")   Wt 118.6 kg (261 lb 6.4 oz)   SpO2 99%   BMI 38.60 kg/m    Wt Readings from Last 10 Encounters:   10/17/19 118.6 kg (261 lb 6.4 oz)   10/09/19 117.9 kg (260 lb)   10/08/19 118.7 kg (261 lb 11.2 oz)   10/05/19 117.7 kg (259 lb 8 oz) "   10/01/19 125.1 kg (275 lb 12.8 oz)   08/30/19 127 kg (280 lb)   07/02/19 127 kg (280 lb)   04/15/19 127 kg (280 lb)   10/12/18 120.2 kg (264 lb 14.4 oz)   10/03/18 126.3 kg (278 lb 6.4 oz)       Constitutional:  Patient is pleasant, alert, cooperative, and in NAD.  HEENT:  NCAT. PERRLA. EOM's intact.   Neck:  CVP is difficult to assess due to body habitus.    Pulmonary: Normal respiratory effort. CTAB.   Cardiac: RRR, normal S1/S2, no S3/S4, no murmur or rub. Distant heart tones. L upper chest device incision C/D/I, with surrounding ecchymosis but no erythema or edema, minimal TTP.   Abdomen:  Non-tender abdomen, no hepatosplenomegaly appreciated.   Vascular: Pulses in the upper and lower extremities are 2+ and equal bilaterally.  Extremities: No edema, erythema, cyanosis or tenderness appreciated.  Skin:  No rashes or lesions appreciated.   Neurological:  No gross motor or sensory deficits.   Psych: Appropriate affect.        Data:     Labs reviewed:  Recent Labs   Lab Test 10/17/19  1002 10/09/19  0906 10/03/18  0849 06/11/18  0420  01/20/17  0911   LDL  --   --  40 59  --  48   HDL  --   --  48 46  --  43   NHDL  --   --  56 82  --  66   CHOL  --   --  104 128  --  109   TRIG  --   --  81 113  --  91   TSH 9.74* 6.88* 3.50  --    < >  --    NTBNP  --  589*  --   --   --   --     < > = values in this interval not displayed.       Lab Results   Component Value Date    WBC 7.1 10/05/2019    RBC 3.94 (L) 10/05/2019    HGB 12.3 (L) 10/05/2019    HCT 35.9 (L) 10/05/2019    MCV 91 10/05/2019    MCH 31.2 10/05/2019    MCHC 34.3 10/05/2019    RDW 13.0 10/05/2019     10/05/2019       Lab Results   Component Value Date     10/17/2019    POTASSIUM 5.3 (H) 10/17/2019    CHLORIDE 104 10/17/2019    CO2 24 10/17/2019    ANIONGAP 15.3 10/17/2019     (H) 10/17/2019    BUN 40 (H) 10/17/2019    CR 1.64 (H) 10/17/2019    GFRESTIMATED 43 (L) 10/17/2019    GFRESTBLACK 52 (L) 10/17/2019    EDITH 10.6 (H) 10/17/2019       Lab Results   Component Value Date    AST 65 (H) 10/02/2019    ALT 56 10/02/2019       Lab Results   Component Value Date    A1C 8.8 (H) 08/26/2019       Lab Results   Component Value Date    INR 1.16 (H) 10/04/2019           Problem List:     Patient Active Problem List   Diagnosis     Diverticulosis of large intestine     Benign essential hypertension     Gout     Severe obesity (BMI 35.0-39.9) with comorbidity (H)     Type 2 diabetes mellitus without complication, without long-term current use of insulin (H)     Hyperlipidemia LDL goal <100     NSTEMI (non-ST elevated myocardial infarction) (H)     Non-ischemic cardiomyopathy (H)     LBBB (left bundle branch block)     Nonrheumatic mitral valve regurgitation     Ascending aorta dilatation (H)     Coronary artery disease involving native coronary artery of native heart without angina pectoris     Near syncope     Paroxysmal ventricular tachycardia (H)           Medications:     Current Outpatient Medications   Medication Sig Dispense Refill     albuterol (PROAIR HFA/PROVENTIL HFA/VENTOLIN HFA) 108 (90 Base) MCG/ACT Inhaler Inhale 2 puffs into the lungs every 6 hours as needed for shortness of breath / dyspnea or wheezing       amiodarone (PACERONE/CODARONE) 200 MG tablet Take 1 tablet (200 mg) by mouth daily 30 tablet 1     aspirin 81 MG tablet Take 1 tablet (81 mg) by mouth daily 30 tablet      atorvastatin (LIPITOR) 80 MG tablet Take 1/2 (one-half) tablet by mouth at bedtime. 45 tablet 0     BD ULTRA-FINE 29G X 12.7MM insulin pen needle use once daily with victoza pen 100 each 1     BD ULTRA-FINE 29G X 12.7MM insulin pen needle use once daily with victoza pen 100 each 1     blood glucose monitoring (ACCU-CHEK LUL PLUS) test strip Use to test blood sugar 1-3 times daily or as directed. 100 each 11     clotrimazole (LOTRIMIN) 1 % external cream Apply sparingly once or twice per day as needed to affected area until the skin is better, then stop; REPEAT AS  NEEDED 30 g 1     fenofibrate 160 MG tablet TAKE ONE TABLET BY MOUTH ONE TIME DAILY  90 tablet 3     fluticasone (FLONASE) 50 MCG/ACT nasal spray Spray 2 sprays into both nostrils daily (Patient taking differently: Spray 2 sprays into both nostrils daily as needed ) 16 g 0     furosemide (LASIX) 20 MG tablet Take 0.5 tablets (10 mg) by mouth daily 30 tablet 1     glipiZIDE (GLUCOTROL XL) 10 MG 24 hr tablet TAKE 1 TABLET (10 MG) BY MOUTH DAILY. TAKE WITH LARGEST MEAL OF THE DAY. ALWAYS TAKE WITH FOOD 90 tablet 0     insulin detemir (LEVEMIR PEN) 100 UNIT/ML pen Inject 10 Units Subcutaneous At Bedtime 18 mL 0     liraglutide (VICTOZA PEN) 18 MG/3ML solution Inject 1.8 mg Subcutaneous daily 9 mL 0     lisinopril (PRINIVIL/ZESTRIL) 10 MG tablet Take 1 tablet (10 mg) by mouth daily 30 tablet 5     metFORMIN (GLUCOPHAGE) 1000 MG tablet Take 1 tablet (1,000 mg) by mouth 2 times daily (with meals) 180 tablet 3     metoprolol succinate ER (TOPROL-XL) 25 MG 24 hr tablet TAKE ONE TABLET BY MOUTH IN THE EVENING  30 tablet 10     triamcinolone (KENALOG) 0.5 % cream Apply sparingly once or twice per day as needed to affected area until the skin is better, then stop 30 g 0     Continuous Blood Gluc  (FREESTYLE JOCELIN 14 DAY READER) LUTHER 1 each See Admin Instructions (Patient not taking: Reported on 10/17/2019) 1 Device 0     Continuous Blood Gluc Sensor (FREESTYLE JOCELIN 14 DAY SENSOR) [facility] [place] 1 each every 14 days (Patient not taking: Reported on 10/17/2019) 6 each 3     continuous blood glucose monitoring (FREESTYLE JOCELIN) sensor For use with Freestyle Jocelin Flash  for continuous monitioring of blood glucose levels. Replace sensor every 10 days. (Patient not taking: Reported on 10/17/2019) 3 each 3           Past Medical History:     Past Medical History:   Diagnosis Date     Ascending aorta dilatation (H)      Diverticulosis of colon (without mention of hemorrhage)      Essential hypertension, benign      Gout,  unspecified      LBBB (left bundle branch block)      Morbid obesity (H)      Non-ischemic cardiomyopathy (H)      Nonrheumatic mitral valve regurgitation      NSTEMI (non-ST elevated myocardial infarction) (H)     cardiac cath 6/2018: mild non-obstructive CAD     Other and unspecified hyperlipidemia      Type II or unspecified type diabetes mellitus without mention of complication, not stated as uncontrolled      Past Surgical History:   Procedure Laterality Date     C APPENDECTOMY  1980's     CV CORONARY ANGIOGRAM N/A 10/2/2019    Procedure: Coronary Angiogram;  Surgeon: Kathy Almaguer MD;  Location:  HEART CARDIAC CATH LAB     CV LEFT HEART CATH N/A 10/2/2019    Procedure: Left Heart Cath;  Surgeon: Kathy Almaguer MD;  Location:  HEART CARDIAC CATH LAB     CV RIGHT HEART CATH N/A 10/2/2019    Procedure: Right Heart Cath;  Surgeon: Kathy Almaguer MD;  Location:  HEART CARDIAC CATH LAB     EP ICD N/A 10/4/2019    Procedure: EP ICD;  Surgeon: Jayy Dorman MD;  Location:  HEART CARDIAC CATH LAB     EP LEAD PLCMNT LV NEW ICD N/A 10/4/2019    Procedure: EP Lead Plcmnt LV New ICD;  Surgeon: Jayy Dorman MD;  Location:  HEART CARDIAC CATH LAB     HEART CATH CORONARY ANGIOGRAM WITHOUT PRESSURES  06/10/2018    normal coronary angiogram, nothing more than 10%     SURGICAL HISTORY OF -   2001    repair of colovesicular fistula     Family History   Problem Relation Age of Onset     Cancer Father 75        bladder cancer     Myocardial Infarction Father      Other - See Comments Father         smoking     Breast Cancer Mother      Breast Cancer Sister      Family History Negative Brother      Family History Negative Son      Family History Negative Son         fluttering/murmur     Asthma Son      Colon Cancer No family hx of      Social History     Socioeconomic History     Marital status:      Spouse name: Not on file     Number of children: Not on file     Years of  education: Not on file     Highest education level: Not on file   Occupational History     Not on file   Social Needs     Financial resource strain: Not on file     Food insecurity:     Worry: Not on file     Inability: Not on file     Transportation needs:     Medical: Not on file     Non-medical: Not on file   Tobacco Use     Smoking status: Never Smoker     Smokeless tobacco: Never Used   Substance and Sexual Activity     Alcohol use: Not Currently     Drug use: No     Sexual activity: Yes     Partners: Female   Lifestyle     Physical activity:     Days per week: Not on file     Minutes per session: Not on file     Stress: Not on file   Relationships     Social connections:     Talks on phone: Not on file     Gets together: Not on file     Attends Jainism service: Not on file     Active member of club or organization: Not on file     Attends meetings of clubs or organizations: Not on file     Relationship status: Not on file     Intimate partner violence:     Fear of current or ex partner: Not on file     Emotionally abused: Not on file     Physically abused: Not on file     Forced sexual activity: Not on file   Other Topics Concern     Parent/sibling w/ CABG, MI or angioplasty before 65F 55M? Not Asked   Social History Narrative     Not on file           Allergies:   No known drug allergies      Nicole Hayes PA-C, [facility]  [facility]  Pager: [phone]

## 2019-10-17 NOTE — PATIENT INSTRUCTIONS
Today, we discussed the following:   - Results: The kidneys still appear dehydrated today.  - Medication changes:     1. Stop spironolactone.   2. Go back to the lower dose of lisinopril (10 mg daily - Rx sent).    See new med list.   - Follow up:    - Return to see me in one week with repeat BMP (kidney function test)  Future Appointments   Date Time Provider Department Center   10/23/2019 10:30 AM [facility]   10/24/2019  7:30 AM Nicole Hayes PA-C [facility]   2019 11:00 [facility]   2019  8:10 AM [facility]   2019  9:15 AM Umm Hernandes MD [facility]        - Repeat TFT's (thyroid test) and visit with Dr. Stewart in two weeks for ongoing management of amiodarone/VT.   - Dr. Batres at the  on .    - Dr. Hernandes on .     Please, remember to continue the followin.  Weigh yourself daily. Call if your weight is up > than 2 pounds in one day, or 5 pounds in one week; if you feel more short of breath or have worsening swelling in your legs or abdomen.  2.  Eat a low sodium diet (less than 2,000mg or 2g daily). If you eat less salt, you will retain less fluid.   3.  Avoid alcohol, as this can worsen heart failure.   4.  Avoid NSAIDs as able (For example, Ibuprofen / aleve / advil / naprosen / diclofenac).    Please call CORE nurse for any questions or concerns Mon-Fri 8am-4pm:   [phone]  For concerns after hours:   [phone]   Scheduling phone number:   [phone]    Thank you for visiting with us today.   Nicole Hayes PA-C  ______________________________________________________________

## 2019-10-17 NOTE — LETTER
10/17/2019    Jefry Harris MD  [address] [facility] [address]    RE: Neymar Rhodes       Dear Colleague,    I had the pleasure of seeing Neymar Rhodes in the [facility].      Cardiology Clinic Progress Note    Neymar Rhodes MRN# [id number]   YOB: 1960 Age: 59 year old   Primary cardiologist: Dr. Hernandes         Assessment and Plan:     In summary, Neymar Rhodes presents today for a CORE clinic f/u visit. He recently developed hypotension and pre-renal azotemia following inpatient diuresis. His lisinopril, Lasix and spironolactone were reduced. However he's mistakenly taking a higher dose of lisinopril. His presentation today is essentially stable.     1. HFrEF    CMR: EF 25%, LVEDd 6.9 cm    ACC/AHA Stage: D; FC II-III     Etiology: suspect sarcoid; ETOH, LBBB likely contributing.    RV: nml    Valves: OK    Volume: Dry  - Admit Wt: 275 lbs  - Current Wt: 261 lbs   - Dry Wt: suspect ~ 263-265 lbs  - Diuretics: Lasix 10 mg daily, Cameron 12.5  - proBNP: not performed today    Rx: lisinopril 10 (mistakenly taking 20), Toprol 25 at bedtime, Cameron 12.5    Rhythm: SR / paroxysmal VT - on AMIO    QRS: native wide 174 ms (LBBB)    Device? CRT-D - no arrhythmias noted on 10/11 device check    Code status: Full    Creatinine: 1.6 (baseline 1.1)    CHF Admissions: 6/2018, 10/2019    Contributing  - Anemia / Fe studies: Mildly anemic, no Fe studies noted  - TFT's: TSH 6.8 (high), T4F WNL - AMIO reduced 10/10 to 200 mg daily  - BELA: unknown - pt declined PSG in past    Plan:  - Stop spironolactone. Go back to lisinopril 10 mg daily.  - Must abstain from driving x 3 months. Will fill out disability paperwork.  - Advised to push an ICD transmission in the case of recurrent chest tightness.   - Complete ETOH cessation reinforced. Low sodium diet, daily wts. He will call me with 2+ lb wt gain overnight.     Follow-up:  - Return to see me in one week with  "repeat BMP and Fe studies.   - Repeat TFT's and visit with Dr. Stewart in two weeks for ongoing management of AMIO/VT.  - Dr. [facility]res [facility] on [facility]nd. PET scan will be arranged at that time.   - Dr. Hernandes on 12/2.         History of Presenting Illness:      Neymar Rhodes is a pleasant 59 year old patient who presents today for a CORE clinic f/u  visit.    The patient has a history of the following -   # Severe nonischemic cardiomyopathy with EF of 15% with marked LV dilatation  # Sustained monomorphic ventricular tachycardia, s/p CRT-D  # Potential cardiac sarcoidosis  # Large LBBB  # Nonobstructive coronary artery disease  # DMII  # Long-standing ETOH abuse - prior to admission was drinking heavily (sallie aguilar and at least 20 beers every weekend - tells me this is after he \"cut back\").  # Obesity - Body mass index is 38.6 kg/m .   # Social - Lives with wife Julisa. He works as a . He is reading labels / monitoring his sodium intake with the help of his wife, and weighing himself daily.     Neymar was first seen in 2018 with new diagnosis of heart failure with reduced ejection fraction when his EF was noted to be around 40% by Dr. Iniguez.  Coronary angiography at that time had shown nonobstructive coronary artery disease.  MRI had confirmed an EF of 40% in the setting of normal sinus rhythm with a large left bundle branch block.  He was started on guideline directed therapy with beta-blockers, ACE inhibitors and mineralocorticoid receptor antagonist.  He had an MRI subsequently which showed that he had scar concerning for sarcoidosis.  He failed to follow up after that as he was feeling fairly well, but did remain on his medications. He did well for about a year until he began to develop progressive dyspnea which resulted in his admission on 10/1/19. ECHO showed an EF<20% with an LVEDd of 6.9 cm. The following day, while seated, he developed a sustained monomorphic VT ~180 bpm.  This was " "cardioverted emergently into normal sinus rhythm with first shock at 200J. From there he went to the cath lab where his coronary anatomy was unchanged. RHC showed mildly elevated filling pressures with an LVEDP of 21 and a low-normal cardiac output. Cardiac MRI was repeated and showed LGE in septal, inferior, lateral base with a non-CAD scar in the anterolateral mid-wall with a worsening scar burden to 13%. He was loaded with amiodarone and received CRT-D on 10/4. He was discharged the following day on his PTA regimen with the addition of spironolactone 25 mg and furosemide 20 mg daily.    After that, he became hypotensive, and his PCP reduced his lisinopril. Despite this he was still hypotensive and labs showed YASMEEN with hyperkalemia when I met him on 10/9. Significant alteration in his diet (ETOH and sodium reduction) was likely contributing. I reduced both Lasix and Spironolactone by half.     Of note, TSH was high at 6.8, T4F WNL. On 10/11, he had a device check which showed no arrhythmias and 97% BiV pace. His AMIO was reduced from 400 BID to 200 mg daily.     Today, he presents with his wife Julisa for close follow-up. He remains hypotensive. His K+ remains high at 5.3, creat at 1.6 and BUN at 40. Weight is stable. He mistakenly went back to the higher dose of lisinopril 20 mg daily, he tells me, unclear why. Overall, he feels about the same - still deconditioned. He's been tolerating some light yardwork at home. He gets a little lightheaded when bending down and standing up quickly, but otherwise has had no near-syncope. He tells me he had one episode of \"tightness in his lungs\" while talking on the phone to his boss the other day. It resolved after he hung up and lay down in bed. He denies PND, orthopnea, edema, claudication, palpitations.          Review of Systems:     12-pt ROS is negative except for as noted in the HPI.          Physical Exam:     Vitals: BP 94/40   Pulse 62   Ht 1.753 m (5' 9\")   Wt " 118.6 kg (261 lb 6.4 oz)   SpO2 99%   BMI 38.60 kg/m     Wt Readings from Last 10 Encounters:   10/17/19 118.6 kg (261 lb 6.4 oz)   10/09/19 117.9 kg (260 lb)   10/08/19 118.7 kg (261 lb 11.2 oz)   10/05/19 117.7 kg (259 lb 8 oz)   10/01/19 125.1 kg (275 lb 12.8 oz)   08/30/19 127 kg (280 lb)   07/02/19 127 kg (280 lb)   04/15/19 127 kg (280 lb)   10/12/18 120.2 kg (264 lb 14.4 oz)   10/03/18 126.3 kg (278 lb 6.4 oz)       Constitutional:  Patient is pleasant, alert, cooperative, and in NAD.  HEENT:  NCAT. PERRLA. EOM's intact.   Neck:  CVP is difficult to assess due to body habitus.    Pulmonary: Normal respiratory effort. CTAB.   Cardiac: RRR, normal S1/S2, no S3/S4, no murmur or rub. Distant heart tones. L upper chest device incision C/D/I, with surrounding ecchymosis but no erythema or edema, minimal TTP.   Abdomen:  Non-tender abdomen, no hepatosplenomegaly appreciated.   Vascular: Pulses in the upper and lower extremities are 2+ and equal bilaterally.  Extremities: No edema, erythema, cyanosis or tenderness appreciated.  Skin:  No rashes or lesions appreciated.   Neurological:  No gross motor or sensory deficits.   Psych: Appropriate affect.        Data:     Labs reviewed:  Recent Labs   Lab Test 10/17/19  1002 10/09/19  0906 10/03/18  0849 06/11/18  0420  01/20/17  0911   LDL  --   --  40 59  --  48   HDL  --   --  48 46  --  43   NHDL  --   --  56 82  --  66   CHOL  --   --  104 128  --  109   TRIG  --   --  81 113  --  91   TSH 9.74* 6.88* 3.50  --    < >  --    NTBNP  --  589*  --   --   --   --     < > = values in this interval not displayed.       Lab Results   Component Value Date    WBC 7.1 10/05/2019    RBC 3.94 (L) 10/05/2019    HGB 12.3 (L) 10/05/2019    HCT 35.9 (L) 10/05/2019    MCV 91 10/05/2019    MCH 31.2 10/05/2019    MCHC 34.3 10/05/2019    RDW 13.0 10/05/2019     10/05/2019       Lab Results   Component Value Date     10/17/2019    POTASSIUM 5.3 (H) 10/17/2019    CHLORIDE 104  10/17/2019    CO2 24 10/17/2019    ANIONGAP 15.3 10/17/2019     (H) 10/17/2019    BUN 40 (H) 10/17/2019    CR 1.64 (H) 10/17/2019    GFRESTIMATED 43 (L) 10/17/2019    GFRESTBLACK 52 (L) 10/17/2019    EDITH 10.6 (H) 10/17/2019      Lab Results   Component Value Date    AST 65 (H) 10/02/2019    ALT 56 10/02/2019       Lab Results   Component Value Date    A1C 8.8 (H) 08/26/2019       Lab Results   Component Value Date    INR 1.16 (H) 10/04/2019           Problem List:     Patient Active Problem List   Diagnosis     Diverticulosis of large intestine     Benign essential hypertension     Gout     Severe obesity (BMI 35.0-39.9) with comorbidity (H)     Type 2 diabetes mellitus without complication, without long-term current use of insulin (H)     Hyperlipidemia LDL goal <100     NSTEMI (non-ST elevated myocardial infarction) (H)     Non-ischemic cardiomyopathy (H)     LBBB (left bundle branch block)     Nonrheumatic mitral valve regurgitation     Ascending aorta dilatation (H)     Coronary artery disease involving native coronary artery of native heart without angina pectoris     Near syncope     Paroxysmal ventricular tachycardia (H)           Medications:     Current Outpatient Medications   Medication Sig Dispense Refill     albuterol (PROAIR HFA/PROVENTIL HFA/VENTOLIN HFA) 108 (90 Base) MCG/ACT Inhaler Inhale 2 puffs into the lungs every 6 hours as needed for shortness of breath / dyspnea or wheezing       amiodarone (PACERONE/CODARONE) 200 MG tablet Take 1 tablet (200 mg) by mouth daily 30 tablet 1     aspirin 81 MG tablet Take 1 tablet (81 mg) by mouth daily 30 tablet      atorvastatin (LIPITOR) 80 MG tablet Take 1/2 (one-half) tablet by mouth at bedtime. 45 tablet 0     BD ULTRA-FINE 29G X 12.7MM insulin pen needle use once daily with victoza pen 100 each 1     BD ULTRA-FINE 29G X 12.7MM insulin pen needle use once daily with victoza pen 100 each 1     blood glucose monitoring (ACCU-CHEK LUL PLUS) test  strip Use to test blood sugar 1-3 times daily or as directed. 100 each 11     clotrimazole (LOTRIMIN) 1 % external cream Apply sparingly once or twice per day as needed to affected area until the skin is better, then stop; REPEAT AS NEEDED 30 g 1     fenofibrate 160 MG tablet TAKE ONE TABLET BY MOUTH ONE TIME DAILY  90 tablet 3     fluticasone (FLONASE) 50 MCG/ACT nasal spray Spray 2 sprays into both nostrils daily (Patient taking differently: Spray 2 sprays into both nostrils daily as needed ) 16 g 0     furosemide (LASIX) 20 MG tablet Take 0.5 tablets (10 mg) by mouth daily 30 tablet 1     glipiZIDE (GLUCOTROL XL) 10 MG 24 hr tablet TAKE 1 TABLET (10 MG) BY MOUTH DAILY. TAKE WITH LARGEST MEAL OF THE DAY. ALWAYS TAKE WITH FOOD 90 tablet 0     insulin detemir (LEVEMIR PEN) 100 UNIT/ML pen Inject 10 Units Subcutaneous At Bedtime 18 mL 0     liraglutide (VICTOZA PEN) 18 MG/3ML solution Inject 1.8 mg Subcutaneous daily 9 mL 0     lisinopril (PRINIVIL/ZESTRIL) 10 MG tablet Take 1 tablet (10 mg) by mouth daily 30 tablet 5     metFORMIN (GLUCOPHAGE) 1000 MG tablet Take 1 tablet (1,000 mg) by mouth 2 times daily (with meals) 180 tablet 3     metoprolol succinate ER (TOPROL-XL) 25 MG 24 hr tablet TAKE ONE TABLET BY MOUTH IN THE EVENING  30 tablet 10     triamcinolone (KENALOG) 0.5 % cream Apply sparingly once or twice per day as needed to affected area until the skin is better, then stop 30 g 0     Continuous Blood Gluc  (FREESTYLE JOCELIN 14 DAY READER) LUTHER 1 each See Admin Instructions (Patient not taking: Reported on 10/17/2019) 1 Device 0     Continuous Blood Gluc Sensor (FREESTYLE JOCELIN 14 DAY SENSOR) [facility] [place] 1 each every 14 days (Patient not taking: Reported on 10/17/2019) 6 each 3     continuous blood glucose monitoring (FREESTYLE JOCELIN) sensor For use with Freestyle Jocelin Flash  for continuous monitioring of blood glucose levels. Replace sensor every 10 days. (Patient not taking: Reported on 10/17/2019)  3 each 3           Past Medical History:     Past Medical History:   Diagnosis Date     Ascending aorta dilatation (H)      Diverticulosis of colon (without mention of hemorrhage)      Essential hypertension, benign      Gout, unspecified      LBBB (left bundle branch block)      Morbid obesity (H)      Non-ischemic cardiomyopathy (H)      Nonrheumatic mitral valve regurgitation      NSTEMI (non-ST elevated myocardial infarction) (H)     cardiac cath 6/2018: mild non-obstructive CAD     Other and unspecified hyperlipidemia      Type II or unspecified type diabetes mellitus without mention of complication, not stated as uncontrolled      Past Surgical History:   Procedure Laterality Date     C APPENDECTOMY  1980's     CV CORONARY ANGIOGRAM N/A 10/2/2019    Procedure: Coronary Angiogram;  Surgeon: Kathy Almaguer MD;  Location:  HEART CARDIAC CATH LAB     CV LEFT HEART CATH N/A 10/2/2019    Procedure: Left Heart Cath;  Surgeon: Kathy Almaguer MD;  Location:  HEART CARDIAC CATH LAB     CV RIGHT HEART CATH N/A 10/2/2019    Procedure: Right Heart Cath;  Surgeon: Kathy Almaguer MD;  Location:  HEART CARDIAC CATH LAB     EP ICD N/A 10/4/2019    Procedure: EP ICD;  Surgeon: Jayy Dorman MD;  Location:  HEART CARDIAC CATH LAB     EP LEAD PLCMNT LV NEW ICD N/A 10/4/2019    Procedure: EP Lead Plcmnt LV New ICD;  Surgeon: Jayy Dorman MD;  Location:  HEART CARDIAC CATH LAB     HEART CATH CORONARY ANGIOGRAM WITHOUT PRESSURES  06/10/2018    normal coronary angiogram, nothing more than 10%     SURGICAL HISTORY OF -   2001    repair of colovesicular fistula     Family History   Problem Relation Age of Onset     Cancer Father 75        bladder cancer     Myocardial Infarction Father      Other - See Comments Father         smoking     Breast Cancer Mother      Breast Cancer Sister      Family History Negative Brother      Family History Negative Son      Family History Negative Son          fluttering/murmur     Asthma Son      Colon Cancer No family hx of      Social History     Socioeconomic History     Marital status:      Spouse name: Not on file     Number of children: Not on file     Years of education: Not on file     Highest education level: Not on file   Occupational History     Not on file   Social Needs     Financial resource strain: Not on file     Food insecurity:     Worry: Not on file     Inability: Not on file     Transportation needs:     Medical: Not on file     Non-medical: Not on file   Tobacco Use     Smoking status: Never Smoker     Smokeless tobacco: Never Used   Substance and Sexual Activity     Alcohol use: Not Currently     Drug use: No     Sexual activity: Yes     Partners: Female   Lifestyle     Physical activity:     Days per week: Not on file     Minutes per session: Not on file     Stress: Not on file   Relationships     Social connections:     Talks on phone: Not on file     Gets together: Not on file     Attends Religion service: Not on file     Active member of club or organization: Not on file     Attends meetings of clubs or organizations: Not on file     Relationship status: Not on file     Intimate partner violence:     Fear of current or ex partner: Not on file     Emotionally abused: Not on file     Physically abused: Not on file     Forced sexual activity: Not on file   Other Topics Concern     Parent/sibling w/ CABG, MI or angioplasty before 65F 55M? Not Asked   Social History Narrative     Not on file           Allergies:   No known drug allergies      Nicole Hayes PA-C, ANNABELLA  [facility]  Pager: [phone]    Thank you for allowing me to participate in the care of your patient.      Sincerely,     Nicole Hayes PA-C     [facility]

## 2019-10-17 NOTE — NURSING NOTE
"[facility]    Met with patient and his wife after their visit with Nicole Freddy.    Living situation:   Independent  Med set up:   Self   Barriers to HF follow-up:   possibly some incomplete understanding as he was on the incorrect dose of lisinopril. When asked why/what happened he stated, \"I don't know.\"    CM/HF education topics we reviewed:   Sodium   Fluids   Daily weights   Symptoms of HF to report to CORE    Education materials provided:   Reviewed AVS      Return appointment:   Future Appointments   Date Time Provider Department Center   10/23/2019 10:30 AM [facility]   10/24/2019  7:30 AM Nicole Hayes PA-C [facility]     12/2/2019  8:10 AM [facility]   12/2/2019  9:15 AM Umm Hernandes MD [facility]     Patient to see EP(Pat) or EP TAMMY in 2 weeks, patient escorted to scheduling to make this appt.     Medication changes:   1.  discontinue spironolactone  2.  Resume lisinopril 10mg/d     Labs: Lab results from today were reviewed with the patient during the office visit. A copy of the results were provided on the After Visit Summary.     It was a pleasure meeting with Neymar and his wife today. Ting Castle RN on 10/17/2019 at 12:21 PM            "

## 2019-10-18 DIAGNOSIS — E11.9 TYPE 2 DIABETES MELLITUS WITHOUT COMPLICATION, WITHOUT LONG-TERM CURRENT USE OF INSULIN (H): ICD-10-CM

## 2019-10-18 RX ORDER — FENOFIBRATE 160 MG/1
TABLET ORAL
Qty: 90 TABLET | Refills: 1 | Status: SHIPPED | OUTPATIENT
Start: 2019-10-18 | End: 2020-05-08

## 2019-10-18 NOTE — TELEPHONE ENCOUNTER
"Requested Prescriptions   Pending Prescriptions Disp Refills     fenofibrate (TRIGLIDE/LOFIBRA) 160 MG tablet [Pharmacy Med Name: Fenofibrate Oral Tablet 160 MG] 90 tablet 2     Sig: TAKE ONE TABLET BY MOUTH ONE TIME DAILY   Last Written Prescription Date:  10/30/2018  Last Fill Quantity: 90,  # refills: 3   Last Office Visit: 10/8/2019   Future Office Visit:         Fibrates Failed - 10/18/2019  7:00 AM        Failed - Lipid panel on file in past 12 months     Recent Labs   Lab Test 10/03/18  0849  01/26/15  0920   CHOL 104   < > 126   TRIG 81   < > 128   HDL 48   < > 45   LDL 40   < > 55   NHDL 56   < >  --    VLDL  --   --  26   CHOLHDLRATIO  --   --  2.8    < > = values in this interval not displayed.               Passed - No abnormal creatine kinase in past 12 months     No lab results found.             Passed - Recent (12 mo) or future (30 days) visit within the authorizing provider's specialty     Patient has had an office visit with the authorizing provider or a provider within the authorizing providers department within the previous 12 mos or has a future within next 30 days. See \"Patient Info\" tab in inbasket, or \"Choose Columns\" in Meds & Orders section of the refill encounter.              Passed - Medication is active on med list        Passed - Patient is age 18 or older          "

## 2019-10-21 ENCOUNTER — DOCUMENTATION ONLY (OUTPATIENT)
Dept: CARE COORDINATION | Facility: CLINIC | Age: 59
End: 2019-10-21

## 2019-10-23 ENCOUNTER — TRANSFERRED RECORDS (OUTPATIENT)
Dept: HEALTH INFORMATION MANAGEMENT | Facility: CLINIC | Age: 59
End: 2019-10-23

## 2019-10-23 DIAGNOSIS — I47.29 PAROXYSMAL VENTRICULAR TACHYCARDIA (H): ICD-10-CM

## 2019-10-23 DIAGNOSIS — I42.8 NON-ISCHEMIC CARDIOMYOPATHY (H): ICD-10-CM

## 2019-10-23 LAB
ANION GAP SERPL CALCULATED.3IONS-SCNC: 13.4 MMOL/L (ref 6–17)
BUN SERPL-MCNC: 29 MG/DL (ref 7–30)
CALCIUM SERPL-MCNC: 9.9 MG/DL (ref 8.5–10.5)
CHLORIDE SERPL-SCNC: 105 MMOL/L (ref 98–107)
CO2 SERPL-SCNC: 24 MMOL/L (ref 23–29)
COLOGUARD-ABSTRACT: NEGATIVE
CREAT SERPL-MCNC: 1.46 MG/DL (ref 0.7–1.3)
FERRITIN SERPL-MCNC: 140 NG/ML (ref 26–388)
GFR SERPL CREATININE-BSD FRML MDRD: 49 ML/MIN/{1.73_M2}
GLUCOSE SERPL-MCNC: 233 MG/DL (ref 70–105)
IRON SATN MFR SERPL: 23 % (ref 15–46)
IRON SERPL-MCNC: 86 UG/DL (ref 35–180)
NT-PROBNP SERPL-MCNC: 710 PG/ML (ref 0–125)
POTASSIUM SERPL-SCNC: 4.4 MMOL/L (ref 3.5–5.1)
SODIUM SERPL-SCNC: 138 MMOL/L (ref 136–145)
T4 FREE SERPL-MCNC: 0.98 NG/DL (ref 0.76–1.46)
TIBC SERPL-MCNC: 378 UG/DL (ref 240–430)
TSH SERPL DL<=0.005 MIU/L-ACNC: 7.68 MU/L (ref 0.4–4)

## 2019-10-23 PROCEDURE — 83540 ASSAY OF IRON: CPT | Performed by: INTERNAL MEDICINE

## 2019-10-23 PROCEDURE — 82728 ASSAY OF FERRITIN: CPT | Performed by: INTERNAL MEDICINE

## 2019-10-23 PROCEDURE — 83550 IRON BINDING TEST: CPT | Performed by: INTERNAL MEDICINE

## 2019-10-23 PROCEDURE — 84439 ASSAY OF FREE THYROXINE: CPT | Performed by: INTERNAL MEDICINE

## 2019-10-23 PROCEDURE — 36415 COLL VENOUS BLD VENIPUNCTURE: CPT | Performed by: INTERNAL MEDICINE

## 2019-10-23 PROCEDURE — 83880 ASSAY OF NATRIURETIC PEPTIDE: CPT | Performed by: INTERNAL MEDICINE

## 2019-10-23 PROCEDURE — 84443 ASSAY THYROID STIM HORMONE: CPT | Performed by: INTERNAL MEDICINE

## 2019-10-23 PROCEDURE — 80048 BASIC METABOLIC PNL TOTAL CA: CPT | Performed by: INTERNAL MEDICINE

## 2019-10-24 ENCOUNTER — OFFICE VISIT (OUTPATIENT)
Dept: CARDIOLOGY | Facility: CLINIC | Age: 59
End: 2019-10-24
Payer: COMMERCIAL

## 2019-10-24 ENCOUNTER — DOCUMENTATION ONLY (OUTPATIENT)
Dept: CARDIOLOGY | Facility: CLINIC | Age: 59
End: 2019-10-24

## 2019-10-24 VITALS
HEIGHT: 69 IN | SYSTOLIC BLOOD PRESSURE: 100 MMHG | BODY MASS INDEX: 39.55 KG/M2 | HEART RATE: 68 BPM | DIASTOLIC BLOOD PRESSURE: 62 MMHG | WEIGHT: 267 LBS | OXYGEN SATURATION: 98 %

## 2019-10-24 DIAGNOSIS — I50.23 ACUTE ON CHRONIC SYSTOLIC HEART FAILURE (H): ICD-10-CM

## 2019-10-24 DIAGNOSIS — I42.8 NON-ISCHEMIC CARDIOMYOPATHY (H): ICD-10-CM

## 2019-10-24 DIAGNOSIS — I10 BENIGN ESSENTIAL HYPERTENSION: ICD-10-CM

## 2019-10-24 DIAGNOSIS — I44.7 LBBB (LEFT BUNDLE BRANCH BLOCK): ICD-10-CM

## 2019-10-24 DIAGNOSIS — I25.10 CORONARY ARTERY DISEASE INVOLVING NATIVE CORONARY ARTERY OF NATIVE HEART WITHOUT ANGINA PECTORIS: ICD-10-CM

## 2019-10-24 DIAGNOSIS — I47.29 PAROXYSMAL VENTRICULAR TACHYCARDIA (H): Primary | ICD-10-CM

## 2019-10-24 DIAGNOSIS — I77.810 ASCENDING AORTA DILATATION (H): ICD-10-CM

## 2019-10-24 PROCEDURE — 99214 OFFICE O/P EST MOD 30 MIN: CPT | Mod: 24 | Performed by: PHYSICIAN ASSISTANT

## 2019-10-24 RX ORDER — FUROSEMIDE 20 MG
20 TABLET ORAL PRN
Qty: 30 TABLET | Refills: 1 | COMMUNITY
Start: 2019-10-24 | End: 2020-03-06

## 2019-10-24 ASSESSMENT — MIFFLIN-ST. JEOR: SCORE: 2011.48

## 2019-10-24 NOTE — PATIENT INSTRUCTIONS
"Today, we discussed the following:   - Results: The kidney function is improving.   - Medication changes:  Stop the Lasix. Take 20 mg \"as needed\" for weight gain of 2+ lbs overnight.  - Follow up: See appointments.     Please, remember to continue the followin.  Weigh yourself daily. Call if your weight is up > than 2 pounds in one day, or 5 pounds in one week; if you feel more short of breath or have worsening swelling in your legs or abdomen.  2.  Eat a low sodium diet (less than 2,000mg or 2g daily). If you eat less salt, you will retain less fluid.   3.  Avoid alcohol, as this can worsen heart failure.   4.  Avoid NSAIDs as able (For example, Ibuprofen / aleve / advil / naprosen / diclofenac).    Please call CORE nurse for any questions or concerns Mon-Fri 8am-4pm:   [phone]  For concerns after hours:   [phone]   Scheduling phone number:   [phone]    Thank you for visiting with us today.   Nicole Hayes PA-C  ______________________________________________________________  "

## 2019-10-24 NOTE — PROGRESS NOTES
Cardiology Clinic Progress Note    Neymar Rhodes MRN# [id number]   YOB: 1960 Age: 59 year old   Primary cardiologist: Dr. Hernandes         Assessment and Plan:     In summary, Neymar Rhodes presents today for a CORE clinic f/u visit. He recently developed hypotension and pre-renal azotemia with hyperkalemia following inpatient diuresis and addition of cameron + Lasix. His lisinopril and Lasix were reduced, Cameron was stopped with some improvement in his labs today. His weight today is up 6 lbs in clinic however only 2 lbs at home and he has no symptoms consistent with volume overload. BP is marginal, he's mildly symptomatic with this.    1. HFrEF    CMR: EF 25%, LVEDd 6.9 cm    ACC/AHA Stage: D; FC II-III     Etiology: suspect sarcoid; ETOH, LBBB likely contributing.    RV: nml    Valves: OK    Volume: Probably near-euvolemic  - Admit Wt: 275 lbs  - Current Wt: 267 lbs  - Dry Wt: suspect ~ 265 lbs  - Diuretics: Lasix 10 mg daily  - proBNP: 710    Rx: lisinopril 10, Toprol 25    Rhythm: SR / paroxysmal VT - on AMIO    QRS: native wide 174 ms (LBBB)    Device? CRT-D - no arrhythmias noted on 10/11 device check    Code status: Full    Creatinine: 1.46 (baseline 1.1)    CHF Admissions: 6/2018, 10/2019    Contributing  - Anemia / Fe studies: Mildly anemic, Fe studies do not qualify for IV Fe in CHF  - TFT's: TSH 7.6 (high), T4F WNL - AMIO reduced 10/10 to 200 mg daily  - BELA: unknown - pt declined PSG in past    Plan:  - Stop Lasix. I have asked him to take 20 mg PRN for weight gain of 2+ lbs overnight.   - Will plan to switch Lisinopril to Entresto at next visit - will look into coverage.   - Disability paperwork will be completed, he is aware that he must abstain from driving.   - Complete ETOH cessation reinforced. Low sodium diet, daily wts.     Follow-up:  - [facility]. PET scan will be arranged at that time.   - Repeat TFT's and visit with Delaney Sanabria in EP 11/4 for ongoing  "management of AMIO/VT.  - Me on 11/20 with repeat BMP, proBNP.   - Dr. Hernandes on 12/2.         History of Presenting Illness:      Neymar Rhodes is a pleasant 59 year old patient who presents today for a CORE clinic f/u  visit.    The patient has a history of the following -   # Severe nonischemic cardiomyopathy with EF of 15% with marked LV dilatation  # Sustained monomorphic ventricular tachycardia, s/p CRT-D  # Potential cardiac sarcoidosis  # Large LBBB  # Nonobstructive coronary artery disease  # DMII  # Long-standing ETOH abuse - prior to admission was drinking heavily (sallie aguilar and at least 20 beers every weekend - tells me this is after he \"cut back\").  # Obesity - Body mass index is 39.43 kg/m .   # Social - Lives with wife Julisa. He works as a . He is reading labels / monitoring his sodium intake with the help of his wife, and weighing himself daily.     Neymar was first seen in 2018 with new diagnosis of heart failure when his EF was noted to be around 40% by Dr. Iniguez, in the setting of normal sinus rhythm with a large LBBB.  Coronary angiography at that time had shown nonobstructive coronary artery disease. He was started on guideline directed therapy with beta-blockers, ACE inhibitors and mineralocorticoid receptor antagonist.  Follow-up cardiac MRI showed that he had scar concerning for sarcoidosis. He unfortunately failed to follow up after that as he was feeling fairly well, but did remain on his medications. He did well for about a year until he began to develop progressive dyspnea which resulted in his admission on 10/1/19. ECHO showed an EF<20% with an LVEDd of 6.9 cm. The following day, while seated, he developed a sustained monomorphic VT ~180 bpm.  This was cardioverted emergently into normal sinus rhythm with first shock at 200J. From there he went to the cath lab where his coronary anatomy was unchanged. RHC showed mildly elevated filling pressures with an LVEDP of " 21 and a low-normal cardiac output. Cardiac MRI was repeated and showed LGE in septal, inferior, lateral base with a non-CAD scar in the anterolateral mid-wall with a worsening scar burden to 13%. He was loaded with amiodarone and received CRT-D on 10/4. He was discharged the following day on his PTA regimen with the addition of spironolactone 25 mg and furosemide 20 mg daily.    After that, he became hypotensive, and his PCP reduced his lisinopril. Despite this he was still hypotensive and labs showed YASMEEN with hyperkalemia when I met him on 10/9. Significant alteration in his diet (ETOH and sodium reduction) was likely contributing. I reduced both Lasix and Spironolactone but ultimately had to hold the Cameron due to persistent hyperkalemia although it should be noted that he'd mistakenly also gone back to the higher dose of lisinopril at that point.   Also, his TSH has been elevated with a normal T4F on AMIO. On 10/11, he had a device check which showed no arrhythmias and 97% BiV pace. His AMIO was reduced from 400 BID to 200 mg daily. He has an upcoming appointment with EP for ongoing management.     Today, he presents to clinic alone. Labs performed yesterday show an improving creatinine from 1.64 --> 1.46, BUN normal at 29, and potassium is now WNL at 4.4. proBNP has risen slightly, from 589 --> 710 and his weight is up six lbs over the past week per our clinic scale however he's up only 2 lbs per his home scale. Iron studies are acceptable.   Overall, he feels about the same - still deconditioned. He's been tolerating some light yardwork at home. He gets a little lightheaded when bending down and standing up quickly, but otherwise has had no near-syncope. He's had no further episodes of chest tightness since we last met. He denies PND, orthopnea, edema, palpitations. He's continuing to work at following a low sodium and low-carb diet although admits he ate pizza last night.          Review of Systems:     12-pt  "ROS is negative except for as noted in the HPI.          Physical Exam:     Vitals: /62   Pulse 68   Ht 1.753 m (5' 9\")   Wt 121.1 kg (267 lb)   SpO2 98%   BMI 39.43 kg/m    Wt Readings from Last 10 Encounters:   10/24/19 121.1 kg (267 lb)   10/17/19 118.6 kg (261 lb 6.4 oz)   10/09/19 117.9 kg (260 lb)   10/08/19 118.7 kg (261 lb 11.2 oz)   10/05/19 117.7 kg (259 lb 8 oz)   10/01/19 125.1 kg (275 lb 12.8 oz)   08/30/19 127 kg (280 lb)   07/02/19 127 kg (280 lb)   04/15/19 127 kg (280 lb)   10/12/18 120.2 kg (264 lb 14.4 oz)       Constitutional:  Patient is pleasant, alert, cooperative, and in NAD.  HEENT:  NCAT. PERRLA. EOM's intact.   Neck:  CVP is difficult to assess due to body habitus.    Pulmonary: Normal respiratory effort. CTAB.   Cardiac: RRR, normal S1/S2, no S3/S4, no murmur or rub. Distant heart tones. L upper chest device incision C/D/I.  Abdomen:  Non-tender abdomen, no hepatosplenomegaly appreciated.   Vascular: Pulses in the upper and lower extremities are 2+ and equal bilaterally.  Extremities: No edema, erythema, cyanosis or tenderness appreciated.  Skin:  No rashes or lesions appreciated.   Neurological:  No gross motor or sensory deficits.   Psych: Appropriate affect.        Data:     Labs reviewed:  Recent Labs   Lab Test 10/23/19  1027 10/17/19  1002 10/09/19  0906 10/03/18  0849 06/11/18  0420  01/20/17  0911   LDL  --   --   --  40 59  --  48   HDL  --   --   --  48 46  --  43   NHDL  --   --   --  56 82  --  66   CHOL  --   --   --  104 128  --  109   TRIG  --   --   --  81 113  --  91   TSH 7.68* 9.74* 6.88* 3.50  --    < >  --    NTBNP 710*  --  589*  --   --   --   --    IRON 86  --   --   --   --   --   --      --   --   --   --   --   --    IRONSAT 23  --   --   --   --   --   --    KEVIN 140  --   --   --   --   --   --     < > = values in this interval not displayed.       Lab Results   Component Value Date    WBC 7.1 10/05/2019    RBC 3.94 (L) 10/05/2019    HGB 12.3 " (L) 10/05/2019    HCT 35.9 (L) 10/05/2019    MCV 91 10/05/2019    MCH 31.2 10/05/2019    MCHC 34.3 10/05/2019    RDW 13.0 10/05/2019     10/05/2019       Lab Results   Component Value Date     10/23/2019    POTASSIUM 4.4 10/23/2019    CHLORIDE 105 10/23/2019    CO2 24 10/23/2019    ANIONGAP 13.4 10/23/2019     (H) 10/23/2019    BUN 29 10/23/2019    CR 1.46 (H) 10/23/2019    GFRESTIMATED 49 (L) 10/23/2019    GFRESTBLACK 60 (L) 10/23/2019    EDITH 9.9 10/23/2019      Lab Results   Component Value Date    AST 65 (H) 10/02/2019    ALT 56 10/02/2019       Lab Results   Component Value Date    A1C 8.8 (H) 08/26/2019       Lab Results   Component Value Date    INR 1.16 (H) 10/04/2019           Problem List:     Patient Active Problem List   Diagnosis     Diverticulosis of large intestine     Benign essential hypertension     Gout     Severe obesity (BMI 35.0-39.9) with comorbidity (H)     Type 2 diabetes mellitus without complication, without long-term current use of insulin (H)     Hyperlipidemia LDL goal <100     NSTEMI (non-ST elevated myocardial infarction) (H)     Non-ischemic cardiomyopathy (H)     LBBB (left bundle branch block)     Nonrheumatic mitral valve regurgitation     Ascending aorta dilatation (H)     Coronary artery disease involving native coronary artery of native heart without angina pectoris     Near syncope     Paroxysmal ventricular tachycardia (H)           Medications:     Current Outpatient Medications   Medication Sig Dispense Refill     albuterol (PROAIR HFA/PROVENTIL HFA/VENTOLIN HFA) 108 (90 Base) MCG/ACT Inhaler Inhale 2 puffs into the lungs every 6 hours as needed for shortness of breath / dyspnea or wheezing       amiodarone (PACERONE/CODARONE) 200 MG tablet Take 1 tablet (200 mg) by mouth daily 30 tablet 1     aspirin 81 MG tablet Take 1 tablet (81 mg) by mouth daily 30 tablet      atorvastatin (LIPITOR) 80 MG tablet Take 1/2 (one-half) tablet by mouth at bedtime. 45  tablet 0     BD ULTRA-FINE 29G X 12.7MM insulin pen needle use once daily with victoza pen 100 each 1     BD ULTRA-FINE 29G X 12.7MM insulin pen needle use once daily with victoza pen 100 each 1     blood glucose monitoring (ACCU-CHEK ULL PLUS) test strip Use to test blood sugar 1-3 times daily or as directed. 100 each 11     clotrimazole (LOTRIMIN) 1 % external cream Apply sparingly once or twice per day as needed to affected area until the skin is better, then stop; REPEAT AS NEEDED 30 g 1     Continuous Blood Gluc  (FREESTYLE JOCELIN 14 DAY READER) LUTHER 1 each See Admin Instructions 1 Device 0     Continuous Blood Gluc Sensor (FREESTYLE JOCELIN 14 DAY SENSOR) MISC 1 each every 14 days 6 each 3     continuous blood glucose monitoring (FREESTYLE JOCELIN) sensor For use with Freestyle Jocelin Flash  for continuous monitioring of blood glucose levels. Replace sensor every 10 days. 3 each 3     fenofibrate (TRIGLIDE/LOFIBRA) 160 MG tablet TAKE ONE TABLET BY MOUTH ONE TIME DAILY 90 tablet 1     fluticasone (FLONASE) 50 MCG/ACT nasal spray Spray 2 sprays into both nostrils daily (Patient taking differently: Spray 2 sprays into both nostrils daily as needed ) 16 g 0     furosemide (LASIX) 20 MG tablet Take 0.5 tablets (10 mg) by mouth daily 30 tablet 1     glipiZIDE (GLUCOTROL XL) 10 MG 24 hr tablet TAKE 1 TABLET (10 MG) BY MOUTH DAILY. TAKE WITH LARGEST MEAL OF THE DAY. ALWAYS TAKE WITH FOOD 90 tablet 0     insulin detemir (LEVEMIR PEN) 100 UNIT/ML pen Inject 10 Units Subcutaneous At Bedtime 18 mL 0     liraglutide (VICTOZA PEN) 18 MG/3ML solution Inject 1.8 mg Subcutaneous daily 9 mL 0     lisinopril (PRINIVIL/ZESTRIL) 10 MG tablet Take 1 tablet (10 mg) by mouth daily 30 tablet 5     metFORMIN (GLUCOPHAGE) 1000 MG tablet Take 1 tablet (1,000 mg) by mouth 2 times daily (with meals) 180 tablet 3     metoprolol succinate ER (TOPROL-XL) 25 MG 24 hr tablet TAKE ONE TABLET BY MOUTH IN THE EVENING  30 tablet 10      triamcinolone (KENALOG) 0.5 % cream Apply sparingly once or twice per day as needed to affected area until the skin is better, then stop 30 g 0           Past Medical History:     Past Medical History:   Diagnosis Date     Ascending aorta dilatation (H)      Diverticulosis of colon (without mention of hemorrhage)      Essential hypertension, benign      Gout, unspecified      LBBB (left bundle branch block)      Morbid obesity (H)      Non-ischemic cardiomyopathy (H)      Nonrheumatic mitral valve regurgitation      NSTEMI (non-ST elevated myocardial infarction) (H)     cardiac cath 6/2018: mild non-obstructive CAD     Other and unspecified hyperlipidemia      Type II or unspecified type diabetes mellitus without mention of complication, not stated as uncontrolled      Past Surgical History:   Procedure Laterality Date     C APPENDECTOMY  1980's     CV CORONARY ANGIOGRAM N/A 10/2/2019    Procedure: Coronary Angiogram;  Surgeon: Kathy Almaguer MD;  Location:  HEART CARDIAC CATH LAB     CV LEFT HEART CATH N/A 10/2/2019    Procedure: Left Heart Cath;  Surgeon: Kathy Almaguer MD;  Location:  HE[facility]ARD[facility] CATH LAB     CV RIGHT HEART CATH N/A 10/2/2019    Procedure: Right Heart Cath;  Surgeon: Kathy Almaguer MD;  Location:  HEART CARDIAC CATH LAB     EP ICD N/A 10/4/2019    Procedure: EP ICD;  Surgeon: Jayy Dorman MD;  Location:  HEART CARDIAC CATH LAB     EP LEAD PLCMNT LV NEW ICD N/A 10/4/2019    Procedure: EP Lead Plcmnt LV New ICD;  Surgeon: Jayy Dorman MD;  Location: [facility] CARDIAC CATH LAB     HEART CATH CORONARY ANGIOGRAM WITHOUT PRESSURES  06/10/2018    normal coronary angiogram, nothing more than 10%     SURGICAL HISTORY OF -   2001    repair of colovesicular fistula     Family History   Problem Relation Age of Onset     Cancer Father 75        bladder cancer     Myocardial Infarction Father      Other - See Comments Father         smoking     Breast Cancer Mother       Breast Cancer Sister      Family History Negative Brother      Family History Negative Son      Family History Negative Son         fluttering/murmur     Asthma Son      Colon Cancer No family hx of      Social History     Socioeconomic History     Marital status:      Spouse name: Not on file     Number of children: Not on file     Years of education: Not on file     Highest education level: Not on file   Occupational History     Not on file   Social Needs     Financial resource strain: Not on file     Food insecurity:     Worry: Not on file     Inability: Not on file     Transportation needs:     Medical: Not on file     Non-medical: Not on file   Tobacco Use     Smoking status: Never Smoker     Smokeless tobacco: Never Used   Substance and Sexual Activity     Alcohol use: Not Currently     Drug use: No     Sexual activity: Yes     Partners: Female   Lifestyle     Physical activity:     Days per week: Not on file     Minutes per session: Not on file     Stress: Not on file   Relationships     Social connections:     Talks on phone: Not on file     Gets together: Not on file     Attends Buddhist service: Not on file     Active member of club or organization: Not on file     Attends meetings of clubs or organizations: Not on file     Relationship status: Not on file     Intimate partner violence:     Fear of current or ex partner: Not on file     Emotionally abused: Not on file     Physically abused: Not on file     Forced sexual activity: Not on file   Other Topics Concern     Parent/sibling w/ CABG, MI or angioplasty before 65F 55M? Not Asked   Social History Narrative     Not on file           Allergies:   No known drug allergies      Nicole Hayes PA-C, ANNABELLA  [facility]  Pager: [phone]

## 2019-10-24 NOTE — PROGRESS NOTES
Completed and Faxed patients FMLA paperwork, copy sent to scan.   Sánchez HAYES([facility]) 10/24/19  9:31 AM

## 2019-10-24 NOTE — LETTER
10/24/2019    Jefry Harris MD  [address] [facility] [address]    RE: Neymar Rhodes       Dear Colleague,    I had the pleasure of seeing Neymar Rhodes in the [facility].      Cardiology Clinic Progress Note    Neymar Rhodes MRN# [id number]   YOB: 1960 Age: 59 year old   Primary cardiologist: Dr. Hernandes         Assessment and Plan:     In summary, Neymar Rhodes presents today for a CORE clinic f/u visit. He recently developed hypotension and pre-renal azotemia with hyperkalemia following inpatient diuresis and addition of amelia + Lasix. His lisinopril and Lasix were reduced, Freedom was stopped with some improvement in his labs today. His weight today is up 6 lbs in clinic however only 2 lbs at home and he has no symptoms consistent with volume overload. BP is marginal, he's mildly symptomatic with this.    1. HFrEF    CMR: EF 25%, LVEDd 6.9 cm    ACC/AHA Stage: D; FC II-III     Etiology: suspect sarcoid; ETOH, LBBB likely contributing.    RV: nml    Valves: OK    Volume: Probably near-euvolemic  - Admit Wt: 275 lbs  - Current Wt: 267 lbs  - Dry Wt: suspect ~ 265 lbs  - Diuretics: Lasix 10 mg daily  - proBNP: 710    Rx: lisinopril 10, Toprol 25    Rhythm: SR / paroxysmal VT - on AMIO    QRS: native wide 174 ms (LBBB)    Device? CRT-D - no arrhythmias noted on 10/11 device check    Code status: Full    Creatinine: 1.46 (baseline 1.1)    CHF Admissions: 6/2018, 10/2019    Contributing  - Anemia / Fe studies: Mildly anemic, Fe studies do not qualify for IV Fe in CHF  - TFT's: TSH 7.6 (high), T4F WNL - AMIO reduced 10/10 to 200 mg daily  - BELA: unknown - pt declined PSG in past    Plan:  - Stop Lasix. I have asked him to take 20 mg PRN for weight gain of 2+ lbs overnight.   - Will plan to switch Lisinopril to Entresto at next visit - will look into coverage.   - Disability paperwork will be completed, he is aware that he must abstain from driving.  "  - Complete ETOH cessation reinforced. Low sodium diet, daily wts.     Follow-up:  - [facility]res [facility]. PET scan will be arranged at that time.   - Repeat TFT's and visit with Delaney Sanabria in  11/4 for ongoing management of AMIO/VT.  - Me on 11/20 with repeat BMP, proBNP.   - Dr. Hernandes on 12/2.         History of Presenting Illness:      Neymar Rhodes is a pleasant 59 year old patient who presents today for a CORE clinic f/u  visit.    The patient has a history of the following -   # Severe nonischemic cardiomyopathy with EF of 15% with marked LV dilatation  # Sustained monomorphic ventricular tachycardia, s/p CRT-D  # Potential cardiac sarcoidosis  # Large LBBB  # Nonobstructive coronary artery disease  # DMII  # Long-standing ETOH abuse - prior to admission was drinking heavily (sallie aguilar and at least 20 beers every weekend - tells me this is after he \"cut back\").  # Obesity - Body mass index is 39.43 kg/m .   # Social - Lives with wife Julisa. He works as a . He is reading labels / monitoring his sodium intake with the help of his wife, and weighing himself daily.     Neymar was first seen in 2018 with new diagnosis of heart failure when his EF was noted to be around 40% by Dr. Iniguez, in the setting of normal sinus rhythm with a large LBBB.  Coronary angiography at that time had shown nonobstructive coronary artery disease. He was started on guideline directed therapy with beta-blockers, ACE inhibitors and mineralocorticoid receptor antagonist.   Follow-up cardiac MRI showed that he had scar concerning for sarcoidosis. He unfortunately failed to follow up after that as he was feeling fairly well, but did remain on his medications. He did well for about a year until he began to develop progressive dyspnea which resulted in his admission on 10/1/19. ECHO showed an EF<20% with an LVEDd of 6.9 cm. The following day, while seated, he developed a sustained monomorphic VT ~180 bpm.  " This was cardioverted emergently into normal sinus rhythm with first shock at 200J. From there he went to the cath lab where his coronary anatomy was unchanged. RHC showed mildly elevated filling pressures with an LVEDP of 21 and a low-normal cardiac output. Cardiac MRI was repeated and showed LGE in septal, inferior, lateral base with a non-CAD scar in the anterolateral mid-wall with a worsening scar burden to 13%. He was loaded with amiodarone and received CRT-D on 10/4. He was discharged the following day on his PTA regimen with the addition of spironolactone 25 mg and furosemide 20 mg daily.    After that, he became hypotensive, and his PCP reduced his lisinopril. Despite this he was still hypotensive and labs showed YASMEEN with hyperkalemia when I met him on 10/9. Significant alteration in his diet (ETOH and sodium reduction) was likely contributing. I reduced both Lasix and Spironolactone but ultimately had to hold the Convoy due to persistent hyperkalemia although it should be noted that he'd mistakenly also gone back to the higher dose of lisinopril at that point.   Also, his TSH has been elevated with a normal T4F on AMIO. On 10/11, he had a device check which showed no arrhythmias and 97% BiV pace. His AMIO was reduced from 400 BID to 200 mg daily. He has an upcoming appointment with EP for ongoing management.     Today, he presents to clinic alone. Labs performed yesterday show an improving creatinine from 1.64 --> 1.46, BUN normal at 29, and potassium is now WNL at 4.4. proBNP has risen slightly, from 589 --> 710 and his weight is up six lbs over the past week per our clinic scale however he's up only 2 lbs per his home scale. Iron studies are acceptable.   Overall, he feels about the same - still deconditioned. He's been tolerating some light yardwork at home. He gets a little lightheaded when bending down and standing up quickly, but otherwise has had no near-syncope. He's had no further episodes of chest  "tightness since we last met. He denies PND, orthopnea, edema, palpitations. He's continuing to work at following a low sodium and low-carb diet although admits he ate pizza last night.          Review of Systems:     12-pt ROS is negative except for as noted in the HPI.          Physical Exam:     Vitals: /62   Pulse 68   Ht 1.753 m (5' 9\")   Wt 121.1 kg (267 lb)   SpO2 98%   BMI 39.43 kg/m     Wt Readings from Last 10 Encounters:   10/24/19 121.1 kg (267 lb)   10/17/19 118.6 kg (261 lb 6.4 oz)   10/09/19 117.9 kg (260 lb)   10/08/19 118.7 kg (261 lb 11.2 oz)   10/05/19 117.7 kg (259 lb 8 oz)   10/01/19 125.1 kg (275 lb 12.8 oz)   08/30/19 127 kg (280 lb)   07/02/19 127 kg (280 lb)   04/15/19 127 kg (280 lb)   10/12/18 120.2 kg (264 lb 14.4 oz)       Constitutional:  Patient is pleasant, alert, cooperative, and in NAD.  HEENT:  NCAT. PERRLA. EOM's intact.   Neck:  CVP is difficult to assess due to body habitus.    Pulmonary: Normal respiratory effort. CTAB.   Cardiac: RRR, normal S1/S2, no S3/S4, no murmur or rub. Distant heart tones. L upper chest device incision C/D/I.  Abdomen:  Non-tender abdomen, no hepatosplenomegaly appreciated.   Vascular: Pulses in the upper and lower extremities are 2+ and equal bilaterally.  Extremities: No edema, erythema, cyanosis or tenderness appreciated.  Skin:  No rashes or lesions appreciated.   Neurological:  No gross motor or sensory deficits.   Psych: Appropriate affect.        Data:     Labs reviewed:  Recent Labs   Lab Test 10/23/19  1027 10/17/19  1002 10/09/19  0906 10/03/18  0849 06/11/18  0420  01/20/17  0911   LDL  --   --   --  40 59  --  48   HDL  --   --   --  48 46  --  43   NHDL  --   --   --  56 82  --  66   CHOL  --   --   --  104 128  --  109   TRIG  --   --   --  81 113  --  91   TSH 7.68* 9.74* 6.88* 3.50  --    < >  --    NTLUISANA 710*  --  589*  --   --   --   --    IRON 86  --   --   --   --   --   --      --   --   --   --   --   --    IRONSAT " 23  --   --   --   --   --   --    KEVIN 140  --   --   --   --   --   --     < > = values in this interval not displayed.       Lab Results   Component Value Date    WBC 7.1 10/05/2019    RBC 3.94 (L) 10/05/2019    HGB 12.3 (L) 10/05/2019    HCT 35.9 (L) 10/05/2019    MCV 91 10/05/2019    MCH 31.2 10/05/2019    MCHC 34.3 10/05/2019    RDW 13.0 10/05/2019     10/05/2019       Lab Results   Component Value Date     10/23/2019    POTASSIUM 4.4 10/23/2019    CHLORIDE 105 10/23/2019    CO2 24 10/23/2019    ANIONGAP 13.4 10/23/2019     (H) 10/23/2019    BUN 29 10/23/2019    CR 1.46 (H) 10/23/2019    GFRESTIMATED 49 (L) 10/23/2019    GFRESTBLACK 60 (L) 10/23/2019    EDITH 9.9 10/23/2019      Lab Results   Component Value Date    AST 65 (H) 10/02/2019    ALT 56 10/02/2019       Lab Results   Component Value Date    A1C 8.8 (H) 08/26/2019       Lab Results   Component Value Date    INR 1.16 (H) 10/04/2019           Problem List:     Patient Active Problem List   Diagnosis     Diverticulosis of large intestine     Benign essential hypertension     Gout     Severe obesity (BMI 35.0-39.9) with comorbidity (H)     Type 2 diabetes mellitus without complication, without long-term current use of insulin (H)     Hyperlipidemia LDL goal <100     NSTEMI (non-ST elevated myocardial infarction) (H)     Non-ischemic cardiomyopathy (H)     LBBB (left bundle branch block)     Nonrheumatic mitral valve regurgitation     Ascending aorta dilatation (H)     Coronary artery disease involving native coronary artery of native heart without angina pectoris     Near syncope     Paroxysmal ventricular tachycardia (H)           Medications:     Current Outpatient Medications   Medication Sig Dispense Refill     albuterol (PROAIR HFA/PROVENTIL HFA/VENTOLIN HFA) 108 (90 Base) MCG/ACT Inhaler Inhale 2 puffs into the lungs every 6 hours as needed for shortness of breath / dyspnea or wheezing       amiodarone (PACERONE/CODARONE) 200 MG  tablet Take 1 tablet (200 mg) by mouth daily 30 tablet 1     aspirin 81 MG tablet Take 1 tablet (81 mg) by mouth daily 30 tablet      atorvastatin (LIPITOR) 80 MG tablet Take 1/2 (one-half) tablet by mouth at bedtime. 45 tablet 0     BD ULTRA-FINE 29G X 12.7MM insulin pen needle use once daily with victoza pen 100 each 1     BD ULTRA-FINE 29G X 12.7MM insulin pen needle use once daily with victoza pen 100 each 1     blood glucose monitoring (ACCU-CHEK LUL PLUS) test strip Use to test blood sugar 1-3 times daily or as directed. 100 each 11     clotrimazole (LOTRIMIN) 1 % external cream Apply sparingly once or twice per day as needed to affected area until the skin is better, then stop; REPEAT AS NEEDED 30 g 1     Continuous Blood Gluc  (FREESTYLE JOCELIN 14 DAY READER) LUTHER 1 each See Admin Instructions 1 Device 0     Continuous Blood Gluc Sensor (FREESTYLE JOCELIN 14 DAY SENSOR) [facility] 1 each every 14 days 6 each 3     continuous blood glucose monitoring (FREESTYLE JOCELIN) sensor For use with Freestyle Jocelin Flash  for continuous monitioring of blood glucose levels. Replace sensor every 10 days. 3 each 3     fenofibrate (TRIGLIDE/LOFIBRA) 160 MG tablet TAKE ONE TABLET BY MOUTH ONE TIME DAILY 90 tablet 1     fluticasone (FLONASE) 50 MCG/ACT nasal spray Spray 2 sprays into both nostrils daily (Patient taking differently: Spray 2 sprays into both nostrils daily as needed ) 16 g 0     furosemide (LASIX) 20 MG tablet Take 0.5 tablets (10 mg) by mouth daily 30 tablet 1     glipiZIDE (GLUCOTROL XL) 10 MG 24 hr tablet TAKE 1 TABLET (10 MG) BY MOUTH DAILY. TAKE WITH LARGEST MEAL OF THE DAY. ALWAYS TAKE WITH FOOD 90 tablet 0     insulin detemir (LEVEMIR PEN) 100 UNIT/ML pen Inject 10 Units Subcutaneous At Bedtime 18 mL 0     liraglutide (VICTOZA PEN) 18 MG/3ML solution Inject 1.8 mg Subcutaneous daily 9 mL 0     lisinopril (PRINIVIL/ZESTRIL) 10 MG tablet Take 1 tablet (10 mg) by mouth daily 30 tablet 5      metFORMIN (GLUCOPHAGE) 1000 MG tablet Take 1 tablet (1,000 mg) by mouth 2 times daily (with meals) 180 tablet 3     metoprolol succinate ER (TOPROL-XL) 25 MG 24 hr tablet TAKE ONE TABLET BY MOUTH IN THE EVENING  30 tablet 10     triamcinolone (KENALOG) 0.5 % cream Apply sparingly once or twice per day as needed to affected area until the skin is better, then stop 30 g 0           Past Medical History:     Past Medical History:   Diagnosis Date     Ascending aorta dilatation (H)      Diverticulosis of colon (without mention of hemorrhage)      Essential hypertension, benign      Gout, unspecified      LBBB (left bundle branch block)      Morbid obesity (H)      Non-ischemic cardiomyopathy (H)      Nonrheumatic mitral valve regurgitation      NSTEMI (non-ST elevated myocardial infarction) (H)     cardiac cath 6/2018: mild non-obstructive CAD     Other and unspecified hyperlipidemia      Type II or unspecified type diabetes mellitus without mention of complication, not stated as uncontrolled      Past Surgical History:   Procedure Laterality Date     C APPENDECTOMY  1980's     CV CORONARY ANGIOGRAM N/A 10/2/2019    Procedure: Coronary Angiogram;  Surgeon: Kathy Almaguer MD;  Location:  HEART CARDIAC CATH LAB     CV LEFT HEART CATH N/A 10/2/2019    Procedure: Left Heart Cath;  Surgeon: Kathy Almaguer MD;  Location:  HEART CARDIAC CATH LAB     CV RIGHT HEART CATH N/A 10/2/2019    Procedure: Right Heart Cath;  Surgeon: Kathy Almaguer MD;  Location:  HEART CARDIAC CATH LAB     EP ICD N/A 10/4/2019    Procedure: EP ICD;  Surgeon: Jayy Dorman MD;  Location:  HEART CARDIAC CATH LAB     EP LEAD PLCMNT LV NEW ICD N/A 10/4/2019    Procedure: EP Lead Plcmnt LV New ICD;  Surgeon: Jayy Dorman MD;  Location:  HEART CARDIAC CATH LAB     HEART CATH CORONARY ANGIOGRAM WITHOUT PRESSURES  06/10/2018    normal coronary angiogram, nothing more than 10%     SURGICAL HISTORY OF -   2001    repair  of colovesicular fistula     Family History   Problem Relation Age of Onset     Cancer Father 75        bladder cancer     Myocardial Infarction Father      Other - See Comments Father         smoking     Breast Cancer Mother      Breast Cancer Sister      Family History Negative Brother      Family History Negative Son      Family History Negative Son         fluttering/murmur     Asthma Son      Colon Cancer No family hx of      Social History     Socioeconomic History     Marital status:      Spouse name: Not on file     Number of children: Not on file     Years of education: Not on file     Highest education level: Not on file   Occupational History     Not on file   Social Needs     Financial resource strain: Not on file     Food insecurity:     Worry: Not on file     Inability: Not on file     Transportation needs:     Medical: Not on file     Non-medical: Not on file   Tobacco Use     Smoking status: Never Smoker     Smokeless tobacco: Never Used   Substance and Sexual Activity     Alcohol use: Not Currently     Drug use: No     Sexual activity: Yes     Partners: Female   Lifestyle     Physical activity:     Days per week: Not on file     Minutes per session: Not on file     Stress: Not on file   Relationships     Social connections:     Talks on phone: Not on file     Gets together: Not on file     Attends Church service: Not on file     Active member of club or organization: Not on file     Attends meetings of clubs or organizations: Not on file     Relationship status: Not on file     Intimate partner violence:     Fear of current or ex partner: Not on file     Emotionally abused: Not on file     Physically abused: Not on file     Forced sexual activity: Not on file   Other Topics Concern     Parent/sibling w/ CABG, MI or angioplasty before 65F 55M? Not Asked   Social History Narrative     Not on file           Allergies:   No known drug allergies      Nicole Hayes PA-C, ANNABELLA  [facility]  Pager: [phone]    Thank you for allowing me to participate in the care of your patient.      Sincerely,     Nicole Hayes PA-C     [facility]    cc:   No referring provider defined for this encounter.

## 2019-10-24 NOTE — LETTER
10/24/2019    Jefry Harris MD  [address] [facility] [address]    RE: Neymar Rhodes       Dear Colleague,    I had the pleasure of seeing Neymar Rhodes in the [facility].      Cardiology Clinic Progress Note    Neymar Rhodes MRN# [id number]   YOB: 1960 Age: 59 year old   Primary cardiologist: Dr. Hernandes         Assessment and Plan:     In summary, Neymar Rhodes presents today for a CORE clinic f/u visit. He recently developed hypotension and pre-renal azotemia with hyperkalemia following inpatient diuresis and addition of amelia + Lasix. His lisinopril and Lasix were reduced, Crandall was stopped with some improvement in his labs today. His weight today is up 6 lbs in clinic however only 2 lbs at home and he has no symptoms consistent with volume overload. BP is marginal, he's mildly symptomatic with this.    1. HFrEF    CMR: EF 25%, LVEDd 6.9 cm    ACC/AHA Stage: D; FC II-III     Etiology: suspect sarcoid; ETOH, LBBB likely contributing.    RV: nml    Valves: OK    Volume: Probably near-euvolemic  - Admit Wt: 275 lbs  - Current Wt: 267 lbs  - Dry Wt: suspect ~ 265 lbs  - Diuretics: Lasix 10 mg daily  - proBNP: 710    Rx: lisinopril 10, Toprol 25    Rhythm: SR / paroxysmal VT - on AMIO    QRS: native wide 174 ms (LBBB)    Device? CRT-D - no arrhythmias noted on 10/11 device check    Code status: Full    Creatinine: 1.46 (baseline 1.1)    CHF Admissions: 6/2018, 10/2019    Contributing  - Anemia / Fe studies: Mildly anemic, Fe studies do not qualify for IV Fe in CHF  - TFT's: TSH 7.6 (high), T4F WNL - AMIO reduced 10/10 to 200 mg daily  - BELA: unknown - pt declined PSG in past    Plan:  - Stop Lasix. I have asked him to take 20 mg PRN for weight gain of 2+ lbs overnight.   - Will plan to switch Lisinopril to Entresto at next visit - will look into coverage.   - Disability paperwork will be completed, he is aware that he must abstain from driving.  "  - Complete ETOH cessation reinforced. Low sodium diet, daily wts.     Follow-up:  - [facility]res [facility]. PET scan will be arranged at that time.   - Repeat TFT's and visit with Delaney Sanabria in  11/4 for ongoing management of AMIO/VT.  - Me on 11/20 with repeat BMP, proBNP.   - Dr. Hernandes on 12/2.         History of Presenting Illness:      Neymar Rhodes is a pleasant 59 year old patient who presents today for a CORE clinic f/u  visit.    The patient has a history of the following -   # Severe nonischemic cardiomyopathy with EF of 15% with marked LV dilatation  # Sustained monomorphic ventricular tachycardia, s/p CRT-D  # Potential cardiac sarcoidosis  # Large LBBB  # Nonobstructive coronary artery disease  # DMII  # Long-standing ETOH abuse - prior to admission was drinking heavily (sallie aguilar and at least 20 beers every weekend - tells me this is after he \"cut back\").  # Obesity - Body mass index is 39.43 kg/m .   # Social - Lives with wife Julisa. He works as a . He is reading labels / monitoring his sodium intake with the help of his wife, and weighing himself daily.     Neymar was first seen in 2018 with new diagnosis of heart failure when his EF was noted to be around 40% by Dr. Iniguez, in the setting of normal sinus rhythm with a large LBBB.  Coronary angiography at that time had shown nonobstructive coronary artery disease. He was started on guideline directed therapy with beta-blockers, ACE inhibitors and mineralocorticoid receptor antagonist.   Follow-up cardiac MRI showed that he had scar concerning for sarcoidosis. He unfortunately failed to follow up after that as he was feeling fairly well, but did remain on his medications. He did well for about a year until he began to develop progressive dyspnea which resulted in his admission on 10/1/19. ECHO showed an EF<20% with an LVEDd of 6.9 cm. The following day, while seated, he developed a sustained monomorphic VT ~180 bpm.  " This was cardioverted emergently into normal sinus rhythm with first shock at 200J. From there he went to the cath lab where his coronary anatomy was unchanged. RHC showed mildly elevated filling pressures with an LVEDP of 21 and a low-normal cardiac output. Cardiac MRI was repeated and showed LGE in septal, inferior, lateral base with a non-CAD scar in the anterolateral mid-wall with a worsening scar burden to 13%. He was loaded with amiodarone and received CRT-D on 10/4. He was discharged the following day on his PTA regimen with the addition of spironolactone 25 mg and furosemide 20 mg daily.    After that, he became hypotensive, and his PCP reduced his lisinopril. Despite this he was still hypotensive and labs showed YASMEEN with hyperkalemia when I met him on 10/9. Significant alteration in his diet (ETOH and sodium reduction) was likely contributing. I reduced both Lasix and Spironolactone but ultimately had to hold the Johnson due to persistent hyperkalemia although it should be noted that he'd mistakenly also gone back to the higher dose of lisinopril at that point.   Also, his TSH has been elevated with a normal T4F on AMIO. On 10/11, he had a device check which showed no arrhythmias and 97% BiV pace. His AMIO was reduced from 400 BID to 200 mg daily. He has an upcoming appointment with EP for ongoing management.     Today, he presents to clinic alone. Labs performed yesterday show an improving creatinine from 1.64 --> 1.46, BUN normal at 29, and potassium is now WNL at 4.4. proBNP has risen slightly, from 589 --> 710 and his weight is up six lbs over the past week per our clinic scale however he's up only 2 lbs per his home scale. Iron studies are acceptable.   Overall, he feels about the same - still deconditioned. He's been tolerating some light yardwork at home. He gets a little lightheaded when bending down and standing up quickly, but otherwise has had no near-syncope. He's had no further episodes of chest  "tightness since we last met. He denies PND, orthopnea, edema, palpitations. He's continuing to work at following a low sodium and low-carb diet although admits he ate pizza last night.          Review of Systems:     12-pt ROS is negative except for as noted in the HPI.          Physical Exam:     Vitals: /62   Pulse 68   Ht 1.753 m (5' 9\")   Wt 121.1 kg (267 lb)   SpO2 98%   BMI 39.43 kg/m     Wt Readings from Last 10 Encounters:   10/24/19 121.1 kg (267 lb)   10/17/19 118.6 kg (261 lb 6.4 oz)   10/09/19 117.9 kg (260 lb)   10/08/19 118.7 kg (261 lb 11.2 oz)   10/05/19 117.7 kg (259 lb 8 oz)   10/01/19 125.1 kg (275 lb 12.8 oz)   08/30/19 127 kg (280 lb)   07/02/19 127 kg (280 lb)   04/15/19 127 kg (280 lb)   10/12/18 120.2 kg (264 lb 14.4 oz)       Constitutional:  Patient is pleasant, alert, cooperative, and in NAD.  HEENT:  NCAT. PERRLA. EOM's intact.   Neck:  CVP is difficult to assess due to body habitus.    Pulmonary: Normal respiratory effort. CTAB.   Cardiac: RRR, normal S1/S2, no S3/S4, no murmur or rub. Distant heart tones. L upper chest device incision C/D/I.  Abdomen:  Non-tender abdomen, no hepatosplenomegaly appreciated.   Vascular: Pulses in the upper and lower extremities are 2+ and equal bilaterally.  Extremities: No edema, erythema, cyanosis or tenderness appreciated.  Skin:  No rashes or lesions appreciated.   Neurological:  No gross motor or sensory deficits.   Psych: Appropriate affect.        Data:     Labs reviewed:  Recent Labs   Lab Test 10/23/19  1027 10/17/19  1002 10/09/19  0906 10/03/18  0849 06/11/18  0420  01/20/17  0911   LDL  --   --   --  40 59  --  48   HDL  --   --   --  48 46  --  43   NHDL  --   --   --  56 82  --  66   CHOL  --   --   --  104 128  --  109   TRIG  --   --   --  81 113  --  91   TSH 7.68* 9.74* 6.88* 3.50  --    < >  --    NTLUISANA 710*  --  589*  --   --   --   --    IRON 86  --   --   --   --   --   --      --   --   --   --   --   --    IRONSAT " 23  --   --   --   --   --   --    KEVIN 140  --   --   --   --   --   --     < > = values in this interval not displayed.       Lab Results   Component Value Date    WBC 7.1 10/05/2019    RBC 3.94 (L) 10/05/2019    HGB 12.3 (L) 10/05/2019    HCT 35.9 (L) 10/05/2019    MCV 91 10/05/2019    MCH 31.2 10/05/2019    MCHC 34.3 10/05/2019    RDW 13.0 10/05/2019     10/05/2019       Lab Results   Component Value Date     10/23/2019    POTASSIUM 4.4 10/23/2019    CHLORIDE 105 10/23/2019    CO2 24 10/23/2019    ANIONGAP 13.4 10/23/2019     (H) 10/23/2019    BUN 29 10/23/2019    CR 1.46 (H) 10/23/2019    GFRESTIMATED 49 (L) 10/23/2019    GFRESTBLACK 60 (L) 10/23/2019    EDITH 9.9 10/23/2019      Lab Results   Component Value Date    AST 65 (H) 10/02/2019    ALT 56 10/02/2019       Lab Results   Component Value Date    A1C 8.8 (H) 08/26/2019       Lab Results   Component Value Date    INR 1.16 (H) 10/04/2019           Problem List:     Patient Active Problem List   Diagnosis     Diverticulosis of large intestine     Benign essential hypertension     Gout     Severe obesity (BMI 35.0-39.9) with comorbidity (H)     Type 2 diabetes mellitus without complication, without long-term current use of insulin (H)     Hyperlipidemia LDL goal <100     NSTEMI (non-ST elevated myocardial infarction) (H)     Non-ischemic cardiomyopathy (H)     LBBB (left bundle branch block)     Nonrheumatic mitral valve regurgitation     Ascending aorta dilatation (H)     Coronary artery disease involving native coronary artery of native heart without angina pectoris     Near syncope     Paroxysmal ventricular tachycardia (H)           Medications:     Current Outpatient Medications   Medication Sig Dispense Refill     albuterol (PROAIR HFA/PROVENTIL HFA/VENTOLIN HFA) 108 (90 Base) MCG/ACT Inhaler Inhale 2 puffs into the lungs every 6 hours as needed for shortness of breath / dyspnea or wheezing       amiodarone (PACERONE/CODARONE) 200 MG  tablet Take 1 tablet (200 mg) by mouth daily 30 tablet 1     aspirin 81 MG tablet Take 1 tablet (81 mg) by mouth daily 30 tablet      atorvastatin (LIPITOR) 80 MG tablet Take 1/2 (one-half) tablet by mouth at bedtime. 45 tablet 0     BD ULTRA-FINE 29G X 12.7MM insulin pen needle use once daily with victoza pen 100 each 1     BD ULTRA-FINE 29G X 12.7MM insulin pen needle use once daily with victoza pen 100 each 1     blood glucose monitoring (ACCU-CHEK LUL PLUS) test strip Use to test blood sugar 1-3 times daily or as directed. 100 each 11     clotrimazole (LOTRIMIN) 1 % external cream Apply sparingly once or twice per day as needed to affected area until the skin is better, then stop; REPEAT AS NEEDED 30 g 1     Continuous Blood Gluc  (FREESTYLE JOCELIN 14 DAY READER) LUTHER 1 each See Admin Instructions 1 Device 0     Continuous Blood Gluc Sensor (FREESTYLE JOCELIN 14 DAY SENSOR) [facility] 1 each every 14 days 6 each 3     continuous blood glucose monitoring (FREESTYLE JOCELIN) sensor For use with Freestyle Jocelin Flash  for continuous monitioring of blood glucose levels. Replace sensor every 10 days. 3 each 3     fenofibrate (TRIGLIDE/LOFIBRA) 160 MG tablet TAKE ONE TABLET BY MOUTH ONE TIME DAILY 90 tablet 1     fluticasone (FLONASE) 50 MCG/ACT nasal spray Spray 2 sprays into both nostrils daily (Patient taking differently: Spray 2 sprays into both nostrils daily as needed ) 16 g 0     furosemide (LASIX) 20 MG tablet Take 0.5 tablets (10 mg) by mouth daily 30 tablet 1     glipiZIDE (GLUCOTROL XL) 10 MG 24 hr tablet TAKE 1 TABLET (10 MG) BY MOUTH DAILY. TAKE WITH LARGEST MEAL OF THE DAY. ALWAYS TAKE WITH FOOD 90 tablet 0     insulin detemir (LEVEMIR PEN) 100 UNIT/ML pen Inject 10 Units Subcutaneous At Bedtime 18 mL 0     liraglutide (VICTOZA PEN) 18 MG/3ML solution Inject 1.8 mg Subcutaneous daily 9 mL 0     lisinopril (PRINIVIL/ZESTRIL) 10 MG tablet Take 1 tablet (10 mg) by mouth daily 30 tablet 5      metFORMIN (GLUCOPHAGE) 1000 MG tablet Take 1 tablet (1,000 mg) by mouth 2 times daily (with meals) 180 tablet 3     metoprolol succinate ER (TOPROL-XL) 25 MG 24 hr tablet TAKE ONE TABLET BY MOUTH IN THE EVENING  30 tablet 10     triamcinolone (KENALOG) 0.5 % cream Apply sparingly once or twice per day as needed to affected area until the skin is better, then stop 30 g 0           Past Medical History:     Past Medical History:   Diagnosis Date     Ascending aorta dilatation (H)      Diverticulosis of colon (without mention of hemorrhage)      Essential hypertension, benign      Gout, unspecified      LBBB (left bundle branch block)      Morbid obesity (H)      Non-ischemic cardiomyopathy (H)      Nonrheumatic mitral valve regurgitation      NSTEMI (non-ST elevated myocardial infarction) (H)     cardiac cath 6/2018: mild non-obstructive CAD     Other and unspecified hyperlipidemia      Type II or unspecified type diabetes mellitus without mention of complication, not stated as uncontrolled      Past Surgical History:   Procedure Laterality Date     C APPENDECTOMY  1980's     CV CORONARY ANGIOGRAM N/A 10/2/2019    Procedure: Coronary Angiogram;  Surgeon: Kathy Almaguer MD;  Location:  HEART CARDIAC CATH LAB     CV LEFT HEART CATH N/A 10/2/2019    Procedure: Left Heart Cath;  Surgeon: Kathy Almaguer MD;  Location:  HEART CARDIAC CATH LAB     CV RIGHT HEART CATH N/A 10/2/2019    Procedure: Right Heart Cath;  Surgeon: Kathy Almaguer MD;  Location:  HEART CARDIAC CATH LAB     EP ICD N/A 10/4/2019    Procedure: EP ICD;  Surgeon: Jayy Dorman MD;  Location:  HEART CARDIAC CATH LAB     EP LEAD PLCMNT LV NEW ICD N/A 10/4/2019    Procedure: EP Lead Plcmnt LV New ICD;  Surgeon: Jayy Dorman MD;  Location:  HEART CARDIAC CATH LAB     HEART CATH CORONARY ANGIOGRAM WITHOUT PRESSURES  06/10/2018    normal coronary angiogram, nothing more than 10%     SURGICAL HISTORY OF -   2001    repair  of colovesicular fistula     Family History   Problem Relation Age of Onset     Cancer Father 75        bladder cancer     Myocardial Infarction Father      Other - See Comments Father         smoking     Breast Cancer Mother      Breast Cancer Sister      Family History Negative Brother      Family History Negative Son      Family History Negative Son         fluttering/murmur     Asthma Son      Colon Cancer No family hx of      Social History     Socioeconomic History     Marital status:      Spouse name: Not on file     Number of children: Not on file     Years of education: Not on file     Highest education level: Not on file   Occupational History     Not on file   Social Needs     Financial resource strain: Not on file     Food insecurity:     Worry: Not on file     Inability: Not on file     Transportation needs:     Medical: Not on file     Non-medical: Not on file   Tobacco Use     Smoking status: Never Smoker     Smokeless tobacco: Never Used   Substance and Sexual Activity     Alcohol use: Not Currently     Drug use: No     Sexual activity: Yes     Partners: Female   Lifestyle     Physical activity:     Days per week: Not on file     Minutes per session: Not on file     Stress: Not on file   Relationships     Social connections:     Talks on phone: Not on file     Gets together: Not on file     Attends [employer] service: Not on file     Active member of club or organization: Not on file     Attends meetings of clubs or organizations: Not on file     Relationship status: Not on file     Intimate partner violence:     Fear of current or ex partner: Not on file     Emotionally abused: Not on file     Physically abused: Not on file     Forced sexual activity: Not on file   Other Topics Concern     Parent/sibling w/ CABG, MI or angioplasty before 65F 55M? Not Asked   Social History Narrative     Not on file           Allergies:   No known drug allergies      Nicole Hayes PA-C, ANNABELLA  [facility]  Pager: [phone]    Thank you for allowing me to participate in the care of your patient.    Sincerely,     Nicole Hayes PA-C     [facility]

## 2019-10-26 DIAGNOSIS — E11.9 TYPE 2 DIABETES MELLITUS WITHOUT COMPLICATION, WITHOUT LONG-TERM CURRENT USE OF INSULIN (H): ICD-10-CM

## 2019-10-26 NOTE — TELEPHONE ENCOUNTER
"Requested Prescriptions   Pending Prescriptions Disp Refills     BD ULTRA-FINE 29G X 12.7MM insulin pen needle [Pharmacy Med Name: BD Pen Needle Original U/F Miscellaneous 29G X 12.7MM] 100 each 0     Sig: USE ONCE DAILY WITH VICTOZA PEN   Last Written Prescription Date:  6/4/2019  Last Fill Quantity: 100,  # refills: 1   Last Office Visit: 10/8/2019   Future Office Visit:         Diabetic Supplies Protocol Passed - 10/26/2019  8:23 AM        Passed - Medication is active on med list        Passed - Patient is 18 years of age or older        Passed - Recent (6 mo) or future (30 days) visit within the authorizing provider's specialty     Patient had office visit in the last 6 months or has a visit in the next 30 days with authorizing provider.  See \"Patient Info\" tab in inbasket, or \"Choose Columns\" in Meds & Orders section of the refill encounter.              "

## 2019-10-28 ENCOUNTER — TRANSFERRED RECORDS (OUTPATIENT)
Dept: HEALTH INFORMATION MANAGEMENT | Facility: CLINIC | Age: 59
End: 2019-10-28

## 2019-10-28 RX ORDER — PEN NEEDLE, DIABETIC 29 G X1/2"
NEEDLE, DISPOSABLE MISCELLANEOUS
Qty: 100 EACH | Refills: 1 | Status: SHIPPED | OUTPATIENT
Start: 2019-10-28 | End: 2020-03-06

## 2019-10-30 ENCOUNTER — TELEPHONE (OUTPATIENT)
Dept: INTERNAL MEDICINE | Facility: CLINIC | Age: 59
End: 2019-10-30

## 2019-10-30 NOTE — LETTER
October 30, 2019      Neymar Rhodes  [address]  [place] [id number]        Dear ,    We are writing to inform you of your test results.    The ColoGuard stool test was NEGATIVE.  This means that there is no indication for colon cancer at this time.    We should reconsider colon cancer screening in 3 years, either ColoGuard every 3 years, colonoscopy every 5--10 years, or our FIT stool test annually.      If you have any questions or concerns, please call the clinic at the number listed above.       Sincerely,        Jefry Harris MD

## 2019-10-30 NOTE — PROGRESS NOTES
"HPI:   I had the pleasure of meeting Neymar when he came for follow up of VT and amiodarone therapy.  This 59 year old saw Dr. Hernandes and Dr. Stewart while hospitalized, and has been under the excellent care of SAMMY Rinaldi since discharge for his history of:    1. Severe non-ischemic CM, HFrEF with EF 40% in 2018, then down to 15%, up to 25% on most recent testing  2. VT noted while hospitalized 10/1/2019 s/p [employer] CRT-D 10/4/2019 with Dr. Dorman, loaded with Amiodarone, now on 200 mg daily since 10/13/2019  3. Potential cardiac sarcoid first noted in 2018. LGE noted on cMRI 10/2019 showing more extensive LGE (13%) compared with 2018 MRI (6%).   4. DM2  5. Heavy EtOH use now without EtOH x 1 month (10/2019)     Nicole saw Shravan 10/24 at which time she reviewed his h/o cardiomyopathy first dx'd 2018. EF was 40% in setting of NSR and LBBB. Follow-up cardiac MRI showed scar concerning for sarcoidosis. He did not follow up as he was feeling well, but was admitted 10/1/2019 due to progressive dyspnea. EF down to <20% with LVEDd 6.9 cm. While hospitalized, had VT @ 180 bpm, which was cardioverted emergently.  Loaded with Amiodarone IV and then po (400 mg BID x 7 days then 400 mg daily).  He underwent placement of CRT-D on 10/4/2019.VT detection rate decreased from 200 to 180 bpm with duration of 20\" due to concern for slow VT below detection rate.    Nicole has seen him multiple times since his discharge and addressed his hypotension and hyperkalemia.  She decreased her amiodarone from 400 mg daily to 200 mg daily on 10/10 due to a rise in his TSH. At her last OV 10/24 she stopped daily Lasix and had him take PRN dosing. Routine EP follow-up was recommended and I'm meeting him today.    Interval History:  Has not had to take the Lasix at all since he saw Nicole 10/24/2019. Weight at home ~256-257# since Lasix was moved to prn. Does not feel like he's retaining any fluid. No edema, orthopnea or PND.     No alcohol x " 1 month.     Feels a bit woozy when stands up quickly. No falls/faints. No palpitations. No problems with ICD site.    No complaints of chest pain, pressure or tightness. No change in exercise tolerance or breathing. Overall, he is feeling well.     Diagnostic Testing:  EKG today, which I overread, showed AP/BiV P at 60 bpm ( bpm as computer has read)    Device interrogation 10/11/2019 showed 28% AP and 97% BiV Paced in DDD  bpm. Underlying SR 60s. No atrial arrhythmias. No ventricular arrhythmias, ATP or shocks    Echocardiogam 10/2019 showed EF severely reduced <20% with LVEDd 6.9 c and WMA. Thinning/akinesis of inferior/inferolateral walls and apex. Septal thinning and dyskinesis c/w known LBBB. Significant hypokinesis of the anterior and anterolateral wall. RVSP 13+ RAP. Mild MR. Trace TR.   R/L Heart Cath 10/2019 showed noting more than luminal irregularities. LV filling pressures mildly elevated. Normal filling pressures and PA pressure. ULN wedge pressure    Cardiac MRI 10/2019 showed severely dilated LV with LVEF 25%. Dyssynchronous septal motion c/w LBBB. RV normal in size and function 57%. Biatrial enlargement. Mild MR. LGE in septal, inferior and lateral base. Non-CAD scar in anterolateral mid-wall. Scar is more extensive (13%) c/w prior MRI 2018 (6%).      Component      Latest Ref Rng & Units 10/9/2019 10/17/2019 10/23/2019 11/4/2019   TSH      0.40 - 4.00 mU/L 6.88 (H) 9.74 (H) 7.68 (H) 8.05 (H)   T4 Free      0.76 - 1.46 ng/dL 1.14 1.02 0.98 0.88     Component      Latest Ref Rng & Units 10/9/2019 10/17/2019 10/23/2019 11/4/2019   Sodium      133 - 144 mmol/L 134 (L) 138 138 139   Potassium      3.4 - 5.3 mmol/L 5.2 (H) 5.3 (H) 4.4 4.3   Chloride      94 - 109 mmol/L 104 104 105 110 (H)   Carbon Dioxide      20 - 32 mmol/L 22 (L) 24 24 26   Anion Gap      3 - 14 mmol/L 13.2 15.3 13.4 3   Glucose      70 - 99 mg/dL 219 (H) 159 (H) 233 (H) 184 (H)   Urea Nitrogen      7 - 30 mg/dL 55 (H) 40  (H) 29 21   Creatinine      0.66 - 1.25 mg/dL 1.57 (H) 1.64 (H) 1.46 (H) 1.07   GFR Estimate      >60 mL/min/1.73:m2 45 (L) 43 (L) 49 (L) 75   GFR Estimate If Black      >60 mL/min/1.73:m2 55 (L) 52 (L) 60 (L) 87   Calcium      8.5 - 10.1 mg/dL 10.5 10.6 (H) 9.9 9.0     Component      Latest Ref Rng & Units 10/9/2019 10/23/2019 11/4/2019   N-Terminal Pro Bnp      0 - 125 pg/mL 589 (H) 710 (H) 973 (H)     Component      Latest Ref Rng & Units 10/1/2019 10/2/2019   Bilirubin Total      0.2 - 1.3 mg/dL 0.4 0.6   Albumin      3.4 - 5.0 g/dL 3.9 3.4   Protein Total      6.8 - 8.8 g/dL 6.8 6.4 (L)   Alkaline Phosphatase      40 - 150 U/L 52 36 (L)   ALT      0 - 70 U/L 43 56   AST      0 - 45 U/L 25 65 (H)     Assessment & Plan:    1. VT in setting of severe non-ischemic CM    EF cMRI 10/2019 was 25% with dilated LV cavity. Known LBBB    He was loaded with amiodarone and was to be continued on 400 mg daily to prevent recurrence, but dose was decreased to 200 mg daily due to elevated TSH. T4 has been wnl.    Now s/p [employer] CRT-D 10/4/2019 and device interrogations have shown no recurrent VT. No therapies required     PLAN:    Continue amiodarone. Will keep him on the lower dose despite VT arrest as he's not had any additional therapies/VT noted on device interrogation    Labs as above    Continue routine device interrogations.      2. Amiodarone use    IV, then 400 mg BID x 7 days then 400 mg daily. Decreased to 200 mg daily on 10/10 due to TSH elevation    TSH/T4 and hepatic panel as above    PLAN:    Continue routine device interrogations    Will get baseline PFTs. Amiodarone will likely be a long term medication despite young age due to his VT and CM    CXR    Will plan labs again in 6 months (ordered)    Explained that as risks a/w VT >> hypothyroidism, would likely just continue amiodarone and he may need a thyroid supplement. Will message Dr. Harris re: abnl TSH in setting of normal T4.     Warned him of  irreversible pulmonary fibrosis. He is to call for worsening SOB, tremor or sudden severe vision changes.    3. Possible sarcoidosis    LGE noted on cMRI 7/2018 and again 10/2019    Saw Dr. Batres 11/2 at the . Note PET scan is likely    PLAN:    Await Dr. Batres's note    4. HFrEF    Seeing Nicole Hayes routinely    On exam appears euvolemic. Weight is down since last visit. Note BNP is up.    Has not had to take additional Lasix due to rapid weight gain    Labs as above    Remains on ACEi/BB    PLAN:    See Nicole 11/2019 and Dr. Hernandes 12/2019 as planned    I will let Nicole know that the labs are back.    Delaney Sanabria PA-C, MSPAS      Orders Placed This Encounter   Procedures     XR Chest 2 Views     Hepatic panel     TSH with free T4 reflex     Basic metabolic panel     EKG 12-lead complete w/read - Clinics (performed today)     Pulmonary function test     No orders of the defined types were placed in this encounter.    There are no discontinued medications.      Encounter Diagnoses   Name Primary?     NSTEMI (non-ST elevated myocardial infarction) (H)      On amiodarone therapy Yes       CURRENT MEDICATIONS:  Current Outpatient Medications   Medication Sig Dispense Refill     albuterol (PROAIR HFA/PROVENTIL HFA/VENTOLIN HFA) 108 (90 Base) MCG/ACT Inhaler Inhale 2 puffs into the lungs every 6 hours as needed for shortness of breath / dyspnea or wheezing       amiodarone (PACERONE/CODARONE) 200 MG tablet Take 1 tablet (200 mg) by mouth daily 30 tablet 1     aspirin 81 MG tablet Take 1 tablet (81 mg) by mouth daily 30 tablet      atorvastatin (LIPITOR) 80 MG tablet Take 1/2 (one-half) tablet by mouth at bedtime. 45 tablet 0     BD ULTRA-FINE 29G X 12.7MM insulin pen needle USE ONCE DAILY WITH VICTOZA  each 1     BD ULTRA-FINE 29G X 12.7MM insulin pen needle use once daily with victoza pen 100 each 1     blood glucose monitoring (ACCU-CHEK LUL PLUS) test strip Use to test blood sugar 1-3 times daily or as  directed. 100 each 11     clotrimazole (LOTRIMIN) 1 % external cream Apply sparingly once or twice per day as needed to affected area until the skin is better, then stop; REPEAT AS NEEDED 30 g 1     Continuous Blood Gluc  (FREESTYLE JOCELIN 14 DAY READER) LUTHER 1 each See Admin Instructions 1 Device 0     Continuous Blood Gluc Sensor (FREESTYLE JOCELIN 14 DAY SENSOR) MIS 1 each every 14 days 6 each 3     continuous blood glucose monitoring (FREESTYLE JOCELIN) sensor For use with Freestyle Jocelin Flash  for continuous monitioring of blood glucose levels. Replace sensor every 10 days. 3 each 3     fenofibrate (TRIGLIDE/LOFIBRA) 160 MG tablet TAKE ONE TABLET BY MOUTH ONE TIME DAILY 90 tablet 1     fluticasone (FLONASE) 50 MCG/ACT nasal spray Spray 2 sprays into both nostrils daily (Patient taking differently: Spray 2 sprays into both nostrils daily as needed ) 16 g 0     furosemide (LASIX) 20 MG tablet Take 1 tablet (20 mg) by mouth as needed (for weight gain of 2+ lbs overnight) 30 tablet 1     glipiZIDE (GLUCOTROL XL) 10 MG 24 hr tablet TAKE 1 TABLET (10 MG) BY MOUTH DAILY. TAKE WITH LARGEST MEAL OF THE DAY. ALWAYS TAKE WITH FOOD 90 tablet 0     insulin detemir (LEVEMIR PEN) 100 UNIT/ML pen Inject 10 Units Subcutaneous At Bedtime 18 mL 0     liraglutide (VICTOZA PEN) 18 MG/3ML solution Inject 1.8 mg Subcutaneous daily 9 mL 0     lisinopril (PRINIVIL/ZESTRIL) 10 MG tablet Take 1 tablet (10 mg) by mouth daily 30 tablet 5     metFORMIN (GLUCOPHAGE) 1000 MG tablet Take 1 tablet (1,000 mg) by mouth 2 times daily (with meals) 180 tablet 3     metoprolol succinate ER (TOPROL-XL) 25 MG 24 hr tablet TAKE ONE TABLET BY MOUTH IN THE EVENING  30 tablet 10     triamcinolone (KENALOG) 0.5 % cream Apply sparingly once or twice per day as needed to affected area until the skin is better, then stop 30 g 0       ALLERGIES     Allergies   Allergen Reactions     No Known Drug Allergies        PAST MEDICAL HISTORY:  Past Medical  History:   Diagnosis Date     Ascending aorta dilatation (H)      Diverticulosis of colon (without mention of hemorrhage)      Essential hypertension, benign      Gout, unspecified      LBBB (left bundle branch block)      Morbid obesity (H)      Non-ischemic cardiomyopathy (H)      Nonrheumatic mitral valve regurgitation      NSTEMI (non-ST elevated myocardial infarction) (H)     cardiac cath 6/2018: mild non-obstructive CAD     Other and unspecified hyperlipidemia      Type II or unspecified type diabetes mellitus without mention of complication, not stated as uncontrolled        PAST SURGICAL HISTORY:  Past Surgical History:   Procedure Laterality Date     C APPENDECTOMY  1980's     CV CORONARY ANGIOGRAM N/A 10/2/2019    Procedure: Coronary Angiogram;  Surgeon: Kathy Almaguer MD;  Location:  [facility]     CV LEFT HEART CATH N/A 10/2/2019    Procedure: Left Heart Cath;  Surgeon: Kathy Almaguer MD;  Location:  [facility]     CV RIGHT HEART CATH N/A 10/2/2019    Procedure: Right Heart Cath;  Surgeon: Kathy Almaguer MD;  Location:  [facility]     EP ICD N/A 10/4/2019    Procedure: EP ICD;  Surgeon: Jayy Dorman MD;  Location:  [facility]     EP LEAD PLCMNT LV NEW ICD N/A 10/4/2019    Procedure: EP Lead Plcmnt LV New ICD;  Surgeon: Jayy Dorman MD;  Location:  [facility]     HEART CATH CORONARY ANGIOGRAM WITHOUT PRESSURES  06/10/2018    normal coronary angiogram, nothing more than 10%     SURGICAL HISTORY OF -   2001    repair of colovesicular fistula       FAMILY HISTORY:  Family History   Problem Relation Age of Onset     Cancer Father 75        bladder cancer     Myocardial Infarction Father      Other - See Comments Father         smoking     Breast Cancer Mother      Breast Cancer Sister      Family History Negative Brother      Family History Negative Son      Family History Negative Son          fluttering/murmur     Asthma Son      Colon Cancer No family hx of        SOCIAL HISTORY:  Social History     Socioeconomic History     Marital status:      Spouse name: Not on file     Number of children: Not on file     Years of education: Not on file     Highest education level: Not on file   Occupational History     Not on file   Social Needs     Financial resource strain: Not on file     Food insecurity:     Worry: Not on file     Inability: Not on file     Transportation needs:     Medical: Not on file     Non-medical: Not on file   Tobacco Use     Smoking status: Never Smoker     Smokeless tobacco: Never Used   Substance and Sexual Activity     Alcohol use: Not Currently     Drug use: No     Sexual activity: Yes     Partners: Female   Lifestyle     Physical activity:     Days per week: Not on file     Minutes per session: Not on file     Stress: Not on file   Relationships     Social connections:     Talks on phone: Not on file     Gets together: Not on file     Attends [employer] service: Not on file     Active member of club or organization: Not on file     Attends meetings of clubs or organizations: Not on file     Relationship status: Not on file     Intimate partner violence:     Fear of current or ex partner: Not on file     Emotionally abused: Not on file     Physically abused: Not on file     Forced sexual activity: Not on file   Other Topics Concern     Parent/sibling w/ CABG, MI or angioplasty before 65F 55M? Not Asked   Social History Narrative     Not on file       Review of Systems:  Skin:  Negative     Eyes:  Positive for glasses  ENT:  Negative    Respiratory:  Negative for dyspnea on exertion;shortness of breath;cough  Cardiovascular:  Negative for;palpitations;chest pain;edema;exercise intolerance;fatigue;dizziness Positive for;lightheadedness  Gastroenterology: Negative    Genitourinary:  Negative    Musculoskeletal:  Negative    Neurologic:  Negative    Psychiatric:  Negative     Heme/Lymph/Imm:  Negative    Endocrine:  Positive for diabetes    Physical Exam:  Vitals: /72   Pulse 60   Ht 1.829 m (6')   Wt 118.6 kg (261 lb 8 oz)   BMI 35.47 kg/m      Constitutional:  cooperative, alert and oriented, well developed, well nourished, in no acute distress        Skin:  warm and dry to the touch, no apparent skin lesions or masses noted   Small (1mm) area of scab mid-incision. Well approximated. No drainage or hematoma    Head:  normocephalic, no masses or lesions        Eyes:  pupils equal and round;conjunctivae and lids unremarkable;sclera white        ENT:  no pallor or cyanosis, dentition good        Neck:  no carotid bruit        Chest:  clear to auscultation        Cardiac: regular rhythm, normal S1/S2, no S3 or S4, apical impulse not displaced, no murmurs, gallops or rubs                  Abdomen:  abdomen soft obese      Vascular: pulses full and equal                                      Extremities and Back:  no deformities, clubbing, cyanosis, erythema observed;no edema        Neurological:  no gross motor deficits          Recent Lab Results:  LIPID RESULTS:  Lab Results   Component Value Date    CHOL 104 10/03/2018    HDL 48 10/03/2018    LDL 40 10/03/2018    TRIG 81 10/03/2018    CHOLHDLRATIO 2.8 01/26/2015     CBC RESULTS:  Lab Results   Component Value Date    WBC 7.1 10/05/2019    RBC 3.94 (L) 10/05/2019    HGB 12.3 (L) 10/05/2019    HCT 35.9 (L) 10/05/2019    MCV 91 10/05/2019    MCH 31.2 10/05/2019    MCHC 34.3 10/05/2019    RDW 13.0 10/05/2019     10/05/2019       A1C RESULTS:  Lab Results   Component Value Date    A1C 8.8 (H) 08/26/2019     CC  Jefry Harris MD  [address] [place] [address]

## 2019-11-02 ENCOUNTER — OFFICE VISIT (OUTPATIENT)
Dept: CARDIOLOGY | Facility: CLINIC | Age: 59
End: 2019-11-02
Attending: INTERNAL MEDICINE
Payer: COMMERCIAL

## 2019-11-02 VITALS
BODY MASS INDEX: 35.65 KG/M2 | HEIGHT: 72 IN | DIASTOLIC BLOOD PRESSURE: 69 MMHG | SYSTOLIC BLOOD PRESSURE: 108 MMHG | OXYGEN SATURATION: 98 % | HEART RATE: 70 BPM | WEIGHT: 263.2 LBS

## 2019-11-02 DIAGNOSIS — N18.30 CKD (CHRONIC KIDNEY DISEASE) STAGE 3, GFR 30-59 ML/MIN (H): Primary | ICD-10-CM

## 2019-11-02 DIAGNOSIS — I42.8 NONISCHEMIC CARDIOMYOPATHY (H): ICD-10-CM

## 2019-11-02 DIAGNOSIS — D86.9 SARCOIDOSIS: ICD-10-CM

## 2019-11-02 DIAGNOSIS — E78.2 MIXED HYPERLIPIDEMIA: ICD-10-CM

## 2019-11-02 DIAGNOSIS — I50.20 HEART FAILURE WITH REDUCED EJECTION FRACTION, NYHA CLASS III (H): ICD-10-CM

## 2019-11-02 DIAGNOSIS — I10 BENIGN ESSENTIAL HYPERTENSION: ICD-10-CM

## 2019-11-02 PROCEDURE — G0463 HOSPITAL OUTPT CLINIC VISIT: HCPCS | Mod: 25,ZF

## 2019-11-02 PROCEDURE — 99215 OFFICE O/P EST HI 40 MIN: CPT | Mod: ZP | Performed by: INTERNAL MEDICINE

## 2019-11-02 ASSESSMENT — MIFFLIN-ST. JEOR: SCORE: 2041.87

## 2019-11-02 ASSESSMENT — PAIN SCALES - GENERAL: PAINLEVEL: NO PAIN (0)

## 2019-11-02 NOTE — LETTER
11/2/2019      RE: Neymar Rhodes  [address]  [facility] [address]       Dear Colleague,    Thank you for the opportunity to participate in the care of your patient, Neymar Rhodes, at the [facility]. Please see a copy of my visit note below.    November 2, 2019     Neymar Rhodes is a very pleasant 59-year-old gentleman who presented with his wife today to the advanced heart failure clinic for further evaluation for possible cardiac sarcoidosis and cardiomyopathy.  He does have a complex past medical history with a dilated 6.9 cm ventricle heart failure reduced ejection fraction in the 20-30% range as well as sustained VT status post device implantation.  He had a recent admission for inpatient diuresis and developed hyperkalemia prerenal azotemia.  He regularly follows at [facility] and core clinic there.   His history is summarized from prior notes: He was first seen in 2018 with new diagnosis of HF with an EF of 40% by Dr. Dr. Iniguez. He had normal sinus rhythm with LBBB;  coronary angiography showed nonobstructive disease. He was started on guideline directed therapy with beta-blockers, ACE inhibitors and mineralocorticoid receptor antagonist.  Follow-up cardiac MRI showed that he had a small scar concerning for sarcoidosis. He unfortunately failed to follow-up afterwards but remained on medications. He did well for about a year until he began to develop progressive dyspnea which resulted in his admission on 10/1/19. ECHO showed an EF<20% with an LVEDd of 6.9 cm. The following day, while seated, he developed a sustained monomorphic VT ~180 bpm.  This was cardioverted emergently into normal sinus rhythm with first shock at 200J. From there he went to the cath lab where his coronary anatomy was unchanged. RHC showed mildly elevated filling pressures with an LVEDP of 21 and a low-normal cardiac output. Cardiac MRI was repeated  and showed LGE in septal, inferior, lateral base with a non-CAD scar in the anterolateral mid-wall with a worsening scar burden to 13%. He was loaded with amiodarone and received CRT-D on 10/4. He was discharged the following day on his PTA regimen with the addition of spironolactone 25 mg and furosemide 20 mg daily. Afterwards he became hypotensive, and his PCP reduced his lisinopril. Despite this he was still hypotensive and labs showed YASMEEN with hyperkalemia on 10/9. Significant alteration in his diet (ETOH and sodium reduction) was likely contributing.   Again He presents to the clinic today for further evaluation of possible cardiac sarcoidosis. He confirms a history and notes that he has been doing relatively well since his recent visits. He denies any episodes of chest pain, no dizziness or lightheadedness, no shocks delivered. He remains somewhat short of breath with class III symptoms however not worse than prior. Takes all medications as prescribed and cut etoh back significantly . Denies any illicit use or smoking.  3 generation family history was also taken and reviewed on todays visit. There are multiple family members with cardiac disease however all appear to be related to CAD. There was no SCD in family.     PMH as reviewed from notes:  # Severe nonischemic cardiomyopathy with EF of 15% with marked LV dilatation  # Sustained monomorphic ventricular tachycardia, s/p CRT-D  # Potential cardiac sarcoidosis  # Nonobstructive coronary artery disease  # DMII  # Long-standing ETOH abuse - prior to admission was drinking heavily (sallie aguilar and at least 20 beers every weekend - cut back).  # Social - Lives with wife Julisa. He works as a . He is reading labels / monitoring his sodium intake with the help of his wife, and weighing himself daily.      PAST MEDICAL HISTORY:  Past Medical History:   Diagnosis Date     Ascending aorta dilatation (H)      Diverticulosis of colon (without mention of  hemorrhage)      Essential hypertension, benign      Gout, unspecified      LBBB (left bundle branch block)      Morbid obesity (H)      Non-ischemic cardiomyopathy (H)      Nonrheumatic mitral valve regurgitation      NSTEMI (non-ST elevated myocardial infarction) (H)     cardiac cath 6/2018: mild non-obstructive CAD     Other and unspecified hyperlipidemia      Type II or unspecified type diabetes mellitus without mention of complication, not stated as uncontrolled      FAMILY HISTORY:  Family History   Problem Relation Age of Onset     Cancer Father 75        bladder cancer     Myocardial Infarction Father      Other - See Comments Father         smoking     Breast Cancer Mother      Breast Cancer Sister      Family History Negative Brother      Family History Negative Son      Family History Negative Son         fluttering/murmur     Asthma Son      Colon Cancer No family hx of      SOCIAL HISTORY:  Social History     Socioeconomic History     Marital status:      Spouse name: None     Number of children: None     Years of education: None     Highest education level: None   Occupational History     None   Social Needs     Financial resource strain: None     Food insecurity:     Worry: None     Inability: None     Transportation needs:     Medical: None     Non-medical: None   Tobacco Use     Smoking status: Never Smoker     Smokeless tobacco: Never Used   Substance and Sexual Activity     Alcohol use: Not Currently     Drug use: No     Sexual activity: Yes     Partners: Female   Lifestyle     Physical activity:     Days per week: None     Minutes per session: None     Stress: None   Relationships     Social connections:     Talks on phone: None     Gets together: None     Attends Denominational service: None     Active member of club or organization: None     Attends meetings of clubs or organizations: None     Relationship status: None     Intimate partner violence:     Fear of current or ex partner: None      Emotionally abused: None     Physically abused: None     Forced sexual activity: None   Other Topics Concern     Parent/sibling w/ CABG, MI or angioplasty before 65F 55M? Not Asked   Social History Narrative     None     CURRENT MEDICATIONS:  Current Outpatient Medications   Medication     albuterol (PROAIR HFA/PROVENTIL HFA/VENTOLIN HFA) 108 (90 Base) MCG/ACT Inhaler     amiodarone (PACERONE/CODARONE) 200 MG tablet     aspirin 81 MG tablet     atorvastatin (LIPITOR) 80 MG tablet     BD ULTRA-FINE 29G X 12.7MM insulin pen needle     BD ULTRA-FINE 29G X 12.7MM insulin pen needle     blood glucose monitoring (ACCU-CHEK LUL PLUS) test strip     clotrimazole (LOTRIMIN) 1 % external cream     Continuous Blood Gluc  (FREESTYLE JOCELIN 14 DAY READER) LUTHER     Continuous Blood Gluc Sensor (FREESTYLE JOCELIN 14 DAY SENSOR) MISC     continuous blood glucose monitoring (FREESTYLE JOCELIN) sensor     fenofibrate (TRIGLIDE/LOFIBRA) 160 MG tablet     fluticasone (FLONASE) 50 MCG/ACT nasal spray     furosemide (LASIX) 20 MG tablet     glipiZIDE (GLUCOTROL XL) 10 MG 24 hr tablet     insulin detemir (LEVEMIR PEN) 100 UNIT/ML pen     liraglutide (VICTOZA PEN) 18 MG/3ML solution     lisinopril (PRINIVIL/ZESTRIL) 10 MG tablet     metFORMIN (GLUCOPHAGE) 1000 MG tablet     metoprolol succinate ER (TOPROL-XL) 25 MG 24 hr tablet     triamcinolone (KENALOG) 0.5 % cream     No current facility-administered medications for this visit.      ROS:   Constitutional: No fever, chills, or sweats. Weight is 263 lbs 3.2 oz  ENT: No visual disturbance, ear ache, epistaxis, sore throat.   Allergies/Immunologic: Negative.   Respiratory: No cough, hemoptysis.   Cardiovascular: As per HPI.   GI: No nausea, vomiting, hematemesis, melena, or hematochezia.   : No urinary frequency, dysuria, or hematuria.   Integument: Negative.   Psychiatric: Pleasant, no major depression noted  Neuro: No focal neurological deficits noted  Endocrinology: Negative.    Musculoskeletal: As per HPI.      EXAM:  /69 (BP Location: Left arm, Patient Position: Chair, Cuff Size: Adult Large)   Pulse 70   Ht 1.829 m (6')   Wt 119.4 kg (263 lb 3.2 oz)   SpO2 98%   BMI 35.70 kg/m     Constitutional:  Patient is pleasant, alert, cooperative, and in NAD.  HEENT:  NCAT. PERRLA. EOM's intact.   Neck:  CVP is difficult to assess due to body habitus.    Pulmonary: Normal respiratory effort. CTAB.   Cardiac: RRR, normal S1/S2, no S3/S4, no murmur or rub. Distant heart tones. L upper chest device incision C/D/I.  Abdomen:  Non-tender abdomen, no hepatosplenomegaly appreciated.   Vascular: Pulses in the upper and lower extremities are 2+ and equal bilaterally.  Extremities: No edema, erythema, cyanosis or tenderness appreciated.  Skin:  No rashes or lesions appreciated.   Neurological:  No gross motor or sensory deficits.   Psych: Appropriate affect.      Labs:  Lab Results   Component Value Date    WBC 7.1 10/05/2019    HGB 12.3 (L) 10/05/2019    HCT 35.9 (L) 10/05/2019     10/05/2019     10/23/2019    POTASSIUM 4.4 10/23/2019    CHLORIDE 105 10/23/2019    CO2 24 10/23/2019    BUN 29 10/23/2019    CR 1.46 (H) 10/23/2019     (H) 10/23/2019    DD 0.3 10/01/2019    NTBNPI 957 (H) 10/01/2019    NTBNP 710 (H) 10/23/2019    TROPONIN 0.07 06/10/2018    TROPI 0.071 (H) 10/01/2019    AST 65 (H) 10/02/2019    ALT 56 10/02/2019    ALKPHOS 36 (L) 10/02/2019    BILITOTAL 0.6 10/02/2019    INR 1.16 (H) 10/04/2019     Cardiac MRI 10/1/2019:  Late gadolinium enhancement is present in the septal. inferior and lateral base. There is non-CAD scar in  the anterolateral mid-wall. Scar is more extensive (13%) as compared to the prior MRI (6%).  The LV is severely dilated and the global systolic function is moderately severely to severely reduced with  an LVEF of 25%. There is severe global LV dysfunction with dyssynchronous septal motion consistent with a  LBBB.  Collectively, these  findings are most consistent with a non-ischemic cardiomyopathy. Possible etiologies  include prior LBBB, cardiac sarcoidosis or prior myocarditis.    Coronary angiogram 10/1/2019:    Minimal non-obstructive coronary artery disease    Mildly elevated LVEDP    Successful closure of the RFA access site with a 6F Angioseal device    RHC 10/1/2019:  Normal filling pressures along with PA pressure.  Upper normal pulmonary capillary wedge pressure (13mmHg).    /1/2019:  1. Severely dilated left ventricle (end-diastolic diameter 6.9 cm) with  severely reduced systolic function.  2. Visually estimated LVEF < 20%.  3. Significant left ventricular wall motion abnormalities.  4. Thinning and akinesis of the inferior and inferolateral walls and apex. Septal thinning and dyskinesis consistent with known conduction abnormality. Significant hypokinesis of the anterior and anterolateral walls.  5. Normal right ventricular size and systolic function. Estimated right  ventricular systolic pressure is 13 mmHg plus the right atrial pressure.  4. Mild mitral regurgitation, trace tricuspid valve regurgitation. It with previous study dated 6/11/2018 and cardiac MRI dated 6/13/2018, LVEF has reduced from 40% to <20%.    Cardiac MRI 7/5/2018  1. The global left ventricular systolic function is moderately decreased. The LVEF is 40%. There is mild LV  hypertrophy.  The LV is moderately enlarged.    2.  There is moderate global hypokinesis and dyssynchronous septal motion.  Regional wall motion  abnormalities include moderately severe hypokinesis of the basilar inferoseptum and basilar inferior wall..  3.  Late gadolinium enhancement imaging predominantly subendocardial enhancement in the base of the  inferior and inferoseptal walls.  The pattern is most consistent  with an ischemic etiology. Other  etiologies such as sarcoidosis are also possible. The degree of LV dysfunction is out of proportion to the  degree of scar  (6%).    ASSESSMENT AND PLAN:  In summary, patient is a 59 year old gentleman with complex past cardiac history as detailed above including VT cardiac arrest, significantly reduced ejection fraction nonischemic cardiomyopathy class III symptoms who was referred for further evaluation for possible cardiac sarcoid.    His presentation could definitely be consistent with cardiac sarcoid especially in the setting of wide left bundle branch block with VT arrest.  LVIDD and ejection fraction however a little bit lower than what I would have expected from sarcoidosis alone.  I also discussed imaging findings with our MRI expert and based on the findings the MRI may not be completely consistent with sarcoid in the genetic component may be considered.  As the next test he is going to continue his current medication regimen  And on next clinic visit we could consider changing his ACE inhibitor to Entresto provided that insurance will cover it.  In addition we will obtain a PET scan to look for inflammation.  If it comes back negative then I discussed with him that we may consider endomyocardial biopsy.  Given his relatively low involvement and the patchy nature of sarcoidosis we will attempt to do an electrophysiologic guided right ventricular septum biopsy.  We discussed that the success rate with this approach improved from 20% to about 50% based on very limited experience so far we have.  All risks and benefits were discussed and family was in agreement as well as patient proceeding with this should other test returned negative.      In addition patient has a [employer] ICD and he agreed to participate in the preempt trial which was essentially monitoring patient for heart failure admissions and no further interventions are indicated or needed.  Standard of care should be delivered to patient.  We will follow-up with him after the PET scan is completed.     I appreciate the opportunity to participate in the care of  Neymar Rhodes . Please do not hesitate to contact me with any further questions.    Sincerely,   Roberto Batres MD     [facility] [place] Division of Cardiology

## 2019-11-02 NOTE — PROGRESS NOTES
November 2, 2019     Neymar Rhodes is a very pleasant 59-year-old gentleman who presented with his wife today to the advanced heart failure clinic for further evaluation for possible cardiac sarcoidosis and cardiomyopathy.  He does have a complex past medical history with a dilated 6.9 cm ventricle heart failure reduced ejection fraction in the 20-30% range as well as sustained VT status post device implantation.  He had a recent admission for inpatient diuresis and developed hyperkalemia prerenal azotemia.  He regularly follows at [facility] and core clinic there.   His history is summarized from prior notes: He was first seen in 2018 with new diagnosis of HF with an EF of 40% by Dr. Dr. Iniguez. He had normal sinus rhythm with LBBB;  coronary angiography showed nonobstructive disease. He was started on guideline directed therapy with beta-blockers, ACE inhibitors and mineralocorticoid receptor antagonist.  Follow-up cardiac MRI showed that he had a small scar concerning for sarcoidosis. He unfortunately failed to follow-up afterwards but remained on medications. He did well for about a year until he began to develop progressive dyspnea which resulted in his admission on 10/1/19. ECHO showed an EF<20% with an LVEDd of 6.9 cm. The following day, while seated, he developed a sustained monomorphic VT ~180 bpm.  This was cardioverted emergently into normal sinus rhythm with first shock at 200J. From there he went to the cath lab where his coronary anatomy was unchanged. RHC showed mildly elevated filling pressures with an LVEDP of 21 and a low-normal cardiac output. Cardiac MRI was repeated and showed LGE in septal, inferior, lateral base with a non-CAD scar in the anterolateral mid-wall with a worsening scar burden to 13%. He was loaded with amiodarone and received CRT-D on 10/4. He was discharged the following day on his PTA regimen with the addition of spironolactone 25 mg and furosemide 20 mg daily.  Afterwards he became hypotensive, and his PCP reduced his lisinopril. Despite this he was still hypotensive and labs showed YASMEEN with hyperkalemia on 10/9. Significant alteration in his diet (ETOH and sodium reduction) was likely contributing.   Again He presents to the clinic today for further evaluation of possible cardiac sarcoidosis. He confirms a history and notes that he has been doing relatively well since his recent visits. He denies any episodes of chest pain, no dizziness or lightheadedness, no shocks delivered. He remains somewhat short of breath with class III symptoms however not worse than prior. Takes all medications as prescribed and cut etoh back significantly . Denies any illicit use or smoking.  3 generation family history was also taken and reviewed on todays visit. There are multiple family members with cardiac disease however all appear to be related to CAD. There was no SCD in family.     PMH as reviewed from notes:  # Severe nonischemic cardiomyopathy with EF of 15% with marked LV dilatation  # Sustained monomorphic ventricular tachycardia, s/p CRT-D  # Potential cardiac sarcoidosis  # Nonobstructive coronary artery disease  # DMII  # Long-standing ETOH abuse - prior to admission was drinking heavily (sallie aguilar and at least 20 beers every weekend - cut back).  # Social - Lives with wife Julisa. He works as a . He is reading labels / monitoring his sodium intake with the help of his wife, and weighing himself daily.      PAST MEDICAL HISTORY:  Past Medical History:   Diagnosis Date     Ascending aorta dilatation (H)      Diverticulosis of colon (without mention of hemorrhage)      Essential hypertension, benign      Gout, unspecified      LBBB (left bundle branch block)      Morbid obesity (H)      Non-ischemic cardiomyopathy (H)      Nonrheumatic mitral valve regurgitation      NSTEMI (non-ST elevated myocardial infarction) (H)     cardiac cath 6/2018: mild non-obstructive  CAD     Other and unspecified hyperlipidemia      Type II or unspecified type diabetes mellitus without mention of complication, not stated as uncontrolled      FAMILY HISTORY:  Family History   Problem Relation Age of Onset     Cancer Father 75        bladder cancer     Myocardial Infarction Father      Other - See Comments Father         smoking     Breast Cancer Mother      Breast Cancer Sister      Family History Negative Brother      Family History Negative Son      Family History Negative Son         fluttering/murmur     Asthma Son      Colon Cancer No family hx of      SOCIAL HISTORY:  Social History     Socioeconomic History     Marital status:      Spouse name: None     Number of children: None     Years of education: None     Highest education level: None   Occupational History     None   Social Needs     Financial resource strain: None     Food insecurity:     Worry: None     Inability: None     Transportation needs:     Medical: None     Non-medical: None   Tobacco Use     Smoking status: Never Smoker     Smokeless tobacco: Never Used   Substance and Sexual Activity     Alcohol use: Not Currently     Drug use: No     Sexual activity: Yes     Partners: Female   Lifestyle     Physical activity:     Days per week: None     Minutes per session: None     Stress: None   Relationships     Social connections:     Talks on phone: None     Gets together: None     Attends Christian service: None     Active member of club or organization: None     Attends meetings of clubs or organizations: None     Relationship status: None     Intimate partner violence:     Fear of current or ex partner: None     Emotionally abused: None     Physically abused: None     Forced sexual activity: None   Other Topics Concern     Parent/sibling w/ CABG, MI or angioplasty before 65F 55M? Not Asked   Social History Narrative     None     CURRENT MEDICATIONS:  Current Outpatient Medications   Medication     albuterol (PROAIR  HFA/PROVENTIL HFA/VENTOLIN HFA) 108 (90 Base) MCG/ACT Inhaler     amiodarone (PACERONE/CODARONE) 200 MG tablet     aspirin 81 MG tablet     atorvastatin (LIPITOR) 80 MG tablet     BD ULTRA-FINE 29G X 12.7MM insulin pen needle     BD ULTRA-FINE 29G X 12.7MM insulin pen needle     blood glucose monitoring (ACCU-CHEK LUL PLUS) test strip     clotrimazole (LOTRIMIN) 1 % external cream     Continuous Blood Gluc  (FREESTYLE JOCELIN 14 DAY READER) LUTHER     Continuous Blood Gluc Sensor (FREESTYLE JOCELIN 14 DAY SENSOR) MISC     continuous blood glucose monitoring (FREESTYLE JOCELIN) sensor     fenofibrate (TRIGLIDE/LOFIBRA) 160 MG tablet     fluticasone (FLONASE) 50 MCG/ACT nasal spray     furosemide (LASIX) 20 MG tablet     glipiZIDE (GLUCOTROL XL) 10 MG 24 hr tablet     insulin detemir (LEVEMIR PEN) 100 UNIT/ML pen     liraglutide (VICTOZA PEN) 18 MG/3ML solution     lisinopril (PRINIVIL/ZESTRIL) 10 MG tablet     metFORMIN (GLUCOPHAGE) 1000 MG tablet     metoprolol succinate ER (TOPROL-XL) 25 MG 24 hr tablet     triamcinolone (KENALOG) 0.5 % cream     No current facility-administered medications for this visit.      ROS:   Constitutional: No fever, chills, or sweats. Weight is 263 lbs 3.2 oz  ENT: No visual disturbance, ear ache, epistaxis, sore throat.   Allergies/Immunologic: Negative.   Respiratory: No cough, hemoptysis.   Cardiovascular: As per HPI.   GI: No nausea, vomiting, hematemesis, melena, or hematochezia.   : No urinary frequency, dysuria, or hematuria.   Integument: Negative.   Psychiatric: Pleasant, no major depression noted  Neuro: No focal neurological deficits noted  Endocrinology: Negative.   Musculoskeletal: As per HPI.      EXAM:  /69 (BP Location: Left arm, Patient Position: Chair, Cuff Size: Adult Large)   Pulse 70   Ht 1.829 m (6')   Wt 119.4 kg (263 lb 3.2 oz)   SpO2 98%   BMI 35.70 kg/m    Constitutional:  Patient is pleasant, alert, cooperative, and in NAD.  HEENT:  NCAT. PERRLA.  EOM's intact.   Neck:  CVP is difficult to assess due to body habitus.    Pulmonary: Normal respiratory effort. CTAB.   Cardiac: RRR, normal S1/S2, no S3/S4, no murmur or rub. Distant heart tones. L upper chest device incision C/D/I.  Abdomen:  Non-tender abdomen, no hepatosplenomegaly appreciated.   Vascular: Pulses in the upper and lower extremities are 2+ and equal bilaterally.  Extremities: No edema, erythema, cyanosis or tenderness appreciated.  Skin:  No rashes or lesions appreciated.   Neurological:  No gross motor or sensory deficits.   Psych: Appropriate affect.      Labs:  Lab Results   Component Value Date    WBC 7.1 10/05/2019    HGB 12.3 (L) 10/05/2019    HCT 35.9 (L) 10/05/2019     10/05/2019     10/23/2019    POTASSIUM 4.4 10/23/2019    CHLORIDE 105 10/23/2019    CO2 24 10/23/2019    BUN 29 10/23/2019    CR 1.46 (H) 10/23/2019     (H) 10/23/2019    DD 0.3 10/01/2019    NTBNPI 957 (H) 10/01/2019    NTBNP 710 (H) 10/23/2019    TROPONIN 0.07 06/10/2018    TROPI 0.071 (H) 10/01/2019    AST 65 (H) 10/02/2019    ALT 56 10/02/2019    ALKPHOS 36 (L) 10/02/2019    BILITOTAL 0.6 10/02/2019    INR 1.16 (H) 10/04/2019     Cardiac MRI 10/1/2019:  Late gadolinium enhancement is present in the septal. inferior and lateral base. There is non-CAD scar in  the anterolateral mid-wall. Scar is more extensive (13%) as compared to the prior MRI (6%).  The LV is severely dilated and the global systolic function is moderately severely to severely reduced with  an LVEF of 25%. There is severe global LV dysfunction with dyssynchronous septal motion consistent with a  LBBB.  Collectively, these findings are most consistent with a non-ischemic cardiomyopathy. Possible etiologies  include prior LBBB, cardiac sarcoidosis or prior myocarditis.    Coronary angiogram 10/1/2019:    Minimal non-obstructive coronary artery disease    Mildly elevated LVEDP    Successful closure of the RFA access site with a 6F  Angioseal device    RHC 10/1/2019:  Normal filling pressures along with PA pressure.  Upper normal pulmonary capillary wedge pressure (13mmHg).    /1/2019:  1. Severely dilated left ventricle (end-diastolic diameter 6.9 cm) with  severely reduced systolic function.  2. Visually estimated LVEF < 20%.  3. Significant left ventricular wall motion abnormalities.  4. Thinning and akinesis of the inferior and inferolateral walls and apex. Septal thinning and dyskinesis consistent with known conduction abnormality. Significant hypokinesis of the anterior and anterolateral walls.  5. Normal right ventricular size and systolic function. Estimated right  ventricular systolic pressure is 13 mmHg plus the right atrial pressure.  4. Mild mitral regurgitation, trace tricuspid valve regurgitation. It with previous study dated 6/11/2018 and cardiac MRI dated 6/13/2018, LVEF has reduced from 40% to <20%.    Cardiac MRI 7/5/2018  1. The global left ventricular systolic function is moderately decreased. The LVEF is 40%. There is mild LV  hypertrophy.  The LV is moderately enlarged.    2.  There is moderate global hypokinesis and dyssynchronous septal motion.  Regional wall motion  abnormalities include moderately severe hypokinesis of the basilar inferoseptum and basilar inferior wall..  3.  Late gadolinium enhancement imaging predominantly subendocardial enhancement in the base of the  inferior and inferoseptal walls.  The pattern is most consistent  with an ischemic etiology. Other  etiologies such as sarcoidosis are also possible. The degree of LV dysfunction is out of proportion to the  degree of scar (6%).    ASSESSMENT AND PLAN:  In summary, patient is a 59 year old gentleman with complex past cardiac history as detailed above including VT cardiac arrest, significantly reduced ejection fraction nonischemic cardiomyopathy class III symptoms who was referred for further evaluation for possible cardiac sarcoid.    His  presentation could definitely be consistent with cardiac sarcoid especially in the setting of wide left bundle branch block with VT arrest.  LVIDD and ejection fraction however a little bit lower than what I would have expected from sarcoidosis alone.  I also discussed imaging findings with our MRI expert and based on the findings the MRI may not be completely consistent with sarcoid in the genetic component may be considered.  As the next test he is going to continue his current medication regimen  And on next clinic visit we could consider changing his ACE inhibitor to Entresto provided that insurance will cover it.  In addition we will obtain a PET scan to look for inflammation.  If it comes back negative then I discussed with him that we may consider endomyocardial biopsy.  Given his relatively low involvement and the patchy nature of sarcoidosis we will attempt to do an electrophysiologic guided right ventricular septum biopsy.  We discussed that the success rate with this approach improved from 20% to about 50% based on very limited experience so far we have.  All risks and benefits were discussed and family was in agreement as well as patient proceeding with this should other test returned negative.      In addition patient has a [employer] ICD and he agreed to participate in the preempt trial which was essentially monitoring patient for heart failure admissions and no further interventions are indicated or needed.  Standard of care should be delivered to patient.  We will follow-up with him after the PET scan is completed.     I appreciate the opportunity to participate in the care of Neymar Rhodes . Please do not hesitate to contact me with any further questions.    Sincerely,   Roberto Batres MD     [facility] Division of Cardiology

## 2019-11-02 NOTE — NURSING NOTE
Chief Complaint   Patient presents with     New Patient     Patient is referred by Dr. Hernandes for cardiac evaluation related to possible cardiac sarcoidosis. Hx of cardiomyopathy.     Vitals were taken and medications were reconciled.     Jelly Wilburn RMA  8:49 AM

## 2019-11-04 ENCOUNTER — OFFICE VISIT (OUTPATIENT)
Dept: CARDIOLOGY | Facility: CLINIC | Age: 59
End: 2019-11-04
Payer: COMMERCIAL

## 2019-11-04 ENCOUNTER — HOSPITAL ENCOUNTER (OUTPATIENT)
Dept: GENERAL RADIOLOGY | Facility: CLINIC | Age: 59
Discharge: HOME | End: 2019-11-04
Attending: PHYSICIAN ASSISTANT | Admitting: PHYSICIAN ASSISTANT
Payer: COMMERCIAL

## 2019-11-04 ENCOUNTER — RESEARCH ENCOUNTER (OUTPATIENT)
Dept: RESEARCH | Facility: CLINIC | Age: 59
End: 2019-11-04

## 2019-11-04 ENCOUNTER — DOCUMENTATION ONLY (OUTPATIENT)
Dept: CARDIOLOGY | Facility: CLINIC | Age: 59
End: 2019-11-04

## 2019-11-04 VITALS
HEIGHT: 72 IN | WEIGHT: 261.5 LBS | DIASTOLIC BLOOD PRESSURE: 72 MMHG | HEART RATE: 60 BPM | SYSTOLIC BLOOD PRESSURE: 117 MMHG | BODY MASS INDEX: 35.42 KG/M2

## 2019-11-04 DIAGNOSIS — E03.8 SUBCLINICAL HYPOTHYROIDISM: Primary | ICD-10-CM

## 2019-11-04 DIAGNOSIS — I50.23 ACUTE ON CHRONIC SYSTOLIC HEART FAILURE (H): ICD-10-CM

## 2019-11-04 DIAGNOSIS — I21.4 NSTEMI (NON-ST ELEVATED MYOCARDIAL INFARCTION) (H): ICD-10-CM

## 2019-11-04 DIAGNOSIS — Z79.899 ON AMIODARONE THERAPY: ICD-10-CM

## 2019-11-04 DIAGNOSIS — Z79.899 ON AMIODARONE THERAPY: Primary | ICD-10-CM

## 2019-11-04 DIAGNOSIS — I50.23 ACUTE ON CHRONIC SYSTOLIC HEART FAILURE (H): Primary | ICD-10-CM

## 2019-11-04 LAB
ANION GAP SERPL CALCULATED.3IONS-SCNC: 3 MMOL/L (ref 3–14)
BUN SERPL-MCNC: 21 MG/DL (ref 7–30)
CALCIUM SERPL-MCNC: 9 MG/DL (ref 8.5–10.1)
CHLORIDE SERPL-SCNC: 110 MMOL/L (ref 94–109)
CO2 SERPL-SCNC: 26 MMOL/L (ref 20–32)
CREAT SERPL-MCNC: 1.07 MG/DL (ref 0.66–1.25)
GFR SERPL CREATININE-BSD FRML MDRD: 75 ML/MIN/{1.73_M2}
GLUCOSE SERPL-MCNC: 184 MG/DL (ref 70–99)
MDC_IDC_LEAD_IMPLANT_DT: NORMAL
MDC_IDC_LEAD_LOCATION: NORMAL
MDC_IDC_LEAD_LOCATION_DETAIL_1: NORMAL
MDC_IDC_LEAD_MFG: NORMAL
MDC_IDC_LEAD_MODEL: NORMAL
MDC_IDC_LEAD_POLARITY_TYPE: NORMAL
MDC_IDC_LEAD_SERIAL: NORMAL
MDC_IDC_MSMT_BATTERY_STATUS: NORMAL
MDC_IDC_MSMT_CAP_CHARGE_DTM: NORMAL
MDC_IDC_MSMT_CAP_CHARGE_ENERGY: 0 J
MDC_IDC_MSMT_CAP_CHARGE_TIME: 0 S
MDC_IDC_MSMT_CAP_CHARGE_TIME: 8.78
MDC_IDC_MSMT_CAP_CHARGE_TYPE: NORMAL
MDC_IDC_MSMT_CAP_CHARGE_TYPE: NORMAL
MDC_IDC_MSMT_LEADCHNL_LV_IMPEDANCE_VALUE: 505 OHM
MDC_IDC_MSMT_LEADCHNL_LV_PACING_THRESHOLD_AMPLITUDE: 1.7 V
MDC_IDC_MSMT_LEADCHNL_LV_PACING_THRESHOLD_PULSEWIDTH: 0.5 MS
MDC_IDC_MSMT_LEADCHNL_LV_SENSING_INTR_AMPL: 22.8 MV
MDC_IDC_MSMT_LEADCHNL_RA_IMPEDANCE_VALUE: 651 OHM
MDC_IDC_MSMT_LEADCHNL_RA_PACING_THRESHOLD_AMPLITUDE: 0.9 V
MDC_IDC_MSMT_LEADCHNL_RA_PACING_THRESHOLD_PULSEWIDTH: 0.5 MS
MDC_IDC_MSMT_LEADCHNL_RA_SENSING_INTR_AMPL: 5.5 MV
MDC_IDC_MSMT_LEADCHNL_RV_IMPEDANCE_VALUE: 578 OHM
MDC_IDC_MSMT_LEADCHNL_RV_PACING_THRESHOLD_AMPLITUDE: 0.8 V
MDC_IDC_MSMT_LEADCHNL_RV_PACING_THRESHOLD_PULSEWIDTH: 0.5 MS
MDC_IDC_MSMT_LEADCHNL_RV_SENSING_INTR_AMPL: 24.4 MV
MDC_IDC_PG_IMPLANT_DTM: NORMAL
MDC_IDC_PG_MFG: NORMAL
MDC_IDC_PG_MODEL: NORMAL
MDC_IDC_PG_SERIAL: NORMAL
MDC_IDC_PG_TYPE: NORMAL
MDC_IDC_SESS_CLINIC_NAME: NORMAL
MDC_IDC_SESS_DTM: NORMAL
MDC_IDC_SESS_TYPE: NORMAL
MDC_IDC_SET_BRADY_AT_MODE_SWITCH_MODE: NORMAL
MDC_IDC_SET_BRADY_AT_MODE_SWITCH_RATE: 170 {BEATS}/MIN
MDC_IDC_SET_BRADY_HYSTRATE: NORMAL
MDC_IDC_SET_BRADY_LOWRATE: 60 {BEATS}/MIN
MDC_IDC_SET_BRADY_MAX_SENSOR_RATE: 130 {BEATS}/MIN
MDC_IDC_SET_BRADY_MAX_TRACKING_RATE: 130 {BEATS}/MIN
MDC_IDC_SET_BRADY_MODE: NORMAL
MDC_IDC_SET_BRADY_PAV_DELAY_HIGH: 200 MS
MDC_IDC_SET_BRADY_PAV_DELAY_LOW: 270 MS
MDC_IDC_SET_BRADY_SAV_DELAY_HIGH: 140 MS
MDC_IDC_SET_BRADY_SAV_DELAY_LOW: 190 MS
MDC_IDC_SET_CRT_LVRV_DELAY: 35 MS
MDC_IDC_SET_CRT_PACED_CHAMBERS: NORMAL
MDC_IDC_SET_LEADCHNL_LV_PACING_AMPLITUDE: 3.5 V
MDC_IDC_SET_LEADCHNL_LV_PACING_CAPTURE_MODE: NORMAL
MDC_IDC_SET_LEADCHNL_LV_PACING_POLARITY: NORMAL
MDC_IDC_SET_LEADCHNL_LV_PACING_PULSEWIDTH: 0.5 MS
MDC_IDC_SET_LEADCHNL_LV_SENSING_ADAPTATION_MODE: NORMAL
MDC_IDC_SET_LEADCHNL_LV_SENSING_POLARITY: NORMAL
MDC_IDC_SET_LEADCHNL_LV_SENSING_SENSITIVITY: 1 MV
MDC_IDC_SET_LEADCHNL_RA_PACING_AMPLITUDE: 3.5 V
MDC_IDC_SET_LEADCHNL_RA_PACING_ANODE_ELECTRODE_1: NORMAL
MDC_IDC_SET_LEADCHNL_RA_PACING_ANODE_LOCATION_1: NORMAL
MDC_IDC_SET_LEADCHNL_RA_PACING_CAPTURE_MODE: NORMAL
MDC_IDC_SET_LEADCHNL_RA_PACING_CATHODE_ELECTRODE_1: NORMAL
MDC_IDC_SET_LEADCHNL_RA_PACING_CATHODE_LOCATION_1: NORMAL
MDC_IDC_SET_LEADCHNL_RA_PACING_POLARITY: NORMAL
MDC_IDC_SET_LEADCHNL_RA_PACING_PULSEWIDTH: 0.5 MS
MDC_IDC_SET_LEADCHNL_RA_SENSING_ADAPTATION_MODE: NORMAL
MDC_IDC_SET_LEADCHNL_RA_SENSING_ANODE_ELECTRODE_1: NORMAL
MDC_IDC_SET_LEADCHNL_RA_SENSING_ANODE_LOCATION_1: NORMAL
MDC_IDC_SET_LEADCHNL_RA_SENSING_CATHODE_ELECTRODE_1: NORMAL
MDC_IDC_SET_LEADCHNL_RA_SENSING_CATHODE_LOCATION_1: NORMAL
MDC_IDC_SET_LEADCHNL_RA_SENSING_POLARITY: NORMAL
MDC_IDC_SET_LEADCHNL_RA_SENSING_SENSITIVITY: 0.25 MV
MDC_IDC_SET_LEADCHNL_RV_PACING_AMPLITUDE: 3.5 V
MDC_IDC_SET_LEADCHNL_RV_PACING_ANODE_ELECTRODE_1: NORMAL
MDC_IDC_SET_LEADCHNL_RV_PACING_ANODE_LOCATION_1: NORMAL
MDC_IDC_SET_LEADCHNL_RV_PACING_CAPTURE_MODE: NORMAL
MDC_IDC_SET_LEADCHNL_RV_PACING_CATHODE_ELECTRODE_1: NORMAL
MDC_IDC_SET_LEADCHNL_RV_PACING_CATHODE_LOCATION_1: NORMAL
MDC_IDC_SET_LEADCHNL_RV_PACING_POLARITY: NORMAL
MDC_IDC_SET_LEADCHNL_RV_PACING_PULSEWIDTH: 0.5 MS
MDC_IDC_SET_LEADCHNL_RV_SENSING_ADAPTATION_MODE: NORMAL
MDC_IDC_SET_LEADCHNL_RV_SENSING_ANODE_ELECTRODE_1: NORMAL
MDC_IDC_SET_LEADCHNL_RV_SENSING_ANODE_LOCATION_1: NORMAL
MDC_IDC_SET_LEADCHNL_RV_SENSING_CATHODE_ELECTRODE_1: NORMAL
MDC_IDC_SET_LEADCHNL_RV_SENSING_CATHODE_LOCATION_1: NORMAL
MDC_IDC_SET_LEADCHNL_RV_SENSING_POLARITY: NORMAL
MDC_IDC_SET_LEADCHNL_RV_SENSING_SENSITIVITY: 0.6 MV
MDC_IDC_SET_ZONE_DETECTION_INTERVAL: 250 MS
MDC_IDC_SET_ZONE_DETECTION_INTERVAL: 333 MS
MDC_IDC_SET_ZONE_DETECTION_INTERVAL: 375 MS
MDC_IDC_SET_ZONE_TYPE: NORMAL
MDC_IDC_SET_ZONE_VENDOR_TYPE: NORMAL
MDC_IDC_STAT_EPISODE_RECENT_COUNT: 0
MDC_IDC_STAT_EPISODE_RECENT_COUNT_DTM_END: NORMAL
MDC_IDC_STAT_EPISODE_RECENT_COUNT_DTM_START: NORMAL
MDC_IDC_STAT_EPISODE_TOTAL_COUNT: 0
MDC_IDC_STAT_EPISODE_TOTAL_COUNT_DTM_END: NORMAL
MDC_IDC_STAT_EPISODE_TYPE: NORMAL
MDC_IDC_STAT_EPISODE_VENDOR_TYPE: NORMAL
MDC_IDC_STAT_TACHYTHERAPY_ATP_DELIVERED_RECENT: 0
MDC_IDC_STAT_TACHYTHERAPY_ATP_DELIVERED_TOTAL: 0
MDC_IDC_STAT_TACHYTHERAPY_RECENT_DTM_END: NORMAL
MDC_IDC_STAT_TACHYTHERAPY_RECENT_DTM_START: NORMAL
MDC_IDC_STAT_TACHYTHERAPY_SHOCKS_ABORTED_RECENT: 0
MDC_IDC_STAT_TACHYTHERAPY_SHOCKS_ABORTED_TOTAL: 0
MDC_IDC_STAT_TACHYTHERAPY_SHOCKS_DELIVERED_RECENT: 0
MDC_IDC_STAT_TACHYTHERAPY_SHOCKS_DELIVERED_TOTAL: 0
MDC_IDC_STAT_TACHYTHERAPY_TOTAL_DTM_END: NORMAL
NT-PROBNP SERPL-MCNC: 973 PG/ML (ref 0–125)
POTASSIUM SERPL-SCNC: 4.3 MMOL/L (ref 3.4–5.3)
SODIUM SERPL-SCNC: 139 MMOL/L (ref 133–144)
T4 FREE SERPL-MCNC: 0.88 NG/DL (ref 0.76–1.46)
TSH SERPL DL<=0.005 MIU/L-ACNC: 8.05 MU/L (ref 0.4–4)

## 2019-11-04 PROCEDURE — 36415 COLL VENOUS BLD VENIPUNCTURE: CPT | Performed by: PHYSICIAN ASSISTANT

## 2019-11-04 PROCEDURE — 80048 BASIC METABOLIC PNL TOTAL CA: CPT | Performed by: PHYSICIAN ASSISTANT

## 2019-11-04 PROCEDURE — 83880 ASSAY OF NATRIURETIC PEPTIDE: CPT | Performed by: PHYSICIAN ASSISTANT

## 2019-11-04 PROCEDURE — 93000 ELECTROCARDIOGRAM COMPLETE: CPT | Performed by: PHYSICIAN ASSISTANT

## 2019-11-04 PROCEDURE — 84439 ASSAY OF FREE THYROXINE: CPT | Performed by: PHYSICIAN ASSISTANT

## 2019-11-04 PROCEDURE — 99214 OFFICE O/P EST MOD 30 MIN: CPT | Mod: 25 | Performed by: PHYSICIAN ASSISTANT

## 2019-11-04 PROCEDURE — 84443 ASSAY THYROID STIM HORMONE: CPT | Performed by: PHYSICIAN ASSISTANT

## 2019-11-04 PROCEDURE — 71046 X-RAY EXAM CHEST 2 VIEWS: CPT

## 2019-11-04 ASSESSMENT — MIFFLIN-ST. JEOR: SCORE: 2034.16

## 2019-11-04 NOTE — LETTER
"11/4/2019    Jefry Harris MD  [address] [facility] [address]    RE: Neymar Rhodes       Dear Colleague,    I had the pleasure of seeing Neymar Rhodes in the [facility].    HPI:   I had the pleasure of meeting Neymar when he came for follow up of VT and amiodarone therapy.  This 59 year old saw Dr. Hernandes and Dr. Stewart while hospitalized, and has been under the excellent care of SAMMY Rinaldi since discharge for his history of:    1. Severe non-ischemic CM, HFrEF with EF 40% in 2018, then down to 15%, up to 25% on most recent testing  2. VT noted while hospitalized 10/1/2019 s/p [employer] CRT-D 10/4/2019 with Dr. Dorman, loaded with Amiodarone, now on 200 mg daily since 10/13/2019  3. Potential cardiac sarcoid first noted in 2018. LGE noted on cMRI 10/2019 showing more extensive LGE (13%) compared with 2018 MRI (6%).   4. DM2  5. Heavy EtOH use now without EtOH x 1 month (10/2019)     Nicole saw Shravan 10/24 at which time she reviewed his h/o cardiomyopathy first dx'd 2018. EF was 40% in setting of NSR and LBBB. Follow-up cardiac MRI showed scar concerning for sarcoidosis. He did not follow up as he was feeling well, but was admitted 10/1/2019 due to progressive dyspnea. EF down to <20% with LVEDd 6.9 cm. While hospitalized, had VT @ 180 bpm, which was cardioverted emergently.  Loaded with Amiodarone IV and then po (400 mg BID x 7 days then 400 mg daily).  He underwent placement of CRT-D on 10/4/2019.VT detection rate decreased from 200 to 180 bpm with duration of 20\" due to concern for slow VT below detection rate.    Nicole has seen him multiple times since his discharge and addressed his hypotension and hyperkalemia.  She decreased her amiodarone from 400 mg daily to 200 mg daily on 10/10 due to a rise in his TSH. At her last OV 10/24 she stopped daily Lasix and had him take PRN dosing. Routine EP follow-up was recommended and I'm meeting him " today.    Interval History:  Has not had to take the Lasix at all since he saw Nicole 10/24/2019. Weight at home ~256-257# since Lasix was moved to prn. Does not feel like he's retaining any fluid. No edema, orthopnea or PND.     No alcohol x 1 month.     Feels a bit woozy when stands up quickly. No falls/faints. No palpitations. No problems with ICD site.    No complaints of chest pain, pressure or tightness. No change in exercise tolerance or breathing. Overall, he is feeling well.     Diagnostic Testing:  EKG today, which I overread, showed AP/BiV P at 60 bpm ( bpm as computer has read)    Device interrogation 10/11/2019 showed 28% AP and 97% BiV Paced in DDD  bpm. Underlying SR 60s. No atrial arrhythmias. No ventricular arrhythmias, ATP or shocks    Echocardiogam 10/2019 showed EF severely reduced <20% with LVEDd 6.9 c and WMA. Thinning/akinesis of inferior/inferolateral walls and apex. Septal thinning and dyskinesis c/w known LBBB. Significant hypokinesis of the anterior and anterolateral wall. RVSP 13+ RAP. Mild MR. Trace TR.   R/L Heart Cath 10/2019 showed noting more than luminal irregularities. LV filling pressures mildly elevated. Normal filling pressures and PA pressure. ULN wedge pressure    Cardiac MRI 10/2019 showed severely dilated LV with LVEF 25%. Dyssynchronous septal motion c/w LBBB. RV normal in size and function 57%. Biatrial enlargement. Mild MR. LGE in septal, inferior and lateral base. Non-CAD scar in anterolateral mid-wall. Scar is more extensive (13%) c/w prior MRI 2018 (6%).      Component      Latest Ref Rng & Units 10/9/2019 10/17/2019 10/23/2019 11/4/2019   TSH      0.40 - 4.00 mU/L 6.88 (H) 9.74 (H) 7.68 (H) 8.05 (H)   T4 Free      0.76 - 1.46 ng/dL 1.14 1.02 0.98 0.88     Component      Latest Ref Rng & Units 10/9/2019 10/17/2019 10/23/2019 11/4/2019   Sodium      133 - 144 mmol/L 134 (L) 138 138 139   Potassium      3.4 - 5.3 mmol/L 5.2 (H) 5.3 (H) 4.4 4.3   Chloride       94 - 109 mmol/L 104 104 105 110 (H)   Carbon Dioxide      20 - 32 mmol/L 22 (L) 24 24 26   Anion Gap      3 - 14 mmol/L 13.2 15.3 13.4 3   Glucose      70 - 99 mg/dL 219 (H) 159 (H) 233 (H) 184 (H)   Urea Nitrogen      7 - 30 mg/dL 55 (H) 40 (H) 29 21   Creatinine      0.66 - 1.25 mg/dL 1.57 (H) 1.64 (H) 1.46 (H) 1.07   GFR Estimate      >60 mL/min/1.73:m2 45 (L) 43 (L) 49 (L) 75   GFR Estimate If Black      >60 mL/min/1.73:m2 55 (L) 52 (L) 60 (L) 87   Calcium      8.5 - 10.1 mg/dL 10.5 10.6 (H) 9.9 9.0     Component      Latest Ref Rng & Units 10/9/2019 10/23/2019 11/4/2019   N-Terminal Pro Bnp      0 - 125 pg/mL 589 (H) 710 (H) 973 (H)     Component      Latest Ref Rng & Units 10/1/2019 10/2/2019   Bilirubin Total      0.2 - 1.3 mg/dL 0.4 0.6   Albumin      3.4 - 5.0 g/dL 3.9 3.4   Protein Total      6.8 - 8.8 g/dL 6.8 6.4 (L)   Alkaline Phosphatase      40 - 150 U/L 52 36 (L)   ALT      0 - 70 U/L 43 56   AST      0 - 45 U/L 25 65 (H)     Assessment & Plan:    1. VT in setting of severe non-ischemic CM    EF cMRI 10/2019 was 25% with dilated LV cavity. Known LBBB    He was loaded with amiodarone and was to be continued on 400 mg daily to prevent recurrence, but dose was decreased to 200 mg daily due to elevated TSH. T4 has been wnl.    Now s/p [employer] CRT-D 10/4/2019 and device interrogations have shown no recurrent VT. No therapies required     PLAN:    Continue amiodarone. Will keep him on the lower dose despite VT arrest as he's not had any additional therapies/VT noted on device interrogation    Labs as above    Continue routine device interrogations.      2. Amiodarone use    IV, then 400 mg BID x 7 days then 400 mg daily. Decreased to 200 mg daily on 10/10 due to TSH elevation    TSH/T4 and hepatic panel as above    PLAN:    Continue routine device interrogations    Will get baseline PFTs. Amiodarone will likely be a long term medication despite young age due to his VT and CM    CXR    Will plan  labs again in 6 months (ordered)    Explained that as risks a/w VT >> hypothyroidism, would likely just continue amiodarone and he may need a thyroid supplement. Will message Dr. Harris re: abnl TSH in setting of normal T4.     Warned him of irreversible pulmonary fibrosis. He is to call for worsening SOB, tremor or sudden severe vision changes.    3. Possible sarcoidosis    LGE noted on cMRI 7/2018 and again 10/2019    Saw Dr. Batres 11/2 at the . Note PET scan is likely    PLAN:    Await Dr. Batres's note    4. HFrEF    Seeing Nicole Freddy routinely    On exam appears euvolemic. Weight is down since last visit. Note BNP is up.    Has not had to take additional Lasix due to rapid weight gain    Labs as above    Remains on ACEi/BB    PLAN:    See Nicole 11/2019 and Dr. Hernandes 12/2019 as planned    I will let Nicole know that the labs are back.    Delaney Sanabria PA-C, MSPAS      Orders Placed This Encounter   Procedures     XR Chest 2 Views     Hepatic panel     TSH with free T4 reflex     Basic metabolic panel     EKG 12-lead complete w/read - Clinics (performed today)     Pulmonary function test     No orders of the defined types were placed in this encounter.    There are no discontinued medications.      Encounter Diagnoses   Name Primary?     NSTEMI (non-ST elevated myocardial infarction) (H)      On amiodarone therapy Yes       CURRENT MEDICATIONS:  Current Outpatient Medications   Medication Sig Dispense Refill     albuterol (PROAIR HFA/PROVENTIL HFA/VENTOLIN HFA) 108 (90 Base) MCG/ACT Inhaler Inhale 2 puffs into the lungs every 6 hours as needed for shortness of breath / dyspnea or wheezing       amiodarone (PACERONE/CODARONE) 200 MG tablet Take 1 tablet (200 mg) by mouth daily 30 tablet 1     aspirin 81 MG tablet Take 1 tablet (81 mg) by mouth daily 30 tablet      atorvastatin (LIPITOR) 80 MG tablet Take 1/2 (one-half) tablet by mouth at bedtime. 45 tablet 0     BD ULTRA-FINE 29G X 12.7MM insulin pen needle USE ONCE  DAILY WITH VICTOZA  each 1     BD ULTRA-FINE 29G X 12.7MM insulin pen needle use once daily with victoza pen 100 each 1     blood glucose monitoring (ACCU-CHEK LUL PLUS) test strip Use to test blood sugar 1-3 times daily or as directed. 100 each 11     clotrimazole (LOTRIMIN) 1 % external cream Apply sparingly once or twice per day as needed to affected area until the skin is better, then stop; REPEAT AS NEEDED 30 g 1     Continuous Blood Gluc  (FREESTYLE JOCELIN 14 DAY READER) LUTHER 1 each See Admin Instructions 1 Device 0     Continuous Blood Gluc Sensor (FREESTYLE JOCELIN 14 DAY SENSOR) MISC 1 each every 14 days 6 each 3     continuous blood glucose monitoring (FREESTYLE JOCELIN) sensor For use with Freestyle Jocelin Flash  for continuous monitioring of blood glucose levels. Replace sensor every 10 days. 3 each 3     fenofibrate (TRIGLIDE/LOFIBRA) 160 MG tablet TAKE ONE TABLET BY MOUTH ONE TIME DAILY 90 tablet 1     fluticasone (FLONASE) 50 MCG/ACT nasal spray Spray 2 sprays into both nostrils daily (Patient taking differently: Spray 2 sprays into both nostrils daily as needed ) 16 g 0     furosemide (LASIX) 20 MG tablet Take 1 tablet (20 mg) by mouth as needed (for weight gain of 2+ lbs overnight) 30 tablet 1     glipiZIDE (GLUCOTROL XL) 10 MG 24 hr tablet TAKE 1 TABLET (10 MG) BY MOUTH DAILY. TAKE WITH LARGEST MEAL OF THE DAY. ALWAYS TAKE WITH FOOD 90 tablet 0     insulin detemir (LEVEMIR PEN) 100 UNIT/ML pen Inject 10 Units Subcutaneous At Bedtime 18 mL 0     liraglutide (VICTOZA PEN) 18 MG/3ML solution Inject 1.8 mg Subcutaneous daily 9 mL 0     lisinopril (PRINIVIL/ZESTRIL) 10 MG tablet Take 1 tablet (10 mg) by mouth daily 30 tablet 5     metFORMIN (GLUCOPHAGE) 1000 MG tablet Take 1 tablet (1,000 mg) by mouth 2 times daily (with meals) 180 tablet 3     metoprolol succinate ER (TOPROL-XL) 25 MG 24 hr tablet TAKE ONE TABLET BY MOUTH IN THE EVENING  30 tablet 10     triamcinolone (KENALOG) 0.5 %  cream Apply sparingly once or twice per day as needed to affected area until the skin is better, then stop 30 g 0       ALLERGIES     Allergies   Allergen Reactions     No Known Drug Allergies        PAST MEDICAL HISTORY:  Past Medical History:   Diagnosis Date     Ascending aorta dilatation (H)      Diverticulosis of colon (without mention of hemorrhage)      Essential hypertension, benign      Gout, unspecified      LBBB (left bundle branch block)      Morbid obesity (H)      Non-ischemic cardiomyopathy (H)      Nonrheumatic mitral valve regurgitation      NSTEMI (non-ST elevated myocardial infarction) (H)     cardiac cath 6/2018: mild non-obstructive CAD     Other and unspecified hyperlipidemia      Type II or unspecified type diabetes mellitus without mention of complication, not stated as uncontrolled        PAST SURGICAL HISTORY:  Past Surgical History:   Procedure Laterality Date     C APPENDECTOMY  1980's     CV CORONARY ANGIOGRAM N/A 10/2/2019    Procedure: Coronary Angiogram;  Surgeon: Kathy Almaguer MD;  Location:  HEART CARDIAC CATH LAB     CV LEFT HEART CATH N/A 10/2/2019    Procedure: Left Heart Cath;  Surgeon: Kathy Almaguer MD;  Location:  HEART CARDIAC CATH LAB     CV RIGHT HEART CATH N/A 10/2/2019    Procedure: Right Heart Cath;  Surgeon: Kathy Almaguer MD;  Location:  HEART CARDIAC CATH LAB     EP ICD N/A 10/4/2019    Procedure: EP ICD;  Surgeon: Jayy Dorman MD;  Location:  HEART CARDIAC CATH LAB     EP LEAD PLCMNT LV NEW ICD N/A 10/4/2019    Procedure: EP Lead Plcmnt LV New ICD;  Surgeon: Jayy Dorman MD;  Location: [facility] CARDIAC CATH LAB     HEART CATH CORONARY ANGIOGRAM WITHOUT PRESSURES  06/10/2018    normal coronary angiogram, nothing more than 10%     SURGICAL HISTORY OF -   2001    repair of colovesicular fistula       FAMILY HISTORY:  Family History   Problem Relation Age of Onset     Cancer Father 75        bladder cancer     Myocardial  Infarction Father      Other - See Comments Father         smoking     Breast Cancer Mother      Breast Cancer Sister      Family History Negative Brother      Family History Negative Son      Family History Negative Son         fluttering/murmur     Asthma Son      Colon Cancer No family hx of        SOCIAL HISTORY:  Social History     Socioeconomic History     Marital status:      Spouse name: Not on file     Number of children: Not on file     Years of education: Not on file     Highest education level: Not on file   Occupational History     Not on file   Social Needs     Financial resource strain: Not on file     Food insecurity:     Worry: Not on file     Inability: Not on file     Transportation needs:     Medical: Not on file     Non-medical: Not on file   Tobacco Use     Smoking status: Never Smoker     Smokeless tobacco: Never Used   Substance and Sexual Activity     Alcohol use: Not Currently     Drug use: No     Sexual activity: Yes     Partners: Female   Lifestyle     Physical activity:     Days per week: Not on file     Minutes per session: Not on file     Stress: Not on file   Relationships     Social connections:     Talks on phone: Not on file     Gets together: Not on file     Attends [employer] service: Not on file     Active member of club or organization: Not on file     Attends meetings of clubs or organizations: Not on file     Relationship status: Not on file     Intimate partner violence:     Fear of current or ex partner: Not on file     Emotionally abused: Not on file     Physically abused: Not on file     Forced sexual activity: Not on file   Other Topics Concern     Parent/sibling w/ CABG, MI or angioplasty before 65F 55M? Not Asked   Social History Narrative     Not on file       Review of Systems:  Skin:  Negative     Eyes:  Positive for glasses  ENT:  Negative    Respiratory:  Negative for dyspnea on exertion;shortness of breath;cough  Cardiovascular:  Negative  for;palpitations;chest pain;edema;exercise intolerance;fatigue;dizziness Positive for;lightheadedness  Gastroenterology: Negative    Genitourinary:  Negative    Musculoskeletal:  Negative    Neurologic:  Negative    Psychiatric:  Negative    Heme/Lymph/Imm:  Negative    Endocrine:  Positive for diabetes    Physical Exam:  Vitals: /72   Pulse 60   Ht 1.829 m (6')   Wt 118.6 kg (261 lb 8 oz)   BMI 35.47 kg/m       Constitutional:  cooperative, alert and oriented, well developed, well nourished, in no acute distress        Skin:  warm and dry to the touch, no apparent skin lesions or masses noted   Small (1mm) area of scab mid-incision. Well approximated. No drainage or hematoma    Head:  normocephalic, no masses or lesions        Eyes:  pupils equal and round;conjunctivae and lids unremarkable;sclera white        ENT:  no pallor or cyanosis, dentition good        Neck:  no carotid bruit        Chest:  clear to auscultation        Cardiac: regular rhythm, normal S1/S2, no S3 or S4, apical impulse not displaced, no murmurs, gallops or rubs                  Abdomen:  abdomen soft obese      Vascular: pulses full and equal                                      Extremities and Back:  no deformities, clubbing, cyanosis, erythema observed;no edema        Neurological:  no gross motor deficits          Recent Lab Results:  LIPID RESULTS:  Lab Results   Component Value Date    CHOL 104 10/03/2018    HDL 48 10/03/2018    LDL 40 10/03/2018    TRIG 81 10/03/2018    CHOLHDLRATIO 2.8 01/26/2015     CBC RESULTS:  Lab Results   Component Value Date    WBC 7.1 10/05/2019    RBC 3.94 (L) 10/05/2019    HGB 12.3 (L) 10/05/2019    HCT 35.9 (L) 10/05/2019    MCV 91 10/05/2019    MCH 31.2 10/05/2019    MCHC 34.3 10/05/2019    RDW 13.0 10/05/2019     10/05/2019       A1C RESULTS:  Lab Results   Component Value Date    A1C 8.8 (H) 08/26/2019         Thank you for allowing me to participate in the care of your  patient.    Sincerely,     Roseann Sanabria PA-C     [facility]

## 2019-11-04 NOTE — PROGRESS NOTES
Called Neymar re: testing that returned after our OV.    - Let him know I sent Dr. Harris a message re: TSH  - Will let Nicole know that BNP was up but weight down and euvolmic on exam.  - BMP OK  - CXR OK (baseline for amiodarone)    Pt voiced understanding.

## 2019-11-04 NOTE — PROGRESS NOTES
Hi Dr. Harris! I met Neymar today to discuss his Amiodarone use for VT.    Hepatic panel wnl. I've ordered PFTs/CXR.    His TSH has been elevated (with normal T4). I am unsure of why these have been checked on an almost weekly basis ...    I told Neymar that I would defer any treatment of ?subclinical hypothyroidism? to you as we're unable to stop the amiodarone given his VT requiring emergent cardioversion while hospitalized. Currently, he's on Amiodarone 200 mg daily (low dose despite VT).    Component      Latest Ref Rng & Units 10/9/2019 10/17/2019 10/23/2019 11/4/2019   TSH      0.40 - 4.00 mU/L 6.88 (H) 9.74 (H) 7.68 (H) 8.05 (H)   T4 Free      0.76 - 1.46 ng/dL 1.14 1.02 0.98 0.88     Pls let him know if you'd like to see him to address abnl labs.  I did let him know you might need to start a thyroid supplement.    TYLER Be PA

## 2019-11-04 NOTE — PROGRESS NOTES
PREEMPT-HF : PRECISION EVENT MONITORING FOR PATIENTS WITH HEART FAILURE USING HEARTLOGIC    IRB ID: [id number]  PI: Roberto Batres M.D., Ph.D - Phone: [phone]  Primary : Tosha Real RN - Phone: [phone] Pager: [phone]  : Nan Araujo - Phone: [phone]    Patient was approached on 11/2/2019  for possible participation for the above study. Inclusion and exclusion criteria reviewed. Patient meets all of the inclusion criteria and does not meet any of the exclusion criteria. The current approved IRB consent form was discussed and explained to the patient.  It was discussed that involvement with the study is voluntary and refusal to participate would not involve penalty or decrease benefits at which the patient is entitled, and the subject may discontinue his/her involvement at any time without penalty or loss in benefits. The consent form was reviewed including purpose, research hypothesis, nature of the research, risk & benefits. Also reviewed were confidentiality issues, compensation for injury, and alternative procedures available. The patient was given time to review and ask any questions about the consent. Patient was shown contact information for PI and study staff in consent for future questions. Patient verbalized understanding of consent and study by restating the purpose, procedures, duration, risk, confidentiality of PHI, and voluntarily participation. Questions and concerns were addressed. Patient printed, signed and dated the consent and HIPAA form prior to study involvement. A copy was given to the patient for their records.     Subject Consent/HIPAA : SIGNED ON 11/2/2019

## 2019-11-05 ENCOUNTER — CARE COORDINATION (OUTPATIENT)
Dept: CARDIOLOGY | Facility: CLINIC | Age: 59
End: 2019-11-05

## 2019-11-05 NOTE — PROGRESS NOTES
proBNP rising, however weight is stable and creat has normalized. Continue PRN Lasix as ordered. Remind patient to call with weight gain.   TFT's were reviewed at  with Delaney Sanabria today.   Thank you! Freddy          Called and spoke with patient about recommendations going forward. Patient confirmed understanding of calling in with increased weight gain or symptoms.     BRANDON Chatman November 5, 2019 11:30 AM

## 2019-11-06 RX ORDER — LEVOTHYROXINE SODIUM 50 UG/1
50 TABLET ORAL DAILY
Qty: 30 TABLET | Refills: 2 | Status: SHIPPED | OUTPATIENT
Start: 2019-11-06 | End: 2020-01-30

## 2019-11-06 NOTE — PROGRESS NOTES
"Please call the patient.     His thyroid labs from the cardiology clinic are trending lower and now looking like he needs a low dose thyroid supplement.   This would be best termed \"subclinical hypothyroidism\".   This could be related to some of the medications he is taking, sometimes it just happens in diabetic patients.     Start Levothyroxine 50 mcg once per day.   Prescription(s) sent electronically to his [facility] pharmacy.     We will continue to monitor the thyroid function and adjust the dose based on the blood tests.      Continue all other medications at the same doses.      Return to see me in approximately December 2019 or January 2020, sooner if needed.  Please get fasting labs done in the [place] lab 1-2 days before this appointment (make a  \"lab appointment\").  Eat nothing for at least 8 hours prior to having these labs drawn.  Use [employer] or Call [phone] to schedule the appointment with me and lab appointment.   "

## 2019-11-09 DIAGNOSIS — E11.9 TYPE 2 DIABETES MELLITUS WITHOUT COMPLICATION, WITHOUT LONG-TERM CURRENT USE OF INSULIN (H): ICD-10-CM

## 2019-11-09 NOTE — LETTER
[facility]  [address] [id number] [phone]            Neymar Rhodes  [address] [place] [id number]        November 11, 2019    Dear Neymar,    While refilling your prescription today, we noticed that you are due to have labs drawn.  We will refill your prescription for 30 days, but a follow-up appointment must be made before any additional refills can be approved.     Taking care of your health is important to us and we look forward to seeing you in the near future.  Please call us at [phone] or [phone] (or use [facility]) to schedule an appointment.     Please disregard this notice if you have already made an appointment.    Sincerely,        [facility]

## 2019-11-10 NOTE — TELEPHONE ENCOUNTER
"Requested Prescriptions   Pending Prescriptions Disp Refills     atorvastatin (LIPITOR) 80 MG tablet [Pharmacy Med Name: Atorvastatin Calcium Oral Tablet 80 MG] 45 tablet 0     Sig: TAKE ONE HALF TABLET BY MOUTH at bedtime   Last Written Prescription Date:  8/23/2019  Last Fill Quantity: 45,  # refills: 0   Last Office Visit: 10/8/2019   Future Office Visit:         Statins Protocol Failed - 11/9/2019  2:18 PM        Failed - LDL on file in past 12 months     Recent Labs   Lab Test 10/03/18  0849   LDL 40             Passed - No abnormal creatine kinase in past 12 months     No lab results found.             Passed - Recent (12 mo) or future (30 days) visit within the authorizing provider's specialty     Patient has had an office visit with the authorizing provider or a provider within the authorizing providers department within the previous 12 mos or has a future within next 30 days. See \"Patient Info\" tab in inbasket, or \"Choose Columns\" in Meds & Orders section of the refill encounter.              Passed - Medication is active on med list        Passed - Patient is age 18 or older          "

## 2019-11-11 RX ORDER — ATORVASTATIN CALCIUM 80 MG/1
TABLET, FILM COATED ORAL
Qty: 15 TABLET | Refills: 0 | Status: SHIPPED | OUTPATIENT
Start: 2019-11-11 | End: 2019-12-30

## 2019-11-14 DIAGNOSIS — E11.9 TYPE 2 DIABETES MELLITUS WITHOUT COMPLICATION, WITH LONG-TERM CURRENT USE OF INSULIN (H): ICD-10-CM

## 2019-11-14 DIAGNOSIS — Z79.4 TYPE 2 DIABETES MELLITUS WITHOUT COMPLICATION, WITH LONG-TERM CURRENT USE OF INSULIN (H): ICD-10-CM

## 2019-11-14 LAB
ALT SERPL W P-5'-P-CCNC: 45 U/L (ref 0–70)
ANION GAP SERPL CALCULATED.3IONS-SCNC: 9 MMOL/L (ref 3–14)
AST SERPL W P-5'-P-CCNC: 32 U/L (ref 0–45)
BUN SERPL-MCNC: 21 MG/DL (ref 7–30)
CALCIUM SERPL-MCNC: 9.3 MG/DL (ref 8.5–10.1)
CHLORIDE SERPL-SCNC: 109 MMOL/L (ref 94–109)
CHOLEST SERPL-MCNC: 124 MG/DL
CO2 SERPL-SCNC: 22 MMOL/L (ref 20–32)
CREAT SERPL-MCNC: 1.04 MG/DL (ref 0.66–1.25)
GFR SERPL CREATININE-BSD FRML MDRD: 77 ML/MIN/{1.73_M2}
GLUCOSE SERPL-MCNC: 118 MG/DL (ref 70–99)
HBA1C MFR BLD: 7 % (ref 0–5.6)
HDLC SERPL-MCNC: 43 MG/DL
LDLC SERPL CALC-MCNC: 58 MG/DL
NONHDLC SERPL-MCNC: 81 MG/DL
POTASSIUM SERPL-SCNC: 4.4 MMOL/L (ref 3.4–5.3)
SODIUM SERPL-SCNC: 140 MMOL/L (ref 133–144)
T4 FREE SERPL-MCNC: 1.06 NG/DL (ref 0.76–1.46)
TRIGL SERPL-MCNC: 116 MG/DL
TSH SERPL DL<=0.005 MIU/L-ACNC: 8.98 MU/L (ref 0.4–4)

## 2019-11-14 PROCEDURE — 84443 ASSAY THYROID STIM HORMONE: CPT | Performed by: INTERNAL MEDICINE

## 2019-11-14 PROCEDURE — 36415 COLL VENOUS BLD VENIPUNCTURE: CPT | Performed by: INTERNAL MEDICINE

## 2019-11-14 PROCEDURE — 84460 ALANINE AMINO (ALT) (SGPT): CPT | Performed by: INTERNAL MEDICINE

## 2019-11-14 PROCEDURE — 80048 BASIC METABOLIC PNL TOTAL CA: CPT | Performed by: INTERNAL MEDICINE

## 2019-11-14 PROCEDURE — 80061 LIPID PANEL: CPT | Performed by: INTERNAL MEDICINE

## 2019-11-14 PROCEDURE — 84439 ASSAY OF FREE THYROXINE: CPT | Performed by: INTERNAL MEDICINE

## 2019-11-14 PROCEDURE — 84450 TRANSFERASE (AST) (SGOT): CPT | Performed by: INTERNAL MEDICINE

## 2019-11-14 PROCEDURE — 83036 HEMOGLOBIN GLYCOSYLATED A1C: CPT | Performed by: INTERNAL MEDICINE

## 2019-11-20 ENCOUNTER — OFFICE VISIT (OUTPATIENT)
Dept: CARDIOLOGY | Facility: CLINIC | Age: 59
End: 2019-11-20
Payer: COMMERCIAL

## 2019-11-20 VITALS
DIASTOLIC BLOOD PRESSURE: 70 MMHG | SYSTOLIC BLOOD PRESSURE: 111 MMHG | BODY MASS INDEX: 34.98 KG/M2 | WEIGHT: 258.3 LBS | HEART RATE: 64 BPM | HEIGHT: 72 IN

## 2019-11-20 DIAGNOSIS — I42.8 NON-ISCHEMIC CARDIOMYOPATHY (H): ICD-10-CM

## 2019-11-20 DIAGNOSIS — I50.23 ACUTE ON CHRONIC SYSTOLIC HEART FAILURE (H): ICD-10-CM

## 2019-11-20 PROCEDURE — 99214 OFFICE O/P EST MOD 30 MIN: CPT | Performed by: PHYSICIAN ASSISTANT

## 2019-11-20 ASSESSMENT — MIFFLIN-ST. JEOR: SCORE: 2019.64

## 2019-11-20 NOTE — LETTER
11/20/2019    Jefry Harris MD  [address] [facility] [address]    RE: Neymar Rhodes       Dear Colleague,    I had the pleasure of seeing Neymar Rhodes in the [facility].      Cardiology Clinic Progress Note    Neymar Rhodes MRN# [id number]   YOB: 1960 Age: 59 year old   Primary cardiologist: Dr. Hernandes         Assessment and Plan:     In summary, Neymar Rhodes presents today for a CORE clinic f/u visit.     1. HFrEF    CMR: EF 25%, LVEDd 6.9 cm    ACC/AHA Stage: D; FC II-III     Etiology: suspect sarcoid; ETOH, LBBB likely contributing.    RV: nml    Valves: OK    Volume: Appears euvolemic  - Admit Wt: 275 lbs  - Current Wt: 258 lbs  - Dry Wt: suspect ~ 255-260 lbs  - Diuretics: None (has PRN Lasix at home)  - proBNP: not performed today    Rx: lisinopril 10, Toprol 25 (no amelia d/t YASMEEN with med)    Rhythm: SR / paroxysmal VT - on AMIO    QRS: native wide 174 ms (LBBB)    Device? CRT-D - no arrhythmias noted on 10/11 device check    Code status: Full    Creatinine: 1    CHF Admissions: 6/2018, 10/2019    Contributing  - Anemia / Fe studies: Mildly anemic, Fe studies do not qualify for IV Fe in CHF  - TFT's: TSH 9 (high), T4F WNL - on AMIO 200 mg daily - followed by EP  - BELA: unknown - pt declined PSG in past    Plan:  - Stop Lisinopril, after 48h start Entresto (medium dose).  - Congratulated on complete ETOH cessation, encouraged to continue, counseling provided today. Continue low sodium diet, daily wts.   - Will await further instruction from Dr. Batres.     Follow-up:  - Dr. Hernandes on 12/2 + BMP, proBNP.   - Dr. Batres on 2/12.        History of Presenting Illness:      Neymar Rhodes is a pleasant 59 year old patient who presents today for a CORE clinic f/u  visit.    The patient has a history of the following -   # Severe nonischemic cardiomyopathy with EF of 15% with marked LV dilatation  # Sustained monomorphic ventricular  "tachycardia, s/p CRT-D, on AMIO   # Probable cardiac sarcoidosis with more extensive LGE (13%) on 10/2019 MRI compared with 6% on 2018 MRI.   # Large LBBB  # Nonobstructive coronary artery disease  # DMII  # Long-standing ETOH abuse - prior to admission was drinking heavily (sallie aguilar and at least 20 beers every weekend - tells me this is after he \"cut back\") - sober since 10/2019.  # Obesity - Body mass index is 35.03 kg/m .   # Social - Lives with wife Julisa. He works as a , now on disability. He is reading labels / monitoring his sodium intake with the help of his wife, and weighing himself daily.     Neymar was first seen in 2018 with new diagnosis of heart failure when his EF was noted to be around 40% by Dr. Iniguez, in the setting of normal sinus rhythm with a large LBBB.  Coronary angiography at that time had shown nonobstructive coronary artery disease. He was started on guideline directed therapy with beta-blockers, ACE inhibitors and mineralocorticoid receptor antagonist.  Follow-up cardiac MRI showed that he had scar concerning for sarcoidosis. He unfortunately failed to follow up after that as he was feeling fairly well, but did remain on his medications. He did well for about a year until he began to develop progressive dyspnea which resulted in his admission on 10/1/19. ECHO showed an EF<20% with an LVEDd of 6.9 cm. The following day, while seated, he developed a sustained monomorphic VT ~180 bpm.  This was cardioverted emergently into normal sinus rhythm with first shock at 200J. From there he went to the cath lab where his coronary anatomy was unchanged. RHC showed mildly elevated filling pressures with an LVEDP of 21 and a low-normal cardiac output. Cardiac MRI was repeated and showed LGE in septal, inferior, lateral base with a non-CAD scar in the anterolateral mid-wall with a worsening scar burden to 13%. He was loaded with amiodarone and received CRT-D on 10/4. He was " discharged the following day on his PTA regimen with the addition of spironolactone 25 mg and furosemide 20 mg daily.    After that, he became hypotensive, and his PCP reduced his lisinopril. Despite this he was still hypotensive and labs showed YASMEEN with hyperkalemia when I met him on 10/9. Significant alteration in his diet (ETOH and sodium reduction) was likely contributing. I reduced both Lasix and Spironolactone but ultimately had to stop the Cameron due to persistent hyperkalemia. His renal function and potassium have now normalized.   Also, his TSH has been elevated with a normal T4F on AMIO. On 10/11, he had a device check which showed no arrhythmias and 97% BiV pace. His AMIO was reduced from 400 BID to 200 mg daily. He is following with EP for ongoing management.     He met Dr. Batres on November 2nd. His note is incomplete, however it appears he is in agreement that sarcoid is probably responsible for his cardiac dysfunction. It does not appear that a PET has yet been scheduled, or therapies initiated for this.     Today, he presents to clinic alone. He remains with FC II-III symptoms. He has persistent mild positional lightheadedness, but can ameliorate this with slow positional changes, and otherwise has had no near-syncope or syncope. He denies chest pain, PND, orthopnea, edema, palpitations. He's continuing to work at following a low sodium and low-carb diet. He's now been completely sober x 1 month and admits he's struggling with that. He worries that upon eventual return to work he may fall off the wagon. We discussed this at length today.          Review of Systems:     12-pt ROS is negative except for as noted in the HPI.          Physical Exam:     Vitals: /70 (BP Location: Left arm, Cuff Size: Adult Large)   Pulse 64   Ht 1.829 m (6')   Wt 117.2 kg (258 lb 4.8 oz)   BMI 35.03 kg/m     Wt Readings from Last 10 Encounters:   11/20/19 117.2 kg (258 lb 4.8 oz)   11/04/19 118.6 kg (261 lb 8 oz)    11/02/19 119.4 kg (263 lb 3.2 oz)   10/24/19 121.1 kg (267 lb)   10/17/19 118.6 kg (261 lb 6.4 oz)   10/09/19 117.9 kg (260 lb)   10/08/19 118.7 kg (261 lb 11.2 oz)   10/05/19 117.7 kg (259 lb 8 oz)   10/01/19 125.1 kg (275 lb 12.8 oz)   08/30/19 127 kg (280 lb)       Constitutional:  Patient is pleasant, alert, cooperative, and in NAD.  HEENT:  NCAT. PERRLA. EOM's intact.   Neck:  CVP is difficult to assess due to body habitus.    Pulmonary: Normal respiratory effort. CTAB.   Cardiac: RRR, normal S1/S2, no S3/S4, no murmur or rub. Distant heart tones.   Abdomen:  Non-tender abdomen, no hepatosplenomegaly appreciated.   Vascular: Pulses in the upper and lower extremities are 2+ and equal bilaterally.  Extremities: No edema, erythema, cyanosis or tenderness appreciated.  Skin:  No rashes or lesions appreciated.   Neurological:  No gross motor or sensory deficits.   Psych: Appropriate affect.        Data:     Labs reviewed:  Recent Labs   Lab Test 11/14/19  0953 11/04/19  1519 10/23/19  1027  10/09/19  0906 10/03/18  0849 06/11/18  0420   LDL 58  --   --   --   --  40 59   HDL 43  --   --   --   --  48 46   NHDL 81  --   --   --   --  56 82   CHOL 124  --   --   --   --  104 128   TRIG 116  --   --   --   --  81 113   TSH 8.98* 8.05* 7.68*   < > 6.88* 3.50  --    NTBNP  --  973* 710*  --  589*  --   --    IRON  --   --  86  --   --   --   --    FEB  --   --  378  --   --   --   --    IRONSAT  --   --  23  --   --   --   --    KVEIN  --   --  140  --   --   --   --     < > = values in this interval not displayed.       Lab Results   Component Value Date    WBC 7.1 10/05/2019    RBC 3.94 (L) 10/05/2019    HGB 12.3 (L) 10/05/2019    HCT 35.9 (L) 10/05/2019    MCV 91 10/05/2019    MCH 31.2 10/05/2019    MCHC 34.3 10/05/2019    RDW 13.0 10/05/2019     10/05/2019       Lab Results   Component Value Date     11/14/2019    POTASSIUM 4.4 11/14/2019    CHLORIDE 109 11/14/2019    CO2 22 11/14/2019    ANIONGAP 9  11/14/2019     (H) 11/14/2019    BUN 21 11/14/2019    CR 1.04 11/14/2019    GFRESTIMATED 77 11/14/2019    GFRESTBLACK 90 11/14/2019    EDITH 9.3 11/14/2019      Lab Results   Component Value Date    AST 32 11/14/2019    ALT 45 11/14/2019       Lab Results   Component Value Date    A1C 7.0 (H) 11/14/2019       Lab Results   Component Value Date    INR 1.16 (H) 10/04/2019           Problem List:     Patient Active Problem List   Diagnosis     Diverticulosis of large intestine     Benign essential hypertension     Gout     Severe obesity (BMI 35.0-39.9) with comorbidity (H)     Type 2 diabetes mellitus without complication, without long-term current use of insulin (H)     Hyperlipidemia LDL goal <100     NSTEMI (non-ST elevated myocardial infarction) (H)     Non-ischemic cardiomyopathy (H)     LBBB (left bundle branch block)     Nonrheumatic mitral valve regurgitation     Ascending aorta dilatation (H)     Coronary artery disease involving native coronary artery of native heart without angina pectoris     Near syncope     Paroxysmal ventricular tachycardia (H)           Medications:     Current Outpatient Medications   Medication Sig Dispense Refill     amiodarone (PACERONE/CODARONE) 200 MG tablet Take 1 tablet (200 mg) by mouth daily 30 tablet 1     aspirin 81 MG tablet Take 1 tablet (81 mg) by mouth daily 30 tablet      atorvastatin (LIPITOR) 80 MG tablet TAKE ONE HALF TABLET BY MOUTH at bedtime 15 tablet 0     BD ULTRA-FINE 29G X 12.7MM insulin pen needle USE ONCE DAILY WITH VICTOZA  each 1     BD ULTRA-FINE 29G X 12.7MM insulin pen needle use once daily with victoza pen 100 each 1     blood glucose monitoring (ACCU-CHEK LUL PLUS) test strip Use to test blood sugar 1-3 times daily or as directed. 100 each 11     clotrimazole (LOTRIMIN) 1 % external cream Apply sparingly once or twice per day as needed to affected area until the skin is better, then stop; REPEAT AS NEEDED 30 g 1     Continuous Blood  Gluc  (FREESTYLE JOCELIN 14 DAY READER) LUTHER 1 each See Admin Instructions 1 Device 0     Continuous Blood Gluc Sensor (FREESTYLE JOCELIN 14 DAY SENSOR) MISC 1 each every 14 days 6 each 3     continuous blood glucose monitoring (FREESTYLE JOCELIN) sensor For use with Freestyle Jocelin Flash  for continuous monitioring of blood glucose levels. Replace sensor every 10 days. 3 each 3     fenofibrate (TRIGLIDE/LOFIBRA) 160 MG tablet TAKE ONE TABLET BY MOUTH ONE TIME DAILY 90 tablet 1     fluticasone (FLONASE) 50 MCG/ACT nasal spray Spray 2 sprays into both nostrils daily (Patient taking differently: Spray 2 sprays into both nostrils daily as needed ) 16 g 0     furosemide (LASIX) 20 MG tablet Take 1 tablet (20 mg) by mouth as needed (for weight gain of 2+ lbs overnight) 30 tablet 1     glipiZIDE (GLUCOTROL XL) 10 MG 24 hr tablet TAKE 1 TABLET (10 MG) BY MOUTH DAILY. TAKE WITH LARGEST MEAL OF THE DAY. ALWAYS TAKE WITH FOOD 90 tablet 0     insulin detemir (LEVEMIR PEN) 100 UNIT/ML pen Inject 10 Units Subcutaneous At Bedtime 18 mL 0     levothyroxine (SYNTHROID/LEVOTHROID) 50 MCG tablet Take 1 tablet (50 mcg) by mouth daily 30 tablet 2     liraglutide (VICTOZA PEN) 18 MG/3ML solution Inject 1.8 mg Subcutaneous daily 9 mL 0     metFORMIN (GLUCOPHAGE) 1000 MG tablet Take 1 tablet (1,000 mg) by mouth 2 times daily (with meals) 180 tablet 3     metoprolol succinate ER (TOPROL-XL) 25 MG 24 hr tablet TAKE ONE TABLET BY MOUTH IN THE EVENING  30 tablet 10     sacubitril-valsartan (ENTRESTO) 49-51 MG per tablet Take 1 tablet by mouth 2 times daily 60 tablet 5     triamcinolone (KENALOG) 0.5 % cream Apply sparingly once or twice per day as needed to affected area until the skin is better, then stop 30 g 0     albuterol (PROAIR HFA/PROVENTIL HFA/VENTOLIN HFA) 108 (90 Base) MCG/ACT Inhaler Inhale 2 puffs into the lungs every 6 hours as needed for shortness of breath / dyspnea or wheezing             Past Medical History:      Past Medical History:   Diagnosis Date     Ascending aorta dilatation (H)      Diverticulosis of colon (without mention of hemorrhage)      Essential hypertension, benign      Gout, unspecified      LBBB (left bundle branch block)      Morbid obesity (H)      Non-ischemic cardiomyopathy (H)      Nonrheumatic mitral valve regurgitation      NSTEMI (non-ST elevated myocardial infarction) (H)     cardiac cath 6/2018: mild non-obstructive CAD     Other and unspecified hyperlipidemia      Type II or unspecified type diabetes mellitus without mention of complication, not stated as uncontrolled      Past Surgical History:   Procedure Laterality Date     C APPENDECTOMY  1980's     CV CORONARY ANGIOGRAM N/A 10/2/2019    Procedure: Coronary Angiogram;  Surgeon: Kathy Almaguer MD;  Location:  HEART CARDIAC CATH LAB     CV LEFT HEART CATH N/A 10/2/2019    Procedure: Left Heart Cath;  Surgeon: Kathy Almaguer MD;  Location:  HEART CARDIAC CATH LAB     CV RIGHT HEART CATH N/A 10/2/2019    Procedure: Right Heart Cath;  Surgeon: Kathy Almaguer MD;  Location:  HEART CARDIAC CATH LAB     EP ICD N/A 10/4/2019    Procedure: EP ICD;  Surgeon: Jayy Dorman MD;  Location:  HEART CARDIAC CATH LAB     EP LEAD PLCMNT LV NEW ICD N/A 10/4/2019    Procedure: EP Lead Plcmnt LV New ICD;  Surgeon: Jayy Dorman MD;  Location: [facility] CARDIAC CATH LAB     HEART CATH CORONARY ANGIOGRAM WITHOUT PRESSURES  06/10/2018    normal coronary angiogram, nothing more than 10%     SURGICAL HISTORY OF -   2001    repair of colovesicular fistula     Family History   Problem Relation Age of Onset     Cancer Father 75        bladder cancer     Myocardial Infarction Father      Other - See Comments Father         smoking     Breast Cancer Mother      Breast Cancer Sister      Family History Negative Brother      Family History Negative Son      Family History Negative Son         fluttering/murmur     Asthma Son       Colon Cancer No family hx of      Social History     Socioeconomic History     Marital status:      Spouse name: Not on file     Number of children: Not on file     Years of education: Not on file     Highest education level: Not on file   Occupational History     Not on file   Social Needs     Financial resource strain: Not on file     Food insecurity:     Worry: Not on file     Inability: Not on file     Transportation needs:     Medical: Not on file     Non-medical: Not on file   Tobacco Use     Smoking status: Never Smoker     Smokeless tobacco: Never Used   Substance and Sexual Activity     Alcohol use: Not Currently     Drug use: No     Sexual activity: Yes     Partners: Female   Lifestyle     Physical activity:     Days per week: Not on file     Minutes per session: Not on file     Stress: Not on file   Relationships     Social connections:     Talks on phone: Not on file     Gets together: Not on file     Attends [employer] service: Not on file     Active member of club or organization: Not on file     Attends meetings of clubs or organizations: Not on file     Relationship status: Not on file     Intimate partner violence:     Fear of current or ex partner: Not on file     Emotionally abused: Not on file     Physically abused: Not on file     Forced sexual activity: Not on file   Other Topics Concern     Parent/sibling w/ CABG, MI or angioplasty before 65F 55M? Not Asked   Social History Narrative     Not on file           Allergies:   No known drug allergies      Nicole Hayes PA-C, ANNABELLA  [facility]  Pager: [phone]    Thank you for allowing me to participate in the care of your patient.    Sincerely,     Nicole Hayes PA-C     [facility]

## 2019-11-20 NOTE — PROGRESS NOTES
Cardiology Clinic Progress Note    Neymar Rhodes MRN# [id number]   YOB: 1960 Age: 59 year old   Primary cardiologist: Dr. Hernandes         Assessment and Plan:     In summary, Neymar Rhodes presents today for a CORE clinic f/u visit.     1. HFrEF    CMR: EF 25%, LVEDd 6.9 cm    ACC/AHA Stage: D; FC II-III     Etiology: suspect sarcoid; ETOH, LBBB likely contributing.    RV: nml    Valves: OK    Volume: Appears euvolemic  - Admit Wt: 275 lbs  - Current Wt: 258 lbs  - Dry Wt: suspect ~ 255-260 lbs  - Diuretics: None (has PRN Lasix at home)  - proBNP: not performed today    Rx: lisinopril 10, Toprol 25 (no amelia d/t YASMEEN with med)    Rhythm: SR / paroxysmal VT - on AMIO    QRS: native wide 174 ms (LBBB)    Device? CRT-D - no arrhythmias noted on 10/11 device check    Code status: Full    Creatinine: 1    CHF Admissions: 6/2018, 10/2019    Contributing  - Anemia / Fe studies: Mildly anemic, Fe studies do not qualify for IV Fe in CHF  - TFT's: TSH 9 (high), T4F WNL - on AMIO 200 mg daily - followed by EP  - BELA: unknown - pt declined PSG in past    Plan:  - Stop Lisinopril, after 48h start Entresto (medium dose).  - Congratulated on complete ETOH cessation, encouraged to continue, counseling provided today. Continue low sodium diet, daily wts.   - Will await further instruction from Dr. Batres.     Follow-up:  - Dr. Hernandes on 12/2 + BMP, proBNP.   - Dr. Batres on 2/12.        History of Presenting Illness:      Neymar Rhodes is a pleasant 59 year old patient who presents today for a CORE clinic f/u  visit.    The patient has a history of the following -   # Severe nonischemic cardiomyopathy with EF of 15% with marked LV dilatation  # Sustained monomorphic ventricular tachycardia, s/p CRT-D, on AMIO   # Probable cardiac sarcoidosis with more extensive LGE (13%) on 10/2019 MRI compared with 6% on 2018 MRI.   # Large LBBB  # Nonobstructive coronary artery disease  # DMII  # Long-standing ETOH abuse -  "prior to admission was drinking heavily (sallie aguilar and at least 20 beers every weekend - tells me this is after he \"cut back\") - sober since 10/2019.  # Obesity - Body mass index is 35.03 kg/m .   # Social - Lives with wife Julisa. He works as a , now on disability. He is reading labels / monitoring his sodium intake with the help of his wife, and weighing himself daily.     Neymar was first seen in 2018 with new diagnosis of heart failure when his EF was noted to be around 40% by Dr. Iniguez, in the setting of normal sinus rhythm with a large LBBB.  Coronary angiography at that time had shown nonobstructive coronary artery disease. He was started on guideline directed therapy with beta-blockers, ACE inhibitors and mineralocorticoid receptor antagonist.  Follow-up cardiac MRI showed that he had scar concerning for sarcoidosis. He unfortunately failed to follow up after that as he was feeling fairly well, but did remain on his medications. He did well for about a year until he began to develop progressive dyspnea which resulted in his admission on 10/1/19. ECHO showed an EF<20% with an LVEDd of 6.9 cm. The following day, while seated, he developed a sustained monomorphic VT ~180 bpm.  This was cardioverted emergently into normal sinus rhythm with first shock at 200J. From there he went to the cath lab where his coronary anatomy was unchanged. RHC showed mildly elevated filling pressures with an LVEDP of 21 and a low-normal cardiac output. Cardiac MRI was repeated and showed LGE in septal, inferior, lateral base with a non-CAD scar in the anterolateral mid-wall with a worsening scar burden to 13%. He was loaded with amiodarone and received CRT-D on 10/4. He was discharged the following day on his PTA regimen with the addition of spironolactone 25 mg and furosemide 20 mg daily.    After that, he became hypotensive, and his PCP reduced his lisinopril. Despite this he was still hypotensive and labs " showed YASMEEN with hyperkalemia when I met him on 10/9. Significant alteration in his diet (ETOH and sodium reduction) was likely contributing. I reduced both Lasix and Spironolactone but ultimately had to stop the Mosier due to persistent hyperkalemia. His renal function and potassium have now normalized.   Also, his TSH has been elevated with a normal T4F on AMIO. On 10/11, he had a device check which showed no arrhythmias and 97% BiV pace. His AMIO was reduced from 400 BID to 200 mg daily. He is following with EP for ongoing management.     He met Dr. Batres on November 2nd. His note is incomplete, however it appears he is in agreement that sarcoid is probably responsible for his cardiac dysfunction. It does not appear that a PET has yet been scheduled, or therapies initiated for this.     Today, he presents to clinic alone. He remains with FC II-III symptoms. He has persistent mild positional lightheadedness, but can ameliorate this with slow positional changes, and otherwise has had no near-syncope or syncope. He denies chest pain, PND, orthopnea, edema, palpitations. He's continuing to work at following a low sodium and low-carb diet. He's now been completely sober x 1 month and admits he's struggling with that. He worries that upon eventual return to work he may fall off the wagon. We discussed this at length today.          Review of Systems:     12-pt ROS is negative except for as noted in the HPI.          Physical Exam:     Vitals: /70 (BP Location: Left arm, Cuff Size: Adult Large)   Pulse 64   Ht 1.829 m (6')   Wt 117.2 kg (258 lb 4.8 oz)   BMI 35.03 kg/m    Wt Readings from Last 10 Encounters:   11/20/19 117.2 kg (258 lb 4.8 oz)   11/04/19 118.6 kg (261 lb 8 oz)   11/02/19 119.4 kg (263 lb 3.2 oz)   10/24/19 121.1 kg (267 lb)   10/17/19 118.6 kg (261 lb 6.4 oz)   10/09/19 117.9 kg (260 lb)   10/08/19 118.7 kg (261 lb 11.2 oz)   10/05/19 117.7 kg (259 lb 8 oz)   10/01/19 125.1 kg (275 lb 12.8 oz)    08/30/19 127 kg (280 lb)       Constitutional:  Patient is pleasant, alert, cooperative, and in NAD.  HEENT:  NCAT. PERRLA. EOM's intact.   Neck:  CVP is difficult to assess due to body habitus.    Pulmonary: Normal respiratory effort. CTAB.   Cardiac: RRR, normal S1/S2, no S3/S4, no murmur or rub. Distant heart tones.   Abdomen:  Non-tender abdomen, no hepatosplenomegaly appreciated.   Vascular: Pulses in the upper and lower extremities are 2+ and equal bilaterally.  Extremities: No edema, erythema, cyanosis or tenderness appreciated.  Skin:  No rashes or lesions appreciated.   Neurological:  No gross motor or sensory deficits.   Psych: Appropriate affect.        Data:     Labs reviewed:  Recent Labs   Lab Test 11/14/19  0953 11/04/19  1519 10/23/19  1027  10/09/19  0906 10/03/18  0849 06/11/18  0420   LDL 58  --   --   --   --  40 59   HDL 43  --   --   --   --  48 46   NHDL 81  --   --   --   --  56 82   CHOL 124  --   --   --   --  104 128   TRIG 116  --   --   --   --  81 113   TSH 8.98* 8.05* 7.68*   < > 6.88* 3.50  --    NTBNP  --  973* 710*  --  589*  --   --    IRON  --   --  86  --   --   --   --    FEB  --   --  378  --   --   --   --    IRONSAT  --   --  23  --   --   --   --    KEVIN  --   --  140  --   --   --   --     < > = values in this interval not displayed.       Lab Results   Component Value Date    WBC 7.1 10/05/2019    RBC 3.94 (L) 10/05/2019    HGB 12.3 (L) 10/05/2019    HCT 35.9 (L) 10/05/2019    MCV 91 10/05/2019    MCH 31.2 10/05/2019    MCHC 34.3 10/05/2019    RDW 13.0 10/05/2019     10/05/2019       Lab Results   Component Value Date     11/14/2019    POTASSIUM 4.4 11/14/2019    CHLORIDE 109 11/14/2019    CO2 22 11/14/2019    ANIONGAP 9 11/14/2019     (H) 11/14/2019    BUN 21 11/14/2019    CR 1.04 11/14/2019    GFRESTIMATED 77 11/14/2019    GFRESTBLACK 90 11/14/2019    EDITH 9.3 11/14/2019      Lab Results   Component Value Date    AST 32 11/14/2019    ALT 45 11/14/2019        Lab Results   Component Value Date    A1C 7.0 (H) 11/14/2019       Lab Results   Component Value Date    INR 1.16 (H) 10/04/2019           Problem List:     Patient Active Problem List   Diagnosis     Diverticulosis of large intestine     Benign essential hypertension     Gout     Severe obesity (BMI 35.0-39.9) with comorbidity (H)     Type 2 diabetes mellitus without complication, without long-term current use of insulin (H)     Hyperlipidemia LDL goal <100     NSTEMI (non-ST elevated myocardial infarction) (H)     Non-ischemic cardiomyopathy (H)     LBBB (left bundle branch block)     Nonrheumatic mitral valve regurgitation     Ascending aorta dilatation (H)     Coronary artery disease involving native coronary artery of native heart without angina pectoris     Near syncope     Paroxysmal ventricular tachycardia (H)           Medications:     Current Outpatient Medications   Medication Sig Dispense Refill     amiodarone (PACERONE/CODARONE) 200 MG tablet Take 1 tablet (200 mg) by mouth daily 30 tablet 1     aspirin 81 MG tablet Take 1 tablet (81 mg) by mouth daily 30 tablet      atorvastatin (LIPITOR) 80 MG tablet TAKE ONE HALF TABLET BY MOUTH at bedtime 15 tablet 0     BD ULTRA-FINE 29G X 12.7MM insulin pen needle USE ONCE DAILY WITH VICTOZA  each 1     BD ULTRA-FINE 29G X 12.7MM insulin pen needle use once daily with victoza pen 100 each 1     blood glucose monitoring (ACCU-CHEK LUL PLUS) test strip Use to test blood sugar 1-3 times daily or as directed. 100 each 11     clotrimazole (LOTRIMIN) 1 % external cream Apply sparingly once or twice per day as needed to affected area until the skin is better, then stop; REPEAT AS NEEDED 30 g 1     Continuous Blood Gluc  (FREESTYLE JOCELIN 14 DAY READER) LUTHER 1 each See Admin Instructions 1 Device 0     Continuous Blood Gluc Sensor (FREESTYLE JOCELIN 14 DAY SENSOR) MISC 1 each every 14 days 6 each 3     continuous blood glucose monitoring (FREESTYLE  JOCELIN) sensor For use with Freestyle Jocelin Flash  for continuous monitioring of blood glucose levels. Replace sensor every 10 days. 3 each 3     fenofibrate (TRIGLIDE/LOFIBRA) 160 MG tablet TAKE ONE TABLET BY MOUTH ONE TIME DAILY 90 tablet 1     fluticasone (FLONASE) 50 MCG/ACT nasal spray Spray 2 sprays into both nostrils daily (Patient taking differently: Spray 2 sprays into both nostrils daily as needed ) 16 g 0     furosemide (LASIX) 20 MG tablet Take 1 tablet (20 mg) by mouth as needed (for weight gain of 2+ lbs overnight) 30 tablet 1     glipiZIDE (GLUCOTROL XL) 10 MG 24 hr tablet TAKE 1 TABLET (10 MG) BY MOUTH DAILY. TAKE WITH LARGEST MEAL OF THE DAY. ALWAYS TAKE WITH FOOD 90 tablet 0     insulin detemir (LEVEMIR PEN) 100 UNIT/ML pen Inject 10 Units Subcutaneous At Bedtime 18 mL 0     levothyroxine (SYNTHROID/LEVOTHROID) 50 MCG tablet Take 1 tablet (50 mcg) by mouth daily 30 tablet 2     liraglutide (VICTOZA PEN) 18 MG/3ML solution Inject 1.8 mg Subcutaneous daily 9 mL 0     metFORMIN (GLUCOPHAGE) 1000 MG tablet Take 1 tablet (1,000 mg) by mouth 2 times daily (with meals) 180 tablet 3     metoprolol succinate ER (TOPROL-XL) 25 MG 24 hr tablet TAKE ONE TABLET BY MOUTH IN THE EVENING  30 tablet 10     sacubitril-valsartan (ENTRESTO) 49-51 MG per tablet Take 1 tablet by mouth 2 times daily 60 tablet 5     triamcinolone (KENALOG) 0.5 % cream Apply sparingly once or twice per day as needed to affected area until the skin is better, then stop 30 g 0     albuterol (PROAIR HFA/PROVENTIL HFA/VENTOLIN HFA) 108 (90 Base) MCG/ACT Inhaler Inhale 2 puffs into the lungs every 6 hours as needed for shortness of breath / dyspnea or wheezing             Past Medical History:     Past Medical History:   Diagnosis Date     Ascending aorta dilatation (H)      Diverticulosis of colon (without mention of hemorrhage)      Essential hypertension, benign      Gout, unspecified      LBBB (left bundle branch block)      Morbid  obesity (H)      Non-ischemic cardiomyopathy (H)      Nonrheumatic mitral valve regurgitation      NSTEMI (non-ST elevated myocardial infarction) (H)     cardiac cath 6/2018: mild non-obstructive CAD     Other and unspecified hyperlipidemia      Type II or unspecified type diabetes mellitus without mention of complication, not stated as uncontrolled      Past Surgical History:   Procedure Laterality Date     C APPENDECTOMY  1980's     CV CORONARY ANGIOGRAM N/A 10/2/2019    Procedure: Coronary Angiogram;  Surgeon: Kathy Almaguer MD;  Location:  [facility]     CV LEFT HEART CATH N/A 10/2/2019    Procedure: Left Heart Cath;  Surgeon: Kathy Almaguer MD;  Location:  [facility]     CV RIGHT HEART CATH N/A 10/2/2019    Procedure: Right Heart Cath;  Surgeon: Kathy Almaguer MD;  Location:  [facility]     EP ICD N/A 10/4/2019    Procedure: EP ICD;  Surgeon: Jayy Dorman MD;  Location:  [facility]ATH [facility]     EP LEAD PLCMNT LV NEW ICD N/A 10/4/2019    Procedure: EP Lead Plcmnt LV New ICD;  Surgeon: Jayy Dorman MD;  Location:  HE[facility]ARD[facility] CATH LAB     HEART CATH CORONARY ANGIOGRAM WITHOUT PRESSURES  06/10/2018    normal coronary angiogram, nothing more than 10%     SURGICAL HISTORY OF -   2001    repair of colovesicular fistula     Family History   Problem Relation Age of Onset     Cancer Father 75        bladder cancer     Myocardial Infarction Father      Other - See Comments Father         smoking     Breast Cancer Mother      Breast Cancer Sister      Family History Negative Brother      Family History Negative Son      Family History Negative Son         fluttering/murmur     Asthma Son      Colon Cancer No family hx of      Social History     Socioeconomic History     Marital status:      Spouse name: Not on file     Number of children: Not on file     Years of education: Not on file     Highest education level: Not on file    Occupational History     Not on file   Social Needs     Financial resource strain: Not on file     Food insecurity:     Worry: Not on file     Inability: Not on file     Transportation needs:     Medical: Not on file     Non-medical: Not on file   Tobacco Use     Smoking status: Never Smoker     Smokeless tobacco: Never Used   Substance and Sexual Activity     Alcohol use: Not Currently     Drug use: No     Sexual activity: Yes     Partners: Female   Lifestyle     Physical activity:     Days per week: Not on file     Minutes per session: Not on file     Stress: Not on file   Relationships     Social connections:     Talks on phone: Not on file     Gets together: Not on file     Attends Islam service: Not on file     Active member of club or organization: Not on file     Attends meetings of clubs or organizations: Not on file     Relationship status: Not on file     Intimate partner violence:     Fear of current or ex partner: Not on file     Emotionally abused: Not on file     Physically abused: Not on file     Forced sexual activity: Not on file   Other Topics Concern     Parent/sibling w/ CABG, MI or angioplasty before 65F 55M? Not Asked   Social History Narrative     Not on file           Allergies:   No known drug allergies      Nicole Hayes PA-C, [facility]  [facility]  Pager: [phone]

## 2019-11-20 NOTE — LETTER
11/20/2019    Jefry Harris MD  [address] [facility] [place] [address]    RE: Neymar Rhodes       Dear Colleague,    I had the pleasure of seeing Neymar Rhodes in the [facility].      Cardiology Clinic Progress Note    Neymar Rhodes MRN# [id number]   YOB: 1960 Age: 59 year old   Primary cardiologist: Dr. Hernandes         Assessment and Plan:     In summary, Neymar Rhodes presents today for a CORE clinic f/u visit.     1. HFrEF    CMR: EF 25%, LVEDd 6.9 cm    ACC/AHA Stage: D; FC II-III     Etiology: suspect sarcoid; ETOH, LBBB likely contributing.    RV: nml    Valves: OK    Volume: Appears euvolemic  - Admit Wt: 275 lbs  - Current Wt: 258 lbs  - Dry Wt: suspect ~ 255-260 lbs  - Diuretics: None (has PRN Lasix at home)  - proBNP: not performed today    Rx: lisinopril 10, Toprol 25 (no amelia d/t YASMEEN with med)    Rhythm: SR / paroxysmal VT - on AMIO    QRS: native wide 174 ms (LBBB)    Device? CRT-D - no arrhythmias noted on 10/11 device check    Code status: Full    Creatinine: 1    CHF Admissions: 6/2018, 10/2019    Contributing  - Anemia / Fe studies: Mildly anemic, Fe studies do not qualify for IV Fe in CHF  - TFT's: TSH 9 (high), T4F WNL - on AMIO 200 mg daily - followed by EP  - BELA: unknown - pt declined PSG in past    Plan:  - Stop Lisinopril, after 48h start Entresto (medium dose).  - Congratulated on complete ETOH cessation, encouraged to continue, counseling provided today. Continue low sodium diet, daily wts.   - Will await further instruction from Dr. Batres.     Follow-up:  - Dr. Hernandes on 12/2 + BMP, proBNP.   - Dr. Batres on 2/12.        History of Presenting Illness:      Neymar Rhodes is a pleasant 59 year old patient who presents today for a CORE clinic f/u  visit.    The patient has a history of the following -   # Severe nonischemic cardiomyopathy with EF of 15% with marked LV dilatation  # Sustained monomorphic ventricular  "tachycardia, s/p CRT-D, on AMIO   # Probable cardiac sarcoidosis with more extensive LGE (13%) on 10/2019 MRI compared with 6% on 2018 MRI.   # Large LBBB  # Nonobstructive coronary artery disease  # DMII  # Long-standing ETOH abuse - prior to admission was drinking heavily (sallie aguilar and at least 20 beers every weekend - tells me this is after he \"cut back\") - sober since 10/2019.  # Obesity - Body mass index is 35.03 kg/m .   # Social - Lives with wife Julisa. He works as a , now on disability. He is reading labels / monitoring his sodium intake with the help of his wife, and weighing himself daily.     Neymar was first seen in 2018 with new diagnosis of heart failure when his EF was noted to be around 40% by Dr. Iniguez, in the setting of normal sinus rhythm with a large LBBB.  Coronary angiography at that time had shown nonobstructive coronary artery disease. He was started on guideline directed therapy with beta-blockers, ACE inhibitors and mineralocorticoid receptor antagonist.  Follow-up cardiac MRI showed that he had scar concerning for sarcoidosis. He unfortunately failed to follow up after that as he was feeling fairly well, but did remain on his medications. He did well for about a year until he began to develop progressive dyspnea which resulted in his admission on 10/1/19. ECHO showed an EF<20% with an LVEDd of 6.9 cm. The following day, while seated, he developed a sustained monomorphic VT ~180 bpm.  This was cardioverted emergently into normal sinus rhythm with first shock at 200J. From there he went to the cath lab where his coronary anatomy was unchanged. RHC showed mildly elevated filling pressures with an LVEDP of 21 and a low-normal cardiac output. Cardiac MRI was repeated and showed LGE in septal, inferior, lateral base with a non-CAD scar in the anterolateral mid-wall with a worsening scar burden to 13%. He was loaded with amiodarone and received CRT-D on 10/4. He was " discharged the following day on his PTA regimen with the addition of spironolactone 25 mg and furosemide 20 mg daily.    After that, he became hypotensive, and his PCP reduced his lisinopril. Despite this he was still hypotensive and labs showed YASMEEN with hyperkalemia when I met him on 10/9. Significant alteration in his diet (ETOH and sodium reduction) was likely contributing. I reduced both Lasix and Spironolactone but ultimately had to stop the Cameron due to persistent hyperkalemia. His renal function and potassium have now normalized.   Also, his TSH has been elevated with a normal T4F on AMIO. On 10/11, he had a device check which showed no arrhythmias and 97% BiV pace. His AMIO was reduced from 400 BID to 200 mg daily. He is following with EP for ongoing management.     He met Dr. Batres on November 2nd. His note is incomplete, however it appears he is in agreement that sarcoid is probably responsible for his cardiac dysfunction. It does not appear that a PET has yet been scheduled, or therapies initiated for this.     Today, he presents to clinic alone. He remains with FC II-III symptoms. He has persistent mild positional lightheadedness, but can ameliorate this with slow positional changes, and otherwise has had no near-syncope or syncope. He denies chest pain, PND, orthopnea, edema, palpitations. He's continuing to work at following a low sodium and low-carb diet. He's now been completely sober x 1 month and admits he's struggling with that. He worries that upon eventual return to work he may fall off the wagon. We discussed this at length today.          Review of Systems:     12-pt ROS is negative except for as noted in the HPI.          Physical Exam:     Vitals: /70 (BP Location: Left arm, Cuff Size: Adult Large)   Pulse 64   Ht 1.829 m (6')   Wt 117.2 kg (258 lb 4.8 oz)   BMI 35.03 kg/m     Wt Readings from Last 10 Encounters:   11/20/19 117.2 kg (258 lb 4.8 oz)   11/04/19 118.6 kg (261 lb 8 oz)    11/02/19 119.4 kg (263 lb 3.2 oz)   10/24/19 121.1 kg (267 lb)   10/17/19 118.6 kg (261 lb 6.4 oz)   10/09/19 117.9 kg (260 lb)   10/08/19 118.7 kg (261 lb 11.2 oz)   10/05/19 117.7 kg (259 lb 8 oz)   10/01/19 125.1 kg (275 lb 12.8 oz)   08/30/19 127 kg (280 lb)       Constitutional:  Patient is pleasant, alert, cooperative, and in NAD.  HEENT:  NCAT. PERRLA. EOM's intact.   Neck:  CVP is difficult to assess due to body habitus.    Pulmonary: Normal respiratory effort. CTAB.   Cardiac: RRR, normal S1/S2, no S3/S4, no murmur or rub. Distant heart tones.   Abdomen:  Non-tender abdomen, no hepatosplenomegaly appreciated.   Vascular: Pulses in the upper and lower extremities are 2+ and equal bilaterally.  Extremities: No edema, erythema, cyanosis or tenderness appreciated.  Skin:  No rashes or lesions appreciated.   Neurological:  No gross motor or sensory deficits.   Psych: Appropriate affect.        Data:     Labs reviewed:  Recent Labs   Lab Test 11/14/19  0953 11/04/19  1519 10/23/19  1027  10/09/19  0906 10/03/18  0849 06/11/18  0420   LDL 58  --   --   --   --  40 59   HDL 43  --   --   --   --  48 46   NHDL 81  --   --   --   --  56 82   CHOL 124  --   --   --   --  104 128   TRIG 116  --   --   --   --  81 113   TSH 8.98* 8.05* 7.68*   < > 6.88* 3.50  --    NTBNP  --  973* 710*  --  589*  --   --    IRON  --   --  86  --   --   --   --    FEB  --   --  378  --   --   --   --    IRONSAT  --   --  23  --   --   --   --    KEVIN  --   --  140  --   --   --   --     < > = values in this interval not displayed.       Lab Results   Component Value Date    WBC 7.1 10/05/2019    RBC 3.94 (L) 10/05/2019    HGB 12.3 (L) 10/05/2019    HCT 35.9 (L) 10/05/2019    MCV 91 10/05/2019    MCH 31.2 10/05/2019    MCHC 34.3 10/05/2019    RDW 13.0 10/05/2019     10/05/2019       Lab Results   Component Value Date     11/14/2019    POTASSIUM 4.4 11/14/2019    CHLORIDE 109 11/14/2019    CO2 22 11/14/2019    ANIONGAP 9  11/14/2019     (H) 11/14/2019    BUN 21 11/14/2019    CR 1.04 11/14/2019    GFRESTIMATED 77 11/14/2019    GFRESTBLACK 90 11/14/2019    EDITH 9.3 11/14/2019      Lab Results   Component Value Date    AST 32 11/14/2019    ALT 45 11/14/2019       Lab Results   Component Value Date    A1C 7.0 (H) 11/14/2019       Lab Results   Component Value Date    INR 1.16 (H) 10/04/2019           Problem List:     Patient Active Problem List   Diagnosis     Diverticulosis of large intestine     Benign essential hypertension     Gout     Severe obesity (BMI 35.0-39.9) with comorbidity (H)     Type 2 diabetes mellitus without complication, without long-term current use of insulin (H)     Hyperlipidemia LDL goal <100     NSTEMI (non-ST elevated myocardial infarction) (H)     Non-ischemic cardiomyopathy (H)     LBBB (left bundle branch block)     Nonrheumatic mitral valve regurgitation     Ascending aorta dilatation (H)     Coronary artery disease involving native coronary artery of native heart without angina pectoris     Near syncope     Paroxysmal ventricular tachycardia (H)           Medications:     Current Outpatient Medications   Medication Sig Dispense Refill     amiodarone (PACERONE/CODARONE) 200 MG tablet Take 1 tablet (200 mg) by mouth daily 30 tablet 1     aspirin 81 MG tablet Take 1 tablet (81 mg) by mouth daily 30 tablet      atorvastatin (LIPITOR) 80 MG tablet TAKE ONE HALF TABLET BY MOUTH at bedtime 15 tablet 0     BD ULTRA-FINE 29G X 12.7MM insulin pen needle USE ONCE DAILY WITH VICTOZA  each 1     BD ULTRA-FINE 29G X 12.7MM insulin pen needle use once daily with victoza pen 100 each 1     blood glucose monitoring (ACCU-CHEK LUL PLUS) test strip Use to test blood sugar 1-3 times daily or as directed. 100 each 11     clotrimazole (LOTRIMIN) 1 % external cream Apply sparingly once or twice per day as needed to affected area until the skin is better, then stop; REPEAT AS NEEDED 30 g 1     Continuous Blood  Gluc  (FREESTYLE JOCELIN 14 DAY READER) LUTHER 1 each See Admin Instructions 1 Device 0     Continuous Blood Gluc Sensor (FREESTYLE JOCELIN 14 DAY SENSOR) MISC 1 each every 14 days 6 each 3     continuous blood glucose monitoring (FREESTYLE JOCELIN) sensor For use with Freestyle Jocelin Flash  for continuous monitioring of blood glucose levels. Replace sensor every 10 days. 3 each 3     fenofibrate (TRIGLIDE/LOFIBRA) 160 MG tablet TAKE ONE TABLET BY MOUTH ONE TIME DAILY 90 tablet 1     fluticasone (FLONASE) 50 MCG/ACT nasal spray Spray 2 sprays into both nostrils daily (Patient taking differently: Spray 2 sprays into both nostrils daily as needed ) 16 g 0     furosemide (LASIX) 20 MG tablet Take 1 tablet (20 mg) by mouth as needed (for weight gain of 2+ lbs overnight) 30 tablet 1     glipiZIDE (GLUCOTROL XL) 10 MG 24 hr tablet TAKE 1 TABLET (10 MG) BY MOUTH DAILY. TAKE WITH LARGEST MEAL OF THE DAY. ALWAYS TAKE WITH FOOD 90 tablet 0     insulin detemir (LEVEMIR PEN) 100 UNIT/ML pen Inject 10 Units Subcutaneous At Bedtime 18 mL 0     levothyroxine (SYNTHROID/LEVOTHROID) 50 MCG tablet Take 1 tablet (50 mcg) by mouth daily 30 tablet 2     liraglutide (VICTOZA PEN) 18 MG/3ML solution Inject 1.8 mg Subcutaneous daily 9 mL 0     metFORMIN (GLUCOPHAGE) 1000 MG tablet Take 1 tablet (1,000 mg) by mouth 2 times daily (with meals) 180 tablet 3     metoprolol succinate ER (TOPROL-XL) 25 MG 24 hr tablet TAKE ONE TABLET BY MOUTH IN THE EVENING  30 tablet 10     sacubitril-valsartan (ENTRESTO) 49-51 MG per tablet Take 1 tablet by mouth 2 times daily 60 tablet 5     triamcinolone (KENALOG) 0.5 % cream Apply sparingly once or twice per day as needed to affected area until the skin is better, then stop 30 g 0     albuterol (PROAIR HFA/PROVENTIL HFA/VENTOLIN HFA) 108 (90 Base) MCG/ACT Inhaler Inhale 2 puffs into the lungs every 6 hours as needed for shortness of breath / dyspnea or wheezing             Past Medical History:      Past Medical History:   Diagnosis Date     Ascending aorta dilatation (H)      Diverticulosis of colon (without mention of hemorrhage)      Essential hypertension, benign      Gout, unspecified      LBBB (left bundle branch block)      Morbid obesity (H)      Non-ischemic cardiomyopathy (H)      Nonrheumatic mitral valve regurgitation      NSTEMI (non-ST elevated myocardial infarction) (H)     cardiac cath 6/2018: mild non-obstructive CAD     Other and unspecified hyperlipidemia      Type II or unspecified type diabetes mellitus without mention of complication, not stated as uncontrolled      Past Surgical History:   Procedure Laterality Date     C APPENDECTOMY  1980's     CV CORONARY ANGIOGRAM N/A 10/2/2019    Procedure: Coronary Angiogram;  Surgeon: Kathy Almaguer MD;  Location:  HEART CARDIAC CATH LAB     CV LEFT HEART CATH N/A 10/2/2019    Procedure: Left Heart Cath;  Surgeon: Kathy Almaguer MD;  Location:  HEART [facility]ARDIAC CATH LAB     CV RIGHT HEART CATH N/A 10/2/2019    Procedure: Right Heart Cath;  Surgeon: Kathy Almaguer MD;  Location:  HEART CARDIAC CATH LAB     EP ICD N/A 10/4/2019    Procedure: EP ICD;  Surgeon: Jayy Dorman MD;  Location:  HEART CARDIAC CATH LAB     EP LEAD PLCMNT LV NEW ICD N/A 10/4/2019    Procedure: EP Lead Plcmnt LV New ICD;  Surgeon: Jayy Dorman MD;  Location: [facility] CARDIAC CATH LAB     HEART CATH CORONARY ANGIOGRAM WITHOUT PRESSURES  06/10/2018    normal coronary angiogram, nothing more than 10%     SURGICAL HISTORY OF -   2001    repair of colovesicular fistula     Family History   Problem Relation Age of Onset     Cancer Father 75        bladder cancer     Myocardial Infarction Father      Other - See Comments Father         smoking     Breast Cancer Mother      Breast Cancer Sister      Family History Negative Brother      Family History Negative Son      Family History Negative Son         fluttering/murmur     Asthma Son       Colon Cancer No family hx of      Social History     Socioeconomic History     Marital status:      Spouse name: Not on file     Number of children: Not on file     Years of education: Not on file     Highest education level: Not on file   Occupational History     Not on file   Social Needs     Financial resource strain: Not on file     Food insecurity:     Worry: Not on file     Inability: Not on file     Transportation needs:     Medical: Not on file     Non-medical: Not on file   Tobacco Use     Smoking status: Never Smoker     Smokeless tobacco: Never Used   Substance and Sexual Activity     Alcohol use: Not Currently     Drug use: No     Sexual activity: Yes     Partners: Female   Lifestyle     Physical activity:     Days per week: Not on file     Minutes per session: Not on file     Stress: Not on file   Relationships     Social connections:     Talks on phone: Not on file     Gets together: Not on file     Attends Buddhist service: Not on file     Active member of club or organization: Not on file     Attends meetings of clubs or organizations: Not on file     Relationship status: Not on file     Intimate partner violence:     Fear of current or ex partner: Not on file     Emotionally abused: Not on file     Physically abused: Not on file     Forced sexual activity: Not on file   Other Topics Concern     Parent/sibling w/ CABG, MI or angioplasty before 65F 55M? Not Asked   Social History Narrative     Not on file           Allergies:   No known drug allergies      Nicole Hayes PA-C, ANNABELLA  [facility]  Pager: [phone]    Thank you for allowing me to participate in the care of your patient.      Sincerely,     Nicole Hayes PA-C     [facility]    cc:   No referring provider defined for this encounter.

## 2019-11-20 NOTE — PATIENT INSTRUCTIONS
Today, we discussed the following:   - Results: Your recent labs look good.   - Medication changes:  Please stop the lisinopril after today. On Friday morning, start the new medication called Entresto, which is twice daily.   - Follow up: As scheduled with Dr. Hernandes.   Please call me in the meantime if your lightheadedness worsens.   Keep up the good work with no alcohol!!    Please, remember to continue the followin.  Weigh yourself daily. Call if your weight is up > than 2 pounds in one day, or 5 pounds in one week; if you feel more short of breath or have worsening swelling in your legs or abdomen.  2.  Eat a low sodium diet (less than 2,000mg or 2g daily). If you eat less salt, you will retain less fluid.   3.  Avoid alcohol, as this can worsen heart failure.   4.  Avoid NSAIDs as able (For example, Ibuprofen / aleve / advil / naprosen / diclofenac).    Please call CORE nurse for any questions or concerns Mon-Fri 8am-4pm:   [phone]  For concerns after hours:   [phone]   Scheduling phone number:   [phone]    Thank you for visiting with us today.   Nicole Hayes PA-C  ______________________________________________________________

## 2019-11-22 ENCOUNTER — ANCILLARY PROCEDURE (OUTPATIENT)
Dept: CARDIOLOGY | Facility: CLINIC | Age: 59
End: 2019-11-22
Attending: INTERNAL MEDICINE
Payer: COMMERCIAL

## 2019-11-22 DIAGNOSIS — I42.8 NON-ISCHEMIC CARDIOMYOPATHY (H): ICD-10-CM

## 2019-11-22 DIAGNOSIS — Z95.810 ICD (IMPLANTABLE CARDIOVERTER-DEFIBRILLATOR) IN PLACE: ICD-10-CM

## 2019-11-22 PROCEDURE — 93284 PRGRMG EVAL IMPLANTABLE DFB: CPT | Performed by: INTERNAL MEDICINE

## 2019-11-25 ENCOUNTER — TELEPHONE (OUTPATIENT)
Dept: CARDIOLOGY | Facility: CLINIC | Age: 59
End: 2019-11-25

## 2019-11-25 NOTE — TELEPHONE ENCOUNTER
Prior Authorization Retail Medication Request    Medication/Dose: changed over to Entresto 49-51 mg  ICD code (if different than what is on RX):  CHF,LBBB  Previously Tried and Failed:  lisinopril    Rationale:  CHF,Severe CM,low EF~LOV 10-~CMR: EF 25%, LVEDd 6.9 cm    ACC/AHA Stage: D; FC II-III     Etiology: suspect sarcoid; ETOH, LBBB likely contributing.    RV: nml    Valves: OK    Volume: Appears euvolemic  - Admit Wt: 275 lbs  - Current Wt: 258 lbs  - Dry Wt: suspect ~ 255-260 lbs  - Diuretics: None (has PRN Lasix at home)  - proBNP: not performed today    Rx: lisinopril 10, Toprol 25 (no amelia d/t YASMEEN with med)    Rhythm: SR / paroxysmal VT - on AMIO    QRS: native wide 174 ms (LBBB)    Device? CRT-D - no arrhythmias noted on 10/11 device check    Code status: Full    Creatinine: 1  CHF Admissions: 6/2018, 10/2019  Plan:  - Stop Lisinopril, after 48h start Entresto (medium dose).  SEE FULL note in chart    Insurance Name:  [employer]  Insurance ID:  [id number]      Pharmacy Information (if different than what is on RX)  Name:  [facility]  Phone:  [phone]

## 2019-11-25 NOTE — TELEPHONE ENCOUNTER
[facility]      Received VM from pt noting that he needs assistance with entresto in regards to insurance.    I note that a PA for entresto was initiated today.    Called pt and left VM giving rationale for entresto PA and asked him to call CORE RN back to discuss possibility of issuing him entresto samples while we await response from insurance.     Adelita Messer RN BSN   4:04 PM 11/25/19      Note that PA for entresto was approved. Called pt and left VM noting this, and asking him to call CORE RN if he has difficulty obtaining medication.    Adelita Messer RN BSN   4:07 PM 11/26/19

## 2019-11-26 NOTE — TELEPHONE ENCOUNTER
Left message for pt that PA jade valentin approved.    Adelita Messer RN BSN   4:13 PM 11/26/19

## 2019-11-26 NOTE — TELEPHONE ENCOUNTER
Prior Authorization Approval    Medication: Entresto 49-51 mg - APPROVED was approved on 11/26/2019  Effective: 11/26/2019 to 11/26/2022  Reference #:    Approved Dose/Quantity:   Insurance Company: [employer] - Phone [phone] Fax [phone]  Expected CoPay:    Pharmacy Filling the Rx: [id number] - [place], [place] [facility] [address]  Pharmacy Notified: Yes  Patient Notified: Comment:  **Instructed pharmacy to notify patient when script is ready to /ship.**

## 2019-11-26 NOTE — TELEPHONE ENCOUNTER
PA Initiation    Medication: Entresto 49-51 mg -   Insurance Company: [employer] - Phone [phone] Fax [phone]  Pharmacy Filling the Rx: [id number] - [place], [place] - [address] [facility]  Filling Pharmacy Phone: [phone]  Filling Pharmacy Fax: [phone]  Start Date: 11/26/2019

## 2019-12-01 LAB
MDC_IDC_LEAD_IMPLANT_DT: NORMAL
MDC_IDC_LEAD_LOCATION: NORMAL
MDC_IDC_LEAD_LOCATION_DETAIL_1: NORMAL
MDC_IDC_LEAD_MFG: NORMAL
MDC_IDC_LEAD_MODEL: NORMAL
MDC_IDC_LEAD_POLARITY_TYPE: NORMAL
MDC_IDC_LEAD_SERIAL: NORMAL
MDC_IDC_MSMT_BATTERY_STATUS: NORMAL
MDC_IDC_MSMT_CAP_CHARGE_DTM: NORMAL
MDC_IDC_MSMT_CAP_CHARGE_ENERGY: 0 J
MDC_IDC_MSMT_CAP_CHARGE_TIME: 0 S
MDC_IDC_MSMT_CAP_CHARGE_TIME: 8.78
MDC_IDC_MSMT_CAP_CHARGE_TYPE: NORMAL
MDC_IDC_MSMT_CAP_CHARGE_TYPE: NORMAL
MDC_IDC_MSMT_LEADCHNL_LV_IMPEDANCE_VALUE: 552 OHM
MDC_IDC_MSMT_LEADCHNL_LV_PACING_THRESHOLD_AMPLITUDE: 1.2 V
MDC_IDC_MSMT_LEADCHNL_LV_PACING_THRESHOLD_PULSEWIDTH: 0.5 MS
MDC_IDC_MSMT_LEADCHNL_LV_SENSING_INTR_AMPL: 23.7 MV
MDC_IDC_MSMT_LEADCHNL_RA_IMPEDANCE_VALUE: 657 OHM
MDC_IDC_MSMT_LEADCHNL_RA_PACING_THRESHOLD_AMPLITUDE: 0.7 V
MDC_IDC_MSMT_LEADCHNL_RA_PACING_THRESHOLD_PULSEWIDTH: 0.5 MS
MDC_IDC_MSMT_LEADCHNL_RA_SENSING_INTR_AMPL: 3.9 MV
MDC_IDC_MSMT_LEADCHNL_RV_IMPEDANCE_VALUE: 560 OHM
MDC_IDC_MSMT_LEADCHNL_RV_PACING_THRESHOLD_AMPLITUDE: 0.5 V
MDC_IDC_MSMT_LEADCHNL_RV_PACING_THRESHOLD_PULSEWIDTH: 0.5 MS
MDC_IDC_MSMT_LEADCHNL_RV_SENSING_INTR_AMPL: 25 MV
MDC_IDC_PG_IMPLANT_DTM: NORMAL
MDC_IDC_PG_MFG: NORMAL
MDC_IDC_PG_MODEL: NORMAL
MDC_IDC_PG_SERIAL: NORMAL
MDC_IDC_PG_TYPE: NORMAL
MDC_IDC_SESS_CLINIC_NAME: NORMAL
MDC_IDC_SESS_DTM: NORMAL
MDC_IDC_SESS_TYPE: NORMAL
MDC_IDC_SET_BRADY_AT_MODE_SWITCH_MODE: NORMAL
MDC_IDC_SET_BRADY_AT_MODE_SWITCH_RATE: 170 {BEATS}/MIN
MDC_IDC_SET_BRADY_HYSTRATE: NORMAL
MDC_IDC_SET_BRADY_LOWRATE: 60 {BEATS}/MIN
MDC_IDC_SET_BRADY_MAX_SENSOR_RATE: 130 {BEATS}/MIN
MDC_IDC_SET_BRADY_MAX_TRACKING_RATE: 130 {BEATS}/MIN
MDC_IDC_SET_BRADY_MODE: NORMAL
MDC_IDC_SET_BRADY_PAV_DELAY_HIGH: 200 MS
MDC_IDC_SET_BRADY_PAV_DELAY_LOW: 270 MS
MDC_IDC_SET_BRADY_SAV_DELAY_HIGH: 140 MS
MDC_IDC_SET_BRADY_SAV_DELAY_LOW: 190 MS
MDC_IDC_SET_CRT_LVRV_DELAY: 35 MS
MDC_IDC_SET_CRT_PACED_CHAMBERS: NORMAL
MDC_IDC_SET_LEADCHNL_LV_PACING_AMPLITUDE: 2.2 V
MDC_IDC_SET_LEADCHNL_LV_PACING_CAPTURE_MODE: NORMAL
MDC_IDC_SET_LEADCHNL_LV_PACING_POLARITY: NORMAL
MDC_IDC_SET_LEADCHNL_LV_PACING_PULSEWIDTH: 0.5 MS
MDC_IDC_SET_LEADCHNL_LV_SENSING_ADAPTATION_MODE: NORMAL
MDC_IDC_SET_LEADCHNL_LV_SENSING_POLARITY: NORMAL
MDC_IDC_SET_LEADCHNL_LV_SENSING_SENSITIVITY: 1 MV
MDC_IDC_SET_LEADCHNL_RA_PACING_AMPLITUDE: 2 V
MDC_IDC_SET_LEADCHNL_RA_PACING_ANODE_ELECTRODE_1: NORMAL
MDC_IDC_SET_LEADCHNL_RA_PACING_ANODE_LOCATION_1: NORMAL
MDC_IDC_SET_LEADCHNL_RA_PACING_CAPTURE_MODE: NORMAL
MDC_IDC_SET_LEADCHNL_RA_PACING_CATHODE_ELECTRODE_1: NORMAL
MDC_IDC_SET_LEADCHNL_RA_PACING_CATHODE_LOCATION_1: NORMAL
MDC_IDC_SET_LEADCHNL_RA_PACING_POLARITY: NORMAL
MDC_IDC_SET_LEADCHNL_RA_PACING_PULSEWIDTH: 0.5 MS
MDC_IDC_SET_LEADCHNL_RA_SENSING_ADAPTATION_MODE: NORMAL
MDC_IDC_SET_LEADCHNL_RA_SENSING_ANODE_ELECTRODE_1: NORMAL
MDC_IDC_SET_LEADCHNL_RA_SENSING_ANODE_LOCATION_1: NORMAL
MDC_IDC_SET_LEADCHNL_RA_SENSING_CATHODE_ELECTRODE_1: NORMAL
MDC_IDC_SET_LEADCHNL_RA_SENSING_CATHODE_LOCATION_1: NORMAL
MDC_IDC_SET_LEADCHNL_RA_SENSING_POLARITY: NORMAL
MDC_IDC_SET_LEADCHNL_RA_SENSING_SENSITIVITY: 0.25 MV
MDC_IDC_SET_LEADCHNL_RV_PACING_AMPLITUDE: 2 V
MDC_IDC_SET_LEADCHNL_RV_PACING_ANODE_ELECTRODE_1: NORMAL
MDC_IDC_SET_LEADCHNL_RV_PACING_ANODE_LOCATION_1: NORMAL
MDC_IDC_SET_LEADCHNL_RV_PACING_CAPTURE_MODE: NORMAL
MDC_IDC_SET_LEADCHNL_RV_PACING_CATHODE_ELECTRODE_1: NORMAL
MDC_IDC_SET_LEADCHNL_RV_PACING_CATHODE_LOCATION_1: NORMAL
MDC_IDC_SET_LEADCHNL_RV_PACING_POLARITY: NORMAL
MDC_IDC_SET_LEADCHNL_RV_PACING_PULSEWIDTH: 0.5 MS
MDC_IDC_SET_LEADCHNL_RV_SENSING_ADAPTATION_MODE: NORMAL
MDC_IDC_SET_LEADCHNL_RV_SENSING_ANODE_ELECTRODE_1: NORMAL
MDC_IDC_SET_LEADCHNL_RV_SENSING_ANODE_LOCATION_1: NORMAL
MDC_IDC_SET_LEADCHNL_RV_SENSING_CATHODE_ELECTRODE_1: NORMAL
MDC_IDC_SET_LEADCHNL_RV_SENSING_CATHODE_LOCATION_1: NORMAL
MDC_IDC_SET_LEADCHNL_RV_SENSING_POLARITY: NORMAL
MDC_IDC_SET_LEADCHNL_RV_SENSING_SENSITIVITY: 0.6 MV
MDC_IDC_SET_ZONE_DETECTION_INTERVAL: 250 MS
MDC_IDC_SET_ZONE_DETECTION_INTERVAL: 333 MS
MDC_IDC_SET_ZONE_DETECTION_INTERVAL: 375 MS
MDC_IDC_SET_ZONE_TYPE: NORMAL
MDC_IDC_SET_ZONE_VENDOR_TYPE: NORMAL
MDC_IDC_STAT_EPISODE_RECENT_COUNT: 0
MDC_IDC_STAT_EPISODE_RECENT_COUNT_DTM_END: NORMAL
MDC_IDC_STAT_EPISODE_RECENT_COUNT_DTM_START: NORMAL
MDC_IDC_STAT_EPISODE_TOTAL_COUNT: 0
MDC_IDC_STAT_EPISODE_TOTAL_COUNT_DTM_END: NORMAL
MDC_IDC_STAT_EPISODE_TYPE: NORMAL
MDC_IDC_STAT_EPISODE_VENDOR_TYPE: NORMAL
MDC_IDC_STAT_TACHYTHERAPY_ATP_DELIVERED_RECENT: 0
MDC_IDC_STAT_TACHYTHERAPY_ATP_DELIVERED_TOTAL: 0
MDC_IDC_STAT_TACHYTHERAPY_RECENT_DTM_END: NORMAL
MDC_IDC_STAT_TACHYTHERAPY_RECENT_DTM_START: NORMAL
MDC_IDC_STAT_TACHYTHERAPY_SHOCKS_ABORTED_RECENT: 0
MDC_IDC_STAT_TACHYTHERAPY_SHOCKS_ABORTED_TOTAL: 0
MDC_IDC_STAT_TACHYTHERAPY_SHOCKS_DELIVERED_RECENT: 0
MDC_IDC_STAT_TACHYTHERAPY_SHOCKS_DELIVERED_TOTAL: 0
MDC_IDC_STAT_TACHYTHERAPY_TOTAL_DTM_END: NORMAL

## 2019-12-02 ENCOUNTER — OFFICE VISIT (OUTPATIENT)
Dept: CARDIOLOGY | Facility: CLINIC | Age: 59
End: 2019-12-02
Payer: COMMERCIAL

## 2019-12-02 VITALS
BODY MASS INDEX: 35.5 KG/M2 | DIASTOLIC BLOOD PRESSURE: 71 MMHG | HEART RATE: 69 BPM | HEIGHT: 72 IN | SYSTOLIC BLOOD PRESSURE: 114 MMHG | WEIGHT: 262.1 LBS

## 2019-12-02 DIAGNOSIS — I50.23 ACUTE ON CHRONIC SYSTOLIC HEART FAILURE (H): ICD-10-CM

## 2019-12-02 DIAGNOSIS — I50.22 CHRONIC SYSTOLIC HEART FAILURE (H): Primary | ICD-10-CM

## 2019-12-02 DIAGNOSIS — I42.8 NON-ISCHEMIC CARDIOMYOPATHY (H): ICD-10-CM

## 2019-12-02 LAB
ANION GAP SERPL CALCULATED.3IONS-SCNC: 13.1 MMOL/L (ref 6–17)
BUN SERPL-MCNC: 16 MG/DL (ref 7–30)
CALCIUM SERPL-MCNC: 10 MG/DL (ref 8.5–10.5)
CHLORIDE SERPL-SCNC: 106 MMOL/L (ref 98–107)
CO2 SERPL-SCNC: 24 MMOL/L (ref 23–29)
CREAT SERPL-MCNC: 1.12 MG/DL (ref 0.7–1.3)
GFR SERPL CREATININE-BSD FRML MDRD: 67 ML/MIN/{1.73_M2}
GLUCOSE SERPL-MCNC: 143 MG/DL (ref 70–105)
NT-PROBNP SERPL-MCNC: 828 PG/ML (ref 0–125)
POTASSIUM SERPL-SCNC: 4.1 MMOL/L (ref 3.5–5.1)
SODIUM SERPL-SCNC: 139 MMOL/L (ref 136–145)

## 2019-12-02 PROCEDURE — 36415 COLL VENOUS BLD VENIPUNCTURE: CPT | Performed by: PHYSICIAN ASSISTANT

## 2019-12-02 PROCEDURE — 83880 ASSAY OF NATRIURETIC PEPTIDE: CPT | Performed by: PHYSICIAN ASSISTANT

## 2019-12-02 PROCEDURE — 99213 OFFICE O/P EST LOW 20 MIN: CPT | Mod: 24 | Performed by: INTERNAL MEDICINE

## 2019-12-02 PROCEDURE — 80048 BASIC METABOLIC PNL TOTAL CA: CPT | Performed by: PHYSICIAN ASSISTANT

## 2019-12-02 ASSESSMENT — MIFFLIN-ST. JEOR: SCORE: 2036.88

## 2019-12-02 NOTE — LETTER
12/2/2019    Jefry Harris MD  [address] [facility] [address]    RE: Neymar HENDRICKS Carmelo       Dear Colleague,    I had the pleasure of seeing Neymar Rhodes in the [facility].    CARDIOLOGY CLINIC CONSULTATION    REASON FOR CONSULT: Non ischemic cardiomyopathy    PRIMARY CARE PHYSICIAN:  Jefry Harris    HISTORY OF PRESENT ILLNESS:  Neymar was first seen in 2018 with new diagnosis of heart failure when his EF was noted to be around 40% by Dr. Iniguez, in the setting of normal sinus rhythm with a large LBBB.  Coronary angiography at that time had shown nonobstructive coronary artery disease. He was started on guideline directed therapy with beta-blockers, ACE inhibitors and mineralocorticoid receptor antagonist.  Follow-up cardiac MRI showed that he had scar concerning for sarcoidosis. He unfortunately failed to follow up after that as he was feeling fairly well, but did remain on his medications. He did well for about a year until he began to develop progressive dyspnea which resulted in his admission on 10/1/19 and was seen by me at the time. ECHO showed an EF<20% with an LVEDd of 6.9 cm. The following day, while seated, he developed a sustained monomorphic VT ~180 bpm.  This was cardioverted emergently into normal sinus rhythm with first shock at 200J. From there he went to the cath lab where his coronary anatomy was unchanged. RHC showed mildly elevated filling pressures with an LVEDP of 21 and a low-normal cardiac output. Cardiac MRI was repeated and showed LGE in septal, inferior, lateral base with a non-CAD scar in the anterolateral mid-wall with a worsening scar burden to 13%. He was loaded with amiodarone and received CRT-D on 10/4.  He was discharged and now in [facility] clinic.  Dose has been reduced to 200 mg daily. TSH is mildly elevated.  Was seen by Dr. Batres as well, plans on getting PET scan later this week.     Routine follow up today. No  symptoms. Has completely quit alcohol.    PAST MEDICAL HISTORY:  Past Medical History:   Diagnosis Date     Ascending aorta dilatation (H)      Diverticulosis of colon (without mention of hemorrhage)      Essential hypertension, benign      Gout, unspecified      LBBB (left bundle branch block)      Morbid obesity (H)      Non-ischemic cardiomyopathy (H)      Nonrheumatic mitral valve regurgitation      NSTEMI (non-ST elevated myocardial infarction) (H)     cardiac cath 6/2018: mild non-obstructive CAD     Other and unspecified hyperlipidemia      Type II or unspecified type diabetes mellitus without mention of complication, not stated as uncontrolled        MEDICATIONS:  Current Outpatient Medications   Medication     amiodarone (PACERONE/CODARONE) 200 MG tablet     aspirin 81 MG tablet     atorvastatin (LIPITOR) 80 MG tablet     BD ULTRA-FINE 29G X 12.7MM insulin pen needle     BD ULTRA-FINE 29G X 12.7MM insulin pen needle     blood glucose monitoring (ACCU-CHEK LUL PLUS) test strip     clotrimazole (LOTRIMIN) 1 % external cream     Continuous Blood Gluc  (FREESTYLE JOCELIN 14 DAY READER) LUTHER     Continuous Blood Gluc Sensor (FREESTYLE JOCELIN 14 DAY SENSOR) MISC     continuous blood glucose monitoring (FREESTYLE JOCELIN) sensor     fenofibrate (TRIGLIDE/LOFIBRA) 160 MG tablet     fluticasone (FLONASE) 50 MCG/ACT nasal spray     furosemide (LASIX) 20 MG tablet     glipiZIDE (GLUCOTROL XL) 10 MG 24 hr tablet     insulin detemir (LEVEMIR PEN) 100 UNIT/ML pen     levothyroxine (SYNTHROID/LEVOTHROID) 50 MCG tablet     liraglutide (VICTOZA PEN) 18 MG/3ML solution     metFORMIN (GLUCOPHAGE) 1000 MG tablet     metoprolol succinate ER (TOPROL-XL) 25 MG 24 hr tablet     sacubitril-valsartan (ENTRESTO) 49-51 MG per tablet     triamcinolone (KENALOG) 0.5 % cream     albuterol (PROAIR HFA/PROVENTIL HFA/VENTOLIN HFA) 108 (90 Base) MCG/ACT Inhaler     No current facility-administered medications for this visit.         ALLERGIES:  Allergies   Allergen Reactions     No Known Drug Allergies        SOCIAL HISTORY:  I have reviewed this patient's social history and updated it with pertinent information if needed. Neymar Rhodes  reports that he has never smoked. He has never used smokeless tobacco. He reports previous alcohol use. He reports that he does not use drugs.    FAMILY HISTORY:  I have reviewed this patient's family history and updated it with pertinent information if needed.   Family History   Problem Relation Age of Onset     Cancer Father 75        bladder cancer     Myocardial Infarction Father      Other - See Comments Father         smoking     Breast Cancer Mother      Breast Cancer Sister      Family History Negative Brother      Family History Negative Son      Family History Negative Son         fluttering/murmur     Asthma Son      Colon Cancer No family hx of        REVIEW OF SYSTEMS:  Skin:  Negative     Eyes:  Positive for glasses  ENT:  Negative    Respiratory:  Negative dyspnea on exertion;shortness of breath;cough  Cardiovascular:  Negative;palpitations;chest pain;syncope or near-syncope;cyanosis;fatigue;edema    Gastroenterology: Negative    Genitourinary:  Negative    Musculoskeletal:  Negative    Neurologic:  Positive for headaches  Psychiatric:  Negative    Heme/Lymph/Imm:  Negative    Endocrine:  Positive for diabetes      PHYSICAL EXAM:      BP: 114/71 Pulse: 69            Vital Signs with Ranges  Pulse:  [69] 69  BP: (114)/(71) 114/71  262 lbs 1.6 oz    Constitutional: awake, alert, no distress  Eyes: PERRL, sclera nonicteric  ENT: trachea midline  Respiratory: CTAB  Cardiovascular: Normal heart sounds, no MRG  GI: nondistended, nontender, bowel sounds present    DATA:  Labs: Pertinent cardiac labs reviewed    ASSESSMENT:  Non ischemic cardiomyopathy, alcohol vs. Sarcoidosis  VT  Secondary prevention ICD  Non obstructive CAD    RECOMMENDATIONS:  1. PET scan this week. Told my RN team to  follow up on the results. If there is active inflammation may be reasonable to attempt steroids, but lets see what EF is doing with medical Rx and alcohol abstinence.   2. Follow up with me in 3 months. Echo and TSH in 1 month.   3. Continue current heart failure therapy. Hyperkalemia with spironolactone but in the setting of YASMEEN. If EF does not improve, re-attempt initiation once more at low dose.  4. No alcohol at all.   5. Follow up with device clinic.     CEZAR Ellis, [facility]  Cardiology - [facility]  December 2, 2019          Thank you for allowing me to participate in the care of your patient.      Sincerely,     Umm Hernandes MD     [facility]    cc:   Nicole Hayes PA-C  [id number]  [place] [address]

## 2019-12-02 NOTE — PROGRESS NOTES
CARDIOLOGY CLINIC CONSULTATION    REASON FOR CONSULT: Non ischemic cardiomyopathy    PRIMARY CARE PHYSICIAN:  Jefry Harris    HISTORY OF PRESENT ILLNESS:  Neymar was first seen in 2018 with new diagnosis of heart failure when his EF was noted to be around 40% by Dr. Iniguez, in the setting of normal sinus rhythm with a large LBBB.  Coronary angiography at that time had shown nonobstructive coronary artery disease. He was started on guideline directed therapy with beta-blockers, ACE inhibitors and mineralocorticoid receptor antagonist.  Follow-up cardiac MRI showed that he had scar concerning for sarcoidosis. He unfortunately failed to follow up after that as he was feeling fairly well, but did remain on his medications. He did well for about a year until he began to develop progressive dyspnea which resulted in his admission on 10/1/19 and was seen by me at the time. ECHO showed an EF<20% with an LVEDd of 6.9 cm. The following day, while seated, he developed a sustained monomorphic VT ~180 bpm.  This was cardioverted emergently into normal sinus rhythm with first shock at 200J. From there he went to the cath lab where his coronary anatomy was unchanged. RHC showed mildly elevated filling pressures with an LVEDP of 21 and a low-normal cardiac output. Cardiac MRI was repeated and showed LGE in septal, inferior, lateral base with a non-CAD scar in the anterolateral mid-wall with a worsening scar burden to 13%. He was loaded with amiodarone and received CRT-D on 10/4.  He was discharged and now in [facility] clinic.  Dose has been reduced to 200 mg daily. TSH is mildly elevated.  Was seen by Dr. Batres as well, plans on getting PET scan later this week.     Routine follow up today. No symptoms. Has completely quit alcohol.    PAST MEDICAL HISTORY:  Past Medical History:   Diagnosis Date     Ascending aorta dilatation (H)      Diverticulosis of colon (without mention of hemorrhage)      Essential hypertension,  benign      Gout, unspecified      LBBB (left bundle branch block)      Morbid obesity (H)      Non-ischemic cardiomyopathy (H)      Nonrheumatic mitral valve regurgitation      NSTEMI (non-ST elevated myocardial infarction) (H)     cardiac cath 6/2018: mild non-obstructive CAD     Other and unspecified hyperlipidemia      Type II or unspecified type diabetes mellitus without mention of complication, not stated as uncontrolled        MEDICATIONS:  Current Outpatient Medications   Medication     amiodarone (PACERONE/CODARONE) 200 MG tablet     aspirin 81 MG tablet     atorvastatin (LIPITOR) 80 MG tablet     BD ULTRA-FINE 29G X 12.7MM insulin pen needle     BD ULTRA-FINE 29G X 12.7MM insulin pen needle     blood glucose monitoring (ACCU-CHEK LUL PLUS) test strip     clotrimazole (LOTRIMIN) 1 % external cream     Continuous Blood Gluc  (FREESTYLE JOCELIN 14 DAY READER) LUTHER     Continuous Blood Gluc Sensor (FREESTYLE JOCELIN 14 DAY SENSOR) MISC     continuous blood glucose monitoring (FREESTYLE JOCELIN) sensor     fenofibrate (TRIGLIDE/LOFIBRA) 160 MG tablet     fluticasone (FLONASE) 50 MCG/ACT nasal spray     furosemide (LASIX) 20 MG tablet     glipiZIDE (GLUCOTROL XL) 10 MG 24 hr tablet     insulin detemir (LEVEMIR PEN) 100 UNIT/ML pen     levothyroxine (SYNTHROID/LEVOTHROID) 50 MCG tablet     liraglutide (VICTOZA PEN) 18 MG/3ML solution     metFORMIN (GLUCOPHAGE) 1000 MG tablet     metoprolol succinate ER (TOPROL-XL) 25 MG 24 hr tablet     sacubitril-valsartan (ENTRESTO) 49-51 MG per tablet     triamcinolone (KENALOG) 0.5 % cream     albuterol (PROAIR HFA/PROVENTIL HFA/VENTOLIN HFA) 108 (90 Base) MCG/ACT Inhaler     No current facility-administered medications for this visit.        ALLERGIES:  Allergies   Allergen Reactions     No Known Drug Allergies        SOCIAL HISTORY:  I have reviewed this patient's social history and updated it with pertinent information if needed. Neymar Rhodes  reports that he has  never smoked. He has never used smokeless tobacco. He reports previous alcohol use. He reports that he does not use drugs.    FAMILY HISTORY:  I have reviewed this patient's family history and updated it with pertinent information if needed.   Family History   Problem Relation Age of Onset     Cancer Father 75        bladder cancer     Myocardial Infarction Father      Other - See Comments Father         smoking     Breast Cancer Mother      Breast Cancer Sister      Family History Negative Brother      Family History Negative Son      Family History Negative Son         fluttering/murmur     Asthma Son      Colon Cancer No family hx of        REVIEW OF SYSTEMS:  Skin:  Negative     Eyes:  Positive for glasses  ENT:  Negative    Respiratory:  Negative dyspnea on exertion;shortness of breath;cough  Cardiovascular:  Negative;palpitations;chest pain;syncope or near-syncope;cyanosis;fatigue;edema    Gastroenterology: Negative    Genitourinary:  Negative    Musculoskeletal:  Negative    Neurologic:  Positive for headaches  Psychiatric:  Negative    Heme/Lymph/Imm:  Negative    Endocrine:  Positive for diabetes      PHYSICAL EXAM:      BP: 114/71 Pulse: 69            Vital Signs with Ranges  Pulse:  [69] 69  BP: (114)/(71) 114/71  262 lbs 1.6 oz    Constitutional: awake, alert, no distress  Eyes: PERRL, sclera nonicteric  ENT: trachea midline  Respiratory: CTAB  Cardiovascular: Normal heart sounds, no MRG  GI: nondistended, nontender, bowel sounds present    DATA:  Labs: Pertinent cardiac labs reviewed    ASSESSMENT:  Non ischemic cardiomyopathy, alcohol vs. Sarcoidosis  VT  Secondary prevention ICD  Non obstructive CAD    RECOMMENDATIONS:  1. PET scan this week. Told my RN team to follow up on the results. If there is active inflammation may be reasonable to attempt steroids, but lets see what EF is doing with medical Rx and alcohol abstinence.   2. Follow up with me in 3 months. Echo and TSH in 1 month.   3. Continue  current heart failure therapy. Hyperkalemia with spironolactone but in the setting of YASMEEN. If EF does not improve, re-attempt initiation once more at low dose.  4. No alcohol at all.   5. Follow up with device clinic.     CEZAR Ellis, [facility]  Cardiology - [facility]  December 2, 2019

## 2019-12-02 NOTE — LETTER
12/2/2019    Jefry Harris MD  [address] [facility] [address]    RE: Neymar HENDRICKS Carmelo       Dear Colleague,    I had the pleasure of seeing Neymar Rhodes in the [facility].    CARDIOLOGY CLINIC CONSULTATION    REASON FOR CONSULT: Non ischemic cardiomyopathy    PRIMARY CARE PHYSICIAN:  Jefry Harris    HISTORY OF PRESENT ILLNESS:  Neymar was first seen in 2018 with new diagnosis of heart failure when his EF was noted to be around 40% by Dr. Iniguez, in the setting of normal sinus rhythm with a large LBBB.  Coronary angiography at that time had shown nonobstructive coronary artery disease. He was started on guideline directed therapy with beta-blockers, ACE inhibitors and mineralocorticoid receptor antagonist.  Follow-up cardiac MRI showed that he had scar concerning for sarcoidosis. He unfortunately failed to follow up after that as he was feeling fairly well, but did remain on his medications. He did well for about a year until he began to develop progressive dyspnea which resulted in his admission on 10/1/19 and was seen by me at the time. ECHO showed an EF<20% with an LVEDd of 6.9 cm. The following day, while seated, he developed a sustained monomorphic VT ~180 bpm.  This was cardioverted emergently into normal sinus rhythm with first shock at 200J. From there he went to the cath lab where his coronary anatomy was unchanged. RHC showed mildly elevated filling pressures with an LVEDP of 21 and a low-normal cardiac output. Cardiac MRI was repeated and showed LGE in septal, inferior, lateral base with a non-CAD scar in the anterolateral mid-wall with a worsening scar burden to 13%. He was loaded with amiodarone and received CRT-D on 10/4.  He was discharged and now in [facility] clinic.  Dose has been reduced to 200 mg daily. TSH is mildly elevated.  Was seen by Dr. Batres as well, plans on getting PET scan later this week.     Routine follow up today. No  symptoms. Has completely quit alcohol.    PAST MEDICAL HISTORY:  Past Medical History:   Diagnosis Date     Ascending aorta dilatation (H)      Diverticulosis of colon (without mention of hemorrhage)      Essential hypertension, benign      Gout, unspecified      LBBB (left bundle branch block)      Morbid obesity (H)      Non-ischemic cardiomyopathy (H)      Nonrheumatic mitral valve regurgitation      NSTEMI (non-ST elevated myocardial infarction) (H)     cardiac cath 6/2018: mild non-obstructive CAD     Other and unspecified hyperlipidemia      Type II or unspecified type diabetes mellitus without mention of complication, not stated as uncontrolled        MEDICATIONS:  Current Outpatient Medications   Medication     amiodarone (PACERONE/CODARONE) 200 MG tablet     aspirin 81 MG tablet     atorvastatin (LIPITOR) 80 MG tablet     BD ULTRA-FINE 29G X 12.7MM insulin pen needle     BD ULTRA-FINE 29G X 12.7MM insulin pen needle     blood glucose monitoring (ACCU-CHEK LUL PLUS) test strip     clotrimazole (LOTRIMIN) 1 % external cream     Continuous Blood Gluc  (FREESTYLE JOCELIN 14 DAY READER) LUTHER     Continuous Blood Gluc Sensor (FREESTYLE JOCELIN 14 DAY SENSOR) MISC     continuous blood glucose monitoring (FREESTYLE JOCELIN) sensor     fenofibrate (TRIGLIDE/LOFIBRA) 160 MG tablet     fluticasone (FLONASE) 50 MCG/ACT nasal spray     furosemide (LASIX) 20 MG tablet     glipiZIDE (GLUCOTROL XL) 10 MG 24 hr tablet     insulin detemir (LEVEMIR PEN) 100 UNIT/ML pen     levothyroxine (SYNTHROID/LEVOTHROID) 50 MCG tablet     liraglutide (VICTOZA PEN) 18 MG/3ML solution     metFORMIN (GLUCOPHAGE) 1000 MG tablet     metoprolol succinate ER (TOPROL-XL) 25 MG 24 hr tablet     sacubitril-valsartan (ENTRESTO) 49-51 MG per tablet     triamcinolone (KENALOG) 0.5 % cream     albuterol (PROAIR HFA/PROVENTIL HFA/VENTOLIN HFA) 108 (90 Base) MCG/ACT Inhaler     No current facility-administered medications for this visit.         ALLERGIES:  Allergies   Allergen Reactions     No Known Drug Allergies        SOCIAL HISTORY:  I have reviewed this patient's social history and updated it with pertinent information if needed. Neymar Rhodes  reports that he has never smoked. He has never used smokeless tobacco. He reports previous alcohol use. He reports that he does not use drugs.    FAMILY HISTORY:  I have reviewed this patient's family history and updated it with pertinent information if needed.   Family History   Problem Relation Age of Onset     Cancer Father 75        bladder cancer     Myocardial Infarction Father      Other - See Comments Father         smoking     Breast Cancer Mother      Breast Cancer Sister      Family History Negative Brother      Family History Negative Son      Family History Negative Son         fluttering/murmur     Asthma Son      Colon Cancer No family hx of        REVIEW OF SYSTEMS:  Skin:  Negative     Eyes:  Positive for glasses  ENT:  Negative    Respiratory:  Negative dyspnea on exertion;shortness of breath;cough  Cardiovascular:  Negative;palpitations;chest pain;syncope or near-syncope;cyanosis;fatigue;edema    Gastroenterology: Negative    Genitourinary:  Negative    Musculoskeletal:  Negative    Neurologic:  Positive for headaches  Psychiatric:  Negative    Heme/Lymph/Imm:  Negative    Endocrine:  Positive for diabetes      PHYSICAL EXAM:      BP: 114/71 Pulse: 69            Vital Signs with Ranges  Pulse:  [69] 69  BP: (114)/(71) 114/71  262 lbs 1.6 oz    Constitutional: awake, alert, no distress  Eyes: PERRL, sclera nonicteric  ENT: trachea midline  Respiratory: CTAB  Cardiovascular: Normal heart sounds, no MRG  GI: nondistended, nontender, bowel sounds present    DATA:  Labs: Pertinent cardiac labs reviewed    ASSESSMENT:  Non ischemic cardiomyopathy, alcohol vs. Sarcoidosis  VT  Secondary prevention ICD  Non obstructive CAD    RECOMMENDATIONS:  1. PET scan this week. Told my RN team to  follow up on the results. If there is active inflammation may be reasonable to attempt steroids, but lets see what EF is doing with medical Rx and alcohol abstinence.   2. Follow up with me in 3 months. Echo and TSH in 1 month.   3. Continue current heart failure therapy. Hyperkalemia with spironolactone but in the setting of YASMEEN. If EF does not improve, re-attempt initiation once more at low dose.  4. No alcohol at all.   5. Follow up with device clinic.     CEZAR Ellis, [facility]  Cardiology - [facility]  December 2, 2019        Thank you for allowing me to participate in the care of your patient.    Sincerely,     Umm Hernandes MD     [facility]

## 2019-12-03 ENCOUNTER — HOSPITAL ENCOUNTER (OUTPATIENT)
Dept: CARDIAC REHAB | Facility: CLINIC | Age: 59
End: 2019-12-03
Attending: PHYSICIAN ASSISTANT
Payer: COMMERCIAL

## 2019-12-03 DIAGNOSIS — Z79.899 ON AMIODARONE THERAPY: ICD-10-CM

## 2019-12-03 PROCEDURE — 94729 DIFFUSING CAPACITY: CPT

## 2019-12-03 PROCEDURE — 94726 PLETHYSMOGRAPHY LUNG VOLUMES: CPT

## 2019-12-03 PROCEDURE — 40001038 ZZH STATISTIC RESPIRATORY TESTING VISIT

## 2019-12-03 PROCEDURE — 94375 RESPIRATORY FLOW VOLUME LOOP: CPT

## 2019-12-04 ENCOUNTER — ANCILLARY PROCEDURE (OUTPATIENT)
Dept: PET IMAGING | Facility: CLINIC | Age: 59
End: 2019-12-04
Attending: PHYSICIAN ASSISTANT
Payer: COMMERCIAL

## 2019-12-04 DIAGNOSIS — I42.8 NONISCHEMIC CARDIOMYOPATHY (H): ICD-10-CM

## 2019-12-04 LAB — GLUCOSE SERPL-MCNC: 148 MG/DL (ref 70–99)

## 2019-12-09 DIAGNOSIS — I50.22 CHRONIC SYSTOLIC HEART FAILURE (H): ICD-10-CM

## 2019-12-09 LAB
ALT SERPL W P-5'-P-CCNC: 7 U/L (ref 5–30)
AST SERPL W P-5'-P-CCNC: 28 U/L (ref 0–45)
DLCOUNC-%PRED-PRE: 87 %
DLCOUNC-PRE: 24.91 ML/MIN/MMHG
DLCOUNC-PRED: 28.49 ML/MIN/MMHG
ERV-%PRED-PRE: 38 %
ERV-PRE: 0.33 L
ERV-PRED: 0.86 L
EXPTIME-PRE: 6.77 SEC
FEF2575-%PRED-PRE: 92 %
FEF2575-PRE: 2.84 L/SEC
FEF2575-PRED: 3.06 L/SEC
FEFMAX-%PRED-PRE: 61 %
FEFMAX-PRE: 5.79 L/SEC
FEFMAX-PRED: 9.45 L/SEC
FEV1-%PRED-PRE: 75 %
FEV1-PRE: 2.8 L
FEV1FEV6-PRE: 81 %
FEV1FEV6-PRED: 79 %
FEV1FVC-PRE: 81 %
FEV1FVC-PRED: 77 %
FEV1SVC-PRE: 81 %
FEV1SVC-PRED: 72 %
FIFMAX-PRE: 5.64 L/SEC
FRCPLETH-%PRED-PRE: 65 %
FRCPLETH-PRE: 2.42 L
FRCPLETH-PRED: 3.67 L
FVC-%PRED-PRE: 71 %
FVC-PRE: 3.44 L
FVC-PRED: 4.8 L
IC-%PRED-PRE: 71 %
IC-PRE: 3.08 L
IC-PRED: 4.3 L
RVPLETH-%PRED-PRE: 82 %
RVPLETH-PRE: 2.03 L
RVPLETH-PRED: 2.45 L
T4 FREE SERPL-MCNC: 1.12 NG/DL (ref 0.76–1.46)
TLCPLETH-%PRED-PRE: 75 %
TLCPLETH-PRE: 5.5 L
TLCPLETH-PRED: 7.33 L
TSH SERPL DL<=0.005 MIU/L-ACNC: 7.52 MU/L (ref 0.4–4)
VA-%PRED-PRE: 82 %
VA-PRE: 5.55 L
VC-%PRED-PRE: 67 %
VC-PRE: 3.47 L
VC-PRED: 5.17 L

## 2019-12-09 PROCEDURE — 84450 TRANSFERASE (AST) (SGOT): CPT | Performed by: INTERNAL MEDICINE

## 2019-12-09 PROCEDURE — 84443 ASSAY THYROID STIM HORMONE: CPT | Performed by: INTERNAL MEDICINE

## 2019-12-09 PROCEDURE — 36415 COLL VENOUS BLD VENIPUNCTURE: CPT | Performed by: INTERNAL MEDICINE

## 2019-12-09 PROCEDURE — 84439 ASSAY OF FREE THYROXINE: CPT | Performed by: INTERNAL MEDICINE

## 2019-12-09 PROCEDURE — 84460 ALANINE AMINO (ALT) (SGPT): CPT | Performed by: INTERNAL MEDICINE

## 2019-12-11 ENCOUNTER — CARE COORDINATION (OUTPATIENT)
Dept: CARDIOLOGY | Facility: CLINIC | Age: 59
End: 2019-12-11

## 2019-12-11 DIAGNOSIS — D86.9 SARCOIDOSIS: Primary | ICD-10-CM

## 2019-12-12 ENCOUNTER — PREP FOR PROCEDURE (OUTPATIENT)
Dept: SURGERY | Facility: CLINIC | Age: 59
End: 2019-12-12

## 2019-12-12 DIAGNOSIS — R59.0 MEDIASTINAL ADENOPATHY: Primary | ICD-10-CM

## 2019-12-12 NOTE — PROGRESS NOTES
I called patient to discuss planned bronchoscopy, as requested by his cardiology provider.   I explained the procedure, where it is done, need for a , and need to hold his aspirin x 5 days prior.   I told him to get a pre-op H & P at his primary clinic and to expect this to be scheduled in early-to-mid January.     He appreciated my call, questions were answered.   I told him our OR  will call him once scheduled and mail him further information.

## 2019-12-13 ENCOUNTER — PREP FOR PROCEDURE (OUTPATIENT)
Dept: PULMONOLOGY | Facility: CLINIC | Age: 59
End: 2019-12-13

## 2019-12-13 ENCOUNTER — TELEPHONE (OUTPATIENT)
Dept: PULMONOLOGY | Facility: CLINIC | Age: 59
End: 2019-12-13

## 2019-12-13 PROBLEM — R59.0 MEDIASTINAL ADENOPATHY: Status: ACTIVE | Noted: 2019-12-13

## 2019-12-13 NOTE — TELEPHONE ENCOUNTER
Spoke with patient to schedule procedure with Dr. Ranulfo Barcenas   Procedure was scheduled on 12/19 at [facility] OR  Patient will have H&P with PCP, Cyrus Beck  Patient is aware a / is needed day of surgery.   Surgery letter was sent via Mail, patient has my direct contact information for any further questions.

## 2019-12-17 DIAGNOSIS — E11.9 TYPE 2 DIABETES MELLITUS WITHOUT COMPLICATION, WITHOUT LONG-TERM CURRENT USE OF INSULIN (H): ICD-10-CM

## 2019-12-17 DIAGNOSIS — Z79.4 TYPE 2 DIABETES MELLITUS WITHOUT COMPLICATION, WITH LONG-TERM CURRENT USE OF INSULIN (H): ICD-10-CM

## 2019-12-17 DIAGNOSIS — E11.9 TYPE 2 DIABETES MELLITUS WITHOUT COMPLICATION, WITH LONG-TERM CURRENT USE OF INSULIN (H): ICD-10-CM

## 2019-12-17 DIAGNOSIS — I47.29 PAROXYSMAL VENTRICULAR TACHYCARDIA (H): ICD-10-CM

## 2019-12-17 RX ORDER — LIRAGLUTIDE 6 MG/ML
1.8 INJECTION SUBCUTANEOUS DAILY
Qty: 9 ML | Refills: 1 | Status: SHIPPED | OUTPATIENT
Start: 2019-12-17 | End: 2019-12-23

## 2019-12-17 RX ORDER — AMIODARONE HYDROCHLORIDE 200 MG/1
200 TABLET ORAL DAILY
Qty: 30 TABLET | Refills: 1 | Status: CANCELLED | OUTPATIENT
Start: 2019-12-17

## 2019-12-17 NOTE — TELEPHONE ENCOUNTER
FUTURE VISIT INFORMATION      SURGERY INFORMATION:    Date: 19    Location: [facility] OR    Surgeon:  Ranulfo Barcenas    Anesthesia Type:  General    RECORDS REQUESTED FROM:       [facility]    Pertinent Medical History: hypertension, LBBB, CAD, paroxysmal ventricular tachycardia    Most recent EKG+ Tracin19    Most recent ECHO: 19    Most recent Coronary Angiogram: 10/1/19    Most recent PFT's: 12/3/19

## 2019-12-17 NOTE — TELEPHONE ENCOUNTER
"                               liraglutide (VICTOZA PEN) 18 MG/3ML solution 9 mL 0     Sig: Inject 1.8 mg Subcutaneous daily       GLP-1 Agonists Protocol Passed - 12/17/2019  1:36 PM        Passed - Blood pressure less than 140/90 in past 6 months     BP Readings from Last 3 Encounters:   12/02/19 114/71   11/20/19 111/70   11/04/19 117/72                 Passed - LDL on file in past 12 months     Recent Labs   Lab Test 11/14/19  0953   LDL 58             Passed - Microalbumin on file in past 12 months     Recent Labs   Lab Test 04/10/19  0729   MICROL 10   UMALCR 7.71             Passed - HgbA1C in past 3 or 6 months     If HgbA1C is 8 or greater, it needs to be on file within the past 3 months.  If less than 8, must be on file within the past 6 months.     Recent Labs   Lab Test 11/14/19  0953   A1C 7.0*             Passed - Medication is active on med list        Passed - Patient is age 18 or older        Passed - Normal serum creatinine on file in past 12 months     Recent Labs   Lab Test 12/02/19  0754   CR 1.12             Passed - Recent (6 mo) or future (30 days) visit within the authorizing provider's specialty     Patient had office visit in the last 6 months or has a visit in the next 30 days with authorizing provider.  See \"Patient Info\" tab in inbasket, or \"Choose Columns\" in Meds & Orders section of the refill encounter.            insulin detemir (LEVEMIR PEN) 100 UNIT/ML pen 18 mL 0     Sig: Inject 10 Units Subcutaneous At Bedtime       Long Acting Insulin Protocol Passed - 12/17/2019  1:36 PM        Passed - Blood pressure less than 140/90 in past 6 months     BP Readings from Last 3 Encounters:   12/02/19 114/71   11/20/19 111/70   11/04/19 117/72                 Passed - LDL on file in past 12 months     Recent Labs   Lab Test 11/14/19  0953   LDL 58             Passed - Microalbumin on file in past 12 months     Recent Labs   Lab Test 04/10/19  0729   MICROL 10   UMALCR 7.71             Passed - " "Serum creatinine on file in past 12 months     Recent Labs   Lab Test 12/02/19  0754   CR 1.12             Passed - HgbA1C in past 3 or 6 months     If HgbA1C is 8 or greater, it needs to be on file within the past 3 months.  If less than 8, must be on file within the past 6 months.     Recent Labs   Lab Test 11/14/19  0953   A1C 7.0*             Passed - Medication is active on med list        Passed - Patient is age 18 or older        Passed - Recent (6 mo) or future (30 days) visit within the authorizing provider's specialty     Patient had office visit in the last 6 months or has a visit in the next 30 days with authorizing provider or within the authorizing provider's specialty.  See \"Patient Info\" tab in inbasket, or \"Choose Columns\" in Meds & Orders section of the refill encounter.              Prescription approved per [facility] Refill Protocol.      Debbie SONGN, RN, PHN      "

## 2019-12-17 NOTE — TELEPHONE ENCOUNTER
Requested Prescriptions   Pending Prescriptions Disp Refills     amiodarone (PACERONE/CODARONE) 200 MG tablet 30 tablet 1     Sig: Take 1 tablet (200 mg) by mouth daily       There is no refill protocol information for this order       Amidoraone      Last Written Prescription Date:  10/17/2019  Last Fill Quantity: 30,   # refills: 1  Last Office Visit: 10/8/2019  Future Office visit:       Routing refill request to provider for review/approval because:  Drug not on the [facility], [facility] or [facility] refill protocol or controlled substance:    Routing to Cardiology

## 2019-12-18 ENCOUNTER — ANESTHESIA EVENT (OUTPATIENT)
Dept: SURGERY | Facility: CLINIC | Age: 59
End: 2019-12-18
Payer: COMMERCIAL

## 2019-12-18 ENCOUNTER — DOCUMENTATION ONLY (OUTPATIENT)
Dept: CARDIOLOGY | Facility: CLINIC | Age: 59
End: 2019-12-18

## 2019-12-18 ENCOUNTER — OFFICE VISIT (OUTPATIENT)
Dept: SURGERY | Facility: CLINIC | Age: 59
End: 2019-12-18
Payer: COMMERCIAL

## 2019-12-18 ENCOUNTER — PRE VISIT (OUTPATIENT)
Dept: SURGERY | Facility: CLINIC | Age: 59
End: 2019-12-18

## 2019-12-18 VITALS
HEIGHT: 72 IN | DIASTOLIC BLOOD PRESSURE: 56 MMHG | SYSTOLIC BLOOD PRESSURE: 112 MMHG | RESPIRATION RATE: 16 BRPM | WEIGHT: 259 LBS | OXYGEN SATURATION: 97 % | HEART RATE: 64 BPM | TEMPERATURE: 97.7 F | BODY MASS INDEX: 35.08 KG/M2

## 2019-12-18 DIAGNOSIS — I47.29 PAROXYSMAL VENTRICULAR TACHYCARDIA (H): ICD-10-CM

## 2019-12-18 DIAGNOSIS — Z79.899 ON AMIODARONE THERAPY: Primary | ICD-10-CM

## 2019-12-18 DIAGNOSIS — I42.8 NICM (NONISCHEMIC CARDIOMYOPATHY) (H): ICD-10-CM

## 2019-12-18 DIAGNOSIS — Z01.818 PRE-OP EXAMINATION: Primary | ICD-10-CM

## 2019-12-18 LAB
ABO + RH BLD: NORMAL
ABO + RH BLD: NORMAL
BLD GP AB SCN SERPL QL: NORMAL
BLOOD BANK CMNT PATIENT-IMP: NORMAL
HGB BLD-MCNC: 13.3 G/DL (ref 13.3–17.7)
NT-PROBNP SERPL-MCNC: 703 PG/ML (ref 0–125)
SPECIMEN EXP DATE BLD: NORMAL

## 2019-12-18 RX ORDER — AMIODARONE HYDROCHLORIDE 200 MG/1
200 TABLET ORAL DAILY
Qty: 90 TABLET | Refills: 2 | Status: SHIPPED | OUTPATIENT
Start: 2019-12-18 | End: 2020-05-27

## 2019-12-18 RX ORDER — ACETAMINOPHEN 500 MG
1000 TABLET ORAL EVERY MORNING
COMMUNITY

## 2019-12-18 ASSESSMENT — MIFFLIN-ST. JEOR: SCORE: 2022.82

## 2019-12-18 ASSESSMENT — LIFESTYLE VARIABLES: TOBACCO_USE: 0

## 2019-12-18 ASSESSMENT — ENCOUNTER SYMPTOMS: DYSRHYTHMIAS: 1

## 2019-12-18 NOTE — TELEPHONE ENCOUNTER
Received refill request for:  Amiodarone    Last OV was: 12/2/19    Labs/EKG:   Amiodarone F/U   Q 6 months = TSH Date: 12/9/19        Hepatic Panel Date =   12/9/19  Q 12 months =   CXR Date:   11/4/19         EKG Date:      11/4/19    F/U scheduled:   Future Appointments   Date Time Provider Department Center   1/2/2020  8:45 AM [id number] CV[facility]G   2/12/2020  1:00 PM Roberto Batres MD [facility]   2/24/2020 12:00 AM RODRIGUEZ DCR2 [facility]   3/11/2020 11:40 AM RODRIGUEZ LAB SULAB [facility]   3/11/2020 12:45 PM Umm Hernandes MD [facility]       New script sent to:   [facility] for 6 months      Adelita Messer RN BSN   2:19 PM 12/18/19

## 2019-12-18 NOTE — ANESTHESIA PREPROCEDURE EVALUATION
Anesthesia Pre-Procedure Evaluation    Patient: Neymar Rhodes   MRN:     [id number] Gender:   male   Age:    59 year old :      1960        Preoperative Diagnosis: * No surgery found *        Past Medical History:   Diagnosis Date     Ascending aorta dilatation (H)      Diverticulosis of colon (without mention of hemorrhage)      Essential hypertension, benign      Gout, unspecified      LBBB (left bundle branch block)      Morbid obesity (H)      Non-ischemic cardiomyopathy (H)      Nonrheumatic mitral valve regurgitation      NSTEMI (non-ST elevated myocardial infarction) (H)     cardiac cath 2018: mild non-obstructive CAD     Other and unspecified hyperlipidemia      Type II or unspecified type diabetes mellitus without mention of complication, not stated as uncontrolled       Past Surgical History:   Procedure Laterality Date     C APPENDECTOMY       CV CORONARY ANGIOGRAM N/A 10/2/2019    Procedure: Coronary Angiogram;  Surgeon: Kathy Almaguer MD;  Location:  HEART CARDIAC CATH LAB     CV LEFT HEART CATH N/A 10/2/2019    Procedure: Left Heart Cath;  Surgeon: Kathy Almaguer MD;  Location:  HEART CARDIAC CATH LAB     CV RIGHT HEART CATH N/A 10/2/2019    Procedure: Right Heart Cath;  Surgeon: Kathy Almaguer MD;  Location:  HEART CARDIAC CATH LAB     EP ICD N/A 10/4/2019    Procedure: EP ICD;  Surgeon: Jayy Dorman MD;  Location:  HEART CARDIAC CATH LAB     EP LEAD PLCMNT LV NEW ICD N/A 10/4/2019    Procedure: EP Lead Plcmnt LV New ICD;  Surgeon: Jayy Dorman MD;  Location:  HEART CARDIAC CATH LAB     HEART CATH CORONARY ANGIOGRAM WITHOUT PRESSURES  06/10/2018    normal coronary angiogram, nothing more than 10%     SURGICAL HISTORY OF -       repair of colovesicular fistula          Anesthesia Evaluation     . Pt has had prior anesthetic. Type: General    No history of anesthetic complications          ROS/MED HX    ENT/Pulmonary: Comment: Sees  dentist every 6 months.     (+)BELA risk factors snores loudly, hypertension, obese, , . .   (-) tobacco use   Neurologic:  - neg neurologic ROS     Cardiovascular: Comment: While hospitalized 10/1-10/5/19 had ventricular tachycardia treated with amiodarone and s/p secondary prevention ICD placement.     Denies chest pain, SOB, palpitations, syncope, JOSHI, orthopnea, or PND.  Reports weight has been stable at 250 pounds at home every morning.     Known LBBB.     (+) Dyslipidemia, hypertension--CAD (non-obstructive), --. Taking blood thinners : Instructions Given to patient: Last Saturday, 12/14/19. CHF (NICM) etiology: non-ischemic cardiomyopathy Last EF: 20% date: 10/20/19 . . :ICD (AP 41% and BiVP 98%.) Reason placed:VT  type;[employer] - Patient is dependent on ICD . dysrhythmias (VT) Other, . Previous cardiac testing Echodate:results:date: results: date: results:Cath date: results:         (-) JOSHI   METS/Exercise Tolerance: Comment: Despite significant heart failure, remains active.  He is able to shovel his drive way.  He reports feeling like he has more energy since discharge from the hospital and starting Entresto.  >4 METS   Hematologic:  - neg hematologic  ROS       Musculoskeletal:  - neg musculoskeletal ROS       GI/Hepatic: Comment: No alcohol since October 2019.Up until October hospitalization, was drinking 9 beers and 3 shots of Flaco Martínez every Friday night.     (+) appendicitis (ruptured appendex s/p appendectomy),       Renal/Genitourinary: Comment: H/o diverticulitis s/p resection ~10 years ago.         Endo:     (+) type II DM Last HgA1c: 7.0 date: 11/14/19 Using insulin - not using insulin pump Normal glucose range: -120 not previously admitted for DM/DKA thyroid problem hypothyroidism, Obesity, .      Psychiatric:  - neg psychiatric ROS       Infectious Disease:  - neg infectious disease ROS       Malignancy:      - no malignancy   Other:    (+) No chance of pregnancy  C-spine cleared: Yes, no H/O Chronic Pain,no other significant disability                        PHYSICAL EXAM:   Mental Status/Neuro: A/A/O   Airway: Facies: Thick Neck (beard)  Mallampati: III  Mouth/Opening: Full  TM distance: > 6 cm  Neck ROM: Full   Respiratory: Auscultation: CTAB     Resp. Rate: Normal     Resp. Effort: Normal      CV: Rhythm: Regular  Rate: Age appropriate  Heart: Normal Sounds  Edema: None   Comments: Back right bottom molars missing.   Very thick beard     Dental: Details                LABS:  CBC:   Lab Results   Component Value Date    WBC 7.1 10/05/2019    WBC 6.7 10/04/2019    HGB 12.3 (L) 10/05/2019    HGB 12.7 (L) 10/04/2019    HCT 35.9 (L) 10/05/2019    HCT 37.8 (L) 10/04/2019     10/05/2019     10/04/2019     BMP:   Lab Results   Component Value Date     12/02/2019     11/14/2019    POTASSIUM 4.1 12/02/2019    POTASSIUM 4.4 11/14/2019    CHLORIDE 106 12/02/2019    CHLORIDE 109 11/14/2019    CO2 24 12/02/2019    CO2 22 11/14/2019    BUN 16 12/02/2019    BUN 21 11/14/2019    CR 1.12 12/02/2019    CR 1.04 11/14/2019     (A) 12/04/2019     (H) 12/02/2019     COAGS:   Lab Results   Component Value Date    INR 1.16 (H) 10/04/2019     POC:   Lab Results   Component Value Date     (H) 10/05/2019     OTHER:   Lab Results   Component Value Date    LACT 2.9 (H) 10/02/2019    A1C 7.0 (H) 11/14/2019    EDITH 10.0 12/02/2019    PHOS 4.0 10/02/2019    MAG 2.4 (H) 10/02/2019    ALBUMIN 3.4 10/02/2019    PROTTOTAL 6.4 (L) 10/02/2019    ALT 7 12/09/2019    AST 28 12/09/2019    ALKPHOS 36 (L) 10/02/2019    BILITOTAL 0.6 10/02/2019    TSH 7.52 (H) 12/09/2019    T4 1.12 12/09/2019    CRP 0.4 12/07/2006        Preop Vitals    BP Readings from Last 3 Encounters:   12/18/19 112/56   12/02/19 114/71   11/20/19 111/70    Pulse Readings from Last 3 Encounters:   12/18/19 64   12/02/19 69   11/20/19 64      Resp Readings from Last 3 Encounters:   12/18/19 16    10/05/19 21   08/30/19 16    SpO2 Readings from Last 3 Encounters:   12/18/19 97%   11/02/19 98%   10/24/19 98%      Temp Readings from Last 1 Encounters:   12/18/19 97.7  F (36.5  C) (Oral)    Ht Readings from Last 1 Encounters:   12/18/19 1.829 m (6')      Wt Readings from Last 1 Encounters:   12/18/19 117.5 kg (259 lb)    Estimated body mass index is 35.13 kg/m  as calculated from the following:    Height as of this encounter: 1.829 m (6').    Weight as of this encounter: 117.5 kg (259 lb).     LDA:        Assessment:   ASA SCORE: 4    H&P: History and physical reviewed and following examination; no interval change.   Smoking Status:  Non-Smoker/Unknown   NPO Status: NPO Appropriate     Plan:   Anes. Type:  General   Pre-Medication: None   Induction:  IV (Standard)   Airway: ETT; Oral   Access/Monitoring: PIV   Maintenance: TIVA     Postop Plan:   Postop Pain: Opioids  Postop Sedation/Airway: Not planned  Disposition: Outpatient     PONV Management:   Adult Risk Factors:, Non-Smoker, Postop Opioids   Prevention: Ondansetron, Dexamethasone, No Volatiles     CONSENT: Direct conversation   Plan and risks discussed with: Patient   Blood Products: Consent Deferred (Minimal Blood Loss)                PAC Discussion and Assessment    ASA Classification: 3  Case is suitable for: [place]  Anesthetic techniques and relevant risks discussed: GA  Invasive monitoring and risk discussed:   Types:   Possibility and Risk of blood transfusion discussed:   NPO instructions given:   Additional anesthetic preparation and risks discussed:   Needs early admission to pre-op area:   Other:     PAC Resident/NP Anesthesia Assessment:  Neymar Rhodes is a 59-year-old male scheduled for Flexible bronchoscopy, endobronchial ultrasound with trans-bronchial biopsies on 12/19/19 with Dr. Ranulfo Barcenas at [facility] under general anesthesia.  Mr. Rhodes was hospitalized 10/1-10/5/19 for NICM with EF 16- 20% (cardiac MRI,  "echocardiogram and PET)  from previously 40% , presenting with ventricular tachycardia.  He underwent ICD placement and started on amiodarone.  He had a coronary angiogram and was found to have non-obstructed coronary artery disease.  His PET scan on 12/4/2019 demonstrated diffuse increased FDG uptake in the myocardium with concern for sarcoidosis and concern for sarcoid in the mediastinal and hilar  lymph nodes. The above procedure has been recommended for purposes of diagnosis to guide treatment.  Mr. Rhodes has opted to proceed.      PMH:  morbid obesity, diabetes, hypertension, dyslipidemia, non-ischemic cardiomyopathy,and  chronic left bundle branch block.     PROCEDURES  Coronary angiogram, left heart catheterization 10/1/19  Conclusion     \" Minimal non-obstructive coronary artery disease  \" Mildly elevated LVEDP  \" Successful closure of the RFA access site with a 6F Angioseal device    Echocardiogram 10/1/2019  Interpretation Summary     1. Severely dilated left ventricle (end-diastolic diameter 6.9 cm) with  severely reduced systolic function.  2. Visually estimated LVEF < 20%.  3. Significant left ventricular wall motion abnormalities.  4. Thinning and akinesis of the inferior and inferolateral walls and apex.  Septal thinning and dyskinesis consistent with known conduction abnormality.  Significant hypokinesis of the anterior and anterolateral walls.  5. Normal right ventricular size and systolic function. Estimated right  ventricular systolic pressure is 13 mmHg plus the right atrial pressure.  4. Mild mitral regurgitation, trace tricuspid valve regurgitation.     It with previous study dated 6/11/2018 and cardiac MRI dated 6/13/2018, LVEF  has reduced from 40% to <20%.  _____________________________________________________________________________  PET MYOCARDIAL PERFUSION SINGLE REST OR STRESS, PET CARDIAC  [id number]/4/2019  Impression:  1. Diffuse increased FDG uptake in the myocardium compatible " with  active cardiac sarcoid.  2. Globally hypokinetic and enlarged heart with ejection fraction  severely decreased to 16%, compatible with a dilatated cardiomyopathy.  3. Small perfusion abnormality of the anterior wall of the left  ventricle, this is the LAD distribution..   4. There is increased ammonia uptake in the lungs, consistent with  patient's known history of congestive heart failure.  5. FDG PET/CT demonstrate active sarcoid in the mediastinal and hilar  lymph nodes.     ------------------------------------------  See Color Composite Images within [employer] PACS System.  ------------------------------------------     This study was jointly reviewed and dictated with Dr. Dallas Smith.     I have personally reviewed the examination and initial interpretation  and I agree with the findings.     DALLAS SMITH MD  MRI cardiac w/ contrast 10/3/2019  SUMMARY   ==========================================================================================================     Clinical history: 59 year old man with LBBB, progressive cardiomyopathy (LVEF 15%) and nonobstructive CAD  on angiogram has had monomorphic VT in the hospital.  Comparison CMR:  7/5/2018.     1. The LV is severely dilated. The global systolic function is moderately severely to severely reduced. The  LVEF is 25%. There is severe global LV dysfunction with dyssynchronous septal motion consistent with a  LBBB.     2. The RV is normal in cavity size. The global systolic function is normal. The RVEF is 57%.      3. Both atria are dilated.     4. There is mild mitral insufficiency.      5. Late gadolinium enhancement is present in the septal. inferior and lateral base. There is non-CAD scar  in the anterolateral mid-wall. Scar is more extensive (13%) as compared to the prior MRI (6%).     CONCLUSIONS:   Late gadolinium enhancement is present in the septal. inferior and lateral base. There is non-CAD scar in  the anterolateral mid-wall. Scar is more  extensive (13%) as compared to the prior MRI (6%).  The LV is severely dilated and the global systolic function is moderately severely to severely reduced with  an LVEF of 25%. There is severe global LV dysfunction with dyssynchronous septal motion consistent with a  LBBB.  Collectively, these findings are most consistent with a non-ischemic cardiomyopathy. Possible etiologies  include prior LBBB, cardiac sarcoidosis or prior myocarditis.     Results called to rounding cardiologist.    ICD device evaluation 11/22/19  [employer] Resonate CRT-D Device Check  AP: 41 %    BiVP: 98 %    Mode: DDD     Underlying Rhythm: SR 60's    Heart Rate: Stable with good variability. Patient states his exercise and energy level has been great.     Sensing: wnl    Pacing Threshold: stable    Impedance: stable    Battery Status: 11 years    Device Site: Healed.    Atrial Arrhythmia: 0    Ventricular Arrhythmia: 0    ATP: 0    Shocks: 0    Setting Change: RA amplitude changed from 3.5V@0.5ms to 2.0V@0.5ms, RV amplitude changed from 3.5V @0.5ms to 2.0V @0.5ms, and LV amplitude changed from 3.5V@0.5ms to 2.2V@0.5ms per clinic protocol while maintaining adequate safety margin based on today's testing.    Care Plan: Follow-up in three months with remote device check. Patient scheduled to see Dr. Hernandes on 12/02/2019. Reviewed remote monitoring and when to call the clinic.     PHILOMENA, RN       PFTs 12/3/19  The FEV1 and FVC are reduced but the FEV1/FVC ratio is normal.  The inspiratory flow rates are within normal limits.  The TLC or alveolar volume is reduced indicative of a restrictive process.  The diffusing capacity is normal.  However, the diffusing   capacity was not corrected for the patient's hemoglobin.  The reduced volumes indicate a restrictive process.  IMPRESSION:  Mild Restriction lung disease  Correlate with imaging   Carlos Fu    --  He has the following specific operative considerations:   - BELA # of risks 6/8 =  high  - VTE risk:  1.8-3%  - Risk of PONV score = 1-2.  If > 2, anti-emetic intervention recommended.    #  Cardiology - METS:  >4. RCRI : Congestive Heart Failure and IDDM.  6.6 % risk of major adverse cardiac event.     - Progressive NICM- Severely dilated left ventricle (end-diastolic diameter 6.9 cm, echo 10/2019).  Today denies any cardiac symptoms, weight has been stable at home, LS clear, no S3, and appears euvolemic on exam. Has not had to take furosemide since d/c'd from hospital October '19.  Reports feeling better since d/c'd from hospital on medical management. He remains active.  Instructed to take metoprolol succinate ER and Entresto DOS.  N-Terminal Pro Bnp today 703 from 828 on 12/2/19.    - Ventricular tachycardia 2/2 to severe cardiomyopathy, s/p CRT-D and amiodarone. No therapeutic shock since ICD placement in October. Take amiodarone DOS.     - non-obstructive CAD, last dose of aspirin on 12/14/19 as instructed by thoracic surgery.     - dyslipidemia, take statin and fenofibrate as prescribed DOS.    - right sided cath demonstrated RV P 31/15 with PAOP 13 and CI 2.4.    #  Pulmonary - no smoking hx    - FDG PET/CT demonstrate active sarcoid in the mediastinal and hilar lymph nodes with above procedure planned.   #  Endocrine - Hypothyroidism, take Levothyroxine DOS.    - Diabetes, insulin dependent: Hold morning oral hypoglycemic medications and short acting insulin DOS. Take 80% of last scheduled dose of long acting insulin prior to procedure.  Recommend close monitoring of the patient's blood glucose levels throughout the perioperative period and treat per [place] guidelines. Hgb A1C 7.0    - Obesity: Recommend careful positioning to prevent airway/ventilatory compromise, or tissue injury.      - Anesthesia considerations:  Thick beard with malampati III. Refer to PAC assessment in anesthesia records      Arrival time, NPO, shower and medication instructions provided by nursing staff today.   Preparing For Your Surgery handout given.  Patient was discussed with Dr Campa.    Reviewed and Signed by PAC Mid-Level Provider/Resident  Mid-Level Provider/Resident: Radha ACUNA CNP  Date: 12/18/2019  Time: 10:15    Attending Anesthesiologist Anesthesia Assessment:  I have examined the patient and reviewed the medical record.  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  He was found to have severely dilated LV (ED diameter 7 cm) with EF 16-20% by echo, MRI and PET.  He had a left cath demonstrating no significant CADz.  His right sided cath demonstrated RV P 31/15 with PAOP 13 and CI 2.4.  He was started on aggressive medical management for his cardiomyopathy and a CRTD was placed  (98% CRT paced and no shocks).  He reports he is doing well and remains active.  His most recent NT pro BNP was 828.  He has history of IDDM which is under better control with Hgb A1c of 7.0  He has known mild restrictive lung disease but denies using his MDI and resting SpO2 was 97 with RR 16    PE:  Healthy appearing male in NAD.  Full beard.  MPC 2-3, limited extension.  Lungs clear  CV RRR without murmur    No further testing at this time  CIED team contacted for periop instructions.  Discussed preinduction arterial line  Contacted anesthesia team for tomorrow  Final plan per attending anesthesiologist the day of surgery.      Reviewed and Signed by PAC Anesthesiologist  Anesthesiologist: Santiago Campa MD  Date: 12/18/2019  Time:   Pass/Fail:   Disposition:     PAC Pharmacist Assessment:        Pharmacist:   Date:   Time:    ARMAND Escalera CNP

## 2019-12-18 NOTE — H&P
Pre-Operative H & P     CC:  Preoperative exam to assess for increased cardiopulmonary risk while undergoing surgery and anesthesia.    Date of Encounter: 12/18/2019  Primary Care Physician:  Jefry Harris  Neymar Rhodes is a 59 year old male who presents for pre-operative H & P in preparation for Flexible bronchoscopy, endobronchial ultrasound with trans-bronchial biopsies on 12/19/19 with Dr. Ranulfo Barcenas at [facility] under general anesthesia.  Mr. Rhodes was hospitalized 10/1-10/5/19 for NICM with EF 16- 20% (cardiac MRI, echocardiogram and PET)  from previously 40% , presenting with ventricular tachycardia.  He underwent ICD placement and started on amiodarone.  He had a coronary angiogram and was found to have non-obstructed coronary artery disease.  His PET scan on 12/4/2019 demonstrated diffuse increased FDG uptake in the myocardium with concern for sarcoidosis and concern for sarcoid in the mediastinal and hilar  lymph nodes. The above procedure has been recommended for purposes of diagnosis to guide treatment.  Mr. Rhodes has opted to proceed.      PMH:  morbid obesity, diabetes, hypertension, dyslipidemia, non-ischemic cardiomyopathy,and  chronic left bundle branch block.      History is obtained from the patient and electronic health record.     Past Medical History  Past Medical History:   Diagnosis Date     Ascending aorta dilatation (H)      Diverticulosis of colon (without mention of hemorrhage)      Essential hypertension, benign      Gout, unspecified      LBBB (left bundle branch block)      Morbid obesity (H)      Non-ischemic cardiomyopathy (H)      Nonrheumatic mitral valve regurgitation      NSTEMI (non-ST elevated myocardial infarction) (H)     cardiac cath 6/2018: mild non-obstructive CAD     Other and unspecified hyperlipidemia      Type II or unspecified type diabetes mellitus without mention of complication, not stated as uncontrolled        Past  Surgical History  Past Surgical History:   Procedure Laterality Date     C APPENDECTOMY  1980's     CV CORONARY ANGIOGRAM N/A 10/2/2019    Procedure: Coronary Angiogram;  Surgeon: Kathy Almaguer MD;  Location:  HEART CARDIAC CATH LAB     CV LEFT HEART CATH N/A 10/2/2019    Procedure: Left Heart Cath;  Surgeon: Kathy Almaguer MD;  Location:  HEART CARDIAC CATH LAB     CV RIGHT HEART CATH N/A 10/2/2019    Procedure: Right Heart Cath;  Surgeon: Kathy Almaguer MD;  Location:  HEART CARDIAC CATH LAB     EP ICD N/A 10/4/2019    Procedure: EP ICD;  Surgeon: Jayy Dorman MD;  Location:  HEART CARDIAC CATH LAB     EP LEAD PLCMNT LV NEW ICD N/A 10/4/2019    Procedure: EP Lead Plcmnt LV New ICD;  Surgeon: Jayy Dorman MD;  Location:  HEART CARDIAC CATH LAB     HEART CATH CORONARY ANGIOGRAM WITHOUT PRESSURES  06/10/2018    normal coronary angiogram, nothing more than 10%     SURGICAL HISTORY OF -   2001    repair of colovesicular fistula       Hx of Blood transfusions/reactions: denies     Hx of abnormal bleeding or anti-platelet use: ASA    Steroid use in the last year: denies    Personal or FH with difficulty with Anesthesia:  denies    Prior to Admission Medications  Current Outpatient Medications   Medication Sig Dispense Refill     acetaminophen (TYLENOL) 500 MG tablet Take 500-1,000 mg by mouth every 6 hours as needed for mild pain       amiodarone (PACERONE/CODARONE) 200 MG tablet Take 1 tablet (200 mg) by mouth daily 30 tablet 1     aspirin 81 MG tablet Take 1 tablet (81 mg) by mouth daily (Patient taking differently: Take 81 mg by mouth daily Pt stopped on 12/15/2019) 30 tablet      atorvastatin (LIPITOR) 80 MG tablet TAKE ONE HALF TABLET BY MOUTH at bedtime 15 tablet 0     BD ULTRA-FINE 29G X 12.7MM insulin pen needle USE ONCE DAILY WITH VICTOZA  each 1     BD ULTRA-FINE 29G X 12.7MM insulin pen needle use once daily with victoza pen 100 each 1     blood glucose  monitoring (ACCU-CHEK LUL PLUS) test strip Use to test blood sugar 1-3 times daily or as directed. 100 each 11     clotrimazole (LOTRIMIN) 1 % external cream Apply sparingly once or twice per day as needed to affected area until the skin is better, then stop; REPEAT AS NEEDED 30 g 1     Continuous Blood Gluc Sensor (FREESTYLE JOCELIN 14 DAY SENSOR) [facility] 1 each every 14 days 6 each 3     continuous blood glucose monitoring (FREESTYLE JOCELIN) sensor For use with Freestyle Jocelin Flash  for continuous monitioring of blood glucose levels. Replace sensor every 10 days. 3 each 3     fenofibrate (TRIGLIDE/LOFIBRA) 160 MG tablet TAKE ONE TABLET BY MOUTH ONE TIME DAILY 90 tablet 1     furosemide (LASIX) 20 MG tablet Take 1 tablet (20 mg) by mouth as needed (for weight gain of 2+ lbs overnight) 30 tablet 1     glipiZIDE (GLUCOTROL XL) 10 MG 24 hr tablet TAKE 1 TABLET (10 MG) BY MOUTH DAILY. TAKE WITH LARGEST MEAL OF THE DAY. ALWAYS TAKE WITH FOOD 90 tablet 0     insulin detemir (LEVEMIR PEN) 100 UNIT/ML pen Inject 10 Units Subcutaneous At Bedtime 18 mL 1     levothyroxine (SYNTHROID/LEVOTHROID) 50 MCG tablet Take 1 tablet (50 mcg) by mouth daily 30 tablet 2     liraglutide (VICTOZA PEN) 18 MG/3ML solution Inject 1.8 mg Subcutaneous daily 9 mL 1     metFORMIN (GLUCOPHAGE) 1000 MG tablet Take 1 tablet (1,000 mg) by mouth 2 times daily (with meals) 180 tablet 3     metoprolol succinate ER (TOPROL-XL) 25 MG 24 hr tablet TAKE ONE TABLET BY MOUTH IN THE EVENING  (Patient taking differently: Take 25 mg by mouth every evening ) 30 tablet 10     sacubitril-valsartan (ENTRESTO) 49-51 MG per tablet Take 1 tablet by mouth 2 times daily 60 tablet 5     albuterol (PROAIR HFA/PROVENTIL HFA/VENTOLIN HFA) 108 (90 Base) MCG/ACT Inhaler Inhale 2 puffs into the lungs every 6 hours as needed for shortness of breath / dyspnea or wheezing       fluticasone (FLONASE) 50 MCG/ACT nasal spray Spray 2 sprays into both nostrils daily (Patient  taking differently: Spray 2 sprays into both nostrils daily as needed ) 16 g 0     triamcinolone (KENALOG) 0.5 % cream Apply sparingly once or twice per day as needed to affected area until the skin is better, then stop 30 g 0       Allergies  Allergies   Allergen Reactions     No Known Drug Allergies        Social History  Social History     Socioeconomic History     Marital status:      Spouse name: Not on file     Number of children: Not on file     Years of education: Not on file     Highest education level: Not on file   Occupational History     Not on file   Social Needs     Financial resource strain: Not on file     Food insecurity:     Worry: Not on file     Inability: Not on file     Transportation needs:     Medical: Not on file     Non-medical: Not on file   Tobacco Use     Smoking status: Never Smoker     Smokeless tobacco: Never Used   Substance and Sexual Activity     Alcohol use: Not Currently     Comment: Was drinking 9 beers and three Jack Martínez shot every Friday.  No ETOH [facility] eOctober 2019.     Drug use: No     Sexual activity: Yes     Partners: Female   Lifestyle     Physical activity:     Days per week: Not on file     Minutes per session: Not on file     Stress: Not on file   Relationships     Social connections:     Talks on phone: Not on file     Gets together: Not on file     Attends Yarsanism service: Not on file     Active member of club or organization: Not on file     Attends meetings of clubs or organizations: Not on file     Relationship status: Not on file     Intimate partner violence:     Fear of current or ex partner: Not on file     Emotionally abused: Not on file     Physically abused: Not on file     Forced sexual activity: Not on file   Other Topics Concern     Parent/sibling w/ CABG, MI or angioplasty before 65F 55M? Not Asked   Social History Narrative     Not on file       Family History  Family History   Problem Relation Age of Onset     Cancer Father 75         bladder cancer     Myocardial Infarction Father      Other - See Comments Father         smoking     Breast Cancer Mother      Breast Cancer Sister      Family History Negative Brother      Family History Negative Son      Family History Negative Son         fluttering/murmur     Asthma Son      Colon Cancer No family hx of            ROS/MED HX    ENT/Pulmonary: Comment: Sees dentist every 6 months.     (+)BELA risk factors snores loudly, hypertension, obese, , . .   (-) tobacco use   Neurologic:  - neg neurologic ROS     Cardiovascular: Comment: While hospitalized 10/1-10/5/19 had ventricular tachycardia treated with amiodarone and s/p secondary prevention ICD placement.     Denies chest pain, SOB, palpitations, syncope, JOSHI, orthopnea, or PND.  Reports weight has been stable at 250 pounds at home every morning.     Known LBBB.     (+) Dyslipidemia, hypertension--CAD (non-obstructive), --. Taking blood thinners : Instructions Given to patient: Last Saturday, 12/14/19. CHF (NICM) etiology: non-ischemic cardiomyopathy Last EF: 20% date: 10/20/19 . . :ICD (AP 41% and BiVP 98%.) Reason placed:VT  type;[place] [employer] - Patient is dependent on ICD . dysrhythmias (VT) Other, . Previous cardiac testing Echodate:results:date: results: date: results:Cath date: results:         (-) JOSHI   METS/Exercise Tolerance: Comment: Despite significant heart failure, remains active.  He is able to shovel his drive way.  He reports feeling like he has more energy since discharge from the hospital and starting Entresto.  >4 METS   Hematologic:  - neg hematologic  ROS       Musculoskeletal:  - neg musculoskeletal ROS       GI/Hepatic: Comment: No alcohol since October 2019.Up until October hospitalization, was drinking 9 beers and 3 shots of Flaco Martínez every Friday night.     (+) appendicitis (ruptured appendex s/p appendectomy),       Renal/Genitourinary: Comment: H/o diverticulitis s/p resection ~10 years ago.         Endo:   "   (+) type II DM Last HgA1c: 7.0 date: 11/14/19 Using insulin - not using insulin pump Normal glucose range: -120 not previously admitted for DM/DKA thyroid problem hypothyroidism, Obesity, .      Psychiatric:  - neg psychiatric ROS       Infectious Disease:  - neg infectious disease ROS       Malignancy:      - no malignancy   Other:    (+) No chance of pregnancy C-spine cleared: Yes, no H/O Chronic Pain,no other significant disability                    PHYSICAL EXAM:   Mental Status/Neuro: A/A/O   Airway: Facies: Thick Neck (beard)  Mallampati: III  Mouth/Opening: Full  TM distance: > 6 cm  Neck ROM: Full   Respiratory: Auscultation: CTAB     Resp. Rate: Normal     Resp. Effort: Normal      CV: Rhythm: Regular  Rate: Age appropriate  Heart: Normal Sounds  Edema: None   Comments: Back right bottom molars missing.      Dental: Details            Temp: 97.7  F (36.5  C) Temp src: Oral BP: 112/56 Pulse: 64   Resp: 16 SpO2: 97 %         259 lbs 0 oz  6' 0\"   Body mass index is 35.13 kg/m .       Physical Exam  Constitutional: Awake, alert, cooperative, no apparent distress, and appears older than stated age.  Eyes: Pupils equal, round and reactive to light, extra ocular muscles intact, sclera clear, conjunctiva normal.  HENT: Normocephalic, oral pharynx with moist mucus membranes, dentition appropriate for age with back right molars missing. No goiter appreciated. Unkempt beard.   Respiratory: Clear to auscultation bilaterally, no crackles or wheezing.  Cardiovascular: Regular rate and rhythm, normal S1 and S2, and no murmur noted.  Carotids +2, no bruits. No edema. Palpable pulses to radial  DP and PT arteries.   GI: Normal bowel sounds, soft and non-tender. Unable to adequately assess for hepatosplenomegaly given obese abdomen. No superficial masses noted.   Lymph/Hematologic: No cervical lymphadenopathy and no supraclavicular lymphadenopathy.  Genitourinary:  deferred  Skin: Warm and dry.   Musculoskeletal: " "Full ROM of neck. There is no redness, warmth, or swelling of the joints. Gross motor strength is normal.    Neurologic: Awake, alert, oriented to name, place and time. Cranial nerves II-XII are grossly intact. Gait is normal.   Neuropsychiatric: Calm, cooperative. Normal affect.     Labs: (personally reviewed)  Lab Results   Component Value Date    WBC 7.1 10/05/2019     Lab Results   Component Value Date    RBC 3.94 10/05/2019     Lab Results   Component Value Date    HGB 13.3 12/18/2019     Lab Results   Component Value Date    HCT 35.9 10/05/2019     Lab Results   Component Value Date    MCV 91 10/05/2019     Lab Results   Component Value Date    MCH 31.2 10/05/2019     Lab Results   Component Value Date    MCHC 34.3 10/05/2019     Lab Results   Component Value Date    RDW 13.0 10/05/2019     Lab Results   Component Value Date     10/05/2019       Last Comprehensive Metabolic Panel:  Sodium   Date Value Ref Range Status   12/02/2019 139 136 - 145 mmol/L Final     Potassium   Date Value Ref Range Status   12/02/2019 4.1 3.5 - 5.1 mmol/L Final     Chloride   Date Value Ref Range Status   12/02/2019 106 98 - 107 mmol/L Final     Carbon Dioxide   Date Value Ref Range Status   12/02/2019 24 23 - 29 mmol/L Final     Anion Gap   Date Value Ref Range Status   12/02/2019 13.1 6 - 17 mmol/L Final     Glucose   Date Value Ref Range Status   12/04/2019 148 (A) 70 - 99 mg/dL Final     Urea Nitrogen   Date Value Ref Range Status   12/02/2019 16 7 - 30 mg/dL Final     Creatinine   Date Value Ref Range Status   12/02/2019 1.12 0.70 - 1.30 mg/dL Final     GFR Estimate   Date Value Ref Range Status   12/02/2019 67 >60 mL/min/[1.73_m2] Final     Calcium   Date Value Ref Range Status   12/02/2019 10.0 8.5 - 10.5 mg/dL Final     PROCEDURES  Coronary angiogram, left heart catheterization 10/1/19  Conclusion     \" Minimal non-obstructive coronary artery disease  \" Mildly elevated LVEDP  \" Successful closure of the RFA access " site with a 6F Angioseal device    Echocardiogram 10/1/2019  Interpretation Summary     1. Severely dilated left ventricle (end-diastolic diameter 6.9 cm) with  severely reduced systolic function.  2. Visually estimated LVEF < 20%.  3. Significant left ventricular wall motion abnormalities.  4. Thinning and akinesis of the inferior and inferolateral walls and apex.  Septal thinning and dyskinesis consistent with known conduction abnormality.  Significant hypokinesis of the anterior and anterolateral walls.  5. Normal right ventricular size and systolic function. Estimated right  ventricular systolic pressure is 13 mmHg plus the right atrial pressure.  4. Mild mitral regurgitation, trace tricuspid valve regurgitation.     It with previous study dated 6/11/2018 and cardiac MRI dated 6/13/2018, LVEF  has reduced from 40% to <20%.  _____________________________________________________________________________  PET MYOCARDIAL PERFUSION SINGLE REST OR STRESS, PET CARDIAC  [id number]/4/2019  Impression:  1. Diffuse increased FDG uptake in the myocardium compatible with  active cardiac sarcoid.  2. Globally hypokinetic and enlarged heart with ejection fraction  severely decreased to 16%, compatible with a dilatated cardiomyopathy.  3. Small perfusion abnormality of the anterior wall of the left  ventricle, this is the LAD distribution..   4. There is increased ammonia uptake in the lungs, consistent with  patient's known history of congestive heart failure.  5. FDG PET/CT demonstrate active sarcoid in the mediastinal and hilar  lymph nodes.     ------------------------------------------  See Color Composite Images within [employer] PACS System.  ------------------------------------------     This study was jointly reviewed and dictated with Dr. Dallas Smith.     I have personally reviewed the examination and initial interpretation  and I agree with the findings.     DALLAS SMITH MD  MRI cardiac w/ contrast  10/3/2019  SUMMARY   ==========================================================================================================     Clinical history: 59 year old man with LBBB, progressive cardiomyopathy (LVEF 15%) and nonobstructive CAD  on angiogram has had monomorphic VT in the hospital.  Comparison CMR:  7/5/2018.     1. The LV is severely dilated. The global systolic function is moderately severely to severely reduced. The  LVEF is 25%. There is severe global LV dysfunction with dyssynchronous septal motion consistent with a  LBBB.     2. The RV is normal in cavity size. The global systolic function is normal. The RVEF is 57%.      3. Both atria are dilated.     4. There is mild mitral insufficiency.      5. Late gadolinium enhancement is present in the septal. inferior and lateral base. There is non-CAD scar  in the anterolateral mid-wall. Scar is more extensive (13%) as compared to the prior MRI (6%).     CONCLUSIONS:   Late gadolinium enhancement is present in the septal. inferior and lateral base. There is non-CAD scar in  the anterolateral mid-wall. Scar is more extensive (13%) as compared to the prior MRI (6%).  The LV is severely dilated and the global systolic function is moderately severely to severely reduced with  an LVEF of 25%. There is severe global LV dysfunction with dyssynchronous septal motion consistent with a  LBBB.  Collectively, these findings are most consistent with a non-ischemic cardiomyopathy. Possible etiologies  include prior LBBB, cardiac sarcoidosis or prior myocarditis.     Results called to rounding cardiologist.    ICD device evaluation 11/22/19  [facility]  AP: 41 %    BiVP: 98 %    Mode: DDD     Underlying Rhythm: SR 60's    Heart Rate: Stable with good variability. Patient states his exercise and energy level has been great.     Sensing: wnl    Pacing Threshold: stable    Impedance: stable    Battery Status: 11 years    Device Site:  Healed.    Atrial Arrhythmia: 0    Ventricular Arrhythmia: 0    ATP: 0    Shocks: 0    Setting Change: RA amplitude changed from 3.5V@0.5ms to 2.0V@0.5ms, RV amplitude changed from 3.5V @0.5ms to 2.0V @0.5ms, and LV amplitude changed from 3.5V@0.5ms to 2.2V@0.5ms per clinic protocol while maintaining adequate safety margin based on today's testing.    Care Plan: Follow-up in three months with remote device check. Patient scheduled to see Dr. Hernandes on 12/02/2019. Reviewed remote monitoring and when to call the clinic.     PHILOMENA, RN       PFTs 12/3/19  The FEV1 and FVC are reduced but the FEV1/FVC ratio is normal.  The inspiratory flow rates are within normal limits.  The TLC or alveolar volume is reduced indicative of a restrictive process.  The diffusing capacity is normal.  However, the diffusing   capacity was not corrected for the patient's hemoglobin.  The reduced volumes indicate a restrictive process.  IMPRESSION:  Mild Restriction lung disease  Correlate with imaging   Carlos Fu    --      ASSESSMENT and PLAN  Neymar ELADIO Rhodes is a 59 year old male scheduled to undergo Flexible bronchoscopy, endobronchial ultrasound with trans-bronchial biopsies on 12/19/19 with Dr. Ranulfo Barcenas at [facility] under general anesthesia.   He has the following specific operative considerations:   - BELA # of risks 6/8 = high  - VTE risk:  1.8-3%  - Risk of PONV score = 1-2.  If > 2, anti-emetic intervention recommended.    #  Cardiology - METS:  >4. RCRI : Congestive Heart Failure and IDDM.  6.6 % risk of major adverse cardiac event.     - Progressive NICM- Severely dilated left ventricle (end-diastolic diameter 6.9 cm, echo 10/2019).  Today denies any cardiac symptoms, weight has been stable at home, LS clear, no S3, and appears euvolemic on exam. Has not had to take furosemide since d/c'd from hospital October '19.  Reports feeling better since d/c'd from hospital on medical management. He remains active.   Instructed to take metoprolol succinate ER and Entresto DOS.  N-Terminal Pro Bnp today 703 from 828 on 12/2/19.    - Ventricular tachycardia 2/2 to severe cardiomyopathy, s/p CRT-D (10/4/19) and amiodarone. No therapeutic shock since ICD placement in October. Take amiodarone DOS.     - non-obstructive CAD, last dose of aspirin on 12/14/19 as instructed by thoracic surgery.     - dyslipidemia, take statin and fenofibrate as prescribed DOS.    - right sided cath demonstrated RV P 31/15 with PAOP 13 and CI 2.4 (10/2/19).    #  Pulmonary - no smoking hx    - FDG PET/CT demonstrate active sarcoid in the mediastinal and hilar lymph nodes with above procedure planned.   #  Endocrine - Hypothyroidism, take Levothyroxine DOS.    - Diabetes, insulin dependent: Hold morning oral hypoglycemic medications and short acting insulin DOS. Take 80% of last scheduled dose of long acting insulin prior to procedure.  Recommend close monitoring of the patient's blood glucose levels throughout the perioperative period and treat per [place] guidelines. Hgb A1C 7.0    - Obesity: Recommend careful positioning to prevent airway/ventilatory compromise, or tissue injury.      - Anesthesia considerations:  Thick beard with malampati III. Refer to PAC assessment in anesthesia records      Arrival time, NPO, shower and medication instructions provided by nursing staff today.  Preparing For Your Surgery handout given.  Patient was discussed with Dr Campa.    ARMAND Escalera CNP  Preoperative Assessment Center  [facility]  [facility]  Phone: [phone]  Fax: [phone]

## 2019-12-18 NOTE — PROGRESS NOTES
Pls let him know that PFTs done for amiodarone screening showed normal DLCO.  He's on amio 200 mg daily to prevent recurrent VT    He does have evidence of mild restrictive lung dz (sarcoid, likely).    No changes needed from our standpoint. Will repeat in 1 year (I've ordered).    Augustine Baltazar

## 2019-12-18 NOTE — PATIENT INSTRUCTIONS
Preparing for Your Surgery      Name:  Neymar Rhodes   MRN:  [id number]   :  1960   Today's Date:  2019     Arriving for surgery:  Surgery date:  19  Arrival time:  Noon  Please come to:       [facility] [address]  [address] [place]  [address]    - ? parking is available in front of the hospital      -    Please proceed to [address] on the 3rd floor. [phone]?     - ?If you are in need of directions, wheelchair or escort please stop at the Information Desk in the lobby.  Inform the information person that you are here for surgery; a wheelchair and escort to [address] will be provided.?     What can I eat or drink?  -  You may have solid food or milk products until 8 hours prior to your surgery (6:00 am).  -  You may have water, apple juice or 7up/Sprite until 2 hours prior to your surgery (Noon).    Which medicines can I take?  Stop Aspirin, vitamins and supplements one week prior to surgery.  Hold Ibuprofen for 24 hours and/or Naproxen for 48 hours prior to surgery.   Take 8 units of Insulin Detemir (Levemir) the night before surgery.   Hold Liraglutide (Victoza) the morning of surgery.       -  Do NOT take these medications in the morning, the day of surgery:  Furosemide (Lasix)  Glipizide (Glucotrol)  Metformin (Glucophage)    -  Please take these medications the day of surgery:  Acetaminophen (Tylenol)  Amidodarone (Pacerone)  Fenofibrate (Tiglide)  Levothyroxine (Synthroid)  Metoprolol (Toprol)  Albuterol (Proair) Inhaler  Fluticasone (Flonase)      Sacubitril-Valsartan (Entresto)    How do I prepare myself?  -  Take two showers: one the night before surgery; and one the morning of surgery.         Use Scrubcare or Hibiclens to wash from neck down, leave soap on your skin for up to one minute.  Do not get soap in your eyes or ears.  You may use your own shampoo and conditioner; no other hair products.   -  Do NOT use lotion, powder,  deodorant, or antiperspirant the day of your surgery.  -  Do NOT wear any jewelry.  -  Do not bring your own medications to the hospital, except for inhalers.  -  Bring your ID and insurance card.    -If you are scheduled to go home the Same Day as surgery you must have a responsible adult as a  and to stay with you overnight the first 24 hours after surgery.     Questions or Concerns:  -If you are scheduled on the East or West campus and have questions or concerns regarding the day of surgery, please call Preadmission Nursing at [phone].       -For questions after surgery please call your surgeons office.     AFTER YOUR SURGERY  Breathing exercises   Breathing exercises help you recover faster. Take deep breaths and let the air out slowly. This will:     Help you wake up after surgery.    Help prevent complications like pneumonia.  Preventing complications will help you go home sooner.   We may give you a breathing device (incentive spirometer) to encourage you to breathe deeply.   Nausea and vomiting   You may feel sick to your stomach after surgery; if so, let your nurse know.    Pain control:  After surgery, you may have pain. Our goal is to help you manage your pain. Pain medicine will help you feel comfortable enough to do activities that will help you heal.  These activities may include breathing exercises, walking and physical therapy.   To help your health care team treat your pain we will ask: 1) If you have pain  2) where it is located 3) describe your pain in your words  Methods of pain control include medications given by mouth, vein or by nerve block for some surgeries.  Sequential Compression Device (SCD) or Pneumo Boots:  You may need to wear SCD S on your legs or feet. These are wraps connected to a machine that pumps in air and releases it. The repeated pumping helps prevent blood clots from forming.

## 2019-12-19 ENCOUNTER — HOSPITAL ENCOUNTER (OUTPATIENT)
Facility: CLINIC | Age: 59
Discharge: HOME | End: 2019-12-19
Attending: INTERNAL MEDICINE | Admitting: INTERNAL MEDICINE
Payer: COMMERCIAL

## 2019-12-19 ENCOUNTER — ANESTHESIA (OUTPATIENT)
Dept: SURGERY | Facility: CLINIC | Age: 59
End: 2019-12-19
Payer: COMMERCIAL

## 2019-12-19 VITALS
HEIGHT: 72 IN | DIASTOLIC BLOOD PRESSURE: 79 MMHG | SYSTOLIC BLOOD PRESSURE: 125 MMHG | WEIGHT: 255.73 LBS | BODY MASS INDEX: 34.64 KG/M2 | OXYGEN SATURATION: 100 % | HEART RATE: 60 BPM | TEMPERATURE: 97.6 F | RESPIRATION RATE: 18 BRPM

## 2019-12-19 DIAGNOSIS — R59.0 MEDIASTINAL ADENOPATHY: ICD-10-CM

## 2019-12-19 DIAGNOSIS — J98.51 MEDIASTINITIS: Primary | ICD-10-CM

## 2019-12-19 LAB
APPEARANCE FLD: NORMAL
COLOR FLD: COLORLESS
CREAT SERPL-MCNC: 0.96 MG/DL (ref 0.66–1.25)
EOSINOPHIL NFR FLD MANUAL: 1 %
GFR SERPL CREATININE-BSD FRML MDRD: 85 ML/MIN/{1.73_M2}
GLUCOSE BLDC GLUCOMTR-MCNC: 110 MG/DL (ref 70–99)
GLUCOSE BLDC GLUCOMTR-MCNC: 93 MG/DL (ref 70–99)
GRAM STN SPEC: NORMAL
KOH PREP SPEC: NORMAL
LYMPHOCYTES NFR FLD MANUAL: 36 %
MONOS+MACROS NFR FLD MANUAL: 62 %
NEUTS BAND NFR FLD MANUAL: 1 %
POTASSIUM SERPL-SCNC: 4.1 MMOL/L (ref 3.4–5.3)
SPECIMEN SOURCE FLD: NORMAL
SPECIMEN SOURCE: NORMAL
SPECIMEN SOURCE: NORMAL
WBC # FLD AUTO: 133 /UL

## 2019-12-19 PROCEDURE — 82962 GLUCOSE BLOOD TEST: CPT

## 2019-12-19 PROCEDURE — 27210794 ZZH OR GENERAL SUPPLY STERILE: Performed by: INTERNAL MEDICINE

## 2019-12-19 PROCEDURE — 82565 ASSAY OF CREATININE: CPT | Performed by: ANESTHESIOLOGY

## 2019-12-19 PROCEDURE — 36000064 ZZH SURGERY LEVEL 4 EA 15 ADDTL MIN - UMMC: Performed by: INTERNAL MEDICINE

## 2019-12-19 PROCEDURE — 40000170 ZZH STATISTIC PRE-PROCEDURE ASSESSMENT II: Performed by: INTERNAL MEDICINE

## 2019-12-19 PROCEDURE — 87070 CULTURE OTHR SPECIMN AEROBIC: CPT | Performed by: INTERNAL MEDICINE

## 2019-12-19 PROCEDURE — 84132 ASSAY OF SERUM POTASSIUM: CPT | Performed by: ANESTHESIOLOGY

## 2019-12-19 PROCEDURE — 87102 FUNGUS ISOLATION CULTURE: CPT | Mod: 91 | Performed by: INTERNAL MEDICINE

## 2019-12-19 PROCEDURE — 71000027 ZZH RECOVERY PHASE 2 EACH 15 MINS: Performed by: INTERNAL MEDICINE

## 2019-12-19 PROCEDURE — 25800030 ZZH RX IP 258 OP 636: Performed by: NURSE ANESTHETIST, CERTIFIED REGISTERED

## 2019-12-19 PROCEDURE — 87015 SPECIMEN INFECT AGNT CONCNTJ: CPT | Performed by: INTERNAL MEDICINE

## 2019-12-19 PROCEDURE — 87081 CULTURE SCREEN ONLY: CPT | Performed by: INTERNAL MEDICINE

## 2019-12-19 PROCEDURE — 87116 MYCOBACTERIA CULTURE: CPT | Performed by: INTERNAL MEDICINE

## 2019-12-19 PROCEDURE — 88173 CYTOPATH EVAL FNA REPORT: CPT | Mod: XS | Performed by: INTERNAL MEDICINE

## 2019-12-19 PROCEDURE — 93005 ELECTROCARDIOGRAM TRACING: CPT

## 2019-12-19 PROCEDURE — 25000128 H RX IP 250 OP 636: Performed by: NURSE ANESTHETIST, CERTIFIED REGISTERED

## 2019-12-19 PROCEDURE — 00000155 ZZHCL STATISTIC H-CELL BLOCK W/STAIN: Performed by: INTERNAL MEDICINE

## 2019-12-19 PROCEDURE — 86356 MONONUCLEAR CELL ANTIGEN: CPT | Performed by: INTERNAL MEDICINE

## 2019-12-19 PROCEDURE — 89051 BODY FLUID CELL COUNT: CPT | Performed by: INTERNAL MEDICINE

## 2019-12-19 PROCEDURE — 25000125 ZZHC RX 250: Performed by: NURSE ANESTHETIST, CERTIFIED REGISTERED

## 2019-12-19 PROCEDURE — 36000066 ZZH SURGERY LEVEL 4 W FLUORO 1ST 30 MIN - UMMC: Performed by: INTERNAL MEDICINE

## 2019-12-19 PROCEDURE — 40000065 ZZH STATISTIC EKG NON-CHARGEABLE

## 2019-12-19 PROCEDURE — 37000009 ZZH ANESTHESIA TECHNICAL FEE, EACH ADDTL 15 MIN: Performed by: INTERNAL MEDICINE

## 2019-12-19 PROCEDURE — 87210 SMEAR WET MOUNT SALINE/INK: CPT | Performed by: INTERNAL MEDICINE

## 2019-12-19 PROCEDURE — 88305 TISSUE EXAM BY PATHOLOGIST: CPT | Performed by: INTERNAL MEDICINE

## 2019-12-19 PROCEDURE — 71000014 ZZH RECOVERY PHASE 1 LEVEL 2 FIRST HR: Performed by: INTERNAL MEDICINE

## 2019-12-19 PROCEDURE — 87205 SMEAR GRAM STAIN: CPT | Performed by: INTERNAL MEDICINE

## 2019-12-19 PROCEDURE — 88342 IMHCHEM/IMCYTCHM 1ST ANTB: CPT | Performed by: INTERNAL MEDICINE

## 2019-12-19 PROCEDURE — 93010 ELECTROCARDIOGRAM REPORT: CPT | Mod: 59 | Performed by: INTERNAL MEDICINE

## 2019-12-19 PROCEDURE — 88312 SPECIAL STAINS GROUP 1: CPT | Performed by: INTERNAL MEDICINE

## 2019-12-19 PROCEDURE — 37000008 ZZH ANESTHESIA TECHNICAL FEE, 1ST 30 MIN: Performed by: INTERNAL MEDICINE

## 2019-12-19 PROCEDURE — 88108 CYTOPATH CONCENTRATE TECH: CPT | Performed by: INTERNAL MEDICINE

## 2019-12-19 PROCEDURE — 87102 FUNGUS ISOLATION CULTURE: CPT | Performed by: INTERNAL MEDICINE

## 2019-12-19 PROCEDURE — 87015 SPECIMEN INFECT AGNT CONCNTJ: CPT | Mod: 91 | Performed by: INTERNAL MEDICINE

## 2019-12-19 PROCEDURE — 88172 CYTP DX EVAL FNA 1ST EA SITE: CPT | Performed by: INTERNAL MEDICINE

## 2019-12-19 PROCEDURE — 87206 SMEAR FLUORESCENT/ACID STAI: CPT | Performed by: INTERNAL MEDICINE

## 2019-12-19 RX ORDER — LIDOCAINE HYDROCHLORIDE 20 MG/ML
INJECTION, SOLUTION INFILTRATION; PERINEURAL PRN
Status: DISCONTINUED | OUTPATIENT
Start: 2019-12-19 | End: 2019-12-19

## 2019-12-19 RX ORDER — CEFAZOLIN SODIUM 2 G/100ML
INJECTION, SOLUTION INTRAVENOUS PRN
Status: DISCONTINUED | OUTPATIENT
Start: 2019-12-19 | End: 2019-12-19

## 2019-12-19 RX ORDER — SODIUM CHLORIDE, SODIUM LACTATE, POTASSIUM CHLORIDE, CALCIUM CHLORIDE 600; 310; 30; 20 MG/100ML; MG/100ML; MG/100ML; MG/100ML
INJECTION, SOLUTION INTRAVENOUS CONTINUOUS PRN
Status: DISCONTINUED | OUTPATIENT
Start: 2019-12-19 | End: 2019-12-19

## 2019-12-19 RX ORDER — LABETALOL 20 MG/4 ML (5 MG/ML) INTRAVENOUS SYRINGE
10
Status: DISCONTINUED | OUTPATIENT
Start: 2019-12-19 | End: 2019-12-19 | Stop reason: HOSPADM

## 2019-12-19 RX ORDER — ETOMIDATE 2 MG/ML
INJECTION INTRAVENOUS PRN
Status: DISCONTINUED | OUTPATIENT
Start: 2019-12-19 | End: 2019-12-19

## 2019-12-19 RX ORDER — LIDOCAINE 40 MG/G
CREAM TOPICAL
Status: DISCONTINUED | OUTPATIENT
Start: 2019-12-19 | End: 2019-12-19 | Stop reason: HOSPADM

## 2019-12-19 RX ORDER — SODIUM CHLORIDE, SODIUM LACTATE, POTASSIUM CHLORIDE, CALCIUM CHLORIDE 600; 310; 30; 20 MG/100ML; MG/100ML; MG/100ML; MG/100ML
INJECTION, SOLUTION INTRAVENOUS CONTINUOUS
Status: DISCONTINUED | OUTPATIENT
Start: 2019-12-19 | End: 2019-12-19 | Stop reason: HOSPADM

## 2019-12-19 RX ORDER — FENTANYL CITRATE 50 UG/ML
INJECTION, SOLUTION INTRAMUSCULAR; INTRAVENOUS PRN
Status: DISCONTINUED | OUTPATIENT
Start: 2019-12-19 | End: 2019-12-19

## 2019-12-19 RX ORDER — HYDRALAZINE HYDROCHLORIDE 20 MG/ML
2.5-5 INJECTION INTRAMUSCULAR; INTRAVENOUS EVERY 10 MIN PRN
Status: DISCONTINUED | OUTPATIENT
Start: 2019-12-19 | End: 2019-12-19 | Stop reason: HOSPADM

## 2019-12-19 RX ORDER — PROPOFOL 10 MG/ML
INJECTION, EMULSION INTRAVENOUS CONTINUOUS PRN
Status: DISCONTINUED | OUTPATIENT
Start: 2019-12-19 | End: 2019-12-19

## 2019-12-19 RX ORDER — ONDANSETRON 2 MG/ML
4 INJECTION INTRAMUSCULAR; INTRAVENOUS EVERY 30 MIN PRN
Status: DISCONTINUED | OUTPATIENT
Start: 2019-12-19 | End: 2019-12-19 | Stop reason: HOSPADM

## 2019-12-19 RX ORDER — ONDANSETRON 4 MG/1
4 TABLET, ORALLY DISINTEGRATING ORAL EVERY 30 MIN PRN
Status: DISCONTINUED | OUTPATIENT
Start: 2019-12-19 | End: 2019-12-19 | Stop reason: HOSPADM

## 2019-12-19 RX ORDER — FENTANYL CITRATE 50 UG/ML
25-50 INJECTION, SOLUTION INTRAMUSCULAR; INTRAVENOUS
Status: DISCONTINUED | OUTPATIENT
Start: 2019-12-19 | End: 2019-12-19 | Stop reason: HOSPADM

## 2019-12-19 RX ORDER — NALOXONE HYDROCHLORIDE 0.4 MG/ML
.1-.4 INJECTION, SOLUTION INTRAMUSCULAR; INTRAVENOUS; SUBCUTANEOUS
Status: DISCONTINUED | OUTPATIENT
Start: 2019-12-19 | End: 2019-12-19 | Stop reason: HOSPADM

## 2019-12-19 RX ORDER — ONDANSETRON 2 MG/ML
INJECTION INTRAMUSCULAR; INTRAVENOUS PRN
Status: DISCONTINUED | OUTPATIENT
Start: 2019-12-19 | End: 2019-12-19

## 2019-12-19 RX ORDER — LEVOFLOXACIN 500 MG/1
500 TABLET, FILM COATED ORAL DAILY
Qty: 5 TABLET | Refills: 0 | Status: SHIPPED | OUTPATIENT
Start: 2019-12-19 | End: 2020-01-03

## 2019-12-19 RX ADMIN — FENTANYL CITRATE 100 MCG: 50 INJECTION, SOLUTION INTRAMUSCULAR; INTRAVENOUS at 14:45

## 2019-12-19 RX ADMIN — SUGAMMADEX 200 MG: 100 INJECTION, SOLUTION INTRAVENOUS at 15:40

## 2019-12-19 RX ADMIN — ETOMIDATE 12 MG: 2 INJECTION INTRAVENOUS at 14:45

## 2019-12-19 RX ADMIN — ROCURONIUM BROMIDE 50 MG: 10 INJECTION INTRAVENOUS at 14:45

## 2019-12-19 RX ADMIN — PROPOFOL 125 MCG/KG/MIN: 10 INJECTION, EMULSION INTRAVENOUS at 14:49

## 2019-12-19 RX ADMIN — MIDAZOLAM 1 MG: 1 INJECTION INTRAMUSCULAR; INTRAVENOUS at 14:36

## 2019-12-19 RX ADMIN — SODIUM CHLORIDE, POTASSIUM CHLORIDE, SODIUM LACTATE AND CALCIUM CHLORIDE: 600; 310; 30; 20 INJECTION, SOLUTION INTRAVENOUS at 14:36

## 2019-12-19 RX ADMIN — MIDAZOLAM 1 MG: 1 INJECTION INTRAMUSCULAR; INTRAVENOUS at 14:39

## 2019-12-19 RX ADMIN — ONDANSETRON 4 MG: 2 INJECTION INTRAMUSCULAR; INTRAVENOUS at 14:55

## 2019-12-19 RX ADMIN — CEFAZOLIN SODIUM 2 G: 2 INJECTION, SOLUTION INTRAVENOUS at 15:19

## 2019-12-19 RX ADMIN — LIDOCAINE HYDROCHLORIDE 100 MG: 20 INJECTION, SOLUTION INFILTRATION; PERINEURAL at 14:45

## 2019-12-19 ASSESSMENT — MIFFLIN-ST. JEOR: SCORE: 2008

## 2019-12-19 NOTE — TELEPHONE ENCOUNTER
Amiodarone approved by [facility] at [place] on duplicate refill request from 12/18/2019.    Debbie SONGN, RN, PHN

## 2019-12-19 NOTE — DISCHARGE INSTRUCTIONS
Post-Bronchoscopy Patient Instructions:    December 19, 2019  Neymar Rhodes      Your procedure (bronchoscopy with lymph node biopsies) was completed without any immediate complications.      You may cough up scant amount of blood for the next 12-24 hours. If you have excessive cough with blood, chest pain, shortness of breath or other concerning symptoms, please report to the closest emergency room.      You may experience low grade (less than 100.5 F) fever next 24 hours for which you can take Tylenol. If the fever persists more than 24 hours contact our office or your primary care provider.      Our office (Pulmonary--[phone]) will call you when the results from today's procedure become available in the coming 3-5 business days      A 5 day prophylactic course of oral antibiotics (Levquin, one tablet daily) has been sent to your pharmacy ([facility]). This can be started tonight if able.      You  resume your regular diet as it was prior to procedure.      Should you have any question, please do not hesitate to call our office.      Lul Harper MD, 12/19/2019, 3:48 PM  Interventional Pulmonology Fellow  Department of Pulmonary, Allergy, Critical Care & Sleep Medicine     [facility], [place]  Same-Day Surgery   Adult Discharge Orders & Instructions     For 24 hours after surgery    1. Get plenty of rest.  A responsible adult must stay with you for at least 24 hours after you leave the hospital.   2. Do not drive or use heavy equipment.  If you have weakness or tingling, don't drive or use heavy equipment until this feeling goes away.  3. Do not drink alcohol.  4. Avoid strenuous or risky activities.  Ask for help when climbing stairs.   5. You may feel lightheaded.  IF so, sit for a few minutes before standing.  Have someone help you get up.   6. If you have nausea (feel sick to your stomach): Drink only clear liquids such as apple juice, ginger ale, broth or 7-Up.   Rest may also help.  Be sure to drink enough fluids.  Move to a regular diet as you feel able.  7. You may have a slight fever. Call the doctor if your fever is over 100 F (37.7 C) (taken under the tongue) or lasts longer than 24 hours.  8. You may have a dry mouth, a sore throat, muscle aches or trouble sleeping.  These should go away after 24 hours.  9. Do not make important or legal decisions.   Call your doctor for any of the followin.  Signs of infection (fever, growing tenderness at the surgery site, a large amount of drainage or bleeding, severe pain, foul-smelling drainage, redness, swelling).    2. It has been over 8 to 10 hours since surgery and you are still not able to urinate (pass water).    3.  Headache for over 24 hours.    4.  Numbness, tingling or weakness the day after surgery (if you had spinal anesthesia).  To contact doctor guajardo, call [phone]   or:        [phone] and ask for the resident on call for pulmonology (answered 24 hours a day)      Emergency Department:    [facility]: [phone]

## 2019-12-19 NOTE — ANESTHESIA POSTPROCEDURE EVALUATION
Anesthesia POST Procedure Evaluation    Patient: Neymar Rhodes   MRN:     [id number] Gender:   male   Age:    59 year old :      1960        Preoperative Diagnosis: Mediastinal adenopathy [R59.0]   Procedure(s):  Flexible bronchoscopy, endobronchial ultrasound, bronchial alveolar lavage  with trans-bronchial biopsies   Postop Comments: No value filed.       Anesthesia Type:  Not documented  General    Reportable Event: NO     PAIN: Uncomplicated   Sign Out status: Comfortable, Well controlled pain     PONV: No PONV   Sign Out status:  No Nausea or Vomiting     Neuro/Psych: Uneventful perioperative course   Sign Out Status: Preoperative baseline; Age appropriate mentation     Airway/Resp.: Uneventful perioperative course   Sign Out Status: Non labored breathing, age appropriate RR; Resp. Status within EXPECTED Parameters     CV: Uneventful perioperative course   Sign Out status: Appropriate BP and perfusion indices; Appropriate HR/Rhythm     Disposition:   Sign Out in:  PACU  Disposition:  Phase II; Home  Recovery Course: Uneventful  Follow-Up: Not required           Last Anesthesia Record Vitals:  CRNA VITALS  2019 1519 - 2019 1609      2019             NIBP:  155/89    Pulse:  60    Ht Rate:  60    SpO2:  98 %          Last PACU Vitals:  Vitals Value Taken Time   /77 2019  4:00 PM   Temp     Pulse 60 2019  4:00 PM   Resp     SpO2 100 % 2019  4:07 PM   Temp src     NIBP     Pulse     SpO2     Resp     Temp     Ht Rate     Temp 2     Vitals shown include unvalidated device data.      Electronically Signed By: Babita Corley MD, 2019, 4:09 PM

## 2019-12-19 NOTE — BRIEF OP NOTE
[facility], [place]    Brief Operative Note    Pre-operative diagnosis: Mediastinal adenopathy [R59.0]  Post-operative diagnosis Same as pre-operative diagnosis    Procedure: Procedure(s):  Flexible bronchoscopy, endobronchial ultrasound, bronchial alveolar lavage  with trans-bronchial biopsies  Surgeon: Surgeon(s) and Role:     * Ranulfo Barcenas MD - Primary     * Lul Harper MD - Assisting  Anesthesia: General   Estimated blood loss: None  Drains: None  Specimens:   ID Type Source Tests Collected by Time Destination   1 : right middle lobe Bronch Lavage Bronchial ACTINOMYCES RULE OUT, AFB CULTURE NON BLOOD, AFB STAIN NON BLOOD, CELL COUNT WITH DIFFERENTIAL FLUID, CMV DNA QUANTIFICATION, FUNGUS CULTURE, GRAM STAIN, LAVON PREP, NOCARDIA CULTURE, LYMPHOCYTE SUBSET BRONCHIAL, CYTOLOGY NON GYN Ranulfo Barcenas MD 12/19/2019  2:54 PM    A : station 7, 4R, 11L  Tissue Lung FINE NEEDLE ASPIRATION Ranulfo Barcenas MD 12/19/2019  3:14 PM    B : station 7 Tissue Lymph Node SURGICAL PATHOLOGY EXAM Ranulfo Barcenas MD 12/19/2019  3:16 PM    C : station 11L forcep biopsy Tissue Lymph Node SURGICAL PATHOLOGY EXAM Ranulfo Barcenas MD 12/19/2019  3:31 PM      Findings:   None.  Complications: None.  Implants: * No implants in log *    Lul Harper MD, 12/19/2019, 3:49 PM  Interventional Pulmonology Fellow  Department of Pulmonary, Allergy, Critical Care & Sleep Medicine   Pager: [phone]

## 2019-12-19 NOTE — OR NURSING
Following message sent to Narinder LAMB:    Neymar Rhodes preop: please delete affirmation of consent. It does not match case modification. Thanks! James TAYLOR

## 2019-12-19 NOTE — OR NURSING
Sabrina RN notified that pt have pacemaker/ICD. Stated that if they are using cautery to place magnet but is not pacemaker dependent.

## 2019-12-19 NOTE — TELEPHONE ENCOUNTER
Called pt and gave him normal PFT results per Delaney CHRISTIANSON. Pt states understanding of the plan.

## 2019-12-19 NOTE — ANESTHESIA CARE TRANSFER NOTE
Patient: Neymar Rhodes    Procedure(s):  Flexible bronchoscopy, endobronchial ultrasound, bronchial alveolar lavage  with trans-bronchial biopsies    Diagnosis: Mediastinal adenopathy [R59.0]  Diagnosis Additional Information: No value filed.    Anesthesia Type:   General     Note:  Airway :Face Mask  Patient transferred to:PACU  Comments: Alert and exchanging well. VSS. Report given to RN.Handoff Report: Identifed the Patient, Identified the Reponsible Provider, Reviewed the pertinent medical history, Discussed the surgical course, Reviewed Intra-OP anesthesia mangement and issues during anesthesia, Set expectations for post-procedure period and Allowed opportunity for questions and acknowledgement of understanding      Vitals: (Last set prior to Anesthesia Care Transfer)    CRNA VITALS  12/19/2019 1519 - 12/19/2019 1555      12/19/2019             NIBP:  155/89    Pulse:  60    Ht Rate:  60    SpO2:  98 %                Electronically Signed By: ARMAND Krause CRNA  December 19, 2019  3:55 PM

## 2019-12-19 NOTE — PROCEDURES
INTERVENTIONAL PULMONOLOGY       Procedure(s):    A flexible bronchoscopy  Airway exam  BAL  EBUS-TBNA (2 sites)  EBUS-TBNB (transbronchial mei biopsies; 2 sites)  Therapeutic suctioning (1 site)    Procedure Date: 12/19/2019    Indication:  Possible sarcoidosis     Attending of Record:  Ranulfo Barcenas MD     Interventional Pulmonary Fellow   Lul Harper MD     Trainees Present:   None    Medications:    General Anesthesia - See anesthesia flowsheet for details    Sedation Time:   Per Anesthesia Care Provider    Time Out:  Performed    The patient's medical record has been reviewed.  The indication for the procedure was reviewed.  The necessary history and physical examination was performed and reviewed.  The risks, benefits and alternatives of the procedure were discussed with the the patient in detail and he had the opportunity to ask questions.  I discussed in particular the potential complications including risks of minor or life-threatening bleeding and/or infection, respiratory failure, vocal cord trauma / paralysis, pneumothorax, and discomfort. Sedation risks were also discussed including abnormal heart rhythms, low blood pressure, and respiratory failure. All questions were answered to the best of my ability.  Verbal and written informed consent was obtained.  The proposed procedure and the patient's identification were verified prior to the procedure by the physician and the nurse.    The patient was assessed for the adequacy for the procedure and to receive medications.   Mental Status:  Alert and oriented x 3  Airway examination:  Class III (Visualization of only the base of uvula)  Pulmonary:  Clear to ausculation bilaterally  CV:  RRR, no murmurs or gallops  ASA Grade:  (IV)  Severe systemic disease that is a constant threat to life    After clinical evaluation and reviewing the indication, risks, alternatives and benefits of the procedure the patient was deemed to be in satisfactory condition  to undergo the procedure.      A Tuberculosis risk assessment was performed:  The patient has no known RISK of Tuberculosis    The procedure was performed in a negative airflow room: The patient could not be moved to a negative airflow room because of needed OR for the procedure    Maneuvers / Procedure:    A Flexible  bronchoscope was used for the procedure. The patient was orally intubated with a # 8.5 ETT and the flexible scope was passed through.      Airway Examination: A complete airway examination was performed from the distal trachea to the subsegmental level in each lobe of both lungs.  Pertinent findings include normal anatomy, no endobronchial lesions, no secretions.         BAL: The bronchoscope was wedged into the RML bronchus. A total of 120 cc of saline was instilled and cellular fluid was aspirated. This was sent for analysis.     EBUS-TBNA: The EBUS scope was inserted and biopsies were obtained from:  1. Station 7 with 4 passes, samples obtained with ANABELLE present; granuloma identified  2. Station 11L with 4 passes, samples obtained with ANABELLE present    EBUS-TBNB (transbronchial mei biopsy) The EBUS scope was inserted and intra-mei forceps biopsies were obtained with the SpyBite forceps from:  3. Station 7 with 2 biopsies  4. Station 11L with 2 biopsies    Therapeutic suctioning: 15-20 min of operative time was spent clearing out the airway of debris, blood and mucous prior to the intervention.     Any disposable equipment was visually inspected and deemed to be intact immediately post procedure.      Relevant Pictures      Recommendations:     Successful BAL of the RML    Successful EBUS-TBNA of station 7 and 11L    Successful EBUS-TBNB of station 7 and 11L    Await pending results in the next 3-5 business days    Follow-up with cardiology as previously arranged           Lul Harper MD, 12/19/2019, 4:05 PM  Interventional Pulmonology Fellow  Department of Pulmonary, Allergy, Critical Care  & Sleep Medicine   Pager: [phone]

## 2019-12-20 DIAGNOSIS — E11.9 TYPE 2 DIABETES MELLITUS WITHOUT COMPLICATION, WITHOUT LONG-TERM CURRENT USE OF INSULIN (H): ICD-10-CM

## 2019-12-20 LAB
CD19 CELLS NFR BRONCH: 1 %
CD3 CELLS NFR BRONCH: 96 %
CD3+CD4+ CELLS NFR BRONCH: 66 %
CD3+CD4+ CELLS/CD3+CD8+ CLL BRONCH: 1.89 %
CD3+CD8+ CELLS NFR BRONCH: 35 %
CD3-CD16+CD56+ CELLS NFR SPEC: 3 %
CMV DNA SPEC NAA+PROBE-ACNC: NORMAL [IU]/ML
CMV DNA SPEC NAA+PROBE-LOG#: NORMAL {LOG_IU}/ML
COPATH REPORT: NORMAL
IFC SPECIMEN: NORMAL
INTERPRETATION ECG - MUSE: NORMAL
SPECIMEN SOURCE: NORMAL
T-CELL SUBSET INTERPRETATION: NORMAL

## 2019-12-21 LAB
ACID FAST STN SPEC QL: NORMAL
ACID FAST STN SPEC QL: NORMAL
BACTERIA SPEC CULT: NO GROWTH
SPECIMEN SOURCE: NORMAL
SPECIMEN SOURCE: NORMAL

## 2019-12-21 NOTE — TELEPHONE ENCOUNTER
"Requested Prescriptions   Pending Prescriptions Disp Refills     liraglutide (VICTOZA PEN) 18 MG/3ML solution  Last Written Prescription Date:  12/17/2019  Last Fill Quantity: 9mL,  # refills: 01   Last Office Visit: 10/8/2019   Future Office Visit:      9 mL 1     Sig: Inject 1.8 mg Subcutaneous daily       GLP-1 Agonists Protocol Passed - 12/20/2019  6:02 PM        Passed - Blood pressure less than 140/90 in past 6 months     BP Readings from Last 3 Encounters:   12/19/19 125/79   12/18/19 112/56   12/02/19 114/71                 Passed - LDL on file in past 12 months     Recent Labs   Lab Test 11/14/19  0953   LDL 58             Passed - Microalbumin on file in past 12 months     Recent Labs   Lab Test 04/10/19  0729   MICROL 10   UMALCR 7.71             Passed - HgbA1C in past 3 or 6 months     If HgbA1C is 8 or greater, it needs to be on file within the past 3 months.  If less than 8, must be on file within the past 6 months.     Recent Labs   Lab Test 11/14/19  0953   A1C 7.0*             Passed - Medication is active on med list        Passed - Patient is age 18 or older        Passed - Normal serum creatinine on file in past 12 months     Recent Labs   Lab Test 12/19/19  1351   CR 0.96             Passed - Recent (6 mo) or future (30 days) visit within the authorizing provider's specialty     Patient had office visit in the last 6 months or has a visit in the next 30 days with authorizing provider.  See \"Patient Info\" tab in inbasket, or \"Choose Columns\" in Meds & Orders section of the refill encounter.              "

## 2019-12-23 LAB — COPATH REPORT: NORMAL

## 2019-12-23 RX ORDER — LIRAGLUTIDE 6 MG/ML
1.8 INJECTION SUBCUTANEOUS DAILY
Qty: 9 ML | Refills: 3 | Status: SHIPPED | OUTPATIENT
Start: 2019-12-23 | End: 2020-06-17

## 2019-12-24 LAB — COPATH REPORT: NORMAL

## 2019-12-27 ENCOUNTER — TELEPHONE (OUTPATIENT)
Dept: INTERNAL MEDICINE | Facility: CLINIC | Age: 59
End: 2019-12-27

## 2019-12-27 NOTE — TELEPHONE ENCOUNTER
"Patient calling, hard to follow him. He is looking for return to work note to go back 1/6/20. Says he has been out for 3 months with \"the heart stuff\". 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 He is figuring it needs to comes from his PCP.    Patient also wanting to know if needs A1C? Says he is getting EKG next week (do not see this) and if needs blood draw could get done then.    Reports was in hospital in October - had pacemaker defibrillator put in, and has been a \"whirlwind\" since then. Going all over the place since then for appts and needs to get things done before goes back to work. Was only out about a month previously when had heart problem before and feels he should have stayed off longer and that maybe then these other issues would not have happened. Reports his heart went from 40 to 20% now. Took polyps or lymph nodes out of lungs 12/19. Bad leg cramps since that surgery, both legs, has not told surgeon about this. Says he was given med for one week so would not get blood clots but only see antibiotics prescribed at discharge and patient says \"that must have been it\". Did not have leg cramps before the surgery on 12/19, was on left side now on right. On calf. Feels like someone grabbing at legs. No SOB, says breathing getting better. No redness, warmth, swelling, discoloration. Heart rate has been coming up- running 96. Lowest has been is 90. /71 today. Was way up above 120 before defibrillator pacemaker put in and has been \"no where around that\". Having ECHO 1/2.    Please advise on above. Did make patient appt on 1.3 with PCP but should he be seen more urgently/call surgeon due to leg cramps? And should he get labs done before appt?      "

## 2019-12-27 NOTE — TELEPHONE ENCOUNTER
I really should see him as planned on 1/3/20.   No labs are needed, the last A1C was done in [facility], it is too early.   He should bring his glucometer with him to review when I see him.   We can decide what labs are needed when I see him    Does he need the note before 1/3/20 ?    If not, I can write and send one at his appointment, otherwise we can write one now.      Let me know.    Close encounter if nothing needed at this time.

## 2019-12-27 NOTE — TELEPHONE ENCOUNTER
Patient informed. Notes he wears something on his arm for glucose monitoring and will bring that with him, says readings go back 90 days. The note can be written at the appt. Advised to call if leg cramps worsen before appt or he could check with surgeon too. MOSES.

## 2019-12-27 NOTE — TELEPHONE ENCOUNTER
"Patient requests\"Return to Work Note\" effective 1/6/20.  He would like to pick this up when ready.    Also would like to discuss lab order for A1C or results.  Pls advise.    Mobile on file.  Okay to lv message.  "

## 2019-12-29 DIAGNOSIS — E11.9 TYPE 2 DIABETES MELLITUS WITHOUT COMPLICATION, WITHOUT LONG-TERM CURRENT USE OF INSULIN (H): ICD-10-CM

## 2019-12-30 RX ORDER — ATORVASTATIN CALCIUM 80 MG/1
TABLET, FILM COATED ORAL
Qty: 15 TABLET | Refills: 2 | Status: SHIPPED | OUTPATIENT
Start: 2019-12-30 | End: 2020-03-27

## 2019-12-30 NOTE — TELEPHONE ENCOUNTER
Prescription approved per [facility] Refill Protocol.    Debbie SONGN, RN, PHN

## 2019-12-30 NOTE — TELEPHONE ENCOUNTER
"Requested Prescriptions   Pending Prescriptions Disp Refills     atorvastatin (LIPITOR) 80 MG tablet [Pharmacy Med Name: Atorvastatin Calcium Oral Tablet 80 MG] 15 tablet 0     Sig: TAKE HALF TABLET BY MOUTH AT BEDTIME   Last Written Prescription Date:  11/11/2019  Last Fill Quantity: 15,  # refills: 0   Last Office Visit: 10/8/2019   Future Office Visit:    Next 5 appointments (look out 90 days)    Jan 03, 2020 11:00 AM CST  Office Visit [facility]  [facility] [address] [place] [id number] [phone]             Statins Protocol Passed - 12/29/2019  5:09 PM        Passed - LDL on file in past 12 months     Recent Labs   Lab Test 11/14/19  0953   LDL 58             Passed - No abnormal creatine kinase in past 12 months     No lab results found.             Passed - Recent (12 mo) or future (30 days) visit within the authorizing provider's specialty     Patient has had an office visit with the authorizing provider or a provider within the authorizing providers department within the previous 12 mos or has a future within next 30 days. See \"Patient Info\" tab in inbasket, or \"Choose Columns\" in Meds & Orders section of the refill encounter.              Passed - Medication is active on med list        Passed - Patient is age 18 or older          "

## 2019-12-31 ENCOUNTER — TELEPHONE (OUTPATIENT)
Dept: PULMONOLOGY | Facility: CLINIC | Age: 59
End: 2019-12-31

## 2019-12-31 NOTE — TELEPHONE ENCOUNTER
Telephone call     Attempted to call Mr. Rhodes but only able to leave VM  I reported that his bx was c/w sarcoid diagnosis.   I will send a message to his cardiologist who ordered the  bx    .Ranulfo Barcenas MD   of Medicine  Interventional Pulmonary  Department of Pulmonary, Allergy, Critical Care and Sleep Medicine   [facility]  Pager: [phone]   Office: [phone]  Email: [email or web address]    Kayla GUILLEN, OCN  Clinical Nurse Specialist  Department of Thoracic Surgery  Office: [phone]  Email: [email or web address]    Izabela Lee  Interventional Pulmonology Surgery Scheduler  Office: [phone]  Email: [email or web address]

## 2020-01-02 ENCOUNTER — HOSPITAL ENCOUNTER (OUTPATIENT)
Dept: CARDIOLOGY | Facility: CLINIC | Age: 60
Discharge: HOME | End: 2020-01-02
Attending: INTERNAL MEDICINE | Admitting: INTERNAL MEDICINE
Payer: COMMERCIAL

## 2020-01-02 DIAGNOSIS — I50.22 CHRONIC SYSTOLIC HEART FAILURE (H): ICD-10-CM

## 2020-01-02 LAB
BACTERIA SPEC CULT: NORMAL
Lab: NORMAL
SPECIMEN SOURCE: NORMAL

## 2020-01-02 PROCEDURE — 93325 DOPPLER ECHO COLOR FLOW MAPG: CPT | Mod: 26 | Performed by: INTERNAL MEDICINE

## 2020-01-02 PROCEDURE — 93325 DOPPLER ECHO COLOR FLOW MAPG: CPT

## 2020-01-02 PROCEDURE — 93308 TTE F-UP OR LMTD: CPT | Mod: 26 | Performed by: INTERNAL MEDICINE

## 2020-01-02 PROCEDURE — 93321 DOPPLER ECHO F-UP/LMTD STD: CPT | Mod: 26 | Performed by: INTERNAL MEDICINE

## 2020-01-02 PROCEDURE — 25500064 ZZH RX 255 OP 636: Performed by: INTERNAL MEDICINE

## 2020-01-02 RX ADMIN — HUMAN ALBUMIN MICROSPHERES AND PERFLUTREN 9 ML: 10; .22 INJECTION, SOLUTION INTRAVENOUS at 09:30

## 2020-01-02 NOTE — ADDENDUM NOTE
Addendum  created 01/02/20 0614 by Demi Ervin MD    Attestation recorded in Intraprocedure, Intraprocedure Attestations filed

## 2020-01-03 ENCOUNTER — OFFICE VISIT (OUTPATIENT)
Dept: INTERNAL MEDICINE | Facility: CLINIC | Age: 60
End: 2020-01-03
Payer: COMMERCIAL

## 2020-01-03 ENCOUNTER — PATIENT OUTREACH (OUTPATIENT)
Dept: CARDIOLOGY | Facility: CLINIC | Age: 60
End: 2020-01-03

## 2020-01-03 VITALS
SYSTOLIC BLOOD PRESSURE: 102 MMHG | BODY MASS INDEX: 35.11 KG/M2 | WEIGHT: 259.2 LBS | HEIGHT: 72 IN | HEART RATE: 65 BPM | TEMPERATURE: 97.5 F | OXYGEN SATURATION: 98 % | DIASTOLIC BLOOD PRESSURE: 60 MMHG

## 2020-01-03 DIAGNOSIS — E11.59 TYPE 2 DIABETES MELLITUS WITH OTHER CIRCULATORY COMPLICATION, WITHOUT LONG-TERM CURRENT USE OF INSULIN (H): ICD-10-CM

## 2020-01-03 DIAGNOSIS — I42.8 NON-ISCHEMIC CARDIOMYOPATHY (H): ICD-10-CM

## 2020-01-03 DIAGNOSIS — D86.0 SARCOIDOSIS OF LUNG (H): Primary | ICD-10-CM

## 2020-01-03 DIAGNOSIS — E66.01 SEVERE OBESITY (BMI 35.0-39.9) WITH COMORBIDITY (H): ICD-10-CM

## 2020-01-03 DIAGNOSIS — D86.9 SARCOIDOSIS: Primary | ICD-10-CM

## 2020-01-03 PROCEDURE — 99214 OFFICE O/P EST MOD 30 MIN: CPT | Performed by: INTERNAL MEDICINE

## 2020-01-03 RX ORDER — SULFAMETHOXAZOLE AND TRIMETHOPRIM 400; 80 MG/1; MG/1
1 TABLET ORAL 2 TIMES DAILY
Qty: 90 TABLET | Refills: 3 | Status: CANCELLED | OUTPATIENT
Start: 2020-01-03

## 2020-01-03 RX ORDER — SULFAMETHOXAZOLE AND TRIMETHOPRIM 400; 80 MG/1; MG/1
1 TABLET ORAL DAILY
Qty: 30 TABLET | Refills: 2 | Status: SHIPPED | OUTPATIENT
Start: 2020-01-03 | End: 2020-03-27

## 2020-01-03 RX ORDER — PREDNISONE 20 MG/1
40 TABLET ORAL DAILY
Qty: 180 TABLET | Refills: 3 | Status: CANCELLED | OUTPATIENT
Start: 2020-01-03

## 2020-01-03 RX ORDER — PREDNISONE 20 MG/1
40 TABLET ORAL DAILY
Qty: 60 TABLET | Refills: 2 | Status: SHIPPED | OUTPATIENT
Start: 2020-01-03 | End: 2020-03-27

## 2020-01-03 ASSESSMENT — MIFFLIN-ST. JEOR: SCORE: 2023.72

## 2020-01-03 NOTE — PROGRESS NOTES
Subjective     Neymar Rhodes is a 59 year old male who presents to clinic today for the following health issues:    HPI   Diabetes Follow-up    How often are you checking your blood sugar? Continuous glucose monitor  What time of day are you checking your blood sugars (select all that apply)?  Before and after meals  Have you had any blood sugars above 200?  Yes, sometimes   Have you had any blood sugars below 70?  No    What symptoms do you notice when your blood sugar is low?  fatigue    What concerns do you have today about your diabetes? None     Do you have any of these symptoms? (Select all that apply)  No numbness or tingling in feet.  No redness, sores or blisters on feet.  No complaints of excessive thirst.  No reports of blurry vision.  No significant changes to weight.     Have you had a diabetic eye exam in the last 12 months? Yes- Date of last eye exam: 11/2019    BP Readings from Last 2 Encounters:   01/03/20 102/60   12/19/19 125/79     Hemoglobin A1C (%)   Date Value   11/14/2019 7.0 (H)   08/26/2019 8.8 (H)     LDL Cholesterol Calculated (mg/dL)   Date Value   11/14/2019 58   10/03/2018 40       Diabetes Management Resources      How many servings of fruits and vegetables do you eat daily?  2-3    On average, how many sweetened beverages do you drink each day (Examples: soda, juice, sweet tea, etc.  Do NOT count diet or artificially sweetened beverages)?   0    How many days per week do you miss taking your medication? 0      2.  New diagnosis of sarcoidosis per lung biopsy.   Recommendation just sent to the patient today for prednisone 40 mg once daily, Bactrim SS once daily.     3.    The patient has had a history of ongoing obesity.  Reviewed the weigth curves.   Their current BMI is:  Body mass index is 35.15 kg/m .  Reviewed previous attempts at weight loss which have not been successful in producing prolonged weigth loss.   Discussed current eating and exercise habits.     Reviewed weights  in chart:  Wt Readings from Last 10 Encounters:   01/03/20 117.6 kg (259 lb 3.2 oz)   12/19/19 116 kg (255 lb 11.7 oz)   12/18/19 117.5 kg (259 lb)   12/02/19 118.9 kg (262 lb 1.6 oz)   11/20/19 117.2 kg (258 lb 4.8 oz)   11/04/19 118.6 kg (261 lb 8 oz)   11/02/19 119.4 kg (263 lb 3.2 oz)   10/24/19 121.1 kg (267 lb)   10/17/19 118.6 kg (261 lb 6.4 oz)   10/09/19 117.9 kg (260 lb)        4.  history of nonischemic cardiomyopathy, no appearing like cardiac sarcoidosis.      Reviewed and updated as needed this visit by Provider         Review of Systems         Objective    /60   Pulse 65   Temp 97.5  F (36.4  C) (Temporal)   Ht 1.829 m (6')   Wt 117.6 kg (259 lb 3.2 oz)   SpO2 98%   BMI 35.15 kg/m    Body mass index is 35.15 kg/m .  Physical Exam                 Past Medical History:  ---------------------------  Past Medical History:   Diagnosis Date     Ascending aorta dilatation (H)      Diverticulosis of colon (without mention of hemorrhage)      Essential hypertension, benign      Gout, unspecified      LBBB (left bundle branch block)      Morbid obesity (H)      Non-ischemic cardiomyopathy (H)      Nonrheumatic mitral valve regurgitation      NSTEMI (non-ST elevated myocardial infarction) (H)     cardiac cath 6/2018: mild non-obstructive CAD     Other and unspecified hyperlipidemia      Type II or unspecified type diabetes mellitus without mention of complication, not stated as uncontrolled        Past Surgical History:  ---------------------------  Past Surgical History:   Procedure Laterality Date     C APPENDECTOMY  1980's     CV CORONARY ANGIOGRAM N/A 10/2/2019    Procedure: Coronary Angiogram;  Surgeon: Kathy Almaguer MD;  Location: [facility] CARDIAC CATH LAB     CV LEFT HEART CATH N/A 10/2/2019    Procedure: Left Heart Cath;  Surgeon: Kathy Almaguer MD;  [facility]     CV RIGHT HEART CATH N/A 10/2/2019    Procedure: Right Heart Cath;  Surgeon: Rosina  Kathy Garcia MD;  Location:  HEART CARDIAC CATH LAB     ENDOBRONCHIAL ULTRASOUND FLEXIBLE N/A 12/19/2019    Procedure: Flexible bronchoscopy, endobronchial ultrasound, bronchial alveolar lavage;  Surgeon: Ranulfo Barcenas MD;  Location: [facility] OR     EP ICD N/A 10/4/2019    Procedure: EP ICD;  Surgeon: Jayy Dorman MD;  Location:  HEART CARDIAC CATH LAB     EP LEAD PLCMNT LV NEW ICD N/A 10/4/2019    Procedure: EP Lead Plcmnt LV New ICD;  Surgeon: Jayy Dorman MD;  Location:  HEART CARDIAC CATH LAB     HEART CATH CORONARY ANGIOGRAM WITHOUT PRESSURES  06/10/2018    normal coronary angiogram, nothing more than 10%     SURGICAL HISTORY OF -   2001    repair of colovesicular fistula       Current Medications:  ---------------------------  Current Outpatient Medications   Medication Sig Dispense Refill     acetaminophen (TYLENOL) 500 MG tablet Take 500-1,000 mg by mouth every 6 hours as needed for mild pain       albuterol (PROAIR HFA/PROVENTIL HFA/VENTOLIN HFA) 108 (90 Base) MCG/ACT Inhaler Inhale 2 puffs into the lungs every 6 hours as needed for shortness of breath / dyspnea or wheezing       amiodarone (PACERONE/CODARONE) 200 MG tablet Take 1 tablet (200 mg) by mouth daily 90 tablet 2     aspirin 81 MG tablet Take 1 tablet (81 mg) by mouth daily (Patient taking differently: Take 81 mg by mouth daily Pt stopped on 12/15/2019) 30 tablet      atorvastatin (LIPITOR) 80 MG tablet TAKE HALF TABLET BY MOUTH AT BEDTIME  15 tablet 2     BD ULTRA-FINE 29G X 12.7MM insulin pen needle USE ONCE DAILY WITH VICTOZA  each 1     BD ULTRA-FINE 29G X 12.7MM insulin pen needle use once daily with victoza pen 100 each 1     blood glucose monitoring (ACCU-CHEK LUL PLUS) test strip Use to test blood sugar 1-3 times daily or as directed. 100 each 11     clotrimazole (LOTRIMIN) 1 % external cream Apply sparingly once or twice per day as needed to affected area until the skin is better, then stop; REPEAT AS NEEDED 30 g 1      Continuous Blood Gluc Sensor (FREESTYLE JOCELIN 14 DAY SENSOR) [facility] 1 each every 14 days 6 each 3     continuous blood glucose monitoring (FREESTYLE JOCELIN) sensor For use with Freestyle Jocelin Flash  for continuous monitioring of blood glucose levels. Replace sensor every 10 days. 3 each 3     fenofibrate (TRIGLIDE/LOFIBRA) 160 MG tablet TAKE ONE TABLET BY MOUTH ONE TIME DAILY 90 tablet 1     fluticasone (FLONASE) 50 MCG/ACT nasal spray Spray 2 sprays into both nostrils daily (Patient taking differently: Spray 2 sprays into both nostrils daily as needed ) 16 g 0     furosemide (LASIX) 20 MG tablet Take 1 tablet (20 mg) by mouth as needed (for weight gain of 2+ lbs overnight) 30 tablet 1     glipiZIDE (GLUCOTROL XL) 10 MG 24 hr tablet TAKE 1 TABLET (10 MG) BY MOUTH DAILY. TAKE WITH LARGEST MEAL OF THE DAY. ALWAYS TAKE WITH FOOD 90 tablet 0     insulin detemir (LEVEMIR PEN) 100 UNIT/ML pen Inject 10 Units Subcutaneous At Bedtime 18 mL 1     levothyroxine (SYNTHROID/LEVOTHROID) 50 MCG tablet Take 1 tablet (50 mcg) by mouth daily 30 tablet 2     liraglutide (VICTOZA PEN) 18 MG/3ML solution Inject 1.8 mg Subcutaneous daily 9 mL 3     metFORMIN (GLUCOPHAGE) 1000 MG tablet Take 1 tablet (1,000 mg) by mouth 2 times daily (with meals) 180 tablet 3     metoprolol succinate ER (TOPROL-XL) 25 MG 24 hr tablet TAKE ONE TABLET BY MOUTH IN THE EVENING  (Patient taking differently: Take 25 mg by mouth every evening ) 30 tablet 10     sacubitril-valsartan (ENTRESTO) 49-51 MG per tablet Take 1 tablet by mouth 2 times daily 60 tablet 5     triamcinolone (KENALOG) 0.5 % cream Apply sparingly once or twice per day as needed to affected area until the skin is better, then stop 30 g 0       Allergies:  -------------  Allergies   Allergen Reactions     No Known Drug Allergies        Social History:  -------------------  Social History     Socioeconomic History     Marital status:      Spouse name: Not on file     Number of  children: Not on file     Years of education: Not on file     Highest education level: Not on file   Occupational History     Not on file   Social Needs     Financial resource strain: Not on file     Food insecurity:     Worry: Not on file     Inability: Not on file     Transportation needs:     Medical: Not on file     Non-medical: Not on file   Tobacco Use     Smoking status: Never Smoker     Smokeless tobacco: Never Used   Substance and Sexual Activity     Alcohol use: Not Currently     Comment: Was drinking 9 beers and three Flaco Martínez shot every Friday.  No ETOH sin eOctober 2019.     Drug use: No     Sexual activity: Yes     Partners: Female   Lifestyle     Physical activity:     Days per week: Not on file     Minutes per session: Not on file     Stress: Not on file   Relationships     Social connections:     Talks on phone: Not on file     Gets together: Not on file     Attends [employer] service: Not on file     Active member of club or organization: Not on file     Attends meetings of clubs or organizations: Not on file     Relationship status: Not on file     Intimate partner violence:     Fear of current or ex partner: Not on file     Emotionally abused: Not on file     Physically abused: Not on file     Forced sexual activity: Not on file   Other Topics Concern     Parent/sibling w/ CABG, MI or angioplasty before 65F 55M? Not Asked   Social History Narrative     Not on file       Family Medical History:  ------------------------------  Family History   Problem Relation Age of Onset     Cancer Father 75        bladder cancer     Myocardial Infarction Father      Other - See Comments Father         smoking     Breast Cancer Mother      Breast Cancer Sister      Family History Negative Brother      Family History Negative Son      Family History Negative Son         fluttering/murmur     Asthma Son      Colon Cancer No family hx of          ROS:  REVIEW OF SYSTEMS:    RESP: negative for cough, dyspnea,  wheezing, hemoptysis  CV: negative for chest pain, palpitations, PND, JOSHI, orthopnea  GI: negative for dysphagia, N/V, pain, melena, diarrhea and constipation  NEURO: negative for new numbness/tingling, paralysis, incoordination, or focal weakness     OBJECTIVE:                                                    /60   Pulse 65   Temp 97.5  F (36.4  C) (Temporal)   Ht 1.829 m (6')   Wt 117.6 kg (259 lb 3.2 oz)   SpO2 98%   BMI 35.15 kg/m       GENERAL alert and no distress  EYES:  Normal sclera,conjunctiva, EOMI  HENT: oral and posterior pharynx without lesions or erythema, facies symmetric  NECK: Neck supple. No LAD, without thyroidmegaly.  RESP: Clear to ausculation bilaterally without wheezes or crackles. Normal BS in all fields.  CV: RRR normal S1S2 without murmurs, rubs or gallops.  LYMPH: no cervical lymph adenopathy appreciated  MS: extremities- no gross deformities of the visible extremities noted,   EXT:  no lower extremity edema  PSYCH: Alert and oriented times 3; speech- coherent  SKIN:  No obvious significant skin lesions on visible portions of face          ASSESSMENT/PLAN:                                                      (D86.0) Sarcoidosis of lung (H)  (primary encounter diagnosis)  Comment: biopsy results reviewed with the patient.   Recommendations from the pulomanry specialist was for daily prednisone 40 mg daily, bactrim sliding scale once daily.   Repeat PET in 3 months.   Passed these along to the patient, sent prescriptions to the pharmacy.   Reviewed side effects of prednisone.   Will also need to watch weight, heart failure given prednisone use.   Plan: predniSONE (DELTASONE) 20 MG tablet,         sulfamethoxazole-trimethoprim (BACTRIM/SEPTRA)         400-80 MG tablet            (I42.8) Non-ischemic cardiomyopathy (H)  Comment: no signs and symptoms of CHF at this time.   Watch for CHF given upcoming use of prednisone, told patient to watch weights closely.   Plan:     (E11.59)  Type 2 diabetes mellitus with other circulatory complication, without long-term current use of insulin (H)  Comment: diabetes well controlled.   Normally would have him stop the insulins, but since he will be placed on prednisone for the next couple of months, the glucoses will elevated, just not sure how high.   Has personal continuous glucose monitoring so he can montiort he glucose closely.   Send an update in a coupel weeks, will adjust meds as needed.   Plan:     (E66.01) Severe obesity (BMI 35.0-39.9) with comorbidity (H)  Comment: has done great job on weight loss with diet control.   conintue efforts at weight loss.   Plan:     **he wants to return to work, feels that he can do a full day.  Wrote letter OK'ing return to work.        See Patient Instructions    FRANCHESKA GRUBER M.D., MD  [facility]    (Chart documentation may have been completed, in part, with [email or web address] voice-recognition software. Even though reviewed, some grammatical, spelling, and word errors may remain.)

## 2020-01-03 NOTE — NURSING NOTE
Chief Complaint   Patient presents with     Diabetes     Letter for School/Work     patient requesting letter to return to work after being out for the past few months     /60   Pulse 65   Temp 97.5  F (36.4  C) (Temporal)   Ht 1.829 m (6')   Wt 117.6 kg (259 lb 3.2 oz)   SpO2 98%   BMI 35.15 kg/m   Estimated body mass index is 35.15 kg/m  as calculated from the following:    Height as of this encounter: 1.829 m (6').    Weight as of this encounter: 117.6 kg (259 lb 3.2 oz).        Health Maintenance that is potentially due pending provider review:  NONE    n/a    RODRIGUEZ Panchal

## 2020-01-03 NOTE — PROGRESS NOTES
Patient had a recent lung biopsy with results consistent with sarcoidosis. Dr. Batres notified and new orders received.    Date: 1/3/2020    Time of Call: 11:29 AM     Diagnosis:  Sarcoidosis     [ VORB ] Ordering provider: Dr. Batres  Order: Prednisone 40 MG once daily. Bactrim 400-80 MG once daily. Repeat PET scan in 3 months.     Order received by: Christopher CELESTE RN     Follow-up/additional notes: Patient was called and voice message left with above information. Requested a call back to discuss and see if patient is agreeable to plan. Awaiting call back.

## 2020-01-03 NOTE — PROGRESS NOTES
"Subjective     Neymar Rhodes is a 59 year old male who presents to clinic today for the following health issues:    HPI       Hospital Follow-up Visit:    Hospital/Nursing Home/IP Rehab Facility: [facility]  Date of Admission: 12/19/2019  Date of Discharge: 12/19/2019   Reason(s) for Admission: Mediastinal Adenopathy              Problems taking medications regularly:  {NONE DEFAULTED:768776::\"None\"}       Medication changes since discharge: {NONE DEFAULTED:986819::\"None\"}       Problems adhering to non-medication therapy:  {NONE DEFAULTED:629753::\"None\"}  {roomer to stop here, delete this reminder}  Summary of hospitalization:  {HOSPITAL DISCHARGE SUMMARY INFO:906025::\"[place] hospital discharge summary reviewed\"}  Diagnostic Tests/Treatments reviewed.  Follow up needed: {NONE DEFAULTED:360278::\"none\"}  Other Healthcare Providers Involved in Patient s Care:         {those currently involved after discharge:725657::\"None\"}  Update since discharge: {IMPROVED DEFAULT:396752::\"improved.\"} {include information from family, SNF, care coordination}    Post Discharge Medication Reconciliation: {ACO Med Rec (Provider):348154}.  Plan of care communicated with {Communicate Plan to:342495::\"patient\"}     Coding guidelines for this visit:  Type of Medical   Decision Making Face-to-Face Visit       within 7 Days of discharge Face-to-Face Visit        within 14 days of discharge   Moderate Complexity [id number]   High Complexity 24849 59203            {additonal problems for provider to add (Optional):707411}    {HIST REVIEW/ LINKS 2 (Optional):972063}    {Additional problems for the provider to add (optional):359790}  Reviewed and updated as needed this visit by Provider         Review of Systems   {ROS COMP (Optional):936111}      Objective    There were no vitals taken for this visit.  There is no height or weight on file to calculate BMI.  Physical Exam   {Exam List " "(Optional):147556}    {Diagnostic Test Results (Optional):938480::\"Diagnostic Test Results:\",\"Labs reviewed in Epic\"}        {PROVIDER CHARTING PREFERENCE:114028}      "

## 2020-01-03 NOTE — LETTER
January 3, 2020      Neymar A Carmelo  [address]  [facility] [address]        To whom it may concern:    I am writing on behalf of Neymar Rhodes, who has been dealing with serious cardiac issues.  He has been receiving appropriate cares and doing better.  He may now return to work without restrictions on Monday January 6, 2020.      If you have any questions or concerns, please call the clinic at the number listed above.       Sincerely,        Jefry Harris MD

## 2020-01-03 NOTE — PATIENT INSTRUCTIONS
*  Your diabetes was doing VERY well, enough that I was about to reduce some medications.     *  According to the biopsy results you have sarcoidosis, an inflammatory disorder of the lungs, heart, etc.     *  The pulmonary specialist tried to contact you to have you start therapy for this    *  Start Prednisone 40 mg once per day until further notice.     *  Start an antibiotic Bactrim DS, 1 tablet daily until further notice.     *  Repeat PET scan in 3 months.     *  I will contact the speicalists and let you know if there are any other follow up instructions.     *  Taking the prednisone will elevated your glucose levels, we will need to montior this closely and adjust your diabetes medications.     *  The prednisone also can make you retain fluid and gain weight and place you at risk for more heart failure.      *  Monitor your weight closely, keep in contact with the heart failure clinic for any worsening heart failure.     *  Continue all other medications at the same doses.  Contact your usual pharmacy if you need refills.     *  Send me an update on your diabetes/gllucose readings in 14 days regardless of how you feel.  Sign up for chuyita Gill to make this communication easier.     *  At a minimum, Return to see me in 3 months, sooner if needed.  Use [employer] or Call [phone] to schedule the appointment with me.

## 2020-01-07 NOTE — PROGRESS NOTES
Called patient and spoke to wife who states that patient saw another provider last week who ordered the prednisone and bactrim and he started taking them on Saturday. Message sent to schedulers to call patient to assist with scheduling the follow up PET scan in 3 months.

## 2020-01-16 LAB
BACTERIA SPEC CULT: NORMAL
FUNGUS SPEC CULT: NORMAL
SPECIMEN SOURCE: NORMAL
SPECIMEN SOURCE: NORMAL

## 2020-01-22 ENCOUNTER — TELEPHONE (OUTPATIENT)
Dept: INTERNAL MEDICINE | Facility: CLINIC | Age: 60
End: 2020-01-22

## 2020-01-22 DIAGNOSIS — E11.9 TYPE 2 DIABETES MELLITUS WITHOUT COMPLICATION, WITH LONG-TERM CURRENT USE OF INSULIN (H): ICD-10-CM

## 2020-01-22 DIAGNOSIS — Z79.4 TYPE 2 DIABETES MELLITUS WITHOUT COMPLICATION, WITH LONG-TERM CURRENT USE OF INSULIN (H): ICD-10-CM

## 2020-01-22 NOTE — TELEPHONE ENCOUNTER
"On steroid blood sugar now up to high of 460 today blood sugars are higher between  11 am - 8 pm.   Pt wondering if should adjust insulin ? Wondering if he needs sliding scale?  [phone] leave a message Taking Glipizide , Metformin and Levemir. Asymptomatic excepts just feels \"crappy when sugars are this high\"Mariangel Loya RN    "

## 2020-01-22 NOTE — TELEPHONE ENCOUNTER
I was worried that the steroids would cause troubles in this way.     Increase the Levemir insulin to 20 units once per day.   Continue to monitor the glucose readings.   Continue to raise the Levemir dose by 2 units every 3-4 days until the glucose readings are under 250 more regularly, watching out for low glucose readings.   The insulin dose requirements will decrease as the steroid dose decreases.      Send me an update in a couple weeks.

## 2020-01-22 NOTE — TELEPHONE ENCOUNTER
Left detailed message on VM.     Advised to be seen in ER if BG continue to be higher and symptomatic.     Also advised to update PCP as recommended or call back if he has further questions.     Debbie SONGN, RN, PHN

## 2020-01-29 DIAGNOSIS — E03.8 SUBCLINICAL HYPOTHYROIDISM: ICD-10-CM

## 2020-01-30 RX ORDER — LEVOTHYROXINE SODIUM 50 UG/1
50 TABLET ORAL DAILY
Qty: 90 TABLET | Refills: 1 | Status: SHIPPED | OUTPATIENT
Start: 2020-01-30 | End: 2020-07-23

## 2020-01-30 NOTE — TELEPHONE ENCOUNTER
"Requested Prescriptions   Pending Prescriptions Disp Refills     levothyroxine (SYNTHROID/LEVOTHROID) 50 MCG tablet [Pharmacy Med Name: Levothyroxine Sodium Oral Tablet 50 MCG] 30 tablet 1     Sig: Take 1 tablet (50 mcg) by mouth daily   Last Written Prescription Date:  11/6/2019  Last Fill Quantity: 30,  # refills: 2   Last Office Visit: 1/2/2020   Future Office Visit:         Thyroid Protocol Failed - 1/29/2020 10:14 PM        Failed - Normal TSH on file in past 12 months     Recent Labs   Lab Test 12/09/19  0821   TSH 7.52*              Passed - Patient is 12 years or older        Passed - Recent (12 mo) or future (30 days) visit within the authorizing provider's specialty     Patient has had an office visit with the authorizing provider or a provider within the authorizing providers department within the previous 12 mos or has a future within next 30 days. See \"Patient Info\" tab in inbasket, or \"Choose Columns\" in Meds & Orders section of the refill encounter.              Passed - Medication is active on med list          "

## 2020-02-12 ENCOUNTER — ANCILLARY PROCEDURE (OUTPATIENT)
Dept: PET IMAGING | Facility: CLINIC | Age: 60
End: 2020-02-12
Attending: INTERNAL MEDICINE
Payer: COMMERCIAL

## 2020-02-12 ENCOUNTER — OFFICE VISIT (OUTPATIENT)
Dept: CARDIOLOGY | Facility: CLINIC | Age: 60
End: 2020-02-12
Attending: INTERNAL MEDICINE
Payer: COMMERCIAL

## 2020-02-12 VITALS
BODY MASS INDEX: 34.31 KG/M2 | WEIGHT: 253.3 LBS | HEIGHT: 72 IN | DIASTOLIC BLOOD PRESSURE: 58 MMHG | HEART RATE: 65 BPM | SYSTOLIC BLOOD PRESSURE: 94 MMHG | OXYGEN SATURATION: 98 %

## 2020-02-12 DIAGNOSIS — I42.8 NONISCHEMIC CARDIOMYOPATHY (H): ICD-10-CM

## 2020-02-12 DIAGNOSIS — I50.22 CHRONIC SYSTOLIC HEART FAILURE (H): ICD-10-CM

## 2020-02-12 DIAGNOSIS — D86.9 SARCOIDOSIS: Primary | ICD-10-CM

## 2020-02-12 DIAGNOSIS — K21.9 ACID REFLUX: ICD-10-CM

## 2020-02-12 DIAGNOSIS — D86.9 SARCOIDOSIS: ICD-10-CM

## 2020-02-12 PROCEDURE — G0463 HOSPITAL OUTPT CLINIC VISIT: HCPCS | Mod: ZF

## 2020-02-12 PROCEDURE — 99215 OFFICE O/P EST HI 40 MIN: CPT | Mod: ZP | Performed by: INTERNAL MEDICINE

## 2020-02-12 RX ORDER — OMEPRAZOLE 40 MG/1
40 CAPSULE, DELAYED RELEASE ORAL DAILY
Qty: 90 CAPSULE | Refills: 3 | Status: SHIPPED | OUTPATIENT
Start: 2020-02-12 | End: 2022-01-14

## 2020-02-12 ASSESSMENT — PAIN SCALES - GENERAL: PAINLEVEL: NO PAIN (0)

## 2020-02-12 ASSESSMENT — MIFFLIN-ST. JEOR: SCORE: 1996.96

## 2020-02-12 NOTE — LETTER
2/12/2020      RE: Neymar Rhodes  [address]  [facility] [address]       Dear Colleague,    Thank you for the opportunity to participate in the care of your patient, Neymar Rhodes, at the [facility]. Please see a copy of my visit note below.    February 12, 2020     Dear Dr. Salomon,    Neymar Rhodes is a very pleasant 59-year-old gentleman who presented with his wife today to the advanced heart failure clinic for further evaluation for possible cardiac sarcoidosis and cardiomyopathy.  He does have a complex past medical history with a dilated 6.9 cm ventricle heart failure reduced ejection fraction in the 20-30% range as well as sustained VT status post device implantation.  He had a recent admission for inpatient diuresis and developed hyperkalemia prerenal azotemia.  He regularly follows at [facility] and core clinic there.   His history is summarized from prior notes: He was first seen in 2018 with new diagnosis of HF with an EF of 40% by Dr. Dr. Iniguez. He had normal sinus rhythm with LBBB;  coronary angiography showed nonobstructive disease. He was started on guideline directed therapy with beta-blockers, ACE inhibitors and mineralocorticoid receptor antagonist.  Follow-up cardiac MRI showed that he had a small scar concerning for sarcoidosis. He unfortunately failed to follow-up afterwards but remained on medications. He did well for about a year until he began to develop progressive dyspnea which resulted in his admission on 10/1/19. ECHO showed an EF<20% with an LVEDd of 6.9 cm. The following day, while seated, he developed a sustained monomorphic VT ~180 bpm.  This was cardioverted emergently into normal sinus rhythm with first shock at 200J. From there he went to the cath lab where his coronary anatomy was unchanged. RHC showed mildly elevated filling pressures with an LVEDP of 21 and a low-normal cardiac output.  Cardiac MRI was repeated and showed LGE in septal, inferior, lateral base with a non-CAD scar in the anterolateral mid-wall with a worsening scar burden to 13%. He was loaded with amiodarone and received CRT-D on 10/4. He was discharged the following day on his PTA regimen with the addition of spironolactone 25 mg and furosemide 20 mg daily. Afterwards he became hypotensive, and his PCP reduced his lisinopril. Despite this he was still hypotensive and labs showed YASMEEN with hyperkalemia on 10/9. Significant alteration in his diet (ETOH and sodium reduction) was likely contributing.   Today he returns for follow-up.  Note that he feels considerably better and notes that he now has energy and able to do whatever he would like to do including driving the past and use of the .  He sleeps much less and his overall feeling is much better.  He does have history mariposa out of control as expected and is taking insulin right now.  He did not take PPI.  No other complaints no dizziness lightheadedness no chest pains.  No palpitations no shocks from the ICD.  Takes the prednisone at 40 mg once a day as well as the Bactrim.  He did have his PET scan today no results as of yet.    PMH as reviewed from notes:  # Severe nonischemic cardiomyopathy with EF of 15% with marked LV dilatation  # Sustained monomorphic ventricular tachycardia, s/p CRT-D  # Potential cardiac sarcoidosis  # Nonobstructive coronary artery disease  # DMII  # Long-standing ETOH abuse - prior to admission was drinking heavily (sallie aguilar and at least 20 beers every weekend - cut back).  # Social - Lives with wife Julisa. He works as a . He is reading labels / monitoring his sodium intake with the help of his wife, and weighing himself daily.      PAST MEDICAL HISTORY:  Past Medical History:   Diagnosis Date     Ascending aorta dilatation (H)      Diverticulosis of colon (without mention of hemorrhage)      Essential hypertension,  benign      Gout, unspecified      LBBB (left bundle branch block)      Morbid obesity (H)      Non-ischemic cardiomyopathy (H)      Nonrheumatic mitral valve regurgitation      NSTEMI (non-ST elevated myocardial infarction) (H)     cardiac cath 6/2018: mild non-obstructive CAD     Other and unspecified hyperlipidemia      Type II or unspecified type diabetes mellitus without mention of complication, not stated as uncontrolled      FAMILY HISTORY:  Family History   Problem Relation Age of Onset     Cancer Father 75        bladder cancer     Myocardial Infarction Father      Other - See Comments Father         smoking     Breast Cancer Mother      Breast Cancer Sister      Family History Negative Brother      Family History Negative Son      Family History Negative Son         fluttering/murmur     Asthma Son      Colon Cancer No family hx of      SOCIAL HISTORY:  Social History     Socioeconomic History     Marital status:      Spouse name: None     Number of children: None     Years of education: None     Highest education level: None   Occupational History     None   Social Needs     Financial resource strain: None     Food insecurity:     Worry: None     Inability: None     Transportation needs:     Medical: None     Non-medical: None   Tobacco Use     Smoking status: Never Smoker     Smokeless tobacco: Never Used   Substance and Sexual Activity     Alcohol use: Not Currently     Comment: Was drinking 9 beers and three Flaco Martínez shot every Friday.  No ETOH sinc eOctober 2019.     Drug use: No     Sexual activity: Yes     Partners: Female   Lifestyle     Physical activity:     Days per week: None     Minutes per session: None     Stress: None   Relationships     Social connections:     Talks on phone: None     Gets together: None     Attends Yarsani service: None     Active member of club or organization: None     Attends meetings of clubs or organizations: None     Relationship status: None      Intimate partner violence:     Fear of current or ex partner: None     Emotionally abused: None     Physically abused: None     Forced sexual activity: None   Other Topics Concern     Parent/sibling w/ CABG, MI or angioplasty before 65F 55M? Not Asked   Social History Narrative     None     CURRENT MEDICATIONS:  Current Outpatient Medications   Medication     acetaminophen (TYLENOL) 500 MG tablet     amiodarone (PACERONE/CODARONE) 200 MG tablet     aspirin 81 MG tablet     atorvastatin (LIPITOR) 80 MG tablet     BD ULTRA-FINE 29G X 12.7MM insulin pen needle     BD ULTRA-FINE 29G X 12.7MM insulin pen needle     blood glucose monitoring (ACCU-CHEK LUL PLUS) test strip     clotrimazole (LOTRIMIN) 1 % external cream     Continuous Blood Gluc Sensor (FREESTYLE JOCELIN 14 DAY SENSOR) MISC     continuous blood glucose monitoring (FREESTYLE JOCELIN) sensor     fenofibrate (TRIGLIDE/LOFIBRA) 160 MG tablet     fluticasone (FLONASE) 50 MCG/ACT nasal spray     furosemide (LASIX) 20 MG tablet     glipiZIDE (GLUCOTROL XL) 10 MG 24 hr tablet     insulin detemir (LEVEMIR PEN) 100 UNIT/ML pen     levothyroxine (SYNTHROID/LEVOTHROID) 50 MCG tablet     liraglutide (VICTOZA PEN) 18 MG/3ML solution     metFORMIN (GLUCOPHAGE) 1000 MG tablet     metoprolol succinate ER (TOPROL-XL) 25 MG 24 hr tablet     predniSONE (DELTASONE) 20 MG tablet     sacubitril-valsartan (ENTRESTO) 49-51 MG per tablet     sulfamethoxazole-trimethoprim (BACTRIM/SEPTRA) 400-80 MG tablet     triamcinolone (KENALOG) 0.5 % cream     albuterol (PROAIR HFA/PROVENTIL HFA/VENTOLIN HFA) 108 (90 Base) MCG/ACT Inhaler     No current facility-administered medications for this visit.      ROS:   Constitutional: No fever, chills, or sweats. Weight is 253 lbs 4.8 oz  ENT: No visual disturbance, ear ache, epistaxis, sore throat.   Allergies/Immunologic: Negative.   Respiratory: No cough, hemoptysis.   Cardiovascular: As per HPI.   GI: No nausea, vomiting, hematemesis, melena, or  hematochezia.   : No urinary frequency, dysuria, or hematuria.   Integument: Negative.   Psychiatric: Pleasant, no major depression noted  Neuro: No focal neurological deficits noted  Endocrinology: Negative.   Musculoskeletal: As per HPI.      EXAM:  BP 94/58 (BP Location: Left arm, Patient Position: Chair, Cuff Size: Adult Large)   Pulse 65   Ht 1.829 m (6')   Wt 114.9 kg (253 lb 4.8 oz)   SpO2 98%   BMI 34.35 kg/m     Constitutional:  Patient is pleasant, alert, cooperative, and in NAD.  HEENT:  NCAT. PERRLA. EOM's intact.   Neck:  CVP is difficult to assess due to body habitus.    Pulmonary: Normal respiratory effort. CTAB.   Cardiac: RRR, normal S1/S2, no S3/S4, no murmur or rub. Distant heart tones. L upper chest device incision C/D/I.  Abdomen:  Non-tender abdomen, no hepatosplenomegaly appreciated.   Vascular: Pulses in the upper and lower extremities are 2+ and equal bilaterally.  Extremities: No edema, erythema, cyanosis or tenderness appreciated.  Skin:  No rashes or lesions appreciated.   Neurological:  No gross motor or sensory deficits.   Psych: Appropriate affect.      Labs:  Lab Results   Component Value Date    WBC 7.1 10/05/2019    HGB 13.3 12/18/2019    HCT 35.9 (L) 10/05/2019     10/05/2019     12/02/2019    POTASSIUM 4.1 12/19/2019    CHLORIDE 106 12/02/2019    CO2 24 12/02/2019    BUN 16 12/02/2019    CR 0.96 12/19/2019     (A) 12/04/2019    DD 0.3 10/01/2019    NTBNPI 957 (H) 10/01/2019    NTBNP 703 (H) 12/18/2019    TROPONIN 0.07 06/10/2018    TROPI 0.071 (H) 10/01/2019    AST 28 12/09/2019    ALT 7 12/09/2019    ALKPHOS 36 (L) 10/02/2019    BILITOTAL 0.6 10/02/2019    INR 1.16 (H) 10/04/2019     Lymph node biopsy:  The amount of tissue obtained is limited. There are small fragments of noncaseating granulomas with occasional   multinucleated giant cells and rim of lymphocytic infiltrate. The differential diagnosis includes infection   and noninfectious processes  such as sarcoidosis, in the appropriate clinical and radiologic context.     Cardiac MRI 10/1/2019:  Late gadolinium enhancement is present in the septal. inferior and lateral base. There is non-CAD scar in  the anterolateral mid-wall. Scar is more extensive (13%) as compared to the prior MRI (6%).  The LV is severely dilated and the global systolic function is moderately severely to severely reduced with  an LVEF of 25%. There is severe global LV dysfunction with dyssynchronous septal motion consistent with a  LBBB.  Collectively, these findings are most consistent with a non-ischemic cardiomyopathy. Possible etiologies  include prior LBBB, cardiac sarcoidosis or prior myocarditis.     Coronary angiogram 10/1/2019:    Minimal non-obstructive coronary artery disease    Mildly elevated LVEDP    Successful closure of the RFA access site with a 6F Angioseal device     RHC 10/1/2019:  Normal filling pressures along with PA pressure.  Upper normal pulmonary capillary wedge pressure (13mmHg).     TTE 10/1/2019:  1. Severely dilated left ventricle (end-diastolic diameter 6.9 cm) with  severely reduced systolic function.  2. Visually estimated LVEF < 20%.  3. Significant left ventricular wall motion abnormalities.  4. Thinning and akinesis of the inferior and inferolateral walls and apex. Septal thinning and dyskinesis consistent with known conduction abnormality. Significant hypokinesis of the anterior and anterolateral walls.  5. Normal right ventricular size and systolic function. Estimated right  ventricular systolic pressure is 13 mmHg plus the right atrial pressure.  4. Mild mitral regurgitation, trace tricuspid valve regurgitation. It with previous study dated 6/11/2018 and cardiac MRI dated 6/13/2018, LVEF has reduced from 40% to <20%.     Cardiac MRI 7/5/2018  1. The global left ventricular systolic function is moderately decreased. The LVEF is 40%. There is mild LV  hypertrophy.  The LV is moderately enlarged.     2.  There is moderate global hypokinesis and dyssynchronous septal motion.  Regional wall motion  abnormalities include moderately severe hypokinesis of the basilar inferoseptum and basilar inferior wall..  3.  Late gadolinium enhancement imaging predominantly subendocardial enhancement in the base of the  inferior and inferoseptal walls.  The pattern is most consistent  with an ischemic etiology. Other  etiologies such as sarcoidosis are also possible. The degree of LV dysfunction is out of proportion to the  degree of scar (6%).    TTE 1/2/20:  The left ventricle is severely dilated, LVEDD 7 cm  The visual ejection fraction is estimated at 25-30%.  There is mod-severe global hypokinesia of the left ventricle.  There is a catheter/pacemaker lead seen in the right ventricle.  LVEF may be slightly improved compared with study from 10-19    PET 12/4/19:  1. Diffuse increased FDG uptake in the myocardium compatible with  active cardiac sarcoid.  2. Globally hypokinetic and enlarged heart with ejection fraction  severely decreased to 16%, compatible with a dilatated cardiomyopathy.  3. Small perfusion abnormality of the anterior wall of the left  ventricle, this is the LAD distribution..   4. There is increased ammonia uptake in the lungs, consistent with  patient's known history of congestive heart failure.  5. FDG PET/CT demonstrate active sarcoid in the mediastinal and hilar  lymph nodes.    ASSESSMENT AND PLAN:  In summary, patient is a 59 year old gentleman with complex past cardiac history as detailed above including VT cardiac arrest, significantly reduced ejection fraction nonischemic cardiomyopathy class III symptoms who was referred for further evaluation for possible cardiac sarcoid.    Patient presentation was initially consistent with sarcoid on the clinical basis and biopsy showed evidence of noncaseating granulomas also suggesting sarcoid.  We started treatment about 2-1/2-month ago and currently  taking 40 mg prednisone.  He did have repeat PET scan today and will see if there was any improvement.  Clinically he feels much better and there were no ICD shocks.  At this point we will continue all his medications we will review the repeat PET scan results and if there is any improvement then we will plan to decrease his prednisone to 30 mg once a day.  He will continue this for 3 months and then reevaluate him with a repeat PET scan.  He will also continue his Bactrim singe strength once once a day in addition to his insulin and we discussed that his insulin needs will hopefully decrease as the prednisone dose decreases.  He will also start taking PPI daily.  Once improvement in PET scan result and if he continues to feel better then we will plan to repeat an echocardiogram probably in 3 to 6 months.  Otherwise continue medications as he is.     I appreciate the opportunity to participate in the care of Neymar Rhodes . Please do not hesitate to contact me with any further questions.     Sincerely,   Roberto Batres MD     [facility] Division of Cardiology

## 2020-02-12 NOTE — PATIENT INSTRUCTIONS
Thank you for your visit today.  Please call me with any questions or concerns.   Christopher Stephens RN  Cardiology Care Coordinator  [phone], press option 1 then option 4

## 2020-02-12 NOTE — NURSING NOTE
Chief Complaint   Patient presents with     New Patient     Patient is referred by Umm Hernandes MD, for cardiac evaluation related to history of chronic systolic heart failure, NICM, and recent active sarcoidosis.     Vitals were taken and medications were reconciled.     Nicole Wilburn CMA    12:41 PM

## 2020-02-12 NOTE — PROGRESS NOTES
February 12, 2020     Dear Neymar Kitchen ELADIO Rhodes is a very pleasant 59-year-old gentleman who presented with his wife today to the advanced heart failure clinic for further evaluation for possible cardiac sarcoidosis and cardiomyopathy.  He does have a complex past medical history with a dilated 6.9 cm ventricle heart failure reduced ejection fraction in the 20-30% range as well as sustained VT status post device implantation.  He had a recent admission for inpatient diuresis and developed hyperkalemia prerenal azotemia.  He regularly follows at [facility] and core clinic there.   His history is summarized from prior notes: He was first seen in 2018 with new diagnosis of HF with an EF of 40% by Dr. Dr. Iniguez. He had normal sinus rhythm with LBBB;  coronary angiography showed nonobstructive disease. He was started on guideline directed therapy with beta-blockers, ACE inhibitors and mineralocorticoid receptor antagonist.  Follow-up cardiac MRI showed that he had a small scar concerning for sarcoidosis. He unfortunately failed to follow-up afterwards but remained on medications. He did well for about a year until he began to develop progressive dyspnea which resulted in his admission on 10/1/19. ECHO showed an EF<20% with an LVEDd of 6.9 cm. The following day, while seated, he developed a sustained monomorphic VT ~180 bpm.  This was cardioverted emergently into normal sinus rhythm with first shock at 200J. From there he went to the cath lab where his coronary anatomy was unchanged. RHC showed mildly elevated filling pressures with an LVEDP of 21 and a low-normal cardiac output. Cardiac MRI was repeated and showed LGE in septal, inferior, lateral base with a non-CAD scar in the anterolateral mid-wall with a worsening scar burden to 13%. He was loaded with amiodarone and received CRT-D on 10/4. He was discharged the following day on his PTA regimen with the addition of spironolactone 25 mg and  furosemide 20 mg daily. Afterwards he became hypotensive, and his PCP reduced his lisinopril. Despite this he was still hypotensive and labs showed YASMEEN with hyperkalemia on 10/9. Significant alteration in his diet (ETOH and sodium reduction) was likely contributing.   Today he returns for follow-up.  Note that he feels considerably better and notes that he now has energy and able to do whatever he would like to do including driving the past and use of the .  He sleeps much less and his overall feeling is much better.  He does have history mariposa out of control as expected and is taking insulin right now.  He did not take PPI.  No other complaints no dizziness lightheadedness no chest pains.  No palpitations no shocks from the ICD.  Takes the prednisone at 40 mg once a day as well as the Bactrim.  He did have his PET scan today no results as of yet.    PMH as reviewed from notes:  # Severe nonischemic cardiomyopathy with EF of 15% with marked LV dilatation  # Sustained monomorphic ventricular tachycardia, s/p CRT-D  # Potential cardiac sarcoidosis  # Nonobstructive coronary artery disease  # DMII  # Long-standing ETOH abuse - prior to admission was drinking heavily (sallie aguilar and at least 20 beers every weekend - cut back).  # Social - Lives with wife Julisa. He works as a . He is reading labels / monitoring his sodium intake with the help of his wife, and weighing himself daily.      PAST MEDICAL HISTORY:  Past Medical History:   Diagnosis Date     Ascending aorta dilatation (H)      Diverticulosis of colon (without mention of hemorrhage)      Essential hypertension, benign      Gout, unspecified      LBBB (left bundle branch block)      Morbid obesity (H)      Non-ischemic cardiomyopathy (H)      Nonrheumatic mitral valve regurgitation      NSTEMI (non-ST elevated myocardial infarction) (H)     cardiac cath 6/2018: mild non-obstructive CAD     Other and unspecified hyperlipidemia       Type II or unspecified type diabetes mellitus without mention of complication, not stated as uncontrolled      FAMILY HISTORY:  Family History   Problem Relation Age of Onset     Cancer Father 75        bladder cancer     Myocardial Infarction Father      Other - See Comments Father         smoking     Breast Cancer Mother      Breast Cancer Sister      Family History Negative Brother      Family History Negative Son      Family History Negative Son         fluttering/murmur     Asthma Son      Colon Cancer No family hx of      SOCIAL HISTORY:  Social History     Socioeconomic History     Marital status:      Spouse name: None     Number of children: None     Years of education: None     Highest education level: None   Occupational History     None   Social Needs     Financial resource strain: None     Food insecurity:     Worry: None     Inability: None     Transportation needs:     Medical: None     Non-medical: None   Tobacco Use     Smoking status: Never Smoker     Smokeless tobacco: Never Used   Substance and Sexual Activity     Alcohol use: Not Currently     Comment: Was drinking 9 beers and three Flaco Martínez shot every Friday.  No ETOH [facility] eOctober 2019.     Drug use: No     Sexual activity: Yes     Partners: Female   Lifestyle     Physical activity:     Days per week: None     Minutes per session: None     Stress: None   Relationships     Social connections:     Talks on phone: None     Gets together: None     Attends Sikhism service: None     Active member of club or organization: None     Attends meetings of clubs or organizations: None     Relationship status: None     Intimate partner violence:     Fear of current or ex partner: None     Emotionally abused: None     Physically abused: None     Forced sexual activity: None   Other Topics Concern     Parent/sibling w/ CABG, MI or angioplasty before 65F 55M? Not Asked   Social History Narrative     None     CURRENT MEDICATIONS:  Current  Outpatient Medications   Medication     acetaminophen (TYLENOL) 500 MG tablet     amiodarone (PACERONE/CODARONE) 200 MG tablet     aspirin 81 MG tablet     atorvastatin (LIPITOR) 80 MG tablet     BD ULTRA-FINE 29G X 12.7MM insulin pen needle     BD ULTRA-FINE 29G X 12.7MM insulin pen needle     blood glucose monitoring (ACCU-CHEK LUL PLUS) test strip     clotrimazole (LOTRIMIN) 1 % external cream     Continuous Blood Gluc Sensor (FREESTYLE JOCELIN 14 DAY SENSOR) MISC     continuous blood glucose monitoring (FREESTYLE JOCELIN) sensor     fenofibrate (TRIGLIDE/LOFIBRA) 160 MG tablet     fluticasone (FLONASE) 50 MCG/ACT nasal spray     furosemide (LASIX) 20 MG tablet     glipiZIDE (GLUCOTROL XL) 10 MG 24 hr tablet     insulin detemir (LEVEMIR PEN) 100 UNIT/ML pen     levothyroxine (SYNTHROID/LEVOTHROID) 50 MCG tablet     liraglutide (VICTOZA PEN) 18 MG/3ML solution     metFORMIN (GLUCOPHAGE) 1000 MG tablet     metoprolol succinate ER (TOPROL-XL) 25 MG 24 hr tablet     predniSONE (DELTASONE) 20 MG tablet     sacubitril-valsartan (ENTRESTO) 49-51 MG per tablet     sulfamethoxazole-trimethoprim (BACTRIM/SEPTRA) 400-80 MG tablet     triamcinolone (KENALOG) 0.5 % cream     albuterol (PROAIR HFA/PROVENTIL HFA/VENTOLIN HFA) 108 (90 Base) MCG/ACT Inhaler     No current facility-administered medications for this visit.      ROS:   Constitutional: No fever, chills, or sweats. Weight is 253 lbs 4.8 oz  ENT: No visual disturbance, ear ache, epistaxis, sore throat.   Allergies/Immunologic: Negative.   Respiratory: No cough, hemoptysis.   Cardiovascular: As per HPI.   GI: No nausea, vomiting, hematemesis, melena, or hematochezia.   : No urinary frequency, dysuria, or hematuria.   Integument: Negative.   Psychiatric: Pleasant, no major depression noted  Neuro: No focal neurological deficits noted  Endocrinology: Negative.   Musculoskeletal: As per HPI.      EXAM:  BP 94/58 (BP Location: Left arm, Patient Position: Chair, Cuff Size:  Adult Large)   Pulse 65   Ht 1.829 m (6')   Wt 114.9 kg (253 lb 4.8 oz)   SpO2 98%   BMI 34.35 kg/m    Constitutional:  Patient is pleasant, alert, cooperative, and in NAD.  HEENT:  NCAT. PERRLA. EOM's intact.   Neck:  CVP is difficult to assess due to body habitus.    Pulmonary: Normal respiratory effort. CTAB.   Cardiac: RRR, normal S1/S2, no S3/S4, no murmur or rub. Distant heart tones. L upper chest device incision C/D/I.  Abdomen:  Non-tender abdomen, no hepatosplenomegaly appreciated.   Vascular: Pulses in the upper and lower extremities are 2+ and equal bilaterally.  Extremities: No edema, erythema, cyanosis or tenderness appreciated.  Skin:  No rashes or lesions appreciated.   Neurological:  No gross motor or sensory deficits.   Psych: Appropriate affect.      Labs:  Lab Results   Component Value Date    WBC 7.1 10/05/2019    HGB 13.3 12/18/2019    HCT 35.9 (L) 10/05/2019     10/05/2019     12/02/2019    POTASSIUM 4.1 12/19/2019    CHLORIDE 106 12/02/2019    CO2 24 12/02/2019    BUN 16 12/02/2019    CR 0.96 12/19/2019     (A) 12/04/2019    DD 0.3 10/01/2019    NTBNPI 957 (H) 10/01/2019    NTBNP 703 (H) 12/18/2019    TROPONIN 0.07 06/10/2018    TROPI 0.071 (H) 10/01/2019    AST 28 12/09/2019    ALT 7 12/09/2019    ALKPHOS 36 (L) 10/02/2019    BILITOTAL 0.6 10/02/2019    INR 1.16 (H) 10/04/2019     Lymph node biopsy:  The amount of tissue obtained is limited. There are small fragments of noncaseating granulomas with occasional   multinucleated giant cells and rim of lymphocytic infiltrate. The differential diagnosis includes infection   and noninfectious processes such as sarcoidosis, in the appropriate clinical and radiologic context.     Cardiac MRI 10/1/2019:  Late gadolinium enhancement is present in the septal. inferior and lateral base. There is non-CAD scar in  the anterolateral mid-wall. Scar is more extensive (13%) as compared to the prior MRI (6%).  The LV is severely dilated  and the global systolic function is moderately severely to severely reduced with  an LVEF of 25%. There is severe global LV dysfunction with dyssynchronous septal motion consistent with a  LBBB.  Collectively, these findings are most consistent with a non-ischemic cardiomyopathy. Possible etiologies  include prior LBBB, cardiac sarcoidosis or prior myocarditis.     Coronary angiogram 10/1/2019:    Minimal non-obstructive coronary artery disease    Mildly elevated LVEDP    Successful closure of the RFA access site with a 6F Angioseal device     RHC 10/1/2019:  Normal filling pressures along with PA pressure.  Upper normal pulmonary capillary wedge pressure (13mmHg).     TTE 10/1/2019:  1. Severely dilated left ventricle (end-diastolic diameter 6.9 cm) with  severely reduced systolic function.  2. Visually estimated LVEF < 20%.  3. Significant left ventricular wall motion abnormalities.  4. Thinning and akinesis of the inferior and inferolateral walls and apex. Septal thinning and dyskinesis consistent with known conduction abnormality. Significant hypokinesis of the anterior and anterolateral walls.  5. Normal right ventricular size and systolic function. Estimated right  ventricular systolic pressure is 13 mmHg plus the right atrial pressure.  4. Mild mitral regurgitation, trace tricuspid valve regurgitation. It with previous study dated 6/11/2018 and cardiac MRI dated 6/13/2018, LVEF has reduced from 40% to <20%.     Cardiac MRI 7/5/2018  1. The global left ventricular systolic function is moderately decreased. The LVEF is 40%. There is mild LV  hypertrophy.  The LV is moderately enlarged.    2.  There is moderate global hypokinesis and dyssynchronous septal motion.  Regional wall motion  abnormalities include moderately severe hypokinesis of the basilar inferoseptum and basilar inferior wall..  3.  Late gadolinium enhancement imaging predominantly subendocardial enhancement in the base of the  inferior and  inferoseptal walls.  The pattern is most consistent  with an ischemic etiology. Other  etiologies such as sarcoidosis are also possible. The degree of LV dysfunction is out of proportion to the  degree of scar (6%).    TTE 1/2/20:  The left ventricle is severely dilated, LVEDD 7 cm  The visual ejection fraction is estimated at 25-30%.  There is mod-severe global hypokinesia of the left ventricle.  There is a catheter/pacemaker lead seen in the right ventricle.  LVEF may be slightly improved compared with study from 10-19    PET 12/4/19:  1. Diffuse increased FDG uptake in the myocardium compatible with  active cardiac sarcoid.  2. Globally hypokinetic and enlarged heart with ejection fraction  severely decreased to 16%, compatible with a dilatated cardiomyopathy.  3. Small perfusion abnormality of the anterior wall of the left  ventricle, this is the LAD distribution..   4. There is increased ammonia uptake in the lungs, consistent with  patient's known history of congestive heart failure.  5. FDG PET/CT demonstrate active sarcoid in the mediastinal and hilar  lymph nodes.    ASSESSMENT AND PLAN:  In summary, patient is a 59 year old gentleman with complex past cardiac history as detailed above including VT cardiac arrest, significantly reduced ejection fraction nonischemic cardiomyopathy class III symptoms who was referred for further evaluation for possible cardiac sarcoid.    Patient presentation was initially consistent with sarcoid on the clinical basis and biopsy showed evidence of noncaseating granulomas also suggesting sarcoid.  We started treatment about 2-1/2-month ago and currently taking 40 mg prednisone.  He did have repeat PET scan today and will see if there was any improvement.  Clinically he feels much better and there were no ICD shocks.  At this point we will continue all his medications we will review the repeat PET scan results and if there is any improvement then we will plan to decrease his  prednisone to 30 mg once a day.  He will continue this for 3 months and then reevaluate him with a repeat PET scan.  He will also continue his Bactrim singe strength once once a day in addition to his insulin and we discussed that his insulin needs will hopefully decrease as the prednisone dose decreases.  He will also start taking PPI daily.  Once improvement in PET scan result and if he continues to feel better then we will plan to repeat an echocardiogram probably in 3 to 6 months.  Otherwise continue medications as he is.     I appreciate the opportunity to participate in the care of Neymar Rhodes . Please do not hesitate to contact me with any further questions.     Sincerely,   Roberto Batres MD     [facility] Division of Cardiology

## 2020-02-14 LAB
MYCOBACTERIUM SPEC CULT: NORMAL
MYCOBACTERIUM SPEC CULT: NORMAL
SPECIMEN SOURCE: NORMAL

## 2020-02-18 ENCOUNTER — OFFICE VISIT (OUTPATIENT)
Dept: URGENT CARE | Facility: URGENT CARE | Age: 60
End: 2020-02-18
Payer: COMMERCIAL

## 2020-02-18 VITALS
TEMPERATURE: 98.1 F | DIASTOLIC BLOOD PRESSURE: 50 MMHG | OXYGEN SATURATION: 97 % | BODY MASS INDEX: 35.67 KG/M2 | WEIGHT: 263 LBS | SYSTOLIC BLOOD PRESSURE: 104 MMHG | RESPIRATION RATE: 16 BRPM | HEART RATE: 70 BPM

## 2020-02-18 DIAGNOSIS — J20.9 ACUTE BRONCHITIS, UNSPECIFIED ORGANISM: Primary | ICD-10-CM

## 2020-02-18 PROCEDURE — 99214 OFFICE O/P EST MOD 30 MIN: CPT | Performed by: PHYSICIAN ASSISTANT

## 2020-02-18 RX ORDER — CODEINE PHOSPHATE AND GUAIFENESIN 10; 100 MG/5ML; MG/5ML
1-2 SOLUTION ORAL EVERY 4 HOURS PRN
Qty: 240 ML | Refills: 0 | Status: SHIPPED | OUTPATIENT
Start: 2020-02-18 | End: 2020-08-19

## 2020-02-18 NOTE — PROGRESS NOTES
Patient presents with:  URI: has been having runny nose, and eye, feels wheezy, coughing, headache. x4 days    SUBJECTIVE:   Neymar Rhodes is a 59 year old male presenting with a chief complaint of:  1) productive cough for the past 4 days, sputum is purulent/green.  No fevers.    2) runny nose and congestion as well  3) wheezing.      Just had  PET scan for his sarcoidosis a week ago.    Current and Associated symptoms: as above  Treatment measures tried include albuterol inhaler and nebulizer  Predisposing factors include sarcoidosis.    Past Medical History:   Diagnosis Date     Ascending aorta dilatation (H)      Diverticulosis of colon (without mention of hemorrhage)      Essential hypertension, benign      Gout, unspecified      LBBB (left bundle branch block)      Morbid obesity (H)      Non-ischemic cardiomyopathy (H)      Nonrheumatic mitral valve regurgitation      NSTEMI (non-ST elevated myocardial infarction) (H)     cardiac cath 6/2018: mild non-obstructive CAD     Other and unspecified hyperlipidemia      Type II or unspecified type diabetes mellitus without mention of complication, not stated as uncontrolled      Patient Active Problem List   Diagnosis     Diverticulosis of large intestine     Benign essential hypertension     Gout     Severe obesity (BMI 35.0-39.9) with comorbidity (H)     Type 2 diabetes mellitus with circulatory disorder, without long-term current use of insulin (H)     Hyperlipidemia LDL goal <100     NSTEMI (non-ST elevated myocardial infarction) (H)     Non-ischemic cardiomyopathy (H)     LBBB (left bundle branch block)     Nonrheumatic mitral valve regurgitation     Ascending aorta dilatation (H)     Coronary artery disease involving native coronary artery of native heart without angina pectoris     Near syncope     Paroxysmal ventricular tachycardia (H)     Mediastinal adenopathy     Social History     Tobacco Use     Smoking status: Never Smoker     Smokeless tobacco:  Never Used   Substance Use Topics     Alcohol use: Not Currently     Comment: Was drinking 9 beers and three Flaco Martínez shot every Friday.  No ETOH [facility] eOctober 2019.       ROS:  CONSTITUTIONAL:as per HPI  INTEGUMENTARY/SKIN: NEGATIVE for worrisome rashes, moles or lesions  EYES: NEGATIVE for vision changes or irritation  ENT/MOUTH: as per HPI  RESP:as per HPI  CV: NEGATIVE for chest pain, palpitations or peripheral edema  GI: NEGATIVE for nausea, abdominal pain, heartburn, or change in bowel habits  : negative for dysuria, hematuria, decreased urinary stream, erectile dysfunction  MUSCULOSKELETAL: NEGATIVE for significant arthralgias or myalgia  NEURO: NEGATIVE for weakness, dizziness or paresthesias  ENDOCRINE: NEGATIVE for temperature intolerance, skin/hair changes  Review of systems negative except as stated above.    OBJECTIVE  :/50 (BP Location: Left arm, Patient Position: Chair, Cuff Size: Adult Large)   Pulse 70   Temp 98.1  F (36.7  C) (Oral)   Resp 16   Wt 119.3 kg (263 lb)   SpO2 97%   BMI 35.67 kg/m    GENERAL APPEARANCE: healthy, alert and no distress  EYES: EOMI,  PERRL, conjunctiva clear  HENT: ear canals and TM's normal.  Nose and mouth without ulcers, erythema or lesions  NECK: supple, nontender, no lymphadenopathy  RESP: a few scattered wheezes and rhonchi  CV: regular rates and rhythm, normal S1 S2, no murmur noted  ABDOMEN:  soft, nontender, no HSM or masses and bowel sounds normal  NEURO: Normal strength and tone, sensory exam grossly normal,  normal speech and mentation  SKIN: no suspicious lesions or rashes    (J20.9) Acute bronchitis, unspecified organism  (primary encounter diagnosis)  Comment:   Plan: amoxicillin-clavulanate (AUGMENTIN) 875-125 MG         tablet, guaiFENesin-codeine (ROBITUSSIN AC)         100-10 MG/5ML solution         F/U with PCP should symptoms persist or worsen.    Patient expresses understanding and agreement with the assessment and plan as  above.

## 2020-02-18 NOTE — PATIENT INSTRUCTIONS
(J20.9) Acute bronchitis, unspecified organism  (primary encounter diagnosis)  Comment:   Plan: amoxicillin-clavulanate (AUGMENTIN) 875-125 MG         tablet, guaiFENesin-codeine (ROBITUSSIN AC)         100-10 MG/5ML solution

## 2020-02-18 NOTE — LETTER
[facility]  [address] [address]  [phone]      February 18, 2020    RE:  Neymar Rhodes                                                                                                                                                       [address] [facility] [address]            To whom it may concern:    Neymar Rhodes was seen in clinic today for illness.  He may return to work tomorrow.        Sincerely,        Tosha Haro PA-C    [facility]

## 2020-02-24 ENCOUNTER — ANCILLARY PROCEDURE (OUTPATIENT)
Dept: CARDIOLOGY | Facility: CLINIC | Age: 60
End: 2020-02-24
Attending: INTERNAL MEDICINE
Payer: COMMERCIAL

## 2020-02-24 DIAGNOSIS — I42.8 NON-ISCHEMIC CARDIOMYOPATHY (H): ICD-10-CM

## 2020-02-24 DIAGNOSIS — Z95.810 ICD (IMPLANTABLE CARDIOVERTER-DEFIBRILLATOR) IN PLACE: ICD-10-CM

## 2020-02-24 PROCEDURE — 93295 DEV INTERROG REMOTE 1/2/MLT: CPT | Performed by: INTERNAL MEDICINE

## 2020-02-24 PROCEDURE — 93296 REM INTERROG EVL PM/IDS: CPT | Performed by: INTERNAL MEDICINE

## 2020-02-27 LAB
MDC_IDC_EPISODE_DTM: NORMAL
MDC_IDC_EPISODE_ID: NORMAL
MDC_IDC_EPISODE_TYPE: NORMAL
MDC_IDC_LEAD_IMPLANT_DT: NORMAL
MDC_IDC_LEAD_LOCATION: NORMAL
MDC_IDC_LEAD_LOCATION_DETAIL_1: NORMAL
MDC_IDC_LEAD_MFG: NORMAL
MDC_IDC_LEAD_MODEL: NORMAL
MDC_IDC_LEAD_POLARITY_TYPE: NORMAL
MDC_IDC_LEAD_SERIAL: NORMAL
MDC_IDC_MSMT_BATTERY_DTM: NORMAL
MDC_IDC_MSMT_BATTERY_REMAINING_LONGEVITY: 132 MO
MDC_IDC_MSMT_BATTERY_REMAINING_PERCENTAGE: 100 %
MDC_IDC_MSMT_BATTERY_STATUS: NORMAL
MDC_IDC_MSMT_CAP_CHARGE_DTM: NORMAL
MDC_IDC_MSMT_CAP_CHARGE_TIME: 8.8 S
MDC_IDC_MSMT_CAP_CHARGE_TYPE: NORMAL
MDC_IDC_MSMT_LEADCHNL_LV_IMPEDANCE_VALUE: 693 OHM
MDC_IDC_MSMT_LEADCHNL_RA_IMPEDANCE_VALUE: 648 OHM
MDC_IDC_MSMT_LEADCHNL_RV_IMPEDANCE_VALUE: 545 OHM
MDC_IDC_PG_IMPLANT_DTM: NORMAL
MDC_IDC_PG_MFG: NORMAL
MDC_IDC_PG_MODEL: NORMAL
MDC_IDC_PG_SERIAL: NORMAL
MDC_IDC_PG_TYPE: NORMAL
MDC_IDC_SESS_CLINIC_NAME: NORMAL
MDC_IDC_SESS_DTM: NORMAL
MDC_IDC_SESS_TYPE: NORMAL
MDC_IDC_SET_BRADY_AT_MODE_SWITCH_MODE: NORMAL
MDC_IDC_SET_BRADY_AT_MODE_SWITCH_RATE: 170 {BEATS}/MIN
MDC_IDC_SET_BRADY_LOWRATE: 60 {BEATS}/MIN
MDC_IDC_SET_BRADY_MAX_SENSOR_RATE: 130 {BEATS}/MIN
MDC_IDC_SET_BRADY_MAX_TRACKING_RATE: 130 {BEATS}/MIN
MDC_IDC_SET_BRADY_MODE: NORMAL
MDC_IDC_SET_BRADY_PAV_DELAY_HIGH: 200 MS
MDC_IDC_SET_BRADY_PAV_DELAY_LOW: 270 MS
MDC_IDC_SET_BRADY_SAV_DELAY_HIGH: 140 MS
MDC_IDC_SET_BRADY_SAV_DELAY_LOW: 190 MS
MDC_IDC_SET_CRT_LVRV_DELAY: 35 MS
MDC_IDC_SET_CRT_PACED_CHAMBERS: NORMAL
MDC_IDC_SET_LEADCHNL_LV_PACING_AMPLITUDE: 2.2 V
MDC_IDC_SET_LEADCHNL_LV_PACING_ANODE_ELECTRODE_1: NORMAL
MDC_IDC_SET_LEADCHNL_LV_PACING_ANODE_LOCATION_1: NORMAL
MDC_IDC_SET_LEADCHNL_LV_PACING_CAPTURE_MODE: NORMAL
MDC_IDC_SET_LEADCHNL_LV_PACING_CATHODE_ELECTRODE_1: NORMAL
MDC_IDC_SET_LEADCHNL_LV_PACING_CATHODE_LOCATION_1: NORMAL
MDC_IDC_SET_LEADCHNL_LV_PACING_PULSEWIDTH: 0.5 MS
MDC_IDC_SET_LEADCHNL_LV_SENSING_ADAPTATION_MODE: NORMAL
MDC_IDC_SET_LEADCHNL_LV_SENSING_ANODE_ELECTRODE_1: NORMAL
MDC_IDC_SET_LEADCHNL_LV_SENSING_ANODE_LOCATION_1: NORMAL
MDC_IDC_SET_LEADCHNL_LV_SENSING_CATHODE_ELECTRODE_1: NORMAL
MDC_IDC_SET_LEADCHNL_LV_SENSING_CATHODE_LOCATION_1: NORMAL
MDC_IDC_SET_LEADCHNL_LV_SENSING_SENSITIVITY: 1 MV
MDC_IDC_SET_LEADCHNL_RA_PACING_AMPLITUDE: 2 V
MDC_IDC_SET_LEADCHNL_RA_PACING_CAPTURE_MODE: NORMAL
MDC_IDC_SET_LEADCHNL_RA_PACING_POLARITY: NORMAL
MDC_IDC_SET_LEADCHNL_RA_PACING_PULSEWIDTH: 0.5 MS
MDC_IDC_SET_LEADCHNL_RA_SENSING_ADAPTATION_MODE: NORMAL
MDC_IDC_SET_LEADCHNL_RA_SENSING_POLARITY: NORMAL
MDC_IDC_SET_LEADCHNL_RA_SENSING_SENSITIVITY: 0.25 MV
MDC_IDC_SET_LEADCHNL_RV_PACING_AMPLITUDE: 2 V
MDC_IDC_SET_LEADCHNL_RV_PACING_CAPTURE_MODE: NORMAL
MDC_IDC_SET_LEADCHNL_RV_PACING_POLARITY: NORMAL
MDC_IDC_SET_LEADCHNL_RV_PACING_PULSEWIDTH: 0.5 MS
MDC_IDC_SET_LEADCHNL_RV_SENSING_ADAPTATION_MODE: NORMAL
MDC_IDC_SET_LEADCHNL_RV_SENSING_POLARITY: NORMAL
MDC_IDC_SET_LEADCHNL_RV_SENSING_SENSITIVITY: 0.6 MV
MDC_IDC_SET_ZONE_DETECTION_INTERVAL: 250 MS
MDC_IDC_SET_ZONE_DETECTION_INTERVAL: 333 MS
MDC_IDC_SET_ZONE_DETECTION_INTERVAL: 375 MS
MDC_IDC_SET_ZONE_TYPE: NORMAL
MDC_IDC_SET_ZONE_VENDOR_TYPE: NORMAL
MDC_IDC_STAT_AT_BURDEN_PERCENT: 0 %
MDC_IDC_STAT_AT_DTM_END: NORMAL
MDC_IDC_STAT_AT_DTM_START: NORMAL
MDC_IDC_STAT_BRADY_DTM_END: NORMAL
MDC_IDC_STAT_BRADY_DTM_START: NORMAL
MDC_IDC_STAT_BRADY_RA_PERCENT_PACED: 40 %
MDC_IDC_STAT_BRADY_RV_PERCENT_PACED: 98 %
MDC_IDC_STAT_CRT_DTM_END: NORMAL
MDC_IDC_STAT_CRT_DTM_START: NORMAL
MDC_IDC_STAT_CRT_LV_PERCENT_PACED: 97 %
MDC_IDC_STAT_EPISODE_RECENT_COUNT: 0
MDC_IDC_STAT_EPISODE_RECENT_COUNT: 2
MDC_IDC_STAT_EPISODE_RECENT_COUNT_DTM_END: NORMAL
MDC_IDC_STAT_EPISODE_RECENT_COUNT_DTM_START: NORMAL
MDC_IDC_STAT_EPISODE_TYPE: NORMAL
MDC_IDC_STAT_EPISODE_VENDOR_TYPE: NORMAL
MDC_IDC_STAT_TACHYTHERAPY_ATP_DELIVERED_RECENT: 0
MDC_IDC_STAT_TACHYTHERAPY_ATP_DELIVERED_TOTAL: 0
MDC_IDC_STAT_TACHYTHERAPY_RECENT_DTM_END: NORMAL
MDC_IDC_STAT_TACHYTHERAPY_RECENT_DTM_START: NORMAL
MDC_IDC_STAT_TACHYTHERAPY_SHOCKS_ABORTED_RECENT: 0
MDC_IDC_STAT_TACHYTHERAPY_SHOCKS_ABORTED_TOTAL: 0
MDC_IDC_STAT_TACHYTHERAPY_SHOCKS_DELIVERED_RECENT: 0
MDC_IDC_STAT_TACHYTHERAPY_SHOCKS_DELIVERED_TOTAL: 0
MDC_IDC_STAT_TACHYTHERAPY_TOTAL_DTM_END: NORMAL
MDC_IDC_STAT_TACHYTHERAPY_TOTAL_DTM_START: NORMAL

## 2020-03-06 ENCOUNTER — OFFICE VISIT (OUTPATIENT)
Dept: INTERNAL MEDICINE | Facility: CLINIC | Age: 60
End: 2020-03-06
Payer: COMMERCIAL

## 2020-03-06 ENCOUNTER — ANCILLARY PROCEDURE (OUTPATIENT)
Dept: GENERAL RADIOLOGY | Facility: CLINIC | Age: 60
End: 2020-03-06
Attending: INTERNAL MEDICINE
Payer: COMMERCIAL

## 2020-03-06 VITALS
TEMPERATURE: 97.2 F | SYSTOLIC BLOOD PRESSURE: 120 MMHG | RESPIRATION RATE: 20 BRPM | DIASTOLIC BLOOD PRESSURE: 66 MMHG | BODY MASS INDEX: 36.19 KG/M2 | HEIGHT: 72 IN | HEART RATE: 59 BPM | WEIGHT: 267.2 LBS | OXYGEN SATURATION: 98 %

## 2020-03-06 DIAGNOSIS — I42.8 NON-ISCHEMIC CARDIOMYOPATHY (H): ICD-10-CM

## 2020-03-06 DIAGNOSIS — Z79.4 TYPE 2 DIABETES MELLITUS WITHOUT COMPLICATION, WITH LONG-TERM CURRENT USE OF INSULIN (H): ICD-10-CM

## 2020-03-06 DIAGNOSIS — R73.9 STEROID-INDUCED HYPERGLYCEMIA: ICD-10-CM

## 2020-03-06 DIAGNOSIS — R05.9 COUGH: ICD-10-CM

## 2020-03-06 DIAGNOSIS — E11.9 TYPE 2 DIABETES MELLITUS WITHOUT COMPLICATION, WITH LONG-TERM CURRENT USE OF INSULIN (H): ICD-10-CM

## 2020-03-06 DIAGNOSIS — T38.0X5A STEROID-INDUCED HYPERGLYCEMIA: ICD-10-CM

## 2020-03-06 DIAGNOSIS — D86.0 SARCOIDOSIS OF LUNG (H): ICD-10-CM

## 2020-03-06 DIAGNOSIS — J98.01 POST-INFECTION BRONCHOSPASM: Primary | ICD-10-CM

## 2020-03-06 PROCEDURE — 99214 OFFICE O/P EST MOD 30 MIN: CPT | Performed by: INTERNAL MEDICINE

## 2020-03-06 PROCEDURE — 71046 X-RAY EXAM CHEST 2 VIEWS: CPT

## 2020-03-06 RX ORDER — ALBUTEROL SULFATE 0.83 MG/ML
2.5 SOLUTION RESPIRATORY (INHALATION) EVERY 6 HOURS PRN
Qty: 2 BOX | Refills: 5 | Status: SHIPPED | OUTPATIENT
Start: 2020-03-06 | End: 2020-08-19

## 2020-03-06 RX ORDER — FUROSEMIDE 20 MG
20 TABLET ORAL PRN
Qty: 30 TABLET | Refills: 1 | Status: SHIPPED | OUTPATIENT
Start: 2020-03-06 | End: 2020-07-14

## 2020-03-06 ASSESSMENT — MIFFLIN-ST. JEOR: SCORE: 2055.01

## 2020-03-06 NOTE — LETTER
March 6, 2020      Neymar Rhodes  [address]  [facility] [address]        To whom it may concern:    I am writing on behalf of Neymar A Rhodes who has been dealing with congestive heart failure, sarcoidosis, and type 2 diabetes.  Unfortunately, his current prescribed medications to manage his multiple severe medical problems are causing significant urinary troubles and greatly increased urinary frequency.  He will require these medications for at least the next 3 months and may have these side effects and symptoms for that long. There are no alternatives to these medications.      If you have any questions or concerns, please call the clinic at the number listed above.       Sincerely,        Jefry Harris MD

## 2020-03-06 NOTE — PROGRESS NOTES
Subjective     Neymar Rhodes is a 60 year old male who presents to clinic today for the following health issues:    HPI   ED/UC Followup:    Facility:  [facility]  Date of visit: 2/18/2020  Reason for visit: URI, dx with acute bronchitis   Current Status: after finishing course of abx (augmentin) sx returned .  Having some cough, occasional wheezes.  Finished course of Augmentin to weeks ago which brought some relief of his symptoms but now they return.  Generally speaking cough is mostly nonproductive, occasional discolored sputum first thing the morning but then clearish to white as a day goes on.  Denies fevers, denies chills.  Currently taking any large dose of prednisone for treatment of sarcoid.  Recently went from 60 mg to 40 mg once daily.    Reports blood sugars are extremely elevated with the large doses of prednisone.    Has an about butyryl inhaler which she uses occasionally with decent relief of his symptoms.                 Reviewed and updated as needed this visit by Provider         Review of Systems         Objective    /66   Pulse 59   Temp 97.2  F (36.2  C) (Temporal)   Resp 20   Ht 1.829 m (6')   Wt 121.2 kg (267 lb 3.2 oz)   SpO2 98%   BMI 36.24 kg/m    Body mass index is 36.24 kg/m .  Physical Exam                 Past Medical History:  ---------------------------  Past Medical History:   Diagnosis Date     Ascending aorta dilatation (H)      Diverticulosis of colon (without mention of hemorrhage)      Essential hypertension, benign      Gout, unspecified      LBBB (left bundle branch block)      Morbid obesity (H)      Non-ischemic cardiomyopathy (H)      Nonrheumatic mitral valve regurgitation      NSTEMI (non-ST elevated myocardial infarction) (H)     cardiac cath 6/2018: mild non-obstructive CAD     Other and unspecified hyperlipidemia      Type II or unspecified type diabetes mellitus without mention of complication, not stated as uncontrolled         Past Surgical History:  ---------------------------  Past Surgical History:   Procedure Laterality Date     C APPENDECTOMY  1980's     CV CORONARY ANGIOGRAM N/A 10/2/2019    Procedure: Coronary Angiogram;  Surgeon: Kathy Almaguer MD;  Location:  [facility]     CV LEFT HEART CATH N/A 10/2/2019    Procedure: Left Heart Cath;  Surgeon: Kathy Almaguer MD;  Location:  [facility]     CV RIGHT HEART CATH N/A 10/2/2019    Procedure: Right Heart Cath;  Surgeon: Kathy Almaguer MD;  Location:  [facility]     ENDOBRONCHIAL ULTRASOUND FLEXIBLE N/A 12/19/2019    Procedure: Flexible bronchoscopy, endobronchial ultrasound, bronchial alveolar lavage;  Surgeon: Ranulfo Barcenas MD;  Location: [facility] OR     EP ICD N/A 10/4/2019    Procedure: EP ICD;  Surgeon: Jayy Dorman MD;  Location:  [facility]     EP LEAD PLCMNT LV NEW ICD N/A 10/4/2019    Procedure: EP Lead Plcmnt LV New ICD;  Surgeon: Jayy Dorman MD;  Location:  [facility]ATH LAB     HEART CATH CORONARY ANGIOGRAM WITHOUT PRESSURES  06/10/2018    normal coronary angiogram, nothing more than 10%     SURGICAL HISTORY OF -   2001    repair of colovesicular fistula       Current Medications:  ---------------------------  Current Outpatient Medications   Medication Sig Dispense Refill     acetaminophen (TYLENOL) 500 MG tablet Take 500-1,000 mg by mouth every 6 hours as needed for mild pain       albuterol (PROAIR HFA/PROVENTIL HFA/VENTOLIN HFA) 108 (90 Base) MCG/ACT Inhaler Inhale 2 puffs into the lungs every 6 hours as needed for shortness of breath / dyspnea or wheezing       amiodarone (PACERONE/CODARONE) 200 MG tablet Take 1 tablet (200 mg) by mouth daily 90 tablet 2     aspirin 81 MG tablet Take 1 tablet (81 mg) by mouth daily (Patient taking differently: Take 81 mg by mouth daily Pt stopped on 12/15/2019) 30 tablet      atorvastatin (LIPITOR) 80 MG tablet TAKE HALF TABLET BY MOUTH  AT BEDTIME  15 tablet 2     BD ULTRA-FINE 29G X 12.7MM insulin pen needle USE ONCE DAILY WITH VICTOZA  each 1     BD ULTRA-FINE 29G X 12.7MM insulin pen needle use once daily with victoza pen 100 each 1     blood glucose monitoring (ACCU-CHEK LUL PLUS) test strip Use to test blood sugar 1-3 times daily or as directed. 100 each 11     clotrimazole (LOTRIMIN) 1 % external cream Apply sparingly once or twice per day as needed to affected area until the skin is better, then stop; REPEAT AS NEEDED 30 g 1     Continuous Blood Gluc Sensor (FREESTYLE JOCELIN 14 DAY SENSOR) MIS 1 each every 14 days 6 each 3     continuous blood glucose monitoring (FREESTYLE JOCELIN) sensor For use with Freestyle Jocelin Flash  for continuous monitioring of blood glucose levels. Replace sensor every 10 days. 3 each 3     fenofibrate (TRIGLIDE/LOFIBRA) 160 MG tablet TAKE ONE TABLET BY MOUTH ONE TIME DAILY 90 tablet 1     fluticasone (FLONASE) 50 MCG/ACT nasal spray Spray 2 sprays into both nostrils daily 16 g 0     furosemide (LASIX) 20 MG tablet Take 1 tablet (20 mg) by mouth as needed (for weight gain of 2+ lbs overnight) 30 tablet 1     glipiZIDE (GLUCOTROL XL) 10 MG 24 hr tablet TAKE 1 TABLET (10 MG) BY MOUTH DAILY. TAKE WITH LARGEST MEAL OF THE DAY. ALWAYS TAKE WITH FOOD 90 tablet 0     guaiFENesin-codeine (ROBITUSSIN AC) 100-10 MG/5ML solution Take 5-10 mLs by mouth every 4 hours as needed 240 mL 0     insulin detemir (LEVEMIR PEN) 100 UNIT/ML pen 10 units in morning, then 14 units at lunch 18 mL 1     levothyroxine (SYNTHROID/LEVOTHROID) 50 MCG tablet Take 1 tablet (50 mcg) by mouth daily 90 tablet 1     liraglutide (VICTOZA PEN) 18 MG/3ML solution Inject 1.8 mg Subcutaneous daily 9 mL 3     metFORMIN (GLUCOPHAGE) 1000 MG tablet Take 1 tablet (1,000 mg) by mouth 2 times daily (with meals) 180 tablet 3     metoprolol succinate ER (TOPROL-XL) 25 MG 24 hr tablet TAKE ONE TABLET BY MOUTH IN THE EVENING  (Patient taking  differently: Take 25 mg by mouth every evening ) 30 tablet 10     omeprazole (PRILOSEC) 40 MG DR capsule Take 1 capsule (40 mg) by mouth daily 90 capsule 3     predniSONE (DELTASONE) 20 MG tablet Take 2 tablets (40 mg) by mouth daily 60 tablet 2     sacubitril-valsartan (ENTRESTO) 49-51 MG per tablet Take 1 tablet by mouth 2 times daily 60 tablet 5     sulfamethoxazole-trimethoprim (BACTRIM/SEPTRA) 400-80 MG tablet Take 1 tablet by mouth daily 30 tablet 2     triamcinolone (KENALOG) 0.5 % cream Apply sparingly once or twice per day as needed to affected area until the skin is better, then stop 30 g 0       Allergies:  -------------  Allergies   Allergen Reactions     No Known Drug Allergies        Social History:  -------------------  Social History     Socioeconomic History     Marital status:      Spouse name: Not on file     Number of children: Not on file     Years of education: Not on file     Highest education level: Not on file   Occupational History     Not on file   Social Needs     Financial resource strain: Not on file     Food insecurity:     Worry: Not on file     Inability: Not on file     Transportation needs:     Medical: Not on file     Non-medical: Not on file   Tobacco Use     Smoking status: Never Smoker     Smokeless tobacco: Never Used   Substance and Sexual Activity     Alcohol use: Not Currently     Comment: Was drinking 9 beers and three Jack Martínez shot every Friday.  No ETOH [facility] eOctober 2019.     Drug use: No     Sexual activity: Yes     Partners: Female   Lifestyle     Physical activity:     Days per week: Not on file     Minutes per session: Not on file     Stress: Not on file   Relationships     Social connections:     Talks on phone: Not on file     Gets together: Not on file     Attends [employer] service: Not on file     Active member of club or organization: Not on file     Attends meetings of clubs or organizations: Not on file     Relationship status: Not on file      Intimate partner violence:     Fear of current or ex partner: Not on file     Emotionally abused: Not on file     Physically abused: Not on file     Forced sexual activity: Not on file   Other Topics Concern     Parent/sibling w/ CABG, MI or angioplasty before 65F 55M? Not Asked   Social History Narrative     Not on file       Family Medical History:  ------------------------------  Family History   Problem Relation Age of Onset     Cancer Father 75        bladder cancer     Myocardial Infarction Father      Other - See Comments Father         smoking     Breast Cancer Mother      Breast Cancer Sister      Family History Negative Brother      Family History Negative Son      Family History Negative Son         fluttering/murmur     Asthma Son      Colon Cancer No family hx of          ROS:  REVIEW OF SYSTEMS:    RESP: positive for cough, dyspnea, wheezing; negative for hemoptysis   CV: negative for chest pain, palpitations, PND, orthopnea  GI: negative for dysphagia, N/V, pain, melena, diarrhea and constipation  NEURO: negative for new numbness/tingling, paralysis, incoordination, or focal weakness     OBJECTIVE:                                                    /66   Pulse 59   Temp 97.2  F (36.2  C) (Temporal)   Resp 20   Ht 1.829 m (6')   Wt 121.2 kg (267 lb 3.2 oz)   SpO2 98%   BMI 36.24 kg/m       GENERAL alert and no distress  EYES:  Normal sclera,conjunctiva, EOMI  HENT: oral and posterior pharynx without lesions or erythema, facies symmetric  NECK: Neck supple. No LAD, without thyroidmegaly.  RESP: Clear to ausculation bilaterally without wheezes or crackles. Normal BS in all fields.  CV: RRR normal S1S2 without murmurs, rubs or gallops.  LYMPH: no cervical lymph adenopathy appreciated  MS: extremities- no gross deformities of the visible extremities noted,   EXT:  no lower extremity edema  PSYCH: Alert and oriented times 3; speech- coherent  SKIN:  No obvious significant skin lesions on  visible portions of face     CXR (my preliminary read): No acute infiltrates or effusions.     ASSESSMENT/PLAN:                                                      (J98.01) Post-infection bronchospasm  (primary encounter diagnosis)  Comment: See no evidence for pneumonia, no evidence for bacterial infection.  He is [place] completed a decent course of antibiotics, no further antibiotics are needed.Pt appears to be having bronchospasm. Discussed issues of bronchial disease/bronchospasm.    Recommended symptomatic treatment with bronchodilator (albuterol) as needed.  Recommend use albuterol nebulizer as needed as he feels this is more helpful than the inhaler   Patient already is taking a high dose of steroids already for the treatment of the sarcoidosis.  Continue the current dose of steroids with plans of tapering over the next month or 2  Antibiotics are not indicated based on history and exam findings today.  Continue to treat any congestion as needed with decongestants, any allergic components with nonsedating antihistamine.  If sx. recur or worsen, may need more chronic/regular medication such as Advair or similar.  Patient was instructed to contact us if there is any worsening, changes in symptoms, or no improvement.       Plan:     (E11.9,  Z79.4) Type 2 diabetes mellitus without complication, with long-term current use of insulin (H)  Comment: Steroid-induced hyperglycemia.  Based on his continuous glucose monitoring patterns, his glucose typically spikes between noon and 8 PM and then bottoms out around 6 AM.  Change Levemir to twice daily, but give dose in the morning and and supper rather than supper and bedtime.  Plan: insulin detemir (LEVEMIR PEN) 100 UNIT/ML pen,         insulin pen needle (BD ULTRA-FINE) 29G X 12.7MM        miscellaneous, DISCONTINUED: insulin detemir         (LEVEMIR PEN) 100 UNIT/ML pen            (I42.8) Non-ischemic cardiomyopathy (H)  Comment: Stable no signs and symptoms of CHF at  this time.  Continue same medications.  Plan: furosemide (LASIX) 20 MG tablet            (D86.0) Sarcoidosis of lung (H)  Comment: Taking large dose steroids as directed by pulmonology clinic.  Continue the tapering course as planned.  Plan: albuterol (PROVENTIL) (2.5 MG/3ML) 0.083% neb         solution            (R05) Cough  Comment: No evidence for pneumonia, see comments above.  Plan: XR Chest 2 Views            (R73.9,  T38.0X5A) Steroid-induced hyperglycemia  Comment: His CGM pattern shows spikes between afternoon and early evening with significant lows in the early morning.    Change the timing of Levemir doses as ordered..    We will need to adjust further depending on the dose of the prednisone he is taking.  Plan:          See Patient Instructions    FRANCHESKA GRUBER M.D., MD  [facility]    (Chart documentation may have been completed, in part, with [employer] voice-recognition software. Even though reviewed, some grammatical, spelling, and word errors may remain.)

## 2020-03-06 NOTE — NURSING NOTE
Chief Complaint   Patient presents with     Urgent Care     Follow Up     /66   Pulse 59   Temp 97.2  F (36.2  C) (Temporal)   Resp 20   Ht 1.829 m (6')   Wt 121.2 kg (267 lb 3.2 oz)   SpO2 98%   BMI 36.24 kg/m   Estimated body mass index is 36.24 kg/m  as calculated from the following:    Height as of this encounter: 1.829 m (6').    Weight as of this encounter: 121.2 kg (267 lb 3.2 oz).        Health Maintenance that is potentially due pending provider review:  NONE    n/a    RODRIGUEZ Panchal

## 2020-03-06 NOTE — PATIENT INSTRUCTIONS
"*  Change the Levemir insulin to 10 units at breakfast and 14 units at lunch.     *  Continue all other medications at the same doses.  Contact your usual pharmacy if you need refills.     *  Send me an update on the glucose levels in 14 days    *   Post infectious Bronchospasm (wheezing):  ===============================================      *  Bronchospasm is irritation of the airways that causes them to spasm and temporarily \"narrow\" which causes coughing (usually minimal sputum production), shortness of breath, dyspnea on exertion, wheezing.  Your airways will be more reactive to things such as temperature changes (too cold, too hot, too humid, etc.), activity, pollutants such as dander, smoke, air pollution, etc.  This will get better with time, but will produce lingering symptoms as described above for a couple weeks to a couple of months.     *  Further Antibiotics not indicated    *  Continue the prednisone    *  Albuterol inhaler, use 2 puffs, 3-5 times per day as needed for any coughing, wheezing, tightness in the breathing, or shortness of breath.  This inhaled medication helps reduce the inflammation and spasm of the airways which will help the breathing become easier.  This is a similar medication we use to treat asthma, but you do not have asthma.      Consider using this inhaler before any known triggers for our coughing or wheezing (usually these include cold or hot temperatures, humidity, dust, air pollutants, smoke).      *  Expect that your airways may remain more easily irritated for a few weeks after upper respiratory infections.     If you require the albuterol rescue inhaler on a regular basis more than a few times per week, you may need additional medications to help control the breathing better.    These are the available forms of Albuterol, your insurance formulary will determine which one is preferred.  They all work the same.                 *  Cough suppressant Delsym, follow directions " on bottle    *  Mucinex extended release, one or two tablets twice per day for the next 5-7 days.  This helps loosen the secretions to they can be passed more easily out of the body.     *  Be sure to drink lots of fluids.  If the appetite and intake of food is way down, then at leat try to eat soup.  Dehydration is the thing that causes people to end up in the hospital with viral infections and the flu.     *  For sore throat:  Try lozenges (Cepostat, Cepacol, hard candies, Zinc lozenges, etc)    *  If you have thick secretions:  Mucinex extended release twice per day on a regular basis for the next few days, then twice per day as needed.  OK to take the regular Mucinex, you may just have to take it 2-3 times per day.      *  If you have dry nasal passages or frequent bloody noses during the winter time:  Saline nasal spray as often as needed for dry nose.     *  If you have a lot of congestion and/or runny noses:  OK to try decongestants as needed (e.g. pseudoephedrine or phenylephrine).  Be sure to take the lowest dose needed.  Take decongestants from only ONE source.  It is possible to inadvertantly take more than the recommended amounts if you take decongestants from multiple products (i.e. Do NOT take Mucinex-D and Claritin-D together)    *  If you have been told to NOT take decongestants or if you cannot tolerate decongestants due to effect on blood pressure, sleep or heart rate:  Try Coricidin HBP or Chlortrimeton (chlorpheniramine).  These can have a similar effect as some decongestants but will not affect your heart rate or blood pressure.      *  If you have SEVERE nasal congestion:  Affrin nasal spray as needed for severe nasal congestion (especially before the airplane trip), but do not use for more than one week.      *  Tylenol as needed for any headaches of low grade temperatures.     *  Ibuprofen as needed for body aches, fevers, headaches    *  Call the clinic if any changes in the symptoms such as  worsening fevers, or the amount and quality of the sputum changes, or if you have any major difficulties breathing, or the symptoms fail to clear for a few weeks.    *  Most upper respiratory infection will clear 1-2 weeks, but it is not uncommon for post viral coughs to last for several weeks, even rarely the entire winter.          BRONCHOSPASM (AIRWAY SPASM):

## 2020-03-10 ENCOUNTER — OFFICE VISIT (OUTPATIENT)
Dept: CARDIOLOGY | Facility: CLINIC | Age: 60
End: 2020-03-10
Attending: INTERNAL MEDICINE
Payer: COMMERCIAL

## 2020-03-10 ENCOUNTER — TELEPHONE (OUTPATIENT)
Dept: CARDIOLOGY | Facility: CLINIC | Age: 60
End: 2020-03-10

## 2020-03-10 VITALS
HEART RATE: 56 BPM | OXYGEN SATURATION: 97 % | WEIGHT: 262.1 LBS | DIASTOLIC BLOOD PRESSURE: 63 MMHG | SYSTOLIC BLOOD PRESSURE: 108 MMHG | BODY MASS INDEX: 35.5 KG/M2 | HEIGHT: 72 IN

## 2020-03-10 DIAGNOSIS — E87.5 HYPERKALEMIA: ICD-10-CM

## 2020-03-10 DIAGNOSIS — N18.30 CKD (CHRONIC KIDNEY DISEASE) STAGE 3, GFR 30-59 ML/MIN (H): Primary | ICD-10-CM

## 2020-03-10 DIAGNOSIS — I50.22 CHRONIC SYSTOLIC HEART FAILURE (H): ICD-10-CM

## 2020-03-10 DIAGNOSIS — E11.9 TYPE 2 DIABETES MELLITUS WITHOUT COMPLICATION, WITHOUT LONG-TERM CURRENT USE OF INSULIN (H): ICD-10-CM

## 2020-03-10 LAB
ANION GAP SERPL CALCULATED.3IONS-SCNC: 12.5 MMOL/L (ref 6–17)
BUN SERPL-MCNC: 20 MG/DL (ref 7–30)
CALCIUM SERPL-MCNC: 8.9 MG/DL (ref 8.5–10.5)
CHLORIDE SERPL-SCNC: 103 MMOL/L (ref 98–107)
CO2 SERPL-SCNC: 22 MMOL/L (ref 23–29)
CREAT SERPL-MCNC: 1.08 MG/DL (ref 0.7–1.3)
GFR SERPL CREATININE-BSD FRML MDRD: 70 ML/MIN/{1.73_M2}
GLUCOSE SERPL-MCNC: 294 MG/DL (ref 70–105)
POTASSIUM SERPL-SCNC: 5.5 MMOL/L (ref 3.5–5.1)
SODIUM SERPL-SCNC: 132 MMOL/L (ref 136–145)
TSH SERPL DL<=0.005 MIU/L-ACNC: 1.51 MU/L (ref 0.4–4)

## 2020-03-10 PROCEDURE — 80048 BASIC METABOLIC PNL TOTAL CA: CPT | Performed by: INTERNAL MEDICINE

## 2020-03-10 PROCEDURE — 36415 COLL VENOUS BLD VENIPUNCTURE: CPT | Performed by: INTERNAL MEDICINE

## 2020-03-10 PROCEDURE — 84443 ASSAY THYROID STIM HORMONE: CPT | Performed by: INTERNAL MEDICINE

## 2020-03-10 PROCEDURE — 99215 OFFICE O/P EST HI 40 MIN: CPT | Performed by: INTERNAL MEDICINE

## 2020-03-10 ASSESSMENT — MIFFLIN-ST. JEOR: SCORE: 2031.88

## 2020-03-10 NOTE — LETTER
3/10/2020    Jefry Harris MD  [address] [facility] [address]    RE: Neymar HENDRICKS Carmelo       Dear Colleague,    I had the pleasure of seeing Neymar Rhodes in the [facility].    CARDIOLOGY CLINIC CONSULTATION    REASON FOR CONSULT: Cardiac sarcoidosis    PRIMARY CARE PHYSICIAN:  Jefry Harris    HISTORY OF PRESENT ILLNESS:  Neymar in 60, he was first seen in 2018 with new diagnosis of heart failure when his EF was noted to be around 40% by Dr. Iniguez, in the setting of normal sinus rhythm with a large LBBB.  Coronary angiography at that time had shown nonobstructive coronary artery disease. He was started on guideline directed therapy with beta-blockers, ACE inhibitors and mineralocorticoid receptor antagonist. K went up and did not tolerate MRA. Follow-up cardiac MRI showed that he had scar concerning for sarcoidosis. He unfortunately failed to follow up after that as he was feeling fairly well, but did remain on his medications. He did well for about a year until he began to develop progressive dyspnea which resulted in his admission on 10/1/19 and was seen by me at the time. ECHO showed an EF<20% with an LVEDd of 6.9 cm. The following day, while seated, he developed a sustained monomorphic VT ~180 bpm.  This was cardioverted emergently into normal sinus rhythm with first shock at 200J. From there he went to the cath lab where his coronary anatomy was unchanged. RHC showed mildly elevated filling pressures with an LVEDP of 21 and a low-normal cardiac output. Cardiac MRI was repeated and showed LGE in septal, inferior, lateral base with a non-CAD scar in the anterolateral mid-wall with a worsening scar burden to 13%. He was loaded with amiodarone and received CRT-D on 10/4.  Dose of amiodarone has been reduced to 200 mg daily. Was seen by Dr. Batres at the . PET scan confirmed inflammation and confirmed on EBUS biopsy. He has completely quit alcohol. He  was started on prednisone 40 mg daily in Dec 2019 and repeat PET scan showed significant improvement in inflammation. EF is around 25-30% on Jan 2020 echo some what better than before. No ICD therapies, some NS VT on device check. NYHA class II HF symptoms.    PAST MEDICAL HISTORY:  Past Medical History:   Diagnosis Date     Ascending aorta dilatation (H)      Diverticulosis of colon (without mention of hemorrhage)      Essential hypertension, benign      Gout, unspecified      LBBB (left bundle branch block)      Morbid obesity (H)      Non-ischemic cardiomyopathy (H)      Nonrheumatic mitral valve regurgitation      NSTEMI (non-ST elevated myocardial infarction) (H)     cardiac cath 6/2018: mild non-obstructive CAD     Other and unspecified hyperlipidemia      Type II or unspecified type diabetes mellitus without mention of complication, not stated as uncontrolled        MEDICATIONS:  Current Outpatient Medications   Medication     acetaminophen (TYLENOL) 500 MG tablet     albuterol (PROAIR HFA/PROVENTIL HFA/VENTOLIN HFA) 108 (90 Base) MCG/ACT Inhaler     albuterol (PROVENTIL) (2.5 MG/3ML) 0.083% neb solution     amiodarone (PACERONE/CODARONE) 200 MG tablet     aspirin 81 MG tablet     atorvastatin (LIPITOR) 80 MG tablet     blood glucose monitoring (ACCU-CHEK LUL PLUS) test strip     clotrimazole (LOTRIMIN) 1 % external cream     Continuous Blood Gluc Sensor (FREESTYLE JOCELIN 14 DAY SENSOR) MISC     continuous blood glucose monitoring (FREESTYLE JOCELIN) sensor     fenofibrate (TRIGLIDE/LOFIBRA) 160 MG tablet     fluticasone (FLONASE) 50 MCG/ACT nasal spray     furosemide (LASIX) 20 MG tablet     glipiZIDE (GLUCOTROL XL) 10 MG 24 hr tablet     guaiFENesin-codeine (ROBITUSSIN AC) 100-10 MG/5ML solution     insulin detemir (LEVEMIR PEN) 100 UNIT/ML pen     insulin pen needle (BD ULTRA-FINE) 29G X 12.7MM miscellaneous     levothyroxine (SYNTHROID/LEVOTHROID) 50 MCG tablet     liraglutide (VICTOZA PEN) 18 MG/3ML  solution     metFORMIN (GLUCOPHAGE) 1000 MG tablet     metoprolol succinate ER (TOPROL-XL) 25 MG 24 hr tablet     omeprazole (PRILOSEC) 40 MG DR capsule     predniSONE (DELTASONE) 20 MG tablet     sacubitril-valsartan (ENTRESTO) 49-51 MG per tablet     triamcinolone (KENALOG) 0.5 % cream     sulfamethoxazole-trimethoprim (BACTRIM/SEPTRA) 400-80 MG tablet     No current facility-administered medications for this visit.        ALLERGIES:  Allergies   Allergen Reactions     No Known Drug Allergies        SOCIAL HISTORY:  I have reviewed this patient's social history and updated it with pertinent information if needed. Neymar Rhodes  reports that he has never smoked. He has never used smokeless tobacco. He reports previous alcohol use. He reports that he does not use drugs.    FAMILY HISTORY:  I have reviewed this patient's family history and updated it with pertinent information if needed.   Family History   Problem Relation Age of Onset     Cancer Father 75        bladder cancer     Myocardial Infarction Father      Other - See Comments Father         smoking     Breast Cancer Mother      Breast Cancer Sister      Family History Negative Brother      Family History Negative Son      Family History Negative Son         fluttering/murmur     Asthma Son      Colon Cancer No family hx of        REVIEW OF SYSTEMS:  Skin:  Negative     Eyes:  Negative    ENT:  Positive for postnasal drainage  Respiratory:  Positive for cough  Cardiovascular:  Negative;palpitations;chest pain;syncope or near-syncope;cyanosis;dizziness;lightheadedness;edema Positive for  Gastroenterology: Negative    Genitourinary:  Positive for urinary frequency;nocturia  Musculoskeletal:  Negative    Neurologic:  Negative    Psychiatric:  Negative    Heme/Lymph/Imm:  Negative    Endocrine:  Negative        PHYSICAL EXAM:      BP: 108/63 Pulse: 56     SpO2: 97 %(room air)      Vital Signs with Ranges  Pulse:  [56] 56  BP: (108)/(63) 108/63  SpO2:  [97 %]  97 %  262 lbs 1.6 oz    Constitutional: awake, alert, no distress  Eyes: PERRL, sclera nonicteric  ENT: trachea midline  Respiratory: Clear  Cardiovascular: Normal, no JVD, no edema, no MRG  GI: nondistended, nontender, bowel sounds present  Musculoskeletal: good muscle tone, strength 5/5 in upper and lower extremities  Neurologic: no focal deficits  Neuropsychiatric: appropriate affact    DATA:  Labs: Pertinent cardiac labs reviewed    Echocardiogram: EF 25-30%    ASSESSMENT:  Over all doing from a cardiac standpoint as above.    RECOMMENDATIONS:  1. Cardiac sarcoidosis - Taper down Prednisone to 30 mg daily. Repeat PET scan at the  in 3 months with follow up with Dr Batres to further taper steroids. Likely wean off in a year or so.  2. Minimal CAD - continue aspirin and atorvastatin 40 mg  3. Still has mild NS VT and still has some active inflammation on PET. Continue amiodarone 200 mg daily for now, will likely back off to 100 mg daily at next visit.  4. Continue Metoprolol 25 and Entresto at current dose for LV dysfunction. He is s/p CRT-D. Cannot do MRA due to K.  5. I discussed with Dr. Batres on the phone today. His K is 5.5 and I do not think it is a good idea to back off his ARNI given that he is so well compensated. This may be Bactrim related. I will plan on rechecking this in a week and if not better, then consult with ID to see if anything else can be used for PCP prophylaxis.   6. See me in 6 months.    CEZAR Ellis, [facility]  Cardiology - [facility]  March 10, 2020        Thank you for allowing me to participate in the care of your patient.    Sincerely,     Umm Hernandes MD     [facility]

## 2020-03-10 NOTE — TELEPHONE ENCOUNTER
I called pt and reviewed update from . Recheck BMP in one week and if not better, then consult with ID on call to see if anything else can be used for PCP prophylaxis instead of bactrim that may not affect his K that much.  will be rounding next week so will update him with results. Pt said he is a  so prefers labs very early in the day or after 5. I told pt it would be better for him to have labs in the AM if possible so there is time during the day to come up with a plan if K remains high. Pt said he will request a morning off next week (has to do so 5 days ahead of time). He will go to his PCP clinic [facility] to have bmp lab done (expected date 3/18/20). Lab order placed. Deidra TAYLOR March 10, 2020, 4:16 PM

## 2020-03-10 NOTE — LETTER
3/10/2020    Jefry Harris MD  [address] [facility] [address]    RE: Neymar HENDRICKS Carmelo       Dear Colleague,    I had the pleasure of seeing Neymar Rhodes in the [facility].    CARDIOLOGY CLINIC CONSULTATION    REASON FOR CONSULT: Cardiac sarcoidosis    PRIMARY CARE PHYSICIAN:  Jefry Harris    HISTORY OF PRESENT ILLNESS:  Neymar in 60, he was first seen in 2018 with new diagnosis of heart failure when his EF was noted to be around 40% by Dr. Iniguez, in the setting of normal sinus rhythm with a large LBBB.  Coronary angiography at that time had shown nonobstructive coronary artery disease. He was started on guideline directed therapy with beta-blockers, ACE inhibitors and mineralocorticoid receptor antagonist. K went up and did not tolerate MRA. Follow-up cardiac MRI showed that he had scar concerning for sarcoidosis. He unfortunately failed to follow up after that as he was feeling fairly well, but did remain on his medications. He did well for about a year until he began to develop progressive dyspnea which resulted in his admission on 10/1/19 and was seen by me at the time. ECHO showed an EF<20% with an LVEDd of 6.9 cm. The following day, while seated, he developed a sustained monomorphic VT ~180 bpm.  This was cardioverted emergently into normal sinus rhythm with first shock at 200J. From there he went to the cath lab where his coronary anatomy was unchanged. RHC showed mildly elevated filling pressures with an LVEDP of 21 and a low-normal cardiac output. Cardiac MRI was repeated and showed LGE in septal, inferior, lateral base with a non-CAD scar in the anterolateral mid-wall with a worsening scar burden to 13%. He was loaded with amiodarone and received CRT-D on 10/4.  Dose of amiodarone has been reduced to 200 mg daily. Was seen by Dr. Batres at the . PET scan confirmed inflammation and confirmed on EBUS biopsy. He has completely quit alcohol. He  was started on prednisone 40 mg daily in Dec 2019 and repeat PET scan showed significant improvement in inflammation. EF is around 25-30% on Jan 2020 echo some what better than before. No ICD therapies, some NS VT on device check. NYHA class II HF symptoms.    PAST MEDICAL HISTORY:  Past Medical History:   Diagnosis Date     Ascending aorta dilatation (H)      Diverticulosis of colon (without mention of hemorrhage)      Essential hypertension, benign      Gout, unspecified      LBBB (left bundle branch block)      Morbid obesity (H)      Non-ischemic cardiomyopathy (H)      Nonrheumatic mitral valve regurgitation      NSTEMI (non-ST elevated myocardial infarction) (H)     cardiac cath 6/2018: mild non-obstructive CAD     Other and unspecified hyperlipidemia      Type II or unspecified type diabetes mellitus without mention of complication, not stated as uncontrolled        MEDICATIONS:  Current Outpatient Medications   Medication     acetaminophen (TYLENOL) 500 MG tablet     albuterol (PROAIR HFA/PROVENTIL HFA/VENTOLIN HFA) 108 (90 Base) MCG/ACT Inhaler     albuterol (PROVENTIL) (2.5 MG/3ML) 0.083% neb solution     amiodarone (PACERONE/CODARONE) 200 MG tablet     aspirin 81 MG tablet     atorvastatin (LIPITOR) 80 MG tablet     blood glucose monitoring (ACCU-CHEK LUL PLUS) test strip     clotrimazole (LOTRIMIN) 1 % external cream     Continuous Blood Gluc Sensor (FREESTYLE JOCELIN 14 DAY SENSOR) MISC     continuous blood glucose monitoring (FREESTYLE JOCELIN) sensor     fenofibrate (TRIGLIDE/LOFIBRA) 160 MG tablet     fluticasone (FLONASE) 50 MCG/ACT nasal spray     furosemide (LASIX) 20 MG tablet     glipiZIDE (GLUCOTROL XL) 10 MG 24 hr tablet     guaiFENesin-codeine (ROBITUSSIN AC) 100-10 MG/5ML solution     insulin detemir (LEVEMIR PEN) 100 UNIT/ML pen     insulin pen needle (BD ULTRA-FINE) 29G X 12.7MM miscellaneous     levothyroxine (SYNTHROID/LEVOTHROID) 50 MCG tablet     liraglutide (VICTOZA PEN) 18 MG/3ML  solution     metFORMIN (GLUCOPHAGE) 1000 MG tablet     metoprolol succinate ER (TOPROL-XL) 25 MG 24 hr tablet     omeprazole (PRILOSEC) 40 MG DR capsule     predniSONE (DELTASONE) 20 MG tablet     sacubitril-valsartan (ENTRESTO) 49-51 MG per tablet     triamcinolone (KENALOG) 0.5 % cream     sulfamethoxazole-trimethoprim (BACTRIM/SEPTRA) 400-80 MG tablet     No current facility-administered medications for this visit.        ALLERGIES:  Allergies   Allergen Reactions     No Known Drug Allergies        SOCIAL HISTORY:  I have reviewed this patient's social history and updated it with pertinent information if needed. Neymar Rhodes  reports that he has never smoked. He has never used smokeless tobacco. He reports previous alcohol use. He reports that he does not use drugs.    FAMILY HISTORY:  I have reviewed this patient's family history and updated it with pertinent information if needed.   Family History   Problem Relation Age of Onset     Cancer Father 75        bladder cancer     Myocardial Infarction Father      Other - See Comments Father         smoking     Breast Cancer Mother      Breast Cancer Sister      Family History Negative Brother      Family History Negative Son      Family History Negative Son         fluttering/murmur     Asthma Son      Colon Cancer No family hx of        REVIEW OF SYSTEMS:  Skin:  Negative     Eyes:  Negative    ENT:  Positive for postnasal drainage  Respiratory:  Positive for cough  Cardiovascular:  Negative;palpitations;chest pain;syncope or near-syncope;cyanosis;dizziness;lightheadedness;edema Positive for  Gastroenterology: Negative    Genitourinary:  Positive for urinary frequency;nocturia  Musculoskeletal:  Negative    Neurologic:  Negative    Psychiatric:  Negative    Heme/Lymph/Imm:  Negative    Endocrine:  Negative        PHYSICAL EXAM:      BP: 108/63 Pulse: 56     SpO2: 97 %(room air)      Vital Signs with Ranges  Pulse:  [56] 56  BP: (108)/(63) 108/63  SpO2:  [97 %]  97 %  262 lbs 1.6 oz    Constitutional: awake, alert, no distress  Eyes: PERRL, sclera nonicteric  ENT: trachea midline  Respiratory: Clear  Cardiovascular: Normal, no JVD, no edema, no MRG  GI: nondistended, nontender, bowel sounds present  Musculoskeletal: good muscle tone, strength 5/5 in upper and lower extremities  Neurologic: no focal deficits  Neuropsychiatric: appropriate affact    DATA:  Labs: Pertinent cardiac labs reviewed    Echocardiogram: EF 25-30%    ASSESSMENT:  Over all doing from a cardiac standpoint as above.    RECOMMENDATIONS:  1. Cardiac sarcoidosis - Taper down Prednisone to 30 mg daily. Repeat PET scan at the  in 3 months with follow up with Dr Batres to further taper steroids. Likely wean off in a year or so.  2. Minimal CAD - continue aspirin and atorvastatin 40 mg  3. Still has mild NS VT and still has some active inflammation on PET. Continue amiodarone 200 mg daily for now, will likely back off to 100 mg daily at next visit.  4. Continue Metoprolol 25 and Entresto at current dose for LV dysfunction. He is s/p CRT-D. Cannot do MRA due to K.  5. I discussed with Dr. Batres on the phone today. His K is 5.5 and I do not think it is a good idea to back off his ARNI given that he is so well compensated. This may be Bactrim related. I will plan on rechecking this in a week and if not better, then consult with ID to see if anything else can be used for PCP prophylaxis.   6. See me in 6 months.    CEZAR Ellis, [facility]  Cardiology - [facility]  March 10, 2020          Thank you for allowing me to participate in the care of your patient.      Sincerely,     Umm Hernandes MD     [facility]    cc:   Jefry Harris MD  [address] [place] [address]

## 2020-03-10 NOTE — TELEPHONE ENCOUNTER
----- Message from Umm Hernandes MD sent at 3/10/2020  3:23 PM CDT -----  I discussed with Dr. Batres on the phone today. His K is 5.5 and I do not think it is a good idea to back off his ARNI given that he is so well compensated. This may be Bactrim related. Lets plan on rechecking BMP in a week and if not better, then consult with ID on call to see if anything else can be used for PCP prophylaxis instead of bactrim that may not affect his K that much. Thanks.

## 2020-03-10 NOTE — PROGRESS NOTES
CARDIOLOGY CLINIC CONSULTATION    REASON FOR CONSULT: Cardiac sarcoidosis    PRIMARY CARE PHYSICIAN:  Jefry Harris    HISTORY OF PRESENT ILLNESS:  Neymar in 60, he was first seen in 2018 with new diagnosis of heart failure when his EF was noted to be around 40% by Dr. Iniguez, in the setting of normal sinus rhythm with a large LBBB.  Coronary angiography at that time had shown nonobstructive coronary artery disease. He was started on guideline directed therapy with beta-blockers, ACE inhibitors and mineralocorticoid receptor antagonist. K went up and did not tolerate MRA. Follow-up cardiac MRI showed that he had scar concerning for sarcoidosis. He unfortunately failed to follow up after that as he was feeling fairly well, but did remain on his medications. He did well for about a year until he began to develop progressive dyspnea which resulted in his admission on 10/1/19 and was seen by me at the time. ECHO showed an EF<20% with an LVEDd of 6.9 cm. The following day, while seated, he developed a sustained monomorphic VT ~180 bpm.  This was cardioverted emergently into normal sinus rhythm with first shock at 200J. From there he went to the cath lab where his coronary anatomy was unchanged. RHC showed mildly elevated filling pressures with an LVEDP of 21 and a low-normal cardiac output. Cardiac MRI was repeated and showed LGE in septal, inferior, lateral base with a non-CAD scar in the anterolateral mid-wall with a worsening scar burden to 13%. He was loaded with amiodarone and received CRT-D on 10/4.  Dose of amiodarone has been reduced to 200 mg daily. Was seen by Dr. Batres at the . PET scan confirmed inflammation and confirmed on EBUS biopsy. He has completely quit alcohol. He was started on prednisone 40 mg daily in Dec 2019 and repeat PET scan showed significant improvement in inflammation. EF is around 25-30% on Jan 2020 echo some what better than before. No ICD therapies, some NS VT on device  check. NYHA class II HF symptoms.    PAST MEDICAL HISTORY:  Past Medical History:   Diagnosis Date     Ascending aorta dilatation (H)      Diverticulosis of colon (without mention of hemorrhage)      Essential hypertension, benign      Gout, unspecified      LBBB (left bundle branch block)      Morbid obesity (H)      Non-ischemic cardiomyopathy (H)      Nonrheumatic mitral valve regurgitation      NSTEMI (non-ST elevated myocardial infarction) (H)     cardiac cath 6/2018: mild non-obstructive CAD     Other and unspecified hyperlipidemia      Type II or unspecified type diabetes mellitus without mention of complication, not stated as uncontrolled        MEDICATIONS:  Current Outpatient Medications   Medication     acetaminophen (TYLENOL) 500 MG tablet     albuterol (PROAIR HFA/PROVENTIL HFA/VENTOLIN HFA) 108 (90 Base) MCG/ACT Inhaler     albuterol (PROVENTIL) (2.5 MG/3ML) 0.083% neb solution     amiodarone (PACERONE/CODARONE) 200 MG tablet     aspirin 81 MG tablet     atorvastatin (LIPITOR) 80 MG tablet     blood glucose monitoring (ACCU-CHEK LUL PLUS) test strip     clotrimazole (LOTRIMIN) 1 % external cream     Continuous Blood Gluc Sensor (FREESTYLE JOCELIN 14 DAY SENSOR) MISC     continuous blood glucose monitoring (FREESTYLE JOCELIN) sensor     fenofibrate (TRIGLIDE/LOFIBRA) 160 MG tablet     fluticasone (FLONASE) 50 MCG/ACT nasal spray     furosemide (LASIX) 20 MG tablet     glipiZIDE (GLUCOTROL XL) 10 MG 24 hr tablet     guaiFENesin-codeine (ROBITUSSIN AC) 100-10 MG/5ML solution     insulin detemir (LEVEMIR PEN) 100 UNIT/ML pen     insulin pen needle (BD ULTRA-FINE) 29G X 12.7MM miscellaneous     levothyroxine (SYNTHROID/LEVOTHROID) 50 MCG tablet     liraglutide (VICTOZA PEN) 18 MG/3ML solution     metFORMIN (GLUCOPHAGE) 1000 MG tablet     metoprolol succinate ER (TOPROL-XL) 25 MG 24 hr tablet     omeprazole (PRILOSEC) 40 MG DR capsule     predniSONE (DELTASONE) 20 MG tablet     sacubitril-valsartan (ENTRESTO)  49-51 MG per tablet     triamcinolone (KENALOG) 0.5 % cream     sulfamethoxazole-trimethoprim (BACTRIM/SEPTRA) 400-80 MG tablet     No current facility-administered medications for this visit.        ALLERGIES:  Allergies   Allergen Reactions     No Known Drug Allergies        SOCIAL HISTORY:  I have reviewed this patient's social history and updated it with pertinent information if needed. Neymar Rhodes  reports that he has never smoked. He has never used smokeless tobacco. He reports previous alcohol use. He reports that he does not use drugs.    FAMILY HISTORY:  I have reviewed this patient's family history and updated it with pertinent information if needed.   Family History   Problem Relation Age of Onset     Cancer Father 75        bladder cancer     Myocardial Infarction Father      Other - See Comments Father         smoking     Breast Cancer Mother      Breast Cancer Sister      Family History Negative Brother      Family History Negative Son      Family History Negative Son         fluttering/murmur     Asthma Son      Colon Cancer No family hx of        REVIEW OF SYSTEMS:  Skin:  Negative     Eyes:  Negative    ENT:  Positive for postnasal drainage  Respiratory:  Positive for cough  Cardiovascular:  Negative;palpitations;chest pain;syncope or near-syncope;cyanosis;dizziness;lightheadedness;edema Positive for  Gastroenterology: Negative    Genitourinary:  Positive for urinary frequency;nocturia  Musculoskeletal:  Negative    Neurologic:  Negative    Psychiatric:  Negative    Heme/Lymph/Imm:  Negative    Endocrine:  Negative        PHYSICAL EXAM:      BP: 108/63 Pulse: 56     SpO2: 97 %(room air)      Vital Signs with Ranges  Pulse:  [56] 56  BP: (108)/(63) 108/63  SpO2:  [97 %] 97 %  262 lbs 1.6 oz    Constitutional: awake, alert, no distress  Eyes: PERRL, sclera nonicteric  ENT: trachea midline  Respiratory: Clear  Cardiovascular: Normal, no JVD, no edema, no MRG  GI: nondistended, nontender, bowel  sounds present  Musculoskeletal: good muscle tone, strength 5/5 in upper and lower extremities  Neurologic: no focal deficits  Neuropsychiatric: appropriate affact    DATA:  Labs: Pertinent cardiac labs reviewed    Echocardiogram: EF 25-30%    ASSESSMENT:  Over all doing from a cardiac standpoint as above.    RECOMMENDATIONS:  1. Cardiac sarcoidosis - Taper down Prednisone to 30 mg daily. Repeat PET scan at the  in 3 months with follow up with Dr Batres to further taper steroids. Likely wean off in a year or so.  2. Minimal CAD - continue aspirin and atorvastatin 40 mg  3. Still has mild NS VT and still has some active inflammation on PET. Continue amiodarone 200 mg daily for now, will likely back off to 100 mg daily at next visit.  4. Continue Metoprolol 25 and Entresto at current dose for LV dysfunction. He is s/p CRT-D. Cannot do MRA due to K.  5. I discussed with Dr. Batres on the phone today. His K is 5.5 and I do not think it is a good idea to back off his ARNI given that he is so well compensated. This may be Bactrim related. I will plan on rechecking this in a week and if not better, then consult with ID to see if anything else can be used for PCP prophylaxis.   6. See me in 6 months.    CEZAR Ellis, [facility]  Cardiology - [facility]  March 10, 2020

## 2020-03-11 RX ORDER — GLIPIZIDE 10 MG/1
10 TABLET, FILM COATED, EXTENDED RELEASE ORAL DAILY
Qty: 90 TABLET | Refills: 0 | Status: SHIPPED | OUTPATIENT
Start: 2020-03-11 | End: 2020-03-15

## 2020-03-11 NOTE — TELEPHONE ENCOUNTER
"Requested Prescriptions   Pending Prescriptions Disp Refills     glipiZIDE (GLUCOTROL XL) 10 MG 24 hr tablet [Pharmacy Med Name: glipiZIDE ER Oral Tablet Extended Release 24 Hour 10 MG] 90 tablet 0     Sig: TAKE 1 TABLET (10 MG) BY MOUTH DAILY. TAKE WITH LARGEST MEAL OF THE DAY. ALWAYS TAKE WITH FOOD       Sulfonylurea Agents Passed - 3/10/2020  9:43 PM        Passed - Blood pressure less than 140/90 in past 6 months     BP Readings from Last 3 Encounters:   03/10/20 108/63   03/06/20 120/66   02/18/20 104/50                 Passed - Patient has documented LDL within the past 12 mos.     Recent Labs   Lab Test 11/14/19  0953   LDL 58             Passed - Patient has had a Microalbumin in the past 15 mos.     Recent Labs   Lab Test 04/10/19  0729   MICROL 10   UMALCR 7.71             Passed - Patient has documented A1c within the specified period of time.     If HgbA1C is 8 or greater, it needs to be on file within the past 3 months.  If less than 8, must be on file within the past 6 months.     Recent Labs   Lab Test 11/14/19  0953   A1C 7.0*             Passed - Medication is active on med list        Passed - Patient is age 18 or older        Passed - Patient has a recent creatinine (normal) within the past 12 mos.     Recent Labs   Lab Test 03/10/20  1403   CR 1.08             Passed - Recent (6 mo) or future (30 days) visit within the authorizing provider's specialty     Patient had office visit in the last 6 months or has a visit in the next 30 days with authorizing provider or within the authorizing provider's specialty.  See \"Patient Info\" tab in inbasket, or \"Choose Columns\" in Meds & Orders section of the refill encounter.                 Prescription approved per [facility] Refill Protocol.    Debbie SONGN, RN, PHN      "

## 2020-03-13 DIAGNOSIS — I42.8 NONISCHEMIC CARDIOMYOPATHY (H): ICD-10-CM

## 2020-03-13 RX ORDER — METOPROLOL SUCCINATE 25 MG/1
TABLET, EXTENDED RELEASE ORAL
Qty: 30 TABLET | Refills: 9 | Status: SHIPPED | OUTPATIENT
Start: 2020-03-13 | End: 2020-08-19

## 2020-03-13 NOTE — TELEPHONE ENCOUNTER
"Requested Prescriptions   Pending Prescriptions Disp Refills     metoprolol succinate ER (TOPROL-XL) 25 MG 24 hr tablet [Pharmacy Med Name: Metoprolol Succinate ER Oral Tablet Extended Release 24 Hour 25 MG] 30 tablet 9     Sig: TAKE ONE TABLET BY MOUTH IN THE EVENING   Last Written Prescription Date:  5/8/2019  Last Fill Quantity: 30,  # refills: 10   Last Office Visit: 3/6/2020   Future Office Visit:         Beta-Blockers Protocol Passed - 3/13/2020  7:01 AM        Passed - Blood pressure under 140/90 in past 12 months     BP Readings from Last 3 Encounters:   03/10/20 108/63   03/06/20 120/66   02/18/20 104/50                 Passed - Patient is age 6 or older        Passed - Recent (12 mo) or future (30 days) visit within the authorizing provider's specialty     Patient has had an office visit with the authorizing provider or a provider within the authorizing providers department within the previous 12 mos or has a future within next 30 days. See \"Patient Info\" tab in inbasket, or \"Choose Columns\" in Meds & Orders section of the refill encounter.              Passed - Medication is active on med list             "

## 2020-03-18 DIAGNOSIS — E87.5 HYPERKALEMIA: ICD-10-CM

## 2020-03-18 DIAGNOSIS — N18.30 CKD (CHRONIC KIDNEY DISEASE) STAGE 3, GFR 30-59 ML/MIN (H): ICD-10-CM

## 2020-03-18 LAB
ANION GAP SERPL CALCULATED.3IONS-SCNC: 5 MMOL/L (ref 3–14)
BUN SERPL-MCNC: 21 MG/DL (ref 7–30)
CALCIUM SERPL-MCNC: 8.7 MG/DL (ref 8.5–10.1)
CHLORIDE SERPL-SCNC: 110 MMOL/L (ref 94–109)
CO2 SERPL-SCNC: 23 MMOL/L (ref 20–32)
CREAT SERPL-MCNC: 1.17 MG/DL (ref 0.66–1.25)
GFR SERPL CREATININE-BSD FRML MDRD: 67 ML/MIN/{1.73_M2}
GLUCOSE SERPL-MCNC: 125 MG/DL (ref 70–99)
POTASSIUM SERPL-SCNC: 4 MMOL/L (ref 3.4–5.3)
SODIUM SERPL-SCNC: 138 MMOL/L (ref 133–144)

## 2020-03-18 PROCEDURE — 80048 BASIC METABOLIC PNL TOTAL CA: CPT | Performed by: INTERNAL MEDICINE

## 2020-03-18 PROCEDURE — 36415 COLL VENOUS BLD VENIPUNCTURE: CPT | Performed by: INTERNAL MEDICINE

## 2020-03-27 DIAGNOSIS — E11.9 TYPE 2 DIABETES MELLITUS WITHOUT COMPLICATION, WITHOUT LONG-TERM CURRENT USE OF INSULIN (H): ICD-10-CM

## 2020-03-27 DIAGNOSIS — D86.0 SARCOIDOSIS OF LUNG (H): ICD-10-CM

## 2020-03-27 RX ORDER — SULFAMETHOXAZOLE/TRIMETHOPRIM 800-160 MG
TABLET ORAL
Qty: 30 TABLET | Refills: 1 | Status: SHIPPED | OUTPATIENT
Start: 2020-03-27 | End: 2020-05-28

## 2020-03-27 RX ORDER — ATORVASTATIN CALCIUM 80 MG/1
TABLET, FILM COATED ORAL
Qty: 15 TABLET | Refills: 1 | Status: SHIPPED | OUTPATIENT
Start: 2020-03-27 | End: 2020-05-28

## 2020-03-27 RX ORDER — PREDNISONE 20 MG/1
40 TABLET ORAL DAILY
Qty: 60 TABLET | Refills: 1 | Status: SHIPPED | OUTPATIENT
Start: 2020-03-27 | End: 2020-06-24

## 2020-03-27 NOTE — TELEPHONE ENCOUNTER
Atorvastatin.      Prescription approved per [facility] Refill Protocol.    Debbie SONGN, RN, PHN

## 2020-03-27 NOTE — TELEPHONE ENCOUNTER
Bactrim  Routing refill request to provider for review/approval because:  Dx: Sarcoidosis of lung    Prednisone  Routing refill request to provider for review/approval because:  Drug not on the [facility] refill protocol

## 2020-03-27 NOTE — TELEPHONE ENCOUNTER
"Requested Prescriptions   Pending Prescriptions Disp Refills     atorvastatin (LIPITOR) 80 MG tablet [Pharmacy Med Name: Atorvastatin Calcium Oral Tablet 80 MG] 15 tablet 1     Sig: TAKE HALF TABLET BY MOUTH AT BEDTIME  Last Written Prescription Date:  12/30/19  Last Fill Quantity: 15,  # refills: 2   Last office visit: 3/6/2020 with prescribing provider:  03/06/20   Future Office Visit:  0       Statins Protocol Passed - 3/27/2020  7:58 AM        Passed - LDL on file in past 12 months     Recent Labs   Lab Test 11/14/19  0953   LDL 58             Passed - No abnormal creatine kinase in past 12 months     No lab results found.             Passed - Recent (12 mo) or future (30 days) visit within the authorizing provider's specialty     Patient has had an office visit with the authorizing provider or a provider within the authorizing providers department within the previous 12 mos or has a future within next 30 days. See \"Patient Info\" tab in inbasket, or \"Choose Columns\" in Meds & Orders section of the refill encounter.              Passed - Medication is active on med list        Passed - Patient is age 18 or older           predniSONE (DELTASONE) 20 MG tablet [Pharmacy Med Name: predniSONE Oral Tablet 20 MG] 60 tablet 1     Sig: Take 2 tablets (40 mg) by mouth daily  Last Written Prescription Date:  01/03/20  Last Fill Quantity: 60,  # refills: 2   Last office visit: 3/6/2020 with prescribing provider:  03/06/20   Future Office Visit:  0       There is no refill protocol information for this order        sulfamethoxazole-trimethoprim (BACTRIM DS) 800-160 MG tablet [Pharmacy Med Name: Sulfamethoxazole-Trimethoprim Oral Tablet 800-160 MG] 30 tablet 1     Sig: TAKE ONE TABLET BY MOUTH ONE TIME DAILY  Last Written Prescription Date:  01/03/20  Last Fill Quantity: 30,  # refills: 2   Last office visit: 3/6/2020 with prescribing provider:  03/06/20   Future Office Visit:  0       Oral Acne/Rosacea Medications Protocol " "Failed - 3/27/2020  7:58 AM        Failed - Confirmation of diagnosis is required     Please confirm diagnosis is acne or rosacea.     If NOT acne or rosacea; refer request to provider for further evaluation.    If diagnosis IS acne or rosacea, OK to refill BASED ON PREVIOUS REFILL CLINICAL NOTE RECOMMENDATION.          Passed - Patient is 12 years of age or older        Passed - Recent (12 mo) or future (30 days) visit within the authorizing provider's specialty     Patient has had an office visit with the authorizing provider or a provider within the authorizing providers department within the previous 12 mos or has a future within next 30 days. See \"Patient Info\" tab in inbasket, or \"Choose Columns\" in Meds & Orders section of the refill encounter.              Passed - Medication is active on med list           "

## 2020-04-15 DIAGNOSIS — E11.9 TYPE 2 DIABETES MELLITUS WITHOUT COMPLICATION, WITHOUT LONG-TERM CURRENT USE OF INSULIN (H): ICD-10-CM

## 2020-04-15 NOTE — TELEPHONE ENCOUNTER
"Requested Prescriptions   Pending Prescriptions Disp Refills     metFORMIN (GLUCOPHAGE) 1000 MG tablet [Pharmacy Med Name: metFORMIN HCl Oral Tablet 1000 MG] 180 tablet 2     Sig: Take 1 tablet (1,000 mg) by mouth 2 times daily (with meals)       Biguanide Agents Passed - 4/15/2020  7:00 AM        Passed - Patient is age 10 or older        Passed - Patient has documented A1c within the specified period of time.     If HgbA1C is 8 or greater, it needs to be on file within the past 3 months.  If less than 8, must be on file within the past 6 months.     Recent Labs   Lab Test 11/14/19  0953   A1C 7.0*             Passed - Patient's CR is NOT>1.4 OR Patient's EGFR is NOT<45 within past 12 mos.     Recent Labs   Lab Test 03/18/20  0756   GFRESTIMATED 67   GFRESTBLACK 78       Recent Labs   Lab Test 03/18/20  0756   CR 1.17             Passed - Patient does NOT have a diagnosis of CHF.        Passed - Medication is active on med list        Passed - Recent (6 mo) or future (30 days) visit within the authorizing provider's specialty     Patient had office visit in the last 6 months or has a visit in the next 30 days with authorizing provider or within the authorizing provider's specialty.  See \"Patient Info\" tab in inbasket, or \"Choose Columns\" in Meds & Orders section of the refill encounter.                 "

## 2020-04-23 ENCOUNTER — TELEPHONE (OUTPATIENT)
Dept: CARDIOLOGY | Facility: CLINIC | Age: 60
End: 2020-04-23

## 2020-04-23 DIAGNOSIS — I42.8 NON-ISCHEMIC CARDIOMYOPATHY (H): ICD-10-CM

## 2020-04-23 DIAGNOSIS — Z95.810 ICD (IMPLANTABLE CARDIOVERTER-DEFIBRILLATOR) IN PLACE: Primary | ICD-10-CM

## 2020-04-23 DIAGNOSIS — I47.29 PAROXYSMAL VENTRICULAR TACHYCARDIA (H): ICD-10-CM

## 2020-04-23 NOTE — TELEPHONE ENCOUNTER
Remote alert received for VT requiring ATP. Patient has a [place] [employer] Resonate CRT-D.  Since last remote on 2/24/20.  Patient has had 96 NSVT/VT episodes logged with 19 EGMs available.  The majority of the these episodes have occurred since 4/6/20 (at least 92 of them, 3 episodes are no longer in the logbook so it could be up to 95 of these episodes..      18 EGM's show episodes of NSVT/VT documented as lasting from 12 seconds to 14m48s with rates primarily in the 160-170's.  There are episodes where the NSVT lasts longer than recorded and goes into the 150's, which is below the monitor zone of 160.  There are no clear endings documented to many of these stored episodes (see below for example).      1 EGM shows a VT episode on 4/22/20 at 1359 lasted 35m49s.  This episode started in the monitor zone in the 160-170's, then ramped up to the 180s and into the therapy zone.  ATPx1 was delivered and successfully broke the rhythm (see below).    Spoke with patient who states he has not had any symptoms with any episode of NSVT/VT.  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      Pt is followed by Dr. Hernandes.  Will route to Dr. Hernandes and implanting Dr. Lakia LAMB for review.    BRANDON Meza        Episode 4/22/20 requiring ATP:                       Episode incorrectly documented as lasting 15s due to going below monitor zone at times:

## 2020-04-23 NOTE — TELEPHONE ENCOUNTER
Called and spoke with patient's wife, Maria Del Carmen, and patient is scheduled for a Video Visit through [facility] with Dr. Dorman tomorrow, 4/24, at 1545.  Pt has a [employer] smartphone.  Instructed them to have Neymar's, medication, height, weight, and blood pressure prior to visit.  Maria Del Carmen verbalized understanding and will call the clinic with any questions, device and scheduling phone numbers provided.    BRANDON Meza

## 2020-04-30 ENCOUNTER — TELEPHONE (OUTPATIENT)
Dept: INTERNAL MEDICINE | Facility: CLINIC | Age: 60
End: 2020-04-30

## 2020-04-30 NOTE — TELEPHONE ENCOUNTER
Reason for Call:  Other call back    Detailed comments: The patient called and stated that he recently got a pace maker put in. He has been having in his left side pain for about two months and has just started to have numbness in both hands that comes and goes. He also stated that his blood sugars are still up but he is currently taking prednisone. He is wondering if he needs to make an appointment with Dr. Harris or what the best course of action would be.      Phone Number Patient can be reached at: Cell number on file:    Telephone Information:   Mobile [phone]       Best Time: Any    Can we leave a detailed message on this number? YES    Call taken on 4/30/2020 at 11:40 AM by Kristal Linn

## 2020-05-07 DIAGNOSIS — E11.9 TYPE 2 DIABETES MELLITUS WITHOUT COMPLICATION, WITHOUT LONG-TERM CURRENT USE OF INSULIN (H): ICD-10-CM

## 2020-05-08 RX ORDER — FENOFIBRATE 160 MG/1
TABLET ORAL
Qty: 90 TABLET | Refills: 1 | Status: SHIPPED | OUTPATIENT
Start: 2020-05-08 | End: 2020-11-05

## 2020-05-26 ENCOUNTER — ANCILLARY PROCEDURE (OUTPATIENT)
Dept: CARDIOLOGY | Facility: CLINIC | Age: 60
End: 2020-05-26
Attending: INTERNAL MEDICINE
Payer: COMMERCIAL

## 2020-05-26 DIAGNOSIS — Z95.810 ICD (IMPLANTABLE CARDIOVERTER-DEFIBRILLATOR) IN PLACE: ICD-10-CM

## 2020-05-26 PROCEDURE — 93295 DEV INTERROG REMOTE 1/2/MLT: CPT | Performed by: INTERNAL MEDICINE

## 2020-05-26 PROCEDURE — 93296 REM INTERROG EVL PM/IDS: CPT | Performed by: INTERNAL MEDICINE

## 2020-05-26 NOTE — TELEPHONE ENCOUNTER
Patient returned call and agreed to do a telephone visit with Dr. Dorman tomorrow 5/27/20 at 4:15pm.

## 2020-05-26 NOTE — TELEPHONE ENCOUNTER
Pt was scheduled to follow-up with Dr. Dorman on 4/24/20 to discuss options for VT/NSVT episodes on the alert on 4/23/20 and canceled this appointment.      Routine ICD remote check completed today and showed an additional 13 VT/NSVT episodes recorded since 4/23/20 with 11 EGM's available.  EGM's show 9 seconds to 1minute 56 seconds of NSVT in the 160-180's.  5 of these episodes lasted over 1 minute and no end of arrhythmia was available on all but one of those EGMs.  One of the shorter episodes was documented as lasting 16 seconds with no start/stop available and with the rhythm falling below the monitor zone of 160 at times.  This EGM  showed NSVT for the entire recorded length of 40 seconds, implying the episode went on for an unknown length of time (see below).  No therapy was delivered for any of these episodes.      Called and left messages for both the patient and his wife, Maria Del Carmen (consent to release information to on file), with request for callback to reschedule follow-up with Dr. Dorman.  Will await return call.      BRANDON Meza

## 2020-05-27 ENCOUNTER — VIRTUAL VISIT (OUTPATIENT)
Dept: CARDIOLOGY | Facility: CLINIC | Age: 60
End: 2020-05-27
Attending: INTERNAL MEDICINE
Payer: COMMERCIAL

## 2020-05-27 ENCOUNTER — TELEPHONE (OUTPATIENT)
Dept: INTERNAL MEDICINE | Facility: CLINIC | Age: 60
End: 2020-05-27

## 2020-05-27 VITALS
WEIGHT: 269.2 LBS | HEART RATE: 62 BPM | BODY MASS INDEX: 36.46 KG/M2 | HEIGHT: 72 IN | DIASTOLIC BLOOD PRESSURE: 71 MMHG | SYSTOLIC BLOOD PRESSURE: 111 MMHG

## 2020-05-27 DIAGNOSIS — Z95.810 ICD (IMPLANTABLE CARDIOVERTER-DEFIBRILLATOR) IN PLACE: ICD-10-CM

## 2020-05-27 DIAGNOSIS — I42.8 NON-ISCHEMIC CARDIOMYOPATHY (H): ICD-10-CM

## 2020-05-27 DIAGNOSIS — E11.9 TYPE 2 DIABETES MELLITUS WITHOUT COMPLICATION, WITHOUT LONG-TERM CURRENT USE OF INSULIN (H): ICD-10-CM

## 2020-05-27 DIAGNOSIS — D86.0 SARCOIDOSIS OF LUNG (H): ICD-10-CM

## 2020-05-27 DIAGNOSIS — E11.9 TYPE 2 DIABETES MELLITUS WITHOUT COMPLICATION, WITH LONG-TERM CURRENT USE OF INSULIN (H): ICD-10-CM

## 2020-05-27 DIAGNOSIS — I47.29 PAROXYSMAL VENTRICULAR TACHYCARDIA (H): ICD-10-CM

## 2020-05-27 DIAGNOSIS — Z79.4 TYPE 2 DIABETES MELLITUS WITHOUT COMPLICATION, WITH LONG-TERM CURRENT USE OF INSULIN (H): ICD-10-CM

## 2020-05-27 PROCEDURE — 99213 OFFICE O/P EST LOW 20 MIN: CPT | Mod: 95 | Performed by: INTERNAL MEDICINE

## 2020-05-27 RX ORDER — AMIODARONE HYDROCHLORIDE 400 MG/1
400 TABLET ORAL 2 TIMES DAILY
Qty: 100 TABLET | Refills: 3 | Status: SHIPPED | OUTPATIENT
Start: 2020-05-27 | End: 2021-05-24

## 2020-05-27 ASSESSMENT — MIFFLIN-ST. JEOR: SCORE: 2064.08

## 2020-05-27 NOTE — LETTER
"5/27/2020    Jefry Harris MD  [address] [facility] [address]    RE: Neymar Rhodes       Dear Colleague,    I had the pleasure of seeing Neymar Rhodes in the [facility].          Review Of Systems  Skin:   Eyes:Ears/Nose/Throat:   Respiratory: NEGATIVE  Cardiovascular:NEGATIVE  Gastrointestinal: NEGATIVE  Genitourinary:NEGATIVE   Musculoskeletal: NEGATIVE  Neurologic: *numbness (L) arm, hand, fingers  Psychiatric: NEGATIVE  Hematologic/Lymphatic/Immunologic:   Endocrine:      Reviewed:  VERONICA Richardson    Neymar Rhodes is a 60 year old male who is being evaluated via a billable telephone visit.      The patient has been notified of following:     \"This telephone visit will be conducted via a call between you and your physician/provider. We have found that certain health care needs can be provided without the need for a physical exam.  This service lets us provide the care you need with a short phone conversation.  If a prescription is necessary we can send it directly to your pharmacy.  If lab work is needed we can place an order for that and you can then stop by our lab to have the test done at a later time.    Telephone visits are billed at different rates depending on your insurance coverage. During this emergency period, for some insurers they may be billed the same as an in-person visit.  Please reach out to your insurance provider with any questions.    If during the course of the call the physician/provider feels a telephone visit is not appropriate, you will not be charged for this service.\"    Patient has given verbal consent for Telephone visit?  Yes  05/27/20  - Writer spoke w/pt via phone.  He is expecting the phone visit w/Dr. Dorman  - Pt states having numbness in (L) arm, hand and fingers  - BP: 111/71  - P: 62  - Wt.:  269.3Lbs    VERONICA Richardson    What phone number would you like to be contacted at?   [phone]    How would you like to obtain your " AVS? 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 He was diagnosed with non-ICM with EF of 40% a few years ago. He was started on  beta-blocker, ACE inhibitor and mineralocorticoid receptor antagonist. Subsequent MRI shows findings consistent for sarcoidosis. He unfortunately failed to follow up after that as he was feeling fairly well, but did remain on his medications.    Patient did well until his hospitalization on 10/1/19 for acute systolic CHF decompensation and noted to have severe LV dysfunction with an EF<20% and an LVEDd of 6.9 cm. During the same hospitalization he had a sustained monomorphic VT ~180 bpm required acute cardioversion. Coronary anatomy was unchanged from angiogram. MRI shows LGE in septal, inferior, lateral base with a non-CAD scar in the anterolateral mid-wall with a worsening scar burden to 13%. He was loaded with amiodarone and received CRT-D on 10/4 performed by me.  Amiodarone has been reduced to 200 mg daily.     Patient has been followed by my colleague, Dr. Hernandes and was also evaluated by Dr. Batres at the [facility]. PET scan confirmed inflammation and confirmed on EBUS biopsy. He was started on prednisone and repeat PET scan showed significant improvement in inflammation. EF is around 25-30% on Jan 2020 echo some what better than before.     I was asked to see the patient because of frequent sustained monomorphic VT episodes at a rate of 160 bpm as long as 30 minutes or longer logged in the monitor zone. I had requested the patient to see me a month ago but due to his schedule he has been able to communicate with me until today.    Patient did not have symptoms during these VT episodes. His main concern has been the numbness in his arms and fingers ever since his sugar elevation due to Prednisone. His sugar has been in 350-400 range. He contacted his PCP's office 2 weeks ago and so far no response. I asked him to contact  again as untreated hyperglycemia can be quite serious.    Regarding his VT, it is not unexpected given his sarcoidosis. It is unclear whether the increased VT burden is directly related to active cardiac inflammation from sarcoidosis or just the presence of these granulomas even dormant would be suffice to illicit VT generation. In any event I discussed the option of ablation vs. Amiodarone reloading. Patient is scheduled to see Dr. Batres in a few weeks and would like what he has to say. In the meantime it would be best in reload amio by having him increasing amio to 2 tablets (400 mg) bid for 5 days - he has 20 pills left, then start new prescription of 400 mg bid for 1 week then 1 tablet (400 mg) daily. VT can be even slower now with high dose of amio but as patient did not have sxs with the current VT setting ok not to do any program changes at this point in time. Depending on Dr. Batres's recommendation for long term management of his VT it would be best with ablation.      Thank you for allowing me to participate in the care of your patient.    Sincerely,     Jayy Pratt MD     [facility]

## 2020-05-27 NOTE — PROGRESS NOTES
"Review Of Systems  Skin:   Eyes:Ears/Nose/Throat:   Respiratory: NEGATIVE  Cardiovascular:NEGATIVE  Gastrointestinal: NEGATIVE  Genitourinary:NEGATIVE   Musculoskeletal: NEGATIVE  Neurologic: *numbness (L) arm, hand, fingers  Psychiatric: NEGATIVE  Hematologic/Lymphatic/Immunologic:   Endocrine:      Reviewed:  VERONICA Richardson    Neymar Rhodes is a 60 year old male who is being evaluated via a billable telephone visit.      The patient has been notified of following:     \"This telephone visit will be conducted via a call between you and your physician/provider. We have found that certain health care needs can be provided without the need for a physical exam.  This service lets us provide the care you need with a short phone conversation.  If a prescription is necessary we can send it directly to your pharmacy.  If lab work is needed we can place an order for that and you can then stop by our lab to have the test done at a later time.    Telephone visits are billed at different rates depending on your insurance coverage. During this emergency period, for some insurers they may be billed the same as an in-person visit.  Please reach out to your insurance provider with any questions.    If during the course of the call the physician/provider feels a telephone visit is not appropriate, you will not be charged for this service.\"    Patient has given verbal consent for Telephone visit?  Yes  05/27/20  - Writer spoke w/pt via phone.  He is expecting the phone visit w/Dr. Dorman  - Pt states having numbness in (L) arm, hand and fingers  - BP: 111/71  - P: 62  - Wt.:  269.3Lbs    VEORNICA Richardson    What phone number would you like to be contacted at?   [phone]    How would you like to obtain your AVS? 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 He was started on  beta-blocker, ACE inhibitor and " mineralocorticoid receptor antagonist. Subsequent MRI shows findings consistent for sarcoidosis. He unfortunately failed to follow up after that as he was feeling fairly well, but did remain on his medications.    Patient did well until his hospitalization on 10/1/19 for acute systolic CHF decompensation and noted to have severe LV dysfunction with an EF<20% and an LVEDd of 6.9 cm. During the same hospitalization he had a sustained monomorphic VT ~180 bpm required acute cardioversion. Coronary anatomy was unchanged from angiogram. MRI shows LGE in septal, inferior, lateral base with a non-CAD scar in the anterolateral mid-wall with a worsening scar burden to 13%. He was loaded with amiodarone and received CRT-D on 10/4 performed by me.  Amiodarone has been reduced to 200 mg daily.     Patient has been followed by my colleague, Dr. Hernandes and was also evaluated by Dr. Batres at the [facility]. PET scan confirmed inflammation and confirmed on EBUS biopsy. He was started on prednisone and repeat PET scan showed significant improvement in inflammation. EF is around 25-30% on Jan 2020 echo some what better than before.     I was asked to see the patient because of frequent sustained monomorphic VT episodes at a rate of 160 bpm as long as 30 minutes or longer logged in the monitor zone. I had requested the patient to see me a month ago but due to his schedule he has been able to communicate with me until today.    Patient did not have symptoms during these VT episodes. His main concern has been the numbness in his arms and fingers ever since his sugar elevation due to Prednisone. His sugar has been in 350-400 range. He contacted his PCP's office 2 weeks ago and so far no response. I asked him to contact again as untreated hyperglycemia can be quite serious.    Regarding his VT, it is not unexpected given his sarcoidosis. It is unclear whether the increased VT burden is directly related to active cardiac inflammation from  sarcoidosis or just the presence of these granulomas even dormant would be suffice to illicit VT generation. In any event I discussed the option of ablation vs. Amiodarone reloading. Patient is scheduled to see Dr. Batres in a few weeks and would like what he has to say. In the meantime it would be best in reload amio by having him increasing amio to 2 tablets (400 mg) bid for 5 days - he has 20 pills left, then start new prescription of 400 mg bid for 1 week then 1 tablet (400 mg) daily. VT can be even slower now with high dose of amio but as patient did not have sxs with the current VT setting ok not to do any program changes at this point in time. Depending on Dr. Batres's recommendation for long term management of his VT it would be best with ablation.     Jayy Pratt MD

## 2020-05-27 NOTE — TELEPHONE ENCOUNTER
Patient informed. He was taking it in morning and at dinnertime so will continue to do so but at 20 units unless advise differently. He will start new dose tonight at dinnertime. He declined available openings for follow up appt until June 5th due to his work schedule, scheduled for June 5th. Advise if differently. He is using the Freestyle listed below. See separate encounter sent to cardiology regarding amiodarone.

## 2020-05-27 NOTE — TELEPHONE ENCOUNTER
See separate encounter sent to PCP today. Patient mentioned that he had his amiodarone up'd today. Says he is to use up his 200 mg tabs and take 2 tabs BID (thinks he maybe has 5 days worth left) and then taken 400 mg tab BID for seven days and then is not sure what will happen after that. Per script sent today, instructions are to take Take 1 tablet (400 mg) by mouth 2 times daily For 7 days then 1 tablet (400 mg) daily. Patient says once a day dosing was never discussed with him. He also thinks everything depends on what happens at June 3rd appt with pulmonary and whether he decides to have heart cut open. Please advise on dosing.

## 2020-05-27 NOTE — TELEPHONE ENCOUNTER
Patient calling. Says he just talked to doctor that did defribllator and was telling him some problems. Needs insulin readjusted. Blood sugar right now 373. Ever since prednisone he changed his levemir to 12 in morning and 12 at dinnertime. Last time he ate today was lunch at noon. Has pain in shoulder to elbow and tingling fingertips both hands. Left hip feels like someone punching it, aching all the time. He was told that these symptoms are because of his blood sugar. Reports his A1C was 5.1 and blood sugar was 172 or 182 in past and after taking first pill of prednisone blood sugar was 400. Blood sugars have been high since being on prednisone. Reports numbers are all over the place but has some readings of 233, 398, 384, 286, 287, 267, 191, 132, 307. Taken at morning and night. Takes morning blood sugar before going to work before taking insulin. Checks blood sugar before bed too. Takes BP and weight twice a day as well. Believe he has sarcoidosis?- attack on lung and heart. Prednisone taking 20 mg tablets listed to take 40 mg daily , but he is taking 1.5 tablets daily (30 mg). Was decreased at some point from 40 mg dosage. Someone at the - Dr. Rivera started him on prednisone. Unsure how long he has been on 30 mg dose. June 3rd sees that doctor again, unsure if prednsione dose will be adjusted then. Hopinghe can cut it down. Was going to have another scan on heart but now another doctor wants to cut something out of his heart? Also had his amidorane up'd today. Says he is to use up his 200 mg tabs and take 2 tabs BID and then taken 400 mg tab BID for seven days and then is not sure what will happen after that. Says everything depends on what will happen next and his decision on if he wants his heart cut open or not. Depends on what happens at June 3rd appt.  Needs blood sugar in check so does not end up with ketoacidosis. Sugars are so high he says he would be in hospital every day.     Please advise on blood  sugars. Should he schedule virtual appt? Also amiodarone script says to take 1 tab BID for 7 days and then 1 tab daily. He was unaware of the 1 tab daily part and is unsure on this. Should he address this with prescribing MD?    Call back patient at [phone]

## 2020-05-27 NOTE — TELEPHONE ENCOUNTER
He has sarcoidosis for which he takes prednisone.  That may be tapered down very slowly over months, will leave that to the pulmonary MD.   His glucoses will be very elevated when on prednisone    Increase the Levemir insulin to 20 units twice per day for now.   Schedule virtual follow up appointment in 2-4 days to discuss his glucose readings.     continue to check the glucose 2-4 times per day with standard glucometer, otherwise use the freestyle Jeffrey personal continuous glucose monitor if he has it.     Contact the cardiology clinic with any questions on the amiodarone dosing.

## 2020-05-28 DIAGNOSIS — I42.8 NON-ISCHEMIC CARDIOMYOPATHY (H): ICD-10-CM

## 2020-05-28 RX ORDER — SULFAMETHOXAZOLE/TRIMETHOPRIM 800-160 MG
TABLET ORAL
Qty: 30 TABLET | Refills: 5 | Status: SHIPPED | OUTPATIENT
Start: 2020-05-28 | End: 2020-11-18

## 2020-05-28 RX ORDER — ATORVASTATIN CALCIUM 80 MG/1
TABLET, FILM COATED ORAL
Qty: 15 TABLET | Refills: 9 | Status: SHIPPED | OUTPATIENT
Start: 2020-05-28 | End: 2020-08-19

## 2020-05-29 LAB
MDC_IDC_EPISODE_DTM: NORMAL
MDC_IDC_EPISODE_ID: NORMAL
MDC_IDC_EPISODE_TYPE: NORMAL
MDC_IDC_LEAD_IMPLANT_DT: NORMAL
MDC_IDC_LEAD_LOCATION: NORMAL
MDC_IDC_LEAD_LOCATION_DETAIL_1: NORMAL
MDC_IDC_LEAD_MFG: NORMAL
MDC_IDC_LEAD_MODEL: NORMAL
MDC_IDC_LEAD_POLARITY_TYPE: NORMAL
MDC_IDC_LEAD_SERIAL: NORMAL
MDC_IDC_MSMT_BATTERY_DTM: NORMAL
MDC_IDC_MSMT_BATTERY_REMAINING_LONGEVITY: 126 MO
MDC_IDC_MSMT_BATTERY_REMAINING_PERCENTAGE: 100 %
MDC_IDC_MSMT_BATTERY_STATUS: NORMAL
MDC_IDC_MSMT_CAP_CHARGE_DTM: NORMAL
MDC_IDC_MSMT_CAP_CHARGE_TIME: 9.6 S
MDC_IDC_MSMT_CAP_CHARGE_TYPE: NORMAL
MDC_IDC_MSMT_LEADCHNL_LV_IMPEDANCE_VALUE: 630 OHM
MDC_IDC_MSMT_LEADCHNL_RA_IMPEDANCE_VALUE: 664 OHM
MDC_IDC_MSMT_LEADCHNL_RV_IMPEDANCE_VALUE: 533 OHM
MDC_IDC_PG_IMPLANT_DTM: NORMAL
MDC_IDC_PG_MFG: NORMAL
MDC_IDC_PG_MODEL: NORMAL
MDC_IDC_PG_SERIAL: NORMAL
MDC_IDC_PG_TYPE: NORMAL
MDC_IDC_SESS_CLINIC_NAME: NORMAL
MDC_IDC_SESS_DTM: NORMAL
MDC_IDC_SESS_TYPE: NORMAL
MDC_IDC_SET_BRADY_AT_MODE_SWITCH_MODE: NORMAL
MDC_IDC_SET_BRADY_AT_MODE_SWITCH_RATE: 170 {BEATS}/MIN
MDC_IDC_SET_BRADY_LOWRATE: 60 {BEATS}/MIN
MDC_IDC_SET_BRADY_MAX_SENSOR_RATE: 130 {BEATS}/MIN
MDC_IDC_SET_BRADY_MAX_TRACKING_RATE: 130 {BEATS}/MIN
MDC_IDC_SET_BRADY_MODE: NORMAL
MDC_IDC_SET_BRADY_PAV_DELAY_HIGH: 200 MS
MDC_IDC_SET_BRADY_PAV_DELAY_LOW: 270 MS
MDC_IDC_SET_BRADY_SAV_DELAY_HIGH: 140 MS
MDC_IDC_SET_BRADY_SAV_DELAY_LOW: 190 MS
MDC_IDC_SET_CRT_LVRV_DELAY: 35 MS
MDC_IDC_SET_CRT_PACED_CHAMBERS: NORMAL
MDC_IDC_SET_LEADCHNL_LV_PACING_AMPLITUDE: 2.2 V
MDC_IDC_SET_LEADCHNL_LV_PACING_ANODE_ELECTRODE_1: NORMAL
MDC_IDC_SET_LEADCHNL_LV_PACING_ANODE_LOCATION_1: NORMAL
MDC_IDC_SET_LEADCHNL_LV_PACING_CAPTURE_MODE: NORMAL
MDC_IDC_SET_LEADCHNL_LV_PACING_CATHODE_ELECTRODE_1: NORMAL
MDC_IDC_SET_LEADCHNL_LV_PACING_CATHODE_LOCATION_1: NORMAL
MDC_IDC_SET_LEADCHNL_LV_PACING_PULSEWIDTH: 0.5 MS
MDC_IDC_SET_LEADCHNL_LV_SENSING_ADAPTATION_MODE: NORMAL
MDC_IDC_SET_LEADCHNL_LV_SENSING_ANODE_ELECTRODE_1: NORMAL
MDC_IDC_SET_LEADCHNL_LV_SENSING_ANODE_LOCATION_1: NORMAL
MDC_IDC_SET_LEADCHNL_LV_SENSING_CATHODE_ELECTRODE_1: NORMAL
MDC_IDC_SET_LEADCHNL_LV_SENSING_CATHODE_LOCATION_1: NORMAL
MDC_IDC_SET_LEADCHNL_LV_SENSING_SENSITIVITY: 1 MV
MDC_IDC_SET_LEADCHNL_RA_PACING_AMPLITUDE: 2 V
MDC_IDC_SET_LEADCHNL_RA_PACING_CAPTURE_MODE: NORMAL
MDC_IDC_SET_LEADCHNL_RA_PACING_POLARITY: NORMAL
MDC_IDC_SET_LEADCHNL_RA_PACING_PULSEWIDTH: 0.5 MS
MDC_IDC_SET_LEADCHNL_RA_SENSING_ADAPTATION_MODE: NORMAL
MDC_IDC_SET_LEADCHNL_RA_SENSING_POLARITY: NORMAL
MDC_IDC_SET_LEADCHNL_RA_SENSING_SENSITIVITY: 0.25 MV
MDC_IDC_SET_LEADCHNL_RV_PACING_AMPLITUDE: 2 V
MDC_IDC_SET_LEADCHNL_RV_PACING_CAPTURE_MODE: NORMAL
MDC_IDC_SET_LEADCHNL_RV_PACING_POLARITY: NORMAL
MDC_IDC_SET_LEADCHNL_RV_PACING_PULSEWIDTH: 0.5 MS
MDC_IDC_SET_LEADCHNL_RV_SENSING_ADAPTATION_MODE: NORMAL
MDC_IDC_SET_LEADCHNL_RV_SENSING_POLARITY: NORMAL
MDC_IDC_SET_LEADCHNL_RV_SENSING_SENSITIVITY: 0.6 MV
MDC_IDC_SET_ZONE_DETECTION_INTERVAL: 250 MS
MDC_IDC_SET_ZONE_DETECTION_INTERVAL: 333 MS
MDC_IDC_SET_ZONE_DETECTION_INTERVAL: 375 MS
MDC_IDC_SET_ZONE_TYPE: NORMAL
MDC_IDC_SET_ZONE_VENDOR_TYPE: NORMAL
MDC_IDC_STAT_AT_BURDEN_PERCENT: 0 %
MDC_IDC_STAT_AT_DTM_END: NORMAL
MDC_IDC_STAT_AT_DTM_START: NORMAL
MDC_IDC_STAT_BRADY_DTM_END: NORMAL
MDC_IDC_STAT_BRADY_DTM_START: NORMAL
MDC_IDC_STAT_BRADY_RA_PERCENT_PACED: 36 %
MDC_IDC_STAT_BRADY_RV_PERCENT_PACED: 95 %
MDC_IDC_STAT_CRT_DTM_END: NORMAL
MDC_IDC_STAT_CRT_DTM_START: NORMAL
MDC_IDC_STAT_CRT_LV_PERCENT_PACED: 95 %
MDC_IDC_STAT_EPISODE_RECENT_COUNT: 0
MDC_IDC_STAT_EPISODE_RECENT_COUNT: 1
MDC_IDC_STAT_EPISODE_RECENT_COUNT: 78
MDC_IDC_STAT_EPISODE_RECENT_COUNT_DTM_END: NORMAL
MDC_IDC_STAT_EPISODE_RECENT_COUNT_DTM_START: NORMAL
MDC_IDC_STAT_EPISODE_TYPE: NORMAL
MDC_IDC_STAT_EPISODE_VENDOR_TYPE: NORMAL
MDC_IDC_STAT_TACHYTHERAPY_ATP_DELIVERED_RECENT: 1
MDC_IDC_STAT_TACHYTHERAPY_ATP_DELIVERED_TOTAL: 1
MDC_IDC_STAT_TACHYTHERAPY_RECENT_DTM_END: NORMAL
MDC_IDC_STAT_TACHYTHERAPY_RECENT_DTM_START: NORMAL
MDC_IDC_STAT_TACHYTHERAPY_SHOCKS_ABORTED_RECENT: 0
MDC_IDC_STAT_TACHYTHERAPY_SHOCKS_ABORTED_TOTAL: 0
MDC_IDC_STAT_TACHYTHERAPY_SHOCKS_DELIVERED_RECENT: 0
MDC_IDC_STAT_TACHYTHERAPY_SHOCKS_DELIVERED_TOTAL: 0
MDC_IDC_STAT_TACHYTHERAPY_TOTAL_DTM_END: NORMAL
MDC_IDC_STAT_TACHYTHERAPY_TOTAL_DTM_START: NORMAL

## 2020-05-29 NOTE — TELEPHONE ENCOUNTER
Jayy Dorman MD  You 3 minutes ago (8:45 AM)       I believe I did discussed it with him. Please see my clinic note for details. qp      Can cardiology staff discuss instructions with patient?

## 2020-05-29 NOTE — TELEPHONE ENCOUNTER
5/29/20 Attempted to call pt but reached CHRISTIANO LEWIS and requested callback. Dimitris 10:30 am

## 2020-06-02 ENCOUNTER — TELEPHONE (OUTPATIENT)
Dept: CARDIOLOGY | Facility: CLINIC | Age: 60
End: 2020-06-02

## 2020-06-02 NOTE — TELEPHONE ENCOUNTER
[facility] Call Center    Phone Message    May a detailed message be left on voicemail: no     Reason for Call: Other: Pt is having an appt tomorrow with Dr. Batres via phone; he cannot do video. Pt needs to confirm if he needs labs prior, since that was not scheduled. Please call Pt back.     Action Taken: Message routed to:  [facility] [[email or web address]] [facility]ARDIOLOGY [facility]    Travel Screening: Not Applicable

## 2020-06-03 ENCOUNTER — CARE COORDINATION (OUTPATIENT)
Dept: CARDIOLOGY | Facility: CLINIC | Age: 60
End: 2020-06-03

## 2020-06-03 ENCOUNTER — VIRTUAL VISIT (OUTPATIENT)
Dept: CARDIOLOGY | Facility: CLINIC | Age: 60
End: 2020-06-03
Attending: INTERNAL MEDICINE
Payer: COMMERCIAL

## 2020-06-03 DIAGNOSIS — I50.22 CHRONIC SYSTOLIC HEART FAILURE (H): Primary | ICD-10-CM

## 2020-06-03 DIAGNOSIS — I10 BENIGN ESSENTIAL HYPERTENSION: ICD-10-CM

## 2020-06-03 DIAGNOSIS — I42.8 NONISCHEMIC CARDIOMYOPATHY (H): ICD-10-CM

## 2020-06-03 DIAGNOSIS — I47.29 PAROXYSMAL VENTRICULAR TACHYCARDIA (H): Primary | ICD-10-CM

## 2020-06-03 DIAGNOSIS — D86.9 SARCOIDOSIS: ICD-10-CM

## 2020-06-03 DIAGNOSIS — I50.22 CHRONIC SYSTOLIC HEART FAILURE (H): ICD-10-CM

## 2020-06-03 PROCEDURE — 99215 OFFICE O/P EST HI 40 MIN: CPT | Mod: 95 | Performed by: INTERNAL MEDICINE

## 2020-06-03 NOTE — PROGRESS NOTES
"Neymar Rhodes is a 60 year old male who is being evaluated via a billable telephone visit.      The patient has been notified of following:     \"This telephone visit will be conducted via a call between you and your physician/provider. We have found that certain health care needs can be provided without the need for a physical exam.  This service lets us provide the care you need with a short phone conversation.  If a prescription is necessary we can send it directly to your pharmacy.  If lab work is needed we can place an order for that and you can then stop by our lab to have the test done at a later time.    Telephone visits are billed at different rates depending on your insurance coverage. During this emergency period, for some insurers they may be billed the same as an in-person visit.  Please reach out to your insurance provider with any questions.    If during the course of the call the physician/provider feels a telephone visit is not appropriate, you will not be charged for this service.\"    Patient has given verbal consent for Telephone visit?  Yes    What phone number would you like to be contacted at? cell    How would you like to obtain your AVS? [email or web address]    Phone call duration: 21 minutes    Yue 3, 2020     Dear Perri Salomon and Lakia,     Neymar Rhodes is a very pleasant 59-year-old gentleman who presented with his wife today to the advanced heart failure clinic for further evaluation for possible cardiac sarcoidosis and cardiomyopathy.  He does have a complex past medical history with a dilated 6.9 cm ventricle heart failure reduced ejection fraction in the 20-30% range as well as sustained VT status post device implantation.  He had a recent admission for inpatient diuresis and developed hyperkalemia prerenal azotemia.  He regularly follows at [facility] and core clinic there.   His history is summarized from prior notes: He was first seen in 2018 with new diagnosis of HF with an EF " of 40% by Dr. Dr. Iniguez. He had normal sinus rhythm with LBBB;  coronary angiography showed nonobstructive disease. He was started on guideline directed therapy with beta-blockers, ACE inhibitors and mineralocorticoid receptor antagonist.  Follow-up cardiac MRI showed that he had a small scar concerning for sarcoidosis. He unfortunately failed to follow-up afterwards but remained on medications. He did well for about a year until he began to develop progressive dyspnea which resulted in his admission on 10/1/19. ECHO showed an EF<20% with an LVEDd of 6.9 cm. The following day, while seated, he developed a sustained monomorphic VT ~180 bpm.  This was cardioverted emergently into normal sinus rhythm with first shock at 200J. From there he went to the cath lab where his coronary anatomy was unchanged. RHC showed mildly elevated filling pressures with an LVEDP of 21 and a low-normal cardiac output. Cardiac MRI was repeated and showed LGE in septal, inferior, lateral base with a non-CAD scar in the anterolateral mid-wall with a worsening scar burden to 13%. He was loaded with amiodarone and received CRT-D on 10/4. He was discharged the following day on his PTA regimen with the addition of spironolactone 25 mg and furosemide 20 mg daily. Afterwards he became hypotensive, and his PCP reduced his lisinopril. Despite this he was still hypotensive and labs showed YASMEEN with hyperkalemia on 10/9. Significant alteration in his diet (ETOH and sodium reduction) was likely contributing.   Today he notes that he continues to feel relatively well with no new complaints.  He did have a few episodes of palpitations which seem to have corresponded to ventricular tachycardia.  On interrogation he did have sustained VT episodes.  He also had significant hyperglycemia and his insulin dose has been adjusted recently as the glucose value is 400.  His prednisone dose was appropriately reduced to 30 mg daily and this is what he is continue  to take right now.  He also notes some tingling in the fingertips but otherwise no new complaints.  He did see Dr. Dorman recently for his VT and amiodarone was reloaded at that time.  He has not had his repeat PET scan yet.  No other complaints today.    PMH as reviewed from notes:  # Severe nonischemic cardiomyopathy with EF of 15% with marked LV dilatation  # Sustained monomorphic ventricular tachycardia, s/p CRT-D  # Potential cardiac sarcoidosis  # Nonobstructive coronary artery disease  # DMII  # Long-standing ETOH abuse - prior to admission was drinking heavily (sallie aguilar and at least 20 beers every weekend - cut back).  # Social - Lives with wife Julisa. He works as a . He is reading labels / monitoring his sodium intake with the help of his wife, and weighing himself daily.      PAST MEDICAL HISTORY:  Past Medical History:   Diagnosis Date     Ascending aorta dilatation (H)      Diverticulosis of colon (without mention of hemorrhage)      Essential hypertension, benign      Gout, unspecified      LBBB (left bundle branch block)      Morbid obesity (H)      Non-ischemic cardiomyopathy (H)      Nonrheumatic mitral valve regurgitation      NSTEMI (non-ST elevated myocardial infarction) (H)     cardiac cath 6/2018: mild non-obstructive CAD     Other and unspecified hyperlipidemia      Type II or unspecified type diabetes mellitus without mention of complication, not stated as uncontrolled      FAMILY HISTORY:  Family History   Problem Relation Age of Onset     Cancer Father 75        bladder cancer     Myocardial Infarction Father      Other - See Comments Father         smoking     Breast Cancer Mother      Breast Cancer Sister      Family History Negative Brother      Family History Negative Son      Family History Negative Son         fluttering/murmur     Asthma Son      Colon Cancer No family hx of       SOCIAL HISTORY:  Social History     Socioeconomic History     Marital status:       Spouse name: Not on file     Number of children: Not on file     Years of education: Not on file     Highest education level: Not on file   Occupational History     Not on file   Social Needs     Financial resource strain: Not on file     Food insecurity     Worry: Not on file     Inability: Not on file     Transportation needs     Medical: Not on file     Non-medical: Not on file   Tobacco Use     Smoking status: Never Smoker     Smokeless tobacco: Never Used   Substance and Sexual Activity     Alcohol use: Not Currently     Comment: Was drinking 9 beers and three Flaco Martínez shot every Friday.  No ETOH sinc eOctober 2019.     Drug use: No     Sexual activity: Yes     Partners: Female   Lifestyle     Physical activity     Days per week: Not on file     Minutes per session: Not on file     Stress: Not on file   Relationships     Social connections     Talks on phone: Not on file     Gets together: Not on file     Attends [employer] service: Not on file     Active member of club or organization: Not on file     Attends meetings of clubs or organizations: Not on file     Relationship status: Not on file     Intimate partner violence     Fear of current or ex partner: Not on file     Emotionally abused: Not on file     Physically abused: Not on file     Forced sexual activity: Not on file   Other Topics Concern     Parent/sibling w/ CABG, MI or angioplasty before 65F 55M? Not Asked   Social History Narrative     Not on file     CURRENT MEDICATIONS:  Current Outpatient Medications   Medication     acetaminophen (TYLENOL) 500 MG tablet     albuterol (PROAIR HFA/PROVENTIL HFA/VENTOLIN HFA) 108 (90 Base) MCG/ACT Inhaler     albuterol (PROVENTIL) (2.5 MG/3ML) 0.083% neb solution     amiodarone (PACERONE) 400 MG tablet     aspirin 81 MG tablet     atorvastatin (LIPITOR) 80 MG tablet     blood glucose monitoring (ACCU-CHEK LUL PLUS) test strip     clotrimazole (LOTRIMIN) 1 % external cream     Continuous Blood Gluc  Sensor (FREESTYLE JOCELIN 14 DAY SENSOR) MISC     continuous blood glucose monitoring (FREESTYLE JOCELIN) sensor     fenofibrate (TRIGLIDE/LOFIBRA) 160 MG tablet     fluticasone (FLONASE) 50 MCG/ACT nasal spray     furosemide (LASIX) 20 MG tablet     glipiZIDE (GLUCOTROL XL) 10 MG 24 hr tablet     guaiFENesin-codeine (ROBITUSSIN AC) 100-10 MG/5ML solution     insulin detemir (LEVEMIR PEN) 100 UNIT/ML pen     insulin pen needle (BD ULTRA-FINE) 29G X 12.7MM miscellaneous     levothyroxine (SYNTHROID/LEVOTHROID) 50 MCG tablet     liraglutide (VICTOZA PEN) 18 MG/3ML solution     metFORMIN (GLUCOPHAGE) 1000 MG tablet     metoprolol succinate ER (TOPROL-XL) 25 MG 24 hr tablet     omeprazole (PRILOSEC) 40 MG DR capsule     predniSONE (DELTASONE) 20 MG tablet     sacubitril-valsartan (ENTRESTO) 49-51 MG per tablet     sulfamethoxazole-trimethoprim (BACTRIM DS) 800-160 MG tablet     triamcinolone (KENALOG) 0.5 % cream     No current facility-administered medications for this visit.       ROS:   Constitutional: No fever, chills, or sweats. Weight is 0 lbs 0 oz  ENT: No visual disturbance, ear ache, epistaxis, sore throat.   Allergies/Immunologic: Negative.   Respiratory: No cough, hemoptysis.   Cardiovascular: As per HPI.   GI: No nausea, vomiting, hematemesis, melena, or hematochezia.   : No urinary frequency, dysuria, or hematuria.   Integument: Negative.   Psychiatric: Pleasant, no major depression noted  Neuro: No focal neurological deficits noted  Endocrinology: Negative.   Musculoskeletal: As per HPI.      EXAM:  Limited evaluation due to phone encounter.  No lower extremity edema no abdominal distention noted.     Labs:  Lab Results   Component Value Date    WBC 7.1 10/05/2019    HGB 13.3 12/18/2019    HCT 35.9 (L) 10/05/2019     10/05/2019     03/18/2020    POTASSIUM 4.0 03/18/2020    CHLORIDE 110 (H) 03/18/2020    CO2 23 03/18/2020    BUN 21 03/18/2020    CR 1.17 03/18/2020     (H) 03/18/2020    DD  0.3 10/01/2019    NTBNPI 957 (H) 10/01/2019    NTBNP 703 (H) 12/18/2019    TROPONIN 0.07 06/10/2018    TROPI 0.071 (H) 10/01/2019    AST 28 12/09/2019    ALT 7 12/09/2019    ALKPHOS 36 (L) 10/02/2019    BILITOTAL 0.6 10/02/2019    INR 1.16 (H) 10/04/2019     Lymph node biopsy:  The amount of tissue obtained is limited. There are small fragments of noncaseating granulomas with occasional   multinucleated giant cells and rim of lymphocytic infiltrate. The differential diagnosis includes infection   and noninfectious processes such as sarcoidosis, in the appropriate clinical and radiologic context.      Cardiac MRI 10/1/2019:  Late gadolinium enhancement is present in the septal. inferior and lateral base. There is non-CAD scar in  the anterolateral mid-wall. Scar is more extensive (13%) as compared to the prior MRI (6%).  The LV is severely dilated and the global systolic function is moderately severely to severely reduced with  an LVEF of 25%. There is severe global LV dysfunction with dyssynchronous septal motion consistent with a  LBBB.  Collectively, these findings are most consistent with a non-ischemic cardiomyopathy. Possible etiologies  include prior LBBB, cardiac sarcoidosis or prior myocarditis.     Coronary angiogram 10/1/2019:    Minimal non-obstructive coronary artery disease    Mildly elevated LVEDP    Successful closure of the RFA access site with a 6F Angioseal device     RHC 10/1/2019:  Normal filling pressures along with PA pressure.  Upper normal pulmonary capillary wedge pressure (13mmHg).     TTE 10/1/2019:  1. Severely dilated left ventricle (end-diastolic diameter 6.9 cm) with  severely reduced systolic function.  2. Visually estimated LVEF < 20%.  3. Significant left ventricular wall motion abnormalities.  4. Thinning and akinesis of the inferior and inferolateral walls and apex. Septal thinning and dyskinesis consistent with known conduction abnormality. Significant hypokinesis of the anterior  and anterolateral walls.  5. Normal right ventricular size and systolic function. Estimated right  ventricular systolic pressure is 13 mmHg plus the right atrial pressure.  4. Mild mitral regurgitation, trace tricuspid valve regurgitation. It with previous study dated 6/11/2018 and cardiac MRI dated 6/13/2018, LVEF has reduced from 40% to <20%.     Cardiac MRI 7/5/2018  1. The global left ventricular systolic function is moderately decreased. The LVEF is 40%. There is mild LV  hypertrophy.  The LV is moderately enlarged.    2.  There is moderate global hypokinesis and dyssynchronous septal motion.  Regional wall motion  abnormalities include moderately severe hypokinesis of the basilar inferoseptum and basilar inferior wall..  3.  Late gadolinium enhancement imaging predominantly subendocardial enhancement in the base of the  inferior and inferoseptal walls.  The pattern is most consistent  with an ischemic etiology. Other  etiologies such as sarcoidosis are also possible. The degree of LV dysfunction is out of proportion to the  degree of scar (6%).     TTE 1/2/20:  The left ventricle is severely dilated, LVEDD 7 cm  The visual ejection fraction is estimated at 25-30%.  There is mod-severe global hypokinesia of the left ventricle.  There is a catheter/pacemaker lead seen in the right ventricle.  LVEF may be slightly improved compared with study from 10-19     PET 12/4/19:  1. Diffuse increased FDG uptake in the myocardium compatible with  active cardiac sarcoid.  2. Globally hypokinetic and enlarged heart with ejection fraction  severely decreased to 16%, compatible with a dilatated cardiomyopathy.  3. Small perfusion abnormality of the anterior wall of the left  ventricle, this is the LAD distribution..   4. There is increased ammonia uptake in the lungs, consistent with  patient's known history of congestive heart failure.  5. FDG PET/CT demonstrate active sarcoid in the mediastinal and hilar lymph nodes.     PET  2/12/20:  1. Minimal residual uptake in the basal aspect of the septum and anterior wall, greatly improved compared to the prior examination.  2. Minimal uptake of a mediastinal lymph node.   3. Left parotid hyperdense nodule without significant FDG uptake, likely lymph node or Warthin's duct tumor.    ASSESSMENT AND PLAN:  In summary, patient is a 59 year old gentleman with complex past cardiac history as detailed above including VT cardiac arrest, significantly reduced ejection fraction nonischemic cardiomyopathy class III symptoms who was referred for further evaluation for cardiac sarcoid.    Patient presentation was initially consistent with sarcoid on the clinical basis and biopsy showed evidence of noncaseating granulomas also suggesting sarcoid.  We started treatment with 40mg Prednisone and he did have repeat PET scan with significant improvement in inflammation. His dose was appropriately decreased to 30 mg daily. Clinically he felt much better and there were no ICD shocks. Recently however he did have many episode of VT, some were sustained. He was re-loaded with amiodarone and the question of possible VT ablation came up.  Response I think we should repeat his PET scan to be reevaluate myocardial information.  If it is better then we should proceed with VT ablation as possible however if inflammation is worse then I think we should switch him over to a different agent and reevaluate the VT burden.  We will try to wean off the prednisone in that case as soon as possible.  We will also repeat an echocardiogram and he will obtain routine blood work at the same time.  Until these are completed he will continue current medical regimen.  I will see him back as soon as these are done hopefully in the next 2 to 3 weeks.     I appreciate the opportunity to participate in the care of Neymar Rhodes . Please do not hesitate to contact me with any further questions.     Sincerely,   Roberto Batres MD  Assistant     [facility] Division of Cardiology

## 2020-06-03 NOTE — PATIENT INSTRUCTIONS
-Please continue medications as you are taking them at the moment  -I reviewed your blood work to be completed on Friday as he mentioned it on a scheduled  -We will repeat an echocardiogram and PET scan for the sarcoidosis over the next 2 weeks  -I will see you back after these are completed to discuss the results.  - If the inflammation is better and he still had these episodes of VT I do think it is a good idea to proceed with VT ablation.  If the inflammation is not better or worse than it was before then we will consider switching him to a different medication.  We will need to wean off the prednisone as soon as possible.  -Please let us know if there is any new issues or complaints in the meantime.

## 2020-06-03 NOTE — TELEPHONE ENCOUNTER
Called was called and message left stating that the testing that is deemed essential will be decided during virtual visit. No labs needed prior to visit at this time.

## 2020-06-03 NOTE — LETTER
"6/3/2020      RE: Neymar Rhodes  [address]  [facility] [address]       Dear Colleague,    Thank you for the opportunity to participate in the care of your patient, Neymar Rhodes, at the [facility]. Please see a copy of my visit note below.    Neymar Rhodes is a 60 year old male who is being evaluated via a billable telephone visit.      The patient has been notified of following:     \"This telephone visit will be conducted via a call between you and your physician/provider. We have found that certain health care needs can be provided without the need for a physical exam.  This service lets us provide the care you need with a short phone conversation.  If a prescription is necessary we can send it directly to your pharmacy.  If lab work is needed we can place an order for that and you can then stop by our lab to have the test done at a later time.    Telephone visits are billed at different rates depending on your insurance coverage. During this emergency period, for some insurers they may be billed the same as an in-person visit.  Please reach out to your insurance provider with any questions.    If during the course of the call the physician/provider feels a telephone visit is not appropriate, you will not be charged for this service.\"    Patient has given verbal consent for Telephone visit?  Yes    What phone number would you like to be contacted at? cell    How would you like to obtain your AVS? [employer]    Phone call duration: 21 minutes    Yue 3, 2020     Dear Perri Salomon and Lakia,     Neymar Rhodes is a very pleasant 59-year-old gentleman who presented with his wife today to the advanced heart failure clinic for further evaluation for possible cardiac sarcoidosis and cardiomyopathy.  He does have a complex past medical history with a dilated 6.9 cm ventricle heart failure reduced ejection fraction in the 20-30% range as well " as sustained VT status post device implantation.  He had a recent admission for inpatient diuresis and developed hyperkalemia prerenal azotemia.  He regularly follows at [facility] and core clinic there.   His history is summarized from prior notes: He was first seen in 2018 with new diagnosis of HF with an EF of 40% by Dr. Dr. Iniguez. He had normal sinus rhythm with LBBB;  coronary angiography showed nonobstructive disease. He was started on guideline directed therapy with beta-blockers, ACE inhibitors and mineralocorticoid receptor antagonist.  Follow-up cardiac MRI showed that he had a small scar concerning for sarcoidosis. He unfortunately failed to follow-up afterwards but remained on medications. He did well for about a year until he began to develop progressive dyspnea which resulted in his admission on 10/1/19. ECHO showed an EF<20% with an LVEDd of 6.9 cm. The following day, while seated, he developed a sustained monomorphic VT ~180 bpm.  This was cardioverted emergently into normal sinus rhythm with first shock at 200J. From there he went to the cath lab where his coronary anatomy was unchanged. RHC showed mildly elevated filling pressures with an LVEDP of 21 and a low-normal cardiac output. Cardiac MRI was repeated and showed LGE in septal, inferior, lateral base with a non-CAD scar in the anterolateral mid-wall with a worsening scar burden to 13%. He was loaded with amiodarone and received CRT-D on 10/4. He was discharged the following day on his PTA regimen with the addition of spironolactone 25 mg and furosemide 20 mg daily. Afterwards he became hypotensive, and his PCP reduced his lisinopril. Despite this he was still hypotensive and labs showed YASMEEN with hyperkalemia on 10/9. Significant alteration in his diet (ETOH and sodium reduction) was likely contributing.   Today he notes that he continues to feel relatively well with no new complaints.  He did have a few episodes of palpitations  which seem to have corresponded to ventricular tachycardia.  On interrogation he did have sustained VT episodes.  He also had significant hyperglycemia and his insulin dose has been adjusted recently as the glucose value is 400.  His prednisone dose was appropriately reduced to 30 mg daily and this is what he is continue to take right now.  He also notes some tingling in the fingertips but otherwise no new complaints.  He did see Dr. Dorman recently for his VT and amiodarone was reloaded at that time.  He has not had his repeat PET scan yet.  No other complaints today.    PMH as reviewed from notes:  # Severe nonischemic cardiomyopathy with EF of 15% with marked LV dilatation  # Sustained monomorphic ventricular tachycardia, s/p CRT-D  # Potential cardiac sarcoidosis  # Nonobstructive coronary artery disease  # DMII  # Long-standing ETOH abuse - prior to admission was drinking heavily (sallie aguilar and at least 20 beers every weekend - cut back).  # Social - Lives with wife Julisa. He works as a . He is reading labels / monitoring his sodium intake with the help of his wife, and weighing himself daily.      PAST MEDICAL HISTORY:  Past Medical History:   Diagnosis Date     Ascending aorta dilatation (H)      Diverticulosis of colon (without mention of hemorrhage)      Essential hypertension, benign      Gout, unspecified      LBBB (left bundle branch block)      Morbid obesity (H)      Non-ischemic cardiomyopathy (H)      Nonrheumatic mitral valve regurgitation      NSTEMI (non-ST elevated myocardial infarction) (H)     cardiac cath 6/2018: mild non-obstructive CAD     Other and unspecified hyperlipidemia      Type II or unspecified type diabetes mellitus without mention of complication, not stated as uncontrolled      FAMILY HISTORY:  Family History   Problem Relation Age of Onset     Cancer Father 75        bladder cancer     Myocardial Infarction Father      Other - See Comments Father         smoking      Breast Cancer Mother      Breast Cancer Sister      Family History Negative Brother      Family History Negative Son      Family History Negative Son         fluttering/murmur     Asthma Son      Colon Cancer No family hx of       SOCIAL HISTORY:  Social History     Socioeconomic History     Marital status:      Spouse name: Not on file     Number of children: Not on file     Years of education: Not on file     Highest education level: Not on file   Occupational History     Not on file   Social Needs     Financial resource strain: Not on file     Food insecurity     Worry: Not on file     Inability: Not on file     Transportation needs     Medical: Not on file     Non-medical: Not on file   Tobacco Use     Smoking status: Never Smoker     Smokeless tobacco: Never Used   Substance and Sexual Activity     Alcohol use: Not Currently     Comment: Was drinking 9 beers and three Flaco Martínez shot every Friday.  No ETOH [facility] eOctober 2019.     Drug use: No     Sexual activity: Yes     Partners: Female   Lifestyle     Physical activity     Days per week: Not on file     Minutes per session: Not on file     Stress: Not on file   Relationships     Social connections     Talks on phone: Not on file     Gets together: Not on file     Attends Jew service: Not on file     Active member of club or organization: Not on file     Attends meetings of clubs or organizations: Not on file     Relationship status: Not on file     Intimate partner violence     Fear of current or ex partner: Not on file     Emotionally abused: Not on file     Physically abused: Not on file     Forced sexual activity: Not on file   Other Topics Concern     Parent/sibling w/ CABG, MI or angioplasty before 65F 55M? Not Asked   Social History Narrative     Not on file     CURRENT MEDICATIONS:  Current Outpatient Medications   Medication     acetaminophen (TYLENOL) 500 MG tablet     albuterol (PROAIR HFA/PROVENTIL HFA/VENTOLIN HFA) 108 (90 Base)  MCG/ACT Inhaler     albuterol (PROVENTIL) (2.5 MG/3ML) 0.083% neb solution     amiodarone (PACERONE) 400 MG tablet     aspirin 81 MG tablet     atorvastatin (LIPITOR) 80 MG tablet     blood glucose monitoring (ACCU-CHEK LUL PLUS) test strip     clotrimazole (LOTRIMIN) 1 % external cream     Continuous Blood Gluc Sensor (FREESTYLE JOCELIN 14 DAY SENSOR) MISC     continuous blood glucose monitoring (FREESTYLE JOCELIN) sensor     fenofibrate (TRIGLIDE/LOFIBRA) 160 MG tablet     fluticasone (FLONASE) 50 MCG/ACT nasal spray     furosemide (LASIX) 20 MG tablet     glipiZIDE (GLUCOTROL XL) 10 MG 24 hr tablet     guaiFENesin-codeine (ROBITUSSIN AC) 100-10 MG/5ML solution     insulin detemir (LEVEMIR PEN) 100 UNIT/ML pen     insulin pen needle (BD ULTRA-FINE) 29G X 12.7MM miscellaneous     levothyroxine (SYNTHROID/LEVOTHROID) 50 MCG tablet     liraglutide (VICTOZA PEN) 18 MG/3ML solution     metFORMIN (GLUCOPHAGE) 1000 MG tablet     metoprolol succinate ER (TOPROL-XL) 25 MG 24 hr tablet     omeprazole (PRILOSEC) 40 MG DR capsule     predniSONE (DELTASONE) 20 MG tablet     sacubitril-valsartan (ENTRESTO) 49-51 MG per tablet     sulfamethoxazole-trimethoprim (BACTRIM DS) 800-160 MG tablet     triamcinolone (KENALOG) 0.5 % cream     No current facility-administered medications for this visit.       ROS:   Constitutional: No fever, chills, or sweats. Weight is 0 lbs 0 oz  ENT: No visual disturbance, ear ache, epistaxis, sore throat.   Allergies/Immunologic: Negative.   Respiratory: No cough, hemoptysis.   Cardiovascular: As per HPI.   GI: No nausea, vomiting, hematemesis, melena, or hematochezia.   : No urinary frequency, dysuria, or hematuria.   Integument: Negative.   Psychiatric: Pleasant, no major depression noted  Neuro: No focal neurological deficits noted  Endocrinology: Negative.   Musculoskeletal: As per HPI.      EXAM:  Limited evaluation due to phone encounter.  No lower extremity edema no abdominal distention  noted.     Labs:  Lab Results   Component Value Date    WBC 7.1 10/05/2019    HGB 13.3 12/18/2019    HCT 35.9 (L) 10/05/2019     10/05/2019     03/18/2020    POTASSIUM 4.0 03/18/2020    CHLORIDE 110 (H) 03/18/2020    CO2 23 03/18/2020    BUN 21 03/18/2020    CR 1.17 03/18/2020     (H) 03/18/2020    DD 0.3 10/01/2019    NTBNPI 957 (H) 10/01/2019    NTBNP 703 (H) 12/18/2019    TROPONIN 0.07 06/10/2018    TROPI 0.071 (H) 10/01/2019    AST 28 12/09/2019    ALT 7 12/09/2019    ALKPHOS 36 (L) 10/02/2019    BILITOTAL 0.6 10/02/2019    INR 1.16 (H) 10/04/2019     Lymph node biopsy:  The amount of tissue obtained is limited. There are small fragments of noncaseating granulomas with occasional   multinucleated giant cells and rim of lymphocytic infiltrate. The differential diagnosis includes infection   and noninfectious processes such as sarcoidosis, in the appropriate clinical and radiologic context.      Cardiac MRI 10/1/2019:  Late gadolinium enhancement is present in the septal. inferior and lateral base. There is non-CAD scar in  the anterolateral mid-wall. Scar is more extensive (13%) as compared to the prior MRI (6%).  The LV is severely dilated and the global systolic function is moderately severely to severely reduced with  an LVEF of 25%. There is severe global LV dysfunction with dyssynchronous septal motion consistent with a  LBBB.  Collectively, these findings are most consistent with a non-ischemic cardiomyopathy. Possible etiologies  include prior LBBB, cardiac sarcoidosis or prior myocarditis.     Coronary angiogram 10/1/2019:    Minimal non-obstructive coronary artery disease    Mildly elevated LVEDP    Successful closure of the RFA access site with a 6F Angioseal device     RHC 10/1/2019:  Normal filling pressures along with PA pressure.  Upper normal pulmonary capillary wedge pressure (13mmHg).     TTE 10/1/2019:  1. Severely dilated left ventricle (end-diastolic diameter 6.9 cm)  with  severely reduced systolic function.  2. Visually estimated LVEF < 20%.  3. Significant left ventricular wall motion abnormalities.  4. Thinning and akinesis of the inferior and inferolateral walls and apex. Septal thinning and dyskinesis consistent with known conduction abnormality. Significant hypokinesis of the anterior and anterolateral walls.  5. Normal right ventricular size and systolic function. Estimated right  ventricular systolic pressure is 13 mmHg plus the right atrial pressure.  4. Mild mitral regurgitation, trace tricuspid valve regurgitation. It with previous study dated 6/11/2018 and cardiac MRI dated 6/13/2018, LVEF has reduced from 40% to <20%.     Cardiac MRI 7/5/2018  1. The global left ventricular systolic function is moderately decreased. The LVEF is 40%. There is mild LV  hypertrophy.  The LV is moderately enlarged.    2.  There is moderate global hypokinesis and dyssynchronous septal motion.  Regional wall motion  abnormalities include moderately severe hypokinesis of the basilar inferoseptum and basilar inferior wall..  3.  Late gadolinium enhancement imaging predominantly subendocardial enhancement in the base of the  inferior and inferoseptal walls.  The pattern is most consistent  with an ischemic etiology. Other  etiologies such as sarcoidosis are also possible. The degree of LV dysfunction is out of proportion to the  degree of scar (6%).     TTE 1/2/20:  The left ventricle is severely dilated, LVEDD 7 cm  The visual ejection fraction is estimated at 25-30%.  There is mod-severe global hypokinesia of the left ventricle.  There is a catheter/pacemaker lead seen in the right ventricle.  LVEF may be slightly improved compared with study from 10-19     PET 12/4/19:  1. Diffuse increased FDG uptake in the myocardium compatible with  active cardiac sarcoid.  2. Globally hypokinetic and enlarged heart with ejection fraction  severely decreased to 16%, compatible with a dilatated  cardiomyopathy.  3. Small perfusion abnormality of the anterior wall of the left  ventricle, this is the LAD distribution..   4. There is increased ammonia uptake in the lungs, consistent with  patient's known history of congestive heart failure.  5. FDG PET/CT demonstrate active sarcoid in the mediastinal and hilar lymph nodes.     PET 2/12/20:  1. Minimal residual uptake in the basal aspect of the septum and anterior wall, greatly improved compared to the prior examination.  2. Minimal uptake of a mediastinal lymph node.   3. Left parotid hyperdense nodule without significant FDG uptake, likely lymph node or Warthin's duct tumor.    ASSESSMENT AND PLAN:  In summary, patient is a 59 year old gentleman with complex past cardiac history as detailed above including VT cardiac arrest, significantly reduced ejection fraction nonischemic cardiomyopathy class III symptoms who was referred for further evaluation for cardiac sarcoid.    Patient presentation was initially consistent with sarcoid on the clinical basis and biopsy showed evidence of noncaseating granulomas also suggesting sarcoid.  We started treatment with 40mg Prednisone and he did have repeat PET scan with significant improvement in inflammation. His dose was appropriately decreased to 30 mg daily. Clinically he felt much better and there were no ICD shocks. Recently however he did have many episode of VT, some were sustained. He was re-loaded with amiodarone and the question of possible VT ablation came up.  Response I think we should repeat his PET scan to be reevaluate myocardial information.  If it is better then we should proceed with VT ablation as possible however if inflammation is worse then I think we should switch him over to a different agent and reevaluate the VT burden.  We will try to wean off the prednisone in that case as soon as possible.  We will also repeat an echocardiogram and he will obtain routine blood work at the same time.  Until  these are completed he will continue current medical regimen.  I will see him back as soon as these are done hopefully in the next 2 to 3 weeks.     I appreciate the opportunity to participate in the care of Neymar Rhodes . Please do not hesitate to contact me with any further questions.     Sincerely,   Roberto Batres MD     [facility] Division of Cardiology     Please do not hesitate to contact me if you have any questions/concerns.     Sincerely,     Roberto Batres MD

## 2020-06-04 NOTE — TELEPHONE ENCOUNTER
6/4 Second attempt- Attempted to call pt but reached CHRISTIANO LEWIS and requested callback. Dimitris 8:36 am

## 2020-06-09 NOTE — TELEPHONE ENCOUNTER
6/9/20 Third attempt to reach pt to check on understanding of Amiodarone load. VM is full and cannot LM. Encounter closed as 3 attempts made Dimitris 245 pm

## 2020-06-14 DIAGNOSIS — E11.9 TYPE 2 DIABETES MELLITUS WITHOUT COMPLICATION, WITHOUT LONG-TERM CURRENT USE OF INSULIN (H): ICD-10-CM

## 2020-06-17 RX ORDER — LIRAGLUTIDE 6 MG/ML
INJECTION SUBCUTANEOUS
Qty: 27 ML | Refills: 0 | Status: SHIPPED | OUTPATIENT
Start: 2020-06-17 | End: 2020-08-19

## 2020-06-22 ENCOUNTER — ANCILLARY PROCEDURE (OUTPATIENT)
Dept: PET IMAGING | Facility: CLINIC | Age: 60
End: 2020-06-22
Attending: INTERNAL MEDICINE
Payer: COMMERCIAL

## 2020-06-22 DIAGNOSIS — I42.8 NONISCHEMIC CARDIOMYOPATHY (H): ICD-10-CM

## 2020-06-22 DIAGNOSIS — D86.9 SARCOIDOSIS: ICD-10-CM

## 2020-06-22 DIAGNOSIS — I50.22 CHRONIC SYSTOLIC HEART FAILURE (H): ICD-10-CM

## 2020-06-22 DIAGNOSIS — D86.0 SARCOIDOSIS OF LUNG (H): ICD-10-CM

## 2020-06-24 RX ORDER — PREDNISONE 20 MG/1
30 TABLET ORAL DAILY
Qty: 45 TABLET | Refills: 0 | Status: SHIPPED | OUTPATIENT
Start: 2020-06-24 | End: 2020-07-20

## 2020-06-24 NOTE — TELEPHONE ENCOUNTER
Dose was reported as 30 mg once daily, new Prescription(s) sent electronically to specified pharmacy. reflecitng that deose    Prescription(s) sent electronically to specified pharmacy.     Definitely needs follow up appt. Need to sort out the plan for ongoign management of the sarcoidosis, I am not sure if he is still seeing pulmonary for this or not;.

## 2020-07-12 DIAGNOSIS — I42.8 NON-ISCHEMIC CARDIOMYOPATHY (H): ICD-10-CM

## 2020-07-14 ENCOUNTER — TELEPHONE (OUTPATIENT)
Dept: INTERNAL MEDICINE | Facility: CLINIC | Age: 60
End: 2020-07-14

## 2020-07-14 RX ORDER — FUROSEMIDE 20 MG
20 TABLET ORAL PRN
Qty: 30 TABLET | Refills: 10 | Status: SHIPPED | OUTPATIENT
Start: 2020-07-14 | End: 2020-08-19 | Stop reason: DRUGHIGH

## 2020-07-14 NOTE — TELEPHONE ENCOUNTER
Reason for Call:  Other call back  Detailed comments: PATIENT WOULD LIKE TO HAVE A BLOOD DRAW FOR HIS MEDICATION AND WOULD LIKE TO BE OFF THE predniSONE (DELTASONE) 20 MG tablet  Phone Number Patient can be reached at: Home number on file [phone] (home)  Best Time: ANY  Can we leave a detailed message on this number? YES  Call taken on 7/14/2020 at 11:27 AM by VENESSA NAQVI

## 2020-07-14 NOTE — TELEPHONE ENCOUNTER
The most recent PET scan no longer showed active sarcoidosis, but I need to get direction from them as to how quickly we can taper him off.      Since he has been on such a high dose of prednisone for so long, it has to be done properly as to avoid complications.      He was supposed to follow up with the Cardiology Clinic about these results and to devise a plan as to how to taper off the medication.      **See the letter of July 6 from the cardiology clinic requesting repeat tests and a follow up visit.   That is when he will be told what to do.      Put him in touch with the cardiology schedulers if needed.

## 2020-07-14 NOTE — TELEPHONE ENCOUNTER
Patient scheduled for lab and states the sarcidosis in his heart and lungs are gone and he is down to 20 mg Prednisone and would like to stop it if it is not needed.Mariangel Loya RN

## 2020-07-15 NOTE — TELEPHONE ENCOUNTER
Pt says they tapered him from 40 mg to 30 mg and then he tapered himself to 20 mg . .Mariangel Loya RN

## 2020-07-18 DIAGNOSIS — E11.9 TYPE 2 DIABETES MELLITUS WITHOUT COMPLICATION, WITH LONG-TERM CURRENT USE OF INSULIN (H): ICD-10-CM

## 2020-07-18 DIAGNOSIS — Z79.4 TYPE 2 DIABETES MELLITUS WITHOUT COMPLICATION, WITH LONG-TERM CURRENT USE OF INSULIN (H): ICD-10-CM

## 2020-07-20 ENCOUNTER — OFFICE VISIT (OUTPATIENT)
Dept: URGENT CARE | Facility: URGENT CARE | Age: 60
End: 2020-07-20
Payer: COMMERCIAL

## 2020-07-20 ENCOUNTER — PATIENT OUTREACH (OUTPATIENT)
Dept: CARDIOLOGY | Facility: CLINIC | Age: 60
End: 2020-07-20

## 2020-07-20 VITALS
HEART RATE: 71 BPM | TEMPERATURE: 98.2 F | BODY MASS INDEX: 38.68 KG/M2 | DIASTOLIC BLOOD PRESSURE: 65 MMHG | OXYGEN SATURATION: 97 % | WEIGHT: 285.2 LBS | SYSTOLIC BLOOD PRESSURE: 110 MMHG

## 2020-07-20 DIAGNOSIS — Z79.4 TYPE 2 DIABETES MELLITUS WITHOUT COMPLICATION, WITH LONG-TERM CURRENT USE OF INSULIN (H): ICD-10-CM

## 2020-07-20 DIAGNOSIS — E11.9 TYPE 2 DIABETES MELLITUS WITHOUT COMPLICATION, WITH LONG-TERM CURRENT USE OF INSULIN (H): ICD-10-CM

## 2020-07-20 DIAGNOSIS — R60.9 EDEMA, UNSPECIFIED TYPE: Primary | ICD-10-CM

## 2020-07-20 DIAGNOSIS — D86.0 SARCOIDOSIS OF LUNG (H): ICD-10-CM

## 2020-07-20 LAB
ALBUMIN SERPL-MCNC: 3.6 G/DL (ref 3.4–5)
ALP SERPL-CCNC: 61 U/L (ref 40–150)
ALT SERPL W P-5'-P-CCNC: 65 U/L (ref 0–70)
ANION GAP SERPL CALCULATED.3IONS-SCNC: 7 MMOL/L (ref 3–14)
AST SERPL W P-5'-P-CCNC: 33 U/L (ref 0–45)
BILIRUB SERPL-MCNC: 0.6 MG/DL (ref 0.2–1.3)
BUN SERPL-MCNC: 21 MG/DL (ref 7–30)
CALCIUM SERPL-MCNC: 8.5 MG/DL (ref 8.5–10.1)
CHLORIDE SERPL-SCNC: 106 MMOL/L (ref 94–109)
CO2 SERPL-SCNC: 25 MMOL/L (ref 20–32)
CREAT SERPL-MCNC: 1.06 MG/DL (ref 0.66–1.25)
GFR SERPL CREATININE-BSD FRML MDRD: 75 ML/MIN/{1.73_M2}
GLUCOSE SERPL-MCNC: 340 MG/DL (ref 70–99)
POTASSIUM SERPL-SCNC: 4.5 MMOL/L (ref 3.4–5.3)
PROT SERPL-MCNC: 6.4 G/DL (ref 6.8–8.8)
SODIUM SERPL-SCNC: 138 MMOL/L (ref 133–144)

## 2020-07-20 PROCEDURE — 99215 OFFICE O/P EST HI 40 MIN: CPT | Performed by: FAMILY MEDICINE

## 2020-07-20 PROCEDURE — 80053 COMPREHEN METABOLIC PANEL: CPT | Performed by: FAMILY MEDICINE

## 2020-07-20 PROCEDURE — 36415 COLL VENOUS BLD VENIPUNCTURE: CPT | Performed by: FAMILY MEDICINE

## 2020-07-20 RX ORDER — PREDNISONE 20 MG/1
20 TABLET ORAL DAILY
Qty: 90 TABLET | Refills: 1 | COMMUNITY
Start: 2020-07-20 | End: 2020-07-24

## 2020-07-20 RX ORDER — INSULIN DETEMIR 100 [IU]/ML
INJECTION, SOLUTION SUBCUTANEOUS
Qty: 18 ML | Refills: 0 | Status: SHIPPED | OUTPATIENT
Start: 2020-07-20 | End: 2020-08-19 | Stop reason: DRUGHIGH

## 2020-07-20 RX ORDER — FUROSEMIDE 20 MG
20 TABLET ORAL 2 TIMES DAILY
Qty: 40 TABLET | Refills: 3 | Status: SHIPPED | OUTPATIENT
Start: 2020-07-20 | End: 2020-08-19

## 2020-07-20 NOTE — TELEPHONE ENCOUNTER
Call from pharmacy. Patient stated his dose increased to 20 unites BID. This matches current order. Last order was 'no print out'.     Pended order, routing to refill pool.

## 2020-07-20 NOTE — TELEPHONE ENCOUNTER
Routing refill request to provider for review/approval because:  Labs out of range:  A1C    Lab Results   Component Value Date    A1C 7.0 11/14/2019    A1C 8.8 08/26/2019    A1C 9.7 04/10/2019    A1C 8.7 10/03/2018    A1C 7.6 06/11/2018

## 2020-07-20 NOTE — TELEPHONE ENCOUNTER
New RX sent with this adjusted dose.    FREE:[LAST:[Dr. Waqas Pittman],PHONE:[phone]],FAX:[(   )    -]]

## 2020-07-20 NOTE — PROGRESS NOTES
SUBJECTIVE:   Neymar Rhodes is a 60 year old male presenting with a chief complaint of   Chief Complaint   Patient presents with     Urgent Care     Patient states feet have been swollen since 07/15/2020     He has had no change in his diet.  He has had no complaints of shortness of breath he has no PND orthopnea he does have a significant. Heart history .  He does have an implant.    He does have 3 separate cardiologist that he sees.  For the future and for current problems I advised him to see his primary with general cardiologist.    The swelling is on both sides he says it hard to walk gives him some pain because of all the swelling that he has in the feet.  He has swelling in his ankles.    He does have compression stockings though though he is not wearing them today    He is on Lasix.    Does not have appear to have a potassium supplement.    he is an established patient of [email or web address].        Review of Systems   Musculoskeletal:        Swelling in his feet and ankles   All other systems reviewed and are negative.      Past Medical History:   Diagnosis Date     Ascending aorta dilatation (H)      Diverticulosis of colon (without mention of hemorrhage)      Essential hypertension, benign      Gout, unspecified      LBBB (left bundle branch block)      Morbid obesity (H)      Non-ischemic cardiomyopathy (H)      Nonrheumatic mitral valve regurgitation      NSTEMI (non-ST elevated myocardial infarction) (H)     cardiac cath 6/2018: mild non-obstructive CAD     Other and unspecified hyperlipidemia      Type II or unspecified type diabetes mellitus without mention of complication, not stated as uncontrolled      Family History   Problem Relation Age of Onset     Cancer Father 75        bladder cancer     Myocardial Infarction Father      Other - See Comments Father         smoking     Breast Cancer Mother      Breast Cancer Sister      Family History Negative Brother      Family History Negative Son      Family  History Negative Son         fluttering/murmur     Asthma Son      Colon Cancer No family hx of      Current Outpatient Medications   Medication Sig Dispense Refill     acetaminophen (TYLENOL) 500 MG tablet Take 500-1,000 mg by mouth every 6 hours as needed for mild pain       amiodarone (PACERONE) 400 MG tablet Take 1 tablet (400 mg) by mouth 2 times daily For 7 days then 1 tablet (400 mg) daily 100 tablet 3     aspirin 81 MG tablet Take 1 tablet (81 mg) by mouth daily 30 tablet      atorvastatin (LIPITOR) 80 MG tablet TAKE ONE-HALF (1/2) TABLET BY MOUTH AT BEDTIME  15 tablet 9     blood glucose monitoring (ACCU-CHEK LUL PLUS) test strip Use to test blood sugar 1-3 times daily or as directed. 100 each 11     fenofibrate (TRIGLIDE/LOFIBRA) 160 MG tablet TAKE ONE TABLET BY MOUTH ONE TIME DAILY 90 tablet 1     fluticasone (FLONASE) 50 MCG/ACT nasal spray Spray 2 sprays into both nostrils daily 16 g 0     furosemide (LASIX) 20 MG tablet Take 1 tablet (20 mg) by mouth 2 times daily 40 tablet 3     furosemide (LASIX) 20 MG tablet Take 1 tablet (20 mg) by mouth as needed (for weight gain of 2+ lbs overnight) 30 tablet 10     glipiZIDE (GLUCOTROL XL) 10 MG 24 hr tablet TAKE 1 TABLET (10 MG) BY MOUTH DAILY. TAKE WITH LARGEST MEAL OF THE DAY. ALWAYS TAKE WITH FOOD 90 tablet 0     insulin detemir (LEVEMIR PEN) 100 UNIT/ML pen Inject 20 Units Subcutaneous 2 times daily 18 mL 1     insulin pen needle (BD ULTRA-FINE) 29G X 12.7MM miscellaneous Use 3 pen needles daily as directed (one for daily victoza injection, 2 for BID levemir injections) 200 each 2     levothyroxine (SYNTHROID/LEVOTHROID) 50 MCG tablet Take 1 tablet (50 mcg) by mouth daily 90 tablet 1     metFORMIN (GLUCOPHAGE) 1000 MG tablet Take 1 tablet (1,000 mg) by mouth 2 times daily (with meals) 180 tablet 1     metoprolol succinate ER (TOPROL-XL) 25 MG 24 hr tablet TAKE ONE TABLET BY MOUTH IN THE EVENING  30 tablet 9     predniSONE (DELTASONE) 20 MG tablet Take 1.5  tablets (30 mg) by mouth daily LAST REFILL WITHOUT AN APPOINTMENT 45 tablet 0     sacubitril-valsartan (ENTRESTO) 49-51 MG per tablet Take 1 tablet by mouth 2 times daily 60 tablet 5     sulfamethoxazole-trimethoprim (BACTRIM DS) 800-160 MG tablet TAKE ONE TABLET BY MOUTH ONE TIME DAILY  30 tablet 5     triamcinolone (KENALOG) 0.5 % cream Apply sparingly once or twice per day as needed to affected area until the skin is better, then stop 30 g 0     VICTOZA PEN 18 MG/3ML soln Inject 1.8 mg Subcutaneous daily 27 mL 0     albuterol (PROAIR HFA/PROVENTIL HFA/VENTOLIN HFA) 108 (90 Base) MCG/ACT Inhaler Inhale 2 puffs into the lungs every 6 hours as needed for shortness of breath / dyspnea or wheezing       albuterol (PROVENTIL) (2.5 MG/3ML) 0.083% neb solution Take 1 vial (2.5 mg) by nebulization every 6 hours as needed for shortness of breath / dyspnea or wheezing (Patient not taking: Reported on 7/20/2020) 2 Box 5     clotrimazole (LOTRIMIN) 1 % external cream Apply sparingly once or twice per day as needed to affected area until the skin is better, then stop; REPEAT AS NEEDED (Patient not taking: Reported on 7/20/2020) 30 g 1     Continuous Blood Gluc Sensor (FREESTYLE JOCELIN 14 DAY SENSOR) [facility] 1 each every 14 days (Patient not taking: Reported on 7/20/2020) 6 each 3     continuous blood glucose monitoring (FREESTYLE JOCELIN) sensor For use with Freestyle Jocelin Flash  for continuous monitioring of blood glucose levels. Replace sensor every 10 days. (Patient not taking: Reported on 7/20/2020) 3 each 3     guaiFENesin-codeine (ROBITUSSIN AC) 100-10 MG/5ML solution Take 5-10 mLs by mouth every 4 hours as needed (Patient not taking: Reported on 5/27/2020) 240 mL 0     omeprazole (PRILOSEC) 40 MG DR capsule Take 1 capsule (40 mg) by mouth daily (Patient not taking: Reported on 5/27/2020) 90 capsule 3     Social History     Tobacco Use     Smoking status: Never Smoker     Smokeless tobacco: Never Used   Substance Use  Topics     Alcohol use: Not Currently     Comment: Was drinking 9 beers and three Flaco Martínez shot every Friday.  No ETOH [facility] eOctober 2019.       OBJECTIVE  /65 (BP Location: Right arm, Patient Position: Sitting, Cuff Size: Adult Large)   Pulse 71   Temp 98.2  F (36.8  C) (Oral)   Wt 129.4 kg (285 lb 3.2 oz)   SpO2 97%   BMI 38.68 kg/m      Physical Exam  Vitals signs and nursing note reviewed.   Eyes:      Extraocular Movements: Extraocular movements intact.      Pupils: Pupils are equal, round, and reactive to light.   Neck:      Musculoskeletal: Normal range of motion.   Cardiovascular:      Rate and Rhythm: Normal rate.      Heart sounds: Normal heart sounds.   Pulmonary:      Effort: Pulmonary effort is normal.      Breath sounds: Normal breath sounds.   Abdominal:      Palpations: Abdomen is soft.   Musculoskeletal: Normal range of motion.      Comments: Bilateral edema pitting to the top of his socks which are ankle socks.   Skin:     General: Skin is warm.   Neurological:      General: No focal deficit present.   Psychiatric:         Mood and Affect: Mood normal.         Labs:  Results for orders placed or performed in visit on 07/20/20 (from the past 24 hour(s))   Comprehensive metabolic panel   Result Value Ref Range    Sodium 138 133 - 144 mmol/L    Potassium 4.5 3.4 - 5.3 mmol/L    Chloride 106 94 - 109 mmol/L    Carbon Dioxide 25 20 - 32 mmol/L    Anion Gap 7 3 - 14 mmol/L    Glucose 340 (H) 70 - 99 mg/dL    Urea Nitrogen 21 7 - 30 mg/dL    Creatinine 1.06 0.66 - 1.25 mg/dL    GFR Estimate 75 >60 mL/min/[1.73_m2]    GFR Estimate If Black 87 >60 mL/min/[1.73_m2]    Calcium 8.5 8.5 - 10.1 mg/dL    Bilirubin Total 0.6 0.2 - 1.3 mg/dL    Albumin 3.6 3.4 - 5.0 g/dL    Protein Total 6.4 (L) 6.8 - 8.8 g/dL    Alkaline Phosphatase 61 40 - 150 U/L    ALT 65 0 - 70 U/L    AST 33 0 - 45 U/L       X-Ray was not done.    ASSESSMENT:      ICD-10-CM    1. Edema, unspecified type  R60.9 Comprehensive  metabolic panel     furosemide (LASIX) 20 MG tablet        Medical Decision Making:    Differential Diagnosis:  Congestive heart failure, liver failure, kidney failure, electrolyte imbalance,    Serious Comorbid Conditions:  Adult:  Diabetes    PLAN:    1.  Increase his Lasix which is only 20mg to 20 mg twice a day.    We talked about some fluid restriction    We talked about calling his primary cardiologist for follow-up    Followup:    If not improving or if condition worsens, follow up with your Primary Care Provider    There are no Patient Instructions on file for this visit.

## 2020-07-20 NOTE — TELEPHONE ENCOUNTER
Called and spoke with patient's wife. Patient would like to come off of prednisone related to high blood sugars and side effects of the steroid. Provider was notified and new orders received.  Date: 7/20/2020    Time of Call: 2:45 PM     Diagnosis:  Hx of sarcoidosis     [ VORB ] Ordering provider: Dr. Batres  Order: May decrease Prednisone to 20 MG daily for 2 months and then 10 MG for 2 months and then will re address with provider.      Order received by: Christopher CELESTE RN     Follow-up/additional notes: Patient's wife called and notified and is agreeable to plan.   States that they had already decreased to 20 MG last week. Had been on 40 MG for 3 months and then 30 MG for 3 months.

## 2020-07-20 NOTE — TELEPHONE ENCOUNTER
Routing refill request to provider for review/approval because:  Labs not current:      Lab Results   Component Value Date    A1C 7.0 11/14/2019    A1C 8.8 08/26/2019    A1C 9.7 04/10/2019    A1C 8.7 10/03/2018    A1C 7.6 06/11/2018

## 2020-07-22 DIAGNOSIS — I50.22 CHRONIC SYSTOLIC HEART FAILURE (H): ICD-10-CM

## 2020-07-22 DIAGNOSIS — E11.9 TYPE 2 DIABETES MELLITUS WITHOUT COMPLICATION, WITH LONG-TERM CURRENT USE OF INSULIN (H): ICD-10-CM

## 2020-07-22 DIAGNOSIS — Z79.4 TYPE 2 DIABETES MELLITUS WITHOUT COMPLICATION, WITH LONG-TERM CURRENT USE OF INSULIN (H): ICD-10-CM

## 2020-07-22 DIAGNOSIS — E03.8 SUBCLINICAL HYPOTHYROIDISM: ICD-10-CM

## 2020-07-22 DIAGNOSIS — D86.9 SARCOIDOSIS: ICD-10-CM

## 2020-07-22 DIAGNOSIS — E11.9 TYPE 2 DIABETES MELLITUS WITHOUT COMPLICATION, WITHOUT LONG-TERM CURRENT USE OF INSULIN (H): ICD-10-CM

## 2020-07-22 DIAGNOSIS — I42.8 NONISCHEMIC CARDIOMYOPATHY (H): ICD-10-CM

## 2020-07-22 DIAGNOSIS — D86.0 SARCOIDOSIS OF LUNG (H): ICD-10-CM

## 2020-07-22 LAB
ALBUMIN SERPL-MCNC: 3.8 G/DL (ref 3.4–5)
ALP SERPL-CCNC: 54 U/L (ref 40–150)
ALT SERPL W P-5'-P-CCNC: 67 U/L (ref 0–70)
ANION GAP SERPL CALCULATED.3IONS-SCNC: 8 MMOL/L (ref 3–14)
AST SERPL W P-5'-P-CCNC: 36 U/L (ref 0–45)
BILIRUB SERPL-MCNC: 0.6 MG/DL (ref 0.2–1.3)
BUN SERPL-MCNC: 25 MG/DL (ref 7–30)
CALCIUM SERPL-MCNC: 8.8 MG/DL (ref 8.5–10.1)
CHLORIDE SERPL-SCNC: 108 MMOL/L (ref 94–109)
CHOLEST SERPL-MCNC: 140 MG/DL
CO2 SERPL-SCNC: 22 MMOL/L (ref 20–32)
CREAT SERPL-MCNC: 1.23 MG/DL (ref 0.66–1.25)
ERYTHROCYTE [DISTWIDTH] IN BLOOD BY AUTOMATED COUNT: 14.5 % (ref 10–15)
GFR SERPL CREATININE-BSD FRML MDRD: 63 ML/MIN/{1.73_M2}
GLUCOSE SERPL-MCNC: 226 MG/DL (ref 70–99)
HBA1C MFR BLD: 8.8 % (ref 0–5.6)
HCT VFR BLD AUTO: 37.5 % (ref 40–53)
HDLC SERPL-MCNC: 64 MG/DL
HGB BLD-MCNC: 12.6 G/DL (ref 13.3–17.7)
LDLC SERPL CALC-MCNC: 51 MG/DL
MCH RBC QN AUTO: 32.8 PG (ref 26.5–33)
MCHC RBC AUTO-ENTMCNC: 33.6 G/DL (ref 31.5–36.5)
MCV RBC AUTO: 98 FL (ref 78–100)
NONHDLC SERPL-MCNC: 76 MG/DL
NT-PROBNP SERPL-MCNC: 523 PG/ML (ref 0–125)
PLATELET # BLD AUTO: 152 10E9/L (ref 150–450)
POTASSIUM SERPL-SCNC: 4.1 MMOL/L (ref 3.4–5.3)
PROT SERPL-MCNC: 6.7 G/DL (ref 6.8–8.8)
RBC # BLD AUTO: 3.84 10E12/L (ref 4.4–5.9)
SODIUM SERPL-SCNC: 138 MMOL/L (ref 133–144)
TRIGL SERPL-MCNC: 123 MG/DL
WBC # BLD AUTO: 7.5 10E9/L (ref 4–11)

## 2020-07-22 PROCEDURE — 83036 HEMOGLOBIN GLYCOSYLATED A1C: CPT | Performed by: INTERNAL MEDICINE

## 2020-07-22 PROCEDURE — 83880 ASSAY OF NATRIURETIC PEPTIDE: CPT | Performed by: INTERNAL MEDICINE

## 2020-07-22 PROCEDURE — 36415 COLL VENOUS BLD VENIPUNCTURE: CPT | Performed by: INTERNAL MEDICINE

## 2020-07-22 PROCEDURE — 85027 COMPLETE CBC AUTOMATED: CPT | Performed by: INTERNAL MEDICINE

## 2020-07-22 PROCEDURE — 80061 LIPID PANEL: CPT | Performed by: INTERNAL MEDICINE

## 2020-07-22 PROCEDURE — 80053 COMPREHEN METABOLIC PANEL: CPT | Performed by: INTERNAL MEDICINE

## 2020-07-23 RX ORDER — LEVOTHYROXINE SODIUM 50 UG/1
50 TABLET ORAL DAILY
Qty: 90 TABLET | Refills: 2 | Status: SHIPPED | OUTPATIENT
Start: 2020-07-23 | End: 2021-01-26

## 2020-07-23 NOTE — TELEPHONE ENCOUNTER
Levothyroxine    Prescription approved per [facility] Refill Protocol.    Debbie SONGN, RN, PHN

## 2020-07-23 NOTE — TELEPHONE ENCOUNTER
Prednisone    Routing refill request to provider for review/approval because:  Drug not on the [facility] refill protocol  Historical  DX: Sarcoidosis     Debbie SONGN, RN, PHN

## 2020-07-24 ENCOUNTER — TELEPHONE (OUTPATIENT)
Dept: INTERNAL MEDICINE | Facility: CLINIC | Age: 60
End: 2020-07-24

## 2020-07-24 RX ORDER — PREDNISONE 20 MG/1
TABLET ORAL
Qty: 150 TABLET | Refills: 0 | Status: SHIPPED | OUTPATIENT
Start: 2020-07-24 | End: 2020-12-14

## 2020-07-24 NOTE — TELEPHONE ENCOUNTER
Refill sent with dose change, was told to decreae to 20 mg once daily for 2 months, then 10 mg once daily for approx 2 months until seen again.     Prescription(s) sent electronically to specified pharmacy.

## 2020-07-24 NOTE — TELEPHONE ENCOUNTER
Informed wife of results (consent to communicate with her on file) and she will relay message to patient. Patient will call back today to schedule an appointment within the next few weeks.    RODRIGUEZ Panchal

## 2020-07-24 NOTE — TELEPHONE ENCOUNTER
Peytons call the patient.     His recent labs show that his diabetes has been running higher (A1C 8.8), which is not surprising given the use of prednisone.   Fortunately the prednisone will be decreasing over the next few weeks.    Return to see me in an office visit in the next few weeks to re-evaluate his diabetes and all of his other medical issues.    Given the number of issues, this will need to be in the office.

## 2020-07-30 ENCOUNTER — DOCUMENTATION ONLY (OUTPATIENT)
Dept: CARDIOLOGY | Facility: CLINIC | Age: 60
End: 2020-07-30

## 2020-07-30 NOTE — PROGRESS NOTES
Remote alert for VT event requiring ATP X 1 which did break rhythm. Since last checked in May pt has had 20 VT events in the monitor zone 170-189 BPM, and 10 NSVT. This is the first treatable rhythm. I did message scheduling and asked that they attempt to reach out to pt again to schedule follow ups as ordered. BRANDON Arroyo

## 2020-08-18 ENCOUNTER — DOCUMENTATION ONLY (OUTPATIENT)
Dept: CARDIOLOGY | Facility: CLINIC | Age: 60
End: 2020-08-18

## 2020-08-18 NOTE — PROGRESS NOTES
[employer]    Remote alert for episode of VT successfully treated with ATP X 1. Event occurred yesterday (monday) at 4:06 in the afternoon. Last VT with ATP occurred on July 29th. Will follow closely, patient on Amiodarone. Notify Dr Dorman if NSVT Or VT events increase further. BRANDON Vela

## 2020-08-19 ENCOUNTER — OFFICE VISIT (OUTPATIENT)
Dept: INTERNAL MEDICINE | Facility: CLINIC | Age: 60
End: 2020-08-19
Payer: COMMERCIAL

## 2020-08-19 VITALS
WEIGHT: 283.4 LBS | HEIGHT: 72 IN | HEART RATE: 87 BPM | OXYGEN SATURATION: 98 % | TEMPERATURE: 97.3 F | DIASTOLIC BLOOD PRESSURE: 60 MMHG | BODY MASS INDEX: 38.39 KG/M2 | SYSTOLIC BLOOD PRESSURE: 98 MMHG

## 2020-08-19 DIAGNOSIS — D86.0 SARCOIDOSIS OF LUNG (H): ICD-10-CM

## 2020-08-19 DIAGNOSIS — I42.8 NONISCHEMIC CARDIOMYOPATHY (H): ICD-10-CM

## 2020-08-19 DIAGNOSIS — E11.59 TYPE 2 DIABETES MELLITUS WITH OTHER CIRCULATORY COMPLICATION, WITH LONG-TERM CURRENT USE OF INSULIN (H): Primary | ICD-10-CM

## 2020-08-19 DIAGNOSIS — I77.810 ASCENDING AORTA DILATATION (H): ICD-10-CM

## 2020-08-19 DIAGNOSIS — Z79.4 TYPE 2 DIABETES MELLITUS WITH OTHER CIRCULATORY COMPLICATION, WITH LONG-TERM CURRENT USE OF INSULIN (H): Primary | ICD-10-CM

## 2020-08-19 DIAGNOSIS — I25.10 CORONARY ARTERY DISEASE INVOLVING NATIVE CORONARY ARTERY OF NATIVE HEART WITHOUT ANGINA PECTORIS: ICD-10-CM

## 2020-08-19 DIAGNOSIS — N18.30 CKD (CHRONIC KIDNEY DISEASE) STAGE 3, GFR 30-59 ML/MIN (H): ICD-10-CM

## 2020-08-19 DIAGNOSIS — R60.9 EDEMA, UNSPECIFIED TYPE: ICD-10-CM

## 2020-08-19 PROCEDURE — 99214 OFFICE O/P EST MOD 30 MIN: CPT | Performed by: INTERNAL MEDICINE

## 2020-08-19 RX ORDER — METOPROLOL SUCCINATE 25 MG/1
25 TABLET, EXTENDED RELEASE ORAL DAILY
Qty: 90 TABLET | Refills: 3 | Status: SHIPPED | OUTPATIENT
Start: 2020-08-19 | End: 2021-08-17

## 2020-08-19 RX ORDER — FUROSEMIDE 20 MG
20 TABLET ORAL 2 TIMES DAILY
Qty: 60 TABLET | Refills: 3 | Status: SHIPPED | OUTPATIENT
Start: 2020-08-19 | End: 2020-09-16

## 2020-08-19 RX ORDER — ATORVASTATIN CALCIUM 80 MG/1
40 TABLET, FILM COATED ORAL DAILY
Qty: 45 TABLET | Refills: 3 | Status: SHIPPED | OUTPATIENT
Start: 2020-08-19 | End: 2021-08-17

## 2020-08-19 RX ORDER — LIRAGLUTIDE 6 MG/ML
1.8 INJECTION SUBCUTANEOUS DAILY
Qty: 27 ML | Refills: 1 | Status: SHIPPED | OUTPATIENT
Start: 2020-08-19 | End: 2021-06-01

## 2020-08-19 ASSESSMENT — MIFFLIN-ST. JEOR: SCORE: 2128.49

## 2020-08-19 NOTE — PATIENT INSTRUCTIONS
"*  Slowly titrate the Levemir insulin upwards depending on your glucose levels.     *  The goal is to have the glucose roughly between 100-200 most of the time.      *  Increae Levemir insulin by 2 units twice per day every 5 days until the glucose readings are in the target range 70-75% of the time.   (I.e. increase to 22 units twice per day for 5 days, then 24 units twice per day for 5 days, etc.)    *  We will then decrease the insulin as you decrease the prednisone dosing.      *  Continue all other medications at the same doses.  Contact your usual pharmacy if you need refills.     *  Follow directions regarding prednisone dosing moving forwards.     *  Follow up with the [facility]iology [facility] as ordered.      *  Return to see me in 3 months.  Use Delivered or Call [phone] to schedule the appointment with me.  Please get non-fasting labs done at the [facility], [place] or any other [facility] lab 1-2 days before this appointment (schedule a \"lab appointment\").  If you get the labs done at another clinic, make arrangements with them directly.  The orders will be in place.  Use Delivered or Call [phone] to schedule the appointment with me and lab appointment.         "

## 2020-08-19 NOTE — PROGRESS NOTES
Subjective     Neymar Rhodes is a 60 year old male who presents to clinic today for the following health issues:    HPI       Diabetes Follow-up    How often are you checking your blood sugar? Continuous glucose monitor  What time of day are you checking your blood sugars (select all that apply)?  Before meals  Have you had any blood sugars above 200?  Yes, always   Have you had any blood sugars below 70?  No    What symptoms do you notice when your blood sugar is low?  Dizzy    What concerns do you have today about your diabetes? None     Do you have any of these symptoms? (Select all that apply)  No numbness or tingling in feet.  No redness, sores or blisters on feet.  No complaints of excessive thirst.  No reports of blurry vision.  No significant changes to weight.        Hyperlipidemia Follow-Up      Are you regularly taking any medication or supplement to lower your cholesterol?   Yes- Lipitor 80mg    Are you having muscle aches or other side effects that you think could be caused by your cholesterol lowering medication?  No    Hypertension Follow-up      Do you check your blood pressure regularly outside of the clinic? Yes     Are you following a low salt diet? No    Are your blood pressures ever more than 140 on the top number (systolic) OR more   than 90 on the bottom number (diastolic), for example 140/90? Yes, occassionally over 140    BP Readings from Last 2 Encounters:   08/19/20 98/60   07/20/20 110/65     Hemoglobin A1C (%)   Date Value   07/22/2020 8.8 (H)   11/14/2019 7.0 (H)     LDL Cholesterol Calculated (mg/dL)   Date Value   07/22/2020 51   11/14/2019 58         How many servings of fruits and vegetables do you eat daily?  4 or more    On average, how many sweetened beverages do you drink each day (Examples: soda, juice, sweet tea, etc.  Do NOT count diet or artificially sweetened beverages)?   1    How many days per week do you exercise enough to make your heart beat faster? 5    How many  minutes a day do you exercise enough to make your heart beat faster? 30 - 60    How many days per week do you miss taking your medication? 0    lower extremity edema, on diuretics.       Wt Readings from Last 10 Encounters:   08/19/20 128.5 kg (283 lb 6.4 oz)   07/20/20 129.4 kg (285 lb 3.2 oz)   05/27/20 122.1 kg (269 lb 3.2 oz)   03/10/20 118.9 kg (262 lb 1.6 oz)   03/06/20 121.2 kg (267 lb 3.2 oz)   02/18/20 119.3 kg (263 lb)   02/12/20 114.9 kg (253 lb 4.8 oz)   01/03/20 117.6 kg (259 lb 3.2 oz)   12/19/19 116 kg (255 lb 11.7 oz)   12/18/19 117.5 kg (259 lb)     The patient has had a history of ongoing obesity.  Reviewed the weigth curves.   Their current BMI is:  Body mass index is 38.44 kg/m .  Reviewed previous attempts at weight loss which have not been successful in producing prolonged weigth loss.   Discussed current eating and exercise habits.     Reviewed weights in chart:  Wt Readings from Last 10 Encounters:   08/19/20 128.5 kg (283 lb 6.4 oz)   07/20/20 129.4 kg (285 lb 3.2 oz)   05/27/20 122.1 kg (269 lb 3.2 oz)   03/10/20 118.9 kg (262 lb 1.6 oz)   03/06/20 121.2 kg (267 lb 3.2 oz)   02/18/20 119.3 kg (263 lb)   02/12/20 114.9 kg (253 lb 4.8 oz)   01/03/20 117.6 kg (259 lb 3.2 oz)   12/19/19 116 kg (255 lb 11.7 oz)   12/18/19 117.5 kg (259 lb)      4.  History of sarcoidosis in the lung per endobronchial biopsy, and also sarcoidosis of the heart according to MRI.  Has been taking high-dose steroid therapy and recently has begun tapering.  Has upcoming PET scan in 3 months.  Reports his respiratory status is currently stable.    Past Medical History:  ---------------------------  Past Medical History:   Diagnosis Date     Ascending aorta dilatation (H)      Diverticulosis of colon (without mention of hemorrhage)      Essential hypertension, benign      Gout, unspecified      LBBB (left bundle branch block)      Morbid obesity (H)      Non-ischemic cardiomyopathy (H)      Nonrheumatic mitral valve  regurgitation      NSTEMI (non-ST elevated myocardial infarction) (H)     cardiac cath 6/2018: mild non-obstructive CAD     Other and unspecified hyperlipidemia      Type II or unspecified type diabetes mellitus without mention of complication, not stated as uncontrolled        Past Surgical History:  ---------------------------  Past Surgical History:   Procedure Laterality Date     C APPENDECTOMY  1980's     CV CORONARY ANGIOGRAM N/A 10/2/2019    Procedure: Coronary Angiogram;  Surgeon: Kathy Almaguer MD;  Location:  [facility]     CV LEFT HEART CATH N/A 10/2/2019    Procedure: Left Heart Cath;  Surgeon: Kathy Almaguer MD;  Location:  [facility]     CV RIGHT HEART CATH N/A 10/2/2019    Procedure: Right Heart Cath;  Surgeon: Kathy Almaguer MD;  Location:  [facility]     ENDOBRONCHIAL ULTRASOUND FLEXIBLE N/A 12/19/2019    Procedure: Flexible bronchoscopy, endobronchial ultrasound, bronchial alveolar lavage;  Surgeon: Ranulfo Barcenas MD;  Location: [facility] OR     EP ICD N/A 10/4/2019    Procedure: EP ICD;  Surgeon: Jayy Dorman MD;  Location:  [facility]     EP LEAD PLCMNT LV NEW ICD N/A 10/4/2019    Procedure: EP Lead Plcmnt LV New ICD;  Surgeon: Jayy Dorman MD;  Location:  [facility]     HEART CATH CORONARY ANGIOGRAM WITHOUT PRESSURES  06/10/2018    normal coronary angiogram, nothing more than 10%     SURGICAL HISTORY OF -   2001    repair of colovesicular fistula       Current Medications:  ---------------------------  Current Outpatient Medications   Medication Sig Dispense Refill     acetaminophen (TYLENOL) 500 MG tablet Take 500-1,000 mg by mouth every 6 hours as needed for mild pain       amiodarone (PACERONE) 400 MG tablet Take 1 tablet (400 mg) by mouth 2 times daily For 7 days then 1 tablet (400 mg) daily 100 tablet 3     aspirin 81 MG tablet Take 1 tablet (81 mg) by mouth daily 30 tablet      atorvastatin  (LIPITOR) 80 MG tablet TAKE ONE-HALF (1/2) TABLET BY MOUTH AT BEDTIME  15 tablet 9     blood glucose monitoring (ACCU-CHEK LUL PLUS) test strip Use to test blood sugar 1-3 times daily or as directed. 100 each 11     clotrimazole (LOTRIMIN) 1 % external cream Apply sparingly once or twice per day as needed to affected area until the skin is better, then stop; REPEAT AS NEEDED 30 g 1     Continuous Blood Gluc Sensor (FREESTYLE JOCELIN 14 DAY SENSOR) MISC 1 each every 14 days 6 each 3     fenofibrate (TRIGLIDE/LOFIBRA) 160 MG tablet TAKE ONE TABLET BY MOUTH ONE TIME DAILY 90 tablet 1     furosemide (LASIX) 20 MG tablet Take 1 tablet (20 mg) by mouth 2 times daily 40 tablet 3     glipiZIDE (GLUCOTROL XL) 10 MG 24 hr tablet TAKE 1 TABLET (10 MG) BY MOUTH DAILY. TAKE WITH LARGEST MEAL OF THE DAY. ALWAYS TAKE WITH FOOD 90 tablet 0     insulin detemir (LEVEMIR PEN) 100 UNIT/ML pen Inject 20 Units Subcutaneous 2 times daily 18 mL 0     insulin pen needle (BD ULTRA-FINE) 29G X 12.7MM miscellaneous Use 3 pen needles daily as directed (one for daily victoza injection, 2 for BID levemir injections) 200 each 2     levothyroxine (SYNTHROID/LEVOTHROID) 50 MCG tablet Take 1 tablet (50 mcg) by mouth daily. 90 tablet 2     metFORMIN (GLUCOPHAGE) 1000 MG tablet Take 1 tablet (1,000 mg) by mouth 2 times daily (with meals) 180 tablet 1     metoprolol succinate ER (TOPROL-XL) 25 MG 24 hr tablet TAKE ONE TABLET BY MOUTH IN THE EVENING  30 tablet 9     omeprazole (PRILOSEC) 40 MG DR capsule Take 1 capsule (40 mg) by mouth daily 90 capsule 3     predniSONE (DELTASONE) 20 MG tablet 20 mg once daily for 2 months, then 10 mg once per day until seen again 150 tablet 0     sacubitril-valsartan (ENTRESTO) 49-51 MG per tablet Take 1 tablet by mouth 2 times daily 60 tablet 5     sulfamethoxazole-trimethoprim (BACTRIM DS) 800-160 MG tablet TAKE ONE TABLET BY MOUTH ONE TIME DAILY  30 tablet 5     triamcinolone (KENALOG) 0.5 % cream Apply sparingly  once or twice per day as needed to affected area until the skin is better, then stop 30 g 0     VICTOZA PEN 18 MG/3ML soln Inject 1.8 mg Subcutaneous daily 27 mL 0     albuterol (PROAIR HFA/PROVENTIL HFA/VENTOLIN HFA) 108 (90 Base) MCG/ACT Inhaler Inhale 2 puffs into the lungs every 6 hours as needed for shortness of breath / dyspnea or wheezing       albuterol (PROVENTIL) (2.5 MG/3ML) 0.083% neb solution Take 1 vial (2.5 mg) by nebulization every 6 hours as needed for shortness of breath / dyspnea or wheezing (Patient not taking: Reported on 7/20/2020) 2 Box 5     continuous blood glucose monitoring ([employer]YLE JOCELIN) sensor For use with Freestyle Jocelin Flash  for continuous monitioring of blood glucose levels. Replace sensor every 10 days. (Patient not taking: Reported on 7/20/2020) 3 each 3     fluticasone (FLONASE) 50 MCG/ACT nasal spray Spray 2 sprays into both nostrils daily 16 g 0     furosemide (LASIX) 20 MG tablet Take 1 tablet (20 mg) by mouth as needed (for weight gain of 2+ lbs overnight) 30 tablet 10     LEVEMIR FLEXTOUCH 100 UNIT/ML pen INJECT 10 units UNDER THE SKIN in morning, then 14 units UNDER THE SKIN at lunch 18 mL 0       Allergies:  -------------  Allergies   Allergen Reactions     No Known Drug Allergies        Social History:  -------------------  Social History     Socioeconomic History     Marital status:      Spouse name: Not on file     Number of children: Not on file     Years of education: Not on file     Highest education level: Not on file   Occupational History     Not on file   Social Needs     Financial resource strain: Not on file     Food insecurity     Worry: Not on file     Inability: Not on file     Transportation needs     Medical: Not on file     Non-medical: Not on file   Tobacco Use     Smoking status: Never Smoker     Smokeless tobacco: Never Used   Substance and Sexual Activity     Alcohol use: Not Currently     Comment: Was drinking 9 beers and three Jack  Mauricio shot every Friday.  No ETOH [facility] eOctober 2019.     Drug use: No     Sexual activity: Yes     Partners: Female   Lifestyle     Physical activity     Days per week: Not on file     Minutes per session: Not on file     Stress: Not on file   Relationships     Social connections     Talks on phone: Not on file     Gets together: Not on file     Attends Mu-ism service: Not on file     Active member of club or organization: Not on file     Attends meetings of clubs or organizations: Not on file     Relationship status: Not on file     Intimate partner violence     Fear of current or ex partner: Not on file     Emotionally abused: Not on file     Physically abused: Not on file     Forced sexual activity: Not on file   Other Topics Concern     Parent/sibling w/ CABG, MI or angioplasty before 65F 55M? Not Asked   Social History Narrative     Not on file       Family Medical History:  ------------------------------  Family History   Problem Relation Age of Onset     Cancer Father 75        bladder cancer     Myocardial Infarction Father      Other - See Comments Father         smoking     Breast Cancer Mother      Breast Cancer Sister      Family History Negative Brother      Family History Negative Son      Family History Negative Son         fluttering/murmur     Asthma Son      Colon Cancer No family hx of         Review of Systems   Constitutional, HEENT, cardiovascular, pulmonary, gi and gu systems are negative, except as otherwise noted.      Objective    BP 98/60   Pulse 87   Temp 97.3  F (36.3  C) (Temporal)   Ht 1.829 m (6')   Wt 128.5 kg (283 lb 6.4 oz)   SpO2 98%   BMI 38.44 kg/m    Body mass index is 38.44 kg/m .  Physical Exam   GENERAL alert and no distress  EYES:  Normal sclera,conjunctiva, EOMI  HENT: oral and posterior pharynx without lesions or erythema, facies symmetric  NECK: Neck supple. No LAD, without thyroidmegaly.  RESP: Clear to ausculation bilaterally without wheezes or crackles.  Normal BS in all fields.  CV: RRR normal S1S2 without murmurs, rubs or gallops.  LYMPH: no cervical lymph adenopathy appreciated  MS: extremities- no gross deformities of the visible extremities noted,   EXT:  no lower extremity edema  PSYCH: Alert and oriented times 3; speech- coherent  SKIN:  No obvious significant skin lesions on visible portions of face               (E11.59,  Z79.4) Type 2 diabetes mellitus with other circulatory complication, with long-term current use of insulin (H)  (primary encounter diagnosis)  Comment: Glucose levels remain elevated due to his prednisone however this should improve as he is tapering down.  He uses a continuous glucose monitor which will provide excellent monitoring of his glucose levels.    *  Slowly titrate the Levemir insulin upwards depending on your glucose levels.     *  The goal is to have the glucose roughly between 100-200 most of the time.      *  Increae Levemir insulin by 2 units twice per day every 5 days until the glucose readings are in the target range 70-75% of the time.   (I.e. increase to 22 units twice per day for 5 days, then 24 units twice per day for 5 days, etc.)    *  We will then decrease the insulin as you decrease the prednisone dosing.      Plan: insulin detemir (LEVEMIR PEN) 100 UNIT/ML pen,         atorvastatin (LIPITOR) 80 MG tablet, metoprolol        succinate ER (TOPROL-XL) 25 MG 24 hr tablet,         liraglutide (VICTOZA PEN) 18 MG/3ML solution,         Basic metabolic panel, Hemoglobin A1c            (I42.8) Nonischemic cardiomyopathy (H)  Comment: Stable, no signs or symptoms of active heart failure at this time.  Plan: furosemide (LASIX) 20 MG tablet, atorvastatin         (LIPITOR) 80 MG tablet, metoprolol succinate ER        (TOPROL-XL) 25 MG 24 hr tablet, Basic metabolic        panel            (I25.10) Coronary artery disease involving native coronary artery of native heart without angina pectoris  Comment: Stable, continue current  medications.  Denies active cardiac symptoms.  Plan: atorvastatin (LIPITOR) 80 MG tablet, Basic         metabolic panel            (R60.9) Edema, unspecified type  Comment: Stable continue same medicine.  Plan: furosemide (LASIX) 20 MG tablet, Basic         metabolic panel            (I77.810) Ascending aorta dilatation (H)  Comment: Noted on cardiac imaging.  Stable, continue to monitor.   Plan:     (N18.3) CKD (chronic kidney disease) stage 3, GFR 30-59 ml/min (H)  Comment: This condition is currently controlled on the current medical regimen.  Continue current therapy.  Continue to taper steroids as ordered  Plan:     (D86.0) Sarcoidosis of lung (H)  Comment:  over the next few months.  Follow-up with the pulmonary and cardiology staff as ordered.    Plan:

## 2020-08-22 DIAGNOSIS — E11.9 TYPE 2 DIABETES MELLITUS WITHOUT COMPLICATION, WITHOUT LONG-TERM CURRENT USE OF INSULIN (H): ICD-10-CM

## 2020-08-24 RX ORDER — GLIPIZIDE 10 MG/1
10 TABLET, FILM COATED, EXTENDED RELEASE ORAL DAILY
Qty: 90 TABLET | Refills: 0 | Status: SHIPPED | OUTPATIENT
Start: 2020-08-24 | End: 2020-11-17

## 2020-08-25 ENCOUNTER — ANCILLARY PROCEDURE (OUTPATIENT)
Dept: CARDIOLOGY | Facility: CLINIC | Age: 60
End: 2020-08-25
Attending: INTERNAL MEDICINE
Payer: COMMERCIAL

## 2020-08-25 ENCOUNTER — TELEPHONE (OUTPATIENT)
Dept: CARDIOLOGY | Facility: CLINIC | Age: 60
End: 2020-08-25

## 2020-08-25 DIAGNOSIS — I42.8 NON-ISCHEMIC CARDIOMYOPATHY (H): ICD-10-CM

## 2020-08-25 DIAGNOSIS — I47.29 PAROXYSMAL VENTRICULAR TACHYCARDIA (H): ICD-10-CM

## 2020-08-25 DIAGNOSIS — Z95.810 ICD (IMPLANTABLE CARDIOVERTER-DEFIBRILLATOR) IN PLACE: ICD-10-CM

## 2020-08-25 DIAGNOSIS — Z95.810 ICD (IMPLANTABLE CARDIOVERTER-DEFIBRILLATOR) IN PLACE: Primary | ICD-10-CM

## 2020-08-25 PROCEDURE — 93296 REM INTERROG EVL PM/IDS: CPT | Performed by: INTERNAL MEDICINE

## 2020-08-25 PROCEDURE — 93295 DEV INTERROG REMOTE 1/2/MLT: CPT | Performed by: INTERNAL MEDICINE

## 2020-08-25 NOTE — PROGRESS NOTES
Dear[facility] CRT-D Device Check  Patient seen in clinic for device evaluation and iterative programming.   AP: 30 %    BiVP: 95 %    Mode: DDD     Underlying Rhythm: Regular AS/BiVP 60s  Heart Rate: Stable with good variability  Sensing: WNL  Pacing Threshold: Not obtained     Impedance: Stable     Battery Status: 10.5 years     Atrial Arrhythmia: 0    Ventricular Arrhythmia: ***    ATP: ***    Shocks: ***      Care Plan: ***      I have reviewed and interpreted the device interrogation, settings, programming and nurse's summary. The device is functioning within normal device parameters. I agree with the current findings, assessment and plan.

## 2020-08-25 NOTE — TELEPHONE ENCOUNTER
Scheduled remote device check completed.     2 ATP alerts received since last remote device check on 5/26/2020. Patient had virtual visit with Dr. Dorman on 5/27/2020.     First remote alert received on 7/30/2020.  VT event requiring ATP X 1 which did break rhythm. Since last checked in May pt has had 20 VT events in the monitor zone 170-189 BPM, and 10 NSVT.     Second remote alert for episode of VT successfully treated with ATP X 1. Event occurred on 8/17/2020 at 4:06 in the afternoon.     11 new VT/NSVT episodes logged since ATP alert received on 8/18/2020, with 6 EGMs available . EGMs available show NSVT/VT lasting between 13 seconds - 2m15s with V rates in the 170s-180s. 2 EGMs available show no end of arrhythmia, those episodes were documented as lasting 57s and 2m15s. No therapy delivered since ATPx1 on 8/17/2020. Patient currently taking amiodarone. Discussed episodes with patient, and patient does not recall symptoms at time episodes logged. Will send FYI to Dr. Dorman for further recommendations.     EGM from ATPx1 on 8/17/2020

## 2020-08-29 PROBLEM — N18.30 CKD (CHRONIC KIDNEY DISEASE) STAGE 3, GFR 30-59 ML/MIN (H): Status: ACTIVE | Noted: 2020-08-29

## 2020-08-29 PROBLEM — D86.0 SARCOIDOSIS OF LUNG (H): Status: ACTIVE | Noted: 2020-08-29

## 2020-09-03 NOTE — TELEPHONE ENCOUNTER
Dr. Dorman would like virtual visit scheduled with patient to go over ablation. Called patient and left message with brief update and recommendations from Dr. Dorman. Asked that patient return call to schedule appointment

## 2020-09-06 LAB
MDC_IDC_EPISODE_DTM: NORMAL
MDC_IDC_EPISODE_DURATION: 13 S
MDC_IDC_EPISODE_DURATION: 135 S
MDC_IDC_EPISODE_DURATION: 20 S
MDC_IDC_EPISODE_DURATION: 24 S
MDC_IDC_EPISODE_DURATION: 27 S
MDC_IDC_EPISODE_DURATION: 32 S
MDC_IDC_EPISODE_DURATION: 41 S
MDC_IDC_EPISODE_DURATION: 57 S
MDC_IDC_EPISODE_DURATION: 74 S
MDC_IDC_EPISODE_DURATION: 76 S
MDC_IDC_EPISODE_DURATION: 87 S
MDC_IDC_EPISODE_ID: NORMAL
MDC_IDC_EPISODE_TYPE: NORMAL
MDC_IDC_EPISODE_VENDOR_TYPE: NORMAL
MDC_IDC_LEAD_IMPLANT_DT: NORMAL
MDC_IDC_LEAD_LOCATION: NORMAL
MDC_IDC_LEAD_LOCATION_DETAIL_1: NORMAL
MDC_IDC_LEAD_MFG: NORMAL
MDC_IDC_LEAD_MODEL: NORMAL
MDC_IDC_LEAD_POLARITY_TYPE: NORMAL
MDC_IDC_LEAD_SERIAL: NORMAL
MDC_IDC_MSMT_BATTERY_DTM: NORMAL
MDC_IDC_MSMT_BATTERY_REMAINING_LONGEVITY: 126 MO
MDC_IDC_MSMT_BATTERY_REMAINING_PERCENTAGE: 100 %
MDC_IDC_MSMT_BATTERY_STATUS: NORMAL
MDC_IDC_MSMT_CAP_CHARGE_DTM: NORMAL
MDC_IDC_MSMT_CAP_CHARGE_TIME: 9.6 S
MDC_IDC_MSMT_CAP_CHARGE_TYPE: NORMAL
MDC_IDC_MSMT_LEADCHNL_LV_IMPEDANCE_VALUE: 644 OHM
MDC_IDC_MSMT_LEADCHNL_RA_IMPEDANCE_VALUE: 645 OHM
MDC_IDC_MSMT_LEADCHNL_RV_IMPEDANCE_VALUE: 530 OHM
MDC_IDC_PG_IMPLANT_DTM: NORMAL
MDC_IDC_PG_MFG: NORMAL
MDC_IDC_PG_MODEL: NORMAL
MDC_IDC_PG_SERIAL: NORMAL
MDC_IDC_PG_TYPE: NORMAL
MDC_IDC_SESS_CLINIC_NAME: NORMAL
MDC_IDC_SESS_DTM: NORMAL
MDC_IDC_SESS_TYPE: NORMAL
MDC_IDC_SET_BRADY_AT_MODE_SWITCH_MODE: NORMAL
MDC_IDC_SET_BRADY_AT_MODE_SWITCH_RATE: 170 {BEATS}/MIN
MDC_IDC_SET_BRADY_LOWRATE: 60 {BEATS}/MIN
MDC_IDC_SET_BRADY_MAX_SENSOR_RATE: 130 {BEATS}/MIN
MDC_IDC_SET_BRADY_MAX_TRACKING_RATE: 130 {BEATS}/MIN
MDC_IDC_SET_BRADY_MODE: NORMAL
MDC_IDC_SET_BRADY_PAV_DELAY_HIGH: 200 MS
MDC_IDC_SET_BRADY_PAV_DELAY_LOW: 270 MS
MDC_IDC_SET_BRADY_SAV_DELAY_HIGH: 140 MS
MDC_IDC_SET_BRADY_SAV_DELAY_LOW: 190 MS
MDC_IDC_SET_CRT_LVRV_DELAY: 35 MS
MDC_IDC_SET_CRT_PACED_CHAMBERS: NORMAL
MDC_IDC_SET_LEADCHNL_LV_PACING_AMPLITUDE: 2.2 V
MDC_IDC_SET_LEADCHNL_LV_PACING_CAPTURE_MODE: NORMAL
MDC_IDC_SET_LEADCHNL_LV_PACING_PULSEWIDTH: 0.5 MS
MDC_IDC_SET_LEADCHNL_LV_SENSING_ADAPTATION_MODE: NORMAL
MDC_IDC_SET_LEADCHNL_LV_SENSING_SENSITIVITY: 1 MV
MDC_IDC_SET_LEADCHNL_RA_PACING_AMPLITUDE: 2 V
MDC_IDC_SET_LEADCHNL_RA_PACING_CAPTURE_MODE: NORMAL
MDC_IDC_SET_LEADCHNL_RA_PACING_POLARITY: NORMAL
MDC_IDC_SET_LEADCHNL_RA_PACING_PULSEWIDTH: 0.5 MS
MDC_IDC_SET_LEADCHNL_RA_SENSING_ADAPTATION_MODE: NORMAL
MDC_IDC_SET_LEADCHNL_RA_SENSING_POLARITY: NORMAL
MDC_IDC_SET_LEADCHNL_RA_SENSING_SENSITIVITY: 0.25 MV
MDC_IDC_SET_LEADCHNL_RV_PACING_AMPLITUDE: 2 V
MDC_IDC_SET_LEADCHNL_RV_PACING_CAPTURE_MODE: NORMAL
MDC_IDC_SET_LEADCHNL_RV_PACING_POLARITY: NORMAL
MDC_IDC_SET_LEADCHNL_RV_PACING_PULSEWIDTH: 0.5 MS
MDC_IDC_SET_LEADCHNL_RV_SENSING_ADAPTATION_MODE: NORMAL
MDC_IDC_SET_LEADCHNL_RV_SENSING_POLARITY: NORMAL
MDC_IDC_SET_LEADCHNL_RV_SENSING_SENSITIVITY: 0.6 MV
MDC_IDC_SET_ZONE_DETECTION_INTERVAL: 250 MS
MDC_IDC_SET_ZONE_DETECTION_INTERVAL: 333 MS
MDC_IDC_SET_ZONE_DETECTION_INTERVAL: 375 MS
MDC_IDC_SET_ZONE_TYPE: NORMAL
MDC_IDC_SET_ZONE_VENDOR_TYPE: NORMAL
MDC_IDC_STAT_AT_BURDEN_PERCENT: 0 %
MDC_IDC_STAT_AT_DTM_END: NORMAL
MDC_IDC_STAT_AT_DTM_START: NORMAL
MDC_IDC_STAT_BRADY_DTM_END: NORMAL
MDC_IDC_STAT_BRADY_DTM_START: NORMAL
MDC_IDC_STAT_BRADY_RA_PERCENT_PACED: 30 %
MDC_IDC_STAT_BRADY_RV_PERCENT_PACED: 95 %
MDC_IDC_STAT_CRT_DTM_END: NORMAL
MDC_IDC_STAT_CRT_DTM_START: NORMAL
MDC_IDC_STAT_CRT_LV_PERCENT_PACED: 95 %
MDC_IDC_STAT_EPISODE_RECENT_COUNT: 0
MDC_IDC_STAT_EPISODE_RECENT_COUNT: 0
MDC_IDC_STAT_EPISODE_RECENT_COUNT: 1
MDC_IDC_STAT_EPISODE_RECENT_COUNT: 109
MDC_IDC_STAT_EPISODE_RECENT_COUNT: 2
MDC_IDC_STAT_EPISODE_RECENT_COUNT: 2
MDC_IDC_STAT_EPISODE_RECENT_COUNT_DTM_END: NORMAL
MDC_IDC_STAT_EPISODE_RECENT_COUNT_DTM_START: NORMAL
MDC_IDC_STAT_EPISODE_TYPE: NORMAL
MDC_IDC_STAT_EPISODE_VENDOR_TYPE: NORMAL
MDC_IDC_STAT_TACHYTHERAPY_ATP_DELIVERED_RECENT: 3
MDC_IDC_STAT_TACHYTHERAPY_ATP_DELIVERED_TOTAL: 3
MDC_IDC_STAT_TACHYTHERAPY_RECENT_DTM_END: NORMAL
MDC_IDC_STAT_TACHYTHERAPY_RECENT_DTM_START: NORMAL
MDC_IDC_STAT_TACHYTHERAPY_SHOCKS_ABORTED_RECENT: 0
MDC_IDC_STAT_TACHYTHERAPY_SHOCKS_ABORTED_TOTAL: 0
MDC_IDC_STAT_TACHYTHERAPY_SHOCKS_DELIVERED_RECENT: 0
MDC_IDC_STAT_TACHYTHERAPY_SHOCKS_DELIVERED_TOTAL: 0
MDC_IDC_STAT_TACHYTHERAPY_TOTAL_DTM_END: NORMAL
MDC_IDC_STAT_TACHYTHERAPY_TOTAL_DTM_START: NORMAL

## 2020-09-14 ENCOUNTER — TELEPHONE (OUTPATIENT)
Dept: INTERNAL MEDICINE | Facility: CLINIC | Age: 60
End: 2020-09-14

## 2020-09-14 DIAGNOSIS — I10 BENIGN ESSENTIAL HYPERTENSION: Primary | ICD-10-CM

## 2020-09-14 DIAGNOSIS — R60.0 BILATERAL LOWER EXTREMITY EDEMA: ICD-10-CM

## 2020-09-14 DIAGNOSIS — N18.30 CKD (CHRONIC KIDNEY DISEASE) STAGE 3, GFR 30-59 ML/MIN (H): ICD-10-CM

## 2020-09-14 DIAGNOSIS — I42.8 NON-ISCHEMIC CARDIOMYOPATHY (H): ICD-10-CM

## 2020-09-14 NOTE — TELEPHONE ENCOUNTER
Increase the furosemide to 40 mg twice per day.   Needs to be seen this week in the clinic, also needs labs on the way upstairs.   Make lab appt 30 minutes before appointment with me.   OK to place in the virtual spot on Wednesday morning 9/16

## 2020-09-14 NOTE — TELEPHONE ENCOUNTER
"Makenna Burger does not seem to be working.   Both feet are \"swollen up like ballons.\"  Pain in left hip, left knee, left foot, and right knee is not controlled.   Taking tyl 1000mg every 2-3 hours depending on the pain. (roughly 20 tabs in 24 hours). When takes tyl says that it does help the pain for \"a little\". Pain 10/10. Stabbing, little needles (mainling L foot).  Pain has been there for 3-4 months.   Swelling never went away.   Last OV - lasix was inc. To BID. - but no change. Actually swelling is worse. Very painful to touch (like a needle). Redness and puffy. Left leg from knee down is cold. No change in color. After walking for awhile   No SOB, or chest pain.      Wondering if he can get something for the pain?  And something to help with the swelling?    Wt Readings from Last 2 Encounters:   08/19/20 283 lb 6.4 oz (128.5 kg)   07/20/20 285 lb 3.2 oz (129.4 kg)     At home today 9/14 - weight was 278.8 lbs.  "

## 2020-09-14 NOTE — TELEPHONE ENCOUNTER
Reason for Call:  Other symptoms    Detailed comments: The patient called and stated that Dr. Harris had recently upped the dosage on his furosemide (LASIX) 20 MG tablet to help with the swelling in his feet. He stated that today his feet are very swollen and he can't even shoes or walk very well. He would like a call back to discuss what the best course of action would be at this point.     Phone Number Patient can be reached at: Home number on file [phone] (home)    Best Time: Any    Can we leave a detailed message on this number? YES    Call taken on 9/14/2020 at 9:15 AM by Kristal Linn

## 2020-09-16 ENCOUNTER — ANCILLARY PROCEDURE (OUTPATIENT)
Dept: GENERAL RADIOLOGY | Facility: CLINIC | Age: 60
End: 2020-09-16
Attending: INTERNAL MEDICINE
Payer: COMMERCIAL

## 2020-09-16 ENCOUNTER — OFFICE VISIT (OUTPATIENT)
Dept: INTERNAL MEDICINE | Facility: CLINIC | Age: 60
End: 2020-09-16
Payer: COMMERCIAL

## 2020-09-16 VITALS
HEART RATE: 74 BPM | BODY MASS INDEX: 37.98 KG/M2 | RESPIRATION RATE: 20 BRPM | WEIGHT: 280.4 LBS | OXYGEN SATURATION: 99 % | SYSTOLIC BLOOD PRESSURE: 90 MMHG | DIASTOLIC BLOOD PRESSURE: 60 MMHG | TEMPERATURE: 97 F | HEIGHT: 72 IN

## 2020-09-16 DIAGNOSIS — N18.30 CKD (CHRONIC KIDNEY DISEASE) STAGE 3, GFR 30-59 ML/MIN (H): ICD-10-CM

## 2020-09-16 DIAGNOSIS — I42.8 NONISCHEMIC CARDIOMYOPATHY (H): ICD-10-CM

## 2020-09-16 DIAGNOSIS — I42.8 NON-ISCHEMIC CARDIOMYOPATHY (H): ICD-10-CM

## 2020-09-16 DIAGNOSIS — M79.672 LEFT FOOT PAIN: ICD-10-CM

## 2020-09-16 DIAGNOSIS — Z79.4 TYPE 2 DIABETES MELLITUS WITH OTHER CIRCULATORY COMPLICATION, WITH LONG-TERM CURRENT USE OF INSULIN (H): ICD-10-CM

## 2020-09-16 DIAGNOSIS — R60.0 BILATERAL LOWER EXTREMITY EDEMA: ICD-10-CM

## 2020-09-16 DIAGNOSIS — M79.672 LEFT FOOT PAIN: Primary | ICD-10-CM

## 2020-09-16 DIAGNOSIS — E11.59 TYPE 2 DIABETES MELLITUS WITH OTHER CIRCULATORY COMPLICATION, WITH LONG-TERM CURRENT USE OF INSULIN (H): ICD-10-CM

## 2020-09-16 DIAGNOSIS — I10 BENIGN ESSENTIAL HYPERTENSION: ICD-10-CM

## 2020-09-16 DIAGNOSIS — R60.9 EDEMA, UNSPECIFIED TYPE: ICD-10-CM

## 2020-09-16 LAB
ALBUMIN SERPL-MCNC: 3.8 G/DL (ref 3.4–5)
ALP SERPL-CCNC: 57 U/L (ref 40–150)
ALT SERPL W P-5'-P-CCNC: 67 U/L (ref 0–70)
ANION GAP SERPL CALCULATED.3IONS-SCNC: 8 MMOL/L (ref 3–14)
AST SERPL W P-5'-P-CCNC: 39 U/L (ref 0–45)
BILIRUB SERPL-MCNC: 0.7 MG/DL (ref 0.2–1.3)
BUN SERPL-MCNC: 25 MG/DL (ref 7–30)
CALCIUM SERPL-MCNC: 9.2 MG/DL (ref 8.5–10.1)
CHLORIDE SERPL-SCNC: 106 MMOL/L (ref 94–109)
CO2 SERPL-SCNC: 23 MMOL/L (ref 20–32)
CREAT SERPL-MCNC: 1.3 MG/DL (ref 0.66–1.25)
ERYTHROCYTE [DISTWIDTH] IN BLOOD BY AUTOMATED COUNT: 14 % (ref 10–15)
GFR SERPL CREATININE-BSD FRML MDRD: 59 ML/MIN/{1.73_M2}
GLUCOSE SERPL-MCNC: 196 MG/DL (ref 70–99)
HCT VFR BLD AUTO: 37.3 % (ref 40–53)
HGB BLD-MCNC: 12 G/DL (ref 13.3–17.7)
MCH RBC QN AUTO: 31.5 PG (ref 26.5–33)
MCHC RBC AUTO-ENTMCNC: 32.2 G/DL (ref 31.5–36.5)
MCV RBC AUTO: 98 FL (ref 78–100)
NT-PROBNP SERPL-MCNC: 300 PG/ML (ref 0–125)
PLATELET # BLD AUTO: 164 10E9/L (ref 150–450)
POTASSIUM SERPL-SCNC: 3.7 MMOL/L (ref 3.4–5.3)
PROT SERPL-MCNC: 6.6 G/DL (ref 6.8–8.8)
RBC # BLD AUTO: 3.81 10E12/L (ref 4.4–5.9)
SODIUM SERPL-SCNC: 137 MMOL/L (ref 133–144)
URATE SERPL-MCNC: 4.5 MG/DL (ref 3.5–7.2)
WBC # BLD AUTO: 6.5 10E9/L (ref 4–11)

## 2020-09-16 PROCEDURE — 84550 ASSAY OF BLOOD/URIC ACID: CPT | Performed by: INTERNAL MEDICINE

## 2020-09-16 PROCEDURE — 99214 OFFICE O/P EST MOD 30 MIN: CPT | Performed by: INTERNAL MEDICINE

## 2020-09-16 PROCEDURE — 85027 COMPLETE CBC AUTOMATED: CPT | Performed by: INTERNAL MEDICINE

## 2020-09-16 PROCEDURE — 73630 X-RAY EXAM OF FOOT: CPT | Mod: LT

## 2020-09-16 PROCEDURE — 83880 ASSAY OF NATRIURETIC PEPTIDE: CPT | Performed by: INTERNAL MEDICINE

## 2020-09-16 PROCEDURE — 80053 COMPREHEN METABOLIC PANEL: CPT | Performed by: INTERNAL MEDICINE

## 2020-09-16 PROCEDURE — 36415 COLL VENOUS BLD VENIPUNCTURE: CPT | Performed by: INTERNAL MEDICINE

## 2020-09-16 RX ORDER — FUROSEMIDE 20 MG
20 TABLET ORAL EVERY MORNING
Qty: 60 TABLET | Refills: 3
Start: 2020-09-16 | End: 2020-12-16

## 2020-09-16 ASSESSMENT — MIFFLIN-ST. JEOR: SCORE: 2114.89

## 2020-09-16 NOTE — PROGRESS NOTES
Subjective     Neymar Rhodes is a 60 year old male who presents to clinic today for the following health issues:    HPI       Musculoskeletal problem/pain  Onset/Duration: about 1 year   Description  Location: foot and knee - bilateral, LT hip  Joint Swelling: YES  Redness: YES- sometimes   Pain: YES  Warmth: YES- burning in LT foot   Intensity:  severe  Progression of Symptoms:  same and constant  Accompanying signs and symptoms:   Fevers: no  Numbness/tingling/weakness: no  History  Trauma to the area: no  Recent illness:  no  Previous similar problem: no  Previous evaluation:  YES  Precipitating or alleviating factors:  Aggravating factors include: sitting, standing, walking, climbing stairs, overuse and medication - prednisone   Therapies tried and outcome: acetaminophen - not helping with sx     Wt Readings from Last 10 Encounters:   09/16/20 127.2 kg (280 lb 6.4 oz)   08/19/20 128.5 kg (283 lb 6.4 oz)   07/20/20 129.4 kg (285 lb 3.2 oz)   05/27/20 122.1 kg (269 lb 3.2 oz)   03/10/20 118.9 kg (262 lb 1.6 oz)   03/06/20 121.2 kg (267 lb 3.2 oz)   02/18/20 119.3 kg (263 lb)   02/12/20 114.9 kg (253 lb 4.8 oz)   01/03/20 117.6 kg (259 lb 3.2 oz)   12/19/19 116 kg (255 lb 11.7 oz)       Current Outpatient Medications   Medication Sig Dispense Refill     acetaminophen (TYLENOL) 500 MG tablet Take 500-1,000 mg by mouth every 6 hours as needed for mild pain       albuterol (PROAIR HFA/PROVENTIL HFA/VENTOLIN HFA) 108 (90 Base) MCG/ACT Inhaler Inhale 2 puffs into the lungs every 6 hours as needed for shortness of breath / dyspnea or wheezing       amiodarone (PACERONE) 400 MG tablet Take 1 tablet (400 mg) by mouth 2 times daily For 7 days then 1 tablet (400 mg) daily 100 tablet 3     aspirin 81 MG tablet Take 1 tablet (81 mg) by mouth daily 30 tablet      atorvastatin (LIPITOR) 80 MG tablet Take 0.5 tablets (40 mg) by mouth daily 45 tablet 3     blood glucose monitoring (ACCU-CHEK LUL PLUS) test strip Use to test  blood sugar 1-3 times daily or as directed. 100 each 11     clotrimazole (LOTRIMIN) 1 % external cream Apply sparingly once or twice per day as needed to affected area until the skin is better, then stop; REPEAT AS NEEDED 30 g 1     fenofibrate (TRIGLIDE/LOFIBRA) 160 MG tablet TAKE ONE TABLET BY MOUTH ONE TIME DAILY 90 tablet 1     fluticasone (FLONASE) 50 MCG/ACT nasal spray Spray 2 sprays into both nostrils daily 16 g 0     furosemide (LASIX) 20 MG tablet Take 1 tablet (20 mg) by mouth 2 times daily 60 tablet 3     glipiZIDE (GLUCOTROL XL) 10 MG 24 hr tablet TAKE 1 TABLET (10 MG) BY MOUTH DAILY. TAKE WITH LARGEST MEAL OF THE DAY. ALWAYS TAKE WITH FOOD 90 tablet 0     insulin detemir (LEVEMIR PEN) 100 UNIT/ML pen Inject 25 Units Subcutaneous 2 times daily 60 mL 1     insulin pen needle (BD ULTRA-FINE) 29G X 12.7MM miscellaneous Use 3 pen needles daily as directed (one for daily victoza injection, 2 for BID levemir injections) 200 each 2     levothyroxine (SYNTHROID/LEVOTHROID) 50 MCG tablet Take 1 tablet (50 mcg) by mouth daily. 90 tablet 2     liraglutide (VICTOZA PEN) 18 MG/3ML solution Inject 1.8 mg Subcutaneous daily 27 mL 1     metFORMIN (GLUCOPHAGE) 1000 MG tablet Take 1 tablet (1,000 mg) by mouth 2 times daily (with meals) 180 tablet 1     metoprolol succinate ER (TOPROL-XL) 25 MG 24 hr tablet Take 1 tablet (25 mg) by mouth daily 90 tablet 3     omeprazole (PRILOSEC) 40 MG DR capsule Take 1 capsule (40 mg) by mouth daily 90 capsule 3     predniSONE (DELTASONE) 20 MG tablet 20 mg once daily for 2 months, then 10 mg once per day until seen again 150 tablet 0     sacubitril-valsartan (ENTRESTO) 49-51 MG per tablet Take 1 tablet by mouth 2 times daily 60 tablet 5     sulfamethoxazole-trimethoprim (BACTRIM DS) 800-160 MG tablet TAKE ONE TABLET BY MOUTH ONE TIME DAILY  30 tablet 5     triamcinolone (KENALOG) 0.5 % cream Apply sparingly once or twice per day as needed to affected area until the skin is better, then  stop 30 g 0     Continuous Blood Gluc Sensor (FREESTYLE JOCELIN 14 DAY SENSOR) MISC 1 each every 14 days 6 each 3     continuous blood glucose monitoring (FREESTYLE JOCELIN) sensor For use with Freestyle Jocelin Flash  for continuous monitioring of blood glucose levels. Replace sensor every 10 days. (Patient not taking: Reported on 7/20/2020) 3 each 3        Review of Systems   Constitutional, HEENT, cardiovascular, pulmonary, gi and gu systems are negative, except as otherwise noted.      Objective    BP 90/60   Pulse 74   Temp 97  F (36.1  C) (Temporal)   Resp 20   Ht 1.829 m (6')   Wt 127.2 kg (280 lb 6.4 oz)   SpO2 99%   BMI 38.03 kg/m    Body mass index is 38.03 kg/m .  Physical Exam   GENERAL alert and no distress  EYES:  Normal sclera,conjunctiva, EOMI  HENT: oral and posterior pharynx without lesions or erythema, facies symmetric  NECK: Neck supple. No LAD, without thyroidmegaly.  RESP: Clear to ausculation bilaterally without wheezes or crackles. Normal BS in all fields.  CV: RRR normal S1S2 without murmurs, rubs or gallops.  LYMPH: no cervical lymph adenopathy appreciated  MS: extremities- no gross deformities of the visible extremities noted,   EXT:  Only has trace lower extremity edema at the ankle, very minor edema in foot.   Pain across dorsum of foot, in the distal 3rd metatarsal bones  PSYCH: Alert and oriented times 3; speech- coherent  SKIN:  No obvious significant skin lesions on visible portions of face       Foot Xray:   Results for orders placed or performed in visit on 09/16/20   XR Foot Left G/E 3 Views     Status: None    Narrative    LEFT FOOT THREE OR MORE VIEWS  9/16/2020 12:53 PM     HISTORY: Left foot pain.    COMPARISON: None.      Impression    IMPRESSION: No acute bony or soft tissue abnormality. 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 Prominent  posterior and plantar calcaneal spurs. Osteoarthritis at the  tibiotalar joint. Extensive vascular  "calcifications.    ISHA LEE MD   Results for orders placed or performed in visit on 09/16/20   Uric acid     Status: None   Result Value Ref Range    Uric Acid 4.5 3.5 - 7.2 mg/dL   Results for orders placed or performed in visit on 09/16/20   CBC with platelets     Status: Abnormal   Result Value Ref Range    WBC 6.5 4.0 - 11.0 10e9/L    RBC Count 3.81 (L) 4.4 - 5.9 10e12/L    Hemoglobin 12.0 (L) 13.3 - 17.7 g/dL    Hematocrit 37.3 (L) 40.0 - 53.0 %    MCV 98 78 - 100 fl    MCH 31.5 26.5 - 33.0 pg    MCHC 32.2 31.5 - 36.5 g/dL    RDW 14.0 10.0 - 15.0 %    Platelet Count 164 150 - 450 10e9/L   Comprehensive metabolic panel     Status: Abnormal   Result Value Ref Range    Sodium 137 133 - 144 mmol/L    Potassium 3.7 3.4 - 5.3 mmol/L    Chloride 106 94 - 109 mmol/L    Carbon Dioxide 23 20 - 32 mmol/L    Anion Gap 8 3 - 14 mmol/L    Glucose 196 (H) 70 - 99 mg/dL    Urea Nitrogen 25 7 - 30 mg/dL    Creatinine 1.30 (H) 0.66 - 1.25 mg/dL    GFR Estimate 59 (L) >60 mL/min/[1.73_m2]    GFR Estimate If Black 68 >60 mL/min/[1.73_m2]    Calcium 9.2 8.5 - 10.1 mg/dL    Bilirubin Total 0.7 0.2 - 1.3 mg/dL    Albumin 3.8 3.4 - 5.0 g/dL    Protein Total 6.6 (L) 6.8 - 8.8 g/dL    Alkaline Phosphatase 57 40 - 150 U/L    ALT 67 0 - 70 U/L    AST 39 0 - 45 U/L   BNP-N terminal pro     Status: Abnormal   Result Value Ref Range    N-Terminal Pro Bnp 300 (H) 0 - 125 pg/mL              (M79.672) Left foot pain  (primary encounter diagnosis)  Comment:   *  No evidence for gout or crystal disease    *  No evidence for heart failure or excessive fluid in your system.     *  I suspect that this is some sort of stress fracture vs degenerative joint disease of the foot.      *  Wear the \"ski boot\" on the left foot all the time for the next 2 weeks, and see how you do.  OK to take off to bathe, sleep, etc.      *  Make sure that you get new shoes (ideally that fit your feet well and with firm sole).      *  Follow up with the structural " heart cardiologists for upcoming testing, I.e. when do they want to repeat echocardiogram, and/or MRI of heart muscles.     *  contnue slowly reducing the prednisone as instructed, hop[efulyl off of this medicatoin in the comign months.     *  Continue to manage the diabetes well.  Goal for diabetes 100-200 more ofetn than not.     *  Return to see me in 3 months.  Use [employer] or Call [phone] to schedule the appointment with me.           Plan: Uric acid, XR Foot Left G/E 3 Views, Ankle/Foot        Bracing Supplies Order for DME - ONLY FOR DME            (I42.8) Nonischemic cardiomyopathy (H)  Comment: This condition is currently controlled on the current medical regimen.  Continue current therapy.   Plan: furosemide (LASIX) 20 MG tablet            (E11.59,  Z79.4) Type 2 diabetes mellitus with other circulatory complication, with long-term current use of insulin (H)  Comment: This condition is currently controlled on the current medical regimen.  Continue current therapy.   BGs will decreae as the prednisone tapers down and eventually off.   Plan:     (R60.9) Edema, unspecified type  Comment: This condition is currently controlled on the current medical regimen.  Continue current therapy.   Plan: furosemide (LASIX) 20 MG tablet

## 2020-09-16 NOTE — PATIENT INSTRUCTIONS
"*  No evidence for gout or crystal disease    *  No evidence for heart failure or excessive fluid in your system.     *  I suspect that this is some sort of stress fracture vs degenerative joint disease of the foot.      *  Wear the \"ski boot\" on the left foot all the time for the next 2 weeks, and see how you do.  OK to take off to bathe, sleep, etc.      *  Make sure that you get new shoes (ideally that fit your feet well and with firm sole).      *  Follow up with the structural heart cardiologists for upcoming testing, I.e. when do they want to repeat echocardiogram, and/or MRI of heart muscles.     *  contnue slowly reducing the prednisone as instructed, hop[efulyl off of this medicatoin in the comign months.     *  Continue to manage the diabetes well.  Goal for diabetes 100-200 more ofetn than not.     *  Return to see me in 3 months.  Use [employer] or Call [phone] to schedule the appointment with me.         "

## 2020-10-04 DIAGNOSIS — E11.9 TYPE 2 DIABETES MELLITUS WITHOUT COMPLICATION, WITH LONG-TERM CURRENT USE OF INSULIN (H): ICD-10-CM

## 2020-10-04 DIAGNOSIS — Z79.4 TYPE 2 DIABETES MELLITUS WITHOUT COMPLICATION, WITH LONG-TERM CURRENT USE OF INSULIN (H): ICD-10-CM

## 2020-10-06 RX ORDER — ALBUTEROL SULFATE 108 UG/1
AEROSOL, METERED RESPIRATORY (INHALATION)
Qty: 200 EACH | Refills: 3 | Status: SHIPPED | OUTPATIENT
Start: 2020-10-06 | End: 2021-06-21

## 2020-10-10 DIAGNOSIS — E11.9 TYPE 2 DIABETES MELLITUS WITHOUT COMPLICATION, WITHOUT LONG-TERM CURRENT USE OF INSULIN (H): ICD-10-CM

## 2020-11-04 DIAGNOSIS — E11.9 TYPE 2 DIABETES MELLITUS WITHOUT COMPLICATION, WITHOUT LONG-TERM CURRENT USE OF INSULIN (H): ICD-10-CM

## 2020-11-05 RX ORDER — FENOFIBRATE 160 MG/1
TABLET ORAL
Qty: 90 TABLET | Refills: 2 | Status: SHIPPED | OUTPATIENT
Start: 2020-11-05 | End: 2021-07-23

## 2020-11-11 DIAGNOSIS — Z79.4 TYPE 2 DIABETES MELLITUS WITHOUT COMPLICATION, WITH LONG-TERM CURRENT USE OF INSULIN (H): ICD-10-CM

## 2020-11-11 DIAGNOSIS — E11.9 TYPE 2 DIABETES MELLITUS WITHOUT COMPLICATION, WITH LONG-TERM CURRENT USE OF INSULIN (H): ICD-10-CM

## 2020-11-12 RX ORDER — FLASH GLUCOSE SENSOR
1 KIT MISCELLANEOUS
Qty: 6 EACH | Refills: 3 | Status: SHIPPED | OUTPATIENT
Start: 2020-11-12 | End: 2021-10-06

## 2020-11-17 DIAGNOSIS — D86.0 SARCOIDOSIS OF LUNG (H): ICD-10-CM

## 2020-11-17 DIAGNOSIS — E11.9 TYPE 2 DIABETES MELLITUS WITHOUT COMPLICATION, WITHOUT LONG-TERM CURRENT USE OF INSULIN (H): ICD-10-CM

## 2020-11-17 RX ORDER — GLIPIZIDE 10 MG/1
10 TABLET, FILM COATED, EXTENDED RELEASE ORAL DAILY
Qty: 90 TABLET | Refills: 0 | Status: SHIPPED | OUTPATIENT
Start: 2020-11-17 | End: 2021-02-19

## 2020-11-18 RX ORDER — SULFAMETHOXAZOLE/TRIMETHOPRIM 800-160 MG
TABLET ORAL
Qty: 30 TABLET | Refills: 0 | Status: SHIPPED | OUTPATIENT
Start: 2020-11-18 | End: 2020-12-22

## 2020-11-18 NOTE — TELEPHONE ENCOUNTER
Bactrim    Routing refill request to provider for review/approval because:  Drug not on the [facility] refill protocol

## 2020-11-24 ENCOUNTER — TELEPHONE (OUTPATIENT)
Dept: CARDIOLOGY | Facility: CLINIC | Age: 60
End: 2020-11-24

## 2020-11-24 ENCOUNTER — ANCILLARY PROCEDURE (OUTPATIENT)
Dept: CARDIOLOGY | Facility: CLINIC | Age: 60
End: 2020-11-24
Attending: INTERNAL MEDICINE
Payer: COMMERCIAL

## 2020-11-24 DIAGNOSIS — Z95.810 ICD (IMPLANTABLE CARDIOVERTER-DEFIBRILLATOR) IN PLACE: Primary | ICD-10-CM

## 2020-11-24 DIAGNOSIS — I42.8 NON-ISCHEMIC CARDIOMYOPATHY (H): ICD-10-CM

## 2020-11-24 DIAGNOSIS — Z95.810 ICD (IMPLANTABLE CARDIOVERTER-DEFIBRILLATOR) IN PLACE: ICD-10-CM

## 2020-11-24 PROCEDURE — 93284 PRGRMG EVAL IMPLANTABLE DFB: CPT | Performed by: INTERNAL MEDICINE

## 2020-11-24 NOTE — LETTER
Neymar Rhodes    [address] [facility] [address]    December 4, 2020      Dear Neymar Rhodes,   We have attempted to reach you by phone on multiple occasions and have been unsuccessful. We would like to get you scheduled for follow up appointments with Dr. Hernandes and Dr. Dorman.   You are due for an in office appointment, with both Dr. Hernandes and Dr. Dorman.  Please call [phone] to arrange.    You may call and leave a message for a device RN if you have any questions at [phone] and they will return your call. Device clinic hours: Monday - Friday  8:00am-4:00pm   Sincerely,   Lalo Solis, Device RN

## 2020-11-24 NOTE — PROGRESS NOTES
*** ICD Device Check  Patient seen in clinic for device evaluation and iterative programming.   AP: *** %    : *** %    Mode: ***    Underlying Rhythm: ***    Heart Rate: ***    Sensing: ***    Pacing Threshold: ***    Impedance: ***    Battery Status: ***    Device Site: ***    Atrial Arrhythmia: ***    Ventricular Arrhythmia: ***    ATP: ***    Shocks: ***    Setting Change: ***    Care Plan: ***      I have reviewed and interpreted the device interrogation, settings, programming and nurse's summary. The device is functioning within normal device parameters. I agree with the current findings, assessment and plan.

## 2020-11-24 NOTE — TELEPHONE ENCOUNTER
Patient was seen in device clinic for annual in office ICD check.     21 VT episodes logged and 13 NSVT episodes logged since device check on 8/25/2020. EGMs available show NSVT/VT lasting 15seconds to 3m47s with rates primarily in the 170s, only one EGM available shows the end of the VT episode with a AS/BiVP rhythm, remaining EGMs show VT and no end of arrhythmia was available on the EGM. Patient denies symptoms at time episodes logged. No therapy delivered with these VT episodes.     Patient is overdue to follow up with Dr. Batres and Dr. Hernandes. Discussed VT episodes with patient and need for follow up with Dr. Dorman to discuss potential ablation following increased VT episodes with ATP in August. Patient stated he did not return call for follow up because he is not interested in pursuing an ablation with Dr. Dorman. Will send CORE team update, order updated for follow up with Dr. Hernandes.

## 2020-11-25 NOTE — TELEPHONE ENCOUNTER
Called patient with update from Dr. Hernandes. Asked that patient return call and schedule appointments for further follow up with Dr. Hernandes, Dr. Batres and Dr. Dorman as soon as possible. Left detailed message, clinic number given. Orders updated.

## 2020-11-25 NOTE — TELEPHONE ENCOUNTER
Were some VT episodes sustained? He should at least have a virtual visit with Dr. Dorman even if he is not interested in ablation.

## 2020-12-01 LAB
MDC_IDC_LEAD_IMPLANT_DT: NORMAL
MDC_IDC_LEAD_LOCATION: NORMAL
MDC_IDC_LEAD_LOCATION_DETAIL_1: NORMAL
MDC_IDC_LEAD_MFG: NORMAL
MDC_IDC_LEAD_MODEL: NORMAL
MDC_IDC_LEAD_POLARITY_TYPE: NORMAL
MDC_IDC_LEAD_SERIAL: NORMAL
MDC_IDC_MSMT_BATTERY_STATUS: NORMAL
MDC_IDC_MSMT_CAP_CHARGE_DTM: NORMAL
MDC_IDC_MSMT_CAP_CHARGE_ENERGY: 0 J
MDC_IDC_MSMT_CAP_CHARGE_TIME: 0 S
MDC_IDC_MSMT_CAP_CHARGE_TIME: 9.72
MDC_IDC_MSMT_CAP_CHARGE_TYPE: NORMAL
MDC_IDC_MSMT_CAP_CHARGE_TYPE: NORMAL
MDC_IDC_MSMT_LEADCHNL_LV_IMPEDANCE_VALUE: 576 OHM
MDC_IDC_MSMT_LEADCHNL_LV_PACING_THRESHOLD_AMPLITUDE: 1.1 V
MDC_IDC_MSMT_LEADCHNL_LV_PACING_THRESHOLD_PULSEWIDTH: 0.5 MS
MDC_IDC_MSMT_LEADCHNL_LV_SENSING_INTR_AMPL: 23.8 MV
MDC_IDC_MSMT_LEADCHNL_RA_IMPEDANCE_VALUE: 667 OHM
MDC_IDC_MSMT_LEADCHNL_RA_PACING_THRESHOLD_AMPLITUDE: 0.7 V
MDC_IDC_MSMT_LEADCHNL_RA_PACING_THRESHOLD_PULSEWIDTH: 0.5 MS
MDC_IDC_MSMT_LEADCHNL_RA_SENSING_INTR_AMPL: 1.7 MV
MDC_IDC_MSMT_LEADCHNL_RV_IMPEDANCE_VALUE: 501 OHM
MDC_IDC_MSMT_LEADCHNL_RV_PACING_THRESHOLD_AMPLITUDE: 0.7 V
MDC_IDC_MSMT_LEADCHNL_RV_PACING_THRESHOLD_PULSEWIDTH: 0.5 MS
MDC_IDC_MSMT_LEADCHNL_RV_SENSING_INTR_AMPL: 20.4 MV
MDC_IDC_PG_IMPLANT_DTM: NORMAL
MDC_IDC_PG_MFG: NORMAL
MDC_IDC_PG_MODEL: NORMAL
MDC_IDC_PG_SERIAL: NORMAL
MDC_IDC_PG_TYPE: NORMAL
MDC_IDC_SESS_CLINIC_NAME: NORMAL
MDC_IDC_SESS_DTM: NORMAL
MDC_IDC_SESS_TYPE: NORMAL
MDC_IDC_SET_BRADY_AT_MODE_SWITCH_MODE: NORMAL
MDC_IDC_SET_BRADY_AT_MODE_SWITCH_RATE: 170 {BEATS}/MIN
MDC_IDC_SET_BRADY_HYSTRATE: NORMAL
MDC_IDC_SET_BRADY_LOWRATE: 60 {BEATS}/MIN
MDC_IDC_SET_BRADY_MAX_SENSOR_RATE: 130 {BEATS}/MIN
MDC_IDC_SET_BRADY_MAX_TRACKING_RATE: 130 {BEATS}/MIN
MDC_IDC_SET_BRADY_MODE: NORMAL
MDC_IDC_SET_BRADY_PAV_DELAY_HIGH: 200 MS
MDC_IDC_SET_BRADY_PAV_DELAY_LOW: 270 MS
MDC_IDC_SET_BRADY_SAV_DELAY_HIGH: 140 MS
MDC_IDC_SET_BRADY_SAV_DELAY_LOW: 190 MS
MDC_IDC_SET_CRT_LVRV_DELAY: 35 MS
MDC_IDC_SET_CRT_PACED_CHAMBERS: NORMAL
MDC_IDC_SET_LEADCHNL_LV_PACING_AMPLITUDE: 2.2 V
MDC_IDC_SET_LEADCHNL_LV_PACING_CAPTURE_MODE: NORMAL
MDC_IDC_SET_LEADCHNL_LV_PACING_POLARITY: NORMAL
MDC_IDC_SET_LEADCHNL_LV_PACING_PULSEWIDTH: 0.5 MS
MDC_IDC_SET_LEADCHNL_LV_SENSING_ADAPTATION_MODE: NORMAL
MDC_IDC_SET_LEADCHNL_LV_SENSING_POLARITY: NORMAL
MDC_IDC_SET_LEADCHNL_LV_SENSING_SENSITIVITY: 1 MV
MDC_IDC_SET_LEADCHNL_RA_PACING_AMPLITUDE: 2 V
MDC_IDC_SET_LEADCHNL_RA_PACING_CAPTURE_MODE: NORMAL
MDC_IDC_SET_LEADCHNL_RA_PACING_POLARITY: NORMAL
MDC_IDC_SET_LEADCHNL_RA_PACING_PULSEWIDTH: 0.5 MS
MDC_IDC_SET_LEADCHNL_RA_SENSING_ADAPTATION_MODE: NORMAL
MDC_IDC_SET_LEADCHNL_RA_SENSING_POLARITY: NORMAL
MDC_IDC_SET_LEADCHNL_RA_SENSING_SENSITIVITY: 0.25 MV
MDC_IDC_SET_LEADCHNL_RV_PACING_AMPLITUDE: 2 V
MDC_IDC_SET_LEADCHNL_RV_PACING_CAPTURE_MODE: NORMAL
MDC_IDC_SET_LEADCHNL_RV_PACING_POLARITY: NORMAL
MDC_IDC_SET_LEADCHNL_RV_PACING_PULSEWIDTH: 0.5 MS
MDC_IDC_SET_LEADCHNL_RV_SENSING_ADAPTATION_MODE: NORMAL
MDC_IDC_SET_LEADCHNL_RV_SENSING_POLARITY: NORMAL
MDC_IDC_SET_LEADCHNL_RV_SENSING_SENSITIVITY: 0.6 MV
MDC_IDC_SET_ZONE_DETECTION_INTERVAL: 250 MS
MDC_IDC_SET_ZONE_DETECTION_INTERVAL: 333 MS
MDC_IDC_SET_ZONE_DETECTION_INTERVAL: 375 MS
MDC_IDC_SET_ZONE_TYPE: NORMAL
MDC_IDC_SET_ZONE_VENDOR_TYPE: NORMAL
MDC_IDC_STAT_EPISODE_RECENT_COUNT: 122
MDC_IDC_STAT_EPISODE_RECENT_COUNT: 79
MDC_IDC_STAT_EPISODE_RECENT_COUNT: 82
MDC_IDC_STAT_EPISODE_RECENT_COUNT_DTM_END: NORMAL
MDC_IDC_STAT_EPISODE_RECENT_COUNT_DTM_START: NORMAL
MDC_IDC_STAT_EPISODE_TOTAL_COUNT: 122
MDC_IDC_STAT_EPISODE_TOTAL_COUNT: 79
MDC_IDC_STAT_EPISODE_TOTAL_COUNT: 82
MDC_IDC_STAT_EPISODE_TOTAL_COUNT_DTM_END: NORMAL
MDC_IDC_STAT_EPISODE_TYPE: NORMAL
MDC_IDC_STAT_EPISODE_VENDOR_TYPE: NORMAL
MDC_IDC_STAT_TACHYTHERAPY_ATP_DELIVERED_RECENT: 3
MDC_IDC_STAT_TACHYTHERAPY_ATP_DELIVERED_TOTAL: 3
MDC_IDC_STAT_TACHYTHERAPY_RECENT_DTM_END: NORMAL
MDC_IDC_STAT_TACHYTHERAPY_RECENT_DTM_START: NORMAL
MDC_IDC_STAT_TACHYTHERAPY_SHOCKS_ABORTED_RECENT: 0
MDC_IDC_STAT_TACHYTHERAPY_SHOCKS_ABORTED_TOTAL: 0
MDC_IDC_STAT_TACHYTHERAPY_SHOCKS_DELIVERED_RECENT: 0
MDC_IDC_STAT_TACHYTHERAPY_SHOCKS_DELIVERED_TOTAL: 0
MDC_IDC_STAT_TACHYTHERAPY_TOTAL_DTM_END: NORMAL

## 2020-12-01 NOTE — TELEPHONE ENCOUNTER
Called patient and left detailed message regarding needing to set up follow up appointments with Dr. Hernandes and Dr. Dorman. Asked that patient return call as soon as possible.

## 2020-12-03 ENCOUNTER — TRANSFERRED RECORDS (OUTPATIENT)
Dept: HEALTH INFORMATION MANAGEMENT | Facility: CLINIC | Age: 60
End: 2020-12-03

## 2020-12-03 LAB — RETINOPATHY: NEGATIVE

## 2020-12-04 NOTE — TELEPHONE ENCOUNTER
Have made multiple attempts to reach patient and help set up follow up appointments with Dr. Hernandes and Dr. Dorman. Letter sent today for further follow up.

## 2020-12-10 ENCOUNTER — TRANSFERRED RECORDS (OUTPATIENT)
Dept: HEALTH INFORMATION MANAGEMENT | Facility: CLINIC | Age: 60
End: 2020-12-10

## 2020-12-10 LAB — RETINOPATHY: NORMAL

## 2020-12-11 DIAGNOSIS — E11.59 TYPE 2 DIABETES MELLITUS WITH OTHER CIRCULATORY COMPLICATION, WITH LONG-TERM CURRENT USE OF INSULIN (H): ICD-10-CM

## 2020-12-11 DIAGNOSIS — R60.9 EDEMA, UNSPECIFIED TYPE: ICD-10-CM

## 2020-12-11 DIAGNOSIS — I25.10 CORONARY ARTERY DISEASE INVOLVING NATIVE CORONARY ARTERY OF NATIVE HEART WITHOUT ANGINA PECTORIS: ICD-10-CM

## 2020-12-11 DIAGNOSIS — I42.8 NONISCHEMIC CARDIOMYOPATHY (H): ICD-10-CM

## 2020-12-11 DIAGNOSIS — Z79.4 TYPE 2 DIABETES MELLITUS WITH OTHER CIRCULATORY COMPLICATION, WITH LONG-TERM CURRENT USE OF INSULIN (H): ICD-10-CM

## 2020-12-11 LAB
ANION GAP SERPL CALCULATED.3IONS-SCNC: 8 MMOL/L (ref 3–14)
BUN SERPL-MCNC: 20 MG/DL (ref 7–30)
CALCIUM SERPL-MCNC: 8.9 MG/DL (ref 8.5–10.1)
CHLORIDE SERPL-SCNC: 108 MMOL/L (ref 94–109)
CO2 SERPL-SCNC: 21 MMOL/L (ref 20–32)
CREAT SERPL-MCNC: 1.21 MG/DL (ref 0.66–1.25)
GFR SERPL CREATININE-BSD FRML MDRD: 64 ML/MIN/{1.73_M2}
GLUCOSE SERPL-MCNC: 175 MG/DL (ref 70–99)
HBA1C MFR BLD: 7.1 % (ref 0–5.6)
POTASSIUM SERPL-SCNC: 4 MMOL/L (ref 3.4–5.3)
SODIUM SERPL-SCNC: 137 MMOL/L (ref 133–144)

## 2020-12-11 PROCEDURE — 36415 COLL VENOUS BLD VENIPUNCTURE: CPT | Performed by: INTERNAL MEDICINE

## 2020-12-11 PROCEDURE — 83036 HEMOGLOBIN GLYCOSYLATED A1C: CPT | Performed by: INTERNAL MEDICINE

## 2020-12-11 PROCEDURE — 80048 BASIC METABOLIC PNL TOTAL CA: CPT | Performed by: INTERNAL MEDICINE

## 2020-12-14 ENCOUNTER — OFFICE VISIT (OUTPATIENT)
Dept: INTERNAL MEDICINE | Facility: CLINIC | Age: 60
End: 2020-12-14
Payer: COMMERCIAL

## 2020-12-14 VITALS
DIASTOLIC BLOOD PRESSURE: 64 MMHG | SYSTOLIC BLOOD PRESSURE: 96 MMHG | OXYGEN SATURATION: 98 % | BODY MASS INDEX: 38.71 KG/M2 | HEIGHT: 72 IN | TEMPERATURE: 97.7 F | HEART RATE: 67 BPM | WEIGHT: 285.8 LBS

## 2020-12-14 DIAGNOSIS — I42.8 NON-ISCHEMIC CARDIOMYOPATHY (H): ICD-10-CM

## 2020-12-14 DIAGNOSIS — D86.0 SARCOIDOSIS OF LUNG (H): ICD-10-CM

## 2020-12-14 DIAGNOSIS — E11.59 TYPE 2 DIABETES MELLITUS WITH OTHER CIRCULATORY COMPLICATION, WITH LONG-TERM CURRENT USE OF INSULIN (H): Primary | ICD-10-CM

## 2020-12-14 DIAGNOSIS — D86.85 CARDIAC SARCOIDOSIS: ICD-10-CM

## 2020-12-14 DIAGNOSIS — Z79.4 TYPE 2 DIABETES MELLITUS WITH OTHER CIRCULATORY COMPLICATION, WITH LONG-TERM CURRENT USE OF INSULIN (H): Primary | ICD-10-CM

## 2020-12-14 DIAGNOSIS — E66.01 SEVERE OBESITY (BMI 35.0-39.9) WITH COMORBIDITY (H): ICD-10-CM

## 2020-12-14 DIAGNOSIS — I10 BENIGN ESSENTIAL HYPERTENSION: ICD-10-CM

## 2020-12-14 PROCEDURE — 99214 OFFICE O/P EST MOD 30 MIN: CPT | Performed by: INTERNAL MEDICINE

## 2020-12-14 RX ORDER — PREDNISONE 5 MG/1
5 TABLET ORAL DAILY
COMMUNITY
Start: 2020-12-14 | End: 2021-06-01

## 2020-12-14 ASSESSMENT — MIFFLIN-ST. JEOR: SCORE: 2139.38

## 2020-12-14 NOTE — PATIENT INSTRUCTIONS
*  Decrease the Levemir 24 units once per day for now.  You should not need a second insulin shot as the prednisone goes down and eventually off.      *  When prednisone is finally done, then stop the Bactrim DS antibiotics (trimethoprim/sulfamethoxizole)    *  Conitnue to monitor the glucose closely the goal is time in the target range    *  Follow up with the Cardiology Clinic as planned.     *  Continue all other medications at the same doses.      *  Follow up with the Cardiologists who deal with the sarcoidosis (Dr. Hernandes or Medardo) to get some instructions on the sarcoidosis.   You will likely need another PET scan done before the prednisone can be completed.

## 2020-12-14 NOTE — PROGRESS NOTES
Subjective     Neymar Rhodes is a 60 year old male who presents to clinic today for the following health issues:    HPI         Diabetes Follow-up    How often are you checking your blood sugar? Continuous glucose monitor  What time of day are you checking your blood sugars (select all that apply)?  Before and after meals  Have you had any blood sugars above 200?  Yes, sometimes   Have you had any blood sugars below 70?  No    What symptoms do you notice when your blood sugar is low?  Dizzy    What concerns do you have today about your diabetes? None     Do you have any of these symptoms? (Select all that apply)  No numbness or tingling in feet.  No redness, sores or blisters on feet.  No complaints of excessive thirst.  No reports of blurry vision.  No significant changes to weight.    Have you had a diabetic eye exam in the last 12 months? Yes- Date of last eye exam: 12/10/2020,  Location: [facility]     *Blood glucose is significantly elevated while he was on prednisone, they have been slowly coming down as he has been tapered off of prednisone.      BP Readings from Last 2 Encounters:   12/14/20 96/64   09/16/20 90/60     Hemoglobin A1C (%)   Date Value   12/11/2020 7.1 (H)   07/22/2020 8.8 (H)     LDL Cholesterol Calculated (mg/dL)   Date Value   07/22/2020 51   11/14/2019 58         Hypertension Follow-up      Do you check your blood pressure regularly outside of the clinic? Yes     Are you following a low salt diet? Yes, no added salt     Are your blood pressures ever more than 140 on the top number (systolic) OR more   than 90 on the bottom number (diastolic), for example 140/90? No      How many servings of fruits and vegetables do you eat daily?  0-1    On average, how many sweetened beverages do you drink each day (Examples: soda, juice, sweet tea, etc.  Do NOT count diet or artificially sweetened beverages)?   1    How many days per week do you miss taking your medication? 0      3.  Cardiac  sarcoidosis: Has been on a long tapering course of prednisone.  Down to 5 mg daily based on a PET scan from June showing no further residual cardiac sarcoidosis.  Feels good.  No orthopnea, no significant shortness of breath.    **I reviewed the information recorded in the patient's EPIC chart (including but not limited to medical history, surgical history, family history, problem list, medication list, and allergy list) and updated the information as indicated based on the patients reported information.         Review of Systems   Constitutional, HEENT, cardiovascular, pulmonary, gi and gu systems are negative, except as otherwise noted.      Objective    BP 96/64   Pulse 67   Temp 97.7  F (36.5  C) (Temporal)   Ht 1.829 m (6')   Wt 129.6 kg (285 lb 12.8 oz)   SpO2 98%   BMI 38.76 kg/m    Body mass index is 38.76 kg/m .  Physical Exam   GENERAL alert and no distress  EYES:  Normal sclera,conjunctiva, EOMI  HENT: oral and posterior pharynx without lesions or erythema, facies symmetric  NECK: Neck supple. No LAD, without thyroidmegaly.  RESP: Clear to ausculation bilaterally without wheezes or crackles. Normal BS in all fields.  CV: RRR normal S1S2 without murmurs, rubs or gallops.  LYMPH: no cervical lymph adenopathy appreciated  MS: extremities- no gross deformities of the visible extremities noted,   EXT:  no lower extremity edema  PSYCH: Alert and oriented times 3; speech- coherent  SKIN:  No obvious significant skin lesions on visible portions of face   ABD:  Obese, small umbilical hernia, normal bowel sounds, nontender abdomen.            (E11.59,  Z79.4) Type 2 diabetes mellitus with other circulatory complication, with long-term current use of insulin (H)  (primary encounter diagnosis)  Comment: Diabetes much improved since tapering down of prednisone, does not usually require a second dose of Levemir on half the days.  Reduce Levemir to once daily.  Continue Metformin glipizide and Victoza.  Continue  lower carb diet.  Continue continuous glucose monitor, goal is time in the target range () 70%  Plan: insulin detemir (LEVEMIR PEN) 100 UNIT/ML pen            (I42.8) Non-ischemic cardiomyopathy (H)  Comment: No signs or symptoms of heart failure.  Due for echocardiogram.  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  Discontinue Bactrim once prednisone is discontinued  Plan: predniSONE (DELTASONE) 5 MG tablet            (E66.01) Severe obesity (BMI 35.0-39.9) with comorbidity (H)  Comment: Weight stable, needs to increase physical activity as able continue lower carbohydrate lower fat diet.  Plan:     (I10) Benign essential hypertension  Comment: This condition is currently controlled on the current medical regimen.  Continue current therapy.   Plan:     (D86.85) Cardiac sarcoidosis  Comment: Last PET scan June showed no residual disease.  Was recommended follow-up this fall, has not done so.    May need another PET scan before finally discontinuing prednisone.  Patient did not get the echocardiogram ordered by Dr. Batres, and will likely need to have this repeated as well.  We will contact the cardiology clinic to help set up a follow-up appointment with the appropriate Cardiologist.  Plan:

## 2020-12-15 ENCOUNTER — OFFICE VISIT (OUTPATIENT)
Dept: CARDIOLOGY | Facility: CLINIC | Age: 60
End: 2020-12-15
Attending: INTERNAL MEDICINE
Payer: COMMERCIAL

## 2020-12-15 VITALS
BODY MASS INDEX: 39.01 KG/M2 | SYSTOLIC BLOOD PRESSURE: 112 MMHG | HEIGHT: 72 IN | DIASTOLIC BLOOD PRESSURE: 71 MMHG | WEIGHT: 288 LBS | HEART RATE: 69 BPM

## 2020-12-15 DIAGNOSIS — I42.8 NON-ISCHEMIC CARDIOMYOPATHY (H): Primary | ICD-10-CM

## 2020-12-15 DIAGNOSIS — Z95.810 ICD (IMPLANTABLE CARDIOVERTER-DEFIBRILLATOR) IN PLACE: ICD-10-CM

## 2020-12-15 PROCEDURE — 99214 OFFICE O/P EST MOD 30 MIN: CPT | Performed by: INTERNAL MEDICINE

## 2020-12-15 PROCEDURE — 93000 ELECTROCARDIOGRAM COMPLETE: CPT | Performed by: INTERNAL MEDICINE

## 2020-12-15 ASSESSMENT — MIFFLIN-ST. JEOR: SCORE: 2149.36

## 2020-12-15 NOTE — LETTER
12/15/2020    Jefry Harris MD  [address] [facility] [address]    RE: Neymar Rhodes       Dear Colleague,    I had the pleasure of seeing Neymar Rhodes in the [facility].    HPI and Plan:   See dictation  [id number]  Orders Placed This Encounter   Procedures     EKG 12-lead complete w/read - Clinics (performed today)       No orders of the defined types were placed in this encounter.      There are no discontinued medications.      Encounter Diagnoses   Name Primary?     ICD (implantable cardioverter-defibrillator) in place      Non-ischemic cardiomyopathy (H) Yes       CURRENT MEDICATIONS:  Current Outpatient Medications   Medication Sig Dispense Refill     acetaminophen (TYLENOL) 500 MG tablet Take 500-1,000 mg by mouth every 6 hours as needed for mild pain       albuterol (PROAIR HFA/PROVENTIL HFA/VENTOLIN HFA) 108 (90 Base) MCG/ACT Inhaler Inhale 2 puffs into the lungs every 6 hours as needed for shortness of breath / dyspnea or wheezing       amiodarone (PACERONE) 400 MG tablet Take 1 tablet (400 mg) by mouth 2 times daily For 7 days then 1 tablet (400 mg) daily 100 tablet 3     aspirin 81 MG tablet Take 1 tablet (81 mg) by mouth daily 30 tablet      atorvastatin (LIPITOR) 80 MG tablet Take 0.5 tablets (40 mg) by mouth daily 45 tablet 3     blood glucose monitoring (ACCU-CHEK LUL PLUS) test strip Use to test blood sugar 1-3 times daily or as directed. 100 each 11     clotrimazole (LOTRIMIN) 1 % external cream Apply sparingly once or twice per day as needed to affected area until the skin is better, then stop; REPEAT AS NEEDED 30 g 1     Continuous Blood Gluc Sensor (FREESTYLE JEFFREY 14 DAY SENSOR) [facility] 1 each every 14 days 6 each 3     continuous blood glucose monitoring (FREESTYLE JEFFREY) sensor For use with Freestyle Jeffrey Flash  for continuous monitioring of blood glucose levels. Replace sensor every 10 days. 3 each 3     fenofibrate  (TRIGLIDE/LOFIBRA) 160 MG tablet TAKE ONE TABLET BY MOUTH ONE TIME DAILY 90 tablet 2     fluticasone (FLONASE) 50 MCG/ACT nasal spray Spray 2 sprays into both nostrils daily 16 g 0     furosemide (LASIX) 20 MG tablet Take 1 tablet (20 mg) by mouth every morning 60 tablet 3     glipiZIDE (GLUCOTROL XL) 10 MG 24 hr tablet TAKE 1 TABLET (10 MG) BY MOUTH DAILY. TAKE WITH LARGEST MEAL OF THE DAY. ALWAYS TAKE WITH FOOD 90 tablet 0     insulin detemir (LEVEMIR PEN) 100 UNIT/ML pen Inject 24 Units Subcutaneous daily 30 mL 1     insulin pen needle (ULTICARE) 29G X 12.7MM miscellaneous Use 3 pen needles daily as directed (one for daily victoza injection, 2 for twice daily levemir injections) 200 each 3     levothyroxine (SYNTHROID/LEVOTHROID) 50 MCG tablet Take 1 tablet (50 mcg) by mouth daily. 90 tablet 2     liraglutide (VICTOZA PEN) 18 MG/3ML solution Inject 1.8 mg Subcutaneous daily 27 mL 1     metFORMIN (GLUCOPHAGE) 1000 MG tablet Take 1 tablet (1,000 mg) by mouth 2 times daily (with meals) 180 tablet 0     metoprolol succinate ER (TOPROL-XL) 25 MG 24 hr tablet Take 1 tablet (25 mg) by mouth daily 90 tablet 3     omeprazole (PRILOSEC) 40 MG DR capsule Take 1 capsule (40 mg) by mouth daily 90 capsule 3     predniSONE (DELTASONE) 5 MG tablet Take 1 tablet (5 mg) by mouth daily Taking 5mg daily       sacubitril-valsartan (ENTRESTO) 49-51 MG per tablet Take 1 tablet by mouth 2 times daily 60 tablet 5     sulfamethoxazole-trimethoprim (BACTRIM DS) 800-160 MG tablet TAKE ONE TABLET BY MOUTH ONE TIME DAILY  30 tablet 0     triamcinolone (KENALOG) 0.5 % cream Apply sparingly once or twice per day as needed to affected area until the skin is better, then stop 30 g 0       ALLERGIES     Allergies   Allergen Reactions     No Known Drug Allergies        PAST MEDICAL HISTORY:  Past Medical History:   Diagnosis Date     Ascending aorta dilatation (H)      Diverticulosis of colon (without mention of hemorrhage)      Essential  hypertension, benign      Gout, unspecified      LBBB (left bundle branch block)      Morbid obesity (H)      Non-ischemic cardiomyopathy (H)      Nonrheumatic mitral valve regurgitation      NSTEMI (non-ST elevated myocardial infarction) (H)     cardiac cath 6/2018: mild non-obstructive CAD     Other and unspecified hyperlipidemia      Type II or unspecified type diabetes mellitus without mention of complication, not stated as uncontrolled        PAST SURGICAL HISTORY:  Past Surgical History:   Procedure Laterality Date     C APPENDECTOMY  1980's     CV CORONARY ANGIOGRAM N/A 10/2/2019    Procedure: Coronary Angiogram;  Surgeon: Kathy Almaguer MD;  Location:  HEART CARDIAC CATH LAB     CV LEFT HEART CATH N/A 10/2/2019    Procedure: Left Heart Cath;  Surgeon: Kathy Almaguer MD;  Location:  HEART CARDIAC CATH LAB     CV RIGHT HEART CATH N/A 10/2/2019    Procedure: Right Heart Cath;  Surgeon: Kathy Almaguer MD;  Location:  HEART CARDIAC CATH LAB     ENDOBRONCHIAL ULTRASOUND FLEXIBLE N/A 12/19/2019    Procedure: Flexible bronchoscopy, endobronchial ultrasound, bronchial alveolar lavage;  Surgeon: Ranulfo Barcenas MD;  Location: [facility] OR     EP ICD N/A 10/4/2019    Procedure: EP ICD;  Surgeon: Jayy Dorman MD;  Location:  HEART CARDIAC CATH LAB     EP LEAD PLCMNT LV NEW ICD N/A 10/4/2019    Procedure: EP Lead Plcmnt LV New ICD;  Surgeon: Jayy Dorman MD;  Location:  HEART CARDIAC CATH LAB     HEART CATH CORONARY ANGIOGRAM WITHOUT PRESSURES  06/10/2018    normal coronary angiogram, nothing more than 10%     SURGICAL HISTORY OF -   2001    repair of colovesicular fistula       FAMILY HISTORY:  Family History   Problem Relation Age of Onset     Cancer Father 75        bladder cancer     Myocardial Infarction Father      Other - See Comments Father         smoking     Breast Cancer Mother      Breast Cancer Sister      Family History Negative Brother      Family History Negative Son       Family History Negative Son         fluttering/murmur     Asthma Son      Colon Cancer No family hx of        SOCIAL HISTORY:  Social History     Socioeconomic History     Marital status:      Spouse name: None     Number of children: None     Years of education: None     Highest education level: None   Occupational History     None   Social Needs     Financial resource strain: None     Food insecurity     Worry: None     Inability: None     Transportation needs     Medical: None     Non-medical: None   Tobacco Use     Smoking status: Never Smoker     Smokeless tobacco: Never Used   Substance and Sexual Activity     Alcohol use: Yes     Comment: once awhile     Drug use: No     Sexual activity: Yes     Partners: Female   Lifestyle     Physical activity     Days per week: None     Minutes per session: None     Stress: None   Relationships     Social connections     Talks on phone: None     Gets together: None     Attends Baptism service: None     Active member of club or organization: None     Attends meetings of clubs or organizations: None     Relationship status: None     Intimate partner violence     Fear of current or ex partner: None     Emotionally abused: None     Physically abused: None     Forced sexual activity: None   Other Topics Concern     Parent/sibling w/ CABG, MI or angioplasty before 65F 55M? Not Asked   Social History Narrative     None       Review of Systems:  Skin:  Negative       Eyes:  Negative glasses driving and cheaters for reading  ENT:  Negative      Respiratory:  Negative       Cardiovascular:  Negative      Gastroenterology: Negative      Genitourinary:  Positive for urinary frequency;nocturia    Musculoskeletal:  Negative      Neurologic:  Negative      Psychiatric:  Negative      Heme/Lymph/Imm:  Negative      Endocrine:  Negative diabetes      Physical Exam:  Vitals: /71   Pulse 69   Ht 1.829 m (6')   Wt 130.6 kg (288 lb)   BMI 39.06 kg/m      Constitutional:   cooperative, alert and oriented, well developed, well nourished, in no acute distress obese      Skin:  warm and dry to the touch, no apparent skin lesions or masses noted   ICD incision in the left infraclavicular area was well-healed      Head:  normocephalic, no masses or lesions        Eyes:  pupils equal and round, conjunctivae and lids unremarkable, sclera white, no xanthalasma, EOMS intact, no nystagmus        Lymph:No Cervical lymphadenopathy present     ENT:  no pallor or cyanosis, dentition good        Neck:  carotid pulses are full and equal bilaterally, JVP normal, no carotid bruit        Respiratory:  normal breath sounds, clear to auscultation, normal A-P diameter, normal symmetry, normal respiratory excursion, no use of accessory muscles         Cardiac: regular rhythm, normal S1/S2, no S3 or S4, apical impulse not displaced, no murmurs, gallops or rubs                pulses full and equal, no bruits auscultated                                        GI:  abdomen soft, non-tender, BS normoactive, no mass, no HSM, no bruits obese      Extremities and Muscular Skeletal:                 Neurological:           Psych:  Alert and Oriented x 3        CC  Jayy Pratt MD  [address]  [place] [address]                  Thank you for allowing me to participate in the care of your patient.      Sincerely,     Jayy Pratt MD     [facility]    cc:   Jayy Pratt MD  [address]  [place] [address]

## 2020-12-15 NOTE — PROGRESS NOTES
HPI and Plan:   See dictation  [id number]  Orders Placed This Encounter   Procedures     EKG 12-lead complete w/read - Clinics (performed today)       No orders of the defined types were placed in this encounter.      There are no discontinued medications.      Encounter Diagnoses   Name Primary?     ICD (implantable cardioverter-defibrillator) in place      Non-ischemic cardiomyopathy (H) Yes       CURRENT MEDICATIONS:  Current Outpatient Medications   Medication Sig Dispense Refill     acetaminophen (TYLENOL) 500 MG tablet Take 500-1,000 mg by mouth every 6 hours as needed for mild pain       albuterol (PROAIR HFA/PROVENTIL HFA/VENTOLIN HFA) 108 (90 Base) MCG/ACT Inhaler Inhale 2 puffs into the lungs every 6 hours as needed for shortness of breath / dyspnea or wheezing       amiodarone (PACERONE) 400 MG tablet Take 1 tablet (400 mg) by mouth 2 times daily For 7 days then 1 tablet (400 mg) daily 100 tablet 3     aspirin 81 MG tablet Take 1 tablet (81 mg) by mouth daily 30 tablet      atorvastatin (LIPITOR) 80 MG tablet Take 0.5 tablets (40 mg) by mouth daily 45 tablet 3     blood glucose monitoring (ACCU-CHEK LUL PLUS) test strip Use to test blood sugar 1-3 times daily or as directed. 100 each 11     clotrimazole (LOTRIMIN) 1 % external cream Apply sparingly once or twice per day as needed to affected area until the skin is better, then stop; REPEAT AS NEEDED 30 g 1     Continuous Blood Gluc Sensor (FREESTYLE JOCELIN 14 DAY SENSOR) [facility] 1 each every 14 days 6 each 3     continuous blood glucose monitoring (FREESTYLE JOCELIN) sensor For use with Freestyle Jocelin Flash  for continuous monitioring of blood glucose levels. Replace sensor every 10 days. 3 each 3     fenofibrate (TRIGLIDE/LOFIBRA) 160 MG tablet TAKE ONE TABLET BY MOUTH ONE TIME DAILY 90 tablet 2     fluticasone (FLONASE) 50 MCG/ACT nasal spray Spray 2 sprays into both nostrils daily 16 g 0     furosemide (LASIX) 20 MG tablet Take 1 tablet (20 mg) by  mouth every morning 60 tablet 3     glipiZIDE (GLUCOTROL XL) 10 MG 24 hr tablet TAKE 1 TABLET (10 MG) BY MOUTH DAILY. TAKE WITH LARGEST MEAL OF THE DAY. ALWAYS TAKE WITH FOOD 90 tablet 0     insulin detemir (LEVEMIR PEN) 100 UNIT/ML pen Inject 24 Units Subcutaneous daily 30 mL 1     insulin pen needle (ULTICARE) 29G X 12.7MM miscellaneous Use 3 pen needles daily as directed (one for daily victoza injection, 2 for twice daily levemir injections) 200 each 3     levothyroxine (SYNTHROID/LEVOTHROID) 50 MCG tablet Take 1 tablet (50 mcg) by mouth daily. 90 tablet 2     liraglutide (VICTOZA PEN) 18 MG/3ML solution Inject 1.8 mg Subcutaneous daily 27 mL 1     metFORMIN (GLUCOPHAGE) 1000 MG tablet Take 1 tablet (1,000 mg) by mouth 2 times daily (with meals) 180 tablet 0     metoprolol succinate ER (TOPROL-XL) 25 MG 24 hr tablet Take 1 tablet (25 mg) by mouth daily 90 tablet 3     omeprazole (PRILOSEC) 40 MG DR capsule Take 1 capsule (40 mg) by mouth daily 90 capsule 3     predniSONE (DELTASONE) 5 MG tablet Take 1 tablet (5 mg) by mouth daily Taking 5mg daily       sacubitril-valsartan (ENTRESTO) 49-51 MG per tablet Take 1 tablet by mouth 2 times daily 60 tablet 5     sulfamethoxazole-trimethoprim (BACTRIM DS) 800-160 MG tablet TAKE ONE TABLET BY MOUTH ONE TIME DAILY  30 tablet 0     triamcinolone (KENALOG) 0.5 % cream Apply sparingly once or twice per day as needed to affected area until the skin is better, then stop 30 g 0       ALLERGIES     Allergies   Allergen Reactions     No Known Drug Allergies        PAST MEDICAL HISTORY:  Past Medical History:   Diagnosis Date     Ascending aorta dilatation (H)      Diverticulosis of colon (without mention of hemorrhage)      Essential hypertension, benign      Gout, unspecified      LBBB (left bundle branch block)      Morbid obesity (H)      Non-ischemic cardiomyopathy (H)      Nonrheumatic mitral valve regurgitation      NSTEMI (non-ST elevated myocardial infarction) (H)      cardiac cath 6/2018: mild non-obstructive CAD     Other and unspecified hyperlipidemia      Type II or unspecified type diabetes mellitus without mention of complication, not stated as uncontrolled        PAST SURGICAL HISTORY:  Past Surgical History:   Procedure Laterality Date     C APPENDECTOMY  1980's     CV CORONARY ANGIOGRAM N/A 10/2/2019    Procedure: Coronary Angiogram;  Surgeon: Kathy Almaguer MD;  Location:  HEART CARDIAC CATH LAB     CV LEFT HEART CATH N/A 10/2/2019    Procedure: Left Heart Cath;  Surgeon: Kathy Almaguer MD;  Location:  HEART CARDIAC CATH LAB     CV RIGHT HEART CATH N/A 10/2/2019    Procedure: Right Heart Cath;  Surgeon: Kathy Almaguer MD;  Location:  HEART CARDIAC CATH LAB     ENDOBRONCHIAL ULTRASOUND FLEXIBLE N/A 12/19/2019    Procedure: Flexible bronchoscopy, endobronchial ultrasound, bronchial alveolar lavage;  Surgeon: Ranulfo Barcenas MD;  Location: [facility] OR     EP ICD N/A 10/4/2019    Procedure: EP ICD;  Surgeon: Jayy Dorman MD;  Location:  HEART CARDIAC CATH LAB     EP LEAD PLCMNT LV NEW ICD N/A 10/4/2019    Procedure: EP Lead Plcmnt LV New ICD;  Surgeon: Jayy Dorman MD;  Location:  HEART CARDIAC CATH LAB     HEART CATH CORONARY ANGIOGRAM WITHOUT PRESSURES  06/10/2018    normal coronary angiogram, nothing more than 10%     SURGICAL HISTORY OF -   2001    repair of colovesicular fistula       FAMILY HISTORY:  Family History   Problem Relation Age of Onset     Cancer Father 75        bladder cancer     Myocardial Infarction Father      Other - See Comments Father         smoking     Breast Cancer Mother      Breast Cancer Sister      Family History Negative Brother      Family History Negative Son      Family History Negative Son         fluttering/murmur     Asthma Son      Colon Cancer No family hx of        SOCIAL HISTORY:  Social History     Socioeconomic History     Marital status:      Spouse name: None     Number of children:  None     Years of education: None     Highest education level: None   Occupational History     None   Social Needs     Financial resource strain: None     Food insecurity     Worry: None     Inability: None     Transportation needs     Medical: None     Non-medical: None   Tobacco Use     Smoking status: Never Smoker     Smokeless tobacco: Never Used   Substance and Sexual Activity     Alcohol use: Yes     Comment: once awhile     Drug use: No     Sexual activity: Yes     Partners: Female   Lifestyle     Physical activity     Days per week: None     Minutes per session: None     Stress: None   Relationships     Social connections     Talks on phone: None     Gets together: None     Attends Faith service: None     Active member of club or organization: None     Attends meetings of clubs or organizations: None     Relationship status: None     Intimate partner violence     Fear of current or ex partner: None     Emotionally abused: None     Physically abused: None     Forced sexual activity: None   Other Topics Concern     Parent/sibling w/ CABG, MI or angioplasty before 65F 55M? Not Asked   Social History Narrative     None       Review of Systems:  Skin:  Negative       Eyes:  Negative glasses driving and cheaters for reading  ENT:  Negative      Respiratory:  Negative       Cardiovascular:  Negative      Gastroenterology: Negative      Genitourinary:  Positive for urinary frequency;nocturia    Musculoskeletal:  Negative      Neurologic:  Negative      Psychiatric:  Negative      Heme/Lymph/Imm:  Negative      Endocrine:  Negative diabetes      Physical Exam:  Vitals: /71   Pulse 69   Ht 1.829 m (6')   Wt 130.6 kg (288 lb)   BMI 39.06 kg/m      Constitutional:  cooperative, alert and oriented, well developed, well nourished, in no acute distress obese      Skin:  warm and dry to the touch, no apparent skin lesions or masses noted   ICD incision in the left infraclavicular area was well-healed       Head:  normocephalic, no masses or lesions        Eyes:  pupils equal and round, conjunctivae and lids unremarkable, sclera white, no xanthalasma, EOMS intact, no nystagmus        Lymph:No Cervical lymphadenopathy present     ENT:  no pallor or cyanosis, dentition good        Neck:  carotid pulses are full and equal bilaterally, JVP normal, no carotid bruit        Respiratory:  normal breath sounds, clear to auscultation, normal A-P diameter, normal symmetry, normal respiratory excursion, no use of accessory muscles         Cardiac: regular rhythm, normal S1/S2, no S3 or S4, apical impulse not displaced, no murmurs, gallops or rubs                pulses full and equal, no bruits auscultated                                        GI:  abdomen soft, non-tender, BS normoactive, no mass, no HSM, no bruits obese      Extremities and Muscular Skeletal:                 Neurological:           Psych:  Alert and Oriented x 3        CC  Jayy Travis Dorman MD  [address]  [place] [address]

## 2020-12-15 NOTE — LETTER
"12/15/2020      Jefry Harris MD  [address] [facility] [address]      RE: Neymar ELADIO Rhodes       Dear Colleague,    I had the pleasure of seeing Neymar Rhodes in the [facility].    Service Date: 12/15/2020      Thank you for allowing me to participate in the care of this very delightful patient.  As you know, Neymar is a 60-year-old  with a history of sarcoidosis that was diagnosed back several years ago with ejection fraction of 40%.  He was started on typical cardiomyopathy medications including beta blockers inhibitor and mineralocorticoid receptor antagonist.        Unfortunately, he was lost to followup as he was doing quite well until he presented with CHF decompensation in 10/2019.  He was found to have worsening LV dysfunction with ejection fraction less than 20% and was quite dilated, measuring 6.9 cm for LV size.  He also had sustained monomorphic VT at a rate about 180 beats per minute that required cardioversion.  MRI confirmed evidence of sarcoidosis with increasing scar burden.  I implanted a biventricular defibrillator for the patient given that he does have a left bundle branch block as well.  Afterward, we had him on amiodarone for his VT.  His most recent ejection fraction was about 25%-30%.      I last saw the patient this past May for VTs at a rate of 160 beats per minute for as long as 30 minutes long.  They were in the monitor zone.  The patient does not recall having any symptoms during his VT episodes.      I had a lengthy discussion with Neymar at that time about VT ablation as I was afraid that the VT burden would get worse given his known sarcoidosis.  The patient declined and would like to continue with amiodarone and followup with Dr. Batres at the [facility] regarding his sarcoidosis. He was also under the impression that ablation would require \"opening his chest\".  He did have a PET scan and has been on prednisone " and is scheduled to have another PET scan for followup.  His most recent device interrogations continue to show slow VT in the monitor zone for which he is asymptomatic.  The longest episode lasted for almost 4 minutes.     We discussed at length again about the reason why he continues to have VT due to sarcoidosis and that  fortunately so far it has not degenerated into something faster.  In the past, we inclined not to lower the VT treatment zone to cover the slow VT as ATP can accelerate it into faster VT or VF.  He has been on amiodarone 400 mg a day.      I still encouraged Neymar to consider to proceed with VT ablation, but he would like to get a followup PET scan and go from there.  I did tell him that should he have 1 ICD shock he should call us, but if he has more than 1, he should go to the nearest emergency room for further investigation.      If he continues to have slow VT in the monitor zone that is quite prolonged and associated with symptoms, we may lower the treatment to cover this zone with extensive ATPs at the expense he may accelerate into something faster that requires ICD shocks.        cc:   Jefry Harris MD   [facility]   [address] [place] [address]  [id number]         NELSON ZAMUDIO MD             D: 12/15/2020   T: 2020   MT: morelia      Name:     NEYMAR WICK   MRN:      [id number]        Account:      [id number]   :      1960           Service Date: 12/15/2020      Document: [id number]            Outpatient Encounter Medications as of 12/15/2020   Medication Sig Dispense Refill     acetaminophen (TYLENOL) 500 MG tablet Take 500-1,000 mg by mouth every 6 hours as needed for mild pain       albuterol (PROAIR HFA/PROVENTIL HFA/VENTOLIN HFA) 108 (90 Base) MCG/ACT Inhaler Inhale 2 puffs into the lungs every 6 hours as needed for shortness of breath / dyspnea or wheezing       amiodarone (PACERONE) 400 MG tablet Take 1 tablet (400 mg) by mouth 2 times daily  For 7 days then 1 tablet (400 mg) daily 100 tablet 3     aspirin 81 MG tablet Take 1 tablet (81 mg) by mouth daily 30 tablet      atorvastatin (LIPITOR) 80 MG tablet Take 0.5 tablets (40 mg) by mouth daily 45 tablet 3     blood glucose monitoring (ACCU-CHEK LUL PLUS) test strip Use to test blood sugar 1-3 times daily or as directed. 100 each 11     clotrimazole (LOTRIMIN) 1 % external cream Apply sparingly once or twice per day as needed to affected area until the skin is better, then stop; REPEAT AS NEEDED 30 g 1     Continuous Blood Gluc Sensor (FREESTYLE JOCELIN 14 DAY SENSOR) MIS 1 each every 14 days 6 each 3     continuous blood glucose monitoring (FREESTYLE JOCELIN) sensor For use with Freestyle Jocelin Flash  for continuous monitioring of blood glucose levels. Replace sensor every 10 days. 3 each 3     fenofibrate (TRIGLIDE/LOFIBRA) 160 MG tablet TAKE ONE TABLET BY MOUTH ONE TIME DAILY 90 tablet 2     fluticasone (FLONASE) 50 MCG/ACT nasal spray Spray 2 sprays into both nostrils daily 16 g 0     glipiZIDE (GLUCOTROL XL) 10 MG 24 hr tablet TAKE 1 TABLET (10 MG) BY MOUTH DAILY. TAKE WITH LARGEST MEAL OF THE DAY. ALWAYS TAKE WITH FOOD 90 tablet 0     insulin detemir (LEVEMIR PEN) 100 UNIT/ML pen Inject 24 Units Subcutaneous daily 30 mL 1     insulin pen needle (ULTICARE) 29G X 12.7MM miscellaneous Use 3 pen needles daily as directed (one for daily victoza injection, 2 for twice daily levemir injections) 200 each 3     levothyroxine (SYNTHROID/LEVOTHROID) 50 MCG tablet Take 1 tablet (50 mcg) by mouth daily. 90 tablet 2     liraglutide (VICTOZA PEN) 18 MG/3ML solution Inject 1.8 mg Subcutaneous daily 27 mL 1     metFORMIN (GLUCOPHAGE) 1000 MG tablet Take 1 tablet (1,000 mg) by mouth 2 times daily (with meals) 180 tablet 0     metoprolol succinate ER (TOPROL-XL) 25 MG 24 hr tablet Take 1 tablet (25 mg) by mouth daily 90 tablet 3     omeprazole (PRILOSEC) 40 MG DR capsule Take 1 capsule (40 mg) by mouth daily  90 capsule 3     predniSONE (DELTASONE) 5 MG tablet Take 1 tablet (5 mg) by mouth daily Taking 5mg daily       sacubitril-valsartan (ENTRESTO) 49-51 MG per tablet Take 1 tablet by mouth 2 times daily 60 tablet 5     sulfamethoxazole-trimethoprim (BACTRIM DS) 800-160 MG tablet TAKE ONE TABLET BY MOUTH ONE TIME DAILY  30 tablet 0     triamcinolone (KENALOG) 0.5 % cream Apply sparingly once or twice per day as needed to affected area until the skin is better, then stop 30 g 0     [DISCONTINUED] furosemide (LASIX) 20 MG tablet Take 1 tablet (20 mg) by mouth every morning 60 tablet 3     No facility-administered encounter medications on file as of 12/15/2020.        Again, thank you for allowing me to participate in the care of your patient.      Sincerely,    Jayy Pratt MD     [facility]

## 2020-12-16 DIAGNOSIS — I42.8 NONISCHEMIC CARDIOMYOPATHY (H): ICD-10-CM

## 2020-12-16 DIAGNOSIS — R60.9 EDEMA, UNSPECIFIED TYPE: ICD-10-CM

## 2020-12-16 RX ORDER — FUROSEMIDE 20 MG
TABLET ORAL
Qty: 60 TABLET | Refills: 11 | Status: SHIPPED | OUTPATIENT
Start: 2020-12-16 | End: 2022-01-18

## 2020-12-16 NOTE — PROGRESS NOTES
"Service Date: 12/15/2020      Thank you for allowing me to participate in the care of this very delightful patient.  As you know, Neymar is a 60-year-old  with a history of sarcoidosis that was diagnosed back several years ago with ejection fraction of 40%.  He was started on typical cardiomyopathy medications including beta blockers inhibitor and mineralocorticoid receptor antagonist.        Unfortunately, he was lost to followup as he was doing quite well until he presented with CHF decompensation in 10/2019.  He was found to have worsening LV dysfunction with ejection fraction less than 20% and was quite dilated, measuring 6.9 cm for LV size.  He also had sustained monomorphic VT at a rate about 180 beats per minute that required cardioversion.  MRI confirmed evidence of sarcoidosis with increasing scar burden.  I implanted a biventricular defibrillator for the patient given that he does have a left bundle branch block as well.  Afterward, we had him on amiodarone for his VT.  His most recent ejection fraction was about 25%-30%.      I last saw the patient this past May for VTs at a rate of 160 beats per minute for as long as 30 minutes long.  They were in the monitor zone.  The patient does not recall having any symptoms during his VT episodes.      I had a lengthy discussion with Neymar at that time about VT ablation as I was afraid that the VT burden would get worse given his known sarcoidosis.  The patient declined and would like to continue with amiodarone and followup with Dr. Batres at the [facility] regarding his sarcoidosis. He was also under the impression that ablation would require \"opening his chest\".  He did have a PET scan and has been on prednisone and is scheduled to have another PET scan for followup.  His most recent device interrogations continue to show slow VT in the monitor zone for which he is asymptomatic.  The longest episode lasted for almost 4 minutes.     We " discussed at length again about the reason why he continues to have VT due to sarcoidosis and that  fortunately so far it has not degenerated into something faster.  In the past, we inclined not to lower the VT treatment zone to cover the slow VT as ATP can accelerate it into faster VT or VF.  He has been on amiodarone 400 mg a day.      I still encouraged Neymar to consider to proceed with VT ablation, but he would like to get a followup PET scan and go from there.  I did tell him that should he have 1 ICD shock he should call us, but if he has more than 1, he should go to the nearest emergency room for further investigation.      If he continues to have slow VT in the monitor zone that is quite prolonged and associated with symptoms, we may lower the treatment to cover this zone with extensive ATPs at the expense he may accelerate into something faster that requires ICD shocks.        cc:   Jefry Harris MD   [facility]   [address] [place] [address]  [id number]         NELSON ZAMUDIO MD             D: 12/15/2020   T: 2020   MT: morelia      Name:     NEYMAR WICK   MRN:      [id number]        Account:      [id number]   :      1960           Service Date: 12/15/2020      Document: [id number]

## 2020-12-19 DIAGNOSIS — D86.0 SARCOIDOSIS OF LUNG (H): ICD-10-CM

## 2020-12-22 DIAGNOSIS — I42.8 NON-ISCHEMIC CARDIOMYOPATHY (H): ICD-10-CM

## 2020-12-22 RX ORDER — SULFAMETHOXAZOLE/TRIMETHOPRIM 800-160 MG
TABLET ORAL
Qty: 30 TABLET | Refills: 0 | Status: SHIPPED | OUTPATIENT
Start: 2020-12-22 | End: 2021-01-26

## 2021-01-22 DIAGNOSIS — E03.8 SUBCLINICAL HYPOTHYROIDISM: ICD-10-CM

## 2021-01-22 DIAGNOSIS — D86.0 SARCOIDOSIS OF LUNG (H): ICD-10-CM

## 2021-01-22 DIAGNOSIS — E11.9 TYPE 2 DIABETES MELLITUS WITHOUT COMPLICATION, WITHOUT LONG-TERM CURRENT USE OF INSULIN (H): ICD-10-CM

## 2021-01-26 RX ORDER — SULFAMETHOXAZOLE/TRIMETHOPRIM 800-160 MG
TABLET ORAL
Qty: 30 TABLET | Refills: 0 | Status: SHIPPED | OUTPATIENT
Start: 2021-01-26 | End: 2021-02-19

## 2021-01-26 RX ORDER — LEVOTHYROXINE SODIUM 50 UG/1
50 TABLET ORAL DAILY
Qty: 90 TABLET | Refills: 0 | Status: SHIPPED | OUTPATIENT
Start: 2021-01-26 | End: 2021-04-27

## 2021-01-26 NOTE — CONFIDENTIAL NOTE
Routing refill request to provider for review/approval because:  Drug not on the [facility] refill protocol

## 2021-02-12 ENCOUNTER — ANCILLARY PROCEDURE (OUTPATIENT)
Dept: PET IMAGING | Facility: CLINIC | Age: 61
End: 2021-02-12
Attending: INTERNAL MEDICINE
Payer: COMMERCIAL

## 2021-02-12 DIAGNOSIS — D86.85 SARCOID MYOCARDITIS: ICD-10-CM

## 2021-02-12 LAB — GLUCOSE SERPL-MCNC: 153 MG/DL (ref 70–99)

## 2021-02-17 DIAGNOSIS — D86.0 SARCOIDOSIS OF LUNG (H): ICD-10-CM

## 2021-02-18 DIAGNOSIS — D86.0 SARCOIDOSIS OF LUNG (H): ICD-10-CM

## 2021-02-18 DIAGNOSIS — E11.9 TYPE 2 DIABETES MELLITUS WITHOUT COMPLICATION, WITHOUT LONG-TERM CURRENT USE OF INSULIN (H): ICD-10-CM

## 2021-02-19 RX ORDER — GLIPIZIDE 10 MG/1
10 TABLET, FILM COATED, EXTENDED RELEASE ORAL DAILY
Qty: 90 TABLET | Refills: 2 | Status: SHIPPED | OUTPATIENT
Start: 2021-02-19 | End: 2021-11-29

## 2021-02-19 RX ORDER — PREDNISONE 20 MG/1
TABLET ORAL
Qty: 150 TABLET | Refills: 0 | OUTPATIENT
Start: 2021-02-19

## 2021-02-19 RX ORDER — SULFAMETHOXAZOLE/TRIMETHOPRIM 800-160 MG
TABLET ORAL
Qty: 30 TABLET | Refills: 0 | Status: SHIPPED | OUTPATIENT
Start: 2021-02-19 | End: 2021-03-23

## 2021-02-19 NOTE — TELEPHONE ENCOUNTER
Failed protocol.  please route to  team if patient needs an appointment     Philly CUNHARN BSN  [facility]  [phone]

## 2021-02-19 NOTE — TELEPHONE ENCOUNTER
Routing refill request to provider for review/approval because:  Confirmation of diagnosis is required

## 2021-02-25 ENCOUNTER — ANCILLARY PROCEDURE (OUTPATIENT)
Dept: CARDIOLOGY | Facility: CLINIC | Age: 61
End: 2021-02-25
Attending: INTERNAL MEDICINE
Payer: COMMERCIAL

## 2021-02-25 DIAGNOSIS — Z95.810 ICD (IMPLANTABLE CARDIOVERTER-DEFIBRILLATOR) IN PLACE: ICD-10-CM

## 2021-02-25 DIAGNOSIS — I42.8 NON-ISCHEMIC CARDIOMYOPATHY (H): Primary | ICD-10-CM

## 2021-02-25 DIAGNOSIS — I42.8 NON-ISCHEMIC CARDIOMYOPATHY (H): ICD-10-CM

## 2021-02-25 PROCEDURE — 93296 REM INTERROG EVL PM/IDS: CPT | Performed by: INTERNAL MEDICINE

## 2021-02-25 PROCEDURE — 93295 DEV INTERROG REMOTE 1/2/MLT: CPT | Performed by: INTERNAL MEDICINE

## 2021-03-01 ENCOUNTER — TELEPHONE (OUTPATIENT)
Dept: CARDIOLOGY | Facility: CLINIC | Age: 61
End: 2021-03-01

## 2021-03-10 ENCOUNTER — TELEPHONE (OUTPATIENT)
Dept: INTERNAL MEDICINE | Facility: CLINIC | Age: 61
End: 2021-03-10

## 2021-03-10 DIAGNOSIS — M62.81 MUSCLE WEAKNESS (GENERALIZED): Primary | ICD-10-CM

## 2021-03-10 LAB
MDC_IDC_EPISODE_DTM: NORMAL
MDC_IDC_EPISODE_ID: NORMAL
MDC_IDC_EPISODE_TYPE: NORMAL
MDC_IDC_LEAD_IMPLANT_DT: NORMAL
MDC_IDC_LEAD_LOCATION: NORMAL
MDC_IDC_LEAD_LOCATION_DETAIL_1: NORMAL
MDC_IDC_LEAD_MFG: NORMAL
MDC_IDC_LEAD_MODEL: NORMAL
MDC_IDC_LEAD_POLARITY_TYPE: NORMAL
MDC_IDC_LEAD_SERIAL: NORMAL
MDC_IDC_MSMT_BATTERY_DTM: NORMAL
MDC_IDC_MSMT_BATTERY_REMAINING_LONGEVITY: 120 MO
MDC_IDC_MSMT_BATTERY_REMAINING_PERCENTAGE: 100 %
MDC_IDC_MSMT_BATTERY_STATUS: NORMAL
MDC_IDC_MSMT_CAP_CHARGE_DTM: NORMAL
MDC_IDC_MSMT_CAP_CHARGE_TIME: 9.7 S
MDC_IDC_MSMT_CAP_CHARGE_TYPE: NORMAL
MDC_IDC_MSMT_LEADCHNL_LV_IMPEDANCE_VALUE: 660 OHM
MDC_IDC_MSMT_LEADCHNL_RA_IMPEDANCE_VALUE: 669 OHM
MDC_IDC_MSMT_LEADCHNL_RV_IMPEDANCE_VALUE: 505 OHM
MDC_IDC_PG_IMPLANT_DTM: NORMAL
MDC_IDC_PG_MFG: NORMAL
MDC_IDC_PG_MODEL: NORMAL
MDC_IDC_PG_SERIAL: NORMAL
MDC_IDC_PG_TYPE: NORMAL
MDC_IDC_SESS_CLINIC_NAME: NORMAL
MDC_IDC_SESS_DTM: NORMAL
MDC_IDC_SESS_TYPE: NORMAL
MDC_IDC_SET_BRADY_AT_MODE_SWITCH_MODE: NORMAL
MDC_IDC_SET_BRADY_AT_MODE_SWITCH_RATE: 170 {BEATS}/MIN
MDC_IDC_SET_BRADY_LOWRATE: 60 {BEATS}/MIN
MDC_IDC_SET_BRADY_MAX_SENSOR_RATE: 130 {BEATS}/MIN
MDC_IDC_SET_BRADY_MAX_TRACKING_RATE: 130 {BEATS}/MIN
MDC_IDC_SET_BRADY_MODE: NORMAL
MDC_IDC_SET_BRADY_PAV_DELAY_HIGH: 200 MS
MDC_IDC_SET_BRADY_PAV_DELAY_LOW: 270 MS
MDC_IDC_SET_BRADY_SAV_DELAY_HIGH: 140 MS
MDC_IDC_SET_BRADY_SAV_DELAY_LOW: 190 MS
MDC_IDC_SET_CRT_LVRV_DELAY: 35 MS
MDC_IDC_SET_CRT_PACED_CHAMBERS: NORMAL
MDC_IDC_SET_LEADCHNL_LV_PACING_AMPLITUDE: 2.2 V
MDC_IDC_SET_LEADCHNL_LV_PACING_CAPTURE_MODE: NORMAL
MDC_IDC_SET_LEADCHNL_LV_PACING_PULSEWIDTH: 0.5 MS
MDC_IDC_SET_LEADCHNL_LV_SENSING_ADAPTATION_MODE: NORMAL
MDC_IDC_SET_LEADCHNL_LV_SENSING_SENSITIVITY: 1 MV
MDC_IDC_SET_LEADCHNL_RA_PACING_AMPLITUDE: 2 V
MDC_IDC_SET_LEADCHNL_RA_PACING_CAPTURE_MODE: NORMAL
MDC_IDC_SET_LEADCHNL_RA_PACING_POLARITY: NORMAL
MDC_IDC_SET_LEADCHNL_RA_PACING_PULSEWIDTH: 0.5 MS
MDC_IDC_SET_LEADCHNL_RA_SENSING_ADAPTATION_MODE: NORMAL
MDC_IDC_SET_LEADCHNL_RA_SENSING_POLARITY: NORMAL
MDC_IDC_SET_LEADCHNL_RA_SENSING_SENSITIVITY: 0.25 MV
MDC_IDC_SET_LEADCHNL_RV_PACING_AMPLITUDE: 2 V
MDC_IDC_SET_LEADCHNL_RV_PACING_CAPTURE_MODE: NORMAL
MDC_IDC_SET_LEADCHNL_RV_PACING_POLARITY: NORMAL
MDC_IDC_SET_LEADCHNL_RV_PACING_PULSEWIDTH: 0.5 MS
MDC_IDC_SET_LEADCHNL_RV_SENSING_ADAPTATION_MODE: NORMAL
MDC_IDC_SET_LEADCHNL_RV_SENSING_POLARITY: NORMAL
MDC_IDC_SET_LEADCHNL_RV_SENSING_SENSITIVITY: 0.6 MV
MDC_IDC_SET_ZONE_DETECTION_INTERVAL: 250 MS
MDC_IDC_SET_ZONE_DETECTION_INTERVAL: 333 MS
MDC_IDC_SET_ZONE_DETECTION_INTERVAL: 375 MS
MDC_IDC_SET_ZONE_TYPE: NORMAL
MDC_IDC_SET_ZONE_VENDOR_TYPE: NORMAL
MDC_IDC_STAT_AT_BURDEN_PERCENT: 0 %
MDC_IDC_STAT_AT_DTM_END: NORMAL
MDC_IDC_STAT_AT_DTM_START: NORMAL
MDC_IDC_STAT_BRADY_DTM_END: NORMAL
MDC_IDC_STAT_BRADY_DTM_START: NORMAL
MDC_IDC_STAT_BRADY_RA_PERCENT_PACED: 37 %
MDC_IDC_STAT_BRADY_RV_PERCENT_PACED: 98 %
MDC_IDC_STAT_CRT_DTM_END: NORMAL
MDC_IDC_STAT_CRT_DTM_START: NORMAL
MDC_IDC_STAT_CRT_LV_PERCENT_PACED: 97 %
MDC_IDC_STAT_EPISODE_RECENT_COUNT: 0
MDC_IDC_STAT_EPISODE_RECENT_COUNT_DTM_END: NORMAL
MDC_IDC_STAT_EPISODE_RECENT_COUNT_DTM_START: NORMAL
MDC_IDC_STAT_EPISODE_TYPE: NORMAL
MDC_IDC_STAT_EPISODE_VENDOR_TYPE: NORMAL
MDC_IDC_STAT_TACHYTHERAPY_ATP_DELIVERED_RECENT: 0
MDC_IDC_STAT_TACHYTHERAPY_ATP_DELIVERED_TOTAL: 3
MDC_IDC_STAT_TACHYTHERAPY_RECENT_DTM_END: NORMAL
MDC_IDC_STAT_TACHYTHERAPY_RECENT_DTM_START: NORMAL
MDC_IDC_STAT_TACHYTHERAPY_SHOCKS_ABORTED_RECENT: 0
MDC_IDC_STAT_TACHYTHERAPY_SHOCKS_ABORTED_TOTAL: 0
MDC_IDC_STAT_TACHYTHERAPY_SHOCKS_DELIVERED_RECENT: 0
MDC_IDC_STAT_TACHYTHERAPY_SHOCKS_DELIVERED_TOTAL: 0
MDC_IDC_STAT_TACHYTHERAPY_TOTAL_DTM_END: NORMAL
MDC_IDC_STAT_TACHYTHERAPY_TOTAL_DTM_START: NORMAL

## 2021-03-10 NOTE — TELEPHONE ENCOUNTER
Left a detailed voicemail informing patient of message below. Told him to call back with any questions or concerns.    RODRIGUEZ Panchal

## 2021-03-10 NOTE — TELEPHONE ENCOUNTER
We can check this lab along with the other labs.     One of the long term side effects from long term oral steroid use (prednisone) is possible decreased muscle mass and weakness.     Continue to follow up recommendations of the cardiology clinic regarding the prednisone dosing and eventual tapering.

## 2021-03-10 NOTE — TELEPHONE ENCOUNTER
Reason for Call: Request for an order or referral:    Order or referral being requested: testosterone labe    Date needed: at your convenience    Has the patient been seen by the PCP for this problem? NO    Additional comments: Pt states that being on prednisone for so long has decreased his muscle mass and now his strength has decreased and his joints hurt. Pt will be coming in April to have his A1c tested. Can do labs at same time    Phone number Patient can be reached at:  Home number on file [phone] (home)    Best Time:  any    Can we leave a detailed message on this number?  YES    Call taken on 3/10/2021 at 9:24 AM by Kelly Hurley

## 2021-03-12 ENCOUNTER — TELEPHONE (OUTPATIENT)
Dept: CARDIOLOGY | Facility: CLINIC | Age: 61
End: 2021-03-12

## 2021-03-12 NOTE — TELEPHONE ENCOUNTER
"Received call from [facility] (in [place], on [address], [address]. [phone]). Pt came in for a refill on his amiodarone and insurance says it's too soon. Pt says he takes amiodarone 400mg in AM and 200mg in PM, but the pharmacy just has on file that it's 200mg BID.     Dr. Dorman's OV note from 12/15/2020 says: \"He has been on amiodarone 400 mg a day.\"    Called pt, he was not home, left message with a family member for pt to call us back with questions about his amiodarone dose.     ADDENDUM 4:20PM. Spoke with pt. He said it was confusion because he didn't realize amiodarone was the generic for pacerone. He said he got his prescription for amiodarone filled for 400mg daily. He said I do not need to call the pharmacy and clarify because he got it all figured out.   "

## 2021-03-18 DIAGNOSIS — D86.0 SARCOIDOSIS OF LUNG (H): ICD-10-CM

## 2021-03-23 RX ORDER — SULFAMETHOXAZOLE/TRIMETHOPRIM 800-160 MG
TABLET ORAL
Qty: 30 TABLET | Refills: 0 | Status: SHIPPED | OUTPATIENT
Start: 2021-03-23 | End: 2021-04-21

## 2021-04-06 DIAGNOSIS — E11.9 TYPE 2 DIABETES MELLITUS WITHOUT COMPLICATION, WITHOUT LONG-TERM CURRENT USE OF INSULIN (H): ICD-10-CM

## 2021-04-07 DIAGNOSIS — E11.59 TYPE 2 DIABETES MELLITUS WITH OTHER CIRCULATORY COMPLICATION, WITH LONG-TERM CURRENT USE OF INSULIN (H): ICD-10-CM

## 2021-04-07 DIAGNOSIS — Z79.4 TYPE 2 DIABETES MELLITUS WITH OTHER CIRCULATORY COMPLICATION, WITH LONG-TERM CURRENT USE OF INSULIN (H): ICD-10-CM

## 2021-04-07 RX ORDER — INSULIN DETEMIR 100 [IU]/ML
INJECTION, SOLUTION SUBCUTANEOUS
Qty: 60 ML | Refills: 0 | Status: SHIPPED | OUTPATIENT
Start: 2021-04-07 | End: 2021-06-01

## 2021-04-09 DIAGNOSIS — I42.8 NON-ISCHEMIC CARDIOMYOPATHY (H): ICD-10-CM

## 2021-04-12 NOTE — TELEPHONE ENCOUNTER
Last Clinic Visit: 6/3/2020  [facility] *Dr Batres

## 2021-04-18 DIAGNOSIS — D86.0 SARCOIDOSIS OF LUNG (H): ICD-10-CM

## 2021-04-21 RX ORDER — SULFAMETHOXAZOLE/TRIMETHOPRIM 800-160 MG
TABLET ORAL
Qty: 30 TABLET | Refills: 0 | Status: SHIPPED | OUTPATIENT
Start: 2021-04-21 | End: 2021-05-26

## 2021-04-21 NOTE — TELEPHONE ENCOUNTER
Failed protocol.  please route to  team if patient needs an appointment     Philly CUNHARN BSN  [facility]  [phone]

## 2021-04-24 DIAGNOSIS — E03.8 SUBCLINICAL HYPOTHYROIDISM: ICD-10-CM

## 2021-04-27 RX ORDER — LEVOTHYROXINE SODIUM 50 UG/1
50 TABLET ORAL DAILY
Qty: 90 TABLET | Refills: 0 | Status: SHIPPED | OUTPATIENT
Start: 2021-04-27 | End: 2021-09-17

## 2021-05-22 DIAGNOSIS — D86.0 SARCOIDOSIS OF LUNG (H): ICD-10-CM

## 2021-05-24 DIAGNOSIS — I47.29 PAROXYSMAL VENTRICULAR TACHYCARDIA (H): ICD-10-CM

## 2021-05-24 RX ORDER — AMIODARONE HYDROCHLORIDE 400 MG/1
400 TABLET ORAL DAILY
Qty: 90 TABLET | Refills: 2 | Status: SHIPPED | OUTPATIENT
Start: 2021-05-24 | End: 2022-01-06

## 2021-05-26 DIAGNOSIS — Z79.4 TYPE 2 DIABETES MELLITUS WITH OTHER CIRCULATORY COMPLICATION, WITH LONG-TERM CURRENT USE OF INSULIN (H): ICD-10-CM

## 2021-05-26 DIAGNOSIS — I10 BENIGN ESSENTIAL HYPERTENSION: ICD-10-CM

## 2021-05-26 DIAGNOSIS — I42.8 NON-ISCHEMIC CARDIOMYOPATHY (H): ICD-10-CM

## 2021-05-26 DIAGNOSIS — E11.59 TYPE 2 DIABETES MELLITUS WITH OTHER CIRCULATORY COMPLICATION, WITH LONG-TERM CURRENT USE OF INSULIN (H): ICD-10-CM

## 2021-05-26 DIAGNOSIS — M62.81 MUSCLE WEAKNESS (GENERALIZED): ICD-10-CM

## 2021-05-26 DIAGNOSIS — E66.01 SEVERE OBESITY (BMI 35.0-39.9) WITH COMORBIDITY (H): ICD-10-CM

## 2021-05-26 LAB
ANION GAP SERPL CALCULATED.3IONS-SCNC: 7 MMOL/L (ref 3–14)
BUN SERPL-MCNC: 18 MG/DL (ref 7–30)
CALCIUM SERPL-MCNC: 9 MG/DL (ref 8.5–10.1)
CHLORIDE SERPL-SCNC: 107 MMOL/L (ref 94–109)
CO2 SERPL-SCNC: 25 MMOL/L (ref 20–32)
CREAT SERPL-MCNC: 1.22 MG/DL (ref 0.66–1.25)
GFR SERPL CREATININE-BSD FRML MDRD: 63 ML/MIN/{1.73_M2}
GLUCOSE SERPL-MCNC: 209 MG/DL (ref 70–99)
HBA1C MFR BLD: 8.8 % (ref 0–5.6)
POTASSIUM SERPL-SCNC: 4.4 MMOL/L (ref 3.4–5.3)
SODIUM SERPL-SCNC: 139 MMOL/L (ref 133–144)

## 2021-05-26 PROCEDURE — 84403 ASSAY OF TOTAL TESTOSTERONE: CPT | Mod: 90 | Performed by: INTERNAL MEDICINE

## 2021-05-26 PROCEDURE — 83036 HEMOGLOBIN GLYCOSYLATED A1C: CPT | Performed by: INTERNAL MEDICINE

## 2021-05-26 PROCEDURE — 84270 ASSAY OF SEX HORMONE GLOBUL: CPT | Performed by: INTERNAL MEDICINE

## 2021-05-26 PROCEDURE — 99000 SPECIMEN HANDLING OFFICE-LAB: CPT | Performed by: INTERNAL MEDICINE

## 2021-05-26 PROCEDURE — 36415 COLL VENOUS BLD VENIPUNCTURE: CPT | Performed by: INTERNAL MEDICINE

## 2021-05-26 PROCEDURE — 80048 BASIC METABOLIC PNL TOTAL CA: CPT | Performed by: INTERNAL MEDICINE

## 2021-05-26 RX ORDER — SULFAMETHOXAZOLE/TRIMETHOPRIM 800-160 MG
TABLET ORAL
Qty: 30 TABLET | Refills: 0 | Status: SHIPPED | OUTPATIENT
Start: 2021-05-26 | End: 2021-06-01

## 2021-05-27 ENCOUNTER — ANCILLARY PROCEDURE (OUTPATIENT)
Dept: CARDIOLOGY | Facility: CLINIC | Age: 61
End: 2021-05-27
Attending: INTERNAL MEDICINE
Payer: COMMERCIAL

## 2021-05-27 DIAGNOSIS — E11.9 TYPE 2 DIABETES MELLITUS WITHOUT COMPLICATION, WITHOUT LONG-TERM CURRENT USE OF INSULIN (H): ICD-10-CM

## 2021-05-27 DIAGNOSIS — I42.8 NON-ISCHEMIC CARDIOMYOPATHY (H): ICD-10-CM

## 2021-05-27 DIAGNOSIS — Z95.810 ICD (IMPLANTABLE CARDIOVERTER-DEFIBRILLATOR) IN PLACE: ICD-10-CM

## 2021-05-27 PROCEDURE — 93296 REM INTERROG EVL PM/IDS: CPT | Performed by: INTERNAL MEDICINE

## 2021-05-27 PROCEDURE — 93295 DEV INTERROG REMOTE 1/2/MLT: CPT | Performed by: INTERNAL MEDICINE

## 2021-05-28 LAB
SHBG SERPL-SCNC: 163 NMOL/L (ref 11–80)
TESTOST FREE SERPL-MCNC: 3.06 NG/DL (ref 4.7–24.4)
TESTOST SERPL-MCNC: 531 NG/DL (ref 240–950)

## 2021-06-01 ENCOUNTER — OFFICE VISIT (OUTPATIENT)
Dept: INTERNAL MEDICINE | Facility: CLINIC | Age: 61
End: 2021-06-01
Payer: COMMERCIAL

## 2021-06-01 VITALS
OXYGEN SATURATION: 97 % | BODY MASS INDEX: 37.5 KG/M2 | DIASTOLIC BLOOD PRESSURE: 60 MMHG | TEMPERATURE: 98.2 F | SYSTOLIC BLOOD PRESSURE: 106 MMHG | WEIGHT: 276.9 LBS | RESPIRATION RATE: 16 BRPM | HEART RATE: 63 BPM | HEIGHT: 72 IN

## 2021-06-01 DIAGNOSIS — Z79.4 TYPE 2 DIABETES MELLITUS WITH OTHER CIRCULATORY COMPLICATION, WITH LONG-TERM CURRENT USE OF INSULIN (H): Primary | ICD-10-CM

## 2021-06-01 DIAGNOSIS — I25.10 CORONARY ARTERY DISEASE INVOLVING NATIVE CORONARY ARTERY OF NATIVE HEART WITHOUT ANGINA PECTORIS: ICD-10-CM

## 2021-06-01 DIAGNOSIS — R60.9 EDEMA, UNSPECIFIED TYPE: ICD-10-CM

## 2021-06-01 DIAGNOSIS — E66.01 SEVERE OBESITY (BMI 35.0-39.9) WITH COMORBIDITY (H): ICD-10-CM

## 2021-06-01 DIAGNOSIS — E11.59 TYPE 2 DIABETES MELLITUS WITH OTHER CIRCULATORY COMPLICATION, WITH LONG-TERM CURRENT USE OF INSULIN (H): Primary | ICD-10-CM

## 2021-06-01 DIAGNOSIS — N18.31 STAGE 3A CHRONIC KIDNEY DISEASE (H): ICD-10-CM

## 2021-06-01 DIAGNOSIS — D86.0 SARCOIDOSIS OF LUNG (H): ICD-10-CM

## 2021-06-01 DIAGNOSIS — I77.810 ASCENDING AORTA DILATATION (H): ICD-10-CM

## 2021-06-01 DIAGNOSIS — I42.8 NON-ISCHEMIC CARDIOMYOPATHY (H): ICD-10-CM

## 2021-06-01 DIAGNOSIS — I47.29 PAROXYSMAL VENTRICULAR TACHYCARDIA (H): ICD-10-CM

## 2021-06-01 DIAGNOSIS — E03.8 SUBCLINICAL HYPOTHYROIDISM: ICD-10-CM

## 2021-06-01 PROCEDURE — 99214 OFFICE O/P EST MOD 30 MIN: CPT | Mod: 25 | Performed by: INTERNAL MEDICINE

## 2021-06-01 PROCEDURE — 90471 IMMUNIZATION ADMIN: CPT | Performed by: INTERNAL MEDICINE

## 2021-06-01 PROCEDURE — 90750 HZV VACC RECOMBINANT IM: CPT | Performed by: INTERNAL MEDICINE

## 2021-06-01 RX ORDER — LIRAGLUTIDE 6 MG/ML
1.8 INJECTION SUBCUTANEOUS DAILY
Qty: 27 ML | Refills: 0 | Status: SHIPPED | OUTPATIENT
Start: 2021-06-01 | End: 2021-09-17

## 2021-06-01 ASSESSMENT — MIFFLIN-ST. JEOR: SCORE: 2094.01

## 2021-06-01 NOTE — PROGRESS NOTES
Assessment & Plan     (E11.59,  Z79.4) Type 2 diabetes mellitus with other circulatory complication, with long-term current use of insulin (H)  (primary encounter diagnosis)  Comment: Diabetes under worse control because he stopped Victoza and taking only half the amount of Levemir prescribed.  I feel expect the patient will to make significant changes in his diet producing much better glucose readings.  He needs to pay closer attention to his continuous glucose monitor specifically watching the effect of the various foods on his glucose levels.  Start Victoza 1.8 mg once a day.  Keep Levemir 25 units once per day.  Adjust doses of medicines based on lab results and personal continuous glucose monitor results.  Strongly recommend continued low carbohydrate diet.  Eye evaluation up-to-date.  Plan: insulin detemir (LEVEMIR FLEXTOUCH) 100 UNIT/ML        pen, liraglutide (VICTOZA PEN) 18 MG/3ML         solution, Basic metabolic panel, Hemoglobin         A1c, TSH with free T4 reflex            (D86.0) Sarcoidosis of lung (H)  Comment: Has improved.  Steroids stopped 6 months ago.  Plan:     (I42.8) Non-ischemic cardiomyopathy (H)  Comment: No signs or symptoms of heart failure, continue current medications.  Follow-up with cardiology clinic as ordered.  Plan: Basic metabolic panel            (E66.01) Severe obesity (BMI 35.0-39.9) with comorbidity (H)  Comment: Current BMI is: Body mass index is 37.55 kg/m ..  Reviewed weight patterns.   Obesity as a biological preventable and treatable disease, which is associated with significantly increased risk of many acute and chronic health conditions.   Obesity has now been recognized as a chronic disease by the American Medical Association.  Obesity has serious co-morbidities associated with obesity including increased risk for hypertension, stroke, coronary artery disease, dyslipidemia, Type II diabetes, depression, sleep apnea, cancers of the colon, breast and endometrium,  obstructive sleep apnea, osteoarthritis and female infertility.  Recommended regular aerobic exercise, recommended improved diet aiming at lowering amount of processed foods, lower sugars, moderation/reduction of simple carbs and fat in the diet, establishing more regular meal times, always eating breakfast, front loading some of the calories and adding more protein to the diet.        Plan:     (I77.810) Ascending aorta dilatation (H)  Comment: Continue to monitor closely.  Taking secondary prevention guidelines.  Plan:     (I47.2) Paroxysmal ventricular tachycardia (H)  Comment: This condition is currently controlled on the current medical regimen.  Continue current therapy.   Plan: Basic metabolic panel            (R60.9) Edema, unspecified type  Comment: This condition is currently controlled on the current medical regimen.  Continue current therapy.   Plan: Basic metabolic panel            (E03.9) Subclinical hypothyroidism  Comment: This condition is currently controlled on the current medical regimen.  Continue current therapy.   Plan: TSH with free T4 reflex            (I25.10) Coronary artery disease involving native coronary artery of native heart without angina pectoris  Comment: Discussed secondary risk factor modification and recommended continuing aggressive management of these items.   Plan: Basic metabolic panel            (N18.31) Stage 3a chronic kidney disease  Comment:   Plan:                 BMI:   Estimated body mass index is 37.55 kg/m  as calculated from the following:    Height as of this encounter: 1.829 m (6').    Weight as of this encounter: 125.6 kg (276 lb 14.4 oz).           Return in about 3 months (around 9/1/2021) for Virtual visit (video or phone), Diabetes, Blood pressure, with pre-visit non-fasting labs.    Jefry Harris MD  [facility]    Milton Blackmon is a 61 year old who presents for the following health issues     HPI      Diabetes Follow-up    How often are you checking your blood sugar? Two times daily  Blood sugar testing frequency justification:  Adjustment of medication(s)  What time of day are you checking your blood sugars (select all that apply)?  Before meals and At bedtime  Have you had any blood sugars above 200?  Yes   Have you had any blood sugars below 70?  No    What symptoms do you notice when your blood sugar is low?  None    What concerns do you have today about your diabetes? None     Do you have any of these symptoms? (Select all that apply)  No numbness or tingling in feet.  No redness, sores or blisters on feet.  No complaints of excessive thirst.  No reports of blurry vision.  No significant changes to weight.      BP Readings from Last 2 Encounters:   06/01/21 106/60   12/15/20 112/71     Hemoglobin A1C (%)   Date Value   05/26/2021 8.8 (H)   12/11/2020 7.1 (H)     LDL Cholesterol Calculated (mg/dL)   Date Value   07/22/2020 51   11/14/2019 58     **Patient stopped using Victoza in December 2020, did not let us know.  **Patient also has been using only 25 units once per day of Levemir insulin.  He is taking other medicines as ordered.    Using continuous glucose monitor he reports his glucose readings are still going above 200 on semiregular basis.  He does not have the unit or remember the time of the target range most recently.      Hypertension Follow-up      Do you check your blood pressure regularly outside of the clinic? Yes     Are you following a low salt diet? No    Are your blood pressures ever more than 140 on the top number (systolic) OR more   than 90 on the bottom number (diastolic), for example 140/90? No      How many servings of fruits and vegetables do you eat daily?  2-3    On average, how many sweetened beverages do you drink each day (Examples: soda, juice, sweet tea, etc.  Do NOT count diet or artificially sweetened beverages)?   0    How many days per week do you exercise enough to  make your heart beat faster? 3 or less    How many minutes a day do you exercise enough to make your heart beat faster? 10 - 19    How many days per week do you miss taking your medication? 0    3.  History of sarcoidosis of the lung and heart.  Completed extensive course of oral steroids, this was discontinued 6 months ago when his most recent PET scan showed no residual disease.    History of heart failure, no recent signs or symptoms of orthopnea, PND.    Wt Readings from Last 10 Encounters:   06/01/21 125.6 kg (276 lb 14.4 oz)   12/15/20 130.6 kg (288 lb)   12/14/20 129.6 kg (285 lb 12.8 oz)   09/16/20 127.2 kg (280 lb 6.4 oz)   08/19/20 128.5 kg (283 lb 6.4 oz)   07/20/20 129.4 kg (285 lb 3.2 oz)   05/27/20 122.1 kg (269 lb 3.2 oz)   03/10/20 118.9 kg (262 lb 1.6 oz)   03/06/20 121.2 kg (267 lb 3.2 oz)   02/18/20 119.3 kg (263 lb)         4.  Requesting shingrix vaccine today     5.    The patient has had a history of ongoing obesity.  Reviewed the weigth curves.   Their current BMI is:  Body mass index is 37.55 kg/m .  Reviewed previous attempts at weight loss which have not been successful in producing prolonged weigth loss.   Discussed current eating and exercise habits.     Reviewed weights in chart:  Wt Readings from Last 10 Encounters:   06/01/21 125.6 kg (276 lb 14.4 oz)   12/15/20 130.6 kg (288 lb)   12/14/20 129.6 kg (285 lb 12.8 oz)   09/16/20 127.2 kg (280 lb 6.4 oz)   08/19/20 128.5 kg (283 lb 6.4 oz)   07/20/20 129.4 kg (285 lb 3.2 oz)   05/27/20 122.1 kg (269 lb 3.2 oz)   03/10/20 118.9 kg (262 lb 1.6 oz)   03/06/20 121.2 kg (267 lb 3.2 oz)   02/18/20 119.3 kg (263 lb)          **I reviewed the information recorded in the patient's EPIC chart (including but not limited to medical history, surgical history, family history, problem list, medication list, and allergy list) and updated the information as indicated based on the patients reported information.            Review of Systems    Constitutional, HEENT, cardiovascular, pulmonary, gi and gu systems are negative, except as otherwise noted.      Objective    /60   Pulse 63   Temp 98.2  F (36.8  C) (Temporal)   Resp 16   Ht 1.829 m (6')   Wt 125.6 kg (276 lb 14.4 oz)   SpO2 97%   BMI 37.55 kg/m    Body mass index is 37.55 kg/m .  Physical Exam   GENERAL alert and no distress  EYES:  Normal sclera,conjunctiva, EOMI  HENT: oral and posterior pharynx without lesions or erythema, facies symmetric  NECK: Neck supple. No LAD, without thyroidmegaly.  RESP: Clear to ausculation bilaterally without wheezes or crackles. Normal BS in all fields.  CV: RRR normal S1S2 without murmurs, rubs or gallops.  LYMPH: no cervical lymph adenopathy appreciated  MS: extremities- no gross deformities of the visible extremities noted,   EXT:  no lower extremity edema  PSYCH: Alert and oriented times 3; speech- coherent  SKIN:  No obvious significant skin lesions on visible portions of face

## 2021-06-01 NOTE — PATIENT INSTRUCTIONS
"*  The reason for the higher glucose readings was staking only 1/2 of the Levemir as expected and stopping the Victoza.     *  Resume Victoza, this medication help control type II diabetes much better, helps in weight loss, and reduces the occurrence of vascular events.     *  Resume Victoza 1.8 mg once per day    *  Continue Levemir 25 units once per day.     *  Continue all other medications at the same doses.  Contact your usual pharmacy if you need refills.     *  Work on your diet, this is still the most powerful way to control type II diabetes.   Reduce the intake of \"simple carbohydrates\" (e.g. White bread, white rice, pasta, noodles, potatoes, snack foods, regular soda, juices (except fresh squeezed), cakes, cookies, candy, etc.) as best able.      * the goal for diabetes is \"time in the target\" range () on the continuous glucose monitor 70-75% of the time, with very few low readings.      * Follow up visit in approximately 3 months, sooner if needed.  Please get non-fasting labs done at the [facility] or any other [facility] lab 1-2 days before this appointment (schedule a \"lab appointment\").  If you get the labs done at another clinic, make arrangements with them directly.  The orders will be in place.  Use [employer] or Call [phone] to schedule the appointment with me and lab appointment.         Shingles Vaccine (SHINGRIX):        I would recommend that you consider getting the Shingrix shingles vaccine.  The shingles vaccine is recommended for anyone over age 50.       The Shingrix vaccine is a series of 2 vaccines given 2-6 months apart.       The shingles vaccine does not stop you from getting shingles, but it decreases the intensity of the event, the duration of the event, and decreases the painful nerve condition that results       Based on the available data, the Shingrix vaccine has superior benefit and should be considered even if you have had the old Zostavax " shinglesvaccine before.        Contact your insurance provider and ask them if either shingles vaccine is covered and is so, how much it will cost you.  Usually this will be cheaper to get in a pharmacy given by the pharmacist.      Regardless of the coverage, I would recommend that you consider the vaccine since shingles is very painful, (just ask anyone who has ever had it)      For Medicare insurance, the vaccine is cheaper to receive from a pharmacist in a pharmacy than for us to give you in the clinic.

## 2021-06-03 DIAGNOSIS — I42.8 NON-ISCHEMIC CARDIOMYOPATHY (H): ICD-10-CM

## 2021-06-07 NOTE — TELEPHONE ENCOUNTER
sacubitril-valsartan (ENTRESTO) 49-51 MG   Last Written Prescription Date:  4/12/21  Last Fill Quantity: 60,   # refills: 1  Last Office Visit : 6/3/20  Future Office visit:  NONE  Routing refill request to provider for review/approval because:  NO PT FOLLOW UP  LAST NOTE :Until these are completed he will continue current medical regimen.  I will see him back as soon as these are done hopefully in the next 2 to 3 weeks. ( July 2020)

## 2021-06-08 RX ORDER — SACUBITRIL AND VALSARTAN 49; 51 MG/1; MG/1
TABLET, FILM COATED ORAL
Qty: 180 TABLET | Refills: 1 | Status: SHIPPED | OUTPATIENT
Start: 2021-06-08 | End: 2021-11-29

## 2021-06-10 LAB
MDC_IDC_EPISODE_DTM: NORMAL
MDC_IDC_EPISODE_ID: NORMAL
MDC_IDC_EPISODE_TYPE: NORMAL
MDC_IDC_LEAD_IMPLANT_DT: NORMAL
MDC_IDC_LEAD_LOCATION: NORMAL
MDC_IDC_LEAD_LOCATION_DETAIL_1: NORMAL
MDC_IDC_LEAD_MFG: NORMAL
MDC_IDC_LEAD_MODEL: NORMAL
MDC_IDC_LEAD_POLARITY_TYPE: NORMAL
MDC_IDC_LEAD_SERIAL: NORMAL
MDC_IDC_MSMT_BATTERY_DTM: NORMAL
MDC_IDC_MSMT_BATTERY_REMAINING_LONGEVITY: 120 MO
MDC_IDC_MSMT_BATTERY_REMAINING_PERCENTAGE: 100 %
MDC_IDC_MSMT_BATTERY_STATUS: NORMAL
MDC_IDC_MSMT_CAP_CHARGE_DTM: NORMAL
MDC_IDC_MSMT_CAP_CHARGE_TIME: 9.8 S
MDC_IDC_MSMT_CAP_CHARGE_TYPE: NORMAL
MDC_IDC_MSMT_LEADCHNL_LV_IMPEDANCE_VALUE: 697 OHM
MDC_IDC_MSMT_LEADCHNL_RA_IMPEDANCE_VALUE: 743 OHM
MDC_IDC_MSMT_LEADCHNL_RV_IMPEDANCE_VALUE: 523 OHM
MDC_IDC_PG_IMPLANT_DTM: NORMAL
MDC_IDC_PG_MFG: NORMAL
MDC_IDC_PG_MODEL: NORMAL
MDC_IDC_PG_SERIAL: NORMAL
MDC_IDC_PG_TYPE: NORMAL
MDC_IDC_SESS_CLINIC_NAME: NORMAL
MDC_IDC_SESS_DTM: NORMAL
MDC_IDC_SESS_TYPE: NORMAL
MDC_IDC_SET_BRADY_AT_MODE_SWITCH_MODE: NORMAL
MDC_IDC_SET_BRADY_AT_MODE_SWITCH_RATE: 170 {BEATS}/MIN
MDC_IDC_SET_BRADY_LOWRATE: 60 {BEATS}/MIN
MDC_IDC_SET_BRADY_MAX_SENSOR_RATE: 130 {BEATS}/MIN
MDC_IDC_SET_BRADY_MAX_TRACKING_RATE: 130 {BEATS}/MIN
MDC_IDC_SET_BRADY_MODE: NORMAL
MDC_IDC_SET_BRADY_PAV_DELAY_HIGH: 200 MS
MDC_IDC_SET_BRADY_PAV_DELAY_LOW: 270 MS
MDC_IDC_SET_BRADY_SAV_DELAY_HIGH: 140 MS
MDC_IDC_SET_BRADY_SAV_DELAY_LOW: 190 MS
MDC_IDC_SET_CRT_LVRV_DELAY: 35 MS
MDC_IDC_SET_CRT_PACED_CHAMBERS: NORMAL
MDC_IDC_SET_LEADCHNL_LV_PACING_AMPLITUDE: 2.2 V
MDC_IDC_SET_LEADCHNL_LV_PACING_ANODE_ELECTRODE_1: NORMAL
MDC_IDC_SET_LEADCHNL_LV_PACING_ANODE_LOCATION_1: NORMAL
MDC_IDC_SET_LEADCHNL_LV_PACING_CAPTURE_MODE: NORMAL
MDC_IDC_SET_LEADCHNL_LV_PACING_CATHODE_ELECTRODE_1: NORMAL
MDC_IDC_SET_LEADCHNL_LV_PACING_CATHODE_LOCATION_1: NORMAL
MDC_IDC_SET_LEADCHNL_LV_PACING_PULSEWIDTH: 0.5 MS
MDC_IDC_SET_LEADCHNL_LV_SENSING_ADAPTATION_MODE: NORMAL
MDC_IDC_SET_LEADCHNL_LV_SENSING_ANODE_ELECTRODE_1: NORMAL
MDC_IDC_SET_LEADCHNL_LV_SENSING_ANODE_LOCATION_1: NORMAL
MDC_IDC_SET_LEADCHNL_LV_SENSING_CATHODE_ELECTRODE_1: NORMAL
MDC_IDC_SET_LEADCHNL_LV_SENSING_CATHODE_LOCATION_1: NORMAL
MDC_IDC_SET_LEADCHNL_LV_SENSING_SENSITIVITY: 1 MV
MDC_IDC_SET_LEADCHNL_RA_PACING_AMPLITUDE: 2 V
MDC_IDC_SET_LEADCHNL_RA_PACING_CAPTURE_MODE: NORMAL
MDC_IDC_SET_LEADCHNL_RA_PACING_POLARITY: NORMAL
MDC_IDC_SET_LEADCHNL_RA_PACING_PULSEWIDTH: 0.5 MS
MDC_IDC_SET_LEADCHNL_RA_SENSING_ADAPTATION_MODE: NORMAL
MDC_IDC_SET_LEADCHNL_RA_SENSING_POLARITY: NORMAL
MDC_IDC_SET_LEADCHNL_RA_SENSING_SENSITIVITY: 0.25 MV
MDC_IDC_SET_LEADCHNL_RV_PACING_AMPLITUDE: 2 V
MDC_IDC_SET_LEADCHNL_RV_PACING_CAPTURE_MODE: NORMAL
MDC_IDC_SET_LEADCHNL_RV_PACING_POLARITY: NORMAL
MDC_IDC_SET_LEADCHNL_RV_PACING_PULSEWIDTH: 0.5 MS
MDC_IDC_SET_LEADCHNL_RV_SENSING_ADAPTATION_MODE: NORMAL
MDC_IDC_SET_LEADCHNL_RV_SENSING_POLARITY: NORMAL
MDC_IDC_SET_LEADCHNL_RV_SENSING_SENSITIVITY: 0.6 MV
MDC_IDC_SET_ZONE_DETECTION_INTERVAL: 250 MS
MDC_IDC_SET_ZONE_DETECTION_INTERVAL: 333 MS
MDC_IDC_SET_ZONE_DETECTION_INTERVAL: 375 MS
MDC_IDC_SET_ZONE_TYPE: NORMAL
MDC_IDC_SET_ZONE_VENDOR_TYPE: NORMAL
MDC_IDC_STAT_AT_BURDEN_PERCENT: 0 %
MDC_IDC_STAT_AT_DTM_END: NORMAL
MDC_IDC_STAT_AT_DTM_START: NORMAL
MDC_IDC_STAT_BRADY_DTM_END: NORMAL
MDC_IDC_STAT_BRADY_DTM_START: NORMAL
MDC_IDC_STAT_BRADY_RA_PERCENT_PACED: 44 %
MDC_IDC_STAT_BRADY_RV_PERCENT_PACED: 99 %
MDC_IDC_STAT_CRT_DTM_END: NORMAL
MDC_IDC_STAT_CRT_DTM_START: NORMAL
MDC_IDC_STAT_CRT_LV_PERCENT_PACED: 98 %
MDC_IDC_STAT_EPISODE_RECENT_COUNT: 0
MDC_IDC_STAT_EPISODE_RECENT_COUNT: 1
MDC_IDC_STAT_EPISODE_RECENT_COUNT_DTM_END: NORMAL
MDC_IDC_STAT_EPISODE_RECENT_COUNT_DTM_START: NORMAL
MDC_IDC_STAT_EPISODE_TYPE: NORMAL
MDC_IDC_STAT_EPISODE_VENDOR_TYPE: NORMAL
MDC_IDC_STAT_TACHYTHERAPY_ATP_DELIVERED_RECENT: 0
MDC_IDC_STAT_TACHYTHERAPY_ATP_DELIVERED_TOTAL: 3
MDC_IDC_STAT_TACHYTHERAPY_RECENT_DTM_END: NORMAL
MDC_IDC_STAT_TACHYTHERAPY_RECENT_DTM_START: NORMAL
MDC_IDC_STAT_TACHYTHERAPY_SHOCKS_ABORTED_RECENT: 0
MDC_IDC_STAT_TACHYTHERAPY_SHOCKS_ABORTED_TOTAL: 0
MDC_IDC_STAT_TACHYTHERAPY_SHOCKS_DELIVERED_RECENT: 0
MDC_IDC_STAT_TACHYTHERAPY_SHOCKS_DELIVERED_TOTAL: 0
MDC_IDC_STAT_TACHYTHERAPY_TOTAL_DTM_END: NORMAL
MDC_IDC_STAT_TACHYTHERAPY_TOTAL_DTM_START: NORMAL

## 2021-06-20 DIAGNOSIS — Z79.4 TYPE 2 DIABETES MELLITUS WITHOUT COMPLICATION, WITH LONG-TERM CURRENT USE OF INSULIN (H): ICD-10-CM

## 2021-06-20 DIAGNOSIS — E11.9 TYPE 2 DIABETES MELLITUS WITHOUT COMPLICATION, WITH LONG-TERM CURRENT USE OF INSULIN (H): ICD-10-CM

## 2021-06-21 RX ORDER — ALBUTEROL SULFATE 108 UG/1
AEROSOL, METERED RESPIRATORY (INHALATION)
Qty: 200 EACH | Refills: 1 | Status: SHIPPED | OUTPATIENT
Start: 2021-06-21 | End: 2022-01-01

## 2021-07-23 DIAGNOSIS — E11.9 TYPE 2 DIABETES MELLITUS WITHOUT COMPLICATION, WITHOUT LONG-TERM CURRENT USE OF INSULIN (H): ICD-10-CM

## 2021-07-23 RX ORDER — FENOFIBRATE 160 MG/1
TABLET ORAL
Qty: 90 TABLET | Refills: 0 | Status: SHIPPED | OUTPATIENT
Start: 2021-07-23 | End: 2021-10-19

## 2021-08-16 DIAGNOSIS — Z79.4 TYPE 2 DIABETES MELLITUS WITH OTHER CIRCULATORY COMPLICATION, WITH LONG-TERM CURRENT USE OF INSULIN (H): ICD-10-CM

## 2021-08-16 DIAGNOSIS — I42.8 NONISCHEMIC CARDIOMYOPATHY (H): ICD-10-CM

## 2021-08-16 DIAGNOSIS — E11.59 TYPE 2 DIABETES MELLITUS WITH OTHER CIRCULATORY COMPLICATION, WITH LONG-TERM CURRENT USE OF INSULIN (H): ICD-10-CM

## 2021-08-16 DIAGNOSIS — I25.10 CORONARY ARTERY DISEASE INVOLVING NATIVE CORONARY ARTERY OF NATIVE HEART WITHOUT ANGINA PECTORIS: ICD-10-CM

## 2021-08-17 RX ORDER — ATORVASTATIN CALCIUM 80 MG/1
TABLET, FILM COATED ORAL
Qty: 45 TABLET | Refills: 2 | Status: SHIPPED | OUTPATIENT
Start: 2021-08-17 | End: 2021-12-16

## 2021-08-17 RX ORDER — METOPROLOL SUCCINATE 25 MG/1
25 TABLET, EXTENDED RELEASE ORAL DAILY
Qty: 90 TABLET | Refills: 2 | Status: SHIPPED | OUTPATIENT
Start: 2021-08-17 | End: 2022-05-06

## 2021-08-17 NOTE — TELEPHONE ENCOUNTER
Routing refill request to provider for review/approval because:  Labs not current:    LDL Cholesterol Calculated   Date Value Ref Range Status   07/22/2020 51 <100 mg/dL Final     Comment:     Desirable:       <100 mg/dl         Parveen rAagon RN  [facility] Triage Nurse

## 2021-08-17 NOTE — TELEPHONE ENCOUNTER
Preceptor Attestation:    Patient seen and evaluated in person. I discussed the patient with the resident. I have verified the content of the note, which accurately reflects my assessment of the patient and the plan of care.   Supervising Physician:  Jg Beal MD.                      Prescription approved per [facility] Refill Protocol.  Parveen Aragon RN

## 2021-08-18 DIAGNOSIS — E11.59 TYPE 2 DIABETES MELLITUS WITH OTHER CIRCULATORY COMPLICATION, WITH LONG-TERM CURRENT USE OF INSULIN (H): ICD-10-CM

## 2021-08-18 DIAGNOSIS — I25.10 CORONARY ARTERY DISEASE INVOLVING NATIVE CORONARY ARTERY OF NATIVE HEART WITHOUT ANGINA PECTORIS: ICD-10-CM

## 2021-08-18 DIAGNOSIS — Z79.4 TYPE 2 DIABETES MELLITUS WITH OTHER CIRCULATORY COMPLICATION, WITH LONG-TERM CURRENT USE OF INSULIN (H): ICD-10-CM

## 2021-08-18 DIAGNOSIS — I42.8 NONISCHEMIC CARDIOMYOPATHY (H): ICD-10-CM

## 2021-08-19 RX ORDER — ATORVASTATIN CALCIUM 80 MG/1
TABLET, FILM COATED ORAL
Qty: 45 TABLET | Refills: 0 | OUTPATIENT
Start: 2021-08-19

## 2021-08-26 ENCOUNTER — TELEPHONE (OUTPATIENT)
Dept: CARDIOLOGY | Facility: CLINIC | Age: 61
End: 2021-08-26

## 2021-08-26 ENCOUNTER — ANCILLARY PROCEDURE (OUTPATIENT)
Dept: CARDIOLOGY | Facility: CLINIC | Age: 61
End: 2021-08-26
Attending: INTERNAL MEDICINE
Payer: COMMERCIAL

## 2021-08-26 DIAGNOSIS — I42.8 NON-ISCHEMIC CARDIOMYOPATHY (H): ICD-10-CM

## 2021-08-26 DIAGNOSIS — Z95.810 ICD (IMPLANTABLE CARDIOVERTER-DEFIBRILLATOR) IN PLACE: ICD-10-CM

## 2021-08-26 PROCEDURE — 93296 REM INTERROG EVL PM/IDS: CPT | Performed by: INTERNAL MEDICINE

## 2021-08-26 PROCEDURE — 93295 DEV INTERROG REMOTE 1/2/MLT: CPT | Performed by: INTERNAL MEDICINE

## 2021-08-26 NOTE — PROGRESS NOTES
Cardiology Clinic Progress Note    Service Date: 08/27/21    Primary Cardiologist: Dr. Dorman, Dr Hernandes,  and Dr. Batres      Reason for Visit: Electrophysiology follow-up    HPI:   I had the pleasure of seeing Mr. Neymar Rhodes in the clinic today. he is a very pleasant 61 year old male with a past medical history notable for    1. Cardiac sarcoidosis. Confirmed on EBUS biopsy  2. Paroxysmal ventricular tachycardia   3. Nonischemic cardiomyopathy.  4. Diabetes  5. Hypertension  6. Nonobstructive coronary artery disease. Per coronary catheterization 2018.  7. Severe obesity  8. Dilated ascending aorta  9. Chronic kidney disease. Stage III  10. Long-standing ETOH abuse      Diagnostics  I have personally reviewed the following reports    MRI (10/2019) Late gadolinium enhancement is present in the septal. inferior and lateral base. There is non-CAD scar in the anterolateral mid-wall. Scar is more extensive (13%) as compared to the prior MRI (6%). The LV is severely dilated and the global systolic function is moderately severely to severely reduced with an LVEF of 25%. There is severe global LV dysfunction with dyssynchronous septal motion consistent with a LBBB.    Collectively, these findings are most consistent with a non-ischemic cardiomyopathy. Possible etiologies    include prior LBBB, cardiac sarcoidosis or prior myocarditis.    ECHO (1/202): The left ventricle is severely dilated, LVEDP 7 cm The visual ejection fraction is estimated at 25-30%. There is mod-severe global hypokinesia of the left ventricle. There is a catheter/pacemaker lead seen in the right ventricle.    LVEF may be slightly improved compared with study from 10-19    Device check (5/2021) revealed A-paced  44%  BiVP: 99%    Stable leads.  Battery life 10 years.   Atrial Arrhythmia: None. Ventricular Arrhythmia  1 episode, EGM shows 16 beats of NSVT at  bpm .   ATP: None   Shocks: None.    Device check (8/2021) revealed A-paced 48%   : 99 %  Stable leads.  Battery life 9.5.   Atrial Arrhythmia: None  Ventricular Arrhythmia   1 NSVT episode logged on 8/14/21 at 13:50. EGM shows VT lasting  5 seconds with rate in the 220's.   ATP: none.    Shocks: None.        In 2018 his diagnosed with heart failure when his EF was noted to be 40% with a left bundle branch block. At that time a coronary angiogram showed nonobstructive coronary artery disease.    In Oct 2019, patient presented after he been lost to follow-up with decompensated heart failure. He was found to have EF less than 20% and a dilated LV. In addition he had a monomorphic ventricular tachycardia at a rate of 180 bpm requiring cardioversion. A coronary angiogram revealed nonobstructive coronary artery disease. And a right heart cath showed mildly elevated filling pressures. A cardiac MRI confirmed sarcoidosis with increased scar burden to 13%. A biventricular defibrillator was implanted. He was placed on amiodarone.    In Dec 2019 he was started on prednisone 40 mg a repeat PET scan showed significant improvement in inflammation.    In May 2020, patient presented with ventricular tachycardia at a rate of 160 bpm for as long as 30 min. They were in the monitor zone and patient was asymptomatic.    In Dec 2020, patient met with Dr. Dorman and his advice showed slow ventricular tachycardia in the monitor zone longest episode was 4 min and they discussed VT ablation however Dr. Dorman stated he is afraid VT burden would increase given the sarcoidosis and patient preferred to continue amiodarone 400 mg/day and follow-up with Dr. Batres at the [facility]. The plan was if patient were to continue to have ventricular tachycardia in the monitor zone that was prolonged or associated with symptoms they may lower the treatment to cover this zone with ATP at the expense he may accelerate into something faster that requires a shock. Patient had been on prednisone and a PET scan was  completed    In Mar 2021, patient had a repeat PET scan which revealed no active sarcoidosis, no evidence of active systemic or pulmonary sarcoidosis    Today, he reports no concerns however on exam he has abdominal distention. States the extra diuretics causes gout and last episode was 4 months ago.   He denies chest pain, shortness of breath, dyspnea on exertion, orthopnea, PND, lower extremity edema, palpitations, dizziness, presyncope, or syncope.  He takes blood pressure twice daily at home and usually 110-120/60-70 mmHg.     Reports taking medications as prescribed     ASSESSMENT AND PLAN:    Cardiac sarcoidosis.     Confirmed on EBUS biopsy    PET scan with cardiac viability (12/2019) revealed FDG uptake in the myocardium compatible with active cardiac sarcoid. EF 60% compatible with dilated cardiomyopathy. Small perfusion abnormality in the anterior wall of the left ventricle in the LAD distribution.  FDG PET/CT demonstrate active sarcoid in the mediastinal and hilar    lymph nodes    PET scan (6/2020) revealed no evidence of active sarcoidosis, no evidence of active inflammation/sarcoidosis in the remainder of the body, cardiomegaly    PET scan (2/2021) revealed no evidence of active cardiac sarcoidosis and no evidence of active systemic or pulmonary sarcoidosis. His prednisone was discontinued.      Paroxysmal ventricular tachycardia     In October 2019, patient was hospitalized with heart failure and had ventricular tachycardia and received defibrillation.  He subsequently underwent a CRT D.  He has a history of ventricular tachycardia in the monitor zone.  He is currently taking amiodarone 400 mg daily.    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 Dorman    PFT testing now.     Nonischemic cardiomyopathy.    Follows with [facility] and [facility] at the [facility]    Currently taking metoprolol succinate 25 mg daily Entresto 49/51 mg twice daily, Lasix 20 mg twice daily    We had a long discussion about increasing lasix to 40 mg x 3 days to help relieve abdominal distention or changing to torsemide 10 mg daily.  However due to gout episodes pt is hesitant.      Nonobstructive coronary artery disease.    Per coronary angiogram (2019)    Patient is currently taking aspirin 81 mg daily and atorvastatin 40 mg daily    asymptomatic    Hypertension    controlled in clinic and at home while taking Entresto, metoprolol succinate      Plan:    Consider lasix to 40 mg x 3 days to help relieve abdominal distention or changing to torsemide 10 mg daily. However due to gout episodes pt is hesitant.     Amiodarone labs, hgb and PFT now. Once the labs return will reach out to Dr Dorman to confirm pt should remain on amiodarone 400 mg as he has only had 2 brief episodes of NSVT in the past 6 months and is no longer on prednisone for sarcoidosis as his last PET scan did not reveal any evidence of active cardiac sarcoidosis.     Follow up with Dr Hernandes in 4 months with BMP prior    Follow up with Dr Dorman in 6 months with amiodarone labs prior.      Please call the clinic if questions or problems arise      Thank you for the opportunity to participate in this pleasant patient's care. We would be happy to see him sooner if needed for any concerns in the meantime.         ARMAND Ochoa CNP  [facility]  Text Page  (M-F 8:00 am - 4:30 pm)    Orders this Visit:  Orders Placed This Encounter   Procedures     Hemoglobin     Hepatic panel     TSH with free T4 reflex     Basic metabolic panel     Basic metabolic panel     Hepatic panel     TSH with free T4 reflex     Basic metabolic panel     Follow-Up with Cardiologist     Follow-Up with  Electrophysiologist     EKG 12-lead complete w/read - Clinics (performed today)     Pulmonary function test     No orders of the defined types were placed in this encounter.    There are no discontinued medications.  Encounter Diagnoses   Name Primary?     Non-ischemic cardiomyopathy (H)      ICD (implantable cardioverter-defibrillator) in place      On amiodarone therapy Yes       CURRENT MEDICATIONS:  Current Outpatient Medications   Medication Sig Dispense Refill     acetaminophen (TYLENOL) 500 MG tablet Take 500-1,000 mg by mouth every 6 hours as needed for mild pain       aspirin 81 MG tablet Take 1 tablet (81 mg) by mouth daily 30 tablet      atorvastatin (LIPITOR) 80 MG tablet Take 1/2 tablet (40 mg) by mouth daily 45 tablet 2     ENTRESTO 49-51 MG per tablet Take 1 tablet by mouth 2 times daily 180 tablet 1     fenofibrate (TRIGLIDE/LOFIBRA) 160 MG tablet TAKE ONE TABLET BY MOUTH ONE TIME DAILY 90 tablet 0     furosemide (LASIX) 20 MG tablet Take 1 tablet (20 mg) by mouth 2 times daily 60 tablet 11     glipiZIDE (GLUCOTROL XL) 10 MG 24 hr tablet TAKE 1 TABLET (10 MG) BY MOUTH DAILY. TAKE WITH LARGEST MEAL OF THE DAY. ALWAYS TAKE WITH FOOD 90 tablet 2     insulin detemir (LEVEMIR FLEXTOUCH) 100 UNIT/ML pen Inject 25 Units Subcutaneous daily 30 mL 0     levothyroxine (SYNTHROID/LEVOTHROID) 50 MCG tablet Take 1 tablet (50 mcg) by mouth daily. 90 tablet 0     liraglutide (VICTOZA PEN) 18 MG/3ML solution Inject 1.8 mg Subcutaneous daily 27 mL 0     metFORMIN (GLUCOPHAGE) 1000 MG tablet Take 1 tablet (1,000 mg) by mouth 2 times daily (with meals)  180 tablet 0     metoprolol succinate ER (TOPROL-XL) 25 MG 24 hr tablet Take 1 tablet (25 mg) by mouth daily 90 tablet 2     albuterol (PROAIR HFA/PROVENTIL HFA/VENTOLIN HFA) 108 (90 Base) MCG/ACT Inhaler Inhale 2 puffs into the lungs every 6 hours as needed for shortness of breath / dyspnea or wheezing       amiodarone (PACERONE) 400 MG tablet Take 1 tablet (400 mg) by  mouth daily 90 tablet 2     blood glucose monitoring (ACCU-CHEK LUL PLUS) test strip Use to test blood sugar 1-3 times daily or as directed. 100 each 11     clotrimazole (LOTRIMIN) 1 % external cream Apply sparingly once or twice per day as needed to affected area until the skin is better, then stop; REPEAT AS NEEDED 30 g 1     Continuous Blood Gluc Sensor (FREESTYLE JOCELIN 14 DAY SENSOR) MISC 1 each every 14 days 6 each 3     continuous blood glucose monitoring (FREESTYLE JOCELIN) sensor For use with Freestyle Jocelin Flash  for continuous monitioring of blood glucose levels. Replace sensor every 10 days. 3 each 3     fluticasone (FLONASE) 50 MCG/ACT nasal spray Spray 2 sprays into both nostrils daily 16 g 0     omeprazole (PRILOSEC) 40 MG DR capsule Take 1 capsule (40 mg) by mouth daily 90 capsule 3     triamcinolone (KENALOG) 0.5 % cream Apply sparingly once or twice per day as needed to affected area until the skin is better, then stop 30 g 0     ULTICARE 29G X 12.7MM insulin pen needle Use 3 pen needles daily as directed (one for daily victoza injection, 2 for twice daily levemir injections) 200 each 1       ALLERGIES  Allergies   Allergen Reactions     No Known Drug Allergies        PAST MEDICAL, SURGICAL, FAMILY HISTORY:  History was reviewed and updated as needed, see medical record.    SOCIAL HISTORY:  Social History     Socioeconomic History     Marital status:      Spouse name: Not on file     Number of children: Not on file     Years of education: Not on file     Highest education level: Not on file   Occupational History     Not on file   Tobacco Use     Smoking status: Never Smoker     Smokeless tobacco: Never Used   Substance and Sexual Activity     Alcohol use: Yes     Comment: once awhile     Drug use: No     Sexual activity: Yes     Partners: Female   Other Topics Concern     Parent/sibling w/ CABG, MI or angioplasty before 65F 55M? Not Asked   Social History Narrative     Not on file  "    Social Determinants of Health     Financial Resource Strain:      Difficulty of Paying Living Expenses:    Food Insecurity:      Worried About Running Out of Food in the Last Year:      Ran Out of Food in the Last Year:    Transportation Needs:      Lack of Transportation (Medical):      Lack of Transportation (Non-Medical):    Physical Activity:      Days of Exercise per Week:      Minutes of Exercise per Session:    Stress:      Feeling of Stress :    Social Connections:      Frequency of Communication with Friends and Family:      Frequency of Social Gatherings with Friends and Family:      Attends [employer] Services:      Active Member of Clubs or Organizations:      Attends Club or Organization Meetings:      Marital Status:    Intimate Partner Violence:      Fear of Current or Ex-Partner:      Emotionally Abused:      Physically Abused:      Sexually Abused:        Review of Systems:  Skin:  Negative     Eyes:  Negative glasses  ENT:  Negative    Respiratory:  Negative    Cardiovascular:  Negative for;palpitations;chest pain;lightheadedness Positive for;edema  Gastroenterology: Negative    Genitourinary:  Positive for urinary frequency;nocturia  Musculoskeletal:  Negative    Neurologic:  Negative    Psychiatric:  Negative    Heme/Lymph/Imm:  Negative    Endocrine:  Negative diabetes     Physical Exam:  Vitals: /72   Pulse 60   Ht 1.829 m (6' 0.01\")   Wt 124.7 kg (275 lb)   BMI 37.29 kg/m     Wt Readings from Last 4 Encounters:   08/27/21 124.7 kg (275 lb)   06/01/21 125.6 kg (276 lb 14.4 oz)   12/15/20 130.6 kg (288 lb)   12/14/20 129.6 kg (285 lb 12.8 oz)     CONSTITUTIONAL: Appears his stated age, obese, and in no acute distress.  HEENT: Pupils equal, round. Sclerae nonicteric.    NECK: Supple, no masses appreciated.  C/V:  Regular rate and rhythm, distant  RESP: Respirations are unlabored. Lungs are clear to auscultation bilaterally without wheezing, rales, or rhonchi.  GI: Abdomen soft, " non-tender, distended.  EXTREM:  No clubbing, cyanosis, or lower extremity edema bilaterally.   NEURO: Alert and oriented, cooperative. Gait steady. No gross focal deficits.   PSYCH: Affect appropriate. Mentation normal. Responds to questions appropriately.  SKIN: Warm and dry. No apparent rashes or bruising.    Recent Lab Results:  LIPID RESULTS:  Lab Results   Component Value Date    CHOL 140 07/22/2020    HDL 64 07/22/2020    LDL 51 07/22/2020    TRIG 123 07/22/2020    CHOLHDLRATIO 2.8 01/26/2015     LIVER ENZYME RESULTS:  Lab Results   Component Value Date    AST 39 09/16/2020    ALT 67 09/16/2020     CBC RESULTS:  Lab Results   Component Value Date    WBC 6.5 09/16/2020    RBC 3.81 (L) 09/16/2020    HGB 12.0 (L) 09/16/2020    HCT 37.3 (L) 09/16/2020    MCV 98 09/16/2020    MCH 31.5 09/16/2020    MCHC 32.2 09/16/2020    RDW 14.0 09/16/2020     09/16/2020     BMP RESULTS:  Lab Results   Component Value Date     05/26/2021    POTASSIUM 4.4 05/26/2021    CHLORIDE 107 05/26/2021    CO2 25 05/26/2021    ANIONGAP 7 05/26/2021     (H) 05/26/2021    BUN 18 05/26/2021    CR 1.22 05/26/2021    GFRESTIMATED 63 05/26/2021    GFRESTBLACK 73 05/26/2021    EDITH 9.0 05/26/2021       CC  Bacilio Irvin MD  [address] [facility] [address]  [place] [id number]    This note was completed in part using Dragon voice recognition software. Although reviewed after completion, some word and grammatical errors may occur.

## 2021-08-26 NOTE — TELEPHONE ENCOUNTER
Sandie requested that patient send a remote transmission prior to patient's office visit with her tomorrow. Called patient and left voicemail asking that he send a manual transmission today.

## 2021-08-27 ENCOUNTER — LAB (OUTPATIENT)
Dept: LAB | Facility: CLINIC | Age: 61
End: 2021-08-27
Payer: COMMERCIAL

## 2021-08-27 ENCOUNTER — OFFICE VISIT (OUTPATIENT)
Dept: CARDIOLOGY | Facility: CLINIC | Age: 61
End: 2021-08-27
Payer: COMMERCIAL

## 2021-08-27 VITALS
HEART RATE: 60 BPM | DIASTOLIC BLOOD PRESSURE: 72 MMHG | HEIGHT: 72 IN | WEIGHT: 275 LBS | BODY MASS INDEX: 37.25 KG/M2 | SYSTOLIC BLOOD PRESSURE: 121 MMHG

## 2021-08-27 DIAGNOSIS — I10 ESSENTIAL HYPERTENSION: ICD-10-CM

## 2021-08-27 DIAGNOSIS — Z95.810 ICD (IMPLANTABLE CARDIOVERTER-DEFIBRILLATOR), BIVENTRICULAR, IN SITU: ICD-10-CM

## 2021-08-27 DIAGNOSIS — Z79.899 ON AMIODARONE THERAPY: ICD-10-CM

## 2021-08-27 DIAGNOSIS — I25.10 CORONARY ARTERY DISEASE INVOLVING NATIVE CORONARY ARTERY OF NATIVE HEART WITHOUT ANGINA PECTORIS: ICD-10-CM

## 2021-08-27 DIAGNOSIS — D86.9 SARCOIDOSIS: ICD-10-CM

## 2021-08-27 DIAGNOSIS — I47.29 PAROXYSMAL VT (H): Primary | ICD-10-CM

## 2021-08-27 DIAGNOSIS — I42.8 NON-ISCHEMIC CARDIOMYOPATHY (H): ICD-10-CM

## 2021-08-27 LAB
ALBUMIN SERPL-MCNC: 3.4 G/DL (ref 3.4–5)
ALP SERPL-CCNC: 135 U/L (ref 40–150)
ALT SERPL W P-5'-P-CCNC: 74 U/L (ref 0–70)
ANION GAP SERPL CALCULATED.3IONS-SCNC: 5 MMOL/L (ref 3–14)
AST SERPL W P-5'-P-CCNC: 76 U/L (ref 0–45)
BILIRUB DIRECT SERPL-MCNC: 0.2 MG/DL (ref 0–0.2)
BILIRUB SERPL-MCNC: 0.7 MG/DL (ref 0.2–1.3)
BUN SERPL-MCNC: 19 MG/DL (ref 7–30)
CALCIUM SERPL-MCNC: 9.1 MG/DL (ref 8.5–10.1)
CHLORIDE BLD-SCNC: 109 MMOL/L (ref 94–109)
CO2 SERPL-SCNC: 24 MMOL/L (ref 20–32)
CREAT SERPL-MCNC: 0.9 MG/DL (ref 0.66–1.25)
GFR SERPL CREATININE-BSD FRML MDRD: >90 ML/MIN/1.73M2
GLUCOSE BLD-MCNC: 224 MG/DL (ref 70–99)
HGB BLD-MCNC: 11.7 G/DL (ref 13.3–17.7)
POTASSIUM BLD-SCNC: 3.8 MMOL/L (ref 3.4–5.3)
PROT SERPL-MCNC: 6.6 G/DL (ref 6.8–8.8)
SODIUM SERPL-SCNC: 138 MMOL/L (ref 133–144)
T4 FREE SERPL-MCNC: 1.25 NG/DL (ref 0.76–1.46)
TSH SERPL DL<=0.005 MIU/L-ACNC: 4.22 MU/L (ref 0.4–4)

## 2021-08-27 PROCEDURE — 82248 BILIRUBIN DIRECT: CPT | Performed by: NURSE PRACTITIONER

## 2021-08-27 PROCEDURE — 36415 COLL VENOUS BLD VENIPUNCTURE: CPT | Performed by: NURSE PRACTITIONER

## 2021-08-27 PROCEDURE — 84439 ASSAY OF FREE THYROXINE: CPT | Performed by: NURSE PRACTITIONER

## 2021-08-27 PROCEDURE — 84443 ASSAY THYROID STIM HORMONE: CPT | Performed by: NURSE PRACTITIONER

## 2021-08-27 PROCEDURE — 85018 HEMOGLOBIN: CPT | Performed by: NURSE PRACTITIONER

## 2021-08-27 PROCEDURE — 80053 COMPREHEN METABOLIC PANEL: CPT | Performed by: NURSE PRACTITIONER

## 2021-08-27 PROCEDURE — 93000 ELECTROCARDIOGRAM COMPLETE: CPT | Performed by: NURSE PRACTITIONER

## 2021-08-27 PROCEDURE — 99215 OFFICE O/P EST HI 40 MIN: CPT | Performed by: NURSE PRACTITIONER

## 2021-08-27 ASSESSMENT — MIFFLIN-ST. JEOR: SCORE: 2085.52

## 2021-08-27 NOTE — PATIENT INSTRUCTIONS
Follow up with labs now   See Dr. Hernandes with labs in December 2020   See. Dr. Dorman in 6 months with labs prior.

## 2021-08-27 NOTE — LETTER
8/27/2021    Jefry Harris MD  [address] [facility] [address]    RE: Neymar Rhodes       Dear Colleague,    I had the pleasure of seeing Neymar Rhodes in the [facility].        Cardiology Clinic Progress Note    Service Date: 08/27/21    Primary Cardiologist: Dr. Dorman, Dr Hernandes,  and Dr. Batres      Reason for Visit: Electrophysiology follow-up    HPI:   I had the pleasure of seeing Mr. Neymar Rhodes in the clinic today. he is a very pleasant 61 year old male with a past medical history notable for    1. Cardiac sarcoidosis. Confirmed on EBUS biopsy  2. Paroxysmal ventricular tachycardia   3. Nonischemic cardiomyopathy.  4. Diabetes  5. Hypertension  6. Nonobstructive coronary artery disease. Per coronary catheterization 2018.  7. Severe obesity  8. Dilated ascending aorta  9. Chronic kidney disease. Stage III  10. Long-standing ETOH abuse      Diagnostics  I have personally reviewed the following reports    MRI (10/2019) Late gadolinium enhancement is present in the septal. inferior and lateral base. There is non-CAD scar in the anterolateral mid-wall. Scar is more extensive (13%) as compared to the prior MRI (6%). The LV is severely dilated and the global systolic function is moderately severely to severely reduced with an LVEF of 25%. There is severe global LV dysfunction with dyssynchronous septal motion consistent with a LBBB.    Collectively, these findings are most consistent with a non-ischemic cardiomyopathy. Possible etiologies    include prior LBBB, cardiac sarcoidosis or prior myocarditis.    ECHO (1/202): The left ventricle is severely dilated, LVEDP 7 cm The visual ejection fraction is estimated at 25-30%. There is mod-severe global hypokinesia of the left ventricle. There is a catheter/pacemaker lead seen in the right ventricle.    LVEF may be slightly improved compared with study from 10-19    Device check (5/2021)  revealed A-paced  44%  BiVP: 99%    Stable leads.  Battery life 10 years.   Atrial Arrhythmia: None. Ventricular Arrhythmia  1 episode, EGM shows 16 beats of NSVT at  bpm .   ATP: None   Shocks: None.    Device check (8/2021) revealed A-paced 48%  : 99 %  Stable leads.  Battery life 9.5.   Atrial Arrhythmia: None  Ventricular Arrhythmia   1 NSVT episode logged on 8/14/21 at 13:50. EGM shows VT lasting  5 seconds with rate in the 220's.   ATP: none.    Shocks: None.        In 2018 his diagnosed with heart failure when his EF was noted to be 40% with a left bundle branch block. At that time a coronary angiogram showed nonobstructive coronary artery disease.    In Oct 2019, patient presented after he been lost to follow-up with decompensated heart failure. He was found to have EF less than 20% and a dilated LV. In addition he had a monomorphic ventricular tachycardia at a rate of 180 bpm requiring cardioversion. A coronary angiogram revealed nonobstructive coronary artery disease. And a right heart cath showed mildly elevated filling pressures. A cardiac MRI confirmed sarcoidosis with increased scar burden to 13%. A biventricular defibrillator was implanted. He was placed on amiodarone.    In Dec 2019 he was started on prednisone 40 mg a repeat PET scan showed significant improvement in inflammation.    In May 2020, patient presented with ventricular tachycardia at a rate of 160 bpm for as long as 30 min. They were in the monitor zone and patient was asymptomatic.    In Dec 2020, patient met with Dr. Dorman and his advice showed slow ventricular tachycardia in the monitor zone longest episode was 4 min and they discussed VT ablation however Dr. Dorman stated he is afraid VT burden would increase given the sarcoidosis and patient preferred to continue amiodarone 400 mg/day and follow-up with Dr. Batres at the [facility]. The plan was if patient were to continue to have ventricular tachycardia in the  monitor zone that was prolonged or associated with symptoms they may lower the treatment to cover this zone with ATP at the expense he may accelerate into something faster that requires a shock. Patient had been on prednisone and a PET scan was completed    In Mar 2021, patient had a repeat PET scan which revealed no active sarcoidosis, no evidence of active systemic or pulmonary sarcoidosis    Today, he reports no concerns however on exam he has abdominal distention. States the extra diuretics causes gout and last episode was 4 months ago.   He denies chest pain, shortness of breath, dyspnea on exertion, orthopnea, PND, lower extremity edema, palpitations, dizziness, presyncope, or syncope.  He takes blood pressure twice daily at home and usually 110-120/60-70 mmHg.     Reports taking medications as prescribed     ASSESSMENT AND PLAN:    Cardiac sarcoidosis.     Confirmed on EBUS biopsy    PET scan with cardiac viability (12/2019) revealed FDG uptake in the myocardium compatible with active cardiac sarcoid. EF 60% compatible with dilated cardiomyopathy. Small perfusion abnormality in the anterior wall of the left ventricle in the LAD distribution.  FDG PET/CT demonstrate active sarcoid in the mediastinal and hilar    lymph nodes    PET scan (6/2020) revealed no evidence of active sarcoidosis, no evidence of active inflammation/sarcoidosis in the remainder of the body, cardiomegaly    PET scan (2/2021) revealed no evidence of active cardiac sarcoidosis and no evidence of active systemic or pulmonary sarcoidosis. His prednisone was discontinued.      Paroxysmal ventricular tachycardia     In October 2019, patient was hospitalized with heart failure and had ventricular tachycardia and received defibrillation.  He subsequently underwent a CRT D.  He has a history of ventricular tachycardia in the monitor zone.  He is currently taking amiodarone 400 mg daily.    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 Dorman    PFT testing now.     Nonischemic cardiomyopathy.    Follows with [facility] and Dr. Batres at the [facility]    Currently taking metoprolol succinate 25 mg daily Entresto 49/51 mg twice daily, Lasix 20 mg twice daily    We had a long discussion about increasing lasix to 40 mg x 3 days to help relieve abdominal distention or changing to torsemide 10 mg daily.  However due to gout episodes pt is hesitant.      Nonobstructive coronary artery disease.    Per coronary angiogram (2019)    Patient is currently taking aspirin 81 mg daily and atorvastatin 40 mg daily    asymptomatic    Hypertension    controlled in clinic and at home while taking Entresto, metoprolol succinate      Plan:    Consider lasix to 40 mg x 3 days to help relieve abdominal distention or changing to torsemide 10 mg daily. However due to gout episodes pt is hesitant.     Amiodarone labs, hgb and PFT now. Once the labs return will reach out to Dr Dorman to confirm pt should remain on amiodarone 400 mg as he has only had 2 brief episodes of NSVT in the past 6 months and is no longer on prednisone for sarcoidosis as his last PET scan did not reveal any evidence of active cardiac sarcoidosis.     Follow up with Dr Hernandes in 4 months with BMP prior    Follow up with Dr Dorman in 6 months with amiodarone labs prior.      Please call the clinic if questions or problems arise      Thank you for the opportunity to participate in this pleasant patient's care. We would be happy to see him sooner if needed for any concerns in the meantime.         ARMAND Ochoa CNP  [facility]  Text Page  (M-F 8:00 am - 4:30 pm)    Orders this Visit:  Orders Placed This Encounter    Procedures     Hemoglobin     Hepatic panel     TSH with free T4 reflex     Basic metabolic panel     Basic metabolic panel     Hepatic panel     TSH with free T4 reflex     Basic metabolic panel     Follow-Up with Cardiologist     Follow-Up with Electrophysiologist     EKG 12-lead complete w/read - Clinics (performed today)     Pulmonary function test     No orders of the defined types were placed in this encounter.    There are no discontinued medications.  Encounter Diagnoses   Name Primary?     Non-ischemic cardiomyopathy (H)      ICD (implantable cardioverter-defibrillator) in place      On amiodarone therapy Yes       CURRENT MEDICATIONS:  Current Outpatient Medications   Medication Sig Dispense Refill     acetaminophen (TYLENOL) 500 MG tablet Take 500-1,000 mg by mouth every 6 hours as needed for mild pain       aspirin 81 MG tablet Take 1 tablet (81 mg) by mouth daily 30 tablet      atorvastatin (LIPITOR) 80 MG tablet Take 1/2 tablet (40 mg) by mouth daily 45 tablet 2     ENTRESTO 49-51 MG per tablet Take 1 tablet by mouth 2 times daily 180 tablet 1     fenofibrate (TRIGLIDE/LOFIBRA) 160 MG tablet TAKE ONE TABLET BY MOUTH ONE TIME DAILY 90 tablet 0     furosemide (LASIX) 20 MG tablet Take 1 tablet (20 mg) by mouth 2 times daily 60 tablet 11     glipiZIDE (GLUCOTROL XL) 10 MG 24 hr tablet TAKE 1 TABLET (10 MG) BY MOUTH DAILY. TAKE WITH LARGEST MEAL OF THE DAY. ALWAYS TAKE WITH FOOD 90 tablet 2     insulin detemir (LEVEMIR FLEXTOUCH) 100 UNIT/ML pen Inject 25 Units Subcutaneous daily 30 mL 0     levothyroxine (SYNTHROID/LEVOTHROID) 50 MCG tablet Take 1 tablet (50 mcg) by mouth daily. 90 tablet 0     liraglutide (VICTOZA PEN) 18 MG/3ML solution Inject 1.8 mg Subcutaneous daily 27 mL 0     metFORMIN (GLUCOPHAGE) 1000 MG tablet Take 1 tablet (1,000 mg) by mouth 2 times daily (with meals)  180 tablet 0     metoprolol succinate ER (TOPROL-XL) 25 MG 24 hr tablet Take 1 tablet (25 mg) by mouth daily 90 tablet 2      albuterol (PROAIR HFA/PROVENTIL HFA/VENTOLIN HFA) 108 (90 Base) MCG/ACT Inhaler Inhale 2 puffs into the lungs every 6 hours as needed for shortness of breath / dyspnea or wheezing       amiodarone (PACERONE) 400 MG tablet Take 1 tablet (400 mg) by mouth daily 90 tablet 2     blood glucose monitoring (ACCU-CHEK LUL PLUS) test strip Use to test blood sugar 1-3 times daily or as directed. 100 each 11     clotrimazole (LOTRIMIN) 1 % external cream Apply sparingly once or twice per day as needed to affected area until the skin is better, then stop; REPEAT AS NEEDED 30 g 1     Continuous Blood Gluc Sensor (FREESTYLE JOCELIN 14 DAY SENSOR) MISC 1 each every 14 days 6 each 3     continuous blood glucose monitoring (FREESTYLE JOCELIN) sensor For use with Freestyle Jocelin Flash  for continuous monitioring of blood glucose levels. Replace sensor every 10 days. 3 each 3     fluticasone (FLONASE) 50 MCG/ACT nasal spray Spray 2 sprays into both nostrils daily 16 g 0     omeprazole (PRILOSEC) 40 MG DR capsule Take 1 capsule (40 mg) by mouth daily 90 capsule 3     triamcinolone (KENALOG) 0.5 % cream Apply sparingly once or twice per day as needed to affected area until the skin is better, then stop 30 g 0     ULTICARE 29G X 12.7MM insulin pen needle Use 3 pen needles daily as directed (one for daily victoza injection, 2 for twice daily levemir injections) 200 each 1       ALLERGIES  Allergies   Allergen Reactions     No Known Drug Allergies        PAST MEDICAL, SURGICAL, FAMILY HISTORY:  History was reviewed and updated as needed, see medical record.    SOCIAL HISTORY:  Social History     Socioeconomic History     Marital status:      Spouse name: Not on file     Number of children: Not on file     Years of education: Not on file     Highest education level: Not on file   Occupational History     Not on file   Tobacco Use     Smoking status: Never Smoker     Smokeless tobacco: Never Used   Substance and Sexual Activity  "    Alcohol use: Yes     Comment: once awhile     Drug use: No     Sexual activity: Yes     Partners: Female   Other Topics Concern     Parent/sibling w/ CABG, MI or angioplasty before 65F 55M? Not Asked   Social History Narrative     Not on file     Social Determinants of Health     Financial Resource Strain:      Difficulty of Paying Living Expenses:    Food Insecurity:      Worried About Running Out of Food in the Last Year:      Ran Out of Food in the Last Year:    Transportation Needs:      Lack of Transportation (Medical):      Lack of Transportation (Non-Medical):    Physical Activity:      Days of Exercise per Week:      Minutes of Exercise per Session:    Stress:      Feeling of Stress :    Social Connections:      Frequency of Communication with Friends and Family:      Frequency of Social Gatherings with Friends and Family:      Attends [employer] Services:      Active Member of Clubs or Organizations:      Attends Club or Organization Meetings:      Marital Status:    Intimate Partner Violence:      Fear of Current or Ex-Partner:      Emotionally Abused:      Physically Abused:      Sexually Abused:        Review of Systems:  Skin:  Negative     Eyes:  Negative glasses  ENT:  Negative    Respiratory:  Negative    Cardiovascular:  Negative for;palpitations;chest pain;lightheadedness Positive for;edema  Gastroenterology: Negative    Genitourinary:  Positive for urinary frequency;nocturia  Musculoskeletal:  Negative    Neurologic:  Negative    Psychiatric:  Negative    Heme/Lymph/Imm:  Negative    Endocrine:  Negative diabetes     Physical Exam:  Vitals: /72   Pulse 60   Ht 1.829 m (6' 0.01\")   Wt 124.7 kg (275 lb)   BMI 37.29 kg/m     Wt Readings from Last 4 Encounters:   08/27/21 124.7 kg (275 lb)   06/01/21 125.6 kg (276 lb 14.4 oz)   12/15/20 130.6 kg (288 lb)   12/14/20 129.6 kg (285 lb 12.8 oz)     CONSTITUTIONAL: Appears his stated age, obese, and in no acute distress.  HEENT: Pupils equal, " round. Sclerae nonicteric.    NECK: Supple, no masses appreciated.  C/V:  Regular rate and rhythm, distant  RESP: Respirations are unlabored. Lungs are clear to auscultation bilaterally without wheezing, rales, or rhonchi.  GI: Abdomen soft, non-tender, distended.  EXTREM:  No clubbing, cyanosis, or lower extremity edema bilaterally.   NEURO: Alert and oriented, cooperative. Gait steady. No gross focal deficits.   PSYCH: Affect appropriate. Mentation normal. Responds to questions appropriately.  SKIN: Warm and dry. No apparent rashes or bruising.    Recent Lab Results:  LIPID RESULTS:  Lab Results   Component Value Date    CHOL 140 07/22/2020    HDL 64 07/22/2020    LDL 51 07/22/2020    TRIG 123 07/22/2020    CHOLHDLRATIO 2.8 01/26/2015     LIVER ENZYME RESULTS:  Lab Results   Component Value Date    AST 39 09/16/2020    ALT 67 09/16/2020     CBC RESULTS:  Lab Results   Component Value Date    WBC 6.5 09/16/2020    RBC 3.81 (L) 09/16/2020    HGB 12.0 (L) 09/16/2020    HCT 37.3 (L) 09/16/2020    MCV 98 09/16/2020    MCH 31.5 09/16/2020    MCHC 32.2 09/16/2020    RDW 14.0 09/16/2020     09/16/2020     BMP RESULTS:  Lab Results   Component Value Date     05/26/2021    POTASSIUM 4.4 05/26/2021    CHLORIDE 107 05/26/2021    CO2 25 05/26/2021    ANIONGAP 7 05/26/2021     (H) 05/26/2021    BUN 18 05/26/2021    CR 1.22 05/26/2021    GFRESTIMATED 63 05/26/2021    GFRESTBLACK 73 05/26/2021    EDITH 9.0 05/26/2021       CC  Bacilio Irvin MD  [address] [facility] [address]  [place]  [place] [address]    This note was completed in part using Dragon voice recognition software. Although reviewed after completion, some word and grammatical errors may occur.      Thank you for allowing me to participate in the care of your patient.      Sincerely,     Sandie Wisdom, [facility]  cc:   Bacilio Irvin MD  [address]  [place]  [place]  [address]

## 2021-08-29 DIAGNOSIS — Z79.899 ENCOUNTER FOR MONITORING AMIODARONE THERAPY: Primary | ICD-10-CM

## 2021-08-29 DIAGNOSIS — Z51.81 ENCOUNTER FOR MONITORING AMIODARONE THERAPY: Primary | ICD-10-CM

## 2021-09-01 LAB
MDC_IDC_EPISODE_DTM: NORMAL
MDC_IDC_EPISODE_ID: NORMAL
MDC_IDC_EPISODE_TYPE: NORMAL
MDC_IDC_LEAD_IMPLANT_DT: NORMAL
MDC_IDC_LEAD_LOCATION: NORMAL
MDC_IDC_LEAD_LOCATION_DETAIL_1: NORMAL
MDC_IDC_LEAD_MFG: NORMAL
MDC_IDC_LEAD_MODEL: NORMAL
MDC_IDC_LEAD_POLARITY_TYPE: NORMAL
MDC_IDC_LEAD_SERIAL: NORMAL
MDC_IDC_MSMT_BATTERY_DTM: NORMAL
MDC_IDC_MSMT_BATTERY_REMAINING_LONGEVITY: 114 MO
MDC_IDC_MSMT_BATTERY_REMAINING_PERCENTAGE: 100 %
MDC_IDC_MSMT_BATTERY_STATUS: NORMAL
MDC_IDC_MSMT_CAP_CHARGE_DTM: NORMAL
MDC_IDC_MSMT_CAP_CHARGE_TIME: 9.8 S
MDC_IDC_MSMT_CAP_CHARGE_TYPE: NORMAL
MDC_IDC_MSMT_LEADCHNL_LV_IMPEDANCE_VALUE: 591 OHM
MDC_IDC_MSMT_LEADCHNL_RA_IMPEDANCE_VALUE: 695 OHM
MDC_IDC_MSMT_LEADCHNL_RV_IMPEDANCE_VALUE: 500 OHM
MDC_IDC_PG_IMPLANT_DTM: NORMAL
MDC_IDC_PG_MFG: NORMAL
MDC_IDC_PG_MODEL: NORMAL
MDC_IDC_PG_SERIAL: NORMAL
MDC_IDC_PG_TYPE: NORMAL
MDC_IDC_SESS_CLINIC_NAME: NORMAL
MDC_IDC_SESS_DTM: NORMAL
MDC_IDC_SESS_TYPE: NORMAL
MDC_IDC_SET_BRADY_AT_MODE_SWITCH_MODE: NORMAL
MDC_IDC_SET_BRADY_AT_MODE_SWITCH_RATE: 170 {BEATS}/MIN
MDC_IDC_SET_BRADY_LOWRATE: 60 {BEATS}/MIN
MDC_IDC_SET_BRADY_MAX_SENSOR_RATE: 130 {BEATS}/MIN
MDC_IDC_SET_BRADY_MAX_TRACKING_RATE: 130 {BEATS}/MIN
MDC_IDC_SET_BRADY_MODE: NORMAL
MDC_IDC_SET_BRADY_PAV_DELAY_HIGH: 200 MS
MDC_IDC_SET_BRADY_PAV_DELAY_LOW: 270 MS
MDC_IDC_SET_BRADY_SAV_DELAY_HIGH: 140 MS
MDC_IDC_SET_BRADY_SAV_DELAY_LOW: 190 MS
MDC_IDC_SET_CRT_LVRV_DELAY: 35 MS
MDC_IDC_SET_CRT_PACED_CHAMBERS: NORMAL
MDC_IDC_SET_LEADCHNL_LV_PACING_AMPLITUDE: 2.2 V
MDC_IDC_SET_LEADCHNL_LV_PACING_ANODE_ELECTRODE_1: NORMAL
MDC_IDC_SET_LEADCHNL_LV_PACING_ANODE_LOCATION_1: NORMAL
MDC_IDC_SET_LEADCHNL_LV_PACING_CAPTURE_MODE: NORMAL
MDC_IDC_SET_LEADCHNL_LV_PACING_CATHODE_ELECTRODE_1: NORMAL
MDC_IDC_SET_LEADCHNL_LV_PACING_CATHODE_LOCATION_1: NORMAL
MDC_IDC_SET_LEADCHNL_LV_PACING_PULSEWIDTH: 0.5 MS
MDC_IDC_SET_LEADCHNL_LV_SENSING_ADAPTATION_MODE: NORMAL
MDC_IDC_SET_LEADCHNL_LV_SENSING_ANODE_ELECTRODE_1: NORMAL
MDC_IDC_SET_LEADCHNL_LV_SENSING_ANODE_LOCATION_1: NORMAL
MDC_IDC_SET_LEADCHNL_LV_SENSING_CATHODE_ELECTRODE_1: NORMAL
MDC_IDC_SET_LEADCHNL_LV_SENSING_CATHODE_LOCATION_1: NORMAL
MDC_IDC_SET_LEADCHNL_LV_SENSING_SENSITIVITY: 1 MV
MDC_IDC_SET_LEADCHNL_RA_PACING_AMPLITUDE: 2 V
MDC_IDC_SET_LEADCHNL_RA_PACING_CAPTURE_MODE: NORMAL
MDC_IDC_SET_LEADCHNL_RA_PACING_POLARITY: NORMAL
MDC_IDC_SET_LEADCHNL_RA_PACING_PULSEWIDTH: 0.5 MS
MDC_IDC_SET_LEADCHNL_RA_SENSING_ADAPTATION_MODE: NORMAL
MDC_IDC_SET_LEADCHNL_RA_SENSING_POLARITY: NORMAL
MDC_IDC_SET_LEADCHNL_RA_SENSING_SENSITIVITY: 0.25 MV
MDC_IDC_SET_LEADCHNL_RV_PACING_AMPLITUDE: 2 V
MDC_IDC_SET_LEADCHNL_RV_PACING_CAPTURE_MODE: NORMAL
MDC_IDC_SET_LEADCHNL_RV_PACING_POLARITY: NORMAL
MDC_IDC_SET_LEADCHNL_RV_PACING_PULSEWIDTH: 0.5 MS
MDC_IDC_SET_LEADCHNL_RV_SENSING_ADAPTATION_MODE: NORMAL
MDC_IDC_SET_LEADCHNL_RV_SENSING_POLARITY: NORMAL
MDC_IDC_SET_LEADCHNL_RV_SENSING_SENSITIVITY: 0.6 MV
MDC_IDC_SET_ZONE_DETECTION_INTERVAL: 250 MS
MDC_IDC_SET_ZONE_DETECTION_INTERVAL: 333 MS
MDC_IDC_SET_ZONE_DETECTION_INTERVAL: 375 MS
MDC_IDC_SET_ZONE_TYPE: NORMAL
MDC_IDC_SET_ZONE_VENDOR_TYPE: NORMAL
MDC_IDC_STAT_AT_BURDEN_PERCENT: 0 %
MDC_IDC_STAT_AT_DTM_END: NORMAL
MDC_IDC_STAT_AT_DTM_START: NORMAL
MDC_IDC_STAT_BRADY_DTM_END: NORMAL
MDC_IDC_STAT_BRADY_DTM_START: NORMAL
MDC_IDC_STAT_BRADY_RA_PERCENT_PACED: 48 %
MDC_IDC_STAT_BRADY_RV_PERCENT_PACED: 99 %
MDC_IDC_STAT_CRT_DTM_END: NORMAL
MDC_IDC_STAT_CRT_DTM_START: NORMAL
MDC_IDC_STAT_CRT_LV_PERCENT_PACED: 99 %
MDC_IDC_STAT_EPISODE_RECENT_COUNT: 0
MDC_IDC_STAT_EPISODE_RECENT_COUNT: 2
MDC_IDC_STAT_EPISODE_RECENT_COUNT_DTM_END: NORMAL
MDC_IDC_STAT_EPISODE_RECENT_COUNT_DTM_START: NORMAL
MDC_IDC_STAT_EPISODE_TYPE: NORMAL
MDC_IDC_STAT_EPISODE_VENDOR_TYPE: NORMAL
MDC_IDC_STAT_TACHYTHERAPY_ATP_DELIVERED_RECENT: 0
MDC_IDC_STAT_TACHYTHERAPY_ATP_DELIVERED_TOTAL: 3
MDC_IDC_STAT_TACHYTHERAPY_RECENT_DTM_END: NORMAL
MDC_IDC_STAT_TACHYTHERAPY_RECENT_DTM_START: NORMAL
MDC_IDC_STAT_TACHYTHERAPY_SHOCKS_ABORTED_RECENT: 0
MDC_IDC_STAT_TACHYTHERAPY_SHOCKS_ABORTED_TOTAL: 0
MDC_IDC_STAT_TACHYTHERAPY_SHOCKS_DELIVERED_RECENT: 0
MDC_IDC_STAT_TACHYTHERAPY_SHOCKS_DELIVERED_TOTAL: 0
MDC_IDC_STAT_TACHYTHERAPY_TOTAL_DTM_END: NORMAL
MDC_IDC_STAT_TACHYTHERAPY_TOTAL_DTM_START: NORMAL

## 2021-09-04 ENCOUNTER — HEALTH MAINTENANCE LETTER (OUTPATIENT)
Age: 61
End: 2021-09-04

## 2021-09-07 ENCOUNTER — TELEPHONE (OUTPATIENT)
Dept: INTERNAL MEDICINE | Facility: CLINIC | Age: 61
End: 2021-09-07
Payer: COMMERCIAL

## 2021-09-07 ENCOUNTER — TELEPHONE (OUTPATIENT)
Dept: CARDIOLOGY | Facility: CLINIC | Age: 61
End: 2021-09-07

## 2021-09-07 DIAGNOSIS — I47.29 PAROXYSMAL VT (H): Primary | ICD-10-CM

## 2021-09-07 RX ORDER — AMIODARONE HYDROCHLORIDE 400 MG/1
400 TABLET ORAL 3 TIMES DAILY
Qty: 21 TABLET | Refills: 0 | Status: SHIPPED | OUTPATIENT
Start: 2021-09-08 | End: 2021-09-17

## 2021-09-07 NOTE — TELEPHONE ENCOUNTER
Patient sent remote transmission due to an episode of fainting at work. Remote transmission reviewed. Patient had 1 episode of NSVT logged today(9/7/21 at 0751). EGM shows fast VT with V rates of 170-300bpm, with the average heart rate of 203bpm. Episode lasted 13 sec. ATP/shock were not delivered due to length of episode(see therapy zones below). Patient has had episodes in the past that have required ATP/Shock therapy, however he was asymptomatic with previous episodes.      Patient is on Amiodarone 400mg daily and Toprol XL 25mg daily.     Left voicemail with patient to discuss episode, medications, and driving restrictions due to episode.     Will route message to Dr Dorman for further review and recommendations.

## 2021-09-07 NOTE — TELEPHONE ENCOUNTER
Spoke with patient regarding Dr Dorman's recommendation of taking Amiodarone 400 mg three times a day for 7 days and then resume daily dose of 400 mg. New prescription sent to pharmacy of choice.    Patient is schedule to be seen in device clinic tomorrow(9/8/21) to adjust VT detection therapy zone from 20sec to 10 sec. Also to adjust the R sensitivity to make more sensitive. Per Dr Dorman's recommendations.                  Reviewed with patient that due to episode of VT/VF and fainting, per clinic protocol patient should not drive for 3 months. Patient voiced understanding and is in agreement to above plan.

## 2021-09-07 NOTE — TELEPHONE ENCOUNTER
Pt called to repot he fainted at work and was sent home, BP sitting 116/67 and standing 125/70. 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 Future diabetic check up scheduled for sep 17th and fasting labs for sep 15th - provider to place labs. Pt will call his heart clinic with this report of fainting also.

## 2021-09-07 NOTE — TELEPHONE ENCOUNTER
It was VF with undersensed. What's sensitive set at? I would reduce vt detection from 20 to 10 seconds and depends on what r-sense set at I would make it more sensitive. Increase amio to 400 mg tid for 1 week then daily. qp

## 2021-09-07 NOTE — TELEPHONE ENCOUNTER
Patient scheduled with  tomorrow at 8:40 for the Syncope and to get a note for work    Parveen Aragon RN

## 2021-09-07 NOTE — TELEPHONE ENCOUNTER
Many different reasons for fainting episodes.   Needs appointment.   I cannot write any notes without seeing him.   Follow up with me as planned/

## 2021-09-08 ENCOUNTER — ANCILLARY PROCEDURE (OUTPATIENT)
Dept: GENERAL RADIOLOGY | Facility: CLINIC | Age: 61
End: 2021-09-08
Attending: INTERNAL MEDICINE
Payer: COMMERCIAL

## 2021-09-08 ENCOUNTER — OFFICE VISIT (OUTPATIENT)
Dept: INTERNAL MEDICINE | Facility: CLINIC | Age: 61
End: 2021-09-08
Payer: COMMERCIAL

## 2021-09-08 ENCOUNTER — ANCILLARY PROCEDURE (OUTPATIENT)
Dept: CARDIOLOGY | Facility: CLINIC | Age: 61
End: 2021-09-08
Attending: INTERNAL MEDICINE
Payer: COMMERCIAL

## 2021-09-08 ENCOUNTER — DOCUMENTATION ONLY (OUTPATIENT)
Dept: CARDIOLOGY | Facility: CLINIC | Age: 61
End: 2021-09-08

## 2021-09-08 VITALS
DIASTOLIC BLOOD PRESSURE: 62 MMHG | WEIGHT: 274 LBS | RESPIRATION RATE: 20 BRPM | BODY MASS INDEX: 37.15 KG/M2 | HEART RATE: 60 BPM | SYSTOLIC BLOOD PRESSURE: 114 MMHG | TEMPERATURE: 96.7 F | OXYGEN SATURATION: 97 %

## 2021-09-08 DIAGNOSIS — M25.512 ACUTE PAIN OF LEFT SHOULDER: ICD-10-CM

## 2021-09-08 DIAGNOSIS — Z95.810 ICD (IMPLANTABLE CARDIOVERTER-DEFIBRILLATOR) IN PLACE: ICD-10-CM

## 2021-09-08 DIAGNOSIS — M79.641 PAIN OF RIGHT HAND: ICD-10-CM

## 2021-09-08 DIAGNOSIS — I42.8 NON-ISCHEMIC CARDIOMYOPATHY (H): ICD-10-CM

## 2021-09-08 DIAGNOSIS — R55 SYNCOPE, UNSPECIFIED SYNCOPE TYPE: Primary | ICD-10-CM

## 2021-09-08 LAB
MDC_IDC_LEAD_IMPLANT_DT: NORMAL
MDC_IDC_LEAD_LOCATION: NORMAL
MDC_IDC_LEAD_LOCATION_DETAIL_1: NORMAL
MDC_IDC_LEAD_MFG: NORMAL
MDC_IDC_LEAD_MODEL: NORMAL
MDC_IDC_LEAD_POLARITY_TYPE: NORMAL
MDC_IDC_LEAD_SERIAL: NORMAL
MDC_IDC_MSMT_BATTERY_STATUS: NORMAL
MDC_IDC_MSMT_CAP_CHARGE_DTM: NORMAL
MDC_IDC_MSMT_CAP_CHARGE_ENERGY: 0 J
MDC_IDC_MSMT_CAP_CHARGE_TIME: 0 S
MDC_IDC_MSMT_CAP_CHARGE_TIME: 9.8 S
MDC_IDC_MSMT_CAP_CHARGE_TYPE: NORMAL
MDC_IDC_MSMT_CAP_CHARGE_TYPE: NORMAL
MDC_IDC_MSMT_LEADCHNL_LV_SENSING_INTR_AMPL: 24.3 MV
MDC_IDC_MSMT_LEADCHNL_RA_SENSING_INTR_AMPL: 1.6 MV
MDC_IDC_MSMT_LEADCHNL_RV_SENSING_INTR_AMPL: 17.9 MV
MDC_IDC_PG_IMPLANT_DTM: NORMAL
MDC_IDC_PG_MFG: NORMAL
MDC_IDC_PG_MODEL: NORMAL
MDC_IDC_PG_SERIAL: NORMAL
MDC_IDC_PG_TYPE: NORMAL
MDC_IDC_SESS_CLINIC_NAME: NORMAL
MDC_IDC_SESS_DTM: NORMAL
MDC_IDC_SESS_TYPE: NORMAL
MDC_IDC_SET_BRADY_AT_MODE_SWITCH_MODE: NORMAL
MDC_IDC_SET_BRADY_AT_MODE_SWITCH_RATE: 170 {BEATS}/MIN
MDC_IDC_SET_BRADY_HYSTRATE: NORMAL
MDC_IDC_SET_BRADY_LOWRATE: 60 {BEATS}/MIN
MDC_IDC_SET_BRADY_MAX_SENSOR_RATE: 130 {BEATS}/MIN
MDC_IDC_SET_BRADY_MAX_TRACKING_RATE: 130 {BEATS}/MIN
MDC_IDC_SET_BRADY_MODE: NORMAL
MDC_IDC_SET_BRADY_PAV_DELAY_HIGH: 200 MS
MDC_IDC_SET_BRADY_PAV_DELAY_LOW: 270 MS
MDC_IDC_SET_BRADY_SAV_DELAY_HIGH: 140 MS
MDC_IDC_SET_BRADY_SAV_DELAY_LOW: 190 MS
MDC_IDC_SET_CRT_LVRV_DELAY: 35 MS
MDC_IDC_SET_CRT_PACED_CHAMBERS: NORMAL
MDC_IDC_SET_LEADCHNL_LV_PACING_AMPLITUDE: 2.2 V
MDC_IDC_SET_LEADCHNL_LV_PACING_CAPTURE_MODE: NORMAL
MDC_IDC_SET_LEADCHNL_LV_PACING_POLARITY: NORMAL
MDC_IDC_SET_LEADCHNL_LV_PACING_PULSEWIDTH: 0.5 MS
MDC_IDC_SET_LEADCHNL_LV_SENSING_ADAPTATION_MODE: NORMAL
MDC_IDC_SET_LEADCHNL_LV_SENSING_POLARITY: NORMAL
MDC_IDC_SET_LEADCHNL_LV_SENSING_SENSITIVITY: 1 MV
MDC_IDC_SET_LEADCHNL_RA_PACING_AMPLITUDE: 2 V
MDC_IDC_SET_LEADCHNL_RA_PACING_CAPTURE_MODE: NORMAL
MDC_IDC_SET_LEADCHNL_RA_PACING_POLARITY: NORMAL
MDC_IDC_SET_LEADCHNL_RA_PACING_PULSEWIDTH: 0.5 MS
MDC_IDC_SET_LEADCHNL_RA_SENSING_ADAPTATION_MODE: NORMAL
MDC_IDC_SET_LEADCHNL_RA_SENSING_POLARITY: NORMAL
MDC_IDC_SET_LEADCHNL_RA_SENSING_SENSITIVITY: 0.25 MV
MDC_IDC_SET_LEADCHNL_RV_PACING_AMPLITUDE: 2 V
MDC_IDC_SET_LEADCHNL_RV_PACING_CAPTURE_MODE: NORMAL
MDC_IDC_SET_LEADCHNL_RV_PACING_POLARITY: NORMAL
MDC_IDC_SET_LEADCHNL_RV_PACING_PULSEWIDTH: 0.5 MS
MDC_IDC_SET_LEADCHNL_RV_SENSING_ADAPTATION_MODE: NORMAL
MDC_IDC_SET_LEADCHNL_RV_SENSING_POLARITY: NORMAL
MDC_IDC_SET_LEADCHNL_RV_SENSING_SENSITIVITY: 0.5 MV
MDC_IDC_SET_ZONE_DETECTION_INTERVAL: 250 MS
MDC_IDC_SET_ZONE_DETECTION_INTERVAL: 333 MS
MDC_IDC_SET_ZONE_DETECTION_INTERVAL: 375 MS
MDC_IDC_SET_ZONE_TYPE: NORMAL
MDC_IDC_SET_ZONE_VENDOR_TYPE: NORMAL
MDC_IDC_STAT_EPISODE_RECENT_COUNT: 1
MDC_IDC_STAT_EPISODE_RECENT_COUNT: 1
MDC_IDC_STAT_EPISODE_RECENT_COUNT: 3
MDC_IDC_STAT_EPISODE_RECENT_COUNT_DTM_END: NORMAL
MDC_IDC_STAT_EPISODE_RECENT_COUNT_DTM_START: NORMAL
MDC_IDC_STAT_EPISODE_TOTAL_COUNT: 125
MDC_IDC_STAT_EPISODE_TOTAL_COUNT: 80
MDC_IDC_STAT_EPISODE_TOTAL_COUNT: 83
MDC_IDC_STAT_EPISODE_TOTAL_COUNT_DTM_END: NORMAL
MDC_IDC_STAT_EPISODE_TYPE: NORMAL
MDC_IDC_STAT_EPISODE_VENDOR_TYPE: NORMAL
MDC_IDC_STAT_TACHYTHERAPY_ATP_DELIVERED_RECENT: 0
MDC_IDC_STAT_TACHYTHERAPY_ATP_DELIVERED_TOTAL: 3
MDC_IDC_STAT_TACHYTHERAPY_RECENT_DTM_END: NORMAL
MDC_IDC_STAT_TACHYTHERAPY_RECENT_DTM_START: NORMAL
MDC_IDC_STAT_TACHYTHERAPY_SHOCKS_ABORTED_RECENT: 0
MDC_IDC_STAT_TACHYTHERAPY_SHOCKS_ABORTED_TOTAL: 0
MDC_IDC_STAT_TACHYTHERAPY_SHOCKS_DELIVERED_RECENT: 0
MDC_IDC_STAT_TACHYTHERAPY_SHOCKS_DELIVERED_TOTAL: 0
MDC_IDC_STAT_TACHYTHERAPY_TOTAL_DTM_END: NORMAL

## 2021-09-08 PROCEDURE — 73130 X-RAY EXAM OF HAND: CPT | Mod: RT | Performed by: RADIOLOGY

## 2021-09-08 PROCEDURE — 99214 OFFICE O/P EST MOD 30 MIN: CPT | Performed by: INTERNAL MEDICINE

## 2021-09-08 PROCEDURE — 73030 X-RAY EXAM OF SHOULDER: CPT | Mod: LT | Performed by: RADIOLOGY

## 2021-09-08 RX ORDER — CELECOXIB 100 MG/1
100 CAPSULE ORAL 2 TIMES DAILY PRN
Qty: 30 CAPSULE | Refills: 1 | Status: SHIPPED | OUTPATIENT
Start: 2021-09-08 | End: 2021-12-08

## 2021-09-08 NOTE — LETTER
September 9, 2021    RE:  Neymar Rhodes 1960  [address] [facility] [address]          To Whom it may concern,     Neymar Rhodes is a patient of mine. He had a syncopal episode on 9/7/21 due to Ventricular tachycardia and fibrillation. His medications have been adjusted. 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

## 2021-09-08 NOTE — PROGRESS NOTES
ASSESSMENT/PLAN                                                      (R55) Syncope, unspecified syncope type  (primary encounter diagnosis)  Comment: due to episode of VF.  Plan: amiodarone dosing adjusted; seeing electrophysiology today for device setting adjustment.    (M79.641) Pain of right hand  (M25.512) Acute pain of left shoulder  Plan: x-rays today; Celebrex sparingly as needed for inflammation/pain relief; may continue Tylenol and cool compresses more liberally for pain relief.    Melva Bernal MD   [place] [address] [address] [address]  T: [phone], F: [phone]    SUBJECTIVE                                                      Neymar Rhodes is a very pleasant 61 year old male who presents after an episode of syncope:    Episode of syncope occurred yesterday. Prior to the episode patient started feeling unwell. The next thing he remembered was waking up on the ground. Syncopal episode was unwitnessed. Patient suspects he was unconscious for less than 3 minutes, but is not sure. He has been having some right hand and left shoulder pain since that episode.     No prior or recurrent episodes. Patient feels well currently other than right hand and left shoulder pain.  No chest pain or palpitations. No shortness of breath. No lightheadedness or presyncope.    PMH significant for cardiac sarcoidosis, paroxysmal ventricular tachycardia, and nonischemic cardiomyopathy s/p biventricular defibrillator placement in 2019.  He has been in contact with his electrophysiologist re: yesterday's episode. Device has been read and they believe his syncopal episode was due to VF. Amiodarone dosing adjusted and patient will be seen in clinic today for device setting adjustment. Patient has been told not to drive for 3 months.    OBJECTIVE                                                      /62 (BP Location: Left arm, Patient Position: Chair, Cuff Size: Adult Large)   Pulse 60    Temp (!) 96.7  F (35.9  C) (Temporal)   Resp 20   Wt 124.3 kg (274 lb)   SpO2 97%   BMI 37.15 kg/m    Constitutional: well-appearing  Right hand: soft tissue swelling and bruising over posterior second and third MTPs  Left shoulder: decreased range of motion    ---    (Note documentation was completed, in part, with [employer] voice-recognition software. Documentation was reviewed, but some grammatical, spelling, and word errors may remain.)

## 2021-09-08 NOTE — PROGRESS NOTES
Pt here for ICD setting changes and asking for a note for work stating he cannot drive to 3 months. Letter was written and given to the patient.

## 2021-09-09 NOTE — PROGRESS NOTES
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 New letter was made and will be mailed to patient today. Additionally per patient's request will attempt to send letter information to patient in a [employer] message.

## 2021-09-10 ENCOUNTER — TELEPHONE (OUTPATIENT)
Dept: CARDIOLOGY | Facility: CLINIC | Age: 61
End: 2021-09-10

## 2021-09-10 ENCOUNTER — TELEPHONE (OUTPATIENT)
Dept: INTERNAL MEDICINE | Facility: CLINIC | Age: 61
End: 2021-09-10

## 2021-09-10 NOTE — TELEPHONE ENCOUNTER
Prior Authorization Retail Medication Request    Medication/Dose: celecoxib (CELEBREX) 100 MG capsule  ICD code (if different than what is on RX):  M79.641,M25.512  Previously Tried and Failed:    Rationale:      Insurance Name:  [employer]  Insurance ID:  [id number]      Pharmacy Information (if different than what is on RX)  Name:  [facility]  Phone:  [phone]

## 2021-09-10 NOTE — TELEPHONE ENCOUNTER
Patient called device clinic regarding questions with driving restrictions. 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 Patient is wondering if this would be okay.     Will route message to Dr Dorman regarding this.

## 2021-09-10 NOTE — TELEPHONE ENCOUNTER
Central Prior Authorization Team   Phone: [phone]      PA Initiation    Medication: celecoxib (CELEBREX) 100 MG capsule - Sent to [facility]  Insurance Company: [employer] - Phone [phone] Fax [phone]  Pharmacy Filling the Rx: [id number] - [place], [place] - [address] [facility]  Filling Pharmacy Phone: [phone]  Filling Pharmacy Fax: [phone]  Start Date: 9/10/2021

## 2021-09-12 NOTE — TELEPHONE ENCOUNTER
I am afraid he can't drive any vehicles for the next 3 months. 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 qp

## 2021-09-13 NOTE — TELEPHONE ENCOUNTER
Prior Authorization Not Needed per Insurance    Medication: celecoxib (CELEBREX) 100 MG capsule  Insurance Company: [employer] - Phone [phone] Fax [phone]  Expected CoPay:      Pharmacy Filling the Rx: [id number] - [place], [place] - [address] [facility]  Pharmacy Notified: Yes  Patient Notified:Yes    Pharmacy was able to process and his co-pay went down about $6.00 and they will contact the patient in regards to the refund.

## 2021-09-13 NOTE — TELEPHONE ENCOUNTER
Prior Authorization Approval    Authorization Effective Date: 8/11/2021  Authorization Expiration Date: 9/10/2022  Medication: celecoxib (CELEBREX) 100 MG capsule - Sent to [facility] - Approved   Approved Dose/Quantity:   Reference #: CaseId:[id number]   Insurance Company: [employer] - Phone [phone] Fax [phone]  Expected CoPay:       CoPay Card Available: No    Foundation Assistance Needed:    Which Pharmacy is filling the prescription (Not needed for infusion/clinic administered): [id number] - [place], [place] [facility] [address]  Pharmacy Notified: Yes  Patient Notified: Yes

## 2021-09-13 NOTE — TELEPHONE ENCOUNTER
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 This is due to his risk of VT/VF recurrence.     Patient stated frustration with the above information. Stated that he needs to drive to work and Dr jett Reviewed with patient that he should explore other means of transportation for the next 3 months.

## 2021-09-15 ENCOUNTER — LAB (OUTPATIENT)
Dept: LAB | Facility: CLINIC | Age: 61
End: 2021-09-15
Payer: COMMERCIAL

## 2021-09-15 DIAGNOSIS — I47.29 PAROXYSMAL VENTRICULAR TACHYCARDIA (H): ICD-10-CM

## 2021-09-15 DIAGNOSIS — E03.8 SUBCLINICAL HYPOTHYROIDISM: ICD-10-CM

## 2021-09-15 DIAGNOSIS — R60.9 EDEMA, UNSPECIFIED TYPE: ICD-10-CM

## 2021-09-15 DIAGNOSIS — Z79.4 TYPE 2 DIABETES MELLITUS WITH OTHER CIRCULATORY COMPLICATION, WITH LONG-TERM CURRENT USE OF INSULIN (H): ICD-10-CM

## 2021-09-15 DIAGNOSIS — I42.8 NON-ISCHEMIC CARDIOMYOPATHY (H): ICD-10-CM

## 2021-09-15 DIAGNOSIS — I25.10 CORONARY ARTERY DISEASE INVOLVING NATIVE CORONARY ARTERY OF NATIVE HEART WITHOUT ANGINA PECTORIS: ICD-10-CM

## 2021-09-15 DIAGNOSIS — E11.59 TYPE 2 DIABETES MELLITUS WITH OTHER CIRCULATORY COMPLICATION, WITH LONG-TERM CURRENT USE OF INSULIN (H): ICD-10-CM

## 2021-09-15 LAB
ANION GAP SERPL CALCULATED.3IONS-SCNC: 6 MMOL/L (ref 3–14)
BUN SERPL-MCNC: 21 MG/DL (ref 7–30)
CALCIUM SERPL-MCNC: 8.2 MG/DL (ref 8.5–10.1)
CHLORIDE BLD-SCNC: 112 MMOL/L (ref 94–109)
CO2 SERPL-SCNC: 22 MMOL/L (ref 20–32)
CREAT SERPL-MCNC: 0.98 MG/DL (ref 0.66–1.25)
GFR SERPL CREATININE-BSD FRML MDRD: 82 ML/MIN/1.73M2
GLUCOSE BLD-MCNC: 140 MG/DL (ref 70–99)
HBA1C MFR BLD: 6.9 % (ref 0–5.6)
POTASSIUM BLD-SCNC: 4 MMOL/L (ref 3.4–5.3)
SODIUM SERPL-SCNC: 140 MMOL/L (ref 133–144)
T4 FREE SERPL-MCNC: 1.33 NG/DL (ref 0.76–1.46)
TSH SERPL DL<=0.005 MIU/L-ACNC: 5.04 MU/L (ref 0.4–4)

## 2021-09-15 PROCEDURE — 83036 HEMOGLOBIN GLYCOSYLATED A1C: CPT

## 2021-09-15 PROCEDURE — 84439 ASSAY OF FREE THYROXINE: CPT

## 2021-09-15 PROCEDURE — 36415 COLL VENOUS BLD VENIPUNCTURE: CPT

## 2021-09-15 PROCEDURE — 84443 ASSAY THYROID STIM HORMONE: CPT

## 2021-09-15 PROCEDURE — 80048 BASIC METABOLIC PNL TOTAL CA: CPT

## 2021-09-17 ENCOUNTER — OFFICE VISIT (OUTPATIENT)
Dept: INTERNAL MEDICINE | Facility: CLINIC | Age: 61
End: 2021-09-17
Payer: COMMERCIAL

## 2021-09-17 VITALS
SYSTOLIC BLOOD PRESSURE: 100 MMHG | OXYGEN SATURATION: 99 % | HEART RATE: 60 BPM | TEMPERATURE: 98.1 F | HEIGHT: 72 IN | DIASTOLIC BLOOD PRESSURE: 56 MMHG | WEIGHT: 272 LBS | BODY MASS INDEX: 36.84 KG/M2

## 2021-09-17 DIAGNOSIS — I10 BENIGN ESSENTIAL HYPERTENSION: ICD-10-CM

## 2021-09-17 DIAGNOSIS — E66.01 SEVERE OBESITY (BMI 35.0-39.9) WITH COMORBIDITY (H): ICD-10-CM

## 2021-09-17 DIAGNOSIS — I47.29 PAROXYSMAL VENTRICULAR TACHYCARDIA (H): ICD-10-CM

## 2021-09-17 DIAGNOSIS — Z79.4 TYPE 2 DIABETES MELLITUS WITH OTHER CIRCULATORY COMPLICATION, WITH LONG-TERM CURRENT USE OF INSULIN (H): Primary | ICD-10-CM

## 2021-09-17 DIAGNOSIS — Z23 NEED FOR INFLUENZA VACCINATION: ICD-10-CM

## 2021-09-17 DIAGNOSIS — E03.8 SUBCLINICAL HYPOTHYROIDISM: ICD-10-CM

## 2021-09-17 DIAGNOSIS — Z23 NEED FOR SHINGLES VACCINE: ICD-10-CM

## 2021-09-17 DIAGNOSIS — I42.8 NON-ISCHEMIC CARDIOMYOPATHY (H): ICD-10-CM

## 2021-09-17 DIAGNOSIS — E11.59 TYPE 2 DIABETES MELLITUS WITH OTHER CIRCULATORY COMPLICATION, WITH LONG-TERM CURRENT USE OF INSULIN (H): Primary | ICD-10-CM

## 2021-09-17 PROCEDURE — 90471 IMMUNIZATION ADMIN: CPT | Performed by: INTERNAL MEDICINE

## 2021-09-17 PROCEDURE — 90750 HZV VACC RECOMBINANT IM: CPT | Performed by: INTERNAL MEDICINE

## 2021-09-17 PROCEDURE — 99214 OFFICE O/P EST MOD 30 MIN: CPT | Mod: 25 | Performed by: INTERNAL MEDICINE

## 2021-09-17 RX ORDER — LEVOTHYROXINE SODIUM 50 UG/1
50 TABLET ORAL DAILY
Qty: 90 TABLET | Refills: 0 | Status: SHIPPED | OUTPATIENT
Start: 2021-09-17 | End: 2021-12-31

## 2021-09-17 RX ORDER — LIRAGLUTIDE 6 MG/ML
1.8 INJECTION SUBCUTANEOUS DAILY
Qty: 27 ML | Refills: 0 | Status: SHIPPED | OUTPATIENT
Start: 2021-09-17 | End: 2021-11-29

## 2021-09-17 ASSESSMENT — MIFFLIN-ST. JEOR: SCORE: 2071.78

## 2021-09-17 NOTE — PROGRESS NOTES
Assessment & Plan     (E11.59,  Z79.4) Type 2 diabetes mellitus with other circulatory complication, with long-term current use of insulin (H)  (primary encounter diagnosis)  Comment: Doing well on current medications, continue the same.  Avoid hypoglycemia.  Strong recommend continued use the continuous glucose monitor as this is been invaluable in achieving better blood sugar control.  Discussed importance in compliance in all areas of diabetic control including diet, routine BS checks, absolute medication compliance, laboratory monitoring, and attending regular follow up appointments as ordered.  Failure to comply with instructions regarding diabetes will lead to a greater chance of poor diabetic control and therefore a greater chance of diabetes related complications such as CAD, CVA, PVD, and retinopathy/neuropathy/nephropathy.  Based on level of diabetes control: testing frequency THREE TIME PER DAY, but strongly recommend the continuous glucose monitor more frequent monitoring.  Plan: liraglutide (VICTOZA PEN) 18 MG/3ML solution,         metFORMIN (GLUCOPHAGE) 1000 MG tablet, insulin         detemir (LEVEMIR FLEXTOUCH) 100 UNIT/ML pen            (I47.2) Paroxysmal ventricular tachycardia (H)  Comment: This condition is currently controlled on the current medical regimen.  Continue current therapy.   Continue all recommendations as made by the cardiology staff.  Plan:     (I42.8) Non-ischemic cardiomyopathy (H)  Comment: This condition is currently controlled on the current medical regimen.  Continue current therapy.   Plan:     (E66.01) Severe obesity (BMI 35.0-39.9) with comorbidity (H)  Comment: Current BMI is: Body mass index is 36.89 kg/m ..  Reviewed weight patterns.   Obesity as a biological preventable and treatable disease, which is associated with significantly increased risk of many acute and chronic health conditions.   Obesity has now been recognized as a chronic disease by the American  Medical Association.  Obesity has serious co-morbidities associated with obesity including increased risk for hypertension, stroke, coronary artery disease, dyslipidemia, Type II diabetes, depression, sleep apnea, cancers of the colon, breast and endometrium, obstructive sleep apnea, osteoarthritis and female infertility.  Recommended regular aerobic exercise, recommended improved diet aiming at lowering amount of processed foods, lower sugars, moderation/reduction of simple carbs and fat in the diet, establishing more regular meal times, always eating breakfast, front loading some of the calories and adding more protein to the diet.        Plan:     (I10) Benign essential hypertension  Comment: This condition is currently controlled on the current medical regimen.  Continue current therapy.   Plan:     (E03.9) Subclinical hypothyroidism  Comment: This condition is currently controlled on the current medical regimen.  Continue current therapy.   Plan: levothyroxine (SYNTHROID/LEVOTHROID) 50 MCG         tablet            (Z23) Need for influenza vaccination  Comment:   Plan: CANCELED: INFLUENZA QUAD, PF (RIV4) (FLUBLOK)            (Z23) Need for shingles vaccine  Comment:   Plan: ZOSTER VACCINE RECOMBINANT ADJUVANTED IM NJX                        BMI:   Estimated body mass index is 36.89 kg/m  as calculated from the following:    Height as of this encounter: 1.829 m (6').    Weight as of this encounter: 123.4 kg (272 lb).           Return in about 6 months (around 3/17/2022) for Diabetes, Blood pressure, with pre-visit fasting labs.    Jefry Harris MD  [facility]    Milton Blackmon is a 61 year old who presents for the following health issues     HPI   OV with Dr. Bernal 9-8-21-Last seen by Dr. Harris 6-1-21  Diabetes Follow-up    How often are you checking your blood sugar? Two times daily  Blood sugar testing frequency justification:  uses [employer] CGM  What time of day  are you checking your blood sugars (select all that apply)?  Before meals and At bedtime  Have you had any blood sugars above 200?  Yes previously 4 months ago  Have you had any blood sugars below 70?  No    What symptoms do you notice when your blood sugar is low?  None    What concerns do you have today about your diabetes? None     Do you have any of these symptoms? (Select all that apply)  No numbness or tingling in feet.  No redness, sores or blisters on feet.  No complaints of excessive thirst.  No reports of blurry vision.  No significant changes to weight.      BP Readings from Last 2 Encounters:   09/17/21 100/56   09/08/21 114/62     Hemoglobin A1C (%)   Date Value   09/15/2021 6.9 (H)   05/26/2021 8.8 (H)   12/11/2020 7.1 (H)     LDL Cholesterol Calculated (mg/dL)   Date Value   07/22/2020 51   11/14/2019 58                 How many servings of fruits and vegetables do you eat daily?  4 or more    On average, how many sweetened beverages do you drink each day (Examples: soda, juice, sweet tea, etc.  Do NOT count diet or artificially sweetened beverages)?   0    How many days per week do you exercise enough to make your heart beat faster? 5  At work-walking    How many minutes a day do you exercise enough to make your heart beat faster? 10 - 19    How many days per week do you miss taking your medication? 0    2.     Hypertension:  History of hypertension, on medication.  No reported side effects from medications.    Reviewed last 6 BP readings in chart:  BP Readings from Last 6 Encounters:   09/17/21 100/56   09/08/21 114/62   08/27/21 121/72   06/01/21 106/60   12/15/20 112/71   12/14/20 96/64     No active cardiac complaints or symptoms with exercise.     3.  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  Longstanding course of prednisone, finally tapered off.  No signs or symptoms of heart failure at this time.    Wt Readings from Last 10 Encounters:   09/17/21 123.4 kg (272 lb)   09/08/21 124.3 kg (274 lb)    08/27/21 124.7 kg (275 lb)   06/01/21 125.6 kg (276 lb 14.4 oz)   12/15/20 130.6 kg (288 lb)   12/14/20 129.6 kg (285 lb 12.8 oz)   09/16/20 127.2 kg (280 lb 6.4 oz)   08/19/20 128.5 kg (283 lb 6.4 oz)   07/20/20 129.4 kg (285 lb 3.2 oz)   05/27/20 122.1 kg (269 lb 3.2 oz)       4.  History of ventricular tachycardia, status post AICD implanted.  Reviewed most recent cardiology note, interrogation of his device showed a brief episode of nonsustained ventricular tachycardia for 5 beats in August.      5.  History of hypothyroidism.  On replacement therapy.  He has not experienced any significant side effects of this medication.   The patient denies of fatigue, weight changes, heat/cold intolerance, hair changes, nail changes, bowel changes.     Latest labs reviewed:    Lab Results   Component Value Date    TSH 5.04 09/15/2021    TSH 4.22 08/27/2021    TSH 1.51 03/10/2020    TSH 7.52 12/09/2019    TSH 8.98 11/14/2019    TSH 8.05 11/04/2019    TSH 7.68 10/23/2019    TSH 9.74 10/17/2019    TSH 6.88 10/09/2019    TSH 3.50 10/03/2018    TSH 1.78 06/11/2018    TSH 2.59 01/16/2017    TSH 4.04 01/11/2016    TSH 1.76 01/26/2015    TSH 2.03 02/14/2013    TSH 3.34 08/22/2011    TSH 2.04 12/15/2005           **I reviewed the information recorded in the patient's EPIC chart (including but not limited to medical history, surgical history, family history, problem list, medication list, and allergy list) and updated the information as indicated based on the patients reported information.         Review of Systems   Constitutional, HEENT, cardiovascular, pulmonary, gi and gu systems are negative, except as otherwise noted.      Objective    /56   Pulse 60   Temp 98.1  F (36.7  C) (Tympanic)   Ht 1.829 m (6')   Wt 123.4 kg (272 lb)   SpO2 99%   BMI 36.89 kg/m    Body mass index is 36.89 kg/m .  Physical Exam   GENERAL alert and no distress  EYES:  Normal sclera,conjunctiva, EOMI  HENT: oral and posterior pharynx without  lesions or erythema, facies symmetric  NECK: Neck supple. No LAD, without thyroidmegaly.  RESP: Clear to ausculation bilaterally without wheezes or crackles. Normal BS in all fields.  CV: RRR normal S1S2 without murmurs, rubs or gallops.  LYMPH: no cervical lymph adenopathy appreciated  MS: extremities- no gross deformities of the visible extremities noted,   EXT:  no lower extremity edema  PSYCH: Alert and oriented times 3; speech- coherent  SKIN:  No obvious significant skin lesions on visible portions of face

## 2021-09-17 NOTE — PATIENT INSTRUCTIONS
*  Continue all medications at the same doses.  Contact your usual pharmacy if you need refills.     *  Follow up as planned with the Cardiology CLinic.     *  Return to see me in 6 months, sooner if needed.  Use [facility] or Call [phone] to schedule the appointment with me.         5 GOALS IN MANAGING DIABETES (i.e. to give the best chance to prevent diabetic complications and vascular disease):     1.  Aggressive diabetic management.  The target for A1C (3 months average blood sugar) is ideally below 6.5, preferably below 7.5    Your diabetes is under good control.      Lab Results   Component Value Date    A1C 6.9 09/15/2021    A1C 8.8 05/26/2021    A1C 7.1 12/11/2020    A1C 8.8 07/22/2020    A1C 7.0 11/14/2019    A1C 8.8 08/26/2019    A1C 9.7 04/10/2019    A1C 8.7 10/03/2018    A1C 7.6 06/11/2018    A1C 6.9 12/01/2017    A1C 9.3 05/15/2017    A1C 8.7 01/16/2017    A1C 8.2 10/19/2016    A1C 8.7 06/30/2016    A1C 9.3 01/11/2016    A1C 7.7 07/17/2015    A1C 6.8 01/26/2015    A1C 6.4 10/16/2014    A1C 8.9 06/11/2014    A1C 7.8 12/30/2013    A1C 6.8 08/02/2013    A1C 7.0 02/14/2013    A1C 7.1 08/10/2012    A1C 8.4 03/30/2012    A1C 8.9 08/22/2011    A1C 8.4 03/08/2011    A1C 6.8 10/16/2010    A1C 6.8 04/09/2010    A1C 7.0 05/21/2009    A1C 7.0 12/23/2008    A1C 6.6 09/03/2008    A1C 7.1 06/09/2008    A1C 7.0 02/15/2008    A1C 7.2 10/24/2007    A1C 7.2 06/29/2007    A1C 6.9 03/29/2007    A1C 6.4 12/07/2006    A1C 6.6 06/01/2006    A1C 8.2 02/27/2006    A1C 8.3 12/15/2005    A1C 7.3 03/22/2005    A1C 7.3 11/29/2004    A1C 7.4 08/05/2004    A1C 6.9 10/27/2003    A1C 6.0 12/24/2002    A1C 6.0 08/07/2002       2.  Aggressive blood pressure control, under 130/80 ideally.  Using medications if needed.    Your blood pressure is under good control    BP Readings from Last 4 Encounters:   09/17/21 100/56   09/08/21 114/62   08/27/21 121/72   06/01/21 106/60       3.  Aggressive LDL cholesterol (bad cholesterol) lowering as  "needed.  Your goal is an LDL under 100 for sure, preferably under 70.     *  All patients with diabetes are recommended to be on a \"statin\" cholesterol lowering medication regardless of the cholesterol levels to give the best chance at avoiding vascular disease.      New guidelines identify four high-risk groups who could benefit from statins:   *people with pre-existing heart disease, such as those who have had a heart attack;   *people ages 40 to 75 who have diabetes of any type  *patients ages 40 to 75 with at least a 7.5% risk of developing cardiovascular disease over the next decade, according to a formula described in the guidelines  *patients with the sort of super-high cholesterol that sometimes runs in families, as evidenced by an LDL of 190 milligrams per deciliter or higher    *  Your cholesterol levels are well controlled.    Recent Labs   Lab Test 07/22/20  0913 11/14/19  0953 01/11/16  0727 01/26/15  0920 12/30/13  1315 12/30/13  1315   CHOL 140 124   < > 126   < > 156   HDL 64 43   < > 45   < > 41   LDL 51 58   < > 55   < > Cannot estimate LDL when triglyceride exceeds 400 mg/dL  86   TRIG 123 116   < > 128   < > 435*   CHOLHDLRATIO  --   --   --  2.8  --  3.8    < > = values in this interval not displayed.       4.  No smoking    5.  Aspirin 81 mg tablet once per day, every day over age 50 unless there is a specific reason that you cannot take aspirin.       *You should take Aspirin 81 mg once per day, unless you have a reason NOT to take aspirin (i.e. side effect, excessive bruising or bleeding, taking another \"blood tinning\" medication, etc.)       DIABETES REMINDERS:   1. Check your blood glucose regularly either with standard glucometer or personal continuous glucose monitor.    2) Your blood sugar goals:  before eating and  two hours after eating.  If using personal continuous glucose monitor, the goal is Time in the Target range ( ) of 70-75% with very few (under 2%) Below " target.    3) Always be prepared to treat low blood sugar symptoms should it happen. Keep a sugar-containing beverage or food nearby.  4) When to call your clinic:     Blood sugar over 400     If you have 2 to 3 low blood sugars (under 70) in a row    Low readings the same time of day several days in a row  5) When to call 911: If your low blood glucose symptoms do not get better with treatment, or if you/someone else is unable to give you treatment.  6) Work with a Certified Diabetes Educator to assist you with your diabetes management  7) Contact us if you have ANY questions about your medications or instructions, or have problems with getting prescriptions filled.

## 2021-10-01 ENCOUNTER — TRANSFERRED RECORDS (OUTPATIENT)
Dept: MULTI SPECIALTY CLINIC | Facility: CLINIC | Age: 61
End: 2021-10-01
Payer: COMMERCIAL

## 2021-10-01 LAB — RETINOPATHY: NORMAL

## 2021-10-19 DIAGNOSIS — E11.9 TYPE 2 DIABETES MELLITUS WITHOUT COMPLICATION, WITHOUT LONG-TERM CURRENT USE OF INSULIN (H): ICD-10-CM

## 2021-10-19 RX ORDER — FENOFIBRATE 160 MG/1
TABLET ORAL
Qty: 90 TABLET | Refills: 1 | Status: SHIPPED | OUTPATIENT
Start: 2021-10-19 | End: 2022-03-28

## 2021-10-19 NOTE — TELEPHONE ENCOUNTER
Routing refill request to provider for review/approval because:  Labs not current:    LDL Cholesterol Calculated   Date Value Ref Range Status   07/22/2020 51 <100 mg/dL Final     Comment:     Desirable:       <100 mg/dl       Tiarra Taylor RN

## 2021-10-20 ENCOUNTER — OFFICE VISIT (OUTPATIENT)
Dept: URGENT CARE | Facility: URGENT CARE | Age: 61
End: 2021-10-20
Payer: COMMERCIAL

## 2021-10-20 VITALS
SYSTOLIC BLOOD PRESSURE: 122 MMHG | OXYGEN SATURATION: 98 % | HEART RATE: 60 BPM | WEIGHT: 272 LBS | DIASTOLIC BLOOD PRESSURE: 80 MMHG | HEIGHT: 72 IN | TEMPERATURE: 96.9 F | BODY MASS INDEX: 36.84 KG/M2

## 2021-10-20 DIAGNOSIS — L03.031 CELLULITIS OF GREAT TOE, RIGHT: ICD-10-CM

## 2021-10-20 DIAGNOSIS — L03.032 CELLULITIS OF THIRD TOE, LEFT: Primary | ICD-10-CM

## 2021-10-20 PROCEDURE — 99213 OFFICE O/P EST LOW 20 MIN: CPT | Performed by: PHYSICIAN ASSISTANT

## 2021-10-20 RX ORDER — CEPHALEXIN 500 MG/1
500 CAPSULE ORAL 3 TIMES DAILY
Qty: 21 CAPSULE | Refills: 0 | Status: SHIPPED | OUTPATIENT
Start: 2021-10-20 | End: 2021-10-28

## 2021-10-20 ASSESSMENT — MIFFLIN-ST. JEOR: SCORE: 2071.78

## 2021-10-20 NOTE — PROGRESS NOTES
URGENT CARE VISIT:    SUBJECTIVE:   Chief Complaint   Patient presents with     Urgent Care     Musculoskeletal Problem     purple around right great toe and some on left 5th toe x 1 week.       Neymar Rhodes is a 61 year old male who presents with a chief complaint of right great toe and left 3rd toe pain and redness.  Symptoms began 1 week(s) ago, are mild and worsening  No known injury.  Pain exacerbated by weight-bearing. Relieved by rest.  He treated it initially with epson salt soaks. This is the first time this type of injury has occurred to this patient.     PMH:   Past Medical History:   Diagnosis Date     Ascending aorta dilatation (H)      Diverticulosis of colon (without mention of hemorrhage)      Essential hypertension, benign      Gout, unspecified      LBBB (left bundle branch block)      Morbid obesity (H)      Non-ischemic cardiomyopathy (H)      Nonrheumatic mitral valve regurgitation      NSTEMI (non-ST elevated myocardial infarction) (H)     cardiac cath 6/2018: mild non-obstructive CAD     Other and unspecified hyperlipidemia      Type II or unspecified type diabetes mellitus without mention of complication, not stated as uncontrolled      Allergies: No known drug allergies   Medications:   Current Outpatient Medications   Medication Sig Dispense Refill     acetaminophen (TYLENOL) 500 MG tablet Take 500-1,000 mg by mouth every 6 hours as needed for mild pain       amiodarone (PACERONE) 400 MG tablet Take 1 tablet (400 mg) by mouth daily 90 tablet 2     aspirin 81 MG tablet Take 1 tablet (81 mg) by mouth daily 30 tablet      atorvastatin (LIPITOR) 80 MG tablet Take 1/2 tablet (40 mg) by mouth daily 45 tablet 2     cephALEXin (KEFLEX) 500 MG capsule Take 1 capsule (500 mg) by mouth 3 times daily for 7 days 21 capsule 0     ENTRESTO 49-51 MG per tablet Take 1 tablet by mouth 2 times daily 180 tablet 1     fenofibrate (TRIGLIDE/LOFIBRA) 160 MG tablet TAKE ONE TABLET BY MOUTH ONE TIME DAILY 90  tablet 1     furosemide (LASIX) 20 MG tablet Take 1 tablet (20 mg) by mouth 2 times daily (Patient taking differently: Take 20 mg by mouth daily ) 60 tablet 11     glipiZIDE (GLUCOTROL XL) 10 MG 24 hr tablet TAKE 1 TABLET (10 MG) BY MOUTH DAILY. TAKE WITH LARGEST MEAL OF THE DAY. ALWAYS TAKE WITH FOOD 90 tablet 2     insulin detemir (LEVEMIR FLEXTOUCH) 100 UNIT/ML pen Inject 25 Units Subcutaneous daily 30 mL 1     levothyroxine (SYNTHROID/LEVOTHROID) 50 MCG tablet Take 1 tablet (50 mcg) by mouth daily 90 tablet 0     liraglutide (VICTOZA PEN) 18 MG/3ML solution Inject 1.8 mg Subcutaneous daily 27 mL 0     metFORMIN (GLUCOPHAGE) 1000 MG tablet Take 1 tablet (1,000 mg) by mouth 2 times daily (with meals) 180 tablet 0     metoprolol succinate ER (TOPROL-XL) 25 MG 24 hr tablet Take 1 tablet (25 mg) by mouth daily 90 tablet 2     albuterol (PROAIR HFA/PROVENTIL HFA/VENTOLIN HFA) 108 (90 Base) MCG/ACT Inhaler Inhale 2 puffs into the lungs every 6 hours as needed for shortness of breath / dyspnea or wheezing (Patient not taking: Reported on 9/17/2021)       blood glucose monitoring (ACCU-CHEK LUL PLUS) test strip Use to test blood sugar 1-3 times daily or as directed. (Patient not taking: Reported on 9/17/2021) 100 each 11     celecoxib (CELEBREX) 100 MG capsule Take 1 capsule (100 mg) by mouth 2 times daily as needed for moderate pain 30 capsule 1     clotrimazole (LOTRIMIN) 1 % external cream Apply sparingly once or twice per day as needed to affected area until the skin is better, then stop; REPEAT AS NEEDED 30 g 1     Continuous Blood Gluc Sensor (FREESTYLE JOCELIN 14 DAY SENSOR) MISC use 1 sensor every 14 days 6 each 3     continuous blood glucose monitoring (FREESTYLE JOCELIN) sensor For use with Freestyle Jocelin Flash  for continuous monitioring of blood glucose levels. Replace sensor every 10 days. 3 each 3     fluticasone (FLONASE) 50 MCG/ACT nasal spray Spray 2 sprays into both nostrils daily (Patient not  taking: Reported on 9/17/2021) 16 g 0     omeprazole (PRILOSEC) 40 MG DR capsule Take 1 capsule (40 mg) by mouth daily (Patient not taking: Reported on 9/17/2021) 90 capsule 3     triamcinolone (KENALOG) 0.5 % cream Apply sparingly once or twice per day as needed to affected area until the skin is better, then stop 30 g 0     ULTICARE 29G X 12.7MM insulin pen needle Use 3 pen needles daily as directed (one for daily victoza injection, 2 for twice daily levemir injections) 200 each 1     Social History:   Social History     Tobacco Use     Smoking status: Never Smoker     Smokeless tobacco: Never Used   Substance Use Topics     Alcohol use: Yes     Comment: once awhile       ROS:  Review of systems negative except as stated above.    OBJECTIVE:  /80   Pulse 60   Temp 96.9  F (36.1  C) (Tympanic)   Ht 1.829 m (6')   Wt 123.4 kg (272 lb)   SpO2 98%   BMI 36.89 kg/m    GENERAL APPEARANCE: healthy, alert and no distress  MUSCULOSKELETAL: mild TTP over medial nail fold of right great toe. No TTP over left 3rd toe.   EXTREMITIES: peripheral pulses normal  SKIN: mild erythema over medial nail fold of right great toe and surrounding proximal nail fold of left 3rd toe. No drainage noted. No ingrown toenail.  NEURO: sensation intact.      ASSESSMENT:    ICD-10-CM    1. Cellulitis of third toe, left  L03.032 cephALEXin (KEFLEX) 500 MG capsule   2. Cellulitis of great toe, right  L03.031        PLAN:  Patient Instructions   Patient was educated on the natural course of condition. Infection vs irritation from shoes. Will treat for infection preemptively due to diabetes. Take medication as prescribed. Side effects discussed.  Conservative measures discussed including epson salt soaks and over-the-counter analgesics (Tylenol). Observe carefully for any signs of increased redness or pain in the affected area. See your primary care provider if symptoms worsen or do not improve in 3 days. Seek emergency care if you develop  severe pain, streaking, or fever.     Patient verbalized understanding and is agreeable to plan. The patient was discharged ambulatory and in stable condition.    Amena Gamboa PA-C on 10/20/2021 at 5:22 PM

## 2021-10-20 NOTE — PATIENT INSTRUCTIONS
Patient was educated on the natural course of condition. Take medication as prescribed. Side effects discussed.  Conservative measures discussed including epson salt soaks and over-the-counter analgesics (Tylenol). Observe carefully for any signs of increased redness or pain in the affected area. See your primary care provider if symptoms worsen or do not improve in 3 days. Seek emergency care if you develop severe pain, streaking, or fever.

## 2021-10-28 DIAGNOSIS — L03.032 CELLULITIS OF THIRD TOE, LEFT: ICD-10-CM

## 2021-10-28 RX ORDER — CEPHALEXIN 500 MG/1
500 CAPSULE ORAL 3 TIMES DAILY
Qty: 21 CAPSULE | Refills: 0 | Status: SHIPPED | OUTPATIENT
Start: 2021-10-28 | End: 2022-01-01

## 2021-10-29 ENCOUNTER — LAB (OUTPATIENT)
Dept: LAB | Facility: CLINIC | Age: 61
End: 2021-10-29
Payer: COMMERCIAL

## 2021-10-29 DIAGNOSIS — Z51.81 ENCOUNTER FOR MONITORING AMIODARONE THERAPY: ICD-10-CM

## 2021-10-29 DIAGNOSIS — Z79.899 ENCOUNTER FOR MONITORING AMIODARONE THERAPY: ICD-10-CM

## 2021-10-29 LAB
ALT SERPL W P-5'-P-CCNC: 82 U/L (ref 0–70)
AST SERPL W P-5'-P-CCNC: 92 U/L (ref 0–45)
T4 FREE SERPL-MCNC: 1.36 NG/DL (ref 0.76–1.46)
TSH SERPL DL<=0.005 MIU/L-ACNC: 5.9 MU/L (ref 0.4–4)

## 2021-10-29 PROCEDURE — 84450 TRANSFERASE (AST) (SGOT): CPT | Performed by: NURSE PRACTITIONER

## 2021-10-29 PROCEDURE — 36415 COLL VENOUS BLD VENIPUNCTURE: CPT | Performed by: NURSE PRACTITIONER

## 2021-10-29 PROCEDURE — 84443 ASSAY THYROID STIM HORMONE: CPT | Performed by: NURSE PRACTITIONER

## 2021-10-29 PROCEDURE — 84439 ASSAY OF FREE THYROXINE: CPT | Performed by: NURSE PRACTITIONER

## 2021-10-29 PROCEDURE — 84460 ALANINE AMINO (ALT) (SGPT): CPT | Performed by: NURSE PRACTITIONER

## 2021-11-03 ENCOUNTER — HOSPITAL ENCOUNTER (OUTPATIENT)
Dept: CARDIAC REHAB | Facility: CLINIC | Age: 61
End: 2021-11-03
Attending: NURSE PRACTITIONER
Payer: COMMERCIAL

## 2021-11-03 DIAGNOSIS — Z79.899 ON AMIODARONE THERAPY: ICD-10-CM

## 2021-11-03 DIAGNOSIS — I42.8 NON-ISCHEMIC CARDIOMYOPATHY (H): ICD-10-CM

## 2021-11-03 PROCEDURE — 94375 RESPIRATORY FLOW VOLUME LOOP: CPT

## 2021-11-03 PROCEDURE — 94729 DIFFUSING CAPACITY: CPT

## 2021-11-03 PROCEDURE — 94726 PLETHYSMOGRAPHY LUNG VOLUMES: CPT

## 2021-11-17 LAB
DLCOUNC-%PRED-PRE: 80 %
DLCOUNC-PRE: 22.76 ML/MIN/MMHG
DLCOUNC-PRED: 28.13 ML/MIN/MMHG
ERV-%PRED-PRE: 64 %
ERV-PRE: 0.47 L
ERV-PRED: 0.73 L
EXPTIME-PRE: 7.27 SEC
FEF2575-%PRED-PRE: 92 %
FEF2575-PRE: 2.72 L/SEC
FEF2575-PRED: 2.94 L/SEC
FEFMAX-%PRED-PRE: 50 %
FEFMAX-PRE: 4.66 L/SEC
FEFMAX-PRED: 9.3 L/SEC
FEV1-%PRED-PRE: 70 %
FEV1-PRE: 2.56 L
FEV1FEV6-PRE: 82 %
FEV1FEV6-PRED: 79 %
FEV1FVC-PRE: 82 %
FEV1FVC-PRED: 77 %
FEV1SVC-PRE: 81 %
FEV1SVC-PRED: 71 %
FIFMAX-PRE: 4.43 L/SEC
FRCPLETH-%PRED-PRE: 67 %
FRCPLETH-PRE: 2.48 L
FRCPLETH-PRED: 3.69 L
FVC-%PRED-PRE: 66 %
FVC-PRE: 3.12 L
FVC-PRED: 4.72 L
IC-%PRED-PRE: 61 %
IC-PRE: 2.68 L
IC-PRED: 4.39 L
RVPLETH-%PRED-PRE: 80 %
RVPLETH-PRE: 2 L
RVPLETH-PRED: 2.5 L
TLCPLETH-%PRED-PRE: 70 %
TLCPLETH-PRE: 5.16 L
TLCPLETH-PRED: 7.33 L
VA-%PRED-PRE: 77 %
VA-PRE: 5.22 L
VC-%PRED-PRE: 61 %
VC-PRE: 3.16 L
VC-PRED: 5.12 L

## 2021-11-27 DIAGNOSIS — I42.8 NON-ISCHEMIC CARDIOMYOPATHY (H): ICD-10-CM

## 2021-11-27 DIAGNOSIS — Z79.4 TYPE 2 DIABETES MELLITUS WITH OTHER CIRCULATORY COMPLICATION, WITH LONG-TERM CURRENT USE OF INSULIN (H): ICD-10-CM

## 2021-11-27 DIAGNOSIS — E11.59 TYPE 2 DIABETES MELLITUS WITH OTHER CIRCULATORY COMPLICATION, WITH LONG-TERM CURRENT USE OF INSULIN (H): ICD-10-CM

## 2021-11-27 DIAGNOSIS — E11.9 TYPE 2 DIABETES MELLITUS WITHOUT COMPLICATION, WITHOUT LONG-TERM CURRENT USE OF INSULIN (H): ICD-10-CM

## 2021-11-29 DIAGNOSIS — I42.8 NON-ISCHEMIC CARDIOMYOPATHY (H): ICD-10-CM

## 2021-11-29 RX ORDER — SACUBITRIL AND VALSARTAN 49; 51 MG/1; MG/1
TABLET, FILM COATED ORAL
Qty: 180 TABLET | Refills: 0 | OUTPATIENT
Start: 2021-11-29

## 2021-11-29 RX ORDER — LIRAGLUTIDE 6 MG/ML
INJECTION SUBCUTANEOUS
Qty: 27 ML | Refills: 0 | Status: SHIPPED | OUTPATIENT
Start: 2021-11-29 | End: 2022-03-18

## 2021-11-29 RX ORDER — GLIPIZIDE 10 MG/1
10 TABLET, FILM COATED, EXTENDED RELEASE ORAL DAILY
Qty: 90 TABLET | Refills: 0 | Status: SHIPPED | OUTPATIENT
Start: 2021-11-29 | End: 2022-03-01

## 2021-11-29 RX ORDER — SACUBITRIL AND VALSARTAN 49; 51 MG/1; MG/1
1 TABLET, FILM COATED ORAL 2 TIMES DAILY
Qty: 180 TABLET | Refills: 0 | Status: SHIPPED | OUTPATIENT
Start: 2021-11-29 | End: 2022-03-02

## 2021-12-03 ENCOUNTER — ANCILLARY PROCEDURE (OUTPATIENT)
Dept: GENERAL RADIOLOGY | Facility: CLINIC | Age: 61
End: 2021-12-03
Attending: FAMILY MEDICINE
Payer: COMMERCIAL

## 2021-12-03 ENCOUNTER — OFFICE VISIT (OUTPATIENT)
Dept: URGENT CARE | Facility: URGENT CARE | Age: 61
End: 2021-12-03
Payer: COMMERCIAL

## 2021-12-03 VITALS
OXYGEN SATURATION: 99 % | RESPIRATION RATE: 18 BRPM | DIASTOLIC BLOOD PRESSURE: 60 MMHG | BODY MASS INDEX: 36.89 KG/M2 | SYSTOLIC BLOOD PRESSURE: 122 MMHG | TEMPERATURE: 98.9 F | WEIGHT: 272 LBS | HEART RATE: 75 BPM

## 2021-12-03 DIAGNOSIS — R06.02 SOB (SHORTNESS OF BREATH): ICD-10-CM

## 2021-12-03 DIAGNOSIS — R05.9 COUGH: Primary | ICD-10-CM

## 2021-12-03 PROCEDURE — 71046 X-RAY EXAM CHEST 2 VIEWS: CPT | Performed by: RADIOLOGY

## 2021-12-03 PROCEDURE — U0005 INFEC AGEN DETEC AMPLI PROBE: HCPCS | Performed by: FAMILY MEDICINE

## 2021-12-03 PROCEDURE — U0003 INFECTIOUS AGENT DETECTION BY NUCLEIC ACID (DNA OR RNA); SEVERE ACUTE RESPIRATORY SYNDROME CORONAVIRUS 2 (SARS-COV-2) (CORONAVIRUS DISEASE [COVID-19]), AMPLIFIED PROBE TECHNIQUE, MAKING USE OF HIGH THROUGHPUT TECHNOLOGIES AS DESCRIBED BY CMS-2020-01-R: HCPCS | Performed by: FAMILY MEDICINE

## 2021-12-03 PROCEDURE — 99214 OFFICE O/P EST MOD 30 MIN: CPT | Performed by: FAMILY MEDICINE

## 2021-12-03 RX ORDER — ALBUTEROL SULFATE 90 UG/1
2 AEROSOL, METERED RESPIRATORY (INHALATION) EVERY 6 HOURS
Qty: 18 G | Refills: 0 | Status: SHIPPED | OUTPATIENT
Start: 2021-12-03 | End: 2022-01-01

## 2021-12-05 LAB — SARS-COV-2 RNA RESP QL NAA+PROBE: NEGATIVE

## 2021-12-07 ENCOUNTER — TELEPHONE (OUTPATIENT)
Dept: INTERNAL MEDICINE | Facility: CLINIC | Age: 61
End: 2021-12-07
Payer: COMMERCIAL

## 2021-12-07 NOTE — TELEPHONE ENCOUNTER
He needs to take the diuretic due to the heart failure to prevent fluid building up in his lungs and abdomen.   I hesitate to decrease the dose due to risk of worsening heart failure.   He can try and adjust the time that he takes hte furosemide.    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     I recommend that he ask the Cardiology Clinic if there is an alternative diuretic regimen for him.

## 2021-12-07 NOTE — TELEPHONE ENCOUNTER
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 He reports having to pee often which is difficult because he's stuck on the bus. He's hoping you can change the script?       Patient reports monitoring his BP twice day- BP this am- 122/64.  Reports when sometimes has sock line when he's had his socks on day.          Can we leave a detailed message on this number? YES  Phone number patient can be reached at: Cell number on file:    Telephone Information:   Mobile [phone]       Kassandra Parker RN  [facility] Triage

## 2021-12-08 DIAGNOSIS — M25.512 ACUTE PAIN OF LEFT SHOULDER: ICD-10-CM

## 2021-12-08 DIAGNOSIS — M79.641 PAIN OF RIGHT HAND: ICD-10-CM

## 2021-12-08 RX ORDER — CELECOXIB 100 MG/1
100 CAPSULE ORAL 2 TIMES DAILY PRN
Qty: 30 CAPSULE | Refills: 0 | Status: SHIPPED | OUTPATIENT
Start: 2021-12-08 | End: 2022-01-01

## 2021-12-08 NOTE — TELEPHONE ENCOUNTER
Spoke to patients spouse, Maria Del Carmen Rhodes, and relayed providers message. Wife will give message to patient during work break today.

## 2021-12-08 NOTE — TELEPHONE ENCOUNTER
Routing refill request to provider for review/approval because:  Labs out of range:    Lab Results   Component Value Date    ALT 82 10/29/2021    ALT 67 09/16/2020     AST   Date Value Ref Range Status   10/29/2021 92 (H) 0 - 45 U/L Final   09/16/2020 39 0 - 45 U/L Final     Lab Results   Component Value Date    WBC 6.5 09/16/2020     Lab Results   Component Value Date    RBC 3.81 09/16/2020     Lab Results   Component Value Date    HGB 11.7 08/27/2021    HGB 12.0 09/16/2020     Lab Results   Component Value Date    HCT 37.3 09/16/2020     No components found for: MCT  Lab Results   Component Value Date    MCV 98 09/16/2020     Lab Results   Component Value Date    MCH 31.5 09/16/2020     Lab Results   Component Value Date    MCHC 32.2 09/16/2020     Lab Results   Component Value Date    RDW 14.0 09/16/2020     Lab Results   Component Value Date     09/16/2020       Tiarra Taylor RN

## 2021-12-16 ENCOUNTER — OFFICE VISIT (OUTPATIENT)
Dept: CARDIOLOGY | Facility: CLINIC | Age: 61
End: 2021-12-16
Payer: COMMERCIAL

## 2021-12-16 ENCOUNTER — LAB (OUTPATIENT)
Dept: LAB | Facility: CLINIC | Age: 61
End: 2021-12-16
Payer: COMMERCIAL

## 2021-12-16 ENCOUNTER — ANCILLARY PROCEDURE (OUTPATIENT)
Dept: CARDIOLOGY | Facility: CLINIC | Age: 61
End: 2021-12-16
Attending: INTERNAL MEDICINE
Payer: COMMERCIAL

## 2021-12-16 VITALS
BODY MASS INDEX: 35.76 KG/M2 | HEIGHT: 72 IN | DIASTOLIC BLOOD PRESSURE: 58 MMHG | WEIGHT: 264 LBS | SYSTOLIC BLOOD PRESSURE: 110 MMHG | OXYGEN SATURATION: 97 % | HEART RATE: 60 BPM

## 2021-12-16 DIAGNOSIS — I42.8 NON-ISCHEMIC CARDIOMYOPATHY (H): ICD-10-CM

## 2021-12-16 DIAGNOSIS — Z79.899 ON AMIODARONE THERAPY: ICD-10-CM

## 2021-12-16 DIAGNOSIS — Z95.810 ICD (IMPLANTABLE CARDIOVERTER-DEFIBRILLATOR) IN PLACE: ICD-10-CM

## 2021-12-16 LAB
ANION GAP SERPL CALCULATED.3IONS-SCNC: 4 MMOL/L (ref 3–14)
BUN SERPL-MCNC: 17 MG/DL (ref 7–30)
CALCIUM SERPL-MCNC: 9.1 MG/DL (ref 8.5–10.1)
CHLORIDE BLD-SCNC: 110 MMOL/L (ref 94–109)
CO2 SERPL-SCNC: 25 MMOL/L (ref 20–32)
CREAT SERPL-MCNC: 1 MG/DL (ref 0.66–1.25)
GFR SERPL CREATININE-BSD FRML MDRD: 80 ML/MIN/1.73M2
GLUCOSE BLD-MCNC: 156 MG/DL (ref 70–99)
POTASSIUM BLD-SCNC: 4.3 MMOL/L (ref 3.4–5.3)
SODIUM SERPL-SCNC: 139 MMOL/L (ref 133–144)

## 2021-12-16 PROCEDURE — 99214 OFFICE O/P EST MOD 30 MIN: CPT | Mod: 25 | Performed by: INTERNAL MEDICINE

## 2021-12-16 PROCEDURE — 80048 BASIC METABOLIC PNL TOTAL CA: CPT

## 2021-12-16 PROCEDURE — 36415 COLL VENOUS BLD VENIPUNCTURE: CPT

## 2021-12-16 PROCEDURE — 93284 PRGRMG EVAL IMPLANTABLE DFB: CPT | Performed by: INTERNAL MEDICINE

## 2021-12-16 RX ORDER — ATORVASTATIN CALCIUM 20 MG/1
20 TABLET, FILM COATED ORAL DAILY
Qty: 90 TABLET | Refills: 3 | Status: SHIPPED | OUTPATIENT
Start: 2021-12-16 | End: 2022-01-01

## 2021-12-16 ASSESSMENT — MIFFLIN-ST. JEOR: SCORE: 2035.5

## 2021-12-16 NOTE — PROGRESS NOTES
Service Date: 12/16/2021    CARDIOLOGY CLINIC CONSULTATION    REASON FOR VISIT:  Cardiac sarcoidosis.    HISTORY OF PRESENTING ILLNESS:  Neymar Rhodes is a very pleasant 61-year-old gentleman who was last seen by me in 03/2020.  He has not had regular follow-up over the years.  In any case, he was first seen by Cardiology in 2018 with a new diagnosis of LV dysfunction with an EF of 40%.  He was at that time seen by Dr. Iniguez.  He had a large left bundle branch block.  He had a coronary angiogram that showed nonobstructive disease.  He was put on beta blockers, ACE inhibitors and spironolactone but his potassium went up and did not tolerate mineralocorticoid receptor antagonist.  Subsequently, a cardiac MRI was done for nonischemic cardiomyopathy that showed scarring concerning for sarcoidosis.  In any case, he did not follow up but he remained on medications.  Unfortunately, in 2019, he presented with decompensated heart failure and EF was less than 20% with severe LV dilatation measuring 7 cm.  During the hospital stay, he was seen by me and developed sustained monomorphic VT at about 180 beats per minute and needed to be emergently cardioverted in the hospital.  He was taken again to the cath lab and coronary anatomy was unchanged, again showing nonobstructive disease.  Right heart catheterization showed mildly elevated pressures.  LVEDP was 21.  He had a repeat cardiac MRI that showed late gadolinium enhancement in the septal inferior and lateral basal wall with scarring pattern concerning for non-CAD scarring and scar burden had actually worsened up to 13%.  He was loaded with amiodarone and subsequently received a CRT device on 10/04/2019.  Subsequently, he was referred to the [facility].  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  He was put on prednisone in 12/2019 and took it  for almost a year and a half thereafter.  Subsequent PET scan in 2020 and on 02/2021 have shown complete resolution of inflammation.  He stopped his prednisone as of spring of 2021.  In addition to that, he has continued to have nonsustained VT.  In 12/2020 Dr. Dorman had also mentioned to him about possibility of VT ablation; however, the patient was not interested in pursuing that.  He had opted to continue taking amiodarone.      Today, he is here for routine followup.  He was last seen by Dr. Dorman in 12/2020 and seen by me and Dr. Batres in 05/2020.  He needs closer followup. Today, Neymar denies any new cardiovascular symptoms.  He is NYHA class II.  No syncope, presyncope or anginal symptoms.    PERTINENT CARDIAC MEDICATIONS:  Include:    1.  Aspirin 81.    2.  Atorvastatin 40 mg.    3.  Furosemide 20 mg daily.    4.  Glipizide.    5.  Insulin.    6.  Metformin.    7.  Metoprolol succinate 25 mg daily.    8.  Entresto level 2 dosing b.i.d.  9.  Other noncardiac medications.    PHYSICAL EXAMINATION:    VITAL SIGNS:  Blood pressure is 110/58 with a pulse of 60 and regular.  GENERAL:  Alert, oriented x3, in no acute distress.  NECK:  Supple.  JVP is hard to evaluate.  LUNGS:  Clear.  HEART:  Sounds are regular.  No murmurs, rubs or gallops.  EXTREMITIES:  Warm without edema.  PSYCHIATRIC:  Appropriate affect.  HEENT:  No icterus or pallor.    ASSESSMENT AND PLAN:  Neymar is 61 with a history of cardiac sarcoidosis without any residual inflammation on most recent PET scan as of 02/2021.  He has been taken off prednisone.  As mentioned, he has not had close followup.  First, I recommend getting an echocardiogram to reevaluate his LV function.  I do not think we have much blood pressure room to up-titrate heart failure therapy but if EF persistently remains low and plus he is diabetic, we should give consideration to putting him on SGLT2 inhibitors if there is persistent LV dysfunction.    In regards to his cardiac  sarcoidosis, he remains off prednisone.  I am going to have him follow up with Dr. Batres in the next 3-4 months.  They may consider repeating a PET scan and seeing how he is doing off prednisone.    In regards to his EP issues, I have told him to schedule an appointment with Dr. Dorman.  He is not interested in ablation.  He is young and he is continuing to take 400 mg of amiodarone.  His PFTs show mild restriction and DLCO is mildly reduced.  His ALT level slightly elevated.  This is going to be an issue long term. I explained to him these risks of worsening side effects.  Despite being on amiodarone, he continues to have some nonsustained VT on his device interrogations however.  In any case, I have backed down his atorvastatin dose to 20 mg to minimize interaction to see if his ALT levels improve.      Lastly, he remains on aspirin for mild coronary disease.  He is without angina.  This is a quiet issue.    See me routinely in a year in [facility] but get echocardiogram.  See Dr. Batres and Dr. Dorman within the next 1 month.    Umm Hernandes MD     cc:  Sandie Wisdom, APRN, CNP   [address], [place] [address]    Jayy Dorman MD   [facility] at [address] [place] [id number]    Roberto Batres MD, PhD  [facility]  [address] [facility], [address] [place] [id number]    Umm Hernandes MD        D: 2021   T: 2021   MT: sammy    Name:     JACI WICK  MRN:      [id number]        Account:      [id number]   :      1960           Service Date: 2021       Document: [id number]

## 2021-12-16 NOTE — PROGRESS NOTES
SUBJECTIVE: Neymar Rhodes is a 61 year old male presenting with a chief complaint of cough  and SOB.  Onset of symptoms was day(s) ago.  Course of illness is worsening.    Severity moderate  Current and Associated symptoms: cough  Predisposing factors include None.    Past Medical History:   Diagnosis Date     Ascending aorta dilatation (H)      Diverticulosis of colon (without mention of hemorrhage)      Essential hypertension, benign      Gout, unspecified      LBBB (left bundle branch block)      Morbid obesity (H)      Non-ischemic cardiomyopathy (H)      Nonrheumatic mitral valve regurgitation      NSTEMI (non-ST elevated myocardial infarction) (H)     cardiac cath 6/2018: mild non-obstructive CAD     Other and unspecified hyperlipidemia      Type II or unspecified type diabetes mellitus without mention of complication, not stated as uncontrolled      Allergies   Allergen Reactions     No Known Drug Allergies      Social History     Tobacco Use     Smoking status: Never Smoker     Smokeless tobacco: Never Used   Substance Use Topics     Alcohol use: Yes     Comment: once awhile       ROS:  SKIN: no rash  GI: no vomiting    OBJECTIVE:  /60   Pulse 75   Temp 98.9  F (37.2  C) (Tympanic)   Resp 18   Wt 123.4 kg (272 lb)   SpO2 99%   BMI 36.89 kg/m  GENERAL APPEARANCE: healthy, alert and no distress  EYES: EOMI,  PERRL, conjunctiva clear  HENT: ear canals and TM's normal.  Nose and mouth without ulcers, erythema or lesions  RESP: lungs clear to auscultation - no rales, rhonchi or wheezes  SKIN: no suspicious lesions or rashes    Xray without acute findings, no pneumonia read by Christopher Sandoval D.O.      ICD-10-CM    1. Cough  R05.9 XR Chest 2 Views     Symptomatic COVID-19 Virus (Coronavirus) by PCR     Symptomatic COVID-19 Virus (Coronavirus) by PCR Nose   2. SOB (shortness of breath)  R06.02 XR Chest 2 Views     Symptomatic COVID-19 Virus (Coronavirus) by PCR     Symptomatic COVID-19 Virus  (Coronavirus) by PCR Nose     albuterol (PROAIR HFA) 108 (90 Base) MCG/ACT inhaler       Fluids/Rest, f/u if worse/not any better

## 2021-12-16 NOTE — LETTER
12/16/2021    Jefry Harris MD  [address] [facility] [address]    RE: Neymar Rhodes       Dear Colleague,     I had the pleasure of seeing Neymar Rhodes in the [facility].  HPI and Plan:   See dictation [id number]    Orders Placed This Encounter   Procedures     Follow-Up with Electrophysiologist     Follow-Up with [facility] - Return MD visit     Echocardiogram Complete     Orders Placed This Encounter   Medications     atorvastatin (LIPITOR) 20 MG tablet     Sig: Take 1 tablet (20 mg) by mouth daily     Dispense:  90 tablet     Refill:  3     Medications Discontinued During This Encounter   Medication Reason     atorvastatin (LIPITOR) 80 MG tablet          Encounter Diagnoses   Name Primary?     Non-ischemic cardiomyopathy (H)      On amiodarone therapy        CURRENT MEDICATIONS:  Current Outpatient Medications   Medication Sig Dispense Refill     acetaminophen (TYLENOL) 500 MG tablet Take 500-1,000 mg by mouth every 6 hours as needed for mild pain       albuterol (PROAIR HFA) 108 (90 Base) MCG/ACT inhaler Inhale 2 puffs into the lungs every 6 hours 18 g 0     amiodarone (PACERONE) 400 MG tablet Take 1 tablet (400 mg) by mouth daily 90 tablet 2     aspirin 81 MG tablet Take 1 tablet (81 mg) by mouth daily 30 tablet      atorvastatin (LIPITOR) 20 MG tablet Take 1 tablet (20 mg) by mouth daily 90 tablet 3     celecoxib (CELEBREX) 100 MG capsule Take 1 capsule (100 mg) by mouth 2 times daily as needed for moderate pain 30 capsule 0     cephALEXin (KEFLEX) 500 MG capsule Take 1 capsule (500 mg) by mouth 3 times daily 21 capsule 0     clotrimazole (LOTRIMIN) 1 % external cream Apply sparingly once or twice per day as needed to affected area until the skin is better, then stop; REPEAT AS NEEDED 30 g 1     Continuous Blood Gluc Sensor (FREESTYLE JOCELIN 14 DAY SENSOR) MISC use 1 sensor every 14 days 6 each 3     continuous blood glucose monitoring (FREESTYLE JOCELIN) sensor For use  with Freestyle Jeffrey Flash  for continuous monitioring of blood glucose levels. Replace sensor every 10 days. 3 each 3     fenofibrate (TRIGLIDE/LOFIBRA) 160 MG tablet TAKE ONE TABLET BY MOUTH ONE TIME DAILY 90 tablet 1     fluticasone (FLONASE) 50 MCG/ACT nasal spray Spray 2 sprays into both nostrils daily 16 g 0     furosemide (LASIX) 20 MG tablet Take 1 tablet (20 mg) by mouth 2 times daily (Patient taking differently: Take 20 mg by mouth daily ) 60 tablet 11     glipiZIDE (GLUCOTROL XL) 10 MG 24 hr tablet TAKE 1 TABLET (10 MG) BY MOUTH DAILY. TAKE WITH LARGEST MEAL OF THE DAY. ALWAYS TAKE WITH FOOD 90 tablet 0     insulin detemir (LEVEMIR FLEXTOUCH) 100 UNIT/ML pen Inject 25 Units Subcutaneous daily 30 mL 1     levothyroxine (SYNTHROID/LEVOTHROID) 50 MCG tablet Take 1 tablet (50 mcg) by mouth daily 90 tablet 0     metFORMIN (GLUCOPHAGE) 1000 MG tablet Take 1 tablet (1,000 mg) by mouth 2 times daily (with meals) 180 tablet 0     metoprolol succinate ER (TOPROL-XL) 25 MG 24 hr tablet Take 1 tablet (25 mg) by mouth daily 90 tablet 2     sacubitril-valsartan (ENTRESTO) 49-51 MG per tablet Take 1 tablet by mouth 2 times daily 180 tablet 0     triamcinolone (KENALOG) 0.5 % cream Apply sparingly once or twice per day as needed to affected area until the skin is better, then stop 30 g 0     ULTICARE 29G X 12.7MM insulin pen needle Use 3 pen needles daily as directed (one for daily victoza injection, 2 for twice daily levemir injections) 200 each 1     VICTOZA PEN 18 MG/3ML soln Inject 1.8 mg Subcutaneously daily 27 mL 0     albuterol (PROAIR HFA/PROVENTIL HFA/VENTOLIN HFA) 108 (90 Base) MCG/ACT Inhaler Inhale 2 puffs into the lungs every 6 hours as needed for shortness of breath / dyspnea or wheezing (Patient not taking: Reported on 9/17/2021)       blood glucose monitoring (ACCU-CHEK LUL PLUS) test strip Use to test blood sugar 1-3 times daily or as directed. (Patient not taking: Reported on 9/17/2021) 100  each 11     omeprazole (PRILOSEC) 40 MG DR capsule Take 1 capsule (40 mg) by mouth daily (Patient not taking: Reported on 9/17/2021) 90 capsule 3       ALLERGIES     Allergies   Allergen Reactions     No Known Drug Allergies        PAST MEDICAL HISTORY:  Past Medical History:   Diagnosis Date     Ascending aorta dilatation (H)      Diverticulosis of colon (without mention of hemorrhage)      Essential hypertension, benign      Gout, unspecified      LBBB (left bundle branch block)      Morbid obesity (H)      Non-ischemic cardiomyopathy (H)      Nonrheumatic mitral valve regurgitation      NSTEMI (non-ST elevated myocardial infarction) (H)     cardiac cath 6/2018: mild non-obstructive CAD     Other and unspecified hyperlipidemia      Type II or unspecified type diabetes mellitus without mention of complication, not stated as uncontrolled        PAST SURGICAL HISTORY:  Past Surgical History:   Procedure Laterality Date     C APPENDECTOMY  1980's     CV CORONARY ANGIOGRAM N/A 10/2/2019    Procedure: Coronary Angiogram;  Surgeon: Kathy Almaguer MD;  Location: [facility] CARDIAC CATH LAB     CV LEFT HEART CATH N/A 10/2/2019    Procedure: Left Heart Cath;  Surgeon: Kathy Almaguer MD;  Location: [facility] CARDIAC CATH LAB     CV RIGHT HEART CATH MEASUREMENTS RECORDED N/A 10/2/2019    Procedure: Right Heart Cath;  Surgeon: Kathy Almaguer MD;  Location: [facility] CARDIAC CATH LAB     ENDOBRONCHIAL ULTRASOUND FLEXIBLE N/A 12/19/2019    Procedure: Flexible bronchoscopy, endobronchial ultrasound, bronchial alveolar lavage;  Surgeon: Ranulfo Barcenas MD;  Location: [facility] OR     EP ICD N/A 10/4/2019    Procedure: EP ICD;  Surgeon: Jayy Dorman MD;  Location:  HEART CARDIAC CATH LAB     EP LEAD PLCMNT LV NEW ICD N/A 10/4/2019    Procedure: EP Lead Plcmnt LV New ICD;  Surgeon: Jayy Dorman MD;  Location: [facility] CARDIAC CATH LAB     HEART CATH CORONARY ANGIOGRAM WITHOUT PRESSURES  06/10/2018    normal  coronary angiogram, nothing more than 10%     SURGICAL HISTORY OF -   2001    repair of colovesicular fistula       FAMILY HISTORY:  Family History   Problem Relation Age of Onset     Cancer Father 75        bladder cancer     Myocardial Infarction Father      Other - See Comments Father         smoking     Breast Cancer Mother      Breast Cancer Sister      Family History Negative Brother      Family History Negative Son      Family History Negative Son         fluttering/murmur     Asthma Son      Colon Cancer No family hx of        SOCIAL HISTORY:  Social History     Socioeconomic History     Marital status:      Spouse name: None     Number of children: None     Years of education: None     Highest education level: None   Occupational History     None   Tobacco Use     Smoking status: Never Smoker     Smokeless tobacco: Never Used   Substance and Sexual Activity     Alcohol use: Yes     Comment: once awhile     Drug use: No     Sexual activity: Yes     Partners: Female   Other Topics Concern     Parent/sibling w/ CABG, MI or angioplasty before 65F 55M? Not Asked   Social History Narrative     None     Social Determinants of Health     Financial Resource Strain: Not on file   Food Insecurity: Not on file   Transportation Needs: Not on file   Physical Activity: Not on file   Stress: Not on file   Social Connections: Not on file   Intimate Partner Violence: Not on file   Housing Stability: Not on file       Review of Systems:  Skin:  Negative     Eyes:  Negative glasses  ENT:  Negative postnasal drainage  Respiratory:  Negative    Cardiovascular:  Negative Positive for;edema;syncope or near-syncope  Gastroenterology: Negative    Genitourinary:  Positive for urinary frequency;nocturia  Musculoskeletal:  Negative    Neurologic:  Negative    Psychiatric:  Negative    Heme/Lymph/Imm:  Negative    Endocrine:  Positive for diabetes    Physical Exam:  Vitals: /58   Pulse 60   Ht 1.829 m (6')   Wt 119.7 kg  (264 lb)   SpO2 97%   BMI 35.80 kg/m       Recent Lab Results:  LIPID RESULTS:  Lab Results   Component Value Date    CHOL 140 07/22/2020    HDL 64 07/22/2020    LDL 51 07/22/2020    TRIG 123 07/22/2020    CHOLHDLRATIO 2.8 01/26/2015       LIVER ENZYME RESULTS:  Lab Results   Component Value Date    AST 92 (H) 10/29/2021    AST 39 09/16/2020    ALT 82 (H) 10/29/2021    ALT 67 09/16/2020       CBC RESULTS:  Lab Results   Component Value Date    WBC 6.5 09/16/2020    RBC 3.81 (L) 09/16/2020    HGB 11.7 (L) 08/27/2021    HGB 12.0 (L) 09/16/2020    HCT 37.3 (L) 09/16/2020    MCV 98 09/16/2020    MCH 31.5 09/16/2020    MCHC 32.2 09/16/2020    RDW 14.0 09/16/2020     09/16/2020       BMP RESULTS:  Lab Results   Component Value Date     12/16/2021     05/26/2021    POTASSIUM 4.3 12/16/2021    POTASSIUM 4.4 05/26/2021    CHLORIDE 110 (H) 12/16/2021    CHLORIDE 107 05/26/2021    CO2 25 12/16/2021    CO2 25 05/26/2021    ANIONGAP 4 12/16/2021    ANIONGAP 7 05/26/2021     (H) 12/16/2021     (H) 05/26/2021    BUN 17 12/16/2021    BUN 18 05/26/2021    CR 1.00 12/16/2021    CR 1.22 05/26/2021    GFRESTIMATED 80 12/16/2021    GFRESTIMATED 63 05/26/2021    GFRESTBLACK 73 05/26/2021    EDITH 9.1 12/16/2021    EDITH 9.0 05/26/2021        A1C RESULTS:  Lab Results   Component Value Date    A1C 6.9 (H) 09/15/2021    A1C 8.8 (H) 05/26/2021       INR RESULTS:  Lab Results   Component Value Date    INR 1.16 (H) 10/04/2019           CC  Sandie Wisdom, APRN CNP  [address]  [place] [address]      Thank you for allowing me to participate in the care of your patient.      Sincerely,     Umm Hernandes MD     [facility]  cc:   Sandie Wisdom, APRN CNP  [address]  [place] [id number]

## 2021-12-16 NOTE — PROGRESS NOTES
HPI and Plan:   See dictation [id number]    Orders Placed This Encounter   Procedures     Follow-Up with Electrophysiologist     [facility] with [facility] - Return MD visit     Echocardiogram Complete     Orders Placed This Encounter   Medications     atorvastatin (LIPITOR) 20 MG tablet     Sig: Take 1 tablet (20 mg) by mouth daily     Dispense:  90 tablet     Refill:  3     Medications Discontinued During This Encounter   Medication Reason     atorvastatin (LIPITOR) 80 MG tablet          Encounter Diagnoses   Name Primary?     Non-ischemic cardiomyopathy (H)      On amiodarone therapy        CURRENT MEDICATIONS:  Current Outpatient Medications   Medication Sig Dispense Refill     acetaminophen (TYLENOL) 500 MG tablet Take 500-1,000 mg by mouth every 6 hours as needed for mild pain       albuterol (PROAIR HFA) 108 (90 Base) MCG/ACT inhaler Inhale 2 puffs into the lungs every 6 hours 18 g 0     amiodarone (PACERONE) 400 MG tablet Take 1 tablet (400 mg) by mouth daily 90 tablet 2     aspirin 81 MG tablet Take 1 tablet (81 mg) by mouth daily 30 tablet      atorvastatin (LIPITOR) 20 MG tablet Take 1 tablet (20 mg) by mouth daily 90 tablet 3     celecoxib (CELEBREX) 100 MG capsule Take 1 capsule (100 mg) by mouth 2 times daily as needed for moderate pain 30 capsule 0     cephALEXin (KEFLEX) 500 MG capsule Take 1 capsule (500 mg) by mouth 3 times daily 21 capsule 0     clotrimazole (LOTRIMIN) 1 % external cream Apply sparingly once or twice per day as needed to affected area until the skin is better, then stop; REPEAT AS NEEDED 30 g 1     Continuous Blood Gluc Sensor (FREESTYLE JOCELIN 14 DAY SENSOR) MISC use 1 sensor every 14 days 6 each 3     continuous blood glucose monitoring (FREESTYLE JOCELIN) sensor For use with Freestyle Jocelin Flash  for continuous monitioring of blood glucose levels. Replace sensor every 10 days. 3 each 3     fenofibrate (TRIGLIDE/LOFIBRA) 160 MG tablet TAKE ONE TABLET BY MOUTH ONE TIME DAILY  90 tablet 1     fluticasone (FLONASE) 50 MCG/ACT nasal spray Spray 2 sprays into both nostrils daily 16 g 0     furosemide (LASIX) 20 MG tablet Take 1 tablet (20 mg) by mouth 2 times daily (Patient taking differently: Take 20 mg by mouth daily ) 60 tablet 11     glipiZIDE (GLUCOTROL XL) 10 MG 24 hr tablet TAKE 1 TABLET (10 MG) BY MOUTH DAILY. TAKE WITH LARGEST MEAL OF THE DAY. ALWAYS TAKE WITH FOOD 90 tablet 0     insulin detemir (LEVEMIR FLEXTOUCH) 100 UNIT/ML pen Inject 25 Units Subcutaneous daily 30 mL 1     levothyroxine (SYNTHROID/LEVOTHROID) 50 MCG tablet Take 1 tablet (50 mcg) by mouth daily 90 tablet 0     metFORMIN (GLUCOPHAGE) 1000 MG tablet Take 1 tablet (1,000 mg) by mouth 2 times daily (with meals) 180 tablet 0     metoprolol succinate ER (TOPROL-XL) 25 MG 24 hr tablet Take 1 tablet (25 mg) by mouth daily 90 tablet 2     sacubitril-valsartan (ENTRESTO) 49-51 MG per tablet Take 1 tablet by mouth 2 times daily 180 tablet 0     triamcinolone (KENALOG) 0.5 % cream Apply sparingly once or twice per day as needed to affected area until the skin is better, then stop 30 g 0     ULTICARE 29G X 12.7MM insulin pen needle Use 3 pen needles daily as directed (one for daily victoza injection, 2 for twice daily levemir injections) 200 each 1     VICTOZA PEN 18 MG/3ML soln Inject 1.8 mg Subcutaneously daily 27 mL 0     albuterol (PROAIR HFA/PROVENTIL HFA/VENTOLIN HFA) 108 (90 Base) MCG/ACT Inhaler Inhale 2 puffs into the lungs every 6 hours as needed for shortness of breath / dyspnea or wheezing (Patient not taking: Reported on 9/17/2021)       blood glucose monitoring (ACCU-CHEK LUL PLUS) test strip Use to test blood sugar 1-3 times daily or as directed. (Patient not taking: Reported on 9/17/2021) 100 each 11     omeprazole (PRILOSEC) 40 MG DR capsule Take 1 capsule (40 mg) by mouth daily (Patient not taking: Reported on 9/17/2021) 90 capsule 3       ALLERGIES     Allergies   Allergen Reactions     No Known Drug  Allergies        PAST MEDICAL HISTORY:  Past Medical History:   Diagnosis Date     Ascending aorta dilatation (H)      Diverticulosis of colon (without mention of hemorrhage)      Essential hypertension, benign      Gout, unspecified      LBBB (left bundle branch block)      Morbid obesity (H)      Non-ischemic cardiomyopathy (H)      Nonrheumatic mitral valve regurgitation      NSTEMI (non-ST elevated myocardial infarction) (H)     cardiac cath 6/2018: mild non-obstructive CAD     Other and unspecified hyperlipidemia      Type II or unspecified type diabetes mellitus without mention of complication, not stated as uncontrolled        PAST SURGICAL HISTORY:  Past Surgical History:   Procedure Laterality Date     C APPENDECTOMY  1980's     CV CORONARY ANGIOGRAM N/A 10/2/2019    Procedure: Coronary Angiogram;  Surgeon: Kathy Almaguer MD;  Location:  [facility]     CV LEFT HEART CATH N/A 10/2/2019    Procedure: Left Heart Cath;  Surgeon: Kathy Almaguer MD;  Location:  [facility]     CV RIGHT HEART CATH MEASUREMENTS RECORDED N/A 10/2/2019    Procedure: Right Heart Cath;  Surgeon: Kathy Almaguer MD;  Location: [facility]ARDIAC CATH LAB     ENDOBRONCHIAL ULTRASOUND FLEXIBLE N/A 12/19/2019    Procedure: Flexible bronchoscopy, endobronchial ultrasound, bronchial alveolar lavage;  Surgeon: Ranulfo Barcenas MD;  Location: [facility] OR     EP ICD N/A 10/4/2019    Procedure: EP ICD;  Surgeon: Jayy Dorman MD;  Location:  [facility]     EP LEAD PLCMNT LV NEW ICD N/A 10/4/2019    Procedure: EP Lead Plcmnt LV New ICD;  Surgeon: Jayy Dorman MD;  Location:  [facility]     HEART CATH CORONARY ANGIOGRAM WITHOUT PRESSURES  06/10/2018    normal coronary angiogram, nothing more than 10%     SURGICAL HISTORY OF -   2001    repair of colovesicular fistula       FAMILY HISTORY:  Family History   Problem Relation Age of Onset     Cancer Father 75        bladder  cancer     Myocardial Infarction Father      Other - See Comments Father         smoking     Breast Cancer Mother      Breast Cancer Sister      Family History Negative Brother      Family History Negative Son      Family History Negative Son         fluttering/murmur     Asthma Son      Colon Cancer No family hx of        SOCIAL HISTORY:  Social History     Socioeconomic History     Marital status:      Spouse name: None     Number of children: None     Years of education: None     Highest education level: None   Occupational History     None   Tobacco Use     Smoking status: Never Smoker     Smokeless tobacco: Never Used   Substance and Sexual Activity     Alcohol use: Yes     Comment: once awhile     Drug use: No     Sexual activity: Yes     Partners: Female   Other Topics Concern     Parent/sibling w/ CABG, MI or angioplasty before 65F 55M? Not Asked   Social History Narrative     None     Social Determinants of Health     Financial Resource Strain: Not on file   Food Insecurity: Not on file   Transportation Needs: Not on file   Physical Activity: Not on file   Stress: Not on file   Social Connections: Not on file   Intimate Partner Violence: Not on file   Housing Stability: Not on file       Review of Systems:  Skin:  Negative     Eyes:  Negative glasses  ENT:  Negative postnasal drainage  Respiratory:  Negative    Cardiovascular:  Negative Positive for;edema;syncope or near-syncope  Gastroenterology: Negative    Genitourinary:  Positive for urinary frequency;nocturia  Musculoskeletal:  Negative    Neurologic:  Negative    Psychiatric:  Negative    Heme/Lymph/Imm:  Negative    Endocrine:  Positive for diabetes    Physical Exam:  Vitals: /58   Pulse 60   Ht 1.829 m (6')   Wt 119.7 kg (264 lb)   SpO2 97%   BMI 35.80 kg/m       Recent Lab Results:  LIPID RESULTS:  Lab Results   Component Value Date    CHOL 140 07/22/2020    HDL 64 07/22/2020    LDL 51 07/22/2020    TRIG 123 07/22/2020     CHOLHDLRATIO 2.8 01/26/2015       LIVER ENZYME RESULTS:  Lab Results   Component Value Date    AST 92 (H) 10/29/2021    AST 39 09/16/2020    ALT 82 (H) 10/29/2021    ALT 67 09/16/2020       CBC RESULTS:  Lab Results   Component Value Date    WBC 6.5 09/16/2020    RBC 3.81 (L) 09/16/2020    HGB 11.7 (L) 08/27/2021    HGB 12.0 (L) 09/16/2020    HCT 37.3 (L) 09/16/2020    MCV 98 09/16/2020    MCH 31.5 09/16/2020    MCHC 32.2 09/16/2020    RDW 14.0 09/16/2020     09/16/2020       BMP RESULTS:  Lab Results   Component Value Date     12/16/2021     05/26/2021    POTASSIUM 4.3 12/16/2021    POTASSIUM 4.4 05/26/2021    CHLORIDE 110 (H) 12/16/2021    CHLORIDE 107 05/26/2021    CO2 25 12/16/2021    CO2 25 05/26/2021    ANIONGAP 4 12/16/2021    ANIONGAP 7 05/26/2021     (H) 12/16/2021     (H) 05/26/2021    BUN 17 12/16/2021    BUN 18 05/26/2021    CR 1.00 12/16/2021    CR 1.22 05/26/2021    GFRESTIMATED 80 12/16/2021    GFRESTIMATED 63 05/26/2021    GFRESTBLACK 73 05/26/2021    EDITH 9.1 12/16/2021    EDITH 9.0 05/26/2021        A1C RESULTS:  Lab Results   Component Value Date    A1C 6.9 (H) 09/15/2021    A1C 8.8 (H) 05/26/2021       INR RESULTS:  Lab Results   Component Value Date    INR 1.16 (H) 10/04/2019           CC  Sandie Wisdom, APRN CNP  [address]  [place] [address]

## 2021-12-20 ENCOUNTER — HOSPITAL ENCOUNTER (OUTPATIENT)
Dept: CARDIOLOGY | Facility: CLINIC | Age: 61
Discharge: HOME | End: 2021-12-20
Attending: INTERNAL MEDICINE | Admitting: INTERNAL MEDICINE
Payer: COMMERCIAL

## 2021-12-20 DIAGNOSIS — Z79.899 ON AMIODARONE THERAPY: ICD-10-CM

## 2021-12-20 DIAGNOSIS — I42.8 NON-ISCHEMIC CARDIOMYOPATHY (H): ICD-10-CM

## 2021-12-20 LAB — LVEF ECHO: NORMAL

## 2021-12-20 PROCEDURE — 93306 TTE W/DOPPLER COMPLETE: CPT | Mod: 26 | Performed by: INTERNAL MEDICINE

## 2021-12-20 PROCEDURE — 255N000002 HC RX 255 OP 636: Performed by: INTERNAL MEDICINE

## 2021-12-20 PROCEDURE — 999N000208 ECHOCARDIOGRAM COMPLETE

## 2021-12-20 RX ADMIN — HUMAN ALBUMIN MICROSPHERES AND PERFLUTREN 3 ML: 10; .22 INJECTION, SOLUTION INTRAVENOUS at 08:19

## 2021-12-27 ENCOUNTER — TELEPHONE (OUTPATIENT)
Dept: CARDIOLOGY | Facility: CLINIC | Age: 61
End: 2021-12-27
Payer: COMMERCIAL

## 2021-12-27 DIAGNOSIS — E03.8 SUBCLINICAL HYPOTHYROIDISM: ICD-10-CM

## 2021-12-27 NOTE — TELEPHONE ENCOUNTER
Alert received for episode requiring ATP and one shock. Episode occurred on 12/24/2021 at 1629. EGM available shows AS/BiVP rhythm then burst of VF with V rates in the 220-260s, then burst of ATP that was unsuccessful. VF continued with average V rates in the 250s, then one 41J shock delivered and was successful, rhythm returned to AS/BiVP rhythm.               Patient had follow up with Dr. Hernandes on 12/16/2021. Patient continues to take amiodarone but has declined VT ablation. Patient is also on Toprol XL 25mg daily. Patient is scheduled for follow up with Dr. Dorman on 1/6/2022.     Called patient to assess for symptoms. Left detailed message asking that patient return call to assess for symptoms and discuss what to do moving forward. Also asked that patient not drive after receiving a shock on 12/24.     Will send update to Dr. Hernandes and Dr. Dorman

## 2021-12-27 NOTE — TELEPHONE ENCOUNTER
If he is having VT despite 400 of amiodarone and is still refusing ablation, Manasa, should we consider adding Mexiletine?

## 2021-12-27 NOTE — TELEPHONE ENCOUNTER
Was able to reach patient. Patient stated he is feeling well today. He is aware that he received a shock on Friday. Patient stated he did not have any associated symptoms prior to the shock. He bent over to tie his shoe, when he felt the shock, patient stated he did not pass out. Patient stated he felt ok following the shock, he just spent the rest of the evening resting and has not had any symptoms since.     Patient stated he has not missed any medication. Patient takes amiodarone 400mg daily and Toprol XL 25mg daily. Most recent medication change was decrease atorvastatin to 20mg to see if ALT levels would improve per Dr. Hernandes.     Instructed patient not to drive, due to recent shock. Reviewed with patient what to do if he receives a shock again. Asked that patient call the clinic if he has any further questions or concerns.     Will send update to doctor of the day, Dr. Arguello, in Dr. Hernandes and Dr. Dorman's absence.

## 2021-12-28 NOTE — TELEPHONE ENCOUNTER
Called and spoke with patient and scheduled a remote device check on 1/4/2022.  Dr. Dorman is out of the office this week and patient is scheduled to see him on 1/6/22 at 1545.  Pt will contact clinic if he has any issues/symptoms before then and we can ask Dr. Dorman if he can be worked in sooner if needed.  Patient in agreement with plan and will send a remote transmission on 1/4/2022.      BRANDON Meza

## 2021-12-28 NOTE — TELEPHONE ENCOUNTER
Thanks Manasa.     Team can we see if he can see Dr. JUNE sooner please. Hold off on adding Marsha for now. Recheck device in a week. If more NSVT, please alert me. Thanks, IKE Mejia, do you want to repeat a PET with persistent LV dysfunction and shock again since he has been taken off immunosuppressants.

## 2021-12-28 NOTE — TELEPHONE ENCOUNTER
Dg Salomon,    Complex patient... and I am unfamiliar with him.  Adding mexiletine empirically is not unreasonable (150 mg tid).  Possible GI and neuro- SE and unclear efficacy in the setting of sarcoidosis.   But if this is his first therapy in 1+ year, it may not be necessary to add more meds right away.  Would definitely refer him back to Jayy for a clinic visit to review options.    DI

## 2021-12-29 NOTE — TELEPHONE ENCOUNTER
Failed protocol.  please route to  team if patient needs an appointment     Philly CUNHARN BSN  [facility]  [phone]

## 2021-12-31 RX ORDER — LEVOTHYROXINE SODIUM 50 UG/1
50 TABLET ORAL DAILY
Qty: 90 TABLET | Refills: 0 | Status: SHIPPED | OUTPATIENT
Start: 2021-12-31 | End: 2022-04-11

## 2022-01-01 ENCOUNTER — TELEPHONE (OUTPATIENT)
Dept: INTERNAL MEDICINE | Facility: CLINIC | Age: 62
End: 2022-01-01

## 2022-01-01 ENCOUNTER — OFFICE VISIT (OUTPATIENT)
Dept: INTERNAL MEDICINE | Facility: CLINIC | Age: 62
End: 2022-01-01
Payer: COMMERCIAL

## 2022-01-01 ENCOUNTER — DOCUMENTATION ONLY (OUTPATIENT)
Dept: CARDIOLOGY | Facility: CLINIC | Age: 62
End: 2022-01-01

## 2022-01-01 ENCOUNTER — TELEPHONE (OUTPATIENT)
Dept: CARDIOLOGY | Facility: CLINIC | Age: 62
End: 2022-01-01

## 2022-01-01 ENCOUNTER — HEALTH MAINTENANCE LETTER (OUTPATIENT)
Age: 62
End: 2022-01-01

## 2022-01-01 ENCOUNTER — LAB (OUTPATIENT)
Dept: LAB | Facility: CLINIC | Age: 62
End: 2022-01-01
Payer: COMMERCIAL

## 2022-01-01 ENCOUNTER — TRANSFERRED RECORDS (OUTPATIENT)
Dept: HEALTH INFORMATION MANAGEMENT | Facility: CLINIC | Age: 62
End: 2022-01-01

## 2022-01-01 ENCOUNTER — PATIENT OUTREACH (OUTPATIENT)
Dept: INTERNAL MEDICINE | Facility: CLINIC | Age: 62
End: 2022-01-01

## 2022-01-01 ENCOUNTER — OFFICE VISIT (OUTPATIENT)
Dept: CARDIOLOGY | Facility: CLINIC | Age: 62
End: 2022-01-01
Attending: INTERNAL MEDICINE
Payer: COMMERCIAL

## 2022-01-01 ENCOUNTER — OFFICE VISIT (OUTPATIENT)
Dept: CARDIOLOGY | Facility: CLINIC | Age: 62
End: 2022-01-01
Payer: COMMERCIAL

## 2022-01-01 ENCOUNTER — HOSPITAL ENCOUNTER (OUTPATIENT)
Facility: CLINIC | Age: 62
Setting detail: OBSERVATION
Discharge: HOME | End: 2022-09-30
Attending: EMERGENCY MEDICINE | Admitting: INTERNAL MEDICINE
Payer: COMMERCIAL

## 2022-01-01 ENCOUNTER — ANCILLARY PROCEDURE (OUTPATIENT)
Dept: PET IMAGING | Facility: CLINIC | Age: 62
End: 2022-01-01
Attending: INTERNAL MEDICINE
Payer: COMMERCIAL

## 2022-01-01 VITALS
WEIGHT: 285.2 LBS | HEIGHT: 71 IN | BODY MASS INDEX: 39.93 KG/M2 | DIASTOLIC BLOOD PRESSURE: 64 MMHG | SYSTOLIC BLOOD PRESSURE: 100 MMHG | HEART RATE: 66 BPM | OXYGEN SATURATION: 99 %

## 2022-01-01 VITALS
OXYGEN SATURATION: 99 % | BODY MASS INDEX: 38.08 KG/M2 | HEIGHT: 71 IN | HEART RATE: 72 BPM | SYSTOLIC BLOOD PRESSURE: 99 MMHG | DIASTOLIC BLOOD PRESSURE: 60 MMHG | WEIGHT: 272 LBS

## 2022-01-01 VITALS
SYSTOLIC BLOOD PRESSURE: 107 MMHG | HEART RATE: 64 BPM | DIASTOLIC BLOOD PRESSURE: 65 MMHG | OXYGEN SATURATION: 95 % | RESPIRATION RATE: 18 BRPM | WEIGHT: 268.4 LBS | TEMPERATURE: 98.3 F | HEIGHT: 71 IN | BODY MASS INDEX: 37.57 KG/M2

## 2022-01-01 VITALS
HEART RATE: 74 BPM | OXYGEN SATURATION: 99 % | HEIGHT: 71 IN | TEMPERATURE: 98.5 F | WEIGHT: 277.6 LBS | SYSTOLIC BLOOD PRESSURE: 100 MMHG | DIASTOLIC BLOOD PRESSURE: 60 MMHG | BODY MASS INDEX: 38.86 KG/M2

## 2022-01-01 DIAGNOSIS — E11.59 TYPE 2 DIABETES MELLITUS WITH OTHER CIRCULATORY COMPLICATION, WITH LONG-TERM CURRENT USE OF INSULIN (H): ICD-10-CM

## 2022-01-01 DIAGNOSIS — Z79.899 ON AMIODARONE THERAPY: ICD-10-CM

## 2022-01-01 DIAGNOSIS — I77.810 ASCENDING AORTA DILATATION (H): ICD-10-CM

## 2022-01-01 DIAGNOSIS — E78.5 HYPERLIPIDEMIA LDL GOAL <100: ICD-10-CM

## 2022-01-01 DIAGNOSIS — E11.9 TYPE 2 DIABETES MELLITUS WITHOUT COMPLICATION, WITH LONG-TERM CURRENT USE OF INSULIN (H): ICD-10-CM

## 2022-01-01 DIAGNOSIS — Z79.4 TYPE 2 DIABETES MELLITUS WITH OTHER CIRCULATORY COMPLICATION, WITH LONG-TERM CURRENT USE OF INSULIN (H): ICD-10-CM

## 2022-01-01 DIAGNOSIS — E11.59 TYPE 2 DIABETES MELLITUS WITH OTHER CIRCULATORY COMPLICATION, WITH LONG-TERM CURRENT USE OF INSULIN (H): Primary | ICD-10-CM

## 2022-01-01 DIAGNOSIS — D86.0 SARCOIDOSIS OF LUNG (H): ICD-10-CM

## 2022-01-01 DIAGNOSIS — I50.22 CHRONIC SYSTOLIC HEART FAILURE (H): ICD-10-CM

## 2022-01-01 DIAGNOSIS — Z95.810 ICD (IMPLANTABLE CARDIOVERTER-DEFIBRILLATOR) IN PLACE: ICD-10-CM

## 2022-01-01 DIAGNOSIS — E66.01 SEVERE OBESITY (BMI 35.0-39.9) WITH COMORBIDITY (H): ICD-10-CM

## 2022-01-01 DIAGNOSIS — I42.8 NON-ISCHEMIC CARDIOMYOPATHY (H): ICD-10-CM

## 2022-01-01 DIAGNOSIS — D86.85 CARDIAC SARCOIDOSIS: ICD-10-CM

## 2022-01-01 DIAGNOSIS — Z13.6 CARDIOVASCULAR SCREENING; LDL GOAL LESS THAN 100: ICD-10-CM

## 2022-01-01 DIAGNOSIS — I42.8 NONISCHEMIC CARDIOMYOPATHY (H): ICD-10-CM

## 2022-01-01 DIAGNOSIS — E03.8 SUBCLINICAL HYPOTHYROIDISM: ICD-10-CM

## 2022-01-01 DIAGNOSIS — I47.29 PAROXYSMAL VENTRICULAR TACHYCARDIA (H): Primary | ICD-10-CM

## 2022-01-01 DIAGNOSIS — I47.20 VENTRICULAR TACHYCARDIA (H): Primary | ICD-10-CM

## 2022-01-01 DIAGNOSIS — I50.20 HEART FAILURE WITH REDUCED EJECTION FRACTION, NYHA CLASS III (H): ICD-10-CM

## 2022-01-01 DIAGNOSIS — Z79.4 TYPE 2 DIABETES MELLITUS WITHOUT COMPLICATION, WITH LONG-TERM CURRENT USE OF INSULIN (H): ICD-10-CM

## 2022-01-01 DIAGNOSIS — K21.00 GASTROESOPHAGEAL REFLUX DISEASE WITH ESOPHAGITIS WITHOUT HEMORRHAGE: ICD-10-CM

## 2022-01-01 DIAGNOSIS — Z12.5 SCREENING FOR PROSTATE CANCER: ICD-10-CM

## 2022-01-01 DIAGNOSIS — I10 BENIGN ESSENTIAL HYPERTENSION: Primary | ICD-10-CM

## 2022-01-01 DIAGNOSIS — I47.20 VENTRICULAR TACHYCARDIA (H): ICD-10-CM

## 2022-01-01 DIAGNOSIS — I21.4 NSTEMI (NON-ST ELEVATED MYOCARDIAL INFARCTION) (H): ICD-10-CM

## 2022-01-01 DIAGNOSIS — I42.8 NON-ISCHEMIC CARDIOMYOPATHY (H): Primary | ICD-10-CM

## 2022-01-01 DIAGNOSIS — I47.29 PAROXYSMAL VENTRICULAR TACHYCARDIA (H): ICD-10-CM

## 2022-01-01 DIAGNOSIS — S93.602D FOOT SPRAIN, LEFT, SUBSEQUENT ENCOUNTER: ICD-10-CM

## 2022-01-01 DIAGNOSIS — D86.0 SARCOIDOSIS OF LUNG (H): Primary | ICD-10-CM

## 2022-01-01 DIAGNOSIS — N18.31 STAGE 3A CHRONIC KIDNEY DISEASE (H): ICD-10-CM

## 2022-01-01 DIAGNOSIS — R55 SYNCOPE, UNSPECIFIED SYNCOPE TYPE: ICD-10-CM

## 2022-01-01 DIAGNOSIS — Z79.4 TYPE 2 DIABETES MELLITUS WITH OTHER CIRCULATORY COMPLICATION, WITH LONG-TERM CURRENT USE OF INSULIN (H): Primary | ICD-10-CM

## 2022-01-01 DIAGNOSIS — E11.9 TYPE 2 DIABETES MELLITUS WITHOUT COMPLICATION, WITHOUT LONG-TERM CURRENT USE OF INSULIN (H): ICD-10-CM

## 2022-01-01 LAB
ANION GAP SERPL CALCULATED.3IONS-SCNC: 10 MMOL/L (ref 3–14)
ANION GAP SERPL CALCULATED.3IONS-SCNC: 6 MMOL/L (ref 3–14)
ANION GAP SERPL CALCULATED.3IONS-SCNC: 8 MMOL/L (ref 3–14)
ATRIAL RATE - MUSE: 60 BPM
ATRIAL RATE - MUSE: 77 BPM
BASOPHILS # BLD AUTO: 0 10E3/UL (ref 0–0.2)
BASOPHILS NFR BLD AUTO: 0 %
BUN SERPL-MCNC: 26 MG/DL (ref 7–30)
BUN SERPL-MCNC: 28 MG/DL (ref 7–30)
BUN SERPL-MCNC: 29 MG/DL (ref 7–30)
CALCIUM SERPL-MCNC: 8.9 MG/DL (ref 8.5–10.1)
CALCIUM SERPL-MCNC: 9.2 MG/DL (ref 8.5–10.1)
CALCIUM SERPL-MCNC: 9.3 MG/DL (ref 8.5–10.1)
CHLORIDE BLD-SCNC: 102 MMOL/L (ref 94–109)
CHLORIDE BLD-SCNC: 105 MMOL/L (ref 94–109)
CHLORIDE BLD-SCNC: 107 MMOL/L (ref 94–109)
CO2 SERPL-SCNC: 23 MMOL/L (ref 20–32)
CO2 SERPL-SCNC: 23 MMOL/L (ref 20–32)
CO2 SERPL-SCNC: 27 MMOL/L (ref 20–32)
CREAT SERPL-MCNC: 1.09 MG/DL (ref 0.66–1.25)
CREAT SERPL-MCNC: 1.21 MG/DL (ref 0.66–1.25)
CREAT SERPL-MCNC: 1.47 MG/DL (ref 0.66–1.25)
DIASTOLIC BLOOD PRESSURE - MUSE: NORMAL MMHG
DIASTOLIC BLOOD PRESSURE - MUSE: NORMAL MMHG
EOSINOPHIL # BLD AUTO: 0 10E3/UL (ref 0–0.7)
EOSINOPHIL NFR BLD AUTO: 0 %
ERYTHROCYTE [DISTWIDTH] IN BLOOD BY AUTOMATED COUNT: 15.3 % (ref 10–15)
ERYTHROCYTE [DISTWIDTH] IN BLOOD BY AUTOMATED COUNT: 15.7 % (ref 10–15)
ERYTHROCYTE [DISTWIDTH] IN BLOOD BY AUTOMATED COUNT: 15.8 % (ref 10–15)
GFR SERPL CREATININE-BSD FRML MDRD: 53 ML/MIN/1.73M2
GFR SERPL CREATININE-BSD FRML MDRD: 67 ML/MIN/1.73M2
GFR SERPL CREATININE-BSD FRML MDRD: 76 ML/MIN/1.73M2
GLUCOSE BLD-MCNC: 102 MG/DL (ref 70–99)
GLUCOSE BLD-MCNC: 254 MG/DL (ref 70–99)
GLUCOSE BLD-MCNC: 379 MG/DL (ref 70–99)
GLUCOSE BLDC GLUCOMTR-MCNC: 101 MG/DL (ref 70–99)
GLUCOSE BLDC GLUCOMTR-MCNC: 118 MG/DL (ref 70–99)
GLUCOSE BLDC GLUCOMTR-MCNC: 156 MG/DL (ref 70–99)
GLUCOSE BLDC GLUCOMTR-MCNC: 68 MG/DL (ref 70–99)
GLUCOSE BLDC GLUCOMTR-MCNC: 91 MG/DL (ref 70–99)
GLUCOSE SERPL-MCNC: 99 MG/DL (ref 70–99)
HBA1C MFR BLD: 7.6 % (ref 0–5.6)
HCT VFR BLD AUTO: 31.8 % (ref 40–53)
HCT VFR BLD AUTO: 33.2 % (ref 40–53)
HCT VFR BLD AUTO: 34.7 % (ref 40–53)
HGB BLD-MCNC: 10.2 G/DL (ref 13.3–17.7)
HGB BLD-MCNC: 10.5 G/DL (ref 13.3–17.7)
HGB BLD-MCNC: 10.8 G/DL (ref 13.3–17.7)
HOLD SPECIMEN: NORMAL
HOLD SPECIMEN: NORMAL
IMM GRANULOCYTES # BLD: 0.1 10E3/UL
IMM GRANULOCYTES NFR BLD: 1 %
INTERPRETATION ECG - MUSE: NORMAL
INTERPRETATION ECG - MUSE: NORMAL
LYMPHOCYTES # BLD AUTO: 0.3 10E3/UL (ref 0.8–5.3)
LYMPHOCYTES NFR BLD AUTO: 6 %
MCH RBC QN AUTO: 30.5 PG (ref 26.5–33)
MCH RBC QN AUTO: 30.6 PG (ref 26.5–33)
MCH RBC QN AUTO: 31.1 PG (ref 26.5–33)
MCHC RBC AUTO-ENTMCNC: 31.1 G/DL (ref 31.5–36.5)
MCHC RBC AUTO-ENTMCNC: 31.6 G/DL (ref 31.5–36.5)
MCHC RBC AUTO-ENTMCNC: 32.1 G/DL (ref 31.5–36.5)
MCV RBC AUTO: 97 FL (ref 78–100)
MCV RBC AUTO: 97 FL (ref 78–100)
MCV RBC AUTO: 98 FL (ref 78–100)
MDC_IDC_EPISODE_DTM: NORMAL
MDC_IDC_EPISODE_ID: NORMAL
MDC_IDC_EPISODE_TYPE: NORMAL
MDC_IDC_LEAD_IMPLANT_DT: NORMAL
MDC_IDC_LEAD_LOCATION: NORMAL
MDC_IDC_LEAD_LOCATION_DETAIL_1: NORMAL
MDC_IDC_LEAD_MFG: NORMAL
MDC_IDC_LEAD_MODEL: NORMAL
MDC_IDC_LEAD_POLARITY_TYPE: NORMAL
MDC_IDC_LEAD_SERIAL: NORMAL
MDC_IDC_MSMT_BATTERY_DTM: NORMAL
MDC_IDC_MSMT_BATTERY_REMAINING_LONGEVITY: 108 MO
MDC_IDC_MSMT_BATTERY_REMAINING_LONGEVITY: 96 MO
MDC_IDC_MSMT_BATTERY_REMAINING_LONGEVITY: 96 MO
MDC_IDC_MSMT_BATTERY_REMAINING_PERCENTAGE: 100 %
MDC_IDC_MSMT_BATTERY_STATUS: NORMAL
MDC_IDC_MSMT_CAP_CHARGE_DTM: NORMAL
MDC_IDC_MSMT_CAP_CHARGE_ENERGY: 41 J
MDC_IDC_MSMT_CAP_CHARGE_TIME: 10 S
MDC_IDC_MSMT_CAP_CHARGE_TIME: 9.8 S
MDC_IDC_MSMT_CAP_CHARGE_TIME: 9.9 S
MDC_IDC_MSMT_CAP_CHARGE_TYPE: NORMAL
MDC_IDC_MSMT_LEADCHNL_LV_IMPEDANCE_VALUE: 730 OHM
MDC_IDC_MSMT_LEADCHNL_LV_IMPEDANCE_VALUE: 752 OHM
MDC_IDC_MSMT_LEADCHNL_LV_IMPEDANCE_VALUE: 755 OHM
MDC_IDC_MSMT_LEADCHNL_LV_PACING_THRESHOLD_AMPLITUDE: 0.4 V
MDC_IDC_MSMT_LEADCHNL_LV_PACING_THRESHOLD_AMPLITUDE: 0.7 V
MDC_IDC_MSMT_LEADCHNL_LV_PACING_THRESHOLD_AMPLITUDE: 1 V
MDC_IDC_MSMT_LEADCHNL_LV_PACING_THRESHOLD_PULSEWIDTH: 0.5 MS
MDC_IDC_MSMT_LEADCHNL_RA_IMPEDANCE_VALUE: 701 OHM
MDC_IDC_MSMT_LEADCHNL_RA_IMPEDANCE_VALUE: 736 OHM
MDC_IDC_MSMT_LEADCHNL_RA_IMPEDANCE_VALUE: 742 OHM
MDC_IDC_MSMT_LEADCHNL_RA_PACING_THRESHOLD_AMPLITUDE: 0.5 V
MDC_IDC_MSMT_LEADCHNL_RA_PACING_THRESHOLD_PULSEWIDTH: 0.4 MS
MDC_IDC_MSMT_LEADCHNL_RV_IMPEDANCE_VALUE: 504 OHM
MDC_IDC_MSMT_LEADCHNL_RV_IMPEDANCE_VALUE: 514 OHM
MDC_IDC_MSMT_LEADCHNL_RV_IMPEDANCE_VALUE: 534 OHM
MDC_IDC_MSMT_LEADCHNL_RV_PACING_THRESHOLD_AMPLITUDE: 0.7 V
MDC_IDC_MSMT_LEADCHNL_RV_PACING_THRESHOLD_PULSEWIDTH: 0.4 MS
MDC_IDC_PG_IMPLANT_DTM: NORMAL
MDC_IDC_PG_MFG: NORMAL
MDC_IDC_PG_MODEL: NORMAL
MDC_IDC_PG_SERIAL: NORMAL
MDC_IDC_PG_TYPE: NORMAL
MDC_IDC_SESS_CLINIC_NAME: NORMAL
MDC_IDC_SESS_DTM: NORMAL
MDC_IDC_SESS_TYPE: NORMAL
MDC_IDC_SET_BRADY_AT_MODE_SWITCH_MODE: NORMAL
MDC_IDC_SET_BRADY_AT_MODE_SWITCH_RATE: 170 {BEATS}/MIN
MDC_IDC_SET_BRADY_LOWRATE: 60 {BEATS}/MIN
MDC_IDC_SET_BRADY_MAX_SENSOR_RATE: 130 {BEATS}/MIN
MDC_IDC_SET_BRADY_MAX_TRACKING_RATE: 130 {BEATS}/MIN
MDC_IDC_SET_BRADY_MODE: NORMAL
MDC_IDC_SET_BRADY_PAV_DELAY_HIGH: 200 MS
MDC_IDC_SET_BRADY_PAV_DELAY_LOW: 270 MS
MDC_IDC_SET_BRADY_SAV_DELAY_HIGH: 140 MS
MDC_IDC_SET_BRADY_SAV_DELAY_LOW: 190 MS
MDC_IDC_SET_CRT_LVRV_DELAY: 35 MS
MDC_IDC_SET_CRT_PACED_CHAMBERS: NORMAL
MDC_IDC_SET_LEADCHNL_LV_PACING_AMPLITUDE: 1.5 V
MDC_IDC_SET_LEADCHNL_LV_PACING_AMPLITUDE: 2 V
MDC_IDC_SET_LEADCHNL_LV_PACING_AMPLITUDE: 2.4 V
MDC_IDC_SET_LEADCHNL_LV_PACING_ANODE_ELECTRODE_1: NORMAL
MDC_IDC_SET_LEADCHNL_LV_PACING_ANODE_LOCATION_1: NORMAL
MDC_IDC_SET_LEADCHNL_LV_PACING_CAPTURE_MODE: NORMAL
MDC_IDC_SET_LEADCHNL_LV_PACING_CATHODE_ELECTRODE_1: NORMAL
MDC_IDC_SET_LEADCHNL_LV_PACING_CATHODE_LOCATION_1: NORMAL
MDC_IDC_SET_LEADCHNL_LV_PACING_PULSEWIDTH: 0.5 MS
MDC_IDC_SET_LEADCHNL_LV_SENSING_ADAPTATION_MODE: NORMAL
MDC_IDC_SET_LEADCHNL_LV_SENSING_ANODE_ELECTRODE_1: NORMAL
MDC_IDC_SET_LEADCHNL_LV_SENSING_ANODE_LOCATION_1: NORMAL
MDC_IDC_SET_LEADCHNL_LV_SENSING_CATHODE_ELECTRODE_1: NORMAL
MDC_IDC_SET_LEADCHNL_LV_SENSING_CATHODE_LOCATION_1: NORMAL
MDC_IDC_SET_LEADCHNL_LV_SENSING_SENSITIVITY: 1 MV
MDC_IDC_SET_LEADCHNL_RA_PACING_AMPLITUDE: 2 V
MDC_IDC_SET_LEADCHNL_RA_PACING_CAPTURE_MODE: NORMAL
MDC_IDC_SET_LEADCHNL_RA_PACING_POLARITY: NORMAL
MDC_IDC_SET_LEADCHNL_RA_PACING_PULSEWIDTH: 0.4 MS
MDC_IDC_SET_LEADCHNL_RA_SENSING_ADAPTATION_MODE: NORMAL
MDC_IDC_SET_LEADCHNL_RA_SENSING_POLARITY: NORMAL
MDC_IDC_SET_LEADCHNL_RA_SENSING_SENSITIVITY: 0.25 MV
MDC_IDC_SET_LEADCHNL_RV_PACING_AMPLITUDE: 2 V
MDC_IDC_SET_LEADCHNL_RV_PACING_CAPTURE_MODE: NORMAL
MDC_IDC_SET_LEADCHNL_RV_PACING_POLARITY: NORMAL
MDC_IDC_SET_LEADCHNL_RV_PACING_PULSEWIDTH: 0.4 MS
MDC_IDC_SET_LEADCHNL_RV_SENSING_ADAPTATION_MODE: NORMAL
MDC_IDC_SET_LEADCHNL_RV_SENSING_POLARITY: NORMAL
MDC_IDC_SET_LEADCHNL_RV_SENSING_SENSITIVITY: 0.5 MV
MDC_IDC_SET_ZONE_DETECTION_INTERVAL: 250 MS
MDC_IDC_SET_ZONE_DETECTION_INTERVAL: 333 MS
MDC_IDC_SET_ZONE_DETECTION_INTERVAL: 375 MS
MDC_IDC_SET_ZONE_DETECTION_INTERVAL: 375 MS
MDC_IDC_SET_ZONE_DETECTION_INTERVAL: 400 MS
MDC_IDC_SET_ZONE_TYPE: NORMAL
MDC_IDC_SET_ZONE_VENDOR_TYPE: NORMAL
MDC_IDC_STAT_AT_BURDEN_PERCENT: 0 %
MDC_IDC_STAT_AT_DTM_END: NORMAL
MDC_IDC_STAT_AT_DTM_START: NORMAL
MDC_IDC_STAT_BRADY_DTM_END: NORMAL
MDC_IDC_STAT_BRADY_DTM_START: NORMAL
MDC_IDC_STAT_BRADY_RA_PERCENT_PACED: 37 %
MDC_IDC_STAT_BRADY_RA_PERCENT_PACED: 45 %
MDC_IDC_STAT_BRADY_RA_PERCENT_PACED: 53 %
MDC_IDC_STAT_BRADY_RV_PERCENT_PACED: 97 %
MDC_IDC_STAT_BRADY_RV_PERCENT_PACED: 98 %
MDC_IDC_STAT_BRADY_RV_PERCENT_PACED: 99 %
MDC_IDC_STAT_CRT_DTM_END: NORMAL
MDC_IDC_STAT_CRT_DTM_START: NORMAL
MDC_IDC_STAT_CRT_LV_PERCENT_PACED: 96 %
MDC_IDC_STAT_CRT_LV_PERCENT_PACED: 98 %
MDC_IDC_STAT_CRT_LV_PERCENT_PACED: 99 %
MDC_IDC_STAT_EPISODE_RECENT_COUNT: 0
MDC_IDC_STAT_EPISODE_RECENT_COUNT: 1
MDC_IDC_STAT_EPISODE_RECENT_COUNT: 2
MDC_IDC_STAT_EPISODE_RECENT_COUNT: 2
MDC_IDC_STAT_EPISODE_RECENT_COUNT_DTM_END: NORMAL
MDC_IDC_STAT_EPISODE_RECENT_COUNT_DTM_START: NORMAL
MDC_IDC_STAT_EPISODE_TYPE: NORMAL
MDC_IDC_STAT_EPISODE_VENDOR_TYPE: NORMAL
MDC_IDC_STAT_TACHYTHERAPY_ATP_DELIVERED_RECENT: 0
MDC_IDC_STAT_TACHYTHERAPY_ATP_DELIVERED_RECENT: 0
MDC_IDC_STAT_TACHYTHERAPY_ATP_DELIVERED_RECENT: 2
MDC_IDC_STAT_TACHYTHERAPY_ATP_DELIVERED_TOTAL: 4
MDC_IDC_STAT_TACHYTHERAPY_ATP_DELIVERED_TOTAL: 4
MDC_IDC_STAT_TACHYTHERAPY_ATP_DELIVERED_TOTAL: 6
MDC_IDC_STAT_TACHYTHERAPY_RECENT_DTM_END: NORMAL
MDC_IDC_STAT_TACHYTHERAPY_RECENT_DTM_START: NORMAL
MDC_IDC_STAT_TACHYTHERAPY_SHOCKS_ABORTED_RECENT: 0
MDC_IDC_STAT_TACHYTHERAPY_SHOCKS_ABORTED_TOTAL: 0
MDC_IDC_STAT_TACHYTHERAPY_SHOCKS_DELIVERED_RECENT: 0
MDC_IDC_STAT_TACHYTHERAPY_SHOCKS_DELIVERED_TOTAL: 1
MDC_IDC_STAT_TACHYTHERAPY_TOTAL_DTM_END: NORMAL
MDC_IDC_STAT_TACHYTHERAPY_TOTAL_DTM_START: NORMAL
MONOCYTES # BLD AUTO: 0.2 10E3/UL (ref 0–1.3)
MONOCYTES NFR BLD AUTO: 4 %
NEUTROPHILS # BLD AUTO: 4.5 10E3/UL (ref 1.6–8.3)
NEUTROPHILS NFR BLD AUTO: 89 %
NRBC # BLD AUTO: 0 10E3/UL
NRBC BLD AUTO-RTO: 0 /100
P AXIS - MUSE: 11 DEGREES
P AXIS - MUSE: 87 DEGREES
PLATELET # BLD AUTO: 118 10E3/UL (ref 150–450)
PLATELET # BLD AUTO: 124 10E3/UL (ref 150–450)
PLATELET # BLD AUTO: 125 10E3/UL (ref 150–450)
POTASSIUM BLD-SCNC: 4 MMOL/L (ref 3.4–5.3)
POTASSIUM BLD-SCNC: 4.1 MMOL/L (ref 3.4–5.3)
POTASSIUM BLD-SCNC: 4.2 MMOL/L (ref 3.4–5.3)
PR INTERVAL - MUSE: 184 MS
PR INTERVAL - MUSE: 248 MS
QRS DURATION - MUSE: 124 MS
QRS DURATION - MUSE: 174 MS
QT - MUSE: 474 MS
QT - MUSE: 514 MS
QTC - MUSE: 474 MS
QTC - MUSE: 581 MS
R AXIS - MUSE: -51 DEGREES
R AXIS - MUSE: -81 DEGREES
RADIOLOGIST FLAGS: ABNORMAL
RBC # BLD AUTO: 3.28 10E6/UL (ref 4.4–5.9)
RBC # BLD AUTO: 3.44 10E6/UL (ref 4.4–5.9)
RBC # BLD AUTO: 3.53 10E6/UL (ref 4.4–5.9)
RETINOPATHY: NEGATIVE
SARS-COV-2 RNA RESP QL NAA+PROBE: NEGATIVE
SODIUM SERPL-SCNC: 135 MMOL/L (ref 133–144)
SODIUM SERPL-SCNC: 138 MMOL/L (ref 133–144)
SODIUM SERPL-SCNC: 138 MMOL/L (ref 133–144)
SYSTOLIC BLOOD PRESSURE - MUSE: NORMAL MMHG
SYSTOLIC BLOOD PRESSURE - MUSE: NORMAL MMHG
T AXIS - MUSE: 78 DEGREES
T AXIS - MUSE: 79 DEGREES
TROPONIN I SERPL HS-MCNC: 64 NG/L
VENTRICULAR RATE- MUSE: 60 BPM
VENTRICULAR RATE- MUSE: 77 BPM
WBC # BLD AUTO: 5 10E3/UL (ref 4–11)
WBC # BLD AUTO: 5.6 10E3/UL (ref 4–11)
WBC # BLD AUTO: 5.7 10E3/UL (ref 4–11)

## 2022-01-01 PROCEDURE — 93295 DEV INTERROG REMOTE 1/2/MLT: CPT | Performed by: INTERNAL MEDICINE

## 2022-01-01 PROCEDURE — U0005 INFEC AGEN DETEC AMPLI PROBE: HCPCS | Performed by: EMERGENCY MEDICINE

## 2022-01-01 PROCEDURE — 36415 COLL VENOUS BLD VENIPUNCTURE: CPT | Performed by: INTERNAL MEDICINE

## 2022-01-01 PROCEDURE — 85025 COMPLETE CBC W/AUTO DIFF WBC: CPT | Performed by: EMERGENCY MEDICINE

## 2022-01-01 PROCEDURE — C9803 HOPD COVID-19 SPEC COLLECT: HCPCS

## 2022-01-01 PROCEDURE — 93000 ELECTROCARDIOGRAM COMPLETE: CPT | Performed by: INTERNAL MEDICINE

## 2022-01-01 PROCEDURE — G0463 HOSPITAL OUTPT CLINIC VISIT: HCPCS | Mod: 25

## 2022-01-01 PROCEDURE — 82310 ASSAY OF CALCIUM: CPT | Performed by: INTERNAL MEDICINE

## 2022-01-01 PROCEDURE — 93296 REM INTERROG EVL PM/IDS: CPT | Performed by: INTERNAL MEDICINE

## 2022-01-01 PROCEDURE — G0378 HOSPITAL OBSERVATION PER HR: HCPCS

## 2022-01-01 PROCEDURE — 99285 EMERGENCY DEPT VISIT HI MDM: CPT | Mod: 25

## 2022-01-01 PROCEDURE — 99220 PR INITIAL OBSERVATION CARE,LEVEL III: CPT | Performed by: INTERNAL MEDICINE

## 2022-01-01 PROCEDURE — 80048 BASIC METABOLIC PNL TOTAL CA: CPT

## 2022-01-01 PROCEDURE — 80048 BASIC METABOLIC PNL TOTAL CA: CPT | Performed by: EMERGENCY MEDICINE

## 2022-01-01 PROCEDURE — 93005 ELECTROCARDIOGRAM TRACING: CPT

## 2022-01-01 PROCEDURE — 250N000013 HC RX MED GY IP 250 OP 250 PS 637: Performed by: INTERNAL MEDICINE

## 2022-01-01 PROCEDURE — 84484 ASSAY OF TROPONIN QUANT: CPT | Performed by: EMERGENCY MEDICINE

## 2022-01-01 PROCEDURE — 250N000012 HC RX MED GY IP 250 OP 636 PS 637: Performed by: INTERNAL MEDICINE

## 2022-01-01 PROCEDURE — 99214 OFFICE O/P EST MOD 30 MIN: CPT | Performed by: INTERNAL MEDICINE

## 2022-01-01 PROCEDURE — 36415 COLL VENOUS BLD VENIPUNCTURE: CPT | Performed by: EMERGENCY MEDICINE

## 2022-01-01 PROCEDURE — 85027 COMPLETE CBC AUTOMATED: CPT | Performed by: INTERNAL MEDICINE

## 2022-01-01 PROCEDURE — 99203 OFFICE O/P NEW LOW 30 MIN: CPT | Performed by: INTERNAL MEDICINE

## 2022-01-01 PROCEDURE — 85027 COMPLETE CBC AUTOMATED: CPT

## 2022-01-01 PROCEDURE — 82310 ASSAY OF CALCIUM: CPT | Performed by: EMERGENCY MEDICINE

## 2022-01-01 PROCEDURE — 83036 HEMOGLOBIN GLYCOSYLATED A1C: CPT | Performed by: INTERNAL MEDICINE

## 2022-01-01 PROCEDURE — 82962 GLUCOSE BLOOD TEST: CPT

## 2022-01-01 PROCEDURE — 36415 COLL VENOUS BLD VENIPUNCTURE: CPT

## 2022-01-01 PROCEDURE — 99217 PR OBSERVATION CARE DISCHARGE: CPT | Performed by: HOSPITALIST

## 2022-01-01 RX ORDER — ALBUTEROL SULFATE 108 UG/1
AEROSOL, METERED RESPIRATORY (INHALATION)
Qty: 200 EACH | Refills: 0 | Status: CANCELLED | OUTPATIENT
Start: 2022-01-01

## 2022-01-01 RX ORDER — ATORVASTATIN CALCIUM 20 MG/1
20 TABLET, FILM COATED ORAL DAILY
Qty: 90 TABLET | Refills: 0 | Status: SHIPPED | OUTPATIENT
Start: 2022-01-01 | End: 2022-01-01

## 2022-01-01 RX ORDER — LIRAGLUTIDE 6 MG/ML
1.8 INJECTION SUBCUTANEOUS DAILY
Qty: 27 ML | Refills: 0 | Status: SHIPPED | OUTPATIENT
Start: 2022-01-01 | End: 2022-02-01

## 2022-01-01 RX ORDER — GLIPIZIDE 10 MG/1
TABLET, FILM COATED, EXTENDED RELEASE ORAL
Qty: 90 TABLET | Refills: 0 | Status: SHIPPED | OUTPATIENT
Start: 2022-01-01 | End: 2022-01-01

## 2022-01-01 RX ORDER — INSULIN DETEMIR 100 [IU]/ML
35 INJECTION, SOLUTION SUBCUTANEOUS DAILY
Qty: 30 ML | Refills: 1 | Status: SHIPPED | OUTPATIENT
Start: 2022-01-01 | End: 2023-01-01

## 2022-01-01 RX ORDER — SULFAMETHOXAZOLE AND TRIMETHOPRIM 400; 80 MG/1; MG/1
1 TABLET ORAL DAILY
Status: DISCONTINUED | OUTPATIENT
Start: 2022-01-01 | End: 2022-01-01 | Stop reason: HOSPADM

## 2022-01-01 RX ORDER — METOPROLOL SUCCINATE 25 MG/1
25 TABLET, EXTENDED RELEASE ORAL DAILY
Status: DISCONTINUED | OUTPATIENT
Start: 2022-01-01 | End: 2022-01-01 | Stop reason: HOSPADM

## 2022-01-01 RX ORDER — FOLIC ACID 1 MG/1
1 TABLET ORAL DAILY
Qty: 90 TABLET | Refills: 3 | Status: SHIPPED | OUTPATIENT
Start: 2022-01-01

## 2022-01-01 RX ORDER — FENOFIBRATE 160 MG/1
160 TABLET ORAL DAILY
Qty: 90 TABLET | Refills: 0 | Status: SHIPPED | OUTPATIENT
Start: 2022-01-01 | End: 2022-01-01

## 2022-01-01 RX ORDER — ALBUTEROL SULFATE 108 UG/1
AEROSOL, METERED RESPIRATORY (INHALATION)
Qty: 200 EACH | Refills: 0 | Status: SHIPPED | OUTPATIENT
Start: 2022-01-01 | End: 2022-01-01

## 2022-01-01 RX ORDER — OMEPRAZOLE 40 MG/1
40 CAPSULE, DELAYED RELEASE ORAL DAILY
Qty: 90 CAPSULE | Refills: 1 | Status: CANCELLED | OUTPATIENT
Start: 2022-01-01

## 2022-01-01 RX ORDER — FENOFIBRATE 160 MG/1
160 TABLET ORAL DAILY
Qty: 90 TABLET | Refills: 3 | Status: SHIPPED | OUTPATIENT
Start: 2022-01-01

## 2022-01-01 RX ORDER — PEN NEEDLE, DIABETIC 29 G X1/2"
NEEDLE, DISPOSABLE MISCELLANEOUS
Qty: 100 EACH | Refills: 0 | Status: SHIPPED | OUTPATIENT
Start: 2022-01-01 | End: 2022-01-01

## 2022-01-01 RX ORDER — ACETAMINOPHEN 650 MG/1
650 SUPPOSITORY RECTAL EVERY 6 HOURS PRN
Status: DISCONTINUED | OUTPATIENT
Start: 2022-01-01 | End: 2022-01-01 | Stop reason: HOSPADM

## 2022-01-01 RX ORDER — LEVOTHYROXINE SODIUM 50 UG/1
50 TABLET ORAL DAILY
Qty: 90 TABLET | Refills: 1 | Status: SHIPPED | OUTPATIENT
Start: 2022-01-01

## 2022-01-01 RX ORDER — ALBUTEROL SULFATE 90 UG/1
2 AEROSOL, METERED RESPIRATORY (INHALATION) EVERY 6 HOURS PRN
Status: DISCONTINUED | OUTPATIENT
Start: 2022-01-01 | End: 2022-01-01 | Stop reason: HOSPADM

## 2022-01-01 RX ORDER — PANTOPRAZOLE SODIUM 40 MG/1
40 TABLET, DELAYED RELEASE ORAL
Status: DISCONTINUED | OUTPATIENT
Start: 2022-01-01 | End: 2022-01-01 | Stop reason: HOSPADM

## 2022-01-01 RX ORDER — FUROSEMIDE 20 MG
60 TABLET ORAL DAILY
COMMUNITY
End: 2022-01-01

## 2022-01-01 RX ORDER — ONDANSETRON 2 MG/ML
4 INJECTION INTRAMUSCULAR; INTRAVENOUS EVERY 6 HOURS PRN
Status: DISCONTINUED | OUTPATIENT
Start: 2022-01-01 | End: 2022-01-01 | Stop reason: HOSPADM

## 2022-01-01 RX ORDER — INSULIN DETEMIR 100 [IU]/ML
INJECTION, SOLUTION SUBCUTANEOUS
Qty: 30 ML | Refills: 0 | Status: SHIPPED | OUTPATIENT
Start: 2022-01-01 | End: 2022-01-01

## 2022-01-01 RX ORDER — AMIODARONE HYDROCHLORIDE 400 MG/1
400 TABLET ORAL 2 TIMES DAILY
Qty: 90 TABLET | Refills: 3 | Status: SHIPPED | OUTPATIENT
Start: 2022-01-01 | End: 2022-01-01 | Stop reason: DRUGHIGH

## 2022-01-01 RX ORDER — PANTOPRAZOLE SODIUM 40 MG/1
40 TABLET, DELAYED RELEASE ORAL DAILY
Status: DISCONTINUED | OUTPATIENT
Start: 2022-01-01 | End: 2022-01-01

## 2022-01-01 RX ORDER — ALBUTEROL SULFATE 108 UG/1
AEROSOL, METERED RESPIRATORY (INHALATION)
Qty: 200 EACH | Refills: 3 | Status: SHIPPED | OUTPATIENT
Start: 2022-01-01 | End: 2023-01-01

## 2022-01-01 RX ORDER — SULFAMETHOXAZOLE AND TRIMETHOPRIM 400; 80 MG/1; MG/1
1 TABLET ORAL DAILY
Qty: 90 TABLET | Refills: 1 | Status: SHIPPED | OUTPATIENT
Start: 2022-01-01 | End: 2023-01-01

## 2022-01-01 RX ORDER — AMIODARONE HYDROCHLORIDE 400 MG/1
400 TABLET ORAL DAILY
Qty: 30 TABLET | Refills: 3 | Status: SHIPPED | OUTPATIENT
Start: 2022-01-01 | End: 2023-01-01

## 2022-01-01 RX ORDER — ALBUTEROL SULFATE 108 UG/1
AEROSOL, METERED RESPIRATORY (INHALATION)
Qty: 100 EACH | Refills: 0 | Status: SHIPPED | OUTPATIENT
Start: 2022-01-01 | End: 2022-01-01

## 2022-01-01 RX ORDER — LEVOTHYROXINE SODIUM 50 UG/1
50 TABLET ORAL DAILY
Qty: 90 TABLET | Refills: 0 | Status: SHIPPED | OUTPATIENT
Start: 2022-01-01 | End: 2022-01-01

## 2022-01-01 RX ORDER — DEXTROSE MONOHYDRATE 25 G/50ML
25-50 INJECTION, SOLUTION INTRAVENOUS
Status: DISCONTINUED | OUTPATIENT
Start: 2022-01-01 | End: 2022-01-01 | Stop reason: HOSPADM

## 2022-01-01 RX ORDER — ONDANSETRON 4 MG/1
4 TABLET, ORALLY DISINTEGRATING ORAL EVERY 6 HOURS PRN
Status: DISCONTINUED | OUTPATIENT
Start: 2022-01-01 | End: 2022-01-01 | Stop reason: HOSPADM

## 2022-01-01 RX ORDER — LEVOTHYROXINE SODIUM 50 UG/1
50 TABLET ORAL DAILY
Qty: 90 TABLET | Refills: 0 | Status: CANCELLED | OUTPATIENT
Start: 2022-01-01

## 2022-01-01 RX ORDER — ACETAMINOPHEN 325 MG/1
650 TABLET ORAL EVERY 6 HOURS PRN
Status: DISCONTINUED | OUTPATIENT
Start: 2022-01-01 | End: 2022-01-01 | Stop reason: HOSPADM

## 2022-01-01 RX ORDER — GLIPIZIDE 10 MG/1
10 TABLET, FILM COATED, EXTENDED RELEASE ORAL DAILY
Qty: 90 TABLET | Refills: 1 | Status: SHIPPED | OUTPATIENT
Start: 2022-01-01 | End: 2023-01-01

## 2022-01-01 RX ORDER — LIDOCAINE 40 MG/G
CREAM TOPICAL
Status: DISCONTINUED | OUTPATIENT
Start: 2022-01-01 | End: 2022-01-01 | Stop reason: HOSPADM

## 2022-01-01 RX ORDER — PREDNISONE 20 MG/1
30 TABLET ORAL DAILY
Qty: 135 TABLET | Refills: 1 | Status: SHIPPED | OUTPATIENT
Start: 2022-01-01 | End: 2022-01-01

## 2022-01-01 RX ORDER — AMIODARONE HYDROCHLORIDE 200 MG/1
200 TABLET ORAL DAILY
Status: DISCONTINUED | OUTPATIENT
Start: 2022-01-01 | End: 2022-01-01 | Stop reason: HOSPADM

## 2022-01-01 RX ORDER — NICOTINE POLACRILEX 4 MG
15-30 LOZENGE BUCCAL
Status: DISCONTINUED | OUTPATIENT
Start: 2022-01-01 | End: 2022-01-01 | Stop reason: HOSPADM

## 2022-01-01 RX ORDER — LIRAGLUTIDE 6 MG/ML
1.8 INJECTION SUBCUTANEOUS DAILY
Qty: 27 ML | Refills: 0 | Status: CANCELLED | OUTPATIENT
Start: 2022-01-01

## 2022-01-01 RX ORDER — LEVOTHYROXINE SODIUM 50 UG/1
50 TABLET ORAL DAILY
Status: DISCONTINUED | OUTPATIENT
Start: 2022-01-01 | End: 2022-01-01 | Stop reason: HOSPADM

## 2022-01-01 RX ORDER — PREDNISONE 20 MG/1
20 TABLET ORAL DAILY
Qty: 90 TABLET | Refills: 3 | Status: SHIPPED | OUTPATIENT
Start: 2022-01-01 | End: 2023-01-01 | Stop reason: DRUGHIGH

## 2022-01-01 RX ORDER — FUROSEMIDE 20 MG
60 TABLET ORAL DAILY
Qty: 270 TABLET | Refills: 3 | Status: ON HOLD | OUTPATIENT
Start: 2022-01-01 | End: 2023-01-01

## 2022-01-01 RX ORDER — SACUBITRIL AND VALSARTAN 49; 51 MG/1; MG/1
1 TABLET, FILM COATED ORAL 2 TIMES DAILY
Qty: 180 TABLET | Refills: 1 | Status: SHIPPED | OUTPATIENT
Start: 2022-01-01 | End: 2023-01-01

## 2022-01-01 RX ORDER — SACUBITRIL AND VALSARTAN 49; 51 MG/1; MG/1
1 TABLET, FILM COATED ORAL 2 TIMES DAILY
Qty: 180 TABLET | Refills: 1 | Status: CANCELLED | OUTPATIENT
Start: 2022-01-01

## 2022-01-01 RX ORDER — ATORVASTATIN CALCIUM 20 MG/1
20 TABLET, FILM COATED ORAL DAILY
Qty: 90 TABLET | Refills: 3 | Status: ON HOLD | OUTPATIENT
Start: 2022-01-01 | End: 2023-01-01

## 2022-01-01 RX ORDER — GLIPIZIDE 10 MG/1
TABLET, FILM COATED, EXTENDED RELEASE ORAL
Qty: 90 TABLET | Refills: 0 | Status: CANCELLED | OUTPATIENT
Start: 2022-01-01

## 2022-01-01 RX ORDER — FENOFIBRATE 160 MG/1
160 TABLET ORAL DAILY
Qty: 90 TABLET | Refills: 0 | Status: CANCELLED | OUTPATIENT
Start: 2022-01-01

## 2022-01-01 RX ORDER — INSULIN DETEMIR 100 [IU]/ML
35 INJECTION, SOLUTION SUBCUTANEOUS DAILY
Qty: 30 ML | Refills: 0 | Status: CANCELLED | OUTPATIENT
Start: 2022-01-01

## 2022-01-01 RX ORDER — METOPROLOL SUCCINATE 25 MG/1
50 TABLET, EXTENDED RELEASE ORAL DAILY
Qty: 1 TABLET | Refills: 0 | Status: CANCELLED | OUTPATIENT
Start: 2022-01-01

## 2022-01-01 RX ORDER — INSULIN DETEMIR 100 [IU]/ML
35 INJECTION, SOLUTION SUBCUTANEOUS DAILY
Qty: 30 ML | Refills: 0 | Status: SHIPPED | OUTPATIENT
Start: 2022-01-01 | End: 2022-01-01

## 2022-01-01 RX ORDER — METOPROLOL SUCCINATE 25 MG/1
50 TABLET, EXTENDED RELEASE ORAL DAILY
Qty: 1 TABLET | Refills: 0 | COMMUNITY
Start: 2022-01-01 | End: 2023-01-01

## 2022-01-01 RX ADMIN — PREDNISONE 30 MG: 20 TABLET ORAL at 10:18

## 2022-01-01 RX ADMIN — METOPROLOL SUCCINATE 25 MG: 25 TABLET, EXTENDED RELEASE ORAL at 10:18

## 2022-01-01 RX ADMIN — SULFAMETHOXAZOLE AND TRIMETHOPRIM 1 TABLET: 400; 80 TABLET ORAL at 10:18

## 2022-01-01 RX ADMIN — AMIODARONE HYDROCHLORIDE 200 MG: 200 TABLET ORAL at 10:18

## 2022-01-01 RX ADMIN — PANTOPRAZOLE SODIUM 40 MG: 40 TABLET, DELAYED RELEASE ORAL at 10:18

## 2022-01-01 ASSESSMENT — PATIENT HEALTH QUESTIONNAIRE - PHQ9
SUM OF ALL RESPONSES TO PHQ QUESTIONS 1-9: 2
SUM OF ALL RESPONSES TO PHQ QUESTIONS 1-9: 2
10. IF YOU CHECKED OFF ANY PROBLEMS, HOW DIFFICULT HAVE THESE PROBLEMS MADE IT FOR YOU TO DO YOUR WORK, TAKE CARE OF THINGS AT HOME, OR GET ALONG WITH OTHER PEOPLE: NOT DIFFICULT AT ALL

## 2022-01-01 ASSESSMENT — ACTIVITIES OF DAILY LIVING (ADL)
ADLS_ACUITY_SCORE: 31
ADLS_ACUITY_SCORE: 33
ADLS_ACUITY_SCORE: 31
ADLS_ACUITY_SCORE: 31
ADLS_ACUITY_SCORE: 35
ADLS_ACUITY_SCORE: 35
ADLS_ACUITY_SCORE: 33
ADLS_ACUITY_SCORE: 31
ADLS_ACUITY_SCORE: 31

## 2022-01-01 ASSESSMENT — PAIN SCALES - GENERAL
PAINLEVEL: NO PAIN (0)
PAINLEVEL: NO PAIN (0)

## 2022-01-01 ASSESSMENT — ENCOUNTER SYMPTOMS
SHORTNESS OF BREATH: 0
FEVER: 0
WOUND: 0

## 2022-01-04 ENCOUNTER — ANCILLARY PROCEDURE (OUTPATIENT)
Dept: CARDIOLOGY | Facility: CLINIC | Age: 62
End: 2022-01-04
Attending: INTERNAL MEDICINE
Payer: COMMERCIAL

## 2022-01-04 DIAGNOSIS — Z95.810 ICD (IMPLANTABLE CARDIOVERTER-DEFIBRILLATOR) IN PLACE: ICD-10-CM

## 2022-01-04 DIAGNOSIS — I42.8 NON-ISCHEMIC CARDIOMYOPATHY (H): ICD-10-CM

## 2022-01-05 ENCOUNTER — CARE COORDINATION (OUTPATIENT)
Dept: CARDIOLOGY | Facility: CLINIC | Age: 62
End: 2022-01-05
Payer: COMMERCIAL

## 2022-01-05 DIAGNOSIS — R60.9 EDEMA, UNSPECIFIED TYPE: ICD-10-CM

## 2022-01-05 DIAGNOSIS — I42.8 NONISCHEMIC CARDIOMYOPATHY (H): ICD-10-CM

## 2022-01-05 DIAGNOSIS — I42.8 NON-ISCHEMIC CARDIOMYOPATHY (H): Primary | ICD-10-CM

## 2022-01-05 NOTE — PROGRESS NOTES
Message sent to  to confirm whether pt needs [facility] follow up and bmp lab if he starts dapa. Deidra TAYLOR January 3, 2022, 3:38 PM    Umm Hernandes MD   12/27/2021  5:47 PM CST         EF is not improving. If he is willing, I would consider adding 10 mg of Dapagliflozin to the regimen. If he starts that, I would reduce lasix to 10 mg daily or use it PRN. Plus he needs close EP follow up also. See other notes for shock.       responded and said pt needs bmp lab 1-2 weeks after starting dapaglifzolin. I left message for pt to call back to review echo and 's medication recommendation. I also reminded pt of 1/6/22 visit with . Deidra TAYLOR January 5, 2022, 3:13 PM

## 2022-01-06 ENCOUNTER — OFFICE VISIT (OUTPATIENT)
Dept: CARDIOLOGY | Facility: CLINIC | Age: 62
End: 2022-01-06
Attending: INTERNAL MEDICINE
Payer: COMMERCIAL

## 2022-01-06 VITALS
HEART RATE: 66 BPM | HEIGHT: 72 IN | BODY MASS INDEX: 37.29 KG/M2 | SYSTOLIC BLOOD PRESSURE: 121 MMHG | DIASTOLIC BLOOD PRESSURE: 75 MMHG | WEIGHT: 275.3 LBS

## 2022-01-06 DIAGNOSIS — I47.29 PAROXYSMAL VENTRICULAR TACHYCARDIA (H): ICD-10-CM

## 2022-01-06 DIAGNOSIS — I42.8 NON-ISCHEMIC CARDIOMYOPATHY (H): ICD-10-CM

## 2022-01-06 DIAGNOSIS — Z79.899 ON AMIODARONE THERAPY: ICD-10-CM

## 2022-01-06 PROCEDURE — 99215 OFFICE O/P EST HI 40 MIN: CPT | Performed by: INTERNAL MEDICINE

## 2022-01-06 PROCEDURE — 93000 ELECTROCARDIOGRAM COMPLETE: CPT | Performed by: INTERNAL MEDICINE

## 2022-01-06 RX ORDER — AMIODARONE HYDROCHLORIDE 400 MG/1
400 TABLET ORAL 2 TIMES DAILY
Qty: 90 TABLET | Refills: 2
Start: 2022-01-06 | End: 2022-03-11

## 2022-01-06 ASSESSMENT — MIFFLIN-ST. JEOR: SCORE: 2086.75

## 2022-01-06 NOTE — LETTER
1/6/2022    Jefry Harris MD  [address] [facility] [address]    RE: Neymar Rhodes       Dear Colleague,     I had the pleasure of seeing Neymar Rhodes in the [facility].  HPI and Plan:   See dictation  [id number]  Today's clinic visit entailed:  The following tests were independently interpreted by me as noted in my documentation: ecg, device interrogation  15 minutes spent on the date of the encounter doing chart review   Provider  Link to MDM Help Grid     The level of medical decision making during this visit was of moderate complexity.      Orders Placed This Encounter   Procedures     EKG 12-lead complete w/read - Clinics (performed today)       No orders of the defined types were placed in this encounter.      There are no discontinued medications.      Encounter Diagnoses   Name Primary?     Non-ischemic cardiomyopathy (H)      On amiodarone therapy        CURRENT MEDICATIONS:  Current Outpatient Medications   Medication Sig Dispense Refill     acetaminophen (TYLENOL) 500 MG tablet Take 500-1,000 mg by mouth every 6 hours as needed for mild pain       amiodarone (PACERONE) 400 MG tablet Take 1 tablet (400 mg) by mouth daily 90 tablet 2     aspirin 81 MG tablet Take 1 tablet (81 mg) by mouth daily 30 tablet      atorvastatin (LIPITOR) 20 MG tablet Take 1 tablet (20 mg) by mouth daily 90 tablet 3     celecoxib (CELEBREX) 100 MG capsule Take 1 capsule (100 mg) by mouth 2 times daily as needed for moderate pain 30 capsule 0     cephALEXin (KEFLEX) 500 MG capsule Take 1 capsule (500 mg) by mouth 3 times daily 21 capsule 0     clotrimazole (LOTRIMIN) 1 % external cream Apply sparingly once or twice per day as needed to affected area until the skin is better, then stop; REPEAT AS NEEDED 30 g 1     Continuous Blood Gluc Sensor (FREESTYLE JOCELIN 14 DAY SENSOR) MISC use 1 sensor every 14 days 6 each 3     continuous blood glucose monitoring (FREESTYLE JOCELIN) sensor For use with  Freestyle Jeffrey Flash  for continuous monitioring of blood glucose levels. Replace sensor every 10 days. 3 each 3     fenofibrate (TRIGLIDE/LOFIBRA) 160 MG tablet TAKE ONE TABLET BY MOUTH ONE TIME DAILY 90 tablet 1     fluticasone (FLONASE) 50 MCG/ACT nasal spray Spray 2 sprays into both nostrils daily 16 g 0     furosemide (LASIX) 20 MG tablet Take 1 tablet (20 mg) by mouth 2 times daily (Patient taking differently: Take 20 mg by mouth daily ) 60 tablet 11     glipiZIDE (GLUCOTROL XL) 10 MG 24 hr tablet TAKE 1 TABLET (10 MG) BY MOUTH DAILY. TAKE WITH LARGEST MEAL OF THE DAY. ALWAYS TAKE WITH FOOD 90 tablet 0     insulin detemir (LEVEMIR FLEXTOUCH) 100 UNIT/ML pen Inject 25 Units Subcutaneous daily 30 mL 1     levothyroxine (SYNTHROID/LEVOTHROID) 50 MCG tablet Take 1 tablet (50 mcg) by mouth daily 90 tablet 0     metFORMIN (GLUCOPHAGE) 1000 MG tablet Take 1 tablet (1,000 mg) by mouth 2 times daily (with meals) 180 tablet 0     metoprolol succinate ER (TOPROL-XL) 25 MG 24 hr tablet Take 1 tablet (25 mg) by mouth daily 90 tablet 2     omeprazole (PRILOSEC) 40 MG DR capsule Take 1 capsule (40 mg) by mouth daily 90 capsule 3     sacubitril-valsartan (ENTRESTO) 49-51 MG per tablet Take 1 tablet by mouth 2 times daily 180 tablet 0     triamcinolone (KENALOG) 0.5 % cream Apply sparingly once or twice per day as needed to affected area until the skin is better, then stop 30 g 0     ULTICARE 29G X 12.7MM insulin pen needle Use 3 pen needles daily as directed (one for daily victoza injection, 2 for twice daily levemir injections) 200 each 1     VICTOZA PEN 18 MG/3ML soln Inject 1.8 mg Subcutaneously daily 27 mL 0     albuterol (PROAIR HFA) 108 (90 Base) MCG/ACT inhaler Inhale 2 puffs into the lungs every 6 hours 18 g 0     albuterol (PROAIR HFA/PROVENTIL HFA/VENTOLIN HFA) 108 (90 Base) MCG/ACT Inhaler Inhale 2 puffs into the lungs every 6 hours as needed for shortness of breath / dyspnea or wheezing (Patient not  taking: Reported on 1/6/2022)       blood glucose monitoring (ACCU-CHEK LUL PLUS) test strip Use to test blood sugar 1-3 times daily or as directed. (Patient not taking: Reported on 9/17/2021) 100 each 11       ALLERGIES     Allergies   Allergen Reactions     No Known Drug Allergies        PAST MEDICAL HISTORY:  Past Medical History:   Diagnosis Date     Ascending aorta dilatation (H)      Diverticulosis of colon (without mention of hemorrhage)      Essential hypertension, benign      Gout, unspecified      LBBB (left bundle branch block)      Morbid obesity (H)      Non-ischemic cardiomyopathy (H)      Nonrheumatic mitral valve regurgitation      NSTEMI (non-ST elevated myocardial infarction) (H)     cardiac cath 6/2018: mild non-obstructive CAD     Other and unspecified hyperlipidemia      Paroxysmal ventricular tachycardia (H)      Type II or unspecified type diabetes mellitus without mention of complication, not stated as uncontrolled        PAST SURGICAL HISTORY:  Past Surgical History:   Procedure Laterality Date     CV CORONARY ANGIOGRAM N/A 10/2/2019    Procedure: Coronary Angiogram;  Surgeon: Kathy Almaguer MD;  Location: [facility] CARDIAC CATH LAB     CV LEFT HEART CATH N/A 10/2/2019    Procedure: Left Heart Cath;  Surgeon: Kathy Almaguer MD;  Location: [facility] CARDIAC CATH LAB     CV RIGHT HEART CATH MEASUREMENTS RECORDED N/A 10/2/2019    Procedure: Right Heart Cath;  Surgeon: Kathy Almaguer MD;  Location: [facility] CARDIAC CATH LAB     ENDOBRONCHIAL ULTRASOUND FLEXIBLE N/A 12/19/2019    Procedure: Flexible bronchoscopy, endobronchial ultrasound, bronchial alveolar lavage;  Surgeon: Ranulfo Barcenas MD;  Location: [facility] OR     EP ICD N/A 10/4/2019    Procedure: EP ICD;  Surgeon: Jayy Dorman MD;  Location: [facility] CARDIAC CATH LAB     EP LEAD PLCMNT LV NEW ICD N/A 10/4/2019    Procedure: EP Lead Plcmnt LV New ICD;  Surgeon: Jayy Dorman MD;  Location: [facility] CARDIAC CATH  LAB     HEART CATH CORONARY ANGIOGRAM WITHOUT PRESSURES  06/10/2018    normal coronary angiogram, nothing more than 10%     SURGICAL HISTORY OF -   2001    repair of colovesicular fistula     ZZC APPENDECTOMY  1980's       FAMILY HISTORY:  Family History   Problem Relation Age of Onset     Cancer Father 75        bladder cancer     Myocardial Infarction Father      Other - See Comments Father         smoking     Breast Cancer Mother      Breast Cancer Sister      Family History Negative Brother      Family History Negative Son      Family History Negative Son         fluttering/murmur     Asthma Son      Colon Cancer No family hx of        SOCIAL HISTORY:  Social History     Socioeconomic History     Marital status:      Spouse name: None     Number of children: None     Years of education: None     Highest education level: None   Occupational History     None   Tobacco Use     Smoking status: Never Smoker     Smokeless tobacco: Never Used   Substance and Sexual Activity     Alcohol use: Yes     Comment: once awhile     Drug use: No     Sexual activity: Yes     Partners: Female   Other Topics Concern     Parent/sibling w/ CABG, MI or angioplasty before 65F 55M? Not Asked   Social History Narrative     None     Social Determinants of Health     Financial Resource Strain: Not on file   Food Insecurity: Not on file   Transportation Needs: Not on file   Physical Activity: Not on file   Stress: Not on file   Social Connections: Not on file   Intimate Partner Violence: Not on file   Housing Stability: Not on file       Review of Systems:  Skin:          Eyes:         ENT:         Respiratory:          Cardiovascular:         Gastroenterology:        Genitourinary:         Musculoskeletal:         Neurologic:         Psychiatric:         Heme/Lymph/Imm:         Endocrine:           Physical Exam:  Vitals: /75 (BP Location: Left arm, Patient Position: Sitting)   Pulse 66   Ht 1.829 m (6')   Wt 124.9 kg (275  lb 4.8 oz)   BMI 37.34 kg/m      Constitutional:  cooperative, alert and oriented, well developed, well nourished, in no acute distress obese      Skin:  warm and dry to the touch, no apparent skin lesions or masses noted   ICD incision in the left infraclavicular area was infected      Head:  normocephalic, no masses or lesions        Eyes:  pupils equal and round, conjunctivae and lids unremarkable, sclera white, no xanthalasma, EOMS intact, no nystagmus        Lymph:No Cervical lymphadenopathy present     ENT:  no pallor or cyanosis        Neck:  carotid pulses are full and equal bilaterally, JVP normal, no carotid bruit        Respiratory:  normal breath sounds, clear to auscultation, normal A-P diameter, normal symmetry, normal respiratory excursion, no use of accessory muscles         Cardiac: regular rhythm, normal S1/S2, no S3 or S4, apical impulse not displaced, no murmurs, gallops or rubs                pulses full and equal, no bruits auscultated                                        GI:  abdomen soft, non-tender, BS normoactive, no mass, no HSM, no bruits obese      Extremities and Muscular Skeletal:  no deformities, clubbing, cyanosis, erythema observed              Neurological:  no gross motor deficits        Psych:  Alert and Oriented x 3        CC  Umm Hernandes MD  [address]  [place] [address]                Service Date: 01/06/2022    HISTORY OF PRESENT ILLNESS:  Thank you for allowing me to participate in the care of your delightful patient.  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  Several years ago, the ejection fraction was noted at 40%.  Over the past few years, it has been as low as less than 25% along with a recurrent VT for which a biventricular ICD was implanted.  I last saw the patient in 12/2020.  At that time, we had discussed the option of VT ablation given the escalating burden  of it.  Neymar declined as he would like to talk to Dr. Moran, who has been taking care of his cardiac sarcoidosis.    Unfortunately, since then, he has had multiple not just VT but VF.  In particular, the last one was on [place]ola Qiana.  His device shocked him after failed ATP.  There was some undersensing, but for the most part, it was sensed appropriately and delivered a shock.  Three months prior to that, he was at work when he had a syncopal episode from VF due to undersensing.  Fortunately, the arrhythmia converted on its own.  After that, we did adjust the R-wave sensitivity and loaded him with amiodarone again.    Because of his recent event, I was asked to see the patient sooner than later.    Neymar is otherwise doing well.  He has been compliant with his medications including amiodarone 400 mg a day, Entresto 2 tablets daily, along with metoprolol 25 mg daily.  I did review the recent device interrogations, and it was VF rather than VT.  Undoubtedly, this is a progression of his cardiac sarcoidosis and the prognosis overall is not good for the patient.    We discussed the option of adding another antiarrhythmic drug, but the data behind it is somewhat questionable.  The other option is an ablation, for which I would recommend the patient to consider.  In the past when he had VT, I had advocated strongly for a VT ablation, but now he has VF as well and I was upfront with the patient that I have never done a VT ablation on a patient with cardiac sarcoidosis.  I do have a  Colleague, Dr. De Guzman, over at the [facility], who have performed VT ablation on patients with cardiac sarcoidosis.  Because of that, I would like him to see Dr. De Guzman for a second opinion.  His office will contact the patient for that appointment.  In the meantime, I would like the patient to increase his amiodarone to 400 mg b.i.d. for 2 weeks and after that to once a day.  I asked him to again refrain from driving, and  if it shocks him once he should call our office, and if it shocks him multiple times to go to the emergency room.    Jayy Dorman MD        D: 2022   T: 2022   MT: ariel    Name:     JACI WICK  MRN:      [id number]        Account:      [id number]   :      1960           Service Date: 2022       Document: [id number]        Thank you for allowing me to participate in the care of your patient.      Sincerely,     Jayy Pratt MD     [facility]  cc:   Umm Hernandes MD  [address] [place] [address]  [place] [address]

## 2022-01-06 NOTE — LETTER
1/6/2022       RE: Neymar Rhodes  [address]  [facility] [address]     Dear Colleague,    Thank you for referring your patient, Neymar Rhodes, to the [facility] at [facility]. Please see a copy of my visit note below.    HPI and Plan:   See dictation  [id number]  Today's clinic visit entailed:  The following tests were independently interpreted by me as noted in my documentation: ecg, device interrogation  15 minutes spent on the date of the encounter doing chart review   Provider  Link to MDM Help Grid     The level of medical decision making during this visit was of moderate complexity.      Orders Placed This Encounter   Procedures     EKG 12-lead complete w/read - Clinics (performed today)       No orders of the defined types were placed in this encounter.      There are no discontinued medications.      Encounter Diagnoses   Name Primary?     Non-ischemic cardiomyopathy (H)      On amiodarone therapy        CURRENT MEDICATIONS:  Current Outpatient Medications   Medication Sig Dispense Refill     acetaminophen (TYLENOL) 500 MG tablet Take 500-1,000 mg by mouth every 6 hours as needed for mild pain       amiodarone (PACERONE) 400 MG tablet Take 1 tablet (400 mg) by mouth daily 90 tablet 2     aspirin 81 MG tablet Take 1 tablet (81 mg) by mouth daily 30 tablet      atorvastatin (LIPITOR) 20 MG tablet Take 1 tablet (20 mg) by mouth daily 90 tablet 3     celecoxib (CELEBREX) 100 MG capsule Take 1 capsule (100 mg) by mouth 2 times daily as needed for moderate pain 30 capsule 0     cephALEXin (KEFLEX) 500 MG capsule Take 1 capsule (500 mg) by mouth 3 times daily 21 capsule 0     clotrimazole (LOTRIMIN) 1 % external cream Apply sparingly once or twice per day as needed to affected area until the skin is better, then stop; REPEAT AS NEEDED 30 g 1     Continuous Blood Gluc Sensor (FREESTYLE JOCELIN 14 DAY SENSOR) MISC use 1 sensor every 14  days 6 each 3     continuous blood glucose monitoring (FREESTYLE JOCELIN) sensor For use with Freestyle Jocelin Flash  for continuous monitioring of blood glucose levels. Replace sensor every 10 days. 3 each 3     fenofibrate (TRIGLIDE/LOFIBRA) 160 MG tablet TAKE ONE TABLET BY MOUTH ONE TIME DAILY 90 tablet 1     fluticasone (FLONASE) 50 MCG/ACT nasal spray Spray 2 sprays into both nostrils daily 16 g 0     furosemide (LASIX) 20 MG tablet Take 1 tablet (20 mg) by mouth 2 times daily (Patient taking differently: Take 20 mg by mouth daily ) 60 tablet 11     glipiZIDE (GLUCOTROL XL) 10 MG 24 hr tablet TAKE 1 TABLET (10 MG) BY MOUTH DAILY. TAKE WITH LARGEST MEAL OF THE DAY. ALWAYS TAKE WITH FOOD 90 tablet 0     insulin detemir (LEVEMIR FLEXTOUCH) 100 UNIT/ML pen Inject 25 Units Subcutaneous daily 30 mL 1     levothyroxine (SYNTHROID/LEVOTHROID) 50 MCG tablet Take 1 tablet (50 mcg) by mouth daily 90 tablet 0     metFORMIN (GLUCOPHAGE) 1000 MG tablet Take 1 tablet (1,000 mg) by mouth 2 times daily (with meals) 180 tablet 0     metoprolol succinate ER (TOPROL-XL) 25 MG 24 hr tablet Take 1 tablet (25 mg) by mouth daily 90 tablet 2     omeprazole (PRILOSEC) 40 MG DR capsule Take 1 capsule (40 mg) by mouth daily 90 capsule 3     sacubitril-valsartan (ENTRESTO) 49-51 MG per tablet Take 1 tablet by mouth 2 times daily 180 tablet 0     triamcinolone (KENALOG) 0.5 % cream Apply sparingly once or twice per day as needed to affected area until the skin is better, then stop 30 g 0     ULTICARE 29G X 12.7MM insulin pen needle Use 3 pen needles daily as directed (one for daily victoza injection, 2 for twice daily levemir injections) 200 each 1     VICTOZA PEN 18 MG/3ML soln Inject 1.8 mg Subcutaneously daily 27 mL 0     albuterol (PROAIR HFA) 108 (90 Base) MCG/ACT inhaler Inhale 2 puffs into the lungs every 6 hours 18 g 0     albuterol (PROAIR HFA/PROVENTIL HFA/VENTOLIN HFA) 108 (90 Base) MCG/ACT Inhaler Inhale 2 puffs into the  lungs every 6 hours as needed for shortness of breath / dyspnea or wheezing (Patient not taking: Reported on 1/6/2022)       blood glucose monitoring (ACCU-CHEK LUL PLUS) test strip Use to test blood sugar 1-3 times daily or as directed. (Patient not taking: Reported on 9/17/2021) 100 each 11       ALLERGIES     Allergies   Allergen Reactions     No Known Drug Allergies        PAST MEDICAL HISTORY:  Past Medical History:   Diagnosis Date     Ascending aorta dilatation (H)      Diverticulosis of colon (without mention of hemorrhage)      Essential hypertension, benign      Gout, unspecified      LBBB (left bundle branch block)      Morbid obesity (H)      Non-ischemic cardiomyopathy (H)      Nonrheumatic mitral valve regurgitation      NSTEMI (non-ST elevated myocardial infarction) (H)     cardiac cath 6/2018: mild non-obstructive CAD     Other and unspecified hyperlipidemia      Paroxysmal ventricular tachycardia (H)      Type II or unspecified type diabetes mellitus without mention of complication, not stated as uncontrolled        PAST SURGICAL HISTORY:  Past Surgical History:   Procedure Laterality Date     CV CORONARY ANGIOGRAM N/A 10/2/2019    Procedure: Coronary Angiogram;  Surgeon: Kathy Almaguer MD;  Location: [facility] CARDIAC CATH LAB     CV LEFT HEART CATH N/A 10/2/2019    Procedure: Left Heart Cath;  Surgeon: Kathy Almaguer MD;  Location:  HEART CARDIAC CATH LAB     CV RIGHT HEART CATH MEASUREMENTS RECORDED N/A 10/2/2019    Procedure: Right Heart Cath;  Surgeon: Kathy Almaguer MD;  Location: [facility] CARDIAC CATH LAB     ENDOBRONCHIAL ULTRASOUND FLEXIBLE N/A 12/19/2019    Procedure: Flexible bronchoscopy, endobronchial ultrasound, bronchial alveolar lavage;  Surgeon: Ranulfo Barcenas MD;  Location: [facility] OR     EP ICD N/A 10/4/2019    Procedure: EP ICD;  Surgeon: Jayy Dorman MD;  Location:  HEART CARDIAC CATH LAB     EP LEAD PLCMNT LV NEW ICD N/A 10/4/2019    Procedure:  EP Lead Plcmnt LV New ICD;  Surgeon: Jayy Dorman MD;  Location:  HEART CARDIAC CATH LAB     HEART CATH CORONARY ANGIOGRAM WITHOUT PRESSURES  06/10/2018    normal coronary angiogram, nothing more than 10%     SURGICAL HISTORY OF -   2001    repair of colovesicular fistula     ZZC APPENDECTOMY  1980's       FAMILY HISTORY:  Family History   Problem Relation Age of Onset     Cancer Father 75        bladder cancer     Myocardial Infarction Father      Other - See Comments Father         smoking     Breast Cancer Mother      Breast Cancer Sister      Family History Negative Brother      Family History Negative Son      Family History Negative Son         fluttering/murmur     Asthma Son      Colon Cancer No family hx of        SOCIAL HISTORY:  Social History     Socioeconomic History     Marital status:      Spouse name: None     Number of children: None     Years of education: None     Highest education level: None   Occupational History     None   Tobacco Use     Smoking status: Never Smoker     Smokeless tobacco: Never Used   Substance and Sexual Activity     Alcohol use: Yes     Comment: once awhile     Drug use: No     Sexual activity: Yes     Partners: Female   Other Topics Concern     Parent/sibling w/ CABG, MI or angioplasty before 65F 55M? Not Asked   Social History Narrative     None     Social Determinants of Health     Financial Resource Strain: Not on file   Food Insecurity: Not on file   Transportation Needs: Not on file   Physical Activity: Not on file   Stress: Not on file   Social Connections: Not on file   Intimate Partner Violence: Not on file   Housing Stability: Not on file       Review of Systems:  Skin:          Eyes:         ENT:         Respiratory:          Cardiovascular:         Gastroenterology:        Genitourinary:         Musculoskeletal:         Neurologic:         Psychiatric:         Heme/Lymph/Imm:         Endocrine:           Physical Exam:  Vitals: /75 (BP  Location: Left arm, Patient Position: Sitting)   Pulse 66   Ht 1.829 m (6')   Wt 124.9 kg (275 lb 4.8 oz)   BMI 37.34 kg/m      Constitutional:  cooperative, alert and oriented, well developed, well nourished, in no acute distress obese      Skin:  warm and dry to the touch, no apparent skin lesions or masses noted   ICD incision in the left infraclavicular area was infected      Head:  normocephalic, no masses or lesions        Eyes:  pupils equal and round, conjunctivae and lids unremarkable, sclera white, no xanthalasma, EOMS intact, no nystagmus        Lymph:No Cervical lymphadenopathy present     ENT:  no pallor or cyanosis        Neck:  carotid pulses are full and equal bilaterally, JVP normal, no carotid bruit        Respiratory:  normal breath sounds, clear to auscultation, normal A-P diameter, normal symmetry, normal respiratory excursion, no use of accessory muscles         Cardiac: regular rhythm, normal S1/S2, no S3 or S4, apical impulse not displaced, no murmurs, gallops or rubs                pulses full and equal, no bruits auscultated                                        GI:  abdomen soft, non-tender, BS normoactive, no mass, no HSM, no bruits obese      Extremities and Muscular Skeletal:  no deformities, clubbing, cyanosis, erythema observed              Neurological:  no gross motor deficits        Psych:  Alert and Oriented x 3        CC  Umm Hernandes MD  [address]  [place] [address]                Service Date: 01/06/2022    HISTORY OF PRESENT ILLNESS:  Thank you for allowing me to participate in the care of your delightful patient.  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  Several years ago, the ejection fraction was noted at 40%.  Over the past few years, it has been as low as less than 25% along with a recurrent VT for which a biventricular ICD was implanted.  I last saw the patient  in 12/2020.  At that time, we had discussed the option of VT ablation given the escalating burden of it.  Neymar declined as he would like to talk to Dr. Moran, who has been taking care of his cardiac sarcoidosis.    Unfortunately, since then, he has had multiple not just VT but VF.  In particular, the last one was on Vichy Qiana.  His device shocked him after failed ATP.  There was some undersensing, but for the most part, it was sensed appropriately and delivered a shock.  Three months prior to that, he was at work when he had a syncopal episode from VF due to undersensing.  Fortunately, the arrhythmia converted on its own.  After that, we did adjust the R-wave sensitivity and loaded him with amiodarone again.    Because of his recent event, I was asked to see the patient sooner than later.    Neymar is otherwise doing well.  He has been compliant with his medications including amiodarone 400 mg a day, Entresto 2 tablets daily, along with metoprolol 25 mg daily.  I did review the recent device interrogations, and it was VF rather than VT.  Undoubtedly, this is a progression of his cardiac sarcoidosis and the prognosis overall is not good for the patient.    We discussed the option of adding another antiarrhythmic drug, but the data behind it is somewhat questionable.  The other option is an ablation, for which I would recommend the patient to consider.  In the past when he had VT, I had advocated strongly for a VT ablation, but now he has VF as well and I was upfront with the patient that I have never done a VT ablation on a patient with cardiac sarcoidosis.  I do have a  Colleague, Dr. De Guzman, over at the [facility], who have performed VT ablation on patients with cardiac sarcoidosis.  Because of that, I would like him to see Dr. De Guzman for a second opinion.  His office will contact the patient for that appointment.  In the meantime, I would like the patient to increase his amiodarone to 400 mg  b.i.d. for 2 weeks and after that to once a day.  I asked him to again refrain from driving, and if it shocks him once he should call our office, and if it shocks him multiple times to go to the emergency room.    Jayy Dorman MD        D: 2022   T: 2022   MT: ariel    Name:     JACI WICK  MRN:      [id number]        Account:      [id number]   :      1960           Service Date: 2022     Document: [id number]

## 2022-01-06 NOTE — PROGRESS NOTES
HPI and Plan:   See dictation  [id number]  Today's clinic visit entailed:  The following tests were independently interpreted by me as noted in my documentation: ecg, device interrogation  15 minutes spent on the date of the encounter doing chart review   Provider  Link to MDM Help Grid     The level of medical decision making during this visit was of moderate complexity.      Orders Placed This Encounter   Procedures     EKG 12-lead complete w/read - Clinics (performed today)       No orders of the defined types were placed in this encounter.      There are no discontinued medications.      Encounter Diagnoses   Name Primary?     Non-ischemic cardiomyopathy (H)      On amiodarone therapy        CURRENT MEDICATIONS:  Current Outpatient Medications   Medication Sig Dispense Refill     acetaminophen (TYLENOL) 500 MG tablet Take 500-1,000 mg by mouth every 6 hours as needed for mild pain       amiodarone (PACERONE) 400 MG tablet Take 1 tablet (400 mg) by mouth daily 90 tablet 2     aspirin 81 MG tablet Take 1 tablet (81 mg) by mouth daily 30 tablet      atorvastatin (LIPITOR) 20 MG tablet Take 1 tablet (20 mg) by mouth daily 90 tablet 3     celecoxib (CELEBREX) 100 MG capsule Take 1 capsule (100 mg) by mouth 2 times daily as needed for moderate pain 30 capsule 0     cephALEXin (KEFLEX) 500 MG capsule Take 1 capsule (500 mg) by mouth 3 times daily 21 capsule 0     clotrimazole (LOTRIMIN) 1 % external cream Apply sparingly once or twice per day as needed to affected area until the skin is better, then stop; REPEAT AS NEEDED 30 g 1     Continuous Blood Gluc Sensor (FREESTYLE JOCELIN 14 DAY SENSOR) MISC use 1 sensor every 14 days 6 each 3     continuous blood glucose monitoring (FREESTYLE JOCELIN) sensor For use with Freestyle Jocelin Flash  for continuous monitioring of blood glucose levels. Replace sensor every 10 days. 3 each 3     fenofibrate (TRIGLIDE/LOFIBRA) 160 MG tablet TAKE ONE TABLET BY MOUTH ONE TIME DAILY 90  tablet 1     fluticasone (FLONASE) 50 MCG/ACT nasal spray Spray 2 sprays into both nostrils daily 16 g 0     furosemide (LASIX) 20 MG tablet Take 1 tablet (20 mg) by mouth 2 times daily (Patient taking differently: Take 20 mg by mouth daily ) 60 tablet 11     glipiZIDE (GLUCOTROL XL) 10 MG 24 hr tablet TAKE 1 TABLET (10 MG) BY MOUTH DAILY. TAKE WITH LARGEST MEAL OF THE DAY. ALWAYS TAKE WITH FOOD 90 tablet 0     insulin detemir (LEVEMIR FLEXTOUCH) 100 UNIT/ML pen Inject 25 Units Subcutaneous daily 30 mL 1     levothyroxine (SYNTHROID/LEVOTHROID) 50 MCG tablet Take 1 tablet (50 mcg) by mouth daily 90 tablet 0     metFORMIN (GLUCOPHAGE) 1000 MG tablet Take 1 tablet (1,000 mg) by mouth 2 times daily (with meals) 180 tablet 0     metoprolol succinate ER (TOPROL-XL) 25 MG 24 hr tablet Take 1 tablet (25 mg) by mouth daily 90 tablet 2     omeprazole (PRILOSEC) 40 MG DR capsule Take 1 capsule (40 mg) by mouth daily 90 capsule 3     sacubitril-valsartan (ENTRESTO) 49-51 MG per tablet Take 1 tablet by mouth 2 times daily 180 tablet 0     triamcinolone (KENALOG) 0.5 % cream Apply sparingly once or twice per day as needed to affected area until the skin is better, then stop 30 g 0     ULTICARE 29G X 12.7MM insulin pen needle Use 3 pen needles daily as directed (one for daily victoza injection, 2 for twice daily levemir injections) 200 each 1     VICTOZA PEN 18 MG/3ML soln Inject 1.8 mg Subcutaneously daily 27 mL 0     albuterol (PROAIR HFA) 108 (90 Base) MCG/ACT inhaler Inhale 2 puffs into the lungs every 6 hours 18 g 0     albuterol (PROAIR HFA/PROVENTIL HFA/VENTOLIN HFA) 108 (90 Base) MCG/ACT Inhaler Inhale 2 puffs into the lungs every 6 hours as needed for shortness of breath / dyspnea or wheezing (Patient not taking: Reported on 1/6/2022)       blood glucose monitoring (ACCU-CHEK LUL PLUS) test strip Use to test blood sugar 1-3 times daily or as directed. (Patient not taking: Reported on 9/17/2021) 100 each 11        ALLERGIES     Allergies   Allergen Reactions     No Known Drug Allergies        PAST MEDICAL HISTORY:  Past Medical History:   Diagnosis Date     Ascending aorta dilatation (H)      Diverticulosis of colon (without mention of hemorrhage)      Essential hypertension, benign      Gout, unspecified      LBBB (left bundle branch block)      Morbid obesity (H)      Non-ischemic cardiomyopathy (H)      Nonrheumatic mitral valve regurgitation      NSTEMI (non-ST elevated myocardial infarction) (H)     cardiac cath 6/2018: mild non-obstructive CAD     Other and unspecified hyperlipidemia      Paroxysmal ventricular tachycardia (H)      Type II or unspecified type diabetes mellitus without mention of complication, not stated as uncontrolled        PAST SURGICAL HISTORY:  Past Surgical History:   Procedure Laterality Date     CV CORONARY ANGIOGRAM N/A 10/2/2019    Procedure: Coronary Angiogram;  Surgeon: Kathy Almaguer MD;  Location:  HEART CARDIAC CATH LAB     CV LEFT HEART CATH N/A 10/2/2019    Procedure: Left Heart Cath;  Surgeon: Kathy Almaguer MD;  Location:  HEART CARDIAC CATH LAB     CV RIGHT HEART CATH MEASUREMENTS RECORDED N/A 10/2/2019    Procedure: Right Heart Cath;  Surgeon: Kathy Almaguer MD;  Location: [facility] CARDIAC CATH LAB     ENDOBRONCHIAL ULTRASOUND FLEXIBLE N/A 12/19/2019    Procedure: Flexible bronchoscopy, endobronchial ultrasound, bronchial alveolar lavage;  Surgeon: Ranulfo Barcenas MD;  Location: [facility] OR     EP ICD N/A 10/4/2019    Procedure: EP ICD;  Surgeon: Jayy Dorman MD;  Location:  HEART CARDIAC CATH LAB     EP LEAD PLCMNT LV NEW ICD N/A 10/4/2019    Procedure: EP Lead Plcmnt LV New ICD;  Surgeon: Jayy Dorman MD;  Location:  HEART CARDIAC CATH LAB     HEART CATH CORONARY ANGIOGRAM WITHOUT PRESSURES  06/10/2018    normal coronary angiogram, nothing more than 10%     SURGICAL HISTORY OF -   2001    repair of colovesicular fistula     Z  APPENDECTOMY  1980's       FAMILY HISTORY:  Family History   Problem Relation Age of Onset     Cancer Father 75        bladder cancer     Myocardial Infarction Father      Other - See Comments Father         smoking     Breast Cancer Mother      Breast Cancer Sister      Family History Negative Brother      Family History Negative Son      Family History Negative Son         fluttering/murmur     Asthma Son      Colon Cancer No family hx of        SOCIAL HISTORY:  Social History     Socioeconomic History     Marital status:      Spouse name: None     Number of children: None     Years of education: None     Highest education level: None   Occupational History     None   Tobacco Use     Smoking status: Never Smoker     Smokeless tobacco: Never Used   Substance and Sexual Activity     Alcohol use: Yes     Comment: once awhile     Drug use: No     Sexual activity: Yes     Partners: Female   Other Topics Concern     Parent/sibling w/ CABG, MI or angioplasty before 65F 55M? Not Asked   Social History Narrative     None     Social Determinants of Health     Financial Resource Strain: Not on file   Food Insecurity: Not on file   Transportation Needs: Not on file   Physical Activity: Not on file   Stress: Not on file   Social Connections: Not on file   Intimate Partner Violence: Not on file   Housing Stability: Not on file       Review of Systems:  Skin:          Eyes:         ENT:         Respiratory:          Cardiovascular:         Gastroenterology:        Genitourinary:         Musculoskeletal:         Neurologic:         Psychiatric:         Heme/Lymph/Imm:         Endocrine:           Physical Exam:  Vitals: /75 (BP Location: Left arm, Patient Position: Sitting)   Pulse 66   Ht 1.829 m (6')   Wt 124.9 kg (275 lb 4.8 oz)   BMI 37.34 kg/m      Constitutional:  cooperative, alert and oriented, well developed, well nourished, in no acute distress obese      Skin:  warm and dry to the touch, no apparent  skin lesions or masses noted   ICD incision in the left infraclavicular area was infected      Head:  normocephalic, no masses or lesions        Eyes:  pupils equal and round, conjunctivae and lids unremarkable, sclera white, no xanthalasma, EOMS intact, no nystagmus        Lymph:No Cervical lymphadenopathy present     ENT:  no pallor or cyanosis        Neck:  carotid pulses are full and equal bilaterally, JVP normal, no carotid bruit        Respiratory:  normal breath sounds, clear to auscultation, normal A-P diameter, normal symmetry, normal respiratory excursion, no use of accessory muscles         Cardiac: regular rhythm, normal S1/S2, no S3 or S4, apical impulse not displaced, no murmurs, gallops or rubs                pulses full and equal, no bruits auscultated                                        GI:  abdomen soft, non-tender, BS normoactive, no mass, no HSM, no bruits obese      Extremities and Muscular Skeletal:  no deformities, clubbing, cyanosis, erythema observed              Neurological:  no gross motor deficits        Psych:  Alert and Oriented x 3        CC  Umm Hernandes MD  [address]  [place] [id number]

## 2022-01-06 NOTE — PROGRESS NOTES
I messaged our pharmacy liaison to find out the cost of dapa for pt.    Tea Coombs Erin B., RN  HI there,   So medication is covered $15 for 30 days supply.   Hope this helps!   Thank you for allowing me to help with your patient   Tea     I will review with pt when he calls back. Deidra TAYLOR January 6, 2022, 9:12 AM

## 2022-01-07 NOTE — PROGRESS NOTES
Service Date: 01/06/2022    HISTORY OF PRESENT ILLNESS:  Thank you for allowing me to participate in the care of your delightful patient.  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  Several years ago, the ejection fraction was noted at 40%.  Over the past few years, it has been as low as less than 25% along with a recurrent VT for which a biventricular ICD was implanted.  I last saw the patient in 12/2020.  At that time, we had discussed the option of VT ablation given the escalating burden of it.  Neymar declined as he would like to talk to Dr. Moran, who has been taking care of his cardiac sarcoidosis.    Unfortunately, since then, he has had multiple not just VT but VF.  In particular, the last one was on Walt Qiana.  His device shocked him after failed ATP.  There was some undersensing, but for the most part, it was sensed appropriately and delivered a shock.  Three months prior to that, he was at work when he had a syncopal episode from VF due to undersensing.  Fortunately, the arrhythmia converted on its own.  After that, we did adjust the R-wave sensitivity and loaded him with amiodarone again.    Because of his recent event, I was asked to see the patient sooner than later.    Neymar is otherwise doing well.  He has been compliant with his medications including amiodarone 400 mg a day, Entresto 2 tablets daily, along with metoprolol 25 mg daily.  I did review the recent device interrogations, and it was VF rather than VT.  Undoubtedly, this is a progression of his cardiac sarcoidosis and the prognosis overall is not good for the patient.    We discussed the option of adding another antiarrhythmic drug, but the data behind it is somewhat questionable.  The other option is an ablation, for which I would recommend the patient to consider.  In the past when he had VT, I had advocated strongly for a VT ablation, but now  he has VF as well and I was upfront with the patient that I have never done a VT ablation on a patient with cardiac sarcoidosis.  I do have a  Colleague, Dr. D eGuzman, over at the [facility], who have performed VT ablation on patients with cardiac sarcoidosis.  Because of that, I would like him to see Dr. De Guzman for a second opinion.  His office will contact the patient for that appointment.  In the meantime, I would like the patient to increase his amiodarone to 400 mg b.i.d. for 2 weeks and after that to once a day.  I asked him to again refrain from driving, and if it shocks him once he should call our office, and if it shocks him multiple times to go to the emergency room.    Jayy Dorman MD        D: 2022   T: 2022   MT: ariel    Name:     JACI WICK  MRN:      [id number]        Account:      [id number]   :      1960           Service Date: 2022       Document: [id number]

## 2022-01-11 LAB
MDC_IDC_LEAD_IMPLANT_DT: NORMAL
MDC_IDC_LEAD_LOCATION: NORMAL
MDC_IDC_LEAD_LOCATION_DETAIL_1: NORMAL
MDC_IDC_LEAD_MFG: NORMAL
MDC_IDC_LEAD_MODEL: NORMAL
MDC_IDC_LEAD_POLARITY_TYPE: NORMAL
MDC_IDC_LEAD_SERIAL: NORMAL
MDC_IDC_MSMT_BATTERY_DTM: NORMAL
MDC_IDC_MSMT_BATTERY_REMAINING_LONGEVITY: 120 MO
MDC_IDC_MSMT_BATTERY_REMAINING_PERCENTAGE: 100 %
MDC_IDC_MSMT_BATTERY_STATUS: NORMAL
MDC_IDC_MSMT_CAP_CHARGE_DTM: NORMAL
MDC_IDC_MSMT_CAP_CHARGE_TIME: 9.9 S
MDC_IDC_MSMT_CAP_CHARGE_TYPE: NORMAL
MDC_IDC_MSMT_LEADCHNL_LV_IMPEDANCE_VALUE: 562 OHM
MDC_IDC_MSMT_LEADCHNL_LV_PACING_THRESHOLD_AMPLITUDE: 0.6 V
MDC_IDC_MSMT_LEADCHNL_LV_PACING_THRESHOLD_PULSEWIDTH: 0.5 MS
MDC_IDC_MSMT_LEADCHNL_RA_IMPEDANCE_VALUE: 731 OHM
MDC_IDC_MSMT_LEADCHNL_RA_PACING_THRESHOLD_AMPLITUDE: 0.6 V
MDC_IDC_MSMT_LEADCHNL_RA_PACING_THRESHOLD_PULSEWIDTH: 0.4 MS
MDC_IDC_MSMT_LEADCHNL_RV_IMPEDANCE_VALUE: 530 OHM
MDC_IDC_MSMT_LEADCHNL_RV_PACING_THRESHOLD_AMPLITUDE: 0.7 V
MDC_IDC_MSMT_LEADCHNL_RV_PACING_THRESHOLD_PULSEWIDTH: 0.4 MS
MDC_IDC_PG_IMPLANT_DTM: NORMAL
MDC_IDC_PG_MFG: NORMAL
MDC_IDC_PG_MODEL: NORMAL
MDC_IDC_PG_SERIAL: NORMAL
MDC_IDC_PG_TYPE: NORMAL
MDC_IDC_SESS_CLINIC_NAME: NORMAL
MDC_IDC_SESS_DTM: NORMAL
MDC_IDC_SESS_TYPE: NORMAL
MDC_IDC_SET_BRADY_AT_MODE_SWITCH_MODE: NORMAL
MDC_IDC_SET_BRADY_AT_MODE_SWITCH_RATE: 170 {BEATS}/MIN
MDC_IDC_SET_BRADY_LOWRATE: 60 {BEATS}/MIN
MDC_IDC_SET_BRADY_MAX_SENSOR_RATE: 130 {BEATS}/MIN
MDC_IDC_SET_BRADY_MAX_TRACKING_RATE: 130 {BEATS}/MIN
MDC_IDC_SET_BRADY_MODE: NORMAL
MDC_IDC_SET_BRADY_PAV_DELAY_HIGH: 200 MS
MDC_IDC_SET_BRADY_PAV_DELAY_LOW: 270 MS
MDC_IDC_SET_BRADY_SAV_DELAY_HIGH: 140 MS
MDC_IDC_SET_BRADY_SAV_DELAY_LOW: 190 MS
MDC_IDC_SET_CRT_LVRV_DELAY: 35 MS
MDC_IDC_SET_CRT_PACED_CHAMBERS: NORMAL
MDC_IDC_SET_LEADCHNL_LV_PACING_AMPLITUDE: 1.6 V
MDC_IDC_SET_LEADCHNL_LV_PACING_ANODE_ELECTRODE_1: NORMAL
MDC_IDC_SET_LEADCHNL_LV_PACING_ANODE_LOCATION_1: NORMAL
MDC_IDC_SET_LEADCHNL_LV_PACING_CAPTURE_MODE: NORMAL
MDC_IDC_SET_LEADCHNL_LV_PACING_CATHODE_ELECTRODE_1: NORMAL
MDC_IDC_SET_LEADCHNL_LV_PACING_CATHODE_LOCATION_1: NORMAL
MDC_IDC_SET_LEADCHNL_LV_PACING_PULSEWIDTH: 0.5 MS
MDC_IDC_SET_LEADCHNL_LV_SENSING_ADAPTATION_MODE: NORMAL
MDC_IDC_SET_LEADCHNL_LV_SENSING_ANODE_ELECTRODE_1: NORMAL
MDC_IDC_SET_LEADCHNL_LV_SENSING_ANODE_LOCATION_1: NORMAL
MDC_IDC_SET_LEADCHNL_LV_SENSING_CATHODE_ELECTRODE_1: NORMAL
MDC_IDC_SET_LEADCHNL_LV_SENSING_CATHODE_LOCATION_1: NORMAL
MDC_IDC_SET_LEADCHNL_LV_SENSING_SENSITIVITY: 1 MV
MDC_IDC_SET_LEADCHNL_RA_PACING_AMPLITUDE: 2 V
MDC_IDC_SET_LEADCHNL_RA_PACING_CAPTURE_MODE: NORMAL
MDC_IDC_SET_LEADCHNL_RA_PACING_POLARITY: NORMAL
MDC_IDC_SET_LEADCHNL_RA_PACING_PULSEWIDTH: 0.4 MS
MDC_IDC_SET_LEADCHNL_RA_SENSING_ADAPTATION_MODE: NORMAL
MDC_IDC_SET_LEADCHNL_RA_SENSING_POLARITY: NORMAL
MDC_IDC_SET_LEADCHNL_RA_SENSING_SENSITIVITY: 0.25 MV
MDC_IDC_SET_LEADCHNL_RV_PACING_AMPLITUDE: 2 V
MDC_IDC_SET_LEADCHNL_RV_PACING_CAPTURE_MODE: NORMAL
MDC_IDC_SET_LEADCHNL_RV_PACING_POLARITY: NORMAL
MDC_IDC_SET_LEADCHNL_RV_PACING_PULSEWIDTH: 0.4 MS
MDC_IDC_SET_LEADCHNL_RV_SENSING_ADAPTATION_MODE: NORMAL
MDC_IDC_SET_LEADCHNL_RV_SENSING_POLARITY: NORMAL
MDC_IDC_SET_LEADCHNL_RV_SENSING_SENSITIVITY: 0.5 MV
MDC_IDC_SET_ZONE_DETECTION_INTERVAL: 250 MS
MDC_IDC_SET_ZONE_DETECTION_INTERVAL: 333 MS
MDC_IDC_SET_ZONE_DETECTION_INTERVAL: 375 MS
MDC_IDC_SET_ZONE_TYPE: NORMAL
MDC_IDC_SET_ZONE_VENDOR_TYPE: NORMAL
MDC_IDC_STAT_AT_BURDEN_PERCENT: 0 %
MDC_IDC_STAT_AT_DTM_END: NORMAL
MDC_IDC_STAT_AT_DTM_START: NORMAL
MDC_IDC_STAT_BRADY_DTM_END: NORMAL
MDC_IDC_STAT_BRADY_DTM_START: NORMAL
MDC_IDC_STAT_BRADY_RA_PERCENT_PACED: 50 %
MDC_IDC_STAT_BRADY_RV_PERCENT_PACED: 99 %
MDC_IDC_STAT_CRT_DTM_END: NORMAL
MDC_IDC_STAT_CRT_DTM_START: NORMAL
MDC_IDC_STAT_CRT_LV_PERCENT_PACED: 99 %
MDC_IDC_STAT_EPISODE_RECENT_COUNT: 0
MDC_IDC_STAT_EPISODE_RECENT_COUNT: 2
MDC_IDC_STAT_EPISODE_RECENT_COUNT_DTM_END: NORMAL
MDC_IDC_STAT_EPISODE_RECENT_COUNT_DTM_START: NORMAL
MDC_IDC_STAT_EPISODE_TYPE: NORMAL
MDC_IDC_STAT_EPISODE_VENDOR_TYPE: NORMAL
MDC_IDC_STAT_TACHYTHERAPY_ATP_DELIVERED_RECENT: 0
MDC_IDC_STAT_TACHYTHERAPY_ATP_DELIVERED_TOTAL: 3
MDC_IDC_STAT_TACHYTHERAPY_RECENT_DTM_END: NORMAL
MDC_IDC_STAT_TACHYTHERAPY_RECENT_DTM_START: NORMAL
MDC_IDC_STAT_TACHYTHERAPY_SHOCKS_ABORTED_RECENT: 0
MDC_IDC_STAT_TACHYTHERAPY_SHOCKS_ABORTED_TOTAL: 0
MDC_IDC_STAT_TACHYTHERAPY_SHOCKS_DELIVERED_RECENT: 0
MDC_IDC_STAT_TACHYTHERAPY_SHOCKS_DELIVERED_TOTAL: 0
MDC_IDC_STAT_TACHYTHERAPY_TOTAL_DTM_END: NORMAL
MDC_IDC_STAT_TACHYTHERAPY_TOTAL_DTM_START: NORMAL

## 2022-01-14 ENCOUNTER — ANCILLARY PROCEDURE (OUTPATIENT)
Dept: CARDIOLOGY | Facility: CLINIC | Age: 62
End: 2022-01-14
Attending: INTERNAL MEDICINE
Payer: COMMERCIAL

## 2022-01-14 VITALS
HEART RATE: 64 BPM | OXYGEN SATURATION: 99 % | WEIGHT: 276 LBS | BODY MASS INDEX: 38.64 KG/M2 | HEIGHT: 71 IN | SYSTOLIC BLOOD PRESSURE: 110 MMHG | DIASTOLIC BLOOD PRESSURE: 65 MMHG

## 2022-01-14 DIAGNOSIS — I47.29 PAROXYSMAL VENTRICULAR TACHYCARDIA (H): Primary | ICD-10-CM

## 2022-01-14 DIAGNOSIS — I42.8 NON-ISCHEMIC CARDIOMYOPATHY (H): ICD-10-CM

## 2022-01-14 DIAGNOSIS — Z79.4 TYPE 2 DIABETES MELLITUS WITH OTHER CIRCULATORY COMPLICATION, WITH LONG-TERM CURRENT USE OF INSULIN (H): ICD-10-CM

## 2022-01-14 DIAGNOSIS — D86.85 CARDIAC SARCOIDOSIS: ICD-10-CM

## 2022-01-14 DIAGNOSIS — E11.59 TYPE 2 DIABETES MELLITUS WITH OTHER CIRCULATORY COMPLICATION, WITH LONG-TERM CURRENT USE OF INSULIN (H): ICD-10-CM

## 2022-01-14 DIAGNOSIS — Z95.810 ICD (IMPLANTABLE CARDIOVERTER-DEFIBRILLATOR) IN PLACE: ICD-10-CM

## 2022-01-14 PROCEDURE — 93284 PRGRMG EVAL IMPLANTABLE DFB: CPT | Performed by: INTERNAL MEDICINE

## 2022-01-14 PROCEDURE — G0463 HOSPITAL OUTPT CLINIC VISIT: HCPCS

## 2022-01-14 PROCEDURE — 99215 OFFICE O/P EST HI 40 MIN: CPT | Performed by: INTERNAL MEDICINE

## 2022-01-14 RX ORDER — LIDOCAINE 40 MG/G
CREAM TOPICAL
Status: CANCELLED | OUTPATIENT
Start: 2022-01-14

## 2022-01-14 RX ORDER — SODIUM CHLORIDE 9 MG/ML
INJECTION, SOLUTION INTRAVENOUS CONTINUOUS
Status: CANCELLED | OUTPATIENT
Start: 2022-01-14

## 2022-01-14 ASSESSMENT — MIFFLIN-ST. JEOR: SCORE: 2076.31

## 2022-01-14 ASSESSMENT — PAIN SCALES - GENERAL: PAINLEVEL: NO PAIN (0)

## 2022-01-14 NOTE — NURSING NOTE
Chief Complaint   Patient presents with     New Patient     VT/VF consult. Sarcoid.     Vitals were taken and medications reconciled.    James Thornton, EMT  11:27 AM

## 2022-01-14 NOTE — LETTER
1/14/2022      RE: Neymar Rhodes  [address]  [facility] [address]       Dear Colleague,    Thank you for the opportunity to participate in the care of your patient, Neymar Rhodes, at the [facility] [place] [facility]. Please see a copy of my visit note below.        ELECTROPHYSIOLOGY CLINIC VISIT    Assessment/Recommendations   Assessment/Plan:    Mr. 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    NICM 2/2 Cardiac Sarcoid LVEF 25-30%, NYHA II-III  Ventricular Tachyarrhythmias with device therapies:  1. ACEi/ARB: Continue Entresto.  2. BB: Continue Toprol XL   3. Aldosterone antagonist: Not currently on.  4. SCD prophylaxis: s/p CRTD 2019  5. Fluid status: Continue Lasix:   6. Etiology of CM: He has biopsy proven pulmonary sarcoid and imaging felt c/w cardiac sarcoid. Given increase in ventricular arrhythmias we will have him get an updated CMR with Dr. Maldonado and Cardiac PET to evaluate for any possible active cardiac sarcoid as he is currently off steroids.  7. VT/VF: He presented with sustained VT in 2019 requiring external defibrillation. He has since had multiple ATPs and a shock from his ICD. He has been on high dose amiodarone. We discussed options of additional AAT or ablation. We discussed that ablation would be preferred at this stage over escalation of therapies. We also discussed that we do not favor leaving him on higher doses of amiodarone given toxicities and his LFT, TFTs are already mildly elevated. His episodes are likely scar related. We also discussed ablation and its indications, risks, and benefits. Complications include but are not limited to vascular injury, excessive bleeding requiring blood transfusion, groin hematoma,  aortic injury at the time of transseptal puncture, bleeding/injury to abdominal structures at time of epicardial access, cardiac tamponade requiring pericardiocentesis or open heart surgery, and thromboembolic complications or strokes. We also discussed that VT ablation can be limited by inducibility of Vt at the time of EPS/Abaltion and/or the origin of VT. Efficacy of ablation also deceases if multiple foci are found. After an extensive discussion, the patient would like to pursue ablation in attempts to improve VT burden and symptoms.  Before we pursue the ablation, we need to see whether his sarcoidosis has reactivated because the last episodes recorded are not monomorphic VT, rather VF, and the last time we checked on inflammatory status since Feb 2021. We also want ot redefine the scar burden and distribution since the last 2 CMRs showed an increase in burden and were done some time ago.  We will want to get an updated CMR first to help with ablation planning and sarcoid evaluation as above. He will need to hold amiodarone for 2 weeks prior to ablation. If inflammation level is high, we would need to consider antiinflammatory therapy and transition to steroid sparing drugs and defer ablation until inflammation is low as the monomorphic Vt was observed in a time when his inflammation was under control andlikely due to fixed scar. . If we find inflammation low or nonexistent, then would proceed with ablation. We will also present him in the sarcoid meeting.    Follow up 3 months after ablation.        History of Present Illness/Subjective    Mr. Neymar Rhodes is a 61 year old male who comes in today for EP consultation of VT/VF in cardiac sarcoid.    Mr. 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    He was first seen by  cardiology in 2018 with a new diagnosis of LV dysfunction LVEF 40% and LBBB identified.  Coronary angiogram showed no obstructive CAD.  He was placed on GDMT. A CMR showed scarring felt consistent with sarcoidosis.  He was somewhat lost to follow-up after that.  Then in 2019, he presented with decompensated HF and LVEF decreased to 20% with severe LV dilation.  During that hospital stay, he had sustained monomorphic  bpm and was emergently defibrillated externally.  He was taken again to have coronary angiogram again demonstrating nonobstructive CAD.  Repeat CMR showed LGE in septal inferior and lateral basal wall with scar burden 13%.  He was placed on amiodarone and underwent CRT-D on 10/4/2019.  Cardiac PET showed diffuse FDG uptake in myocardium and mediastinal/hilar lymph nodes.  An endobronchial biopsy confirmed none caseating granulomas.  He was placed on prednisone.  Subsequent cardiac PET scans showed resolution of inflammation.  He was taken off prednisone in spring 2021.  He has also continued to have some some escalating burden of VT.  An ablation had been offered to him before but he deferred electing to continue with amiodarone.  He has now had multiple VT and VF episodes.  On 12/24/2021 he had ICD shock after failed ATP.  In 9/2021 he also had a syncopal episode from VF due to under sensing.  Fortunately, the arrhythmia converted on its own.  After that his R wave sensitivity was adjusted and he was reloaded with amiodarone again.  He was now referred for sarcoid VT management.     He reports feeling at baseline. He denies chest discomfort, palpitations, abdominal fullness/bloating or peripheral edema, shortness of breath, paroxysmal nocturnal dyspnea, orthopnea, lightheadedness, dizziness, pre-syncope, or syncope. LFTs mildly elevated 10/2021, TSH mildly elevated 10/2021, and PFT WDL 11/2021. Device interrogation shows normal device function, stable lead parameters, last VT/VF on 12/24/21, and  AP 58%, BVP 99%. Current cardiac medications include: Amiodarone, Lipitor, Entresto, Toprol-XL, Lasix, and ASA.     I have reviewed and updated the patient's Past Medical History, Social History, Family History and Medication List.     Cardiographics (Personally Reviewed) :   12/2021 Echo:   Interpretation Summary  1. Left ventricular systolic function is severely reduced. The visual ejection fraction is 25-30%.  2. Septal wall motion abnormality may reflect pacemaker activation.  3. The left ventricle is severely dilated.  4. The right ventricle is normal in structure, function and size.  5. No evidence for significant valvular pathology  6. Mildly dilated aortic root (sinus of valsalva) 4.0 cm and ascending aorta, 3.9 cm.    2/12/2021 Cardiac PET:  Impression:  1. No evidence of active cardiac sarcoidosis.  2. No evidence of active systemic or pulmonary sarcoidosis.  3. Cholelithiasis.  4. Colonic diverticulosis.    6/22/20 Cardiac PET:  Impression:  1. No evidence of active cardiac sarcoidosis.  2. No evidence of active inflammation/sarcoidosis in the remainder of the body.  3. Cardiomegaly.  4. Cholelithiasis.    2/12/20 Cardiac PET:  Impression:  1. Minimal residual uptake in the basal aspect of the septum and anterior wall, greatly improved compared to the prior examination.  2. Minimal uptake of a mediastinal lymph node.   3. Left parotid hyperdense nodule without significant FDG uptake, likely lymph node or Warthin's duct tumor.    12/2019 Cardiac PET:  Impression:  1. Diffuse increased FDG uptake in the myocardium compatible with active cardiac sarcoid.  2. Globally hypokinetic and enlarged heart with ejection fraction severely decreased to 16%, compatible with a dilatated cardiomyopathy.  3. Small perfusion abnormality of the anterior wall of the left ventricle, this is the LAD distribution..   4. There is increased ammonia uptake in the lungs, consistent with patient's known history of congestive heart  failure.  5. FDG PET/CT demonstrate active sarcoid in the mediastinal and hilar lymph nodes.     10/2019 Coronary Angiogram:      Minimal non-obstructive coronary artery disease    Mildly elevated LVEDP    Successful closure of the RFA access site with a 6F Angioseal device        10/2019 CMR:  1. The LV is severely dilated. The global systolic function is moderately severely to severely reduced. The LVEF is 25%. There is severe global LV dysfunction with dyssynchronous septal motion consistent with a LBBB.  2. The RV is normal in cavity size. The global systolic function is normal. The RVEF is 57%.   3. Both atria are dilated.  4. There is mild mitral insufficiency.   5. Late gadolinium enhancement is present in the septal. inferior and lateral base. There is non-CAD scar in the anterolateral mid-wall. Scar is more extensive (13%) as compared to the prior MRI (6%).     CONCLUSIONS:   Late gadolinium enhancement is present in the septal. inferior and lateral base. There is non-CAD scar in  the anterolateral mid-wall. Scar is more extensive (13%) as compared to the prior MRI (6%).  The LV is severely dilated and the global systolic function is moderately severely to severely reduced with  an LVEF of 25%. There is severe global LV dysfunction with dyssynchronous septal motion consistent with a  LBBB.  Collectively, these findings are most consistent with a non-ischemic cardiomyopathy. Possible etiologies  include prior LBBB, cardiac sarcoidosis or prior myocarditis.       Physical Examination   There were no vitals taken for this visit.  Wt Readings from Last 3 Encounters:   01/06/22 124.9 kg (275 lb 4.8 oz)   12/16/21 119.7 kg (264 lb)   12/03/21 123.4 kg (272 lb)     General Appearance:   Alert, well-appearing and in no acute distress.   HEENT: Atraumatic, normocephalic. PERRL.  MMM.   Chest/Lungs:   Respirations unlabored.  Lungs are clear to auscultation.   Cardiovascular:   Regular rate and rhythm.  S1/S2. No  murmur.    Abdomen:  Soft, nontender, nondistended.   Extremities: No cyanosis or clubbing. No edema. .    Musculoskeletal: Moves all extremities.  Gait normal.   Skin: Warm, dry, intact.    Neurologic: Mood and affect are appropriate.  Alert and oriented to person, place, time, and situation.          Medications  Allergies   Current Outpatient Medications   Medication Sig Dispense Refill     acetaminophen (TYLENOL) 500 MG tablet Take 500-1,000 mg by mouth every 6 hours as needed for mild pain       albuterol (PROAIR HFA) 108 (90 Base) MCG/ACT inhaler Inhale 2 puffs into the lungs every 6 hours 18 g 0     albuterol (PROAIR HFA/PROVENTIL HFA/VENTOLIN HFA) 108 (90 Base) MCG/ACT Inhaler Inhale 2 puffs into the lungs every 6 hours as needed for shortness of breath / dyspnea or wheezing (Patient not taking: Reported on 1/6/2022)       amiodarone (PACERONE) 400 MG tablet Take 1 tablet (400 mg) by mouth 2 times daily For 2 weeks then one tablet (400 mg) daily 90 tablet 2     aspirin 81 MG tablet Take 1 tablet (81 mg) by mouth daily 30 tablet      atorvastatin (LIPITOR) 20 MG tablet Take 1 tablet (20 mg) by mouth daily 90 tablet 3     blood glucose monitoring (ACCU-CHEK LUL PLUS) test strip Use to test blood sugar 1-3 times daily or as directed. (Patient not taking: Reported on 9/17/2021) 100 each 11     celecoxib (CELEBREX) 100 MG capsule Take 1 capsule (100 mg) by mouth 2 times daily as needed for moderate pain 30 capsule 0     cephALEXin (KEFLEX) 500 MG capsule Take 1 capsule (500 mg) by mouth 3 times daily 21 capsule 0     clotrimazole (LOTRIMIN) 1 % external cream Apply sparingly once or twice per day as needed to affected area until the skin is better, then stop; REPEAT AS NEEDED 30 g 1     Continuous Blood Gluc Sensor (FREESTYLE JOCELIN 14 DAY SENSOR) MISC use 1 sensor every 14 days 6 each 3     continuous blood glucose monitoring (FREESTYLE JOCELIN) sensor For use with Freestyle Jocelin Flash  for continuous  monitioring of blood glucose levels. Replace sensor every 10 days. 3 each 3     fenofibrate (TRIGLIDE/LOFIBRA) 160 MG tablet TAKE ONE TABLET BY MOUTH ONE TIME DAILY 90 tablet 1     fluticasone (FLONASE) 50 MCG/ACT nasal spray Spray 2 sprays into both nostrils daily 16 g 0     furosemide (LASIX) 20 MG tablet Take 1 tablet (20 mg) by mouth 2 times daily (Patient taking differently: Take 20 mg by mouth daily ) 60 tablet 11     glipiZIDE (GLUCOTROL XL) 10 MG 24 hr tablet TAKE 1 TABLET (10 MG) BY MOUTH DAILY. TAKE WITH LARGEST MEAL OF THE DAY. ALWAYS TAKE WITH FOOD 90 tablet 0     insulin detemir (LEVEMIR FLEXTOUCH) 100 UNIT/ML pen Inject 25 Units Subcutaneous daily 30 mL 1     levothyroxine (SYNTHROID/LEVOTHROID) 50 MCG tablet Take 1 tablet (50 mcg) by mouth daily 90 tablet 0     metFORMIN (GLUCOPHAGE) 1000 MG tablet Take 1 tablet (1,000 mg) by mouth 2 times daily (with meals) 180 tablet 0     metoprolol succinate ER (TOPROL-XL) 25 MG 24 hr tablet Take 1 tablet (25 mg) by mouth daily 90 tablet 2     omeprazole (PRILOSEC) 40 MG DR capsule Take 1 capsule (40 mg) by mouth daily 90 capsule 3     sacubitril-valsartan (ENTRESTO) 49-51 MG per tablet Take 1 tablet by mouth 2 times daily 180 tablet 0     triamcinolone (KENALOG) 0.5 % cream Apply sparingly once or twice per day as needed to affected area until the skin is better, then stop 30 g 0     ULTICARE 29G X 12.7MM insulin pen needle Use 3 pen needles daily as directed (one for daily victoza injection, 2 for twice daily levemir injections) 200 each 1     VICTOZA PEN 18 MG/3ML soln Inject 1.8 mg Subcutaneously daily 27 mL 0    Allergies   Allergen Reactions     No Known Drug Allergies          Lab Results (Personally Reviewed)    Chemistry/lipid CBC Cardiac Enzymes/BNP/TSH/INR   Lab Results   Component Value Date    BUN 17 12/16/2021     12/16/2021    CO2 25 12/16/2021     Creatinine   Date Value Ref Range Status   12/16/2021 1.00 0.66 - 1.25 mg/dL Final   05/26/2021  1.22 0.66 - 1.25 mg/dL Final       Lab Results   Component Value Date    CHOL 140 07/22/2020    HDL 64 07/22/2020    LDL 51 07/22/2020      Lab Results   Component Value Date    WBC 6.5 09/16/2020    HGB 11.7 (L) 08/27/2021    HCT 37.3 (L) 09/16/2020    MCV 98 09/16/2020     09/16/2020    Lab Results   Component Value Date    TSH 5.90 (H) 10/29/2021    INR 1.16 (H) 10/04/2019        The patient states understanding and is agreeable with the plan.   Zackery De Guzman MD [facility]  Cardiology - Electrophysiology    Total time spent on patient visit, reviewing notes, imaging, labs, orders, and completing necessary documentation: 60 minutes.

## 2022-01-14 NOTE — LETTER
January 14, 2022      TO: Neymar Rhodes  [address]  [facility] [place] [facility] [place] [address]         Dear Neymar,      You are scheduled for a VT ablation, at [facility], [place], with Dr. De Guzman. The hospital is located at [address] [place] [address] on the East bank of the campus.  If you need to cancel this procedure, please call [phone]. Please note the following schedule below:      You will need to undergo a COVID-19 PCR swab test within 4 days of procedure. You will receive a phone call with more information. If you do not hear from the COVID scheduling team, please call: [phone] to schedule.      Pre-Anesthesia Phone Call will occur 1-2 days prior to procedure date.  You do not need to come to the [facility].    Date: _______  Time: _______Arrive to the [facility] Waiting Room at the [facility]  Cardiac MRI  1. You will be required to lay flat and follow breath-hold instructions.   2. You will need to remove all metal and answer a safety questionnaire.     Date: _______  Time: _______Arrive to [facility] ([facility]), [address] [place] [address]  Cardiac PET  1. You will receive instructions at the time of scheduling              Date:______  Time: _______________To the Unit 3C at the [facility]  VT Ablation    1. Please review the attached instructions on showering before your procedure at the end of this letter.  2. Your history and physical will be completed by our nurse practitioner when you arrive.  3. Please do not eat anything for 8 hours prior to your procedure. You may have sips of water up until 2 hours prior to your arrival.  4. If you are diabetic follow the following instructions: (Contact your diabetes team if you have questions)   * Hold oral diabetic medications and short-acting insulins (aspart, lispro, regular) the morning of the procedure.              * Hold non-insulin injectable liraglutide (Victoza) the evening  before and/or morning of the procedure.   * Take 80% of your normal long-acting insulin (glargine/Lantus or levemir/Detemir, degludec/Tresiba) the evening before and/or morning of the procedure.  5. The morning of your procedure, you may take your scheduled medications with a sip of water.  HOLD:  - Amiodarone 2 weeks prior  - Diuretic day of for comfort  6. You will likely discharge the same day and need a .        Post-Procedure Instructions  Care of groin site:    Remove the Band-Aid after 24 hours. If there is minor oozing, apply another Band-aid and remove it after 12 hours.     Do NOT take a bath, use a hot tub, pool, or submerse in water for at least 3 days. You may shower.     It is normal to have a small bruise or lump at the site.    Do not scrub the site.    Do not use lotion or powder near the puncture site for 3 days.    If you start bleeding from the site in your groin: Lie down flat and press firmly on the site. Call your physician immediately, or, come to the emergency room.  Call 911 right away if you have bleeding that is heavy or does not stop.    Call your doctor/provider if:     You have a large or growing hard lump around the site.     The site is red, swollen, hot or tender.     Blood or fluid is draining from the site.     You have chills or a fever greater than 101 F (38 C).     Your leg or arm turns bluish, feels numb or cool.     You have hives, a rash or unusual itching.      Activity Restrictions    For the first 2 days: Do not stoop or squat. When you cough, sneeze or move your bowels, hold your hand over the puncture site and press gently.    Do not lift more than 10 pounds or exertional activity for 10 days.  - No driving for 24 hours after (with or without general anesthesia).         Date: _______ Follow up appointment      Please do not hesitate to utilize [phone] or call us if you have any questions or concerns.    Irina Krishna RN  Electrophysiology Nurse  Coordinator  [phone]    Vanesa Kang  EP Procedure   [phone]        Showering Before Surgery   Your surgeon has asked you to take 2 showers before surgery.  Why is this important?  It is normal for bacteria (germs) to be on your skin. The skin protects us from these germs. When you have surgery, we cut the skin. Sometimes germs get into the cuts and cause infection (illness caused by germs). By following the instructions below and using special soap, you will lower the number of germs on your skin. This decreases your chance of infection.  Special soap  Buy or get 8 ounces of antiseptic surgical soap called 4% CHG. Common name brands of this soap are Hibiclens and Exidine.   You can find it at your local pharmacy, clinic or retail store. If you have trouble, ask your pharmacist to help you find the right substitute.   A note about shaving:  Do not shave within 12 inches of your incision (surgical cut) area for at least 3 days before surgery. Shaving can make small cuts in the skin. This puts you at a higher risk of infection.  Items you will need for each shower:    1 newly washed towel    4 ounces of one of the above soaps  Follow these instructions:  The evening before surgery   1. Wash your hair and body with your regular shampoo and soap. Make sure you rinse the shampoo and soap from your hair and body.   2. Using clean hands, apply about 2 ounces of soap gently on your skin from the neck to your toes. Use on your groin area last. Do not use this soap on your face or head. If you get any soap in your eyes, ears or mouth, rinse right away.   3. Repeat step 2. It is very important to let the soap stay on your skin for at least 1 minute.   4. Rinse well and dry off using a clean towel.If you feel any tingling, itching or other irritation, rinse right away. It is normal to feel some coolness on the skin after using the antiseptic soap. Your skin may feel a bit dry after the shower, but do not use  any lotions, creams or moisturizers. Do not use hair spray or other products in your hair.  5. Dress in freshly washed clothes or pajamas. Use fresh pillowcases and sheets on your bed.  The morning of surgery  1. Wash your hair and body with your regular shampoo and soap. Make sure you rinse the shampoo and soap from your hair and body.   2. Using clean hands, apply about 2 ounces of soap gently on your skin from the neck to your toes. Use on your groin area last. Do not use this soap on your face or head. If you get any soap in your eyes, ears or mouth, rinse right away.   3. Repeat step 2. It is very important to let the soap stay on your skin for at least 1 minute.   4. Rinse well and dry off using a clean towel.If you feel any tingling, itching or other irritation, rinse right away. It is normal to feel some coolness on the skin after using the antiseptic soap. Your skin may feel a bit dry after the shower, but do not use any lotions, creams or moisturizers. Do not use hair spray or other products in your hair.  5. Dress in clean clothes.  If you have any questions about showering or an allergy to CHG soap, please call the Preadmissions Nursing Department at the hospital where you are having your surgery.  [facility]: [phone]  [facility]: [phone]  [place]: [phone] or [phone]  [facility]: [phone].  [facility]: [phone]  [place]: [phone]  [facility] [place] ([place]): [phone]  [facility] [place] ([facility]): [phone]  This phone number will be answered between the hours of 8:00 a.m. and 6:30 p.m. Monday through Friday.

## 2022-01-14 NOTE — PROGRESS NOTES
ELECTROPHYSIOLOGY CLINIC VISIT    Assessment/Recommendations   Assessment/Plan:    Mr. 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    NICM 2/2 Cardiac Sarcoid LVEF 25-30%, NYHA II-III  Ventricular Tachyarrhythmias with device therapies:  1. ACEi/ARB: Continue Entresto.  2. BB: Continue Toprol XL   3. Aldosterone antagonist: Not currently on.  4. SCD prophylaxis: s/p CRTD 2019  5. Fluid status: Continue Lasix:   6. Etiology of CM: He has biopsy proven pulmonary sarcoid and imaging felt c/w cardiac sarcoid. Given increase in ventricular arrhythmias we will have him get an updated CMR with Dr. Maldonado and Cardiac PET to evaluate for any possible active cardiac sarcoid as he is currently off steroids.  7. VT/VF: He presented with sustained VT in 2019 requiring external defibrillation. He has since had multiple ATPs and a shock from his ICD. He has been on high dose amiodarone. We discussed options of additional AAT or ablation. We discussed that ablation would be preferred at this stage over escalation of therapies. We also discussed that we do not favor leaving him on higher doses of amiodarone given toxicities and his LFT, TFTs are already mildly elevated. His episodes are likely scar related. We also discussed ablation and its indications, risks, and benefits. Complications include but are not limited to vascular injury, excessive bleeding requiring blood transfusion, groin hematoma, aortic injury at the time of transseptal puncture, bleeding/injury to abdominal structures at time of epicardial access, cardiac tamponade requiring pericardiocentesis or open heart surgery, and thromboembolic complications or strokes. We also discussed that VT ablation can be limited by inducibility of Vt at the time of EPS/Abaltion and/or the  origin of VT. Efficacy of ablation also deceases if multiple foci are found. After an extensive discussion, the patient would like to pursue ablation in attempts to improve VT burden and symptoms.  Before we pursue the ablation, we need to see whether his sarcoidosis has reactivated because the last episodes recorded are not monomorphic VT, rather VF, and the last time we checked on inflammatory status since Feb 2021. We also want ot redefine the scar burden and distribution since the last 2 CMRs showed an increase in burden and were done some time ago.  We will want to get an updated CMR first to help with ablation planning and sarcoid evaluation as above. He will need to hold amiodarone for 2 weeks prior to ablation. If inflammation level is high, we would need to consider antiinflammatory therapy and transition to steroid sparing drugs and defer ablation until inflammation is low as the monomorphic Vt was observed in a time when his inflammation was under control andlikely due to fixed scar. . If we find inflammation low or nonexistent, then would proceed with ablation. We will also present him in the sarcoid meeting.    Follow up 3 months after ablation.        History of Present Illness/Subjective    Mr. Neymar Rhodes is a 61 year old male who comes in today for EP consultation of VT/VF in cardiac sarcoid.    Mr. 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    He was first seen by cardiology in 2018 with a new diagnosis of LV dysfunction LVEF 40% and LBBB identified.  Coronary angiogram showed no obstructive CAD.  He was placed on GDMT. A CMR showed scarring felt consistent with sarcoidosis.  He was somewhat lost to follow-up after that.  Then in 2019, he presented with decompensated HF and LVEF decreased to 20% with  severe LV dilation.  During that hospital stay, he had sustained monomorphic  bpm and was emergently defibrillated externally.  He was taken again to have coronary angiogram again demonstrating nonobstructive CAD.  Repeat CMR showed LGE in septal inferior and lateral basal wall with scar burden 13%.  He was placed on amiodarone and underwent CRT-D on 10/4/2019.  Cardiac PET showed diffuse FDG uptake in myocardium and mediastinal/hilar lymph nodes.  An endobronchial biopsy confirmed none caseating granulomas.  He was placed on prednisone.  Subsequent cardiac PET scans showed resolution of inflammation.  He was taken off prednisone in spring 2021.  He has also continued to have some some escalating burden of VT.  An ablation had been offered to him before but he deferred electing to continue with amiodarone.  He has now had multiple VT and VF episodes.  On 12/24/2021 he had ICD shock after failed ATP.  In 9/2021 he also had a syncopal episode from VF due to under sensing.  Fortunately, the arrhythmia converted on its own.  After that his R wave sensitivity was adjusted and he was reloaded with amiodarone again.  He was now referred for sarcoid VT management.     He reports feeling at baseline. He denies chest discomfort, palpitations, abdominal fullness/bloating or peripheral edema, shortness of breath, paroxysmal nocturnal dyspnea, orthopnea, lightheadedness, dizziness, pre-syncope, or syncope. LFTs mildly elevated 10/2021, TSH mildly elevated 10/2021, and PFT WDL 11/2021. Device interrogation shows normal device function, stable lead parameters, last VT/VF on 12/24/21, and AP 58%, BVP 99%. Current cardiac medications include: Amiodarone, Lipitor, Entresto, Toprol-XL, Lasix, and ASA.     I have reviewed and updated the patient's Past Medical History, Social History, Family History and Medication List.     Cardiographics (Personally Reviewed) :   12/2021 Echo:   Interpretation Summary  1. Left ventricular  systolic function is severely reduced. The visual ejection fraction is 25-30%.  2. Septal wall motion abnormality may reflect pacemaker activation.  3. The left ventricle is severely dilated.  4. The right ventricle is normal in structure, function and size.  5. No evidence for significant valvular pathology  6. Mildly dilated aortic root (sinus of valsalva) 4.0 cm and ascending aorta, 3.9 cm.    2/12/2021 Cardiac PET:  Impression:  1. No evidence of active cardiac sarcoidosis.  2. No evidence of active systemic or pulmonary sarcoidosis.  3. Cholelithiasis.  4. Colonic diverticulosis.    6/22/20 Cardiac PET:  Impression:  1. No evidence of active cardiac sarcoidosis.  2. No evidence of active inflammation/sarcoidosis in the remainder of the body.  3. Cardiomegaly.  4. Cholelithiasis.    2/12/20 Cardiac PET:  Impression:  1. Minimal residual uptake in the basal aspect of the septum and anterior wall, greatly improved compared to the prior examination.  2. Minimal uptake of a mediastinal lymph node.   3. Left parotid hyperdense nodule without significant FDG uptake, likely lymph node or Warthin's duct tumor.    12/2019 Cardiac PET:  Impression:  1. Diffuse increased FDG uptake in the myocardium compatible with active cardiac sarcoid.  2. Globally hypokinetic and enlarged heart with ejection fraction severely decreased to 16%, compatible with a dilatated cardiomyopathy.  3. Small perfusion abnormality of the anterior wall of the left ventricle, this is the LAD distribution..   4. There is increased ammonia uptake in the lungs, consistent with patient's known history of congestive heart failure.  5. FDG PET/CT demonstrate active sarcoid in the mediastinal and hilar lymph nodes.     10/2019 Coronary Angiogram:      Minimal non-obstructive coronary artery disease    Mildly elevated LVEDP    Successful closure of the RFA access site with a 6F Angioseal device        10/2019 CMR:  1. The LV is severely dilated. The global  systolic function is moderately severely to severely reduced. The LVEF is 25%. There is severe global LV dysfunction with dyssynchronous septal motion consistent with a LBBB.  2. The RV is normal in cavity size. The global systolic function is normal. The RVEF is 57%.   3. Both atria are dilated.  4. There is mild mitral insufficiency.   5. Late gadolinium enhancement is present in the septal. inferior and lateral base. There is non-CAD scar in the anterolateral mid-wall. Scar is more extensive (13%) as compared to the prior MRI (6%).     CONCLUSIONS:   Late gadolinium enhancement is present in the septal. inferior and lateral base. There is non-CAD scar in  the anterolateral mid-wall. Scar is more extensive (13%) as compared to the prior MRI (6%).  The LV is severely dilated and the global systolic function is moderately severely to severely reduced with  an LVEF of 25%. There is severe global LV dysfunction with dyssynchronous septal motion consistent with a  LBBB.  Collectively, these findings are most consistent with a non-ischemic cardiomyopathy. Possible etiologies  include prior LBBB, cardiac sarcoidosis or prior myocarditis.       Physical Examination   There were no vitals taken for this visit.  Wt Readings from Last 3 Encounters:   01/06/22 124.9 kg (275 lb 4.8 oz)   12/16/21 119.7 kg (264 lb)   12/03/21 123.4 kg (272 lb)     General Appearance:   Alert, well-appearing and in no acute distress.   HEENT: Atraumatic, normocephalic. PERRL.  MMM.   Chest/Lungs:   Respirations unlabored.  Lungs are clear to auscultation.   Cardiovascular:   Regular rate and rhythm.  S1/S2. No murmur.    Abdomen:  Soft, nontender, nondistended.   Extremities: No cyanosis or clubbing. No edema. .    Musculoskeletal: Moves all extremities.  Gait normal.   Skin: Warm, dry, intact.    Neurologic: Mood and affect are appropriate.  Alert and oriented to person, place, time, and situation.          Medications  Allergies   Current  Outpatient Medications   Medication Sig Dispense Refill     acetaminophen (TYLENOL) 500 MG tablet Take 500-1,000 mg by mouth every 6 hours as needed for mild pain       albuterol (PROAIR HFA) 108 (90 Base) MCG/ACT inhaler Inhale 2 puffs into the lungs every 6 hours 18 g 0     albuterol (PROAIR HFA/PROVENTIL HFA/VENTOLIN HFA) 108 (90 Base) MCG/ACT Inhaler Inhale 2 puffs into the lungs every 6 hours as needed for shortness of breath / dyspnea or wheezing (Patient not taking: Reported on 1/6/2022)       amiodarone (PACERONE) 400 MG tablet Take 1 tablet (400 mg) by mouth 2 times daily For 2 weeks then one tablet (400 mg) daily 90 tablet 2     aspirin 81 MG tablet Take 1 tablet (81 mg) by mouth daily 30 tablet      atorvastatin (LIPITOR) 20 MG tablet Take 1 tablet (20 mg) by mouth daily 90 tablet 3     blood glucose monitoring (ACCU-CHEK LUL PLUS) test strip Use to test blood sugar 1-3 times daily or as directed. (Patient not taking: Reported on 9/17/2021) 100 each 11     celecoxib (CELEBREX) 100 MG capsule Take 1 capsule (100 mg) by mouth 2 times daily as needed for moderate pain 30 capsule 0     cephALEXin (KEFLEX) 500 MG capsule Take 1 capsule (500 mg) by mouth 3 times daily 21 capsule 0     clotrimazole (LOTRIMIN) 1 % external cream Apply sparingly once or twice per day as needed to affected area until the skin is better, then stop; REPEAT AS NEEDED 30 g 1     Continuous Blood Gluc Sensor (FREESTYLE JOCELIN 14 DAY SENSOR) MISC use 1 sensor every 14 days 6 each 3     continuous blood glucose monitoring (FREESTYLE JOCELIN) sensor For use with Freestyle Jocelin Flash  for continuous monitioring of blood glucose levels. Replace sensor every 10 days. 3 each 3     fenofibrate (TRIGLIDE/LOFIBRA) 160 MG tablet TAKE ONE TABLET BY MOUTH ONE TIME DAILY 90 tablet 1     fluticasone (FLONASE) 50 MCG/ACT nasal spray Spray 2 sprays into both nostrils daily 16 g 0     furosemide (LASIX) 20 MG tablet Take 1 tablet (20 mg) by mouth  2 times daily (Patient taking differently: Take 20 mg by mouth daily ) 60 tablet 11     glipiZIDE (GLUCOTROL XL) 10 MG 24 hr tablet TAKE 1 TABLET (10 MG) BY MOUTH DAILY. TAKE WITH LARGEST MEAL OF THE DAY. ALWAYS TAKE WITH FOOD 90 tablet 0     insulin detemir (LEVEMIR FLEXTOUCH) 100 UNIT/ML pen Inject 25 Units Subcutaneous daily 30 mL 1     levothyroxine (SYNTHROID/LEVOTHROID) 50 MCG tablet Take 1 tablet (50 mcg) by mouth daily 90 tablet 0     metFORMIN (GLUCOPHAGE) 1000 MG tablet Take 1 tablet (1,000 mg) by mouth 2 times daily (with meals) 180 tablet 0     metoprolol succinate ER (TOPROL-XL) 25 MG 24 hr tablet Take 1 tablet (25 mg) by mouth daily 90 tablet 2     omeprazole (PRILOSEC) 40 MG DR capsule Take 1 capsule (40 mg) by mouth daily 90 capsule 3     sacubitril-valsartan (ENTRESTO) 49-51 MG per tablet Take 1 tablet by mouth 2 times daily 180 tablet 0     triamcinolone (KENALOG) 0.5 % cream Apply sparingly once or twice per day as needed to affected area until the skin is better, then stop 30 g 0     ULTICARE 29G X 12.7MM insulin pen needle Use 3 pen needles daily as directed (one for daily victoza injection, 2 for twice daily levemir injections) 200 each 1     VICTOZA PEN 18 MG/3ML soln Inject 1.8 mg Subcutaneously daily 27 mL 0    Allergies   Allergen Reactions     No Known Drug Allergies          Lab Results (Personally Reviewed)    Chemistry/lipid CBC Cardiac Enzymes/BNP/TSH/INR   Lab Results   Component Value Date    BUN 17 12/16/2021     12/16/2021    CO2 25 12/16/2021     Creatinine   Date Value Ref Range Status   12/16/2021 1.00 0.66 - 1.25 mg/dL Final   05/26/2021 1.22 0.66 - 1.25 mg/dL Final       Lab Results   Component Value Date    CHOL 140 07/22/2020    HDL 64 07/22/2020    LDL 51 07/22/2020      Lab Results   Component Value Date    WBC 6.5 09/16/2020    HGB 11.7 (L) 08/27/2021    HCT 37.3 (L) 09/16/2020    MCV 98 09/16/2020     09/16/2020    Lab Results   Component Value Date    TSH  5.90 (H) 10/29/2021    INR 1.16 (H) 10/04/2019        The patient states understanding and is agreeable with the plan.   Zackery De Guzman MD [facility]  Cardiology - Electrophysiology    Total time spent on patient visit, reviewing notes, imaging, labs, orders, and completing necessary documentation: 60 minutes.

## 2022-01-14 NOTE — PATIENT INSTRUCTIONS
Plan:     Cardiac MRI & PET scan prior to VT ablation. You will be contacted to get scheduled.       Your Care Team:  EP Cardiology   Telephone Number     Irina Krishna RN [phone]     For scheduling appts or procedures:    Vanesa Kang   [phone]   For the Device Clinic (Pacemakers, ICDs, Loop Recorders)    During business hours: [phone]  After business hours:   [phone]- select option 4 and ask for job code [phone].       Cardiovascular Clinic:   [address] [place] [address]. [place] [address]      As always, Thank you for trusting us with your health care needs!

## 2022-01-15 LAB
MDC_IDC_LEAD_IMPLANT_DT: NORMAL
MDC_IDC_LEAD_LOCATION: NORMAL
MDC_IDC_LEAD_LOCATION_DETAIL_1: NORMAL
MDC_IDC_LEAD_MFG: NORMAL
MDC_IDC_LEAD_MODEL: NORMAL
MDC_IDC_LEAD_POLARITY_TYPE: NORMAL
MDC_IDC_LEAD_SERIAL: NORMAL
MDC_IDC_MSMT_BATTERY_DTM: NORMAL
MDC_IDC_MSMT_BATTERY_REMAINING_LONGEVITY: 102 MO
MDC_IDC_MSMT_BATTERY_REMAINING_PERCENTAGE: 100 %
MDC_IDC_MSMT_BATTERY_STATUS: NORMAL
MDC_IDC_MSMT_CAP_CHARGE_DTM: NORMAL
MDC_IDC_MSMT_CAP_CHARGE_DTM: NORMAL
MDC_IDC_MSMT_CAP_CHARGE_ENERGY: 41 J
MDC_IDC_MSMT_CAP_CHARGE_TIME: 9.8 S
MDC_IDC_MSMT_CAP_CHARGE_TIME: 9.8 S
MDC_IDC_MSMT_CAP_CHARGE_TYPE: NORMAL
MDC_IDC_MSMT_CAP_CHARGE_TYPE: NORMAL
MDC_IDC_MSMT_LEADCHNL_LV_IMPEDANCE_VALUE: 556 OHM
MDC_IDC_MSMT_LEADCHNL_LV_PACING_THRESHOLD_AMPLITUDE: 0.6 V
MDC_IDC_MSMT_LEADCHNL_LV_PACING_THRESHOLD_PULSEWIDTH: 0.5 MS
MDC_IDC_MSMT_LEADCHNL_RA_IMPEDANCE_VALUE: 716 OHM
MDC_IDC_MSMT_LEADCHNL_RA_PACING_THRESHOLD_AMPLITUDE: 0.8 V
MDC_IDC_MSMT_LEADCHNL_RA_PACING_THRESHOLD_PULSEWIDTH: 0.4 MS
MDC_IDC_MSMT_LEADCHNL_RV_IMPEDANCE_VALUE: 515 OHM
MDC_IDC_MSMT_LEADCHNL_RV_PACING_THRESHOLD_AMPLITUDE: 0.8 V
MDC_IDC_MSMT_LEADCHNL_RV_PACING_THRESHOLD_PULSEWIDTH: 0.4 MS
MDC_IDC_PG_IMPLANT_DTM: NORMAL
MDC_IDC_PG_MFG: NORMAL
MDC_IDC_PG_MODEL: NORMAL
MDC_IDC_PG_SERIAL: NORMAL
MDC_IDC_PG_TYPE: NORMAL
MDC_IDC_SESS_CLINIC_NAME: NORMAL
MDC_IDC_SESS_DTM: NORMAL
MDC_IDC_SESS_TYPE: NORMAL
MDC_IDC_SET_BRADY_AT_MODE_SWITCH_MODE: NORMAL
MDC_IDC_SET_BRADY_AT_MODE_SWITCH_RATE: 170 {BEATS}/MIN
MDC_IDC_SET_BRADY_LOWRATE: 60 {BEATS}/MIN
MDC_IDC_SET_BRADY_MAX_SENSOR_RATE: 130 {BEATS}/MIN
MDC_IDC_SET_BRADY_MAX_TRACKING_RATE: 130 {BEATS}/MIN
MDC_IDC_SET_BRADY_MODE: NORMAL
MDC_IDC_SET_BRADY_PAV_DELAY_HIGH: 200 MS
MDC_IDC_SET_BRADY_PAV_DELAY_LOW: 270 MS
MDC_IDC_SET_BRADY_SAV_DELAY_HIGH: 140 MS
MDC_IDC_SET_BRADY_SAV_DELAY_LOW: 190 MS
MDC_IDC_SET_CRT_LVRV_DELAY: 35 MS
MDC_IDC_SET_CRT_PACED_CHAMBERS: NORMAL
MDC_IDC_SET_LEADCHNL_LV_PACING_AMPLITUDE: 1.7 V
MDC_IDC_SET_LEADCHNL_LV_PACING_ANODE_ELECTRODE_1: NORMAL
MDC_IDC_SET_LEADCHNL_LV_PACING_ANODE_LOCATION_1: NORMAL
MDC_IDC_SET_LEADCHNL_LV_PACING_CAPTURE_MODE: NORMAL
MDC_IDC_SET_LEADCHNL_LV_PACING_CATHODE_ELECTRODE_1: NORMAL
MDC_IDC_SET_LEADCHNL_LV_PACING_CATHODE_LOCATION_1: NORMAL
MDC_IDC_SET_LEADCHNL_LV_PACING_PULSEWIDTH: 0.5 MS
MDC_IDC_SET_LEADCHNL_LV_SENSING_ADAPTATION_MODE: NORMAL
MDC_IDC_SET_LEADCHNL_LV_SENSING_ANODE_ELECTRODE_1: NORMAL
MDC_IDC_SET_LEADCHNL_LV_SENSING_ANODE_LOCATION_1: NORMAL
MDC_IDC_SET_LEADCHNL_LV_SENSING_CATHODE_ELECTRODE_1: NORMAL
MDC_IDC_SET_LEADCHNL_LV_SENSING_CATHODE_LOCATION_1: NORMAL
MDC_IDC_SET_LEADCHNL_LV_SENSING_SENSITIVITY: 1 MV
MDC_IDC_SET_LEADCHNL_RA_PACING_AMPLITUDE: 2 V
MDC_IDC_SET_LEADCHNL_RA_PACING_CAPTURE_MODE: NORMAL
MDC_IDC_SET_LEADCHNL_RA_PACING_POLARITY: NORMAL
MDC_IDC_SET_LEADCHNL_RA_PACING_PULSEWIDTH: 0.4 MS
MDC_IDC_SET_LEADCHNL_RA_SENSING_ADAPTATION_MODE: NORMAL
MDC_IDC_SET_LEADCHNL_RA_SENSING_POLARITY: NORMAL
MDC_IDC_SET_LEADCHNL_RA_SENSING_SENSITIVITY: 0.25 MV
MDC_IDC_SET_LEADCHNL_RV_PACING_AMPLITUDE: 2 V
MDC_IDC_SET_LEADCHNL_RV_PACING_CAPTURE_MODE: NORMAL
MDC_IDC_SET_LEADCHNL_RV_PACING_POLARITY: NORMAL
MDC_IDC_SET_LEADCHNL_RV_PACING_PULSEWIDTH: 0.4 MS
MDC_IDC_SET_LEADCHNL_RV_SENSING_ADAPTATION_MODE: NORMAL
MDC_IDC_SET_LEADCHNL_RV_SENSING_POLARITY: NORMAL
MDC_IDC_SET_LEADCHNL_RV_SENSING_SENSITIVITY: 0.5 MV
MDC_IDC_SET_ZONE_DETECTION_INTERVAL: 250 MS
MDC_IDC_SET_ZONE_DETECTION_INTERVAL: 333 MS
MDC_IDC_SET_ZONE_DETECTION_INTERVAL: 375 MS
MDC_IDC_SET_ZONE_TYPE: NORMAL
MDC_IDC_SET_ZONE_VENDOR_TYPE: NORMAL
MDC_IDC_STAT_AT_BURDEN_PERCENT: 0 %
MDC_IDC_STAT_AT_DTM_END: NORMAL
MDC_IDC_STAT_AT_DTM_START: NORMAL
MDC_IDC_STAT_BRADY_DTM_END: NORMAL
MDC_IDC_STAT_BRADY_DTM_START: NORMAL
MDC_IDC_STAT_BRADY_RA_PERCENT_PACED: 58 %
MDC_IDC_STAT_BRADY_RV_PERCENT_PACED: 100 %
MDC_IDC_STAT_CRT_DTM_END: NORMAL
MDC_IDC_STAT_CRT_DTM_START: NORMAL
MDC_IDC_STAT_CRT_LV_PERCENT_PACED: 99 %
MDC_IDC_STAT_EPISODE_RECENT_COUNT: 0
MDC_IDC_STAT_EPISODE_RECENT_COUNT_DTM_END: NORMAL
MDC_IDC_STAT_EPISODE_RECENT_COUNT_DTM_START: NORMAL
MDC_IDC_STAT_EPISODE_TYPE: NORMAL
MDC_IDC_STAT_EPISODE_VENDOR_TYPE: NORMAL
MDC_IDC_STAT_TACHYTHERAPY_ATP_DELIVERED_RECENT: 0
MDC_IDC_STAT_TACHYTHERAPY_ATP_DELIVERED_TOTAL: 4
MDC_IDC_STAT_TACHYTHERAPY_RECENT_DTM_END: NORMAL
MDC_IDC_STAT_TACHYTHERAPY_RECENT_DTM_START: NORMAL
MDC_IDC_STAT_TACHYTHERAPY_SHOCKS_ABORTED_RECENT: 0
MDC_IDC_STAT_TACHYTHERAPY_SHOCKS_ABORTED_TOTAL: 0
MDC_IDC_STAT_TACHYTHERAPY_SHOCKS_DELIVERED_RECENT: 0
MDC_IDC_STAT_TACHYTHERAPY_SHOCKS_DELIVERED_TOTAL: 1
MDC_IDC_STAT_TACHYTHERAPY_TOTAL_DTM_END: NORMAL
MDC_IDC_STAT_TACHYTHERAPY_TOTAL_DTM_START: NORMAL

## 2022-01-18 RX ORDER — DAPAGLIFLOZIN 10 MG/1
10 TABLET, FILM COATED ORAL DAILY
Qty: 90 TABLET | Refills: 3 | Status: SHIPPED | OUTPATIENT
Start: 2022-01-18 | End: 2022-02-17 | Stop reason: SINTOL

## 2022-01-18 RX ORDER — FUROSEMIDE 20 MG
10 TABLET ORAL DAILY
Qty: 15 TABLET | Refills: 11 | Status: SHIPPED | OUTPATIENT
Start: 2022-01-18 | End: 2022-02-17

## 2022-01-18 NOTE — PROGRESS NOTES
I called pt again to review 's recommendation to start dapagliflozin 10 mg daily, and decrease furosemide to 10 mg daily. Pt will have bmp lab at [facility] 1-2 weeks after starting the dapagliflozin and will call their clinic to schedule it. Pt advised to watch out for any signs and symptoms of yeast infection or UTI after starting the medication (urinary frequency, burning pain, trouble emptying bladder, itching), and to call if he does develop any symptoms or side effects with the medication. I also told pt to call if he has any issues with the cost, but that our pharmacy liasion priced it at $15/month with his insurance. Deidra TAYLOR January 18, 2022, 4:30 PM

## 2022-02-11 ENCOUNTER — TEAM CONFERENCE (OUTPATIENT)
Dept: PULMONOLOGY | Facility: CLINIC | Age: 62
End: 2022-02-11
Payer: COMMERCIAL

## 2022-02-11 NOTE — TELEPHONE ENCOUNTER
Sarcoid Multidisciplinary Conference      Patient name: Neymar Rhodes    Physician: Dr. Zackery De Guzman    Question for multidisciplinary group:    Should patient still be on immunosuppression.  Patient with biopsy proven sarcoidosis.  Has had episodes of VT, ICD shock in December.  Severe reduction in EF.  Patient finished prednisone in the spring of 2021.  Is patient reactivating  his sarcoidosis?  Radiology interpretation: Most recent MRI and PET shows improvement and resolution of sarcoidosis, however, patient was on treatment at the time of the most recent imaging.    Other testing reviewed:     Plan: Patient has had no imaging since he has been off of prednisone.  He now has increased arrhythmia burden.  Patient needs to have a repeat PET scan.  Will wait for PET scan results and most likely resume immunosuppression.  Will need to see pulmonary if PET is positive.

## 2022-02-15 NOTE — PROGRESS NOTES
3x attempts made to pt to schedule BMP lab after starting dapafliflozin 1/18/22 per Dr. Hernandes. Third VM left by scheduling today. Eva Trejo RN on 2/15/2022 at 4:08 PM

## 2022-02-16 ENCOUNTER — LAB (OUTPATIENT)
Dept: LAB | Facility: CLINIC | Age: 62
End: 2022-02-16
Payer: COMMERCIAL

## 2022-02-16 DIAGNOSIS — I42.8 NON-ISCHEMIC CARDIOMYOPATHY (H): ICD-10-CM

## 2022-02-16 LAB
ANION GAP SERPL CALCULATED.3IONS-SCNC: 4 MMOL/L (ref 3–14)
BUN SERPL-MCNC: 19 MG/DL (ref 7–30)
CALCIUM SERPL-MCNC: 8.9 MG/DL (ref 8.5–10.1)
CHLORIDE BLD-SCNC: 111 MMOL/L (ref 94–109)
CO2 SERPL-SCNC: 24 MMOL/L (ref 20–32)
CREAT SERPL-MCNC: 1.19 MG/DL (ref 0.66–1.25)
GFR SERPL CREATININE-BSD FRML MDRD: 69 ML/MIN/1.73M2
GLUCOSE BLD-MCNC: 180 MG/DL (ref 70–99)
POTASSIUM BLD-SCNC: 4.4 MMOL/L (ref 3.4–5.3)
SODIUM SERPL-SCNC: 139 MMOL/L (ref 133–144)

## 2022-02-16 PROCEDURE — 80048 BASIC METABOLIC PNL TOTAL CA: CPT | Performed by: INTERNAL MEDICINE

## 2022-02-16 PROCEDURE — 36415 COLL VENOUS BLD VENIPUNCTURE: CPT | Performed by: INTERNAL MEDICINE

## 2022-02-16 NOTE — PROGRESS NOTES
Call out to pt to review BMP lab today and confirm that he made changes recommended by Dr. Hernandes last month to start dapagliflozin 10 mg daily, and decrease furosemide to 10 mg daily. Neymar confirmed he made these changes. He said he's feeling fine other than he noticed that the skin along an old lower abdominal suture line has become discolored/brown and itchy. He reports this developed after he started Farxiga. 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 Neymar states his scrotal skin is also itchy and slightly discolored which is a new change as well. Advised this could possibly be a rare but severe side effect of Farxiga.  Advised Neymar hold the Farxiga for now and contact his PMD for further evaluation. Neymar verbalized understanding and said he would also like me to update Dr. Hernandes since he started him on the med. Eva Trejo RN on 2/16/2022 at 4:42 PM

## 2022-02-17 ENCOUNTER — TELEPHONE (OUTPATIENT)
Dept: INTERNAL MEDICINE | Facility: CLINIC | Age: 62
End: 2022-02-17
Payer: COMMERCIAL

## 2022-02-17 RX ORDER — FUROSEMIDE 20 MG
20 TABLET ORAL DAILY
Qty: 90 TABLET | Refills: 3 | Status: SHIPPED | DISCHARGE
Start: 2022-02-17 | End: 2022-03-29 | Stop reason: DRUGHIGH

## 2022-02-17 NOTE — TELEPHONE ENCOUNTER
Neymar calling clinic with request for work letter. 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 Requesting Dr. Harris to write this letter as had issues getting in contact with cardiologists.     Patient has up coming MRI, PET scans, and possible ablation.     PCP to review and advise if appropriate for letter to come from Dr. Harris. Letter pended for provider review/edit/sign if appropriate.       Appointments in Next Year    Feb 18, 2022 12:30 PM  (Arrive by 12:00 PM)  MR MYOCARDIUM W CONTRAST with JAMEEL MANCILLA CV MR NURSE, JAMEEL IR RN  [facility] ([facility] ) [phone]   Feb 25, 2022 10:00 AM  (Arrive by 9:30 AM)  PET MYOCARDIAL PERFUSION REST AND STRESS with [id number]  [facility] ([facility]) [phone]   Mar 17, 2022 12:00 AM  CARDIAC DEVICE CHECK - REMOTE with RODRIGUEZ DCR2  [facility] ([facility] ) [phone]          Patient able to view/print letters from [employer], if signed. Call back at [phone]    Kristal Kiser RN

## 2022-02-17 NOTE — LETTER
February 17, 2022      Neymar Rhodes  [address]  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    Please call the clinic with any questions.     Sincerely,     Jefry Harris MD

## 2022-02-17 NOTE — TELEPHONE ENCOUNTER
Called and relayed message to patient, [facility] Harris after patients scans in march    Parveen Aragon RN

## 2022-02-17 NOTE — TELEPHONE ENCOUNTER
Letter written.   OK to send to him, or else he can download on his MyChart page.     Close encounter when done.

## 2022-02-17 NOTE — TELEPHONE ENCOUNTER
Yes I think that is correct to do. Have him take pictures to show his MDs for changes and get that evaluated by PCP and derm if needed. If needs extra lasix in the interim, he can increase back to previous dose. Follow up with us after these decisions in 1-2 months

## 2022-02-17 NOTE — PROGRESS NOTES
Called pt. No answer. Left detailed VM with this recommendation. I also sent pt a [facility] message. Extended Dr. Hernandes recall order for 2 months out due 4/17/22 so scheduling calls him to make that follow up after he is done w/evaluation by [facility] and PCP.     Called pharmacy and discontinued Farxiga.       Umm Hernandes MD  You 1 hour ago (10:13 AM)     KV       Yes I think that is correct to do. Have him take pictures to show his MDs for changes and get that evaluated by PCP and derm if needed. If needs extra lasix in the interim, he can increase back to previous dose. Follow up with us after these decisions in 1-2 months         Documentation

## 2022-02-18 ENCOUNTER — ANCILLARY PROCEDURE (OUTPATIENT)
Dept: CARDIOLOGY | Facility: CLINIC | Age: 62
End: 2022-02-18
Attending: INTERNAL MEDICINE
Payer: COMMERCIAL

## 2022-02-18 ENCOUNTER — HOSPITAL ENCOUNTER (OUTPATIENT)
Dept: MRI IMAGING | Facility: CLINIC | Age: 62
End: 2022-02-18
Attending: INTERNAL MEDICINE
Payer: COMMERCIAL

## 2022-02-18 VITALS — HEART RATE: 73 BPM | OXYGEN SATURATION: 97 %

## 2022-02-18 DIAGNOSIS — Z95.810 ICD (IMPLANTABLE CARDIOVERTER-DEFIBRILLATOR) IN PLACE: ICD-10-CM

## 2022-02-18 DIAGNOSIS — I47.29 PAROXYSMAL VENTRICULAR TACHYCARDIA (H): ICD-10-CM

## 2022-02-18 DIAGNOSIS — D86.85 CARDIAC SARCOIDOSIS: ICD-10-CM

## 2022-02-18 DIAGNOSIS — I42.8 NON-ISCHEMIC CARDIOMYOPATHY (H): ICD-10-CM

## 2022-02-18 DIAGNOSIS — I42.8 NON-ISCHEMIC CARDIOMYOPATHY (H): Primary | ICD-10-CM

## 2022-02-18 PROCEDURE — 75561 CARDIAC MRI FOR MORPH W/DYE: CPT | Mod: 26 | Performed by: STUDENT IN AN ORGANIZED HEALTH CARE EDUCATION/TRAINING PROGRAM

## 2022-02-18 PROCEDURE — 93287 PERI-PX DEVICE EVAL & PRGR: CPT

## 2022-02-18 PROCEDURE — 93287 PERI-PX DEVICE EVAL & PRGR: CPT | Mod: 26 | Performed by: INTERNAL MEDICINE

## 2022-02-18 PROCEDURE — A9585 GADOBUTROL INJECTION: HCPCS | Performed by: INTERNAL MEDICINE

## 2022-02-18 PROCEDURE — 75561 CARDIAC MRI FOR MORPH W/DYE: CPT

## 2022-02-18 PROCEDURE — 255N000002 HC RX 255 OP 636: Performed by: INTERNAL MEDICINE

## 2022-02-18 RX ORDER — GADOBUTROL 604.72 MG/ML
7.5 INJECTION INTRAVENOUS ONCE
Status: COMPLETED | OUTPATIENT
Start: 2022-02-18 | End: 2022-02-18

## 2022-02-18 RX ADMIN — GADOBUTROL 7.5 ML: 604.72 INJECTION INTRAVENOUS at 14:12

## 2022-02-18 NOTE — PROGRESS NOTES
Interventional Radiology RN to cardiac monitor pt while in MRI. Pt tolerated without issue.    Martha Castañeda, RN

## 2022-02-25 ENCOUNTER — ANCILLARY PROCEDURE (OUTPATIENT)
Dept: PET IMAGING | Facility: CLINIC | Age: 62
End: 2022-02-25
Attending: INTERNAL MEDICINE
Payer: COMMERCIAL

## 2022-02-25 DIAGNOSIS — D86.85 CARDIAC SARCOIDOSIS: ICD-10-CM

## 2022-02-25 LAB — GLUCOSE SERPL-MCNC: 136 MG/DL (ref 70–99)

## 2022-02-27 DIAGNOSIS — E11.9 TYPE 2 DIABETES MELLITUS WITHOUT COMPLICATION, WITHOUT LONG-TERM CURRENT USE OF INSULIN (H): ICD-10-CM

## 2022-03-01 DIAGNOSIS — I42.8 NON-ISCHEMIC CARDIOMYOPATHY (H): ICD-10-CM

## 2022-03-01 RX ORDER — GLIPIZIDE 10 MG/1
TABLET, FILM COATED, EXTENDED RELEASE ORAL
Qty: 90 TABLET | Refills: 0 | Status: SHIPPED | OUTPATIENT
Start: 2022-03-01 | End: 2022-05-31

## 2022-03-02 RX ORDER — SACUBITRIL AND VALSARTAN 49; 51 MG/1; MG/1
1 TABLET, FILM COATED ORAL 2 TIMES DAILY
Qty: 180 TABLET | Refills: 1 | Status: SHIPPED | OUTPATIENT
Start: 2022-03-02 | End: 2022-01-01

## 2022-03-07 ENCOUNTER — MYC MEDICAL ADVICE (OUTPATIENT)
Dept: CARDIOLOGY | Facility: CLINIC | Age: 62
End: 2022-03-07
Payer: COMMERCIAL

## 2022-03-07 DIAGNOSIS — I47.29 PAROXYSMAL VENTRICULAR TACHYCARDIA (H): Primary | ICD-10-CM

## 2022-03-07 DIAGNOSIS — D86.85 CARDIAC SARCOIDOSIS: ICD-10-CM

## 2022-03-07 DIAGNOSIS — Z95.810 ICD (IMPLANTABLE CARDIOVERTER-DEFIBRILLATOR) IN PLACE: ICD-10-CM

## 2022-03-07 NOTE — TELEPHONE ENCOUNTER
Left voicemail for patient that we have cMRI and PET results. Sending [employer] message as well. Encouraged patient to call back or reply via [employer] with any questions.   Routing to clinic coordinators to schedule appts.             Message  Received: 3 days ago  Zackery De Guzman MD Kelling, Maria, BRANDON  Cc: romel Cunningham, Mayte Mtz, APRN KARLO  Hi Irina,     I talked to the team and Dr Roca who will arrange for the patient to see him soon. Can you please call this patient and tell him we will hold off on the ablation since his PET scan showed inflammation and that we want him to see Dr Roca for that. I can follow-up with him in 6 months or so.   ThanksZackery             Previous Messages       ----- Message -----   From: Umm Hernandes MD   Sent: 3/2/2022   9:48 AM CST   To: Roberto Batres MD, Jayy Pratt MD, *     I am happy to do it at [facility], but previously Roberto has managed this for him. I have no objections at all if he sees Roberto.   Umm   ----- Message -----   From: Zackery De Guzman MD   Sent: 3/2/2022   8:36 AM CST   To: Jayy Pratt MD, MD Dr Lakia Ellis, Dr Hernandes,     We recently received the results of the PET scan for Mr Rhodes. We did the PET because his recent events were more polymorphic/VF rather than VT that he had previously at a time when his inflammation was under control. It looks like his sarcoid is active again and may require a run of immunosuppression before we decide to go to ablation therapy. I can connect him with other members of our sarcoid team to manage immunosuppression or you could do this at [facility]. Please let me know how you like me to proceed and again thank you for your trust in the care of this patient.     Zackery

## 2022-03-08 LAB
MDC_IDC_LEAD_IMPLANT_DT: NORMAL
MDC_IDC_LEAD_LOCATION: NORMAL
MDC_IDC_LEAD_LOCATION_DETAIL_1: NORMAL
MDC_IDC_LEAD_MFG: NORMAL
MDC_IDC_LEAD_MODEL: NORMAL
MDC_IDC_LEAD_POLARITY_TYPE: NORMAL
MDC_IDC_LEAD_SERIAL: NORMAL
MDC_IDC_MSMT_BATTERY_DTM: NORMAL
MDC_IDC_MSMT_BATTERY_DTM: NORMAL
MDC_IDC_MSMT_BATTERY_REMAINING_LONGEVITY: 108 MO
MDC_IDC_MSMT_BATTERY_REMAINING_LONGEVITY: 108 MO
MDC_IDC_MSMT_BATTERY_REMAINING_PERCENTAGE: 100 %
MDC_IDC_MSMT_BATTERY_REMAINING_PERCENTAGE: 100 %
MDC_IDC_MSMT_BATTERY_STATUS: NORMAL
MDC_IDC_MSMT_BATTERY_STATUS: NORMAL
MDC_IDC_MSMT_CAP_CHARGE_DTM: NORMAL
MDC_IDC_MSMT_CAP_CHARGE_ENERGY: 41 J
MDC_IDC_MSMT_CAP_CHARGE_ENERGY: 41 J
MDC_IDC_MSMT_CAP_CHARGE_TIME: 9.8 S
MDC_IDC_MSMT_CAP_CHARGE_TYPE: NORMAL
MDC_IDC_MSMT_LEADCHNL_LV_IMPEDANCE_VALUE: 539 OHM
MDC_IDC_MSMT_LEADCHNL_LV_IMPEDANCE_VALUE: 549 OHM
MDC_IDC_MSMT_LEADCHNL_LV_PACING_THRESHOLD_AMPLITUDE: 0.6 V
MDC_IDC_MSMT_LEADCHNL_LV_PACING_THRESHOLD_AMPLITUDE: 0.6 V
MDC_IDC_MSMT_LEADCHNL_LV_PACING_THRESHOLD_PULSEWIDTH: 0.5 MS
MDC_IDC_MSMT_LEADCHNL_LV_PACING_THRESHOLD_PULSEWIDTH: 0.5 MS
MDC_IDC_MSMT_LEADCHNL_RA_IMPEDANCE_VALUE: 653 OHM
MDC_IDC_MSMT_LEADCHNL_RA_IMPEDANCE_VALUE: 716 OHM
MDC_IDC_MSMT_LEADCHNL_RA_PACING_THRESHOLD_AMPLITUDE: 0.7 V
MDC_IDC_MSMT_LEADCHNL_RA_PACING_THRESHOLD_AMPLITUDE: 0.8 V
MDC_IDC_MSMT_LEADCHNL_RA_PACING_THRESHOLD_PULSEWIDTH: 0.4 MS
MDC_IDC_MSMT_LEADCHNL_RA_PACING_THRESHOLD_PULSEWIDTH: 0.4 MS
MDC_IDC_MSMT_LEADCHNL_RV_IMPEDANCE_VALUE: 464 OHM
MDC_IDC_MSMT_LEADCHNL_RV_IMPEDANCE_VALUE: 506 OHM
MDC_IDC_MSMT_LEADCHNL_RV_PACING_THRESHOLD_AMPLITUDE: 0.8 V
MDC_IDC_MSMT_LEADCHNL_RV_PACING_THRESHOLD_AMPLITUDE: 0.8 V
MDC_IDC_MSMT_LEADCHNL_RV_PACING_THRESHOLD_PULSEWIDTH: 0.4 MS
MDC_IDC_MSMT_LEADCHNL_RV_PACING_THRESHOLD_PULSEWIDTH: 0.4 MS
MDC_IDC_PG_IMPLANT_DTM: NORMAL
MDC_IDC_PG_IMPLANT_DTM: NORMAL
MDC_IDC_PG_MFG: NORMAL
MDC_IDC_PG_MFG: NORMAL
MDC_IDC_PG_MODEL: NORMAL
MDC_IDC_PG_MODEL: NORMAL
MDC_IDC_PG_SERIAL: NORMAL
MDC_IDC_PG_SERIAL: NORMAL
MDC_IDC_PG_TYPE: NORMAL
MDC_IDC_PG_TYPE: NORMAL
MDC_IDC_SESS_CLINIC_NAME: NORMAL
MDC_IDC_SESS_CLINIC_NAME: NORMAL
MDC_IDC_SESS_DTM: NORMAL
MDC_IDC_SESS_DTM: NORMAL
MDC_IDC_SESS_TYPE: NORMAL
MDC_IDC_SESS_TYPE: NORMAL
MDC_IDC_SET_BRADY_AT_MODE_SWITCH_MODE: NORMAL
MDC_IDC_SET_BRADY_AT_MODE_SWITCH_MODE: NORMAL
MDC_IDC_SET_BRADY_AT_MODE_SWITCH_RATE: 170 {BEATS}/MIN
MDC_IDC_SET_BRADY_AT_MODE_SWITCH_RATE: 170 {BEATS}/MIN
MDC_IDC_SET_BRADY_LOWRATE: 60 {BEATS}/MIN
MDC_IDC_SET_BRADY_LOWRATE: 75 {BEATS}/MIN
MDC_IDC_SET_BRADY_MAX_SENSOR_RATE: 130 {BEATS}/MIN
MDC_IDC_SET_BRADY_MAX_TRACKING_RATE: 130 {BEATS}/MIN
MDC_IDC_SET_BRADY_MODE: NORMAL
MDC_IDC_SET_BRADY_MODE: NORMAL
MDC_IDC_SET_BRADY_PAV_DELAY_HIGH: 200 MS
MDC_IDC_SET_BRADY_PAV_DELAY_HIGH: 200 MS
MDC_IDC_SET_BRADY_PAV_DELAY_LOW: 270 MS
MDC_IDC_SET_BRADY_PAV_DELAY_LOW: 270 MS
MDC_IDC_SET_BRADY_SAV_DELAY_HIGH: 140 MS
MDC_IDC_SET_BRADY_SAV_DELAY_HIGH: 140 MS
MDC_IDC_SET_BRADY_SAV_DELAY_LOW: 190 MS
MDC_IDC_SET_BRADY_SAV_DELAY_LOW: 190 MS
MDC_IDC_SET_CRT_LVRV_DELAY: 35 MS
MDC_IDC_SET_CRT_LVRV_DELAY: 35 MS
MDC_IDC_SET_CRT_PACED_CHAMBERS: NORMAL
MDC_IDC_SET_CRT_PACED_CHAMBERS: NORMAL
MDC_IDC_SET_LEADCHNL_LV_PACING_AMPLITUDE: 1.7 V
MDC_IDC_SET_LEADCHNL_LV_PACING_AMPLITUDE: 5 V
MDC_IDC_SET_LEADCHNL_LV_PACING_ANODE_ELECTRODE_1: NORMAL
MDC_IDC_SET_LEADCHNL_LV_PACING_ANODE_ELECTRODE_1: NORMAL
MDC_IDC_SET_LEADCHNL_LV_PACING_ANODE_LOCATION_1: NORMAL
MDC_IDC_SET_LEADCHNL_LV_PACING_ANODE_LOCATION_1: NORMAL
MDC_IDC_SET_LEADCHNL_LV_PACING_CAPTURE_MODE: NORMAL
MDC_IDC_SET_LEADCHNL_LV_PACING_CAPTURE_MODE: NORMAL
MDC_IDC_SET_LEADCHNL_LV_PACING_CATHODE_ELECTRODE_1: NORMAL
MDC_IDC_SET_LEADCHNL_LV_PACING_CATHODE_ELECTRODE_1: NORMAL
MDC_IDC_SET_LEADCHNL_LV_PACING_CATHODE_LOCATION_1: NORMAL
MDC_IDC_SET_LEADCHNL_LV_PACING_CATHODE_LOCATION_1: NORMAL
MDC_IDC_SET_LEADCHNL_LV_PACING_PULSEWIDTH: 0.5 MS
MDC_IDC_SET_LEADCHNL_LV_PACING_PULSEWIDTH: 1 MS
MDC_IDC_SET_LEADCHNL_LV_SENSING_ADAPTATION_MODE: NORMAL
MDC_IDC_SET_LEADCHNL_LV_SENSING_ADAPTATION_MODE: NORMAL
MDC_IDC_SET_LEADCHNL_LV_SENSING_ANODE_ELECTRODE_1: NORMAL
MDC_IDC_SET_LEADCHNL_LV_SENSING_ANODE_ELECTRODE_1: NORMAL
MDC_IDC_SET_LEADCHNL_LV_SENSING_ANODE_LOCATION_1: NORMAL
MDC_IDC_SET_LEADCHNL_LV_SENSING_ANODE_LOCATION_1: NORMAL
MDC_IDC_SET_LEADCHNL_LV_SENSING_CATHODE_ELECTRODE_1: NORMAL
MDC_IDC_SET_LEADCHNL_LV_SENSING_CATHODE_ELECTRODE_1: NORMAL
MDC_IDC_SET_LEADCHNL_LV_SENSING_CATHODE_LOCATION_1: NORMAL
MDC_IDC_SET_LEADCHNL_LV_SENSING_CATHODE_LOCATION_1: NORMAL
MDC_IDC_SET_LEADCHNL_LV_SENSING_SENSITIVITY: 1 MV
MDC_IDC_SET_LEADCHNL_LV_SENSING_SENSITIVITY: 1 MV
MDC_IDC_SET_LEADCHNL_RA_PACING_AMPLITUDE: 2 V
MDC_IDC_SET_LEADCHNL_RA_PACING_AMPLITUDE: 5 V
MDC_IDC_SET_LEADCHNL_RA_PACING_CAPTURE_MODE: NORMAL
MDC_IDC_SET_LEADCHNL_RA_PACING_CAPTURE_MODE: NORMAL
MDC_IDC_SET_LEADCHNL_RA_PACING_POLARITY: NORMAL
MDC_IDC_SET_LEADCHNL_RA_PACING_POLARITY: NORMAL
MDC_IDC_SET_LEADCHNL_RA_PACING_PULSEWIDTH: 0.4 MS
MDC_IDC_SET_LEADCHNL_RA_PACING_PULSEWIDTH: 1 MS
MDC_IDC_SET_LEADCHNL_RA_SENSING_ADAPTATION_MODE: NORMAL
MDC_IDC_SET_LEADCHNL_RA_SENSING_ADAPTATION_MODE: NORMAL
MDC_IDC_SET_LEADCHNL_RA_SENSING_POLARITY: NORMAL
MDC_IDC_SET_LEADCHNL_RA_SENSING_POLARITY: NORMAL
MDC_IDC_SET_LEADCHNL_RA_SENSING_SENSITIVITY: 0.25 MV
MDC_IDC_SET_LEADCHNL_RA_SENSING_SENSITIVITY: 0.25 MV
MDC_IDC_SET_LEADCHNL_RV_PACING_AMPLITUDE: 2 V
MDC_IDC_SET_LEADCHNL_RV_PACING_AMPLITUDE: 5 V
MDC_IDC_SET_LEADCHNL_RV_PACING_CAPTURE_MODE: NORMAL
MDC_IDC_SET_LEADCHNL_RV_PACING_CAPTURE_MODE: NORMAL
MDC_IDC_SET_LEADCHNL_RV_PACING_POLARITY: NORMAL
MDC_IDC_SET_LEADCHNL_RV_PACING_POLARITY: NORMAL
MDC_IDC_SET_LEADCHNL_RV_PACING_PULSEWIDTH: 0.4 MS
MDC_IDC_SET_LEADCHNL_RV_PACING_PULSEWIDTH: 1 MS
MDC_IDC_SET_LEADCHNL_RV_SENSING_ADAPTATION_MODE: NORMAL
MDC_IDC_SET_LEADCHNL_RV_SENSING_ADAPTATION_MODE: NORMAL
MDC_IDC_SET_LEADCHNL_RV_SENSING_POLARITY: NORMAL
MDC_IDC_SET_LEADCHNL_RV_SENSING_POLARITY: NORMAL
MDC_IDC_SET_LEADCHNL_RV_SENSING_SENSITIVITY: 0.5 MV
MDC_IDC_SET_LEADCHNL_RV_SENSING_SENSITIVITY: 0.5 MV
MDC_IDC_SET_ZONE_DETECTION_INTERVAL: 250 MS
MDC_IDC_SET_ZONE_DETECTION_INTERVAL: 250 MS
MDC_IDC_SET_ZONE_DETECTION_INTERVAL: 333 MS
MDC_IDC_SET_ZONE_DETECTION_INTERVAL: 333 MS
MDC_IDC_SET_ZONE_DETECTION_INTERVAL: 375 MS
MDC_IDC_SET_ZONE_DETECTION_INTERVAL: 375 MS
MDC_IDC_SET_ZONE_TYPE: NORMAL
MDC_IDC_SET_ZONE_VENDOR_TYPE: NORMAL
MDC_IDC_STAT_AT_BURDEN_PERCENT: 0 %
MDC_IDC_STAT_AT_BURDEN_PERCENT: 0 %
MDC_IDC_STAT_AT_DTM_END: NORMAL
MDC_IDC_STAT_AT_DTM_END: NORMAL
MDC_IDC_STAT_AT_DTM_START: NORMAL
MDC_IDC_STAT_AT_DTM_START: NORMAL
MDC_IDC_STAT_BRADY_DTM_END: NORMAL
MDC_IDC_STAT_BRADY_DTM_END: NORMAL
MDC_IDC_STAT_BRADY_DTM_START: NORMAL
MDC_IDC_STAT_BRADY_DTM_START: NORMAL
MDC_IDC_STAT_BRADY_RA_PERCENT_PACED: 60 %
MDC_IDC_STAT_BRADY_RA_PERCENT_PACED: 60 %
MDC_IDC_STAT_BRADY_RV_PERCENT_PACED: 99 %
MDC_IDC_STAT_BRADY_RV_PERCENT_PACED: 99 %
MDC_IDC_STAT_CRT_DTM_END: NORMAL
MDC_IDC_STAT_CRT_DTM_END: NORMAL
MDC_IDC_STAT_CRT_DTM_START: NORMAL
MDC_IDC_STAT_CRT_DTM_START: NORMAL
MDC_IDC_STAT_CRT_LV_PERCENT_PACED: 99 %
MDC_IDC_STAT_CRT_LV_PERCENT_PACED: 99 %
MDC_IDC_STAT_EPISODE_RECENT_COUNT: 0
MDC_IDC_STAT_EPISODE_RECENT_COUNT: 1
MDC_IDC_STAT_EPISODE_RECENT_COUNT_DTM_END: NORMAL
MDC_IDC_STAT_EPISODE_RECENT_COUNT_DTM_START: NORMAL
MDC_IDC_STAT_EPISODE_TYPE: NORMAL
MDC_IDC_STAT_EPISODE_VENDOR_TYPE: NORMAL
MDC_IDC_STAT_TACHYTHERAPY_ATP_DELIVERED_RECENT: 0
MDC_IDC_STAT_TACHYTHERAPY_ATP_DELIVERED_RECENT: 0
MDC_IDC_STAT_TACHYTHERAPY_ATP_DELIVERED_TOTAL: 4
MDC_IDC_STAT_TACHYTHERAPY_ATP_DELIVERED_TOTAL: 4
MDC_IDC_STAT_TACHYTHERAPY_RECENT_DTM_END: NORMAL
MDC_IDC_STAT_TACHYTHERAPY_RECENT_DTM_END: NORMAL
MDC_IDC_STAT_TACHYTHERAPY_RECENT_DTM_START: NORMAL
MDC_IDC_STAT_TACHYTHERAPY_RECENT_DTM_START: NORMAL
MDC_IDC_STAT_TACHYTHERAPY_SHOCKS_ABORTED_RECENT: 0
MDC_IDC_STAT_TACHYTHERAPY_SHOCKS_ABORTED_RECENT: 0
MDC_IDC_STAT_TACHYTHERAPY_SHOCKS_ABORTED_TOTAL: 0
MDC_IDC_STAT_TACHYTHERAPY_SHOCKS_ABORTED_TOTAL: 0
MDC_IDC_STAT_TACHYTHERAPY_SHOCKS_DELIVERED_RECENT: 0
MDC_IDC_STAT_TACHYTHERAPY_SHOCKS_DELIVERED_RECENT: 0
MDC_IDC_STAT_TACHYTHERAPY_SHOCKS_DELIVERED_TOTAL: 1
MDC_IDC_STAT_TACHYTHERAPY_SHOCKS_DELIVERED_TOTAL: 1
MDC_IDC_STAT_TACHYTHERAPY_TOTAL_DTM_END: NORMAL
MDC_IDC_STAT_TACHYTHERAPY_TOTAL_DTM_END: NORMAL
MDC_IDC_STAT_TACHYTHERAPY_TOTAL_DTM_START: NORMAL
MDC_IDC_STAT_TACHYTHERAPY_TOTAL_DTM_START: NORMAL

## 2022-03-09 ENCOUNTER — TELEPHONE (OUTPATIENT)
Dept: INTERNAL MEDICINE | Facility: CLINIC | Age: 62
End: 2022-03-09
Payer: COMMERCIAL

## 2022-03-09 NOTE — TELEPHONE ENCOUNTER
TO PCP:     Patient called - cardiology had started him on Faxiga 1/18/22     He developed a brown itchy rash from it, and called cardiology office. They had him discontinue med on 2/17/22     He was watching commercials on TV and is worried because they said this med could lead to an infection. His rash is now brown and itchy on his waist     Not seeing PCP until 2/28/22     Patient has not tried anything for the rash, he is worried that meds may interact with his current meds. Please advise - would it be safe for him to try Benadryl, or hydrocortisone cream for itchy rash?     Karin Ly RN  [facility]     Appointments in Next Year    Mar 14, 2022  7:20 AM  LAB with  LAB ONLY  [facility] ([facility] ) [phone]   Mar 14, 2022  4:00 PM  Return EP with ARMAND Saravia CNP  [facility] ([facility] ) [phone]   Mar 17, 2022 12:00 AM  CARDIAC DEVICE CHECK - REMOTE with RODRIGUEZ DCR2  [facility] ([facility] ) [phone]   Mar 28, 2022 10:00 AM  (Arrive by 9:40 AM)  Provider Visit with Jefry Harris MD  [facility] ([facility] ) [phone]   Mar 29, 2022  3:00 PM  LAB with  LAB  [facility] [place] ([facility] ) [phone]   Mar 29, 2022  3:30 PM  (Arrive by 3:15 PM)  RETURN HEART FAILURE with Roberto Batres MD  [facility] ([facility] ) [phone]        dapagliflozin (FARXIGA) 10 MG TABS tablet (Discontinued) 90 tablet 3 1/18/2022 2/17/2022 --   Sig - Route: Take 1 tablet (10 mg) by mouth daily - Oral   Sent to pharmacy as: Dapagliflozin Propanediol 10 MG Oral Tablet (FARXIGA)   Class: E-Prescribe   Reason  for Discontinue: Side effects   Order: [id number]   E-Prescribing Status: Receipt confirmed by pharmacy (1/18/2022  4:32 PM CST)   E-Cancel Status: Request approved by pharmacy (2/17/2022 11:43 AM CST)       E-Cancel Status Note: Prescription cancelled; filled 1 time

## 2022-03-09 NOTE — TELEPHONE ENCOUNTER
Not concerned about this rash being any serious infection related to farxiga.   Could send a photo w/accompanying e-visit if wants eval sooner  Most pruritic rashes - either dermatitis or fungal . Neither serious but can be bothersome

## 2022-03-09 NOTE — TELEPHONE ENCOUNTER
Patient calling back to return missed call. Relayed providers message, patient verbalized understanding and will wait until appt with PCP on 3/28.

## 2022-03-10 DIAGNOSIS — I47.29 PAROXYSMAL VENTRICULAR TACHYCARDIA (H): ICD-10-CM

## 2022-03-10 NOTE — TELEPHONE ENCOUNTER
amiodarone (PACERONE) 400 MG tablet      Last Written Prescription Date:  NO PRINT OUT    Last Office Visit : 1-14-22  Future Office visit:  3-29-22    Routing refill request to provider for review/approval because:  Med not on card protocol  NO Print out--  Entered by other cards provider/clinic

## 2022-03-11 DIAGNOSIS — I47.29 PAROXYSMAL VENTRICULAR TACHYCARDIA (H): ICD-10-CM

## 2022-03-11 RX ORDER — AMIODARONE HYDROCHLORIDE 400 MG/1
400 TABLET ORAL DAILY
Qty: 90 TABLET | Refills: 0 | Status: SHIPPED | OUTPATIENT
Start: 2022-03-11 | End: 2022-06-02

## 2022-03-11 RX ORDER — AMIODARONE HYDROCHLORIDE 400 MG/1
TABLET ORAL
Qty: 90 TABLET | Refills: 0 | OUTPATIENT
Start: 2022-03-11

## 2022-03-14 ENCOUNTER — LAB (OUTPATIENT)
Dept: LAB | Facility: CLINIC | Age: 62
End: 2022-03-14
Attending: NURSE PRACTITIONER
Payer: COMMERCIAL

## 2022-03-14 ENCOUNTER — TELEPHONE (OUTPATIENT)
Dept: CARDIOLOGY | Facility: CLINIC | Age: 62
End: 2022-03-14

## 2022-03-14 DIAGNOSIS — Z79.899 ON AMIODARONE THERAPY: ICD-10-CM

## 2022-03-14 LAB
ALBUMIN SERPL-MCNC: 3.3 G/DL (ref 3.4–5)
ALP SERPL-CCNC: 156 U/L (ref 40–150)
ALT SERPL W P-5'-P-CCNC: 69 U/L (ref 0–70)
ANION GAP SERPL CALCULATED.3IONS-SCNC: 5 MMOL/L (ref 3–14)
AST SERPL W P-5'-P-CCNC: 88 U/L (ref 0–45)
BILIRUB DIRECT SERPL-MCNC: 0.3 MG/DL (ref 0–0.2)
BILIRUB SERPL-MCNC: 0.8 MG/DL (ref 0.2–1.3)
BUN SERPL-MCNC: 16 MG/DL (ref 7–30)
CALCIUM SERPL-MCNC: 9 MG/DL (ref 8.5–10.1)
CHLORIDE BLD-SCNC: 112 MMOL/L (ref 94–109)
CO2 SERPL-SCNC: 24 MMOL/L (ref 20–32)
CREAT SERPL-MCNC: 0.92 MG/DL (ref 0.66–1.25)
GFR SERPL CREATININE-BSD FRML MDRD: >90 ML/MIN/1.73M2
GLUCOSE BLD-MCNC: 150 MG/DL (ref 70–99)
POTASSIUM BLD-SCNC: 3.8 MMOL/L (ref 3.4–5.3)
PROT SERPL-MCNC: 6.7 G/DL (ref 6.8–8.8)
SODIUM SERPL-SCNC: 141 MMOL/L (ref 133–144)
T4 FREE SERPL-MCNC: 1.45 NG/DL (ref 0.76–1.46)
TSH SERPL DL<=0.005 MIU/L-ACNC: 4.92 MU/L (ref 0.4–4)

## 2022-03-14 PROCEDURE — 36415 COLL VENOUS BLD VENIPUNCTURE: CPT

## 2022-03-14 PROCEDURE — 84439 ASSAY OF FREE THYROXINE: CPT

## 2022-03-14 PROCEDURE — 82248 BILIRUBIN DIRECT: CPT

## 2022-03-14 PROCEDURE — 84443 ASSAY THYROID STIM HORMONE: CPT

## 2022-03-14 PROCEDURE — 80053 COMPREHEN METABOLIC PANEL: CPT

## 2022-03-14 NOTE — TELEPHONE ENCOUNTER
[facility] Call Center    Phone Message    May a detailed message be left on voicemail: yes     Reason for Call: Appointment Intake    Referring Provider Name: Dr. De Guzman    Pt needs to make an appt regarding some procedure Dr. De Guzman is to perform. There were no openings, so transferring over to clinic to see if you can get pt in sooner.    Thank you!    Action Taken: Message routed to:  [facility] Central Scheduling Pool    Travel Screening: Not Applicable

## 2022-03-14 NOTE — TELEPHONE ENCOUNTER
Called pt to cancel follow up with ARMAND Mayer on 03/14/22 at 4 pm and pt should follow up Dr. De Guzman per plan dated on 1/14/2022     ARMAND Ochoa CNP on 3/14/2022 at 11:29 AM

## 2022-03-16 DIAGNOSIS — Z79.4 TYPE 2 DIABETES MELLITUS WITH OTHER CIRCULATORY COMPLICATION, WITH LONG-TERM CURRENT USE OF INSULIN (H): ICD-10-CM

## 2022-03-16 DIAGNOSIS — E11.59 TYPE 2 DIABETES MELLITUS WITH OTHER CIRCULATORY COMPLICATION, WITH LONG-TERM CURRENT USE OF INSULIN (H): ICD-10-CM

## 2022-03-17 ENCOUNTER — ANCILLARY PROCEDURE (OUTPATIENT)
Dept: CARDIOLOGY | Facility: CLINIC | Age: 62
End: 2022-03-17
Attending: INTERNAL MEDICINE
Payer: COMMERCIAL

## 2022-03-17 DIAGNOSIS — I42.8 NON-ISCHEMIC CARDIOMYOPATHY (H): ICD-10-CM

## 2022-03-17 DIAGNOSIS — Z95.810 ICD (IMPLANTABLE CARDIOVERTER-DEFIBRILLATOR) IN PLACE: ICD-10-CM

## 2022-03-17 PROCEDURE — 93295 DEV INTERROG REMOTE 1/2/MLT: CPT | Performed by: INTERNAL MEDICINE

## 2022-03-17 PROCEDURE — 93296 REM INTERROG EVL PM/IDS: CPT | Performed by: INTERNAL MEDICINE

## 2022-03-17 NOTE — TELEPHONE ENCOUNTER
Routing refill request to provider for review/approval because:  Lab Results   Component Value Date    A1C 6.9 09/15/2021    A1C 8.8 05/26/2021    A1C 7.1 12/11/2020    A1C 8.8 07/22/2020    A1C 7.0 11/14/2019    A1C 8.8 08/26/2019     Appointment scheduled 3/28    Parveen Aragon RN  [facility] Triage Nurse

## 2022-03-18 RX ORDER — LIRAGLUTIDE 6 MG/ML
INJECTION SUBCUTANEOUS
Qty: 27 ML | Refills: 0 | Status: SHIPPED | OUTPATIENT
Start: 2022-03-18 | End: 2022-03-28

## 2022-03-23 ENCOUNTER — OFFICE VISIT (OUTPATIENT)
Dept: URGENT CARE | Facility: URGENT CARE | Age: 62
End: 2022-03-23
Payer: COMMERCIAL

## 2022-03-23 VITALS
BODY MASS INDEX: 38.34 KG/M2 | TEMPERATURE: 97.2 F | OXYGEN SATURATION: 99 % | RESPIRATION RATE: 18 BRPM | DIASTOLIC BLOOD PRESSURE: 60 MMHG | SYSTOLIC BLOOD PRESSURE: 120 MMHG | HEART RATE: 60 BPM | WEIGHT: 276 LBS

## 2022-03-23 DIAGNOSIS — D64.9 LOW HEMOGLOBIN: ICD-10-CM

## 2022-03-23 DIAGNOSIS — K92.1 BLACK STOOLS: ICD-10-CM

## 2022-03-23 DIAGNOSIS — R19.7 DIARRHEA, UNSPECIFIED TYPE: Primary | ICD-10-CM

## 2022-03-23 DIAGNOSIS — R10.84 ABDOMINAL PAIN, GENERALIZED: ICD-10-CM

## 2022-03-23 LAB
ANION GAP SERPL CALCULATED.3IONS-SCNC: 6 MMOL/L (ref 3–14)
BASOPHILS # BLD AUTO: 0 10E3/UL (ref 0–0.2)
BASOPHILS NFR BLD AUTO: 1 %
BUN SERPL-MCNC: 16 MG/DL (ref 7–30)
CALCIUM SERPL-MCNC: 8.8 MG/DL (ref 8.5–10.1)
CHLORIDE BLD-SCNC: 115 MMOL/L (ref 94–109)
CO2 SERPL-SCNC: 21 MMOL/L (ref 20–32)
CREAT SERPL-MCNC: 0.88 MG/DL (ref 0.66–1.25)
EOSINOPHIL # BLD AUTO: 0.2 10E3/UL (ref 0–0.7)
EOSINOPHIL NFR BLD AUTO: 5 %
ERYTHROCYTE [DISTWIDTH] IN BLOOD BY AUTOMATED COUNT: 18 % (ref 10–15)
GFR SERPL CREATININE-BSD FRML MDRD: >90 ML/MIN/1.73M2
GLUCOSE BLD-MCNC: 187 MG/DL (ref 70–99)
HCT VFR BLD AUTO: 33.1 % (ref 40–53)
HGB BLD-MCNC: 10.1 G/DL (ref 13.3–17.7)
LYMPHOCYTES # BLD AUTO: 1.1 10E3/UL (ref 0.8–5.3)
LYMPHOCYTES NFR BLD AUTO: 28 %
MCH RBC QN AUTO: 26.8 PG (ref 26.5–33)
MCHC RBC AUTO-ENTMCNC: 30.5 G/DL (ref 31.5–36.5)
MCV RBC AUTO: 88 FL (ref 78–100)
MONOCYTES # BLD AUTO: 0.4 10E3/UL (ref 0–1.3)
MONOCYTES NFR BLD AUTO: 9 %
NEUTROPHILS # BLD AUTO: 2.3 10E3/UL (ref 1.6–8.3)
NEUTROPHILS NFR BLD AUTO: 58 %
PLATELET # BLD AUTO: 150 10E3/UL (ref 150–450)
POTASSIUM BLD-SCNC: 3.6 MMOL/L (ref 3.4–5.3)
RBC # BLD AUTO: 3.77 10E6/UL (ref 4.4–5.9)
SODIUM SERPL-SCNC: 142 MMOL/L (ref 133–144)
WBC # BLD AUTO: 4 10E3/UL (ref 4–11)

## 2022-03-23 PROCEDURE — 80048 BASIC METABOLIC PNL TOTAL CA: CPT | Performed by: FAMILY MEDICINE

## 2022-03-23 PROCEDURE — 85025 COMPLETE CBC W/AUTO DIFF WBC: CPT | Performed by: FAMILY MEDICINE

## 2022-03-23 PROCEDURE — 99214 OFFICE O/P EST MOD 30 MIN: CPT | Performed by: FAMILY MEDICINE

## 2022-03-23 PROCEDURE — 36415 COLL VENOUS BLD VENIPUNCTURE: CPT | Performed by: FAMILY MEDICINE

## 2022-03-24 ENCOUNTER — TELEPHONE (OUTPATIENT)
Dept: CARDIOLOGY | Facility: CLINIC | Age: 62
End: 2022-03-24

## 2022-03-24 PROCEDURE — 87506 IADNA-DNA/RNA PROBE TQ 6-11: CPT | Performed by: FAMILY MEDICINE

## 2022-03-24 NOTE — TELEPHONE ENCOUNTER
----- Message from Irina Krishna RN sent at 3/14/2022  2:36 PM CDT -----  Regarding: RE: schedule  Hi,  Patient needs follow-up with Gege in 6 months with a device check, labs prior.   Thx  ----- Message -----  From: Sandie Wisdom APRN CNP  Sent: 3/14/2022   1:34 PM CDT  To: Irina Krishna RN  Subject: schedule                                         This pt was on my schedule today but I cancelled him because it looks like he has some business he needs to do to get ready to have an ablation with Dr. De Guzman.       Can you follow up and maybe call him so he doesn't get lost in the scheduling hole.     ARMAND Ochoa CNP on 3/14/2022 at 1:34 PM

## 2022-03-25 ENCOUNTER — CARE COORDINATION (OUTPATIENT)
Dept: CARDIOLOGY | Facility: CLINIC | Age: 62
End: 2022-03-25
Payer: COMMERCIAL

## 2022-03-25 DIAGNOSIS — I50.22 CHRONIC SYSTOLIC HEART FAILURE (H): Primary | ICD-10-CM

## 2022-03-28 ENCOUNTER — OFFICE VISIT (OUTPATIENT)
Dept: INTERNAL MEDICINE | Facility: CLINIC | Age: 62
End: 2022-03-28
Payer: COMMERCIAL

## 2022-03-28 VITALS
WEIGHT: 275 LBS | OXYGEN SATURATION: 99 % | BODY MASS INDEX: 38.5 KG/M2 | DIASTOLIC BLOOD PRESSURE: 67 MMHG | HEIGHT: 71 IN | SYSTOLIC BLOOD PRESSURE: 113 MMHG | HEART RATE: 60 BPM | TEMPERATURE: 98.4 F

## 2022-03-28 DIAGNOSIS — R60.9 EDEMA, UNSPECIFIED TYPE: ICD-10-CM

## 2022-03-28 DIAGNOSIS — E11.59 TYPE 2 DIABETES MELLITUS WITH OTHER CIRCULATORY COMPLICATION, WITH LONG-TERM CURRENT USE OF INSULIN (H): Primary | ICD-10-CM

## 2022-03-28 DIAGNOSIS — L40.9 PSORIASIS: ICD-10-CM

## 2022-03-28 DIAGNOSIS — Z79.4 TYPE 2 DIABETES MELLITUS WITH OTHER CIRCULATORY COMPLICATION, WITH LONG-TERM CURRENT USE OF INSULIN (H): Primary | ICD-10-CM

## 2022-03-28 DIAGNOSIS — Z79.899 ON AMIODARONE THERAPY: ICD-10-CM

## 2022-03-28 DIAGNOSIS — E66.01 SEVERE OBESITY (BMI 35.0-39.9) WITH COMORBIDITY (H): ICD-10-CM

## 2022-03-28 DIAGNOSIS — E03.8 SUBCLINICAL HYPOTHYROIDISM: ICD-10-CM

## 2022-03-28 DIAGNOSIS — I42.8 NONISCHEMIC CARDIOMYOPATHY (H): ICD-10-CM

## 2022-03-28 DIAGNOSIS — N18.31 STAGE 3A CHRONIC KIDNEY DISEASE (H): ICD-10-CM

## 2022-03-28 DIAGNOSIS — D86.0 SARCOIDOSIS OF LUNG (H): ICD-10-CM

## 2022-03-28 DIAGNOSIS — E78.5 HYPERLIPIDEMIA LDL GOAL <100: ICD-10-CM

## 2022-03-28 DIAGNOSIS — I42.8 NON-ISCHEMIC CARDIOMYOPATHY (H): ICD-10-CM

## 2022-03-28 DIAGNOSIS — I50.22 CHRONIC SYSTOLIC HEART FAILURE (H): ICD-10-CM

## 2022-03-28 DIAGNOSIS — I77.810 ASCENDING AORTA DILATATION (H): ICD-10-CM

## 2022-03-28 DIAGNOSIS — I47.29 PAROXYSMAL VENTRICULAR TACHYCARDIA (H): ICD-10-CM

## 2022-03-28 LAB
ALBUMIN SERPL-MCNC: 3 G/DL (ref 3.4–5)
ALP SERPL-CCNC: 159 U/L (ref 40–150)
ALT SERPL W P-5'-P-CCNC: 95 U/L (ref 0–70)
ANION GAP SERPL CALCULATED.3IONS-SCNC: 5 MMOL/L (ref 3–14)
AST SERPL W P-5'-P-CCNC: 110 U/L (ref 0–45)
BILIRUB SERPL-MCNC: 1 MG/DL (ref 0.2–1.3)
BUN SERPL-MCNC: 13 MG/DL (ref 7–30)
CALCIUM SERPL-MCNC: 8.9 MG/DL (ref 8.5–10.1)
CHLORIDE BLD-SCNC: 110 MMOL/L (ref 94–109)
CHOLEST SERPL-MCNC: 97 MG/DL
CO2 SERPL-SCNC: 26 MMOL/L (ref 20–32)
CREAT SERPL-MCNC: 0.93 MG/DL (ref 0.66–1.25)
ERYTHROCYTE [DISTWIDTH] IN BLOOD BY AUTOMATED COUNT: 18.2 % (ref 10–15)
FASTING STATUS PATIENT QL REPORTED: YES
GFR SERPL CREATININE-BSD FRML MDRD: >90 ML/MIN/1.73M2
GLUCOSE BLD-MCNC: 116 MG/DL (ref 70–99)
HBA1C MFR BLD: 6.3 % (ref 0–5.6)
HCT VFR BLD AUTO: 34.5 % (ref 40–53)
HDLC SERPL-MCNC: 34 MG/DL
HGB BLD-MCNC: 10.7 G/DL (ref 13.3–17.7)
LDLC SERPL CALC-MCNC: 44 MG/DL
MCH RBC QN AUTO: 27.1 PG (ref 26.5–33)
MCHC RBC AUTO-ENTMCNC: 31 G/DL (ref 31.5–36.5)
MCV RBC AUTO: 87 FL (ref 78–100)
NONHDLC SERPL-MCNC: 63 MG/DL
NT-PROBNP SERPL-MCNC: 402 PG/ML (ref 0–125)
PLATELET # BLD AUTO: 158 10E3/UL (ref 150–450)
POTASSIUM BLD-SCNC: 4.2 MMOL/L (ref 3.4–5.3)
PROT SERPL-MCNC: 6.5 G/DL (ref 6.8–8.8)
RBC # BLD AUTO: 3.95 10E6/UL (ref 4.4–5.9)
SODIUM SERPL-SCNC: 141 MMOL/L (ref 133–144)
TRIGL SERPL-MCNC: 95 MG/DL
WBC # BLD AUTO: 4.1 10E3/UL (ref 4–11)

## 2022-03-28 PROCEDURE — 99214 OFFICE O/P EST MOD 30 MIN: CPT | Performed by: INTERNAL MEDICINE

## 2022-03-28 PROCEDURE — 80061 LIPID PANEL: CPT | Performed by: INTERNAL MEDICINE

## 2022-03-28 PROCEDURE — 83880 ASSAY OF NATRIURETIC PEPTIDE: CPT | Performed by: INTERNAL MEDICINE

## 2022-03-28 PROCEDURE — 85027 COMPLETE CBC AUTOMATED: CPT | Performed by: INTERNAL MEDICINE

## 2022-03-28 PROCEDURE — 80053 COMPREHEN METABOLIC PANEL: CPT | Performed by: INTERNAL MEDICINE

## 2022-03-28 PROCEDURE — 83036 HEMOGLOBIN GLYCOSYLATED A1C: CPT | Performed by: INTERNAL MEDICINE

## 2022-03-28 PROCEDURE — 36415 COLL VENOUS BLD VENIPUNCTURE: CPT | Performed by: INTERNAL MEDICINE

## 2022-03-28 RX ORDER — LIRAGLUTIDE 6 MG/ML
1.8 INJECTION SUBCUTANEOUS DAILY
Qty: 27 ML | Refills: 1 | Status: SHIPPED | OUTPATIENT
Start: 2022-03-28 | End: 2022-01-01

## 2022-03-28 RX ORDER — FENOFIBRATE 160 MG/1
160 TABLET ORAL DAILY
Qty: 90 TABLET | Refills: 1 | Status: SHIPPED | OUTPATIENT
Start: 2022-03-28 | End: 2022-01-01

## 2022-03-28 RX ORDER — INSULIN DETEMIR 100 [IU]/ML
25 INJECTION, SOLUTION SUBCUTANEOUS DAILY
Qty: 30 ML | Refills: 1 | Status: SHIPPED | OUTPATIENT
Start: 2022-03-28 | End: 2022-03-29

## 2022-03-28 RX ORDER — BETAMETHASONE DIPROPIONATE 0.5 MG/G
CREAM TOPICAL 2 TIMES DAILY
Qty: 30 G | Refills: 1 | Status: SHIPPED | OUTPATIENT
Start: 2022-03-28

## 2022-03-28 ASSESSMENT — PATIENT HEALTH QUESTIONNAIRE - PHQ9
10. IF YOU CHECKED OFF ANY PROBLEMS, HOW DIFFICULT HAVE THESE PROBLEMS MADE IT FOR YOU TO DO YOUR WORK, TAKE CARE OF THINGS AT HOME, OR GET ALONG WITH OTHER PEOPLE: NOT DIFFICULT AT ALL
SUM OF ALL RESPONSES TO PHQ QUESTIONS 1-9: 5
SUM OF ALL RESPONSES TO PHQ QUESTIONS 1-9: 5

## 2022-03-28 NOTE — PROGRESS NOTES
Assessment & Plan     (E11.59,  Z79.4) Type 2 diabetes mellitus with other circulatory complication, with long-term current use of insulin (H)  (primary encounter diagnosis)  Comment: Relatively stable current management.  Continue on current medications.  Doing well using the continuous glucose monitor, this device has been invaluable in achieving better glycemic control allowing for more accurate medication adjustments and demonstration of the importance of lifestyle and dietary changes.  Discussed importance of aggressive management of diabetes, including aggressive low cabr diet (one of the most powerful ways to control type II DM), performing regular glucose monitoring, (either with standard glucometer, or preferably personal continuous glucose monitor), medication compliance, regular laboratory monitoring at least every 6 months (or possibly more often if diabetes not at goal), and attending regular follow up appointments as ordered.    Aggressive diabetes management of diabetes will greatly reduce the future risk of diabetes related complications such as CAD, CVA, PVD, and retinopathy/neuropathy/nephropathy.    **If the patient is returned onto chronic steroids, will need to adjust the dose of his diabetes medications accordingly, following the glycemic results seen on his continuous glucose monitor.    Plan: Hemoglobin A1c, Lipid panel reflex to direct         LDL Fasting, liraglutide (VICTOZA PEN) 18         MG/3ML solution, metFORMIN (GLUCOPHAGE) 1000 MG        tablet, fenofibrate (TRIGLIDE/LOFIBRA) 160 MG         tablet, CBC with platelets, Hemoglobin A1c,         Basic metabolic panel, DISCONTINUED: insulin         detemir (LEVEMIR FLEXTOUCH) 100 UNIT/ML pen            (I77.810) Ascending aorta dilatation (H)  Comment: Stable, continue current management.  Plan:     (E66.01) Severe obesity (BMI 35.0-39.9) with comorbidity (H)  Comment: Diabetes directly related to his obesity.  Discussed importance of  aggressive management of diabetes, including aggressive low cabr diet (one of the most powerful ways to control type II DM), performing regular glucose monitoring, (either with standard glucometer, or preferably personal continuous glucose monitor), medication compliance, regular laboratory monitoring at least every 6 months (or possibly more often if diabetes not at goal), and attending regular follow up appointments as ordered.    Aggressive diabetes management of diabetes will greatly reduce the future risk of diabetes related complications such as CAD, CVA, PVD, and retinopathy/neuropathy/nephropathy.  Due to the variability of his glucoses when he will be on prednisone, he will need more frequent monitoring with the standard glucometer up to 3 times a day, but preferably he should continue with the personal continuous glucose monitor.  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  Plan:     (N18.31) Stage 3a chronic kidney disease (H)  Comment: This condition is currently controlled on the current medical regimen.  Continue current therapy.   Plan: Lipid panel reflex to direct LDL Fasting, CBC         with platelets, Basic metabolic panel            (I42.8) Non-ischemic cardiomyopathy (H)  Comment: Stable, no signs or symptoms of heart failure.  Plan: CBC with platelets, Basic metabolic panel            (I42.8) Nonischemic cardiomyopathy (H)  Comment:   Plan: Lipid panel reflex to direct LDL Fasting            (E03.8) Subclinical hypothyroidism  Comment: This condition is currently controlled on the current medical regimen.  Continue current therapy.   Plan:     (R60.9) Edema, unspecified type  Comment: This condition is currently controlled on the current medical regimen.  Continue current therapy.   Plan:     (Z79.899) On amiodarone therapy  Comment: This  "condition is currently controlled on the current medical regimen.  Continue current therapy.   Plan:     (I47.2) Paroxysmal ventricular tachycardia (H)  Comment: Has AICD in place.  Last shock was December 2021.  Plan:     (I50.22) Chronic systolic heart failure (H)  Comment: This condition is currently controlled on the current medical regimen.  Continue current therapy.   Plan:     (L40.9) Psoriasis  Comment: This condition is currently controlled on the current medical regimen.  Continue current therapy.   Plan: betamethasone dipropionate (DIPROSONE) 0.05 %         external cream            (E78.5) Hyperlipidemia LDL goal <100  Comment: Discussed guidelines recommending a statin cholesterol lowering medication for any patient with either diabetes and/or vascular disease, aiming for a LDL goal under 100 for sure, ideally under 70, using whatever dose of statin tolerated.    Plan: Lipid panel reflex to direct LDL Fasting,         fenofibrate (TRIGLIDE/LOFIBRA) 160 MG tablet                        BMI:   Estimated body mass index is 38.35 kg/m  as calculated from the following:    Height as of this encounter: 1.803 m (5' 11\").    Weight as of this encounter: 124.7 kg (275 lb).           Return in about 6 months (around 9/28/2022) for Diabetes, Blood pressure, with pre-visit non-fasting labs.    Jefry Harris MD  [facility]    Milton BEATTY is a 62 year old who presents for the following health issues     HPI     ED/UC Followup:    Facility:  [facility]  Date of visit: 3/23/22  Reason for visit: Diarrhea  Current Status: Discuss pogress    Acute N/V and diarrhea.   Stool tests are negative.     Back to normal now.        1.  Diabetes:  In regards specifically to the patient's diabetes, they reports no unusual symptoms.  Medication compliance: good  Diabetic diet compliance: good    Patient reports no significant episodes of hypo- or hyperglycemia    Diabetic " complications: coronary artery disease        Review of patient's personal continuous glucose monitor data:  Last 14 days  Time in target range ():  67%  Above target range:  29%  Below target range:  4%  Sensor usage: 87%    Average glucose 14 days:  152      Most  recent labs:    Lab Results   Component Value Date    A1C 6.9 09/15/2021    A1C 8.8 05/26/2021    A1C 7.1 12/11/2020    A1C 8.8 07/22/2020    A1C 7.0 11/14/2019    A1C 8.8 08/26/2019    A1C 9.7 04/10/2019    A1C 8.7 10/03/2018    A1C 7.6 06/11/2018    A1C 6.9 12/01/2017    A1C 9.3 05/15/2017    A1C 8.7 01/16/2017    A1C 8.2 10/19/2016    A1C 8.7 06/30/2016    A1C 9.3 01/11/2016    A1C 7.7 07/17/2015    A1C 6.8 01/26/2015    A1C 6.4 10/16/2014    A1C 8.9 06/11/2014    A1C 7.8 12/30/2013    A1C 6.8 08/02/2013    A1C 7.0 02/14/2013    A1C 7.1 08/10/2012    A1C 8.4 03/30/2012    A1C 8.9 08/22/2011    A1C 8.4 03/08/2011    A1C 6.8 10/16/2010    A1C 6.8 04/09/2010    A1C 7.0 05/21/2009    A1C 7.0 12/23/2008    A1C 6.6 09/03/2008    A1C 7.1 06/09/2008    A1C 7.0 02/15/2008    A1C 7.2 10/24/2007    A1C 7.2 06/29/2007    A1C 6.9 03/29/2007    A1C 6.4 12/07/2006    A1C 6.6 06/01/2006    A1C 8.2 02/27/2006    A1C 8.3 12/15/2005    A1C 7.3 03/22/2005    A1C 7.3 11/29/2004    A1C 7.4 08/05/2004    A1C 6.9 10/27/2003    A1C 6.0 12/24/2002    A1C 6.0 08/07/2002          2.    Hypertension:  History of hypertension, on medication.  No reported side effects from medications.    Reviewed last 6 BP readings in chart:  BP Readings from Last 6 Encounters:   03/29/22 104/67   03/28/22 113/67   03/23/22 120/60   01/14/22 110/65   01/06/22 121/75   12/16/21 110/58         3.  Severe cardiac sarcoidosis.  Recent evaluations by the cardiology clinic.  Recent PET scan showed active cardiac sarcoid with reactive lymph nodes in mediastinum.    Has appointment with cardiologist tomorrow to review these findings and to determine further course of management.    Cardiomyopathy,  "decreased ejection fraction, denies recent signs or symptoms of heart failure.    Previously on high-dose prednisone therapy for many months, unfortunately resulted in significant hyperglycemia and adjustments in his diabetes management.    4.    The patient has had a history of ongoing obesity.  Reviewed the weigth curves.   Their current BMI is:  Body mass index is 38.35 kg/m .  Reviewed previous attempts at weight loss which have not been successful in producing prolonged weigth loss.   Discussed current eating and exercise habits.     Reviewed weights in chart:  Wt Readings from Last 10 Encounters:   03/29/22 124.7 kg (275 lb)   03/28/22 124.7 kg (275 lb)   03/23/22 125.2 kg (276 lb)   01/14/22 125.2 kg (276 lb)   01/06/22 124.9 kg (275 lb 4.8 oz)   12/16/21 119.7 kg (264 lb)   12/03/21 123.4 kg (272 lb)   10/20/21 123.4 kg (272 lb)   09/17/21 123.4 kg (272 lb)   09/08/21 124.3 kg (274 lb)          **I reviewed the information recorded in the patient's EPIC chart (including but not limited to medical history, surgical history, family history, problem list, medication list, and allergy list) and updated the information as indicated based on the patients reported information.         Review of Systems         Objective    /67   Pulse 60   Temp 98.4  F (36.9  C)   Ht 1.803 m (5' 11\")   Wt 124.7 kg (275 lb)   SpO2 99%   BMI 38.35 kg/m    Body mass index is 38.35 kg/m .  Physical Exam   GENERAL alert and no distress  EYES:  Normal sclera,conjunctiva, EOMI  HENT: oral and posterior pharynx without lesions or erythema, facies symmetric  NECK: Neck supple. No LAD, without thyroidmegaly.  RESP: Clear to ausculation bilaterally without wheezes or crackles. Normal BS in all fields.  CV: RRR normal S1S2 without murmurs, rubs or gallops.  LYMPH: no cervical lymph adenopathy appreciated  MS: extremities- no gross deformities of the visible extremities noted,   EXT:  no lower extremity edema  PSYCH: Alert and " oriented times 3; speech- coherent  SKIN:  No obvious significant skin lesions on visible portions of face

## 2022-03-28 NOTE — PATIENT INSTRUCTIONS
*  The irritated skin on the fronts of the legs and the front of the abdomen is psoriasis.      --Steroid cream (Betamethasone) Apply sparingly once or twice per day as needed to affected area until the skin is better, then stop; REPEAT AS NEEDED     *  Continue all medications at the same doses.  Contact your usual pharmacy if you need refills.     *  Return to see me in approximately 6 months, sooner if needed.  Please get nonfasting labs done in the [facility] or at any other [facility] lab 1-2 days before this appointment.  If you get the labs done at another clinic, make arrangements with that clinic directly.  The orders will be in place.  Use [employer] or Call [phone] to schedule the appointment with me and lab appointment.           5 GOALS IN MANAGING DIABETES (i.e. to give the best chance to prevent diabetic complications and vascular disease):     1.  Aggressive diabetic management.  The target for A1C (3 months average blood sugar) is ideally below 6.5, preferably below 7.5    Your diabetes is under good control.      Lab Results   Component Value Date    A1C 6.9 09/15/2021    A1C 8.8 05/26/2021    A1C 7.1 12/11/2020    A1C 8.8 07/22/2020    A1C 7.0 11/14/2019    A1C 8.8 08/26/2019    A1C 9.7 04/10/2019    A1C 8.7 10/03/2018    A1C 7.6 06/11/2018    A1C 6.9 12/01/2017    A1C 9.3 05/15/2017    A1C 8.7 01/16/2017    A1C 8.2 10/19/2016    A1C 8.7 06/30/2016    A1C 9.3 01/11/2016    A1C 7.7 07/17/2015    A1C 6.8 01/26/2015    A1C 6.4 10/16/2014    A1C 8.9 06/11/2014    A1C 7.8 12/30/2013    A1C 6.8 08/02/2013    A1C 7.0 02/14/2013    A1C 7.1 08/10/2012    A1C 8.4 03/30/2012    A1C 8.9 08/22/2011    A1C 8.4 03/08/2011    A1C 6.8 10/16/2010    A1C 6.8 04/09/2010    A1C 7.0 05/21/2009    A1C 7.0 12/23/2008    A1C 6.6 09/03/2008    A1C 7.1 06/09/2008    A1C 7.0 02/15/2008    A1C 7.2 10/24/2007    A1C 7.2 06/29/2007    A1C 6.9 03/29/2007    A1C 6.4 12/07/2006    A1C 6.6 06/01/2006    A1C 8.2  "02/27/2006    A1C 8.3 12/15/2005    A1C 7.3 03/22/2005    A1C 7.3 11/29/2004    A1C 7.4 08/05/2004    A1C 6.9 10/27/2003    A1C 6.0 12/24/2002    A1C 6.0 08/07/2002       2.  Aggressive blood pressure control, under 130/80 ideally.  Using medications if needed.    Your blood pressure is under good control    BP Readings from Last 4 Encounters:   03/28/22 113/67   03/23/22 120/60   01/14/22 110/65   01/06/22 121/75       3.  Aggressive LDL cholesterol (bad cholesterol) lowering as needed.  Your goal is an LDL under 100 for sure, preferably under 70.     *  All patients with diabetes are recommended to be on a \"statin\" cholesterol lowering medication regardless of the cholesterol levels to give the best chance at avoiding vascular disease.      New guidelines identify four high-risk groups who could benefit from statins:   *people with pre-existing heart disease, such as those who have had a heart attack;   *people ages 40 to 75 who have diabetes of any type  *patients ages 40 to 75 with at least a 7.5% risk of developing cardiovascular disease over the next decade, according to a formula described in the guidelines  *patients with the sort of super-high cholesterol that sometimes runs in families, as evidenced by an LDL of 190 milligrams per deciliter or higher    *  Your cholesterol levels are well controlled.    Recent Labs   Lab Test 07/22/20  0913 11/14/19  0953 01/11/16  0727 01/26/15  0920   CHOL 140 124   < > 126   HDL 64 43   < > 45   LDL 51 58   < > 55   TRIG 123 116   < > 128   CHOLHDLRATIO  --   --   --  2.8    < > = values in this interval not displayed.       4.  No smoking    5.  Consider daily preventative Aspirin once per day, every day over age 50 unless there is a specific reason that you cannot take aspirin.       *You should take Aspirin 81 mg once per day, unless you have a reason NOT to take aspirin (i.e. side effect, excessive bruising or bleeding, taking another \"blood tinning\" medication, " etc.)       DIABETES REMINDERS:   1. Check your blood glucose regularly either with standard glucometer or personal continuous glucose monitor.    2) Your blood sugar goals:  before eating and  two hours after eating.  If using personal continuous glucose monitor, the goal is Time in the Target range ( ) of 70-75% with very few (under 2%) Below target.    3) Always be prepared to treat low blood sugar symptoms should it happen. Keep a sugar-containing beverage or food nearby.  4) When to call your clinic:     Blood sugar over 400     If you have 2 to 3 low blood sugars (under 70) in a row    Low readings the same time of day several days in a row  5) When to call 911: If your low blood glucose symptoms do not get better with treatment, or if you/someone else is unable to give you treatment.  6) Work with a Certified Diabetes Educator to assist you with your diabetes management  7) Contact us if you have ANY questions about your medications or instructions, or have problems with getting prescriptions filled.

## 2022-03-29 ENCOUNTER — OFFICE VISIT (OUTPATIENT)
Dept: CARDIOLOGY | Facility: CLINIC | Age: 62
End: 2022-03-29
Attending: INTERNAL MEDICINE
Payer: COMMERCIAL

## 2022-03-29 VITALS
OXYGEN SATURATION: 99 % | HEART RATE: 60 BPM | HEIGHT: 71 IN | BODY MASS INDEX: 38.5 KG/M2 | WEIGHT: 275 LBS | DIASTOLIC BLOOD PRESSURE: 67 MMHG | SYSTOLIC BLOOD PRESSURE: 104 MMHG

## 2022-03-29 DIAGNOSIS — I50.22 CHRONIC SYSTOLIC HEART FAILURE (H): ICD-10-CM

## 2022-03-29 DIAGNOSIS — D86.85 CARDIAC SARCOIDOSIS: ICD-10-CM

## 2022-03-29 DIAGNOSIS — Z79.4 TYPE 2 DIABETES MELLITUS WITH OTHER CIRCULATORY COMPLICATION, WITH LONG-TERM CURRENT USE OF INSULIN (H): ICD-10-CM

## 2022-03-29 DIAGNOSIS — K21.00 GASTROESOPHAGEAL REFLUX DISEASE WITH ESOPHAGITIS WITHOUT HEMORRHAGE: Primary | ICD-10-CM

## 2022-03-29 DIAGNOSIS — E11.59 TYPE 2 DIABETES MELLITUS WITH OTHER CIRCULATORY COMPLICATION, WITH LONG-TERM CURRENT USE OF INSULIN (H): ICD-10-CM

## 2022-03-29 DIAGNOSIS — I42.8 NONISCHEMIC CARDIOMYOPATHY (H): ICD-10-CM

## 2022-03-29 DIAGNOSIS — I10 BENIGN ESSENTIAL HYPERTENSION: ICD-10-CM

## 2022-03-29 DIAGNOSIS — I50.30 NYHA CLASS 3 HEART FAILURE WITH PRESERVED EJECTION FRACTION (H): ICD-10-CM

## 2022-03-29 PROCEDURE — G0463 HOSPITAL OUTPT CLINIC VISIT: HCPCS

## 2022-03-29 PROCEDURE — 99214 OFFICE O/P EST MOD 30 MIN: CPT | Performed by: INTERNAL MEDICINE

## 2022-03-29 RX ORDER — PREDNISONE 20 MG/1
30 TABLET ORAL DAILY
Qty: 135 TABLET | Refills: 1 | Status: SHIPPED | OUTPATIENT
Start: 2022-03-29 | End: 2022-01-01

## 2022-03-29 RX ORDER — OMEPRAZOLE 40 MG/1
40 CAPSULE, DELAYED RELEASE ORAL DAILY
Qty: 90 CAPSULE | Refills: 1 | Status: SHIPPED | OUTPATIENT
Start: 2022-03-29 | End: 2023-01-01

## 2022-03-29 RX ORDER — SULFAMETHOXAZOLE AND TRIMETHOPRIM 400; 80 MG/1; MG/1
1 TABLET ORAL DAILY
Qty: 90 TABLET | Refills: 1 | Status: SHIPPED | OUTPATIENT
Start: 2022-03-29 | End: 2022-01-01

## 2022-03-29 RX ORDER — INSULIN DETEMIR 100 [IU]/ML
35 INJECTION, SOLUTION SUBCUTANEOUS DAILY
Qty: 30 ML | Refills: 1
Start: 2022-03-29 | End: 2022-01-01

## 2022-03-29 RX ORDER — FUROSEMIDE 40 MG
40 TABLET ORAL DAILY
Qty: 90 TABLET | Refills: 1 | Status: SHIPPED | OUTPATIENT
Start: 2022-03-29 | End: 2022-05-17

## 2022-03-29 ASSESSMENT — PATIENT HEALTH QUESTIONNAIRE - PHQ9: SUM OF ALL RESPONSES TO PHQ QUESTIONS 1-9: 5

## 2022-03-29 ASSESSMENT — PAIN SCALES - GENERAL: PAINLEVEL: NO PAIN (0)

## 2022-03-29 NOTE — PATIENT INSTRUCTIONS
Dr. Batres recommends:    Start taking Prednisone 30 MG once daily.(prescription sent to pharmacy)    Increase Lasix/Furosemide to 40 MG once daily. (prescription sent to pharmacy)    Start taking Bactrim 400-80 MG once daily. (prescription sent to pharmacy)    Start taking Omeprazole 40 MG once daily. (prescription sent to pharmacy)    PET scan in 3 months.    Follow up clinic visit with Dr. Batres in 3 months with labs same day, a week after the PET scan.     Thank you for your visit today.  Please call me with any questions or concerns.   Christopher Stephens RN  Cardiology Care Coordinator  [phone]

## 2022-03-29 NOTE — LETTER
3/29/2022      RE: Neymar Rhodes  [address]  [facility] [address]       Dear Colleague,    Thank you for the opportunity to participate in the care of your patient, Neymar Rhodes, at the [facility] [place] [facility]. Please see a copy of my visit note below.    March 29, 2022     Dear Dr. Hernandes,  Neymar Rhodes is a very pleasant 62-year-old gentleman, he has not had regular follow-up over the years, I actually saw him almost 2 years ago in 2020.  In any case, he was first seen by Cardiology in 2018 with a new diagnosis of LV dysfunction with an EF of 40%.  He was at that time seen by Dr. Iniguez.  He had a large left bundle branch block.  He had a coronary angiogram that showed nonobstructive disease.  He was put on beta blockers, ACE inhibitors and spironolactone but his potassium went up and did not tolerate mineralocorticoid receptor antagonist.  Subsequently, a cardiac MRI was done for nonischemic cardiomyopathy that showed scarring concerning for sarcoidosis.  In any case, he did not follow up but he remained on medications.  Unfortunately, in 2019, he presented with decompensated heart failure and EF was less than 20% with severe LV dilatation measuring 7 cm.  During the hospital stay, he was seen by me and developed sustained monomorphic VT at about 180 beats per minute and needed to be emergently cardioverted in the hospital.  He was taken again to the cath lab and coronary anatomy was unchanged, again showing nonobstructive disease.  Right heart catheterization showed mildly elevated pressures.  LVEDP was 21.  He had a repeat cardiac MRI that showed late gadolinium enhancement in the septal inferior and lateral basal wall with scarring pattern concerning for non-CAD scarring and scar burden had actually worsened up to 13%.  He was loaded with amiodarone and subsequently received a CRT device on 10/04/2019.   Subsequently, he was referred to the [facility].  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  He was put on prednisone in 12/2019 and took it for almost a year and a half thereafter.  Subsequent PET scan in 2020 and on 02/2021 have shown complete resolution of inflammation.  He stopped his prednisone as of spring of 2021.  In addition to that, he has continued to have nonsustained VT.  In 12/2020 Dr. Dorman had also mentioned to him about possibility of VT ablation; however, the patient was not interested in pursuing that.  He had opted to continue taking amiodarone.    Today I am seeing again after 2 years essentially.  He notes that overall he has been doing well his last ICD shock was back in December 2021.  Unfortunately no masses.  He continues to take the amiodarone to 400 mg daily dosing.  Denies any other issues feels relatively well no lower extremity edema dizziness lightheadedness or chest pain.  No other issues.     PAST MEDICAL HISTORY:  Past Medical History:   Diagnosis Date     Ascending aorta dilatation (H)      Diverticulosis of colon (without mention of hemorrhage)      Essential hypertension, benign      Gout, unspecified      LBBB (left bundle branch block)      Morbid obesity (H)      Non-ischemic cardiomyopathy (H)      Nonrheumatic mitral valve regurgitation      NSTEMI (non-ST elevated myocardial infarction) (H)     cardiac cath 6/2018: mild non-obstructive CAD     Other and unspecified hyperlipidemia      Paroxysmal ventricular tachycardia (H)      Type II or unspecified type diabetes mellitus without mention of complication, not stated as uncontrolled      FAMILY HISTORY:  Family History   Problem Relation Age of Onset     Cancer Father 75        bladder cancer     Myocardial Infarction Father      Other - See Comments Father         smoking     Breast Cancer Mother       Breast Cancer Sister      Family History Negative Brother      Family History Negative Son      Family History Negative Son         fluttering/murmur     Asthma Son      Colon Cancer No family hx of      SOCIAL HISTORY:  Social History     Socioeconomic History     Marital status:      Spouse name: Not on file     Number of children: Not on file     Years of education: Not on file     Highest education level: Not on file   Occupational History     Not on file   Tobacco Use     Smoking status: Never Smoker     Smokeless tobacco: Never Used   Substance and Sexual Activity     Alcohol use: Yes     Comment: once awhile     Drug use: No     Sexual activity: Yes     Partners: Female   Other Topics Concern     Parent/sibling w/ CABG, MI or angioplasty before 65F 55M? Not Asked   Social History Narrative     Not on file     Social Determinants of Health     Financial Resource Strain: Not on file   Food Insecurity: Not on file   Transportation Needs: Not on file   Physical Activity: Not on file   Stress: Not on file   Social Connections: Not on file   Intimate Partner Violence: Not on file   Housing Stability: Not on file     CURRENT MEDICATIONS:  Current Outpatient Medications   Medication     acetaminophen (TYLENOL) 500 MG tablet     albuterol (PROAIR HFA) 108 (90 Base) MCG/ACT inhaler     albuterol (PROAIR HFA/PROVENTIL HFA/VENTOLIN HFA) 108 (90 Base) MCG/ACT Inhaler     amiodarone (PACERONE) 400 MG tablet     aspirin 81 MG tablet     atorvastatin (LIPITOR) 20 MG tablet     betamethasone dipropionate (DIPROSONE) 0.05 % external cream     blood glucose monitoring (ACCU-CHEK LUL PLUS) test strip     celecoxib (CELEBREX) 100 MG capsule     cephALEXin (KEFLEX) 500 MG capsule     clotrimazole (LOTRIMIN) 1 % external cream     Continuous Blood Gluc Sensor (FREESTYLE JOCELIN 14 DAY SENSOR) MISC     continuous blood glucose monitoring (FREESTYLE JOCELIN) sensor     fenofibrate (TRIGLIDE/LOFIBRA) 160 MG tablet      "furosemide (LASIX) 20 MG tablet     glipiZIDE (GLUCOTROL XL) 10 MG 24 hr tablet     insulin detemir (LEVEMIR FLEXTOUCH) 100 UNIT/ML pen     levothyroxine (SYNTHROID/LEVOTHROID) 50 MCG tablet     liraglutide (VICTOZA PEN) 18 MG/3ML solution     metFORMIN (GLUCOPHAGE) 1000 MG tablet     metoprolol succinate ER (TOPROL-XL) 25 MG 24 hr tablet     sacubitril-valsartan (ENTRESTO) 49-51 MG per tablet     triamcinolone (KENALOG) 0.5 % cream     ULTICARE 29G X 12.7MM insulin pen needle     No current facility-administered medications for this visit.     ROS:   Constitutional: No fever, chills, or sweats.   ENT: No visual disturbance, ear ache, epistaxis, sore throat.   Allergies/Immunologic: Negative.   Respiratory: No cough, hemoptysis.   Cardiovascular: As per HPI.   GI: No nausea, vomiting, hematemesis, melena, or hematochezia.   : No urinary frequency, dysuria, or hematuria.   Integument: Negative.   Psychiatric: Pleasant, no major depression noted  Neuro: No focal neurological deficits noted  Endocrinology: Negative.   Musculoskeletal: As per HPI.      EXAM:  /67 (BP Location: Right arm, Patient Position: Chair, Cuff Size: Adult Large)   Pulse 60   Ht 1.803 m (5' 11\")   Wt 124.7 kg (275 lb)   SpO2 99%   BMI 38.35 kg/m    General: appears comfortable, alert and oriented  Head: normocephalic, atraumatic  Eyes: anicteric sclera, EOMI , PERRL  Neck: no adenopathy  Orophyarynx: moist mucosa, no lesions noted  Heart: regular, S1/S2, no murmurs, rubs or gallop. Estimated JVP at 5 cmH2O  Lungs: CTAB, No wheezing.   Abdomen: soft, non-tender, bowel sounds present, no hepatosplenomegaly  Extremities: No LE edema today  Skin: no open lesions noted  Neuro: grossly non-focal     Labs:  Lab Results   Component Value Date    WBC 4.1 03/28/2022    HGB 10.7 (L) 03/28/2022    HCT 34.5 (L) 03/28/2022     03/28/2022     03/28/2022    POTASSIUM 4.2 03/28/2022    CHLORIDE 110 (H) 03/28/2022    CO2 26 03/28/2022    " BUN 13 03/28/2022    CR 0.93 03/28/2022     (H) 03/28/2022    DD 0.3 10/01/2019    NTBNPI 957 (H) 10/01/2019    NTBNP 402 (H) 03/28/2022    TROPONIN 0.07 06/10/2018    TROPI 0.071 (H) 10/01/2019     (H) 03/28/2022    ALT 95 (H) 03/28/2022    ALKPHOS 159 (H) 03/28/2022    BILITOTAL 1.0 03/28/2022    INR 1.16 (H) 10/04/2019     CMR 10/3/19:  1. The LV is severely dilated. The global systolic function is moderately severely to severely reduced. The LVEF is 25%. There is severe global LV dysfunction with dyssynchronous septal motion consistent with a LBBB.  2. The RV is normal in cavity size. The global systolic function is normal. The RVEF is 57%.   3. Both atria are dilated.  4. There is mild mitral insufficiency.   5. Late gadolinium enhancement is present in the septal. inferior and lateral base. There is non-CAD scar in the anterolateral mid-wall. Scar is more extensive (13%) as compared to the prior MRI (6%).   CONCLUSIONS:   Late gadolinium enhancement is present in the septal. inferior and lateral base. There is non-CAD scar in the anterolateral mid-wall. Scar is more extensive (13%) as compared to the prior MRI (6%). The LV is severely dilated and the global systolic function is moderately severely to severely reduced with an LVEF of 25%. There is severe global LV dysfunction with dyssynchronous septal motion consistent with a LBBB.  Collectively, these findings are most consistent with a non-ischemic cardiomyopathy. Possible etiologies include prior LBBB, cardiac sarcoidosis or prior myocarditis    PET 12/4/19:  1. Diffuse increased FDG uptake in the myocardium compatible with active cardiac sarcoid.  2. Globally hypokinetic and enlarged heart with ejection fraction severely decreased to 16%, compatible with a dilatated cardiomyopathy.  3. Small perfusion abnormality of the anterior wall of the left ventricle, this is the LAD distribution..   4. There is increased ammonia uptake in the lungs,  consistent with patient's known history of congestive heart failure.  5. FDG PET/CT demonstrate active sarcoid in the mediastinal and hilar lymph nodes.    TTE 1/2/20:  The left ventricle is severely dilated, LVEDD 7 cm  The visual ejection fraction is estimated at 25-30%.  There is mod-severe global hypokinesia of the left ventricle.  There is a catheter/pacemaker lead seen in the right ventricle.  LVEF may be slightly improved compared with study from 10-19    PET 2/12/20:  1. Minimal residual uptake in the basal aspect of the septum and anterior wall, greatly improved compared to the prior examination.  2. Minimal uptake of a mediastinal lymph node.   3. Left parotid hyperdense nodule without significant FDG uptake, likely lymph node or Warthin's duct tumor.    PET 6/22/20:  1. No evidence of active cardiac sarcoidosis.  2. No evidence of active inflammation/sarcoidosis in the remainder of the body.  3. Cardiomegaly.  4. Cholelithiasis.    PET viability 2/12/21:  1. No evidence of active cardiac sarcoidosis.  2. No evidence of active systemic or pulmonary sarcoidosis.  3. Cholelithiasis.  4. Colonic diverticulosis.    TTE 12/20/21:  1. Left ventricular systolic function is severely reduced. The visual ejection fraction is 25-30%.  2. Septal wall motion abnormality may reflect pacemaker activation.  3. The left ventricle is severely dilated.  4. The right ventricle is normal in structure, function and size.  5. No evidence for significant valvular pathology  6. Mildly dilated aortic root (sinus of valsalva) 4.0 cm and ascending aorta, 3.9 cm.    CMR 2/18/22:  1. The LV is mildly increased in cavity size. The global systolic function is severely reduced. The LVEF is 24%. There is severe diffuse hypokinesis with akinesis of the basal to mid inferior segment.    2. The RV is normal in cavity size. The global systolic function is mildly reduced. The RVEF is 48%.   3. The left atrium is mildly enlarged.   4. There is  no significant valvular disease.   5. Late gadolinium enhancement imaging shows epicardial hyperenhancement in the basal anterior RV size of the septum, transmural LGE in the basal inferior segment and subendocardial hyperenhancement in the mid septal and inferior segments and mid-myocardial LGE in the mid inferior/inferolateral segment. This pattern of LGE with multifocal, septal and epicardial is compatible with cardiac sarcoid.   6. There is no pericardial effusion or thickening.  CONCLUSIONS: Non-ischemic cardiomyopathy with severely reduced LV function, LVEF of 24%. The LGE pattern is compatible with cardiac sarcoid. Compared to prior CMR from 10/3/2019 there is no significant change in LGE.     PET viability 2/25/2022  Impression:  Active sarcoid: cardiac and mediastinal & hilar lymphadenopathy.  1. Increased FDG uptake in the left ventricle mid/basal myocardial segments, septum, anterior wall and inferior wall compatible with  active cardiac sarcoid. Lateral wall is relatively spared.    2. Several hypermetabolic mediastinal and hilar hypermetabolic lymph nodes, suggestive of active systemic sarcoid.  No lung abnormalities.  3. Cardiomegaly.   4. Cholelithiasis and colonic diverticulosis.    ASSESSMENT AND PLAN:    Neymar is 62 with a history of cardiac sarcoidosis without any residual inflammation on most recent PET scan as of 02/2021.   Unfortunately I have not seen him for quite some time and in the meantime his prednisone has been stopped.  He did get ICD shocks and continued amiodarone 400 mg daily dosing which is pretty high dose.  Today we have discussed that I am very concerned about the recent PET scan result and the MRI results as discussed above.  He does have ongoing heavy sarcoidosis and he needs treatment for this especially in the setting of recent ICD shock as well as the high-dose amiodarone use.  As such we decided that we will start prednisone 30 mg daily for at least 3-month in addition  to PPI as well as Bactrim single dose daily.  Hopefully this will control the initial inflammatory episode on the short run.  In the meantime we will also increase his Lasix to 40 mg daily from the 20 mg given that it is expected that with the prednisone he will retain a little bit more fluid.  No other medication changes today I will keep the amiodarone 400 mg daily however discussed that as soon as the inflammation is better controlled hopefully in the next PET scan I would like to decrease it to 200 mg and ultimately further decrease it but EP discussion.  The plan will be to repeat the PET scan in 2-1/2-month and see where he is at and ultimately transition him to methotrexate and further decrease the prednisone.  He most likely will need long-term methotrexate use given that his sarcoidosis came back fairly quickly after stopping the prednisone.  All questions were answered at the end in agreement.  I will see him back immediately after the PET scan is complete.     I appreciate the opportunity to participate in the care of Neymar Rhodes . Please do not hesitate to contact me with any further questions.    Sincerely,   Roberto Batres MD  [facility] Division of Cardiology

## 2022-03-29 NOTE — NURSING NOTE
Chief Complaint   Patient presents with     Follow Up     Return heart failure- Non-ischemic cardiomyopathy     Vitals were taken and medications reconciled.    Cedric Lau, EVONNE  12:21 PM

## 2022-03-29 NOTE — PROGRESS NOTES
March 29, 2022     Dear Dr. Hernandes,  Neymarstacey Rhodes is a very pleasant 62-year-old gentleman, he has not had regular follow-up over the years, I actually saw him almost 2 years ago in 2020.  In any case, he was first seen by Cardiology in 2018 with a new diagnosis of LV dysfunction with an EF of 40%.  He was at that time seen by Dr. Iniguez.  He had a large left bundle branch block.  He had a coronary angiogram that showed nonobstructive disease.  He was put on beta blockers, ACE inhibitors and spironolactone but his potassium went up and did not tolerate mineralocorticoid receptor antagonist.  Subsequently, a cardiac MRI was done for nonischemic cardiomyopathy that showed scarring concerning for sarcoidosis.  In any case, he did not follow up but he remained on medications.  Unfortunately, in 2019, he presented with decompensated heart failure and EF was less than 20% with severe LV dilatation measuring 7 cm.  During the hospital stay, he was seen by me and developed sustained monomorphic VT at about 180 beats per minute and needed to be emergently cardioverted in the hospital.  He was taken again to the cath lab and coronary anatomy was unchanged, again showing nonobstructive disease.  Right heart catheterization showed mildly elevated pressures.  LVEDP was 21.  He had a repeat cardiac MRI that showed late gadolinium enhancement in the septal inferior and lateral basal wall with scarring pattern concerning for non-CAD scarring and scar burden had actually worsened up to 13%.  He was loaded with amiodarone and subsequently received a CRT device on 10/04/2019.  Subsequently, he was referred to the [facility].  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  He was put on prednisone in 12/2019 and took it for almost a year and a half thereafter.  Subsequent PET scan in 2020 and on 02/2021 have  shown complete resolution of inflammation.  He stopped his prednisone as of spring of 2021.  In addition to that, he has continued to have nonsustained VT.  In 12/2020 Dr. Dorman had also mentioned to him about possibility of VT ablation; however, the patient was not interested in pursuing that.  He had opted to continue taking amiodarone.    Today I am seeing again after 2 years essentially.  He notes that overall he has been doing well his last ICD shock was back in December 2021.  Unfortunately no masses.  He continues to take the amiodarone to 400 mg daily dosing.  Denies any other issues feels relatively well no lower extremity edema dizziness lightheadedness or chest pain.  No other issues.     PAST MEDICAL HISTORY:  Past Medical History:   Diagnosis Date     Ascending aorta dilatation (H)      Diverticulosis of colon (without mention of hemorrhage)      Essential hypertension, benign      Gout, unspecified      LBBB (left bundle branch block)      Morbid obesity (H)      Non-ischemic cardiomyopathy (H)      Nonrheumatic mitral valve regurgitation      NSTEMI (non-ST elevated myocardial infarction) (H)     cardiac cath 6/2018: mild non-obstructive CAD     Other and unspecified hyperlipidemia      Paroxysmal ventricular tachycardia (H)      Type II or unspecified type diabetes mellitus without mention of complication, not stated as uncontrolled      FAMILY HISTORY:  Family History   Problem Relation Age of Onset     Cancer Father 75        bladder cancer     Myocardial Infarction Father      Other - See Comments Father         smoking     Breast Cancer Mother      Breast Cancer Sister      Family History Negative Brother      Family History Negative Son      Family History Negative Son         fluttering/murmur     Asthma Son      Colon Cancer No family hx of      SOCIAL HISTORY:  Social History     Socioeconomic History     Marital status:      Spouse name: Not on file     Number of children: Not on file      Years of education: Not on file     Highest education level: Not on file   Occupational History     Not on file   Tobacco Use     Smoking status: Never Smoker     Smokeless tobacco: Never Used   Substance and Sexual Activity     Alcohol use: Yes     Comment: once awhile     Drug use: No     Sexual activity: Yes     Partners: Female   Other Topics Concern     Parent/sibling w/ CABG, MI or angioplasty before 65F 55M? Not Asked   Social History Narrative     Not on file     Social Determinants of Health     Financial Resource Strain: Not on file   Food Insecurity: Not on file   Transportation Needs: Not on file   Physical Activity: Not on file   Stress: Not on file   Social Connections: Not on file   Intimate Partner Violence: Not on file   Housing Stability: Not on file     CURRENT MEDICATIONS:  Current Outpatient Medications   Medication     acetaminophen (TYLENOL) 500 MG tablet     albuterol (PROAIR HFA) 108 (90 Base) MCG/ACT inhaler     albuterol (PROAIR HFA/PROVENTIL HFA/VENTOLIN HFA) 108 (90 Base) MCG/ACT Inhaler     amiodarone (PACERONE) 400 MG tablet     aspirin 81 MG tablet     atorvastatin (LIPITOR) 20 MG tablet     betamethasone dipropionate (DIPROSONE) 0.05 % external cream     blood glucose monitoring (ACCU-CHEK LUL PLUS) test strip     celecoxib (CELEBREX) 100 MG capsule     cephALEXin (KEFLEX) 500 MG capsule     clotrimazole (LOTRIMIN) 1 % external cream     Continuous Blood Gluc Sensor (FREESTYLE JOCELIN 14 DAY SENSOR) MISC     continuous blood glucose monitoring (FREESTYLE JOCELIN) sensor     fenofibrate (TRIGLIDE/LOFIBRA) 160 MG tablet     furosemide (LASIX) 20 MG tablet     glipiZIDE (GLUCOTROL XL) 10 MG 24 hr tablet     insulin detemir (LEVEMIR FLEXTOUCH) 100 UNIT/ML pen     levothyroxine (SYNTHROID/LEVOTHROID) 50 MCG tablet     liraglutide (VICTOZA PEN) 18 MG/3ML solution     metFORMIN (GLUCOPHAGE) 1000 MG tablet     metoprolol succinate ER (TOPROL-XL) 25 MG 24 hr tablet     sacubitril-valsartan  "(ENTRESTO) 49-51 MG per tablet     triamcinolone (KENALOG) 0.5 % cream     ULTICARE 29G X 12.7MM insulin pen needle     No current facility-administered medications for this visit.     ROS:   Constitutional: No fever, chills, or sweats.   ENT: No visual disturbance, ear ache, epistaxis, sore throat.   Allergies/Immunologic: Negative.   Respiratory: No cough, hemoptysis.   Cardiovascular: As per HPI.   GI: No nausea, vomiting, hematemesis, melena, or hematochezia.   : No urinary frequency, dysuria, or hematuria.   Integument: Negative.   Psychiatric: Pleasant, no major depression noted  Neuro: No focal neurological deficits noted  Endocrinology: Negative.   Musculoskeletal: As per HPI.      EXAM:  /67 (BP Location: Right arm, Patient Position: Chair, Cuff Size: Adult Large)   Pulse 60   Ht 1.803 m (5' 11\")   Wt 124.7 kg (275 lb)   SpO2 99%   BMI 38.35 kg/m    General: appears comfortable, alert and oriented  Head: normocephalic, atraumatic  Eyes: anicteric sclera, EOMI , PERRL  Neck: no adenopathy  Orophyarynx: moist mucosa, no lesions noted  Heart: regular, S1/S2, no murmurs, rubs or gallop. Estimated JVP at 5 cmH2O  Lungs: CTAB, No wheezing.   Abdomen: soft, non-tender, bowel sounds present, no hepatosplenomegaly  Extremities: No LE edema today  Skin: no open lesions noted  Neuro: grossly non-focal     Labs:  Lab Results   Component Value Date    WBC 4.1 03/28/2022    HGB 10.7 (L) 03/28/2022    HCT 34.5 (L) 03/28/2022     03/28/2022     03/28/2022    POTASSIUM 4.2 03/28/2022    CHLORIDE 110 (H) 03/28/2022    CO2 26 03/28/2022    BUN 13 03/28/2022    CR 0.93 03/28/2022     (H) 03/28/2022    DD 0.3 10/01/2019    NTBNPI 957 (H) 10/01/2019    NTBNP 402 (H) 03/28/2022    TROPONIN 0.07 06/10/2018    TROPI 0.071 (H) 10/01/2019     (H) 03/28/2022    ALT 95 (H) 03/28/2022    ALKPHOS 159 (H) 03/28/2022    BILITOTAL 1.0 03/28/2022    INR 1.16 (H) 10/04/2019     CMR 10/3/19:  1. The LV " is severely dilated. The global systolic function is moderately severely to severely reduced. The LVEF is 25%. There is severe global LV dysfunction with dyssynchronous septal motion consistent with a LBBB.  2. The RV is normal in cavity size. The global systolic function is normal. The RVEF is 57%.   3. Both atria are dilated.  4. There is mild mitral insufficiency.   5. Late gadolinium enhancement is present in the septal. inferior and lateral base. There is non-CAD scar in the anterolateral mid-wall. Scar is more extensive (13%) as compared to the prior MRI (6%).   CONCLUSIONS:   Late gadolinium enhancement is present in the septal. inferior and lateral base. There is non-CAD scar in the anterolateral mid-wall. Scar is more extensive (13%) as compared to the prior MRI (6%). The LV is severely dilated and the global systolic function is moderately severely to severely reduced with an LVEF of 25%. There is severe global LV dysfunction with dyssynchronous septal motion consistent with a LBBB.  Collectively, these findings are most consistent with a non-ischemic cardiomyopathy. Possible etiologies include prior LBBB, cardiac sarcoidosis or prior myocarditis    PET 12/4/19:  1. Diffuse increased FDG uptake in the myocardium compatible with active cardiac sarcoid.  2. Globally hypokinetic and enlarged heart with ejection fraction severely decreased to 16%, compatible with a dilatated cardiomyopathy.  3. Small perfusion abnormality of the anterior wall of the left ventricle, this is the LAD distribution..   4. There is increased ammonia uptake in the lungs, consistent with patient's known history of congestive heart failure.  5. FDG PET/CT demonstrate active sarcoid in the mediastinal and hilar lymph nodes.    TTE 1/2/20:  The left ventricle is severely dilated, LVEDD 7 cm  The visual ejection fraction is estimated at 25-30%.  There is mod-severe global hypokinesia of the left ventricle.  There is a catheter/pacemaker  lead seen in the right ventricle.  LVEF may be slightly improved compared with study from 10-19    PET 2/12/20:  1. Minimal residual uptake in the basal aspect of the septum and anterior wall, greatly improved compared to the prior examination.  2. Minimal uptake of a mediastinal lymph node.   3. Left parotid hyperdense nodule without significant FDG uptake, likely lymph node or Warthin's duct tumor.    PET 6/22/20:  1. No evidence of active cardiac sarcoidosis.  2. No evidence of active inflammation/sarcoidosis in the remainder of the body.  3. Cardiomegaly.  4. Cholelithiasis.    PET viability 2/12/21:  1. No evidence of active cardiac sarcoidosis.  2. No evidence of active systemic or pulmonary sarcoidosis.  3. Cholelithiasis.  4. Colonic diverticulosis.    TTE 12/20/21:  1. Left ventricular systolic function is severely reduced. The visual ejection fraction is 25-30%.  2. Septal wall motion abnormality may reflect pacemaker activation.  3. The left ventricle is severely dilated.  4. The right ventricle is normal in structure, function and size.  5. No evidence for significant valvular pathology  6. Mildly dilated aortic root (sinus of valsalva) 4.0 cm and ascending aorta, 3.9 cm.    CMR 2/18/22:  1. The LV is mildly increased in cavity size. The global systolic function is severely reduced. The LVEF is 24%. There is severe diffuse hypokinesis with akinesis of the basal to mid inferior segment.    2. The RV is normal in cavity size. The global systolic function is mildly reduced. The RVEF is 48%.   3. The left atrium is mildly enlarged.   4. There is no significant valvular disease.   5. Late gadolinium enhancement imaging shows epicardial hyperenhancement in the basal anterior RV size of the septum, transmural LGE in the basal inferior segment and subendocardial hyperenhancement in the mid septal and inferior segments and mid-myocardial LGE in the mid inferior/inferolateral segment. This pattern of LGE with  multifocal, septal and epicardial is compatible with cardiac sarcoid.   6. There is no pericardial effusion or thickening.  CONCLUSIONS: Non-ischemic cardiomyopathy with severely reduced LV function, LVEF of 24%. The LGE pattern is compatible with cardiac sarcoid. Compared to prior CMR from 10/3/2019 there is no significant change in LGE.     PET viability 2/25/2022  Impression:  Active sarcoid: cardiac and mediastinal & hilar lymphadenopathy.  1. Increased FDG uptake in the left ventricle mid/basal myocardial segments, septum, anterior wall and inferior wall compatible with  active cardiac sarcoid. Lateral wall is relatively spared.    2. Several hypermetabolic mediastinal and hilar hypermetabolic lymph nodes, suggestive of active systemic sarcoid.  No lung abnormalities.  3. Cardiomegaly.   4. Cholelithiasis and colonic diverticulosis.    ASSESSMENT AND PLAN:    Neymar is 62 with a history of cardiac sarcoidosis without any residual inflammation on most recent PET scan as of 02/2021.   Unfortunately I have not seen him for quite some time and in the meantime his prednisone has been stopped.  He did get ICD shocks and continued amiodarone 400 mg daily dosing which is pretty high dose.  Today we have discussed that I am very concerned about the recent PET scan result and the MRI results as discussed above.  He does have ongoing heavy sarcoidosis and he needs treatment for this especially in the setting of recent ICD shock as well as the high-dose amiodarone use.  As such we decided that we will start prednisone 30 mg daily for at least 3-month in addition to PPI as well as Bactrim single dose daily.  Hopefully this will control the initial inflammatory episode on the short run.  In the meantime we will also increase his Lasix to 40 mg daily from the 20 mg given that it is expected that with the prednisone he will retain a little bit more fluid.  No other medication changes today I will keep the amiodarone 400 mg  daily however discussed that as soon as the inflammation is better controlled hopefully in the next PET scan I would like to decrease it to 200 mg and ultimately further decrease it but EP discussion.  The plan will be to repeat the PET scan in 2-1/2-month and see where he is at and ultimately transition him to methotrexate and further decrease the prednisone.  He most likely will need long-term methotrexate use given that his sarcoidosis came back fairly quickly after stopping the prednisone.  All questions were answered at the end in agreement.  I will see him back immediately after the PET scan is complete.     I appreciate the opportunity to participate in the care of Neymar Rhodes . Please do not hesitate to contact me with any further questions.    Sincerely,   Roberto Batres MD  [facility] Division of Cardiology

## 2022-03-31 LAB
MDC_IDC_EPISODE_DTM: NORMAL
MDC_IDC_EPISODE_ID: NORMAL
MDC_IDC_EPISODE_TYPE: NORMAL
MDC_IDC_LEAD_IMPLANT_DT: NORMAL
MDC_IDC_LEAD_LOCATION: NORMAL
MDC_IDC_LEAD_LOCATION_DETAIL_1: NORMAL
MDC_IDC_LEAD_MFG: NORMAL
MDC_IDC_LEAD_MODEL: NORMAL
MDC_IDC_LEAD_POLARITY_TYPE: NORMAL
MDC_IDC_LEAD_SERIAL: NORMAL
MDC_IDC_MSMT_BATTERY_DTM: NORMAL
MDC_IDC_MSMT_BATTERY_REMAINING_LONGEVITY: 108 MO
MDC_IDC_MSMT_BATTERY_REMAINING_PERCENTAGE: 100 %
MDC_IDC_MSMT_BATTERY_STATUS: NORMAL
MDC_IDC_MSMT_CAP_CHARGE_DTM: NORMAL
MDC_IDC_MSMT_CAP_CHARGE_DTM: NORMAL
MDC_IDC_MSMT_CAP_CHARGE_ENERGY: 41 J
MDC_IDC_MSMT_CAP_CHARGE_TIME: 9.8 S
MDC_IDC_MSMT_CAP_CHARGE_TIME: 9.8 S
MDC_IDC_MSMT_CAP_CHARGE_TYPE: NORMAL
MDC_IDC_MSMT_CAP_CHARGE_TYPE: NORMAL
MDC_IDC_MSMT_LEADCHNL_LV_IMPEDANCE_VALUE: 535 OHM
MDC_IDC_MSMT_LEADCHNL_LV_PACING_THRESHOLD_AMPLITUDE: 0.6 V
MDC_IDC_MSMT_LEADCHNL_LV_PACING_THRESHOLD_PULSEWIDTH: 0.5 MS
MDC_IDC_MSMT_LEADCHNL_RA_IMPEDANCE_VALUE: 697 OHM
MDC_IDC_MSMT_LEADCHNL_RA_PACING_THRESHOLD_AMPLITUDE: 0.5 V
MDC_IDC_MSMT_LEADCHNL_RA_PACING_THRESHOLD_PULSEWIDTH: 0.4 MS
MDC_IDC_MSMT_LEADCHNL_RV_IMPEDANCE_VALUE: 484 OHM
MDC_IDC_MSMT_LEADCHNL_RV_PACING_THRESHOLD_AMPLITUDE: 0.7 V
MDC_IDC_MSMT_LEADCHNL_RV_PACING_THRESHOLD_PULSEWIDTH: 0.4 MS
MDC_IDC_PG_IMPLANT_DTM: NORMAL
MDC_IDC_PG_MFG: NORMAL
MDC_IDC_PG_MODEL: NORMAL
MDC_IDC_PG_SERIAL: NORMAL
MDC_IDC_PG_TYPE: NORMAL
MDC_IDC_SESS_CLINIC_NAME: NORMAL
MDC_IDC_SESS_DTM: NORMAL
MDC_IDC_SESS_TYPE: NORMAL
MDC_IDC_SET_BRADY_AT_MODE_SWITCH_MODE: NORMAL
MDC_IDC_SET_BRADY_AT_MODE_SWITCH_RATE: 170 {BEATS}/MIN
MDC_IDC_SET_BRADY_LOWRATE: 60 {BEATS}/MIN
MDC_IDC_SET_BRADY_MAX_SENSOR_RATE: 130 {BEATS}/MIN
MDC_IDC_SET_BRADY_MAX_TRACKING_RATE: 130 {BEATS}/MIN
MDC_IDC_SET_BRADY_MODE: NORMAL
MDC_IDC_SET_BRADY_PAV_DELAY_HIGH: 200 MS
MDC_IDC_SET_BRADY_PAV_DELAY_LOW: 270 MS
MDC_IDC_SET_BRADY_SAV_DELAY_HIGH: 140 MS
MDC_IDC_SET_BRADY_SAV_DELAY_LOW: 190 MS
MDC_IDC_SET_CRT_LVRV_DELAY: 35 MS
MDC_IDC_SET_CRT_PACED_CHAMBERS: NORMAL
MDC_IDC_SET_LEADCHNL_LV_PACING_AMPLITUDE: 1.7 V
MDC_IDC_SET_LEADCHNL_LV_PACING_ANODE_ELECTRODE_1: NORMAL
MDC_IDC_SET_LEADCHNL_LV_PACING_ANODE_LOCATION_1: NORMAL
MDC_IDC_SET_LEADCHNL_LV_PACING_CAPTURE_MODE: NORMAL
MDC_IDC_SET_LEADCHNL_LV_PACING_CATHODE_ELECTRODE_1: NORMAL
MDC_IDC_SET_LEADCHNL_LV_PACING_CATHODE_LOCATION_1: NORMAL
MDC_IDC_SET_LEADCHNL_LV_PACING_PULSEWIDTH: 0.5 MS
MDC_IDC_SET_LEADCHNL_LV_SENSING_ADAPTATION_MODE: NORMAL
MDC_IDC_SET_LEADCHNL_LV_SENSING_ANODE_ELECTRODE_1: NORMAL
MDC_IDC_SET_LEADCHNL_LV_SENSING_ANODE_LOCATION_1: NORMAL
MDC_IDC_SET_LEADCHNL_LV_SENSING_CATHODE_ELECTRODE_1: NORMAL
MDC_IDC_SET_LEADCHNL_LV_SENSING_CATHODE_LOCATION_1: NORMAL
MDC_IDC_SET_LEADCHNL_LV_SENSING_SENSITIVITY: 1 MV
MDC_IDC_SET_LEADCHNL_RA_PACING_AMPLITUDE: 2 V
MDC_IDC_SET_LEADCHNL_RA_PACING_CAPTURE_MODE: NORMAL
MDC_IDC_SET_LEADCHNL_RA_PACING_POLARITY: NORMAL
MDC_IDC_SET_LEADCHNL_RA_PACING_PULSEWIDTH: 0.4 MS
MDC_IDC_SET_LEADCHNL_RA_SENSING_ADAPTATION_MODE: NORMAL
MDC_IDC_SET_LEADCHNL_RA_SENSING_POLARITY: NORMAL
MDC_IDC_SET_LEADCHNL_RA_SENSING_SENSITIVITY: 0.25 MV
MDC_IDC_SET_LEADCHNL_RV_PACING_AMPLITUDE: 2 V
MDC_IDC_SET_LEADCHNL_RV_PACING_CAPTURE_MODE: NORMAL
MDC_IDC_SET_LEADCHNL_RV_PACING_POLARITY: NORMAL
MDC_IDC_SET_LEADCHNL_RV_PACING_PULSEWIDTH: 0.4 MS
MDC_IDC_SET_LEADCHNL_RV_SENSING_ADAPTATION_MODE: NORMAL
MDC_IDC_SET_LEADCHNL_RV_SENSING_POLARITY: NORMAL
MDC_IDC_SET_LEADCHNL_RV_SENSING_SENSITIVITY: 0.5 MV
MDC_IDC_SET_ZONE_DETECTION_INTERVAL: 250 MS
MDC_IDC_SET_ZONE_DETECTION_INTERVAL: 333 MS
MDC_IDC_SET_ZONE_DETECTION_INTERVAL: 375 MS
MDC_IDC_SET_ZONE_TYPE: NORMAL
MDC_IDC_SET_ZONE_VENDOR_TYPE: NORMAL
MDC_IDC_STAT_AT_BURDEN_PERCENT: 0 %
MDC_IDC_STAT_AT_DTM_END: NORMAL
MDC_IDC_STAT_AT_DTM_START: NORMAL
MDC_IDC_STAT_BRADY_DTM_END: NORMAL
MDC_IDC_STAT_BRADY_DTM_START: NORMAL
MDC_IDC_STAT_BRADY_RA_PERCENT_PACED: 61 %
MDC_IDC_STAT_BRADY_RV_PERCENT_PACED: 99 %
MDC_IDC_STAT_CRT_DTM_END: NORMAL
MDC_IDC_STAT_CRT_DTM_START: NORMAL
MDC_IDC_STAT_CRT_LV_PERCENT_PACED: 99 %
MDC_IDC_STAT_EPISODE_RECENT_COUNT: 0
MDC_IDC_STAT_EPISODE_RECENT_COUNT: 1
MDC_IDC_STAT_EPISODE_RECENT_COUNT_DTM_END: NORMAL
MDC_IDC_STAT_EPISODE_RECENT_COUNT_DTM_START: NORMAL
MDC_IDC_STAT_EPISODE_TYPE: NORMAL
MDC_IDC_STAT_EPISODE_VENDOR_TYPE: NORMAL
MDC_IDC_STAT_TACHYTHERAPY_ATP_DELIVERED_RECENT: 0
MDC_IDC_STAT_TACHYTHERAPY_ATP_DELIVERED_TOTAL: 4
MDC_IDC_STAT_TACHYTHERAPY_RECENT_DTM_END: NORMAL
MDC_IDC_STAT_TACHYTHERAPY_RECENT_DTM_START: NORMAL
MDC_IDC_STAT_TACHYTHERAPY_SHOCKS_ABORTED_RECENT: 0
MDC_IDC_STAT_TACHYTHERAPY_SHOCKS_ABORTED_TOTAL: 0
MDC_IDC_STAT_TACHYTHERAPY_SHOCKS_DELIVERED_RECENT: 0
MDC_IDC_STAT_TACHYTHERAPY_SHOCKS_DELIVERED_TOTAL: 1
MDC_IDC_STAT_TACHYTHERAPY_TOTAL_DTM_END: NORMAL
MDC_IDC_STAT_TACHYTHERAPY_TOTAL_DTM_START: NORMAL

## 2022-04-09 DIAGNOSIS — E03.8 SUBCLINICAL HYPOTHYROIDISM: ICD-10-CM

## 2022-04-11 RX ORDER — LEVOTHYROXINE SODIUM 50 UG/1
50 TABLET ORAL DAILY
Qty: 90 TABLET | Refills: 0 | Status: SHIPPED | OUTPATIENT
Start: 2022-04-11 | End: 2022-01-01

## 2022-04-11 NOTE — TELEPHONE ENCOUNTER
Routing refill request to provider for review/approval because:   Thyroid Protocol Failed 04/09/2022 02:40 PM   Protocol Details  Normal TSH on file in past 12 months

## 2022-05-06 DIAGNOSIS — I42.8 NONISCHEMIC CARDIOMYOPATHY (H): ICD-10-CM

## 2022-05-06 DIAGNOSIS — E11.59 TYPE 2 DIABETES MELLITUS WITH OTHER CIRCULATORY COMPLICATION, WITH LONG-TERM CURRENT USE OF INSULIN (H): ICD-10-CM

## 2022-05-06 DIAGNOSIS — Z79.4 TYPE 2 DIABETES MELLITUS WITH OTHER CIRCULATORY COMPLICATION, WITH LONG-TERM CURRENT USE OF INSULIN (H): ICD-10-CM

## 2022-05-06 RX ORDER — METOPROLOL SUCCINATE 25 MG/1
25 TABLET, EXTENDED RELEASE ORAL DAILY
Qty: 90 TABLET | Refills: 2 | Status: SHIPPED | OUTPATIENT
Start: 2022-05-06 | End: 2022-01-01

## 2022-05-17 ENCOUNTER — TELEPHONE (OUTPATIENT)
Dept: CARDIOLOGY | Facility: CLINIC | Age: 62
End: 2022-05-17
Payer: COMMERCIAL

## 2022-05-17 ENCOUNTER — OFFICE VISIT (OUTPATIENT)
Dept: URGENT CARE | Facility: URGENT CARE | Age: 62
End: 2022-05-17
Payer: COMMERCIAL

## 2022-05-17 ENCOUNTER — TELEPHONE (OUTPATIENT)
Dept: INTERNAL MEDICINE | Facility: CLINIC | Age: 62
End: 2022-05-17

## 2022-05-17 VITALS
WEIGHT: 278.2 LBS | HEART RATE: 62 BPM | BODY MASS INDEX: 38.8 KG/M2 | DIASTOLIC BLOOD PRESSURE: 64 MMHG | TEMPERATURE: 96.6 F | OXYGEN SATURATION: 98 % | SYSTOLIC BLOOD PRESSURE: 113 MMHG

## 2022-05-17 DIAGNOSIS — R05.8 PRODUCTIVE COUGH: Primary | ICD-10-CM

## 2022-05-17 DIAGNOSIS — D86.85 CARDIAC SARCOIDOSIS: ICD-10-CM

## 2022-05-17 PROCEDURE — 99213 OFFICE O/P EST LOW 20 MIN: CPT | Performed by: FAMILY MEDICINE

## 2022-05-17 RX ORDER — BENZONATATE 100 MG/1
100 CAPSULE ORAL 3 TIMES DAILY PRN
Qty: 21 CAPSULE | Refills: 0 | Status: SHIPPED | OUTPATIENT
Start: 2022-05-17 | End: 2022-05-24

## 2022-05-17 RX ORDER — DOXYCYCLINE 100 MG/1
100 TABLET ORAL 2 TIMES DAILY
Qty: 20 TABLET | Refills: 0 | Status: SHIPPED | OUTPATIENT
Start: 2022-05-17 | End: 2022-05-27

## 2022-05-17 RX ORDER — FUROSEMIDE 40 MG
60 TABLET ORAL DAILY
Qty: 135 TABLET | Refills: 1 | Status: SHIPPED | OUTPATIENT
Start: 2022-05-17 | End: 2022-01-01

## 2022-05-17 NOTE — TELEPHONE ENCOUNTER
Pt reports noticing an increase of bilat ankle edema after lasix dosage increased.     Advised patient to reach out to Cardiology as medication is managed by specialty at this time. Patient verbalized agreement, Cardiology number provided to patient from last AVS.     Routing message to Cardiologist.

## 2022-05-17 NOTE — PROGRESS NOTES
SUBJECTIVE: Neymar Rhodes is a 62 year old male presenting with a chief complaint of cough .  Onset of symptoms was 10 day(s) ago.  Current and Associated symptoms: runny nose and facial pain/pressure  Predisposing factors include None.    Past Medical History:   Diagnosis Date     Ascending aorta dilatation (H)      Diverticulosis of colon (without mention of hemorrhage)      Essential hypertension, benign      Gout, unspecified      LBBB (left bundle branch block)      Morbid obesity (H)      Non-ischemic cardiomyopathy (H)      Nonrheumatic mitral valve regurgitation      NSTEMI (non-ST elevated myocardial infarction) (H)     cardiac cath 6/2018: mild non-obstructive CAD     Other and unspecified hyperlipidemia      Paroxysmal ventricular tachycardia (H)      Type II or unspecified type diabetes mellitus without mention of complication, not stated as uncontrolled      Allergies   Allergen Reactions     No Known Drug Allergies      Social History     Tobacco Use     Smoking status: Never Smoker     Smokeless tobacco: Never Used   Substance Use Topics     Alcohol use: Yes     Comment: once awhile       ROS:  SKIN: no rash  GI: no vomiting    OBJECTIVE:  /64 (BP Location: Left arm, Patient Position: Sitting, Cuff Size: Adult Large)   Pulse 62   Temp (!) 96.6  F (35.9  C) (Tympanic)   Wt 126.2 kg (278 lb 3.2 oz)   SpO2 98%   BMI 38.80 kg/m  GENERAL APPEARANCE: healthy, alert and no distress  EYES: EOMI,  PERRL, conjunctiva clear  HENT: ear canals and TM's normal.  Nose and mouth without ulcers, erythema or lesions  RESP: lungs clear to auscultation - no rales, rhonchi or wheezes  SKIN: no suspicious lesions or rashes      ICD-10-CM    1. Productive cough  R05.8 doxycycline monohydrate (ADOXA) 100 MG tablet     benzonatate (TESSALON) 100 MG capsule       Fluids/Rest, f/u if worse/not any better

## 2022-05-17 NOTE — TELEPHONE ENCOUNTER
[facility] Call Center    Phone Message    May a detailed message be left on voicemail: yes     Reason for Call: Symptoms or Concerns     If patient has red-flag symptoms, warm transfer to triage line    Current symptom or concern: swelling in feet    Symptoms have been present for:  2 week(s)    Has patient previously been seen for this? Yes    By [facility]: [facility]    Are there any new or worsening symptoms? Patient was advised to call his cardiology for recommendations on the swelling on his feet. Patients to know if he should increase his Lasix      Action Taken: Other: routed cardiology high priority    Travel Screening: Not Applicable                                                             Provider notified and new orders received.  Date: 5/17/2022    Time of Call: 12:08 PM     Diagnosis:  HF     [ VORB ] Ordering provider: Dr. Batres  Order: Increase Lasix to 60 MG once daily.     Order received by: Christopher Stephens RN   Follow-up/additional notes: Patient was called, no answer, message left with above order. Prescription sent to pharmacy.

## 2022-05-20 ENCOUNTER — TELEPHONE (OUTPATIENT)
Dept: CARDIOLOGY | Facility: CLINIC | Age: 62
End: 2022-05-20
Payer: COMMERCIAL

## 2022-05-20 NOTE — TELEPHONE ENCOUNTER
[facility] Call Center    Phone Message    May a detailed message be left on voicemail: yes     Reason for Call: Other: Calling Back Per Request    Neymar is calling back per nurse request after three days on his dosage change of furosemide. His dosage went from furosemide (LASIX) 40 MG tablet to 60 MG. He stated that with the 60 MG his feet swelling has gone down. He is looking to speak to a nurse again to see if he is to continue on the 60 MG or what his next steps should be. Please call Neymar back to discuss.    Action Taken: Other: Cardiology    Travel Screening: Not Applicable                                                        Called patient and left message that he is to stay on the Furosemide 60 MG once daily.

## 2022-05-28 DIAGNOSIS — I47.29 PAROXYSMAL VENTRICULAR TACHYCARDIA (H): ICD-10-CM

## 2022-05-28 DIAGNOSIS — E11.9 TYPE 2 DIABETES MELLITUS WITHOUT COMPLICATION, WITHOUT LONG-TERM CURRENT USE OF INSULIN (H): ICD-10-CM

## 2022-05-31 RX ORDER — GLIPIZIDE 10 MG/1
TABLET, FILM COATED, EXTENDED RELEASE ORAL
Qty: 90 TABLET | Refills: 0 | Status: SHIPPED | OUTPATIENT
Start: 2022-05-31 | End: 2022-01-01

## 2022-05-31 NOTE — TELEPHONE ENCOUNTER
Prescription approved per [facility] Refill Protocol.  Rosana Lozano, RN  [facility] Triage Nurse

## 2022-06-02 NOTE — TELEPHONE ENCOUNTER
Amiodarone HCl Oral Tablet 400 MG  Last Written Prescription Date:   3/11/2022  Last Fill Quantity: 90,   # refills: 0  Last Office Visit :  3/29/2022  Future Office visit:  6/21/2022    Routing refill request to provider for review/approval because:  Not on protocol  But which Cardiology clinic is managing this medication.  [facility]?  Or [facility].   Please advise a review and fill per Providers orders.  Thank you       Shiloh Walton RN  Central Triage Red Flags/Med Refills

## 2022-06-03 RX ORDER — AMIODARONE HYDROCHLORIDE 400 MG/1
400 TABLET ORAL DAILY
Qty: 90 TABLET | Refills: 0 | Status: SHIPPED | OUTPATIENT
Start: 2022-06-03 | End: 2022-06-03

## 2022-06-03 RX ORDER — AMIODARONE HYDROCHLORIDE 200 MG/1
200 TABLET ORAL DAILY
Qty: 90 TABLET | Refills: 1 | Status: SHIPPED | OUTPATIENT
Start: 2022-06-03 | End: 2022-01-01 | Stop reason: DRUGHIGH

## 2022-06-03 NOTE — TELEPHONE ENCOUNTER
Left voicemail for patient with below recommendations. New Rx sent.     Date: 6/3/2022    Time of Call: 4:57 PM     Diagnosis:  VT, active sarcoid, elevated LFTs     [ VORB ] Ordering provider: Mayte Ramsay NP   Order:   Decrease amiodarone to 200mg daily     Order received by: Irina Krishna RN

## 2022-06-09 ENCOUNTER — TELEPHONE (OUTPATIENT)
Dept: CARDIOLOGY | Facility: CLINIC | Age: 62
End: 2022-06-09
Payer: COMMERCIAL

## 2022-06-09 NOTE — TELEPHONE ENCOUNTER
6/9 talked with patient, wants to be seen before July due to insurance questions. Asking nurse about this and if the PET Scan needs to be rescheduled -EF

## 2022-06-13 ENCOUNTER — TELEPHONE (OUTPATIENT)
Dept: CARDIOLOGY | Facility: CLINIC | Age: 62
End: 2022-06-13

## 2022-06-14 ENCOUNTER — ANCILLARY PROCEDURE (OUTPATIENT)
Dept: PET IMAGING | Facility: CLINIC | Age: 62
End: 2022-06-14
Attending: INTERNAL MEDICINE
Payer: COMMERCIAL

## 2022-06-14 DIAGNOSIS — D86.85 CARDIAC SARCOIDOSIS: ICD-10-CM

## 2022-06-14 LAB — GLUCOSE SERPL-MCNC: 106 MG/DL (ref 70–99)

## 2022-06-16 ENCOUNTER — CARE COORDINATION (OUTPATIENT)
Dept: CARDIOLOGY | Facility: CLINIC | Age: 62
End: 2022-06-16
Payer: COMMERCIAL

## 2022-06-16 DIAGNOSIS — I47.29 PAROXYSMAL VENTRICULAR TACHYCARDIA (H): Primary | ICD-10-CM

## 2022-06-21 ENCOUNTER — ANCILLARY PROCEDURE (OUTPATIENT)
Dept: CARDIOLOGY | Facility: CLINIC | Age: 62
End: 2022-06-21
Attending: INTERNAL MEDICINE
Payer: COMMERCIAL

## 2022-06-21 ENCOUNTER — LAB (OUTPATIENT)
Dept: LAB | Facility: CLINIC | Age: 62
End: 2022-06-21

## 2022-06-21 DIAGNOSIS — Z95.810 ICD (IMPLANTABLE CARDIOVERTER-DEFIBRILLATOR) IN PLACE: ICD-10-CM

## 2022-06-21 DIAGNOSIS — I42.8 NON-ISCHEMIC CARDIOMYOPATHY (H): ICD-10-CM

## 2022-06-21 DIAGNOSIS — I50.22 CHRONIC SYSTOLIC HEART FAILURE (H): ICD-10-CM

## 2022-06-21 DIAGNOSIS — D86.85 CARDIAC SARCOIDOSIS: ICD-10-CM

## 2022-06-21 LAB
ALBUMIN SERPL-MCNC: 3.4 G/DL (ref 3.4–5)
ALP SERPL-CCNC: 121 U/L (ref 40–150)
ALT SERPL W P-5'-P-CCNC: 80 U/L (ref 0–70)
ANION GAP SERPL CALCULATED.3IONS-SCNC: 6 MMOL/L (ref 3–14)
AST SERPL W P-5'-P-CCNC: 83 U/L (ref 0–45)
BILIRUB SERPL-MCNC: 0.7 MG/DL (ref 0.2–1.3)
BUN SERPL-MCNC: 28 MG/DL (ref 7–30)
CALCIUM SERPL-MCNC: 8.9 MG/DL (ref 8.5–10.1)
CHLORIDE BLD-SCNC: 109 MMOL/L (ref 94–109)
CO2 SERPL-SCNC: 23 MMOL/L (ref 20–32)
CREAT SERPL-MCNC: 1.23 MG/DL (ref 0.66–1.25)
ERYTHROCYTE [DISTWIDTH] IN BLOOD BY AUTOMATED COUNT: 17.7 % (ref 10–15)
GFR SERPL CREATININE-BSD FRML MDRD: 66 ML/MIN/1.73M2
GLUCOSE BLD-MCNC: 205 MG/DL (ref 70–99)
HCT VFR BLD AUTO: 33.4 % (ref 40–53)
HGB BLD-MCNC: 10.5 G/DL (ref 13.3–17.7)
MCH RBC QN AUTO: 28.6 PG (ref 26.5–33)
MCHC RBC AUTO-ENTMCNC: 31.4 G/DL (ref 31.5–36.5)
MCV RBC AUTO: 91 FL (ref 78–100)
NT-PROBNP SERPL-MCNC: 431 PG/ML (ref 0–125)
PLATELET # BLD AUTO: 143 10E3/UL (ref 150–450)
POTASSIUM BLD-SCNC: 4.3 MMOL/L (ref 3.4–5.3)
PROT SERPL-MCNC: 6.8 G/DL (ref 6.8–8.8)
RBC # BLD AUTO: 3.67 10E6/UL (ref 4.4–5.9)
SODIUM SERPL-SCNC: 138 MMOL/L (ref 133–144)
WBC # BLD AUTO: 4.7 10E3/UL (ref 4–11)

## 2022-06-21 PROCEDURE — 83880 ASSAY OF NATRIURETIC PEPTIDE: CPT

## 2022-06-21 PROCEDURE — 85027 COMPLETE CBC AUTOMATED: CPT

## 2022-06-21 PROCEDURE — 93295 DEV INTERROG REMOTE 1/2/MLT: CPT | Performed by: INTERNAL MEDICINE

## 2022-06-21 PROCEDURE — 80053 COMPREHEN METABOLIC PANEL: CPT

## 2022-06-21 PROCEDURE — 36415 COLL VENOUS BLD VENIPUNCTURE: CPT

## 2022-06-21 PROCEDURE — 93296 REM INTERROG EVL PM/IDS: CPT | Performed by: INTERNAL MEDICINE

## 2022-06-24 DIAGNOSIS — B35.4 DERMATOPHYTOSIS OF BODY: ICD-10-CM

## 2022-06-24 RX ORDER — CLOTRIMAZOLE 1 %
CREAM (GRAM) TOPICAL
Qty: 30 G | Refills: 0 | Status: SHIPPED | OUTPATIENT
Start: 2022-06-24 | End: 2022-01-01

## 2022-06-24 NOTE — TELEPHONE ENCOUNTER
Routing refill request to provider for review/approval because:  Drug not on the [facility] refill protocol     Kristal Kiser RN

## 2022-06-30 NOTE — TELEPHONE ENCOUNTER
Prescription approved per [facility] Refill Protocol.  Parveen Aragon RN  [facility] Triage Nurse

## 2022-07-13 NOTE — TELEPHONE ENCOUNTER
Routing refill request to provider for review/approval because:  Labs out of range:    TSH   Date Value Ref Range Status   03/14/2022 4.92 (H) 0.40 - 4.00 mU/L Final   03/10/2020 1.51 0.40 - 4.00 mU/L Final     Rosana Lozano RN

## 2022-08-09 NOTE — TELEPHONE ENCOUNTER
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     Requests this letter be sent though [employer] please.

## 2022-08-09 NOTE — TELEPHONE ENCOUNTER
Let the patient know a letter written, identical to the one earlier this year.    He may download this from the [employer].    Close encounter when done.

## 2022-08-09 NOTE — LETTER
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  These restrictions are indefinite.      Please call the clinic with any questions.     Sincerely,     Jefry Harris MD

## 2022-08-26 NOTE — PROGRESS NOTES
Corrected note below - Patient is being referred for possible sarcoid, not amyloid.  PET scan orders have been placed by referring physician, but has not been scheduled as yet.  Per Dr. Hernandes, will schedule patient with Dr. Batres as Dr. Cedeno is not available for several weeks.    Methotrexate Pregnancy And Lactation Text: This medication is Pregnancy Category X and is known to cause fetal harm. This medication is excreted in breast milk.

## 2022-08-31 NOTE — TELEPHONE ENCOUNTER
Entresto Oral Tablet 49-51 MG  Last Written Prescription Date:   3/2/2022  Last Fill Quantity: 180,   # refills: 1  Last Office Visit :  3/29/2022  Future Office visit:  11/20/2022  180 Tabs, 1 Refill sent to pharm 8/21/2022      Shiloh Walton RN  Central Triage Red Flags/Med Refills

## 2022-09-20 NOTE — TELEPHONE ENCOUNTER
Sulfamethoxazole-Trimethoprim Oral Tablet 400-80 MG  Last Written Prescription Date:  3/29/2022  Last Fill Quantity: 90,   # refills: 1  Last Office Visit : 3/29/2022  Future Office visit:   10/10/2022  Routing refill request to provider for review/approval because:  Drug not on the [facility], [facility] or [facility] refill protocol or controlled substance    predniSONE Oral Tablet 20 MG  Last Written Prescription Date:  3/29/2022  Last Fill Quantity:  135,   # refills: 1  Last Office Visit : 3/29/2022  Future Office visit:   10/10/2022  Routing refill request to provider for review/approval because:  Drug not on the [facility], [facility] or [facility] refill protocol or controlled substance      Shiloh Walton RN  Central Triage Red Flags/Med Refills

## 2022-09-29 NOTE — ED TRIAGE NOTES
Shoulders felt tired while using a . Pt removed his suspenders and then woke up on the floor. Pt believes he was unconscious for 20 mins. Pt feels overall fatigue. Pt has sarcoid with prednisone. He also has a defibrillator. States his blood sugar has been low in the mornings - he has a cat who wakes him up when his sugar is low. This am glucose was 81.

## 2022-09-29 NOTE — ED NOTES
[facility]  ED Nurse Handoff Report    ED Chief complaint: Syncope      ED Diagnosis:   Final diagnoses:   Ventricular tachycardia   Syncope, unspecified syncope type       Code Status: Not addressed in ED    Allergies:   Allergies   Allergen Reactions     No Known Drug Allergies        Patient Story: Syncope, fatigue, pacemaker was interrogated and pt had run of V-tach  Focused Assessment:    Labs Ordered and Resulted from Time of ED Arrival to Time of ED Departure   BASIC METABOLIC PANEL - Abnormal       Result Value    Sodium 135      Potassium 4.2      Chloride 102      Carbon Dioxide (CO2) 23      Anion Gap 10      Urea Nitrogen 28      Creatinine 1.47 (*)     Calcium 9.3      Glucose 379 (*)     GFR Estimate 53 (*)    CBC WITH PLATELETS AND DIFFERENTIAL - Abnormal    WBC Count 5.0      RBC Count 3.53 (*)     Hemoglobin 10.8 (*)     Hematocrit 34.7 (*)     MCV 98      MCH 30.6      MCHC 31.1 (*)     RDW 15.7 (*)     Platelet Count 118 (*)     % Neutrophils 89      % Lymphocytes 6      % Monocytes 4      % Eosinophils 0      % Basophils 0      % Immature Granulocytes 1      NRBCs per 100 WBC 0      Absolute Neutrophils 4.5      Absolute Lymphocytes 0.3 (*)     Absolute Monocytes 0.2      Absolute Eosinophils 0.0      Absolute Basophils 0.0      Absolute Immature Granulocytes 0.1      Absolute NRBCs 0.0     TROPONIN I - Normal    Troponin I High Sensitivity 64     COVID-19 VIRUS (CORONAVIRUS) BY PCR     No orders to display         Treatments and/or interventions provided: none in ED  Patient's response to treatments and/or interventions: no changes    To be done/followed up on inpatient unit:  see orders    Does this patient have any cognitive concerns?: none    Activity level - Baseline/Home:  Independent  Activity Level - Current:   Independent    Patient's Preferred language: English   Needed?: No    Isolation: None  Infection: Not Applicable  Patient tested for COVID 19 prior to  "admission: YES  Bariatric?: No    Vital Signs:   Vitals:    09/29/22 1101   BP: 119/66   Pulse: 81   Resp: 20   Temp: 97.1  F (36.2  C)   TempSrc: Temporal   SpO2: 100%   Weight: 122.9 kg (271 lb)   Height: 1.803 m (5' 11\")       Cardiac Rhythm:     Was the PSS-3 completed:   Yes  What interventions are required if any?               Family Comments: sig other at bedside  OBS brochure/video discussed/provided to patient/family: No              Name of person given brochure if not patient:               Relationship to patient:     For the majority of the shift this patient's behavior was Green.   Behavioral interventions performed were none.    ED NURSE PHONE NUMBER: *[id number]         "

## 2022-09-29 NOTE — ED PROVIDER NOTES
History     Chief Complaint:  Syncope       HPI   Neymar Rhodes is a 62 year old male who presents with episode of syncope.  This morning at 850am he was pressure washing at work when he had sudden loss of consciousness.  Patient thinks he may have been unresponsive for up to 20 minutes; however the episode was unwitnessed.  Time prior to syncopal episode patient noted noted that he had been standing for a prolonged period of time and was feeling uncomfortable and hot secondary to environmental conditions.  Currently patient is asymptomatic.  Blood sugar 81 this morning.  Patient has type 2 diabetes (insulin-dependent), cardiac sarcoidosis with implantable cardiac defibrillator secondary to episodes of V. tach/V. fib (notes device check yesterday within normal parameters).  No trauma related to syncopal episode.     ROS:  Review of Systems   Constitutional: Negative for fever.   Respiratory: Negative for shortness of breath.    Cardiovascular: Negative for chest pain and leg swelling.   Skin: Negative for wound.   Neurological: Positive for syncope.   All other systems reviewed and are negative.    Allergies:  No Known Drug Allergies     Medications:    No current outpatient medications on file.      Past Medical History:    Past Medical History:   Diagnosis Date     Ascending aorta dilatation (H)      Diverticulosis of colon (without mention of hemorrhage)      Essential hypertension, benign      Gout, unspecified      LBBB (left bundle branch block)      Morbid obesity (H)      Non-ischemic cardiomyopathy (H)      Nonrheumatic mitral valve regurgitation      NSTEMI (non-ST elevated myocardial infarction) (H)      Other and unspecified hyperlipidemia      Paroxysmal ventricular tachycardia (H)      Type II or unspecified type diabetes mellitus without mention of complication, not stated as uncontrolled        Past Surgical History:    Past Surgical History:   Procedure Laterality Date     CV CORONARY  "ANGIOGRAM N/A 10/2/2019    Procedure: Coronary Angiogram;  Surgeon: Kathy Almaguer MD;  Location:  HEART CARDIAC CATH LAB     CV LEFT HEART CATH N/A 10/2/2019    Procedure: Left Heart Cath;  Surgeon: Kathy Almaguer MD;  Location:  HEART CARDIAC CATH LAB     CV RIGHT HEART CATH MEASUREMENTS RECORDED N/A 10/2/2019    Procedure: Right Heart Cath;  Surgeon: Kathy Almaguer MD;  Location:  HEART CARDIAC CATH LAB     ENDOBRONCHIAL ULTRASOUND FLEXIBLE N/A 12/19/2019    Procedure: Flexible bronchoscopy, endobronchial ultrasound, bronchial alveolar lavage;  Surgeon: Ranulfo Barcenas MD;  Location: [facility] OR     EP ICD N/A 10/4/2019    Procedure: EP ICD;  Surgeon: Jayy Dorman MD;  Location:  HEART CARDIAC CATH LAB     EP LEAD PLCMNT LV NEW ICD N/A 10/4/2019    Procedure: EP Lead Plcmnt LV New ICD;  Surgeon: Jayy Dorman MD;  Location:  HEART CARDIAC CATH LAB     HEART CATH CORONARY ANGIOGRAM WITHOUT PRESSURES  06/10/2018    normal coronary angiogram, nothing more than 10%     SURGICAL HISTORY OF -   2001    repair of colovesicular fistula     ZZC APPENDECTOMY  1980's        Family History:    family history includes Asthma in his son; Breast Cancer in his mother and sister; Cancer (age of onset: 75) in his father; Family History Negative in his brother, son, and son; Myocardial Infarction in his father; Other - See Comments in his father.    Social History:   reports that he has never smoked. He has never used smokeless tobacco. He reports current alcohol use. He reports that he does not use drugs.  PCP: Jefry Harris     Physical Exam     Patient Vitals for the past 24 hrs:   BP Temp Temp src Pulse Resp SpO2 Height Weight   09/29/22 2000 -- 97.9  F (36.6  C) Oral 68 16 100 % -- --   09/29/22 1920 97/55 -- -- 66 12 -- -- --   09/29/22 1101 119/66 97.1  F (36.2  C) Temporal 81 20 100 % 1.803 m (5' 11\") 122.9 kg (271 lb)        Physical Exam  General: Alert and cooperative with " exam. Patient in mild distress. Normal mentation.  Head:  Scalp is NC/AT  Eyes:  No scleral icterus, PERRL  ENT:  The external nose and ears are normal. The oropharynx is normal and without erythema; mucus membranes are moist. Uvula midline, no evidence of deep space infection.  Neck:  Normal range of motion without rigidity.  CV:  Regular rate and rhythm.  ICD in place  Resp:  Breath sounds are clear bilaterally    Non-labored, no retractions or accessory muscle use  GI:  Abdomen is soft, no distension, no tenderness. No peritoneal signs.  Overweight  MS:  No lower extremity edema   Skin:  Warm and dry, No rash or lesions noted.  Neuro: Oriented x 3. No gross motor deficits.        Emergency Department Course   ECG:  Atrially paced ventricularly sensed rhythm.    Laboratory:  Labs Ordered and Resulted from Time of ED Arrival to Time of ED Departure   BASIC METABOLIC PANEL - Abnormal       Result Value    Sodium 135      Potassium 4.2      Chloride 102      Carbon Dioxide (CO2) 23      Anion Gap 10      Urea Nitrogen 28      Creatinine 1.47 (*)     Calcium 9.3      Glucose 379 (*)     GFR Estimate 53 (*)    CBC WITH PLATELETS AND DIFFERENTIAL - Abnormal    WBC Count 5.0      RBC Count 3.53 (*)     Hemoglobin 10.8 (*)     Hematocrit 34.7 (*)     MCV 98      MCH 30.6      MCHC 31.1 (*)     RDW 15.7 (*)     Platelet Count 118 (*)     % Neutrophils 89      % Lymphocytes 6      % Monocytes 4      % Eosinophils 0      % Basophils 0      % Immature Granulocytes 1      NRBCs per 100 WBC 0      Absolute Neutrophils 4.5      Absolute Lymphocytes 0.3 (*)     Absolute Monocytes 0.2      Absolute Eosinophils 0.0      Absolute Basophils 0.0      Absolute Immature Granulocytes 0.1      Absolute NRBCs 0.0     TROPONIN I - Normal    Troponin I High Sensitivity 64     COVID-19 VIRUS (CORONAVIRUS) BY PCR - Normal    SARS CoV2 PCR Negative          Emergency Department Course:      Reviewed:  I reviewed nursing notes, vitals and past  medical history      Disposition:  The patient was discharged to home.     Impression & Plan      Medical Decision Making:  Patient is a 62-year-old male presents with syncopal episode that occurred earlier today; patient with ICD present and history of V. tach/V. fib.  Patient's medical history and records were reviewed.  On evaluation he is asymptomatic with normal vital signs.  EKG demonstrates paced rhythm with normal rate.  Basic labs without significant findings including normal troponin.  He denied chest pain, shortness of breath, abdominal pain, or other concerning symptoms.  Patient's ICD was interrogated and shows episodes of slow V. tach this morning prior to the syncopal episode though no shock was delivered as the rate threshold was not met.  I discussed the patient's medical history and findings with Dr. Bone of cardiology who recommended admission for EP consultation and continued evaluation.  Patient will be admitted to the hospitalist service.  He remained stable and asymptomatic throughout my care.      Diagnosis:    ICD-10-CM    1. Ventricular tachycardia (H)  I47.2    2. Syncope, unspecified syncope type  R55          Farooq Barajas, DO  09/29/22 9039

## 2022-09-29 NOTE — PHARMACY-ADMISSION MEDICATION HISTORY
Pharmacy Medication History  Admission medication history interview status for the 9/29/2022  admission is complete. See EPIC admission navigator for prior to admission medications     Location of Interview: Patient room  Medication history sources: Patient and Surescripts    Significant changes made to the medication list:  Discontinued: celecoxib, cephalexin, triamcinolone cream  Added: acetaminophen 500 mg every 6 hours as needed--> 1000 mg daily    In the past week, patient estimated taking medication this percent of the time: greater than 90%    Additional medication history information:   Patient states levemir has been increased recently and he has experiencing hypoglycemia frequently.     Medication reconciliation completed by provider prior to medication history? No    Time spent in this activity: 30 minutes    Prior to Admission medications    Medication Sig Last Dose Taking? Auth Provider Long Term End Date   acetaminophen (TYLENOL) 500 MG tablet Take 1,000 mg by mouth every morning 9/29/2022 at am Yes Reported, Patient     amiodarone (PACERONE) 200 MG tablet Take 1 tablet (200 mg) by mouth daily 9/29/2022 at am Yes Zackery De Guzman MD Yes    aspirin 81 MG tablet Take 1 tablet (81 mg) by mouth daily 9/29/2022 at am Yes Oscar Paul, DO     atorvastatin (LIPITOR) 20 MG tablet Take 1 tablet (20 mg) by mouth daily  Patient taking differently: Take 20 mg by mouth At Bedtime 9/28/2022 at pm Yes Umm Hernandes MD Yes    betamethasone dipropionate (DIPROSONE) 0.05 % external cream Apply topically 2 times daily Apply sparingly once or twice per day as needed to affected area until the skin is better, then stop; REPEAT AS NEEDED  Yes Jefry Harris MD     fenofibrate (TRIGLIDE/LOFIBRA) 160 MG tablet Take 1 tablet (160 mg) by mouth daily 9/29/2022 at am Yes Jefry Harris MD Yes    furosemide (LASIX) 20 MG tablet Take 60 mg by mouth daily 9/29/2022 at am Yes Unknown, Entered By History No     glipiZIDE (GLUCOTROL XL) 10 MG 24 hr tablet TAKE 1 TABLET (10 MG) BY MOUTH DAILY**TAKE WITH LARGEST MEAL OF THE DAY. ALWAYS TAKE WITH FOOD 9/29/2022 at am Yes Jefry Harris MD Yes    LEVEMIR FLEXTOUCH 100 UNIT/ML pen Inject 25 Units Subcutaneously daily  Patient taking differently: Inject 35 Units Subcutaneous daily 9/29/2022 at am Yes Jefry Harris MD Yes    levothyroxine (SYNTHROID/LEVOTHROID) 50 MCG tablet Take 1 tablet (50 mcg) by mouth daily 9/29/2022 at am Yes Jefry Harris MD Yes    liraglutide (VICTOZA PEN) 18 MG/3ML solution Inject 1.8 mg Subcutaneous daily 9/29/2022 at am Yes Jefry Harris MD Yes    metFORMIN (GLUCOPHAGE) 1000 MG tablet Take 1 tablet (1,000 mg) by mouth 2 times daily (with meals) 9/29/2022 at am Yes Jefry Harris MD Yes    metoprolol succinate ER (TOPROL XL) 25 MG 24 hr tablet Take 1 tablet (25 mg) by mouth daily 9/29/2022 at am Yes Jefry Harris MD Yes    omeprazole (PRILOSEC) 40 MG DR capsule Take 1 capsule (40 mg) by mouth daily  Patient taking differently: Take 40 mg by mouth daily as needed  Yes Roberto Batres MD     predniSONE (DELTASONE) 20 MG tablet Take 1.5 tablets (30 mg) by mouth daily 9/29/2022 at am Yes Roberto Batres MD     sacubitril-valsartan (ENTRESTO) 49-51 MG per tablet Take 1 tablet by mouth 2 times daily 9/29/2022 at am Yes Zackery De Guzman MD Yes    sulfamethoxazole-trimethoprim (BACTRIM) 400-80 MG tablet Take 1 tablet by mouth daily 9/29/2022 at am Yes Roberto Batres MD     albuterol (PROAIR HFA/PROVENTIL HFA/VENTOLIN HFA) 108 (90 Base) MCG/ACT Inhaler Inhale 2 puffs into the lungs every 6 hours as needed for shortness of breath / dyspnea or wheezing    Unknown, Entered By History Yes    blood glucose monitoring (ACCU-CHEK LUL PLUS) test strip Use to test blood sugar 1-3 times daily or as directed.   Jefry Harris MD     Continuous Blood Gluc Sensor (FREESTYLE OJCELIN 14 DAY SENSOR) [facility] USE  SENSOR EVERY 14 DAYS.   Jefry Harris MD     continuous blood glucose monitoring (FREESTYLE JOCELIN) sensor For use with Freestyle Jocelin Flash  for continuous monitioring of blood glucose levels. Replace sensor every 10 days.   Jefry Harris MD     insulin pen needle (BD ULTRA-FINE) 29G X 12.7MM miscellaneous USE ONCE DAILY WITH VICTOZA PEN   Melva Bernal MD         The information provided in this note is only as accurate as the sources available at the time of update(s)

## 2022-09-30 NOTE — DISCHARGE SUMMARY
[facility]  Hospitalist Discharge Summary      Date of Admission:  9/29/2022  Date of Discharge:  9/30/2022 12:16 PM  Discharging Provider: Bello Rubio MD  Discharge Service: Hospitalist Service    Discharge Diagnoses   Syncope 2/2 recurrent VT - s/p reprogrammed ICD  History of VT s/p ICD   Nonischemic cardiomyopathy  Cardiac sarcoidosis  T2DM  GERD  Hypothyroidism      Follow-ups Needed After Discharge   Follow-up Appointments     Follow-up and recommended labs and tests       EP - Dr Dorman as planned 10/10/22             Unresulted Labs Ordered in the Past 30 Days of this Admission     No orders found for last 31 day(s).      These results will be followed up by NA    Discharge Disposition   Discharged to home  Condition at discharge: Stable      Hospital Course   Neymar Rhodes is 62 with a history of nonischemic cardiomyopathy, cardiac sarcoidosis who has ICD and permanent pacemaker, who has had prior history of syncope with failure of ICD shock, who now presents with another episode of presyncope with ICD device check showing slow AFib prior to his syncope at a rate of 160, that was too low for the ICD to engage, who is now hemodynamically stable, being admitted for further EP evaluation.        Syncope with slow ventricular tachycardia in the setting of a nonischemic cardiomyopathy and cardiac sarcoidosis  The patient will be made n.p.o.  The patient will be seen by cardiac electrophysiology. Patient will be monitored on telemetry overnight.  The patient will be continued on his amiodarone and metoprolol.  - EP consulted and appreciated. Promptly had ICD reprogrammed.  Patient directed per EP to continue prior medications and follow up as planned in EP clinic with Dr Dorman on October 10, 2022. VT ablation will be discussed at that time.   - Patient has been ambulating the floor independently this morning (day of discharge) without issues and is in agreement with the plan to  discharge.  Cardiology - EP okay with discharge.      Cardiac sarcoidosis  The patient is followed at the [facility] for this. Patient is on chronic prednisone of 30 mg as well as Bactrim prophylaxis, which we will continue.     Diabetes  The patient is on Levemir as well as Victoza, glipizide and metformin.  We will hold all of his diabetic medications and give him 10 units of Levemir in the morning.  We will perform Accu-Cheks every 4 hours and cover with sliding scale insulin as the patient is n.p.o. for possible procedure in the morning.  - PTA regimen continued  - PCP follow up as needed/already scheduled     Reflux disease  The patient continues omeprazole.     History of nonischemic cardiomyopathy  The patient is on Entresto, which we will hold, as well as his Lasix.     Hypothyroidism  We will continue the patient on levothyroxine.       Consultations This Hospital Stay   CARDIOLOGY IP CONSULT    Code Status   Full Code    Time Spent on this Encounter   I, Bello Rubio MD, personally saw the patient today and spent greater than 30 minutes discharging this patient.       Bello Rubio MD  [facility] HE[facility] [address], [address]  [facility] [address]  Phone: [phone]  ______________________________________________________________________    Physical Exam   Vital Signs: Temp: 98.3  F (36.8  C) Temp src: Oral BP: 107/65 Pulse: 64   Resp: 18 SpO2: 95 % O2 Device: None (Room air)    Weight: 268 lbs 6.4 oz    Gen: NAD, very pleasant, up in chair  HEENT: EOMI, MMM  Resp: no crackles,  no wheezes, no increased work of resp  CV: S1S2 heard, reg rhythm, reg rate  Abdo: soft, nontender, nondistended, bowel sounds present  Ext: calves nontender, well perfused  Neuro: aa, conversant appropriately, moving all ext, CN grossly intact, no facial asymmetry         Primary Care Physician   Jefry Harris    Discharge Orders      Reason for your hospital stay     Syncopal episode likely due to arrhythmia     Follow-up and recommended labs and tests     EP - Dr Dorman as planned 10/10/22     Activity    Your activity upon discharge: activity as tolerated     Discharge Instructions    Continue your medicines as below. Your ICD has been reprogrammed. You should follow up with EP as planned on October 10th.     Diet    Follow this diet upon discharge: Orders Placed This Encounter      Regular Diet Adult / Diabetic       Significant Results and Procedures   Most Recent 3 CBC's:Recent Labs   Lab Test 09/30/22  0635 09/29/22  1111 06/21/22  1000   WBC 5.6 5.0 4.7   HGB 10.2* 10.8* 10.5*   MCV 97 98 91   * 118* 143*     Most Recent 3 BMP's:Recent Labs   Lab Test 09/30/22  0826 09/30/22  0635 09/30/22  0306 09/29/22 2132 09/29/22  1111 06/21/22  1000   NA  --  138  --   --  135 138   POTASSIUM  --  4.0  --   --  4.2 4.3   CHLORIDE  --  105  --   --  102 109   CO2  --  27  --   --  23 23   BUN  --  26  --   --  28 28   CR  --  1.09  --   --  1.47* 1.23   ANIONGAP  --  6  --   --  10 6   EDITH  --  8.9  --   --  9.3 8.9   * 102* 101*   < > 379* 205*    < > = values in this interval not displayed.     Most Recent 3 Troponin's:Recent Labs   Lab Test 10/01/19  2121 10/01/19  1745 10/01/19  1209 06/11/18  0040 06/10/18  2103   TROPI 0.071* 0.075* 0.069*   < >  --    TROPONIN  --   --   --   --  0.07    < > = values in this interval not displayed.   ,   Results for orders placed or performed in visit on 09/28/22   Cardiac Device Check - Remote     Value    Date Time Interrogation Session [id number]    Implantable Pulse Generator  [employer]    Implantable Pulse Generator Model G447 RESONATE X4 CRT-D    Implantable Pulse Generator Serial Number [id number]    Type Interrogation Session Remote     Clinic Name [facility]    Implantable Pulse Generator Type Cardiac Resynchronization Therapy - Defibrillator    Implantable Pulse Generator Implant Date 20191004     Implantable Lead  [employer]    Implantable Lead Model 0292 [place] 4-Site SG    Implantable Lead Serial Number [id number]    Implantable Lead Implant Date 20191004    Implantable Lead Polarity Type Bipolar Lead    Implantable Lead Location Detail 1 UNKNOWN    Implantable Lead Location Right Ventricle    Implantable Lead  [employer]    Implantable Lead Model 4677 Acuity X4 Spiral Long    Implantable Lead Serial Number [id number]    Implantable Lead Implant Date 20191004    Implantable Lead Polarity Type Quadripolar Lead    Implantable Lead Location Detail 1 UNKNOWN    Implantable Lead Location Left Ventricle    Implantable Lead  [employer]    Implantable Lead Model 7741 Ingevity MRI    Implantable Lead Serial Number [id number]    Implantable Lead Implant Date 20191004    Implantable Lead Polarity Type Bipolar Lead    Implantable Lead Location Detail 1 UNKNOWN    Implantable Lead Location Right Atrium    Jemal Setting Mode (NBG Code) DDDR    Jemal Setting Lower Rate Limit 60    Jemal Setting Maximum Tracking Rate 130    Jemal Setting Maximum Sensor Rate 130    Jemal Setting DESTINEY Delay Low 190    Jemal Setting PAV Delay Low 270    Jemal Setting PAV Delay High 200    Jemal Setting DESTINEY Delay High 140    Jemal Setting AT Mode Switch Rate 170    Jemal Setting AT Mode Switch Mode VDIR    Lead Channel Setting Sensing Polarity Bipolar    Lead Channel Setting Sensing Sensitivity 0.25    Lead Channel Setting Sensing Adaptation Mode Adaptive    Lead Channel Setting Sensing Polarity Bipolar    Lead Channel Setting Sensing Sensitivity 0.5    Lead Channel Setting Sensing Adaptation Mode Adaptive    Lead Channel Setting Sensing Anode Location Left Ventricle    Lead Channel Setting Sensing Anode Terminal Ring2    Lead Channel Setting Sensing Cathode Location Left Ventricle    Lead Channel Setting Sensing Cathode Terminal Tip    Lead Channel Setting Sensing Sensitivity 1.0    Lead Channel  Setting Sensing Adaptation Mode Adaptive    Ventricular chambers paced during CRT pacing. BiV    CRT LV-RV Delay 35    Lead Channel Setting Pacing Polarity Bipolar    Lead Channel Setting Pacing Pulse Width 0.4    Lead Channel Setting Pacing Amplitude 2.0    Lead Channel Setting Pacing Capture Mode Adaptive    Lead Channel Setting Pacing Polarity Bipolar    Lead Channel Setting Pacing Pulse Width 0.4    Lead Channel Setting Pacing Amplitude 2.0    Lead Channel Setting Pacing Capture Mode Adaptive    Lead Channel Setting Pacing Anode Location Other    Lead Channel Setting Pacing Anode Terminal Can    Lead Channel Setting Sensing Cathode Location Left Ventricle    Lead Channel Setting Sensing Cathode Terminal Ring2    Lead Channel Setting Pacing Pulse Width 0.5    Lead Channel Setting Pacing Amplitude 2.4    Lead Channel Setting Pacing Capture Mode Adaptive    Zone Setting Type Category VF    Zone Setting Vendor Type Category VF    Zone Setting Detection Interval 250    Zone Setting Type Category VT    Zone Setting Vendor Type Category VT    Zone Setting Detection Interval 333    Zone Setting Type Category VT    Zone Setting Vendor Type Category VT-1    Zone Setting Detection Interval 375    Lead Channel Impedance Value 755    Lead Channel Pacing Threshold Amplitude 1.0    Lead Channel Pacing Threshold Pulse Width 0.5    Lead Channel Impedance Value 742    Lead Channel Pacing Threshold Amplitude 0.5    Lead Channel Pacing Threshold Pulse Width 0.4    Lead Channel Impedance Value 514    Lead Channel Pacing Threshold Amplitude 0.7    Lead Channel Pacing Threshold Pulse Width 0.4    Battery Date Time of Measurements [id number]    Battery Status Beginning of Service    Battery Remaining Longevity 96    Battery Remaining Percentage 100    Capacitor Charge Type Reformation    Capacitor Last Charge Date Time 58232539451201    Capacitor Charge Time 9.9    Capacitor Charge Type Shock    Capacitor Last Charge Date Time  [id number]    Capacitor Charge Time 9.8    Capacitor Charge Energy 41    Jemal Statistic Date Time Start 20220114000000    Jemal Statistic Date Time End 20220928000000    Jemal Statistic RA Percent Paced 45    Jemal Statistic RV Percent Paced 98    CRT Statistic LV Percent Paced 98    CRT Statistic Date Time Start 20220114000000    CRT Statistic Date Time End 20220928000000    Atrial Tachy Statistic Date Time Start 20220116000000    Atrial Tachy Statistic Date Time End 20220927000000    Atrial Tachy Statistic AT/AF [place] Percent 0    Therapy Statistic Recent Shocks Delivered 0    Therapy Statistic Recent Shocks Aborted 0    Therapy Statistic Recent ATP Delivered 0    Therapy Statistic Recent Date Time Start 20220114000000    Therapy Statistic Recent Date Time End 20220928000000    Therapy Statistic Total Shocks Delivered 1    Therapy Statistic Total Shocks Aborted 0    Therapy Statistic Total ATP Delivered 4    Therapy Statistic Total  Date Time Start 20191004000000    Therapy Statistic Total  Date Time End 20220928000000    Episode Statistic Recent Count 0    Episode Statistic Type Category AT/AF    Episode Statistic Vendor Type Category AF    Episode Statistic Recent Count 1    Episode Statistic Type Category Other    Episode Statistic Recent Count 2    Episode Statistic Type Category VT    Episode Statistic Vendor Type Category NSVT    Episode Statistic Recent Count 0    Episode Statistic Type Category VF    Episode Statistic Vendor Type Category VF    Episode Statistic Recent Count 0    Episode Statistic Type Category VT    Episode Statistic Vendor Type Category VT    Episode Statistic Recent Count 0    Episode Statistic Type Category VT    Episode Statistic Vendor Type Category VT-1    Episode Statistic Recent Date Time Start 20220114000000    Episode Statistic Recent Date Time End 20220928000000    Episode Statistic Recent Date Time Start 20220114000000    Episode Statistic Recent Date Time End  20220928000000    Episode Statistic Recent Date Time Start 20220114000000    Episode Statistic Recent Date Time End 20220928000000    Episode Statistic Recent Date Time Start 20220114000000    Episode Statistic Recent Date Time End 20220928000000    Episode Statistic Recent Date Time Start 20220114000000    Episode Statistic Recent Date Time End 20220928000000    Episode Statistic Recent Date Time Start 20220114000000    Episode Statistic Recent Date Time End 20220928000000    Episode Identifier [id number]    Episode Type Category Periodic EGM    Episode Date Time 20220928000700    Episode Identifier [id number]    Episode Type Category Other    Episode Date Time 20220927140400    Episode Identifier [id number]    Episode Type Category Other    Episode Date Time 20220927140300    Episode Identifier [id number]-315    Episode Type Category Other    Episode Date Time 20220926180100    Narrative    [employer] Resonate (CRT) ICD Remote Device Check  AP: 45  %    BVP: 98 %    Mode: DDDR     Presenting Rhythm: AP/BVP with PVC    Heart Rate: stable, good variability   rates primarily 60-70  Sensing: WNL   Pacing Threshold: WNL    Impedance: WNL  LV not obtained  Battery Status: 8 yrs estimated longevity, 9.9 second charge time     Atrial Arrhythmia: 0    Ventricular Arrhythmia: 1 episode, occurred 9/14/2022, EGM shows V's greater than A's for 20 beats of NSVT at  bpm.     ATP: 0    Shocks: 0    Care Plan: Scheduled remote 12/28/2022.  Pt has OV with Dr. Dorman 10/10/2022. Called pt with results and plan.   GF RN  I have reviewed and interpreted the device interrogation, settings, programming and nurse's summary. The device is functioning within normal device parameters. I agree with the current findings, assessment and plan.       Discharge Medications   Discharge Medication List as of 9/30/2022 11:54 AM      CONTINUE these medications which have NOT CHANGED    Details   acetaminophen (TYLENOL) 500 MG tablet Take 1,000 mg  by mouth every morning, Historical      amiodarone (PACERONE) 200 MG tablet Take 1 tablet (200 mg) by mouth daily, Disp-90 tablet, R-1, E-Prescribe      aspirin 81 MG tablet Take 1 tablet (81 mg) by mouth daily, Disp-30 tablet, OTC      atorvastatin (LIPITOR) 20 MG tablet Take 1 tablet (20 mg) by mouth daily, Disp-90 tablet, R-3, E-Prescribe      betamethasone dipropionate (DIPROSONE) 0.05 % external cream Apply topically 2 times daily Apply sparingly once or twice per day as needed to affected area until the skin is better, then stop; REPEAT AS NEEDEDDisp-30 g, A-3N-Yuimqohve      fenofibrate (TRIGLIDE/LOFIBRA) 160 MG tablet Take 1 tablet (160 mg) by mouth daily, Disp-90 tablet, R-1, E-PrescribePROFILE FOR FUTURE REFILLS UNTIL PATIENT CALLS FOR REFILLS      furosemide (LASIX) 20 MG tablet Take 60 mg by mouth daily, Historical      glipiZIDE (GLUCOTROL XL) 10 MG 24 hr tablet TAKE 1 TABLET (10 MG) BY MOUTH DAILY**TAKE WITH LARGEST MEAL OF THE DAY. ALWAYS TAKE WITH FOOD, Disp-90 tablet, R-0, E-PrescribeMedication filled 1 time as pt is due for a follow-up in clinic. Please have patient call [phone] to schedule.      LEVEMIR FLEXTOUCH 100 UNIT/ML pen Inject 25 Units Subcutaneously daily, Disp-30 mL, R-0, E-Prescribe      levothyroxine (SYNTHROID/LEVOTHROID) 50 MCG tablet Take 1 tablet (50 mcg) by mouth daily, Disp-90 tablet, R-0, E-Prescribe      liraglutide (VICTOZA PEN) 18 MG/3ML solution Inject 1.8 mg Subcutaneous daily, Disp-27 mL, R-1, E-PrescribePROFILE FOR FUTURE REFILLS UNTIL PATIENT CALLS FOR REFILLS      metFORMIN (GLUCOPHAGE) 1000 MG tablet Take 1 tablet (1,000 mg) by mouth 2 times daily (with meals), Disp-180 tablet, R-1, E-PrescribePROFILE FOR FUTURE REFILLS UNTIL PATIENT CALLS FOR REFILLS      metoprolol succinate ER (TOPROL XL) 25 MG 24 hr tablet Take 1 tablet (25 mg) by mouth daily, Disp-90 tablet, R-2, E-Prescribe      omeprazole (PRILOSEC) 40 MG DR capsule Take 1 capsule (40 mg) by mouth daily,  Disp-90 capsule, R-1, E-Prescribe      predniSONE (DELTASONE) 20 MG tablet Take 1.5 tablets (30 mg) by mouth daily, Disp-135 tablet, R-1, E-Prescribe      sacubitril-valsartan (ENTRESTO) 49-51 MG per tablet Take 1 tablet by mouth 2 times daily, Disp-180 tablet, R-1, E-Prescribe      sulfamethoxazole-trimethoprim (BACTRIM) 400-80 MG tablet Take 1 tablet by mouth daily, Disp-90 tablet, R-1, E-Prescribe      albuterol (PROAIR HFA/PROVENTIL HFA/VENTOLIN HFA) 108 (90 Base) MCG/ACT Inhaler Inhale 2 puffs into the lungs every 6 hours as needed for shortness of breath / dyspnea or wheezing , Historical      blood glucose monitoring (ACCU-CHEK LUL PLUS) test strip Use to test blood sugar 1-3 times daily or as directed., Disp-100 each, R-11, E-Prescribe      !! Continuous Blood Gluc Sensor (FREESTYLE JOCELIN 14 DAY SENSOR) [facility] [place] USE SENSOR EVERY 14 DAYS., Disp-6 each, R-1, E-Prescribe      !! continuous blood glucose monitoring (FREESTYLE JOCELIN) sensor For use with Freestyle Jocelin Flash  for continuous monitioring of blood glucose levels. Replace sensor every 10 days., Disp-3 each, R-3, Local Print      insulin pen needle (BD ULTRA-FINE) 29G X 12.7MM miscellaneous USE ONCE DAILY WITH VICTOZA PENDisp-100 each, A-3L-Thamftwkc       !! - Potential duplicate medications found. Please discuss with provider.        Allergies   Allergies   Allergen Reactions     No Known Drug Allergies

## 2022-09-30 NOTE — CONSULTS
Consult Date: 09/29/2022    REASON FOR CONSULTATION:  Ventricular tachycardia.    REQUESTING PHYSICIAN:  Dr. Desai    HISTORY OF PRESENT ILLNESS:  Mr. Rhodes is a 62-year-old white male with history of nonischemic cardiomyopathy.  He had a [employer] CRT-D implanted in 2019 for primary prevention of sudden cardiac death.  He subsequently had recurrent VT and was treated with amiodarone.  He was also suspected to have cardiac sarcoidosis and was evaluated at the [facility].  He has been put on steroid therapy, which seemed to have no significant impact on his ventricular tachycardia.  The patient has been on amiodarone for prevention of recurrent VT before this admission.  He collapsed at work for unknown duration.  When he was brought to the ER, he was in sinus rhythm.  However, the ICD interrogation showed that he was in VT around the time of the loss of consciousness.  The VT heart rate was about 162 beats per minute.  That heart rate was below the ICD detection and treatment heart rate.  Therefore, the arrhythmia was not detected or treated by the ICD.  Since admission, he has been relatively stable with frequent PVCs, but no recurrent VT.    PAST MEDICAL HISTORY:  Hypertension, gout, diverticulosis, type 2 diabetes and obesity.    FAMILY HISTORY:  Noncontributory to cardiomyopathy.    SOCIAL HISTORY:  He is FULL CODE.  He is .  He does not smoke and denies alcohol abuse.    REVIEW OF SYSTEMS:  Comprehensive review of the systems showed no fever, cough or diarrhea.    CURRENT MEDICATIONS:  Amiodarone 200 mg p.o. daily, Toprol-XL 25 mg p.o. daily, Protonix 40 mg p.o. daily, Deltasone 30 mg p.o. daily.    ALLERGIES:  NONE.    PHYSICAL EXAMINATION:    GENERAL:  He is alert and oriented with no acute distress.  VITAL SIGNS:  Blood pressure 107/65, heart rate 64 beats per minute, temperature 98.3 degrees, oxygen saturation 95% on room air.  HEENT:  The eyes and ENT were  unremarkable.  SKIN:  There was no skin rash or lymph node enlargement.  NECK:  Jugular vein was not elevated.  LUNGS:  No crackles or wheezing.  HEART:  Rhythm was regular and heart sounds were remote.  There was no murmur.  ABDOMEN:  Severe obesity.  EXTREMITIES:  No pedal edema.  NEUROLOGIC:  No focal deficit.    ASSESSMENT AND RECOMMENDATIONS:  Mr. Rhodes is a 62-year-old white male with nonischemic cardiomyopathy.  He presented with syncope and he had recurrent VT while on amiodarone.  The VT was not detectable or treated by the ICD.  I will have the ICD reprogrammed by  lowering the detection rate from 180 to 150 beats per minute.  He will continue the other medications for the time being.  The patient has an appointment to see Dr. Dorman on 10/10.  During that time, they can discuss possible VT ablation.    Thierry Stewart MD        D: 2022   T: 2022   MT: [id number]    Name:     JACI RHODES  MRN:      [id number]        Account:      [id number]   :      1960           Consult Date: 2022     Document: [id number]    cc:  Bello Rubio MD

## 2022-09-30 NOTE — PROGRESS NOTES
62 year old white male with nonischemic cardiomyopathy, presented for syncope.   He had a [employer] CRTD implanted in 2019.  He has had recurrent VT and was on amiodarone before this admission.  Diagnosis of cardiac sarcoidosis, on prednisone. EF 25-30% in 12/2021.  ICD interrogation showed untreated VT at 162 bpm.  ICD VT detection was set at 180 bpm for VT detection.  History of hypertension, gout, diverticulosis, type II diabetes, obesity.    Will reprogram ICD by lowering VT detection to 150 bpm.  Continue current medical therapy.  Pt has an appointment to see Dr. Dorman on 10-. May discuss VT ablation at that time.

## 2022-09-30 NOTE — H&P
"[facility]    History and Physical - Hospitalist Service       Date of Admission:  9/29/2022  Dictation #: [id number]  Brief Summary (see dictation for more details): 62 year old with Non ischemic cardiomyopathy, cardiac sarcoidosis, prior hx of v tach with PPM/ICD presents with syncope and interrogation of cardiac device showing slow vtach being admitted for EP evaluation.      Clinically Significant Risk Factors Present on Admission                # Thrombocytopenia: Plts = 118 10e3/uL (Ref range: 150 - 450 10e3/uL) on admission, will monitor for bleeding   # Chronic systolic heart failure: echo within the past year with EF <40%    # DMII: A1C = 7.6 % (Ref range: 0.0 - 5.6 %) within past 3 months  # Obesity: Estimated body mass index is 37.8 kg/m  as calculated from the following:    Height as of this encounter: 1.803 m (5' 11\").    Weight as of this encounter: 122.9 kg (271 lb).          Denis Desai MD  Hospitalist Service  [facility]  Securely message with the Vocera Web Console (learn more here)  Text page via [facility] Paging/Directory       "

## 2022-09-30 NOTE — PROGRESS NOTES
RECEIVING UNIT ED HANDOFF REVIEW    ED Nurse Handoff Report was reviewed by: Mary Noe RN on September 29, 2022 at 7:05 PM

## 2022-09-30 NOTE — H&P
Admitted: 09/29/2022    PRIMARY CARE PHYSICIAN:  Dr. Jefry Harris.    PRIMARY CARDIOLOGIST: Dr. Jayy Dorman.     CHIEF COMPLAINT:  Syncopal episode.    HISTORY OF PRESENT ILLNESS:  Neymar Rhodes is 62 with history of cardiac sarcoid, who has a chronic pacemaker and ICD, followed by electrophysiology, presents to [facility] after a syncopal episode.  The patient has had prior syncopal episode in the past for which his ICD did not appropriately shock.  This was adjusted and the patient has not had any problems until this morning.  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  He was not found by anyone.  He woke up about 20 minutes later and called his colleagues who came and the patient was brought into [facility] for further assessment.    In the Emergency Department, the patient was seen by Dr. Dony Barajas.  The patient had stable vital signs, was afebrile, normal oxygen saturation, stable blood pressures.  Blood work revealed normal electrolytes, BUN 20, creatinine was 1.47 with calculated GFR 53, which is slightly below his baseline.  His troponin was within normal limits.  Glucose was 379.  White count 5, hemoglobin 10.8, platelets 118.  COVID test was negative.  A 12-lead ECG showed atrial sensed and ventricular paced rhythm with occasional PVCs.  He did undergo device interrogation, which showed slow V-tach prior to his syncope and I believe this was below the rate of 160.  The patient is now hemodynamically stable and is being admitted under observation status for further EP evaluation and possible ICD adjustment.    PAST MEDICAL HISTORY:    1.  History of ascending aortic dilatation.  2.  History of colonic diverticulosis.  3.  Hypertension.   4.  Chronic left bundle branch block.  5.  Morbid obesity.   6.  Nonischemic  cardiomyopathy.  7.  History of non-rheumatic mitral valve regurgitation.  8.  History of NSTEMI.  9.  Hyperlipidemia.  10.  Paroxysmal ventricular tachycardia.  11.  Type 2 diabetes.    PAST SURGICAL HISTORY:    1.  Coronary angiogram.  2.  History of right heart catheterization.  3.  Endobronchial flexible ultrasound.  4.  History of ICD placement.  5.  History of colovesicular fistula.  6.  Appendectomy.    FAMILY HISTORY:  Positive for asthma in son, breast cancer in mother and sister, cancer in father.    SOCIAL HISTORY:  No tobacco.  Positive for alcohol.  No IV or recreational drug use.    ALLERGIES:  NO KNOWN DRUG ALLERGIES.    CURRENT MEDICATIONS:  Include amiodarone, aspirin, atorvastatin, fenofibrate, Glipizide, Levemir, levothyroxine, liraglutide, metformin, metoprolol, prednisone, Entresto, Bactrim, Tylenol, albuterol, Lasix.    REVIEW OF SYSTEMS:  Ten points reviewed and as dictated in history of present illness.  The patient denies any chest pain, shortness of breath, diarrhea, fevers, chills, cough production, headache, focal neurologic complaints.  Otherwise, 10 points reviewed and otherwise negative.    PHYSICAL EXAMINATION:    VITAL SIGNS:  Temperature 97.9, heart rate 68, respirations 16, blood pressure 97/55, sats 100% on room air.  GENERAL:  The patient is a very pleasant 62-year-old gentleman who is resting comfortably.  HEENT:  Normocephalic, atraumatic, pupils equal.  Mucous membranes are moist.  No facial asymmetry.  NECK:  No neck vein elevation.  PULMONARY:  Lungs are clear to auscultation.  CARDIOVASCULAR:  S1, S2, regular rate and rhythm.  GASTROINTESTINAL:  Abdomen is obese, soft, nontender, normoactive bowel sounds.  MUSCULOSKELETAL:  Shows no edema.  NEUROLOGIC:  He is grossly nonfocal.  Cranial nerves grossly intact.  SKIN:  Warm, dry, well perfused.  PSYCHIATRIC:  Mood and affect pleasant and cooperative.    LABORATORY STUDIES:  As dictated in history of present  illness.    ASSESSMENT:  Neymar Rhodes is 62 with a history of nonischemic cardiomyopathy, cardiac sarcoidosis who has ICD and permanent pacemaker, who has had prior history of syncope with failure of ICD shock, who now presents with another episode of presyncope with ICD device check showing slow AFib prior to his syncope at a rate of 160, that was too low for the ICD to engage, who is now hemodynamically stable, being admitted for further EP evaluation.    PLAN:     1.  Syncope with slow ventricular tachycardia in the setting of a nonischemic cardiomyopathy and cardiac sarcoidosis.  The patient will be made n.p.o.  The patient will be seen by cardiac electrophysiology. Patient will be monitored on telemetry overnight.  The patient will be continued on his amiodarone and metoprolol.  2.  Cardiac sarcoidosis.  The patient is followed at the [facility] for this. Patient is on chronic prednisone of 30 mg as well as Bactrim prophylaxis, which we will continue.  3.  Diabetes:  The patient is on Levemir as well as Victoza, glipizide and metformin.  We will hold all of his diabetic medications and give him 10 units of Levemir in the morning.  We will perform Accu-Cheks every 4 hours and cover with sliding scale insulin as the patient is n.p.o. for possible procedure in the morning.  4.  Reflux disease.  The patient continues omeprazole.  5.  History of nonischemic cardiomyopathy.  The patient is on Entresto, which we will hold, as well as his Lasix.  6.  Hypothyroidism.  We will continue the patient on levothyroxine.  7.  DVT prophylaxis.       DEEP VENOUS THROMBOSIS PROPHYLAXIS:  The patient received compression boots.    CODE STATUS:  FULL.    Denis Desai MD        D: 2022   T: 2022   MT: [facility]    Name:     NEYMAR RHODES  MRN:      [id number]        Account:     [id number]   :      1960           Admitted:    2022       Document: [id number]    cc:  Jefry CELESTE  MD Jayy Harris MD

## 2022-10-04 NOTE — TELEPHONE ENCOUNTER
What type of discharge? Observation  Risk of Hospital admission or ED visit: 19%  Is a TCM episode required? Yes  When should the patient follow up with PCP? 14 days of discharge.        Maureen Jackson RN    [facility]

## 2022-10-05 NOTE — TELEPHONE ENCOUNTER
Patient was admitted to [facility] on 9/29/22 after having a syncopal episode at work. NSR upon admission to ED, but CRT-D interrogation showed VT-rates 160's bpm that coincides with time of syncopal episode. VT rate was below programmed ICD detection rate, therefore VT was not detected or treated by the ICD. No recurrent VT during admission, but frequent PVC's. ICD detection rate was reprogrammed from 180 to 150 BPM.    PMH: nonischemic cardiomyopathy, CRT-D implanted in 2019 for primary prevention of sudden cardiac death, recurrent VT and was treated with Amiodarone, cardiac sarcoidosis-evaluated at the [facility] and started on steroid therapy-no significant impact on his VT, HTN, gout, DM2, obesity and diverticulosis.    No medication changes made at time of discharge.    Called patient to discuss any post hospital d/c questions he may have and confirm f/u appts, but phone answered by his wife and pt is at work. She states pt has not had any further syncopal episodes or concerns.    RN confirmed with wife that patient is scheduled for an ICD check on 10/10/22 at 1400, followed with an OV at 1545 with Dr. Dorman at our [facility].     She verbalized understanding and has no further questions or concerns. KAZ Mata RN.

## 2022-10-06 NOTE — TELEPHONE ENCOUNTER
Have him come to see us in the office, OK to place him in for an office visit into any open spot (OK to use virtual or same day spots) next week for hospital follow up.

## 2022-10-07 NOTE — TELEPHONE ENCOUNTER
ED / Discharge Outreach Protocol    Patient Contact    Attempt # 2    Was call answered?  No.  Left message on voicemail with information to call me back.    Upon call back, please complete hospital follow-up and schedule an appointment with Dr. Harris (see note below).     2 attempts made to reach the patient. Closing encounter.     Rosana Lozano RN

## 2022-10-10 NOTE — LETTER
10/10/2022    Jefry Harris MD  [address] [facility] [address]    RE: Neymar Rhodes       Dear Colleague,     I had the pleasure of seeing Neymar Rhodes in the [facility].  HPI and Plan:   See dictation  [id number]  Today's clinic visit entailed:  The following tests were independently interpreted by me as noted in my documentation: PET, device interrogration  45 minutes spent on the date of the encounter doing chart review, history and exam, documentation and further activities per the note  Provider  Link to MDM Help Grid     The level of medical decision making during this visit was of moderate complexity.      Orders Placed This Encounter   Procedures     EKG 12-lead complete w/read - Clinics (performed today)       Orders Placed This Encounter   Medications     amiodarone (PACERONE) 400 MG tablet     Sig: Take 1 tablet (400 mg) by mouth 2 times daily For 1 week then one tablet (400 mg) daily     Dispense:  90 tablet     Refill:  3       There are no discontinued medications.      Encounter Diagnosis   Name Primary?     Ventricular tachycardia Yes       CURRENT MEDICATIONS:  Current Outpatient Medications   Medication Sig Dispense Refill     acetaminophen (TYLENOL) 500 MG tablet Take 1,000 mg by mouth every morning       albuterol (PROAIR HFA/PROVENTIL HFA/VENTOLIN HFA) 108 (90 Base) MCG/ACT Inhaler Inhale 2 puffs into the lungs every 6 hours as needed for shortness of breath / dyspnea or wheezing        amiodarone (PACERONE) 200 MG tablet Take 1 tablet (200 mg) by mouth daily 90 tablet 1     amiodarone (PACERONE) 400 MG tablet Take 1 tablet (400 mg) by mouth 2 times daily For 1 week then one tablet (400 mg) daily 90 tablet 3     aspirin 81 MG tablet Take 1 tablet (81 mg) by mouth daily 30 tablet      atorvastatin (LIPITOR) 20 MG tablet Take 1 tablet (20 mg) by mouth daily (Patient taking differently: Take 1 tablet (20 mg) by mouth At Bedtime) 90 tablet 3      betamethasone dipropionate (DIPROSONE) 0.05 % external cream Apply topically 2 times daily Apply sparingly once or twice per day as needed to affected area until the skin is better, then stop; REPEAT AS NEEDED 30 g 1     blood glucose monitoring (ACCU-CHEK LUL PLUS) test strip Use to test blood sugar 1-3 times daily or as directed. 100 each 11     Continuous Blood Gluc Sensor (FREESTYLE JOCELIN 14 DAY SENSOR) [facility] USE SENSOR EVERY 14 DAYS. 6 each 1     continuous blood glucose monitoring (FREESTYLE JOCELIN) sensor For use with Freestyle Jocelin Flash  for continuous monitioring of blood glucose levels. Replace sensor every 10 days. 3 each 3     fenofibrate (TRIGLIDE/LOFIBRA) 160 MG tablet Take 1 tablet (160 mg) by mouth daily Due for appointment. Please schedule 90 tablet 0     furosemide (LASIX) 20 MG tablet Take 60 mg by mouth daily       glipiZIDE (GLUCOTROL XL) 10 MG 24 hr tablet TAKE 1 TABLET (10 MG) BY MOUTH DAILY**TAKE WITH LARGEST MEAL OF THE DAY. ALWAYS TAKE WITH FOOD 90 tablet 0     insulin pen needle (BD ULTRA-FINE) 29G X 12.7MM miscellaneous USE ONCE DAILY WITH VICTOZA  each 0     levothyroxine (SYNTHROID/LEVOTHROID) 50 MCG tablet Take 1 tablet (50 mcg) by mouth daily 90 tablet 0     liraglutide (VICTOZA PEN) 18 MG/3ML solution Inject 1.8 mg Subcutaneous daily 27 mL 1     metFORMIN (GLUCOPHAGE) 1000 MG tablet Take 1 tablet (1,000 mg) by mouth 2 times daily (with meals) 180 tablet 1     metoprolol succinate ER (TOPROL XL) 25 MG 24 hr tablet Take 1 tablet (25 mg) by mouth daily 90 tablet 2     omeprazole (PRILOSEC) 40 MG DR capsule Take 1 capsule (40 mg) by mouth daily (Patient taking differently: Take 1 capsule (40 mg) by mouth daily as needed) 90 capsule 1     predniSONE (DELTASONE) 20 MG tablet Take 1.5 tablets (30 mg) by mouth daily 135 tablet 1     sacubitril-valsartan (ENTRESTO) 49-51 MG per tablet Take 1 tablet by mouth 2 times daily 180 tablet 1     sulfamethoxazole-trimethoprim  (BACTRIM) 400-80 MG tablet Take 1 tablet by mouth daily 90 tablet 1     insulin detemir (LEVEMIR FLEXTOUCH) 100 UNIT/ML pen Inject 35 Units Subcutaneous daily 30 mL 0       ALLERGIES     Allergies   Allergen Reactions     No Known Drug Allergies        PAST MEDICAL HISTORY:  Past Medical History:   Diagnosis Date     Ascending aorta dilatation (H)      Diverticulosis of colon (without mention of hemorrhage)      Essential hypertension, benign      Gout, unspecified      LBBB (left bundle branch block)      Morbid obesity (H)      Non-ischemic cardiomyopathy (H)      Nonrheumatic mitral valve regurgitation      NSTEMI (non-ST elevated myocardial infarction) (H)     cardiac cath 6/2018: mild non-obstructive CAD     Other and unspecified hyperlipidemia      Paroxysmal ventricular tachycardia      Type II or unspecified type diabetes mellitus without mention of complication, not stated as uncontrolled        PAST SURGICAL HISTORY:  Past Surgical History:   Procedure Laterality Date     CV CORONARY ANGIOGRAM N/A 10/2/2019    Procedure: Coronary Angiogram;  Surgeon: Kathy Almaguer MD;  Location:  HEART CARDIAC CATH LAB     CV LEFT HEART CATH N/A 10/2/2019    Procedure: Left Heart Cath;  Surgeon: Kathy Almaguer MD;  Location: [facility] CARDIAC CATH LAB     CV RIGHT HEART CATH MEASUREMENTS RECORDED N/A 10/2/2019    Procedure: Right Heart Cath;  Surgeon: Kathy Almaguer MD;  Location: [facility] CARDIAC CATH LAB     ENDOBRONCHIAL ULTRASOUND FLEXIBLE N/A 12/19/2019    Procedure: Flexible bronchoscopy, endobronchial ultrasound, bronchial alveolar lavage;  Surgeon: Ranulfo Barcenas MD;  Location: [facility] OR     EP ICD N/A 10/4/2019    Procedure: EP ICD;  Surgeon: Jayy Dorman MD;  Location:  HEART CARDIAC CATH LAB     EP LEAD PLCMNT LV NEW ICD N/A 10/4/2019    Procedure: EP Lead Plcmnt LV New ICD;  Surgeon: Jayy Dorman MD;  Location: [facility] CARDIAC CATH LAB     HEART CATH CORONARY ANGIOGRAM  "WITHOUT PRESSURES  06/10/2018    normal coronary angiogram, nothing more than 10%     SURGICAL HISTORY OF -   2001    repair of colovesicular fistula     ZZC APPENDECTOMY  1980's       FAMILY HISTORY:  Family History   Problem Relation Age of Onset     Cancer Father 75        bladder cancer     Myocardial Infarction Father      Other - See Comments Father         smoking     Breast Cancer Mother      Breast Cancer Sister      Family History Negative Brother      Family History Negative Son      Family History Negative Son         fluttering/murmur     Asthma Son      Colon Cancer No family hx of        SOCIAL HISTORY:  Social History     Socioeconomic History     Marital status:      Spouse name: None     Number of children: None     Years of education: None     Highest education level: None   Tobacco Use     Smoking status: Never     Smokeless tobacco: Never   Substance and Sexual Activity     Alcohol use: Yes     Comment: once awhile     Drug use: No     Sexual activity: Yes     Partners: Female       Review of Systems:  Skin:          Eyes:         ENT:         Respiratory:          Cardiovascular:         Gastroenterology:        Genitourinary:         Musculoskeletal:         Neurologic:         Psychiatric:         Heme/Lymph/Imm:         Endocrine:           Physical Exam:  Vitals: BP 99/60   Pulse 72   Ht 1.803 m (5' 11\")   Wt 123.4 kg (272 lb)   SpO2 99%   BMI 37.94 kg/m      Constitutional:  cooperative, alert and oriented, well developed, well nourished, in no acute distress obese      Skin:  warm and dry to the touch, no apparent skin lesions or masses noted   ICD incision in the left infraclavicular area was well-healed      Head:  normocephalic, no masses or lesions        Eyes:  pupils equal and round, conjunctivae and lids unremarkable, sclera white, no xanthalasma, EOMS intact, no nystagmus        Lymph:      ENT:  no pallor or cyanosis        Neck:  carotid pulses are full and equal " bilaterally, JVP normal, no carotid bruit        Respiratory:  normal breath sounds, clear to auscultation, normal A-P diameter, normal symmetry, normal respiratory excursion, no use of accessory muscles         Cardiac: regular rhythm, normal S1/S2, no S3 or S4, apical impulse not displaced, no murmurs, gallops or rubs                pulses full and equal, no bruits auscultated                                        GI:  abdomen soft, non-tender, BS normoactive, no mass, no HSM, no bruits obese      Extremities and Muscular Skeletal:  no deformities, clubbing, cyanosis, erythema observed              Neurological:  no gross motor deficits        Psych:  Alert and Oriented x 3        CC  No referring provider defined for this encounter.                Service Date: 10/10/2022    HISTORY OF PRESENT ILLNESS:  Thank you for allowing me to participate in the care of your delightful patient.  As you know, Neymar is a 62-year-old gentleman with a history of sarcoidosis including cardiac involvement and recurrent VT in addition to severe cardiomyopathy.  He has been followed by Dr. Roberto Batres at the [facility].  I have been following Neymar for his recurrent VT and ventricular fibrillation despite being on amiodarone.  In light of that, I asked him to see Dr. De Guzman, one of our EP colleagues over at the [facility], for his opinion.  Dr. De Guzman would recommend ablation provided sarcoidosis is not active.  Unfortunately, it has been quite difficult to treat his sarcoidosis, as the most recent PET scan was done this past June and continues to show multiple areas of active sarcoidosis including mid/basal myocardial septum, anterior wall and inferior wall, compared to the one done this past February.  Additionally, a PET scan also showed cirrhotic liver along with a small amount of ascites.    I last saw Neymar this past January.  Unfortunately, a few weeks ago, he was hospitalized at [facility] for syncope due to slow VT at a rate 160 beats per minute, below the VT detection rate.  He was taking amiodarone 200 mg once a day.  As he already had an appointment to see me today, no medication adjustment was made, aside from lowering the treatment zone to 150 beats per minute.    Neymar otherwise has no question or concerns.  He understands that he cannot drive in the meantime because of his recurrent VT.    Neymar does have an appointment with Dr. Batres in a month or so, and he would like to wait to talk to Dr. Batres prior to making a decision whether to proceed with the ablation.  I am not sure whether we can \"stabilize\" his cardiac sarcoidosis to the point that an ablation can proceed, and therefore, there may be no other choice except to proceed with the ablation.  It will be an extensive ablation given the amount of sarcoidosis as described above and certainly could be quite risky, as the patient could go into ventricular arrhythmia refractory to medical therapy or intervention at that time.  I told Neymar that if Dr. Batres has no other option except to treat his sarcoidosis, then I feel comfortable enough to proceed with the VT ablation at our institution under general anesthesia.  In the meantime, I would like him to go back to amiodarone loading dose at 400 mg twice a day for 1 week and afterwards go to 400 mg once a day.    Jayy Dorman MD        D: 10/10/2022   T: 10/10/2022   MT:     Name:     NEYMAR WICK  MRN:      [id number]        Account:      [id number]   :      1960           Service Date: 10/10/2022       Document: [id number]      Thank you for allowing me to participate in the care of your patient.      Sincerely,     Jayy Pratt MD     [facility]  cc:   No referring provider defined for this encounter.        "

## 2022-10-10 NOTE — PROGRESS NOTES
HPI and Plan:   See dictation  [id number]  Today's clinic visit entailed:  The following tests were independently interpreted by me as noted in my documentation: PET, device interrogration  45 minutes spent on the date of the encounter doing chart review, history and exam, documentation and further activities per the note  Provider  Link to [facility] Help Grid     The level of medical decision making during this visit was of moderate complexity.      Orders Placed This Encounter   Procedures     EKG 12-lead complete w/read - Clinics (performed today)       Orders Placed This Encounter   Medications     amiodarone (PACERONE) 400 MG tablet     Sig: Take 1 tablet (400 mg) by mouth 2 times daily For 1 week then one tablet (400 mg) daily     Dispense:  90 tablet     Refill:  3       There are no discontinued medications.      Encounter Diagnosis   Name Primary?     Ventricular tachycardia Yes       CURRENT MEDICATIONS:  Current Outpatient Medications   Medication Sig Dispense Refill     acetaminophen (TYLENOL) 500 MG tablet Take 1,000 mg by mouth every morning       albuterol (PROAIR HFA/PROVENTIL HFA/VENTOLIN HFA) 108 (90 Base) MCG/ACT Inhaler Inhale 2 puffs into the lungs every 6 hours as needed for shortness of breath / dyspnea or wheezing        amiodarone (PACERONE) 200 MG tablet Take 1 tablet (200 mg) by mouth daily 90 tablet 1     amiodarone (PACERONE) 400 MG tablet Take 1 tablet (400 mg) by mouth 2 times daily For 1 week then one tablet (400 mg) daily 90 tablet 3     aspirin 81 MG tablet Take 1 tablet (81 mg) by mouth daily 30 tablet      atorvastatin (LIPITOR) 20 MG tablet Take 1 tablet (20 mg) by mouth daily (Patient taking differently: Take 1 tablet (20 mg) by mouth At Bedtime) 90 tablet 3     betamethasone dipropionate (DIPROSONE) 0.05 % external cream Apply topically 2 times daily Apply sparingly once or twice per day as needed to affected area until the skin is better, then stop; REPEAT AS NEEDED 30 g 1     blood  glucose monitoring (ACCU-CHEK LUL PLUS) test strip Use to test blood sugar 1-3 times daily or as directed. 100 each 11     Continuous Blood Gluc Sensor (FREESTYLE JOCELIN 14 DAY SENSOR) [facility] USE SENSOR EVERY 14 DAYS. 6 each 1     continuous blood glucose monitoring (FREESTYLE JOCELIN) sensor For use with Freestyle Jocelin Flash  for continuous monitioring of blood glucose levels. Replace sensor every 10 days. 3 each 3     fenofibrate (TRIGLIDE/LOFIBRA) 160 MG tablet Take 1 tablet (160 mg) by mouth daily Due for appointment. Please schedule 90 tablet 0     furosemide (LASIX) 20 MG tablet Take 60 mg by mouth daily       glipiZIDE (GLUCOTROL XL) 10 MG 24 hr tablet TAKE 1 TABLET (10 MG) BY MOUTH DAILY**TAKE WITH LARGEST MEAL OF THE DAY. ALWAYS TAKE WITH FOOD 90 tablet 0     insulin pen needle (BD ULTRA-FINE) 29G X 12.7MM miscellaneous USE ONCE DAILY WITH VICTOZA  each 0     levothyroxine (SYNTHROID/LEVOTHROID) 50 MCG tablet Take 1 tablet (50 mcg) by mouth daily 90 tablet 0     liraglutide (VICTOZA PEN) 18 MG/3ML solution Inject 1.8 mg Subcutaneous daily 27 mL 1     metFORMIN (GLUCOPHAGE) 1000 MG tablet Take 1 tablet (1,000 mg) by mouth 2 times daily (with meals) 180 tablet 1     metoprolol succinate ER (TOPROL XL) 25 MG 24 hr tablet Take 1 tablet (25 mg) by mouth daily 90 tablet 2     omeprazole (PRILOSEC) 40 MG DR capsule Take 1 capsule (40 mg) by mouth daily (Patient taking differently: Take 1 capsule (40 mg) by mouth daily as needed) 90 capsule 1     predniSONE (DELTASONE) 20 MG tablet Take 1.5 tablets (30 mg) by mouth daily 135 tablet 1     sacubitril-valsartan (ENTRESTO) 49-51 MG per tablet Take 1 tablet by mouth 2 times daily 180 tablet 1     sulfamethoxazole-trimethoprim (BACTRIM) 400-80 MG tablet Take 1 tablet by mouth daily 90 tablet 1     insulin detemir (LEVEMIR FLEXTOUCH) 100 UNIT/ML pen Inject 35 Units Subcutaneous daily 30 mL 0       ALLERGIES     Allergies   Allergen Reactions     No Known Drug  Allergies        PAST MEDICAL HISTORY:  Past Medical History:   Diagnosis Date     Ascending aorta dilatation (H)      Diverticulosis of colon (without mention of hemorrhage)      Essential hypertension, benign      Gout, unspecified      LBBB (left bundle branch block)      Morbid obesity (H)      Non-ischemic cardiomyopathy (H)      Nonrheumatic mitral valve regurgitation      NSTEMI (non-ST elevated myocardial infarction) (H)     cardiac cath 6/2018: mild non-obstructive CAD     Other and unspecified hyperlipidemia      Paroxysmal ventricular tachycardia      Type II or unspecified type diabetes mellitus without mention of complication, not stated as uncontrolled        PAST SURGICAL HISTORY:  Past Surgical History:   Procedure Laterality Date     CV CORONARY ANGIOGRAM N/A 10/2/2019    Procedure: Coronary Angiogram;  Surgeon: Kathy Almaguer MD;  Location:  [facility]     CV LEFT HEART CATH N/A 10/2/2019    Procedure: Left Heart Cath;  Surgeon: Kathy Almaguer MD;  Location:  [facility]     CV RIGHT HEART CATH MEASUREMENTS RECORDED N/A 10/2/2019    Procedure: Right Heart Cath;  Surgeon: Kathy Almaguer MD;  Location:  HEART CARDI[facility] CATH LAB     ENDOBRONCHIAL ULTRASOUND FLEXIBLE N/A 12/19/2019    Procedure: Flexible bronchoscopy, endobronchial ultrasound, bronchial alveolar lavage;  Surgeon: Ranulfo Barcenas MD;  Location: [facility] OR     EP ICD N/A 10/4/2019    Procedure: EP ICD;  Surgeon: Jayy Dorman MD;  Location:  HE[facility] CARDI[facility] CATH LAB     EP LEAD PLCMNT LV NEW ICD N/A 10/4/2019    Procedure: EP Lead Plcmnt LV New ICD;  Surgeon: Jayy Dorman MD;  Location:  HEART CARDI[facility] CATH LAB     HEART CATH CORONARY ANGIOGRAM WITHOUT PRESSURES  06/10/2018    normal coronary angiogram, nothing more than 10%     SURGICAL HISTORY OF -   2001    repair of colovesicular fistula     ZZC APPENDECTOMY  1980's       FAMILY HISTORY:  Family History   Problem Relation Age of  "Onset     Cancer Father 75        bladder cancer     Myocardial Infarction Father      Other - See Comments Father         smoking     Breast Cancer Mother      Breast Cancer Sister      Family History Negative Brother      Family History Negative Son      Family History Negative Son         fluttering/murmur     Asthma Son      Colon Cancer No family hx of        SOCIAL HISTORY:  Social History     Socioeconomic History     Marital status:      Spouse name: None     Number of children: None     Years of education: None     Highest education level: None   Tobacco Use     Smoking status: Never     Smokeless tobacco: Never   Substance and Sexual Activity     Alcohol use: Yes     Comment: once awhile     Drug use: No     Sexual activity: Yes     Partners: Female       Review of Systems:  Skin:          Eyes:         ENT:         Respiratory:          Cardiovascular:         Gastroenterology:        Genitourinary:         Musculoskeletal:         Neurologic:         Psychiatric:         Heme/Lymph/Imm:         Endocrine:           Physical Exam:  Vitals: BP 99/60   Pulse 72   Ht 1.803 m (5' 11\")   Wt 123.4 kg (272 lb)   SpO2 99%   BMI 37.94 kg/m      Constitutional:  cooperative, alert and oriented, well developed, well nourished, in no acute distress obese      Skin:  warm and dry to the touch, no apparent skin lesions or masses noted   ICD incision in the left infraclavicular area was well-healed      Head:  normocephalic, no masses or lesions        Eyes:  pupils equal and round, conjunctivae and lids unremarkable, sclera white, no xanthalasma, EOMS intact, no nystagmus        Lymph:      ENT:  no pallor or cyanosis        Neck:  carotid pulses are full and equal bilaterally, JVP normal, no carotid bruit        Respiratory:  normal breath sounds, clear to auscultation, normal A-P diameter, normal symmetry, normal respiratory excursion, no use of accessory muscles         Cardiac: regular rhythm, normal " S1/S2, no S3 or S4, apical impulse not displaced, no murmurs, gallops or rubs                pulses full and equal, no bruits auscultated                                        GI:  abdomen soft, non-tender, BS normoactive, no mass, no HSM, no bruits obese      Extremities and Muscular Skeletal:  no deformities, clubbing, cyanosis, erythema observed              Neurological:  no gross motor deficits        Psych:  Alert and Oriented x 3        CC  No referring provider defined for this encounter.

## 2022-10-10 NOTE — TELEPHONE ENCOUNTER
Routing refill request to provider for review/approval because:  Patient needs to be seen because:  has not been seen over 6 months. Documentation also notes pt taking differently.   Domonique BOND RN  [facility]

## 2022-10-11 NOTE — PROGRESS NOTES
"Service Date: 10/10/2022    HISTORY OF PRESENT ILLNESS:  Thank you for allowing me to participate in the care of your delightful patient.  As you know, Neymar is a 62-year-old gentleman with a history of sarcoidosis including cardiac involvement and recurrent VT in addition to severe cardiomyopathy. He does have a CRT-D. Patient has been followed by Dr. Roberto Batres at the [facility].  I have been following Neymar for his recurrent VT and ventricular fibrillation despite being on amiodarone.  In light of that, I asked him to see Dr. De Guzman, one of our EP colleagues over at the [facility], for his opinion.  Dr. De Guzman would recommended suppression of active cardiac sarcoidosis as much as possible before considering ablation. Unfortunately, it has been quite difficult to treat his sarcoidosis, as the most recent PET scan continues to show multiple areas of active sarcoidosis including mid/basal myocardial septum, anterior wall and inferior wall, compared to the one done this past February.  Additionally, a PET scan also showed cirrhotic liver along with a small amount of ascites.    I last saw Neymar this past January.  Unfortunately, a few weeks ago, he was hospitalized at [facility] for syncope due to slow VT at a rate 160 beats per minute, below the VT detection rate.  He was taking amiodarone 200 mg once a day.  As he already had an appointment to see me today, no medication adjustment was made, aside from lowering the treatment zone to 150 beats per minute.    Neymar otherwise has no question or concerns.  He understands that he cannot drive in the meantime because of his recurrent VT.    Neymar does have an appointment with Dr. Batres in a month or so, and he would like to wait to talk to Dr. Batres prior to making a decision whether to proceed with the ablation.  I am not sure whether we can \"stabilize\" his cardiac sarcoidosis to the point that an ablation can " proceed, and therefore, there may be no other choice except to proceed with the ablation.  It will be an extensive ablation given the amount of sarcoidosis as described above and certainly could be quite risky, as the patient could go into ventricular arrhythmia refractory to medical therapy at that time.  I told Neymar that if Dr. Batres has no other option except to treat his sarcoidosis, then I feel comfortable enough to proceed with the VT ablation at our institution under general anesthesia.  In the meantime, I would like him to go back to amiodarone loading dose at 400 mg twice a day for 1 week and afterwards go to 400 mg once a day.    Jayy Dorman MD        D: 10/10/2022   T: 10/10/2022   MT: [email or web address]    Name:     NEYMAR WICK  MRN:      [id number]        Account:      [id number]   :      1960           Service Date: 10/10/2022       Document: [id number]

## 2022-10-12 NOTE — TELEPHONE ENCOUNTER
Routing refill request to provider for review/approval because:  Pt is due for a visit. MC message sent to pt as a reminder to schedule an appt.

## 2022-10-17 NOTE — TELEPHONE ENCOUNTER
Routing refill request to provider for review/approval because:  Labs out of range:    Hemoglobin A1C   Date Value Ref Range Status   09/29/2022 7.6 (H) 0.0 - 5.6 % Final     Comment:     Normal <5.7%   Prediabetes 5.7-6.4%    Diabetes 6.5% or higher     Note: Adopted from ADA consensus guidelines.   05/26/2021 8.8 (H) 0 - 5.6 % Final     Comment:     Normal <5.7% Prediabetes 5.7-6.4%  Diabetes 6.5% or higher - adopted from ADA   consensus guidelines.       TSH   Date Value Ref Range Status   03/14/2022 4.92 (H) 0.40 - 4.00 mU/L Final   03/10/2020 1.51 0.40 - 4.00 mU/L Final       Ary Armstrong RN

## 2022-11-18 NOTE — TELEPHONE ENCOUNTER
Medication filled 1 time as pt is due for a follow-up in clinic. Patient is already scheduled for upcoming appointment. 12/26/22    Prescription approved per [facility] Refill Protocol.  Ary Armstrong RN

## 2022-11-21 NOTE — PROGRESS NOTES
November 22, 2022     Dear Colleagues,  I had the pleasure of seeing Mr. Blackmon in clinic today for follow-up. As you know, he is a very pleasant 62-year-old gentleman, he has not had regular follow-up over the years, He was first seen by Cardiology in 2018 with a new diagnosis of LV dysfunction with an EF of 40%.  He was at that time seen by Dr. Iniguez.  He had a significant left bundle branch block.  He underwent coronary angiogram that showed nonobstructive disease.  He was put on beta blockers, ACE inhibitors and spironolactone but his potassium increased and did not tolerate mineralocorticoid receptor antagonist.  Subsequently, a cardiac MRI was done for nonischemic cardiomyopathy that showed scarring concerning for sarcoidosis.  In any case, he did not follow up but he remained on medications.  Unfortunately, in 2019, he presented with decompensated heart failure and EF was less than 20% with severe LV dilatation measuring 7 cm.  During the hospital stay, he developed sustained monomorphic VT at about 180 beats per minute and needed to be emergently cardioverted in the hospital.  He was taken again to the cath lab and coronary anatomy was unchanged, again showing nonobstructive disease.  Right heart catheterization showed mildly elevated pressures.  LVEDP was 21.  He had a repeat cardiac MRI that showed late gadolinium enhancement in the septal inferior and lateral basal wall with scarring pattern concerning for non-CAD scarring and scar burden had actually worsened up to 13%.  He was loaded with amiodarone and subsequently received a CRT device on 10/04/2019.  Subsequently, he was referred to the [facility].  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  He was put on prednisone in 12/2019 and took it for almost a year and a half thereafter.  Subsequent PET scan in 2020 and on  02/2021 have shown complete resolution of inflammation.  He stopped his prednisone as of spring of 2021.  In addition to that, he has continued to have nonsustained VT.  In 12/2020 Dr. Dorman had also mentioned to him about possibility of VT ablation; however, the patient was not interested in pursuing that.  He had opted to continue taking amiodarone, at 400mg daily hernandez the last time I saw him.   His most recent PET scan was persistently positive in 6/2022, at the time he was supposed to take prednisone 30mg daily in addition to high dose amiodarone. I have not seen him in follow up since. However, in 9/2022 he was admitted to [facility] and was seen by Dr. Dorman of EP for a-fib as well as slow VT below the detection level of the device so no interventions were done. . At the time potential VT ablation was rediscussed and patient would like to review this option today here and the device was reprogrammed. Note that he continued on prednisone 30mg daily; he were expecting an earlier follow-up.  Today he notes that he has been doing relatively well otherwise.  He did have this episode of palpitations which led to syncope but since the device was reprogrammed he did not have anything notable.  We did get an EKG today which shows paced rhythm.  He continues to take her medications denies any dizziness lightheadedness no palpitations.  Denies any PND or orthopnea no lower extremity edema.  No other complaints     PAST MEDICAL HISTORY:  Past Medical History:   Diagnosis Date     Ascending aorta dilatation (H)      Diverticulosis of colon (without mention of hemorrhage)      Essential hypertension, benign      Gout, unspecified      LBBB (left bundle branch block)      Morbid obesity (H)      Non-ischemic cardiomyopathy (H)      Nonrheumatic mitral valve regurgitation      NSTEMI (non-ST elevated myocardial infarction) (H)     cardiac cath 6/2018: mild non-obstructive CAD     Other and unspecified hyperlipidemia       Paroxysmal ventricular tachycardia      Type II or unspecified type diabetes mellitus without mention of complication, not stated as uncontrolled      FAMILY HISTORY:  Family History   Problem Relation Age of Onset     Cancer Father 75        bladder cancer     Myocardial Infarction Father      Other - See Comments Father         smoking     Breast Cancer Mother      Breast Cancer Sister      Family History Negative Brother      Family History Negative Son      Family History Negative Son         fluttering/murmur     Asthma Son      Colon Cancer No family hx of      SOCIAL HISTORY:  Social History     Socioeconomic History     Marital status:    Tobacco Use     Smoking status: Never     Smokeless tobacco: Never   Substance and Sexual Activity     Alcohol use: Yes     Comment: once awhile     Drug use: No     Sexual activity: Yes     Partners: Female     CURRENT MEDICATIONS:  Current Outpatient Medications   Medication     acetaminophen (TYLENOL) 500 MG tablet     albuterol (PROAIR HFA/PROVENTIL HFA/VENTOLIN HFA) 108 (90 Base) MCG/ACT Inhaler     amiodarone (PACERONE) 200 MG tablet     amiodarone (PACERONE) 400 MG tablet     aspirin 81 MG tablet     atorvastatin (LIPITOR) 20 MG tablet     betamethasone dipropionate (DIPROSONE) 0.05 % external cream     blood glucose monitoring (ACCU-CHEK LUL PLUS) test strip     Continuous Blood Gluc Sensor (FREESTYLE JOCELIN 14 DAY SENSOR) MISC     continuous blood glucose monitoring (FREESTYLE JOCELIN) sensor     fenofibrate (TRIGLIDE/LOFIBRA) 160 MG tablet     furosemide (LASIX) 20 MG tablet     glipiZIDE (GLUCOTROL XL) 10 MG 24 hr tablet     insulin detemir (LEVEMIR FLEXTOUCH) 100 UNIT/ML pen     insulin pen needle (ULTICARE) 29G X 12.7MM miscellaneous     levothyroxine (SYNTHROID/LEVOTHROID) 50 MCG tablet     liraglutide (VICTOZA PEN) 18 MG/3ML solution     metFORMIN (GLUCOPHAGE) 1000 MG tablet     metoprolol succinate ER (TOPROL XL) 25 MG 24 hr tablet     omeprazole  "(PRILOSEC) 40 MG DR capsule     predniSONE (DELTASONE) 20 MG tablet     sacubitril-valsartan (ENTRESTO) 49-51 MG per tablet     sulfamethoxazole-trimethoprim (BACTRIM) 400-80 MG tablet     No current facility-administered medications for this visit.     ROS:   Constitutional: No fever, chills, or sweats.  ENT: No visual disturbance, ear ache, epistaxis, sore throat.   Allergies/Immunologic: Negative.   Respiratory: No cough, hemoptysis.   Cardiovascular: As per HPI.   GI: No nausea, vomiting, hematemesis, melena, or hematochezia.   : No urinary frequency, dysuria, or hematuria.   Integument: Negative.   Psychiatric: Pleasant, no major depression noted  Neuro: No focal neurological deficits noted  Endocrinology: Negative.   Musculoskeletal: As per HPI.      EXAM:  /64 (BP Location: Left arm, Patient Position: Chair, Cuff Size: Adult Large)   Pulse 66   Ht 1.803 m (5' 11\")   Wt 129.4 kg (285 lb 3.2 oz)   SpO2 99%   BMI 39.78 kg/m    General: appears comfortable, alert and oriented  Head: normocephalic, atraumatic  Eyes: anicteric sclera, EOMI , PERRL  Neck: no adenopathy  Orophyarynx: moist mucosa, no lesions noted  Heart: regular, S1/S2, no murmurs, rubs or gallop. Estimated JVP at 5 cmH2O  Lungs: CTAB, No wheezing.   Abdomen: soft, non-tender, bowel sounds present, no hepatosplenomegaly  Extremities: No LE edema today  Skin: no open lesions noted  Neuro: grossly non-focal     Labs:  Lab Results   Component Value Date    WBC 5.7 10/07/2022    HGB 10.5 (L) 10/07/2022    HCT 33.2 (L) 10/07/2022     (L) 10/07/2022     10/07/2022    POTASSIUM 4.1 10/07/2022    CHLORIDE 107 10/07/2022    CO2 23 10/07/2022    BUN 29 10/07/2022    CR 1.21 10/07/2022     (H) 10/07/2022    DD 0.3 10/01/2019    NTBNPI 957 (H) 10/01/2019    NTBNP 431 (H) 06/21/2022    TROPONIN 0.07 06/10/2018    TROPI 0.071 (H) 10/01/2019    AST 83 (H) 06/21/2022    ALT 80 (H) 06/21/2022    ALKPHOS 121 06/21/2022    BILITOTAL " 0.7 06/21/2022    INR 1.16 (H) 10/04/2019     CMR 10/3/19:  1. The LV is severely dilated. The global systolic function is moderately severely to severely reduced. The LVEF is 25%. There is severe global LV dysfunction with dyssynchronous septal motion consistent with a LBBB.  2. The RV is normal in cavity size. The global systolic function is normal. The RVEF is 57%.   3. Both atria are dilated.  4. There is mild mitral insufficiency.   5. Late gadolinium enhancement is present in the septal. inferior and lateral base. There is non-CAD scar in the anterolateral mid-wall. Scar is more extensive (13%) as compared to the prior MRI (6%).   CONCLUSIONS:   Late gadolinium enhancement is present in the septal. inferior and lateral base. There is non-CAD scar in the anterolateral mid-wall. Scar is more extensive (13%) as compared to the prior MRI (6%). The LV is severely dilated and the global systolic function is moderately severely to severely reduced with an LVEF of 25%. There is severe global LV dysfunction with dyssynchronous septal motion consistent with a LBBB.  Collectively, these findings are most consistent with a non-ischemic cardiomyopathy. Possible etiologies include prior LBBB, cardiac sarcoidosis or prior myocarditis     PET 12/4/19:  1. Diffuse increased FDG uptake in the myocardium compatible with active cardiac sarcoid.  2. Globally hypokinetic and enlarged heart with ejection fraction severely decreased to 16%, compatible with a dilatated cardiomyopathy.  3. Small perfusion abnormality of the anterior wall of the left ventricle, this is the LAD distribution..   4. There is increased ammonia uptake in the lungs, consistent with patient's known history of congestive heart failure.  5. FDG PET/CT demonstrate active sarcoid in the mediastinal and hilar lymph nodes.     TTE 1/2/20:  The left ventricle is severely dilated, LVEDD 7 cm  The visual ejection fraction is estimated at 25-30%.  There is mod-severe  global hypokinesia of the left ventricle.  There is a catheter/pacemaker lead seen in the right ventricle.  LVEF may be slightly improved compared with study from 10-19     PET 2/12/20:  1. Minimal residual uptake in the basal aspect of the septum and anterior wall, greatly improved compared to the prior examination.  2. Minimal uptake of a mediastinal lymph node.   3. Left parotid hyperdense nodule without significant FDG uptake, likely lymph node or Warthin's duct tumor.     PET 6/22/20:  1. No evidence of active cardiac sarcoidosis.  2. No evidence of active inflammation/sarcoidosis in the remainder of the body.  3. Cardiomegaly.  4. Cholelithiasis.     PET viability 2/12/21:  1. No evidence of active cardiac sarcoidosis.  2. No evidence of active systemic or pulmonary sarcoidosis.  3. Cholelithiasis.  4. Colonic diverticulosis.     TTE 12/20/21:  1. Left ventricular systolic function is severely reduced. The visual ejection fraction is 25-30%.  2. Septal wall motion abnormality may reflect pacemaker activation.  3. The left ventricle is severely dilated.  4. The right ventricle is normal in structure, function and size.  5. No evidence for significant valvular pathology  6. Mildly dilated aortic root (sinus of valsalva) 4.0 cm and ascending aorta, 3.9 cm.     CMR 2/18/22:  1. The LV is mildly increased in cavity size. The global systolic function is severely reduced. The LVEF is 24%. There is severe diffuse hypokinesis with akinesis of the basal to mid inferior segment.    2. The RV is normal in cavity size. The global systolic function is mildly reduced. The RVEF is 48%.   3. The left atrium is mildly enlarged.   4. There is no significant valvular disease.   5. Late gadolinium enhancement imaging shows epicardial hyperenhancement in the basal anterior RV size of the septum, transmural LGE in the basal inferior segment and subendocardial hyperenhancement in the mid septal and inferior segments and  mid-myocardial LGE in the mid inferior/inferolateral segment. This pattern of LGE with multifocal, septal and epicardial is compatible with cardiac sarcoid.   6. There is no pericardial effusion or thickening.  CONCLUSIONS: Non-ischemic cardiomyopathy with severely reduced LV function, LVEF of 24%. The LGE pattern is compatible with cardiac sarcoid. Compared to prior CMR from 10/3/2019 there is no significant change in LGE.      PET viability 2/25/2022  Impression:  Active sarcoid: cardiac and mediastinal & hilar lymphadenopathy.  1. Increased FDG uptake in the left ventricle mid/basal myocardial segments, septum, anterior wall and inferior wall compatible with  active cardiac sarcoid. Lateral wall is relatively spared.    2. Several hypermetabolic mediastinal and hilar hypermetabolic lymph nodes, suggestive of active systemic sarcoid.  No lung abnormalities.  3. Cardiomegaly.   4. Cholelithiasis and colonic diverticulosis.    PET scan 6/2022:  1. Evidence of active sarcoidosis with similar FDG uptake in the left ventricle mid/basal myocardial segments, septum, anterior wall and  inferior wall since since PET/CT from 2/25/2022.   2. Near resolution of hypermetabolism in prominent mediastinal nodes since PET/CT from 2/25/2022.   3. No lung findings sarcoidosis.  4. Cardiomegaly.  5. Cirrhotic liver morphology, new small amount of ascites.  6. Cholelithiasis and colonic diverticulosis.     ASSESSMENT AND PLAN:    Neymar is 62 with a history of cardiac sarcoidosis without any residual inflammation on most recent PET scan as of 02/2021.   Unfortunately I have not seen him for quite some time and in the meantime his prednisone has been stopped.  He did get ICD shocks and continued amiodarone 400 mg daily dosing which is pretty high dose.  Today he is here for follow-up.  Overall I am concerned because we have not seen him for several months despite trying to make an appointment for him in the past.  He did have an  episode of VT which concerns me.  In addition he remained on 30 mg of prednisone daily which is very much undesirable at this point for such prolonged period of time.  Likely does not seem to have a significant complications from it such as infections or significant muscle wasting however he does have hyperglycemia requiring insulin.  So we discussed the following.  - We will start methotrexate 10 mg weekly  - We will add folic acid 1 mg daily  - We will decrease prednisone to 20 mg daily once he had the methotrexate for at least a week  - Which he is continue to continue Bactrim at this time  - Continue amiodarone 200 mg daily     I will see him back in January and we discussed that it is very imperative that we definitely see him in January at the latest given that he has not been improving overall from the VT and potential from the sarcoidosis standpoint.  Unfortunately we do not have a recent PET scan or MRI however I am concerned that with the VT he does have active disease.  Hoping that with the switch to methotrexate we will be able to control his disease better.  He understood with his significant other, that follow-up is critical we will see him back in January by all means.  I also have discussed his care with Dr. Dorman    I appreciate the opportunity to participate in the care of Neymar Rhodes . Please do not hesitate to contact me with any further questions.     Sincerely,   Roberto Batres MD  [facility] Division of Cardiology

## 2022-11-22 NOTE — PATIENT INSTRUCTIONS
Dr. Batres recommends:    Start Methotrexate 10 MG once every week.    Start Folic Acid 1 MG once daily.    Decrease Prednisone to 20 MG once daily.,    PET scan for assessing Sarcoid in January.    Follow up clinic visit with Dr. Batres a  week or two after PET scan with labs the same day.    Thank you for your visit today.  Please call me with any questions or concerns.   Christopher Stephens RN  Cardiology Care Coordinator  [phone]

## 2022-11-22 NOTE — LETTER
11/22/2022      RE: Neymar Rhodes  [address]  [facility] [address]       Dear Colleague,    Thank you for the opportunity to participate in the care of your patient, Neymar Rhodes, at the [facility] [place] at [facility]. Please see a copy of my visit note below.    November 22, 2022     Dear Colleagues,  I had the pleasure of seeing Mr. Blackmon in clinic today for follow-up. As you know, he is a very pleasant 62-year-old gentleman, he has not had regular follow-up over the years, He was first seen by Cardiology in 2018 with a new diagnosis of LV dysfunction with an EF of 40%.  He was at that time seen by Dr. Iniguez.  He had a significant left bundle branch block.  He underwent coronary angiogram that showed nonobstructive disease.  He was put on beta blockers, ACE inhibitors and spironolactone but his potassium increased and did not tolerate mineralocorticoid receptor antagonist.  Subsequently, a cardiac MRI was done for nonischemic cardiomyopathy that showed scarring concerning for sarcoidosis.  In any case, he did not follow up but he remained on medications.  Unfortunately, in 2019, he presented with decompensated heart failure and EF was less than 20% with severe LV dilatation measuring 7 cm.  During the hospital stay, he developed sustained monomorphic VT at about 180 beats per minute and needed to be emergently cardioverted in the hospital.  He was taken again to the cath lab and coronary anatomy was unchanged, again showing nonobstructive disease.  Right heart catheterization showed mildly elevated pressures.  LVEDP was 21.  He had a repeat cardiac MRI that showed late gadolinium enhancement in the septal inferior and lateral basal wall with scarring pattern concerning for non-CAD scarring and scar burden had actually worsened up to 13%.  He was loaded with amiodarone and subsequently received a CRT device on 10/04/2019.   Subsequently, he was referred to the [facility].  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  He was put on prednisone in 12/2019 and took it for almost a year and a half thereafter.  Subsequent PET scan in 2020 and on 02/2021 have shown complete resolution of inflammation.  He stopped his prednisone as of spring of 2021.  In addition to that, he has continued to have nonsustained VT.  In 12/2020 Dr. Dorman had also mentioned to him about possibility of VT ablation; however, the patient was not interested in pursuing that.  He had opted to continue taking amiodarone, at 400mg daily hernandez the last time I saw him.   His most recent PET scan was persistently positive in 6/2022, at the time he was supposed to take prednisone 30mg daily in addition to high dose amiodarone. I have not seen him in follow up since. However, in 9/2022 he was admitted to [facility] and was seen by Dr. Dorman of EP for a-fib as well as slow VT below the detection level of the device so no interventions were done. . At the time potential VT ablation was rediscussed and patient would like to review this option today here and the device was reprogrammed. Note that he continued on prednisone 30mg daily; he were expecting an earlier follow-up.  Today he notes that he has been doing relatively well otherwise.  He did have this episode of palpitations which led to syncope but since the device was reprogrammed he did not have anything notable.  We did get an EKG today which shows paced rhythm.  He continues to take her medications denies any dizziness lightheadedness no palpitations.  Denies any PND or orthopnea no lower extremity edema.  No other complaints     PAST MEDICAL HISTORY:  Past Medical History:   Diagnosis Date     Ascending aorta dilatation (H)      Diverticulosis of colon (without mention of hemorrhage)       Essential hypertension, benign      Gout, unspecified      LBBB (left bundle branch block)      Morbid obesity (H)      Non-ischemic cardiomyopathy (H)      Nonrheumatic mitral valve regurgitation      NSTEMI (non-ST elevated myocardial infarction) (H)     cardiac cath 6/2018: mild non-obstructive CAD     Other and unspecified hyperlipidemia      Paroxysmal ventricular tachycardia      Type II or unspecified type diabetes mellitus without mention of complication, not stated as uncontrolled      FAMILY HISTORY:  Family History   Problem Relation Age of Onset     Cancer Father 75        bladder cancer     Myocardial Infarction Father      Other - See Comments Father         smoking     Breast Cancer Mother      Breast Cancer Sister      Family History Negative Brother      Family History Negative Son      Family History Negative Son         fluttering/murmur     Asthma Son      Colon Cancer No family hx of      SOCIAL HISTORY:  Social History     Socioeconomic History     Marital status:    Tobacco Use     Smoking status: Never     Smokeless tobacco: Never   Substance and Sexual Activity     Alcohol use: Yes     Comment: once awhile     Drug use: No     Sexual activity: Yes     Partners: Female     CURRENT MEDICATIONS:  Current Outpatient Medications   Medication     acetaminophen (TYLENOL) 500 MG tablet     albuterol (PROAIR HFA/PROVENTIL HFA/VENTOLIN HFA) 108 (90 Base) MCG/ACT Inhaler     amiodarone (PACERONE) 200 MG tablet     amiodarone (PACERONE) 400 MG tablet     aspirin 81 MG tablet     atorvastatin (LIPITOR) 20 MG tablet     betamethasone dipropionate (DIPROSONE) 0.05 % external cream     blood glucose monitoring (ACCU-CHEK LUL PLUS) test strip     Continuous Blood Gluc Sensor (FREESTYLE JOCELIN 14 DAY SENSOR) MISC     continuous blood glucose monitoring (FREESTYLE JOCELIN) sensor     fenofibrate (TRIGLIDE/LOFIBRA) 160 MG tablet     furosemide (LASIX) 20 MG tablet     glipiZIDE (GLUCOTROL XL) 10 MG 24 hr  "tablet     insulin detemir (LEVEMIR FLEXTOUCH) 100 UNIT/ML pen     insulin pen needle (ULTICARE) 29G X 12.7MM miscellaneous     levothyroxine (SYNTHROID/LEVOTHROID) 50 MCG tablet     liraglutide (VICTOZA PEN) 18 MG/3ML solution     metFORMIN (GLUCOPHAGE) 1000 MG tablet     metoprolol succinate ER (TOPROL XL) 25 MG 24 hr tablet     omeprazole (PRILOSEC) 40 MG DR capsule     predniSONE (DELTASONE) 20 MG tablet     sacubitril-valsartan (ENTRESTO) 49-51 MG per tablet     sulfamethoxazole-trimethoprim (BACTRIM) 400-80 MG tablet     No current facility-administered medications for this visit.     ROS:   Constitutional: No fever, chills, or sweats.  ENT: No visual disturbance, ear ache, epistaxis, sore throat.   Allergies/Immunologic: Negative.   Respiratory: No cough, hemoptysis.   Cardiovascular: As per HPI.   GI: No nausea, vomiting, hematemesis, melena, or hematochezia.   : No urinary frequency, dysuria, or hematuria.   Integument: Negative.   Psychiatric: Pleasant, no major depression noted  Neuro: No focal neurological deficits noted  Endocrinology: Negative.   Musculoskeletal: As per HPI.      EXAM:  /64 (BP Location: Left arm, Patient Position: Chair, Cuff Size: Adult Large)   Pulse 66   Ht 1.803 m (5' 11\")   Wt 129.4 kg (285 lb 3.2 oz)   SpO2 99%   BMI 39.78 kg/m    General: appears comfortable, alert and oriented  Head: normocephalic, atraumatic  Eyes: anicteric sclera, EOMI , PERRL  Neck: no adenopathy  Orophyarynx: moist mucosa, no lesions noted  Heart: regular, S1/S2, no murmurs, rubs or gallop. Estimated JVP at 5 cmH2O  Lungs: CTAB, No wheezing.   Abdomen: soft, non-tender, bowel sounds present, no hepatosplenomegaly  Extremities: No LE edema today  Skin: no open lesions noted  Neuro: grossly non-focal     Labs:  Lab Results   Component Value Date    WBC 5.7 10/07/2022    HGB 10.5 (L) 10/07/2022    HCT 33.2 (L) 10/07/2022     (L) 10/07/2022     10/07/2022    POTASSIUM 4.1 10/07/2022 "    CHLORIDE 107 10/07/2022    CO2 23 10/07/2022    BUN 29 10/07/2022    CR 1.21 10/07/2022     (H) 10/07/2022    DD 0.3 10/01/2019    NTBNPI 957 (H) 10/01/2019    NTBNP 431 (H) 06/21/2022    TROPONIN 0.07 06/10/2018    TROPI 0.071 (H) 10/01/2019    AST 83 (H) 06/21/2022    ALT 80 (H) 06/21/2022    ALKPHOS 121 06/21/2022    BILITOTAL 0.7 06/21/2022    INR 1.16 (H) 10/04/2019     CMR 10/3/19:  1. The LV is severely dilated. The global systolic function is moderately severely to severely reduced. The LVEF is 25%. There is severe global LV dysfunction with dyssynchronous septal motion consistent with a LBBB.  2. The RV is normal in cavity size. The global systolic function is normal. The RVEF is 57%.   3. Both atria are dilated.  4. There is mild mitral insufficiency.   5. Late gadolinium enhancement is present in the septal. inferior and lateral base. There is non-CAD scar in the anterolateral mid-wall. Scar is more extensive (13%) as compared to the prior MRI (6%).   CONCLUSIONS:   Late gadolinium enhancement is present in the septal. inferior and lateral base. There is non-CAD scar in the anterolateral mid-wall. Scar is more extensive (13%) as compared to the prior MRI (6%). The LV is severely dilated and the global systolic function is moderately severely to severely reduced with an LVEF of 25%. There is severe global LV dysfunction with dyssynchronous septal motion consistent with a LBBB.  Collectively, these findings are most consistent with a non-ischemic cardiomyopathy. Possible etiologies include prior LBBB, cardiac sarcoidosis or prior myocarditis     PET 12/4/19:  1. Diffuse increased FDG uptake in the myocardium compatible with active cardiac sarcoid.  2. Globally hypokinetic and enlarged heart with ejection fraction severely decreased to 16%, compatible with a dilatated cardiomyopathy.  3. Small perfusion abnormality of the anterior wall of the left ventricle, this is the LAD distribution..   4.  There is increased ammonia uptake in the lungs, consistent with patient's known history of congestive heart failure.  5. FDG PET/CT demonstrate active sarcoid in the mediastinal and hilar lymph nodes.     TTE 1/2/20:  The left ventricle is severely dilated, LVEDD 7 cm  The visual ejection fraction is estimated at 25-30%.  There is mod-severe global hypokinesia of the left ventricle.  There is a catheter/pacemaker lead seen in the right ventricle.  LVEF may be slightly improved compared with study from 10-19     PET 2/12/20:  1. Minimal residual uptake in the basal aspect of the septum and anterior wall, greatly improved compared to the prior examination.  2. Minimal uptake of a mediastinal lymph node.   3. Left parotid hyperdense nodule without significant FDG uptake, likely lymph node or Warthin's duct tumor.     PET 6/22/20:  1. No evidence of active cardiac sarcoidosis.  2. No evidence of active inflammation/sarcoidosis in the remainder of the body.  3. Cardiomegaly.  4. Cholelithiasis.     PET viability 2/12/21:  1. No evidence of active cardiac sarcoidosis.  2. No evidence of active systemic or pulmonary sarcoidosis.  3. Cholelithiasis.  4. Colonic diverticulosis.     TTE 12/20/21:  1. Left ventricular systolic function is severely reduced. The visual ejection fraction is 25-30%.  2. Septal wall motion abnormality may reflect pacemaker activation.  3. The left ventricle is severely dilated.  4. The right ventricle is normal in structure, function and size.  5. No evidence for significant valvular pathology  6. Mildly dilated aortic root (sinus of valsalva) 4.0 cm and ascending aorta, 3.9 cm.     CMR 2/18/22:  1. The LV is mildly increased in cavity size. The global systolic function is severely reduced. The LVEF is 24%. There is severe diffuse hypokinesis with akinesis of the basal to mid inferior segment.    2. The RV is normal in cavity size. The global systolic function is mildly reduced. The RVEF is 48%.    3. The left atrium is mildly enlarged.   4. There is no significant valvular disease.   5. Late gadolinium enhancement imaging shows epicardial hyperenhancement in the basal anterior RV size of the septum, transmural LGE in the basal inferior segment and subendocardial hyperenhancement in the mid septal and inferior segments and mid-myocardial LGE in the mid inferior/inferolateral segment. This pattern of LGE with multifocal, septal and epicardial is compatible with cardiac sarcoid.   6. There is no pericardial effusion or thickening.  CONCLUSIONS: Non-ischemic cardiomyopathy with severely reduced LV function, LVEF of 24%. The LGE pattern is compatible with cardiac sarcoid. Compared to prior CMR from 10/3/2019 there is no significant change in LGE.      PET viability 2/25/2022  Impression:  Active sarcoid: cardiac and mediastinal & hilar lymphadenopathy.  1. Increased FDG uptake in the left ventricle mid/basal myocardial segments, septum, anterior wall and inferior wall compatible with  active cardiac sarcoid. Lateral wall is relatively spared.    2. Several hypermetabolic mediastinal and hilar hypermetabolic lymph nodes, suggestive of active systemic sarcoid.  No lung abnormalities.  3. Cardiomegaly.   4. Cholelithiasis and colonic diverticulosis.    PET scan 6/2022:  1. Evidence of active sarcoidosis with similar FDG uptake in the left ventricle mid/basal myocardial segments, septum, anterior wall and  inferior wall since since PET/CT from 2/25/2022.   2. Near resolution of hypermetabolism in prominent mediastinal nodes since PET/CT from 2/25/2022.   3. No lung findings sarcoidosis.  4. Cardiomegaly.  5. Cirrhotic liver morphology, new small amount of ascites.  6. Cholelithiasis and colonic diverticulosis.     ASSESSMENT AND PLAN:    Neymar is 62 with a history of cardiac sarcoidosis without any residual inflammation on most recent PET scan as of 02/2021.   Unfortunately I have not seen him for quite some time  and in the meantime his prednisone has been stopped.  He did get ICD shocks and continued amiodarone 400 mg daily dosing which is pretty high dose.  Today he is here for follow-up.  Overall I am concerned because we have not seen him for several months despite trying to make an appointment for him in the past.  He did have an episode of VT which concerns me.  In addition he remained on 30 mg of prednisone daily which is very much undesirable at this point for such prolonged period of time.  Likely does not seem to have a significant complications from it such as infections or significant muscle wasting however he does have hyperglycemia requiring insulin.  So we discussed the following.  - We will start methotrexate 10 mg weekly  - We will add folic acid 1 mg daily  - We will decrease prednisone to 20 mg daily once he had the methotrexate for at least a week  - Which he is continue to continue Bactrim at this time  - Continue amiodarone 200 mg daily     I will see him back in January and we discussed that it is very imperative that we definitely see him in January at the latest given that he has not been improving overall from the VT and potential from the sarcoidosis standpoint.  Unfortunately we do not have a recent PET scan or MRI however I am concerned that with the VT he does have active disease.  Hoping that with the switch to methotrexate we will be able to control his disease better.  He understood with his significant other, that follow-up is critical we will see him back in January by all means.  I also have discussed his care with Dr. Dorman    I appreciate the opportunity to participate in the care of Neymar Rhodes . Please do not hesitate to contact me with any further questions.     Sincerely,   Roberto Batres MD  [facility] Division of Cardiology

## 2022-11-25 NOTE — TELEPHONE ENCOUNTER
Reviewed with Dr. Irvin in Dr. Dorman's absence. He recommends increasing metoprolol from 25mg daily to 25mg BID.     Called pt back. Explained the medication change. Pt states understanding. He said it works much better for him to take it 50mg once a day in the morning, so he will start that tomorrow. Pt said he does not need a refill right now. Med list updated.     Will send update to Dr. Dorman and call pt if there are any other changes.

## 2022-11-25 NOTE — TELEPHONE ENCOUNTER
"Received remote ICD alert for 2 ATP episodes. Both occurred 11/23/2022.     Per [facility] med list, pt is taking amiodarone 400mg daily. This was stated in Dr. Dorman's OV note from 10/10/2022: \"In the meantime, I would like him to go back to amiodarone loading dose at 400 mg twice a day for 1 week and afterwards go to 400 mg once a day.\"  But pt had OV with Dr. Batres on 11/22/2022 and his note says: \"We will start methotrexate 10 mg weekly. We will add folic acid 1 mg daily. We will decrease prednisone to 20 mg daily once he had the methotrexate for at least a week. Which he is continue to continue Bactrim at this time. Continue amiodarone 200 mg daily\"    Called pt on cell (primary number) and home numbers, no answer, left VM to call back to device clinic for a message about his ICD and some questions. When pt calls back will discuss:  1. If he had any symptoms with ATP episodes on 11/23  2. What dose of amiodarone he is taking  3. Any missed doses of amio or other cardiac meds    ADDENDUM 2:10PM. Pt called back. He said he did not have symptoms at the time of the ATP, he was out grocery shopping and felt fine. He said he is taking amiodarone 200mg daily. He has had no missed doses of meds recently, but did note the changes Dr. Batres made on 11/22, noted above in this note. Told pt I will review this with Dr. Irvin because Dr. Dorman is out of the clinic today.       Episode list:        Tachy zones:         SILVER (episode V-219):               "

## 2022-11-30 NOTE — TELEPHONE ENCOUNTER
Per Dr. Dorman's routing comment:   Increase amio to 400 mg daily. I believe that Dr. Batres wants to treat his sarcoidosis first. qp     Called pt, no answer, left detailed VM with increase in amiodarone dose. Asked pt to call back to device clinic to confirm he got the message and to let us know if he needs a refill right now.     Will update med list after hearing confirmation back from pt.

## 2022-11-30 NOTE — TELEPHONE ENCOUNTER
Pt called back and confirmed that he received message.  Sent new script to pharmacy and updated med list.

## 2022-12-07 NOTE — TELEPHONE ENCOUNTER
Per pharmacy    Please send new Rx for insulin pen needle (ULTICARE) 29G X 12.7MM miscellaneous that say twice daily.  Thank you     [facility] [address] [phone]   [phone] fx

## 2022-12-21 NOTE — TELEPHONE ENCOUNTER
No labs needed before his appointment.    He had plenty drawn in Sept and October.  We will review all of his recent labs at his appointment.    Close encounter when done.

## 2022-12-21 NOTE — TELEPHONE ENCOUNTER
Patient calling clinic asking if he will need labs drawn before 12/26/2022 Office Visit with PCP.     Last Fasting Lipid panel was drawn 3/28/2022.    Routing to PCP for recommendations.

## 2022-12-22 NOTE — TELEPHONE ENCOUNTER
Pt called back and informed him of below.  Karlee Campos, RN  [facility] RN Triage Team

## 2022-12-26 NOTE — PATIENT INSTRUCTIONS
" Continue all medications at the same doses.  Contact your usual pharmacy if you need refills.      Try eating 1/2 of a protein nutrition bar at bedtime to help prevent the glucose from going too low.      Work on  your diet, this is the most powerful way to control type II diabetes.   Reduce the intake of \"simple carbohydrates\" (e.g. White bread, white rice, pasta, noodles, potatoes, snack foods, regular soda, juices (except fresh squeezed), cakes, cookies, candy, etc.) as best possible.       Follow up with the Cardiology and pulmonary specialists as planned.       We will recheck the diabetes labs when you get the cardiology labs in late January.       Contact me if your glucose levels are going too high or too low on a regular basis.     We will adjust your diabetes therapy as needed based on the prednisone dosing.    "

## 2022-12-26 NOTE — PROGRESS NOTES
Assessment & Plan     (E11.59,  Z79.4) Type 2 diabetes mellitus with other circulatory complication, with long-term current use of insulin (H)  (primary encounter diagnosis)  Comment: continue same meds for now, adjust insulin per prednisone dosing.   Plan: Hemoglobin A1c, fenofibrate (TRIGLIDE/LOFIBRA)         160 MG tablet, insulin detemir (LEVEMIR         FLEXTOUCH) 100 UNIT/ML pen, metFORMIN         (GLUCOPHAGE) 1000 MG tablet            (D86.0) Sarcoidosis of lung (H)  Comment: Known issue that I take into account for their medical decisions; no current exacerbations, or new concerns.  This condition is currently controlled on the current medical regimen.  Continue current therapy.   Plan:     (I42.8) Non-ischemic cardiomyopathy (H)  Comment: This condition is currently controlled on the current medical regimen.  Continue current therapy.   Plan: atorvastatin (LIPITOR) 20 MG tablet            (N18.31) Stage 3a chronic kidney disease (H)  Comment: This condition is currently controlled on the current medical regimen.  Continue current therapy.   Plan: Albumin Random Urine Quantitative with Creat         Ratio            (E66.01) Severe obesity (BMI 35.0-39.9) with comorbidity (H)  Comment: obeity contributing to type II diabetes  Cont low carb, lower sat fat diet as best able.   Exercise will be tough given cardiac issues.   Plan:     (I77.810) Ascending aorta dilatation (H)  Comment: continue to monitor  Discussed secondary risk factor modification and recommended continuing aggressive management of these items.   Plan:     (E03.8) Subclinical hypothyroidism  Comment: This condition is currently controlled on the current medical regimen.  Continue current therapy.   Plan: levothyroxine (SYNTHROID/LEVOTHROID) 50 MCG         tablet            (K21.00) Gastroesophageal reflux disease with esophagitis without hemorrhage  Comment: This condition is currently controlled on the current medical regimen.  Continue  "current therapy.   Plan:     (E78.5) Hyperlipidemia LDL goal <100  Comment: This condition is currently controlled on the current medical regimen.  Continue current therapy.   Discussed guidelines recommending a statin cholesterol lowering medication for any patient with either diabetes and/or vascular disease, aiming for a LDL goal under 100 for sure, ideally under 70, using whatever dose of statin tolerated.    Plan: fenofibrate (TRIGLIDE/LOFIBRA) 160 MG tablet            (I47.29) Paroxysmal ventricular tachycardia  Comment:   Plan:     (Z79.899) On amiodarone therapy  Comment:   Plan: atorvastatin (LIPITOR) 20 MG tablet            (S93.602D) Foot sprain, left, subsequent encounter  Comment:   Plan:                 BMI:   Estimated body mass index is 38.72 kg/m  as calculated from the following:    Height as of this encounter: 1.803 m (5' 11\").    Weight as of this encounter: 125.9 kg (277 lb 9.6 oz).           Return in about 6 months (around 6/26/2023) for Diabetes, Blood pressure.    Jefry Harris MD  [facility]    Milton Blackmon is a 62 year old, presenting for the following health issues:  RECHECK (Follow up multiple conditions ) and Refill Request    Follow up for refills  History of Present Illness       Diabetes:   He presents for follow up of diabetes.  He is checking home blood glucose a few times a month. He checks blood glucose before and after meals and at bedtime.  Blood glucose is sometimes over 200 and sometimes under 70. He is aware of hypoglycemia symptoms including shakiness. He is concerned about low blood sugar, several less than 70 in the past few weeks.  He is having excessive thirst, blurry vision and weight gain.         Heart Failure:  He presents for follow up of heart failure. He is experiencing shortness of breath with activity only, which is same as usual. He is experiencing lower extremity edema which is same as usual. He denies " orthopenea and is not coughing at night. Patient is checking weight daily. He has recently had a None. He has no side effects from medications.  He has has a medical visit for heart failure 1 time since the last visit.    Hypertension: He presents for follow up of hypertension.  He does check blood pressure  regularly outside of the clinic. Outpatient blood pressures have not been over 140/90. He follows a low salt diet.     Vascular Disease:  He presents for follow up of vascular disease.  He never takes nitroglycerin. He takes daily aspirin.     Today's PHQ-9         PHQ-9 Total Score: 2    PHQ-9 Q9 Thoughts of better off dead/self-harm past 2 weeks :   Not at all    How difficult have these problems made it for you to do your work, take care of things at home, or get along with other people: Not difficult at all     Still on prednisone for sarcoidosis.   Has upcomign PET scan.     The patient has had a history of ongoing obesity.  Reviewed the weigth curves.   Their current BMI is:  Body mass index is 38.72 kg/m .  Reviewed previous attempts at weight loss which have not been successful in producing prolonged weigth loss.   Discussed current eating and exercise habits.     Reviewed weights in chart:  Wt Readings from Last 10 Encounters:   12/26/22 125.9 kg (277 lb 9.6 oz)   11/22/22 129.4 kg (285 lb 3.2 oz)   10/10/22 123.4 kg (272 lb)   09/30/22 121.7 kg (268 lb 6.4 oz)   05/17/22 126.2 kg (278 lb 3.2 oz)   03/29/22 124.7 kg (275 lb)   03/28/22 124.7 kg (275 lb)   03/23/22 125.2 kg (276 lb)   01/14/22 125.2 kg (276 lb)   01/06/22 124.9 kg (275 lb 4.8 oz)          **I reviewed the information recorded in the patient's EPIC chart (including but not limited to medical history, surgical history, family history, problem list, medication list, and allergy list) and updated the information as indicated based on the patients reported information.           Review of Systems   Constitutional, HEENT, cardiovascular,  "pulmonary, gi and gu systems are negative, except as otherwise noted.      Objective    /60   Pulse 74   Temp 98.5  F (36.9  C) (Tympanic)   Ht 1.803 m (5' 11\")   Wt 125.9 kg (277 lb 9.6 oz)   SpO2 99%   BMI 38.72 kg/m    Body mass index is 38.72 kg/m .  Physical Exam   GENERAL alert and no distress  EYES:  Normal sclera,conjunctiva, EOMI  HENT: oral and posterior pharynx without lesions or erythema, facies symmetric  NECK: Neck supple. No LAD, without thyroidmegaly.  RESP: Clear to ausculation bilaterally without wheezes or crackles. Normal BS in all fields.  CV: RRR normal S1S2 without murmurs, rubs or gallops.  LYMPH: no cervical lymph adenopathy appreciated  MS: extremities- no gross deformities of the visible extremities noted,   EXT:  no lower extremity edema  PSYCH: Alert and oriented times 3; speech- coherent  SKIN:  No obvious significant skin lesions on visible portions of face                     "

## 2022-12-29 NOTE — TELEPHONE ENCOUNTER
Received call from radiology with critical result:     PET scan with compression deformity L4. veterbral body with mild FDG uptake. Change since PET scan on 6/14/22.     Dr. Batres not available. Reviewed with Dr. Cosme. Recommends that pt Follow-up with PCP for ortho/spine referral.     Called pt and relayed news about L4 veterbral fracture. Pt states that he is currently not in any pain, he did sustain a fall a few weeks ago. Recommended that he Follow-up with PCP for further direction. Pt agreeable to plan and will call his PCP office ASAP.     FYI to Dr. Batres.       Estefania Georges RN

## 2023-01-01 ENCOUNTER — APPOINTMENT (OUTPATIENT)
Dept: GENERAL RADIOLOGY | Facility: CLINIC | Age: 63
DRG: 280 | End: 2023-01-01
Attending: EMERGENCY MEDICINE
Payer: COMMERCIAL

## 2023-01-01 ENCOUNTER — PATIENT OUTREACH (OUTPATIENT)
Dept: CARE COORDINATION | Facility: CLINIC | Age: 63
End: 2023-01-01
Payer: COMMERCIAL

## 2023-01-01 ENCOUNTER — TELEPHONE (OUTPATIENT)
Dept: INTERNAL MEDICINE | Facility: CLINIC | Age: 63
End: 2023-01-01
Payer: COMMERCIAL

## 2023-01-01 ENCOUNTER — LAB (OUTPATIENT)
Dept: LAB | Facility: CLINIC | Age: 63
End: 2023-01-01
Payer: COMMERCIAL

## 2023-01-01 ENCOUNTER — ANCILLARY PROCEDURE (OUTPATIENT)
Dept: PET IMAGING | Facility: CLINIC | Age: 63
End: 2023-01-01
Attending: INTERNAL MEDICINE
Payer: COMMERCIAL

## 2023-01-01 ENCOUNTER — APPOINTMENT (OUTPATIENT)
Dept: GENERAL RADIOLOGY | Facility: CLINIC | Age: 63
DRG: 280 | End: 2023-01-01
Attending: NURSE PRACTITIONER
Payer: COMMERCIAL

## 2023-01-01 ENCOUNTER — TELEPHONE (OUTPATIENT)
Dept: CARDIOLOGY | Facility: CLINIC | Age: 63
End: 2023-01-01
Payer: COMMERCIAL

## 2023-01-01 ENCOUNTER — OFFICE VISIT (OUTPATIENT)
Dept: INTERNAL MEDICINE | Facility: CLINIC | Age: 63
End: 2023-01-01
Payer: COMMERCIAL

## 2023-01-01 ENCOUNTER — TRANSFERRED RECORDS (OUTPATIENT)
Dept: HEALTH INFORMATION MANAGEMENT | Facility: CLINIC | Age: 63
End: 2023-01-01

## 2023-01-01 ENCOUNTER — ANCILLARY PROCEDURE (OUTPATIENT)
Dept: CARDIOLOGY | Facility: CLINIC | Age: 63
End: 2023-01-01
Attending: INTERNAL MEDICINE
Payer: COMMERCIAL

## 2023-01-01 ENCOUNTER — HOSPITAL ENCOUNTER (INPATIENT)
Facility: CLINIC | Age: 63
LOS: 3 days | Discharge: HOME | DRG: 309 | End: 2023-04-28
Attending: EMERGENCY MEDICINE | Admitting: STUDENT IN AN ORGANIZED HEALTH CARE EDUCATION/TRAINING PROGRAM
Payer: COMMERCIAL

## 2023-01-01 ENCOUNTER — HOSPITAL ENCOUNTER (OUTPATIENT)
Facility: CLINIC | Age: 63
Discharge: HOME | End: 2023-05-18
Attending: INTERNAL MEDICINE | Admitting: INTERNAL MEDICINE
Payer: COMMERCIAL

## 2023-01-01 ENCOUNTER — APPOINTMENT (OUTPATIENT)
Dept: ULTRASOUND IMAGING | Facility: CLINIC | Age: 63
DRG: 196 | End: 2023-01-01
Attending: HOSPITALIST
Payer: COMMERCIAL

## 2023-01-01 ENCOUNTER — TELEPHONE (OUTPATIENT)
Dept: CARDIOLOGY | Facility: CLINIC | Age: 63
End: 2023-01-01

## 2023-01-01 ENCOUNTER — HOSPITAL ENCOUNTER (OUTPATIENT)
Facility: CLINIC | Age: 63
End: 2023-01-01
Attending: INTERNAL MEDICINE | Admitting: INTERNAL MEDICINE
Payer: COMMERCIAL

## 2023-01-01 ENCOUNTER — APPOINTMENT (OUTPATIENT)
Dept: MEDSURG UNIT | Facility: CLINIC | Age: 63
End: 2023-01-01
Attending: INTERNAL MEDICINE
Payer: COMMERCIAL

## 2023-01-01 ENCOUNTER — APPOINTMENT (OUTPATIENT)
Dept: CT IMAGING | Facility: CLINIC | Age: 63
DRG: 280 | End: 2023-01-01
Attending: INTERNAL MEDICINE
Payer: COMMERCIAL

## 2023-01-01 ENCOUNTER — HOSPITAL ENCOUNTER (EMERGENCY)
Facility: CLINIC | Age: 63
End: 2023-06-28
Attending: EMERGENCY MEDICINE | Admitting: EMERGENCY MEDICINE
Payer: COMMERCIAL

## 2023-01-01 ENCOUNTER — NURSE TRIAGE (OUTPATIENT)
Dept: INTERNAL MEDICINE | Facility: CLINIC | Age: 63
End: 2023-01-01
Payer: COMMERCIAL

## 2023-01-01 ENCOUNTER — HOSPITAL ENCOUNTER (INPATIENT)
Facility: CLINIC | Age: 63
LOS: 2 days | Discharge: HOME | DRG: 196 | End: 2023-06-15
Attending: EMERGENCY MEDICINE | Admitting: HOSPITALIST
Payer: COMMERCIAL

## 2023-01-01 ENCOUNTER — HOSPITAL ENCOUNTER (INPATIENT)
Facility: CLINIC | Age: 63
LOS: 6 days | Discharge: HOME | DRG: 280 | End: 2023-04-19
Attending: EMERGENCY MEDICINE | Admitting: STUDENT IN AN ORGANIZED HEALTH CARE EDUCATION/TRAINING PROGRAM
Payer: COMMERCIAL

## 2023-01-01 ENCOUNTER — APPOINTMENT (OUTPATIENT)
Dept: GENERAL RADIOLOGY | Facility: CLINIC | Age: 63
DRG: 309 | End: 2023-01-01
Attending: EMERGENCY MEDICINE
Payer: COMMERCIAL

## 2023-01-01 ENCOUNTER — APPOINTMENT (OUTPATIENT)
Dept: CT IMAGING | Facility: CLINIC | Age: 63
DRG: 196 | End: 2023-01-01
Attending: EMERGENCY MEDICINE
Payer: COMMERCIAL

## 2023-01-01 ENCOUNTER — APPOINTMENT (OUTPATIENT)
Dept: LAB | Facility: CLINIC | Age: 63
End: 2023-01-01
Attending: INTERNAL MEDICINE
Payer: COMMERCIAL

## 2023-01-01 ENCOUNTER — APPOINTMENT (OUTPATIENT)
Dept: CARDIOLOGY | Facility: CLINIC | Age: 63
DRG: 280 | End: 2023-01-01
Attending: STUDENT IN AN ORGANIZED HEALTH CARE EDUCATION/TRAINING PROGRAM
Payer: COMMERCIAL

## 2023-01-01 VITALS
DIASTOLIC BLOOD PRESSURE: 58 MMHG | OXYGEN SATURATION: 98 % | BODY MASS INDEX: 34.69 KG/M2 | TEMPERATURE: 96.6 F | RESPIRATION RATE: 18 BRPM | SYSTOLIC BLOOD PRESSURE: 103 MMHG | HEART RATE: 65 BPM | WEIGHT: 248.7 LBS

## 2023-01-01 VITALS
OXYGEN SATURATION: 100 % | BODY MASS INDEX: 32.69 KG/M2 | HEART RATE: 71 BPM | SYSTOLIC BLOOD PRESSURE: 85 MMHG | WEIGHT: 233.5 LBS | TEMPERATURE: 97.8 F | DIASTOLIC BLOOD PRESSURE: 51 MMHG | HEIGHT: 71 IN | RESPIRATION RATE: 20 BRPM

## 2023-01-01 VITALS
TEMPERATURE: 98.1 F | OXYGEN SATURATION: 99 % | RESPIRATION RATE: 16 BRPM | DIASTOLIC BLOOD PRESSURE: 54 MMHG | WEIGHT: 259 LBS | HEART RATE: 69 BPM | SYSTOLIC BLOOD PRESSURE: 88 MMHG | BODY MASS INDEX: 36.12 KG/M2

## 2023-01-01 VITALS
HEART RATE: 69 BPM | OXYGEN SATURATION: 100 % | SYSTOLIC BLOOD PRESSURE: 94 MMHG | RESPIRATION RATE: 20 BRPM | TEMPERATURE: 97.7 F | DIASTOLIC BLOOD PRESSURE: 66 MMHG

## 2023-01-01 VITALS
OXYGEN SATURATION: 99 % | DIASTOLIC BLOOD PRESSURE: 61 MMHG | HEART RATE: 81 BPM | BODY MASS INDEX: 35.11 KG/M2 | WEIGHT: 251.7 LBS | SYSTOLIC BLOOD PRESSURE: 94 MMHG

## 2023-01-01 VITALS
TEMPERATURE: 98.2 F | SYSTOLIC BLOOD PRESSURE: 124 MMHG | BODY MASS INDEX: 36.69 KG/M2 | DIASTOLIC BLOOD PRESSURE: 60 MMHG | HEART RATE: 60 BPM | OXYGEN SATURATION: 100 % | HEIGHT: 71 IN | WEIGHT: 262.1 LBS

## 2023-01-01 VITALS
OXYGEN SATURATION: 100 % | HEART RATE: 71 BPM | SYSTOLIC BLOOD PRESSURE: 94 MMHG | WEIGHT: 244 LBS | DIASTOLIC BLOOD PRESSURE: 62 MMHG | BODY MASS INDEX: 34.16 KG/M2 | HEIGHT: 71 IN | TEMPERATURE: 98.6 F

## 2023-01-01 VITALS
DIASTOLIC BLOOD PRESSURE: 52 MMHG | SYSTOLIC BLOOD PRESSURE: 86 MMHG | OXYGEN SATURATION: 100 % | HEART RATE: 70 BPM | BODY MASS INDEX: 35.64 KG/M2 | HEIGHT: 71 IN | WEIGHT: 254.6 LBS

## 2023-01-01 VITALS
BODY MASS INDEX: 37.63 KG/M2 | OXYGEN SATURATION: 99 % | WEIGHT: 269.8 LBS | SYSTOLIC BLOOD PRESSURE: 85 MMHG | HEART RATE: 60 BPM | DIASTOLIC BLOOD PRESSURE: 49 MMHG

## 2023-01-01 VITALS
SYSTOLIC BLOOD PRESSURE: 112 MMHG | BODY MASS INDEX: 35.22 KG/M2 | WEIGHT: 251.6 LBS | HEIGHT: 71 IN | TEMPERATURE: 97.5 F | DIASTOLIC BLOOD PRESSURE: 66 MMHG | RESPIRATION RATE: 18 BRPM | HEART RATE: 70 BPM | OXYGEN SATURATION: 99 %

## 2023-01-01 VITALS
TEMPERATURE: 97 F | OXYGEN SATURATION: 97 % | BODY MASS INDEX: 33.96 KG/M2 | SYSTOLIC BLOOD PRESSURE: 84 MMHG | HEART RATE: 77 BPM | DIASTOLIC BLOOD PRESSURE: 52 MMHG | WEIGHT: 243.5 LBS

## 2023-01-01 DIAGNOSIS — I42.9 CARDIOMYOPATHY (H): ICD-10-CM

## 2023-01-01 DIAGNOSIS — Z13.220 SCREENING FOR HYPERLIPIDEMIA: ICD-10-CM

## 2023-01-01 DIAGNOSIS — I42.8 NON-ISCHEMIC CARDIOMYOPATHY (H): ICD-10-CM

## 2023-01-01 DIAGNOSIS — I25.10 CORONARY ARTERY DISEASE INVOLVING NATIVE CORONARY ARTERY OF NATIVE HEART WITHOUT ANGINA PECTORIS: ICD-10-CM

## 2023-01-01 DIAGNOSIS — D50.9 IRON DEFICIENCY ANEMIA, UNSPECIFIED IRON DEFICIENCY ANEMIA TYPE: ICD-10-CM

## 2023-01-01 DIAGNOSIS — E11.59 TYPE 2 DIABETES MELLITUS WITH OTHER CIRCULATORY COMPLICATION, WITH LONG-TERM CURRENT USE OF INSULIN (H): Primary | ICD-10-CM

## 2023-01-01 DIAGNOSIS — I10 BENIGN ESSENTIAL HYPERTENSION: Primary | ICD-10-CM

## 2023-01-01 DIAGNOSIS — I47.20 VENTRICULAR TACHYCARDIA (H): ICD-10-CM

## 2023-01-01 DIAGNOSIS — I47.29 PAROXYSMAL VENTRICULAR TACHYCARDIA (H): ICD-10-CM

## 2023-01-01 DIAGNOSIS — Z95.810 ICD (IMPLANTABLE CARDIOVERTER-DEFIBRILLATOR) IN PLACE: ICD-10-CM

## 2023-01-01 DIAGNOSIS — D86.85 CARDIAC SARCOIDOSIS: ICD-10-CM

## 2023-01-01 DIAGNOSIS — Z79.4 TYPE 2 DIABETES MELLITUS WITH OTHER CIRCULATORY COMPLICATION, WITH LONG-TERM CURRENT USE OF INSULIN (H): ICD-10-CM

## 2023-01-01 DIAGNOSIS — I50.22 CHRONIC SYSTOLIC HEART FAILURE (H): ICD-10-CM

## 2023-01-01 DIAGNOSIS — I10 BENIGN ESSENTIAL HYPERTENSION: ICD-10-CM

## 2023-01-01 DIAGNOSIS — K76.1 CARDIAC CIRRHOSIS: Primary | ICD-10-CM

## 2023-01-01 DIAGNOSIS — Z79.4 TYPE 2 DIABETES MELLITUS WITH CIRCULATORY DISORDER, WITH LONG-TERM CURRENT USE OF INSULIN (H): ICD-10-CM

## 2023-01-01 DIAGNOSIS — N17.9 AKI (ACUTE KIDNEY INJURY) (H): ICD-10-CM

## 2023-01-01 DIAGNOSIS — E11.59 TYPE 2 DIABETES MELLITUS WITH OTHER CIRCULATORY COMPLICATION, WITH LONG-TERM CURRENT USE OF INSULIN (H): ICD-10-CM

## 2023-01-01 DIAGNOSIS — Z79.899 ON AMIODARONE THERAPY: Primary | ICD-10-CM

## 2023-01-01 DIAGNOSIS — Z79.899 ON AMIODARONE THERAPY: ICD-10-CM

## 2023-01-01 DIAGNOSIS — R18.8 OTHER ASCITES: ICD-10-CM

## 2023-01-01 DIAGNOSIS — D50.0 IRON DEFICIENCY ANEMIA DUE TO CHRONIC BLOOD LOSS: ICD-10-CM

## 2023-01-01 DIAGNOSIS — B35.4 DERMATOPHYTOSIS OF BODY: ICD-10-CM

## 2023-01-01 DIAGNOSIS — I50.20 HEART FAILURE WITH REDUCED EJECTION FRACTION, NYHA CLASS III (H): ICD-10-CM

## 2023-01-01 DIAGNOSIS — I42.8 NONISCHEMIC CARDIOMYOPATHY (H): ICD-10-CM

## 2023-01-01 DIAGNOSIS — R07.89 CHEST WALL PAIN: ICD-10-CM

## 2023-01-01 DIAGNOSIS — K76.1 CARDIAC CIRRHOSIS: ICD-10-CM

## 2023-01-01 DIAGNOSIS — K52.9 CHRONIC DIARRHEA: ICD-10-CM

## 2023-01-01 DIAGNOSIS — R06.09 DOE (DYSPNEA ON EXERTION): ICD-10-CM

## 2023-01-01 DIAGNOSIS — I47.29 PAROXYSMAL VENTRICULAR TACHYCARDIA (H): Primary | ICD-10-CM

## 2023-01-01 DIAGNOSIS — R79.89 ELEVATED TROPONIN: ICD-10-CM

## 2023-01-01 DIAGNOSIS — L40.9 PSORIASIS: ICD-10-CM

## 2023-01-01 DIAGNOSIS — E11.59 TYPE 2 DIABETES MELLITUS WITH CIRCULATORY DISORDER, WITH LONG-TERM CURRENT USE OF INSULIN (H): ICD-10-CM

## 2023-01-01 DIAGNOSIS — I42.8 NON-ISCHEMIC CARDIOMYOPATHY (H): Primary | ICD-10-CM

## 2023-01-01 DIAGNOSIS — E03.8 SUBCLINICAL HYPOTHYROIDISM: ICD-10-CM

## 2023-01-01 DIAGNOSIS — E11.9 TYPE 2 DIABETES MELLITUS WITHOUT COMPLICATION, WITH LONG-TERM CURRENT USE OF INSULIN (H): ICD-10-CM

## 2023-01-01 DIAGNOSIS — N18.31 STAGE 3A CHRONIC KIDNEY DISEASE (H): ICD-10-CM

## 2023-01-01 DIAGNOSIS — D50.9 IRON DEFICIENCY ANEMIA, UNSPECIFIED IRON DEFICIENCY ANEMIA TYPE: Primary | ICD-10-CM

## 2023-01-01 DIAGNOSIS — D86.9 SARCOIDOSIS: ICD-10-CM

## 2023-01-01 DIAGNOSIS — I50.23 ACUTE ON CHRONIC SYSTOLIC HEART FAILURE (H): ICD-10-CM

## 2023-01-01 DIAGNOSIS — R55 CARDIAC RELATED SYNCOPE: ICD-10-CM

## 2023-01-01 DIAGNOSIS — R79.89 ELEVATED LFTS: ICD-10-CM

## 2023-01-01 DIAGNOSIS — D86.0 SARCOIDOSIS OF LUNG (H): ICD-10-CM

## 2023-01-01 DIAGNOSIS — E66.01 SEVERE OBESITY (BMI 35.0-39.9) WITH COMORBIDITY (H): ICD-10-CM

## 2023-01-01 DIAGNOSIS — I21.4 NSTEMI (NON-ST ELEVATED MYOCARDIAL INFARCTION) (H): ICD-10-CM

## 2023-01-01 DIAGNOSIS — I89.0 LYMPHEDEMA: Primary | ICD-10-CM

## 2023-01-01 DIAGNOSIS — R60.0 BILATERAL LOWER EXTREMITY EDEMA: ICD-10-CM

## 2023-01-01 DIAGNOSIS — Z79.4 TYPE 2 DIABETES MELLITUS WITHOUT COMPLICATION, WITH LONG-TERM CURRENT USE OF INSULIN (H): ICD-10-CM

## 2023-01-01 DIAGNOSIS — Z79.4 TYPE 2 DIABETES MELLITUS WITH OTHER CIRCULATORY COMPLICATION, WITH LONG-TERM CURRENT USE OF INSULIN (H): Primary | ICD-10-CM

## 2023-01-01 DIAGNOSIS — I46.9 CARDIAC ARREST (H): Primary | ICD-10-CM

## 2023-01-01 DIAGNOSIS — E86.0 DEHYDRATION: ICD-10-CM

## 2023-01-01 DIAGNOSIS — K72.00 ACUTE LIVER FAILURE WITHOUT HEPATIC COMA: ICD-10-CM

## 2023-01-01 DIAGNOSIS — Z79.52 LONG TERM (CURRENT) USE OF SYSTEMIC STEROIDS: ICD-10-CM

## 2023-01-01 DIAGNOSIS — I46.9 CARDIAC ARREST (H): ICD-10-CM

## 2023-01-01 DIAGNOSIS — I50.22 CHRONIC SYSTOLIC HEART FAILURE (H): Primary | ICD-10-CM

## 2023-01-01 DIAGNOSIS — I77.810 ASCENDING AORTA DILATATION (H): ICD-10-CM

## 2023-01-01 LAB
ABO/RH(D): NORMAL
ABSOLUTE NEUTROPHILS, BODY FLUID: 12.6 /UL
ALBUMIN BODY FLUID SOURCE: NORMAL
ALBUMIN FLD-MCNC: 0.9 G/DL
ALBUMIN MFR UR ELPH: 4.8 MG/DL (ref 1–14)
ALBUMIN SERPL BCG-MCNC: 2.7 G/DL (ref 3.5–5.2)
ALBUMIN SERPL BCG-MCNC: 2.8 G/DL (ref 3.5–5.2)
ALBUMIN SERPL BCG-MCNC: 2.9 G/DL (ref 3.5–5.2)
ALBUMIN SERPL BCG-MCNC: 3.1 G/DL (ref 3.5–5.2)
ALBUMIN SERPL BCG-MCNC: 3.1 G/DL (ref 3.5–5.2)
ALBUMIN SERPL BCG-MCNC: 3.2 G/DL (ref 3.5–5.2)
ALBUMIN SERPL BCG-MCNC: 3.3 G/DL (ref 3.5–5.2)
ALBUMIN SERPL BCG-MCNC: 3.4 G/DL (ref 3.5–5.2)
ALBUMIN SERPL BCG-MCNC: 3.7 G/DL (ref 3.5–5.2)
ALBUMIN UR-MCNC: NEGATIVE MG/DL
ALP SERPL-CCNC: 121 U/L (ref 40–129)
ALP SERPL-CCNC: 163 U/L (ref 40–129)
ALP SERPL-CCNC: 164 U/L (ref 40–129)
ALP SERPL-CCNC: 168 U/L (ref 40–129)
ALP SERPL-CCNC: 193 U/L (ref 40–129)
ALP SERPL-CCNC: 235 U/L (ref 40–129)
ALP SERPL-CCNC: 236 U/L (ref 40–129)
ALP SERPL-CCNC: 251 U/L (ref 40–129)
ALP SERPL-CCNC: 251 U/L (ref 40–129)
ALP SERPL-CCNC: 281 U/L (ref 40–129)
ALP SERPL-CCNC: 285 U/L (ref 40–129)
ALP SERPL-CCNC: 289 U/L (ref 40–129)
ALP SERPL-CCNC: 294 U/L (ref 40–129)
ALT SERPL W P-5'-P-CCNC: 48 U/L (ref 10–50)
ALT SERPL W P-5'-P-CCNC: 54 U/L (ref 10–50)
ALT SERPL W P-5'-P-CCNC: 54 U/L (ref 10–50)
ALT SERPL W P-5'-P-CCNC: 57 U/L (ref 10–50)
ALT SERPL W P-5'-P-CCNC: 63 U/L (ref 0–70)
ALT SERPL W P-5'-P-CCNC: 66 U/L (ref 0–70)
ALT SERPL W P-5'-P-CCNC: 67 U/L (ref 0–70)
ALT SERPL W P-5'-P-CCNC: 71 U/L (ref 10–50)
ALT SERPL W P-5'-P-CCNC: 76 U/L (ref 0–70)
ALT SERPL W P-5'-P-CCNC: 79 U/L (ref 10–50)
ALT SERPL W P-5'-P-CCNC: 81 U/L (ref 10–50)
ALT SERPL W P-5'-P-CCNC: 84 U/L (ref 10–50)
ALT SERPL W P-5'-P-CCNC: 85 U/L (ref 10–50)
ANION GAP SERPL CALCULATED.3IONS-SCNC: 10 MMOL/L (ref 7–15)
ANION GAP SERPL CALCULATED.3IONS-SCNC: 10 MMOL/L (ref 7–15)
ANION GAP SERPL CALCULATED.3IONS-SCNC: 11 MMOL/L (ref 7–15)
ANION GAP SERPL CALCULATED.3IONS-SCNC: 12 MMOL/L (ref 7–15)
ANION GAP SERPL CALCULATED.3IONS-SCNC: 13 MMOL/L (ref 7–15)
ANION GAP SERPL CALCULATED.3IONS-SCNC: 14 MMOL/L (ref 7–15)
ANION GAP SERPL CALCULATED.3IONS-SCNC: 7 MMOL/L (ref 7–15)
ANION GAP SERPL CALCULATED.3IONS-SCNC: 7 MMOL/L (ref 7–15)
ANION GAP SERPL CALCULATED.3IONS-SCNC: 8 MMOL/L (ref 7–15)
ANION GAP SERPL CALCULATED.3IONS-SCNC: 9 MMOL/L (ref 7–15)
ANTIBODY SCREEN: NEGATIVE
APPEARANCE FLD: ABNORMAL
APPEARANCE UR: CLEAR
AST SERPL W P-5'-P-CCNC: 101 U/L (ref 0–45)
AST SERPL W P-5'-P-CCNC: 104 U/L (ref 10–50)
AST SERPL W P-5'-P-CCNC: 107 U/L (ref 0–45)
AST SERPL W P-5'-P-CCNC: 112 U/L (ref 10–50)
AST SERPL W P-5'-P-CCNC: 65 U/L (ref 10–50)
AST SERPL W P-5'-P-CCNC: 66 U/L (ref 10–50)
AST SERPL W P-5'-P-CCNC: 68 U/L (ref 10–50)
AST SERPL W P-5'-P-CCNC: 79 U/L (ref 10–50)
AST SERPL W P-5'-P-CCNC: 88 U/L (ref 10–50)
AST SERPL W P-5'-P-CCNC: 91 U/L (ref 10–50)
AST SERPL W P-5'-P-CCNC: 95 U/L (ref 0–45)
AST SERPL W P-5'-P-CCNC: 95 U/L (ref 10–50)
AST SERPL W P-5'-P-CCNC: 96 U/L (ref 0–45)
ATRIAL RATE - MUSE: 60 BPM
ATRIAL RATE - MUSE: 80 BPM
ATRIAL RATE - MUSE: 80 BPM
ATRIAL RATE - MUSE: 81 BPM
BASOPHILS # BLD AUTO: 0 10E3/UL (ref 0–0.2)
BASOPHILS # BLD MANUAL: 0.1 10E3/UL (ref 0–0.2)
BASOPHILS NFR BLD AUTO: 0 %
BASOPHILS NFR BLD AUTO: 1 %
BASOPHILS NFR BLD MANUAL: 1 %
BILIRUB DIRECT SERPL-MCNC: 0.51 MG/DL (ref 0–0.3)
BILIRUB DIRECT SERPL-MCNC: 0.58 MG/DL (ref 0–0.3)
BILIRUB DIRECT SERPL-MCNC: 0.65 MG/DL (ref 0–0.3)
BILIRUB DIRECT SERPL-MCNC: 0.86 MG/DL (ref 0–0.3)
BILIRUB DIRECT SERPL-MCNC: 1.01 MG/DL (ref 0–0.3)
BILIRUB SERPL-MCNC: 0.8 MG/DL
BILIRUB SERPL-MCNC: 0.9 MG/DL
BILIRUB SERPL-MCNC: 0.9 MG/DL
BILIRUB SERPL-MCNC: 1.1 MG/DL
BILIRUB SERPL-MCNC: 1.2 MG/DL
BILIRUB SERPL-MCNC: 1.2 MG/DL
BILIRUB SERPL-MCNC: 1.3 MG/DL
BILIRUB SERPL-MCNC: 1.4 MG/DL
BILIRUB SERPL-MCNC: 1.5 MG/DL
BILIRUB SERPL-MCNC: 1.7 MG/DL
BILIRUB UR QL STRIP: NEGATIVE
BUN SERPL-MCNC: 28.7 MG/DL (ref 8–23)
BUN SERPL-MCNC: 29 MG/DL (ref 8–23)
BUN SERPL-MCNC: 29.5 MG/DL (ref 8–23)
BUN SERPL-MCNC: 30 MG/DL (ref 8–23)
BUN SERPL-MCNC: 30.1 MG/DL (ref 8–23)
BUN SERPL-MCNC: 31.7 MG/DL (ref 8–23)
BUN SERPL-MCNC: 32.5 MG/DL (ref 8–23)
BUN SERPL-MCNC: 32.5 MG/DL (ref 8–23)
BUN SERPL-MCNC: 35.7 MG/DL (ref 8–23)
BUN SERPL-MCNC: 37.5 MG/DL (ref 8–23)
BUN SERPL-MCNC: 37.9 MG/DL (ref 8–23)
BUN SERPL-MCNC: 48.5 MG/DL (ref 8–23)
BUN SERPL-MCNC: 51.4 MG/DL (ref 8–23)
BUN SERPL-MCNC: 51.5 MG/DL (ref 8–23)
BUN SERPL-MCNC: 53.4 MG/DL (ref 8–23)
BUN SERPL-MCNC: 56.7 MG/DL (ref 8–23)
BUN SERPL-MCNC: 57.4 MG/DL (ref 8–23)
BUN SERPL-MCNC: 65.1 MG/DL (ref 8–23)
BUN SERPL-MCNC: 72.5 MG/DL (ref 8–23)
C PARVUM AG STL QL IA: NEGATIVE
CALCIUM SERPL-MCNC: 8.2 MG/DL (ref 8.8–10.2)
CALCIUM SERPL-MCNC: 8.3 MG/DL (ref 8.8–10.2)
CALCIUM SERPL-MCNC: 8.5 MG/DL (ref 8.8–10.2)
CALCIUM SERPL-MCNC: 8.6 MG/DL (ref 8.8–10.2)
CALCIUM SERPL-MCNC: 8.7 MG/DL (ref 8.8–10.2)
CALCIUM SERPL-MCNC: 8.8 MG/DL (ref 8.8–10.2)
CALCIUM SERPL-MCNC: 8.8 MG/DL (ref 8.8–10.2)
CALCIUM SERPL-MCNC: 9 MG/DL (ref 8.8–10.2)
CALCIUM SERPL-MCNC: 9.1 MG/DL (ref 8.8–10.2)
CALCIUM SERPL-MCNC: 9.2 MG/DL (ref 8.8–10.2)
CALCIUM SERPL-MCNC: 9.2 MG/DL (ref 8.8–10.2)
CELL COUNT BODY FLUID SOURCE: ABNORMAL
CHLORIDE SERPL-SCNC: 100 MMOL/L (ref 98–107)
CHLORIDE SERPL-SCNC: 101 MMOL/L (ref 98–107)
CHLORIDE SERPL-SCNC: 101 MMOL/L (ref 98–107)
CHLORIDE SERPL-SCNC: 102 MMOL/L (ref 98–107)
CHLORIDE SERPL-SCNC: 103 MMOL/L (ref 98–107)
CHLORIDE SERPL-SCNC: 103 MMOL/L (ref 98–107)
CHLORIDE SERPL-SCNC: 104 MMOL/L (ref 98–107)
CHLORIDE SERPL-SCNC: 105 MMOL/L (ref 98–107)
CHLORIDE SERPL-SCNC: 105 MMOL/L (ref 98–107)
CHLORIDE SERPL-SCNC: 107 MMOL/L (ref 98–107)
CHLORIDE SERPL-SCNC: 107 MMOL/L (ref 98–107)
CHLORIDE SERPL-SCNC: 109 MMOL/L (ref 98–107)
CHLORIDE SERPL-SCNC: 110 MMOL/L (ref 98–107)
CHLORIDE SERPL-SCNC: 111 MMOL/L (ref 98–107)
CHLORIDE SERPL-SCNC: 97 MMOL/L (ref 98–107)
CHLORIDE SERPL-SCNC: 97 MMOL/L (ref 98–107)
CHLORIDE SERPL-SCNC: 99 MMOL/L (ref 98–107)
CHOLEST SERPL-MCNC: 88 MG/DL
COLOR FLD: YELLOW
COLOR UR AUTO: YELLOW
CREAT SERPL-MCNC: 1.18 MG/DL (ref 0.67–1.17)
CREAT SERPL-MCNC: 1.21 MG/DL (ref 0.67–1.17)
CREAT SERPL-MCNC: 1.24 MG/DL (ref 0.67–1.17)
CREAT SERPL-MCNC: 1.25 MG/DL (ref 0.67–1.17)
CREAT SERPL-MCNC: 1.27 MG/DL (ref 0.67–1.17)
CREAT SERPL-MCNC: 1.28 MG/DL (ref 0.67–1.17)
CREAT SERPL-MCNC: 1.31 MG/DL (ref 0.67–1.17)
CREAT SERPL-MCNC: 1.33 MG/DL (ref 0.67–1.17)
CREAT SERPL-MCNC: 1.37 MG/DL (ref 0.67–1.17)
CREAT SERPL-MCNC: 1.48 MG/DL (ref 0.67–1.17)
CREAT SERPL-MCNC: 1.5 MG/DL (ref 0.67–1.17)
CREAT SERPL-MCNC: 1.56 MG/DL (ref 0.67–1.17)
CREAT SERPL-MCNC: 1.61 MG/DL (ref 0.67–1.17)
CREAT SERPL-MCNC: 1.8 MG/DL (ref 0.67–1.17)
CREAT SERPL-MCNC: 1.97 MG/DL (ref 0.67–1.17)
CREAT SERPL-MCNC: 2 MG/DL (ref 0.67–1.17)
CREAT SERPL-MCNC: 2.14 MG/DL (ref 0.67–1.17)
CREAT SERPL-MCNC: 2.21 MG/DL (ref 0.67–1.17)
CREAT SERPL-MCNC: 2.42 MG/DL (ref 0.67–1.17)
CREAT SERPL-MCNC: 2.55 MG/DL (ref 0.67–1.17)
CREAT SERPL-MCNC: 2.79 MG/DL (ref 0.67–1.17)
CREAT UR-MCNC: 180 MG/DL
CREAT UR-MCNC: 73.5 MG/DL
CREAT UR-MCNC: 73.5 MG/DL
DEPRECATED HCO3 PLAS-SCNC: 17 MMOL/L (ref 22–29)
DEPRECATED HCO3 PLAS-SCNC: 18 MMOL/L (ref 22–29)
DEPRECATED HCO3 PLAS-SCNC: 19 MMOL/L (ref 22–29)
DEPRECATED HCO3 PLAS-SCNC: 20 MMOL/L (ref 22–29)
DEPRECATED HCO3 PLAS-SCNC: 20 MMOL/L (ref 22–29)
DEPRECATED HCO3 PLAS-SCNC: 21 MMOL/L (ref 22–29)
DEPRECATED HCO3 PLAS-SCNC: 21 MMOL/L (ref 22–29)
DEPRECATED HCO3 PLAS-SCNC: 22 MMOL/L (ref 22–29)
DEPRECATED HCO3 PLAS-SCNC: 23 MMOL/L (ref 22–29)
DEPRECATED HCO3 PLAS-SCNC: 24 MMOL/L (ref 22–29)
DEPRECATED HCO3 PLAS-SCNC: 24 MMOL/L (ref 22–29)
DIASTOLIC BLOOD PRESSURE - MUSE: NORMAL MMHG
DLCOCOR-%PRED-PRE: 92 %
DLCOCOR-PRE: 25.69 ML/MIN/MMHG
DLCOUNC-%PRED-PRE: 74 %
DLCOUNC-PRE: 20.57 ML/MIN/MMHG
DLCOUNC-PRED: 27.68 ML/MIN/MMHG
EOSINOPHIL # BLD AUTO: 0 10E3/UL (ref 0–0.7)
EOSINOPHIL # BLD AUTO: 0.1 10E3/UL (ref 0–0.7)
EOSINOPHIL # BLD MANUAL: 0 10E3/UL (ref 0–0.7)
EOSINOPHIL NFR BLD AUTO: 0 %
EOSINOPHIL NFR BLD AUTO: 0 %
EOSINOPHIL NFR BLD AUTO: 1 %
EOSINOPHIL NFR BLD MANUAL: 0 %
ERV-%PRED-PRE: 29 %
ERV-PRE: 0.39 L
ERV-PRED: 1.36 L
ERYTHROCYTE [DISTWIDTH] IN BLOOD BY AUTOMATED COUNT: 16.6 % (ref 10–15)
ERYTHROCYTE [DISTWIDTH] IN BLOOD BY AUTOMATED COUNT: 16.9 % (ref 10–15)
ERYTHROCYTE [DISTWIDTH] IN BLOOD BY AUTOMATED COUNT: 17.1 % (ref 10–15)
ERYTHROCYTE [DISTWIDTH] IN BLOOD BY AUTOMATED COUNT: 17.2 % (ref 10–15)
ERYTHROCYTE [DISTWIDTH] IN BLOOD BY AUTOMATED COUNT: 17.4 % (ref 10–15)
ERYTHROCYTE [DISTWIDTH] IN BLOOD BY AUTOMATED COUNT: 17.5 % (ref 10–15)
ERYTHROCYTE [DISTWIDTH] IN BLOOD BY AUTOMATED COUNT: 18.1 % (ref 10–15)
ERYTHROCYTE [DISTWIDTH] IN BLOOD BY AUTOMATED COUNT: 18.2 % (ref 10–15)
ERYTHROCYTE [DISTWIDTH] IN BLOOD BY AUTOMATED COUNT: 18.4 % (ref 10–15)
ERYTHROCYTE [DISTWIDTH] IN BLOOD BY AUTOMATED COUNT: 18.5 % (ref 10–15)
ERYTHROCYTE [DISTWIDTH] IN BLOOD BY AUTOMATED COUNT: 18.9 % (ref 10–15)
ERYTHROCYTE [DISTWIDTH] IN BLOOD BY AUTOMATED COUNT: 18.9 % (ref 10–15)
ERYTHROCYTE [DISTWIDTH] IN BLOOD BY AUTOMATED COUNT: 20.1 % (ref 10–15)
EXPTIME-PRE: 7.1 SEC
FEF2575-%PRED-PRE: 116 %
FEF2575-PRE: 3.13 L/SEC
FEF2575-PRED: 2.68 L/SEC
FEFMAX-%PRED-PRE: 63 %
FEFMAX-PRE: 5.77 L/SEC
FEFMAX-PRED: 9.14 L/SEC
FERRITIN SERPL-MCNC: 423 NG/ML (ref 31–409)
FEV1-%PRED-PRE: 88 %
FEV1-PRE: 2.94 L
FEV1FEV6-PRE: 82 %
FEV1FEV6-PRED: 78 %
FEV1FVC-PRE: 82 %
FEV1FVC-PRED: 78 %
FEV1SVC-PRE: 85 %
FEV1SVC-PRED: 65 %
FIFMAX-PRE: 5.91 L/SEC
FOLATE SERPL-MCNC: 19.4 NG/ML (ref 4.6–34.8)
FOLATE SERPL-MCNC: 27.7 NG/ML (ref 4.6–34.8)
FRACT EXCRET NA UR+SERPL-RTO: 1 %
FVC-%PRED-PRE: 83 %
FVC-PRE: 3.58 L
FVC-PRED: 4.27 L
G LAMBLIA AG STL QL IA: NEGATIVE
GFR SERPL CREATININE-BSD FRML MDRD: 25 ML/MIN/1.73M2
GFR SERPL CREATININE-BSD FRML MDRD: 27 ML/MIN/1.73M2
GFR SERPL CREATININE-BSD FRML MDRD: 29 ML/MIN/1.73M2
GFR SERPL CREATININE-BSD FRML MDRD: 32 ML/MIN/1.73M2
GFR SERPL CREATININE-BSD FRML MDRD: 34 ML/MIN/1.73M2
GFR SERPL CREATININE-BSD FRML MDRD: 37 ML/MIN/1.73M2
GFR SERPL CREATININE-BSD FRML MDRD: 37 ML/MIN/1.73M2
GFR SERPL CREATININE-BSD FRML MDRD: 42 ML/MIN/1.73M2
GFR SERPL CREATININE-BSD FRML MDRD: 47 ML/MIN/1.73M2
GFR SERPL CREATININE-BSD FRML MDRD: 49 ML/MIN/1.73M2
GFR SERPL CREATININE-BSD FRML MDRD: 52 ML/MIN/1.73M2
GFR SERPL CREATININE-BSD FRML MDRD: 58 ML/MIN/1.73M2
GFR SERPL CREATININE-BSD FRML MDRD: 60 ML/MIN/1.73M2
GFR SERPL CREATININE-BSD FRML MDRD: 61 ML/MIN/1.73M2
GFR SERPL CREATININE-BSD FRML MDRD: 62 ML/MIN/1.73M2
GFR SERPL CREATININE-BSD FRML MDRD: 63 ML/MIN/1.73M2
GFR SERPL CREATININE-BSD FRML MDRD: 64 ML/MIN/1.73M2
GFR SERPL CREATININE-BSD FRML MDRD: 65 ML/MIN/1.73M2
GFR SERPL CREATININE-BSD FRML MDRD: 67 ML/MIN/1.73M2
GFR SERPL CREATININE-BSD FRML MDRD: 69 ML/MIN/1.73M2
GLUCOSE BLDC GLUCOMTR-MCNC: 133 MG/DL (ref 70–99)
GLUCOSE BLDC GLUCOMTR-MCNC: 140 MG/DL (ref 70–99)
GLUCOSE BLDC GLUCOMTR-MCNC: 146 MG/DL (ref 70–99)
GLUCOSE BLDC GLUCOMTR-MCNC: 148 MG/DL (ref 70–99)
GLUCOSE BLDC GLUCOMTR-MCNC: 148 MG/DL (ref 70–99)
GLUCOSE BLDC GLUCOMTR-MCNC: 150 MG/DL (ref 70–99)
GLUCOSE BLDC GLUCOMTR-MCNC: 153 MG/DL (ref 70–99)
GLUCOSE BLDC GLUCOMTR-MCNC: 154 MG/DL (ref 70–99)
GLUCOSE BLDC GLUCOMTR-MCNC: 155 MG/DL (ref 70–99)
GLUCOSE BLDC GLUCOMTR-MCNC: 156 MG/DL (ref 70–99)
GLUCOSE BLDC GLUCOMTR-MCNC: 157 MG/DL (ref 70–99)
GLUCOSE BLDC GLUCOMTR-MCNC: 158 MG/DL (ref 70–99)
GLUCOSE BLDC GLUCOMTR-MCNC: 159 MG/DL (ref 70–99)
GLUCOSE BLDC GLUCOMTR-MCNC: 159 MG/DL (ref 70–99)
GLUCOSE BLDC GLUCOMTR-MCNC: 163 MG/DL (ref 70–99)
GLUCOSE BLDC GLUCOMTR-MCNC: 164 MG/DL (ref 70–99)
GLUCOSE BLDC GLUCOMTR-MCNC: 165 MG/DL (ref 70–99)
GLUCOSE BLDC GLUCOMTR-MCNC: 172 MG/DL (ref 70–99)
GLUCOSE BLDC GLUCOMTR-MCNC: 173 MG/DL (ref 70–99)
GLUCOSE BLDC GLUCOMTR-MCNC: 175 MG/DL (ref 70–99)
GLUCOSE BLDC GLUCOMTR-MCNC: 175 MG/DL (ref 70–99)
GLUCOSE BLDC GLUCOMTR-MCNC: 179 MG/DL (ref 70–99)
GLUCOSE BLDC GLUCOMTR-MCNC: 179 MG/DL (ref 70–99)
GLUCOSE BLDC GLUCOMTR-MCNC: 181 MG/DL (ref 70–99)
GLUCOSE BLDC GLUCOMTR-MCNC: 182 MG/DL (ref 70–99)
GLUCOSE BLDC GLUCOMTR-MCNC: 193 MG/DL (ref 70–99)
GLUCOSE BLDC GLUCOMTR-MCNC: 194 MG/DL (ref 70–99)
GLUCOSE BLDC GLUCOMTR-MCNC: 203 MG/DL (ref 70–99)
GLUCOSE BLDC GLUCOMTR-MCNC: 209 MG/DL (ref 70–99)
GLUCOSE BLDC GLUCOMTR-MCNC: 212 MG/DL (ref 70–99)
GLUCOSE BLDC GLUCOMTR-MCNC: 217 MG/DL (ref 70–99)
GLUCOSE BLDC GLUCOMTR-MCNC: 240 MG/DL (ref 70–99)
GLUCOSE BLDC GLUCOMTR-MCNC: 240 MG/DL (ref 70–99)
GLUCOSE BLDC GLUCOMTR-MCNC: 242 MG/DL (ref 70–99)
GLUCOSE BLDC GLUCOMTR-MCNC: 245 MG/DL (ref 70–99)
GLUCOSE BLDC GLUCOMTR-MCNC: 246 MG/DL (ref 70–99)
GLUCOSE BLDC GLUCOMTR-MCNC: 251 MG/DL (ref 70–99)
GLUCOSE BLDC GLUCOMTR-MCNC: 260 MG/DL (ref 70–99)
GLUCOSE BLDC GLUCOMTR-MCNC: 260 MG/DL (ref 70–99)
GLUCOSE BLDC GLUCOMTR-MCNC: 265 MG/DL (ref 70–99)
GLUCOSE BLDC GLUCOMTR-MCNC: 268 MG/DL (ref 70–99)
GLUCOSE BLDC GLUCOMTR-MCNC: 274 MG/DL (ref 70–99)
GLUCOSE BLDC GLUCOMTR-MCNC: 293 MG/DL (ref 70–99)
GLUCOSE BLDC GLUCOMTR-MCNC: 295 MG/DL (ref 70–99)
GLUCOSE BLDC GLUCOMTR-MCNC: 304 MG/DL (ref 70–99)
GLUCOSE SERPL-MCNC: 124 MG/DL (ref 70–99)
GLUCOSE SERPL-MCNC: 135 MG/DL (ref 70–99)
GLUCOSE SERPL-MCNC: 135 MG/DL (ref 70–99)
GLUCOSE SERPL-MCNC: 140 MG/DL (ref 70–99)
GLUCOSE SERPL-MCNC: 155 MG/DL (ref 70–99)
GLUCOSE SERPL-MCNC: 159 MG/DL (ref 70–99)
GLUCOSE SERPL-MCNC: 163 MG/DL (ref 70–99)
GLUCOSE SERPL-MCNC: 165 MG/DL (ref 70–99)
GLUCOSE SERPL-MCNC: 167 MG/DL (ref 70–99)
GLUCOSE SERPL-MCNC: 174 MG/DL (ref 70–99)
GLUCOSE SERPL-MCNC: 192 MG/DL (ref 70–99)
GLUCOSE SERPL-MCNC: 208 MG/DL (ref 70–99)
GLUCOSE SERPL-MCNC: 224 MG/DL (ref 70–99)
GLUCOSE SERPL-MCNC: 234 MG/DL (ref 70–99)
GLUCOSE SERPL-MCNC: 250 MG/DL (ref 70–99)
GLUCOSE SERPL-MCNC: 266 MG/DL (ref 70–99)
GLUCOSE SERPL-MCNC: 268 MG/DL (ref 70–99)
GLUCOSE SERPL-MCNC: 316 MG/DL (ref 70–99)
GLUCOSE SERPL-MCNC: 74 MG/DL (ref 70–99)
GLUCOSE SERPL-MCNC: 93 MG/DL (ref 70–99)
GLUCOSE UR STRIP-MCNC: NEGATIVE MG/DL
HBA1C MFR BLD: 7.1 %
HBA1C MFR BLD: 7.9 %
HCO3 BLDV-SCNC: 21 MMOL/L (ref 21–28)
HCT VFR BLD AUTO: 23.2 % (ref 40–53)
HCT VFR BLD AUTO: 23.4 % (ref 40–53)
HCT VFR BLD AUTO: 24.2 % (ref 40–53)
HCT VFR BLD AUTO: 24.7 % (ref 40–53)
HCT VFR BLD AUTO: 24.9 % (ref 40–53)
HCT VFR BLD AUTO: 26.1 % (ref 40–53)
HCT VFR BLD AUTO: 26.6 % (ref 40–53)
HCT VFR BLD AUTO: 26.7 % (ref 40–53)
HCT VFR BLD AUTO: 27.1 % (ref 40–53)
HCT VFR BLD AUTO: 27.5 % (ref 40–53)
HCT VFR BLD AUTO: 27.8 % (ref 40–53)
HCT VFR BLD AUTO: 28.4 % (ref 40–53)
HCT VFR BLD AUTO: 31.7 % (ref 40–53)
HDLC SERPL-MCNC: 29 MG/DL
HGB BLD-MCNC: 7.7 G/DL (ref 13.3–17.7)
HGB BLD-MCNC: 7.8 G/DL (ref 13.3–17.7)
HGB BLD-MCNC: 8 G/DL (ref 13.3–17.7)
HGB BLD-MCNC: 8.1 G/DL (ref 13.3–17.7)
HGB BLD-MCNC: 8.2 G/DL (ref 13.3–17.7)
HGB BLD-MCNC: 8.3 G/DL (ref 13.3–17.7)
HGB BLD-MCNC: 8.5 G/DL (ref 13.3–17.7)
HGB BLD-MCNC: 8.6 G/DL (ref 13.3–17.7)
HGB BLD-MCNC: 8.8 G/DL (ref 13.3–17.7)
HGB BLD-MCNC: 8.8 G/DL (ref 13.3–17.7)
HGB BLD-MCNC: 9 G/DL (ref 13.3–17.7)
HGB BLD-MCNC: 9.1 G/DL (ref 13.3–17.7)
HGB BLD-MCNC: 9.2 G/DL (ref 13.3–17.7)
HGB BLD-MCNC: 9.9 G/DL (ref 13.3–17.7)
HGB UR QL STRIP: NEGATIVE
HOLD SPECIMEN: NORMAL
IC-%PRED-PRE: 83 %
IC-PRE: 3.07 L
IC-PRED: 3.66 L
IMM GRANULOCYTES # BLD: 0 10E3/UL
IMM GRANULOCYTES # BLD: 0.1 10E3/UL
IMM GRANULOCYTES NFR BLD: 1 %
INR PPP: 1.35 (ref 0.85–1.15)
INR PPP: 1.46 (ref 0.85–1.15)
INTERPRETATION ECG - MUSE: NORMAL
IRON BINDING CAPACITY (ROCHE): 162 UG/DL (ref 240–430)
IRON BINDING CAPACITY (ROCHE): 179 UG/DL (ref 240–430)
IRON BINDING CAPACITY (ROCHE): 256 UG/DL (ref 240–430)
IRON SATN MFR SERPL: 31 % (ref 15–46)
IRON SATN MFR SERPL: 45 % (ref 15–46)
IRON SATN MFR SERPL: 9 % (ref 15–46)
IRON SERPL-MCNC: 15 UG/DL (ref 61–157)
IRON SERPL-MCNC: 80 UG/DL (ref 61–157)
IRON SERPL-MCNC: 81 UG/DL (ref 61–157)
KETONES UR STRIP-MCNC: NEGATIVE MG/DL
LACTATE BLD-SCNC: 3.4 MMOL/L
LACTATE SERPL-SCNC: 1.6 MMOL/L (ref 0.7–2)
LACTATE SERPL-SCNC: 1.8 MMOL/L (ref 0.7–2)
LACTATE SERPL-SCNC: 2.8 MMOL/L (ref 0.7–2)
LACTATE SERPL-SCNC: 3.2 MMOL/L (ref 0.7–2)
LDLC SERPL CALC-MCNC: 36 MG/DL
LEUKOCYTE ESTERASE UR QL STRIP: NEGATIVE
LIPASE SERPL-CCNC: 54 U/L (ref 13–60)
LVEF ECHO: NORMAL
LYMPHOCYTES # BLD AUTO: 0.4 10E3/UL (ref 0.8–5.3)
LYMPHOCYTES # BLD AUTO: 0.5 10E3/UL (ref 0.8–5.3)
LYMPHOCYTES # BLD AUTO: 0.6 10E3/UL (ref 0.8–5.3)
LYMPHOCYTES # BLD AUTO: 0.9 10E3/UL (ref 0.8–5.3)
LYMPHOCYTES # BLD AUTO: 1.1 10E3/UL (ref 0.8–5.3)
LYMPHOCYTES # BLD AUTO: 1.1 10E3/UL (ref 0.8–5.3)
LYMPHOCYTES # BLD MANUAL: 0.7 10E3/UL (ref 0.8–5.3)
LYMPHOCYTES NFR BLD AUTO: 13 %
LYMPHOCYTES NFR BLD AUTO: 15 %
LYMPHOCYTES NFR BLD AUTO: 16 %
LYMPHOCYTES NFR BLD AUTO: 7 %
LYMPHOCYTES NFR BLD AUTO: 7 %
LYMPHOCYTES NFR BLD AUTO: 9 %
LYMPHOCYTES NFR BLD MANUAL: 11 %
LYMPHOCYTES NFR FLD MANUAL: 41 %
MAGNESIUM SERPL-MCNC: 1.6 MG/DL (ref 1.7–2.3)
MAGNESIUM SERPL-MCNC: 1.7 MG/DL (ref 1.7–2.3)
MAGNESIUM SERPL-MCNC: 1.8 MG/DL (ref 1.7–2.3)
MAGNESIUM SERPL-MCNC: 1.9 MG/DL (ref 1.7–2.3)
MAGNESIUM SERPL-MCNC: 2 MG/DL (ref 1.7–2.3)
MCH RBC QN AUTO: 29.1 PG (ref 26.5–33)
MCH RBC QN AUTO: 30.8 PG (ref 26.5–33)
MCH RBC QN AUTO: 30.8 PG (ref 26.5–33)
MCH RBC QN AUTO: 31.2 PG (ref 26.5–33)
MCH RBC QN AUTO: 31.3 PG (ref 26.5–33)
MCH RBC QN AUTO: 31.3 PG (ref 26.5–33)
MCH RBC QN AUTO: 31.4 PG (ref 26.5–33)
MCH RBC QN AUTO: 31.5 PG (ref 26.5–33)
MCH RBC QN AUTO: 31.8 PG (ref 26.5–33)
MCH RBC QN AUTO: 31.9 PG (ref 26.5–33)
MCH RBC QN AUTO: 32 PG (ref 26.5–33)
MCHC RBC AUTO-ENTMCNC: 31.2 G/DL (ref 31.5–36.5)
MCHC RBC AUTO-ENTMCNC: 31.7 G/DL (ref 31.5–36.5)
MCHC RBC AUTO-ENTMCNC: 32 G/DL (ref 31.5–36.5)
MCHC RBC AUTO-ENTMCNC: 32.4 G/DL (ref 31.5–36.5)
MCHC RBC AUTO-ENTMCNC: 32.5 G/DL (ref 31.5–36.5)
MCHC RBC AUTO-ENTMCNC: 32.7 G/DL (ref 31.5–36.5)
MCHC RBC AUTO-ENTMCNC: 32.9 G/DL (ref 31.5–36.5)
MCHC RBC AUTO-ENTMCNC: 33 G/DL (ref 31.5–36.5)
MCHC RBC AUTO-ENTMCNC: 33 G/DL (ref 31.5–36.5)
MCHC RBC AUTO-ENTMCNC: 33.2 G/DL (ref 31.5–36.5)
MCHC RBC AUTO-ENTMCNC: 33.3 G/DL (ref 31.5–36.5)
MCHC RBC AUTO-ENTMCNC: 33.5 G/DL (ref 31.5–36.5)
MCHC RBC AUTO-ENTMCNC: 33.5 G/DL (ref 31.5–36.5)
MCV RBC AUTO: 93 FL (ref 78–100)
MCV RBC AUTO: 94 FL (ref 78–100)
MCV RBC AUTO: 95 FL (ref 78–100)
MCV RBC AUTO: 95 FL (ref 78–100)
MCV RBC AUTO: 96 FL (ref 78–100)
MCV RBC AUTO: 98 FL (ref 78–100)
MCV RBC AUTO: 98 FL (ref 78–100)
MCV RBC AUTO: 99 FL (ref 78–100)
MDC_IDC_EPISODE_DTM: NORMAL
MDC_IDC_EPISODE_DURATION: 13 S
MDC_IDC_EPISODE_DURATION: 15 S
MDC_IDC_EPISODE_DURATION: 153 S
MDC_IDC_EPISODE_DURATION: 16 S
MDC_IDC_EPISODE_DURATION: 17 S
MDC_IDC_EPISODE_DURATION: 19 S
MDC_IDC_EPISODE_DURATION: 40 S
MDC_IDC_EPISODE_DURATION: 46 S
MDC_IDC_EPISODE_DURATION: 47 S
MDC_IDC_EPISODE_DURATION: 66 S
MDC_IDC_EPISODE_DURATION: 79 S
MDC_IDC_EPISODE_ID: NORMAL
MDC_IDC_EPISODE_TYPE: NORMAL
MDC_IDC_EPISODE_VENDOR_TYPE: NORMAL
MDC_IDC_LEAD_IMPLANT_DT: NORMAL
MDC_IDC_LEAD_LOCATION: NORMAL
MDC_IDC_LEAD_LOCATION_DETAIL_1: NORMAL
MDC_IDC_LEAD_MFG: NORMAL
MDC_IDC_LEAD_MODEL: NORMAL
MDC_IDC_LEAD_POLARITY_TYPE: NORMAL
MDC_IDC_LEAD_SERIAL: NORMAL
MDC_IDC_MSMT_BATTERY_DTM: NORMAL
MDC_IDC_MSMT_BATTERY_REMAINING_LONGEVITY: 66 MO
MDC_IDC_MSMT_BATTERY_REMAINING_LONGEVITY: 72 MO
MDC_IDC_MSMT_BATTERY_REMAINING_LONGEVITY: 78 MO
MDC_IDC_MSMT_BATTERY_REMAINING_LONGEVITY: 84 MO
MDC_IDC_MSMT_BATTERY_REMAINING_LONGEVITY: 84 MO
MDC_IDC_MSMT_BATTERY_REMAINING_LONGEVITY: 96 MO
MDC_IDC_MSMT_BATTERY_REMAINING_LONGEVITY: 96 MO
MDC_IDC_MSMT_BATTERY_REMAINING_PERCENTAGE: 100 %
MDC_IDC_MSMT_BATTERY_REMAINING_PERCENTAGE: 100 %
MDC_IDC_MSMT_BATTERY_REMAINING_PERCENTAGE: 78 %
MDC_IDC_MSMT_BATTERY_REMAINING_PERCENTAGE: 83 %
MDC_IDC_MSMT_BATTERY_REMAINING_PERCENTAGE: 92 %
MDC_IDC_MSMT_BATTERY_STATUS: NORMAL
MDC_IDC_MSMT_CAP_CHARGE_DTM: NORMAL
MDC_IDC_MSMT_CAP_CHARGE_ENERGY: 41 J
MDC_IDC_MSMT_CAP_CHARGE_TIME: 10 S
MDC_IDC_MSMT_CAP_CHARGE_TIME: 7 S
MDC_IDC_MSMT_CAP_CHARGE_TIME: 7.07
MDC_IDC_MSMT_CAP_CHARGE_TIME: 7.1 S
MDC_IDC_MSMT_CAP_CHARGE_TIME: 9.8 S
MDC_IDC_MSMT_CAP_CHARGE_TIME: 9.9 S
MDC_IDC_MSMT_CAP_CHARGE_TIME: 9.97
MDC_IDC_MSMT_CAP_CHARGE_TYPE: NORMAL
MDC_IDC_MSMT_LEADCHNL_LV_IMPEDANCE_VALUE: 642 OHM
MDC_IDC_MSMT_LEADCHNL_LV_IMPEDANCE_VALUE: 655 OHM
MDC_IDC_MSMT_LEADCHNL_LV_IMPEDANCE_VALUE: 657 OHM
MDC_IDC_MSMT_LEADCHNL_LV_IMPEDANCE_VALUE: 669 OHM
MDC_IDC_MSMT_LEADCHNL_LV_IMPEDANCE_VALUE: 684 OHM
MDC_IDC_MSMT_LEADCHNL_LV_IMPEDANCE_VALUE: 724 OHM
MDC_IDC_MSMT_LEADCHNL_LV_IMPEDANCE_VALUE: 785 OHM
MDC_IDC_MSMT_LEADCHNL_LV_PACING_THRESHOLD_AMPLITUDE: 0.6 V
MDC_IDC_MSMT_LEADCHNL_LV_PACING_THRESHOLD_AMPLITUDE: 0.6 V
MDC_IDC_MSMT_LEADCHNL_LV_PACING_THRESHOLD_AMPLITUDE: 0.7 V
MDC_IDC_MSMT_LEADCHNL_LV_PACING_THRESHOLD_AMPLITUDE: 0.8 V
MDC_IDC_MSMT_LEADCHNL_LV_PACING_THRESHOLD_PULSEWIDTH: 0.5 MS
MDC_IDC_MSMT_LEADCHNL_LV_SENSING_INTR_AMPL: 12.6 MV
MDC_IDC_MSMT_LEADCHNL_LV_SENSING_INTR_AMPL: 14.7 MV
MDC_IDC_MSMT_LEADCHNL_RA_IMPEDANCE_VALUE: 642 OHM
MDC_IDC_MSMT_LEADCHNL_RA_IMPEDANCE_VALUE: 644 OHM
MDC_IDC_MSMT_LEADCHNL_RA_IMPEDANCE_VALUE: 657 OHM
MDC_IDC_MSMT_LEADCHNL_RA_IMPEDANCE_VALUE: 670 OHM
MDC_IDC_MSMT_LEADCHNL_RA_IMPEDANCE_VALUE: 701 OHM
MDC_IDC_MSMT_LEADCHNL_RA_IMPEDANCE_VALUE: 701 OHM
MDC_IDC_MSMT_LEADCHNL_RA_IMPEDANCE_VALUE: 709 OHM
MDC_IDC_MSMT_LEADCHNL_RA_PACING_THRESHOLD_AMPLITUDE: 0.5 V
MDC_IDC_MSMT_LEADCHNL_RA_PACING_THRESHOLD_AMPLITUDE: 0.7 V
MDC_IDC_MSMT_LEADCHNL_RA_PACING_THRESHOLD_AMPLITUDE: 0.7 V
MDC_IDC_MSMT_LEADCHNL_RA_PACING_THRESHOLD_PULSEWIDTH: 0.4 MS
MDC_IDC_MSMT_LEADCHNL_RA_SENSING_INTR_AMPL: 1.7 MV
MDC_IDC_MSMT_LEADCHNL_RA_SENSING_INTR_AMPL: 1.8 MV
MDC_IDC_MSMT_LEADCHNL_RV_IMPEDANCE_VALUE: 441 OHM
MDC_IDC_MSMT_LEADCHNL_RV_IMPEDANCE_VALUE: 457 OHM
MDC_IDC_MSMT_LEADCHNL_RV_IMPEDANCE_VALUE: 461 OHM
MDC_IDC_MSMT_LEADCHNL_RV_IMPEDANCE_VALUE: 465 OHM
MDC_IDC_MSMT_LEADCHNL_RV_IMPEDANCE_VALUE: 474 OHM
MDC_IDC_MSMT_LEADCHNL_RV_IMPEDANCE_VALUE: 493 OHM
MDC_IDC_MSMT_LEADCHNL_RV_IMPEDANCE_VALUE: 510 OHM
MDC_IDC_MSMT_LEADCHNL_RV_PACING_THRESHOLD_AMPLITUDE: 0.7 V
MDC_IDC_MSMT_LEADCHNL_RV_PACING_THRESHOLD_AMPLITUDE: 0.8 V
MDC_IDC_MSMT_LEADCHNL_RV_PACING_THRESHOLD_AMPLITUDE: 3.5 V
MDC_IDC_MSMT_LEADCHNL_RV_PACING_THRESHOLD_PULSEWIDTH: 0.4 MS
MDC_IDC_MSMT_LEADCHNL_RV_SENSING_INTR_AMPL: 10.3 MV
MDC_IDC_MSMT_LEADCHNL_RV_SENSING_INTR_AMPL: 13.2 MV
MDC_IDC_PG_IMPLANT_DTM: NORMAL
MDC_IDC_PG_MFG: NORMAL
MDC_IDC_PG_MODEL: NORMAL
MDC_IDC_PG_SERIAL: NORMAL
MDC_IDC_PG_TYPE: NORMAL
MDC_IDC_SESS_CLINIC_NAME: NORMAL
MDC_IDC_SESS_DTM: NORMAL
MDC_IDC_SESS_TYPE: NORMAL
MDC_IDC_SET_BRADY_AT_MODE_SWITCH_MODE: NORMAL
MDC_IDC_SET_BRADY_AT_MODE_SWITCH_RATE: 170 {BEATS}/MIN
MDC_IDC_SET_BRADY_LOWRATE: 60 {BEATS}/MIN
MDC_IDC_SET_BRADY_MAX_SENSOR_RATE: 130 {BEATS}/MIN
MDC_IDC_SET_BRADY_MAX_TRACKING_RATE: 130 {BEATS}/MIN
MDC_IDC_SET_BRADY_MODE: NORMAL
MDC_IDC_SET_BRADY_PAV_DELAY_HIGH: 200 MS
MDC_IDC_SET_BRADY_PAV_DELAY_LOW: 270 MS
MDC_IDC_SET_BRADY_SAV_DELAY_HIGH: 140 MS
MDC_IDC_SET_BRADY_SAV_DELAY_LOW: 190 MS
MDC_IDC_SET_CRT_LVRV_DELAY: 35 MS
MDC_IDC_SET_CRT_PACED_CHAMBERS: NORMAL
MDC_IDC_SET_LEADCHNL_LV_PACING_AMPLITUDE: 1.7 V
MDC_IDC_SET_LEADCHNL_LV_PACING_AMPLITUDE: 1.8 V
MDC_IDC_SET_LEADCHNL_LV_PACING_AMPLITUDE: 1.9 V
MDC_IDC_SET_LEADCHNL_LV_PACING_AMPLITUDE: 1.9 V
MDC_IDC_SET_LEADCHNL_LV_PACING_ANODE_ELECTRODE_1: NORMAL
MDC_IDC_SET_LEADCHNL_LV_PACING_ANODE_LOCATION_1: NORMAL
MDC_IDC_SET_LEADCHNL_LV_PACING_CAPTURE_MODE: NORMAL
MDC_IDC_SET_LEADCHNL_LV_PACING_CATHODE_ELECTRODE_1: NORMAL
MDC_IDC_SET_LEADCHNL_LV_PACING_CATHODE_LOCATION_1: NORMAL
MDC_IDC_SET_LEADCHNL_LV_PACING_POLARITY: NORMAL
MDC_IDC_SET_LEADCHNL_LV_PACING_POLARITY: NORMAL
MDC_IDC_SET_LEADCHNL_LV_PACING_PULSEWIDTH: 0.5 MS
MDC_IDC_SET_LEADCHNL_LV_SENSING_ADAPTATION_MODE: NORMAL
MDC_IDC_SET_LEADCHNL_LV_SENSING_ANODE_ELECTRODE_1: NORMAL
MDC_IDC_SET_LEADCHNL_LV_SENSING_ANODE_LOCATION_1: NORMAL
MDC_IDC_SET_LEADCHNL_LV_SENSING_CATHODE_ELECTRODE_1: NORMAL
MDC_IDC_SET_LEADCHNL_LV_SENSING_CATHODE_LOCATION_1: NORMAL
MDC_IDC_SET_LEADCHNL_LV_SENSING_POLARITY: NORMAL
MDC_IDC_SET_LEADCHNL_LV_SENSING_POLARITY: NORMAL
MDC_IDC_SET_LEADCHNL_LV_SENSING_SENSITIVITY: 1 MV
MDC_IDC_SET_LEADCHNL_RA_PACING_AMPLITUDE: 2 V
MDC_IDC_SET_LEADCHNL_RA_PACING_CAPTURE_MODE: NORMAL
MDC_IDC_SET_LEADCHNL_RA_PACING_POLARITY: NORMAL
MDC_IDC_SET_LEADCHNL_RA_PACING_PULSEWIDTH: 0.4 MS
MDC_IDC_SET_LEADCHNL_RA_SENSING_ADAPTATION_MODE: NORMAL
MDC_IDC_SET_LEADCHNL_RA_SENSING_POLARITY: NORMAL
MDC_IDC_SET_LEADCHNL_RA_SENSING_SENSITIVITY: 0.25 MV
MDC_IDC_SET_LEADCHNL_RV_PACING_AMPLITUDE: 2 V
MDC_IDC_SET_LEADCHNL_RV_PACING_AMPLITUDE: 5 V
MDC_IDC_SET_LEADCHNL_RV_PACING_AMPLITUDE: 5 V
MDC_IDC_SET_LEADCHNL_RV_PACING_CAPTURE_MODE: NORMAL
MDC_IDC_SET_LEADCHNL_RV_PACING_POLARITY: NORMAL
MDC_IDC_SET_LEADCHNL_RV_PACING_PULSEWIDTH: 0.4 MS
MDC_IDC_SET_LEADCHNL_RV_SENSING_ADAPTATION_MODE: NORMAL
MDC_IDC_SET_LEADCHNL_RV_SENSING_POLARITY: NORMAL
MDC_IDC_SET_LEADCHNL_RV_SENSING_SENSITIVITY: 0.5 MV
MDC_IDC_SET_ZONE_DETECTION_INTERVAL: 250 MS
MDC_IDC_SET_ZONE_DETECTION_INTERVAL: 333 MS
MDC_IDC_SET_ZONE_DETECTION_INTERVAL: 400 MS
MDC_IDC_SET_ZONE_TYPE: NORMAL
MDC_IDC_SET_ZONE_VENDOR_TYPE: NORMAL
MDC_IDC_STAT_AT_BURDEN_PERCENT: 0 %
MDC_IDC_STAT_AT_DTM_END: NORMAL
MDC_IDC_STAT_AT_DTM_START: NORMAL
MDC_IDC_STAT_BRADY_DTM_END: NORMAL
MDC_IDC_STAT_BRADY_DTM_START: NORMAL
MDC_IDC_STAT_BRADY_RA_PERCENT_PACED: 3 %
MDC_IDC_STAT_BRADY_RA_PERCENT_PACED: 4 %
MDC_IDC_STAT_BRADY_RA_PERCENT_PACED: 43 %
MDC_IDC_STAT_BRADY_RA_PERCENT_PACED: 44 %
MDC_IDC_STAT_BRADY_RA_PERCENT_PACED: 44 %
MDC_IDC_STAT_BRADY_RA_PERCENT_PACED: 46 %
MDC_IDC_STAT_BRADY_RA_PERCENT_PACED: 5 %
MDC_IDC_STAT_BRADY_RV_PERCENT_PACED: 94 %
MDC_IDC_STAT_BRADY_RV_PERCENT_PACED: 96 %
MDC_IDC_STAT_BRADY_RV_PERCENT_PACED: 96 %
MDC_IDC_STAT_BRADY_RV_PERCENT_PACED: 97 %
MDC_IDC_STAT_BRADY_RV_PERCENT_PACED: 98 %
MDC_IDC_STAT_CRT_DTM_END: NORMAL
MDC_IDC_STAT_CRT_DTM_START: NORMAL
MDC_IDC_STAT_CRT_LV_PERCENT_PACED: 93 %
MDC_IDC_STAT_CRT_LV_PERCENT_PACED: 95 %
MDC_IDC_STAT_CRT_LV_PERCENT_PACED: 96 %
MDC_IDC_STAT_CRT_LV_PERCENT_PACED: 97 %
MDC_IDC_STAT_CRT_PERCENT_PACED: 93 %
MDC_IDC_STAT_CRT_PERCENT_PACED: 96 %
MDC_IDC_STAT_EPISODE_RECENT_COUNT: 0
MDC_IDC_STAT_EPISODE_RECENT_COUNT: 1
MDC_IDC_STAT_EPISODE_RECENT_COUNT: 10
MDC_IDC_STAT_EPISODE_RECENT_COUNT: 12
MDC_IDC_STAT_EPISODE_RECENT_COUNT: 2
MDC_IDC_STAT_EPISODE_RECENT_COUNT: 3
MDC_IDC_STAT_EPISODE_RECENT_COUNT: 4
MDC_IDC_STAT_EPISODE_RECENT_COUNT: 4
MDC_IDC_STAT_EPISODE_RECENT_COUNT: 5
MDC_IDC_STAT_EPISODE_RECENT_COUNT: 8
MDC_IDC_STAT_EPISODE_RECENT_COUNT_DTM_END: NORMAL
MDC_IDC_STAT_EPISODE_RECENT_COUNT_DTM_START: NORMAL
MDC_IDC_STAT_EPISODE_TOTAL_COUNT: 144
MDC_IDC_STAT_EPISODE_TOTAL_COUNT: 144
MDC_IDC_STAT_EPISODE_TOTAL_COUNT: 80
MDC_IDC_STAT_EPISODE_TOTAL_COUNT: 80
MDC_IDC_STAT_EPISODE_TOTAL_COUNT: 94
MDC_IDC_STAT_EPISODE_TOTAL_COUNT: 94
MDC_IDC_STAT_EPISODE_TOTAL_COUNT_DTM_END: NORMAL
MDC_IDC_STAT_EPISODE_TYPE: NORMAL
MDC_IDC_STAT_EPISODE_VENDOR_TYPE: NORMAL
MDC_IDC_STAT_TACHYTHERAPY_ATP_DELIVERED_RECENT: 0
MDC_IDC_STAT_TACHYTHERAPY_ATP_DELIVERED_RECENT: 1
MDC_IDC_STAT_TACHYTHERAPY_ATP_DELIVERED_RECENT: 10
MDC_IDC_STAT_TACHYTHERAPY_ATP_DELIVERED_RECENT: 13
MDC_IDC_STAT_TACHYTHERAPY_ATP_DELIVERED_RECENT: 2
MDC_IDC_STAT_TACHYTHERAPY_ATP_DELIVERED_RECENT: 3
MDC_IDC_STAT_TACHYTHERAPY_ATP_DELIVERED_RECENT: 4
MDC_IDC_STAT_TACHYTHERAPY_ATP_DELIVERED_TOTAL: 14
MDC_IDC_STAT_TACHYTHERAPY_ATP_DELIVERED_TOTAL: 16
MDC_IDC_STAT_TACHYTHERAPY_ATP_DELIVERED_TOTAL: 17
MDC_IDC_STAT_TACHYTHERAPY_ATP_DELIVERED_TOTAL: 19
MDC_IDC_STAT_TACHYTHERAPY_ATP_DELIVERED_TOTAL: 8
MDC_IDC_STAT_TACHYTHERAPY_RECENT_DTM_END: NORMAL
MDC_IDC_STAT_TACHYTHERAPY_RECENT_DTM_START: NORMAL
MDC_IDC_STAT_TACHYTHERAPY_SHOCKS_ABORTED_RECENT: 0
MDC_IDC_STAT_TACHYTHERAPY_SHOCKS_ABORTED_TOTAL: 0
MDC_IDC_STAT_TACHYTHERAPY_SHOCKS_DELIVERED_RECENT: 0
MDC_IDC_STAT_TACHYTHERAPY_SHOCKS_DELIVERED_RECENT: 3
MDC_IDC_STAT_TACHYTHERAPY_SHOCKS_DELIVERED_RECENT: 3
MDC_IDC_STAT_TACHYTHERAPY_SHOCKS_DELIVERED_TOTAL: 1
MDC_IDC_STAT_TACHYTHERAPY_SHOCKS_DELIVERED_TOTAL: 4
MDC_IDC_STAT_TACHYTHERAPY_TOTAL_DTM_END: NORMAL
MDC_IDC_STAT_TACHYTHERAPY_TOTAL_DTM_START: NORMAL
MICROALBUMIN UR-MCNC: <12 MG/L
MICROALBUMIN/CREAT UR: NORMAL MG/G{CREAT}
MONOCYTES # BLD AUTO: 0 10E3/UL (ref 0–1.3)
MONOCYTES # BLD AUTO: 0.1 10E3/UL (ref 0–1.3)
MONOCYTES # BLD AUTO: 0.2 10E3/UL (ref 0–1.3)
MONOCYTES # BLD AUTO: 0.4 10E3/UL (ref 0–1.3)
MONOCYTES # BLD AUTO: 0.4 10E3/UL (ref 0–1.3)
MONOCYTES # BLD AUTO: 0.5 10E3/UL (ref 0–1.3)
MONOCYTES # BLD MANUAL: 0.1 10E3/UL (ref 0–1.3)
MONOCYTES NFR BLD AUTO: 1 %
MONOCYTES NFR BLD AUTO: 2 %
MONOCYTES NFR BLD AUTO: 3 %
MONOCYTES NFR BLD AUTO: 5 %
MONOCYTES NFR BLD AUTO: 6 %
MONOCYTES NFR BLD AUTO: 7 %
MONOCYTES NFR BLD MANUAL: 1 %
MONOS+MACROS NFR FLD MANUAL: 23 %
NEUTROPHILS # BLD AUTO: 5.2 10E3/UL (ref 1.6–8.3)
NEUTROPHILS # BLD AUTO: 5.3 10E3/UL (ref 1.6–8.3)
NEUTROPHILS # BLD AUTO: 5.8 10E3/UL (ref 1.6–8.3)
NEUTROPHILS # BLD AUTO: 5.8 10E3/UL (ref 1.6–8.3)
NEUTROPHILS # BLD AUTO: 6 10E3/UL (ref 1.6–8.3)
NEUTROPHILS # BLD AUTO: 6.6 10E3/UL (ref 1.6–8.3)
NEUTROPHILS # BLD MANUAL: 5.7 10E3/UL (ref 1.6–8.3)
NEUTROPHILS NFR BLD AUTO: 75 %
NEUTROPHILS NFR BLD AUTO: 79 %
NEUTROPHILS NFR BLD AUTO: 80 %
NEUTROPHILS NFR BLD AUTO: 83 %
NEUTROPHILS NFR BLD AUTO: 90 %
NEUTROPHILS NFR BLD AUTO: 91 %
NEUTROPHILS NFR BLD MANUAL: 87 %
NEUTS BAND NFR FLD MANUAL: 36 %
NITRATE UR QL: NEGATIVE
NONHDLC SERPL-MCNC: 59 MG/DL
NRBC # BLD AUTO: 0 10E3/UL
NRBC BLD AUTO-RTO: 0 /100
NT-PROBNP SERPL-MCNC: 444 PG/ML (ref 0–900)
NT-PROBNP SERPL-MCNC: 450 PG/ML (ref 0–900)
NT-PROBNP SERPL-MCNC: 739 PG/ML (ref 0–900)
NT-PROBNP SERPL-MCNC: 810 PG/ML (ref 0–900)
NT-PROBNP SERPL-MCNC: 819 PG/ML (ref 0–900)
NT-PROBNP SERPL-MCNC: 853 PG/ML (ref 0–900)
OXYHGB MFR BLDA: 67 % (ref 92–100)
P AXIS - MUSE: 102 DEGREES
P AXIS - MUSE: 20 DEGREES
P AXIS - MUSE: 49 DEGREES
P AXIS - MUSE: NORMAL DEGREES
PCO2 BLDV: 29 MM HG (ref 40–50)
PH BLDV: 7.47 [PH] (ref 7.32–7.43)
PH UR STRIP: 5 [PH] (ref 5–7)
PLAT MORPH BLD: ABNORMAL
PLATELET # BLD AUTO: 125 10E3/UL (ref 150–450)
PLATELET # BLD AUTO: 147 10E3/UL (ref 150–450)
PLATELET # BLD AUTO: 152 10E3/UL (ref 150–450)
PLATELET # BLD AUTO: 160 10E3/UL (ref 150–450)
PLATELET # BLD AUTO: 164 10E3/UL (ref 150–450)
PLATELET # BLD AUTO: 167 10E3/UL (ref 150–450)
PLATELET # BLD AUTO: 173 10E3/UL (ref 150–450)
PLATELET # BLD AUTO: 184 10E3/UL (ref 150–450)
PLATELET # BLD AUTO: 187 10E3/UL (ref 150–450)
PLATELET # BLD AUTO: 194 10E3/UL (ref 150–450)
PLATELET # BLD AUTO: 196 10E3/UL (ref 150–450)
PLATELET # BLD AUTO: 202 10E3/UL (ref 150–450)
PLATELET # BLD AUTO: 267 10E3/UL (ref 150–450)
PO2 BLDV: 17 MM HG (ref 25–47)
POTASSIUM SERPL-SCNC: 3.5 MMOL/L (ref 3.4–5.3)
POTASSIUM SERPL-SCNC: 3.6 MMOL/L (ref 3.4–5.3)
POTASSIUM SERPL-SCNC: 3.8 MMOL/L (ref 3.4–5.3)
POTASSIUM SERPL-SCNC: 3.9 MMOL/L (ref 3.4–5.3)
POTASSIUM SERPL-SCNC: 4.1 MMOL/L (ref 3.4–5.3)
POTASSIUM SERPL-SCNC: 4.3 MMOL/L (ref 3.4–5.3)
POTASSIUM SERPL-SCNC: 4.3 MMOL/L (ref 3.4–5.3)
POTASSIUM SERPL-SCNC: 4.4 MMOL/L (ref 3.4–5.3)
POTASSIUM SERPL-SCNC: 4.5 MMOL/L (ref 3.4–5.3)
POTASSIUM SERPL-SCNC: 4.5 MMOL/L (ref 3.4–5.3)
POTASSIUM SERPL-SCNC: 4.6 MMOL/L (ref 3.4–5.3)
POTASSIUM SERPL-SCNC: 4.7 MMOL/L (ref 3.4–5.3)
POTASSIUM SERPL-SCNC: 4.8 MMOL/L (ref 3.4–5.3)
POTASSIUM SERPL-SCNC: 4.8 MMOL/L (ref 3.4–5.3)
PR INTERVAL - MUSE: 176 MS
PR INTERVAL - MUSE: 184 MS
PR INTERVAL - MUSE: 206 MS
PR INTERVAL - MUSE: 232 MS
PROCALCITONIN SERPL IA-MCNC: 0.39 NG/ML
PROT FLD-MCNC: 1.6 G/DL
PROT SERPL-MCNC: 5 G/DL (ref 6.4–8.3)
PROT SERPL-MCNC: 5.1 G/DL (ref 6.4–8.3)
PROT SERPL-MCNC: 5.3 G/DL (ref 6.4–8.3)
PROT SERPL-MCNC: 5.4 G/DL (ref 6.4–8.3)
PROT SERPL-MCNC: 5.4 G/DL (ref 6.4–8.3)
PROT SERPL-MCNC: 5.5 G/DL (ref 6.4–8.3)
PROT SERPL-MCNC: 5.6 G/DL (ref 6.4–8.3)
PROT SERPL-MCNC: 5.7 G/DL (ref 6.4–8.3)
PROT SERPL-MCNC: 5.8 G/DL (ref 6.4–8.3)
PROT SERPL-MCNC: 6.2 G/DL (ref 6.4–8.3)
PROT/CREAT 24H UR: 0.07 MG/MG CR (ref 0–0.2)
PROTEIN BODY FLUID SOURCE: NORMAL
QRS DURATION - MUSE: 176 MS
QRS DURATION - MUSE: 178 MS
QRS DURATION - MUSE: 184 MS
QRS DURATION - MUSE: 186 MS
QT - MUSE: 478 MS
QT - MUSE: 500 MS
QT - MUSE: 506 MS
QT - MUSE: 550 MS
QTC - MUSE: 550 MS
QTC - MUSE: 551 MS
QTC - MUSE: 576 MS
QTC - MUSE: 587 MS
R AXIS - MUSE: -48 DEGREES
R AXIS - MUSE: -53 DEGREES
R AXIS - MUSE: -60 DEGREES
R AXIS - MUSE: 154 DEGREES
RBC # BLD AUTO: 2.45 10E6/UL (ref 4.4–5.9)
RBC # BLD AUTO: 2.5 10E6/UL (ref 4.4–5.9)
RBC # BLD AUTO: 2.51 10E6/UL (ref 4.4–5.9)
RBC # BLD AUTO: 2.55 10E6/UL (ref 4.4–5.9)
RBC # BLD AUTO: 2.66 10E6/UL (ref 4.4–5.9)
RBC # BLD AUTO: 2.73 10E6/UL (ref 4.4–5.9)
RBC # BLD AUTO: 2.76 10E6/UL (ref 4.4–5.9)
RBC # BLD AUTO: 2.79 10E6/UL (ref 4.4–5.9)
RBC # BLD AUTO: 2.81 10E6/UL (ref 4.4–5.9)
RBC # BLD AUTO: 2.84 10E6/UL (ref 4.4–5.9)
RBC # BLD AUTO: 2.88 10E6/UL (ref 4.4–5.9)
RBC # BLD AUTO: 2.92 10E6/UL (ref 4.4–5.9)
RBC # BLD AUTO: 3.4 10E6/UL (ref 4.4–5.9)
RBC MORPH BLD: ABNORMAL
SAO2 % BLDV: 29 % (ref 94–100)
SODIUM SERPL-SCNC: 131 MMOL/L (ref 136–145)
SODIUM SERPL-SCNC: 133 MMOL/L (ref 136–145)
SODIUM SERPL-SCNC: 134 MMOL/L (ref 136–145)
SODIUM SERPL-SCNC: 135 MMOL/L (ref 136–145)
SODIUM SERPL-SCNC: 136 MMOL/L (ref 136–145)
SODIUM SERPL-SCNC: 137 MMOL/L (ref 136–145)
SODIUM SERPL-SCNC: 138 MMOL/L (ref 136–145)
SODIUM SERPL-SCNC: 139 MMOL/L (ref 136–145)
SODIUM SERPL-SCNC: 139 MMOL/L (ref 136–145)
SODIUM SERPL-SCNC: 140 MMOL/L (ref 136–145)
SODIUM SERPL-SCNC: 140 MMOL/L (ref 136–145)
SODIUM UR-SCNC: 71 MMOL/L
SP GR UR STRIP: 1.02 (ref 1–1.03)
SPECIMEN EXPIRATION DATE: NORMAL
SYSTOLIC BLOOD PRESSURE - MUSE: NORMAL MMHG
T AXIS - MUSE: 101 DEGREES
T AXIS - MUSE: 104 DEGREES
T AXIS - MUSE: 109 DEGREES
T AXIS - MUSE: 94 DEGREES
T4 FREE SERPL-MCNC: 1.69 NG/DL (ref 0.9–1.7)
T4 FREE SERPL-MCNC: 1.74 NG/DL (ref 0.9–1.7)
T4 FREE SERPL-MCNC: 1.92 NG/DL (ref 0.9–1.7)
TRIGL SERPL-MCNC: 116 MG/DL
TROPONIN T SERPL HS-MCNC: 26 NG/L
TROPONIN T SERPL HS-MCNC: 44 NG/L
TROPONIN T SERPL HS-MCNC: 47 NG/L
TROPONIN T SERPL HS-MCNC: 52 NG/L
TSH SERPL DL<=0.005 MIU/L-ACNC: 4.29 UIU/ML (ref 0.3–4.2)
TSH SERPL DL<=0.005 MIU/L-ACNC: 4.86 UIU/ML (ref 0.3–4.2)
TSH SERPL DL<=0.005 MIU/L-ACNC: 5.51 UIU/ML (ref 0.3–4.2)
UROBILINOGEN UR STRIP-MCNC: NORMAL MG/DL
VA-%PRED-PRE: 74 %
VA-PRE: 4.98 L
VC-%PRED-PRE: 68 %
VC-PRE: 3.47 L
VC-PRED: 5.07 L
VENTRICULAR RATE- MUSE: 60 BPM
VENTRICULAR RATE- MUSE: 80 BPM
VENTRICULAR RATE- MUSE: 80 BPM
VENTRICULAR RATE- MUSE: 81 BPM
VIT B12 SERPL-MCNC: 369 PG/ML (ref 232–1245)
VIT B12 SERPL-MCNC: 789 PG/ML (ref 232–1245)
WBC # BLD AUTO: 5.8 10E3/UL (ref 4–11)
WBC # BLD AUTO: 5.9 10E3/UL (ref 4–11)
WBC # BLD AUTO: 5.9 10E3/UL (ref 4–11)
WBC # BLD AUTO: 6.1 10E3/UL (ref 4–11)
WBC # BLD AUTO: 6.2 10E3/UL (ref 4–11)
WBC # BLD AUTO: 6.4 10E3/UL (ref 4–11)
WBC # BLD AUTO: 6.5 10E3/UL (ref 4–11)
WBC # BLD AUTO: 6.6 10E3/UL (ref 4–11)
WBC # BLD AUTO: 6.7 10E3/UL (ref 4–11)
WBC # BLD AUTO: 6.9 10E3/UL (ref 4–11)
WBC # BLD AUTO: 6.9 10E3/UL (ref 4–11)
WBC # BLD AUTO: 7.3 10E3/UL (ref 4–11)
WBC # BLD AUTO: 7.3 10E3/UL (ref 4–11)
WBC # FLD AUTO: 35 /UL

## 2023-01-01 PROCEDURE — 93005 ELECTROCARDIOGRAM TRACING: CPT

## 2023-01-01 PROCEDURE — 99000 SPECIMEN HANDLING OFFICE-LAB: CPT | Performed by: INTERNAL MEDICINE

## 2023-01-01 PROCEDURE — 210N000002 HC R&B HEART CARE

## 2023-01-01 PROCEDURE — 80053 COMPREHEN METABOLIC PANEL: CPT | Performed by: STUDENT IN AN ORGANIZED HEALTH CARE EDUCATION/TRAINING PROGRAM

## 2023-01-01 PROCEDURE — 93010 ELECTROCARDIOGRAM REPORT: CPT | Performed by: INTERNAL MEDICINE

## 2023-01-01 PROCEDURE — 272N000706 US PARACENTESIS WITH ALBUMIN

## 2023-01-01 PROCEDURE — 80048 BASIC METABOLIC PNL TOTAL CA: CPT | Performed by: INTERNAL MEDICINE

## 2023-01-01 PROCEDURE — 82248 BILIRUBIN DIRECT: CPT | Performed by: EMERGENCY MEDICINE

## 2023-01-01 PROCEDURE — 250N000009 HC RX 250: Performed by: INTERNAL MEDICINE

## 2023-01-01 PROCEDURE — 82565 ASSAY OF CREATININE: CPT | Performed by: STUDENT IN AN ORGANIZED HEALTH CARE EDUCATION/TRAINING PROGRAM

## 2023-01-01 PROCEDURE — 250N000013 HC RX MED GY IP 250 OP 250 PS 637: Performed by: INTERNAL MEDICINE

## 2023-01-01 PROCEDURE — 250N000013 HC RX MED GY IP 250 OP 250 PS 637: Performed by: HOSPITALIST

## 2023-01-01 PROCEDURE — 250N000013 HC RX MED GY IP 250 OP 250 PS 637: Performed by: STUDENT IN AN ORGANIZED HEALTH CARE EDUCATION/TRAINING PROGRAM

## 2023-01-01 PROCEDURE — 83690 ASSAY OF LIPASE: CPT | Performed by: EMERGENCY MEDICINE

## 2023-01-01 PROCEDURE — 999N000132 HC STATISTIC PP CARE STAGE 1

## 2023-01-01 PROCEDURE — 0W9G3ZZ DRAINAGE OF PERITONEAL CAVITY, PERCUTANEOUS APPROACH: ICD-10-PCS | Performed by: RADIOLOGY

## 2023-01-01 PROCEDURE — 36415 COLL VENOUS BLD VENIPUNCTURE: CPT | Performed by: HOSPITALIST

## 2023-01-01 PROCEDURE — 85025 COMPLETE CBC W/AUTO DIFF WBC: CPT | Performed by: EMERGENCY MEDICINE

## 2023-01-01 PROCEDURE — 250N000012 HC RX MED GY IP 250 OP 636 PS 637: Performed by: STUDENT IN AN ORGANIZED HEALTH CARE EDUCATION/TRAINING PROGRAM

## 2023-01-01 PROCEDURE — 84132 ASSAY OF SERUM POTASSIUM: CPT

## 2023-01-01 PROCEDURE — 36415 COLL VENOUS BLD VENIPUNCTURE: CPT

## 2023-01-01 PROCEDURE — 74176 CT ABD & PELVIS W/O CONTRAST: CPT

## 2023-01-01 PROCEDURE — 999N000065 XR CHEST PORT 1 VIEW

## 2023-01-01 PROCEDURE — 250N000011 HC RX IP 250 OP 636: Performed by: INTERNAL MEDICINE

## 2023-01-01 PROCEDURE — 85007 BL SMEAR W/DIFF WBC COUNT: CPT | Performed by: PATHOLOGY

## 2023-01-01 PROCEDURE — 83735 ASSAY OF MAGNESIUM: CPT | Performed by: INTERNAL MEDICINE

## 2023-01-01 PROCEDURE — 250N000013 HC RX MED GY IP 250 OP 250 PS 637: Performed by: PHYSICIAN ASSISTANT

## 2023-01-01 PROCEDURE — 80053 COMPREHEN METABOLIC PANEL: CPT | Performed by: EMERGENCY MEDICINE

## 2023-01-01 PROCEDURE — 84132 ASSAY OF SERUM POTASSIUM: CPT | Performed by: HOSPITALIST

## 2023-01-01 PROCEDURE — 99232 SBSQ HOSP IP/OBS MODERATE 35: CPT | Performed by: INTERNAL MEDICINE

## 2023-01-01 PROCEDURE — 85027 COMPLETE CBC AUTOMATED: CPT

## 2023-01-01 PROCEDURE — P9047 ALBUMIN (HUMAN), 25%, 50ML: HCPCS | Performed by: HOSPITALIST

## 2023-01-01 PROCEDURE — 250N000012 HC RX MED GY IP 250 OP 636 PS 637: Performed by: HOSPITALIST

## 2023-01-01 PROCEDURE — 99223 1ST HOSP IP/OBS HIGH 75: CPT | Performed by: PHYSICIAN ASSISTANT

## 2023-01-01 PROCEDURE — 999N000026 HC STATISTIC CARDIOPULM RESUSCITATION

## 2023-01-01 PROCEDURE — 36415 COLL VENOUS BLD VENIPUNCTURE: CPT | Performed by: EMERGENCY MEDICINE

## 2023-01-01 PROCEDURE — 82962 GLUCOSE BLOOD TEST: CPT

## 2023-01-01 PROCEDURE — 258N000003 HC RX IP 258 OP 636: Performed by: EMERGENCY MEDICINE

## 2023-01-01 PROCEDURE — 99214 OFFICE O/P EST MOD 30 MIN: CPT | Performed by: INTERNAL MEDICINE

## 2023-01-01 PROCEDURE — 82248 BILIRUBIN DIRECT: CPT | Performed by: INTERNAL MEDICINE

## 2023-01-01 PROCEDURE — 83550 IRON BINDING TEST: CPT | Performed by: STUDENT IN AN ORGANIZED HEALTH CARE EDUCATION/TRAINING PROGRAM

## 2023-01-01 PROCEDURE — 80048 BASIC METABOLIC PNL TOTAL CA: CPT

## 2023-01-01 PROCEDURE — 83036 HEMOGLOBIN GLYCOSYLATED A1C: CPT | Performed by: HOSPITALIST

## 2023-01-01 PROCEDURE — 82570 ASSAY OF URINE CREATININE: CPT | Mod: 90 | Performed by: PATHOLOGY

## 2023-01-01 PROCEDURE — 80048 BASIC METABOLIC PNL TOTAL CA: CPT | Performed by: HOSPITALIST

## 2023-01-01 PROCEDURE — 82248 BILIRUBIN DIRECT: CPT | Performed by: HOSPITALIST

## 2023-01-01 PROCEDURE — 99207 PR NO BILLABLE SERVICE THIS VISIT: CPT

## 2023-01-01 PROCEDURE — 250N000011 HC RX IP 250 OP 636: Performed by: HOSPITALIST

## 2023-01-01 PROCEDURE — 99222 1ST HOSP IP/OBS MODERATE 55: CPT | Performed by: INTERNAL MEDICINE

## 2023-01-01 PROCEDURE — 87328 CRYPTOSPORIDIUM AG IA: CPT | Performed by: INTERNAL MEDICINE

## 2023-01-01 PROCEDURE — 36415 COLL VENOUS BLD VENIPUNCTURE: CPT | Performed by: STUDENT IN AN ORGANIZED HEALTH CARE EDUCATION/TRAINING PROGRAM

## 2023-01-01 PROCEDURE — 99239 HOSP IP/OBS DSCHRG MGMT >30: CPT | Performed by: HOSPITALIST

## 2023-01-01 PROCEDURE — 210N000001 HC R&B IMCU HEART CARE

## 2023-01-01 PROCEDURE — 99291 CRITICAL CARE FIRST HOUR: CPT | Performed by: INTERNAL MEDICINE

## 2023-01-01 PROCEDURE — 99215 OFFICE O/P EST HI 40 MIN: CPT | Mod: 25 | Performed by: INTERNAL MEDICINE

## 2023-01-01 PROCEDURE — 99284 EMERGENCY DEPT VISIT MOD MDM: CPT | Mod: 25

## 2023-01-01 PROCEDURE — 89051 BODY FLUID CELL COUNT: CPT | Performed by: HOSPITALIST

## 2023-01-01 PROCEDURE — 258N000003 HC RX IP 258 OP 636: Performed by: INTERNAL MEDICINE

## 2023-01-01 PROCEDURE — 84132 ASSAY OF SERUM POTASSIUM: CPT | Performed by: INTERNAL MEDICINE

## 2023-01-01 PROCEDURE — 82042 OTHER SOURCE ALBUMIN QUAN EA: CPT | Performed by: HOSPITALIST

## 2023-01-01 PROCEDURE — 87329 GIARDIA AG IA: CPT | Performed by: INTERNAL MEDICINE

## 2023-01-01 PROCEDURE — 99233 SBSQ HOSP IP/OBS HIGH 50: CPT | Performed by: HOSPITALIST

## 2023-01-01 PROCEDURE — 86901 BLOOD TYPING SEROLOGIC RH(D): CPT | Performed by: EMERGENCY MEDICINE

## 2023-01-01 PROCEDURE — 87252 VIRUS INOCULATION TISSUE: CPT | Mod: 90 | Performed by: INTERNAL MEDICINE

## 2023-01-01 PROCEDURE — 85025 COMPLETE CBC W/AUTO DIFF WBC: CPT | Performed by: HOSPITALIST

## 2023-01-01 PROCEDURE — 81003 URINALYSIS AUTO W/O SCOPE: CPT | Performed by: STUDENT IN AN ORGANIZED HEALTH CARE EDUCATION/TRAINING PROGRAM

## 2023-01-01 PROCEDURE — 85610 PROTHROMBIN TIME: CPT | Performed by: EMERGENCY MEDICINE

## 2023-01-01 PROCEDURE — 83880 ASSAY OF NATRIURETIC PEPTIDE: CPT | Performed by: PATHOLOGY

## 2023-01-01 PROCEDURE — 82803 BLOOD GASES ANY COMBINATION: CPT

## 2023-01-01 PROCEDURE — 84155 ASSAY OF PROTEIN SERUM: CPT | Performed by: INTERNAL MEDICINE

## 2023-01-01 PROCEDURE — 84156 ASSAY OF PROTEIN URINE: CPT | Performed by: STUDENT IN AN ORGANIZED HEALTH CARE EDUCATION/TRAINING PROGRAM

## 2023-01-01 PROCEDURE — 82248 BILIRUBIN DIRECT: CPT | Performed by: STUDENT IN AN ORGANIZED HEALTH CARE EDUCATION/TRAINING PROGRAM

## 2023-01-01 PROCEDURE — 84484 ASSAY OF TROPONIN QUANT: CPT | Performed by: STUDENT IN AN ORGANIZED HEALTH CARE EDUCATION/TRAINING PROGRAM

## 2023-01-01 PROCEDURE — 84439 ASSAY OF FREE THYROXINE: CPT | Performed by: INTERNAL MEDICINE

## 2023-01-01 PROCEDURE — 250N000013 HC RX MED GY IP 250 OP 250 PS 637: Performed by: EMERGENCY MEDICINE

## 2023-01-01 PROCEDURE — 99233 SBSQ HOSP IP/OBS HIGH 50: CPT | Performed by: INTERNAL MEDICINE

## 2023-01-01 PROCEDURE — 84484 ASSAY OF TROPONIN QUANT: CPT | Performed by: EMERGENCY MEDICINE

## 2023-01-01 PROCEDURE — 71046 X-RAY EXAM CHEST 2 VIEWS: CPT

## 2023-01-01 PROCEDURE — 83880 ASSAY OF NATRIURETIC PEPTIDE: CPT | Performed by: EMERGENCY MEDICINE

## 2023-01-01 PROCEDURE — 82043 UR ALBUMIN QUANTITATIVE: CPT | Mod: 90 | Performed by: PATHOLOGY

## 2023-01-01 PROCEDURE — 99232 SBSQ HOSP IP/OBS MODERATE 35: CPT | Performed by: HOSPITALIST

## 2023-01-01 PROCEDURE — 82746 ASSAY OF FOLIC ACID SERUM: CPT | Performed by: STUDENT IN AN ORGANIZED HEALTH CARE EDUCATION/TRAINING PROGRAM

## 2023-01-01 PROCEDURE — 85027 COMPLETE CBC AUTOMATED: CPT | Performed by: PATHOLOGY

## 2023-01-01 PROCEDURE — 80053 COMPREHEN METABOLIC PANEL: CPT | Performed by: PATHOLOGY

## 2023-01-01 PROCEDURE — 85610 PROTHROMBIN TIME: CPT | Performed by: INTERNAL MEDICINE

## 2023-01-01 PROCEDURE — 99223 1ST HOSP IP/OBS HIGH 75: CPT | Performed by: STUDENT IN AN ORGANIZED HEALTH CARE EDUCATION/TRAINING PROGRAM

## 2023-01-01 PROCEDURE — 85027 COMPLETE CBC AUTOMATED: CPT | Performed by: HOSPITALIST

## 2023-01-01 PROCEDURE — 99215 OFFICE O/P EST HI 40 MIN: CPT | Performed by: INTERNAL MEDICINE

## 2023-01-01 PROCEDURE — 999N000040 HC STATISTIC CONSULT NO CHARGE VASC ACCESS

## 2023-01-01 PROCEDURE — 83605 ASSAY OF LACTIC ACID: CPT

## 2023-01-01 PROCEDURE — 99207 CARDIAC DEVICE CHECK - REMOTE: CPT | Performed by: INTERNAL MEDICINE

## 2023-01-01 PROCEDURE — 85018 HEMOGLOBIN: CPT

## 2023-01-01 PROCEDURE — 250N000013 HC RX MED GY IP 250 OP 250 PS 637: Performed by: NURSE PRACTITIONER

## 2023-01-01 PROCEDURE — 84295 ASSAY OF SERUM SODIUM: CPT | Performed by: STUDENT IN AN ORGANIZED HEALTH CARE EDUCATION/TRAINING PROGRAM

## 2023-01-01 PROCEDURE — 84439 ASSAY OF FREE THYROXINE: CPT

## 2023-01-01 PROCEDURE — C1894 INTRO/SHEATH, NON-LASER: HCPCS | Performed by: INTERNAL MEDICINE

## 2023-01-01 PROCEDURE — 99233 SBSQ HOSP IP/OBS HIGH 50: CPT | Mod: 25 | Performed by: PHYSICIAN ASSISTANT

## 2023-01-01 PROCEDURE — 272N000001 HC OR GENERAL SUPPLY STERILE: Performed by: INTERNAL MEDICINE

## 2023-01-01 PROCEDURE — 36415 COLL VENOUS BLD VENIPUNCTURE: CPT | Performed by: PATHOLOGY

## 2023-01-01 PROCEDURE — 85027 COMPLETE CBC AUTOMATED: CPT | Performed by: STUDENT IN AN ORGANIZED HEALTH CARE EDUCATION/TRAINING PROGRAM

## 2023-01-01 PROCEDURE — 85027 COMPLETE CBC AUTOMATED: CPT | Performed by: INTERNAL MEDICINE

## 2023-01-01 PROCEDURE — 99285 EMERGENCY DEPT VISIT HI MDM: CPT | Mod: 25

## 2023-01-01 PROCEDURE — 85004 AUTOMATED DIFF WBC COUNT: CPT | Performed by: INTERNAL MEDICINE

## 2023-01-01 PROCEDURE — 84443 ASSAY THYROID STIM HORMONE: CPT

## 2023-01-01 PROCEDURE — 99214 OFFICE O/P EST MOD 30 MIN: CPT | Mod: 25 | Performed by: INTERNAL MEDICINE

## 2023-01-01 PROCEDURE — 94375 RESPIRATORY FLOW VOLUME LOOP: CPT | Performed by: INTERNAL MEDICINE

## 2023-01-01 PROCEDURE — 83735 ASSAY OF MAGNESIUM: CPT | Performed by: HOSPITALIST

## 2023-01-01 PROCEDURE — 82746 ASSAY OF FOLIC ACID SERUM: CPT | Performed by: HOSPITALIST

## 2023-01-01 PROCEDURE — 94729 DIFFUSING CAPACITY: CPT | Performed by: INTERNAL MEDICINE

## 2023-01-01 PROCEDURE — 96360 HYDRATION IV INFUSION INIT: CPT

## 2023-01-01 PROCEDURE — 82565 ASSAY OF CREATININE: CPT | Performed by: HOSPITALIST

## 2023-01-01 PROCEDURE — 80061 LIPID PANEL: CPT

## 2023-01-01 PROCEDURE — 82810 BLOOD GASES O2 SAT ONLY: CPT

## 2023-01-01 PROCEDURE — 83550 IRON BINDING TEST: CPT | Performed by: HOSPITALIST

## 2023-01-01 PROCEDURE — G0463 HOSPITAL OUTPT CLINIC VISIT: HCPCS | Performed by: INTERNAL MEDICINE

## 2023-01-01 PROCEDURE — 82728 ASSAY OF FERRITIN: CPT | Performed by: HOSPITALIST

## 2023-01-01 PROCEDURE — 999N000208 ECHOCARDIOGRAM COMPLETE

## 2023-01-01 PROCEDURE — 80053 COMPREHEN METABOLIC PANEL: CPT | Performed by: INTERNAL MEDICINE

## 2023-01-01 PROCEDURE — 272N000452 HC KIT SHRLOCK 5FR POWER PICC TRIPLE LUMEN

## 2023-01-01 PROCEDURE — 250N000009 HC RX 250: Performed by: RADIOLOGY

## 2023-01-01 PROCEDURE — 36569 INSJ PICC 5 YR+ W/O IMAGING: CPT

## 2023-01-01 PROCEDURE — 93284 PRGRMG EVAL IMPLANTABLE DFB: CPT | Performed by: INTERNAL MEDICINE

## 2023-01-01 PROCEDURE — 96361 HYDRATE IV INFUSION ADD-ON: CPT

## 2023-01-01 PROCEDURE — 82607 VITAMIN B-12: CPT | Performed by: HOSPITALIST

## 2023-01-01 PROCEDURE — 84155 ASSAY OF PROTEIN SERUM: CPT | Performed by: EMERGENCY MEDICINE

## 2023-01-01 PROCEDURE — 93295 DEV INTERROG REMOTE 1/2/MLT: CPT | Performed by: INTERNAL MEDICINE

## 2023-01-01 PROCEDURE — 83735 ASSAY OF MAGNESIUM: CPT

## 2023-01-01 PROCEDURE — 250N000009 HC RX 250: Performed by: NURSE PRACTITIONER

## 2023-01-01 PROCEDURE — 99000 SPECIMEN HANDLING OFFICE-LAB: CPT | Performed by: PATHOLOGY

## 2023-01-01 PROCEDURE — 83605 ASSAY OF LACTIC ACID: CPT | Performed by: STUDENT IN AN ORGANIZED HEALTH CARE EDUCATION/TRAINING PROGRAM

## 2023-01-01 PROCEDURE — 93451 RIGHT HEART CATH: CPT | Mod: 26 | Performed by: INTERNAL MEDICINE

## 2023-01-01 PROCEDURE — 99495 TRANSJ CARE MGMT MOD F2F 14D: CPT | Performed by: PHYSICIAN ASSISTANT

## 2023-01-01 PROCEDURE — 83605 ASSAY OF LACTIC ACID: CPT | Performed by: EMERGENCY MEDICINE

## 2023-01-01 PROCEDURE — 99223 1ST HOSP IP/OBS HIGH 75: CPT | Performed by: HOSPITALIST

## 2023-01-01 PROCEDURE — G0463 HOSPITAL OUTPT CLINIC VISIT: HCPCS

## 2023-01-01 PROCEDURE — 999N000142 HC STATISTIC PROCEDURE PREP ONLY

## 2023-01-01 PROCEDURE — 36415 COLL VENOUS BLD VENIPUNCTURE: CPT | Performed by: NURSE PRACTITIONER

## 2023-01-01 PROCEDURE — 84443 ASSAY THYROID STIM HORMONE: CPT | Performed by: INTERNAL MEDICINE

## 2023-01-01 PROCEDURE — 80053 COMPREHEN METABOLIC PANEL: CPT | Performed by: HOSPITALIST

## 2023-01-01 PROCEDURE — 99231 SBSQ HOSP IP/OBS SF/LOW 25: CPT | Mod: 25 | Performed by: INTERNAL MEDICINE

## 2023-01-01 PROCEDURE — 93451 RIGHT HEART CATH: CPT | Performed by: INTERNAL MEDICINE

## 2023-01-01 PROCEDURE — 83550 IRON BINDING TEST: CPT | Performed by: INTERNAL MEDICINE

## 2023-01-01 PROCEDURE — 83540 ASSAY OF IRON: CPT | Performed by: INTERNAL MEDICINE

## 2023-01-01 PROCEDURE — 93296 REM INTERROG EVL PM/IDS: CPT

## 2023-01-01 PROCEDURE — 99223 1ST HOSP IP/OBS HIGH 75: CPT | Mod: AI | Performed by: STUDENT IN AN ORGANIZED HEALTH CARE EDUCATION/TRAINING PROGRAM

## 2023-01-01 PROCEDURE — 99207 PR SC NO CHARGE VISIT: CPT | Performed by: INTERNAL MEDICINE

## 2023-01-01 PROCEDURE — C1751 CATH, INF, PER/CENT/MIDLINE: HCPCS | Performed by: INTERNAL MEDICINE

## 2023-01-01 PROCEDURE — 85025 COMPLETE CBC W/AUTO DIFF WBC: CPT

## 2023-01-01 PROCEDURE — 99215 OFFICE O/P EST HI 40 MIN: CPT | Mod: GC | Performed by: INTERNAL MEDICINE

## 2023-01-01 PROCEDURE — 36415 COLL VENOUS BLD VENIPUNCTURE: CPT | Performed by: INTERNAL MEDICINE

## 2023-01-01 PROCEDURE — 80053 COMPREHEN METABOLIC PANEL: CPT | Performed by: NURSE PRACTITIONER

## 2023-01-01 PROCEDURE — 99231 SBSQ HOSP IP/OBS SF/LOW 25: CPT | Performed by: NURSE PRACTITIONER

## 2023-01-01 PROCEDURE — 255N000002 HC RX 255 OP 636: Performed by: STUDENT IN AN ORGANIZED HEALTH CARE EDUCATION/TRAINING PROGRAM

## 2023-01-01 PROCEDURE — 82607 VITAMIN B-12: CPT | Performed by: STUDENT IN AN ORGANIZED HEALTH CARE EDUCATION/TRAINING PROGRAM

## 2023-01-01 PROCEDURE — 71250 CT THORAX DX C-: CPT

## 2023-01-01 PROCEDURE — 80053 COMPREHEN METABOLIC PANEL: CPT

## 2023-01-01 PROCEDURE — 84157 ASSAY OF PROTEIN OTHER: CPT | Performed by: HOSPITALIST

## 2023-01-01 PROCEDURE — 86850 RBC ANTIBODY SCREEN: CPT | Performed by: EMERGENCY MEDICINE

## 2023-01-01 PROCEDURE — 84145 PROCALCITONIN (PCT): CPT | Performed by: HOSPITALIST

## 2023-01-01 PROCEDURE — 84300 ASSAY OF URINE SODIUM: CPT | Performed by: STUDENT IN AN ORGANIZED HEALTH CARE EDUCATION/TRAINING PROGRAM

## 2023-01-01 PROCEDURE — 83036 HEMOGLOBIN GLYCOSYLATED A1C: CPT | Mod: 90 | Performed by: PATHOLOGY

## 2023-01-01 PROCEDURE — 93306 TTE W/DOPPLER COMPLETE: CPT | Mod: 26 | Performed by: INTERNAL MEDICINE

## 2023-01-01 RX ORDER — PREDNISONE 10 MG/1
10 TABLET ORAL DAILY
Status: DISCONTINUED | OUTPATIENT
Start: 2023-01-01 | End: 2023-01-01 | Stop reason: HOSPADM

## 2023-01-01 RX ORDER — ACETAMINOPHEN 500 MG
1000 TABLET ORAL EVERY 6 HOURS PRN
Status: DISCONTINUED | OUTPATIENT
Start: 2023-01-01 | End: 2023-01-01 | Stop reason: HOSPADM

## 2023-01-01 RX ORDER — ACETAMINOPHEN 500 MG
1000 TABLET ORAL ONCE
Status: COMPLETED | OUTPATIENT
Start: 2023-01-01 | End: 2023-01-01

## 2023-01-01 RX ORDER — FERROUS SULFATE 325(65) MG
325 TABLET ORAL DAILY
Status: DISCONTINUED | OUTPATIENT
Start: 2023-01-01 | End: 2023-01-01 | Stop reason: HOSPADM

## 2023-01-01 RX ORDER — ASPIRIN 81 MG/1
81 TABLET ORAL DAILY
Status: DISCONTINUED | OUTPATIENT
Start: 2023-01-01 | End: 2023-01-01 | Stop reason: HOSPADM

## 2023-01-01 RX ORDER — AMIODARONE HYDROCHLORIDE 200 MG/1
400 TABLET ORAL DAILY
Status: DISCONTINUED | OUTPATIENT
Start: 2023-01-01 | End: 2023-01-01

## 2023-01-01 RX ORDER — FUROSEMIDE 10 MG/ML
60 INJECTION INTRAMUSCULAR; INTRAVENOUS DAILY
Status: DISCONTINUED | OUTPATIENT
Start: 2023-01-01 | End: 2023-01-01

## 2023-01-01 RX ORDER — ACETAMINOPHEN 500 MG
1000 TABLET ORAL DAILY PRN
COMMUNITY

## 2023-01-01 RX ORDER — ATORVASTATIN CALCIUM 20 MG/1
20 TABLET, FILM COATED ORAL DAILY
Status: DISCONTINUED | OUTPATIENT
Start: 2023-01-01 | End: 2023-01-01 | Stop reason: HOSPADM

## 2023-01-01 RX ORDER — BUMETANIDE 2 MG/1
2 TABLET ORAL 2 TIMES DAILY
Qty: 180 TABLET | Refills: 3
Start: 2023-01-01

## 2023-01-01 RX ORDER — ONDANSETRON 2 MG/ML
4 INJECTION INTRAMUSCULAR; INTRAVENOUS EVERY 6 HOURS PRN
Status: DISCONTINUED | OUTPATIENT
Start: 2023-01-01 | End: 2023-01-01 | Stop reason: HOSPADM

## 2023-01-01 RX ORDER — FERROUS SULFATE 325(65) MG
325 TABLET ORAL EVERY OTHER DAY
Status: DISCONTINUED | OUTPATIENT
Start: 2023-01-01 | End: 2023-01-01 | Stop reason: HOSPADM

## 2023-01-01 RX ORDER — PREDNISONE 10 MG/1
10 TABLET ORAL DAILY
Qty: 90 TABLET | Refills: 3 | Status: SHIPPED | OUTPATIENT
Start: 2023-01-01

## 2023-01-01 RX ORDER — FERROUS SULFATE 325(65) MG
325 TABLET ORAL EVERY OTHER DAY
Qty: 15 TABLET | Refills: 0 | Status: SHIPPED | OUTPATIENT
Start: 2023-01-01 | End: 2023-01-01

## 2023-01-01 RX ORDER — BUMETANIDE 0.25 MG/ML
1 INJECTION INTRAMUSCULAR; INTRAVENOUS ONCE
Status: COMPLETED | OUTPATIENT
Start: 2023-01-01 | End: 2023-01-01

## 2023-01-01 RX ORDER — SACUBITRIL AND VALSARTAN 49; 51 MG/1; MG/1
1 TABLET, FILM COATED ORAL 2 TIMES DAILY
Qty: 180 TABLET | Refills: 0 | OUTPATIENT
Start: 2023-01-01

## 2023-01-01 RX ORDER — LIDOCAINE 40 MG/G
CREAM TOPICAL
Status: COMPLETED | OUTPATIENT
Start: 2023-01-01 | End: 2023-01-01

## 2023-01-01 RX ORDER — FERROUS SULFATE 325(65) MG
325 TABLET ORAL EVERY OTHER DAY
Qty: 15 TABLET | Refills: 0 | Status: SHIPPED | OUTPATIENT
Start: 2023-01-01

## 2023-01-01 RX ORDER — AMIODARONE HYDROCHLORIDE 200 MG/1
400 TABLET ORAL DAILY
Status: DISCONTINUED | OUTPATIENT
Start: 2023-01-01 | End: 2023-01-01 | Stop reason: HOSPADM

## 2023-01-01 RX ORDER — ACETAMINOPHEN 325 MG/1
650 TABLET ORAL EVERY 6 HOURS PRN
Status: DISCONTINUED | OUTPATIENT
Start: 2023-01-01 | End: 2023-01-01 | Stop reason: HOSPADM

## 2023-01-01 RX ORDER — ALBUTEROL SULFATE 90 UG/1
2 AEROSOL, METERED RESPIRATORY (INHALATION) EVERY 6 HOURS PRN
Status: DISCONTINUED | OUTPATIENT
Start: 2023-01-01 | End: 2023-01-01 | Stop reason: HOSPADM

## 2023-01-01 RX ORDER — SULFAMETHOXAZOLE AND TRIMETHOPRIM 400; 80 MG/1; MG/1
1 TABLET ORAL DAILY
Status: DISCONTINUED | OUTPATIENT
Start: 2023-01-01 | End: 2023-01-01 | Stop reason: HOSPADM

## 2023-01-01 RX ORDER — METFORMIN HCL 500 MG
1000 TABLET, EXTENDED RELEASE 24 HR ORAL 2 TIMES DAILY WITH MEALS
Qty: 360 TABLET | Refills: 0 | Status: ON HOLD | OUTPATIENT
Start: 2023-01-01 | End: 2023-01-01

## 2023-01-01 RX ORDER — METOPROLOL SUCCINATE 25 MG/1
25 TABLET, EXTENDED RELEASE ORAL 2 TIMES DAILY
Qty: 180 TABLET | Refills: 1 | Status: ON HOLD | OUTPATIENT
Start: 2023-01-01 | End: 2023-01-01

## 2023-01-01 RX ORDER — DEXTROSE MONOHYDRATE 25 G/50ML
25-50 INJECTION, SOLUTION INTRAVENOUS
Status: DISCONTINUED | OUTPATIENT
Start: 2023-01-01 | End: 2023-01-01

## 2023-01-01 RX ORDER — METFORMIN HCL 500 MG
1000 TABLET, EXTENDED RELEASE 24 HR ORAL 2 TIMES DAILY WITH MEALS
Qty: 360 TABLET | Refills: 0
Start: 2023-01-01

## 2023-01-01 RX ORDER — LEVOTHYROXINE SODIUM 50 UG/1
50 TABLET ORAL
Status: DISCONTINUED | OUTPATIENT
Start: 2023-01-01 | End: 2023-01-01 | Stop reason: HOSPADM

## 2023-01-01 RX ORDER — ATORVASTATIN CALCIUM 20 MG/1
20 TABLET, FILM COATED ORAL EVERY EVENING
Status: DISCONTINUED | OUTPATIENT
Start: 2023-01-01 | End: 2023-01-01 | Stop reason: HOSPADM

## 2023-01-01 RX ORDER — FLASH GLUCOSE SENSOR
KIT MISCELLANEOUS
Qty: 6 EACH | Refills: 1 | Status: SHIPPED | OUTPATIENT
Start: 2023-01-01

## 2023-01-01 RX ORDER — AMIODARONE HYDROCHLORIDE 200 MG/1
400 TABLET ORAL 2 TIMES DAILY
Status: DISCONTINUED | OUTPATIENT
Start: 2023-01-01 | End: 2023-01-01 | Stop reason: HOSPADM

## 2023-01-01 RX ORDER — ASCORBIC ACID 500 MG
500 TABLET ORAL DAILY
COMMUNITY

## 2023-01-01 RX ORDER — NICOTINE POLACRILEX 4 MG
15-30 LOZENGE BUCCAL
Status: DISCONTINUED | OUTPATIENT
Start: 2023-01-01 | End: 2023-01-01

## 2023-01-01 RX ORDER — SULFAMETHOXAZOLE AND TRIMETHOPRIM 400; 80 MG/1; MG/1
1 TABLET ORAL DAILY
Qty: 90 TABLET | Refills: 1 | Status: ON HOLD | OUTPATIENT
Start: 2023-01-01 | End: 2023-01-01

## 2023-01-01 RX ORDER — MAGNESIUM SULFATE IN WATER 40 MG/ML
1 INJECTION, SOLUTION INTRAVENOUS ONCE
Status: DISCONTINUED | OUTPATIENT
Start: 2023-01-01 | End: 2023-01-01

## 2023-01-01 RX ORDER — BUMETANIDE 1 MG/1
2 TABLET ORAL DAILY
Status: DISCONTINUED | OUTPATIENT
Start: 2023-01-01 | End: 2023-01-01

## 2023-01-01 RX ORDER — LIDOCAINE 40 MG/G
CREAM TOPICAL
Status: ACTIVE | OUTPATIENT
Start: 2023-01-01 | End: 2023-01-01

## 2023-01-01 RX ORDER — AMIODARONE HYDROCHLORIDE 200 MG/1
200 TABLET ORAL DAILY
COMMUNITY

## 2023-01-01 RX ORDER — DEXTROSE MONOHYDRATE 25 G/50ML
25-50 INJECTION, SOLUTION INTRAVENOUS
Status: DISCONTINUED | OUTPATIENT
Start: 2023-01-01 | End: 2023-01-01 | Stop reason: HOSPADM

## 2023-01-01 RX ORDER — AMIODARONE HYDROCHLORIDE 200 MG/1
200 TABLET ORAL DAILY
Status: DISCONTINUED | OUTPATIENT
Start: 2023-01-01 | End: 2023-01-01 | Stop reason: HOSPADM

## 2023-01-01 RX ORDER — SODIUM CHLORIDE 9 MG/ML
INJECTION, SOLUTION INTRAVENOUS CONTINUOUS
Status: CANCELLED | OUTPATIENT
Start: 2023-01-01

## 2023-01-01 RX ORDER — ALBUMIN (HUMAN) 12.5 G/50ML
25-50 SOLUTION INTRAVENOUS ONCE
Status: COMPLETED | OUTPATIENT
Start: 2023-01-01 | End: 2023-01-01

## 2023-01-01 RX ORDER — INSULIN ASPART 100 [IU]/ML
3 INJECTION, SOLUTION INTRAVENOUS; SUBCUTANEOUS
Qty: 15 ML | Refills: 0 | Status: SHIPPED | OUTPATIENT
Start: 2023-01-01

## 2023-01-01 RX ORDER — BUMETANIDE 1 MG/1
2 TABLET ORAL
Status: DISCONTINUED | OUTPATIENT
Start: 2023-01-01 | End: 2023-01-01 | Stop reason: HOSPADM

## 2023-01-01 RX ORDER — MAGNESIUM SULFATE HEPTAHYDRATE 40 MG/ML
2 INJECTION, SOLUTION INTRAVENOUS ONCE
Status: COMPLETED | OUTPATIENT
Start: 2023-01-01 | End: 2023-01-01

## 2023-01-01 RX ORDER — BUMETANIDE 2 MG/1
2 TABLET ORAL DAILY
Qty: 180 TABLET | Refills: 3 | Status: ON HOLD | OUTPATIENT
Start: 2023-01-01 | End: 2023-01-01

## 2023-01-01 RX ORDER — NITROGLYCERIN 0.4 MG/1
0.4 TABLET SUBLINGUAL EVERY 5 MIN PRN
Status: DISCONTINUED | OUTPATIENT
Start: 2023-01-01 | End: 2023-01-01 | Stop reason: HOSPADM

## 2023-01-01 RX ORDER — ATORVASTATIN CALCIUM 20 MG/1
20 TABLET, FILM COATED ORAL DAILY
Qty: 90 TABLET | Refills: 3
Start: 2023-01-01

## 2023-01-01 RX ORDER — LIDOCAINE 4 G/G
1 PATCH TOPICAL
Status: DISCONTINUED | OUTPATIENT
Start: 2023-01-01 | End: 2023-01-01 | Stop reason: HOSPADM

## 2023-01-01 RX ORDER — LIDOCAINE 40 MG/G
CREAM TOPICAL
Status: CANCELLED | OUTPATIENT
Start: 2023-01-01

## 2023-01-01 RX ORDER — AMIODARONE HYDROCHLORIDE 200 MG/1
TABLET ORAL
Qty: 56 TABLET | Refills: 0 | Status: SHIPPED | OUTPATIENT
Start: 2023-01-01 | End: 2023-01-01

## 2023-01-01 RX ORDER — ONDANSETRON 4 MG/1
4 TABLET, ORALLY DISINTEGRATING ORAL EVERY 6 HOURS PRN
Status: DISCONTINUED | OUTPATIENT
Start: 2023-01-01 | End: 2023-01-01 | Stop reason: HOSPADM

## 2023-01-01 RX ORDER — LIRAGLUTIDE 6 MG/ML
1.8 INJECTION SUBCUTANEOUS
COMMUNITY
Start: 2022-01-01 | End: 2023-01-01

## 2023-01-01 RX ORDER — MAGNESIUM OXIDE 400 MG/1
400 TABLET ORAL EVERY 4 HOURS
Status: COMPLETED | OUTPATIENT
Start: 2023-01-01 | End: 2023-01-01

## 2023-01-01 RX ORDER — LIDOCAINE 40 MG/G
CREAM TOPICAL
Status: DISCONTINUED | OUTPATIENT
Start: 2023-01-01 | End: 2023-01-01 | Stop reason: HOSPADM

## 2023-01-01 RX ORDER — INSULIN DETEMIR 100 [IU]/ML
20 INJECTION, SOLUTION SUBCUTANEOUS DAILY
Qty: 30 ML | Refills: 0
Start: 2023-01-01 | End: 2022-02-01

## 2023-01-01 RX ORDER — FENOFIBRATE 160 MG/1
160 TABLET ORAL DAILY
Status: DISCONTINUED | OUTPATIENT
Start: 2023-01-01 | End: 2023-01-01 | Stop reason: HOSPADM

## 2023-01-01 RX ORDER — ACETAMINOPHEN 500 MG
1000 TABLET ORAL EVERY MORNING
Status: DISCONTINUED | OUTPATIENT
Start: 2023-01-01 | End: 2023-01-01 | Stop reason: HOSPADM

## 2023-01-01 RX ORDER — LIDOCAINE 4 G/G
1 PATCH TOPICAL EVERY 24 HOURS
Qty: 10 PATCH | Refills: 0 | Status: SHIPPED | OUTPATIENT
Start: 2023-01-01 | End: 2023-01-01

## 2023-01-01 RX ORDER — NICOTINE POLACRILEX 4 MG
15-30 LOZENGE BUCCAL
Status: DISCONTINUED | OUTPATIENT
Start: 2023-01-01 | End: 2023-01-01 | Stop reason: HOSPADM

## 2023-01-01 RX ORDER — LEVOTHYROXINE SODIUM 50 UG/1
50 TABLET ORAL DAILY
Status: DISCONTINUED | OUTPATIENT
Start: 2023-01-01 | End: 2023-01-01 | Stop reason: HOSPADM

## 2023-01-01 RX ORDER — GLIPIZIDE 10 MG/1
TABLET, FILM COATED, EXTENDED RELEASE ORAL
Qty: 90 TABLET | Refills: 0
Start: 2023-01-01 | End: 2023-01-01 | Stop reason: SINTOL

## 2023-01-01 RX ORDER — METOPROLOL SUCCINATE 25 MG/1
25 TABLET, EXTENDED RELEASE ORAL 2 TIMES DAILY
Status: DISCONTINUED | OUTPATIENT
Start: 2023-01-01 | End: 2023-01-01 | Stop reason: HOSPADM

## 2023-01-01 RX ORDER — BUMETANIDE 1 MG/1
2 TABLET ORAL ONCE
Status: COMPLETED | OUTPATIENT
Start: 2023-01-01 | End: 2023-01-01

## 2023-01-01 RX ORDER — CLOTRIMAZOLE 1 %
CREAM (GRAM) TOPICAL
Qty: 30 G | Refills: 0 | Status: SHIPPED | OUTPATIENT
Start: 2023-01-01

## 2023-01-01 RX ORDER — ACETAMINOPHEN 500 MG
1000 TABLET ORAL DAILY PRN
Status: DISCONTINUED | OUTPATIENT
Start: 2023-01-01 | End: 2023-01-01

## 2023-01-01 RX ORDER — LIDOCAINE HYDROCHLORIDE 10 MG/ML
10 INJECTION, SOLUTION EPIDURAL; INFILTRATION; INTRACAUDAL; PERINEURAL ONCE
Status: COMPLETED | OUTPATIENT
Start: 2023-01-01 | End: 2023-01-01

## 2023-01-01 RX ORDER — SODIUM CHLORIDE 9 MG/ML
INJECTION, SOLUTION INTRAVENOUS ONCE
Status: DISCONTINUED | OUTPATIENT
Start: 2023-01-01 | End: 2023-01-01

## 2023-01-01 RX ORDER — FOLIC ACID 1 MG/1
1 TABLET ORAL DAILY
Status: DISCONTINUED | OUTPATIENT
Start: 2023-01-01 | End: 2023-01-01 | Stop reason: HOSPADM

## 2023-01-01 RX ORDER — ACETAMINOPHEN 650 MG/1
650 SUPPOSITORY RECTAL EVERY 6 HOURS PRN
Status: DISCONTINUED | OUTPATIENT
Start: 2023-01-01 | End: 2023-01-01 | Stop reason: HOSPADM

## 2023-01-01 RX ORDER — DOBUTAMINE HYDROCHLORIDE 200 MG/100ML
5 INJECTION INTRAVENOUS CONTINUOUS
Status: DISCONTINUED | OUTPATIENT
Start: 2023-01-01 | End: 2023-01-01

## 2023-01-01 RX ORDER — MULTIVITAMIN,THERAPEUTIC
1 TABLET ORAL DAILY
COMMUNITY

## 2023-01-01 RX ORDER — IOPAMIDOL 755 MG/ML
127 INJECTION, SOLUTION INTRAVASCULAR ONCE
Status: DISCONTINUED | OUTPATIENT
Start: 2023-01-01 | End: 2023-01-01 | Stop reason: SINTOL

## 2023-01-01 RX ORDER — SENNOSIDES 8.6 MG
1-2 TABLET ORAL 2 TIMES DAILY PRN
Status: DISCONTINUED | OUTPATIENT
Start: 2023-01-01 | End: 2023-01-01 | Stop reason: HOSPADM

## 2023-01-01 RX ORDER — INSULIN DETEMIR 100 [IU]/ML
25 INJECTION, SOLUTION SUBCUTANEOUS DAILY
Qty: 30 ML | Refills: 1
Start: 2023-01-01 | End: 2023-01-01

## 2023-01-01 RX ORDER — AMIODARONE HYDROCHLORIDE 400 MG/1
400 TABLET ORAL DAILY
Qty: 30 TABLET | Refills: 2 | Status: ON HOLD | OUTPATIENT
Start: 2023-01-01 | End: 2023-01-01

## 2023-01-01 RX ORDER — METFORMIN HCL 500 MG
1000 TABLET, EXTENDED RELEASE 24 HR ORAL 2 TIMES DAILY WITH MEALS
Qty: 360 TABLET | Refills: 0 | Status: SHIPPED | OUTPATIENT
Start: 2023-01-01 | End: 2023-01-01

## 2023-01-01 RX ORDER — SACUBITRIL AND VALSARTAN 49; 51 MG/1; MG/1
1 TABLET, FILM COATED ORAL 2 TIMES DAILY
Qty: 180 TABLET | Refills: 1 | Status: ON HOLD | OUTPATIENT
Start: 2023-01-01 | End: 2023-01-01

## 2023-01-01 RX ORDER — GLIPIZIDE 5 MG/1
5 TABLET, FILM COATED, EXTENDED RELEASE ORAL DAILY
Qty: 90 TABLET | Refills: 0 | OUTPATIENT
Start: 2023-01-01

## 2023-01-01 RX ORDER — BUMETANIDE 2 MG/1
2 TABLET ORAL
Qty: 60 TABLET | Refills: 0 | Status: SHIPPED | OUTPATIENT
Start: 2023-01-01 | End: 2023-01-01

## 2023-01-01 RX ADMIN — PREDNISONE 10 MG: 10 TABLET ORAL at 09:15

## 2023-01-01 RX ADMIN — METHOTREXATE SODIUM 15 MG: 2.5 TABLET ORAL at 10:48

## 2023-01-01 RX ADMIN — AMIODARONE HYDROCHLORIDE 400 MG: 200 TABLET ORAL at 08:34

## 2023-01-01 RX ADMIN — INSULIN ASPART 2 UNITS: 100 INJECTION, SOLUTION INTRAVENOUS; SUBCUTANEOUS at 18:10

## 2023-01-01 RX ADMIN — INSULIN ASPART 4 UNITS: 100 INJECTION, SOLUTION INTRAVENOUS; SUBCUTANEOUS at 17:24

## 2023-01-01 RX ADMIN — LIDOCAINE: 40 CREAM TOPICAL at 10:00

## 2023-01-01 RX ADMIN — FOLIC ACID 1 MG: 1 TABLET ORAL at 09:00

## 2023-01-01 RX ADMIN — SACUBITRIL AND VALSARTAN 1 TABLET: 24; 26 TABLET, FILM COATED ORAL at 09:00

## 2023-01-01 RX ADMIN — FOLIC ACID 1 MG: 1 TABLET ORAL at 07:18

## 2023-01-01 RX ADMIN — SULFAMETHOXAZOLE AND TRIMETHOPRIM 1 TABLET: 400; 80 TABLET ORAL at 09:01

## 2023-01-01 RX ADMIN — METOPROLOL SUCCINATE 25 MG: 25 TABLET, EXTENDED RELEASE ORAL at 20:25

## 2023-01-01 RX ADMIN — ACETAMINOPHEN 1000 MG: 500 TABLET ORAL at 19:57

## 2023-01-01 RX ADMIN — Medication 1 ML: at 11:26

## 2023-01-01 RX ADMIN — ACETAMINOPHEN 1000 MG: 500 TABLET ORAL at 06:18

## 2023-01-01 RX ADMIN — FERROUS SULFATE TAB 325 MG (65 MG ELEMENTAL FE) 325 MG: 325 (65 FE) TAB at 08:34

## 2023-01-01 RX ADMIN — ASPIRIN 81 MG: 81 TABLET, COATED ORAL at 08:45

## 2023-01-01 RX ADMIN — ACETAMINOPHEN 1000 MG: 500 TABLET ORAL at 21:05

## 2023-01-01 RX ADMIN — ACETAMINOPHEN 1000 MG: 500 TABLET ORAL at 01:10

## 2023-01-01 RX ADMIN — Medication 1 LOZENGE: at 08:45

## 2023-01-01 RX ADMIN — ACETAMINOPHEN 1000 MG: 500 TABLET ORAL at 06:39

## 2023-01-01 RX ADMIN — ACETAMINOPHEN 650 MG: 325 TABLET ORAL at 04:45

## 2023-01-01 RX ADMIN — PREDNISONE 10 MG: 10 TABLET ORAL at 09:00

## 2023-01-01 RX ADMIN — Medication 1 LOZENGE: at 04:46

## 2023-01-01 RX ADMIN — SACUBITRIL AND VALSARTAN 1 TABLET: 24; 26 TABLET, FILM COATED ORAL at 21:54

## 2023-01-01 RX ADMIN — MAGNESIUM SULFATE HEPTAHYDRATE 1 G: 500 INJECTION, SOLUTION INTRAMUSCULAR; INTRAVENOUS at 03:58

## 2023-01-01 RX ADMIN — LEVOTHYROXINE SODIUM 50 MCG: 50 TABLET ORAL at 10:47

## 2023-01-01 RX ADMIN — ACETAMINOPHEN 650 MG: 325 TABLET ORAL at 06:22

## 2023-01-01 RX ADMIN — INSULIN ASPART 2 UNITS: 100 INJECTION, SOLUTION INTRAVENOUS; SUBCUTANEOUS at 18:37

## 2023-01-01 RX ADMIN — DOBUTAMINE HYDROCHLORIDE 5 MCG/KG/MIN: 200 INJECTION INTRAVENOUS at 06:52

## 2023-01-01 RX ADMIN — SACUBITRIL AND VALSARTAN 1 TABLET: 24; 26 TABLET, FILM COATED ORAL at 21:03

## 2023-01-01 RX ADMIN — FOLIC ACID 1 MG: 1 TABLET ORAL at 10:47

## 2023-01-01 RX ADMIN — INSULIN ASPART 1 UNITS: 100 INJECTION, SOLUTION INTRAVENOUS; SUBCUTANEOUS at 21:37

## 2023-01-01 RX ADMIN — METOPROLOL SUCCINATE 25 MG: 25 TABLET, EXTENDED RELEASE ORAL at 09:20

## 2023-01-01 RX ADMIN — DOBUTAMINE HYDROCHLORIDE 5 MCG/KG/MIN: 200 INJECTION INTRAVENOUS at 16:29

## 2023-01-01 RX ADMIN — ACETAMINOPHEN 1000 MG: 500 TABLET ORAL at 21:54

## 2023-01-01 RX ADMIN — SULFAMETHOXAZOLE AND TRIMETHOPRIM 1 TABLET: 400; 80 TABLET ORAL at 08:45

## 2023-01-01 RX ADMIN — ASPIRIN 81 MG: 81 TABLET, COATED ORAL at 12:05

## 2023-01-01 RX ADMIN — FERROUS SULFATE TAB 325 MG (65 MG ELEMENTAL FE) 325 MG: 325 (65 FE) TAB at 15:47

## 2023-01-01 RX ADMIN — Medication 1 ML: at 18:01

## 2023-01-01 RX ADMIN — INSULIN ASPART 1 UNITS: 100 INJECTION, SOLUTION INTRAVENOUS; SUBCUTANEOUS at 13:05

## 2023-01-01 RX ADMIN — AMIODARONE HYDROCHLORIDE 400 MG: 200 TABLET ORAL at 09:01

## 2023-01-01 RX ADMIN — LEVOTHYROXINE SODIUM 50 MCG: 50 TABLET ORAL at 09:01

## 2023-01-01 RX ADMIN — BUMETANIDE 2 MG: 1 TABLET ORAL at 12:05

## 2023-01-01 RX ADMIN — LIDOCAINE 1 PATCH: 560 PATCH PERCUTANEOUS; TOPICAL; TRANSDERMAL at 07:34

## 2023-01-01 RX ADMIN — Medication 1 LOZENGE: at 09:23

## 2023-01-01 RX ADMIN — PREDNISONE 10 MG: 10 TABLET ORAL at 07:18

## 2023-01-01 RX ADMIN — Medication 400 MG: at 14:17

## 2023-01-01 RX ADMIN — INSULIN ASPART 1 UNITS: 100 INJECTION, SOLUTION INTRAVENOUS; SUBCUTANEOUS at 07:43

## 2023-01-01 RX ADMIN — INSULIN ASPART 3 UNITS: 100 INJECTION, SOLUTION INTRAVENOUS; SUBCUTANEOUS at 14:11

## 2023-01-01 RX ADMIN — AMIODARONE HYDROCHLORIDE 400 MG: 200 TABLET ORAL at 10:47

## 2023-01-01 RX ADMIN — PREDNISONE 10 MG: 10 TABLET ORAL at 08:34

## 2023-01-01 RX ADMIN — BUMETANIDE 1 MG: 0.25 INJECTION INTRAMUSCULAR; INTRAVENOUS at 15:05

## 2023-01-01 RX ADMIN — LEVOTHYROXINE SODIUM 50 MCG: 50 TABLET ORAL at 07:35

## 2023-01-01 RX ADMIN — INSULIN ASPART 2 UNITS: 100 INJECTION, SOLUTION INTRAVENOUS; SUBCUTANEOUS at 12:37

## 2023-01-01 RX ADMIN — ACETAMINOPHEN 1000 MG: 500 TABLET ORAL at 19:38

## 2023-01-01 RX ADMIN — LIDOCAINE HYDROCHLORIDE 7 ML: 10 INJECTION, SOLUTION EPIDURAL; INFILTRATION; INTRACAUDAL; PERINEURAL at 09:21

## 2023-01-01 RX ADMIN — FERROUS SULFATE TAB 325 MG (65 MG ELEMENTAL FE) 325 MG: 325 (65 FE) TAB at 11:21

## 2023-01-01 RX ADMIN — INSULIN ASPART 1 UNITS: 100 INJECTION, SOLUTION INTRAVENOUS; SUBCUTANEOUS at 12:35

## 2023-01-01 RX ADMIN — FENOFIBRATE 160 MG: 160 TABLET ORAL at 07:18

## 2023-01-01 RX ADMIN — ACETAMINOPHEN 1000 MG: 500 TABLET ORAL at 09:27

## 2023-01-01 RX ADMIN — SACUBITRIL AND VALSARTAN 1 TABLET: 24; 26 TABLET, FILM COATED ORAL at 21:05

## 2023-01-01 RX ADMIN — FOLIC ACID 1 MG: 1 TABLET ORAL at 09:20

## 2023-01-01 RX ADMIN — LIDOCAINE HYDROCHLORIDE 4 ML: 10 INJECTION, SOLUTION EPIDURAL; INFILTRATION; INTRACAUDAL; PERINEURAL at 10:42

## 2023-01-01 RX ADMIN — ACETAMINOPHEN 650 MG: 325 TABLET ORAL at 08:46

## 2023-01-01 RX ADMIN — ACETAMINOPHEN 1000 MG: 500 TABLET ORAL at 21:01

## 2023-01-01 RX ADMIN — LEVOTHYROXINE SODIUM 50 MCG: 50 TABLET ORAL at 08:44

## 2023-01-01 RX ADMIN — AMIODARONE HYDROCHLORIDE 400 MG: 200 TABLET ORAL at 09:27

## 2023-01-01 RX ADMIN — ALTEPLASE 2 MG: 2.2 INJECTION, POWDER, LYOPHILIZED, FOR SOLUTION INTRAVENOUS at 17:00

## 2023-01-01 RX ADMIN — INSULIN ASPART 3 UNITS: 100 INJECTION, SOLUTION INTRAVENOUS; SUBCUTANEOUS at 18:44

## 2023-01-01 RX ADMIN — FUROSEMIDE 60 MG: 10 INJECTION, SOLUTION INTRAMUSCULAR; INTRAVENOUS at 11:18

## 2023-01-01 RX ADMIN — INSULIN ASPART 3 UNITS: 100 INJECTION, SOLUTION INTRAVENOUS; SUBCUTANEOUS at 12:39

## 2023-01-01 RX ADMIN — ACETAMINOPHEN 1000 MG: 500 TABLET ORAL at 22:04

## 2023-01-01 RX ADMIN — AMIODARONE HYDROCHLORIDE 400 MG: 200 TABLET ORAL at 09:20

## 2023-01-01 RX ADMIN — PREDNISONE 10 MG: 10 TABLET ORAL at 09:27

## 2023-01-01 RX ADMIN — LIDOCAINE 1 PATCH: 560 PATCH PERCUTANEOUS; TOPICAL; TRANSDERMAL at 07:17

## 2023-01-01 RX ADMIN — SULFAMETHOXAZOLE AND TRIMETHOPRIM 1 TABLET: 400; 80 TABLET ORAL at 10:47

## 2023-01-01 RX ADMIN — Medication 400 MG: at 11:29

## 2023-01-01 RX ADMIN — ACETAMINOPHEN 1000 MG: 500 TABLET ORAL at 18:50

## 2023-01-01 RX ADMIN — INSULIN ASPART 1 UNITS: 100 INJECTION, SOLUTION INTRAVENOUS; SUBCUTANEOUS at 07:36

## 2023-01-01 RX ADMIN — ACETAMINOPHEN 650 MG: 325 TABLET ORAL at 22:55

## 2023-01-01 RX ADMIN — LIDOCAINE 1 PATCH: 560 PATCH PERCUTANEOUS; TOPICAL; TRANSDERMAL at 08:33

## 2023-01-01 RX ADMIN — INSULIN ASPART 2 UNITS: 100 INJECTION, SOLUTION INTRAVENOUS; SUBCUTANEOUS at 22:57

## 2023-01-01 RX ADMIN — FOLIC ACID 1 MG: 1 TABLET ORAL at 09:21

## 2023-01-01 RX ADMIN — BUMETANIDE 2 MG: 1 TABLET ORAL at 15:47

## 2023-01-01 RX ADMIN — INSULIN ASPART 1 UNITS: 100 INJECTION, SOLUTION INTRAVENOUS; SUBCUTANEOUS at 10:57

## 2023-01-01 RX ADMIN — ASPIRIN 81 MG: 81 TABLET, COATED ORAL at 08:30

## 2023-01-01 RX ADMIN — ACETAMINOPHEN 1000 MG: 500 TABLET ORAL at 11:15

## 2023-01-01 RX ADMIN — Medication 1 LOZENGE: at 12:01

## 2023-01-01 RX ADMIN — SULFAMETHOXAZOLE AND TRIMETHOPRIM 1 TABLET: 400; 80 TABLET ORAL at 09:20

## 2023-01-01 RX ADMIN — DOBUTAMINE HYDROCHLORIDE 5 MCG/KG/MIN: 200 INJECTION INTRAVENOUS at 21:02

## 2023-01-01 RX ADMIN — Medication 1 ML: at 02:51

## 2023-01-01 RX ADMIN — FENOFIBRATE 160 MG: 160 TABLET ORAL at 08:34

## 2023-01-01 RX ADMIN — INSULIN ASPART 1 UNITS: 100 INJECTION, SOLUTION INTRAVENOUS; SUBCUTANEOUS at 07:18

## 2023-01-01 RX ADMIN — INSULIN ASPART 3 UNITS: 100 INJECTION, SOLUTION INTRAVENOUS; SUBCUTANEOUS at 18:52

## 2023-01-01 RX ADMIN — Medication 1 ML: at 04:46

## 2023-01-01 RX ADMIN — ACETAMINOPHEN 1000 MG: 500 TABLET ORAL at 15:37

## 2023-01-01 RX ADMIN — Medication 1 ML: at 13:23

## 2023-01-01 RX ADMIN — Medication 1 LOZENGE: at 13:23

## 2023-01-01 RX ADMIN — LIDOCAINE 1 PATCH: 560 PATCH PERCUTANEOUS; TOPICAL; TRANSDERMAL at 21:03

## 2023-01-01 RX ADMIN — ASPIRIN 81 MG: 81 TABLET, COATED ORAL at 09:15

## 2023-01-01 RX ADMIN — AMIODARONE HYDROCHLORIDE 400 MG: 200 TABLET ORAL at 09:08

## 2023-01-01 RX ADMIN — LIDOCAINE 1 PATCH: 560 PATCH PERCUTANEOUS; TOPICAL; TRANSDERMAL at 21:55

## 2023-01-01 RX ADMIN — SACUBITRIL AND VALSARTAN 1 TABLET: 24; 26 TABLET, FILM COATED ORAL at 09:20

## 2023-01-01 RX ADMIN — ACETAMINOPHEN 650 MG: 325 TABLET ORAL at 21:37

## 2023-01-01 RX ADMIN — PREDNISONE 10 MG: 10 TABLET ORAL at 07:36

## 2023-01-01 RX ADMIN — FOLIC ACID 1 MG: 1 TABLET ORAL at 09:01

## 2023-01-01 RX ADMIN — BUMETANIDE 2 MG: 1 TABLET ORAL at 09:20

## 2023-01-01 RX ADMIN — INSULIN ASPART 1 UNITS: 100 INJECTION, SOLUTION INTRAVENOUS; SUBCUTANEOUS at 12:56

## 2023-01-01 RX ADMIN — FOLIC ACID 1 MG: 1 TABLET ORAL at 09:28

## 2023-01-01 RX ADMIN — FENOFIBRATE 160 MG: 160 TABLET ORAL at 07:36

## 2023-01-01 RX ADMIN — FERROUS SULFATE TAB 325 MG (65 MG ELEMENTAL FE) 325 MG: 325 (65 FE) TAB at 08:30

## 2023-01-01 RX ADMIN — ACETAMINOPHEN 1000 MG: 500 TABLET ORAL at 18:03

## 2023-01-01 RX ADMIN — ACETAMINOPHEN 1000 MG: 500 TABLET ORAL at 06:33

## 2023-01-01 RX ADMIN — LEVOTHYROXINE SODIUM 50 MCG: 50 TABLET ORAL at 07:18

## 2023-01-01 RX ADMIN — FOLIC ACID 1 MG: 1 TABLET ORAL at 07:36

## 2023-01-01 RX ADMIN — INSULIN ASPART 4 UNITS: 100 INJECTION, SOLUTION INTRAVENOUS; SUBCUTANEOUS at 18:37

## 2023-01-01 RX ADMIN — INSULIN ASPART 1 UNITS: 100 INJECTION, SOLUTION INTRAVENOUS; SUBCUTANEOUS at 08:57

## 2023-01-01 RX ADMIN — SODIUM CHLORIDE 1 MG/MIN: 9 INJECTION, SOLUTION INTRAVENOUS at 06:39

## 2023-01-01 RX ADMIN — INSULIN ASPART 3 UNITS: 100 INJECTION, SOLUTION INTRAVENOUS; SUBCUTANEOUS at 12:51

## 2023-01-01 RX ADMIN — Medication 1 LOZENGE: at 22:55

## 2023-01-01 RX ADMIN — Medication 400 MG: at 00:21

## 2023-01-01 RX ADMIN — FERROUS SULFATE TAB 325 MG (65 MG ELEMENTAL FE) 325 MG: 325 (65 FE) TAB at 16:29

## 2023-01-01 RX ADMIN — INSULIN ASPART 1 UNITS: 100 INJECTION, SOLUTION INTRAVENOUS; SUBCUTANEOUS at 11:21

## 2023-01-01 RX ADMIN — INSULIN ASPART 1 UNITS: 100 INJECTION, SOLUTION INTRAVENOUS; SUBCUTANEOUS at 13:21

## 2023-01-01 RX ADMIN — PREDNISONE 10 MG: 10 TABLET ORAL at 10:47

## 2023-01-01 RX ADMIN — PREDNISONE 10 MG: 10 TABLET ORAL at 09:21

## 2023-01-01 RX ADMIN — SACUBITRIL AND VALSARTAN 1 TABLET: 24; 26 TABLET, FILM COATED ORAL at 09:01

## 2023-01-01 RX ADMIN — SODIUM CHLORIDE 500 ML: 9 INJECTION, SOLUTION INTRAVENOUS at 16:21

## 2023-01-01 RX ADMIN — FOLIC ACID 1 MG: 1 TABLET ORAL at 08:34

## 2023-01-01 RX ADMIN — AMIODARONE HYDROCHLORIDE 400 MG: 200 TABLET ORAL at 20:38

## 2023-01-01 RX ADMIN — LEVOTHYROXINE SODIUM 50 MCG: 50 TABLET ORAL at 09:28

## 2023-01-01 RX ADMIN — FERROUS SULFATE TAB 325 MG (65 MG ELEMENTAL FE) 325 MG: 325 (65 FE) TAB at 11:29

## 2023-01-01 RX ADMIN — LIDOCAINE 1 PATCH: 560 PATCH PERCUTANEOUS; TOPICAL; TRANSDERMAL at 21:48

## 2023-01-01 RX ADMIN — HUMAN ALBUMIN MICROSPHERES AND PERFLUTREN 9 ML: 10; .22 INJECTION, SOLUTION INTRAVENOUS at 12:21

## 2023-01-01 RX ADMIN — ATORVASTATIN CALCIUM 20 MG: 20 TABLET, FILM COATED ORAL at 08:34

## 2023-01-01 RX ADMIN — LEVOTHYROXINE SODIUM 50 MCG: 50 TABLET ORAL at 09:00

## 2023-01-01 RX ADMIN — ACETAMINOPHEN 650 MG: 325 TABLET ORAL at 12:01

## 2023-01-01 RX ADMIN — BUMETANIDE 2 MG: 1 TABLET ORAL at 09:15

## 2023-01-01 RX ADMIN — PREDNISONE 10 MG: 10 TABLET ORAL at 09:20

## 2023-01-01 RX ADMIN — PREDNISONE 10 MG: 10 TABLET ORAL at 08:45

## 2023-01-01 RX ADMIN — ALBUMIN HUMAN 25 G: 0.25 SOLUTION INTRAVENOUS at 11:21

## 2023-01-01 RX ADMIN — FERROUS SULFATE TAB 325 MG (65 MG ELEMENTAL FE) 325 MG: 325 (65 FE) TAB at 11:18

## 2023-01-01 RX ADMIN — INSULIN ASPART 4 UNITS: 100 INJECTION, SOLUTION INTRAVENOUS; SUBCUTANEOUS at 12:06

## 2023-01-01 RX ADMIN — BUMETANIDE 2 MG: 1 TABLET ORAL at 07:18

## 2023-01-01 RX ADMIN — FUROSEMIDE 60 MG: 10 INJECTION, SOLUTION INTRAMUSCULAR; INTRAVENOUS at 10:01

## 2023-01-01 RX ADMIN — ATORVASTATIN CALCIUM 20 MG: 20 TABLET, FILM COATED ORAL at 07:18

## 2023-01-01 RX ADMIN — Medication 400 MG: at 04:12

## 2023-01-01 RX ADMIN — FUROSEMIDE 60 MG: 10 INJECTION, SOLUTION INTRAMUSCULAR; INTRAVENOUS at 18:50

## 2023-01-01 RX ADMIN — Medication 1 LOZENGE: at 02:50

## 2023-01-01 RX ADMIN — FUROSEMIDE 60 MG: 10 INJECTION, SOLUTION INTRAMUSCULAR; INTRAVENOUS at 12:42

## 2023-01-01 RX ADMIN — ACETAMINOPHEN 1000 MG: 500 TABLET ORAL at 00:04

## 2023-01-01 RX ADMIN — ATORVASTATIN CALCIUM 20 MG: 20 TABLET, FILM COATED ORAL at 07:36

## 2023-01-01 RX ADMIN — AMIODARONE HYDROCHLORIDE 400 MG: 200 TABLET ORAL at 07:18

## 2023-01-01 RX ADMIN — LEVOTHYROXINE SODIUM 50 MCG: 50 TABLET ORAL at 09:15

## 2023-01-01 RX ADMIN — AMIODARONE HYDROCHLORIDE 150 MG: 1.5 INJECTION, SOLUTION INTRAVENOUS at 06:12

## 2023-01-01 RX ADMIN — SULFAMETHOXAZOLE AND TRIMETHOPRIM 1 TABLET: 400; 80 TABLET ORAL at 09:28

## 2023-01-01 RX ADMIN — INSULIN ASPART 3 UNITS: 100 INJECTION, SOLUTION INTRAVENOUS; SUBCUTANEOUS at 18:17

## 2023-01-01 RX ADMIN — AMIODARONE HYDROCHLORIDE 200 MG: 200 TABLET ORAL at 08:45

## 2023-01-01 RX ADMIN — INSULIN ASPART 1 UNITS: 100 INJECTION, SOLUTION INTRAVENOUS; SUBCUTANEOUS at 09:07

## 2023-01-01 RX ADMIN — SACUBITRIL AND VALSARTAN 1 TABLET: 24; 26 TABLET, FILM COATED ORAL at 10:01

## 2023-01-01 RX ADMIN — Medication 1 ML: at 09:23

## 2023-01-01 RX ADMIN — LEVOTHYROXINE SODIUM 50 MCG: 50 TABLET ORAL at 09:21

## 2023-01-01 RX ADMIN — PREDNISONE 10 MG: 10 TABLET ORAL at 09:01

## 2023-01-01 RX ADMIN — AMIODARONE HYDROCHLORIDE 400 MG: 200 TABLET ORAL at 20:34

## 2023-01-01 RX ADMIN — SODIUM CHLORIDE 500 ML: 9 INJECTION, SOLUTION INTRAVENOUS at 18:00

## 2023-01-01 RX ADMIN — ACETAMINOPHEN 650 MG: 325 TABLET ORAL at 20:38

## 2023-01-01 RX ADMIN — FERROUS SULFATE TAB 325 MG (65 MG ELEMENTAL FE) 325 MG: 325 (65 FE) TAB at 11:00

## 2023-01-01 RX ADMIN — AMIODARONE HYDROCHLORIDE 400 MG: 200 TABLET ORAL at 09:19

## 2023-01-01 RX ADMIN — AMIODARONE HYDROCHLORIDE 200 MG: 200 TABLET ORAL at 09:15

## 2023-01-01 RX ADMIN — ACETAMINOPHEN 1000 MG: 500 TABLET ORAL at 07:35

## 2023-01-01 RX ADMIN — Medication 1 ML: at 08:45

## 2023-01-01 RX ADMIN — AMIODARONE HYDROCHLORIDE 400 MG: 200 TABLET ORAL at 07:35

## 2023-01-01 RX ADMIN — MAGNESIUM SULFATE HEPTAHYDRATE 2 G/HR: 40 INJECTION, SOLUTION INTRAVENOUS at 23:55

## 2023-01-01 RX ADMIN — LEVOTHYROXINE SODIUM 50 MCG: 50 TABLET ORAL at 08:34

## 2023-01-01 RX ADMIN — LEVOTHYROXINE SODIUM 50 MCG: 50 TABLET ORAL at 09:20

## 2023-01-01 RX ADMIN — SODIUM CHLORIDE 500 ML: 9 INJECTION, SOLUTION INTRAVENOUS at 13:25

## 2023-01-01 ASSESSMENT — ACTIVITIES OF DAILY LIVING (ADL)
ADLS_ACUITY_SCORE: 22
ADLS_ACUITY_SCORE: 26
ADLS_ACUITY_SCORE: 26
ADLS_ACUITY_SCORE: 24
ADLS_ACUITY_SCORE: 22
ADLS_ACUITY_SCORE: 35
ADLS_ACUITY_SCORE: 35
ADLS_ACUITY_SCORE: 22
ADLS_ACUITY_SCORE: 35
ADLS_ACUITY_SCORE: 26
ADLS_ACUITY_SCORE: 22
ADLS_ACUITY_SCORE: 33
ADLS_ACUITY_SCORE: 26
ADLS_ACUITY_SCORE: 24
ADLS_ACUITY_SCORE: 35
ADLS_ACUITY_SCORE: 18
ADLS_ACUITY_SCORE: 24
ADLS_ACUITY_SCORE: 22
ADLS_ACUITY_SCORE: 37
ADLS_ACUITY_SCORE: 35
ADLS_ACUITY_SCORE: 22
ADLS_ACUITY_SCORE: 22
ADLS_ACUITY_SCORE: 37
ADLS_ACUITY_SCORE: 26
ADLS_ACUITY_SCORE: 37
ADLS_ACUITY_SCORE: 22
ADLS_ACUITY_SCORE: 33
ADLS_ACUITY_SCORE: 26
ADLS_ACUITY_SCORE: 35
ADLS_ACUITY_SCORE: 37
ADLS_ACUITY_SCORE: 23
ADLS_ACUITY_SCORE: 30
ADLS_ACUITY_SCORE: 35
ADLS_ACUITY_SCORE: 24
ADLS_ACUITY_SCORE: 24
ADLS_ACUITY_SCORE: 35
ADLS_ACUITY_SCORE: 22
ADLS_ACUITY_SCORE: 37
ADLS_ACUITY_SCORE: 24
ADLS_ACUITY_SCORE: 35
ADLS_ACUITY_SCORE: 22
ADLS_ACUITY_SCORE: 22
ADLS_ACUITY_SCORE: 37
ADLS_ACUITY_SCORE: 35
ADLS_ACUITY_SCORE: 22
ADLS_ACUITY_SCORE: 37
ADLS_ACUITY_SCORE: 30
ADLS_ACUITY_SCORE: 22
ADLS_ACUITY_SCORE: 22
ADLS_ACUITY_SCORE: 35
ADLS_ACUITY_SCORE: 24
ADLS_ACUITY_SCORE: 22
ADLS_ACUITY_SCORE: 18
ADLS_ACUITY_SCORE: 26
ADLS_ACUITY_SCORE: 35
ADLS_ACUITY_SCORE: 35
ADLS_ACUITY_SCORE: 37
ADLS_ACUITY_SCORE: 35
ADLS_ACUITY_SCORE: 28
ADLS_ACUITY_SCORE: 37
ADLS_ACUITY_SCORE: 26
ADLS_ACUITY_SCORE: 37
ADLS_ACUITY_SCORE: 22
ADLS_ACUITY_SCORE: 35
ADLS_ACUITY_SCORE: 24
ADLS_ACUITY_SCORE: 30
ADLS_ACUITY_SCORE: 35
ADLS_ACUITY_SCORE: 23
ADLS_ACUITY_SCORE: 18
ADLS_ACUITY_SCORE: 30
ADLS_ACUITY_SCORE: 22
ADLS_ACUITY_SCORE: 24
ADLS_ACUITY_SCORE: 30
ADLS_ACUITY_SCORE: 18
ADLS_ACUITY_SCORE: 35
ADLS_ACUITY_SCORE: 22
ADLS_ACUITY_SCORE: 18
ADLS_ACUITY_SCORE: 22
ADLS_ACUITY_SCORE: 26
ADLS_ACUITY_SCORE: 35
ADLS_ACUITY_SCORE: 37
ADLS_ACUITY_SCORE: 22
ADLS_ACUITY_SCORE: 22
ADLS_ACUITY_SCORE: 37
ADLS_ACUITY_SCORE: 35
ADLS_ACUITY_SCORE: 22
ADLS_ACUITY_SCORE: 35
ADLS_ACUITY_SCORE: 22
ADLS_ACUITY_SCORE: 18
ADLS_ACUITY_SCORE: 35
ADLS_ACUITY_SCORE: 22
ADLS_ACUITY_SCORE: 22
ADLS_ACUITY_SCORE: 35
ADLS_ACUITY_SCORE: 22
ADLS_ACUITY_SCORE: 30
ADLS_ACUITY_SCORE: 24
ADLS_ACUITY_SCORE: 37
ADLS_ACUITY_SCORE: 35
ADLS_ACUITY_SCORE: 35
ADLS_ACUITY_SCORE: 37
ADLS_ACUITY_SCORE: 30
ADLS_ACUITY_SCORE: 35
ADLS_ACUITY_SCORE: 28
ADLS_ACUITY_SCORE: 35
ADLS_ACUITY_SCORE: 30
ADLS_ACUITY_SCORE: 22
ADLS_ACUITY_SCORE: 22
ADLS_ACUITY_SCORE: 35
ADLS_ACUITY_SCORE: 24
ADLS_ACUITY_SCORE: 23
ADLS_ACUITY_SCORE: 35
ADLS_ACUITY_SCORE: 20
ADLS_ACUITY_SCORE: 35
ADLS_ACUITY_SCORE: 22
ADLS_ACUITY_SCORE: 35
ADLS_ACUITY_SCORE: 35
ADLS_ACUITY_SCORE: 30
ADLS_ACUITY_SCORE: 22
ADLS_ACUITY_SCORE: 22
ADLS_ACUITY_SCORE: 18
ADLS_ACUITY_SCORE: 24
ADLS_ACUITY_SCORE: 35
ADLS_ACUITY_SCORE: 22
ADLS_ACUITY_SCORE: 35
ADLS_ACUITY_SCORE: 35

## 2023-01-01 ASSESSMENT — ENCOUNTER SYMPTOMS
WEAKNESS: 1
DIARRHEA: 0
FEVER: 0
SHORTNESS OF BREATH: 1
COUGH: 0
LIGHT-HEADEDNESS: 1
SHORTNESS OF BREATH: 0
FEVER: 0
APPETITE CHANGE: 0

## 2023-01-01 ASSESSMENT — PAIN SCALES - GENERAL
PAINLEVEL: NO PAIN (0)
PAINLEVEL: NO PAIN (0)
PAINLEVEL: SEVERE PAIN (7)
PAINLEVEL: NO PAIN (0)

## 2023-01-04 NOTE — PROGRESS NOTES
Late entry from 9/30/2022. ICD check by rep in hospital for reprogramming per Dr. Stewart's orders. Pt was hospitalized for syncope secondary to VT.     Lead measurements stable  Battery longevity 7.5 yrs  AP 44%  BVP 98%  Mode DDDR   No arrhythmias since [facility] Consult from 9/29/2022  Programming changes:  -RhythmMatch Threshold changed from 90% to 92%  -Tachy Therapy added to VT-1 zone (previously a monitor zone) and VT-1 rate changed from 160 bpm to 150 bpm. Now VT-1 zone treatment has 2 bursts of ATP followed by 5 shocks.   EC RN

## 2023-01-16 NOTE — PATIENT INSTRUCTIONS
1. OK to try Miralax for the constipation.  This might be due to the amiodarone    2. Will call you with results of blood work.  The lungs, thyroid and liver might be affected by the amiodarone. If so, we'll let Dr. Harris know as right now, you need it for the rhythm.     3. If any severe vision changes, tremors or concerns about heart rhythm, call us! [phone]   no

## 2023-01-23 NOTE — TELEPHONE ENCOUNTER
[facility] Call Center    Phone Message    May a detailed message be left on voicemail: yes     Reason for Call: Other: Pt called wondering if he can continue to take his meds up until his lab appt tomorrow morning. Pt is also concerned about his BG levels when he wakes up considering he is having fasting labs. Please call pt back asap to discuss and leave a MyChart message.    Action Taken: Other: Cardiology    Travel Screening: Not Applicable       Thank you!  [facility]                                    Called patient back, no answer, left voice message stating ok to take meds while NPO with sips of water.

## 2023-01-24 NOTE — PROGRESS NOTES
January 24, 2023     Dear Colleagues,  I had the pleasure of seeing Mr. Blackmon in clinic today for follow-up. As you know, he is a very pleasant 62-year-old gentleman, he has not had regular follow-up over the years, He was first seen by Cardiology in 2018 with a new diagnosis of LV dysfunction with an EF of 40%.  He was at that time seen by Dr. Iniguez.  He had a significant left bundle branch block.  He underwent coronary angiogram that showed nonobstructive disease.  He was put on beta blockers, ACE inhibitors and spironolactone but his potassium increased and did not tolerate mineralocorticoid receptor antagonist.  Subsequently, a cardiac MRI was done for nonischemic cardiomyopathy that showed scarring concerning for sarcoidosis.  In any case, he did not follow up but he remained on medications.  Unfortunately, in 2019, he presented with decompensated heart failure and EF was less than 20% with severe LV dilatation measuring 7 cm.  During the hospital stay, he developed sustained monomorphic VT at about 180 beats per minute and needed to be emergently cardioverted in the hospital.  He was taken again to the cath lab and coronary anatomy was unchanged, again showing nonobstructive disease.  Right heart catheterization showed mildly elevated pressures.  LVEDP was 21.  He had a repeat cardiac MRI that showed late gadolinium enhancement in the septal inferior and lateral basal wall with scarring pattern concerning for non-CAD scarring and scar burden had actually worsened up to 13%.  He was loaded with amiodarone and subsequently received a CRT device on 10/04/2019.  Subsequently, he was referred to the [facility]. 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  He was put on prednisone in 12/2019 and took it for almost a year and a half thereafter.  Subsequent PET scan in 2020 and on 02/2021  "have shown complete resolution of inflammation.  He stopped his prednisone as of spring of 2021.  In addition to that, he has continued to have nonsustained VT.  In 12/2020 Dr. Dorman had also mentioned to him about possibility of VT ablation; however, the patient was not interested in pursuing that.  He had opted to continue taking amiodarone, at 400mg daily dose. In 6/2022, a repeat PET scan demonstrated ongoing cardiac inflammation c/w active sarcoid. In 9/2022 he was admitted to [facility] and was seen by Dr. Dorman of EP for Afib as well as slow VT below the detection level of the device so no interventions were done. At the time potential VT ablation was rediscussed, but the pt has continued to defer this option. In 11/2022, he was seen in clinic for interim f/u and daily prednisone was decreased to 20mg daily coupled with starting methotrexate. A f/u PET scan 12/2022 demonstrated an interval decrease in cardiac inflammation (compared to 6/2022).     Today, he presents for close month f/u. Overall he is doing \"all right\". He did fall on icy roads a few times recently, but fortunately without significant sequelae. No changes in dyspnea, palpitations, dizziness or edema. No issues with nausea. He had decreased his amiodarone to 200 for a few days, but then some more events were discovered on his CRT-D and the pt was advised by Dr. Dorman to return to amiodarone 400mg daily. He is also taking prednisone 20mg daily (plus bactrim for PJP ppx).     PAST MEDICAL HISTORY:  Past Medical History:   Diagnosis Date     Ascending aorta dilatation (H)      Diverticulosis of colon (without mention of hemorrhage)      Essential hypertension, benign      Gout, unspecified      LBBB (left bundle branch block)      Morbid obesity (H)      Non-ischemic cardiomyopathy (H)      Nonrheumatic mitral valve regurgitation      NSTEMI (non-ST elevated myocardial infarction) (H)     cardiac cath 6/2018: mild non-obstructive CAD     " Other and unspecified hyperlipidemia      Paroxysmal ventricular tachycardia      Type II or unspecified type diabetes mellitus without mention of complication, not stated as uncontrolled      FAMILY HISTORY:  Family History   Problem Relation Age of Onset     Cancer Father 75        bladder cancer     Myocardial Infarction Father      Other - See Comments Father         smoking     Breast Cancer Mother      Breast Cancer Sister      Family History Negative Brother      Family History Negative Son      Family History Negative Son         fluttering/murmur     Asthma Son      Colon Cancer No family hx of      SOCIAL HISTORY:  Social History     Socioeconomic History     Marital status:    Tobacco Use     Smoking status: Never     Smokeless tobacco: Never   Substance and Sexual Activity     Alcohol use: Yes     Comment: once awhile     Drug use: No     Sexual activity: Yes     Partners: Female     CURRENT MEDICATIONS:  Current Outpatient Medications   Medication     acetaminophen (TYLENOL) 500 MG tablet     amiodarone (PACERONE) 400 MG tablet     aspirin 81 MG tablet     atorvastatin (LIPITOR) 20 MG tablet     betamethasone dipropionate (DIPROSONE) 0.05 % external cream     Continuous Blood Gluc Sensor (FREESTYLE JOCELIN 14 DAY SENSOR) MISC     continuous blood glucose monitoring (FREESTYLE JOCELIN) sensor     fenofibrate (TRIGLIDE/LOFIBRA) 160 MG tablet     folic acid (FOLVITE) 1 MG tablet     furosemide (LASIX) 20 MG tablet     glipiZIDE (GLUCOTROL XL) 10 MG 24 hr tablet     insulin detemir (LEVEMIR FLEXTOUCH) 100 UNIT/ML pen     insulin pen needle (ULTICARE) 29G X 12.7MM miscellaneous     levothyroxine (SYNTHROID/LEVOTHROID) 50 MCG tablet     metFORMIN (GLUCOPHAGE) 1000 MG tablet     methotrexate 2.5 MG tablet     metoprolol succinate ER (TOPROL XL) 25 MG 24 hr tablet     predniSONE (DELTASONE) 20 MG tablet     sacubitril-valsartan (ENTRESTO) 49-51 MG per tablet     sulfamethoxazole-trimethoprim (BACTRIM) 400-80  MG tablet     VICTOZA PEN 18 MG/3ML soln     albuterol (PROAIR HFA/PROVENTIL HFA/VENTOLIN HFA) 108 (90 Base) MCG/ACT Inhaler     blood glucose monitoring (ACCU-CHEK LUL PLUS) test strip     omeprazole (PRILOSEC) 40 MG DR capsule     No current facility-administered medications for this visit.     ROS:   Answers for HPI/ROS submitted by the patient on 1/23/2023  General Symptoms: No  Skin Symptoms: No  HENT Symptoms: No  EYE SYMPTOMS: No  HEART SYMPTOMS: No  LUNG SYMPTOMS: No  INTESTINAL SYMPTOMS: No  URINARY SYMPTOMS: No  REPRODUCTIVE SYMPTOMS: No  SKELETAL SYMPTOMS: No  BLOOD SYMPTOMS: No  NERVOUS SYSTEM SYMPTOMS: No  MENTAL HEALTH SYMPTOMS: No         EXAM:  BP (!) 85/49 (BP Location: Right arm, Patient Position: Chair, Cuff Size: Adult Large)   Pulse 60   Wt 122.4 kg (269 lb 12.8 oz)   SpO2 99%   BMI 37.63 kg/m    General: appears comfortable, alert and oriented  Head: normocephalic, atraumatic, large white beard  Eyes: anicteric sclera, EOMI , PERRL  Neck: no adenopathy  Orophyarynx: moist mucosa, no lesions noted  Heart: regular, S1/S2, no murmurs, rubs or gallop. Estimated JVP at 5-6 cmH2O  Lungs: CTAB, No wheezing.   Abdomen: soft, non-tender, bowel sounds present, no palpable hepatosplenomegaly  Extremities: No LE edema today  Skin: no open lesions noted  Neuro: grossly non-focal     Labs:  Lab Results   Component Value Date    WBC 6.2 01/24/2023    HGB 9.9 (L) 01/24/2023    HCT 31.7 (L) 01/24/2023     01/24/2023     01/24/2023    POTASSIUM 3.8 01/24/2023    CHLORIDE 107 01/24/2023    CO2 22 01/24/2023    BUN 29.0 (H) 01/24/2023    CR 1.31 (H) 01/24/2023    GLC 93 01/24/2023    DD 0.3 10/01/2019    NTBNPI 957 (H) 10/01/2019    NTBNP 431 (H) 06/21/2022    TROPONIN 0.07 06/10/2018    TROPI 0.071 (H) 10/01/2019    AST 95 (H) 01/24/2023    ALT 84 (H) 01/24/2023    ALKPHOS 121 01/24/2023    BILITOTAL 0.9 01/24/2023    INR 1.16 (H) 10/04/2019     CMR 10/3/19:  1. The LV is severely dilated. The  global systolic function is moderately severely to severely reduced. The LVEF is 25%. There is severe global LV dysfunction with dyssynchronous septal motion consistent with a LBBB.  2. The RV is normal in cavity size. The global systolic function is normal. The RVEF is 57%.   3. Both atria are dilated.  4. There is mild mitral insufficiency.   5. Late gadolinium enhancement is present in the septal. inferior and lateral base. There is non-CAD scar in the anterolateral mid-wall. Scar is more extensive (13%) as compared to the prior MRI (6%).   CONCLUSIONS:   Late gadolinium enhancement is present in the septal. inferior and lateral base. There is non-CAD scar in the anterolateral mid-wall. Scar is more extensive (13%) as compared to the prior MRI (6%). The LV is severely dilated and the global systolic function is moderately severely to severely reduced with an LVEF of 25%. There is severe global LV dysfunction with dyssynchronous septal motion consistent with a LBBB.  Collectively, these findings are most consistent with a non-ischemic cardiomyopathy. Possible etiologies include prior LBBB, cardiac sarcoidosis or prior myocarditis     PET 12/4/19:  1. Diffuse increased FDG uptake in the myocardium compatible with active cardiac sarcoid.  2. Globally hypokinetic and enlarged heart with ejection fraction severely decreased to 16%, compatible with a dilatated cardiomyopathy.  3. Small perfusion abnormality of the anterior wall of the left ventricle, this is the LAD distribution..   4. There is increased ammonia uptake in the lungs, consistent with patient's known history of congestive heart failure.  5. FDG PET/CT demonstrate active sarcoid in the mediastinal and hilar lymph nodes.     TTE 1/2/20:  The left ventricle is severely dilated, LVEDD 7 cm  The visual ejection fraction is estimated at 25-30%.  There is mod-severe global hypokinesia of the left ventricle.  There is a catheter/pacemaker lead seen in the right  ventricle.  LVEF may be slightly improved compared with study from 10-19     PET 2/12/20:  1. Minimal residual uptake in the basal aspect of the septum and anterior wall, greatly improved compared to the prior examination.  2. Minimal uptake of a mediastinal lymph node.   3. Left parotid hyperdense nodule without significant FDG uptake, likely lymph node or Warthin's duct tumor.     PET 6/22/20:  1. No evidence of active cardiac sarcoidosis.  2. No evidence of active inflammation/sarcoidosis in the remainder of the body.  3. Cardiomegaly.  4. Cholelithiasis.     PET viability 2/12/21:  1. No evidence of active cardiac sarcoidosis.  2. No evidence of active systemic or pulmonary sarcoidosis.  3. Cholelithiasis.  4. Colonic diverticulosis.     TTE 12/20/21:  1. Left ventricular systolic function is severely reduced. The visual ejection fraction is 25-30%.  2. Septal wall motion abnormality may reflect pacemaker activation.  3. The left ventricle is severely dilated.  4. The right ventricle is normal in structure, function and size.  5. No evidence for significant valvular pathology  6. Mildly dilated aortic root (sinus of valsalva) 4.0 cm and ascending aorta, 3.9 cm.     CMR 2/18/22:  1. The LV is mildly increased in cavity size. The global systolic function is severely reduced. The LVEF is 24%. There is severe diffuse hypokinesis with akinesis of the basal to mid inferior segment.    2. The RV is normal in cavity size. The global systolic function is mildly reduced. The RVEF is 48%.   3. The left atrium is mildly enlarged.   4. There is no significant valvular disease.   5. Late gadolinium enhancement imaging shows epicardial hyperenhancement in the basal anterior RV size of the septum, transmural LGE in the basal inferior segment and subendocardial hyperenhancement in the mid septal and inferior segments and mid-myocardial LGE in the mid inferior/inferolateral segment. This pattern of LGE with multifocal, septal and  epicardial is compatible with cardiac sarcoid.   6. There is no pericardial effusion or thickening.  CONCLUSIONS: Non-ischemic cardiomyopathy with severely reduced LV function, LVEF of 24%. The LGE pattern is compatible with cardiac sarcoid. Compared to prior CMR from 10/3/2019 there is no significant change in LGE.      PET viability 2/25/2022  Impression:  Active sarcoid: cardiac and mediastinal & hilar lymphadenopathy.  1. Increased FDG uptake in the left ventricle mid/basal myocardial segments, septum, anterior wall and inferior wall compatible with  active cardiac sarcoid. Lateral wall is relatively spared.    2. Several hypermetabolic mediastinal and hilar hypermetabolic lymph nodes, suggestive of active systemic sarcoid.  No lung abnormalities.  3. Cardiomegaly.   4. Cholelithiasis and colonic diverticulosis.    PET scan 6/2022:  1. Evidence of active sarcoidosis with similar FDG uptake in the left ventricle mid/basal myocardial segments, septum, anterior wall and  inferior wall since since PET/CT from 2/25/2022.   2. Near resolution of hypermetabolism in prominent mediastinal nodes since PET/CT from 2/25/2022.   3. No lung findings sarcoidosis.  4. Cardiomegaly.  5. Cirrhotic liver morphology, new small amount of ascites.  6. Cholelithiasis and colonic diverticulosis.    PET scan 12/2022  1. Findings of active cardiac sarcoidosis, which is improved compared  to 6/14/2022 PET/CT.  2. No evidence suggest active systemic sarcoidosis.  3. Sub-5 mm pulmonary nodules within the bilateral lungs, otherwise  the lung parenchyma is unremarkable. Attention follow-up.  4. Compression deformity of L4 vertebral body with mild FDG uptake,  new compared to 6/14/2022 without retropulsion or significant height  loss.      ASSESSMENT AND PLAN:    Neymar is 62 year old male with cardiac sarcoidosis c/b ventricular tachycardia currently controlled on amiodarone, methotrexate and prednisone. Last PET scan 12/2022 with decreasing  cardiac inflammation. The pt has been on amiodarone and prednisone for a long time. Methotrexate was initiated 11/2022 in an attempt to wean off prednisone and the pt appears to be tolerating this new medication well. We will thus continue uptitrating methotrexate while downtitrating prednisone.     1. Cardiac sarcoidosis c/b ventricular tachycardia  2. Heart failure with reduced ejection fraction, LVEF 24% per 2/2022 cMR, etiology NICM, NYHA II    3. S/p CRT-D  4. LBBB    - Increase methotrexate to 15 mg weekly  - Continue folic acid 1 mg daily  - Decrease prednisone to 10 mg daily  - Continue bactrim daily for PJP ppx  - Continue amiodarone 400 mg daily  - Follow up in 3 months with cardiac FDG PET scan and complete metabolic panel, then return clinic visit     The patient was seen and discussed with attending physician, Dr. Roberto Batres MD.     Brett Babin MD, PhD  Cardiology Fellow  Click to text page [phone]    I have seen and evaluated the patient and agree with the assessment and plan as above.     I appreciate the opportunity to participate in the care of Neymar Rhodes . Please do not hesitate to contact me with any further questions.     Sincerely,   Roberto Batres MD  [facility] Division of Cardiology

## 2023-01-24 NOTE — NURSING NOTE
Chief Complaint   Patient presents with     Follow Up     1/24/23--Dr. Batres: Non-ischemic cardiomyopathy     Vitals were taken and medications reconciled.    Zoran Og, EMT  7:50 AM

## 2023-01-24 NOTE — PATIENT INSTRUCTIONS
Dr. Batres recommends:    Increase Methotrexate to 15 MG weekly.    Decrease Prednisone to 10 MG once daily.    Pet Scan in 3 months.    Follow up clinic visit with Dr. Batres in 3 months with labs, after the Pet Scan is complete.    Thank you for your visit today.  Please call me with any questions or concerns.   Christopher Stephens RN  Cardiology Care Coordinator  [phone]

## 2023-01-24 NOTE — LETTER
1/24/2023      RE: Neymar Rhodes  [address]  [facility] [place] [address]       Dear Colleague,    Thank you for the opportunity to participate in the care of your patient, Neymar Rhodes, at the [facility] [place] at [facility]. Please see a copy of my visit note below.    January 24, 2023     Dear Colleagues,  I had the pleasure of seeing Mr. Blackmon in clinic today for follow-up. As you know, he is a very pleasant 62-year-old gentleman, he has not had regular follow-up over the years, He was first seen by Cardiology in 2018 with a new diagnosis of LV dysfunction with an EF of 40%.  He was at that time seen by Dr. Iniguez.  He had a significant left bundle branch block.  He underwent coronary angiogram that showed nonobstructive disease.  He was put on beta blockers, ACE inhibitors and spironolactone but his potassium increased and did not tolerate mineralocorticoid receptor antagonist.  Subsequently, a cardiac MRI was done for nonischemic cardiomyopathy that showed scarring concerning for sarcoidosis.  In any case, he did not follow up but he remained on medications.  Unfortunately, in 2019, he presented with decompensated heart failure and EF was less than 20% with severe LV dilatation measuring 7 cm.  During the hospital stay, he developed sustained monomorphic VT at about 180 beats per minute and needed to be emergently cardioverted in the hospital.  He was taken again to the cath lab and coronary anatomy was unchanged, again showing nonobstructive disease.  Right heart catheterization showed mildly elevated pressures.  LVEDP was 21.  He had a repeat cardiac MRI that showed late gadolinium enhancement in the septal inferior and lateral basal wall with scarring pattern concerning for non-CAD scarring and scar burden had actually worsened up to 13%.  He was loaded with amiodarone and subsequently received a CRT device on 10/04/2019.  " Subsequently, he was referred to the [facility]. 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  He was put on prednisone in 12/2019 and took it for almost a year and a half thereafter.  Subsequent PET scan in 2020 and on 02/2021 have shown complete resolution of inflammation.  He stopped his prednisone as of spring of 2021.  In addition to that, he has continued to have nonsustained VT.  In 12/2020 Dr. Dorman had also mentioned to him about possibility of VT ablation; however, the patient was not interested in pursuing that.  He had opted to continue taking amiodarone, at 400mg daily dose. In 6/2022, a repeat PET scan demonstrated ongoing cardiac inflammation c/w active sarcoid. In 9/2022 he was admitted to [facility] and was seen by Dr. Dorman of EP for Afib as well as slow VT below the detection level of the device so no interventions were done. At the time potential VT ablation was rediscussed, but the pt has continued to defer this option. In 11/2022, he was seen in clinic for interim f/u and daily prednisone was decreased to 20mg daily coupled with starting methotrexate. A f/u PET scan 12/2022 demonstrated an interval decrease in cardiac inflammation (compared to 6/2022).     Today, he presents for close month f/u. Overall he is doing \"all right\". He did fall on icy roads a few times recently, but fortunately without significant sequelae. No changes in dyspnea, palpitations, dizziness or edema. No issues with nausea. He had decreased his amiodarone to 200 for a few days, but then some more events were discovered on his CRT-D and the pt was advised by Dr. Dorman to return to amiodarone 400mg daily. He is also taking prednisone 20mg daily (plus bactrim for PJP ppx).     PAST MEDICAL HISTORY:  Past Medical History:   Diagnosis Date     Ascending aorta dilatation (H)      Diverticulosis of " colon (without mention of hemorrhage)      Essential hypertension, benign      Gout, unspecified      LBBB (left bundle branch block)      Morbid obesity (H)      Non-ischemic cardiomyopathy (H)      Nonrheumatic mitral valve regurgitation      NSTEMI (non-ST elevated myocardial infarction) (H)     cardiac cath 6/2018: mild non-obstructive CAD     Other and unspecified hyperlipidemia      Paroxysmal ventricular tachycardia      Type II or unspecified type diabetes mellitus without mention of complication, not stated as uncontrolled      FAMILY HISTORY:  Family History   Problem Relation Age of Onset     Cancer Father 75        bladder cancer     Myocardial Infarction Father      Other - See Comments Father         smoking     Breast Cancer Mother      Breast Cancer Sister      Family History Negative Brother      Family History Negative Son      Family History Negative Son         fluttering/murmur     Asthma Son      Colon Cancer No family hx of      SOCIAL HISTORY:  Social History     Socioeconomic History     Marital status:    Tobacco Use     Smoking status: Never     Smokeless tobacco: Never   Substance and Sexual Activity     Alcohol use: Yes     Comment: once awhile     Drug use: No     Sexual activity: Yes     Partners: Female     CURRENT MEDICATIONS:  Current Outpatient Medications   Medication     acetaminophen (TYLENOL) 500 MG tablet     amiodarone (PACERONE) 400 MG tablet     aspirin 81 MG tablet     atorvastatin (LIPITOR) 20 MG tablet     betamethasone dipropionate (DIPROSONE) 0.05 % external cream     Continuous Blood Gluc Sensor (FREESTYLE JOCELIN 14 DAY SENSOR) MISC     continuous blood glucose monitoring (FREESTYLE JOCELIN) sensor     fenofibrate (TRIGLIDE/LOFIBRA) 160 MG tablet     folic acid (FOLVITE) 1 MG tablet     furosemide (LASIX) 20 MG tablet     glipiZIDE (GLUCOTROL XL) 10 MG 24 hr tablet     insulin detemir (LEVEMIR FLEXTOUCH) 100 UNIT/ML pen     insulin pen needle (ULTICARE) 29G X  12.7MM miscellaneous     levothyroxine (SYNTHROID/LEVOTHROID) 50 MCG tablet     metFORMIN (GLUCOPHAGE) 1000 MG tablet     methotrexate 2.5 MG tablet     metoprolol succinate ER (TOPROL XL) 25 MG 24 hr tablet     predniSONE (DELTASONE) 20 MG tablet     sacubitril-valsartan (ENTRESTO) 49-51 MG per tablet     sulfamethoxazole-trimethoprim (BACTRIM) 400-80 MG tablet     VICTOZA PEN 18 MG/3ML soln     albuterol (PROAIR HFA/PROVENTIL HFA/VENTOLIN HFA) 108 (90 Base) MCG/ACT Inhaler     blood glucose monitoring (ACCU-CHEK LUL PLUS) test strip     omeprazole (PRILOSEC) 40 MG DR capsule     No current facility-administered medications for this visit.     ROS:   Answers for HPI/ROS submitted by the patient on 1/23/2023  General Symptoms: No  Skin Symptoms: No  HENT Symptoms: No  EYE SYMPTOMS: No  HEART SYMPTOMS: No  LUNG SYMPTOMS: No  INTESTINAL SYMPTOMS: No  URINARY SYMPTOMS: No  REPRODUCTIVE SYMPTOMS: No  SKELETAL SYMPTOMS: No  BLOOD SYMPTOMS: No  NERVOUS SYSTEM SYMPTOMS: No  MENTAL HEALTH SYMPTOMS: No         EXAM:  BP (!) 85/49 (BP Location: Right arm, Patient Position: Chair, Cuff Size: Adult Large)   Pulse 60   Wt 122.4 kg (269 lb 12.8 oz)   SpO2 99%   BMI 37.63 kg/m    General: appears comfortable, alert and oriented  Head: normocephalic, atraumatic, large white beard  Eyes: anicteric sclera, EOMI , PERRL  Neck: no adenopathy  Orophyarynx: moist mucosa, no lesions noted  Heart: regular, S1/S2, no murmurs, rubs or gallop. Estimated JVP at 5-6 cmH2O  Lungs: CTAB, No wheezing.   Abdomen: soft, non-tender, bowel sounds present, no palpable hepatosplenomegaly  Extremities: No LE edema today  Skin: no open lesions noted  Neuro: grossly non-focal     Labs:  Lab Results   Component Value Date    WBC 6.2 01/24/2023    HGB 9.9 (L) 01/24/2023    HCT 31.7 (L) 01/24/2023     01/24/2023     01/24/2023    POTASSIUM 3.8 01/24/2023    CHLORIDE 107 01/24/2023    CO2 22 01/24/2023    BUN 29.0 (H) 01/24/2023    CR 1.31  (H) 01/24/2023    GLC 93 01/24/2023    DD 0.3 10/01/2019    NTBNPI 957 (H) 10/01/2019    NTBNP 431 (H) 06/21/2022    TROPONIN 0.07 06/10/2018    TROPI 0.071 (H) 10/01/2019    AST 95 (H) 01/24/2023    ALT 84 (H) 01/24/2023    ALKPHOS 121 01/24/2023    BILITOTAL 0.9 01/24/2023    INR 1.16 (H) 10/04/2019     CMR 10/3/19:  1. The LV is severely dilated. The global systolic function is moderately severely to severely reduced. The LVEF is 25%. There is severe global LV dysfunction with dyssynchronous septal motion consistent with a LBBB.  2. The RV is normal in cavity size. The global systolic function is normal. The RVEF is 57%.   3. Both atria are dilated.  4. There is mild mitral insufficiency.   5. Late gadolinium enhancement is present in the septal. inferior and lateral base. There is non-CAD scar in the anterolateral mid-wall. Scar is more extensive (13%) as compared to the prior MRI (6%).   CONCLUSIONS:   Late gadolinium enhancement is present in the septal. inferior and lateral base. There is non-CAD scar in the anterolateral mid-wall. Scar is more extensive (13%) as compared to the prior MRI (6%). The LV is severely dilated and the global systolic function is moderately severely to severely reduced with an LVEF of 25%. There is severe global LV dysfunction with dyssynchronous septal motion consistent with a LBBB.  Collectively, these findings are most consistent with a non-ischemic cardiomyopathy. Possible etiologies include prior LBBB, cardiac sarcoidosis or prior myocarditis     PET 12/4/19:  1. Diffuse increased FDG uptake in the myocardium compatible with active cardiac sarcoid.  2. Globally hypokinetic and enlarged heart with ejection fraction severely decreased to 16%, compatible with a dilatated cardiomyopathy.  3. Small perfusion abnormality of the anterior wall of the left ventricle, this is the LAD distribution..   4. There is increased ammonia uptake in the lungs, consistent with patient's known  history of congestive heart failure.  5. FDG PET/CT demonstrate active sarcoid in the mediastinal and hilar lymph nodes.     TTE 1/2/20:  The left ventricle is severely dilated, LVEDD 7 cm  The visual ejection fraction is estimated at 25-30%.  There is mod-severe global hypokinesia of the left ventricle.  There is a catheter/pacemaker lead seen in the right ventricle.  LVEF may be slightly improved compared with study from 10-19     PET 2/12/20:  1. Minimal residual uptake in the basal aspect of the septum and anterior wall, greatly improved compared to the prior examination.  2. Minimal uptake of a mediastinal lymph node.   3. Left parotid hyperdense nodule without significant FDG uptake, likely lymph node or Warthin's duct tumor.     PET 6/22/20:  1. No evidence of active cardiac sarcoidosis.  2. No evidence of active inflammation/sarcoidosis in the remainder of the body.  3. Cardiomegaly.  4. Cholelithiasis.     PET viability 2/12/21:  1. No evidence of active cardiac sarcoidosis.  2. No evidence of active systemic or pulmonary sarcoidosis.  3. Cholelithiasis.  4. Colonic diverticulosis.     TTE 12/20/21:  1. Left ventricular systolic function is severely reduced. The visual ejection fraction is 25-30%.  2. Septal wall motion abnormality may reflect pacemaker activation.  3. The left ventricle is severely dilated.  4. The right ventricle is normal in structure, function and size.  5. No evidence for significant valvular pathology  6. Mildly dilated aortic root (sinus of valsalva) 4.0 cm and ascending aorta, 3.9 cm.     CMR 2/18/22:  1. The LV is mildly increased in cavity size. The global systolic function is severely reduced. The LVEF is 24%. There is severe diffuse hypokinesis with akinesis of the basal to mid inferior segment.    2. The RV is normal in cavity size. The global systolic function is mildly reduced. The RVEF is 48%.   3. The left atrium is mildly enlarged.   4. There is no significant valvular  disease.   5. Late gadolinium enhancement imaging shows epicardial hyperenhancement in the basal anterior RV size of the septum, transmural LGE in the basal inferior segment and subendocardial hyperenhancement in the mid septal and inferior segments and mid-myocardial LGE in the mid inferior/inferolateral segment. This pattern of LGE with multifocal, septal and epicardial is compatible with cardiac sarcoid.   6. There is no pericardial effusion or thickening.  CONCLUSIONS: Non-ischemic cardiomyopathy with severely reduced LV function, LVEF of 24%. The LGE pattern is compatible with cardiac sarcoid. Compared to prior CMR from 10/3/2019 there is no significant change in LGE.      PET viability 2/25/2022  Impression:  Active sarcoid: cardiac and mediastinal & hilar lymphadenopathy.  1. Increased FDG uptake in the left ventricle mid/basal myocardial segments, septum, anterior wall and inferior wall compatible with  active cardiac sarcoid. Lateral wall is relatively spared.    2. Several hypermetabolic mediastinal and hilar hypermetabolic lymph nodes, suggestive of active systemic sarcoid.  No lung abnormalities.  3. Cardiomegaly.   4. Cholelithiasis and colonic diverticulosis.    PET scan 6/2022:  1. Evidence of active sarcoidosis with similar FDG uptake in the left ventricle mid/basal myocardial segments, septum, anterior wall and  inferior wall since since PET/CT from 2/25/2022.   2. Near resolution of hypermetabolism in prominent mediastinal nodes since PET/CT from 2/25/2022.   3. No lung findings sarcoidosis.  4. Cardiomegaly.  5. Cirrhotic liver morphology, new small amount of ascites.  6. Cholelithiasis and colonic diverticulosis.    PET scan 12/2022  1. Findings of active cardiac sarcoidosis, which is improved compared  to 6/14/2022 PET/CT.  2. No evidence suggest active systemic sarcoidosis.  3. Sub-5 mm pulmonary nodules within the bilateral lungs, otherwise  the lung parenchyma is unremarkable. Attention  follow-up.  4. Compression deformity of L4 vertebral body with mild FDG uptake,  new compared to 6/14/2022 without retropulsion or significant height  loss.      ASSESSMENT AND PLAN:    Neymar is 62 year old male with cardiac sarcoidosis c/b ventricular tachycardia currently controlled on amiodarone, methotrexate and prednisone. Last PET scan 12/2022 with decreasing cardiac inflammation. The pt has been on amiodarone and prednisone for a long time. Methotrexate was initiated 11/2022 in an attempt to wean off prednisone and the pt appears to be tolerating this new medication well. We will thus continue uptitrating methotrexate while downtitrating prednisone.     1. Cardiac sarcoidosis c/b ventricular tachycardia  2. Heart failure with reduced ejection fraction, LVEF 24% per 2/2022 cMR, etiology NICM, NYHA II    3. S/p CRT-D  4. LBBB    - Increase methotrexate to 15 mg weekly  - Continue folic acid 1 mg daily  - Decrease prednisone to 10 mg daily  - Continue bactrim daily for PJP ppx  - Continue amiodarone 400 mg daily  - Follow up in 3 months with cardiac FDG PET scan and complete metabolic panel, then return clinic visit     The patient was seen and discussed with attending physician, Dr. Roberto Batres MD.     Brett Babin MD, PhD  Cardiology Fellow  Click to text page [phone]    I have seen and evaluated the patient and agree with the assessment and plan as above.     I appreciate the opportunity to participate in the care of Neymar Rhodes . Please do not hesitate to contact me with any further questions.         Please do not hesitate to contact me if you have any questions/concerns.     Sincerely,     Roberto Batres MD

## 2023-02-08 NOTE — TELEPHONE ENCOUNTER
Patient calling reporting BG readings have been running low since 2/6/2023 after Prednisone script was halved from 20 mg down to 10 mg. Patient stated this morning at 4 am that his cat woke him up and his BG was 44, patient did take two sugar tablets and orange juice and stated he felt better after that. BG during the time of phone call was 101 and patient stated he was feeling pretty good.     Routing to PCP for recommendations.     Patient will be at work today, can call cell phone but if there is no answer can call and leave message on home phone.

## 2023-02-08 NOTE — TELEPHONE ENCOUNTER
Not surprising that his glucoses will decrease with lower prednisone dose.     Confirm his doses of insulin.     Reduce the Levemir insulin from 35 to  25 units once per day.   Continue all other meds at same doses.   Continue to monitor the glucose, if he goes below 80 on regular basis or has symptoms hypoglycemia, let me know.     Make sure that you do not go too long without food.     Let me know if his insulin doses are different than what we have down.

## 2023-02-08 NOTE — TELEPHONE ENCOUNTER
Patient Contact    Attempt # 1    Was call answered?  No.  Left message on voicemail with information to call me back. Sent pt mychart message.     On call back: please relay message from provider/confirm pt has responded to mychart message sent.       Maureen Jackson RN

## 2023-02-09 NOTE — TELEPHONE ENCOUNTER
Patient Contact    Attempt # 2    Was call answered?  No.  Left message on voicemail with information to call me back. On chart review, pt read Ingeny message sent with provider message. Pt did NOT confirm current dose of insulin as requested by provider.     LVM on home phone call clinic back or respond to Ingeny message.     On call back: confirm current dose of Insulin.     Maureen Jackson RN

## 2023-02-13 NOTE — TELEPHONE ENCOUNTER
Per MD, follow up with EP. Called pt. No answer. No voicemail available this time. Orders in for cardiology follow up. Routed message to scheduling. SH/RN

## 2023-02-15 NOTE — TELEPHONE ENCOUNTER
Entresto Oral Tablet 49-51 MG    Last Written Prescription Date:  8/31/22  Last Fill Quantity: 180,   # refills: 1  Last Office Visit : 1/24/23  Future Office visit:  4/25/23    Routing refill request to provider for review/approval because:  Elevated Cr at last visit  01/24/23       CR 1.31*

## 2023-02-20 NOTE — TELEPHONE ENCOUNTER
"Another alert received from patient's ICD for \"Antitachycardia pacing (ATP) therapy delivered to convert arrhythmia on 2/17/2023 at 0811.\"    EGM shows AS-BiVP with a PVC initiating a VT rhythm w/ V rates in the 160s (VT-1 zone) for 30s before ATP burst x1 successful at breaking arrhythmia.    This is the second VT episode requiring ATP in 4 days. (Refer to 2/13 encounter for details regarding ATP delivery on 2/10). This episode was routed to Dr. Irvin who recommended patient follow up with EP. Patient has yet to get this set up (multiple messages have been left by device RNs and scheduling).    Pt has a [employer] Resonate CRT-D, placed in 2019 for dilated CM (EF at 15% at the time, most recently 24% on 2/18/22), LBBB, LV dysfunction, paroxsysmal VT and sarcoidosis. Pt currently taking amiodarone 400mg daily, Toprol XL 25mg twice daily, Entresto 49-51mg twice daily, methotrexate 15mg every 7 days and prednisone 20 mg daily (per last cardiology note, snap shot says 10mg).      Have a call out to patient to assess for symptoms and med compliance. Called every number on file and LVM requesting a call back. Of note, patient received ATPx2 for VT episodes on 11/23/22 and denied any symptoms associated with episodes. (At that time, patient's toprol XL dose was doubled to 25mg BID).    Will route to update to Dr. Dorman for review and recommendation and await call back from patient.    BRANDON Chu      EGM for review:            Treatment Settings:        "

## 2023-02-20 NOTE — TELEPHONE ENCOUNTER
Jayy Dorman MD  You 2 minutes ago (11:17 AM)     QP  Patient has cardiac sarcoidosis. Dr. De Guzman, an EP at the  have seen him in the past and would defer VT to him. qp      Received above message from Dr. Dorman. Encounter routed to [facility] EP team and Dr. De Guzman for review and recommendation.  BC TAYLOR

## 2023-02-22 NOTE — TELEPHONE ENCOUNTER
Pt called following up with Vm left yesterday pt refused any appointments and stated that he will never do any appts and he does not want to schedule routing to team as an FYI

## 2023-03-02 NOTE — TELEPHONE ENCOUNTER
"TO PCP:     Patient called, sates he quite taking his insulin and his Victoza over a month ago (January 28th) due to numbers were falling at night     Was waking with readings in the 50s     Using Jeffrey reader     In past 90 days had 82 events where sugars were \"very low\" between 12am and 6am. Pt unsure what actual readings were. Between 12pm -6 pm had 22 events     States the sugar concerns started with adjusting prednisone medication     Continues to take Glipizide and Metformin     11:03am today sugar was 112 has not eaten since 7am last night      appetite has went away recently     9:42 pm last night 85 sugar reading     March 1st was 61at bedtime     Has glucose tabs and Gatorade on hand for low readings. Cat wakes him up if sugar is too low per patient     Patient wants OV soon with PCP, declines virtual visit, asking if PCP can work him in in the near future for office visit to review diabetes/sugar readings?     On call back use HOME phone, ok to leave a message, otherwise pt states okay to use [employer] but don't call back on his wife's number     Miladis CELESTE, Triage RN  [facility]     "

## 2023-03-02 NOTE — TELEPHONE ENCOUNTER
Called and spoke to the patient. Relayed message from the provider. Patient states he stopped taking his Levemir and Victoza about one month ago. Patient states he continues to take his Glipizide and Metformin. Informed patient to decrease his Glipizide to 5 mg daily.     Scheduled patient for an appointment with Dr. Harris.    Appointments in Next Year    Mar 08, 2023 11:30 AM  (Arrive by 11:10 AM)  Provider Visit with Jefry Harris MD  [facility] ) [phone]     Will route back to Dr. Harris to inform about the insulin and Victoza.     Rosana Lozano RN

## 2023-03-02 NOTE — CONFIDENTIAL NOTE
Not surprising the his glucose readings have fallen as the prednisone dose has fallen.     Make the following changes for now:    Confirm his insulin dosing.     Reduce Levemir insulin by 5 units.   Reduce Glipizide to 5 mg once daily.   Continue all other medications at the same doses.    (med list updated with these changes)    Check glucose at bedtime, take a small bedtime snack (like 1/2 of a protein bar) as needed.     OK to place him in for an office visit in person in the 3/8 virtual spot.  Bring home glucose readings.

## 2023-03-02 NOTE — CONFIDENTIAL NOTE
Thanks for the update.     I was not aware that he had stopped both the Levemir and Victoza.     Medicaiton list updated    Make the med change as I ordered.     F/u as planned next week.   Be sure to take small bed time snack.     Close encounter if done.

## 2023-03-07 NOTE — TELEPHONE ENCOUNTER
Reason for Call:  Rx    Detailed comments: Patient calling to get a new Rx for 5mg glipiziee.     Phone Number Patient can be reached at: Other phone number:  [phone]    Best Time: Any    Can we leave a detailed message on this number? YES    Call taken on 3/7/2023 at 10:01 AM by Xochitl Ludwig

## 2023-03-08 NOTE — PATIENT INSTRUCTIONS
Glucose readings are going too low.      Stop Glipizide until further notice.      Continue Metformin at 1000 mg twice per day with meals, but Change the Metformin to extended release preparation (2 x 500 mg tablets) to see if the diarrhea is better.      Goal for diabetes control:  glucose between  approximately 70-75% of the time or better, with very few( if any) readings below 70.     Continue all other medications at the same doses.  Contact your usual pharmacy if you need refills.      Diarrhea could be caused by many things:  metformin, side effects from the bactrim, could be infection due to the immunosuppressive medications, could be low grade inflammatory disease of the intestines (e.g. microscopic colitis)     I doubt metformin is causing this as you have bene taking this medication for years without any troubles.       Collect and return the stool samples to our labs.  I will treat any infection present.      If the stool samples are negative, then OK to try low dose Imodium (over the counter diarrhea medication), 1/2 - 1 tablet once or twice per day.      Send me an update via [employer] message in one month about the glucose readings, and loose stools.

## 2023-03-08 NOTE — PROGRESS NOTES
Assessment & Plan     (E11.59,  Z79.4) Type 2 diabetes mellitus with other circulatory complication, with long-term current use of insulin (H)  (primary encounter diagnosis)  Comment:        Glucose readings are going too low.        Stop Glipizide until further notice.        Continue Metformin at 1000 mg twice per day with meals, but Change the Metformin to extended release preparation (2 x 500 mg tablets) to see if the diarrhea is better.        Goal for diabetes control:  glucose between  approximately 70-75% of the time or better, with very few( if any) readings below 70.       Continue all other medications at the same doses.  Contact your usual pharmacy if you need refills.        Diarrhea could be caused by many things:  metformin, side effects from the bactrim, could be infection due to the immunosuppressive medications, could be low grade inflammatory disease of the intestines (e.g. microscopic colitis)       I doubt metformin is causing this as you have bene taking this medication for years without any troubles.         Collect and return the stool samples to our labs.  I will treat any infection present.        If the stool samples are negative, then OK to try low dose Imodium (over the counter diarrhea medication), 1/2 - 1 tablet once or twice per day.        Send me an update via [employer] message in one month about the glucose readings, and loose stools.        Plan: metFORMIN (GLUCOPHAGE XR) 500 MG 24 hr tablet            (E66.01) Severe obesity (BMI 35.0-39.9) with comorbidity (H)  Comment: Obese contribute to hypertension and diabetes.  Continue low-carb low, low saturated fat diet.  Plan:     (I25.10) Coronary artery disease involving native coronary artery of native heart without angina pectoris  Comment: This condition is currently controlled on the current medical regimen.  Continue current therapy.   Discussed secondary risk factor modification and recommended continuing aggressive  "management of these items.   Plan:     (I50.22) Chronic systolic heart failure (H)  Comment: This condition is currently controlled on the current medical regimen.  Continue current therapy.   Plan:     (I77.810) Ascending aorta dilatation (H)  Comment: This condition is currently controlled on the current medical regimen.  Continue current therapy.   Plan:     (D86.85) Cardiac sarcoidosis  Comment: This condition is currently controlled on the current medical regimen.  Continue current therapy.   Management per cardiology team.  Plan:     (N18.31) Stage 3a chronic kidney disease (H)  Comment: This condition is currently controlled on the current medical regimen.  Continue current therapy.   Plan:     (D86.0) Sarcoidosis of lung (H)  Comment: This condition is currently controlled on the current medical regimen.  Continue current therapy.   Plan:     (K52.9) Chronic diarrhea  Comment:   Plan: C. difficile Toxin B PCR with reflex to C.         difficile Antigen and Toxins A/B EIA,         Cryptosporidium/Giardia Immunoassay, Fecal         Lactoferrin, Viral Culture Non-respiratory            (Z13.220) Screening for hyperlipidemia  Comment:   Plan:                 BMI:   Estimated body mass index is 36.56 kg/m  as calculated from the following:    Height as of this encounter: 1.803 m (5' 11\").    Weight as of this encounter: 118.9 kg (262 lb 1.6 oz).           Jefry Harris MD  [facility]   Neymar is a 63 year old, presenting for the following health issues:  Diabetes      History of Present Illness       Diabetes:   He presents for follow up of diabetes.  He is checking home blood glucose four or more times daily. He checks blood glucose before meals, after meals, before and after meals and at bedtime.  Blood glucose is sometimes over 200 and sometimes under 70. He is aware of hypoglycemia symptoms including shakiness, dizziness, weakness and blurred vision. " He is concerned about low blood sugar, several less than 70 in the past few weeks.  He is having blurry vision and weight loss.         He eats 2-3 servings of fruits and vegetables daily.He consumes 0 sweetened beverage(s) daily.He exercises with enough effort to increase his heart rate 9 or less minutes per day.  He exercises with enough effort to increase his heart rate 3 or less days per week.   He is taking medications regularly.     Blood glucoses are running very low lately since tapering down on steroids.    Sarcoidosis is improving, has been tapering down steroids.  Still taking preventive antibiotics.    History of aortic dilated root, stable.    History of previous heart failure, currently stable.    History of coronary artery disease, no new cardiac symptoms.      The patient has had a history of ongoing obesity.  Reviewed the weigth curves.   Their current BMI is:  Body mass index is 36.56 kg/m .  Reviewed previous attempts at weight loss which have not been successful in producing prolonged weigth loss.   Discussed current eating and exercise habits.     Reviewed weights in chart:  Wt Readings from Last 10 Encounters:   03/08/23 118.9 kg (262 lb 1.6 oz)   01/24/23 122.4 kg (269 lb 12.8 oz)   12/26/22 125.9 kg (277 lb 9.6 oz)   11/22/22 129.4 kg (285 lb 3.2 oz)   10/10/22 123.4 kg (272 lb)   09/30/22 121.7 kg (268 lb 6.4 oz)   05/17/22 126.2 kg (278 lb 3.2 oz)   03/29/22 124.7 kg (275 lb)   03/28/22 124.7 kg (275 lb)   03/23/22 125.2 kg (276 lb)        History of chronic kidney disease presumed due to HTN and DM.  Reviewed most recent labs:    Lab Results   Component Value Date    CR 1.31 01/24/2023    CR 1.21 10/07/2022    CR 1.09 09/30/2022    CR 1.47 09/29/2022    CR 1.23 06/21/2022    CR 0.93 03/28/2022    CR 0.88 03/23/2022    CR 0.92 03/14/2022    CR 1.19 02/16/2022    CR 1.00 12/16/2021    CR 0.98 09/15/2021    CR 0.90 08/27/2021    CR 1.22 05/26/2021    CR 1.21 12/11/2020    CR 1.30 09/16/2020  "   CR 1.23 07/22/2020    CR 1.06 07/20/2020    CR 1.17 03/18/2020    CR 1.08 03/10/2020    CR 0.96 12/19/2019    CR 1.12 12/02/2019    CR 1.04 11/14/2019    CR 1.07 11/04/2019    CR 1.46 10/23/2019    CR 1.64 10/17/2019    CR 1.57 10/09/2019    CR 0.99 10/05/2019    CR 1.18 10/04/2019    CR 0.90 10/03/2019    CR 1.06 10/02/2019    CR 0.81 10/02/2019    CR 0.95 10/01/2019    CR 0.94 08/26/2019    CR 0.91 04/10/2019    CR 0.77 10/03/2018    CR 1.01 07/02/2018    CR 0.96 06/20/2018    CR 0.67 06/13/2018    CR 0.87 06/12/2018    CR 0.91 06/11/2018    CR 1.11 06/10/2018    CR 0.94 12/01/2017    CR 0.85 05/15/2017    CR 1.00 01/20/2017    CR 0.87 10/19/2016    CR 0.85 01/11/2016    CR 0.83 07/17/2015    CR 0.79 01/26/2015    CR 0.72 12/30/2013    CR 0.77 06/22/2013    CR 0.80 02/14/2013    CR 0.91 08/10/2012    CR 0.77 03/30/2012    CR 0.85 08/22/2011    CR 0.82 03/08/2011    CR 0.80 04/09/2010    CR 0.76 12/23/2008    CR 0.73 09/03/2008    CR 1.01 02/15/2008    CR 0.87 10/24/2007    CR 0.87 03/29/2007    CR 0.90 12/07/2006    CR 0.90 06/01/2006    CR 0.80 02/27/2006    CR 0.80 12/15/2005    CR 0.90 03/22/2005    CR 0.80 11/29/2004    CR 0.90 08/05/2004    CR 0.9 10/27/2003    CR 0.9 12/24/2002         **I reviewed the information recorded in the patient's EPIC chart (including but not limited to medical history, surgical history, family history, problem list, medication list, and allergy list) and updated the information as indicated based on the patients reported information.         Review of Systems   Constitutional, HEENT, cardiovascular, pulmonary, gi and gu systems are negative, except as otherwise noted.      Objective    /60   Pulse 60   Temp 98.2  F (36.8  C) (Oral)   Ht 1.803 m (5' 11\")   Wt 118.9 kg (262 lb 1.6 oz)   SpO2 100%   BMI 36.56 kg/m    Body mass index is 36.56 kg/m .  Physical Exam   GENERAL alert and no distress  EYES:  Normal sclera,conjunctiva, EOMI  HENT: oral and posterior pharynx " without lesions or erythema, facies symmetric  NECK: Neck supple. No LAD, without thyroidmegaly.  RESP: Clear to ausculation bilaterally without wheezes or crackles. Normal BS in all fields.  CV: RRR normal S1S2 without murmurs, rubs or gallops.  LYMPH: no cervical lymph adenopathy appreciated  MS: extremities- no gross deformities of the visible extremities noted,   EXT:  no lower extremity edema  PSYCH: Alert and oriented times 3; speech- coherent  SKIN:  No obvious significant skin lesions on visible portions of face

## 2023-04-12 NOTE — TELEPHONE ENCOUNTER
Nurse Triage SBAR    Is this a 2nd Level Triage? YES, LICENSED PRACTITIONER REVIEW IS REQUIRED    Situation: Patient reports pedal edema bilat, and increased SOB/lightheadedness with exertion that has started 1 week ago. Patient takes furosemide every morning, he is not sure if he needs higher dose.     Background: Patient says he typically only has edema in L foot because of Gout.     Assessment: Patient states both tops of feet are swollen, causing difficulty picking feet up because shoes/socks pinch.   Ankles are not swollen. Hands/face are not swollen.   Pain rating 10/10  Denies redness/cyanotic color  Feet are warm to the touch  Denies chest pain  SOB and lightheadedness has been increasing with ambulation.   Comes home from work and needs to take a 2 hr nap.  BP this AM 91/55 patient is asymptomatic.     Protocol Recommended Disposition:   See in Office Today, See More Appropriate Protocol    Recommendation:  Patient only wants to see PCP. No appointments available today in clinic, patient requesting to be worked into schedule 4/13 or 4/14 with PCP.    Routed to provider    Does the patient meet one of the following criteria for ADS visit consideration? 16+ years old, with an [facility] PCP     TIP  Providers, please consider if this condition is appropriate for management at one of our Acute and Diagnostic Services sites.     If patient is a good candidate, please use dotphrase <dot>triageresponse and select Refer to ADS to document.      Reason for Disposition    Swelling of both ankles (i.e., pedal edema)    Difficulty breathing with exertion AND worsening or new-onset    Additional Information    Negative: Chest pain    Negative: Small area of swelling and followed an insect bite to the area    Negative: Followed a knee injury    Negative: Ankle or foot injury    Negative: Pregnant with leg swelling or edema    Negative: Difficulty breathing at rest    Negative: Entire foot is cool or blue in comparison to  "other side    Negative: Sounds like a life-threatening emergency to the triager    Negative: SEVERE swelling (e.g., swelling extends above knee, entire leg is swollen, weeping fluid)    Negative: Thigh or calf pain and only 1 side and present > 1 hour    Negative: Thigh, calf, or ankle swelling in only one leg    Negative: Thigh, calf, or ankle swelling in both legs, but one side is definitely more swollen (Exception: longstanding difference between legs)    Negative: Cast on leg or ankle and has increasing pain    Negative: Can't walk or can barely stand (new-onset)    Negative: Fever and red area (or area very tender to touch)    Negative: Patient sounds very sick or weak to the triager    Negative: Swelling of face, arm or hands  (Exception: Slight puffiness of fingers during hot weather.)    Negative: Pregnant 20 or more weeks and sudden weight gain (i.e., > 2 lbs, 1 kg in one week)    Negative: MODERATE swelling of both ankles (e.g., swelling extends up to the knees) AND new-onset or worsening    Answer Assessment - Initial Assessment Questions  1. ONSET: \"When did the swelling start?\" (e.g., minutes, hours, days)      Worsening edema over the past 1 week  2. LOCATION: \"What part of the leg is swollen?\"  \"Are both legs swollen or just one leg?\"      Tops of feet bilat  3. SEVERITY: \"How bad is the swelling?\" (e.g., localized; mild, moderate, severe)   - MILD pedal edema - swelling limited to foot and ankle, pitting edema < 1/4 inch (6 mm) deep, rest and elevation eliminate most or all swelling      Mild- Pitting edema in feet only.   4. REDNESS: \"Does the swelling look red or infected?\"      No  5. PAIN: \"Is the swelling painful to touch?\" If Yes, ask: \"How painful is it?\"   (Scale 1-10; mild, moderate or severe)      10/10  6. FEVER: \"Do you have a fever?\" If Yes, ask: \"What is it, how was it measured, and when did it start?\"       No  7. CAUSE: \"What do you think is causing the leg swelling?\"      Unknown. " "Patient takes furosemide every morning, not sure if he needs higher dose.   8. MEDICAL HISTORY: \"Do you have a history of heart failure, kidney disease, liver failure, or cancer?\"      Pt has extensive cardiac history  9. RECURRENT SYMPTOM: \"Have you had leg swelling before?\" If Yes, ask: \"When was the last time?\" \"What happened that time?\"      L ankle edema at baseline  10. OTHER SYMPTOMS: \"Do you have any other symptoms?\" (e.g., chest pain, difficulty breathing)        Increased SOB over the last week  11. PREGNANCY: \"Is there any chance you are pregnant?\" \"When was your last menstrual period?\"        N/A    Protocols used: ANKLE SWELLING-A-OH, LEG SWELLING AND EDEMA-A-OH      "

## 2023-04-12 NOTE — TELEPHONE ENCOUNTER
Information reviewed.    I am most concerned about the increased shortness of breath and lightheadedness with exertion in light of his history of heart failure.     These symptoms are all concerning for worsening heart failure, especially if his weight has gone up in the past couple of weeks.      If the shortness of breath is noticeable and limiting (which they are since he would not be calling about minor symptoms), then he really should probably be seen in the ER today.    It's not just as simple as giving more diuretics.  He needs some labs, a Chest xray, and exam to check for causes for the worsening breathing.

## 2023-04-12 NOTE — TELEPHONE ENCOUNTER
Patient Contact    Attempt # 1    Was call answered?  No.  Left message on voicemail with information to call me back.    Sent pt [employer] message.     On follow-up please check it pt has viewed [employer] message or on Call back please relay message from provider below.     Maureen Jackson RN

## 2023-04-13 PROBLEM — R79.89 ELEVATED TROPONIN: Status: ACTIVE | Noted: 2023-01-01

## 2023-04-13 PROBLEM — R06.09 DOE (DYSPNEA ON EXERTION): Status: ACTIVE | Noted: 2023-01-01

## 2023-04-13 PROBLEM — R79.89 ELEVATED LFTS: Status: ACTIVE | Noted: 2023-01-01

## 2023-04-13 PROBLEM — Z79.52 LONG TERM (CURRENT) USE OF SYSTEMIC STEROIDS: Status: ACTIVE | Noted: 2023-01-01

## 2023-04-13 PROBLEM — N17.9 AKI (ACUTE KIDNEY INJURY) (H): Status: ACTIVE | Noted: 2023-01-01

## 2023-04-13 PROBLEM — D86.9 SARCOIDOSIS: Status: ACTIVE | Noted: 2023-01-01

## 2023-04-13 NOTE — TELEPHONE ENCOUNTER
[employer] message read by patient at 6:14pm on 4/12/23.      Patient Contact    Attempt # 2    Was call answered?  No.  Left message on voicemail with information to call me back.

## 2023-04-13 NOTE — PHARMACY-ADMISSION MEDICATION HISTORY
Pharmacist Admission Medication History    Admission medication history is complete. The information provided in this note is only as accurate as the sources available at the time of the update.    Medication reconciliation/reorder completed by provider prior to medication history? No    Information Source(s): Patient and CareEverywhere/[employer]Script[employer] via in-person    Pertinent Information: Patient is a reliable historian. Generally only takes acetaminophen in the mornings. Will alternate topical therapies for flares.     Changes made to PTA medication list:    Added: multivitamin, vitamin C     Deleted: omeprazole 40 mg daily (last filled 6/2022, patient confirms not taking)     Changed: acetaminophen to separate scheduled and PRN entries     Medication Affordability:  Not including over the counter (OTC) medications, was there a time in the past 12 months when you did not take your medications as prescribed because of cost?: No    Allergies reviewed with patient and updates made in EHR: yes    Medication History Completed By: Kita Hung [facility] 4/13/2023 4:10 PM    PTA Med List   Medication Sig Note Last Dose     acetaminophen (TYLENOL) 500 MG tablet Take 1,000 mg by mouth daily as needed for mild pain  Unknown at PRN     acetaminophen (TYLENOL) 500 MG tablet Take 1,000 mg by mouth every morning  4/13/2023 at AM     albuterol (PROAIR HFA/PROVENTIL HFA/VENTOLIN HFA) 108 (90 Base) MCG/ACT Inhaler Inhale 2 puffs into the lungs every 6 hours as needed for shortness of breath or wheezing  More than a month at PRN     amiodarone (PACERONE) 400 MG tablet Take 1 tablet (400 mg) by mouth daily  4/13/2023 at AM     aspirin 81 MG tablet Take 1 tablet (81 mg) by mouth daily  4/13/2023 at AM     atorvastatin (LIPITOR) 20 MG tablet Take 1 tablet (20 mg) by mouth daily  4/12/2023 at PM     betamethasone dipropionate (DIPROSONE) 0.05 % external cream Apply topically 2 times daily Apply sparingly once or twice per day as needed  to affected area until the skin is better, then stop; REPEAT AS NEEDED  Past Month at PRN     clotrimazole (LOTRIMIN) 1 % external cream Apply sparingly once or twice per day as needed to affected area until the skin is better, then stop; REPEAT AS NEEDED  Past Month at PRN     ENTRESTO 49-51 MG per tablet Take 1 tablet by mouth 2 times daily  4/13/2023 at AM     fenofibrate (TRIGLIDE/LOFIBRA) 160 MG tablet Take 1 tablet (160 mg) by mouth daily  4/13/2023 at AM     folic acid (FOLVITE) 1 MG tablet Take 1 tablet (1 mg) by mouth daily  4/13/2023 at AM     furosemide (LASIX) 20 MG tablet Take 3 tablets (60 mg) by mouth daily  4/13/2023 at AM     levothyroxine (SYNTHROID/LEVOTHROID) 50 MCG tablet Take 1 tablet (50 mcg) by mouth daily  4/13/2023 at AM     metFORMIN (GLUCOPHAGE XR) 500 MG 24 hr tablet Take 2 tablets (1,000 mg) by mouth 2 times daily (with meals)  4/13/2023 at AM     methotrexate 2.5 MG tablet Take 6 tablets (15 mg) by mouth every 7 days 4/13/2023: Saturdays 4/8/2023 at Unknown time     metoprolol succinate ER (TOPROL XL) 25 MG 24 hr tablet Take 1 tablet (25 mg) by mouth 2 times daily  4/13/2023 at AM     multivitamin, therapeutic (THERA-VIT) TABS tablet Take 1 tablet by mouth daily  4/13/2023 at AM     predniSONE (DELTASONE) 10 MG tablet Take 1 tablet (10 mg) by mouth daily  4/13/2023 at AM     sulfamethoxazole-trimethoprim (BACTRIM) 400-80 MG tablet Take 1 tablet by mouth daily  4/13/2023 at AM     vitamin C (ASCORBIC ACID) 500 MG tablet Take 500 mg by mouth daily  4/13/2023 at AM

## 2023-04-13 NOTE — ED TRIAGE NOTES
Shortness of breath x1 week worse with exertion and laying flat. Pt reports hx of ICD pacemaker.      Triage Assessment     Row Name 04/13/23 1007       Triage Assessment (Adult)    Airway WDL WDL

## 2023-04-13 NOTE — ED NOTES
[facility]  ED Nurse Handoff Report    ED Chief complaint: Shortness of Breath      ED Diagnosis:   Final diagnoses:   JOSHI (dyspnea on exertion)   Elevated troponin   YASMEEN (acute kidney injury) (H)   Elevated LFTs   Sarcoidosis   Long term (current) use of systemic steroids       Code Status: Full Code    Allergies:   Allergies   Allergen Reactions     No Known Drug Allergies        Patient Story: Over the past few weeks he has had increased light headedness and shortness of breath with exertion. 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   Focused Assessment:    Labs Ordered and Resulted from Time of ED Arrival to Time of ED Departure   COMPREHENSIVE METABOLIC PANEL - Abnormal       Result Value    Sodium 135 (*)     Potassium 4.8      Chloride 107      Carbon Dioxide (CO2) 18 (*)     Anion Gap 10      Urea Nitrogen 37.5 (*)     Creatinine 1.80 (*)     Calcium 8.6 (*)     Glucose 250 (*)     Alkaline Phosphatase 193 (*)     AST 79 (*)     ALT 57 (*)     Protein Total 5.7 (*)     Albumin 3.3 (*)     Bilirubin Total 1.1      GFR Estimate 42 (*)    TROPONIN T, HIGH SENSITIVITY - Abnormal    Troponin T, High Sensitivity 52 (*)    CBC WITH PLATELETS AND DIFFERENTIAL - Abnormal    WBC Count 6.9      RBC Count 2.88 (*)     Hemoglobin 9.0 (*)     Hematocrit 28.4 (*)     MCV 99      MCH 31.3      MCHC 31.7      RDW 18.9 (*)     Platelet Count 164      % Neutrophils 83      % Lymphocytes 9      % Monocytes 5      % Eosinophils 1      % Basophils 1      % Immature Granulocytes 1      NRBCs per 100 WBC 0      Absolute Neutrophils 5.8      Absolute Lymphocytes 0.6 (*)     Absolute Monocytes 0.4      Absolute Eosinophils 0.0      Absolute Basophils 0.0      Absolute Immature Granulocytes 0.0      Absolute NRBCs 0.0     TROPONIN T, HIGH SENSITIVITY - Abnormal    Troponin T, High Sensitivity 26 (*)    IRON AND IRON BINDING  CAPACITY - Abnormal    Iron 15 (*)     Iron Binding Capacity 162 (*)     Iron Sat Index 9 (*)    NT PROBNP INPATIENT - Normal    N terminal Pro BNP Inpatient 810     NT PROBNP INPATIENT - Normal    N terminal Pro BNP Inpatient 450     VITAMIN B12   FOLATE   UA MACROSCOPIC WITH REFLEX TO MICRO AND CULTURE   PROTEIN RANDOM URINE   GLUCOSE MONITOR NURSING POCT   GLUCOSE MONITOR NURSING POCT   FRACTIONAL EXCRETION OF SODIUM     Chest XR,  PA & LAT   Final Result   IMPRESSION: PA and lateral views of the chest were obtained. Left   chest wall triple lead automatic implantable cardiac defibrillator and   leads are noted. Cardiomediastinal silhouette is within normal limits.   No suspicious focal pulmonary opacities. No significant pleural   effusion or pneumothorax.      DOMINIC WHITE MD            SYSTEM ID:  [id number]      Echocardiogram Complete    (Results Pending)   Cardiac Device Check - Inpatient    (Results Pending)         Treatments and/or interventions provided: IV capped. Pacemaker interrogated  Patient's response to treatments and/or interventions: Tolerated     To be done/followed up on inpatient unit:  Cardiac monitor, independent with cares and ambulation. Pt reports his Bps are normally 90s-110 systolic.     Does this patient have any cognitive concerns?: none    Activity level - Baseline/Home:  Independent  Activity Level - Current:   Independent    Patient's Preferred language: English   Needed?: No    Isolation: None  Infection: Not Applicable  Patient tested for COVID 19 prior to admission: NO  Bariatric?: No    Vital Signs:   Vitals:    04/13/23 1008 04/13/23 1548 04/13/23 1549 04/13/23 1551   BP: 93/48   94/57   Pulse: 64 60 60 60   Resp: 22 18 11 12   Temp: 97.6  F (36.4  C)      TempSrc: Temporal      SpO2: 98% 100%  100%       Cardiac- AV paced  Was the PSS-3 completed:   Yes  What interventions are required if any?               Family Comments: Patient able to call wife   OBS  brochure/video discussed/provided to patient/family: Yes             For the majority of the shift this patient's behavior was Green.   Behavioral interventions performed were None.    ED NURSE PHONE NUMBER: [phone]       '

## 2023-04-13 NOTE — TELEPHONE ENCOUNTER
Pt called back this morning, is still having light headedness and SOB.  His legs are still swollen.  Pt didn't get the message until late last night o/w would have gone into the ER. He is going to the ER shortly for further eval and tx.   Vicky Lazo RN

## 2023-04-13 NOTE — LETTER
[facility] CORONARY CARE UN[facility]  [address]  [place] [address]  Phone: [phone]    April 19, 2023        Neymar Rhodes  [address]  [facility] [address]          To whom it may concern:    RE: Neymar Rhodes    This letter is to certify that Neymar Rhodes is unable to work at this time given his tenuous cardiac status. This will be re-evaluated at the time of his upcoming Cardiology appointment on 4/25/2023.          Sincerely,          Shanae Curtis PA-C  Physician Assistant  [facility] [place]

## 2023-04-13 NOTE — H&P
[facility]    History and Physical - Hospitalist Service       Date of Admission:  4/13/2023    Assessment & Plan      Neymar Rhodes is a 63 year old male with hx of HTN, DM2, HFrEF and cardiac sarcodosis admitted on 4/13/2023 for JOSHI.    JOSHI  Patient follows closely with cardiology for his history of heart failure and cardiac sarcodosis. He reports over the last few weeks he has noticed a decline in his exercise tolerance. He must stop now when walking through the same parking lot at work to catch his breath and he is unable to climb the stairs at his home without feeling SOB, dizzy and lightheaded. No chest pain. No palpitations. All new symptoms since March. He denies SOB while laying down or flat or any change in his weight recently. He has noticed increased swelling his legs over the last few weeks. Within the ED, no hypoxia noted. BNP returned at 450 and CXR clear without obvious opacities or edema. Cr elevated at 1.8    - patient does not appear to be in obvious heart failure exacerbation despite some LE edema on exam. Will hold on any diuresis especially with YASMEEN  - consult pulmonary, highly concerned that patient has developed a pulmonary manifestation of his sarcoidosis. Notable patient also taking amiodarone and methotrexate  - interrogate cardiac device  - obtain cardiac echo    YASMEEN  Metabolic acidosis  - Cr baseline closer to 1.0-1.2, on admission here 1.8 with bicarb of 18  - obtain UA, FeNA, Pr/Cr  - hold PTA lasix and entresto for now and follow results of above, consult nephrology on admission    Non ischemia cardiomyopathy with EF ~24% per cMRI in 2022  NSTEMI  Cardiac sarcoidosis  Hx of VT s/p CRT-D  *Initial troponin elevated in the ED at 54, EKG paced on review.  *Patient with known nonobstructive disease on last angiogram     - Troponin trended down from admission to 26, low for suspicion for ACS causing his symptoms however will follow cardiac echo and ask  cardiology to weigh in with his significant hx  - resume PTA asa, statin, and metoprolol   - resume PTA methotrexate and prednisone with folic acid and bactrim for ppx  - hold entresto and lasix for YASMEEN    Elevated LFTs  -mildly elevated, obtain repeat tomorrow    DM2  Hold PTA oral agents  - start on sliding scale    Anemia  Recent baseline ~10. Hb 9 here slightly lower than last value of 9.9 01/2023  - anemia may be contributing to his symptoms but unlikely to be major contributor  - obtain iron panel and B12/folate    Hypothyroidism  - resume PTA levothyroxine         Diet:  regular  DVT Prophylaxis: Pneumatic Compression Devices  Villa Catheter: Not present  Lines: None     Cardiac Monitoring: None  Code Status:   FULL    Clinically Significant Risk Factors Present on Admission              # Hypoalbuminemia: Lowest albumin = 3.3 g/dL at 4/13/2023 10:12 AM, will monitor as appropriate         # DMII: A1C = 7.1 % (Ref range: <5.7 %) within past 6 months            Disposition Plan           Clara Biggs DO  Hospitalist Service  [facility]  Securely message with Deed (more info)  Text page via [facility] Paging/Directory     ______________________________________________________________________    Chief Complaint   JOSHI    History of Present Illness   Patient follows closely with cardiology for his history of heart failure and cardiac sarcodosis. He reports over the last few weeks he has noticed decline in his exercise tolerance. He must stop now when walking through the same parking lot at work to catch his breath and he is unable to climb the stairs at his home without feeling SOB, dizzy and lightheaded. No chest pain. No palpitations. All new symptoms since March. He denies SOB while laying down or flat or any change in his weight recently. He has noticed increased swelling his legs over the last few weeks    On my assessment patient lying in the ER bed. He appears comfortable and  reports besides walking he is really feeling normal. He does have increased swelling in his legs but he thinks that is related to his socks. He denies chest pain. He does feel dizzy with these symptoms. He takes his medications every day without missing a dose.      Past Medical History    Past Medical History:   Diagnosis Date     Ascending aorta dilatation (H)      Diverticulosis of colon (without mention of hemorrhage)      Essential hypertension, benign      Gout, unspecified      LBBB (left bundle branch block)      Morbid obesity (H)      Non-ischemic cardiomyopathy (H)      Nonrheumatic mitral valve regurgitation      NSTEMI (non-ST elevated myocardial infarction) (H)     cardiac cath 6/2018: mild non-obstructive CAD     Other and unspecified hyperlipidemia      Paroxysmal ventricular tachycardia      Type II or unspecified type diabetes mellitus without mention of complication, not stated as uncontrolled        Past Surgical History   Past Surgical History:   Procedure Laterality Date     CV CORONARY ANGIOGRAM N/A 10/2/2019    Procedure: Coronary Angiogram;  Surgeon: Kathy Almaguer MD;  Location: [facility] CARDIAC CATH LAB     CV LEFT HEART CATH N/A 10/2/2019    Procedure: Left Heart Cath;  Surgeon: Kathy Almaguer MD;  Location: [facility] CARDIAC CATH LAB     CV RIGHT HEART CATH MEASUREMENTS RECORDED N/A 10/2/2019    Procedure: Right Heart Cath;  Surgeon: Kathy Almaguer MD;  Location: [facility] CARDIAC CATH LAB     ENDOBRONCHIAL ULTRASOUND FLEXIBLE N/A 12/19/2019    Procedure: Flexible bronchoscopy, endobronchial ultrasound, bronchial alveolar lavage;  Surgeon: Ranulfo Barcenas MD;  Location: [facility] OR     EP ICD N/A 10/4/2019    Procedure: EP ICD;  Surgeon: Jayy Dorman MD;  Location: [facility] CARDIAC CATH LAB     EP LEAD PLCMNT LV NEW ICD N/A 10/4/2019    Procedure: EP Lead Plcmnt LV New ICD;  Surgeon: Jayy Dorman MD;  Location: [facility] CARDIAC CATH LAB     HEART CATH CORONARY  ANGIOGRAM WITHOUT PRESSURES  06/10/2018    normal coronary angiogram, nothing more than 10%     SURGICAL HISTORY OF -   2001    repair of colovesicular fistula     ZZC APPENDECTOMY  1980's       Prior to Admission Medications   Prior to Admission Medications   Prescriptions Last Dose Informant Patient Reported? Taking?   Continuous Blood Gluc Sensor (FREESTYLE JOCELIN 14 DAY SENSOR) [facility]   No No   Sig: USE SENSOR EVERY 14 DAYS.   ENTRESTO 49-51 MG per tablet   No No   Sig: Take 1 tablet by mouth 2 times daily   acetaminophen (TYLENOL) 500 MG tablet   Yes No   Sig: Take 1,000 mg by mouth every morning   albuterol (PROAIR HFA/PROVENTIL HFA/VENTOLIN HFA) 108 (90 Base) MCG/ACT Inhaler  Self Yes No   Sig: Inhale 2 puffs into the lungs every 6 hours as needed for shortness of breath / dyspnea or wheezing    Patient not taking: Reported on 12/26/2022   amiodarone (PACERONE) 400 MG tablet   No No   Sig: Take 1 tablet (400 mg) by mouth daily   aspirin 81 MG tablet  Self No No   Sig: Take 1 tablet (81 mg) by mouth daily   atorvastatin (LIPITOR) 20 MG tablet   No No   Sig: Take 1 tablet (20 mg) by mouth daily   betamethasone dipropionate (DIPROSONE) 0.05 % external cream   No No   Sig: Apply topically 2 times daily Apply sparingly once or twice per day as needed to affected area until the skin is better, then stop; REPEAT AS NEEDED   blood glucose monitoring (ACCU-CHEK LUL PLUS) test strip  Self No No   Sig: Use to test blood sugar 1-3 times daily or as directed.   Patient not taking: Reported on 12/26/2022   clotrimazole (LOTRIMIN) 1 % external cream   No No   Sig: Apply sparingly once or twice per day as needed to affected area until the skin is better, then stop; REPEAT AS NEEDED   continuous blood glucose monitoring (FREESTYLE JOCELIN) sensor  Self No No   Sig: For use with Freestyle Jocelin Flash  for continuous monitioring of blood glucose levels. Replace sensor every 10 days.   fenofibrate (TRIGLIDE/LOFIBRA) 160 MG  tablet   No No   Sig: Take 1 tablet (160 mg) by mouth daily   folic acid (FOLVITE) 1 MG tablet   No No   Sig: Take 1 tablet (1 mg) by mouth daily   furosemide (LASIX) 20 MG tablet   No No   Sig: Take 3 tablets (60 mg) by mouth daily   levothyroxine (SYNTHROID/LEVOTHROID) 50 MCG tablet   No No   Sig: Take 1 tablet (50 mcg) by mouth daily   metFORMIN (GLUCOPHAGE XR) 500 MG 24 hr tablet   No No   Sig: Take 2 tablets (1,000 mg) by mouth 2 times daily (with meals)   methotrexate 2.5 MG tablet   No No   Sig: Take 6 tablets (15 mg) by mouth every 7 days   metoprolol succinate ER (TOPROL XL) 25 MG 24 hr tablet   No No   Sig: Take 1 tablet (25 mg) by mouth 2 times daily   omeprazole (PRILOSEC) 40 MG DR capsule   No No   Sig: Take 1 capsule (40 mg) by mouth daily   Patient not taking: Reported on 12/26/2022   predniSONE (DELTASONE) 10 MG tablet   No No   Sig: Take 1 tablet (10 mg) by mouth daily   sulfamethoxazole-trimethoprim (BACTRIM) 400-80 MG tablet   No No   Sig: Take 1 tablet by mouth daily      Facility-Administered Medications: None        Review of Systems    The 10 point Review of Systems is negative other than noted in the HPI or here.      Physical Exam   Vital Signs: Temp: 97.6  F (36.4  C) Temp src: Temporal BP: 93/48 Pulse: 64   Resp: 22 SpO2: 98 % O2 Device: None (Room air)    Weight: 0 lbs 0 oz     Constitutional: Awake, alert, cooperative, no apparent distress.  Eyes: Conjunctiva and pupils examined and normal.  HEENT: Moist mucous membranes, normal dentition.  Respiratory: Clear to auscultation bilaterally, no crackles or wheezing.  Cardiovascular: Regular rate and rhythm, normal S1 and S2, and no murmur noted. +1-2 pitting edema over the knees  GI: Soft, non-distended, non-tender, normal bowel sounds. Large abdomen with soft umbilicus hernia, reproducable  Lymph/Hematologic: No anterior cervical or supraclavicular adenopathy.  Skin: No rashes, no cyanosis  Musculoskeletal: No joint swelling, erythema or  tenderness.  Neurologic: Cranial nerves 2-12 intact, normal strength and sensation.  Psychiatric: Alert, oriented to person, place and time, no obvious anxiety or depression.    Medical Decision Making       90 MINUTES SPENT BY ME on the date of service doing chart review, history, exam, documentation & further activities per the note.       Extensive chart review of his history and previous hospitalizations and office visits.     Data     I have personally reviewed the following data over the past 24 hrs:    6.9  \   9.0 (L)   / 164     135 (L) 107 37.5 (H) /  250 (H)   4.8 18 (L) 1.80 (H) \       ALT: 57 (H) AST: 79 (H) AP: 193 (H) TBILI: 1.1   ALB: 3.3 (L) TOT PROTEIN: 5.7 (L) LIPASE: N/A       Trop: 26 (H) BNP: 450       Imaging results reviewed over the past 24 hrs:   Recent Results (from the past 24 hour(s))   Chest XR,  PA & LAT    Narrative    CHEST TWO VIEWS   4/13/2023 12:25 PM     HISTORY: Chest pain and shortness of breath with exertion.    COMPARISON: Chest x-ray on 12/3/2021      Impression    IMPRESSION: PA and lateral views of the chest were obtained. Left  chest wall triple lead automatic implantable cardiac defibrillator and  leads are noted. Cardiomediastinal silhouette is within normal limits.  No suspicious focal pulmonary opacities. No significant pleural  effusion or pneumothorax.    DOMINIC WHITE MD         SYSTEM ID:  [id number]

## 2023-04-13 NOTE — ED PROVIDER NOTES
History     Chief Complaint:  Shortness of Breath       HPI   Neymar Rhodes is a 63 year old male with a history of sarcoid, HTN, NSTEMI, DVT, obesity, diabetes, and hyperlipidmeia who presents with shortness of breath. The patient states that for the past few weeks he has had increased light headedness and shortness of breath with exertion. 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 He denies any shortness of breath that worsens while laying down. He talked to his primary care Dr. Harris who wanted him to come in. Patient is getting a PET scan for his sarcoid in a week and has cardiology follow up in two weeks. He denies fever or chest pain.      Independent Historian:   None - Patient Only    Review of External Notes: I reviewed his clinic note from 3/8/2023    Cardiac MRI on 2/18/22  Clinical history:  61 year-old male with VT/VF, cardiac sarcoid.  Comparison CMR: 10/03/2019     1. The LV is mildly increased in cavity size. The global systolic function is severely reduced. The LVEF is  24%. There is severe diffuse hypokinesis with akinesis of the basal to mid inferior segment.      2. The RV is normal in cavity size. The global systolic function is mildly reduced. The RVEF is 48%.      3. The left atrium is mildly enlarged.      4. There is no significant valvular disease.      5. Late gadolinium enhancement imaging shows epicardial hyperenhancement in the basal anterior RV size of  the septum, transmural LGE in the basal inferior segment and subendocardial hyperenhancement in the mid  septal and inferior segments and mid-myocardial LGE in the mid inferior/inferolateral segment. This pattern  of LGE with multifocal, septal and epicardial is compatible with cardiac sarcoid.      6. There is no pericardial effusion or thickening.     CONCLUSIONS: Non-ischemic cardiomyopathy with severely reduced LV function, LVEF of 24%.  The LGE pattern is  compatible with cardiac sarcoid. Compared to prior CMR from 10/3/2019 there is no significant change in  LGE.     ROS:  Review of Systems   Constitutional: Negative for fever.   Respiratory: Positive for shortness of breath.    Cardiovascular: Negative for chest pain.   Neurological: Positive for light-headedness.   All other systems reviewed and are negative.      Allergies:  No Known Drug Allergies     Medications:    albuterol   amiodarone   aspirin 81 MG   atorvastatin   furosemide  levothyroxine  metFORMIN  metoprolol succinate   omeprazole  predniSONE  entresto     Past Medical History:    Ascending aorta dilation  DVT of colon  HTN  Gout  LBBB  Obesity  Cardiomyopathy  NSTEMI  Hyperlipidemia  Diabetes  Paroxysmal ventricular tachycardia  Sarcoid    Past Surgical History:    Coronary angiogram  Left heart cath  Right heart cath  ICD implant  Appendectomy  Fistula repair     Family History:    Father-Bladder cancer, MI  Mother-Breast cancer  Sister-Breast cancer    Social History:  The patient presents to the ED alone.    Physical Exam     Patient Vitals for the past 24 hrs:   BP Temp Temp src Pulse Resp SpO2   04/13/23 1551 94/57 -- -- 60 12 100 %   04/13/23 1549 -- -- -- 60 11 --   04/13/23 1548 -- -- -- 60 18 100 %   04/13/23 1008 93/48 97.6  F (36.4  C) Temporal 64 22 98 %        Physical Exam  Nursing note and vitals reviewed.    Constitutional:  Appears comfortable.  AICD in place.  HENT:                Nose normal.  No discharge.      Oral mucosa is moist.  Eyes:    Conjunctivae are normal without injection.  Pupils are equal.  Cardiovascular:  Normal rate, regular rhythm with normal S1 and S2.      Normal heart sounds and peripheral pulses 2+ and equal.       No murmur or darrick.  Pulmonary:  Effort normal and breath sounds clear to auscultation bilaterally.   No respiratory distress.  No stridor.     No wheezes. No rales.     GI:    Soft. No distension and no mass. No tenderness.       Protuberant abdomen with soft umbilical hernia.  Musculoskeletal:  Normal range of motion. No extremity deformity.      no tenderness.  1+ lower extremity edema bilaterally.  Neurological:   Alert and oriented. No focal weakness.  Skin:    Skin is warm and dry. No rash noted.   Psychiatric:   Behavior is normal. Appropriate mood and affect.     Judgment and thought content normal.     Emergency Department Course     ECG:  ECG results from 04/13/23 signed @ 1515   EKG 12 lead     Value    Systolic Blood Pressure     Diastolic Blood Pressure     Ventricular Rate 60    Atrial Rate 60    NH Interval 232    QRS Duration 184        QTc 550    P Axis     R AXIS -48    T Axis 94    Interpretation ECG      AV dual-paced rhythm with prolonged AV conduction  Abnormal ECG  When compared with ECG of 22-NOV-2022 07:44,  Previous ECG has undetermined rhythm, needs review         Imaging:  Chest XR,  PA & LAT   Final Result   IMPRESSION: PA and lateral views of the chest were obtained. Left   chest wall triple lead automatic implantable cardiac defibrillator and   leads are noted. Cardiomediastinal silhouette is within normal limits.   No suspicious focal pulmonary opacities. No significant pleural   effusion or pneumothorax.      DOMINIC WHITE MD            SYSTEM ID:  [id number]         Report per radiology    Laboratory:  Labs Ordered and Resulted from Time of ED Arrival to Time of ED Departure   COMPREHENSIVE METABOLIC PANEL - Abnormal       Result Value    Sodium 135 (*)     Potassium 4.8      Chloride 107      Carbon Dioxide (CO2) 18 (*)     Anion Gap 10      Urea Nitrogen 37.5 (*)     Creatinine 1.80 (*)     Calcium 8.6 (*)     Glucose 250 (*)     Alkaline Phosphatase 193 (*)     AST 79 (*)     ALT 57 (*)     Protein Total 5.7 (*)     Albumin 3.3 (*)     Bilirubin Total 1.1      GFR Estimate 42 (*)    TROPONIN T, HIGH SENSITIVITY - Abnormal    Troponin T, High Sensitivity 52 (*)    CBC WITH PLATELETS AND  DIFFERENTIAL - Abnormal    WBC Count 6.9      RBC Count 2.88 (*)     Hemoglobin 9.0 (*)     Hematocrit 28.4 (*)     MCV 99      MCH 31.3      MCHC 31.7      RDW 18.9 (*)     Platelet Count 164      % Neutrophils 83      % Lymphocytes 9      % Monocytes 5      % Eosinophils 1      % Basophils 1      % Immature Granulocytes 1      NRBCs per 100 WBC 0      Absolute Neutrophils 5.8      Absolute Lymphocytes 0.6 (*)     Absolute Monocytes 0.4      Absolute Eosinophils 0.0      Absolute Basophils 0.0      Absolute Immature Granulocytes 0.0      Absolute NRBCs 0.0     TROPONIN T, HIGH SENSITIVITY - Abnormal    Troponin T, High Sensitivity 26 (*)    NT PROBNP INPATIENT - Normal    N terminal Pro BNP Inpatient 810     NT PROBNP INPATIENT - Normal    N terminal Pro BNP Inpatient 450          Emergency Department Course & Assessments:    Assessments:  1520 Obtained the patients history and performed initial exam    Independent Interpretation (X-rays, CTs, rhythm strip):  I reviewed the patient's x-ray and there is no obvious infiltrate or pulmonary edema.    Consultations/Discussion of Management or Tests:  ED Course as of 04/13/23 1622   u Apr 13, 2023   1543 Talked to Dr. Biggs about the patients findings and possible admittance         Social Determinants of Health affecting care:   None    Disposition:  The patient was admitted to the hospital under the care of Dr. Biggs.     Impression & Plan      Medical Decision Making:  Patient comes in because of increased dyspnea on exertion over the last 2 to 3 weeks.  He has sarcoidosis and cardiac sarcoid.  His EKG shows his paced rhythm.  Labs are obtained and his comprehensive panel shows renal failure which is worsened with a creatinine up to 1.80 and his GFR at 42, electrolytes are fairly normal other than a mildly low sodium 135, LFTs are elevated with an alk phos of 193, ALT of 57 and AST of 79 but normal bilirubin.  CBC demonstrates a stable but low hemoglobin 9.0  but normal white count.  Chest x-ray did not reveal evidence of pulmonary edema or large pleural effusions.  He does have an elevated troponin of 52 and a repeat 1 later was down to 26.  I looked back and he had normal troponins in the past despite having some chronic renal failure.  BNP is normal.  With his cardiac history and the fact that he has had coronary artery disease diagnosed in the past, I think he needs to come in for further evaluation and work-up.  His renal failure is a little worse, he is got an elevated troponin with increased dyspnea on exertion.  We will hold on anticoagulants at this time.  I talked with Dr. Biggs who will be admitting the patient and get cardiology consultation and possibly an echo in the morning.    Diagnosis:    ICD-10-CM    1. JOSHI (dyspnea on exertion)  R06.09       2. Elevated troponin  R77.8       3. YASMEEN (acute kidney injury) (H)  N17.9       4. Elevated LFTs  R79.89       5. Sarcoidosis  D86.9       6. Long term (current) use of systemic steroids  Z79.52            Scribe Disclosure:  I, Yogesh Auguste, am serving as a scribe at 3:03 PM on 4/13/2023 to document services personally performed by Silvia Chatterjee MD based on my observations and the provider's statements to me.     4/13/2023   Silvia Chatterjee MD Powell, Tracy Alan, MD  04/13/23 1491

## 2023-04-14 NOTE — PROGRESS NOTES
[facility]    Medicine Progress Note - Hospitalist Service    Date of Admission:  4/13/2023    Assessment & Plan   Neymar Rhodes is a 63 year old male with hx of HTN, DM2, HFrEF and cardiac sarcodosis admitted on 4/13/2023 for JOSHI.      Cardiac sarcoidosis  Non ischemia cardiomyopathy with EF 15-24% per cMRI in 2022  Hx of VT s/p CRT-D  NSTEMI  Hypotension   -cardiology onboard  -pulmonary on board   -concerning for cardiogenic shock developing. This corresponds to a recent doubling of his Toprol XL dose by outpatient PCP one month PTA.  -Device check reveals last ATP for VTach in VT zone in February, currently AV sequential paced on telemetry  -resume amiodarone  --entresto on hold due to YASMEEN, hypotension   -metoprolol on hold   -continue methotrexate and prednisone for now, dose titration per discretion of advanced HF service  -cardiology called the [facility] and Dr. Mendoza Do will accept patient for heart failure service. awaiting bed place.   -patient was schedule for Cardiac PET on Tuesday   -echo pending   -started on Bactrim       YASMEEN (improving)   Metabolic acidosis  -Cr baseline closer to 1.0-1.2  -on admission here 1.8 with bicarb of 18  -hold PTA lasix and entresto for now   -trend level   -BMP in am     Elevated LFTs  -trend level  -repeat in am  -hold statin       DM2  -Hold PTA oral agents  -sliding scale insulin  -hypoglycemic protocol     Anemia  Recent baseline ~10. Hb 9 here slightly lower than last value of 9.9 01/2023  - anemia may be contributing to his symptoms but unlikely to be major contributor  -iron study iron 15, iron binding 162, iron sat 9  -start on iron supplement     Hypothyroidism  - resume PTA levothyroxine          Diet: Moderate Consistent Carb (60 g CHO per Meal) Diet    DVT Prophylaxis: Pneumatic Compression Devices  Villa Catheter: Not present  Lines: None     Cardiac Monitoring: None  Code Status: Full Code      Clinically  Significant Risk Factors Present on Admission              # Hypoalbuminemia: Lowest albumin = 3.2 g/dL at 4/14/2023  6:09 AM, will monitor as appropriate         # DMII: A1C = 7.1 % (Ref range: <5.7 %) within past 6 months            Disposition Plan     Expected Discharge Date: 04/15/2023                The patient's care was discussed with the Attending Physician, Dr. Heller.    Deepti Rojas CNP  Hospitalist Service  [facility]  Securely message with [employer] (more info)  Text page via [employer] [facility] Paging/Directory   ______________________________________________________________________    Interval History   -awaiting to be transferred to [facility] when bed available  -NAD noted    Physical Exam   Vital Signs: Temp: 98.4  F (36.9  C) Temp src: Oral BP: 102/67 Pulse: 60   Resp: 16 SpO2: 100 % O2 Device: None (Room air)    Weight: 254 lbs 0 oz    Constitutional: no distress, cooperative, alert, oriented, normal mood  Cardiovascular: Regular rate and rhythm, no murmurs  Respiratory: Non-labored effort. LCAB, no wheezing  Gastrointestinal: Abdomen soft, non-tender. BS normal. No masses.  Skin: warm, dry, intact, 2+ ankle edema    Medical Decision Making       30 MINUTES SPENT BY ME on the date of service doing chart review, history, exam, documentation & further activities per the note.  MANAGEMENT DISCUSSED with the following over the past 24 hours: patient and Dr. Heller       Data     I have personally reviewed the following data over the past 24 hrs:    N/A  \   N/A   / N/A     137 110 (H) 28.7 (H) /  208 (H)   4.7 20 (L) 1.24 (H) \       ALT: 54 (H) AST: 68 (H) AP: 164 (H) TBILI: 1.2   ALB: 3.4 (L) TOT PROTEIN: 5.8 (L) LIPASE: N/A       Trop: N/A BNP: N/A       Imaging results reviewed over the past 24 hrs:   No results found for this or any previous visit (from the past 24 hour(s)).

## 2023-04-14 NOTE — CONSULTS
I discussed the case with Dr. Heller.  YASMEEN is improving nice.   Primary team will cancel the consult.   Please call us back if kidney function heads the wrong direction.   Thank you.

## 2023-04-14 NOTE — PROGRESS NOTES
Picc insertion teaching done with patient and wife at the bedside. Procedure explained and teaching sheet given to patient to keep and review.  Pt in agreement to have picc placed in early am of 4-15.

## 2023-04-14 NOTE — PROGRESS NOTES
Received order for picc line placement for initiation of Dobutamine drip.  Discussed with bedside RN and charge nurse and VAT will place picc line in early AM of 4-15.  Pt currently has working peripheral access. Charge RN will update cardiology.

## 2023-04-14 NOTE — PLAN OF CARE
Alert and oriented x 4. VS stable, on RA and some complaints of general pain. He was given Tylenol x 1 with good pain relief. Tele 100% AV paced with no incidence of VT since starting Dobutamine drip. He continue to have +3LE edema and declined any ace wraps. Blood [id number]'s today and appropriate coverage given. Plan for PICC placement tomorrow morning and [facility].

## 2023-04-14 NOTE — CONSULTS
"[facility]    Cardiology Consultation     Date of Admission:  4/13/2023    Assessment & Plan     Pleasant 63 yr old male with cardiac sarcoid, advanced HF, EF 15-24%, paroxysmal Vtach, s/p CRTd admitted with progressive decline over 2 weeks, labs (YASMEEN, LFT elevation, lactate pending) and vital signs consistent with cardiogenic shock.     1. Cardiac sarcoid, EF 15-24%, hypotensive, elevated Cr, LFTs - concerning for cardiogenic shock developing. This corresponds to a recent doubling of his Toprol XL dose by outpatient PCP one month PTA.  -check lactate, trend Cr and LFT function   -Device check reveals last ATP for VTach in VT zone in February, currently AV sequential paced on telemetry  -stop metoprolol, received dose unfortunately on admission which caused SBP to drop in the 60s. Currently 80s/50s.   -will start fixed dose of dobutamine @ 5 via peripheral   -continue amiodarone 400 mg daily, if dobutamine induces frequent ectopy will start amiodarone infusion load  -will need gentle diuresis, start dobutamine first   -entresto on hold due to YASMEEN, hypotension   -continue methotrexate and prednisone for now, dose titration per discretion of advanced HF service. Has cardiac PET planned for Tuesday at the . Last PET was in November demonstrating active cardiac sarcoid and no systemic sarcoidosis but given decline in respiratory status agree with pulmonary c/s and recheck of PET   -Will discuss with heart failure service at the , needs transfer  - if delay in transfer could consider [place] placement to assist in medical therapy    2. Ventricular tachycardia, on amiodarone, s/p CRTd      Recent device check: Another alert received from patient's ICD for \"Antitachycardia pacing (ATP) therapy delivered to convert arrhythmia on 2/17/2023 at 0811.\" Â EGM shows AS-BiVP with a PVC initiating a VT rhythm w/ V rates in the 160s (VT-1 zone) for 30s before ATP burst x1 successful at breaking arrhythmia. " This is the second VT episode requiring ATP in 4 days. (Refer to 2/13 encounter for details regarding ATP delivery on 2/10). This episode was routed to Dr. Irvin who recommended patient follow up with EP. Patient has yet to get this set up (multiple messages have been left by device RNs and scheduling).    -as above, will monitor for VT on dobutamine, since above ATP therapy and reported pVT episodes there has not been additional ATP  -continue amiodarone 400 mg daily, infusion if necessary     High complexity      Critical care condition includes cardiogenic shock needing dobutamine. Total critical care time spent in patient care and documentation more than 60 minutes.     Fay Mcgill MD    Primary Care Physician   Jefry Harris    Reason for Consult     Cardiac sarcoid     History of Present Illness     This is a 63 yr old male with h/o cardiac sarcoid followed by Dr. Batres at [facility], paroxysmal VTACH followed by [facility] EP, s/p CRTd, recent ATP treatment for VT, DVT, T2DM and HLD admitted with SOB and dizziness and light headedness for the past few weeks. Of note his PCP increased his metoprolol XL (doubled) one month PTA. He has been having low blood pressures, feeling his ankles have been swelling beyond his usual, up to his calves. Orthopnea, PND+. Occasional cool circulation. No syncope or palpitations. Recent device check showed ATP treated VT in February. He has been on amiodarone 400 mg daily. Also PTA meds include entresto. For his sarcoid, he is on prednisone and methotrexate and has been scheduled for repeat PET this coming Tuesday.     Past Medical History   Past Medical History:   Diagnosis Date     Ascending aorta dilatation (H)      Diverticulosis of colon (without mention of hemorrhage)      Essential hypertension, benign      Gout, unspecified      LBBB (left bundle branch block)      Morbid obesity (H)      Non-ischemic cardiomyopathy (H)      Nonrheumatic mitral valve regurgitation       NSTEMI (non-ST elevated myocardial infarction) (H)     cardiac cath 6/2018: mild non-obstructive CAD     Other and unspecified hyperlipidemia      Paroxysmal ventricular tachycardia      Type II or unspecified type diabetes mellitus without mention of complication, not stated as uncontrolled        Past Surgical History   Past Surgical History:   Procedure Laterality Date     CV CORONARY ANGIOGRAM N/A 10/2/2019    Procedure: Coronary Angiogram;  Surgeon: Kathy Almaguer MD;  Location:  HEART CARDIAC CATH LAB     CV LEFT HEART CATH N/A 10/2/2019    Procedure: Left Heart Cath;  Surgeon: Kathy Almaguer MD;  Location:  HEART CARDIAC CATH LAB     CV RIGHT HEART CATH MEASUREMENTS RECORDED N/A 10/2/2019    Procedure: Right Heart Cath;  Surgeon: Kathy Almaguer MD;  Location:  HEART CARDIAC CATH LAB     ENDOBRONCHIAL ULTRASOUND FLEXIBLE N/A 12/19/2019    Procedure: Flexible bronchoscopy, endobronchial ultrasound, bronchial alveolar lavage;  Surgeon: Ranulfo Barcenas MD;  Location: [facility] OR     EP ICD N/A 10/4/2019    Procedure: EP ICD;  Surgeon: Jayy Dorman MD;  Location:  HEART CARDIAC CATH LAB     EP LEAD PLCMNT LV NEW ICD N/A 10/4/2019    Procedure: EP Lead Plcmnt LV New ICD;  Surgeon: Jayy Dorman MD;  Location:  HEART CARDIAC CATH LAB     HEART CATH CORONARY ANGIOGRAM WITHOUT PRESSURES  06/10/2018    normal coronary angiogram, nothing more than 10%     SURGICAL HISTORY OF -   2001    repair of colovesicular fistula     ZZC APPENDECTOMY  1980's         Prior to Admission Medications   Prior to Admission Medications   Prescriptions Last Dose Informant Patient Reported? Taking?   Continuous Blood Gluc Sensor (ParaShootSTYLE JOCELIN 14 DAY SENSOR) MISC  Self No No   Sig: USE SENSOR EVERY 14 DAYS.   ENTRESTO 49-51 MG per tablet 4/13/2023 at AM Self No Yes   Sig: Take 1 tablet by mouth 2 times daily   acetaminophen (TYLENOL) 500 MG tablet 4/13/2023 at AM Self Yes Yes   Sig: Take 1,000 mg by  mouth every morning   acetaminophen (TYLENOL) 500 MG tablet Unknown at PRN Self Yes Yes   Sig: Take 1,000 mg by mouth daily as needed for mild pain   albuterol (PROAIR HFA/PROVENTIL HFA/VENTOLIN HFA) 108 (90 Base) MCG/ACT Inhaler More than a month at PRN Self Yes Yes   Sig: Inhale 2 puffs into the lungs every 6 hours as needed for shortness of breath or wheezing   amiodarone (PACERONE) 400 MG tablet 4/13/2023 at AM Self No Yes   Sig: Take 1 tablet (400 mg) by mouth daily   aspirin 81 MG tablet 4/13/2023 at AM Self No Yes   Sig: Take 1 tablet (81 mg) by mouth daily   atorvastatin (LIPITOR) 20 MG tablet 4/12/2023 at PM Self No Yes   Sig: Take 1 tablet (20 mg) by mouth daily   betamethasone dipropionate (DIPROSONE) 0.05 % external cream Past Month at PRN Self No Yes   Sig: Apply topically 2 times daily Apply sparingly once or twice per day as needed to affected area until the skin is better, then stop; REPEAT AS NEEDED   blood glucose monitoring (ACCU-CHEK LUL PLUS) test strip  Self No No   Sig: Use to test blood sugar 1-3 times daily or as directed.   Patient not taking: Reported on 12/26/2022   clotrimazole (LOTRIMIN) 1 % external cream Past Month at PRN Self No Yes   Sig: Apply sparingly once or twice per day as needed to affected area until the skin is better, then stop; REPEAT AS NEEDED   continuous blood glucose monitoring (FREESTYLE JOCELIN) sensor  Self No No   Sig: For use with Freestyle Jocelin Flash  for continuous monitioring of blood glucose levels. Replace sensor every 10 days.   fenofibrate (TRIGLIDE/LOFIBRA) 160 MG tablet 4/13/2023 at AM Self No Yes   Sig: Take 1 tablet (160 mg) by mouth daily   folic acid (FOLVITE) 1 MG tablet 4/13/2023 at AM Self No Yes   Sig: Take 1 tablet (1 mg) by mouth daily   furosemide (LASIX) 20 MG tablet 4/13/2023 at AM Self No Yes   Sig: Take 3 tablets (60 mg) by mouth daily   levothyroxine (SYNTHROID/LEVOTHROID) 50 MCG tablet 4/13/2023 at AM Self No Yes   Sig: Take 1  tablet (50 mcg) by mouth daily   metFORMIN (GLUCOPHAGE XR) 500 MG 24 hr tablet 4/13/2023 at AM Self No Yes   Sig: Take 2 tablets (1,000 mg) by mouth 2 times daily (with meals)   methotrexate 2.5 MG tablet 4/8/2023 at Unknown time Self No Yes   Sig: Take 6 tablets (15 mg) by mouth every 7 days   metoprolol succinate ER (TOPROL XL) 25 MG 24 hr tablet 4/13/2023 at AM Self No Yes   Sig: Take 1 tablet (25 mg) by mouth 2 times daily   multivitamin, therapeutic (THERA-VIT) TABS tablet 4/13/2023 at AM Self Yes Yes   Sig: Take 1 tablet by mouth daily   predniSONE (DELTASONE) 10 MG tablet 4/13/2023 at AM Self No Yes   Sig: Take 1 tablet (10 mg) by mouth daily   sulfamethoxazole-trimethoprim (BACTRIM) 400-80 MG tablet 4/13/2023 at AM Self No Yes   Sig: Take 1 tablet by mouth daily   vitamin C (ASCORBIC ACID) 500 MG tablet 4/13/2023 at AM Self Yes Yes   Sig: Take 500 mg by mouth daily      Facility-Administered Medications: None     Current Facility-Administered Medications   Medication Dose Route Frequency     amiodarone  400 mg Oral Daily     atorvastatin  20 mg Oral QPM     folic acid  1 mg Oral Daily     insulin aspart  1-7 Units Subcutaneous TID AC     insulin aspart  1-5 Units Subcutaneous At Bedtime     levothyroxine  50 mcg Oral QAM AC     [START ON 4/15/2023] methotrexate  15 mg Oral Q7 Days     [Held by provider] metoprolol succinate ER  25 mg Oral BID     predniSONE  10 mg Oral Daily     sulfamethoxazole-trimethoprim  1 tablet Oral Daily     Current Facility-Administered Medications   Medication Last Rate     Allergies   Allergies   Allergen Reactions     No Known Drug Allergies        Social History    reports that he has never smoked. He has never used smokeless tobacco. He reports current alcohol use. He reports that he does not use drugs.      Family History   I have reviewed this patient's family history and updated it with pertinent information if needed.  Family History   Problem Relation Age of Onset      Cancer Father 75        bladder cancer     Myocardial Infarction Father      Other - See Comments Father         smoking     Breast Cancer Mother      Breast Cancer Sister      Family History Negative Brother      Family History Negative Son      Family History Negative Son         fluttering/murmur     Asthma Son      Colon Cancer No family hx of           Review of Systems   A comprehensive review of system was performed and is negative other than that noted in the HPI or here.     Physical Exam   Vital Signs with Ranges  Temp:  [97.6  F (36.4  C)-98.4  F (36.9  C)] 98  F (36.7  C)  Pulse:  [60-67] 60  Resp:  [11-22] 16  BP: ()/(47-86) 96/54  SpO2:  [98 %-100 %] 99 %  Wt Readings from Last 4 Encounters:   04/14/23 115.2 kg (254 lb)   03/08/23 118.9 kg (262 lb 1.6 oz)   01/24/23 122.4 kg (269 lb 12.8 oz)   12/26/22 125.9 kg (277 lb 9.6 oz)     No intake/output data recorded.      Vitals: BP 96/54 (BP Location: Left arm)   Pulse 60   Temp 98  F (36.7  C) (Oral)   Resp 16   Wt 115.2 kg (254 lb)   SpO2 99%   BMI 35.43 kg/m      Physical Exam:   General - Alert and oriented to time place and person in no acute distress  Eyes - No scleral icterus  HEENT - Neck supple, moist mucous membranes  Cardiovascular - RRR  Extremities - There is 2+ ankle edema  Respiratory - CTAB  Skin - No pallor or cyanosis  Gastrointestinal - Non tender and non distended without rebound or guarding  Psych - Appropriate affect   Neurological - No gross motor neurological focal deficits    No lab results found in last 7 days.    Invalid input(s): TROPONINIES    Recent Labs   Lab 04/14/23  0609 04/14/23  0149 04/13/23  2149 04/13/23  1929 04/13/23  1835 04/13/23  1012   WBC  --   --   --   --   --  6.9   HGB  --   --   --   --   --  9.0*   MCV  --   --   --   --   --  99   PLT  --   --   --   --   --  164     --   --  139  --  135*   POTASSIUM 4.7  --   --   --   --  4.8   CHLORIDE 111*  --   --   --   --  107   CO2 19*  --   --    --   --  18*   BUN 31.7*  --   --   --   --  37.5*   CR 1.27*  --   --  1.48*  --  1.80*   GFRESTIMATED 63  --   --   --   --  42*   ANIONGAP 10  --   --   --   --  10   EDITH 8.7*  --   --   --   --  8.6*   * 133* 179*  --    < > 250*   ALBUMIN 3.2*  --   --   --   --  3.3*   PROTTOTAL 5.6*  --   --   --   --  5.7*   BILITOTAL 0.9  --   --   --   --  1.1   ALKPHOS 163*  --   --   --   --  193*   ALT 54*  --   --   --   --  57*   AST 66*  --   --   --   --  79*    < > = values in this interval not displayed.     Recent Labs   Lab Test 03/28/22  1112 07/22/20  0913 01/11/16  0727 01/26/15  0920   CHOL 97 140   < > 126   HDL 34* 64   < > 45   LDL 44 51   < > 55   TRIG 95 123   < > 128   CHOLHDLRATIO  --   --   --  2.8    < > = values in this interval not displayed.     Recent Labs   Lab 04/13/23 2229 04/13/23  1424 04/13/23  1012   WBC  --   --  6.9   HGB  --   --  9.0*   HCT  --   --  28.4*   MCV  --   --  99   PLT  --   --  164   IRON  --  15*  --    IRONSAT  --  9*  --    FEB  --  162*  --    B12  --  369  --    FOLIC 19.4  --   --      No results for input(s): PH, PHV, PO2, PO2V, SAT, PCO2, PCO2V, HCO3, HCO3V in the last 168 hours.  Recent Labs   Lab 04/13/23  1424 04/13/23  1012   NTBNPI 450 810     No results for input(s): DD in the last 168 hours.  No results for input(s): SED, CRP in the last 168 hours.  Recent Labs   Lab 04/13/23  1012        No results for input(s): TSH in the last 168 hours.  Recent Labs   Lab 04/13/23 1929   COLOR Yellow   APPEARANCE Clear   URINEGLC Negative   URINEBILI Negative   URINEKETONE Negative   SG 1.016   UBLD Negative   URINEPH 5.0   PROTEIN Negative   NITRITE Negative   LEUKEST Negative       Imaging:  Recent Results (from the past 48 hour(s))   Chest XR,  PA & LAT    Narrative    CHEST TWO VIEWS   4/13/2023 12:25 PM     HISTORY: Chest pain and shortness of breath with exertion.    COMPARISON: Chest x-ray on 12/3/2021      Impression    IMPRESSION: PA and  lateral views of the chest were obtained. Left  chest wall triple lead automatic implantable cardiac defibrillator and  leads are noted. Cardiomediastinal silhouette is within normal limits.  No suspicious focal pulmonary opacities. No significant pleural  effusion or pneumothorax.    DOMINIC WHITE MD         SYSTEM ID:  [id number]       Echo:  No results found for this or any previous visit (from the past 4320 hour(s)).    Clinically Significant Risk Factors Present on Admission             # Hypoalbuminemia: Lowest albumin = 3.2 g/dL at 4/14/2023  6:09 AM, will monitor as appropriate      # DMII: A1C = 7.1 % (Ref range: <5.7 %) within past 6 months    Cardiovascular: Cardiac Arrhythmia: Ventricular tachycardia     Fluid & Electrolyte Disorders: Fluid overload, unspecified            Nephrology: Acute kidney failure, unspecified    Pulmonary Heart Disease (Pulmonary hypertension or Cor pulmonale): Pulmonary Hypertension, unspecified    Limitations of activities/Fatigue (optional): Chronic Fatigue and Other Debilities: Other reduced mobility

## 2023-04-14 NOTE — PLAN OF CARE
Pleasant gentlemen. Aox4, ind, RA. Tele AV paced. Pt denies chest pain and SOB. BLE with 3+ edema.   Plan: echo, interrogation, card, nephrology and pulmonology consults.

## 2023-04-14 NOTE — PROGRESS NOTES
Brief cardiology note:    Patient accepted by Dr. Mendoza Do at the [facility] for heart failure service. Awaiting bed placement. Please page cardiology with any clinical status changes in the meantime.     Fay Mcgill MD MSC  Staff Cardiologist

## 2023-04-15 NOTE — PROGRESS NOTES
"[facility]    Cardiology Consultation - Progress Note     Date of Admission:  4/13/2023  Date of Service: 04/15/23    Reason for Consult   Reason for consult: heart failure, cardiogenic shock    History of Present Illness   Neymar Rhodes is a 63 year old male with h/o cardiac sarcoid followed by Dr. Batres at [facility], paroxysmal VTACH followed by [facility] EP, s/p CRTd, recent ATP treatment for VT, DVT, T2DM and HLD admitted with SOB and dizziness and light headedness for the past few weeks. Of note his PCP increased his metoprolol XL (doubled) one month PTA. He has been having low blood pressures, feeling his ankles have been swelling beyond his usual, up to his calves. Orthopnea, PND+. Occasional cool circulation. No syncope or palpitations. Recent device check showed ATP treated VT in February. He has been on amiodarone 400 mg daily. Also PTA meds include entresto. For his sarcoid, he is on prednisone and methotrexate and has been scheduled for repeat PET this coming Tuesday.     He has advanced HF, EF 15-24% secondary to his cardiac sarcoidosis and hsa had progressive decline over 2 weeks, findings here are consistent with early cardiogenic shock.     Assessment & Plan   #1 Cardiac sarcoidosis  #2 Heart failure with reduced ejection fraction, LVEF 15-24%  #3 Cardiogenic shock  #4 Acute kidney injury  #5 Ventricular tachycardia, on amiodarone, s/p CRT-D    Shock corresponds to a recent doubling of his Toprol XL dose by outpatient PCP one month PTA.  - trending lactate, creatinine, and LFT function   -Device check reveals last ATP for VTach in VT zone in February, currently AV sequential paced on telemetry    Of note, from his device interrogation:      Recent device check: Another alert received from patient's ICD for \"Antitachycardia pacing (ATP) therapy delivered to convert arrhythmia on 2/17/2023 at 0811.\" Â EGM shows AS-BiVP with a PVC initiating a VT rhythm w/ V rates in the 160s (VT-1 " zone) for 30s before ATP burst x1 successful at breaking arrhythmia. This is the second VT episode requiring ATP in 4 days. (Refer to 2/13 encounter for details regarding ATP delivery on 2/10). This episode was routed to Dr. Irvin who recommended patient follow up with EP. Patient has yet to get this set up (multiple messages have been left by device RNs and scheduling).    He had one episode of NSVT this afternoon while I was in the room speaking with him, he was asymptomatic. If this becomes more frequent we can start IV amiodarone.    Plan:    1. Hold metoprolol  2. Continue fixed dose of dobutamine @ 5 via peripheral, may attempt to wean tomorrow morning  3. 60 mg of IV Lasix now that he is normotensive  4. Will monitor for VT on dobutamine, since above ATP therapy and reported pVT episodes there has not been additional ATP  5. Continue amiodarone 400 mg daily, if dobutamine induces frequent ectopy will start amiodarone infusion load  6. Entresto on hold due to YASMEEN, hypotension, may be able to resume tomorrow   7. Continue methotrexate and prednisone for now, dose titration per discretion of advanced HF service. Has cardiac PET planned for Tuesday at the . Last PET was in November demonstrating active cardiac sarcoid and no systemic sarcoidosis but given decline in respiratory status agree with pulmonary c/s and recheck of PET   8. Has been accepted for transfer to the  but will likely be delayed several days, we will work on therapies here in the meantime      Blane Bear MD    Primary Care Physician   Jefry Harris    Patient Active Problem List   Diagnosis     Diverticulosis of large intestine     Benign essential hypertension     Gout     Severe obesity (BMI 35.0-39.9) with comorbidity (H)     Type 2 diabetes mellitus with circulatory disorder, with long-term current use of insulin (H)     Hyperlipidemia LDL goal <100     NSTEMI (non-ST elevated myocardial infarction) (H)      Non-ischemic cardiomyopathy (H)     LBBB (left bundle branch block)     Nonrheumatic mitral valve regurgitation     Ascending aorta dilatation (H)     Coronary artery disease involving native coronary artery of native heart without angina pectoris     Near syncope     Paroxysmal ventricular tachycardia (H)     Mediastinal adenopathy     CKD (chronic kidney disease) stage 3, GFR 30-59 ml/min (H)     Sarcoidosis of lung (H)     Sarcoidosis     JOSHI (dyspnea on exertion)     Elevated troponin     Elevated LFTs     YASMEEN (acute kidney injury) (H)     Long term (current) use of systemic steroids       Past Medical History   I have reviewed this patient's medical history and updated it with pertinent information if needed.   Past Medical History:   Diagnosis Date     Ascending aorta dilatation (H)      Diverticulosis of colon (without mention of hemorrhage)      Essential hypertension, benign      Gout, unspecified      LBBB (left bundle branch block)      Morbid obesity (H)      Non-ischemic cardiomyopathy (H)      Nonrheumatic mitral valve regurgitation      NSTEMI (non-ST elevated myocardial infarction) (H)     cardiac cath 6/2018: mild non-obstructive CAD     Other and unspecified hyperlipidemia      Paroxysmal ventricular tachycardia      Type II or unspecified type diabetes mellitus without mention of complication, not stated as uncontrolled        Past Surgical History   I have reviewed this patient's surgical history and updated it with pertinent information if needed.  Past Surgical History:   Procedure Laterality Date     CV CORONARY ANGIOGRAM N/A 10/2/2019    Procedure: Coronary Angiogram;  Surgeon: Kathy Almaguer MD;  Location: [facility] CARDIAC CATH LAB     CV LEFT HEART CATH N/A 10/2/2019    Procedure: Left Heart Cath;  Surgeon: Kathy Almaguer MD;  Location: [facility] CARDIAC CATH LAB     CV RIGHT HEART CATH MEASUREMENTS RECORDED N/A 10/2/2019    Procedure: Right Heart Cath;  Surgeon: Rosina  Kathy Garcia MD;  Location:  HEART CARDIAC CATH LAB     ENDOBRONCHIAL ULTRASOUND FLEXIBLE N/A 12/19/2019    Procedure: Flexible bronchoscopy, endobronchial ultrasound, bronchial alveolar lavage;  Surgeon: Ranulfo Barcenas MD;  Location: [facility] OR     EP ICD N/A 10/4/2019    Procedure: EP ICD;  Surgeon: Jayy Dorman MD;  Location:  HEART CARDIAC CATH LAB     EP LEAD PLCMNT LV NEW ICD N/A 10/4/2019    Procedure: EP Lead Plcmnt LV New ICD;  Surgeon: Jayy Dorman MD;  Location:  HEART CARDIAC CATH LAB     HEART CATH CORONARY ANGIOGRAM WITHOUT PRESSURES  06/10/2018    normal coronary angiogram, nothing more than 10%     SURGICAL HISTORY OF -   2001    repair of colovesicular fistula     ZZC APPENDECTOMY  1980's       Prior to Admission Medications   Prior to Admission Medications   Prescriptions Last Dose Informant Patient Reported? Taking?   Continuous Blood Gluc Sensor (Fired Up [employer] WearYLE JOCELIN 14 DAY SENSOR) MISC  Self No No   Sig: USE SENSOR EVERY 14 DAYS.   ENTRESTO 49-51 MG per tablet 4/13/2023 at AM Self No Yes   Sig: Take 1 tablet by mouth 2 times daily   acetaminophen (TYLENOL) 500 MG tablet 4/13/2023 at AM Self Yes Yes   Sig: Take 1,000 mg by mouth every morning   acetaminophen (TYLENOL) 500 MG tablet Unknown at PRN Self Yes Yes   Sig: Take 1,000 mg by mouth daily as needed for mild pain   albuterol (PROAIR HFA/PROVENTIL HFA/VENTOLIN HFA) 108 (90 Base) MCG/ACT Inhaler More than a month at PRN Self Yes Yes   Sig: Inhale 2 puffs into the lungs every 6 hours as needed for shortness of breath or wheezing   amiodarone (PACERONE) 400 MG tablet 4/13/2023 at AM Self No Yes   Sig: Take 1 tablet (400 mg) by mouth daily   aspirin 81 MG tablet 4/13/2023 at AM Self No Yes   Sig: Take 1 tablet (81 mg) by mouth daily   atorvastatin (LIPITOR) 20 MG tablet 4/12/2023 at PM Self No Yes   Sig: Take 1 tablet (20 mg) by mouth daily   betamethasone dipropionate (DIPROSONE) 0.05 % external cream Past Month at PRN Self No Yes   Sig:  Apply topically 2 times daily Apply sparingly once or twice per day as needed to affected area until the skin is better, then stop; REPEAT AS NEEDED   blood glucose monitoring (ACCU-CHEK LUL PLUS) test strip  Self No No   Sig: Use to test blood sugar 1-3 times daily or as directed.   Patient not taking: Reported on 12/26/2022   clotrimazole (LOTRIMIN) 1 % external cream Past Month at PRN Self No Yes   Sig: Apply sparingly once or twice per day as needed to affected area until the skin is better, then stop; REPEAT AS NEEDED   continuous blood glucose monitoring (FREESTYLE JOCELIN) sensor  Self No No   Sig: For use with Freestyle Jocelin Flash  for continuous monitioring of blood glucose levels. Replace sensor every 10 days.   fenofibrate (TRIGLIDE/LOFIBRA) 160 MG tablet 4/13/2023 at AM Self No Yes   Sig: Take 1 tablet (160 mg) by mouth daily   folic acid (FOLVITE) 1 MG tablet 4/13/2023 at AM Self No Yes   Sig: Take 1 tablet (1 mg) by mouth daily   furosemide (LASIX) 20 MG tablet 4/13/2023 at AM Self No Yes   Sig: Take 3 tablets (60 mg) by mouth daily   levothyroxine (SYNTHROID/LEVOTHROID) 50 MCG tablet 4/13/2023 at AM Self No Yes   Sig: Take 1 tablet (50 mcg) by mouth daily   metFORMIN (GLUCOPHAGE XR) 500 MG 24 hr tablet 4/13/2023 at AM Self No Yes   Sig: Take 2 tablets (1,000 mg) by mouth 2 times daily (with meals)   methotrexate 2.5 MG tablet 4/8/2023 at Unknown time Self No Yes   Sig: Take 6 tablets (15 mg) by mouth every 7 days   metoprolol succinate ER (TOPROL XL) 25 MG 24 hr tablet 4/13/2023 at AM Self No Yes   Sig: Take 1 tablet (25 mg) by mouth 2 times daily   multivitamin, therapeutic (THERA-VIT) TABS tablet 4/13/2023 at AM Self Yes Yes   Sig: Take 1 tablet by mouth daily   predniSONE (DELTASONE) 10 MG tablet 4/13/2023 at AM Self No Yes   Sig: Take 1 tablet (10 mg) by mouth daily   sulfamethoxazole-trimethoprim (BACTRIM) 400-80 MG tablet 4/13/2023 at AM Self No Yes   Sig: Take 1 tablet by mouth daily    vitamin C (ASCORBIC ACID) 500 MG tablet 4/13/2023 at AM Self Yes Yes   Sig: Take 500 mg by mouth daily      Facility-Administered Medications: None     Current Facility-Administered Medications   Medication Dose Route Frequency     amiodarone  400 mg Oral Daily     [Held by provider] atorvastatin  20 mg Oral QPM     ferrous sulfate  325 mg Oral Daily     folic acid  1 mg Oral Daily     insulin aspart  1-7 Units Subcutaneous TID AC     insulin aspart  1-5 Units Subcutaneous At Bedtime     levothyroxine  50 mcg Oral QAM AC     methotrexate  15 mg Oral Q7 Days     [Held by provider] metoprolol succinate ER  25 mg Oral BID     predniSONE  10 mg Oral Daily     sulfamethoxazole-trimethoprim  1 tablet Oral Daily     Current Facility-Administered Medications   Medication Last Rate     DOBUTamine 5 mcg/kg/min (04/15/23 0652)     Allergies   Allergies   Allergen Reactions     No Known Drug Allergies        Social History    reports that he has never smoked. He has never used smokeless tobacco. He reports current alcohol use. He reports that he does not use drugs.    Family History   Family History   Problem Relation Age of Onset     Cancer Father 75        bladder cancer     Myocardial Infarction Father      Other - See Comments Father         smoking     Breast Cancer Mother      Breast Cancer Sister      Family History Negative Brother      Family History Negative Son      Family History Negative Son         fluttering/murmur     Asthma Son      Colon Cancer No family hx of        Review of Systems   The comprehensive Review of Systems is negative other than noted in the HPI or here.     Physical Exam   Vital Signs with Ranges  Temp:  [98  F (36.7  C)-98.5  F (36.9  C)] 98  F (36.7  C)  Pulse:  [65-82] 65  Resp:  [16] 16  BP: ()/(54-93) 111/93  SpO2:  [98 %-100 %] 99 %  Wt Readings from Last 4 Encounters:   04/15/23 116.2 kg (256 lb 1.6 oz)   03/08/23 118.9 kg (262 lb 1.6 oz)   01/24/23 122.4 kg (269 lb 12.8 oz)    12/26/22 125.9 kg (277 lb 9.6 oz)     I/O last 3 completed shifts:  In: 100 [P.O.:100]  Out: -       Vitals: BP (!) 111/93 (BP Location: Left arm)   Pulse 65   Temp 98  F (36.7  C) (Oral)   Resp 16   Wt 116.2 kg (256 lb 1.6 oz)   SpO2 99%   BMI 35.72 kg/m      General: Alert, oriented, in no acute distress  HEENT: PERRLA, mucous membranes moist  Neck: Normal JVP  CV: Regular rate and rhythm without murmur  Respiratory: Clear to auscultation bilaterally  GI: Normoactive bowel sounds; soft, non-tender abdomen  Neuro: No focal deficits appreciated  Extremities: Trace peripheral edema bilaterally    Recent Labs   Lab 04/15/23  1214 04/15/23  0743 04/15/23  0544 04/14/23  1659 04/14/23  1246 04/14/23  1229 04/14/23  0609 04/13/23  1835 04/13/23  1012   WBC  --   --   --   --   --   --   --   --  6.9   HGB  --   --   --   --   --   --   --   --  9.0*   MCV  --   --   --   --   --   --   --   --  99   PLT  --   --   --   --   --   --   --   --  164   NA  --   --  136  --  137  --  140   < > 135*   POTASSIUM  --   --  4.3  --  4.7  --  4.7  --  4.8   CHLORIDE  --   --  109*  --  110*  --  111*  --  107   CO2  --   --  19*  --  20*  --  19*  --  18*   BUN  --   --  32.5*  --  28.7*  --  31.7*  --  37.5*   CR  --   --  1.37*  --  1.24*  --  1.27*   < > 1.80*   GFRESTIMATED  --   --  58*  --  65  --  63  --  42*   ANIONGAP  --   --  8  --  7  --  10  --  10   EDITH  --   --  8.3*  --  8.7*  --  8.7*  --  8.6*   * 140* 140*   < > 208*   < > 124*   < > 250*   ALBUMIN  --   --  2.9*  --  3.4*  --  3.2*  --  3.3*   PROTTOTAL  --   --  5.0*  --  5.8*  --  5.6*  --  5.7*   BILITOTAL  --   --  0.8  --  1.2  --  0.9  --  1.1   ALKPHOS  --   --  168*  --  164*  --  163*  --  193*   ALT  --   --  48  --  54*  --  54*  --  57*   AST  --   --  65*  --  68*  --  66*  --  79*    < > = values in this interval not displayed.     Recent Labs   Lab Test 03/28/22  1112 07/22/20  0913   CHOL 97 140   HDL 34* 64   LDL 44 51   TRIG 95  123     Recent Labs   Lab 23  2229 23  1424 23  1012   WBC  --   --  6.9   HGB  --   --  9.0*   HCT  --   --  28.4*   MCV  --   --  99   PLT  --   --  164   IRON  --  15*  --    IRONSAT  --  9*  --    FEB  --  162*  --    B12  --  369  --    FOLIC 19.4  --   --      No results for input(s): PH, PHV, PO2, PO2V, SAT, PCO2, PCO2V, HCO3, HCO3V in the last 168 hours.  Recent Labs   Lab 23  1424 23  1012   NTBNPI 450 810     No results for input(s): DD in the last 168 hours.  No results for input(s): SED, CRP in the last 168 hours.  Recent Labs   Lab 23  1012        No results for input(s): TSH in the last 168 hours.  Recent Labs   Lab 23  192   COLOR Yellow   APPEARANCE Clear   URINEGLC Negative   URINEBILI Negative   URINEKETONE Negative   SG 1.016   UBLD Negative   URINEPH 5.0   PROTEIN Negative   NITRITE Negative   LEUKEST Negative       Imaging:  Recent Results (from the past 48 hour(s))   Echocardiogram Complete   Result Value    LVEF  25-30%    [facility]    [id number]  [id number]  2011^SHY^EDGAR^E     [facility]  Echocardiography Laboratory  [address] [place] [address]     Name: JACI WICK  MRN: [id number]  : 1960  Study Date: 2023 11:44 AM  Age: 63 yrs  Gender: Male  Patient [facility]: [facility]  Reason For Study: Dyspnea  Ordering Physician: EDGRA BEGUM  Referring Physician: Jefry Harris  Performed By: Tim Raymond     BSA: 2.3 m2  Height: 71 in  Weight: 254 lb  HR: 60  BP: 102/67 mmHg  ______________________________________________________________________________  Procedure  Complete Portable Echo Adult. Optison (NDC #[id number]) given intravenously.  ______________________________________________________________________________  Interpretation Summary     1. The left ventricle is severely dilated. Left ventricular systolic function  is severely reduced. The visual ejection fraction is  25-30%. Left ventricular  diastolic function is abnormal. There is severe global hypokinesia of the left  ventricle. Septal wall motion abnormality may reflect pacemaker activation.  There is no thrombus seen in the left ventricle.  2. The right ventricle is normal size. There is a catheter/pacemaker lead seen  in the right ventricle. The right ventricular systolic function is normal.  3. There is mild-moderate biatrial enlargement. There is no color Doppler  evidence of an atrial shunt.  4. Trace mitral and tricuspid regurgitation.  5. No pericardial effusion.  6. In comparison to the previous report dated 12/20/2021, the findings are  similar.  ______________________________________________________________________________  Left Ventricle  The left ventricle is severely dilated. Left ventricular systolic function is  severely reduced. The visual ejection fraction is 25-30%. Left ventricular  diastolic function is abnormal. There is severe global hypokinesia of the left  ventricle. Septal wall motion abnormality may reflect pacemaker activation.  There is no thrombus seen in the left ventricle.     Right Ventricle  The right ventricle is normal size. There is a catheter/pacemaker lead seen in  the right ventricle. The right ventricular systolic function is normal.     Atria  There is mild-moderate biatrial enlargement. There is no color Doppler  evidence of an atrial shunt.     Mitral Valve  The mitral valve leaflets are mildly thickened. There is trace mitral  regurgitation.     Tricuspid Valve  There is trace tricuspid regurgitation. The right ventricular systolic  pressure is approximated at 16.0 mmHg plus the right atrial pressure.     Aortic Valve  There is mild trileaflet aortic sclerosis. No aortic regurgitation is present.  No aortic stenosis is present.     Pulmonic Valve  There is trace pulmonic valvular regurgitation. There is no pulmonic valvular  stenosis.     Vessels  The aortic root is normal size.  Normal size ascending aorta. The inferior vena  cava is normal.     Pericardium  There is no pericardial effusion.     Rhythm  The rhythm was paced.  ______________________________________________________________________________  MMode/2D Measurements & Calculations  IVSd: 1.1 cm     LVIDd: 6.7 cm  LVIDs: 7.0 cm  LVPWd: 1.1 cm  FS: -4.5 %  LV mass(C)d: 334.9 grams  LV mass(C)dI: 143.5 grams/m2  Ao root diam: 3.6 cm  LA dimension: 4.8 cm  asc Aorta Diam: 3.7 cm  LA/Ao: 1.3  LVOT diam: 2.0 cm  LVOT area: 3.0 cm2  LA Volume (BP): 93.7 ml  LA Volume Index (BP): 40.2 ml/m2  RWT: 0.32  TAPSE: 3.2 cm     Doppler Measurements & Calculations  MV E max carlita: 48.0 cm/sec  MV A max carlita: 113.1 cm/sec  MV E/A: 0.42  MV max P.1 mmHg  MV mean P.3 mmHg  MV V2 VTI: 37.7 cm  MV dec slope: 257.1 cm/sec2  MV dec time: 0.19 sec  PA acc time: 0.17 sec  TR max carlita: 200.0 cm/sec  TR max P.0 mmHg  E/E' av.6  Lateral E/e': 13.8  Medial E/e': 9.4     ______________________________________________________________________________  Report approved by: Roma Deleon 2023 04:59 PM         XR Chest Port 1 View    Narrative    EXAM: XR CHEST PORTABLE 1 VIEW  LOCATION: [facility]  DATE/TIME: 04/15/2023, 11:01 AM CDT    INDICATION: PICC placement.  COMPARISON: 2023.      Impression    IMPRESSION: A right PICC line has been placed, with tip near the SVC/RA junction. Triple lead cardiac device is again noted. The lungs are clear where visualized. No pneumothorax.           Medical Decision Making       35 MINUTES SPENT BY ME on the date of service doing chart review, history, exam, documentation & further activities per the note.

## 2023-04-15 NOTE — PROVIDER NOTIFICATION
MD Notification    Notified Person: MD    Notified Person Name: Gary    Notification Date/Time: 4/15/23 0655    Notification Interaction: amcon text page    Purpose of Notification: Mag 1.6, no protocol ordered, please advise    Orders Received: Mag standard protocol ordered.     Comments:

## 2023-04-15 NOTE — PROGRESS NOTES
"SPIRITUAL HEALTH SERVICES Progress Note    [facility]    Saw pt Neymar A Rhodes per request at admission.    Patient/Family Understanding of Illness and Goals of Care - Neymar reflected on how he arrived at [facility] after experiencing weakness in his body. Now he understands that he is waiting on more information about next steps in his plan of care. He also noted that he's waiting to hear about whether he might transfer to the [facility] but understands that might take an extended length of time. 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     Strengths, Coping, and Resources - It was noted that his wife is the primary supportive figure for him at this time.      Meaning, Beliefs, and Spirituality - No specific Congregational or spiritual tradition was named during our conversation. I offered support at the bedside today and introduced  support services. I also oriented Neymar to how to contact  through the bedside nurse.      Plan of Care - Spiritual Health remains available to support Neymar while admitted. Please consult if any additional or immediate needs arise.     ------  Richardson Chappell M.Div.  Resident   Pager: [phone]   "

## 2023-04-15 NOTE — PLAN OF CARE
[id number]: A&Ox4. VSS on RA, soft BPs. Dobutamine drip infusing. Tele 100% V paced. Denies CP, lightheadedness or dizziness. JOSHI slightly. LS clear. BLE +3+4 edema. Bg checks. Given prn tylenol x2 for chronic back/neck pain. Up independently. Plan for PICC first thing 4-15 and then transfer to the [facility] when bed available.

## 2023-04-15 NOTE — PROGRESS NOTES
PULMONOLOGY Progress note  Date of service: 4/14/2023    [facility]    _____________________________________________________        HISTORY OF PRESENT ILLNESS     Patient follows closely with cardiology for his history of heart failure and cardiac sarcodosis. No prior hx of pulmonary sarcoid requiring treatment. Did have EBUS for adenopathy Dec 2019. Not on o2 or inhalers at home. He reports over the last few weeks he has noticed decline in his exercise tolerance. He must stop now when walking through the same parking lot at work to catch his breath and he is unable to climb the stairs at his home without feeling SOB, dizzy and lightheaded. No chest pain, no cough, no wheezing. He has noticed increased swelling his legs over the last few weeks. No rash or vision changes. Complaint with daily pred and weekly methotrexate.  No new events overnight,  Afebrile. No worsening respiratory complaints at this time.        HOME MEDICATIONS     Medications Prior to Admission   Medication Sig Dispense Refill Last Dose     acetaminophen (TYLENOL) 500 MG tablet Take 1,000 mg by mouth daily as needed for mild pain   Unknown at PRN     acetaminophen (TYLENOL) 500 MG tablet Take 1,000 mg by mouth every morning   4/13/2023 at AM     albuterol (PROAIR HFA/PROVENTIL HFA/VENTOLIN HFA) 108 (90 Base) MCG/ACT Inhaler Inhale 2 puffs into the lungs every 6 hours as needed for shortness of breath or wheezing   More than a month at PRN     amiodarone (PACERONE) 400 MG tablet Take 1 tablet (400 mg) by mouth daily 30 tablet 3 4/13/2023 at AM     aspirin 81 MG tablet Take 1 tablet (81 mg) by mouth daily 30 tablet  4/13/2023 at AM     atorvastatin (LIPITOR) 20 MG tablet Take 1 tablet (20 mg) by mouth daily 90 tablet 3 4/12/2023 at PM     betamethasone dipropionate (DIPROSONE) 0.05 % external cream Apply topically 2 times daily Apply sparingly once or twice per day as needed to affected area until the skin is better, then  stop; REPEAT AS NEEDED 30 g 1 Past Month at PRN     clotrimazole (LOTRIMIN) 1 % external cream Apply sparingly once or twice per day as needed to affected area until the skin is better, then stop; REPEAT AS NEEDED 30 g 0 Past Month at PRN     ENTRESTO 49-51 MG per tablet Take 1 tablet by mouth 2 times daily 180 tablet 1 4/13/2023 at AM     fenofibrate (TRIGLIDE/LOFIBRA) 160 MG tablet Take 1 tablet (160 mg) by mouth daily 90 tablet 3 4/13/2023 at AM     folic acid (FOLVITE) 1 MG tablet Take 1 tablet (1 mg) by mouth daily 90 tablet 3 4/13/2023 at AM     furosemide (LASIX) 20 MG tablet Take 3 tablets (60 mg) by mouth daily 270 tablet 3 4/13/2023 at AM     levothyroxine (SYNTHROID/LEVOTHROID) 50 MCG tablet Take 1 tablet (50 mcg) by mouth daily 90 tablet 1 4/13/2023 at AM     metFORMIN (GLUCOPHAGE XR) 500 MG 24 hr tablet Take 2 tablets (1,000 mg) by mouth 2 times daily (with meals) 360 tablet 0 4/13/2023 at AM     methotrexate 2.5 MG tablet Take 6 tablets (15 mg) by mouth every 7 days 75 tablet 3 4/8/2023 at Unknown time     metoprolol succinate ER (TOPROL XL) 25 MG 24 hr tablet Take 1 tablet (25 mg) by mouth 2 times daily 180 tablet 1 4/13/2023 at AM     multivitamin, therapeutic (THERA-VIT) TABS tablet Take 1 tablet by mouth daily   4/13/2023 at AM     predniSONE (DELTASONE) 10 MG tablet Take 1 tablet (10 mg) by mouth daily 90 tablet 3 4/13/2023 at AM     sulfamethoxazole-trimethoprim (BACTRIM) 400-80 MG tablet Take 1 tablet by mouth daily 90 tablet 1 4/13/2023 at AM     vitamin C (ASCORBIC ACID) 500 MG tablet Take 500 mg by mouth daily   4/13/2023 at AM     blood glucose monitoring (ACCU-CHEK LUL PLUS) test strip Use to test blood sugar 1-3 times daily or as directed. (Patient not taking: Reported on 12/26/2022) 100 each 11      Continuous Blood Gluc Sensor (Smart PlateYLE JOCELIN 14 DAY SENSOR) MISC USE SENSOR EVERY 14 DAYS. 6 each 1      continuous blood glucose monitoring (FREESTYLE JOCELIN) sensor For use with Freestyle  "Jeffrey Flash  for continuous monitioring of blood glucose levels. Replace sensor every 10 days. 3 each 3        PHYSICAL EXAMINATION   Temp (24hrs), Av  F (36.7  C), Min:97.6  F (36.4  C), Max:98.4  F (36.9  C)    Vital signs:  Temp: 98  F (36.7  C) Temp src: Oral BP: 103/55 Pulse: 70   Resp: 16 SpO2: 98 % O2 Device: None (Room air)     Weight: 116.2 kg (256 lb 1.6 oz) (wearing jeans)  Estimated body mass index is 35.72 kg/m  as calculated from the following:    Height as of 3/8/23: 1.803 m (5' 11\").    Weight as of this encounter: 116.2 kg (256 lb 1.6 oz).        I/O last 3 completed shifts:  In: 100 [P.O.:100]  Out: -     CONSTITUTIONAL/GENERAL APPEARANCE: Alert male. No Apparent Distress.  PSYCHIATRIC: Pleasant and appropriate mood and affect. Oriented x 3.  EARS, NOSE,THROAT,MOUTH: External ears and nose overall normal. nl oral mucosa.   NECK: Neck appearance normal. No neck masses and the thyroid not enlarged.   RESPIRATORY: Non-labored effort. LCAB, no wheezing  CARDIOVASCULAR: S1, S2, regular, bilat le swelling  ABDOMEN: round, soft  LYMPHATIC: no cervical lymphadenopathy.  SKIN: Skin color was normal    LABORATORY ASSESSMENT    CBC  Recent Labs   Lab 23  1012   WBC 6.9   HGB 9.0*   HCT 28.4*        BMP  Recent Labs   Lab 04/15/23  0544 04/15/23  0102 23  1659 23  1246     --   --  137   CHLORIDE 109*  --   --  110*   EDITH 8.3*  --   --  8.7*   CO2 19*  --   --  20*   BUN 32.5*  --   --  28.7*   CR 1.37*  --   --  1.24*   * 148*   < > 208*    < > = values in this interval not displayed.     BNPNo lab results found in last 7 days.  Arterial Blood GasNo lab results found in last 7 days.  Venous Blood GasNo lab results found in last 7 days.      IMAGING      CHEST TWO VIEWS   2023 12:25 PM      HISTORY: Chest pain and shortness of breath with exertion.     COMPARISON: Chest x-ray on 12/3/2021                                                                    "   IMPRESSION: PA and lateral views of the chest were obtained. Left  chest wall triple lead automatic implantable cardiac defibrillator and  leads are noted. Cardiomediastinal silhouette is within normal limits.  No suspicious focal pulmonary opacities. No significant pleural  effusion or pneumothorax.      2D Echo 4/13    1. The left ventricle is severely dilated. Left ventricular systolic function  is severely reduced. The visual ejection fraction is 25-30%. Left ventricular  diastolic function is abnormal. There is severe global hypokinesia of the left  ventricle. Septal wall motion abnormality may reflect pacemaker activation.    PFT, SLEEP STUDY, RHC & OTHER TESTING       ASSESSMENT / PLAN      Pulmonary diagnoses:  Abnormal Chest Imaging R91.8  CHF I50.9  Obesity E66.9  Sarcoidosis D86.9      ASSESSMENT : Patient follows closely with cardiology for his history of heart failure and cardiac sarcodosis. No prior hx of pulmonary sarcoid requiring treatment. Did have EBUS of adenopathy Dec 2019 (sampled station 7 and 11L with both nodes with non-necrotizing granulmatous changes ). Not on o2 or inhalers at home. He reports over the last few weeks he has noticed decline in his exercise tolerance.He has noticed increased swelling his legs over the last few weeks. No rash or vision changes. Suspect decompensation related to cardiac disease. Will get noncon CT chest to evaluate. Strictly from pulm standpoint, would not burst with prednisone but may be necessary from cardiac standpoint.      PLAN:     No evidence of pulmonary  Cardiac MRI or PET?  On room air  noncon CT chest  would like PFTs, but not available as inpt at [facility]  prednisone and methotrexate per cardiology for his known cardiac sarcoid  Schedule pulmonary follow-up for baseline PFTs since he has not had them in the Recent past [phone]  Please call if questions        Satish Valle M.D.  Pulmonary, Critical Care and Sleep Medicine  [facility]  [place]   Pager: [phone]  Office:[phone]

## 2023-04-15 NOTE — PROCEDURES
[facility]    Triple Lumen PICC Placement    Date/Time: 4/15/2023 10:58 AM    Performed by: Tressa Lewis RN  Authorized by: Deepti Rojas CNP  Indications: vascular access      UNIVERSAL PROTOCOL   Site Marked: Yes  Prior Images Obtained and Reviewed:  Yes  Required items: Required blood products, implants, devices and special equipment available    Patient identity confirmed:  Arm band and hospital-assigned identification number  Patient was reevaluated immediately before administering moderate or deep sedation or anesthesia  Confirmation Checklist:  Patient's identity using two indicators, relevant allergies, procedure was appropriate and matched the consent or emergent situation and correct equipment/implants were available  Time out: Immediately prior to the procedure a time out was called    Universal Protocol: the Joint Commission Universal Protocol was followed    Preparation: Patient was prepped and draped in usual sterile fashion       ANESTHESIA    Anesthesia: Local infiltration  Local Anesthetic:  Lidocaine 1% without epinephrine  Anesthetic Total (mL):  3      SEDATION    Patient Sedated: No        Preparation: skin prepped with 2% chlorhexidine and skin prepped with ChloraPrep  Skin prep agent: skin prep agent completely dried prior to procedure  Sterile barriers: maximum sterile barriers were used: cap, mask, sterile gown, sterile gloves, and large sterile sheet  Hand hygiene: hand hygiene performed prior to central venous catheter insertion  Type of line used: PICC  Catheter type: triple lumen  Lumen type: valved and power PICC  Lumen Identification: White, Red and Gray  Catheter size: 5 Fr  Brand: Bard  Lot number: [id number]  Placement method: MST and ultrasound  Number of attempts: 1  Difficulty threading catheter: no  Successful placement: yes  Orientation: right    Location: basilic vein  Tip Location: SVC/RA Junction  Site rationale: ok to use  Arm circumference:  adults 10 cm  Extremity circumference: 29  Visible catheter length: 9  Total catheter length: 50  Internal Lumen Volume: 41 mL    Dressing and securement: blood removed, chlorhexidine disc applied, occlusive dressing applied and subcutaneous anchor securement system  Post procedure assessment: blood return through all ports and placement verified by x-ray  PROCEDURE   Patient Tolerance:  Patient tolerated the procedure well with no immediate complicationsDescribe Procedure: Right upper arm picc line placed for dobutamine infusion.  Line placed without difficulty and will obtain CXR to confirm tip location Picc tip in the SVC/RA junction and ok to use  Disposal: sharps and needle count correct at the end of procedure, needles and guidewire disposed in sharps container

## 2023-04-15 NOTE — PROGRESS NOTES
[facility]    Medicine Progress Note - Hospitalist Service        Date of Admission:  4/13/2023  2:44 PM    Assessment & Plan:   Neymar Rhodes is a 63 year old male with hx of HTN, DM2, HFrEF and cardiac sarcodosis admitted on 4/13/2023 for JOSHI.     Cardiac sarcoidosis  Non ischemia cardiomyopathy   Hx of VT s/p CRT-D  NSTEMI  Hypotension   -Presented worsening dyspnea exertion for the last several days.  -Echo shows EF of 15-24%, patient was also hypotensive after admission with worsening creatinine and LFTs concerning for cardiogenic shock  -Metoprolol discontinued  -Continue amiodarone  -Started on fixed dose dobutamine by cardiology, blood pressure much stable now  -Hold Entresto due to YASMEEN and hypotension  -Continue methotrexate and prednisone for cardiac sarcoid  -Plan is for transfer to [facility] for advanced heart failure service once bed available  -Closely for VT on dobutamine, patient is s/p CRTd  -Pulmonary also following, discussed with them today, will proceed with noncontrast CT chest today.      YASMEEN (improving)   Metabolic acidosis  -Cr baseline closer to 1.0-1.2  -on admission here 1.8 with bicarb of 18  -hold PTA lasix and entresto for now   -Creatinine stable at 1.37 today, recheck in the morning     Elevated LFTs  -Most likely due to cardiogenic shock, transaminases trending down       DM2  -Hold PTA oral agents  -sliding scale insulin  -hypoglycemic protocol     Anemia  Recent baseline ~10. Hb 9 here slightly lower than last value of 9.9 01/2023  -anemia may be contributing to his symptoms but unlikely to be major contributor  -iron study iron 15, iron binding 162, iron sat 9  -started on iron supplement  -Recheck hemoglobin tomorrow morning     Hypothyroidism  -continue PTA levothyroxine          Diet: Moderate Consistent Carb (60 g CHO per Meal) Diet     DVT Prophylaxis: Pneumatic Compression Devices   Villa Catheter: Not present  Code Status: Full Code    "  Disposition Plan      Expected Discharge Date: 04/15/2023              Entered: Ashu Aguirre MD 04/15/2023, 7:09 AM        Clinically Significant Risk Factors            # Hypomagnesemia: Lowest Mg = 1.6 mg/dL in last 2 days, will replace as needed   # Hypoalbuminemia: Lowest albumin = 2.9 g/dL at 4/15/2023  5:44 AM, will monitor as appropriate          # DMII: A1C = 7.1 % (Ref range: <5.7 %) within past 6 months, PRESENT ON ADMISSION             The patient's care was discussed with the Bedside Nurse, Patient and marta Cunningham.    Medical Decision Making       **CLEAR ALL SELECTIONS**        Labs/Imaging Reviewed:  See Information above and Data section below    Time SPENT BY ME on the date of service doing chart review, history, exam, documentation & further activities per the note:  50 MINUTES    Ashu Aguirre MD  Hospitalist Service  [facility]  Text Page 7AM-6PM  Securely message with the Vocera Web Console (learn more here)  Text page via [employer] Paging/Directory    ______________________________________________________________________    Interval History   Patient feels slightly better today.  Dyspnea is somewhat improved.  Blood pressure is now stable on dobutamine.  Renal function stable.  LFTs improving.  Awaiting transfer to [facility]    Data reviewed today: I reviewed all medications, new labs and imaging results over the last 24 hours. I personally reviewed no images or EKG's today.    Physical Exam   Vital signs:  Temp: 98  F (36.7  C) Temp src: Oral BP: 103/55 Pulse: 70   Resp: 16 SpO2: 98 % O2 Device: None (Room air)     Weight: 116.2 kg (256 lb 1.6 oz) (wearing jeans)  Estimated body mass index is 35.72 kg/m  as calculated from the following:    Height as of 3/8/23: 1.803 m (5' 11\").    Weight as of this encounter: 116.2 kg (256 lb 1.6 oz).      Wt Readings from Last 2 Encounters:   04/15/23 116.2 kg (256 lb 1.6 oz)   03/08/23 118.9 kg (262 lb 1.6 " oz)       Gen: AAOX3, NAD, comfortable  HEENT: Supple neck, moist oral mucosa, no pallor  Resp: CTA B/L, normal WOB, no crackles, no wheezes  CVS: RRR, no murmur  Abd/GI: Soft, non-tender. BS- normoactive.  No G/R/R  Skin: Warm, dry no rashes  MSK: No joint deformities, no pedal edema  Neuro- CN- intact. No focal deficits.  Spontaneously moving all 4 extremities      Data   Recent Labs   Lab 04/15/23  0544 04/15/23  0102 23  2149 23  1659 23  1246 23  1229 23  0609 23  1835 23  1012   WBC  --   --   --   --   --   --   --   --  6.9   HGB  --   --   --   --   --   --   --   --  9.0*   MCV  --   --   --   --   --   --   --   --  99   PLT  --   --   --   --   --   --   --   --  164     --   --   --  137  --  140   < > 135*   POTASSIUM 4.3  --   --   --  4.7  --  4.7  --  4.8   CHLORIDE 109*  --   --   --  110*  --  111*  --  107   CO2 19*  --   --   --  20*  --  19*  --  18*   BUN 32.5*  --   --   --  28.7*  --  31.7*  --  37.5*   CR 1.37*  --   --   --  1.24*  --  1.27*   < > 1.80*   ANIONGAP 8  --   --   --  7  --  10  --  10   EDITH 8.3*  --   --   --  8.7*  --  8.7*  --  8.6*   * 148* 194*   < > 208*   < > 124*   < > 250*   ALBUMIN 2.9*  --   --   --  3.4*  --  3.2*  --  3.3*   PROTTOTAL 5.0*  --   --   --  5.8*  --  5.6*  --  5.7*   BILITOTAL 0.8  --   --   --  1.2  --  0.9  --  1.1   ALKPHOS 168*  --   --   --  164*  --  163*  --  193*   ALT 48  --   --   --  54*  --  54*  --  57*   AST 65*  --   --   --  68*  --  66*  --  79*    < > = values in this interval not displayed.       Recent Results (from the past 24 hour(s))   Echocardiogram Complete   Result Value    LVEF  25-30%    [facility]    [id number]  [id number]  [id number]^SHY^EDGAR^SANDRA     [facility]  Echocardiography Laboratory  [address] [place] [id number]     Name: MISHA WICKCHELSEA HENDRICKS  MRN: [id number]  : 1960  Study Date: 2023 11:44 AM  Age: 63  yrs  Gender: Male  Patient Location: [facility]  Reason For Study: Dyspnea  Ordering Physician: EDGAR BEGUM  Referring Physician: Jefry Harris  Performed By: Tim Raymond     BSA: 2.3 m2  Height: 71 in  Weight: 254 lb  HR: 60  BP: 102/67 mmHg  ______________________________________________________________________________  Procedure  Complete Portable Echo Adult. Optison (NDC #[id number]) given intravenously.  ______________________________________________________________________________  Interpretation Summary     1. The left ventricle is severely dilated. Left ventricular systolic function  is severely reduced. The visual ejection fraction is 25-30%. Left ventricular  diastolic function is abnormal. There is severe global hypokinesia of the left  ventricle. Septal wall motion abnormality may reflect pacemaker activation.  There is no thrombus seen in the left ventricle.  2. The right ventricle is normal size. There is a catheter/pacemaker lead seen  in the right ventricle. The right ventricular systolic function is normal.  3. There is mild-moderate biatrial enlargement. There is no color Doppler  evidence of an atrial shunt.  4. Trace mitral and tricuspid regurgitation.  5. No pericardial effusion.  6. In comparison to the previous report dated 12/20/2021, the findings are  similar.  ______________________________________________________________________________  Left Ventricle  The left ventricle is severely dilated. Left ventricular systolic function is  severely reduced. The visual ejection fraction is 25-30%. Left ventricular  diastolic function is abnormal. There is severe global hypokinesia of the left  ventricle. Septal wall motion abnormality may reflect pacemaker activation.  There is no thrombus seen in the left ventricle.     Right Ventricle  The right ventricle is normal size. There is a catheter/pacemaker lead seen in  the right ventricle. The right ventricular systolic function is normal.      Atria  There is mild-moderate biatrial enlargement. There is no color Doppler  evidence of an atrial shunt.     Mitral Valve  The mitral valve leaflets are mildly thickened. There is trace mitral  regurgitation.     Tricuspid Valve  There is trace tricuspid regurgitation. The right ventricular systolic  pressure is approximated at 16.0 mmHg plus the right atrial pressure.     Aortic Valve  There is mild trileaflet aortic sclerosis. No aortic regurgitation is present.  No aortic stenosis is present.     Pulmonic Valve  There is trace pulmonic valvular regurgitation. There is no pulmonic valvular  stenosis.     Vessels  The aortic root is normal size. Normal size ascending aorta. The inferior vena  cava is normal.     Pericardium  There is no pericardial effusion.     Rhythm  The rhythm was paced.  ______________________________________________________________________________  MMode/2D Measurements & Calculations  IVSd: 1.1 cm     LVIDd: 6.7 cm  LVIDs: 7.0 cm  LVPWd: 1.1 cm  FS: -4.5 %  LV mass(C)d: 334.9 grams  LV mass(C)dI: 143.5 grams/m2  Ao root diam: 3.6 cm  LA dimension: 4.8 cm  asc Aorta Diam: 3.7 cm  LA/Ao: 1.3  LVOT diam: 2.0 cm  LVOT area: 3.0 cm2  LA Volume (BP): 93.7 ml  LA Volume Index (BP): 40.2 ml/m2  RWT: 0.32  TAPSE: 3.2 cm     Doppler Measurements & Calculations  MV E max carlita: 48.0 cm/sec  MV A max carlita: 113.1 cm/sec  MV E/A: 0.42  MV max P.1 mmHg  MV mean P.3 mmHg  MV V2 VTI: 37.7 cm  MV dec slope: 257.1 cm/sec2  MV dec time: 0.19 sec  PA acc time: 0.17 sec  TR max carlita: 200.0 cm/sec  TR max P.0 mmHg  E/E' av.6  Lateral E/e': 13.8  Medial E/e': 9.4     ______________________________________________________________________________  Report approved by: Roma Deleon 2023 04:59 PM           Medications     DOBUTamine 5 mcg/kg/min (04/15/23 0652)       amiodarone  400 mg Oral Daily     [Held by provider] atorvastatin  20 mg Oral QPM     ferrous sulfate  325 mg Oral Daily      folic acid  1 mg Oral Daily     insulin aspart  1-7 Units Subcutaneous TID AC     insulin aspart  1-5 Units Subcutaneous At Bedtime     levothyroxine  50 mcg Oral QAM AC     methotrexate  15 mg Oral Q7 Days     [Held by provider] metoprolol succinate ER  25 mg Oral BID     predniSONE  10 mg Oral Daily     sulfamethoxazole-trimethoprim  1 tablet Oral Daily

## 2023-04-15 NOTE — PLAN OF CARE
Alert and oriented x 4, VS stable, on RA and some complaints of pain but declined any Tylenol today. Tele 100% AV paced with rates in the 70's. He did have two incidents of vfib that can be found printed in the chart at 1436, cardiology made aware. Patient was symptomatic with that rhythm and VS remained stable. He was up on one walk around the unit and got a little short of breath with that activity. Minimal urine output today  Plan to try and wean Dobutamine tomorrow.

## 2023-04-16 NOTE — PLAN OF CARE
Alert and oriented x 4, VS stable, on RA and some complaints of pain and took Tylenol x 2 with good pain control. Tele 100% AV paced with rates in the 70's. He had no instances of Vfib/VT. He was up on one walk around the unit and got a little short of breath with that activity. Urine output was >1100 today. Plan for further monitoring of cardiac status and placement at the [facility].

## 2023-04-16 NOTE — PLAN OF CARE
Code Blue @ 0508. Pulseless, vfib/v-tach, 2-3 shocks from ICD. Compressions given and no shocks. Before code blue, 92 beats of vtach and asymptomatic. Replaced mag x2. 2gm and 1gm. K+ 4.4 and Mag 2.0. SBP 90-100s. HR 60 tele 100% v-paced. Turned dobutamine off, started amio infusion. R PICC intact. Good UO.   Plan: tx to [facility], amio gtt, need more aggresive replacement protocol (spoke with Dr. Bear about addressing protocol)

## 2023-04-16 NOTE — PROVIDER NOTIFICATION
MD Notification    Notified Person: Beba LAMB   Notification Date/Time: 2317  Notification Interaction: [facility]   Purpose of Notification: . Pt  3rd episode of vtach today. Latest episode was @ 23:01 and 92 beats. On dobutamine for BP and diuresing. /70 and paced @ 60 now. Asymptomatic. Want to draw K+ and Mag? Marika Patton RN. *[phone]    Orders Received: canceled page   Comments:    ([phone]) Paged [place] Fellow - day team referred to reach out to [place] Fellow for the continued   . Pt  3rd episode of vtach today. Latest episode was @ 23:01 and 92 beats. On dobutamine for BP and diuresing. /70 and paced @ 60 now. Asymptomatic. Want to draw K+ and Mag? Marika Patton RN. *[phone]    Spoke with [place] Fellow- Dr. Bear.   TORB - First, give 2gm IV Magensium, replacing this mornings 1.6 Mag. Second, draw electrolytes. If continues to have episodes of v-tach, possible start a amio gtt.     (0014)   . Pt DM. Infusing 1 time dose Mag 2g/hr currently. How long after the mag infusion do you want me to draw Mag and K+? Marika Patton RN. *[phone]    Drawing Mag and K+ 1hr AFTER mag infusion is completed.     0212: To Dr. Bear -  Update - Mag 1.9 and K 4.4.     0510: Code Blue, vfib/vtach, pulseless, unresponsive - called Dr. Bear - TROB: stopped dobutamine, started amio bolus/infusion.     0629: Paged Abel Weir NP for [facility] orders

## 2023-04-16 NOTE — PROGRESS NOTES
[facility]    Medicine Progress Note - Hospitalist Service        Date of Admission:  4/13/2023  2:44 PM    Assessment & Plan:   Neymar Rhodes is a 63 year old male with hx of HTN, DM2, HFrEF and cardiac sarcodosis admitted on 4/13/2023 for [facility]OSHI.     VT/VF s/p brief CPR and defibrillation with ICD x3  Cardiac sarcoidosis  Non ischemia cardiomyopathy   Hx of VT s/p CRT-D  NSTEMI  Hypotension   -Presented worsening dyspnea exertion for the last several days.  -Echo shows EF of 15-24%, patient was also hypotensive after admission with worsening creatinine and LFTs concerning for cardiogenic shock  -Metoprolol discontinued  -Started on fixed dose dobutamine by cardiology with improvement and blood pressure however dobutamine was discontinued overnight due to VT/VF requiring shock and CPR.  -Patient overnight developed VT/VF and required brief CPR and shocks from his ICD x3  -Started on IV amiodarone  -Continue to hold metoprolol  -Defer IV diuretics to cardiology  -Restarted on Entresto  -Continue methotrexate and prednisone for cardiac sarcoid  -Plan is for transfer to [facility] for advanced heart failure service once bed available  -CT chest done yesterday showed a few tiny subpleural and fissural nodules otherwise no sequela of sarcoidosis in the chest.      YASMEEN (improving)   Metabolic acidosis  -Cr baseline closer to 1.0-1.2  -on admission creatinine was 1.8, improving daily, now down to 1.18 which is likely his baseline     Elevated LFTs  -Most likely due to cardiogenic shock, transaminases trending down       DM2  -Hold PTA oral agents  -sliding scale insulin  -Blood sugars adequately controlled at the moment  -hypoglycemic protocol     Anemia  Recent baseline ~10. Hb 9 here slightly lower than last value of 9.9 01/2023  -anemia may be contributing to his symptoms but unlikely to be major contributor  -iron study iron 15, iron binding 162, iron sat 9  -started on iron  supplement  -Hemoglobin slightly low at 8.5, monitor intermittently     Hypothyroidism  -continue PTA levothyroxine        Diet: Moderate Consistent Carb (60 g CHO per Meal) Diet     DVT Prophylaxis: Pneumatic Compression Devices   Villa Catheter: Not present  Code Status: Full Code     Disposition Plan       Expected Discharge Date: 04/17/2023              Entered: Ashu Aguirre MD 04/16/2023, 10:04 AM        Clinically Significant Risk Factors            # Hypomagnesemia: Lowest Mg = 1.6 mg/dL in last 2 days, will replace as needed   # Hypoalbuminemia: Lowest albumin = 2.9 g/dL at 4/15/2023  5:44 AM, will monitor as appropriate          # DMII: A1C = 7.1 % (Ref range: <5.7 %) within past 6 months, PRESENT ON ADMISSION             The patient's care was discussed with the Bedside Nurse, Patient and marta Cunningham.    Medical Decision Making       **CLEAR ALL SELECTIONS**        Labs/Imaging Reviewed:  See Information above and Data section below    Time SPENT BY ME on the date of service doing chart review, history, exam, documentation & further activities per the note:  50 MINUTES    Ashu Aguirre MD  Hospitalist Service  [facility]  Text Page 7AM-6PM  Securely message with the Vocera Web Console (learn more here)  Text page via [employer] Paging/Directory    ______________________________________________________________________    Interval History   Patient overnight developed sustained VT/VF and required brief CPR and received 3 ICD shocks with return to sinus rhythm.  Patient does not have good recollection of the event.  He endorses chest wall pain from CPR but otherwise denies significant complaints.  Mild dyspnea unchanged.  Dobutamine was stopped and he was loaded with IV amiodarone.    Data reviewed today: I reviewed all medications, new labs and imaging results over the last 24 hours. I personally reviewed no images or EKG's today.    Physical Exam   Vital signs:  Temp: 98.1  " F (36.7  C) Temp src: Oral BP: 108/63 Pulse: 65   Resp: 18 SpO2: 98 % O2 Device: None (Room air)     Weight: 118.4 kg (261 lb 0.4 oz)  Estimated body mass index is 36.41 kg/m  as calculated from the following:    Height as of 3/8/23: 1.803 m (5' 11\").    Weight as of this encounter: 118.4 kg (261 lb 0.4 oz).      Wt Readings from Last 2 Encounters:   04/16/23 118.4 kg (261 lb 0.4 oz)   03/08/23 118.9 kg (262 lb 1.6 oz)       Gen: AAOX3, NAD, comfortable  HEENT: Supple neck, moist oral mucosa, no pallor  Resp: CTA B/L, normal WOB, mild tenderness over anterior chest wall  CVS: RRR, no murmur  Abd/GI: Soft, non-tender. BS- normoactive.    Skin: Warm, dry no rashes  MSK: 2-3+ pedal edema  Neuro- CN- intact. No focal deficits.     Data   Recent Labs   Lab 04/16/23  0909 04/16/23  0521 04/16/23  0207 04/16/23  0137 04/15/23  0743 04/15/23  0544 04/14/23  1659 04/14/23  1246 04/13/23  1835 04/13/23  1012   WBC  --  6.9  --   --   --   --   --   --   --  6.9   HGB  --  8.5*  --   --   --   --   --   --   --  9.0*   MCV  --  96  --   --   --   --   --   --   --  99   PLT  --  160  --   --   --   --   --   --   --  164   NA  --  134*  --   --   --  136  --  137   < > 135*   POTASSIUM  --  4.4  --  4.4  --  4.3  --  4.7   < > 4.8   CHLORIDE  --  105  --   --   --  109*  --  110*   < > 107   CO2  --  17*  --   --   --  19*  --  20*   < > 18*   BUN  --  30.0*  --   --   --  32.5*  --  28.7*   < > 37.5*   CR  --  1.18*  --   --   --  1.37*  --  1.24*   < > 1.80*   ANIONGAP  --  12  --   --   --  8  --  7   < > 10   EDITH  --  8.6*  --   --   --  8.3*  --  8.7*   < > 8.6*   * 174* 159*  --    < > 140*   < > 208*   < > 250*   ALBUMIN  --   --   --   --   --  2.9*  --  3.4*   < > 3.3*   PROTTOTAL  --   --   --   --   --  5.0*  --  5.8*   < > 5.7*   BILITOTAL  --   --   --   --   --  0.8  --  1.2   < > 1.1   ALKPHOS  --   --   --   --   --  168*  --  164*   < > 193*   ALT  --   --   --   --   --  48  --  54*   < > 57*   AST  " --   --   --   --   --  65*  --  68*   < > 79*    < > = values in this interval not displayed.       Recent Results (from the past 24 hour(s))   XR Chest Port 1 View    Narrative    EXAM: XR CHEST PORTABLE 1 VIEW  LOCATION: [facility]  DATE/TIME: 04/15/2023, 11:01 AM CDT    INDICATION: PICC placement.  COMPARISON: 04/13/2023.      Impression    IMPRESSION: A right PICC line has been placed, with tip near the SVC/RA junction. Triple lead cardiac device is again noted. The lungs are clear where visualized. No pneumothorax.     CT Chest w/o Contrast    Narrative    EXAM: CT CHEST WITHOUT CONTRAST  LOCATION: [facility]  DATE/TIME: 04/15/2023, 2:04 PM CDT    INDICATION: Pulmonary sarcoid.  COMPARISON: None.  TECHNIQUE: CT chest without IV contrast. Multiplanar reformats were obtained. Dose reduction techniques were used.  CONTRAST: None.    FINDINGS:   LUNGS AND PLEURA: A few very tiny fissural nodules are noted. No infiltrates or effusions. No fibrosis. 2 mm subpleural nodule posterior left lower lobe image 199 series 4.    MEDIASTINUM/AXILLAE: No adenopathy demonstrated. No aneurysm.    CORONARY ARTERY CALCIFICATION: Severe.    UPPER ABDOMEN: Cirrhotic liver morphology, cholelithiasis without cholecystitis and moderate ascites.    MUSCULOSKELETAL: No frankly destructive bony lesions.      Impression    IMPRESSION:   1.  A few tiny subpleural and fissural nodules are noted, otherwise no sequelae of sarcoidosis demonstrated in the chest.  2.  Cirrhosis and ascites in the upper abdomen.         Medications     amiodarone 1 mg/min (04/16/23 0639)     amiodarone         amiodarone  400 mg Oral Daily     [Held by provider] atorvastatin  20 mg Oral QPM     ferrous sulfate  325 mg Oral Daily     folic acid  1 mg Oral Daily     furosemide  60 mg Intravenous Daily     insulin aspart  1-7 Units Subcutaneous TID AC     insulin aspart  1-5 Units Subcutaneous At Bedtime      levothyroxine  50 mcg Oral QAM AC     methotrexate  15 mg Oral Q7 Days     [Held by provider] metoprolol succinate ER  25 mg Oral BID     predniSONE  10 mg Oral Daily     sacubitril-valsartan  1 tablet Oral BID     sodium chloride (PF)  3 mL Intracatheter Q8H     sulfamethoxazole-trimethoprim  1 tablet Oral Daily

## 2023-04-16 NOTE — PROGRESS NOTES
[facility]    Cardiology Consultation - Progress Note     Date of Admission:  4/13/2023  Date of Service: 04/16/23    Reason for Consult   Reason for consult: heart failure, cardiogenic shock    History of Present Illness   Neymar Rhodes is a 63 year old male with h/o cardiac sarcoid followed by Dr. Batres at [facility], paroxysmal VTACH followed by [facility] EP, s/p CRTd, recent ATP treatment for VT, DVT, T2DM and HLD admitted with SOB and dizziness and light headedness for the past few weeks. Of note his PCP increased his metoprolol XL (doubled) one month PTA. He has been having low blood pressures, feeling his ankles have been swelling beyond his usual, up to his calves. Orthopnea, PND+. Occasional cool circulation. No syncope or palpitations. Recent device check showed ATP treated VT in February. He has been on amiodarone 400 mg daily. Also PTA meds include entresto. For his sarcoid, he is on prednisone and methotrexate and has been scheduled for repeat PET this coming Tuesday.     He has advanced HF, EF 15-24% secondary to his cardiac sarcoidosis and hsa had progressive decline over 2 weeks, findings here are consistent with early cardiogenic shock.     Interval update:  Yesterday during the day he had 3 runs of NSVT. We repleted his magnesium with 3 g. Unfortunately later in the night he developed some sustained VT which deteriorated into VF and he reported received 3 ICD shocks with return to sinus rhythm. He states he does not recall any of this. At that time we stopped the dobutamine, loaded with 150 mg of IV amiodarone and started an amiodarone infusion.    Assessment & Plan   #1 Cardiac sarcoidosis  #2 Heart failure with reduced ejection fraction, LVEF 15-24%  #3 Cardiogenic shock  #4 Acute kidney injury, resolved  #5 Ventricular tachycardia, on amiodarone, s/p CRT-D    Shock corresponds to a recent doubling of his Toprol XL dose by outpatient PCP one month PTA.  - trending lactate,  "creatinine, and LFT function   -Device check reveals last ATP for VTach in VT zone in February, currently AV sequential paced on telemetry    Of note, from his device interrogation:      Recent device check: Another alert received from patient's ICD for \"Antitachycardia pacing (ATP) therapy delivered to convert arrhythmia on 2/17/2023 at 0811.\" Â EGM shows AS-BiVP with a PVC initiating a VT rhythm w/ V rates in the 160s (VT-1 zone) for 30s before ATP burst x1 successful at breaking arrhythmia. This is the second VT episode requiring ATP in 4 days. (Refer to 2/13 encounter for details regarding ATP delivery on 2/10). This episode was routed to Dr. Irvin who recommended patient follow up with EP. Patient has yet to get this set up (multiple messages have been left by device RNs and scheduling).    Plan:    1. Hold metoprolol  2. Dobutamine discontinued overnight for VT/VF with 3 ICD shocks  3. 60 mg of IV Lasix daily, can transition to oral 60 mg (home dose) when euvolemic  4. Device interrogation  5. Continue amiodarone 400 mg daily, IV amiodarone bolus and infusion started overnight for VT/VF with 3 ICD shocks, will run for 24 hour course  6. Restart low dose Entresto now that hypotension and YASMEEN have resolved, uptitrate as able  7. Continue methotrexate and prednisone for now, dose titration per discretion of advanced HF service. Has cardiac PET planned for Tuesday at the . Last PET was in November demonstrating active cardiac sarcoid and no systemic sarcoidosis but given decline in respiratory status agree with pulmonary c/s and recheck of PET   8. Replete if K<4.0 and Mg<2.0  9. Has been accepted for transfer to the  pending bed availablity, we will work on therapies here in the meantime    Blane Bear MD    Primary Care Physician   Jefry Harris    Patient Active Problem List   Diagnosis     Diverticulosis of large intestine     Benign essential hypertension     Gout     Severe obesity " (BMI 35.0-39.9) with comorbidity (H)     Type 2 diabetes mellitus with circulatory disorder, with long-term current use of insulin (H)     Hyperlipidemia LDL goal <100     NSTEMI (non-ST elevated myocardial infarction) (H)     Non-ischemic cardiomyopathy (H)     LBBB (left bundle branch block)     Nonrheumatic mitral valve regurgitation     Ascending aorta dilatation (H)     Coronary artery disease involving native coronary artery of native heart without angina pectoris     Near syncope     Paroxysmal ventricular tachycardia (H)     Mediastinal adenopathy     CKD (chronic kidney disease) stage 3, GFR 30-59 ml/min (H)     Sarcoidosis of lung (H)     Sarcoidosis     JOSHI (dyspnea on exertion)     Elevated troponin     Elevated LFTs     YASMEEN (acute kidney injury) (H)     Long term (current) use of systemic steroids       Past Medical History   I have reviewed this patient's medical history and updated it with pertinent information if needed.   Past Medical History:   Diagnosis Date     Ascending aorta dilatation (H)      Diverticulosis of colon (without mention of hemorrhage)      Essential hypertension, benign      Gout, unspecified      LBBB (left bundle branch block)      Morbid obesity (H)      Non-ischemic cardiomyopathy (H)      Nonrheumatic mitral valve regurgitation      NSTEMI (non-ST elevated myocardial infarction) (H)     cardiac cath 6/2018: mild non-obstructive CAD     Other and unspecified hyperlipidemia      Paroxysmal ventricular tachycardia      Type II or unspecified type diabetes mellitus without mention of complication, not stated as uncontrolled        Past Surgical History   I have reviewed this patient's surgical history and updated it with pertinent information if needed.  Past Surgical History:   Procedure Laterality Date     CV CORONARY ANGIOGRAM N/A 10/2/2019    Procedure: Coronary Angiogram;  Surgeon: Kathy Almaguer MD;  Location:  HEART CARDIAC CATH LAB     CV LEFT HEART CATH N/A  10/2/2019    Procedure: Left Heart Cath;  Surgeon: Kathy Almaguer MD;  Location:  HEART CARDIAC CATH LAB     CV RIGHT HEART CATH MEASUREMENTS RECORDED N/A 10/2/2019    Procedure: Right Heart Cath;  Surgeon: Kathy Almaguer MD;  Location:  HEART CARDIAC CATH LAB     ENDOBRONCHIAL ULTRASOUND FLEXIBLE N/A 12/19/2019    Procedure: Flexible bronchoscopy, endobronchial ultrasound, bronchial alveolar lavage;  Surgeon: Ranulfo Barcenas MD;  Location: [facility] OR     EP ICD N/A 10/4/2019    Procedure: EP ICD;  Surgeon: Jayy Dorman MD;  Location:  HEART CARDIAC CATH LAB     EP LEAD PLCMNT LV NEW ICD N/A 10/4/2019    Procedure: EP Lead Plcmnt LV New ICD;  Surgeon: Jayy Dorman MD;  Location:  HEART CARDIAC CATH LAB     HEART CATH CORONARY ANGIOGRAM WITHOUT PRESSURES  06/10/2018    normal coronary angiogram, nothing more than 10%     SURGICAL HISTORY OF -   2001    repair of colovesicular fistula     ZZC APPENDECTOMY  1980's       Prior to Admission Medications   Prior to Admission Medications   Prescriptions Last Dose Informant Patient Reported? Taking?   Continuous Blood Gluc Sensor (FREESTYLE JOCELIN 14 DAY SENSOR) MISC  Self No No   Sig: USE SENSOR EVERY 14 DAYS.   ENTRESTO 49-51 MG per tablet 4/13/2023 at AM Self No Yes   Sig: Take 1 tablet by mouth 2 times daily   acetaminophen (TYLENOL) 500 MG tablet 4/13/2023 at AM Self Yes Yes   Sig: Take 1,000 mg by mouth every morning   acetaminophen (TYLENOL) 500 MG tablet Unknown at PRN Self Yes Yes   Sig: Take 1,000 mg by mouth daily as needed for mild pain   albuterol (PROAIR HFA/PROVENTIL HFA/VENTOLIN HFA) 108 (90 Base) MCG/ACT Inhaler More than a month at PRN Self Yes Yes   Sig: Inhale 2 puffs into the lungs every 6 hours as needed for shortness of breath or wheezing   amiodarone (PACERONE) 400 MG tablet 4/13/2023 at AM Self No Yes   Sig: Take 1 tablet (400 mg) by mouth daily   aspirin 81 MG tablet 4/13/2023 at AM Self No Yes   Sig: Take 1 tablet (81 mg)  by mouth daily   atorvastatin (LIPITOR) 20 MG tablet 4/12/2023 at PM Self No Yes   Sig: Take 1 tablet (20 mg) by mouth daily   betamethasone dipropionate (DIPROSONE) 0.05 % external cream Past Month at PRN Self No Yes   Sig: Apply topically 2 times daily Apply sparingly once or twice per day as needed to affected area until the skin is better, then stop; REPEAT AS NEEDED   blood glucose monitoring (ACCU-CHEK LUL PLUS) test strip  Self No No   Sig: Use to test blood sugar 1-3 times daily or as directed.   Patient not taking: Reported on 12/26/2022   clotrimazole (LOTRIMIN) 1 % external cream Past Month at PRN Self No Yes   Sig: Apply sparingly once or twice per day as needed to affected area until the skin is better, then stop; REPEAT AS NEEDED   continuous blood glucose monitoring (FREESTYLE JOCELIN) sensor  Self No No   Sig: For use with Freestyle Jocelin Flash  for continuous monitioring of blood glucose levels. Replace sensor every 10 days.   fenofibrate (TRIGLIDE/LOFIBRA) 160 MG tablet 4/13/2023 at AM Self No Yes   Sig: Take 1 tablet (160 mg) by mouth daily   folic acid (FOLVITE) 1 MG tablet 4/13/2023 at AM Self No Yes   Sig: Take 1 tablet (1 mg) by mouth daily   furosemide (LASIX) 20 MG tablet 4/13/2023 at AM Self No Yes   Sig: Take 3 tablets (60 mg) by mouth daily   levothyroxine (SYNTHROID/LEVOTHROID) 50 MCG tablet 4/13/2023 at AM Self No Yes   Sig: Take 1 tablet (50 mcg) by mouth daily   metFORMIN (GLUCOPHAGE XR) 500 MG 24 hr tablet 4/13/2023 at AM Self No Yes   Sig: Take 2 tablets (1,000 mg) by mouth 2 times daily (with meals)   methotrexate 2.5 MG tablet 4/8/2023 at Unknown time Self No Yes   Sig: Take 6 tablets (15 mg) by mouth every 7 days   metoprolol succinate ER (TOPROL XL) 25 MG 24 hr tablet 4/13/2023 at AM Self No Yes   Sig: Take 1 tablet (25 mg) by mouth 2 times daily   multivitamin, therapeutic (THERA-VIT) TABS tablet 4/13/2023 at AM Self Yes Yes   Sig: Take 1 tablet by mouth daily    predniSONE (DELTASONE) 10 MG tablet 4/13/2023 at AM Self No Yes   Sig: Take 1 tablet (10 mg) by mouth daily   sulfamethoxazole-trimethoprim (BACTRIM) 400-80 MG tablet 4/13/2023 at AM Self No Yes   Sig: Take 1 tablet by mouth daily   vitamin C (ASCORBIC ACID) 500 MG tablet 4/13/2023 at AM Self Yes Yes   Sig: Take 500 mg by mouth daily      Facility-Administered Medications: None     Current Facility-Administered Medications   Medication Dose Route Frequency     amiodarone  400 mg Oral Daily     [Held by provider] atorvastatin  20 mg Oral QPM     ferrous sulfate  325 mg Oral Daily     folic acid  1 mg Oral Daily     furosemide  60 mg Intravenous Daily     insulin aspart  1-7 Units Subcutaneous TID AC     insulin aspart  1-5 Units Subcutaneous At Bedtime     levothyroxine  50 mcg Oral QAM AC     methotrexate  15 mg Oral Q7 Days     [Held by provider] metoprolol succinate ER  25 mg Oral BID     predniSONE  10 mg Oral Daily     sodium chloride (PF)  3 mL Intracatheter Q8H     sulfamethoxazole-trimethoprim  1 tablet Oral Daily     Current Facility-Administered Medications   Medication Last Rate     amiodarone 1 mg/min (04/16/23 0639)     amiodarone       Allergies   Allergies   Allergen Reactions     No Known Drug Allergies        Social History    reports that he has never smoked. He has never used smokeless tobacco. He reports current alcohol use. He reports that he does not use drugs.    Family History   Family History   Problem Relation Age of Onset     Cancer Father 75        bladder cancer     Myocardial Infarction Father      Other - See Comments Father         smoking     Breast Cancer Mother      Breast Cancer Sister      Family History Negative Brother      Family History Negative Son      Family History Negative Son         fluttering/murmur     Asthma Son      Colon Cancer No family hx of        Review of Systems   The comprehensive Review of Systems is negative other than noted in the HPI or here.      Physical Exam   Vital Signs with Ranges  Temp:  [98  F (36.7  C)-99  F (37.2  C)] 99  F (37.2  C)  Pulse:  [67-91] 71  Resp:  [16-18] 18  BP: ()/(52-67) 104/67  SpO2:  [97 %-100 %] 97 %  Wt Readings from Last 4 Encounters:   04/16/23 118.4 kg (261 lb 0.4 oz)   03/08/23 118.9 kg (262 lb 1.6 oz)   01/24/23 122.4 kg (269 lb 12.8 oz)   12/26/22 125.9 kg (277 lb 9.6 oz)     I/O last 3 completed shifts:  In: 950 [P.O.:950]  Out: 500 [Urine:500]      Vitals: /67 (BP Location: Left arm)   Pulse 71   Temp 99  F (37.2  C) (Oral)   Resp 18   Wt 118.4 kg (261 lb 0.4 oz)   SpO2 97%   BMI 36.41 kg/m      General: Alert, oriented, in no acute distress  HEENT: PERRLA, mucous membranes moist  Neck: Normal JVP  CV: Regular rate and rhythm without murmur  Respiratory: Clear to auscultation bilaterally  GI: Normoactive bowel sounds; soft, non-tender abdomen  Neuro: No focal deficits appreciated  Extremities: Trace peripheral edema bilaterally    Recent Labs   Lab 04/16/23  0521 04/16/23  0207 04/16/23  0137 04/15/23  2054 04/15/23  0743 04/15/23  0544 04/14/23  1659 04/14/23  1246 04/13/23  1835 04/13/23  1012   WBC 6.9  --   --   --   --   --   --   --   --  6.9   HGB 8.5*  --   --   --   --   --   --   --   --  9.0*   MCV 96  --   --   --   --   --   --   --   --  99     --   --   --   --   --   --   --   --  164   *  --   --   --   --  136  --  137   < > 135*   POTASSIUM 4.4  --  4.4  --   --  4.3  --  4.7   < > 4.8   CHLORIDE 105  --   --   --   --  109*  --  110*   < > 107   CO2 17*  --   --   --   --  19*  --  20*   < > 18*   BUN 30.0*  --   --   --   --  32.5*  --  28.7*   < > 37.5*   CR 1.18*  --   --   --   --  1.37*  --  1.24*   < > 1.80*   GFRESTIMATED 69  --   --   --   --  58*  --  65   < > 42*   ANIONGAP 12  --   --   --   --  8  --  7   < > 10   EDITH 8.6*  --   --   --   --  8.3*  --  8.7*   < > 8.6*   * 159*  --  240*   < > 140*   < > 208*   < > 250*   ALBUMIN  --   --   --   --    --  2.9*  --  3.4*   < > 3.3*   PROTTOTAL  --   --   --   --   --  5.0*  --  5.8*   < > 5.7*   BILITOTAL  --   --   --   --   --  0.8  --  1.2   < > 1.1   ALKPHOS  --   --   --   --   --  168*  --  164*   < > 193*   ALT  --   --   --   --   --  48  --  54*   < > 57*   AST  --   --   --   --   --  65*  --  68*   < > 79*    < > = values in this interval not displayed.     Recent Labs   Lab Test 22  1112 20  0913   CHOL 97 140   HDL 34* 64   LDL 44 51   TRIG 95 123     Recent Labs   Lab 23  05239 23  1424 23  1012   WBC 6.9  --   --  6.9   HGB 8.5*  --   --  9.0*   HCT 26.6*  --   --  28.4*   MCV 96  --   --  99     --   --  164   IRON  --   --  15*  --    IRONSAT  --   --  9*  --    FEB  --   --  162*  --    B12  --   --  369  --    FOLIC  --  19.4  --   --      No results for input(s): PH, PHV, PO2, PO2V, SAT, PCO2, PCO2V, HCO3, HCO3V in the last 168 hours.  Recent Labs   Lab 23  1424 23  1012   NTBNPI 450 810     No results for input(s): DD in the last 168 hours.  No results for input(s): SED, CRP in the last 168 hours.  Recent Labs   Lab 23  0521 23  1012    164     No results for input(s): TSH in the last 168 hours.  Recent Labs   Lab 23  192   COLOR Yellow   APPEARANCE Clear   URINEGLC Negative   URINEBILI Negative   URINEKETONE Negative   SG 1.016   UBLD Negative   URINEPH 5.0   PROTEIN Negative   NITRITE Negative   LEUKEST Negative       Imaging:  Recent Results (from the past 48 hour(s))   Echocardiogram Complete   Result Value    LVEF  25-30%    Narrative    [id number]  [id number]  [id number]  2011^SHY^EDGAR^E     [facility]  Echocardiography Laboratory  [address] [place] [address]     Name: JACI WICK  MRN: [id number]  : 1960  Study Date: 2023 11:44 AM  Age: 63 yrs  Gender: Male  Patient Location: [facility]  Reason For Study: Dyspnea  Ordering Physician: SHY  EDGAR FLORES  Referring Physician: Jefry Harris  Performed By: Tim Raymond     BSA: 2.3 m2  Height: 71 in  Weight: 254 lb  HR: 60  BP: 102/67 mmHg  ______________________________________________________________________________  Procedure  Complete Portable Echo Adult. Optison (NDC #5040-4452) given intravenously.  ______________________________________________________________________________  Interpretation Summary     1. The left ventricle is severely dilated. Left ventricular systolic function  is severely reduced. The visual ejection fraction is 25-30%. Left ventricular  diastolic function is abnormal. There is severe global hypokinesia of the left  ventricle. Septal wall motion abnormality may reflect pacemaker activation.  There is no thrombus seen in the left ventricle.  2. The right ventricle is normal size. There is a catheter/pacemaker lead seen  in the right ventricle. The right ventricular systolic function is normal.  3. There is mild-moderate biatrial enlargement. There is no color Doppler  evidence of an atrial shunt.  4. Trace mitral and tricuspid regurgitation.  5. No pericardial effusion.  6. In comparison to the previous report dated 12/20/2021, the findings are  similar.  ______________________________________________________________________________  Left Ventricle  The left ventricle is severely dilated. Left ventricular systolic function is  severely reduced. The visual ejection fraction is 25-30%. Left ventricular  diastolic function is abnormal. There is severe global hypokinesia of the left  ventricle. Septal wall motion abnormality may reflect pacemaker activation.  There is no thrombus seen in the left ventricle.     Right Ventricle  The right ventricle is normal size. There is a catheter/pacemaker lead seen in  the right ventricle. The right ventricular systolic function is normal.     Atria  There is mild-moderate biatrial enlargement. There is no color Doppler  evidence of an atrial  shunt.     Mitral Valve  The mitral valve leaflets are mildly thickened. There is trace mitral  regurgitation.     Tricuspid Valve  There is trace tricuspid regurgitation. The right ventricular systolic  pressure is approximated at 16.0 mmHg plus the right atrial pressure.     Aortic Valve  There is mild trileaflet aortic sclerosis. No aortic regurgitation is present.  No aortic stenosis is present.     Pulmonic Valve  There is trace pulmonic valvular regurgitation. There is no pulmonic valvular  stenosis.     Vessels  The aortic root is normal size. Normal size ascending aorta. The inferior vena  cava is normal.     Pericardium  There is no pericardial effusion.     Rhythm  The rhythm was paced.  ______________________________________________________________________________  MMode/2D Measurements & Calculations  IVSd: 1.1 cm     LVIDd: 6.7 cm  LVIDs: 7.0 cm  LVPWd: 1.1 cm  FS: -4.5 %  LV mass(C)d: 334.9 grams  LV mass(C)dI: 143.5 grams/m2  Ao root diam: 3.6 cm  LA dimension: 4.8 cm  asc Aorta Diam: 3.7 cm  LA/Ao: 1.3  LVOT diam: 2.0 cm  LVOT area: 3.0 cm2  LA Volume (BP): 93.7 ml  LA Volume Index (BP): 40.2 ml/m2  RWT: 0.32  TAPSE: 3.2 cm     Doppler Measurements & Calculations  MV E max carlita: 48.0 cm/sec  MV A max carlita: 113.1 cm/sec  MV E/A: 0.42  MV max P.1 mmHg  MV mean P.3 mmHg  MV V2 VTI: 37.7 cm  MV dec slope: 257.1 cm/sec2  MV dec time: 0.19 sec  PA acc time: 0.17 sec  TR max carlita: 200.0 cm/sec  TR max P.0 mmHg  E/E' av.6  Lateral E/e': 13.8  Medial E/e': 9.4     ______________________________________________________________________________  Report approved by: Roma Deleon 2023 04:59 PM         XR Chest Port 1 View    Narrative    EXAM: XR CHEST PORTABLE 1 VIEW  LOCATION: [facility]  DATE/TIME: 04/15/2023, 11:01 AM CDT    INDICATION: PICC placement.  COMPARISON: 2023.      Impression    IMPRESSION: A right PICC line has been placed, with tip near the  SVC/RA junction. Triple lead cardiac device is again noted. The lungs are clear where visualized. No pneumothorax.           Medical Decision Making       35 MINUTES SPENT BY ME on the date of service doing chart review, history, exam, documentation & further activities per the note.

## 2023-04-16 NOTE — PROGRESS NOTES
Brief House NP Note    Responded to code blue call on patient who became unresponsive and pulseless during a run of wide-complex tachycardia. Prior to my arrival the patient appeared to be defibrillated by his ICD and was awake and responsive by the time I arrived. Patient had reportedly had 2 runs of Vtach during the day yesterday. Cardiology was contacted, dobutamine stopped, amiodarone bolus and infusion.     Plan:  - IMC orders placed    No charge note.    ARMAND Santos, CNP  Hospitalist - House [facility]  Text me on the [employer] sergio for a textback  Text page with web based paging for a callback

## 2023-04-17 NOTE — PLAN OF CARE
A&Ox4. SBP 90s-110s. HR 60-70s. Tele 100% AV paced. No VT/Vfib overnight. Lidocaine patch on sternum r/t chest compression soreness. SBA to bathroom. SOB w activity, activity minimized. Amio gtt @ 0.5mg.   Plan: turn amio gtt off and transition to PO only. Transfer to [facility] when bed is available.

## 2023-04-17 NOTE — PROGRESS NOTES
[facility]  Cardiology Progress Note  Date of Service: 04/17/2023  Primary Cardiologist: Dr. Batres at [facility]    Assessment & Plan    Neymar Rhodes is a 63 year old male admitted on 4/13/2023 with early cardiogenic shock.    Assessment:  #1 Cardiac sarcoidosis. On methotrexate and prednisone. Follows with  Dr. Batres at [facility].  #2 Heart failure with reduced ejection fraction, LVEF 25%  #3 Cardiogenic shock.  Symptoms developed after recent doubling of outpatient Toprol dose.  Dobutamine started on admission resulting in VT/VF requiring brief CPR and ICD shocks x3 on 4/15.  Has since been discontinued.  #4 Acute kidney injury, resolved  #5 Ventricular tachycardia, on amiodarone, s/p CRT-D    Plan:   1. Medications   -Continue amiodarone 400 mg once daily   -Continue diuresis with IV Lasix 60 mg daily   -Continue Entresto 24/26 mg twice daily  2.  Ongoing continuous telemetry monitoring  3.  Awaiting transfer to [facility].  Patient has been accepted-just waiting on bed availability.    A total of 25 minutes was spent face-to-face or coordinating care of Neymar Rhodes. Over 50% of our time was spent counseling the patient and/or coordinating care.    Shanae Curtis PA-C  [facility] - Heart Care  Pager: [phone]    Interval History   Neymar Rhodes is a 63 year old male with a history of cardiac sarcoid followed by Dr. Batres at [facility], chronic systolic heart failure with EF 25% paroxysmal VT followed by [facility] EP, s/p CRT-D, hx of DVT, T2DM and HLD.    Mr. Rhodes was admitted with a several week history of shortness of breath, dizziness, and lightheadedness.  His PCP had increased his metoprolol XL 1 month prior to admission.  Subsequently noted to have low blood pressures, lower extremity edema, orthopnea, and PND.  Recent device check revealed VT treated with ATP 2/2023.  Has been compliant with amiodarone therapy.  Is on prednisone and methotrexate for his active cardiac sarcoid  with plan for repeat PET 4/18/2023 at [facility].      On admission, he was started on dobutamine (4/14).  Metoprolol has been held.  PO amiodarone was continued. Unfortunately, on 4/15, patient developed VT/VF requiring brief CPR and shocks from his ICD x3.  Dobutamine was discontinued.  He was started on IV amiodarone which has since been transitioned to oral. Request has been made to transfer patient to the  -he has been accepted and is now waiting for bed.    Patient continues to feel well and denies chest pain, shortness of breath, palpitations, lightheadedness, dizziness.  Does note some musculoskeletal discomfort related to recent CPR.    Physical Exam   Temp: 98.3  F (36.8  C) Temp src: Oral BP: 91/56 Pulse: 100   Resp: 16 SpO2: 95 % O2 Device: None (Room air)    Vitals:    04/15/23 0645 04/16/23 0600 04/17/23 0600   Weight: 116.2 kg (256 lb 1.6 oz) 118.4 kg (261 lb 0.4 oz) 116.4 kg (256 lb 9.9 oz)       GEN: well nourished, in no acute distress.  HEENT:  Pupils equal, round. Sclerae nonicteric.   NECK: Supple, no masses appreciated.  Unable to accurately assess JVP secondary to body habitus  C/V:  Regular rate and rhythm, no murmur, rub or gallop.   RESP: Respirations are unlabored. Clear to auscultation bilaterally without wheezing, rales, or rhonchi.  GI: Abdomen taut, nontender.  EXTREM: 2+ LE edema.  NEURO: Alert and oriented, cooperative.  SKIN: Warm and dry.     Medications     amiodarone Stopped (04/17/23 0902)       amiodarone  400 mg Oral Daily     [Held by provider] atorvastatin  20 mg Oral QPM     ferrous sulfate  325 mg Oral Daily     folic acid  1 mg Oral Daily     furosemide  60 mg Intravenous Daily     insulin aspart  1-7 Units Subcutaneous TID AC     insulin aspart  1-5 Units Subcutaneous At Bedtime     levothyroxine  50 mcg Oral QAM AC     lidocaine  1 patch Transdermal Q24h    And     lidocaine   Transdermal Q8H NEVILLE     magnesium oxide  400 mg Oral Q4H     methotrexate  15 mg Oral Q7 Days      [Held by provider] metoprolol succinate ER  25 mg Oral BID     predniSONE  10 mg Oral Daily     sacubitril-valsartan  1 tablet Oral BID     sodium chloride (PF)  3 mL Intracatheter Q8H     sulfamethoxazole-trimethoprim  1 tablet Oral Daily       Data   Last 24 hours labs reviewed     Tele: ASVP with intermittent some APVP. PVCs and some NSVT (3-5 beats in duration).    Echo 4/14/2023:   1. The left ventricle is severely dilated. Left ventricular systolic function  is severely reduced. The visual ejection fraction is 25-30%. Left ventricular  diastolic function is abnormal. There is severe global hypokinesia of the left  ventricle. Septal wall motion abnormality may reflect pacemaker activation.  There is no thrombus seen in the left ventricle.  2. The right ventricle is normal size. There is a catheter/pacemaker lead seen  in the right ventricle. The right ventricular systolic function is normal.  3. There is mild-moderate biatrial enlargement. There is no color Doppler  evidence of an atrial shunt.  4. Trace mitral and tricuspid regurgitation.  5. No pericardial effusion.  6. In comparison to the previous report dated 12/20/2021, the findings are  similar.    Cardiac PET 12/29/2022  Impression:  1. Findings of active cardiac sarcoidosis, which is improved compared  to 6/14/2022 PET/CT.  2. No evidence suggest active systemic sarcoidosis.  3. Sub-5 mm pulmonary nodules within the bilateral lungs, otherwise  the lung parenchyma is unremarkable. Attention follow-up.  4. Compression deformity of L4 vertebral body with mild FDG uptake,  new compared to 6/14/2022 without retropulsion or significant height  loss.

## 2023-04-17 NOTE — PROGRESS NOTES
A&O x4. VSS on room air. Tele AV paced. Denies CP/SOB. C/o back pain, PRN tylenol given. Up independently. Plan to transfer to [facility] once bed available.

## 2023-04-17 NOTE — PROVIDER NOTIFICATION
MD Notification    Notified Person: MD Gardner  Notification Date/Time: 2101  Notification Interaction: Amcom  Purpose of Notification: . Pt  Can you order a lidocaine patch please. Sore from compressions last night. Marika Patton RN. *[phone]    Orders Received: no orders given   Comments:    2118: Gregor Bear   . Pt DR. Bundy hospitalist haven't heard back - pt wants a lidocaine patch for his sternum, sore from compressions. Marika Patton RN. *[phone]

## 2023-04-17 NOTE — PROGRESS NOTES
[facility]    Medicine Progress Note - Hospitalist Service        Date of Admission:  4/13/2023  2:44 PM    Assessment & Plan:   Neymar Rhodes is a 63 year old male with hx of HTN, DM2, HFrEF and cardiac sarcodosis admitted on 4/13/2023 for JOSHI.     VT/VF s/p brief CPR and defibrillation with ICD x3 on 4/15.  Cardiac sarcoidosis  Non ischemia cardiomyopathy   Hx of VT s/p CRT-D  NSTEMI  Hypotension   -Presented worsening dyspnea exertion for the last several days.  -Echo shows EF of 15-24%, patient was also hypotensive after admission with worsening creatinine and LFTs concerning for cardiogenic shock  -Metoprolol discontinued  -Started on fixed dose dobutamine by cardiology with improvement and blood pressure however dobutamine was discontinued overnight due to VT/VF requiring shock and CPR.  -Patient on 4/15, overnight developed VT/VF and required brief CPR and shocks from his ICD x3  -Started on IV amiodarone, which has subsequently been discontinued  -Continue oral amiodarone per cardiology  -Continue to hold metoprolol  -Defer IV diuretics to cardiology, currently on Lasix 60 mg IV daily  -Restarted on Entresto on 4/16, blood pressure soft but patient largely asymptomatic  -Continue methotrexate and prednisone for cardiac sarcoid  -Plan is for transfer to [facility] for advanced heart failure service once bed available  -CT chest done on 4/15 showed a few tiny subpleural and fissural nodules otherwise no sequela of sarcoidosis in the chest.      YASMEEN (improving)   Metabolic acidosis  -Cr baseline closer to 1.0-1.2  -on admission creatinine was 1.8, improving daily, now down to 1.18 on 4/16.  -Recheck BMP tomorrow     Elevated LFTs  -Most likely due to cardiogenic shock, transaminases trending down       DM2  -Hold PTA oral agents  -sliding scale insulin  -Blood sugars adequately controlled at the moment  -hypoglycemic protocol     Anemia  Recent baseline ~10. Hb 9 here slightly  lower than last value of 9.9 01/2023  -anemia may be contributing to his symptoms but unlikely to be major contributor  -iron study iron 15, iron binding 162, iron sat 9  -started on iron supplement  -Hemoglobin slightly low at 8.5, monitor intermittently     Hypothyroidism  -continue PTA levothyroxine        Diet: Moderate Consistent Carb (60 g CHO per Meal) Diet     DVT Prophylaxis: Pneumatic Compression Devices   Villa Catheter: Not present  Code Status: Full Code     Disposition Plan      Expected Discharge Date: 04/17/2023              Entered: Ashu Aguirre MD 04/17/2023, 10:15 AM        Clinically Significant Risk Factors              # Hypoalbuminemia: Lowest albumin = 2.9 g/dL at 4/15/2023  5:44 AM, will monitor as appropriate          # DMII: A1C = 7.1 % (Ref range: <5.7 %) within past 6 months, PRESENT ON ADMISSION             The patient's care was discussed with the Bedside Nurse, Patient and marta Cunningham.    Medical Decision Making       **CLEAR ALL SELECTIONS**        Labs/Imaging Reviewed:  See Information above and Data section below    Time SPENT BY ME on the date of service doing chart review, history, exam, documentation & further activities per the note:  35 MINUTES    Ashu Aguirre MD  Hospitalist Service  [facility]  Text Page 7AM-6PM  Securely message with the Vocera Web Console (learn more here)  Text page via [employer] Paging/Directory    ______________________________________________________________________    Interval History   Patient endorses soreness on his anterior chest wall from CPR which is adequately controlled with Lidoderm patch.  Denies dyspnea.  Blood pressure soft but asymptomatic.  No arrhythmias in the last 24 hours.  Awaiting transfer to [facility] for advanced heart failure evaluation.    Data reviewed today: I reviewed all medications, new labs and imaging results over the last 24 hours. I personally reviewed no images or EKG's  "today.    Physical Exam   Vital signs:  Temp: 98.3  F (36.8  C) Temp src: Oral BP: (!) 83/56 (asymptomatic) Pulse: 84   Resp: 16 SpO2: 95 % O2 Device: None (Room air)     Weight: 116.4 kg (256 lb 9.9 oz)  Estimated body mass index is 35.79 kg/m  as calculated from the following:    Height as of 3/8/23: 1.803 m (5' 11\").    Weight as of this encounter: 116.4 kg (256 lb 9.9 oz).      Wt Readings from Last 2 Encounters:   04/17/23 116.4 kg (256 lb 9.9 oz)   03/08/23 118.9 kg (262 lb 1.6 oz)       Gen: AAOX3, NAD, comfortable  HEENT:  no pallor  Resp: CTA B/L, normal WOB, mild tenderness over anterior chest wall  CVS: RRR, no murmur  Abd/GI: Soft, non-tender. BS- normoactive.    Skin: Warm, dry no rashes  MSK: 2-3+ pedal edema  Neuro- CN- intact. No focal deficits.     Data   Recent Labs   Lab 04/17/23  0725 04/16/23  2056 04/16/23  1804 04/16/23  1524 04/16/23  0909 04/16/23  0521 04/16/23  0207 04/16/23  0137 04/15/23  0743 04/15/23  0544 04/14/23  1659 04/14/23  1246 04/13/23  1835 04/13/23  1012   WBC  --   --   --   --   --  6.9  --   --   --   --   --   --   --  6.9   HGB  --   --   --   --   --  8.5*  --   --   --   --   --   --   --  9.0*   MCV  --   --   --   --   --  96  --   --   --   --   --   --   --  99   PLT  --   --   --   --   --  160  --   --   --   --   --   --   --  164   NA  --   --   --   --   --  134*  --   --   --  136  --  137   < > 135*   POTASSIUM  --   --   --  4.7  --  4.4  --  4.4  --  4.3  --  4.7   < > 4.8   CHLORIDE  --   --   --   --   --  105  --   --   --  109*  --  110*   < > 107   CO2  --   --   --   --   --  17*  --   --   --  19*  --  20*   < > 18*   BUN  --   --   --   --   --  30.0*  --   --   --  32.5*  --  28.7*   < > 37.5*   CR  --   --   --   --   --  1.18*  --   --   --  1.37*  --  1.24*   < > 1.80*   ANIONGAP  --   --   --   --   --  12  --   --   --  8  --  7   < > 10   EDITH  --   --   --   --   --  8.6*  --   --   --  8.3*  --  8.7*   < > 8.6*   * 175* 240*  --    " < > 174*   < >  --    < > 140*   < > 208*   < > 250*   ALBUMIN  --   --   --   --   --   --   --   --   --  2.9*  --  3.4*   < > 3.3*   PROTTOTAL  --   --   --   --   --   --   --   --   --  5.0*  --  5.8*   < > 5.7*   BILITOTAL  --   --   --   --   --   --   --   --   --  0.8  --  1.2   < > 1.1   ALKPHOS  --   --   --   --   --   --   --   --   --  168*  --  164*   < > 193*   ALT  --   --   --   --   --   --   --   --   --  48  --  54*   < > 57*   AST  --   --   --   --   --   --   --   --   --  65*  --  68*   < > 79*    < > = values in this interval not displayed.       No results found for this or any previous visit (from the past 24 hour(s)).  Medications     amiodarone Stopped (04/17/23 0902)       amiodarone  400 mg Oral Daily     [Held by provider] atorvastatin  20 mg Oral QPM     ferrous sulfate  325 mg Oral Daily     folic acid  1 mg Oral Daily     furosemide  60 mg Intravenous Daily     insulin aspart  1-7 Units Subcutaneous TID AC     insulin aspart  1-5 Units Subcutaneous At Bedtime     levothyroxine  50 mcg Oral QAM AC     lidocaine  1 patch Transdermal Q24h    And     lidocaine   Transdermal Q8H NEVILLE     methotrexate  15 mg Oral Q7 Days     [Held by provider] metoprolol succinate ER  25 mg Oral BID     predniSONE  10 mg Oral Daily     sacubitril-valsartan  1 tablet Oral BID     sodium chloride (PF)  3 mL Intracatheter Q8H     sulfamethoxazole-trimethoprim  1 tablet Oral Daily

## 2023-04-17 NOTE — PLAN OF CARE
Goal Outcome Evaluation:      Plan of Care Reviewed With: patient    Overall Patient Progress: improvingOverall Patient Progress: improving    Heart Center Nursing Note    Patient Information  Name: Neymar Rhodes  Age: 63 year old    Assessment  Orientation/Neuro: Alert and Oriented x4  Cardiac/Tele: 100% AV paced  Resp: JOSHI  GI/: WDL No nausea, vomiting, abdominal pain, diarrhea, constipation or change in bowel habits   Mobility: walks with assist   Pain: denies   Diet: Orders Placed This Encounter      Moderate Consistent Carb (60 g CHO per Meal) Diet    Vital Signs  B/P: 101/53, T: 98.3, P: 91, R: 16, O2: 95% on RA    Plan  Transfer to  when bed is available for advanced heart failure, high mag replacement protocol    Tiffanie Abreu RN

## 2023-04-17 NOTE — PROVIDER NOTIFICATION
Cards notified BP 80s/50s. Asymptomatic. IV amio stopped. Oral given. Holding off on lasix until BP improves.

## 2023-04-18 NOTE — PLAN OF CARE
Neuro- A/Ox4  Most Recent Vitals- Temp: 98.3  F (36.8  C) Temp src: Oral BP: 100/61 Pulse: 72   Resp: 16 SpO2: 99 % O2 Device: None (Room air)   Tele/Cardiac- AV paced  Resp- on RA, LS clear, JOSHI  Activity- ind/SBA  Pain- constant back pain relieved with PRN Tylenol  Drips- n/a  Drains/Tubes- R PICC  Skin- BLE edema 3-4+  GI/- WDL  Aggression Color- Green  COVID status- Negative  Plan- waiting for open bed at [facility]    Marilia Caballero RN

## 2023-04-18 NOTE — PROGRESS NOTES
[facility]  Cardiology Chart Check  Date of Service: 04/18/2023  Primary Cardiologist: Dr. Batres at [facility]    Assessment & Plan    Neymar Rhodes is a 63 year old male admitted on 4/13/2023 with early cardiogenic shock.    Assessment:  #1 Cardiac sarcoidosis. On methotrexate and prednisone. Follows with  Dr. Batres at [facility].  #2 Heart failure with reduced ejection fraction, LVEF 25%  #3 Cardiogenic shock.  Symptoms developed after recent doubling of outpatient Toprol dose.  Dobutamine started on admission resulting in VT/VF requiring brief CPR and ICD shocks x3 on 4/15.  Has since been discontinued.  #4 Acute kidney injury, resolved  #5 Ventricular tachycardia, on amiodarone, s/p CRT-D    Plan:   1. Medications   -Continue amiodarone 400 mg once daily   -Discontinue Lasix.  Give IV Bumex 1 mg x 1 dose today.  Starting tomorrow, initiate Bumex 2 mg once daily.   -Continue Entresto 24/26 mg twice daily  2.  Ongoing continuous telemetry monitoring  3.  No longer need to transfer to [facility].  4.  Cardiology will continue to follow. Pending response to diuresis, hopeful for discharge tomorrow.     Shanae Curtis PA-C  [facility] - Heart Care  Pager: [phone]    Interval History   Neymar Rhodes is a 63 year old male with a history of cardiac sarcoid followed by Dr. Batres at [facility], chronic systolic heart failure with EF 25% paroxysmal VT followed by [facility] EP, s/p CRT-D, hx of DVT, T2DM and HLD.    Mr. Rhodes was admitted with a several week history of shortness of breath, dizziness, and lightheadedness.  His PCP had increased his metoprolol XL 1 month prior to admission.  Subsequently noted to have low blood pressures, lower extremity edema, orthopnea, and PND.  Recent device check revealed VT treated with ATP 2/2023.  Has been compliant with amiodarone therapy.  Is on prednisone and methotrexate for his active cardiac sarcoid with plan for repeat PET 4/18/2023 at [facility].      On  "admission, he was started on dobutamine (4/14).  Metoprolol has been held.  PO amiodarone was continued. Unfortunately, on 4/15, patient developed VT/VF requiring brief CPR and shocks from his ICD x3.  Dobutamine was discontinued.  He was started on IV amiodarone which has since been transitioned to oral. Request has been made to transfer patient to the  -he has been accepted and is now waiting for bed.    Physical exam  Vital signs:  Temp: 98.4  F (36.9  C) Temp src: Oral BP: (!) 111/98 Pulse: 73   Resp: 16 SpO2: 97 % O2 Device: None (Room air)     Weight: 117.5 kg (259 lb)  Estimated body mass index is 36.12 kg/m  as calculated from the following:    Height as of 3/8/23: 1.803 m (5' 11\").    Weight as of this encounter: 117.5 kg (259 lb).  GEN: well nourished, in no acute distress.  HEENT:  Pupils equal, round. Sclerae nonicteric.   NECK: Supple, no masses appreciated.  Unable to accurately assess JVP secondary to body habitus  C/V:  Regular rate and rhythm, no murmur, rub or gallop.  RESP: Respirations are unlabored. Clear to auscultation bilaterally without wheezing, rales, or rhonchi.  GI: Abdomen soft, nontender.  EXTREM: 2+ bilateral LE edema.  NEURO: Alert and oriented, cooperative.  SKIN: Warm and dry.      Medications     amiodarone Stopped (04/17/23 0902)       amiodarone  400 mg Oral Daily     [Held by provider] atorvastatin  20 mg Oral QPM     ferrous sulfate  325 mg Oral Daily     folic acid  1 mg Oral Daily     furosemide  60 mg Intravenous Daily     insulin aspart  1-7 Units Subcutaneous TID AC     insulin aspart  1-5 Units Subcutaneous At Bedtime     levothyroxine  50 mcg Oral QAM AC     lidocaine  1 patch Transdermal Q24h    And     lidocaine   Transdermal Q8H NEVILLE     methotrexate  15 mg Oral Q7 Days     [Held by provider] metoprolol succinate ER  25 mg Oral BID     predniSONE  10 mg Oral Daily     sacubitril-valsartan  1 tablet Oral BID     sodium chloride (PF)  3 mL Intracatheter Q8H     " sulfamethoxazole-trimethoprim  1 tablet Oral Daily       Data   Last 24 hours labs reviewed     Tele: APVP with occasional PVCs. Rare and brief NSVT    Echo 4/14/2023:   1. The left ventricle is severely dilated. Left ventricular systolic function  is severely reduced. The visual ejection fraction is 25-30%. Left ventricular  diastolic function is abnormal. There is severe global hypokinesia of the left  ventricle. Septal wall motion abnormality may reflect pacemaker activation.  There is no thrombus seen in the left ventricle.  2. The right ventricle is normal size. There is a catheter/pacemaker lead seen  in the right ventricle. The right ventricular systolic function is normal.  3. There is mild-moderate biatrial enlargement. There is no color Doppler  evidence of an atrial shunt.  4. Trace mitral and tricuspid regurgitation.  5. No pericardial effusion.  6. In comparison to the previous report dated 12/20/2021, the findings are  similar.    Cardiac PET 12/29/2022  Impression:  1. Findings of active cardiac sarcoidosis, which is improved compared  to 6/14/2022 PET/CT.  2. No evidence suggest active systemic sarcoidosis.  3. Sub-5 mm pulmonary nodules within the bilateral lungs, otherwise  the lung parenchyma is unremarkable. Attention follow-up.  4. Compression deformity of L4 vertebral body with mild FDG uptake,  new compared to 6/14/2022 without retropulsion or significant height  loss.

## 2023-04-18 NOTE — PROGRESS NOTES
[facility]  Hospitalist Progress Note        Jose Mcnamara MD   04/18/2023        Interval History:        - BP on softer side, asymptomatic ; IV amiodarone stopped, continued on PO amiodarone  -reports feeling fine, denies any chest pain or shortness of breath  - cardiology following; Awaiting transfer to [facility] pending bed availability         Assessment and Plan:        Neymar Rhodes is a 63 year old male with hx of HTN, DM2, HFrEF and cardiac sarcoidosis, h/o ICD placement admitted on 4/13/2023 for JOSHI.     VT/VF s/p brief CPR and defibrillation with ICD x3 on 4/15.  Cardiac sarcoidosis  Non ischemia cardiomyopathy   Hx of VT s/p CRT-D  NSTEMI  Hypotension   - ECHO noted with EF of 15-24%, was also hypotensive after admission with worsening creatinine and LFTs concerning for cardiogenic shock  - evaluated and followed by cardiology  - Metoprolol held; was started on dobutamine on admission resulting in VT/VF requiring brief CPR and ICD shocks X 3 on 4/15/23, dobutamine discontinued   -started on amiodarone (switched from IV to  mg daily)  -getting diuresis with IV Lasix per cardiology; currently on Lasix 60 mg IV daily  -Restarted on Entresto on 4/16, blood pressure soft but patient largely asymptomatic  -Continue methotrexate and prednisone for cardiac sarcoidosis  -Plan is for transfer to [facility] for advanced heart failure service once bed available  -CT chest done on 4/15 showed a few tiny subpleural and fissural nodules otherwise no sequela of sarcoidosis in the chest.      YASMEEN (improving)   Metabolic acidosis  -Cr baseline closer to 1.0-1.2  -on admission creatinine was 1.8, improved and seems stable around baseline  -monitor BMP periodically with diuresis     Elevated LFTs-- improved  -Most likely due to cardiogenic shock, transaminases trended down       DM2  -Hold PTA oral agents  -sliding scale insulin  -hypoglycemic protocol     Chronic Anemia  Recent  baseline ~10. Hb 9 here slightly lower than last value of 9.9 01/2023  -anemia may be contributing to his symptoms but unlikely to be major contributor  -iron study iron 15, iron binding 162, iron sat 9  -started on iron supplement  - Hb stable around 8-9; monitor intermittently     Hypothyroidism  -continue PTA levothyroxine     DVT Prophylaxis: Pneumatic Compression Devices     Code Status: Full Code       Diet: Moderate Consistent Carb (60 g CHO per Meal) Diet       Disposition:   - Awaiting transfer to [facility] for advanced heart failure services pending bed availability    Clinically Significant Risk Factors              # Hypoalbuminemia: Lowest albumin = 2.9 g/dL at 4/15/2023  5:44 AM, will monitor as appropriate          # DMII: A1C = 7.1 % (Ref range: <5.7 %) within past 6 months              Page Me (7 am to 6 pm)    Care plan discussed with patient and               Physical Exam:      Blood pressure 100/61, pulse 72, temperature 98.3  F (36.8  C), temperature source Oral, resp. rate 16, weight 117.5 kg (259 lb), SpO2 99 %.  Vitals:    04/16/23 0600 04/17/23 0600 04/18/23 0621   Weight: 118.4 kg (261 lb 0.4 oz) 116.4 kg (256 lb 9.9 oz) 117.5 kg (259 lb)     Vital Signs with Ranges  Temp:  [97.8  F (36.6  C)-98.3  F (36.8  C)] 98.3  F (36.8  C)  Pulse:  [] 72  Resp:  [16] 16  BP: ()/(51-68) 100/61  SpO2:  [95 %-99 %] 99 %  I/O's Last 24 hours  I/O last 3 completed shifts:  In: 1040 [P.O.:1040]  Out: 1300 [Urine:1300]    Constitutional: Alert, awake and oriented X 3; resting comfortably in no apparent distress       Oral cavity: Moist mucosa   Cardiovascular: Normal s1 s2, regular rate and rhythm, no murmur; ICD in place    Lungs: B/l clear to auscultation, no wheezes or crepitations   Abdomen: Soft, nt, nd, no guarding, rigidity or rebound; BS +   LE :  marked bilateral edema +   Musculoskeletal/Neuro Power 5/5 in all extremities; No focal neurological deficits noted   Psychiatry:  normal mood and affect                Medications:          amiodarone  400 mg Oral Daily     [Held by provider] atorvastatin  20 mg Oral QPM     ferrous sulfate  325 mg Oral Daily     folic acid  1 mg Oral Daily     furosemide  60 mg Intravenous Daily     insulin aspart  1-7 Units Subcutaneous TID AC     insulin aspart  1-5 Units Subcutaneous At Bedtime     levothyroxine  50 mcg Oral QAM AC     lidocaine  1 patch Transdermal Q24h    And     lidocaine   Transdermal Q8H NEVILLE     methotrexate  15 mg Oral Q7 Days     [Held by provider] metoprolol succinate ER  25 mg Oral BID     predniSONE  10 mg Oral Daily     sacubitril-valsartan  1 tablet Oral BID     sodium chloride (PF)  3 mL Intracatheter Q8H     sulfamethoxazole-trimethoprim  1 tablet Oral Daily     PRN Meds: acetaminophen, albuterol, glucose **OR** dextrose **OR** glucagon, lidocaine 4%, lidocaine (buffered or not buffered), nitroGLYcerin, sodium chloride (PF)         Data:      All new lab and imaging data was reviewed.   Recent Labs   Lab Test 04/18/23  0616 04/16/23  0521 04/13/23  1012 10/05/19  0527 10/04/19  0948   WBC 5.8 6.9 6.9   < > 6.7   HGB 8.8* 8.5* 9.0*   < > 12.7*   MCV 96 96 99   < > 92    160 164   < > 217   INR  --   --   --   --  1.16*    < > = values in this interval not displayed.      Recent Labs   Lab Test 04/18/23  0206 04/17/23  2133 04/17/23  1758 04/16/23  1804 04/16/23  1524 04/16/23  0909 04/16/23  0521 04/16/23  0207 04/16/23  0137 04/15/23  0743 04/15/23  0544 04/14/23  1659 04/14/23  1246   NA  --   --   --   --   --   --  134*  --   --   --  136  --  137   POTASSIUM  --   --   --   --  4.7  --  4.4  --  4.4  --  4.3  --  4.7   CHLORIDE  --   --   --   --   --   --  105  --   --   --  109*  --  110*   CO2  --   --   --   --   --   --  17*  --   --   --  19*  --  20*   BUN  --   --   --   --   --   --  30.0*  --   --   --  32.5*  --  28.7*   CR  --   --   --   --   --   --  1.18*  --   --   --  1.37*  --  1.24*   ANIONGAP   --   --   --   --   --   --  12  --   --   --  8  --  7   EDITH  --   --   --   --   --   --  8.6*  --   --   --  8.3*  --  8.7*   * 246* 217*   < >  --    < > 174*   < >  --    < > 140*   < > 208*    < > = values in this interval not displayed.     Recent Labs   Lab Test 10/01/19  2121 10/01/19  1745 10/01/19  1209 06/11/18  0040 06/10/18  2103   TROPI 0.071* 0.075* 0.069*   < >  --    TROPONIN  --   --   --   --  0.07    < > = values in this interval not displayed.

## 2023-04-18 NOTE — PROVIDER NOTIFICATION
MD Notification    Notified Person: MD    Notified Person Name: Dr. Mcnamara     Notification Date/Time: 4/18 1452    Notification Interaction: text page     Purpose of Notification: Can you order alteplase for his PICC line?     Orders Received: alteplase ordered    Comments:

## 2023-04-19 NOTE — PROGRESS NOTES
[facility]  Cardiology Chart Check  Date of Service: 04/19/2023  Primary Cardiologist: Dr. Batres at [facility]    Assessment & Plan    Neymar Rhodes is a 63 year old male admitted on 4/13/2023 with early cardiogenic shock.    Assessment:  #1 Cardiac sarcoidosis. On methotrexate and prednisone. Follows with  Dr. Batres at [facility].  #2 Heart failure with reduced ejection fraction, LVEF 25%  #3 Cardiogenic shock.  Symptoms developed after recent doubling of outpatient Toprol dose.  Dobutamine started on admission resulting in VT/VF requiring brief CPR and ICD shocks x3 on 4/15.  Has since been discontinued.  #4 Acute kidney injury, resolved  #5 Ventricular tachycardia, on amiodarone, s/p CRT-D    Plan:   1. Medications   -Continue amiodarone 400 mg once daily   -HOLD Bumex tonight and tomorrow. Resume Bumex 2 mg BID starting Friday.   -Continue Entresto 24/26 mg twice daily  2.  Ongoing continuous telemetry monitoring until time of discharge.  3.  Will reschedule repeat cardiac PET and Dr. Batres follow up as patient missed these apts during current hospitalization.   4.  Okay to discharge from cardiology perspective.    Cardiology will sign off.    Shanae Curtis PA-C  [facility]  Pager: [phone]    Interval History   Neymar Rhodes is a 63 year old male with a history of cardiac sarcoid followed by Dr. Batres at [facility], chronic systolic heart failure with EF 25% paroxysmal VT followed by [facility] EP, s/p CRT-D, hx of DVT, T2DM and HLD.    Mr. Rhodes was admitted with a several week history of shortness of breath, dizziness, and lightheadedness.  His PCP had increased his metoprolol XL 1 month prior to admission.  Subsequently noted to have low blood pressures, lower extremity edema, orthopnea, and PND.  Recent device check revealed VT treated with ATP 2/2023.  Has been compliant with amiodarone therapy.  Is on prednisone and methotrexate for his active cardiac sarcoid with plan  "for repeat PET 4/18/2023 at [facility].      On admission, he was started on dobutamine (4/14).  Metoprolol has been held.  PO amiodarone was continued. Unfortunately, on 4/15, patient developed VT/VF requiring brief CPR and shocks from his ICD x3.  Dobutamine was discontinued.  He was started on IV amiodarone which has since been transitioned to oral. Request has been made to transfer patient to the  -he was initially accepted and waiting for a bed but given improvement in status, transfer canceled.    IV Lasix was transitioned to IV Bumex 4/19/2023.  BMP today reveals creatinine bump to 1.33.  Symptomatically, patient feels much improved since time of admission.  Denies shortness of breath, orthopnea, PND.  No chest pain or palpitations.  Ongoing lower extremity edema but feels improved per patient.    PHYSICAL EXAM:  Vital signs:  Temp: 98  F (36.7  C) Temp src: Oral BP: 99/65 Pulse: 63   Resp: 16 SpO2: 99 % O2 Device: None (Room air)     Weight: 117.5 kg (259 lb)  Estimated body mass index is 36.12 kg/m  as calculated from the following:    Height as of 3/8/23: 1.803 m (5' 11\").    Weight as of this encounter: 117.5 kg (259 lb).  GEN: well nourished, in no acute distress.  HEENT:  Pupils equal, round. Sclerae nonicteric.   NECK: Supple, no masses appreciated.  Unable to accurately assess JVP secondary to body habitus  C/V:  Regular rate and rhythm, no murmur, rub or gallop.  RESP: Respirations are unlabored. Clear to auscultation bilaterally without wheezing, rales, or rhonchi.  GI: Abdomen soft, nontender.  EXTREM: 2+ bilateral LE edema.  NEURO: Alert and oriented, cooperative.  SKIN: Warm and dry.      Medications     amiodarone Stopped (04/17/23 0902)       amiodarone  400 mg Oral Daily     [Held by provider] atorvastatin  20 mg Oral QPM     bumetanide  2 mg Oral BID     ferrous sulfate  325 mg Oral Daily     folic acid  1 mg Oral Daily     insulin aspart  1-7 Units Subcutaneous TID AC     insulin aspart  1-5 " Units Subcutaneous At Bedtime     levothyroxine  50 mcg Oral QAM AC     lidocaine  1 patch Transdermal Q24h    And     lidocaine   Transdermal Q8H NEVILLE     methotrexate  15 mg Oral Q7 Days     [Held by provider] metoprolol succinate ER  25 mg Oral BID     predniSONE  10 mg Oral Daily     sacubitril-valsartan  1 tablet Oral BID     sodium chloride (PF)  3 mL Intracatheter Q8H     sulfamethoxazole-trimethoprim  1 tablet Oral Daily       Data   Last 24 hours labs reviewed     Tele: APVP with occasional PVCs. Rare and brief NSVT    Echo 4/14/2023:   1. The left ventricle is severely dilated. Left ventricular systolic function  is severely reduced. The visual ejection fraction is 25-30%. Left ventricular  diastolic function is abnormal. There is severe global hypokinesia of the left  ventricle. Septal wall motion abnormality may reflect pacemaker activation.  There is no thrombus seen in the left ventricle.  2. The right ventricle is normal size. There is a catheter/pacemaker lead seen  in the right ventricle. The right ventricular systolic function is normal.  3. There is mild-moderate biatrial enlargement. There is no color Doppler  evidence of an atrial shunt.  4. Trace mitral and tricuspid regurgitation.  5. No pericardial effusion.  6. In comparison to the previous report dated 12/20/2021, the findings are  similar.    Cardiac PET 12/29/2022  Impression:  1. Findings of active cardiac sarcoidosis, which is improved compared  to 6/14/2022 PET/CT.  2. No evidence suggest active systemic sarcoidosis.  3. Sub-5 mm pulmonary nodules within the bilateral lungs, otherwise  the lung parenchyma is unremarkable. Attention follow-up.  4. Compression deformity of L4 vertebral body with mild FDG uptake,  new compared to 6/14/2022 without retropulsion or significant height  loss.

## 2023-04-19 NOTE — DISCHARGE SUMMARY
[facility]  Discharge Summary        Neymar Rhodes MRN# [id number]   YOB: 1960 Age: 63 year old     Date of Admission:  4/13/2023  Date of Discharge:  4/19/2023  Admitting Physician:  Clara Biggs DO  Discharge Physician: Jose Mcnamara MD  Discharging Service: Hospitalist     Primary Provider: Jefry Harris  Primary Care Physician Phone Number: [phone]         Discharge Diagnoses/Problem Oriented Hospital Course (Providers):    Neymar Rhodes was admitted on 4/13/2023 by Clara Biggs DO and I would refer you to their history and physical.  The following problems were addressed during his hospitalization:    Neymar Rhodes is a 63 year old male with hx of HTN, DM2, HFrEF and cardiac sarcoidosis, h/o ICD placement admitted on 4/13/2023 for JOSHI.     VT/VF s/p brief CPR and defibrillation with ICD x3 on 4/15.  Cardiac sarcoidosis  Non ischemia cardiomyopathy   Hx of VT s/p CRT-D  Likely Type 2 NSTEMI  Hypotension   - ECHO noted with EF of 15-24%, was also hypotensive after admission with worsening creatinine and LFTs concerning for cardiogenic shock  - troponins 52---26  - evaluated and followed by cardiology  - Metoprolol held (d/roman for discharge ); was started on dobutamine on admission resulting in VT/VF requiring brief CPR and ICD shocks X 3 on 4/15/23, dobutamine discontinued   - started on amiodarone (switched from IV to PTA  mg daily)  - diuresed with IV Lasix per cardiology, switched to PO BUmex 2 mg BID (starting 2/21) for discharge  - Restarted on Entresto on 4/16 and lower dose of 24/26 mg BID , blood pressure soft but patient largely asymptomatic  - Continue methotrexate and prednisone for cardiac sarcoidosis  - Initially plan was for transfer to [facility] for advanced heart failure service once bed available but on 4/18 Cardiology suggested that he is almost back to baseline and does not need transfer to  [facility]; was given an additional 1 mg IV Bumex 4/18 and switched Lasix to Bumex 2 mg BID (4/19)  - CT chest done on 4/15 showed a few tiny subpleural and fissural nodules otherwise no sequela of sarcoidosis in the chest.      YASMEEN (improving)   Metabolic acidosis  -Cr baseline closer to 1.0-1.2  -on admission creatinine was 1.8, improved and seems stable around baseline; [facility] 1.18 [facility]; then slightly trending up 1.33 (on day of discharge 4/19)  - d/w Cardiology and they suggested to hold Bumex evening of 4 /19 and hold both doses 4/20 and to resume Bumex 2 mg BID starting 4/21  -monitor BMP on follow up     Elevated LFTs-- improved  -Most likely due to cardiogenic shock, transaminases trended down       DM2  - resume Metformin on discharge      Chronic Anemia  Recent baseline ~10. Hb 9 here slightly lower than last value of 9.9 01/2023  -anemia may be contributing to his symptoms but unlikely to be major contributor  -iron study iron 15, iron binding 162, iron sat 9  - started on iron supplement  - Hb stable around 8-9; monitor intermittently     Hypothyroidism  -continue PTA levothyroxine      Code Status: Full Code        Clinically Significant Risk Factors              # Hypoalbuminemia: Lowest albumin = 2.9 g/dL at 4/15/2023  5:44 AM, will monitor as appropriate          # DMII: A1C = 7.1 % (Ref range: <5.7 %) within past 6 months                      Brief Hospital Stay Summary Sent Home With Patient in AVS:        Reason for your hospital stay      You were admitted for evaluation and treatment of heart failure                 Pending Results:        Unresulted Labs Ordered in the Past 30 Days of this Admission     No orders found from 3/14/2023 to 4/14/2023.            Discharge Instructions and Follow-Up:      Follow-up Appointments     Follow-up and recommended labs and tests       Follow up with primary care provider, Jefry Harris, within 7   days for hospital follow- up.  The following  labs/tests are recommended:   repeat CBC and BMP.      Follow-up with Cardiology in 1 to 2 weeks or as suggested.                 Discharge Disposition:      Discharged to home        Discharge Medications:        Current Discharge Medication List      START taking these medications    Details   bumetanide (BUMEX) 2 MG tablet Take 1 tablet (2 mg) by mouth 2 times daily for 30 days  Qty: 60 tablet, Refills: 0    Comments: Future refills by PCP Dr. Jefry Harris with phone number [phone] or Cardiology.  Associated Diagnoses: Non-ischemic cardiomyopathy (H)      ferrous sulfate (FEROSUL) 325 (65 Fe) MG tablet Take 1 tablet (325 mg) by mouth every other day for 30 days (Absorbed best on an empty stomach. If stomach upset occurs, can take with meals.)  Qty: 15 tablet, Refills: 0    Comments: Future refills by PCP Dr. Jefry Harris with phone number [phone].  Associated Diagnoses: Iron deficiency anemia, unspecified iron deficiency anemia type      sacubitril-valsartan (ENTRESTO) 24-26 MG per tablet Take 1 tablet by mouth 2 times daily for 30 days  Qty: 60 tablet, Refills: 0    Comments: Future refills by PCP Dr. Jefry Harris with phone number [phone] or Cardiology.  Associated Diagnoses: Non-ischemic cardiomyopathy (H)         CONTINUE these medications which have NOT CHANGED    Details   !! acetaminophen (TYLENOL) 500 MG tablet Take 1,000 mg by mouth daily as needed for mild pain      !! acetaminophen (TYLENOL) 500 MG tablet Take 1,000 mg by mouth every morning      albuterol (PROAIR HFA/PROVENTIL HFA/VENTOLIN HFA) 108 (90 Base) MCG/ACT Inhaler Inhale 2 puffs into the lungs every 6 hours as needed for shortness of breath or wheezing      amiodarone (PACERONE) 400 MG tablet Take 1 tablet (400 mg) by mouth daily  Qty: 30 tablet, Refills: 3    Associated Diagnoses: Ventricular tachycardia (H)      aspirin 81 MG tablet Take 1 tablet (81 mg) by mouth daily  Qty: 30 tablet     Associated Diagnoses: Essential hypertension, benign      atorvastatin (LIPITOR) 20 MG tablet Take 1 tablet (20 mg) by mouth daily  Qty: 90 tablet, Refills: 3    Comments: PROFILE FOR FUTURE REFILLS UNTIL PATIENT CALLS FOR REFILLS  Associated Diagnoses: Non-ischemic cardiomyopathy (H); On amiodarone therapy      betamethasone dipropionate (DIPROSONE) 0.05 % external cream Apply topically 2 times daily Apply sparingly once or twice per day as needed to affected area until the skin is better, then stop; REPEAT AS NEEDED  Qty: 30 g, Refills: 1    Associated Diagnoses: Psoriasis      clotrimazole (LOTRIMIN) 1 % external cream Apply sparingly once or twice per day as needed to affected area until the skin is better, then stop; REPEAT AS NEEDED  Qty: 30 g, Refills: 0    Associated Diagnoses: Dermatophytosis of body      fenofibrate (TRIGLIDE/LOFIBRA) 160 MG tablet Take 1 tablet (160 mg) by mouth daily  Qty: 90 tablet, Refills: 3    Comments: PROFILE FOR FUTURE REFILLS UNTIL PATIENT CALLS FOR REFILLS  Associated Diagnoses: Type 2 diabetes mellitus with other circulatory complication, with long-term current use of insulin (H); Hyperlipidemia LDL goal <100      folic acid (FOLVITE) 1 MG tablet Take 1 tablet (1 mg) by mouth daily  Qty: 90 tablet, Refills: 3    Associated Diagnoses: Cardiac sarcoidosis; Chronic systolic heart failure (H)      levothyroxine (SYNTHROID/LEVOTHROID) 50 MCG tablet Take 1 tablet (50 mcg) by mouth daily  Qty: 90 tablet, Refills: 1    Comments: PROFILE FOR FUTURE REFILLS UNTIL PATIENT CALLS FOR REFILLS  Associated Diagnoses: Subclinical hypothyroidism      metFORMIN (GLUCOPHAGE XR) 500 MG 24 hr tablet Take 2 tablets (1,000 mg) by mouth 2 times daily (with meals)  Qty: 360 tablet, Refills: 0    Comments: NOTE DOSE CHANGE from regular release metformin; Also discontinue glipizide  Associated Diagnoses: Type 2 diabetes mellitus with other circulatory complication, with long-term current use of insulin  (H)      methotrexate 2.5 MG tablet Take 6 tablets (15 mg) by mouth every 7 days  Qty: 75 tablet, Refills: 3    Associated Diagnoses: Cardiac sarcoidosis; Chronic systolic heart failure (H)      multivitamin, therapeutic (THERA-VIT) TABS tablet Take 1 tablet by mouth daily      predniSONE (DELTASONE) 10 MG tablet Take 1 tablet (10 mg) by mouth daily  Qty: 90 tablet, Refills: 3    Comments: New decreased dose.  Associated Diagnoses: Cardiac sarcoidosis; Chronic systolic heart failure (H)      sulfamethoxazole-trimethoprim (BACTRIM) 400-80 MG tablet Take 1 tablet by mouth daily  Qty: 90 tablet, Refills: 1    Associated Diagnoses: Cardiac sarcoidosis      vitamin C (ASCORBIC ACID) 500 MG tablet Take 500 mg by mouth daily      blood glucose monitoring (ACCU-CHEK LUL PLUS) test strip Use to test blood sugar 1-3 times daily or as directed.  Qty: 100 each, Refills: 11    Associated Diagnoses: Type 2 diabetes mellitus without complication (H)      !! Continuous Blood Gluc Sensor (FREESTYLE JOCELIN 14 DAY SENSOR) [facility] USE SENSOR EVERY 14 DAYS.  Qty: 6 each, Refills: 1    Associated Diagnoses: Type 2 diabetes mellitus without complication, with long-term current use of insulin (H)      !! continuous blood glucose monitoring (FREESTYLE JOCELIN) sensor For use with Freestyle Jocelin Flash  for continuous monitioring of blood glucose levels. Replace sensor every 10 days.  Qty: 3 each, Refills: 3    Associated Diagnoses: Type 2 diabetes mellitus without complication, without long-term current use of insulin (H)       !! - Potential duplicate medications found. Please discuss with provider.      STOP taking these medications       ENTRESTO 49-51 MG per tablet Comments:   Reason for Stopping:         furosemide (LASIX) 20 MG tablet Comments:   Reason for Stopping:         metoprolol succinate ER (TOPROL XL) 25 MG 24 hr tablet Comments:   Reason for Stopping:                 Allergies:         Allergies   Allergen Reactions      No Known Drug Allergies            Consultations This Hospital Stay:      Consultation during this admission received from cardiology and pulmonary medicine        Condition and Physical on Discharge:        Discharge condition: Stable   Vitals: Blood pressure 99/65, pulse 63, temperature 98  F (36.7  C), temperature source Oral, resp. rate 16, weight 117.5 kg (259 lb), SpO2 99 %.     Constitutional: Alert, awake and oriented X 3; resting comfortably in no apparent distress         Oral cavity: Moist mucosa   Cardiovascular: Normal s1 s2, regular rate and rhythm, no murmur; ICD in place    Lungs: B/l clear to auscultation, no wheezes or crepitations   Abdomen: Soft, nt, nd, no guarding, rigidity or rebound; BS +   LE :  bilateral edema +   Musculoskeletal/Neuro Power 5/5 in all extremities; No focal neurological deficits noted   Psychiatry: normal mood and affect               Discharge Time:      Greater than 30 minutes.        Image Results From This Hospital Stay (For Non-EPIC Providers):        Results for orders placed or performed during the hospital encounter of 04/13/23   Chest XR,  PA & LAT    Narrative    CHEST TWO VIEWS   4/13/2023 12:25 PM     HISTORY: Chest pain and shortness of breath with exertion.    COMPARISON: Chest x-ray on 12/3/2021      Impression    IMPRESSION: PA and lateral views of the chest were obtained. Left  chest wall triple lead automatic implantable cardiac defibrillator and  leads are noted. Cardiomediastinal silhouette is within normal limits.  No suspicious focal pulmonary opacities. No significant pleural  effusion or pneumothorax.    DOMINIC WHITE MD         SYSTEM ID:  [id number]   XR Chest Port 1 View    Narrative    EXAM: XR CHEST PORTABLE 1 VIEW  LOCATION: [facility]  DATE/TIME: 04/15/2023, 11:01 AM CDT    INDICATION: PICC placement.  COMPARISON: 04/13/2023.      Impression    IMPRESSION: A right PICC line has been placed, with tip near the SVC/RA  junction. Triple lead cardiac device is again noted. The lungs are clear where visualized. No pneumothorax.     CT Chest w/o Contrast    Narrative    EXAM: CT CHEST WITHOUT CONTRAST  LOCATION: [facility]  DATE/TIME: 04/15/2023, 2:04 PM CDT    INDICATION: Pulmonary sarcoid.  COMPARISON: None.  TECHNIQUE: CT chest without IV contrast. Multiplanar reformats were obtained. Dose reduction techniques were used.  CONTRAST: None.    FINDINGS:   LUNGS AND PLEURA: A few very tiny fissural nodules are noted. No infiltrates or effusions. No fibrosis. 2 mm subpleural nodule posterior left lower lobe image 199 series 4.    MEDIASTINUM/AXILLAE: No adenopathy demonstrated. No aneurysm.    CORONARY ARTERY CALCIFICATION: Severe.    UPPER ABDOMEN: Cirrhotic liver morphology, cholelithiasis without cholecystitis and moderate ascites.    MUSCULOSKELETAL: No frankly destructive bony lesions.      Impression    IMPRESSION:   1.  A few tiny subpleural and fissural nodules are noted, otherwise no sequelae of sarcoidosis demonstrated in the chest.  2.  Cirrhosis and ascites in the upper abdomen.       Echocardiogram Complete     Value    LVEF  25-30%    Narrative    [id number]  [facility]  [id number]  2011^SHY^EDGAR^E     [facility]  Echocardiography Laboratory  [address] [place] [address]     Name: JACI WICK  MRN: [id number]  : 1960  Study Date: 2023 11:44 AM  Age: 63 yrs  Gender: Male  Patient Location: [facility]  Reason For Study: Dyspnea  Ordering Physician: EDGAR BEGUM  Referring Physician: Jefry Harris  Performed By: Tim Raymond     BSA: 2.3 m2  Height: 71 in  Weight: 254 lb  HR: 60  BP: 102/67 mmHg  ______________________________________________________________________________  Procedure  Complete Portable Echo Adult. [employer] (NDC #[id number]) given  intravenously.  ______________________________________________________________________________  Interpretation Summary     1. The left ventricle is severely dilated. Left ventricular systolic function  is severely reduced. The visual ejection fraction is 25-30%. Left ventricular  diastolic function is abnormal. There is severe global hypokinesia of the left  ventricle. Septal wall motion abnormality may reflect pacemaker activation.  There is no thrombus seen in the left ventricle.  2. The right ventricle is normal size. There is a catheter/pacemaker lead seen  in the right ventricle. The right ventricular systolic function is normal.  3. There is mild-moderate biatrial enlargement. There is no color Doppler  evidence of an atrial shunt.  4. Trace mitral and tricuspid regurgitation.  5. No pericardial effusion.  6. In comparison to the previous report dated 12/20/2021, the findings are  similar.  ______________________________________________________________________________  Left Ventricle  The left ventricle is severely dilated. Left ventricular systolic function is  severely reduced. The visual ejection fraction is 25-30%. Left ventricular  diastolic function is abnormal. There is severe global hypokinesia of the left  ventricle. Septal wall motion abnormality may reflect pacemaker activation.  There is no thrombus seen in the left ventricle.     Right Ventricle  The right ventricle is normal size. There is a catheter/pacemaker lead seen in  the right ventricle. The right ventricular systolic function is normal.     Atria  There is mild-moderate biatrial enlargement. There is no color Doppler  evidence of an atrial shunt.     Mitral Valve  The mitral valve leaflets are mildly thickened. There is trace mitral  regurgitation.     Tricuspid Valve  There is trace tricuspid regurgitation. The right ventricular systolic  pressure is approximated at 16.0 mmHg plus the right atrial pressure.     Aortic Valve  There is  mild trileaflet aortic sclerosis. No aortic regurgitation is present.  No aortic stenosis is present.     Pulmonic Valve  There is trace pulmonic valvular regurgitation. There is no pulmonic valvular  stenosis.     Vessels  The aortic root is normal size. Normal size ascending aorta. The inferior vena  cava is normal.     Pericardium  There is no pericardial effusion.     Rhythm  The rhythm was paced.  ______________________________________________________________________________  MMode/2D Measurements & Calculations  IVSd: 1.1 cm     LVIDd: 6.7 cm  LVIDs: 7.0 cm  LVPWd: 1.1 cm  FS: -4.5 %  LV mass(C)d: 334.9 grams  LV mass(C)dI: 143.5 grams/m2  Ao root diam: 3.6 cm  LA dimension: 4.8 cm  asc Aorta Diam: 3.7 cm  LA/Ao: 1.3  LVOT diam: 2.0 cm  LVOT area: 3.0 cm2  LA Volume (BP): 93.7 ml  LA Volume Index (BP): 40.2 ml/m2  RWT: 0.32  TAPSE: 3.2 cm     Doppler Measurements & Calculations  MV E max carlita: 48.0 cm/sec  MV A max carlita: 113.1 cm/sec  MV E/A: 0.42  MV max P.1 mmHg  MV mean P.3 mmHg  MV V2 VTI: 37.7 cm  MV dec slope: 257.1 cm/sec2  MV dec time: 0.19 sec  PA acc time: 0.17 sec  TR max carlita: 200.0 cm/sec  TR max P.0 mmHg  E/E' av.6  Lateral E/e': 13.8  Medial E/e': 9.4     ______________________________________________________________________________  Report approved by: Roma Deleon 2023 04:59 PM                 Most Recent Lab Results In EPIC (For Non-EPIC Providers):    Most Recent 3 CBC's:  Recent Labs   Lab Test 23  0616 23  0521 23  1012   WBC 5.8 6.9 6.9   HGB 8.8* 8.5* 9.0*   MCV 96 96 99    160 164      Most Recent 3 BMP's:  Recent Labs   Lab Test 23  0624 23  0528 23  2153 23  0719 23  0616 23  1804 23  1524 23  0909 23  0521   *  --   --   --  133*  --   --   --  134*   POTASSIUM 4.5  4.5  --   --   --  4.7  --  4.7  --  4.4   CHLORIDE 104  --   --   --  105  --   --   --  105   CO2 19*  --    --   --  21*  --   --   --  17*   BUN 32.5*  --   --   --  29.5*  --   --   --  30.0*   CR 1.33*  --   --   --  1.28*  --   --   --  1.18*   ANIONGAP 11  --   --   --  7  --   --   --  12   EDITH 8.7*  --   --   --  8.5*  --   --   --  8.6*   * 146* 175*   < > 155*   < >  --    < > 174*    < > = values in this interval not displayed.     Most Recent 3 Troponin's:  Recent Labs   Lab Test 10/01/19  2121 10/01/19  1745 10/01/19  1209 06/11/18  0040 06/10/18  2103   TROPI 0.071* 0.075* 0.069*   < >  --    TROPONIN  --   --   --   --  0.07    < > = values in this interval not displayed.     Most Recent 3 INR's:  Recent Labs   Lab Test 10/04/19  0948   INR 1.16*     Most Recent 2 LFT's:  Recent Labs   Lab Test 04/15/23  0544 04/14/23  1246   AST 65* 68*   ALT 48 54*   ALKPHOS 168* 164*   BILITOTAL 0.8 1.2     Most Recent Cholesterol Panel:  Recent Labs   Lab Test 03/28/22  1112   CHOL 97   LDL 44   HDL 34*   TRIG 95     Most Recent 6 Bacteria Isolates From Any Culture (See EPIC Reports for Culture Details):  Recent Labs   Lab Test 12/19/19  1454   CULT Culture negative for acid fast bacilli  Assayed at [facility] [address] [place] [address] [phone]  No growth after 4 weeks  Culture negative after 4 weeks  No Actinomyces species isolated  No growth     Most Recent TSH, T4 and HgbA1c:   Recent Labs   Lab Test 01/24/23  0720 03/28/22  1113 03/14/22  0738   TSH  --   --  4.92*   T4  --   --  1.45   A1C 7.1*   < >  --     < > = values in this interval not displayed.

## 2023-04-19 NOTE — PROGRESS NOTES
Patient seen and examined    - Reports feeling fine, denies any active complaints; leg edema improving  - Cardiology suggest that he is almost back to baseline and might not need transfer to [facility]; was given an additional 1 mg IV Bumex 4/18 and switched Lasix to Bumex 2 mg BID (4/19)  -renal function slightly worsened to creatinine 1.33; d/w Cardiology and suggested to hold Bumex evening of 4 /19 and hold both doses 4/20 and to resume Bumex 2 mg BID starting 4/21  -cardiology cleared for discharge and plan to reschedule Cardiac PET and f/u with DR Batres as patient had missed those appointments during current hospitalization    Discharge home today    Please see discharge summary for details

## 2023-04-19 NOTE — PROGRESS NOTES
Pt denied pain. vitals stable. IV removed w/o s/s of infection. Reviewed/gave d/c instructions including med's, and F/u's. Pt verbalized understanding. Leaving unit with all belongings in wheelchair. New RX filled and given to pt. transport provided by pts spouse.

## 2023-04-19 NOTE — PROGRESS NOTES
Shift Summary      Neuro: A/Ox4. Follows commands and uses his call light for assistance when needed.     Cardiac: Biventricular paced.    Respiratory: LS clear.    GI: Continent of bowel and bladder. Denied nausea.    Musculoskeletal: Up independently in his room.     Pain: Complaint of left chest pain this am. PRN Tylenol given, see the MAR.  A lidoderm patch is in place to his chest as well.     Skin: No skin concerns.     Lines: Triple lumen PICC in place to the right arm.     Discharge: Plans for discharge to home with outpatient services. Possible discharge today.

## 2023-04-19 NOTE — PROGRESS NOTES
A&O x4. VSS on room air. Tele AV paced. Denies CP/SOB/pain. Up independently. PICC in place, red and grey ports working. Unable to flush or draw back with white.

## 2023-04-20 NOTE — PROGRESS NOTES
"Clinic Care Coordination Contact  [facility]: Post-Discharge Note  SITUATION                                                      Admission:    Admission Date: 04/13/23   Reason for Admission: SOB  Discharge:   Discharge Date: 04/19/23  Discharge Diagnosis: Cardiac sarcoidosis    BACKGROUND                                                      Per hospital discharge summary and inpatient provider notes: You were admitted for evaluation and treatment of heart failure              ASSESSMENT           Discharge Assessment  How are you doing now that you are home?: \" I am doing ok I guess \"  How are your symptoms? (Red Flag symptoms escalate to triage hotline per guidelines): Improved  Do you feel your condition is stable enough to be safe at home until your provider visit?: Yes  Does the patient have their discharge instructions? : Yes  Does the patient have questions regarding their discharge instructions? : No  Were you started on any new medications or were there changes to any of your previous medications? : Yes  Does the patient have all of their medications?: Yes  Do you have questions regarding any of your medications? : No  Do you have all of your needed medical supplies or equipment (DME)?  (i.e. oxygen tank, CPAP, cane, etc.): Yes  Discharge follow-up appointment scheduled within 14 calendar days? : Yes  Discharge Follow Up Appointment Date: 04/25/23  Discharge Follow Up Appointment Scheduled with?: Specialty Care Provider    Post-op ([facility] Only)  If the patient had a surgery or procedure, do they have any questions for a nurse?: No             PLAN                                                      Outpatient Plan: Follow-up Appointments     Follow-up and recommended labs and tests       Follow up with primary care provider, Jefry Harris, within 7   days for hospital follow- up.  The following labs/tests are recommended:   repeat CBC and BMP.       Follow-up with Cardiology in 1 to 2 " weeks or as suggested.      Future Appointments   Date Time Provider Department Center   4/25/2023  7:30 AM  LAB [facility]   4/25/2023  8:00 AM  CV DEVICE 1 [facility]   4/25/2023  8:30 AM Roberto Batres MD [facility]   5/2/2023  1:00 PM [id number] CXPETCT [facility]   7/28/2023 12:00 AM  ICD REMOTE [facility]VSV [facility]         For any urgent concerns, please contact our 24 hour nurse triage line: [phone] ([phone])         Shilpa Casanova

## 2023-04-25 PROBLEM — R55 SYNCOPE, UNSPECIFIED SYNCOPE TYPE: Status: ACTIVE | Noted: 2023-01-01

## 2023-04-25 NOTE — LETTER
[facility]  [address]  [place] [address]  Phone: [phone]    April 28, 2023        Neymar Rhodes  [address]  [place] [address]          To whom it may concern:    RE: Neymar Rhodes    This is to inform that Mr Rhodes was admitted to this hospital on 4/25/2023 and is being discharged today.  He will be followed by PCP in 1 week to  further evaluate his progress after discharge and to determine if he is able to resume his work. Please excuse him for this absence from work due to illness.    Please contact me for questions or concerns.      Sincerely,      Sudha Neff MD  Hospitalist  [phone]

## 2023-04-25 NOTE — ED TRIAGE NOTES
"Patient BIBA from [facility] for near syncopal event. Patient states he felt his defibrillator \"buzz/burn\" his brain, then spouse helped patient to ground safely. Patient recently in hospital and had inpatient arrest. Patient feels \"ok\" now , but some general weakness. A&O4     Triage Assessment     Row Name 04/25/23 1549       Respiratory WDL    Respiratory WDL WDL       Cardiac WDL    Cardiac WDL X;rhythm    Cardiac Rhythm Ventricular paced       Cognitive/Neuro/Behavioral WDL    Cognitive/Neuro/Behavioral WDL X  head burning prior to arrival    Level of Consciousness alert    Arousal Level opens eyes spontaneously    Orientation oriented x 4       [place] Coma Scale    Best Eye Response 4-->(E4) spontaneous    Best Motor Response 6-->(M6) obeys commands    Best Verbal Response 5-->(V5) oriented    [place] Coma Scale Score 15              "

## 2023-04-25 NOTE — ED PROVIDER NOTES
"   History     Chief Complaint:  Generalized Weakness and Headache       The history is provided by the patient.      Neymar Rhodes is a 63 year old male with a history of type 2 diabetes, ascending aorta dilatation, CAD, and NSTEMI who presents after suffering a syncopal episode while shopping at [employer]. The patient reports that going to [employer] was the first occasion that he has left his home since his recent discharge from the hospital on 4/19/23. He was recently admitted on 4/13 and during his hospitalization experienced cardiac arrest at which time he required CPR and ICD defibrillation. He explains that today he was standing in line a checkout, holding onto the shopping cart when he said \"oh no\" out loud and began experiencing a sensation in his head that he describes as his \"brain feeling hot\" and \"like a bonfire in his head.\" His wife explained to EMS that he did not fall or hit his head when he experienced a syncopal episode. He explains that an EMT just happened to be nearby and noticed what had occurred and called for EMS. He states that he did not feel his ICD fire. He complains of generalized weakness. He states that he has been eating and drinking well at home. He did not receive any CPR today. He denies experiencing chest pain, shortness of breath, recent fever, cough, or diarrhea.    Independent Historian:   History also independently provided by EMS as noted in HPI.    Review of External Notes: None    ROS:  Review of Systems   Constitutional: Negative for appetite change and fever.   Respiratory: Negative for cough and shortness of breath.    Cardiovascular: Negative for chest pain.   Gastrointestinal: Negative for diarrhea.   Neurological: Positive for syncope and weakness.   All other systems reviewed and are negative.    Allergies:  No known drug allergies    Medications:    Albuterol inhaler  Amiodarone  Aspirin 81 " mg  Atorvastatin  Bumetanide  Fenofibrate  Levothyroxine  Metformin  Methotrexate  Prednisone  Sacubitril-valsartan  Sulfamethoxazole-trimethoprim    Past Medical History:    YASMEEN  Ascending aorta dilatation  CAD  CKD, stage 3  Diverticulosis of large intestine  Dyspnea on exertion  Elevated LFTs  Elevated troponin  Gout  Hyperlipidemia  Hypertension  Left bundle branch block  Long term (current) use of systemic steroids  Mediastinal adenopathy  Morbid obesity  NSTEMI  Near syncope  Non-ischemic cardiomyopathy  Nonrheumatic mitral valve regurgitation  Paroxysmal ventricular tachycardia  Sarcoidosis of lung  Type 2 diabetes mellitus with circulatory disorder, with long-term current use of insulin    Past Surgical History:    Appendectomy  Coronary angiogram  Endobronchial ultrasound, flexible  Heart catheterization, coronary angiogram without pressures  ICD placement  Left heart catheterization  Repair of colovesicular fistula  Right hearet catheterization, measurements recorded     Family History:    Father: Bladder cancer, MI, smoking  Mother: Breast cancer    Social History:  The patient presents to the ED alone.  He arrived via EMS.  PCP: Jefry Harris     Physical Exam     Patient Vitals for the past 24 hrs:   BP Temp Temp src Pulse Resp SpO2   04/25/23 1800 93/61 -- -- 75 18 100 %   04/25/23 1745 -- -- -- 71 13 96 %   04/25/23 1730 (!) 87/58 -- -- 73 22 100 %   04/25/23 1715 -- -- -- 87 22 --   04/25/23 1700 98/62 -- -- 75 24 99 %   04/25/23 1645 104/56 -- -- 78 16 99 %   04/25/23 1546 -- 98.5  F (36.9  C) Oral -- 16 --   04/25/23 1545 -- -- -- -- -- 100 %   04/25/23 1542 100/65 -- -- 85 -- --        Physical Exam  Nursing note and vitals reviewed.  Constitutional:  Appears well-developed and well-nourished.   HENT:   Head:    Atraumatic.   Mouth/Throat:   Oropharynx is clear and moist. No oropharyngeal exudate.   Eyes:    Pupils are equal, round, and reactive to light.   Neck:    Normal range of  motion. Neck supple.      No tracheal deviation present. No thyromegaly present.   Cardiovascular:  Normal rate, regular rhythm, no murmur   Pulmonary/Chest: Breath sounds are clear and equal without wheezes or crackles.  Abdominal:   Soft. Bowel sounds are normal. Exhibits no distension and      no mass. There is no tenderness.      There is no rebound and no guarding.   Musculoskeletal:  Exhibits no edema.   Lymphadenopathy:  No cervical adenopathy.   Neurological:   Alert and oriented to person, place, and time. GCS 15.  CN 2-12 intact.  and proximal upper extremity strength strong and equal.  Bilateral lower extremity strength strong and equal, including strong dorsiflexion and plantarflexion strength.  Sensation intact and equal to the face, arms and legs.  No facial droop or weakness. Normal speech.  Follows commands and answers questions normally.     Skin:    Skin is warm and dry. No rash noted. No pallor.     Emergency Department Course   ECG  ECG taken at 1611, ECG read at 1617  Atrial-sensed ventricular-paced rhythm.   Rate 80 bpm. WV interval 150 ms. QRS duration 174 ms. QT/QTc 508/585 ms. P-R-T axes 28 -67 103.     Imaging:  XR Chest 2 Views   Final Result   IMPRESSION: No acute cardiopulmonary disease. Stable left chest   pacemaker.      KYLAH JOLLY MD            SYSTEM ID:  [id number]         Report per radiology    Laboratory:  Labs Ordered and Resulted from Time of ED Arrival to Time of ED Departure   COMPREHENSIVE METABOLIC PANEL - Abnormal       Result Value    Sodium 133 (*)     Potassium 4.6      Chloride 97 (*)     Carbon Dioxide (CO2) 23      Anion Gap 13      Urea Nitrogen 53.4 (*)     Creatinine 1.97 (*)     Calcium 8.5 (*)     Glucose 224 (*)     Alkaline Phosphatase 294 (*)      (*)     ALT 81 (*)     Protein Total 5.8 (*)     Albumin 3.3 (*)     Bilirubin Total 1.1      GFR Estimate 37 (*)    TROPONIN T, HIGH SENSITIVITY - Abnormal    Troponin T, High Sensitivity 47 (*)    CBC  WITH PLATELETS AND DIFFERENTIAL - Abnormal    WBC Count 7.3      RBC Count 2.81 (*)     Hemoglobin 8.8 (*)     Hematocrit 27.1 (*)     MCV 96      MCH 31.3      MCHC 32.5      RDW 18.1 (*)     Platelet Count 202      % Neutrophils 90      % Lymphocytes 7      % Monocytes 2      % Eosinophils 0      % Basophils 0      % Immature Granulocytes 1      NRBCs per 100 WBC 0      Absolute Neutrophils 6.6      Absolute Lymphocytes 0.5 (*)     Absolute Monocytes 0.1      Absolute Eosinophils 0.0      Absolute Basophils 0.0      Absolute Immature Granulocytes 0.0      Absolute NRBCs 0.0     NT PROBNP INPATIENT - Normal    N terminal Pro BNP Inpatient 819     LACTIC ACID WHOLE BLOOD - Normal    Lactic Acid 1.8        Emergency Department Course & Assessments:    Interventions:  Medications   sodium chloride 0.9% infusion (has no administration in time range)   0.9% sodium chloride BOLUS (0 mLs Intravenous Stopped 4/25/23 1853)   acetaminophen (TYLENOL) tablet 1,000 mg (1,000 mg Oral $Given 4/25/23 1803)        Assessments:  1543 I obtained history and examined the patient.  1852 I rechecked the patient and explained findings.  2104 I spoke again with the patient.    Independent Interpretation (X-rays, CTs, rhythm strip):  None    Consultations/Discussion of Management or Tests:  1759 I spoke in person with Dr. Thompson, Hospitalist, regarding the patient.   1841 I spoke on the phone with Dr. Thompson.  1844 I spoke on the phone with Javier Jean, a [employer] representative, regarding the patient's ICD.   1901 I spoke again on the phone with Dr. Thompson.  1937 I spoke on the phone with Dr. Jm Puentes, Cardiology.  2001 I spoke again on the phone with Javier Jean.  2010 I spoke again on the phone with Dr. Jm Peuntes.    Social Determinants of Health affecting care:   None    Disposition:  The patient was admitted to the hospital under the care of Dr. Thompson.     Impression & Plan      Medical Decision Making:  I  feel that this patient had a cardiac syncopal episode due to ventricular tachycardia.  His pacemaker/defibrillator was interrogated by [email or web address] and the rep reported that he had a 45-second run of ventricular tachycardia at a rate of 170 bpm this afternoon around 2:30 PM that resolved after the third attempt at ATP.  He did not receive a shock.  I felt this was the cause of his syncopal episode, therefore he was admitted to the cardiac telemetry unit under the care of the hospitalist for further cardiac monitoring and cardiology evaluation and I consulted cardiology.  I also considered the possibility of hypovolemia in the differential diagnosis since he has acute kidney injury likely due to overdiuresis.  He has soft blood pressures here so he was cautiously IV fluid hydrated.    Diagnosis:    ICD-10-CM    1. Paroxysmal ventricular tachycardia (H)  I47.29       2. Cardiac related syncope  R55       3. YASMEEN (acute kidney injury) (H)  N17.9       4. Dehydration  E86.0            Scribe Disclosure:  I, Matteo Sandy, am serving as a scribe at 3:59 PM on 4/25/2023 to document services personally performed by Celeste Edwards MD based on my observations and the provider's statements to me.        Celeste Edwards MD  04/25/23 4923

## 2023-04-25 NOTE — PHARMACY-ADMISSION MEDICATION HISTORY
Pharmacist Admission Medication History    Admission medication history is complete. The information provided in this note is only as accurate as the sources available at the time of the update.    Medication reconciliation/reorder completed by provider prior to medication history? No    Information Source(s): Patient, Hospital records and CareEverywhere/SureScripts via in-person    Pertinent Information: Patient recently admitted to the hospital 4/13-4/19 no changes since 4/19/23 discharge.    Changes made to PTA medication list:    Added: None    Deleted: None    Changed: None    Medication Affordability:  Not including over the counter (OTC) medications, was there a time in the past 12 months when you did not take your medications as prescribed because of cost?: No    Allergies reviewed with patient and updates made in EHR: yes    Medication History Completed By: Shyla Chacko formerly [facility] 4/25/2023 6:33 PM    Prior to Admission medications    Medication Sig Last Dose Taking? Auth Provider Long Term End Date   acetaminophen (TYLENOL) 500 MG tablet Take 1,000 mg by mouth daily as needed for mild pain prn at prn Yes Unknown, Entered By History     acetaminophen (TYLENOL) 500 MG tablet Take 1,000 mg by mouth every morning 4/25/2023 at am Yes Reported, Patient     albuterol (PROAIR HFA/PROVENTIL HFA/VENTOLIN HFA) 108 (90 Base) MCG/ACT Inhaler Inhale 2 puffs into the lungs every 6 hours as needed for shortness of breath or wheezing prn at prn Yes Unknown, Entered By History Yes    amiodarone (PACERONE) 400 MG tablet Take 1 tablet (400 mg) by mouth daily 4/25/2023 at am Yes Jayy Dorman MD Yes    aspirin 81 MG tablet Take 1 tablet (81 mg) by mouth daily 4/25/2023 at am Yes Oscar Paul, DO     atorvastatin (LIPITOR) 20 MG tablet Take 1 tablet (20 mg) by mouth daily 4/24/2023 at pm Yes Jefry Harris MD Yes    betamethasone dipropionate (DIPROSONE) 0.05 % external cream Apply topically 2 times daily Apply  sparingly once or twice per day as needed to affected area until the skin is better, then stop; REPEAT AS NEEDED  Patient taking differently: Apply topically 2 times daily as needed Apply sparingly once or twice per day as needed to affected area until the skin is better, then stop; REPEAT AS NEEDED prn at prn Yes Jefry Harris MD     bumetanide (BUMEX) 2 MG tablet Take 1 tablet (2 mg) by mouth 2 times daily for 30 days 4/25/2023 at x1 Yes Jose Mcnamara MD Yes 5/21/23   clotrimazole (LOTRIMIN) 1 % external cream Apply sparingly once or twice per day as needed to affected area until the skin is better, then stop; REPEAT AS NEEDED prn at prn Yes Jefry Harris MD     fenofibrate (TRIGLIDE/LOFIBRA) 160 MG tablet Take 1 tablet (160 mg) by mouth daily 4/25/2023 at am Yes Jefry Harris MD Yes    ferrous sulfate (FEROSUL) 325 (65 Fe) MG tablet Take 1 tablet (325 mg) by mouth every other day for 30 days (Absorbed best on an empty stomach. If stomach upset occurs, can take with meals.) 4/24/2023 Yes Jose Mcnamara MD  5/19/23   folic acid (FOLVITE) 1 MG tablet Take 1 tablet (1 mg) by mouth daily 4/25/2023 Yes Roberto Batres MD     levothyroxine (SYNTHROID/LEVOTHROID) 50 MCG tablet Take 1 tablet (50 mcg) by mouth daily 4/25/2023 at am Yes Jefry Harris MD Yes    metFORMIN (GLUCOPHAGE XR) 500 MG 24 hr tablet Take 2 tablets (1,000 mg) by mouth 2 times daily (with meals) 4/25/2023 at x1 Yes Jefry Harris MD Yes    methotrexate 2.5 MG tablet Take 6 tablets (15 mg) by mouth every 7 days 4/22/2023 Yes Roberto Batres MD Yes    multivitamin, therapeutic (THERA-VIT) TABS tablet Take 1 tablet by mouth daily 4/25/2023 at am Yes Unknown, Entered By History     predniSONE (DELTASONE) 10 MG tablet Take 1 tablet (10 mg) by mouth daily 4/25/2023 at am Yes Roberto Batres MD     sacubitril-valsartan (ENTRESTO) 24-26 MG per tablet Take 1 tablet by mouth 2 times daily for 30 days  4/25/2023 at x1 Yes Jose Mcnamara MD Yes 5/19/23   sulfamethoxazole-trimethoprim (BACTRIM) 400-80 MG tablet Take 1 tablet by mouth daily 4/25/2023 at am Yes Roberto Batres MD     vitamin C (ASCORBIC ACID) 500 MG tablet Take 500 mg by mouth daily 4/25/2023 at am Yes Unknown, Entered By History     blood glucose monitoring (ACCU-CHEK LUL PLUS) test strip Use to test blood sugar 1-3 times daily or as directed.  Patient not taking: Reported on 12/26/2022   Jefry Harris MD     Continuous Blood Gluc Sensor (FREESTYLE JEFFREY 14 DAY SENSOR) [facility] USE SENSOR EVERY 14 DAYS.   Mitchell Joseph MD     continuous blood glucose monitoring (FREESTYLE JEFFREY) sensor For use with Freestyle Jeffrey Flash  for continuous monitioring of blood glucose levels. Replace sensor every 10 days.   Jefry Harris MD

## 2023-04-26 NOTE — H&P
[facility]    History and Physical - Hospitalist Service       Date of Admission:  4/25/2023    Assessment & Plan      Neymar Rhodes is a 63 year old male admitted on 4/25/2023. He hx of HTN, DM2, HFrEF and cardiac sarcoidosis, h/o ICD placement admitted on 4/25/2023 for syncopal episode  Patient was admitted from 4/13/2023 to 4/19/2023 for dyspnea on exertion.  During his hospitalization patient had an episode of V. tach/V-fib, tach and is s/p brief CPR and defibrillation.    #Syncope  #Recent cardiac arrest VT/VF s/p brief CPR and defibrillation with ICD x3 on 4/15.  # Hx of VT s/p CRT-D  #Hypotension  Patient has a history of nonischemic cardiomyopathy.  Recent echo showed an EF of 15 to 24%.  During his previous admission patient  was started on dobutamine on admission for cardiogenic shock resulting in VT/VF requiring brief CPR and ICD shocks X 3 on 4/15/23,.  Patient was started on amiodarone and switch to p.o.400mg daily and was sent home on Bumex 2 mg twice daily  Patient had a syncopal episode today.  Pacemaker interrogation showed an episode of V. Tach  Cardiology consulted from the ER.  Chest x-ray shows no pulmonary congestion.  N-terminal pro     -Admit to inpatient /cardiac floor  -Likely hypotension due to dehydration because of diuresis.  Patient mentating well, patient perfusing well with warm extremities with normal lactic acid and less likely cardiogenic shock   patient blood pressure improved with fluids.  -Will  hold diuretics  -Potassium and magnesium replacement protocol  -Continue  methotrexate and prednisone for cardiac sarcoidosis  -Hold entresto  -Continue aspirin  -Hold bumex  -Continue amiodarone 400mg daily     #Acute kidney injury  -Likely due to diuresis  Creatinine on admission 1.97  -Cr baseline closer to 1.0-1.2.  Patient was discharged on Bumex 2 mg twice daily and will hold diuresis and recheck BMP tomorrow.  Received 500 mL of bolus fluid in  the ED and maintenance fluid  -Hold entresto  -Hold bactrim ( PCP prophylaxis on chronic steriods)  -Hold bumex  -Hold metformin      # DM2  - Hold  Metformin on admission  -POC ACHS  -Hypoglycemia protocol        #Elevated LFTs  Could be due to Sarcoidosis ? Vs methotrexate   -Elevation of alkaline phosphatase 294,  and ALT 81  -Less likely due to shock liver and will continue to monitor        #Troponin elevation likely demand ischemia.  No anginal symptoms  Will repeat troponins      #Mild hyponatremia  -Sodium 133 on admission.  Likely hypovolemic hyponatremia.  Recheck sodium in the morning.      #Hypothyroidism  Continue PTA levothyroxine     Diet: Regular Diet Adult    DVT Prophylaxis: Pneumatic Compression Devices  Villa Catheter: Not present  Lines: None     Cardiac Monitoring: None  Code Status:  Full code    Clinically Significant Risk Factors Present on Admission              # Hypoalbuminemia: Lowest albumin = 3.3 g/dL at 4/25/2023  3:53 PM, will monitor as appropriate    # Acute Kidney Injury, unspecified: based on a >150% or 0.3 mg/dL increase in last creatinine compared to past 90 day average, will monitor renal function   # Chronic systolic heart failure: echo within the past year with EF <40%     # DMII: A1C = N/A within past 6 months            Disposition Plan      Expected Discharge Date: 04/27/2023                  Zenia Thompson MD  Hospitalist Service  [facility]  Securely message with If[employer] (more info)  Text page via [facility] Paging/Directory     ______________________________________________________________________    Chief Complaint   Syncope    History is obtained from the patient    History of Present Illness   Neymar Rhodes is a 63 year old male who   He hx of HTN, DM2, HFrEF and cardiac sarcoidosis, h/o ICD placement admitted on 4/25/2023 for syncopal episode  Patient was admitted from 4/13/2023 to 4/19/2023 for dyspnea on exertion.   During his hospitalization patient had an episode of V. tach/V-fib, tach and is s/p brief CPR and defibrillation.    Patient today went to [facility], this was the first time he stayed outside since his discharge on 4/19/2023.  Patient suddenly felt hot and had a syncopal episode.  He did not lose consciousness.  ICD did not fire.  Regained consciousness quickly and was brought to the emergency room by EMS.  Patient says that he has been feeling well.  Denies any chest pain, increased shortness of breath or swelling in his lower extremities.  No episodes of ICD firing.  No worsening shortness of breath.  Denies any diarrhea, burning during urination, bleeding in stools.      Past Medical History    Past Medical History:   Diagnosis Date     Ascending aorta dilatation (H)      Diverticulosis of colon (without mention of hemorrhage)      Essential hypertension, benign      Gout, unspecified      LBBB (left bundle branch block)      Morbid obesity (H)      Non-ischemic cardiomyopathy (H)      Nonrheumatic mitral valve regurgitation      NSTEMI (non-ST elevated myocardial infarction) (H)     cardiac cath 6/2018: mild non-obstructive CAD     Other and unspecified hyperlipidemia      Paroxysmal ventricular tachycardia      Type II or unspecified type diabetes mellitus without mention of complication, not stated as uncontrolled        Past Surgical History   Past Surgical History:   Procedure Laterality Date     CV CORONARY ANGIOGRAM N/A 10/2/2019    Procedure: Coronary Angiogram;  Surgeon: Kathy Almaguer MD;  Location: [facility] CARDIAC CATH LAB     CV LEFT HEART CATH N/A 10/2/2019    Procedure: Left Heart Cath;  Surgeon: Kathy Almaguer MD;  Location: [facility] CARDIAC CATH LAB     CV RIGHT HEART CATH MEASUREMENTS RECORDED N/A 10/2/2019    Procedure: Right Heart Cath;  Surgeon: Kathy Almaguer MD;  Location: [facility] CARDIAC CATH LAB     ENDOBRONCHIAL ULTRASOUND FLEXIBLE N/A 12/19/2019    Procedure:  Flexible bronchoscopy, endobronchial ultrasound, bronchial alveolar lavage;  Surgeon: Ranulfo Barcenas MD;  Location: [facility] OR     EP ICD N/A 10/4/2019    Procedure: EP ICD;  Surgeon: Jayy Dorman MD;  Location:  HEART CARDIAC CATH LAB     EP LEAD PLCMNT LV NEW ICD N/A 10/4/2019    Procedure: EP Lead Plcmnt LV New ICD;  Surgeon: Jayy Dorman MD;  Location:  HEART CARDIAC CATH LAB     HEART CATH CORONARY ANGIOGRAM WITHOUT PRESSURES  06/10/2018    normal coronary angiogram, nothing more than 10%     SURGICAL HISTORY OF -   2001    repair of colovesicular fistula     ZZC APPENDECTOMY  1980's       Prior to Admission Medications   Prior to Admission Medications   Prescriptions Last Dose Informant Patient Reported? Taking?   Continuous Blood Gluc Sensor (FREESTYLE JOCELIN 14 DAY SENSOR) MISC  Self No No   Sig: USE SENSOR EVERY 14 DAYS.   acetaminophen (TYLENOL) 500 MG tablet 4/25/2023 at am Self Yes Yes   Sig: Take 1,000 mg by mouth every morning   acetaminophen (TYLENOL) 500 MG tablet prn at prn Self Yes Yes   Sig: Take 1,000 mg by mouth daily as needed for mild pain   albuterol (PROAIR HFA/PROVENTIL HFA/VENTOLIN HFA) 108 (90 Base) MCG/ACT Inhaler prn at prn Self Yes Yes   Sig: Inhale 2 puffs into the lungs every 6 hours as needed for shortness of breath or wheezing   amiodarone (PACERONE) 400 MG tablet 4/25/2023 at am Self No Yes   Sig: Take 1 tablet (400 mg) by mouth daily   aspirin 81 MG tablet 4/25/2023 at am Self No Yes   Sig: Take 1 tablet (81 mg) by mouth daily   atorvastatin (LIPITOR) 20 MG tablet 4/24/2023 at pm Self No Yes   Sig: Take 1 tablet (20 mg) by mouth daily   betamethasone dipropionate (DIPROSONE) 0.05 % external cream prn at prn Self No Yes   Sig: Apply topically 2 times daily Apply sparingly once or twice per day as needed to affected area until the skin is better, then stop; REPEAT AS NEEDED   Patient taking differently: Apply topically 2 times daily as needed Apply sparingly once or twice per  day as needed to affected area until the skin is better, then stop; REPEAT AS NEEDED   blood glucose monitoring (ACCU-CHEK LUL PLUS) test strip  Self No No   Sig: Use to test blood sugar 1-3 times daily or as directed.   Patient not taking: Reported on 12/26/2022   bumetanide (BUMEX) 2 MG tablet 4/25/2023 at x1 Self No Yes   Sig: Take 1 tablet (2 mg) by mouth 2 times daily for 30 days   clotrimazole (LOTRIMIN) 1 % external cream prn at prn Self No Yes   Sig: Apply sparingly once or twice per day as needed to affected area until the skin is better, then stop; REPEAT AS NEEDED   continuous blood glucose monitoring (FREESTYLE JOCELIN) sensor  Self No No   Sig: For use with Freestyle Jocelin Flash  for continuous monitioring of blood glucose levels. Replace sensor every 10 days.   fenofibrate (TRIGLIDE/LOFIBRA) 160 MG tablet 4/25/2023 at am Self No Yes   Sig: Take 1 tablet (160 mg) by mouth daily   ferrous sulfate (FEROSUL) 325 (65 Fe) MG tablet 4/24/2023 Self No Yes   Sig: Take 1 tablet (325 mg) by mouth every other day for 30 days (Absorbed best on an empty stomach. If stomach upset occurs, can take with meals.)   folic acid (FOLVITE) 1 MG tablet 4/25/2023 Self No Yes   Sig: Take 1 tablet (1 mg) by mouth daily   levothyroxine (SYNTHROID/LEVOTHROID) 50 MCG tablet 4/25/2023 at am Self No Yes   Sig: Take 1 tablet (50 mcg) by mouth daily   metFORMIN (GLUCOPHAGE XR) 500 MG 24 hr tablet 4/25/2023 at x1 Self No Yes   Sig: Take 2 tablets (1,000 mg) by mouth 2 times daily (with meals)   methotrexate 2.5 MG tablet 4/22/2023 Self No Yes   Sig: Take 6 tablets (15 mg) by mouth every 7 days   multivitamin, therapeutic (THERA-VIT) TABS tablet 4/25/2023 at am Self Yes Yes   Sig: Take 1 tablet by mouth daily   predniSONE (DELTASONE) 10 MG tablet 4/25/2023 at am Self No Yes   Sig: Take 1 tablet (10 mg) by mouth daily   sacubitril-valsartan (ENTRESTO) 24-26 MG per tablet 4/25/2023 at x1 Self No Yes   Sig: Take 1 tablet by mouth 2  times daily for 30 days   sulfamethoxazole-trimethoprim (BACTRIM) 400-80 MG tablet 4/25/2023 at am Self No Yes   Sig: Take 1 tablet by mouth daily   vitamin C (ASCORBIC ACID) 500 MG tablet 4/25/2023 at am Self Yes Yes   Sig: Take 500 mg by mouth daily      Facility-Administered Medications: None           Physical Exam   Vital Signs: Temp: 98.5  F (36.9  C) Temp src: Oral BP: 116/68 Pulse: 81   Resp: 19 SpO2: 100 %      Weight: 0 lbs 0 oz    Physical Exam  HENT:      Head: Normocephalic and atraumatic.   Cardiovascular:      Rate and Rhythm: Normal rate.      Heart sounds: No murmur heard.  Pulmonary:      Effort: Pulmonary effort is normal. No respiratory distress.      Breath sounds: Normal breath sounds. No wheezing.   Abdominal:      General: There is no distension.      Palpations: Abdomen is soft.   Musculoskeletal:      Right lower leg: No edema.      Left lower leg: No edema.   Skin:     General: Skin is warm.   Neurological:      Mental Status: He is alert and oriented to person, place, and time.       Medical Decision Making       Data     I have personally reviewed the following data over the past 24 hrs:    7.3  \   8.8 (L)   / 202     133 (L) 97 (L) 53.4 (H) /  224 (H)   4.6 23 1.97 (H) \       ALT: 81 (H) AST: 104 (H) AP: 294 (H) TBILI: 1.1   ALB: 3.3 (L) TOT PROTEIN: 5.8 (L) LIPASE: N/A       Trop: 47 (H) BNP: 819       Procal: N/A CRP: N/A Lactic Acid: 1.8         Imaging results reviewed over the past 24 hrs:   Recent Results (from the past 24 hour(s))   XR Chest 2 Views    Narrative    CHEST 2 VIEWS   4/25/2023 4:09 PM     HISTORY: Check for CHF. Syncope.    COMPARISON: Chest x-ray 4/15/2023.      Impression    IMPRESSION: No acute cardiopulmonary disease. Stable left chest  pacemaker.    KYLAH JOLLY MD         SYSTEM ID:  [id number]

## 2023-04-26 NOTE — ED NOTES
"./.  [facility]  ED Nurse Handoff Report    ED Chief complaint: Generalized Weakness and Headache      ED Diagnosis:   Final diagnoses:   Syncope, unspecified syncope type       Code Status: Full Code    Allergies:   Allergies   Allergen Reactions     No Known Drug Allergy        Patient Story: Patient BIBA from [facility] for near syncopal event. Patient states he felt his defibrillator \"buzz/burn\" his brain, then spouse helped patient to ground safely. Patient recently in hospital and had inpatient arrest. Patient feels \"ok\" now , but some general weakness. A&O4  Focused Assessment:  AxO 4, RA, SBA, Low BP's, Denies pain, Wife at Bedside    Treatments and/or interventions provided: Labs, PPM interrogation, Xray, EKG   Patient's response to treatments and/or interventions: WNL    To be done/followed up on inpatient unit:  N/a    Does this patient have any cognitive concerns?: None    Activity level - Baseline/Home:  Independent  Activity Level - Current:   Stand with Assist    Patient's Preferred language: English   Needed?: No    Isolation: None  Infection: Not Applicable  Patient tested for COVID 19 prior to admission: NO  Bariatric?: No    Vital Signs:   Vitals:    04/25/23 1715 04/25/23 1730 04/25/23 1745 04/25/23 1800   BP:  (!) 87/58  93/61   Pulse: 87 73 71 75   Resp: 22 22 13 18   Temp:       TempSrc:       SpO2:  100% 96% 100%       Cardiac Rhythm:Cardiac Rhythm: Ventricular paced    Was the PSS-3 completed:   Yes  What interventions are required if any?                 For the majority of the shift this patient's behavior was Green.   Behavioral interventions performed were None.    ED NURSE PHONE NUMBER: [phone]        "
Bed: ED04  Expected date:   Expected time:   Means of arrival:   Comments:  432    
Interrogation was sent. Waiting for rep to call back. Multiple tries for troubleshooting interrogation device, and connection.   
Pacemaker interrogated.   
Patient independently ambulated to the restroom. Patient denied dizziness or light-headedness.  
Re-interrogated pacemaker.   
Writer spoke with rep for [employer], noted that the issue seemed to be fixed. Clarified with the rep that they were able to see that the information for the patient was received and should be faxed shortly. She noted that if we needed any more help to call back. Rep number noted as [phone].   
Wheelchair

## 2023-04-26 NOTE — PROGRESS NOTES
[facility]    Hospitalist Progress Note    Interval History   Patient is awake and alert.  No acute events overnight.  Has chest discomfort from his recent CPR and back pain from spinal fracture from a fall a while ago.  No more episodes of presyncope or syncope at this time.    -Data reviewed today: I reviewed all new labs and imaging results over the last 24 hours. I personally reviewed the chest x-ray image(s) showing No acute events.  Stable left chest pacemaker..    Physical Exam   Temp: 98.1  F (36.7  C) Temp src: Oral BP: 97/59 Pulse: 66   Resp: 16 SpO2: 97 % O2 Device: None (Room air)    There were no vitals filed for this visit.  Vital Signs with Ranges  Temp:  [98.1  F (36.7  C)-98.5  F (36.9  C)] 98.1  F (36.7  C)  Pulse:  [64-87] 66  Resp:  [13-29] 16  BP: ()/(55-68) 97/59  SpO2:  [96 %-100 %] 97 %  I/O last 3 completed shifts:  In: 120 [P.O.:120]  Out: 700 [Urine:700]    Physical Exam      Medications       amiodarone  400 mg Oral Daily     atorvastatin  20 mg Oral Daily     fenofibrate  160 mg Oral Daily     ferrous sulfate  325 mg Oral Every Other Day     folic acid  1 mg Oral Daily     insulin aspart  1-7 Units Subcutaneous TID AC     insulin aspart  1-5 Units Subcutaneous At Bedtime     levothyroxine  50 mcg Oral Daily     predniSONE  10 mg Oral Daily     sodium chloride (PF)  3 mL Intracatheter Q8H       Data   Recent Labs   Lab 04/26/23  0758 04/26/23  0551 04/26/23  0244 04/25/23  1553   WBC  --  6.1  --  7.3   HGB  --  8.6*  --  8.8*   MCV  --  96  --  96   PLT  --  187  --  202   NA  --  135*  --  133*   POTASSIUM  --  4.1  --  4.6   CHLORIDE  --  102  --  97*   CO2  --  24  --  23   BUN  --  51.4*  --  53.4*   CR  --  1.61*  --  1.97*   ANIONGAP  --  9  --  13   EDITH  --  8.5*  --  8.5*   * 163* 156* 224*   ALBUMIN  --  3.1*  --  3.3*   PROTTOTAL  --  5.4*  --  5.8*   BILITOTAL  --  1.1  --  1.1   ALKPHOS  --  236*  --  294*   ALT  --  71*  --  81*   AST   --  88*  --  104*       Recent Results (from the past 24 hour(s))   XR Chest 2 Views    Narrative    CHEST 2 VIEWS   4/25/2023 4:09 PM     HISTORY: Check for CHF. Syncope.    COMPARISON: Chest x-ray 4/15/2023.      Impression    IMPRESSION: No acute cardiopulmonary disease. Stable left chest  pacemaker.    KYLAH JOLLY MD         SYSTEM ID:  [id number]         Assessment & Plan      Neymar Rhodes is a 63 year old male admitted on 4/25/2023. He hx of HTN, DM2, HFrEF and cardiac sarcoidosis, h/o ICD placement admitted on 4/25/2023 for syncopal episode  Patient was admitted from 4/13/2023 to 4/19/2023 for dyspnea on exertion.  During his hospitalization patient had an episode of V. tach/V-fib, tach and is s/p brief CPR and defibrillation.     #Syncope  #Recent cardiac arrest VT/VF s/p brief CPR and defibrillation with ICD x3 on 4/15.  # Hx of VT s/p CRT-D  #Hypotension  #Repeat episode of 45-second run of V. tach at rate of 170 bpm on 4/25/2023 around 2:30 PM with 3 attempts antitachycardia pacing.  Patient did not receive a shock..  Resolved after the third attempt.  Patient has a history of nonischemic cardiomyopathy.  Recent echo showed an EF of 15 to 24%.  During his previous admission patient  was started on dobutamine on admission for cardiogenic shock resulting in VT/VF requiring brief CPR and ICD shocks X 3 on 4/15/23,.  Patient was started on amiodarone and switch to p.o.400mg daily and was sent home on Bumex 2 mg twice daily  - Cardiology consulted from the ER.  Chest x-ray shows no pulmonary congestion.    - N-terminal pro   -EP team consulted.     -Admit to inpatient /cardiac floor  -Likely hypotension due to dehydration because of diuresis.  Patient mentating well, patient perfusing well with warm extremities with normal lactic acid and less likely cardiogenic shock   patient blood pressure improved with fluids.  -Will  hold diuretics  -Potassium and magnesium replacement protocol  -Continue    prednisone for cardiac sarcoidosis  -Hold entresto  -Continue aspirin  -Hold bumex  -Continue amiodarone 400mg daily      #Acute kidney injury  -Likely due to diuresis  Creatinine on admission 1.97  -Cr baseline closer to 1.0-1.2.  Patient was discharged on Bumex 2 mg twice daily.  Received 500 mL of bolus fluid in the ED and maintenance fluid  -Hold entresto  -Hold bactrim ( PCP prophylaxis on chronic steriods)  -Hold bumex  -Hold metformin  -Repeat BMP on 4/26/2023 shows improvement of creatinine down to 1.16.    -Will await cardiology recommendations prior to restarting diuretics.     # DM2  - Hold  Metformin on admission  -POC ACHS  -Hypoglycemia protocol        #Elevated LFTs  Could be due to Sarcoidosis ? Vs methotrexate   -Elevation of alkaline phosphatase 294,  and ALT 81  -Repeat LFT on 4/26/2023 slowly improving.     #Troponin elevation likely demand ischemia.  No anginal symptoms  Troponin trend flat.        #Mild hyponatremia  -Sodium 133 on admission.  Likely hypovolemic hyponatremia.  Recheck sodium improving to 135        #Hypothyroidism  Continue PTA levothyroxine     Musculoskeletal chest discomfort from CPR during previous admission  -Lidocaine patch ordered.     Diet: Regular Diet Adult    DVT Prophylaxis: Pneumatic Compression Devices  Villa Catheter: Not present  Lines: None     Cardiac Monitoring: None    Clinically Significant Risk Factors Present on Admission              # Hypoalbuminemia: Lowest albumin = 3.1 g/dL at 4/26/2023  5:51 AM, will monitor as appropriate      # Chronic systolic heart failure: echo within the past year with EF <40%     # DMII: A1C = N/A within past 6 months             DVT Prophylaxis: Pneumatic Compression Devices  Code Status: Prior  Disposition: Expected discharge when medically stable    Jannette Simpson MD, MD  [phone](p)

## 2023-04-26 NOTE — CONSULTS
Electrophysiology/ Cardiology- Consult Note         H&P and Plan:     Reason for consult: Ventricular tachycardia.       History of present illness: Patient is a pleasant 63-year old gentleman with a history of hypertension, hyperlipidemia, diabetes, previous DVT, and heart failure secondary to nonischemic cardiomyopathy/sarcoidosis (CRT-D implantation), who presents with syncope.    Patient was recently admitted in the hospital with CHF exacerbation (4/14 - 6/19).  During hospital stay, he required dobutamine drip, and hospital stay was complicated by recurrent episodes of VT/VF (received 3 ICD shocks).  Prior to discharge, he was started on amiodarone and Entresto.    Yesterday, he had 1 episode of syncope without warning.  He came to the ED for evaluation.  In the ED, he was found to be dehydrated with acute renal failure (creatinine 1.9).  Diuretics were held.  Device technician showed an episode of VT and EP was controlled with management.    Today, he informs he is doing well.  He denies any sense of chest pain, shortness of breath or lightheadedness.    I reviewed the device interrogation.  Patient had 1 episode of monomorphic VT with cycle length around 330-350 ms.  He received 3 rounds of ATP, which finally terminated tachycardia.  The total episode lasted for about 40 seconds.     ECG on admission (personally reviewed):  Sinus rhythm with biventricular pacing.      Labs on admission (personally reviewed):  CBC/BMP: Creatinine is 1.61.  Hemoglobin 8.6. . GFR of 47.  LFTs:  ALT 71, AST 88  Troponin: 44.    Previous studies:  -Echo (4/14/2023): .  EF of 25-30%. Trace MR/TR.      Plan:  1.  Syncope in the setting of VT and dehydration.  I had an extensive discussion with patient.  He was recently loaded with amiodarone, and has not completed the amiodarone loading protocol.  Syncopal episode happened during an episode of VT which was prolonged (40 seconds) and in the setting of dehydration.  I believe the VT  burden will improve after completion of amiodarone load.  Additionally, VT cycle length may decrease and become more tolerable over time.  Therefore, we decided not to make any adjustments on tach therapies at this time.    I recommend increase amiodarone to 400 mg twice daily for additional 2 days, then decrease to 400 mg daily for a week, and then 200 mg daily.    Patient should follow-up with cardiology group at [facility].    We will sign off.  Please call with questions.    2.  Heart failure.  Renal function is improving and he appears to be close to euvolemia.  Okay to resume diuretics.    Bacilio Irvin MD    Clinically Significant Risk Factors Present on Admission           # Hypomagnesemia: Lowest Mg = 1.6 mg/dL in last 2 days, will replace as needed   # Hypoalbuminemia: Lowest albumin = 3.1 g/dL at 4/26/2023  5:51 AM, will monitor as appropriate      # DMII: A1C = N/A within past 6 months    Cardiovascular: Cardiac Arrhythmia: Ventricular tachycardia     Fluid & Electrolyte Disorders: Dehydration        Nephrology: Acute kidney failure, unspecified    Limitations of activities/Fatigue (optional): Chronic Fatigue and Other Debilities: Other reduced mobility         Physical Exam:  Vitals: BP 97/59   Pulse 66   Temp 98.1  F (36.7  C) (Oral)   Resp 16   SpO2 97%       Intake/Output Summary (Last 24 hours) at 4/26/2023 0834  Last data filed at 4/26/2023 0639  Gross per 24 hour   Intake 120 ml   Output 700 ml   Net -580 ml     There were no vitals filed for this visit.    Constitutional:  AAO x3.  Pt is in NAD.  HEAD: Normocephalic.  SKIN: Skin normal color, texture and turgor with no lesions or eruptions.  Eyes: PERRL, EOMI.  ENT:  Supple, normal JVP. No lymphadenopathy or thyroid enlargement.  Chest:  CTAB.  Cardiac:  RRR, normal  S1 and S2.  No murmurs rubs or gallop.    Abdomen:  Normal BS.  Soft, non-tender and non-distended.  No rebound or guarding.    Extremities:  Pedious pulses  palpable B/L.  No LE edema noticed.   Neurological: Strength and sensation grossly symmetric and intact throughout.         Review of Systems:  Complete review of system is otherwise negative with the exception of what was described above.     CURRENT MEDICATIONS:    amiodarone  400 mg Oral Daily     atorvastatin  20 mg Oral Daily     fenofibrate  160 mg Oral Daily     ferrous sulfate  325 mg Oral Every Other Day     folic acid  1 mg Oral Daily     insulin aspart  1-7 Units Subcutaneous TID AC     insulin aspart  1-5 Units Subcutaneous At Bedtime     levothyroxine  50 mcg Oral Daily     lidocaine  1 patch Transdermal Q24H     lidocaine   Transdermal Q8H NEVILLE     predniSONE  10 mg Oral Daily     sodium chloride (PF)  3 mL Intracatheter Q8H     PRN Meds: acetaminophen, albuterol, glucose **OR** dextrose **OR** glucagon, glucose **OR** dextrose **OR** glucagon, lidocaine 4%, lidocaine (buffered or not buffered), melatonin, sodium chloride (PF)    ALLERGIES     Allergies   Allergen Reactions     No Known Drug Allergy        PAST MEDICAL HISTORY:  Past Medical History:   Diagnosis Date     Ascending aorta dilatation (H)      Diverticulosis of colon (without mention of hemorrhage)      Essential hypertension, benign      Gout, unspecified      LBBB (left bundle branch block)      Morbid obesity (H)      Non-ischemic cardiomyopathy (H)      Nonrheumatic mitral valve regurgitation      NSTEMI (non-ST elevated myocardial infarction) (H)     cardiac cath 6/2018: mild non-obstructive CAD     Other and unspecified hyperlipidemia      Paroxysmal ventricular tachycardia      Type II or unspecified type diabetes mellitus without mention of complication, not stated as uncontrolled        PAST SURGICAL HISTORY:  Past Surgical History:   Procedure Laterality Date     CV CORONARY ANGIOGRAM N/A 10/2/2019    Procedure: Coronary Angiogram;  Surgeon: Kathy Almaguer MD;  Location:  [facility]ARD[facility] CATH [facility]     CV LEFT HEART CATH  N/A 10/2/2019    Procedure: Left Heart Cath;  Surgeon: Kathy Almaguer MD;  Location:  [facility] CATH LAB     CV RIGHT HEART CATH MEASUREMENTS RECORDED N/A 10/2/2019    Procedure: Right Heart Cath;  Surgeon: Kathy Almaguer MD;  Location:  HEART CARDIAC CATH LAB     ENDOBRONCHIAL ULTRASOUND FLEXIBLE N/A 12/19/2019    Procedure: Flexible bronchoscopy, endobronchial ultrasound, bronchial alveolar lavage;  Surgeon: Ranulfo Barcenas MD;  Location: [facility] OR     EP ICD N/A 10/4/2019    Procedure: EP ICD;  Surgeon: Jayy Dorman MD;  Location:  HEART CARDIAC CATH LAB     EP LEAD PLCMNT LV NEW ICD N/A 10/4/2019    Procedure: EP Lead Plcmnt LV New ICD;  Surgeon: Jayy Dorman MD;  Location:  HEART CARDIAC CATH LAB     HEART CATH CORONARY ANGIOGRAM WITHOUT PRESSURES  06/10/2018    normal coronary angiogram, nothing more than 10%     SURGICAL HISTORY OF -   2001    repair of colovesicular fistula     ZZC APPENDECTOMY  1980's       FAMILY HISTORY:  The patient's family history was reviewed and is non-contributory for heart disease.    SOCIAL HISTORY:  Social History     Socioeconomic History     Marital status:    Tobacco Use     Smoking status: Never     Smokeless tobacco: Never   Vaping Use     Vaping status: Never Used   Substance and Sexual Activity     Alcohol use: Yes     Comment: once awhile     Drug use: No     Sexual activity: Yes     Partners: Female         Recent Lab Results:  Recent Labs   Lab 04/26/23  0758 04/26/23  0551 04/26/23  0244 04/25/23  1553   WBC  --  6.1  --  7.3   HGB  --  8.6*  --  8.8*   MCV  --  96  --  96   PLT  --  187  --  202   NA  --  135*  --  133*   POTASSIUM  --  4.1  --  4.6   CHLORIDE  --  102  --  97*   CO2  --  24  --  23   BUN  --  51.4*  --  53.4*   CR  --  1.61*  --  1.97*   ANIONGAP  --  9  --  13   EDITH  --  8.5*  --  8.5*   * 163* 156* 224*   ALBUMIN  --  3.1*  --  3.3*   PROTTOTAL  --  5.4*  --  5.8*   BILITOTAL  --  1.1  --  1.1    ALKPHOS  --  236*  --  294*   ALT  --  71*  --  81*   AST  --  88*  --  104*

## 2023-04-26 NOTE — PROGRESS NOTES
RECEIVING UNIT ED HANDOFF REVIEW    ED Nurse Handoff Report was reviewed by: Ophelia Morrissey on April 26, 2023 at 1:28 PM

## 2023-04-27 NOTE — PLAN OF CARE
A&Ox4. /60 (BP Location: Right arm)   Pulse 63   Temp 98.2  F (36.8  C) (Oral)   Resp 16   Wt 113.2 kg (249 lb 9.6 oz)   SpO2 99%   BMI 34.81 kg/m   Tele 100% AV paced. Independent in room. PRN Tylenol given for back pain. Scheduled lidocaine patch to chest from CPR pain. Loading with PO Amiodarone. Plan for possible discharge today.

## 2023-04-27 NOTE — PLAN OF CARE
Alert and oriented x 4. VS stable, on RA and some complaints of chronic back pain. He took Tylenol x 1 with moderate pain relief. Plan to continue Amiodarone load and monitor cardiac status.

## 2023-04-27 NOTE — PROGRESS NOTES
EP- Cardiology Progress Note           Assessment and Plan:     63-yo M with a Hx of HTN, HL, DM, previous DVT, and CHF sec to NI-CMP /sarcoidosis (CRT-D implantation), who p/w syncope he was found to have VT treated with ATP therapy.    Events: Patient had an episode of nonsustained VT (HR around 150 bpm).  He was asymptomatic and tachycardia self terminated..      Previous studies:  -Echo (4/14/2023): .  EF of 25-30%. Trace MR/TR.       Plan:  1.    Monomorphic VT.  Continue therapy with amiodarone.  I recommend the following:   -Amiodarone to 400 mg twice daily for additional 7 days, then decrease to 400 mg daily for a week, and then 200 mg daily.  2.  Heart failure.  Renal function continues to improve.     Bacilio Irvin MD      Physical Exam:  Vitals: /68 (BP Location: Right arm)   Pulse 67   Temp 98.5  F (36.9  C) (Oral)   Resp 16   Wt 113.2 kg (249 lb 9.6 oz)   SpO2 99%   BMI 34.81 kg/m        Intake/Output Summary (Last 24 hours) at 4/27/2023 1246  Last data filed at 4/27/2023 0843  Gross per 24 hour   Intake 240 ml   Output --   Net 240 ml     Vitals:    04/26/23 0937 04/27/23 0535   Weight: 117.5 kg (259 lb) 113.2 kg (249 lb 9.6 oz)       Constitutional:  AAO x3.  Pt is in NAD.  HEAD: Normocephalic.  SKIN: Skin normal color, texture and turgor with no lesions or eruptions.  Eyes: PERRL, EOMI.  ENT:  Supple, normal JVP. No lymphadenopathy or thyroid enlargement.  Chest:  CTAB.  Cardiac:  RRR, normal  S1 and S2.  No murmurs rubs or gallop.    Abdomen:  Normal BS.  Soft, non-tender and non-distended.  No rebound or guarding.    Extremities:  Pedious pulses palpable B/L.  No LE edema noticed.   Neurological: Strength and sensation grossly symmetric and intact throughout.                            Review of Systems:   As per subjective, otherwise 5 systems reviewed and negative.         Medications:          acetaminophen  1,000 mg Oral QAM     amiodarone  400 mg Oral BID     aspirin  81 mg Oral  Daily     atorvastatin  20 mg Oral Daily     [START ON 4/28/2023] bumetanide  2 mg Oral BID     fenofibrate  160 mg Oral Daily     ferrous sulfate  325 mg Oral Every Other Day     folic acid  1 mg Oral Daily     insulin aspart  1-7 Units Subcutaneous TID AC     insulin aspart  1-5 Units Subcutaneous At Bedtime     levothyroxine  50 mcg Oral Daily     lidocaine  1 patch Transdermal Q24H     lidocaine   Transdermal Q8H NEVILLE     predniSONE  10 mg Oral Daily     sodium chloride (PF)  3 mL Intracatheter Q8H     PRN Meds: acetaminophen, albuterol, glucose **OR** dextrose **OR** glucagon, lidocaine 4%, lidocaine (buffered or not buffered), melatonin, sodium chloride (PF)             Data:     Recent Labs   Lab 04/27/23  1230 04/27/23  0717 04/27/23  0639 04/27/23  0213 04/26/23  0758 04/26/23  0551 04/26/23  0244 04/25/23  1553   WBC  --   --   --   --   --  6.1  --  7.3   HGB  --   --   --   --   --  8.6*  --  8.8*   MCV  --   --   --   --   --  96  --  96   PLT  --   --   --   --   --  187  --  202   NA  --   --   --   --   --  135*  --  133*   POTASSIUM  --   --  4.1  --   --  4.1  --  4.6   CHLORIDE  --   --   --   --   --  102  --  97*   CO2  --   --   --   --   --  24  --  23   BUN  --   --   --   --   --  51.4*  --  53.4*   CR  --   --  1.25*  --   --  1.61*  --  1.97*   ANIONGAP  --   --   --   --   --  9  --  13   EDITH  --   --   --   --   --  8.5*  --  8.5*   * 148*  --  165*   < > 163*   < > 224*   ALBUMIN  --   --   --   --   --  3.1*  --  3.3*   PROTTOTAL  --   --   --   --   --  5.4*  --  5.8*   BILITOTAL  --   --   --   --   --  1.1  --  1.1   ALKPHOS  --   --   --   --   --  236*  --  294*   ALT  --   --   --   --   --  71*  --  81*   AST  --   --   --   --   --  88*  --  104*    < > = values in this interval not displayed.

## 2023-04-27 NOTE — PROGRESS NOTES
[facility]  Hospitalist Progress Note    Interval History      Chart reviewed and evaluated patient this morning.patient is sitting up in the chair, denies increasing shortness of breath.  He feels legs are more swollen and will not be able to put on his shoes or compression stocking.  Patient had run of wide-complex tachycardia this morning but was asymptomatic.  Patient reports each anterior chest wall is sore from recent CPR.  No increasing pain.      Assessment & Plan      Neymar Rhodes is a 63 year old male admitted on 4/25/2023. He hx of HTN, DM2, HFrEF and cardiac sarcoidosis, h/o ICD placement admitted on 4/25/2023 for syncopal episode  Patient was admitted from 4/13/2023 to 4/19/2023 for dyspnea on exertion.  During his hospitalization patient had an episode of V. tach/V-fib, tach and is s/p brief CPR and defibrillation.     #Syncope  #Recent cardiac arrest VT/VF s/p brief CPR and defibrillation with ICD x3 on 4/15.  # Hx of VT s/p CRT-D  #Hypotension  #Repeat episode of V. Tach.  Had 45-second run at rate of 170 bpm on 4/25/2023 around 2:30 PM with 3 attempts of ATP. Patient did not receive a shock. Resolved after the third attempt. Patient has a history of nonischemic cardiomyopathy.  Recent echo showed an EF of 15 to 24%.  During his previous admission patient  was started on dobutamine on admission for cardiogenic shock resulting in VT/VF requiring brief CPR and ICD shocks X 3 on 4/15/23.  Patient was started on amiodarone and switched to p.o.400mg daily and was sent home on Bumex 2 mg twice daily. Chest x-ray on admission shows no pulmonary congestion.   proBNP 819.     - EP consulted.  Discussed with Dr. Irvin 4/27.  Given ongoing wide-complex tachycardia, recommended continuing amiodarone loading with 400 mg twice daily for 1 more week, then daily for a week and then 200 Mg daily  - Hypotension likely due to dehydration from overdiuresis.  Patient mentating  well/perfusing well with warm extremities with normal lactic acid. Un likely cardiogenic shock. Received IVF at presentation, and BP normal.   - Diuretic held on admission, resuming now, follow BMP. Close BP monitoring.    -Potassium and magnesium replacement protocol  -Continue prednisone for cardiac sarcoidosis  -Holding PTA Entresto, if creatinine continues to improve, resume in a.m.    -Continue aspirin   -Continue amiodarone 400mg daily   - Lymphedema consult placed.     #Acute kidney injury: Creatinine on admission 1.97.  Baseline closer to 1-1 0.2.  On Bumex 2 Mg twice daily and Entresto as well.  -Likely due to diuresis.  Was hypotensive as well.  -Received 500 cc NS bolus in ER and was placed on maintenance IVF which has been discontinued.  -Leg edema has worsened.  No increased dyspnea.  -Creatinine has improved to 1.6, follow daily  -Resume PTA Bumex  -Resume Entresto in a.m. if creatinine stable/continues to improve  -Holding Bactrim and metformin.  Resume if creatinine continues to improve.      # DM2  - Hold  Metformin given YASMEEN  - POC ACHS  -Hypoglycemia protocol  - ISS      #Elevated LFTs  Could be due to Sarcoidosis ? Vs methotrexate.  Could be related to liver congestion from CHF.  -Elevation of alkaline phosphatase 294,  and ALT 81  -Repeat LFT on 4/26/2023 slowly improving.     #Troponin elevation likely demand ischemia.  No anginal symptoms  Troponin trend flat.     #Mild hyponatremia  -Sodium 133 on admission.  Likely hypovolemic hyponatremia.  Recheck sodium improving to 135.     #Hypothyroidism  -Continue PTA levothyroxine     Musculoskeletal chest discomfort from CPR during previous admission  -Lidocaine patch ordered.     Diet: Regular Diet Adult    DVT Prophylaxis: Pneumatic Compression Devices  Villa Catheter: Not present    Clinically Significant Risk Factors            # Hypomagnesemia: Lowest Mg = 1.6 mg/dL in last 2 days, will replace as needed   # Hypoalbuminemia: Lowest  albumin = 3.1 g/dL at 4/26/2023  5:51 AM, will monitor as appropriate           # DMII: A1C = N/A within past 6 months, PRESENT ON ADMISSION           DVT Prophylaxis: Pneumatic Compression Devices  Code Status: Full Code  Disposition: Expected discharge: Anticipate 1-2 more days if creatinine continues to improve and tolerates diuretic resumed    Sudha Neff MD  Hospitalist       -Data reviewed today: I reviewed all new labs and imaging results over the last 24 hours. I personally reviewed the EKG tracing showing WCT at 150 this am, reviewed with EP    Physical Exam   Temp: 98.5  F (36.9  C) Temp src: Oral BP: 110/68 Pulse: 67   Resp: 16 SpO2: 99 % O2 Device: None (Room air)    Vitals:    04/26/23 0937 04/27/23 0535   Weight: 117.5 kg (259 lb) 113.2 kg (249 lb 9.6 oz)     Vital Signs with Ranges  Temp:  [97.4  F (36.3  C)-98.5  F (36.9  C)] 98.5  F (36.9  C)  Pulse:  [63-77] 67  Resp:  [12-43] 16  BP: ()/(52-68) 110/68  SpO2:  [97 %-100 %] 99 %  I/O last 3 completed shifts:  In: 120 [P.O.:120]  Out: -     Physical Exam    General: AAOx3, up in chair, very pleasant, appears comfortable.  HEENT: PERRLA EOMI. Mucosa moist.   Lungs: Bilateral equal air entry. Clear to auscultation, normal work of breathing.   CVS: S1S2 regular, no tachycardia or murmur.   Abdomen: Soft, obese/distended, nontender.    MSK: Bilateral marked lymphedema   Neuro: AAOX3. CN 2-12 normal. Strength symmetrical.  Skin: No rash.       Medications       amiodarone  400 mg Oral BID     atorvastatin  20 mg Oral Daily     fenofibrate  160 mg Oral Daily     ferrous sulfate  325 mg Oral Every Other Day     folic acid  1 mg Oral Daily     insulin aspart  1-7 Units Subcutaneous TID AC     insulin aspart  1-5 Units Subcutaneous At Bedtime     levothyroxine  50 mcg Oral Daily     lidocaine  1 patch Transdermal Q24H     lidocaine   Transdermal Q8H NEVILLE     predniSONE  10 mg Oral Daily     sodium chloride (PF)  3 mL Intracatheter Q8H       Data    Recent Labs   Lab 04/27/23  0717 04/27/23  0639 04/27/23  0213 04/26/23  2101 04/26/23  0758 04/26/23  0551 04/26/23  0244 04/25/23  1553   WBC  --   --   --   --   --  6.1  --  7.3   HGB  --   --   --   --   --  8.6*  --  8.8*   MCV  --   --   --   --   --  96  --  96   PLT  --   --   --   --   --  187  --  202   NA  --   --   --   --   --  135*  --  133*   POTASSIUM  --  4.1  --   --   --  4.1  --  4.6   CHLORIDE  --   --   --   --   --  102  --  97*   CO2  --   --   --   --   --  24  --  23   BUN  --   --   --   --   --  51.4*  --  53.4*   CR  --   --   --   --   --  1.61*  --  1.97*   ANIONGAP  --   --   --   --   --  9  --  13   EDITH  --   --   --   --   --  8.5*  --  8.5*   *  --  165* 293*   < > 163*   < > 224*   ALBUMIN  --   --   --   --   --  3.1*  --  3.3*   PROTTOTAL  --   --   --   --   --  5.4*  --  5.8*   BILITOTAL  --   --   --   --   --  1.1  --  1.1   ALKPHOS  --   --   --   --   --  236*  --  294*   ALT  --   --   --   --   --  71*  --  81*   AST  --   --   --   --   --  88*  --  104*    < > = values in this interval not displayed.       No results found for this or any previous visit (from the past 24 hour(s)).

## 2023-04-28 NOTE — PLAN OF CARE
Lymphedema: Orders received. Chart reviewed and discussed with care team. Inpatient lymphedema not indicated due to patient d/c'ing home today.? Will benefit from OP lymphedema evaluation.? Will complete orders.

## 2023-04-28 NOTE — PLAN OF CARE
A&Ox4, denies CP or SOB. Tele remains 100% AV Paced. Pt with 48 beats of VT this am, asymptomatic. Bumex restarted with good output. OT to place lymph wraps to BLE likely tomorrow. Offered to place ace wraps today, pt declined. Up independently in room. Plan to continue amiodarone load, likely discharge home tomorrow.

## 2023-04-28 NOTE — DISCHARGE SUMMARY
[facility]  Hospitalist Discharge Summary      Date of Admission:  4/25/2023  Date of Discharge:  4/28/2023  Discharging Provider: Sudha Neff MD  Discharge Service: Hospitalist Service    Discharge Diagnoses     Please refer to the hospital course below    Follow-ups Needed After Discharge   Follow-up Appointments     Add follow up information to the AVS prior to printing      Follow-up and recommended labs and tests       Follow up with primary care provider, Jefry Harris, within 7   days to evaluate medication change, to evaluate treatment change, for   hospital follow- up, regarding new diagnosis, and to follow up on results.    The following labs/tests are recommended: BMP in a week.               Unresulted Labs Ordered in the Past 30 Days of this Admission     No orders found from 3/26/2023 to 4/26/2023.      These results will be followed up by None    Discharge Disposition   Discharged to home  Condition at discharge: Stable      Hospital Course      Chart reviewed and evaluated patient this morning.patient is sitting up in the chair, denies increasing shortness of breath.  He feels legs are more swollen and will not be able to put on his shoes or compression stocking.  Patient had run of wide-complex tachycardia this morning but was asymptomatic.  Patient reports each anterior chest wall is sore from recent CPR.  No increasing pain.        Assessment & Plan      Neymar Rhodes is a 63 year old male admitted on 4/25/2023. He hx of HTN, DM2, HFrEF and cardiac sarcoidosis, h/o ICD placement admitted on 4/25/2023 for syncopal episode  Patient was admitted from 4/13/2023 to 4/19/2023 for dyspnea on exertion.  During his hospitalization patient had an episode of V. tach/V-fib, tach and is s/p brief CPR and defibrillation.     #Syncope  #Recent cardiac arrest VT/VF s/p brief CPR and defibrillation with ICD x3 on 4/15.  # Hx of VT s/p CRT-D  #Hypotension  #Repeat  episode of V. Tach.  Had 45-second run at rate of 170 bpm on 4/25/2023 around 2:30 PM with 3 attempts of ATP. Patient did not receive a shock. Resolved after the third attempt. Patient has a history of nonischemic cardiomyopathy.  Recent echo showed an EF of 15 to 24%.  During his previous admission patient  was started on dobutamine on admission for cardiogenic shock resulting in VT/VF requiring brief CPR and ICD shocks X 3 on 4/15/23.  Patient was started on amiodarone and switched to p.o.400mg daily and was sent home on Bumex 2 mg twice daily. Chest x-ray on admission shows no pulmonary congestion.   proBNP 819.     - EP consulted.  Discussed with Dr. Irvin and given ongoing wide-complex tachycardia, recommended continuing amiodarone loading with 400 mg twice daily for 1 more week, then daily for a week and then 200 Mg daily  - Hypotension likely due to dehydration from overdiuresis.  Patient mentating well/perfusing well with warm extremities with normal lactic acid. Un likely cardiogenic shock. Received IVF at presentation, and BP normal.   - Diuretic held on admission, resuming now, follow BMP. Close BP monitoring.    -Potassium and magnesium replacement protocol  -Continue prednisone for cardiac sarcoidosis  - PTA Entresto held, Cr improved and BP stable although SBPs , Reviewed with EP and resumed at discharge  - Continue aspirin    - Lymphedema consulted although not seen here, patient wears compression stockings at home.  Lymphedema follow up as outpatient     #Acute kidney injury: Creatinine on admission 1.97.  Baseline closer to 1-1.2.  On Bumex 2 Mg twice daily and Entresto as well.  -Likely due to diuresis.  Was hypotensive as well.  -Received 500 cc NS bolus in ER and was placed on maintenance IVF which has been discontinued.  -Leg edema has worsened but no increased dyspnea.  -Creatinine has improved to 1.2 which is near his baseline.  -Resumed PTA Bumex  -Entresto as above  -Bactrim and  metformin held but given improvement in creatinine, resumed at discharge.  Needs close follow-up of his BMP      # DM2 HbA1c 7.1 in January/2023  - Metformin held on admission given YASMEEN  - Managed with ISS while in hospital.  With improvement in creatinine, metformin resumed.      #Elevated LFTs  Could be due to Sarcoidosis ? Vs methotrexate.  Could be related to liver congestion from CHF.  -Elevation of alkaline phosphatase 294,  and ALT 81  -Repeat LFT   improved     #Troponin elevation likely demand ischemia.  No anginal symptoms  Troponin trend flat.     #Mild hyponatremia  -Sodium this stay remained at 133-135.     #Hypothyroidism  -Continued with PTA levothyroxine     Musculoskeletal chest discomfort from CPR during previous admission  -Lidocaine patch ordered.    Consultations This Hospital Stay   CARDIOLOGY IP CONSULT  ELECTROPHYSIOLOGY IP CONSULT  LYMPHEDEMA THERAPY IP CONSULT    Code Status   Full Code    Time Spent on this Encounter   I, Sudha Neff MD, personally saw the patient today and spent greater than 30 minutes discharging this patient.       Sudha Neff MD  [facility]  [address] [place] [address], [address]  [facility] [address]  Phone: [phone]  ______________________________________________________________________    Physical Exam   Vital Signs: Temp: (!) 96.6  F (35.9  C) Temp src: Axillary BP: 103/58 Pulse: 65   Resp: 18 SpO2: 98 % O2 Device: None (Room air)    Weight: 248 lbs 11.2 oz    General: AAOx3, up in chair, very pleasant, appears comfortable.  HEENT: PERRLA EOMI. Mucosa moist.   Lungs: Bilateral equal air entry. Clear to auscultation, normal work of breathing.   CVS: S1S2 regular, no tachycardia or murmur.   Abdomen: Soft, obese/distended, nontender.    MSK: Bilateral marked lymphedema   Neuro: AAOX3. CN 2-12 normal. Strength symmetrical.  Skin: No rash.        Primary Care Physician   Jefry Harris    Discharge Orders       Reason for your hospital stay    Nonsustained Ventricular tachycardia.  Acute kidney injury.     Follow-up and recommended labs and tests     Follow up with primary care provider, Jefry Harris, within 7 days to evaluate medication change, to evaluate treatment change, for hospital follow- up, regarding new diagnosis, and to follow up on results.  The following labs/tests are recommended: BMP in a week.     Activity    Your activity upon discharge: activity as tolerated     Monitor and record    Weigh yourself every morning     When to contact your care team    Contact your care team If symptoms get worse, If increased shortness of breath, If waking up at night with difficulty breathing, If unable to lie down for sleep due to symptoms, If weight gain of 2 pounds a day for 2 days in a row OR 5 pounds in 1 week., Increased swelling in your ankles or legs, dizziness and lightheadedness.     Discharge Follow Up - with Primary Care clinic within 3-5 days (RN to schedule prior to d/c for HE/Entira primary care     Add follow up information to the AVS prior to printing     Diet    Follow this diet upon discharge:        Low salt and moderate carb consistent Diet Adult, fluid restriction to 1800 ML per day       Significant Results and Procedures   Most Recent 3 CBC's:Recent Labs   Lab Test 04/26/23  0551 04/25/23  1553 04/18/23  0616   WBC 6.1 7.3 5.8   HGB 8.6* 8.8* 8.8*   MCV 96 96 96    202 167     Most Recent 3 BMP's:Recent Labs   Lab Test 04/28/23  0712 04/28/23  0650 04/27/23  2110 04/27/23  0717 04/27/23  0639 04/26/23  0758 04/26/23  0551 04/26/23  0244 04/25/23  1553   NA  --  134*  --   --   --   --  135*  --  133*   POTASSIUM  --  3.9  --   --  4.1  --  4.1  --  4.6   CHLORIDE  --  101  --   --   --   --  102  --  97*   CO2  --  22  --   --   --   --  24  --  23   BUN  --  35.7*  --   --   --   --  51.4*  --  53.4*   CR  --  1.21*  --   --  1.25*  --  1.61*  --  1.97*   ANIONGAP  --  11  --    --   --   --  9  --  13   EDITH  --  8.8  --   --   --   --  8.5*  --  8.5*   * 165* 245*   < >  --    < > 163*   < > 224*    < > = values in this interval not displayed.     Most Recent 2 LFT's:Recent Labs   Lab Test 04/26/23  0551 04/25/23  1553   AST 88* 104*   ALT 71* 81*   ALKPHOS 236* 294*   BILITOTAL 1.1 1.1     Most Recent 3 BNP's:Recent Labs   Lab Test 04/25/23  1553 04/13/23  1424 04/13/23  1012 01/24/23  0720 06/21/22  1000 03/28/22  1113   NTBNPI 819 450 810  --   --   --    NTBNP  --   --   --  444 431* 402*   ,   Results for orders placed or performed during the hospital encounter of 04/25/23   XR Chest 2 Views    Narrative    CHEST 2 VIEWS   4/25/2023 4:09 PM     HISTORY: Check for CHF. Syncope.    COMPARISON: Chest x-ray 4/15/2023.      Impression    IMPRESSION: No acute cardiopulmonary disease. Stable left chest  pacemaker.    KYLAH JOLLY MD         SYSTEM ID:  [id number]       Discharge Medications   Current Discharge Medication List      CONTINUE these medications which have CHANGED    Details   amiodarone (PACERONE) 200 MG tablet 400 mg PO every 12 hours for 1 week then 400 mg PO daily for 1 week then continue at 200 mg PO daily.  Qty: 56 tablet, Refills: 0    Comments: Future refills by PCP Dr. Jefry Harris with phone number [phone].  Associated Diagnoses: Ventricular tachycardia (H)         CONTINUE these medications which have NOT CHANGED    Details   !! acetaminophen (TYLENOL) 500 MG tablet Take 1,000 mg by mouth daily as needed for mild pain      !! acetaminophen (TYLENOL) 500 MG tablet Take 1,000 mg by mouth every morning      albuterol (PROAIR HFA/PROVENTIL HFA/VENTOLIN HFA) 108 (90 Base) MCG/ACT Inhaler Inhale 2 puffs into the lungs every 6 hours as needed for shortness of breath or wheezing      aspirin 81 MG tablet Take 1 tablet (81 mg) by mouth daily  Qty: 30 tablet    Associated Diagnoses: Essential hypertension, benign      atorvastatin (LIPITOR) 20 MG tablet Take 1  tablet (20 mg) by mouth daily  Qty: 90 tablet, Refills: 3    Comments: PROFILE FOR FUTURE REFILLS UNTIL PATIENT CALLS FOR REFILLS  Associated Diagnoses: Non-ischemic cardiomyopathy (H); On amiodarone therapy      betamethasone dipropionate (DIPROSONE) 0.05 % external cream Apply topically 2 times daily Apply sparingly once or twice per day as needed to affected area until the skin is better, then stop; REPEAT AS NEEDED  Qty: 30 g, Refills: 1    Associated Diagnoses: Psoriasis      bumetanide (BUMEX) 2 MG tablet Take 1 tablet (2 mg) by mouth 2 times daily for 30 days  Qty: 60 tablet, Refills: 0    Comments: Future refills by PCP Dr. Jefry Harris with phone number [phone] or Cardiology.  Associated Diagnoses: Non-ischemic cardiomyopathy (H)      clotrimazole (LOTRIMIN) 1 % external cream Apply sparingly once or twice per day as needed to affected area until the skin is better, then stop; REPEAT AS NEEDED  Qty: 30 g, Refills: 0    Associated Diagnoses: Dermatophytosis of body      fenofibrate (TRIGLIDE/LOFIBRA) 160 MG tablet Take 1 tablet (160 mg) by mouth daily  Qty: 90 tablet, Refills: 3    Comments: PROFILE FOR FUTURE REFILLS UNTIL PATIENT CALLS FOR REFILLS  Associated Diagnoses: Type 2 diabetes mellitus with other circulatory complication, with long-term current use of insulin (H); Hyperlipidemia LDL goal <100      ferrous sulfate (FEROSUL) 325 (65 Fe) MG tablet Take 1 tablet (325 mg) by mouth every other day for 30 days (Absorbed best on an empty stomach. If stomach upset occurs, can take with meals.)  Qty: 15 tablet, Refills: 0    Comments: Future refills by PCP Dr. Jefry Harris with phone number [phone].  Associated Diagnoses: Iron deficiency anemia, unspecified iron deficiency anemia type      folic acid (FOLVITE) 1 MG tablet Take 1 tablet (1 mg) by mouth daily  Qty: 90 tablet, Refills: 3    Associated Diagnoses: Cardiac sarcoidosis; Chronic systolic heart failure (H)       levothyroxine (SYNTHROID/LEVOTHROID) 50 MCG tablet Take 1 tablet (50 mcg) by mouth daily  Qty: 90 tablet, Refills: 1    Comments: PROFILE FOR FUTURE REFILLS UNTIL PATIENT CALLS FOR REFILLS  Associated Diagnoses: Subclinical hypothyroidism      metFORMIN (GLUCOPHAGE XR) 500 MG 24 hr tablet Take 2 tablets (1,000 mg) by mouth 2 times daily (with meals)  Qty: 360 tablet, Refills: 0    Comments: NOTE DOSE CHANGE from regular release metformin; Also discontinue glipizide  Associated Diagnoses: Type 2 diabetes mellitus with other circulatory complication, with long-term current use of insulin (H)      methotrexate 2.5 MG tablet Take 6 tablets (15 mg) by mouth every 7 days  Qty: 75 tablet, Refills: 3    Associated Diagnoses: Cardiac sarcoidosis; Chronic systolic heart failure (H)      multivitamin, therapeutic (THERA-VIT) TABS tablet Take 1 tablet by mouth daily      predniSONE (DELTASONE) 10 MG tablet Take 1 tablet (10 mg) by mouth daily  Qty: 90 tablet, Refills: 3    Comments: New decreased dose.  Associated Diagnoses: Cardiac sarcoidosis; Chronic systolic heart failure (H)      sacubitril-valsartan (ENTRESTO) 24-26 MG per tablet Take 1 tablet by mouth 2 times daily for 30 days  Qty: 60 tablet, Refills: 0    Comments: Future refills by PCP Dr. Jefry Harris with phone number [phone] or Cardiology.  Associated Diagnoses: Non-ischemic cardiomyopathy (H)      sulfamethoxazole-trimethoprim (BACTRIM) 400-80 MG tablet Take 1 tablet by mouth daily  Qty: 90 tablet, Refills: 1    Associated Diagnoses: Cardiac sarcoidosis      vitamin C (ASCORBIC ACID) 500 MG tablet Take 500 mg by mouth daily      blood glucose monitoring (ACCU-CHEK LUL PLUS) test strip Use to test blood sugar 1-3 times daily or as directed.  Qty: 100 each, Refills: 11    Associated Diagnoses: Type 2 diabetes mellitus without complication (H)      !! Continuous Blood Gluc Sensor (FREESTYLE JOCELIN 14 DAY SENSOR) [facility] USE SENSOR EVERY 14 DAYS.  Qty:  6 each, Refills: 1    Associated Diagnoses: Type 2 diabetes mellitus without complication, with long-term current use of insulin (H)      !! continuous blood glucose monitoring (FREESTYLE JOCELIN) sensor For use with Freestyle Jocelin Flash  for continuous monitioring of blood glucose levels. Replace sensor every 10 days.  Qty: 3 each, Refills: 3    Associated Diagnoses: Type 2 diabetes mellitus without complication, without long-term current use of insulin (H)       !! - Potential duplicate medications found. Please discuss with provider.        Allergies   Allergies   Allergen Reactions     No Known Drug Allergy

## 2023-04-28 NOTE — PROGRESS NOTES
Discharged to home with wife. C/O chest pain from CPR - lidocaine patches. Left via WC. Mike BEDOLLA removed PIV.     Follow up arranged with PCP per care coordinator. Meds filled here for pt. All paper work reviewed with pt and wife. All belongings with pt on discharge

## 2023-04-28 NOTE — PROVIDER NOTIFICATION
Dr Irvin paged for Dr Neff for verification of entresto dose. Dr Irvin ok for pt to discharge as long as tolerating.

## 2023-04-28 NOTE — PROGRESS NOTES
EP- Cardiology Progress Note           Assessment and Plan:     63-yo M with a Hx of HTN, HL, DM, previous DVT, and CHF sec to NI-CMP /sarcoidosis (CRT-D implantation), who p/w syncope he was found to have VT treated with ATP therapy.    Events: No events overnight.     Previous studies:  -Echo (4/14/2023): .  EF of 25-30%. Trace MR/TR.       Plan:  1.    Monomorphic VT.  Continue therapy with amiodarone (as detailed previously).    2.  Heart failure.  Euvolemic on physical exam.     We will sign off.    Bacilio Irvin MD      Physical Exam:  Vitals: /58 (BP Location: Right arm)   Pulse 65   Temp (!) 96.6  F (35.9  C) (Axillary)   Resp 18   Wt 112.8 kg (248 lb 11.2 oz)   SpO2 98%   BMI 34.69 kg/m        Intake/Output Summary (Last 24 hours) at 4/28/2023 0941  Last data filed at 4/28/2023 0831  Gross per 24 hour   Intake 360 ml   Output --   Net 360 ml     Vitals:    04/26/23 0937 04/27/23 0535 04/28/23 0600   Weight: 117.5 kg (259 lb) 113.2 kg (249 lb 9.6 oz) 112.8 kg (248 lb 11.2 oz)       Constitutional:  AAO x3.  Pt is in NAD.  HEAD: Normocephalic.  SKIN: Skin normal color, texture and turgor with no lesions or eruptions.  Eyes: PERRL, EOMI.  ENT:  Supple, normal JVP. No lymphadenopathy or thyroid enlargement.  Chest:  CTAB.  Cardiac:  RRR, normal  S1 and S2.  No murmurs rubs or gallop.   Abdomen:  Normal BS.  Soft, non-tender and non-distended.  No rebound or guarding.    Extremities:  Pedious pulses palpable B/L.  No LE edema noticed.   Neurological: Strength and sensation grossly symmetric and intact throughout.                            Review of Systems:   As per subjective, otherwise 5 systems reviewed and negative.         Medications:          acetaminophen  1,000 mg Oral QAM     amiodarone  400 mg Oral BID     aspirin  81 mg Oral Daily     atorvastatin  20 mg Oral Daily     bumetanide  2 mg Oral BID     fenofibrate  160 mg Oral Daily     ferrous sulfate  325 mg Oral Every Other Day     folic  acid  1 mg Oral Daily     insulin aspart  1-7 Units Subcutaneous TID AC     insulin aspart  1-5 Units Subcutaneous At Bedtime     levothyroxine  50 mcg Oral Daily     lidocaine  1 patch Transdermal Q24H     lidocaine   Transdermal Q8H NEVILLE     predniSONE  10 mg Oral Daily     sodium chloride (PF)  3 mL Intracatheter Q8H     PRN Meds: acetaminophen, albuterol, glucose **OR** dextrose **OR** glucagon, lidocaine 4%, lidocaine (buffered or not buffered), melatonin, sodium chloride (PF)             Data:     Recent Labs   Lab 04/28/23  0712 04/28/23  0650 04/27/23  2110 04/27/23  0717 04/27/23  0639 04/26/23  0758 04/26/23  0551 04/26/23  0244 04/25/23  1553   WBC  --   --   --   --   --   --  6.1  --  7.3   HGB  --   --   --   --   --   --  8.6*  --  8.8*   MCV  --   --   --   --   --   --  96  --  96   PLT  --   --   --   --   --   --  187  --  202   NA  --  134*  --   --   --   --  135*  --  133*   POTASSIUM  --  3.9  --   --  4.1  --  4.1  --  4.6   CHLORIDE  --  101  --   --   --   --  102  --  97*   CO2  --  22  --   --   --   --  24  --  23   BUN  --  35.7*  --   --   --   --  51.4*  --  53.4*   CR  --  1.21*  --   --  1.25*  --  1.61*  --  1.97*   ANIONGAP  --  11  --   --   --   --  9  --  13   EDITH  --  8.8  --   --   --   --  8.5*  --  8.5*   * 165* 245*   < >  --    < > 163*   < > 224*   ALBUMIN  --   --   --   --   --   --  3.1*  --  3.3*   PROTTOTAL  --   --   --   --   --   --  5.4*  --  5.8*   BILITOTAL  --   --   --   --   --   --  1.1  --  1.1   ALKPHOS  --   --   --   --   --   --  236*  --  294*   ALT  --   --   --   --   --   --  71*  --  81*   AST  --   --   --   --   --   --  88*  --  104*    < > = values in this interval not displayed.

## 2023-04-28 NOTE — PLAN OF CARE
[id number] A&Ox4, denies CP or SOB. Tele remains 100% Vpaced, short runs of 4-5 beats of VT this shift, asymptomatic. Plan for discharge home today.

## 2023-04-28 NOTE — PLAN OF CARE
Neuro: A&O x4  Tele/cardiac: 100% AV paced  Respiration: On RA  Activity: Ind  Pain: tylenol given for back pain  Drips: PIV SL  Drains/tubes: none  GI/: continent  Aggression color: green  Isolation: none  Plan: possible discharge home today

## 2023-05-01 NOTE — PROGRESS NOTES
"  Assessment & Plan     Non-ischemic cardiomyopathy (H)    - Basic metabolic panel  (Ca, Cl, CO2, Creat, Gluc, K, Na, BUN); Future    Coronary artery disease involving native coronary artery of native heart without angina pectoris    - Basic metabolic panel  (Ca, Cl, CO2, Creat, Gluc, K, Na, BUN); Future    Type 2 diabetes mellitus with other circulatory complication, with long-term current use of insulin (H)    - Basic metabolic panel  (Ca, Cl, CO2, Creat, Gluc, K, Na, BUN); Future    NSTEMI (non-ST elevated myocardial infarction) (H)               MED REC REQUIRED  Post Medication Reconciliation Status: discharge medications reconciled, continue medications without change  BMI:   Estimated body mass index is 35.09 kg/m  as calculated from the following:    Height as of this encounter: 1.803 m (5' 11\").    Weight as of this encounter: 114.1 kg (251 lb 9.6 oz).   Weight management plan: Patient was referred to their PCP to discuss a diet and exercise plan.    Continue with plan to see specialists.  See PCP in a month(s) or sooner if needed    Tea Dalton PA-C  [facility]    Milton Blackmon is a 63 year old, presenting for the following health issues:  Hospital F/U      HPI         4/20/2023     1:50 PM   Post Discharge Outreach   Admission Date 4/13/2023   Reason for Admission SOB   Discharge Date 4/19/2023   Discharge Diagnosis Cardiac sarcoidosis   How are you doing now that you are home? \" I am doing ok I guess \"   How are your symptoms? (Red Flag symptoms escalate to triage hotline per guidelines) Improved   Do you feel your condition is stable enough to be safe at home until your provider visit? Yes   Does the patient have their discharge instructions?  Yes   Does the patient have questions regarding their discharge instructions?  No   Were you started on any new medications or were there changes to any of your previous medications?  Yes   Does the patient have " "all of their medications? Yes   Do you have questions regarding any of your medications?  No   Do you have all of your needed medical supplies or equipment (DME)?  (i.e. oxygen tank, CPAP, cane, etc.) Yes   Discharge follow-up appointment scheduled within 14 calendar days?  Yes   Discharge Follow Up Appointment Date 4/25/2023   Discharge Follow Up Appointment Scheduled with? Specialty Care Provider     Hospital Follow-up Visit:    Hospital/Nursing Home/IP Rehab Facility: [facility]  Date of Admission: 4/25/2023  Date of Discharge: 4/28/2023  Reason(s) for Admission: Syncope    Was your hospitalization related to COVID-19? No   Problems taking medications regularly:  None  Medication changes since discharge: None  Problems adhering to non-medication therapy:  None    Summary of hospitalization:  [facility] discharge summary reviewed  Diagnostic Tests/Treatments reviewed.  Follow up needed: BMP and specialist appointments   Other Healthcare Providers Involved in Patient s Care:         Specialist appointment - next week  Update since discharge: improved.         Plan of care communicated with patient and family           Reviewed hospital stay. Patient doing ok over the weekend. His wife has been wrapping his feet and lower legs with ace wrap which has helped the swelling.  No questions today about new medications       Review of Systems   Constitutional, HEENT, cardiovascular, pulmonary, gi  systems are negative, except as otherwise noted.      Objective    /66   Pulse 70   Temp 97.5  F (36.4  C) (Tympanic)   Resp 18   Ht 1.803 m (5' 11\")   Wt 114.1 kg (251 lb 9.6 oz)   SpO2 99%   BMI 35.09 kg/m    Body mass index is 35.09 kg/m .  Physical Exam   GENERAL: healthy, alert and no distress  NECK: no adenopathy, no asymmetry, masses, or scars and thyroid normal to palpation  RESP: lungs clear to auscultation - no rales, rhonchi or wheezes  CV: regular rates and " rhythm, normal S1 S2, no S3 or S4, no murmur, click or rub, peripheral pulses strong and 1-2+ bilateral lower extremity pitting edema to mid shin    MS: extremities normal- no gross deformities noted  SKIN: no suspicious lesions or rashes

## 2023-05-01 NOTE — LETTER
May 1, 2023      Neymar Rhodes  [address] [facility] [address]        To Whom It May Concern:    Neymar Rhodes  was seen on 5/1/2023.  He has been in the hospital with most recent discharge on 4/28/2023. His primary care provider is out on vacation and will not be back until 5/15/2023. Paperwork that needs to be addressed will have to wait until Dr Harris has returned.       Sincerely,        Tea Dalton PA-C

## 2023-05-03 NOTE — TELEPHONE ENCOUNTER
"Pt requesting medication renewal. Not on med list.     wagner mena     Pt is a type two diabetic and has been taking metformin as prescribed - pt reporting he was on insulin during hospitalization and is wondering if he should restart his Levemire insulin. Pt was discharged from hospital 4/25 where he reports being insulin drip. Pt has had several blood glucose readings in the 300's, 2 hours after eating for about the past week. Asymptomatic.   BG was 392 at 215PM, Pt  360's 250PM.   Discussed with pt to continued drinking fluids and recheck blood glucose; call clinic to update provider if BG remains above 300.   Pt dispo'd to discuss with PCP and callback by nurse.       1. BLOOD GLUCOSE: \"What is your blood glucose level?\"       5/3/2023 145  2. ONSET: \"When did you check the blood glucose?\"      Pt was 392 5/3/2023 215. Pt reporting he had lunch around 1215.    3. USUAL RANGE: \"What is your glucose level usually?\" (e.g., usual fasting morning value, usual evening value)      150 fasting blood glucose. 132 at bedtime   4. KETONES: \"Do you check for ketones (urine or blood test strips)?\" If yes, ask: \"What does the test show now?\"       Denies   5. TYPE 1 or 2:  \"Do you know what type of diabetes you have?\"  (e.g., Type 1, Type 2, Gestational; doesn't know)       Type 2   6. INSULIN: \"Do you take insulin?\" \"What type of insulin(s) do you use? What is the mode of delivery? (syringe, pen; injection or pump)?\"       levemire in the a.m.   7. DIABETES PILLS: \"Do you take any pills for your diabetes?\" If yes, ask: \"Have you missed taking any pills recently?\"      Metformin; hasnt missed any doses.   8. OTHER SYMPTOMS: \"Do you have any symptoms?\" (e.g., fever, frequent urination, difficulty breathing, dizziness, weakness, vomiting)      Denies.     Additional Information    Negative: Unconscious or difficult to awaken    Negative: Acting confused (e.g., disoriented, slurred speech)    Negative: Very weak (can't " stand)    Negative: Sounds like a life-threatening emergency to the triager    Negative: Vomiting and signs of dehydration (e.g., very dry mouth, lightheaded, dark urine)    Negative: Blood glucose > 240 mg/dL (13.3 mmol/L) and rapid breathing    Negative: Blood glucose > 500 mg/dL (27.8 mmol/L)    Negative: Blood glucose > 240 mg/dL (13.3 mmol/L) AND urine ketones moderate-large (or more than 1+)    Negative: Blood glucose > 240 mg/dL (13.3 mmol/L) and blood ketones > 1.4 mmol/L    Negative: Blood glucose > 240 mg/dL (13.3 mmol/L) AND vomiting AND unable to check for ketones (in blood or urine)    Negative: Vomiting lasting > 4 hours    Negative: Patient sounds very sick or weak to the triager    Protocols used: DIABETES - HIGH BLOOD SUGAR-A-OH    Reason for Disposition    Blood glucose > 300 mg/dL (16.7 mmol/L)    Additional Information    Negative: Unconscious or difficult to awaken    Negative: Acting confused (e.g., disoriented, slurred speech)    Negative: Very weak (can't stand)    Negative: Sounds like a life-threatening emergency to the triager    Negative: Vomiting and signs of dehydration (e.g., very dry mouth, lightheaded, dark urine)    Negative: Blood glucose > 240 mg/dL (13.3 mmol/L) and rapid breathing    Negative: Blood glucose > 500 mg/dL (27.8 mmol/L)    Negative: Blood glucose > 240 mg/dL (13.3 mmol/L) AND urine ketones moderate-large (or more than 1+)    Negative: Blood glucose > 240 mg/dL (13.3 mmol/L) and blood ketones > 1.4 mmol/L    Negative: Blood glucose > 240 mg/dL (13.3 mmol/L) AND vomiting AND unable to check for ketones (in blood or urine)    Negative: Vomiting lasting > 4 hours    Negative: Patient sounds very sick or weak to the triager    Negative: Fever > 100.4 F (38.0 C)    Negative: Caller has URGENT medication or insulin pump question and triager unable to answer question    Negative: Blood glucose > 400 mg/dL (22.2 mmol/L)    Negative: Blood glucose > 300 mg/dL (16.7 mmol/L)  AND two or more times in a row    Negative: Urine ketones moderate - large (or blood ketones > 1.4 mmol/L)    Negative: New-onset diabetes mellitus suspected (e.g., frequent urination, weak, weight loss)    Negative: Symptoms of high blood sugar (e.g., frequent urination, weak, weight loss) and not able to test blood glucose    Negative: Patient wants to be seen    Negative: Caller has NON-URGENT medication question about med that PCP prescribed and triager unable to answer question    Protocols used: DIABETES - HIGH BLOOD SUGAR-A-OH    Reason for Disposition    Blood glucose > 300 mg/dL (16.7 mmol/L)    Blood glucose > 300 mg/dL (16.7 mmol/L) AND two or more times in a row    Additional Information    Negative: Unconscious or difficult to awaken    Negative: Acting confused (e.g., disoriented, slurred speech)    Negative: Very weak (can't stand)    Negative: Sounds like a life-threatening emergency to the triager    Negative: Vomiting and signs of dehydration (e.g., very dry mouth, lightheaded, dark urine)    Negative: Blood glucose > 240 mg/dL (13.3 mmol/L) and rapid breathing    Negative: Blood glucose > 500 mg/dL (27.8 mmol/L)    Negative: Blood glucose > 240 mg/dL (13.3 mmol/L) AND urine ketones moderate-large (or more than 1+)    Negative: Blood glucose > 240 mg/dL (13.3 mmol/L) and blood ketones > 1.4 mmol/L    Negative: Blood glucose > 240 mg/dL (13.3 mmol/L) AND vomiting AND unable to check for ketones (in blood or urine)    Negative: Vomiting lasting > 4 hours    Negative: Patient sounds very sick or weak to the triager    Negative: Fever > 100.4 F (38.0 C)    Negative: Caller has URGENT medication or insulin pump question and triager unable to answer question    Negative: Caller has NON-URGENT medication question about med that PCP prescribed and triager unable to answer question    Negative: Blood glucose > 400 mg/dL (22.2 mmol/L)    Protocols used: DIABETES - HIGH BLOOD SUGAR-A-OH

## 2023-05-03 NOTE — TELEPHONE ENCOUNTER
Reason for Disposition    Blood glucose > 300 mg/dL (16.7 mmol/L)    Additional Information    Negative: Unconscious or difficult to awaken    Negative: Acting confused (e.g., disoriented, slurred speech)    Negative: Very weak (can't stand)    Negative: Sounds like a life-threatening emergency to the triager    Negative: Vomiting and signs of dehydration (e.g., very dry mouth, lightheaded, dark urine)    Negative: Blood glucose > 240 mg/dL (13.3 mmol/L) and rapid breathing    Negative: Blood glucose > 500 mg/dL (27.8 mmol/L)    Negative: Blood glucose > 240 mg/dL (13.3 mmol/L) AND urine ketones moderate-large (or more than 1+)    Negative: Blood glucose > 240 mg/dL (13.3 mmol/L) and blood ketones > 1.4 mmol/L    Negative: Blood glucose > 240 mg/dL (13.3 mmol/L) AND vomiting AND unable to check for ketones (in blood or urine)    Negative: Vomiting lasting > 4 hours    Negative: Patient sounds very sick or weak to the triager    Negative: Fever > 100.4 F (38.0 C)    Negative: Caller has URGENT medication or insulin pump question and triager unable to answer question    Negative: Blood glucose > 400 mg/dL (22.2 mmol/L)    Negative: Blood glucose > 300 mg/dL (16.7 mmol/L) AND two or more times in a row    Negative: Urine ketones moderate - large (or blood ketones > 1.4 mmol/L)    Negative: New-onset diabetes mellitus suspected (e.g., frequent urination, weak, weight loss)    Negative: Symptoms of high blood sugar (e.g., frequent urination, weak, weight loss) and not able to test blood glucose    Negative: Patient wants to be seen    Negative: Caller has NON-URGENT medication question about med that PCP prescribed and triager unable to answer question    Protocols used: DIABETES - HIGH BLOOD SUGAR-A-OH

## 2023-05-04 NOTE — TELEPHONE ENCOUNTER
The last 3+ months, pt has been taking metformin: 1000 mg a.m. and 1000 mg p.m. Pt used to take Levemire daily in the morning, but stopped taking insulin per provider instruction Dec 2022. Pt's blood glucose was running low in the morning, ranging from . Pt was supposed to keep his Levemire pen at home after stopping insulin for a while and monitoring BG.      5/4/2023  0735  .   0932  - before meal  1106   1:49 PM . Pt ate macaroni and cheese 1 hour ago.     Pt reports being on an insulin drip, then sliding scale during his recent hospitalization. Pt is unsure why his bg is running high; no change in diet, pt recently stopped taking furosemide and is now taking bumex. Pt was told that the body could get used to furosemide and so he was changed bumex. Pt denies signs of infection, no fever.     Pt was seen in office 5/1 and understood that he could not have labs done until 5/5.     Pt reports taking 25mg of Levemire today at 1100AM.

## 2023-05-04 NOTE — TELEPHONE ENCOUNTER
Whats the story here?  Steven not here so give me  synopsis of rx for his diabetes the last 3 months and whats changed that his # are all of a sudden so high just a few days after d/c and seen 5/1 here.

## 2023-05-04 NOTE — TELEPHONE ENCOUNTER
Spoke to patient and gave him the sliding scale per Dr. Joseph.       Samantha Saini RN  [facility] [place]

## 2023-05-04 NOTE — TELEPHONE ENCOUNTER
Attempted to contact pt to find out more information per note from below. No answer. Left VM to call us back.     Upon callback- please clarify and gather the information requested from Dr Joseph from below.         Patient Contact    Attempt # 1    Was call answered?  No.  Left message on voicemail with information to call me back.

## 2023-05-04 NOTE — TELEPHONE ENCOUNTER
On call back please relay providers message     Patient Contact    Attempt # 1    Was call answered?  No.  Left message on voicemail with information to call me back.

## 2023-05-04 NOTE — TELEPHONE ENCOUNTER
If already taken basal dose today would adjust tomorrows dose as follows:  Base this on his morning # fasting.   <100 decrease by 4 units   100-175 no change in dose . Continue @ 25 units  175-250 increase dose by 2 units   > 251 increase by 4 units    Do this over the weekend and let us know what his # are Monday.

## 2023-05-16 NOTE — PATIENT INSTRUCTIONS
It was a pleasure to see you in clinic today, please do not hesitate to call us with any questions or concerns.  We will see you in clinic 5/24/2023 when you return to see Dr De Guzman.    Mayte James RN    Electrophysiology Nurse Clinician  [facility]    During Business Hours Please Call:  [phone]  After Hours Please Call:  [phone] - select option #4 and ask for job code [phone]

## 2023-05-16 NOTE — NURSING NOTE
Chief Complaint   Patient presents with     Follow Up     5/16/23-- Dr. Batres: Non-ischemic cardiomyopathy.     Vitals were taken and medications reconciled.    Zoran Og, EMT  3:23 PM

## 2023-05-16 NOTE — PROGRESS NOTES
May 16, 2023     Dear Colleagues,  I had the pleasure of seeing Mr. Blackmon in clinic today for follow-up. As you know, he is a very pleasant 63-year-old gentleman, he has not had regular follow-up over the years, He was first seen by Cardiology in 2018 with a new diagnosis of LV dysfunction with an EF of 40%.  He was at that time seen by Dr. Iniguez.  He had a significant left bundle branch block.  He underwent coronary angiogram that showed nonobstructive disease.  He was put on beta blockers, ACE inhibitors and spironolactone but his potassium increased and did not tolerate mineralocorticoid receptor antagonist.  Subsequently, a cardiac MRI was done for nonischemic cardiomyopathy that showed scarring concerning for sarcoidosis.  In any case, he did not follow up but he remained on medications.  Unfortunately, in 2019, he presented with decompensated heart failure and EF was less than 20% with severe LV dilatation measuring 7 cm.  During the hospital stay, he developed sustained monomorphic VT at about 180 beats per minute and needed to be emergently cardioverted in the hospital.  He was taken again to the cath lab and coronary anatomy was unchanged, again showing nonobstructive disease.  Right heart catheterization showed mildly elevated pressures.  LVEDP was 21.  He had a repeat cardiac MRI that showed late gadolinium enhancement in the septal inferior and lateral basal wall with scarring pattern concerning for non-CAD scarring and scar burden had actually worsened up to 13%.  He was loaded with amiodarone and subsequently received a CRT device on 10/04/2019.  Subsequently, he was referred to the [facility]. 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  He was put on prednisone in 12/2019 and took it for almost a year and a half thereafter.  Subsequent PET scan in 2020 and on 02/2021 have  shown complete resolution of inflammation.  He stopped his prednisone as of spring of 2021.  In addition to that, he has continued to have nonsustained VT.  In 12/2020 Dr. Dorman had also mentioned to him about possibility of VT ablation; however, the patient was not interested in pursuing that.  He had opted to continue taking amiodarone, at 400mg daily dose. In 6/2022, a repeat PET scan demonstrated ongoing cardiac inflammation c/w active sarcoid. In 9/2022 he was admitted to [facility] and was seen by Dr. Dorman of EP for Afib as well as slow VT below the detection level of the device so no interventions were done. At the time potential VT ablation was rediscussed, but the pt has continued to defer this option. In 11/2022, he was seen in clinic for interim f/u and daily prednisone was decreased to 20mg daily coupled with starting methotrexate. A f/u PET scan 12/2022 demonstrated an interval decrease in cardiac inflammation (compared to 6/2022).      Patient was admitted to [facility] on 4/13/2023 with worsening dyspnea on exertion and shortness of breath.  Repeat echocardiogram performed showed an ejection fraction around 20% which was roughly unchanged.  Given concern for worsening cardiogenic shock given his symptoms and worsening renal function,  he was started on dobutamine infusion however this led to essentially cardiac arrest with 3 VT VF episodes and appropriate shocks.  He was started on amiodarone drip there was admitted transitioned over to p.o. amiodarone and discharged after aggressive diuresis.  Entresto was restarted at the lower dose.  Subsequently he was admitted with a syncopal episode and device interrogation showed persistent and sustained episodes of ventricular tachycardia.  EP was again consulted and amiodarone dose was increased.  Interestingly he did have a repeat PET scan for cardiac sarcoidosis given that we recently increased his methotrexate dose and decreased prednisone  to 10 mg daily and this showed no PET activity.  He is here for follow-up today.  Overall he is doing relatively well after the hospital discharge continues to have some lower extremity edema which is significantly better than it has been in the past.  He continues to have some tiredness and fatigue but denies any other new issues.  Takes her medications as prescribed given by his wife who is a nurse.      PAST MEDICAL HISTORY:  Past Medical History:   Diagnosis Date     Ascending aorta dilatation (H)      Diverticulosis of colon (without mention of hemorrhage)      Essential hypertension, benign      Gout, unspecified      LBBB (left bundle branch block)      Morbid obesity (H)      Non-ischemic cardiomyopathy (H)      Nonrheumatic mitral valve regurgitation      NSTEMI (non-ST elevated myocardial infarction) (H)     cardiac cath 6/2018: mild non-obstructive CAD     Other and unspecified hyperlipidemia      Paroxysmal ventricular tachycardia (H)      Type II or unspecified type diabetes mellitus without mention of complication, not stated as uncontrolled      FAMILY HISTORY:  Family History   Problem Relation Age of Onset     Cancer Father 75        bladder cancer     Myocardial Infarction Father      Other - See Comments Father         smoking     Breast Cancer Mother      Breast Cancer Sister      Family History Negative Brother      Family History Negative Son      Family History Negative Son         fluttering/murmur     Asthma Son      Colon Cancer No family hx of      SOCIAL HISTORY:  Social History     Socioeconomic History     Marital status:    Tobacco Use     Smoking status: Never     Smokeless tobacco: Never   Vaping Use     Vaping status: Never Used   Substance and Sexual Activity     Alcohol use: Yes     Comment: once awhile     Drug use: No     Sexual activity: Yes     Partners: Female     CURRENT MEDICATIONS:  Current Outpatient Medications   Medication     acetaminophen (TYLENOL) 500 MG  tablet     acetaminophen (TYLENOL) 500 MG tablet     albuterol (PROAIR HFA/PROVENTIL HFA/VENTOLIN HFA) 108 (90 Base) MCG/ACT Inhaler     amiodarone (PACERONE) 200 MG tablet     aspirin 81 MG tablet     atorvastatin (LIPITOR) 20 MG tablet     betamethasone dipropionate (DIPROSONE) 0.05 % external cream     blood glucose monitoring (ACCU-CHEK LUL PLUS) test strip     bumetanide (BUMEX) 2 MG tablet     clotrimazole (LOTRIMIN) 1 % external cream     Continuous Blood Gluc Sensor (FREESTYLE JOCELIN 14 DAY SENSOR) MISC     continuous blood glucose monitoring (FREESTYLE JOCELIN) sensor     fenofibrate (TRIGLIDE/LOFIBRA) 160 MG tablet     ferrous sulfate (FEROSUL) 325 (65 Fe) MG tablet     folic acid (FOLVITE) 1 MG tablet     levothyroxine (SYNTHROID/LEVOTHROID) 50 MCG tablet     Lidocaine (LIDOCARE) 4 % Patch     metFORMIN (GLUCOPHAGE XR) 500 MG 24 hr tablet     methotrexate 2.5 MG tablet     multivitamin, therapeutic (THERA-VIT) TABS tablet     predniSONE (DELTASONE) 10 MG tablet     sacubitril-valsartan (ENTRESTO) 24-26 MG per tablet     sulfamethoxazole-trimethoprim (BACTRIM) 400-80 MG tablet     vitamin C (ASCORBIC ACID) 500 MG tablet     No current facility-administered medications for this visit.     ROS:   Constitutional: No fever, chills, or sweats. Weight is 0 lbs 0 oz  ENT: No visual disturbance, ear ache, epistaxis, sore throat.   Allergies/Immunologic: Negative.   Respiratory: No cough, hemoptysis.   Cardiovascular: As per HPI.   GI: No nausea, vomiting, hematemesis, melena, or hematochezia.   : No urinary frequency, dysuria, or hematuria.   Integument: Negative.   Psychiatric: Pleasant, no major depression noted  Neuro: No focal neurological deficits noted  Endocrinology: Negative.   Musculoskeletal: As per HPI.      EXAM:  BP 94/61 (BP Location: Right arm, Patient Position: Chair, Cuff Size: Adult Large)   Pulse 81   Wt 114.2 kg (251 lb 11.2 oz)   SpO2 99%   BMI 35.11 kg/m    General: appears comfortable,  alert and oriented  Head: normocephalic, atraumatic, large white beard  Eyes: anicteric sclera, EOMI , PERRL  Neck: no adenopathy  Orophyarynx: moist mucosa, no lesions noted  Heart: regular, S1/S2, no murmurs, rubs or gallop. Estimated JVP at 5-6 cmH2O  Lungs: CTAB, No wheezing.   Abdomen: soft, non-tender, bowel sounds present, no palpable hepatosplenomegaly  Extremities: No LE edema today  Skin: no open lesions noted  Neuro: grossly non-focal      Labs:  Lab Results   Component Value Date    WBC 6.1 04/26/2023    HGB 8.6 (L) 04/26/2023    HCT 26.1 (L) 04/26/2023     04/26/2023     (L) 05/05/2023    POTASSIUM 4.1 05/05/2023    CHLORIDE 99 05/05/2023    CO2 23 05/05/2023    BUN 57.4 (H) 05/05/2023    CR 2.14 (H) 05/05/2023    GLC 74 05/08/2023    DD 0.3 10/01/2019    NTBNPI 819 04/25/2023    NTBNP 444 01/24/2023    TROPONIN 0.07 06/10/2018    TROPI 0.071 (H) 10/01/2019    AST 88 (H) 04/26/2023    ALT 71 (H) 04/26/2023    ALKPHOS 236 (H) 04/26/2023    BILITOTAL 1.1 04/26/2023    INR 1.16 (H) 10/04/2019     CMR 10/3/19:  1. The LV is severely dilated. The global systolic function is moderately severely to severely reduced. The LVEF is 25%. There is severe global LV dysfunction with dyssynchronous septal motion consistent with a LBBB.  2. The RV is normal in cavity size. The global systolic function is normal. The RVEF is 57%.   3. Both atria are dilated.  4. There is mild mitral insufficiency.   5. Late gadolinium enhancement is present in the septal. inferior and lateral base. There is non-CAD scar in the anterolateral mid-wall. Scar is more extensive (13%) as compared to the prior MRI (6%).   CONCLUSIONS:   Late gadolinium enhancement is present in the septal. inferior and lateral base. There is non-CAD scar in the anterolateral mid-wall. Scar is more extensive (13%) as compared to the prior MRI (6%). The LV is severely dilated and the global systolic function is moderately severely to severely reduced  with an LVEF of 25%. There is severe global LV dysfunction with dyssynchronous septal motion consistent with a LBBB.  Collectively, these findings are most consistent with a non-ischemic cardiomyopathy. Possible etiologies include prior LBBB, cardiac sarcoidosis or prior myocarditis     PET 12/4/19:  1. Diffuse increased FDG uptake in the myocardium compatible with active cardiac sarcoid.  2. Globally hypokinetic and enlarged heart with ejection fraction severely decreased to 16%, compatible with a dilatated cardiomyopathy.  3. Small perfusion abnormality of the anterior wall of the left ventricle, this is the LAD distribution..   4. There is increased ammonia uptake in the lungs, consistent with patient's known history of congestive heart failure.  5. FDG PET/CT demonstrate active sarcoid in the mediastinal and hilar lymph nodes.     TTE 1/2/20:  The left ventricle is severely dilated, LVEDD 7 cm  The visual ejection fraction is estimated at 25-30%.  There is mod-severe global hypokinesia of the left ventricle.  There is a catheter/pacemaker lead seen in the right ventricle.  LVEF may be slightly improved compared with study from 10-19     PET 2/12/20:  1. Minimal residual uptake in the basal aspect of the septum and anterior wall, greatly improved compared to the prior examination.  2. Minimal uptake of a mediastinal lymph node.   3. Left parotid hyperdense nodule without significant FDG uptake, likely lymph node or Warthin's duct tumor.     PET 6/22/20:  1. No evidence of active cardiac sarcoidosis.  2. No evidence of active inflammation/sarcoidosis in the remainder of the body.  3. Cardiomegaly.  4. Cholelithiasis.     PET viability 2/12/21:  1. No evidence of active cardiac sarcoidosis.  2. No evidence of active systemic or pulmonary sarcoidosis.  3. Cholelithiasis.  4. Colonic diverticulosis.     TTE 12/20/21:  1. Left ventricular systolic function is severely reduced. The visual ejection fraction is  25-30%.  2. Septal wall motion abnormality may reflect pacemaker activation.  3. The left ventricle is severely dilated.  4. The right ventricle is normal in structure, function and size.  5. No evidence for significant valvular pathology  6. Mildly dilated aortic root (sinus of valsalva) 4.0 cm and ascending aorta, 3.9 cm.     CMR 2/18/22:  1. The LV is mildly increased in cavity size. The global systolic function is severely reduced. The LVEF is 24%. There is severe diffuse hypokinesis with akinesis of the basal to mid inferior segment.    2. The RV is normal in cavity size. The global systolic function is mildly reduced. The RVEF is 48%.   3. The left atrium is mildly enlarged.   4. There is no significant valvular disease.   5. Late gadolinium enhancement imaging shows epicardial hyperenhancement in the basal anterior RV size of the septum, transmural LGE in the basal inferior segment and subendocardial hyperenhancement in the mid septal and inferior segments and mid-myocardial LGE in the mid inferior/inferolateral segment. This pattern of LGE with multifocal, septal and epicardial is compatible with cardiac sarcoid.   6. There is no pericardial effusion or thickening.  CONCLUSIONS: Non-ischemic cardiomyopathy with severely reduced LV function, LVEF of 24%. The LGE pattern is compatible with cardiac sarcoid. Compared to prior CMR from 10/3/2019 there is no significant change in LGE.      PET viability 2/25/2022  Impression:  Active sarcoid: cardiac and mediastinal & hilar lymphadenopathy.  1. Increased FDG uptake in the left ventricle mid/basal myocardial segments, septum, anterior wall and inferior wall compatible with  active cardiac sarcoid. Lateral wall is relatively spared.    2. Several hypermetabolic mediastinal and hilar hypermetabolic lymph nodes, suggestive of active systemic sarcoid.  No lung abnormalities.  3. Cardiomegaly.   4. Cholelithiasis and colonic diverticulosis.     PET scan 6/2022:  1.  Evidence of active sarcoidosis with similar FDG uptake in the left ventricle mid/basal myocardial segments, septum, anterior wall and  inferior wall since since PET/CT from 2/25/2022.   2. Near resolution of hypermetabolism in prominent mediastinal nodes since PET/CT from 2/25/2022.   3. No lung findings sarcoidosis.  4. Cardiomegaly.  5. Cirrhotic liver morphology, new small amount of ascites.  6. Cholelithiasis and colonic diverticulosis.     PET scan 12/2022  1. Findings of active cardiac sarcoidosis, which is improved compared  to 6/14/2022 PET/CT.  2. No evidence suggest active systemic sarcoidosis.  3. Sub-5 mm pulmonary nodules within the bilateral lungs, otherwise  the lung parenchyma is unremarkable. Attention follow-up.  4. Compression deformity of L4 vertebral body with mild FDG uptake,  new compared to 6/14/2022 without retropulsion or significant height  loss.     TTE 4/13/23:  1. The left ventricle is severely dilated. Left ventricular systolic function is severely reduced. The visual ejection fraction is 25-30%. Left ventricular diastolic function is abnormal. There is severe global hypokinesia of the left ventricle. Septal wall motion abnormality may reflect pacemaker activation. There is no thrombus seen in the left ventricle.  2. The right ventricle is normal size. There is a catheter/pacemaker lead seen in the right ventricle. The right ventricular systolic function is normal.  3. There is mild-moderate biatrial enlargement. There is no color Doppler evidence of an atrial shunt.  4. Trace mitral and tricuspid regurgitation.  5. No pericardial effusion.  6. In comparison to the previous report dated 12/20/2021, the findings are similar.     PET 5/8/23:  1. No evidence for active cardiac sarcoidosis. Resolution of the previously visualized cardiac uptake.  2. No evidence for active systemic sarcoidosis.  3. Stable scattered sub-6 mm pulmonary nodules.  4. Compression fracture of L4 is slightly  increased compared to prior  PET/CT without retropulsion or severe spinal canal stenosis.     ASSESSMENT AND PLAN:    Neymar is 63 year old male with cardiac sarcoidosis c/b ventricular tachycardia currently controlled on amiodarone, methotrexate and prednisone. Last PET scan 12/2022 with decreasing cardiac inflammation.  Patient did have a complicated course recently including admission for potential cardiogenic shock and multiple ICD shocks and resuscitation.  He had multiple episodes of ventricular tachycardia.  We will do the following at this point  - We will encourage him to see Dr. De Guzman from  here and he has an appointment next week  - We will decrease the Bumex to 2 mg once a day  - We will set him up for a right heart catheterization potentially next week to evaluate the filling pressures and cardiac index.  He still has some lower extremity edema however creatinine is worsening which is very concerning for me.  Left we will not change the prednisone and the methotrexate at this time given that he does not have active disease on the PET scan however he did have episodes of ventricular tachycardia.  I will reconsider after he meets with Dr. De Guzman.     I appreciate the opportunity to participate in the care of Neymar Rhodes . Please do not hesitate to contact me with any further questions.     Sincerely,   Roberto Batres MD  [facility] Division of Cardiology

## 2023-05-16 NOTE — LETTER
5/16/2023      RE: Neymar Rhodes  [address]  [facility] [address]       Dear Colleague,    Thank you for the opportunity to participate in the care of your patient, Neymar Rhodes, at the [facility] [place] at [facility]. Please see a copy of my visit note below.    May 16, 2023     Dear Colleagues,  I had the pleasure of seeing Mr. Blackmon in clinic today for follow-up. As you know, he is a very pleasant 63-year-old gentleman, he has not had regular follow-up over the years, He was first seen by Cardiology in 2018 with a new diagnosis of LV dysfunction with an EF of 40%.  He was at that time seen by Dr. Iniguez.  He had a significant left bundle branch block.  He underwent coronary angiogram that showed nonobstructive disease.  He was put on beta blockers, ACE inhibitors and spironolactone but his potassium increased and did not tolerate mineralocorticoid receptor antagonist.  Subsequently, a cardiac MRI was done for nonischemic cardiomyopathy that showed scarring concerning for sarcoidosis.  In any case, he did not follow up but he remained on medications.  Unfortunately, in 2019, he presented with decompensated heart failure and EF was less than 20% with severe LV dilatation measuring 7 cm.  During the hospital stay, he developed sustained monomorphic VT at about 180 beats per minute and needed to be emergently cardioverted in the hospital.  He was taken again to the cath lab and coronary anatomy was unchanged, again showing nonobstructive disease.  Right heart catheterization showed mildly elevated pressures.  LVEDP was 21.  He had a repeat cardiac MRI that showed late gadolinium enhancement in the septal inferior and lateral basal wall with scarring pattern concerning for non-CAD scarring and scar burden had actually worsened up to 13%.  He was loaded with amiodarone and subsequently received a CRT device on 10/04/2019.   Subsequently, he was referred to the [facility]. 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  He was put on prednisone in 12/2019 and took it for almost a year and a half thereafter.  Subsequent PET scan in 2020 and on 02/2021 have shown complete resolution of inflammation.  He stopped his prednisone as of spring of 2021.  In addition to that, he has continued to have nonsustained VT.  In 12/2020 Dr. Dorman had also mentioned to him about possibility of VT ablation; however, the patient was not interested in pursuing that.  He had opted to continue taking amiodarone, at 400mg daily dose. In 6/2022, a repeat PET scan demonstrated ongoing cardiac inflammation c/w active sarcoid. In 9/2022 he was admitted to [facility] and was seen by Dr. Dorman of EP for Afib as well as slow VT below the detection level of the device so no interventions were done. At the time potential VT ablation was rediscussed, but the pt has continued to defer this option. In 11/2022, he was seen in clinic for interim f/u and daily prednisone was decreased to 20mg daily coupled with starting methotrexate. A f/u PET scan 12/2022 demonstrated an interval decrease in cardiac inflammation (compared to 6/2022).      Patient was admitted to [facility] on 4/13/2023 with worsening dyspnea on exertion and shortness of breath.  Repeat echocardiogram performed showed an ejection fraction around 20% which was roughly unchanged.  Given concern for worsening cardiogenic shock given his symptoms and worsening renal function,  he was started on dobutamine infusion however this led to essentially cardiac arrest with 3 VT VF episodes and appropriate shocks.  He was started on amiodarone drip there was admitted transitioned over to p.o. amiodarone and discharged after aggressive diuresis.  Entresto was restarted at the lower dose.   Subsequently he was admitted with a syncopal episode and device interrogation showed persistent and sustained episodes of ventricular tachycardia.  EP was again consulted and amiodarone dose was increased.  Interestingly he did have a repeat PET scan for cardiac sarcoidosis given that we recently increased his methotrexate dose and decreased prednisone to 10 mg daily and this showed no PET activity.  He is here for follow-up today.  Overall he is doing relatively well after the hospital discharge continues to have some lower extremity edema which is significantly better than it has been in the past.  He continues to have some tiredness and fatigue but denies any other new issues.  Takes her medications as prescribed given by his wife who is a nurse.      PAST MEDICAL HISTORY:  Past Medical History:   Diagnosis Date    Ascending aorta dilatation (H)     Diverticulosis of colon (without mention of hemorrhage)     Essential hypertension, benign     Gout, unspecified     LBBB (left bundle branch block)     Morbid obesity (H)     Non-ischemic cardiomyopathy (H)     Nonrheumatic mitral valve regurgitation     NSTEMI (non-ST elevated myocardial infarction) (H)     cardiac cath 6/2018: mild non-obstructive CAD    Other and unspecified hyperlipidemia     Paroxysmal ventricular tachycardia (H)     Type II or unspecified type diabetes mellitus without mention of complication, not stated as uncontrolled      FAMILY HISTORY:  Family History   Problem Relation Age of Onset    Cancer Father 75        bladder cancer    Myocardial Infarction Father     Other - See Comments Father         smoking    Breast Cancer Mother     Breast Cancer Sister     Family History Negative Brother     Family History Negative Son     Family History Negative Son         fluttering/murmur    Asthma Son     Colon Cancer No family hx of      SOCIAL HISTORY:  Social History     Socioeconomic History    Marital status:    Tobacco Use    Smoking  status: Never    Smokeless tobacco: Never   Vaping Use    Vaping status: Never Used   Substance and Sexual Activity    Alcohol use: Yes     Comment: once awhile    Drug use: No    Sexual activity: Yes     Partners: Female     CURRENT MEDICATIONS:  Current Outpatient Medications   Medication    acetaminophen (TYLENOL) 500 MG tablet    acetaminophen (TYLENOL) 500 MG tablet    albuterol (PROAIR HFA/PROVENTIL HFA/VENTOLIN HFA) 108 (90 Base) MCG/ACT Inhaler    amiodarone (PACERONE) 200 MG tablet    aspirin 81 MG tablet    atorvastatin (LIPITOR) 20 MG tablet    betamethasone dipropionate (DIPROSONE) 0.05 % external cream    blood glucose monitoring (ACCU-CHEK LUL PLUS) test strip    bumetanide (BUMEX) 2 MG tablet    clotrimazole (LOTRIMIN) 1 % external cream    Continuous Blood Gluc Sensor (FREESTYLE JOCELIN 14 DAY SENSOR) MISC    continuous blood glucose monitoring (FREESTYLE JOCELIN) sensor    fenofibrate (TRIGLIDE/LOFIBRA) 160 MG tablet    ferrous sulfate (FEROSUL) 325 (65 Fe) MG tablet    folic acid (FOLVITE) 1 MG tablet    levothyroxine (SYNTHROID/LEVOTHROID) 50 MCG tablet    Lidocaine (LIDOCARE) 4 % Patch    metFORMIN (GLUCOPHAGE XR) 500 MG 24 hr tablet    methotrexate 2.5 MG tablet    multivitamin, therapeutic (THERA-VIT) TABS tablet    predniSONE (DELTASONE) 10 MG tablet    sacubitril-valsartan (ENTRESTO) 24-26 MG per tablet    sulfamethoxazole-trimethoprim (BACTRIM) 400-80 MG tablet    vitamin C (ASCORBIC ACID) 500 MG tablet     No current facility-administered medications for this visit.     ROS:   Constitutional: No fever, chills, or sweats. Weight is 0 lbs 0 oz  ENT: No visual disturbance, ear ache, epistaxis, sore throat.   Allergies/Immunologic: Negative.   Respiratory: No cough, hemoptysis.   Cardiovascular: As per HPI.   GI: No nausea, vomiting, hematemesis, melena, or hematochezia.   : No urinary frequency, dysuria, or hematuria.   Integument: Negative.   Psychiatric: Pleasant, no major depression  noted  Neuro: No focal neurological deficits noted  Endocrinology: Negative.   Musculoskeletal: As per HPI.      EXAM:  BP 94/61 (BP Location: Right arm, Patient Position: Chair, Cuff Size: Adult Large)   Pulse 81   Wt 114.2 kg (251 lb 11.2 oz)   SpO2 99%   BMI 35.11 kg/m    General: appears comfortable, alert and oriented  Head: normocephalic, atraumatic, large white beard  Eyes: anicteric sclera, EOMI , PERRL  Neck: no adenopathy  Orophyarynx: moist mucosa, no lesions noted  Heart: regular, S1/S2, no murmurs, rubs or gallop. Estimated JVP at 5-6 cmH2O  Lungs: CTAB, No wheezing.   Abdomen: soft, non-tender, bowel sounds present, no palpable hepatosplenomegaly  Extremities: No LE edema today  Skin: no open lesions noted  Neuro: grossly non-focal      Labs:  Lab Results   Component Value Date    WBC 6.1 04/26/2023    HGB 8.6 (L) 04/26/2023    HCT 26.1 (L) 04/26/2023     04/26/2023     (L) 05/05/2023    POTASSIUM 4.1 05/05/2023    CHLORIDE 99 05/05/2023    CO2 23 05/05/2023    BUN 57.4 (H) 05/05/2023    CR 2.14 (H) 05/05/2023    GLC 74 05/08/2023    DD 0.3 10/01/2019    NTBNPI 819 04/25/2023    NTBNP 444 01/24/2023    TROPONIN 0.07 06/10/2018    TROPI 0.071 (H) 10/01/2019    AST 88 (H) 04/26/2023    ALT 71 (H) 04/26/2023    ALKPHOS 236 (H) 04/26/2023    BILITOTAL 1.1 04/26/2023    INR 1.16 (H) 10/04/2019     CMR 10/3/19:  1. The LV is severely dilated. The global systolic function is moderately severely to severely reduced. The LVEF is 25%. There is severe global LV dysfunction with dyssynchronous septal motion consistent with a LBBB.  2. The RV is normal in cavity size. The global systolic function is normal. The RVEF is 57%.   3. Both atria are dilated.  4. There is mild mitral insufficiency.   5. Late gadolinium enhancement is present in the septal. inferior and lateral base. There is non-CAD scar in the anterolateral mid-wall. Scar is more extensive (13%) as compared to the prior MRI (6%).    CONCLUSIONS:   Late gadolinium enhancement is present in the septal. inferior and lateral base. There is non-CAD scar in the anterolateral mid-wall. Scar is more extensive (13%) as compared to the prior MRI (6%). The LV is severely dilated and the global systolic function is moderately severely to severely reduced with an LVEF of 25%. There is severe global LV dysfunction with dyssynchronous septal motion consistent with a LBBB.  Collectively, these findings are most consistent with a non-ischemic cardiomyopathy. Possible etiologies include prior LBBB, cardiac sarcoidosis or prior myocarditis     PET 12/4/19:  1. Diffuse increased FDG uptake in the myocardium compatible with active cardiac sarcoid.  2. Globally hypokinetic and enlarged heart with ejection fraction severely decreased to 16%, compatible with a dilatated cardiomyopathy.  3. Small perfusion abnormality of the anterior wall of the left ventricle, this is the LAD distribution..   4. There is increased ammonia uptake in the lungs, consistent with patient's known history of congestive heart failure.  5. FDG PET/CT demonstrate active sarcoid in the mediastinal and hilar lymph nodes.     TTE 1/2/20:  The left ventricle is severely dilated, LVEDD 7 cm  The visual ejection fraction is estimated at 25-30%.  There is mod-severe global hypokinesia of the left ventricle.  There is a catheter/pacemaker lead seen in the right ventricle.  LVEF may be slightly improved compared with study from 10-19     PET 2/12/20:  1. Minimal residual uptake in the basal aspect of the septum and anterior wall, greatly improved compared to the prior examination.  2. Minimal uptake of a mediastinal lymph node.   3. Left parotid hyperdense nodule without significant FDG uptake, likely lymph node or Warthin's duct tumor.     PET 6/22/20:  1. No evidence of active cardiac sarcoidosis.  2. No evidence of active inflammation/sarcoidosis in the remainder of the body.  3. Cardiomegaly.  4.  Cholelithiasis.     PET viability 2/12/21:  1. No evidence of active cardiac sarcoidosis.  2. No evidence of active systemic or pulmonary sarcoidosis.  3. Cholelithiasis.  4. Colonic diverticulosis.     TTE 12/20/21:  1. Left ventricular systolic function is severely reduced. The visual ejection fraction is 25-30%.  2. Septal wall motion abnormality may reflect pacemaker activation.  3. The left ventricle is severely dilated.  4. The right ventricle is normal in structure, function and size.  5. No evidence for significant valvular pathology  6. Mildly dilated aortic root (sinus of valsalva) 4.0 cm and ascending aorta, 3.9 cm.     CMR 2/18/22:  1. The LV is mildly increased in cavity size. The global systolic function is severely reduced. The LVEF is 24%. There is severe diffuse hypokinesis with akinesis of the basal to mid inferior segment.    2. The RV is normal in cavity size. The global systolic function is mildly reduced. The RVEF is 48%.   3. The left atrium is mildly enlarged.   4. There is no significant valvular disease.   5. Late gadolinium enhancement imaging shows epicardial hyperenhancement in the basal anterior RV size of the septum, transmural LGE in the basal inferior segment and subendocardial hyperenhancement in the mid septal and inferior segments and mid-myocardial LGE in the mid inferior/inferolateral segment. This pattern of LGE with multifocal, septal and epicardial is compatible with cardiac sarcoid.   6. There is no pericardial effusion or thickening.  CONCLUSIONS: Non-ischemic cardiomyopathy with severely reduced LV function, LVEF of 24%. The LGE pattern is compatible with cardiac sarcoid. Compared to prior CMR from 10/3/2019 there is no significant change in LGE.      PET viability 2/25/2022  Impression:  Active sarcoid: cardiac and mediastinal & hilar lymphadenopathy.  1. Increased FDG uptake in the left ventricle mid/basal myocardial segments, septum, anterior wall and inferior wall  compatible with  active cardiac sarcoid. Lateral wall is relatively spared.    2. Several hypermetabolic mediastinal and hilar hypermetabolic lymph nodes, suggestive of active systemic sarcoid.  No lung abnormalities.  3. Cardiomegaly.   4. Cholelithiasis and colonic diverticulosis.     PET scan 6/2022:  1. Evidence of active sarcoidosis with similar FDG uptake in the left ventricle mid/basal myocardial segments, septum, anterior wall and  inferior wall since since PET/CT from 2/25/2022.   2. Near resolution of hypermetabolism in prominent mediastinal nodes since PET/CT from 2/25/2022.   3. No lung findings sarcoidosis.  4. Cardiomegaly.  5. Cirrhotic liver morphology, new small amount of ascites.  6. Cholelithiasis and colonic diverticulosis.     PET scan 12/2022  1. Findings of active cardiac sarcoidosis, which is improved compared  to 6/14/2022 PET/CT.  2. No evidence suggest active systemic sarcoidosis.  3. Sub-5 mm pulmonary nodules within the bilateral lungs, otherwise  the lung parenchyma is unremarkable. Attention follow-up.  4. Compression deformity of L4 vertebral body with mild FDG uptake,  new compared to 6/14/2022 without retropulsion or significant height  loss.     TTE 4/13/23:  1. The left ventricle is severely dilated. Left ventricular systolic function is severely reduced. The visual ejection fraction is 25-30%. Left ventricular diastolic function is abnormal. There is severe global hypokinesia of the left ventricle. Septal wall motion abnormality may reflect pacemaker activation. There is no thrombus seen in the left ventricle.  2. The right ventricle is normal size. There is a catheter/pacemaker lead seen in the right ventricle. The right ventricular systolic function is normal.  3. There is mild-moderate biatrial enlargement. There is no color Doppler evidence of an atrial shunt.  4. Trace mitral and tricuspid regurgitation.  5. No pericardial effusion.  6. In comparison to the previous report  dated 12/20/2021, the findings are similar.     PET 5/8/23:  1. No evidence for active cardiac sarcoidosis. Resolution of the previously visualized cardiac uptake.  2. No evidence for active systemic sarcoidosis.  3. Stable scattered sub-6 mm pulmonary nodules.  4. Compression fracture of L4 is slightly increased compared to prior  PET/CT without retropulsion or severe spinal canal stenosis.     ASSESSMENT AND PLAN:    Neymar is 63 year old male with cardiac sarcoidosis c/b ventricular tachycardia currently controlled on amiodarone, methotrexate and prednisone. Last PET scan 12/2022 with decreasing cardiac inflammation.  Patient did have a complicated course recently including admission for potential cardiogenic shock and multiple ICD shocks and resuscitation.  He had multiple episodes of ventricular tachycardia.  We will do the following at this point  - We will encourage him to see Dr. De Guzman from EP here and he has an appointment next week  - We will decrease the Bumex to 2 mg once a day  - We will set him up for a right heart catheterization potentially next week to evaluate the filling pressures and cardiac index.  He still has some lower extremity edema however creatinine is worsening which is very concerning for me.  Left we will not change the prednisone and the methotrexate at this time given that he does not have active disease on the PET scan however he did have episodes of ventricular tachycardia.  I will reconsider after he meets with Dr. De Guzman.     I appreciate the opportunity to participate in the care of Neymar Rhodes . Please do not hesitate to contact me with any further questions.     Sincerely,   Roberto Batres MD  [facility] Division of Cardiology

## 2023-05-16 NOTE — PATIENT INSTRUCTIONS
Dr. Batres recommends:    Decrease Bumex to 2 MG every morning.    Right heart cath for the near future.    Follow up clinic visit with Dr. Batres in 2-3 months with labs the same day.    Thank you for your visit today.  Please call me with any questions or concerns.   Christopher Stephens  RN  Cardiology Care Coordinator  [phone]     This is scheduled for Thursday, 5/18/23, arriving at 12:00 to the [facility] waiting room located at [facility] at [address] [place] [address].  Please don't eat or drink for 6 hours prior to the procedure. You can have sips of water for your morning meds and for comfort.    Pre-procedure instructions - Right hear catheterization  Patient Education    Your arrival time is 12:00 on Thursday 5/18/23..  [place] [address] - [facility] Waiting [facility]  Please plan on being at the hospital all day.  At any time, emergencies and/or urgent cases may come up which could delay the start of your procedure.    Pre-procedure instructions - Right heart catheterization  No solid food for 8 hours prior  Nothing to drink 2 hours prior to arrival time  You can take your morning medications (except diabetic and blood thinners) with sips of water  We recommend you arrange for a ride to drop you off and pick you up, in the instance, you are unable to drive home, however you should be able to function as you normally would after the procedure    Diabetic Medication Instructions  Typical instructions for insulin diabetic medication holding are below. However, please reach out to your Primary Care Provider or Endocrinologist for specific instructions  DO NOT take any oral diabetic medication, short-acting diabetes medications/insulin, humalog or regular insulin the morning of your test  Take   dose of long-acting insulin (Lantus, Levemir) the day of your test  Remember to  bring your glucometer  and insulin with you to take after your test if needed              Anticoagulation Medication Instructions   NA

## 2023-05-18 PROBLEM — D86.85 CARDIAC SARCOIDOSIS: Status: ACTIVE | Noted: 2023-01-01

## 2023-05-18 NOTE — DISCHARGE INSTRUCTIONS
[facility]                        Interventional Cardiology  Discharge Instructions   Post Right Heart Cath      AFTER YOU GO HOME:  DO drink plenty of fluids  DO resume your regular diet and medications unless otherwise instructed by your Primary Physician  Do Not scrub the procedure site vigorously  No lotion or powder to the puncture site for 3 days    CALL YOUR PRIMARY PHYSICIAN IF: You may resume all normal activity.  Monitor neck site for bleeding, swelling, or voice changes. If you notice bleeding or swelling immediately apply pressure to the site and call number below to speak with Cardiology Fellow.  If you experience any changes in your breathing you should call your doctor immediately or come to the closest Emergency Department.  Do not drive yourself.    ADDITIONAL INSTRUCTIONS: Medications: You are to resume all home medications including anticoagulation therapy unless otherwise advised by your primary cardiologist or nurse coordinator.    Follow Up: Per your primary cardiology team    If you have any questions or concerns regarding your procedure site please call [phone] at anytime and ask for Cardiology Fellow on call.  They are available 24 hours a day.  You may also contact the Cardiology Clinic after hours number at [phone].                                                       Telephone Numbers [phone] Monday-Friday 8:00 am to 4:30 pm    [phone] After 4:30 pm Monday-Friday, Weekends & Holidays  Ask for Interventional Cardiologist on call. Someone is on call 24 hours/day   [facility] toll free number [phone] Monday-Friday 8:00 am to 4:30 pm   [facility] Emergency Dept [phone]              [facility]                        Interventional Cardiology  Discharge Instructions   Post Right Heart Cath      AFTER YOU GO HOME:  DO drink plenty of fluids  DO resume your regular diet and medications unless otherwise instructed by your  Primary Physician  Do Not scrub the procedure site vigorously  No lotion or powder to the puncture site for 3 days    CALL YOUR PRIMARY PHYSICIAN IF: You may resume all normal activity.  Monitor neck site for bleeding, swelling, or voice changes. If you notice bleeding or swelling immediately apply pressure to the site and call number below to speak with Cardiology Fellow.  If you experience any changes in your breathing you should call your doctor immediately or come to the closest Emergency Department.  Do not drive yourself.    ADDITIONAL INSTRUCTIONS: Medications: You are to resume all home medications including anticoagulation therapy unless otherwise advised by your primary cardiologist or nurse coordinator.    Follow Up: Per your primary cardiology team    If you have any questions or concerns regarding your procedure site please call [phone] at anytime and ask for Cardiology Fellow on call.  They are available 24 hours a day.  You may also contact the Cardiology Clinic after hours number at [phone].                                                       Telephone Numbers [phone] Monday-Friday 8:00 am to 4:30 pm    [phone] After 4:30 pm Monday-Friday, Weekends & Holidays  Ask for Interventional Cardiologist on call. Someone is on call 24 hours/day   [facility] toll free number [phone] Monday-Friday 8:00 am to 4:30 pm   [facility] Emergency Dept [phone]

## 2023-05-18 NOTE — Clinical Note
dry, intact, no bleeding and no hematoma. 7fr RIJ sheath removed, manual pressure applied, hemostasis achieved, bandage placed.

## 2023-05-18 NOTE — PROGRESS NOTES
Pt returns to 2a s/p RHC. Right neck site CDI, soft, flat, non tender. Pt tolerating PO. Discharge instructions reviewed with pt, pt verbalizes understanding.   1300 Pt transported off unit via wheelchair by spouse.

## 2023-05-23 NOTE — TELEPHONE ENCOUNTER
Received FMLA forms. Dr. Harris holding forms for additional information from patient's cardiologist. (Pt has appointment with them 5/24/23) Will complete forms after receiving additional info. Patient's wife will MyChart info.  Alisha Sanchez CMA

## 2023-05-24 NOTE — TELEPHONE ENCOUNTER
Completed FMLA forms at  for . Left VM and sent BackTrack message informing patient. Alisha Sanchez CMA

## 2023-05-24 NOTE — PATIENT INSTRUCTIONS
You are scheduled for a VT ablation, at [facility] [place], with Dr. De Guzman. The hospital is located at [address] [place] [address] on the [place] bank of the campus.  If you need to cancel this procedure, please call [phone]. Please note the following schedule below:      Pre-Admission Phone Call will occur 1-2 days prior to procedure date.  You do not need to come to the [facility].    Visitor Policy: Two visitors.        Date:______  Time: _______________To the [address] at the [facility]  VT Ablation    1. Please review the attached instructions on showering before your procedure at the end of this letter.  2. Your history and physical will be completed by our nurse practitioner when you arrive.  3. Please do not eat anything for 8 hours prior to your procedure. You may have sips of water up until 2 hours prior to your arrival.  4. If you are diabetic follow the following instructions: (Contact your diabetes team if you have questions)   * Hold oral diabetic medications (metformin) the morning of the procedure.  5. The morning of your procedure, you may take your scheduled medications with a sip of water.  HOLD:  - Amiodarone 2 weeks prior  - Bumex day of  6. You will likely stay overnight and will need a  to take you home.         Post-Procedure Instructions  Care of groin site:   Remove the Band-Aid after 24 hours. If there is minor oozing, apply another Band-aid and remove it after 12 hours.    Do NOT take a bath, use a hot tub, pool, or submerse in water for at least 3 days. You may shower.    It is normal to have a small bruise or lump at the site.   Do not scrub the site.   Do not use lotion or powder near the puncture site for 3 days.    If you start bleeding from the site in your groin: Lie down flat and press firmly on the site. Call your physician immediately, or, come to the emergency room.  Call [phone] right away if you have bleeding that is heavy or does not stop.    Call  your doctor/provider if:    You have a large or growing hard lump around the site.    The site is red, swollen, hot or tender.    You have chills or a fever greater than 101 F (38 C).    Blood or fluid is draining from the site.    Your leg or arm turns bluish, feels numb or cool.    You have hives, a rash or unusual itching.    Activity Restrictions   For the first 2 days: Do not stoop or squat. When you cough, sneeze or move your bowels, hold your hand over the puncture site and press gently.   Do not lift more than 10 pounds or exertional activity for 10 days.  - No driving for 24 hours after (with or without general anesthesia).         Date: _______ Follow up appointment with labs, device check prior      Please do not hesitate to utilize MyChart or call us if you have any questions or concerns.    Irina Krishna RN  Electrophysiology Nurse Coordinator  [phone]    [facility] Procedure   [phone]        Showering Before Surgery   Your surgeon has asked you to take 2 showers before surgery.  Why is this important?  It is normal for bacteria (germs) to be on your skin. The skin protects us from these germs. When you have surgery, we cut the skin. Sometimes germs get into the cuts and cause infection (illness caused by germs). By following the instructions below and using special soap, you will lower the number of germs on your skin. This decreases your chance of infection.  Special soap  Buy or get 8 ounces of antiseptic surgical soap called 4% CHG. Common name brands of this soap are Hibiclens and Exidine.   You can find it at your local pharmacy, clinic or retail store. If you have trouble, ask your pharmacist to help you find the right substitute.   A note about shaving:  Do not shave within 12 inches of your incision (surgical cut) area for at least 3 days before surgery. Shaving can make small cuts in the skin. This puts you at a higher risk of infection.  Items you will need for each  shower:   1 newly washed towel   4 ounces of one of the above soaps  Follow these instructions:  The evening before surgery   1. Wash your hair and body with your regular shampoo and soap. Make sure you rinse the shampoo and soap from your hair and body.   2. Using clean hands, apply about 2 ounces of soap gently on your skin from the neck to your toes. Use on your groin area last. Do not use this soap on your face or head. If you get any soap in your eyes, ears or mouth, rinse right away.   3. Repeat step 2. It is very important to let the soap stay on your skin for at least 1 minute.   4. Rinse well and dry off using a clean towel.If you feel any tingling, itching or other irritation, rinse right away. It is normal to feel some coolness on the skin after using the antiseptic soap. Your skin may feel a bit dry after the shower, but do not use any lotions, creams or moisturizers. Do not use hair spray or other products in your hair.  5. Dress in freshly washed clothes or pajamas. Use fresh pillowcases and sheets on your bed.  The morning of surgery  1. Wash your hair and body with your regular shampoo and soap. Make sure you rinse the shampoo and soap from your hair and body.   2. Using clean hands, apply about 2 ounces of soap gently on your skin from the neck to your toes. Use on your groin area last. Do not use this soap on your face or head. If you get any soap in your eyes, ears or mouth, rinse right away.   3. Repeat step 2. It is very important to let the soap stay on your skin for at least 1 minute.   4. Rinse well and dry off using a clean towel.If you feel any tingling, itching or other irritation, rinse right away. It is normal to feel some coolness on the skin after using the antiseptic soap. Your skin may feel a bit dry after the shower, but do not use any lotions, creams or moisturizers. Do not use hair spray or other products in your hair.  5. Dress in clean clothes.  If you have any questions about  showering or an allergy to CHG soap, please call the [facility] Nursing Department at the hospital where you are having your surgery.  [facility]: [phone]  [facility]: [phone]  [facility]: [phone] or [phone]  [facility]: [phone].  [facility]: [phone]  [facility]: [phone]  [facility] ([place]): [phone]  [facility] ([facility]): [phone]  This phone number will be answered between the hours of 8:00 a.m. and 6:30 p.m. Monday through Friday.

## 2023-05-24 NOTE — NURSING NOTE
Chief Complaint   Patient presents with     Follow Up     Amiodarone monitoring: PFTs today, Elevated TSH & T4 4/2023, Elevated AST & ALT 4/2023     Vitals were taken and medications reconciled.    Farooq Machado, EMT  2:28 PM

## 2023-05-24 NOTE — PATIENT INSTRUCTIONS
It was a pleasure to see you in clinic today, we will see you back in clinic on 8/22/2023.    Mayte James RN    Electrophysiology Nurse Clinician  [facility]    During Business Hours Please Call:  [phone]  After Hours Please Call:  [phone] - select option #[phone] and ask for job code [phone]

## 2023-05-24 NOTE — PROGRESS NOTES
ELECTROPHYSIOLOGY CLINIC VISIT    Assessment/Recommendations   Assessment/Plan:    Mr. 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    NICM 2/2 Cardiac Sarcoid LVEF 25-30%, NYHA II-III  Ventricular Tachyarrhythmias with device therapies:  1. ACEi/ARB: Continue Entresto.  2. BB: Continue Toprol XL   3. Aldosterone antagonist: Not currently on.  4. SCD prophylaxis: s/p CRTD 2019  5. Fluid status: Continue Lasix:   6. Etiology of CM: He has biopsy proven pulmonary sarcoid and imaging felt c/w cardiac sarcoid. Given increase in ventricular arrhythmias we will have him get an updated CMR   7. VT/VF: He presented with sustained VT in 2019 requiring external defibrillation. He has since had multiple ATPs and a shock from his ICD. He has been on high dose amiodarone. We discussed options of additional AAT or ablation. We discussed that ablation would be preferred at this stage over escalation of therapies. We also discussed that we do not favor leaving him on higher doses of amiodarone given toxicities and his LFT, TFTs are already mildly elevated. His episodes are likely scar related. We also discussed ablation and its indications, risks, and benefits. Complications include but are not limited to vascular injury, excessive bleeding requiring blood transfusion, groin hematoma, aortic injury at the time of transseptal puncture, bleeding/injury to abdominal structures at time of epicardial access, cardiac tamponade requiring pericardiocentesis or open heart surgery, and thromboembolic complications or strokes. We also discussed that VT ablation can be limited by inducibility of Vt at the time of EPS/Abaltion and/or the origin of VT. Efficacy of ablation also deceases if multiple foci are found. After an extensive discussion, the patient  would like to pursue ablation in attempts to improve VT burden and symptoms.  Patient did have a repeat PET scan for cardiac sarcoidosis given that we recently increased his methotrexate dose and decreased prednisone to 10 mg daily and this showed no PET activity.  Also had a right heart cath on 05/18/2023 which was normal cardiac chamber pressures.  Device interrogation on 05/16/2023, CRT-D biventricular pacing 93%, A-fib burden 0% 10 VT episodes treated by ATP, one VT episode treated by 41 J shock.  Mildly levated TSH & T4 4/2023, Elevated AST & ALT 4/2023, patient now on amiodarone 200 mg p.o. daily (reduced from before). Plan: Patient will be scheduled for VT ablation given VT recurrence despite amiodarone therapy. The VTs are mostly monomorphic, with one other much less frequents morphology. We discussed the risks of the procedure that include CVA, HF decompensation, vascular complications, bleeding, pericardial effusion and tamponade, AVB and CVA. All questions are answered. The patient expressed his understanding and agreement to proceed.    Follow up 3 months after ablation.        History of Present Illness/Subjective    Mr. Neymar Rhodes is a 63 year old male who comes in today for EP consultation of VT/VF in cardiac sarcoid.    Mr. 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    He was first seen by cardiology in 2018 with a new diagnosis of LV dysfunction LVEF 40% and LBBB identified.  Coronary angiogram showed no obstructive CAD.  He was placed on GDMT. A CMR showed scarring felt consistent with sarcoidosis.  He was somewhat lost to follow-up after that.  Then in 2019, he presented with decompensated HF and LVEF decreased to 20% with severe LV dilation.  During that hospital stay, he had sustained monomorphic VT  180 bpm and was emergently defibrillated externally.  He was taken again to have coronary angiogram again demonstrating nonobstructive CAD.  Repeat CMR showed LGE in septal inferior and lateral basal wall with scar burden 13%.  He was placed on amiodarone and underwent CRT-D on 10/4/2019.  Cardiac PET showed diffuse FDG uptake in myocardium and mediastinal/hilar lymph nodes.  An endobronchial biopsy confirmed none caseating granulomas.  He was placed on prednisone.  Subsequent cardiac PET scans showed resolution of inflammation.  He was taken off prednisone in spring 2021.  He has also continued to have some some escalating burden of VT.  An ablation had been offered to him before but he deferred electing to continue with amiodarone.  He has now had multiple VT and VF episodes.  On 12/24/2021 he had ICD shock after failed ATP.  In 9/2021 he also had a syncopal episode from VF due to under sensing.  Fortunately, the arrhythmia converted on its own.  After that his R wave sensitivity was adjusted and he was reloaded with amiodarone again.  He was now referred for sarcoid VT management.     EP Visit 1/14/22: He reports feeling at baseline. He denies chest discomfort, palpitations, abdominal fullness/bloating or peripheral edema, shortness of breath, paroxysmal nocturnal dyspnea, orthopnea, lightheadedness, dizziness, pre-syncope, or syncope. LFTs mildly elevated 10/2021, TSH mildly elevated 10/2021, and PFT WDL 11/2021. Device interrogation shows normal device function, stable lead parameters, last VT/VF on 12/24/21, and AP 58%, BVP 99%. Current cardiac medications include: Amiodarone, Lipitor, Entresto, Toprol-XL, Lasix, and ASA.     EP visit on 01/14/2022 he was admitted to [facility] on 4/13/23 with worsening JOSHI and SOB. Echo showed LVEF 20% (relatively unchanged). Given concern for worsening cardiogenic shock given his symptoms and worsening renal function,  he was started on dobutamine infusion however this led to  essentially cardiac arrest with 3 VT VF episodes and appropriate shocks.  He was started on amiodarone drip there was admitted transitioned over to p.o. amiodarone and discharged after aggressive diuresis. Subsequently he was admitted with a syncopal episode and device interrogation showed persistent and sustained episodes of ventricular tachycardia.      EP visit on 05/24/2023 : Patient was referred back to EP due to multiple episodes of sustained VT treated by ATP despite attempts of increasing his amiodarone couple of times.  Interestingly he did have a repeat PET scan for cardiac sarcoidosis given that we recently increased his methotrexate dose and decreased prednisone to 10 mg daily and this showed no PET activity. He reports feeling feeling okay in general patient is asymptomatic.  Device interrogation on 05/16/2023, CRT-D biventricular pacing 93%, A-fib burden 0% 10 VT episodes treated by ATP, one VT episode treated by 41 J shock.  Device interrogation today showed no further ventricular arrhythmias. Elevated TSH & T4 4/2023, Elevated AST & ALT 4/2023, patient now on amiodarone 200 mg p.o. daily (reduced from before).     Cardiographics (Personally Reviewed) :   4/14/23 Echo:  Interpretation Summary     1. The left ventricle is severely dilated. Left ventricular systolic function  is severely reduced. The visual ejection fraction is 25-30%. Left ventricular  diastolic function is abnormal. There is severe global hypokinesia of the left  ventricle. Septal wall motion abnormality may reflect pacemaker activation.  There is no thrombus seen in the left ventricle.  2. The right ventricle is normal size. There is a catheter/pacemaker lead seen  in the right ventricle. The right ventricular systolic function is normal.  3. There is mild-moderate biatrial enlargement. There is no color Doppler  evidence of an atrial shunt.  4. Trace mitral and tricuspid regurgitation.  5. No pericardial effusion.  6. In comparison  to the previous report dated 12/20/2021, the findings are  Similar.    12/2021 Echo:   Interpretation Summary  1. Left ventricular systolic function is severely reduced. The visual ejection fraction is 25-30%.  2. Septal wall motion abnormality may reflect pacemaker activation.  3. The left ventricle is severely dilated.  4. The right ventricle is normal in structure, function and size.  5. No evidence for significant valvular pathology  6. Mildly dilated aortic root (sinus of valsalva) 4.0 cm and ascending aorta, 3.9 cm.    5/8/23 Cardiac PET:  Impression:  1. No evidence for active cardiac sarcoidosis. Resolution of the  previously visualized cardiac uptake.  2. No evidence for active systemic sarcoidosis.  3. Stable scattered sub-6 mm pulmonary nodules.  4. Compression fracture of L4 is slightly increased compared to prior  PET/CT without retropulsion or severe spinal canal stenosis.     2/12/2021 Cardiac PET:  Impression:  1. No evidence of active cardiac sarcoidosis.  2. No evidence of active systemic or pulmonary sarcoidosis.  3. Cholelithiasis.  4. Colonic diverticulosis.    6/22/20 Cardiac PET:  Impression:  1. No evidence of active cardiac sarcoidosis.  2. No evidence of active inflammation/sarcoidosis in the remainder of the body.  3. Cardiomegaly.  4. Cholelithiasis.    2/12/20 Cardiac PET:  Impression:  1. Minimal residual uptake in the basal aspect of the septum and anterior wall, greatly improved compared to the prior examination.  2. Minimal uptake of a mediastinal lymph node.   3. Left parotid hyperdense nodule without significant FDG uptake, likely lymph node or Warthin's duct tumor.    12/2019 Cardiac PET:  Impression:  1. Diffuse increased FDG uptake in the myocardium compatible with active cardiac sarcoid.  2. Globally hypokinetic and enlarged heart with ejection fraction severely decreased to 16%, compatible with a dilatated cardiomyopathy.  3. Small perfusion abnormality of the anterior wall of  the left ventricle, this is the LAD distribution..   4. There is increased ammonia uptake in the lungs, consistent with patient's known history of congestive heart failure.  5. FDG PET/CT demonstrate active sarcoid in the mediastinal and hilar lymph nodes.    5/18/23 RHC:  Hemodynamics    RA 3  RV 26/4  PA 26/10, 16  PCWP 10  PA sat 67%  Ezekiel CO/CI: 9.64/4.15  Thermo CO/CI: 7.37/3.2  MAP 62   Right sided filling pressures are normal. Left sided filling pressures are normal. Normal PA pressures. Left ventricular filling pressures are normal.        10/2019 Coronary Angiogram:      Minimal non-obstructive coronary artery disease    Mildly elevated LVEDP    Successful closure of the RFA access site with a 6F Angioseal device        10/2019 CMR:  1. The LV is severely dilated. The global systolic function is moderately severely to severely reduced. The LVEF is 25%. There is severe global LV dysfunction with dyssynchronous septal motion consistent with a LBBB.  2. The RV is normal in cavity size. The global systolic function is normal. The RVEF is 57%.   3. Both atria are dilated.  4. There is mild mitral insufficiency.   5. Late gadolinium enhancement is present in the septal. inferior and lateral base. There is non-CAD scar in the anterolateral mid-wall. Scar is more extensive (13%) as compared to the prior MRI (6%).     CONCLUSIONS:   Late gadolinium enhancement is present in the septal. inferior and lateral base. There is non-CAD scar in  the anterolateral mid-wall. Scar is more extensive (13%) as compared to the prior MRI (6%).  The LV is severely dilated and the global systolic function is moderately severely to severely reduced with  an LVEF of 25%. There is severe global LV dysfunction with dyssynchronous septal motion consistent with a  LBBB.  Collectively, these findings are most consistent with a non-ischemic cardiomyopathy. Possible etiologies  include prior LBBB, cardiac sarcoidosis or prior  myocarditis.    CMR 2/18/2022:  Clinical history:  61 year-old male with VT/VF, cardiac sarcoid.  Comparison CMR: 10/03/2019  1. The LV is mildly increased in cavity size. The global systolic function is severely reduced. The LVEF is 24%. There is severe diffuse hypokinesis with akinesis of the basal to mid inferior segment.   2. The RV is normal in cavity size. The global systolic function is mildly reduced. The RVEF is 48%.   3. The left atrium is mildly enlarged.   4. There is no significant valvular disease.   5. Late gadolinium enhancement imaging shows epicardial hyperenhancement in the basal anterior RV size of the septum, transmural LGE in the basal inferior segment and subendocardial hyperenhancement in the mid septal and inferior segments and mid-myocardial LGE in the mid inferior/inferolateral segment. This pattern of LGE with multifocal, septal and epicardial is compatible with cardiac sarcoid.   6. There is no pericardial effusion or thickening.  CONCLUSIONS: Non-ischemic cardiomyopathy with severely reduced LV function, LVEF of 24%. The LGE pattern is  compatible with cardiac sarcoid. Compared to prior CMR from 10/3/2019 there is no significant change in  LGE.      Physical Examination   There were no vitals taken for this visit.  Wt Readings from Last 3 Encounters:   05/16/23 114.2 kg (251 lb 11.2 oz)   05/01/23 114.1 kg (251 lb 9.6 oz)   04/28/23 112.8 kg (248 lb 11.2 oz)     General Appearance:   Alert, well-appearing and in no acute distress.   HEENT: Atraumatic, normocephalic. PERRL.  MMM.   Chest/Lungs:   Respirations unlabored.     Cardiovascular:   Regular rate and rhythm.     Abdomen:  Soft, nontender, nondistended.   Extremities: No cyanosis or clubbing. No edema. .    Musculoskeletal: Moves all extremities.  Gait normal.   Skin: Warm, dry, intact.    Neurologic: Mood and affect are appropriate.  Alert and oriented to person, place, time, and situation.          Medications  Allergies    Current Outpatient Medications   Medication Sig Dispense Refill     acetaminophen (TYLENOL) 500 MG tablet Take 1,000 mg by mouth daily as needed for mild pain       acetaminophen (TYLENOL) 500 MG tablet Take 1,000 mg by mouth every morning       amiodarone (PACERONE) 200 MG tablet 400 mg PO every 12 hours for 1 week then 400 mg PO daily for 1 week then continue at 200 mg PO daily. 56 tablet 0     aspirin 81 MG tablet Take 1 tablet (81 mg) by mouth daily 30 tablet      atorvastatin (LIPITOR) 20 MG tablet Take 1 tablet (20 mg) by mouth daily 90 tablet 3     betamethasone dipropionate (DIPROSONE) 0.05 % external cream Apply topically 2 times daily Apply sparingly once or twice per day as needed to affected area until the skin is better, then stop; REPEAT AS NEEDED (Patient taking differently: Apply topically 2 times daily as needed Apply sparingly once or twice per day as needed to affected area until the skin is better, then stop; REPEAT AS NEEDED) 30 g 1     blood glucose monitoring (ACCU-CHEK LUL PLUS) test strip Use to test blood sugar 1-3 times daily or as directed. 100 each 11     bumetanide (BUMEX) 2 MG tablet Take 1 tablet (2 mg) by mouth daily 180 tablet 3     clotrimazole (LOTRIMIN) 1 % external cream Apply sparingly once or twice per day as needed to affected area until the skin is better, then stop; REPEAT AS NEEDED 30 g 0     Continuous Blood Gluc Sensor (FREESTYLE JOCELIN 14 DAY SENSOR) [facility] USE SENSOR EVERY 14 DAYS. 6 each 1     continuous blood glucose monitoring (FREESTYLE JOCELIN) sensor For use with Freestyle Jocelin Flash  for continuous monitioring of blood glucose levels. Replace sensor every 10 days. 3 each 3     fenofibrate (TRIGLIDE/LOFIBRA) 160 MG tablet Take 1 tablet (160 mg) by mouth daily 90 tablet 3     ferrous sulfate (FEROSUL) 325 (65 Fe) MG tablet Take 1 tablet (325 mg) by mouth every other day (Absorbed best on an empty stomach. If stomach upset occurs, can take with meals.) 15  tablet 0     folic acid (FOLVITE) 1 MG tablet Take 1 tablet (1 mg) by mouth daily 90 tablet 3     levothyroxine (SYNTHROID/LEVOTHROID) 50 MCG tablet Take 1 tablet (50 mcg) by mouth daily 90 tablet 1     Lidocaine (LIDOCARE) 4 % Patch Place 1 patch onto the skin every 24 hours To prevent lidocaine toxicity, patient should be patch free for 12 hrs daily. 10 patch 0     metFORMIN (GLUCOPHAGE XR) 500 MG 24 hr tablet Take 2 tablets (1,000 mg) by mouth 2 times daily (with meals) 360 tablet 0     methotrexate 2.5 MG tablet Take 6 tablets (15 mg) by mouth every 7 days 75 tablet 3     multivitamin, therapeutic (THERA-VIT) TABS tablet Take 1 tablet by mouth daily       predniSONE (DELTASONE) 10 MG tablet Take 1 tablet (10 mg) by mouth daily 90 tablet 3     sacubitril-valsartan (ENTRESTO) 24-26 MG per tablet Take 1 tablet by mouth 2 times daily 60 tablet 0     sulfamethoxazole-trimethoprim (BACTRIM) 400-80 MG tablet Take 1 tablet by mouth daily 90 tablet 1     vitamin C (ASCORBIC ACID) 500 MG tablet Take 500 mg by mouth daily      Allergies   Allergen Reactions     No Known Drug Allergy          Lab Results (Personally Reviewed)    Chemistry/lipid CBC Cardiac Enzymes/BNP/TSH/INR   Lab Results   Component Value Date    BUN 72.5 (H) 05/18/2023     (L) 05/18/2023    CO2 21 (L) 05/18/2023     Creatinine   Date Value Ref Range Status   05/18/2023 2.79 (H) 0.67 - 1.17 mg/dL Final   05/26/2021 1.22 0.66 - 1.25 mg/dL Final       Lab Results   Component Value Date    CHOL 88 05/05/2023    HDL 29 (L) 05/05/2023    LDL 36 05/05/2023      Lab Results   Component Value Date    WBC 6.7 05/18/2023    HGB 8.3 (L) 05/18/2023    HCT 27.8 (L) 05/18/2023    MCV 98 05/18/2023     05/18/2023    Lab Results   Component Value Date    TSH 5.51 (H) 05/05/2023    INR 1.35 (H) 05/18/2023        Becki Demarco MD  Cardiac electrophysiology fellow    EP STAFF NOTE  Patient seen and examined and management plan personally reviewed with the  patient. I agree with the note above by the CV/EP fellow.  Zackery De Guzman MD [facility]  Cardiology - Electrophysiology      Total time spent on patient visit, reviewing notes, imaging, labs, orders, and completing necessary documentation: 45 minutes.

## 2023-05-24 NOTE — LETTER
5/24/2023      RE: Neymar Rhodes  [address]  [facility] [address]       Dear Colleague,    Thank you for the opportunity to participate in the care of your patient, Neymar Rhodes, at the [facility] [place] [facility]. Please see a copy of my visit note below.        ELECTROPHYSIOLOGY CLINIC VISIT    Assessment/Recommendations   Assessment/Plan:    Mr. 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    NICM 2/2 Cardiac Sarcoid LVEF 25-30%, NYHA II-III  Ventricular Tachyarrhythmias with device therapies:  1. ACEi/ARB: Continue Entresto.  2. BB: Continue Toprol XL   3. Aldosterone antagonist: Not currently on.  4. SCD prophylaxis: s/p CRTD 2019  5. Fluid status: Continue Lasix:   6. Etiology of CM: He has biopsy proven pulmonary sarcoid and imaging felt c/w cardiac sarcoid. Given increase in ventricular arrhythmias we will have him get an updated CMR   7. VT/VF: He presented with sustained VT in 2019 requiring external defibrillation. He has since had multiple ATPs and a shock from his ICD. He has been on high dose amiodarone. We discussed options of additional AAT or ablation. We discussed that ablation would be preferred at this stage over escalation of therapies. We also discussed that we do not favor leaving him on higher doses of amiodarone given toxicities and his LFT, TFTs are already mildly elevated. His episodes are likely scar related. We also discussed ablation and its indications, risks, and benefits. Complications include but are not limited to vascular injury, excessive bleeding requiring blood transfusion, groin hematoma, aortic injury at the time of transseptal puncture, bleeding/injury to abdominal structures at time of epicardial  access, cardiac tamponade requiring pericardiocentesis or open heart surgery, and thromboembolic complications or strokes. We also discussed that VT ablation can be limited by inducibility of Vt at the time of EPS/Abaltion and/or the origin of VT. Efficacy of ablation also deceases if multiple foci are found. After an extensive discussion, the patient would like to pursue ablation in attempts to improve VT burden and symptoms.  Patient did have a repeat PET scan for cardiac sarcoidosis given that we recently increased his methotrexate dose and decreased prednisone to 10 mg daily and this showed no PET activity.  Also had a right heart cath on 05/18/2023 which was normal cardiac chamber pressures.  Device interrogation on 05/16/2023, CRT-D biventricular pacing 93%, A-fib burden 0% 10 VT episodes treated by ATP, one VT episode treated by 41 J shock.  Mildly levated TSH & T4 4/2023, Elevated AST & ALT 4/2023, patient now on amiodarone 200 mg p.o. daily (reduced from before). Plan: Patient will be scheduled for VT ablation given VT recurrence despite amiodarone therapy. The VTs are mostly monomorphic, with one other much less frequents morphology. We discussed the risks of the procedure that include CVA, HF decompensation, vascular complications, bleeding, pericardial effusion and tamponade, AVB and CVA. All questions are answered. The patient expressed his understanding and agreement to proceed.    Follow up 3 months after ablation.        History of Present Illness/Subjective    Mr. Neymar Rhodes is a 63 year old male who comes in today for EP consultation of VT/VF in cardiac sarcoid.    Mr. 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    He was first seen by cardiology in 2018 with a new diagnosis of LV dysfunction LVEF 40% and  LBBB identified.  Coronary angiogram showed no obstructive CAD.  He was placed on GDMT. A CMR showed scarring felt consistent with sarcoidosis.  He was somewhat lost to follow-up after that.  Then in 2019, he presented with decompensated HF and LVEF decreased to 20% with severe LV dilation.  During that hospital stay, he had sustained monomorphic  bpm and was emergently defibrillated externally.  He was taken again to have coronary angiogram again demonstrating nonobstructive CAD.  Repeat CMR showed LGE in septal inferior and lateral basal wall with scar burden 13%.  He was placed on amiodarone and underwent CRT-D on 10/4/2019.  Cardiac PET showed diffuse FDG uptake in myocardium and mediastinal/hilar lymph nodes.  An endobronchial biopsy confirmed none caseating granulomas.  He was placed on prednisone.  Subsequent cardiac PET scans showed resolution of inflammation.  He was taken off prednisone in spring 2021.  He has also continued to have some some escalating burden of VT.  An ablation had been offered to him before but he deferred electing to continue with amiodarone.  He has now had multiple VT and VF episodes.  On 12/24/2021 he had ICD shock after failed ATP.  In 9/2021 he also had a syncopal episode from VF due to under sensing.  Fortunately, the arrhythmia converted on its own.  After that his R wave sensitivity was adjusted and he was reloaded with amiodarone again.  He was now referred for sarcoid VT management.     EP Visit 1/14/22: He reports feeling at baseline. He denies chest discomfort, palpitations, abdominal fullness/bloating or peripheral edema, shortness of breath, paroxysmal nocturnal dyspnea, orthopnea, lightheadedness, dizziness, pre-syncope, or syncope. LFTs mildly elevated 10/2021, TSH mildly elevated 10/2021, and PFT WDL 11/2021. Device interrogation shows normal device function, stable lead parameters, last VT/VF on 12/24/21, and AP 58%, BVP 99%. Current cardiac medications include:  Amiodarone, Lipitor, Entresto, Toprol-XL, Lasix, and ASA.     EP visit on 01/14/2022 he was [facility] with worsening JOSHI and SOB. Echo showed LVEF 20% (relatively unchanged). Given concern for worsening cardiogenic shock given his symptoms and worsening renal function,  he was started on dobutamine infusion however this led to essentially cardiac arrest with 3 VT VF episodes and appropriate shocks.  He was started on amiodarone drip there was admitted transitioned over to p.o. amiodarone and discharged after aggressive diuresis. Subsequently he was admitted with a syncopal episode and device interrogation showed persistent and sustained episodes of ventricular tachycardia.      EP visit on 05/24/2023 : Patient was referred back to EP due to multiple episodes of sustained VT treated by ATP despite attempts of increasing his amiodarone couple of times.  Interestingly he did have a repeat PET scan for cardiac sarcoidosis given that we recently increased his methotrexate dose and decreased prednisone to 10 mg daily and this showed no PET activity. He reports feeling feeling okay in general patient is asymptomatic.  Device interrogation on 05/16/2023, CRT-D biventricular pacing 93%, A-fib burden 0% 10 VT episodes treated by ATP, one VT episode treated by 41 J shock.  Device interrogation today showed no further ventricular arrhythmias. Elevated TSH & T4 4/2023, Elevated AST & ALT 4/2023, patient now on amiodarone 200 mg p.o. daily (reduced from before).     Cardiographics (Personally Reviewed) :   4/14/23 Echo:  Interpretation Summary     1. The left ventricle is severely dilated. Left ventricular systolic function  is severely reduced. The visual ejection fraction is 25-30%. Left ventricular  diastolic function is abnormal. There is severe global hypokinesia of the left  ventricle. Septal wall motion abnormality may reflect pacemaker activation.  There is no thrombus seen in the left ventricle.  2. The  right ventricle is normal size. There is a catheter/pacemaker lead seen  in the right ventricle. The right ventricular systolic function is normal.  3. There is mild-moderate biatrial enlargement. There is no color Doppler  evidence of an atrial shunt.  4. Trace mitral and tricuspid regurgitation.  5. No pericardial effusion.  6. In comparison to the previous report dated 12/20/2021, the findings are  Similar.    12/2021 Echo:   Interpretation Summary  1. Left ventricular systolic function is severely reduced. The visual ejection fraction is 25-30%.  2. Septal wall motion abnormality may reflect pacemaker activation.  3. The left ventricle is severely dilated.  4. The right ventricle is normal in structure, function and size.  5. No evidence for significant valvular pathology  6. Mildly dilated aortic root (sinus of valsalva) 4.0 cm and ascending aorta, 3.9 cm.    5/8/23 Cardiac PET:  Impression:  1. No evidence for active cardiac sarcoidosis. Resolution of the  previously visualized cardiac uptake.  2. No evidence for active systemic sarcoidosis.  3. Stable scattered sub-6 mm pulmonary nodules.  4. Compression fracture of L4 is slightly increased compared to prior  PET/CT without retropulsion or severe spinal canal stenosis.     2/12/2021 Cardiac PET:  Impression:  1. No evidence of active cardiac sarcoidosis.  2. No evidence of active systemic or pulmonary sarcoidosis.  3. Cholelithiasis.  4. Colonic diverticulosis.    6/22/20 Cardiac PET:  Impression:  1. No evidence of active cardiac sarcoidosis.  2. No evidence of active inflammation/sarcoidosis in the remainder of the body.  3. Cardiomegaly.  4. Cholelithiasis.    2/12/20 Cardiac PET:  Impression:  1. Minimal residual uptake in the basal aspect of the septum and anterior wall, greatly improved compared to the prior examination.  2. Minimal uptake of a mediastinal lymph node.   3. Left parotid hyperdense nodule without significant FDG uptake, likely lymph node  or Warthin's duct tumor.    12/2019 Cardiac PET:  Impression:  1. Diffuse increased FDG uptake in the myocardium compatible with active cardiac sarcoid.  2. Globally hypokinetic and enlarged heart with ejection fraction severely decreased to 16%, compatible with a dilatated cardiomyopathy.  3. Small perfusion abnormality of the anterior wall of the left ventricle, this is the LAD distribution..   4. There is increased ammonia uptake in the lungs, consistent with patient's known history of congestive heart failure.  5. FDG PET/CT demonstrate active sarcoid in the mediastinal and hilar lymph nodes.    5/18/23 RHC:  Hemodynamics    RA 3  RV 26/4  PA 26/10, 16  PCWP 10  PA sat 67%  Ezekiel CO/CI: 9.64/4.15  Thermo CO/CI: 7.37/3.2  MAP 62   Right sided filling pressures are normal. Left sided filling pressures are normal. Normal PA pressures. Left ventricular filling pressures are normal.        10/2019 Coronary Angiogram:    Minimal non-obstructive coronary artery disease  Mildly elevated LVEDP  Successful closure of the RFA access site with a 6F Angioseal device        10/2019 CMR:  1. The LV is severely dilated. The global systolic function is moderately severely to severely reduced. The LVEF is 25%. There is severe global LV dysfunction with dyssynchronous septal motion consistent with a LBBB.  2. The RV is normal in cavity size. The global systolic function is normal. The RVEF is 57%.   3. Both atria are dilated.  4. There is mild mitral insufficiency.   5. Late gadolinium enhancement is present in the septal. inferior and lateral base. There is non-CAD scar in the anterolateral mid-wall. Scar is more extensive (13%) as compared to the prior MRI (6%).     CONCLUSIONS:   Late gadolinium enhancement is present in the septal. inferior and lateral base. There is non-CAD scar in  the anterolateral mid-wall. Scar is more extensive (13%) as compared to the prior MRI (6%).  The LV is severely dilated and the global  systolic function is moderately severely to severely reduced with  an LVEF of 25%. There is severe global LV dysfunction with dyssynchronous septal motion consistent with a  LBBB.  Collectively, these findings are most consistent with a non-ischemic cardiomyopathy. Possible etiologies  include prior LBBB, cardiac sarcoidosis or prior myocarditis.    CMR 2/18/2022:  Clinical history:  61 year-old male with VT/VF, cardiac sarcoid.  Comparison CMR: 10/03/2019  1. The LV is mildly increased in cavity size. The global systolic function is severely reduced. The LVEF is 24%. There is severe diffuse hypokinesis with akinesis of the basal to mid inferior segment.   2. The RV is normal in cavity size. The global systolic function is mildly reduced. The RVEF is 48%.   3. The left atrium is mildly enlarged.   4. There is no significant valvular disease.   5. Late gadolinium enhancement imaging shows epicardial hyperenhancement in the basal anterior RV size of the septum, transmural LGE in the basal inferior segment and subendocardial hyperenhancement in the mid septal and inferior segments and mid-myocardial LGE in the mid inferior/inferolateral segment. This pattern of LGE with multifocal, septal and epicardial is compatible with cardiac sarcoid.   6. There is no pericardial effusion or thickening.  CONCLUSIONS: Non-ischemic cardiomyopathy with severely reduced LV function, LVEF of 24%. The LGE pattern is  compatible with cardiac sarcoid. Compared to prior CMR from 10/3/2019 there is no significant change in  LGE.      Physical Examination   There were no vitals taken for this visit.  Wt Readings from Last 3 Encounters:   05/16/23 114.2 kg (251 lb 11.2 oz)   05/01/23 114.1 kg (251 lb 9.6 oz)   04/28/23 112.8 kg (248 lb 11.2 oz)     General Appearance:   Alert, well-appearing and in no acute distress.   HEENT: Atraumatic, normocephalic. PERRL.  MMM.   Chest/Lungs:   Respirations unlabored.     Cardiovascular:   Regular rate and  rhythm.     Abdomen:  Soft, nontender, nondistended.   Extremities: No cyanosis or clubbing. No edema. .    Musculoskeletal: Moves all extremities.  Gait normal.   Skin: Warm, dry, intact.    Neurologic: Mood and affect are appropriate.  Alert and oriented to person, place, time, and situation.          Medications  Allergies   Current Outpatient Medications   Medication Sig Dispense Refill    acetaminophen (TYLENOL) 500 MG tablet Take 1,000 mg by mouth daily as needed for mild pain      acetaminophen (TYLENOL) 500 MG tablet Take 1,000 mg by mouth every morning      amiodarone (PACERONE) 200 MG tablet 400 mg PO every 12 hours for 1 week then 400 mg PO daily for 1 week then continue at 200 mg PO daily. 56 tablet 0    aspirin 81 MG tablet Take 1 tablet (81 mg) by mouth daily 30 tablet     atorvastatin (LIPITOR) 20 MG tablet Take 1 tablet (20 mg) by mouth daily 90 tablet 3    betamethasone dipropionate (DIPROSONE) 0.05 % external cream Apply topically 2 times daily Apply sparingly once or twice per day as needed to affected area until the skin is better, then stop; REPEAT AS NEEDED (Patient taking differently: Apply topically 2 times daily as needed Apply sparingly once or twice per day as needed to affected area until the skin is better, then stop; REPEAT AS NEEDED) 30 g 1    blood glucose monitoring (ACCU-CHEK LUL PLUS) test strip Use to test blood sugar 1-3 times daily or as directed. 100 each 11    bumetanide (BUMEX) 2 MG tablet Take 1 tablet (2 mg) by mouth daily 180 tablet 3    clotrimazole (LOTRIMIN) 1 % external cream Apply sparingly once or twice per day as needed to affected area until the skin is better, then stop; REPEAT AS NEEDED 30 g 0    Continuous Blood Gluc Sensor (FREESTYLE JOCELIN 14 DAY SENSOR) [facility] USE SENSOR EVERY 14 DAYS. 6 each 1    continuous blood glucose monitoring (FREESTYLE JOCELIN) sensor For use with Freestyle Jocelin Flash  for continuous monitioring of blood glucose levels. Replace  sensor every 10 days. 3 each 3    fenofibrate (TRIGLIDE/LOFIBRA) 160 MG tablet Take 1 tablet (160 mg) by mouth daily 90 tablet 3    ferrous sulfate (FEROSUL) 325 (65 Fe) MG tablet Take 1 tablet (325 mg) by mouth every other day (Absorbed best on an empty stomach. If stomach upset occurs, can take with meals.) 15 tablet 0    folic acid (FOLVITE) 1 MG tablet Take 1 tablet (1 mg) by mouth daily 90 tablet 3    levothyroxine (SYNTHROID/LEVOTHROID) 50 MCG tablet Take 1 tablet (50 mcg) by mouth daily 90 tablet 1    Lidocaine (LIDOCARE) 4 % Patch Place 1 patch onto the skin every 24 hours To prevent lidocaine toxicity, patient should be patch free for 12 hrs daily. 10 patch 0    metFORMIN (GLUCOPHAGE XR) 500 MG 24 hr tablet Take 2 tablets (1,000 mg) by mouth 2 times daily (with meals) 360 tablet 0    methotrexate 2.5 MG tablet Take 6 tablets (15 mg) by mouth every 7 days 75 tablet 3    multivitamin, therapeutic (THERA-VIT) TABS tablet Take 1 tablet by mouth daily      predniSONE (DELTASONE) 10 MG tablet Take 1 tablet (10 mg) by mouth daily 90 tablet 3    sacubitril-valsartan (ENTRESTO) 24-26 MG per tablet Take 1 tablet by mouth 2 times daily 60 tablet 0    sulfamethoxazole-trimethoprim (BACTRIM) 400-80 MG tablet Take 1 tablet by mouth daily 90 tablet 1    vitamin C (ASCORBIC ACID) 500 MG tablet Take 500 mg by mouth daily      Allergies   Allergen Reactions    No Known Drug Allergy          Lab Results (Personally Reviewed)    Chemistry/lipid CBC Cardiac Enzymes/BNP/TSH/INR   Lab Results   Component Value Date    BUN 72.5 (H) 05/18/2023     (L) 05/18/2023    CO2 21 (L) 05/18/2023     Creatinine   Date Value Ref Range Status   05/18/2023 2.79 (H) 0.67 - 1.17 mg/dL Final   05/26/2021 1.22 0.66 - 1.25 mg/dL Final       Lab Results   Component Value Date    CHOL 88 05/05/2023    HDL 29 (L) 05/05/2023    LDL 36 05/05/2023      Lab Results   Component Value Date    WBC 6.7 05/18/2023    HGB 8.3 (L) 05/18/2023    HCT 27.8 (L)  05/18/2023    MCV 98 05/18/2023     05/18/2023    Lab Results   Component Value Date    TSH 5.51 (H) 05/05/2023    INR 1.35 (H) 05/18/2023        Becki Demarco MD  Cardiac electrophysiology fellow    EP STAFF NOTE  Patient seen and examined and management plan personally reviewed with the patient. I agree with the note above by the CV/EP fellow.  Zackery De Guzman MD [facility]  [facility]iology - Electrophysiology      Total time spent on patient visit, reviewing notes, imaging, labs, orders, and completing necessary documentation: 45 minutes.

## 2023-06-13 PROBLEM — K76.1 CARDIAC CIRRHOSIS: Status: ACTIVE | Noted: 2023-01-01

## 2023-06-13 PROBLEM — K72.00 ACUTE LIVER FAILURE WITHOUT HEPATIC COMA: Status: ACTIVE | Noted: 2023-01-01

## 2023-06-13 PROBLEM — R18.8 OTHER ASCITES: Status: ACTIVE | Noted: 2023-01-01

## 2023-06-13 NOTE — ED NOTES
[facility]  ED Nurse Handoff Report    ED Chief complaint: Abdominal Pain      ED Diagnosis:   Final diagnoses:   Cardiac cirrhosis   Acute liver failure without hepatic coma   Other ascites       Code Status: Full Code    Allergies:   Allergies   Allergen Reactions     No Known Drug Allergy        Patient Story: abd pain  Focused Assessment:  Pt reports that his abd has been increasing in size. Pt states he takes a diuretic but was recently switched to Bumex and its not making a difference. Pt has lots of elevated lab values that will need to be addressed by hospitalist.     Treatments and/or interventions provided: 500mL of IVF  Patient's response to treatments and/or interventions: good    To be done/followed up on inpatient unit:  monitor BP    Does this patient have any cognitive concerns?: NA    Activity level - Baseline/Home:  Independent  Activity Level - Current:   Stand with Assist    Patient's Preferred language: English   Needed?: No    Isolation: None  Infection: Not Applicable  Patient tested for COVID 19 prior to admission: NO  Bariatric?: No    Vital Signs:   Vitals:    06/13/23 1307 06/13/23 1310 06/13/23 1557   BP: (!) 74/47 (!) 81/45 92/50   Pulse: 81     Resp: 20     Temp: 98.4  F (36.9  C)     TempSrc: Oral     SpO2: 100%  100%       Cardiac Rhythm:     Was the PSS-3 completed:   Yes  What interventions are required if any?               Family Comments: wife at bedside  OBS brochure/video discussed/provided to patient/family: N/A              Name of person given brochure if not patient: NA              Relationship to patient: NA    For the majority of the shift this patient's behavior was Green.   Behavioral interventions performed were NA.    ED NURSE PHONE NUMBER: *[phone]

## 2023-06-13 NOTE — PHARMACY-ADMISSION MEDICATION HISTORY
Pharmacist Admission Medication History    Admission medication history is complete. The information provided in this note is only as accurate as the sources available at the time of the update.    Medication reconciliation/reorder completed by provider prior to medication history? No    Information Source(s): Patient and his wife via in-person    Pertinent Information:     Changes made to PTA medication list:    Added: None    Deleted: Lidocaine patch    Changed: None    Medication Affordability:       Allergies reviewed with patient and updates made in EHR: Was done by RN    Medication History Completed By: Geneva Fraser RPH 6/13/2023 5:23 PM    Prior to Admission medications    Medication Sig Last Dose Taking? Auth Provider Long Term End Date   acetaminophen (TYLENOL) 500 MG tablet Take 1,000 mg by mouth daily as needed for mild pain prn Yes Unknown, Entered By History     acetaminophen (TYLENOL) 500 MG tablet Take 1,000 mg by mouth every morning 6/13/2023 Yes Reported, Patient     amiodarone (PACERONE) 200 MG tablet Take 200 mg by mouth daily 6/13/2023 Yes Unknown, Entered By History No    aspirin 81 MG tablet Take 1 tablet (81 mg) by mouth daily 6/13/2023 Yes Oscar Paul,      atorvastatin (LIPITOR) 20 MG tablet Take 1 tablet (20 mg) by mouth daily 6/12/2023 at hs Yes Jefry Harris MD Yes    betamethasone dipropionate (DIPROSONE) 0.05 % external cream Apply topically 2 times daily Apply sparingly once or twice per day as needed to affected area until the skin is better, then stop; REPEAT AS NEEDED  Patient taking differently: Apply topically 2 times daily as needed Apply sparingly once or twice per day as needed to affected area until the skin is better, then stop; REPEAT AS NEEDED prn Yes Jefry Harris MD     bumetanide (BUMEX) 2 MG tablet Take 1 tablet (2 mg) by mouth daily 6/13/2023 Yes Roberto Batres MD Yes    clotrimazole (LOTRIMIN) 1 % external cream Apply sparingly once or twice  per day as needed to affected area until the skin is better, then stop; REPEAT AS NEEDED prn Yes Jefry Harris MD     fenofibrate (TRIGLIDE/LOFIBRA) 160 MG tablet Take 1 tablet (160 mg) by mouth daily 6/13/2023 at am Yes Jefry Harris MD Yes    ferrous sulfate (FEROSUL) 325 (65 Fe) MG tablet Take 1 tablet (325 mg) by mouth every other day (Absorbed best on an empty stomach. If stomach upset occurs, can take with meals.) 6/12/2023 at am Yes Jefry Harris MD     folic acid (FOLVITE) 1 MG tablet Take 1 tablet (1 mg) by mouth daily 6/13/2023 Yes Roberto Batres MD     levothyroxine (SYNTHROID/LEVOTHROID) 50 MCG tablet Take 1 tablet (50 mcg) by mouth daily 6/13/2023 Yes Jefry Harris MD Yes    metFORMIN (GLUCOPHAGE XR) 500 MG 24 hr tablet Take 2 tablets (1,000 mg) by mouth 2 times daily (with meals) 6/13/2023 Yes Jefry Harris MD Yes    methotrexate 2.5 MG tablet Take 6 tablets (15 mg) by mouth every 7 days 6/10/2023 at am Yes Roberto Batres MD Yes    multivitamin, therapeutic (THERA-VIT) TABS tablet Take 1 tablet by mouth daily 6/13/2023 Yes Unknown, Entered By History     predniSONE (DELTASONE) 10 MG tablet Take 1 tablet (10 mg) by mouth daily 6/13/2023 Yes Roberto Batres MD     sacubitril-valsartan (ENTRESTO) 24-26 MG per tablet Take 1 tablet by mouth 2 times daily 6/13/2023 Yes Jefry Harris MD Yes    sulfamethoxazole-trimethoprim (BACTRIM) 400-80 MG tablet Take 1 tablet by mouth daily 6/13/2023 Yes Roberto Batres MD     vitamin C (ASCORBIC ACID) 500 MG tablet Take 500 mg by mouth daily 6/13/2023 Yes Unknown, Entered By History     blood glucose monitoring (ACCU-CHEK LUL PLUS) test strip Use to test blood sugar 1-3 times daily or as directed.   Jefry Harris MD     Continuous Blood Gluc Sensor (FREESTYLE JOCELIN 14 DAY SENSOR) [facility] USE SENSOR EVERY 14 DAYS.   Mitchell Joseph MD     continuous blood glucose monitoring (FREESTYLE JOCELIN) sensor  For use with Freestyle Jeffrey Flash  for continuous monitioring of blood glucose levels. Replace sensor every 10 days.   Jefry Harris MD

## 2023-06-13 NOTE — H&P
[facility]    History and Physical  Hospitalist       Date of Admission:  6/13/2023    Assessment & Plan   Neymar Rhodes is a 63 year old male with a history of HTN, type II DM, chronic HFrEF, NICM due to cardiac sarcoidosis, s/p CRT-D 2019, VT with device therapies, biopsy-proven pulmonary sarcoid, HLD, gout, diverticulosis, and obesity.  Admitted twice in April this year for JOSHI, had VT/V-fib cardiac arrest, s/p brief CPR and defibrillation with ICD on 4/15.  Patient now presents with worsening abdominal distention and leg swelling.    Ascites  Anasarca  Cardiopulmonary sarcoidosis with acute on chronic HFrEF, nonischemic cardiomyopathy  Elevated LFTs likely due to cardiac cirrhosis   HTN, now hypotensive  S/p CRT-D 2019  H/o VT/VF with device therapies  Patient notes that over the last 6 to 8 weeks he has had worsening abdominal distention.  His chronic leg swelling has gotten worse.  He denies any recent fevers, chills, sweats, chest pain.  Breathing is stable, denies any significant SOB.  Sometimes has nausea but no vomiting.  Does have some intermittent constipation and diarrhea.  He used to drink alcohol heavily but quit about 3 years ago after ICD was placed.  No smoking history.    During last admission in April, there was concern for worsening cardiogenic shock given his symptoms and worsening renal function and he was started on dobutamine infusion however this led to essentially cardiac arrest and he was resuscitated.  He has been on amiodarone since then and received aggressive diuresis.  Subsequently he had another syncopal event leading to admission and device interrogation showed persistent and sustained episodes of V. tach.  EP was again consulted and amiodarone dose was increased.  Last seen by cardiology on 5/16. Interestingly he did have a repeat PET scan on 5/8/2023 for cardiac sarcoidosis given that cardiology recently increased his methotrexate dose and decreased  prednisone to 10 mg daily and this showed no PET activity.  He saw cardiology in follow-up on 5/16 in the clinic.  Bumex dose was decreased to 2 mg once a day. Also had a right heart cath on 05/18/2023 which showed normal cardiac chamber pressures.  Device interrogation on 5/16 showed 10 VT episodes treated by ATP, one VT episode treated by 41 J shock.    Referred to EP clinic and was seen on 5/24 there.  Patient is scheduled for VT ablation given VT recurrence despite amiodarone therapy.  This is scheduled to take place in 2 weeks at the [facility].    In ED, initially hypotensive in the 70s-80s systolic.  He received 1 L IV NS bolus.  Afebrile, not hypoxic.  Patient noted to have significant abdominal distention, leg swelling and jaundice.  EKG with atrial sensed ventricular paced rhythm.  CT A/P showed morphology changes of cirrhosis with moderate volume ascites.  Labs significant for sodium 133, bicarb 20, creatinine 2.5, BUN 56, ALKP 251, , ALT 66, T. bili 1.3, INR 1.4, WBC 6.5, Hb 8.2, , initial lactate 3.2, repeat lactate 3.4, lipase 54, , Pro-Jose 0.3.    Attempt was made to transfer patient to the [facility] to complex heart failure service but we have not heard back regarding bed availability.  Patient is admitted for further management.    Plan  -Admit as inpatient  -Consult to cardiology.  Discussed with Dr. Brambila this evening.  Attempted transfer to [facility] but does not appear that they have bed availability.  -I placed orders for ultrasound-guided diagnostic and therapeutic paracentesis tomorrow with IV albumin as needed for large-volume paracentesis.  -Have not placed orders for diuretics this evening given hypotension.  Diuretics could be attempted following paracentesis which will hopefully improve circulation and blood pressures.  -Continue PTA amiodarone, aspirin  -Holding PTA statin, fibrate, methotrexate with elevated LFTs  -Holding PTA Entresto with hypotension and  "YASMEEN  -Continue PTA prednisone and Bactrim prophylaxis  -I's and O's, daily weights, Ace wraps to lower extremities  -2 g sodium diet, 1800 mL fluid restriction    YASMEEN, possibly cardiorenal syndrome vs hypotension vs diuretics  Hyponatremia  Creatinine has been worsening since April at which time it was around 1.2-1.6 range.  Creatinine 2.5 at admission.  GFR 27.   -Holding PTA Entresto, Bumex given low BP.  -Paracentesis should hopefully improve renal function  -Defer to cardiology regarding when diuretics and Entresto need to be initiated  -Avoid nephrotoxins  -Holding PTA metformin  -Monitor BMP    Type II DM  Last A1c 7.1 in January this year.  Recheck A1c.  -Hold PTA metformin given YASMEEN  -MD SSI ordered.  Titrate insulin as needed.    Hypothyroidism  -Continue PTA levothyroxine    DVT Prophylaxis: Pneumatic Compression Devices  Code Status: Full Code     Expected Discharge Date: 06/15/2023               Syeda Lewis MD    Medical Decision Making       Please see A&P for additional details of medical decision making.         Clinically Significant Risk Factors Present on Admission              # Hypoalbuminemia: Lowest albumin = 2.7 g/dL at 6/13/2023  1:20 PM, will monitor as appropriate  # Coagulation Defect: INR = 1.46 (Ref range: 0.85 - 1.15) and/or PTT = N/A, will monitor for bleeding  # Drug Induced Platelet Defect: home medication list includes an antiplatelet medication  # Acute Kidney Injury, unspecified: based on a >150% or 0.3 mg/dL increase in last creatinine compared to past 90 day average, will monitor renal function  # Hypertension: Noted on problem list  # Chronic heart failure with reduced ejection fraction: last echo with EF <40%    # DMII: A1C = N/A within past 6 months    # Obesity: Estimated body mass index is 35.51 kg/m  as calculated from the following:    Height as of 5/24/23: 1.803 m (5' 11\").    Weight as of 5/24/23: 115.5 kg (254 lb 9.6 oz).              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  Admitted twice in April this year for JOSHI, had VT/V-fib cardiac arrest, s/p brief CPR and defibrillation with ICD on 4/15.  Patient now presents with worsening abdominal distention and leg swelling.    Patient notes that over the last 6 to 8 weeks he has had worsening abdominal distention.  His chronic leg swelling has gotten worse.  He denies any recent fevers, chills, sweats, chest pain.  Breathing is stable, denies any significant SOB.  Sometimes has nausea but no vomiting.  Does have some intermittent constipation and diarrhea.  He used to drink alcohol heavily but quit about 3 years ago after ICD was placed.  No smoking history.    During last admission in April, there was concern for worsening cardiogenic shock given his symptoms and worsening renal function and he was started on dobutamine infusion however this led to essentially cardiac arrest and he was resuscitated.  He has been on amiodarone since then and received aggressive diuresis.  Subsequently he had another syncopal event leading to admission and device interrogation showed persistent and sustained episodes of V. tach.  EP was again consulted and amiodarone dose was increased.  Last seen by cardiology on 5/16. Interestingly he did have a repeat PET scan on 5/8/2023 for cardiac sarcoidosis given that cardiology recently increased his methotrexate dose and decreased prednisone to 10 mg daily and this showed no PET activity.  He saw cardiology in follow-up on 5/16 in the clinic.  Bumex dose was decreased to 2 mg once a  day. Also had a right heart cath on 05/18/2023 which showed normal cardiac chamber pressures.  Device interrogation on 5/16 showed 10 VT episodes treated by ATP, one VT episode treated by 41 J shock.    Referred to EP clinic and was seen on 5/24 there.  Patient is scheduled for VT ablation given VT recurrence despite amiodarone therapy.  This is scheduled to take place in 2 weeks at the [facility].    In ED, initially hypotensive in the 70s-80s systolic.  He received 1 L IV NS bolus.  Afebrile, not hypoxic.  Patient noted to have significant abdominal distention, leg swelling and jaundice.  EKG with atrial sensed ventricular paced rhythm.  CT A/P showed morphology changes of cirrhosis with moderate volume ascites.  Labs significant for sodium 133, bicarb 20, creatinine 2.5, BUN 56, ALKP 251, , ALT 66, T. bili 1.3, INR 1.4, WBC 6.5, Hb 8.2, , initial lactate 3.2, repeat lactate 3.4, lipase 54, , Pro-Jose 0.3.    Attempt was made to transfer patient to the [facility] to complex heart failure service but we have not heard back regarding bed availability.  Patient is admitted for further management.    Past Medical History    I have reviewed this patient's medical history and updated it with pertinent information if needed.   Past Medical History:   Diagnosis Date     Ascending aorta dilatation (H)      Diverticulosis of colon (without mention of hemorrhage)      Essential hypertension, benign      Gout, unspecified      LBBB (left bundle branch block)      Morbid obesity (H)      Non-ischemic cardiomyopathy (H)      Nonrheumatic mitral valve regurgitation      NSTEMI (non-ST elevated myocardial infarction) (H)     cardiac cath 6/2018: mild non-obstructive CAD     Other and unspecified hyperlipidemia      Paroxysmal ventricular tachycardia (H)      Type II or unspecified type diabetes mellitus without mention of complication, not stated as uncontrolled        Past Surgical History   I have reviewed  this patient's surgical history and updated it with pertinent information if needed.  Past Surgical History:   Procedure Laterality Date     CV CORONARY ANGIOGRAM N/A 10/2/2019    Procedure: Coronary Angiogram;  Surgeon: Kathy Almaguer MD;  Location:  [facility] CARDIAC CATH LAB     CV LEFT HEART CATH N/A 10/2/2019    Procedure: Left Heart Cath;  Surgeon: Kathy Almageur MD;  Location:  [facility]ATH [facility]     CV RIGHT HEART CATH MEASUREMENTS RECORDED N/A 10/2/2019    Procedure: Right Heart Cath;  Surgeon: Kathy Almaguer MD;  Location:  HE[facility]ARD[facility] CATH LAB     CV RIGHT HEART CATH MEASUREMENTS RECORDED N/A 5/18/2023    Procedure: Heart Cath Right Heart Cath;  Surgeon: Chinyere Amaral MD;  Location: [facility]     ENDOBRONCHIAL ULTRASOUND FLEXIBLE N/A 12/19/2019    Procedure: Flexible bronchoscopy, endobronchial ultrasound, bronchial alveolar lavage;  Surgeon: Ranulfo Barcenas MD;  Location: [facility] OR     EP ICD N/A 10/4/2019    Procedure: EP ICD;  Surgeon: Jayy Dorman MD;  Location:  HE[facility] CARDIAC CATH LAB     EP LEAD PLCMNT LV NEW ICD N/A 10/4/2019    Procedure: EP Lead Plcmnt LV New ICD;  Surgeon: Jayy Dorman MD;  Location:  HEART CARDIAC CATH LAB     HEART CATH CORONARY ANGIOGRAM WITHOUT PRESSURES  06/10/2018    normal coronary angiogram, nothing more than 10%     SURGICAL HISTORY OF -   2001    repair of colovesicular fistula     ZZC APPENDECTOMY  1980's       Prior to Admission Medications   Prior to Admission Medications   Prescriptions Last Dose Informant Patient Reported? Taking?   Continuous Blood Gluc Sensor (FREESTYLE JOCELIN 14 DAY SENSOR) MISC  Self No No   Sig: USE SENSOR EVERY 14 DAYS.   Lidocaine (LIDOCARE) 4 % Patch   No No   Sig: Place 1 patch onto the skin every 24 hours To prevent lidocaine toxicity, patient should be patch free for 12 hrs daily.   acetaminophen (TYLENOL) 500 MG tablet  Self Yes No   Sig: Take 1,000 mg by mouth every morning    acetaminophen (TYLENOL) 500 MG tablet  Self Yes No   Sig: Take 1,000 mg by mouth daily as needed for mild pain   amiodarone (PACERONE) 200 MG tablet   No No   Si mg PO every 12 hours for 1 week then 400 mg PO daily for 1 week then continue at 200 mg PO daily.   aspirin 81 MG tablet  Self No No   Sig: Take 1 tablet (81 mg) by mouth daily   atorvastatin (LIPITOR) 20 MG tablet  Self No No   Sig: Take 1 tablet (20 mg) by mouth daily   betamethasone dipropionate (DIPROSONE) 0.05 % external cream  Self No No   Sig: Apply topically 2 times daily Apply sparingly once or twice per day as needed to affected area until the skin is better, then stop; REPEAT AS NEEDED   Patient taking differently: Apply topically 2 times daily as needed Apply sparingly once or twice per day as needed to affected area until the skin is better, then stop; REPEAT AS NEEDED   blood glucose monitoring (ACCU-CHEK LUL PLUS) test strip  Self No No   Sig: Use to test blood sugar 1-3 times daily or as directed.   bumetanide (BUMEX) 2 MG tablet   No No   Sig: Take 1 tablet (2 mg) by mouth daily   clotrimazole (LOTRIMIN) 1 % external cream  Self No No   Sig: Apply sparingly once or twice per day as needed to affected area until the skin is better, then stop; REPEAT AS NEEDED   continuous blood glucose monitoring (FREESTYLE JOCELIN) sensor  Self No No   Sig: For use with Freestyle Jocelin Flash  for continuous monitioring of blood glucose levels. Replace sensor every 10 days.   fenofibrate (TRIGLIDE/LOFIBRA) 160 MG tablet  Self No No   Sig: Take 1 tablet (160 mg) by mouth daily   ferrous sulfate (FEROSUL) 325 (65 Fe) MG tablet   No No   Sig: Take 1 tablet (325 mg) by mouth every other day (Absorbed best on an empty stomach. If stomach upset occurs, can take with meals.)   folic acid (FOLVITE) 1 MG tablet  Self No No   Sig: Take 1 tablet (1 mg) by mouth daily   levothyroxine (SYNTHROID/LEVOTHROID) 50 MCG tablet  Self No No   Sig: Take 1 tablet  (50 mcg) by mouth daily   metFORMIN (GLUCOPHAGE XR) 500 MG 24 hr tablet   No No   Sig: Take 2 tablets (1,000 mg) by mouth 2 times daily (with meals)   methotrexate 2.5 MG tablet  Self No No   Sig: Take 6 tablets (15 mg) by mouth every 7 days   multivitamin, therapeutic (THERA-VIT) TABS tablet  Self Yes No   Sig: Take 1 tablet by mouth daily   predniSONE (DELTASONE) 10 MG tablet  Self No No   Sig: Take 1 tablet (10 mg) by mouth daily   sacubitril-valsartan (ENTRESTO) 24-26 MG per tablet   No No   Sig: Take 1 tablet by mouth 2 times daily   sulfamethoxazole-trimethoprim (BACTRIM) 400-80 MG tablet  Self No No   Sig: Take 1 tablet by mouth daily   vitamin C (ASCORBIC ACID) 500 MG tablet  Self Yes No   Sig: Take 500 mg by mouth daily      Facility-Administered Medications: None     Allergies   Allergies   Allergen Reactions     No Known Drug Allergy        Social History   I have reviewed this patient's social history and updated it with pertinent information if needed. Neymar Rhodes  reports that he has never smoked. He has never used smokeless tobacco. He reports current alcohol use. He reports that he does not use drugs.    Family History   I have reviewed this patient's family history and updated it with pertinent information if needed.   Family History   Problem Relation Age of Onset     Cancer Father 75        bladder cancer     Myocardial Infarction Father      Other - See Comments Father         smoking     Breast Cancer Mother      Breast Cancer Sister      Family History Negative Brother      Family History Negative Son      Family History Negative Son         fluttering/murmur     Asthma Son      Colon Cancer No family hx of        Review of Systems   The 10 point Review of Systems is negative other than noted in the HPI or here.     Physical Exam   Temp: 98.4  F (36.9  C) Temp src: Oral BP: 92/50 Pulse: 81   Resp: 20 SpO2: 100 % O2 Device: None (Room air)    Vital Signs with Ranges  Temp:  [98.4  F (36.9   C)] 98.4  F (36.9  C)  Pulse:  [81] 81  Resp:  [20] 20  BP: (74-92)/(45-50) 92/50  SpO2:  [100 %] 100 %  0 lbs 0 oz    Constitutional: Awake, alert, cooperative, no apparent distress.  Appears fatigued, jaundiced  Eyes: Appears jaundiced  HEENT: Moist mucous membranes  Respiratory: Clear to auscultation bilaterally, nonlabored breathing, diminished at bases  Cardiovascular: Regular rate and rhythm, normal S1 and S2, and no murmur noted.  ICD noted in left upper chest wall  GI: Significantly distended, firm, mildly tender lower quadrants to deep palpation, normal bowel sounds  Skin: 4+ bilateral lower extremity edema, pitting, extending all the way up to thighs and abdominal wall  Musculoskeletal: No joint swelling, erythema or tenderness.  Neurologic: Alert, oriented and engages in appropriate conversation, no facial asymmetry, moving all extremities, fluent speech  Psychiatric: Calm and pleasant, no obvious anxiety or depression.     Data   Data reviewed today:  I personally reviewed EKG with result as noted above and CT scan with result as noted above  Recent Labs   Lab 06/13/23  1320   WBC 6.5   HGB 8.2*   MCV 94      INR 1.46*   *   POTASSIUM 4.8   CHLORIDE 99   CO2 20*   BUN 56.7*   CR 2.55*   ANIONGAP 14   EDITH 8.7*   *   ALBUMIN 2.7*   PROTTOTAL 5.3*   BILITOTAL 1.3*   ALKPHOS 251*   ALT 66   *   LIPASE 54       Recent Results (from the past 24 hour(s))   CT Abdomen Pelvis w/o Contrast    Narrative    CT ABDOMEN PELVIS W/O CONTRAST 6/13/2023 3:10 PM    CLINICAL HISTORY: Abdominal distention.  Jaundice.  Probable ascites.   Liver or pancreatic mass.  TECHNIQUE: CT scan of the abdomen and pelvis was performed without IV  contrast. Multiplanar reformats were obtained. Dose reduction  techniques were used.  CONTRAST: None.    COMPARISON: Report from CT 9/28/2001 (no images available)    FINDINGS:   LOWER CHEST: Pacemaker/automatic implantable cardiac defibrillator  leads partially  visualized. Visualized lung bases are clear. No  significant pleural effusion.    HEPATOBILIARY: Nodular contour of the liver with widening of the  fissures. No focal liver lesions seen on this noncontrast CT.  Cholelithiasis without evidence of acute cholecystitis or biliary  obstruction.    PANCREAS: No definite focal mass, ductal dilation or surrounding fat  stranding.    SPLEEN: Normal.    ADRENAL GLANDS: Normal.    KIDNEYS/BLADDER: No hydronephrosis. Urinary bladder is unremarkable.    BOWEL: Prior partial sigmoid colectomy. Diverticulosis in the colon.  No acute inflammatory change. No obstruction.     LYMPH NODES: No suspicious lymphadenopathy.    VASCULATURE: Scattered calcified atherosclerosis.    PELVIC ORGANS: Normal.    OTHER: Moderate volume ascites throughout the abdomen and pelvis.  Multiple small fat and fluid containing ventral hernias without  evidence of complication. Moderate body wall edema.    MUSCULOSKELETAL: No acute bony abnormality. Healed bilateral rib  fractures.      Impression    IMPRESSION:   1.  Morphologic changes of cirrhosis with moderate volume ascites.  2.  No visualized solid organ mass on this noncontrast exam.    NATALIA WISE MD         SYSTEM ID:  [id number]

## 2023-06-13 NOTE — ED PROVIDER NOTES
History   Chief Complaint:  Abdominal Pain     The history is provided by the patient, the spouse and medical records.      Neymar Rhodes is a 63 year old male with history of cardiac and pulmonary sarcoidosis, ventricular tachycardia s/p ICD, cardiomyopathy, hypertension, among others who presents with abdominal pain. The patient states he has had abdominal distention for the past month. He notes he has been losing weight, however, his abdomen has remained distended and hard to the touch. He has had difficulty going to the bathroom and has had decreased appetite. He denies nausea, vomting, or diarrhea. He denies chest pain or palpitations. He states his leg swelling is at baseline. He has history of heart failure and has chronic hypotension. He denies history of paracentesis or hepatitis. He denies alcohol use and states he last drank alcohol several years ago. He denies IV drug use. He had his ICD placed in 2019. He has a cardiac ablation scheduled in one month.     Independent Historian:   Wife - They report the patient has been frequently taking Pepto Bismol.     Review of External Notes: I reviewed the patient's cardiology visit on 05/24/2023 for paroxysmal ventricular tachycardia.      Allergies:  The patient has no known drug allergies.     Medications:    Pacerone   Aspirin (81 mg)  Lipitor  Bumex   Triglide   Synthroid   Metformin   Deltasone   Entresto     Past Medical History:    Ascending aorta dilatation  Diverticulosis of colon  Hypertension  Hyperlipidemia   Gout  Left bundle branch block  Morbid obesity   Nonischemic cardiomyopathy   Nonrheumatic mitral valve regurgitation  NSTEMI   Paroxysmal ventricular tachycardia   Diabetes mellitus, type 2  Coronary artery disease   Chronic kidney disease, stage 3  Sarcoidosis of lung   Cardiac sarcoidosis     Past Surgical History:    Coronary angiogram  Heart catheterization, left   Heart catheterization, right   ICD placement   ICD lead placement    Colovesicular fistula repair   Appendectomy      Family History:    family history includes Asthma in his son; Breast Cancer in his mother and sister; Cancer (age of onset: 75) in his father; Family History Negative in his brother, son, and son; Myocardial Infarction in his father; Other - See Comments in his father.    Social History:  The patient was accompanied to the emergency department by his wife.   reports that he has never smoked. He has never used smokeless tobacco. He reports current alcohol use. He reports that he does not use drugs.  PCP: Jefry Harris     Physical Exam     Patient Vitals for the past 24 hrs:   BP Temp Temp src Pulse Resp SpO2   06/13/23 1557 92/50 -- -- -- -- 100 %   06/13/23 1310 (!) 81/45 -- -- -- -- --   06/13/23 1307 (!) 74/47 98.4  F (36.9  C) Oral 81 20 100 %      Physical Exam  Vitals: reviewed by me  General: Pt seen on [facility], pleasant, cooperative, and alert to conversation.  Quite jaundiced, chronically ill-appearing  Eyes: Tracking well, icteric conjunctiva BL  ENT: MMM, midline trachea.   Lungs: No tachypnea, no accessory muscle use. No respiratory distress.   CV: Rate as above  Abd: Distended, no focal guarding or rebound  MSK: no joint effusion.  No evidence of trauma  Skin: No rash  Neuro: Clear speech and no facial droop.  Moving extremity spontaneously and following commands  Psych: Not RIS, no e/o AH/VH      Emergency Department Course   ECG:  ECG results from 06/13/23   EKG 12 lead     Value    Systolic Blood Pressure     Diastolic Blood Pressure     Ventricular Rate 81    Atrial Rate 81    MT Interval 184    QRS Duration 178        QTc 587    P Axis 102    R AXIS -60    T Axis 109    Interpretation ECG      Atrial-sensed ventricular-paced rhythm  Abnormal ECG  When compared with ECG of 25-APR-2023 16:11,  No significant change was found  Confirmed by GENERATED REPORT, COMPUTER ([id number]),  Jyoti Jones ([id number]) on 6/13/2023 1:40:29 PM        Imaging:  CT Abdomen Pelvis w/o Contrast   Final Result   IMPRESSION:    1.  Morphologic changes of cirrhosis with moderate volume ascites.   2.  No visualized solid organ mass on this noncontrast exam.      NATALIA WISE MD            SYSTEM ID:  [id number]      Report per radiology    Laboratory:  Labs Ordered and Resulted from Time of ED Arrival to Time of ED Departure   COMPREHENSIVE METABOLIC PANEL - Abnormal       Result Value    Sodium 133 (*)     Potassium 4.8      Chloride 99      Carbon Dioxide (CO2) 20 (*)     Anion Gap 14      Urea Nitrogen 56.7 (*)     Creatinine 2.55 (*)     Calcium 8.7 (*)     Glucose 234 (*)     Alkaline Phosphatase 251 (*)      (*)     ALT 66      Protein Total 5.3 (*)     Albumin 2.7 (*)     Bilirubin Total 1.3 (*)     GFR Estimate 27 (*)    INR - Abnormal    INR 1.46 (*)    BILIRUBIN DIRECT - Abnormal    Bilirubin Direct 0.86 (*)    CBC WITH PLATELETS AND DIFFERENTIAL - Abnormal    WBC Count 6.5      RBC Count 2.66 (*)     Hemoglobin 8.2 (*)     Hematocrit 24.9 (*)     MCV 94      MCH 30.8      MCHC 32.9      RDW 17.2 (*)     Platelet Count 173      % Neutrophils 91      % Lymphocytes 7      % Monocytes 1      % Eosinophils 0      % Basophils 0      % Immature Granulocytes 1      NRBCs per 100 WBC 0      Absolute Neutrophils 6.0      Absolute Lymphocytes 0.4 (*)     Absolute Monocytes 0.0      Absolute Eosinophils 0.0      Absolute Basophils 0.0      Absolute Immature Granulocytes 0.1      Absolute NRBCs 0.0     LACTIC ACID WHOLE BLOOD - Abnormal    Lactic Acid 3.2 (*)    ISTAT GASES LACTATE VENOUS POCT - Abnormal    Lactic Acid POCT 3.4 (*)     Bicarbonate Venous POCT 21      O2 Sat, Venous POCT 29 (*)     pCO2 Venous POCT 29 (*)     pH Venous POCT 7.47 (*)     pO2 Venous POCT 17 (*)    LIPASE - Normal    Lipase 54     NT PROBNP INPATIENT   LACTIC ACID WHOLE BLOOD   PROCALCITONIN   TYPE AND SCREEN, ADULT    ABO/RH(D) O POS      Antibody Screen Negative      SPECIMEN  EXPIRATION DATE [id number]     ABO/RH TYPE AND SCREEN      Emergency Department Course & Assessments:     Interventions:  Medications   0.9% sodium chloride BOLUS (has no administration in time range)   0.9% sodium chloride BOLUS (0 mLs Intravenous Stopped 6/13/23 1512)        Consultations/Discussion of Management or Tests:  7860 I spoke with Dr. Lewis of the hospitalist service who accepts the patient for admission.       Disposition:  The patient was admitted to the hospital under the care of Dr. Lewis.     Impression & Plan    Medical Decision Making:  This is a very pleasant 63-year-old male presents emergency room with appears to be about 4 to 6 weeks of worsening abdominal pain and abdominal distention.  He has not seen anyone for this before, and he does look like he is having certainly some degree of liver failure.  His coagulation factors seem to be affected, his albumin is low, and he is quite jaundiced appearing with a very distended abdomen despite his bilirubin technically being less than 2.  His abdomen is benign, he does not drink any alcohol, and I do strongly suspect that there is an element of cardiac cirrhosis here given the history of sarcoid that he has.  Thankfully is not having any chest pain and specifically denies any palpitations.  He is mentating appropriately, wife is at bedside and also confirms he is acting normally.  No focal complaints, nothing is gotten worse over the last several days, only notes that this is building up in his abdomen and causing fullness and shortness of breath.  I do appreciate the elevated lactate, will give gentle fluid resuscitation, his blood pressure he tells me is typically this low and he does not feel like it is lower than normal for him.  No fever or white count, CT scan of the abdomen unremarkable, and no focal infectious symptoms, will hold off on antibiotics at this time.  I spoke with the inpatient team who also agreed with this.    Also note  that if [facility] does have a bed, we may transfer the patient there, as he does have a complicated cardiac history that likely is playing into his liver failure, and our cardiologist here have recommended that we at least try to send the patient to the , however if there are no beds, the patient will be admitted here under the care of Dr. Lewis    Diagnosis:    ICD-10-CM    1. Cardiac cirrhosis  K76.1       2. Acute liver failure without hepatic coma  K72.00       3. Other ascites  R18.8          Scribe Disclosure:  I, Fartun Mar, am serving as a scribe at 3:33 PM on 6/13/2023 to document services personally performed by Brett Fernandez MD based on my observations and the provider's statements to me.      Brett Fernandez MD  06/13/23 1669

## 2023-06-13 NOTE — ED PROVIDER NOTES
PIT/Triage Evaluation    Neymar Rhodes  is a 63 year old male who presents with distension in his abdomen for the past month. He also has been hypotensive, at baseline per his report.  No history of liver failure. History of sarcardosis. He is scheduled for a cardiac ablation procedure. He has a pacemaker in place.       Exam is notable for:    Patient Vitals for the past 24 hrs:   BP Temp Temp src Pulse Resp SpO2   06/13/23 1310 (!) 81/45 -- -- -- -- --   06/13/23 1307 (!) 74/47 98.4  F (36.9  C) Oral 81 20 100 %     Exam  resting in the triage bay on a chair  the patient appears juandice   there is sclearal icteris   a pacemaker is noted   He has marked abdominal distension, prior bdomninal surgicla scar, umbilical hernia  The ambdomen is somewhat tense and I am concerned about ascites.       Appropriate interventions for symptom management were initiated if applicable.  Appropriate diagnostic tests were initiated if indicated.    Important information for subsequent clinician:  This patient presents emergency department with general weakness malaise, fatigue, increasing abdominal distention, and reported weight loss.  The patient is noted to have jaundice discoloration to significant swelling of the wrist, significant bruising to his arms, probable distended abdomen from ascites.  We will need to exclude mass lesion involving the pancreas body perioperatively or liver.  CT scan of the abdomen appropriate laboratory studies ordered.  Patient will be admitted since hospital given his hypotension.    I briefly evaluated the patient and developed an initial plan of care. I discussed this plan and explained that this brief interaction does not constitute a full evaluation. Patient/family understands that they should wait to be fully evaluated and discuss any test results with another clinician prior to leaving the hospital.       Brett Bunch MD  06/13/23 5426

## 2023-06-14 NOTE — PROVIDER NOTIFICATION
MD Notification    Notified Person: MD    Notified Person Name: Joshua    Notification Date/Time: 06/13/23 10:21 PM    Notification Interaction: Vocera    Purpose of Notification: Pt requesting throat lozenges. Can we also do PRN stool softener? Please advise. [phone] BRANDON Capellan    Orders Received: Lozenges, chloraseptic throat spray, and senna ordered

## 2023-06-14 NOTE — PLAN OF CARE
Neuro- A/Ox4  Tele/Cardiac- AV and V pacing w/ PVCs  Resp- on RA, LS w/ crackles in RLL; JOSHI  Activity- SBA  Pain- back pain and R jaw/neck/throat pain managed w/ PRN Tylenol and throat lozenges  Drips- n/a  Drains/Tubes- PIV  Skin- anasarca, including ascites  GI/- WDL  Plan- cards consult and paracentesis today w/ plan to improve BP  Misc- NPO since 0000 for cards consult; SBP 80-90s NOC, asymptomatic, not new    Marilia Caballero, RN

## 2023-06-14 NOTE — PROVIDER NOTIFICATION
MD Notification    Notified Person: MD    Notified Person Name: Dr. Mcnamara     Notification Date/Time: 6/14 0958    Notification Interaction: text page/ MD bedside    Purpose of Notification :SBP has been in the 70-80's all night, but pt asymptomatic and states SBP in the 80's is normal for him. Just want to know where your okay with his blood pressures being for today. SBP goal above 80 or do you want to make sure the MAP stays above 65?     Orders Received: Okay with BP's in the 80's as long as pt is asymptomatic.     Comments:

## 2023-06-14 NOTE — CONSULTS
[facility] Cardiology Consultation     Date of Admission:  6/13/2023  Date of Consult: 06/14/23  Requesting Physician: Dr. Lewis  Primary care physician: Jefry Harris  Primary cardiologist: Dr. Batres ([facility])  Staff Cardiologist:  Dr. 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 Batres at the [facility], refractory VT/VF on amiodarone with ICD in place, scheduled for upcoming VT ablation with Dr. Tobar, pulmonary sarcoidosis, chronic hypotension, chronic renal disease, and longstanding alcohol use (sober since 2019), who has had recent readmissions for acute congestive heart failure and ventricular arrhythmias, amiodarone up-titrated and scheduled for an upcoming VT ablation in 1 month. One month ago his Bumex was reduced from 2 mg BID to daily. He has now been readmitted on 6/13/2023 with acute HFrEF / ascites, undergoing 5L paracentesis. He also recently developed YASMEEN which persists despite Entresto reduction and was present at time of normal RHC last month. 4/2023 PET showed no evidence of active cardiac sarcoid.    Plan:  1. Re-start oral Bumex 2 mg BID.  Discussed recommendations for high-protein, low sodium diet at home.    2. We will ask nephrology to weigh in on his recent YASMEEN.  3. For now, we will keep him off Entresto, but consider low-dose Isordil pending his renal trajectory.  4. Amiodarone not ideal with cirrhosis/ascites, however I have low suspicion for acute toxicity (d/t minimal LFT elevation) and furthermore with his recent VT syncope, the benefits of maintaining amio outweigh potential risks.  5. I agree that he would be more appropriately managed at the [facility], however from report there are no beds  available. Will require close follow up with Dr. Batres. Will arrange.    Thank you very much for this consultation.     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 Batres at the [facility], refractory VT/VF on amiodarone with ICD in place, scheduled for upcoming VT ablation with Dr. Tobar, pulmonary sarcoidosis, chronic hypotension, and chronic renal disease.     He was recently admitted in April with progressive HF sxs and concerns for cardiogenic shock with progressive renal failure, was placed on dobutamine infusion which triggered VT/VF with appropriate ICD shocks. He was started on amiodarone drip there was admitted transitioned over to p.o. amiodarone and discharged after aggressive diuresis.  Entresto was restarted at the lower dose.  Subsequently he was admitted with a syncopal episode and device interrogation showed persistent and sustained episodes of ventricular tachycardia.  EP was again consulted and amiodarone dose was increased. PET scan showed no evidence for active cardiac sarcoidosis.  He was scheduled for outpatient VT ablation, which is taking place on July 17.      He last saw Dr. Batres in May, who, due to concern for progressive renal failure ordered a right heart catheterization, which actually showed normal biventricular filling pressures and cardiac index.  This was at a weight of 250 pounds with a creatinine of 2.8 (baseline ~1.5). After that, his bumex was reduced from 2 mg BID to 2 mg daily.  Over the next several weeks, he developed progressive abdominal distention and presented  to the hospital for this reason yesterday.  He was found to have moderate ascites with evidence of cirrhosis on imaging, and had 5 L removed with paracentesis.  It appears that his Entresto, statin, fenofibrate, methotrexate, have been held due to YASMEEN and elevated LFTs -- Although his creatinine on admit was stable from May at 2.5, coming down to 2.2 today.  He also has acute on chronic anemia, with a hemoglobin of 7.7, baseline it appears is ~9.    Today, he states his abdomen feels much better.  He has marked peripheral eczema on exam, however he states that his chronic and did not worsen over the past month.  His lungs are clear.    He is sedentary at baseline, no EtOH use (sober since 2019), does not follow a low-sodium diet but states he does not really eat much in general.  _____________________________________________________________    Code Status    Full Code    Past Medical History   I have reviewed this patient's medical history and updated it with pertinent information if needed.   Past Medical History:   Diagnosis Date     Ascending aorta dilatation (H)      Diverticulosis of colon (without mention of hemorrhage)      Essential hypertension, benign      Gout, unspecified      LBBB (left bundle branch block)      Morbid obesity (H)      Non-ischemic cardiomyopathy (H)      Nonrheumatic mitral valve regurgitation      NSTEMI (non-ST elevated myocardial infarction) (H)     cardiac cath 6/2018: mild non-obstructive CAD     Other and unspecified hyperlipidemia      Paroxysmal ventricular tachycardia (H)      Type II or unspecified type diabetes mellitus without mention of complication, not stated as uncontrolled        Past Surgical History   I have reviewed this patient's surgical history and updated it with pertinent information if needed.  Past Surgical History:   Procedure Laterality Date     CV CORONARY ANGIOGRAM N/A 10/2/2019    Procedure: Coronary Angiogram;  Surgeon: Kathy Almaguer MD;   Location:  HEART CARDIAC CATH LAB     CV LEFT HEART CATH N/A 10/2/2019    Procedure: Left Heart Cath;  Surgeon: Kathy Almaguer MD;  Location:  HEART CARDIAC CATH LAB     CV RIGHT HEART CATH MEASUREMENTS RECORDED N/A 10/2/2019    Procedure: Right Heart Cath;  Surgeon: Kathy Almaguer MD;  Location:  HEART CARDIAC CATH LAB     CV RIGHT HEART CATH MEASUREMENTS RECORDED N/A 5/18/2023    Procedure: Heart Cath Right Heart Cath;  Surgeon: Chinyere Amaral MD;  Location: [facility] HEART CARDIAC CATH LAB     ENDOBRONCHIAL ULTRASOUND FLEXIBLE N/A 12/19/2019    Procedure: Flexible bronchoscopy, endobronchial ultrasound, bronchial alveolar lavage;  Surgeon: Ranulfo Barcenas MD;  Location: [facility] OR     EP ICD N/A 10/4/2019    Procedure: EP ICD;  Surgeon: Jayy Dorman MD;  Location:  HEART CARDIAC CATH LAB     EP LEAD PLCMNT LV NEW ICD N/A 10/4/2019    Procedure: EP Lead Plcmnt LV New ICD;  Surgeon: Jayy Dorman MD;  Location:  HEART CARDIAC CATH LAB     HEART CATH CORONARY ANGIOGRAM WITHOUT PRESSURES  06/10/2018    normal coronary angiogram, nothing more than 10%     SURGICAL HISTORY OF -   2001    repair of colovesicular fistula     ZZC APPENDECTOMY  1980's       Prior to Admission Medications   Prior to Admission Medications   Prescriptions Last Dose Informant Patient Reported? Taking?   Continuous Blood Gluc Sensor (FREESTYLE JOCELIN 14 DAY SENSOR) MISC  Self No No   Sig: USE SENSOR EVERY 14 DAYS.   acetaminophen (TYLENOL) 500 MG tablet 6/13/2023 Self Yes Yes   Sig: Take 1,000 mg by mouth every morning   acetaminophen (TYLENOL) 500 MG tablet prn Self Yes Yes   Sig: Take 1,000 mg by mouth daily as needed for mild pain   amiodarone (PACERONE) 200 MG tablet 6/13/2023  Yes Yes   Sig: Take 200 mg by mouth daily   aspirin 81 MG tablet 6/13/2023 Self No Yes   Sig: Take 1 tablet (81 mg) by mouth daily   atorvastatin (LIPITOR) 20 MG tablet 6/12/2023 at hs Self No Yes   Sig: Take 1 tablet (20 mg) by mouth daily    betamethasone dipropionate (DIPROSONE) 0.05 % external cream prn Self No Yes   Sig: Apply topically 2 times daily Apply sparingly once or twice per day as needed to affected area until the skin is better, then stop; REPEAT AS NEEDED   Patient taking differently: Apply topically 2 times daily as needed Apply sparingly once or twice per day as needed to affected area until the skin is better, then stop; REPEAT AS NEEDED   blood glucose monitoring (ACCU-CHEK LUL PLUS) test strip  Self No No   Sig: Use to test blood sugar 1-3 times daily or as directed.   bumetanide (BUMEX) 2 MG tablet 6/13/2023  No Yes   Sig: Take 1 tablet (2 mg) by mouth daily   clotrimazole (LOTRIMIN) 1 % external cream prn Self No Yes   Sig: Apply sparingly once or twice per day as needed to affected area until the skin is better, then stop; REPEAT AS NEEDED   continuous blood glucose monitoring (FREESTYLE JOCELIN) sensor  Self No No   Sig: For use with Freestyle Jocelin Flash  for continuous monitioring of blood glucose levels. Replace sensor every 10 days.   fenofibrate (TRIGLIDE/LOFIBRA) 160 MG tablet 6/13/2023 at am Self No Yes   Sig: Take 1 tablet (160 mg) by mouth daily   ferrous sulfate (FEROSUL) 325 (65 Fe) MG tablet 6/12/2023 at am  No Yes   Sig: Take 1 tablet (325 mg) by mouth every other day (Absorbed best on an empty stomach. If stomach upset occurs, can take with meals.)   folic acid (FOLVITE) 1 MG tablet 6/13/2023 Self No Yes   Sig: Take 1 tablet (1 mg) by mouth daily   levothyroxine (SYNTHROID/LEVOTHROID) 50 MCG tablet 6/13/2023 Self No Yes   Sig: Take 1 tablet (50 mcg) by mouth daily   metFORMIN (GLUCOPHAGE XR) 500 MG 24 hr tablet 6/13/2023  No Yes   Sig: Take 2 tablets (1,000 mg) by mouth 2 times daily (with meals)   methotrexate 2.5 MG tablet 6/10/2023 at am Self No Yes   Sig: Take 6 tablets (15 mg) by mouth every 7 days   multivitamin, therapeutic (THERA-VIT) TABS tablet 6/13/2023 Self Yes Yes   Sig: Take 1 tablet by mouth  daily   predniSONE (DELTASONE) 10 MG tablet 6/13/2023 Self No Yes   Sig: Take 1 tablet (10 mg) by mouth daily   sacubitril-valsartan (ENTRESTO) 24-26 MG per tablet 6/13/2023  No Yes   Sig: Take 1 tablet by mouth 2 times daily   sulfamethoxazole-trimethoprim (BACTRIM) 400-80 MG tablet 6/13/2023 Self No Yes   Sig: Take 1 tablet by mouth daily   vitamin C (ASCORBIC ACID) 500 MG tablet 6/13/2023 Self Yes Yes   Sig: Take 500 mg by mouth daily      Facility-Administered Medications: None     Allergies   Allergies   Allergen Reactions     No Known Drug Allergy        Social History   I have reviewed this patient's social history and updated it with pertinent information if needed. Neymar Rhodes  reports that he has never smoked. He has never used smokeless tobacco. He reports current alcohol use. He reports that he does not use drugs.    Family History   I have reviewed this patient's family history and updated it with pertinent information if needed.   Family History   Problem Relation Age of Onset     Cancer Father 75        bladder cancer     Myocardial Infarction Father      Other - See Comments Father         smoking     Breast Cancer Mother      Breast Cancer Sister      Family History Negative Brother      Family History Negative Son      Family History Negative Son         fluttering/murmur     Asthma Son      Colon Cancer No family hx of        Review of Systems   A comprehensive review of systems is negative, except for as noted in the HPI.    Physical Exam   Temp: 99  F (37.2  C) Temp src: Oral BP: 96/48 Pulse: 87   Resp: 18 SpO2: 99 % O2 Device: None (Room air)    Vital Signs with Ranges  Temp:  [97.3  F (36.3  C)-99  F (37.2  C)] 99  F (37.2  C)  Pulse:  [64-87] 87  Resp:  [18-24] 18  BP: (73-99)/(45-67) 96/48  SpO2:  [96 %-100 %] 99 %  247 lbs 6.4 oz    General - Alert and oriented to time place and person in no acute distress  Eyes - No scleral icterus  HEENT - Neck supple, moist mucous  membranes  Cardiovascular -regular rate and rhythm, no murmurs.  Extremities - There is marked peripheral edema extending into the thighs bilaterally, no evidence of wounds, erythema.  Respiratory - CTAB.  Skin - No pallor or cyanosis  Gastrointestinal - Non tender and non distended without rebound or guarding  Psych - Appropriate affect   Neurological - No gross motor neurological focal deficits    Data   Most Recent 3 CBC's:Recent Labs   Lab Test 06/14/23  0557 06/13/23  1320 05/18/23  1221 05/18/23  0910   WBC 5.9 6.5  --  6.7   HGB 7.7* 8.2* 8.3* 9.1*   MCV 95 94  --  98   * 173  --  184     Most Recent 3 BMP's:Recent Labs   Lab Test 06/14/23  1049 06/14/23  0557 06/14/23  0202 06/13/23  2001 06/13/23  1320 05/18/23  0910   NA  --  139  --   --  133* 135*   POTASSIUM  --  4.4  --   --  4.8 4.1   CHLORIDE  --  103  --   --  99 101   CO2  --  23  --   --  20* 21*   BUN  --  51.5*  --   --  56.7* 72.5*   CR  --  2.21*  --   --  2.55* 2.79*   ANIONGAP  --  13  --   --  14 13   EDITH  --  8.8  --   --  8.7* 9.2   * 135* 155*   < > 234* 192*    < > = values in this interval not displayed.     Most Recent 2 LFT's:Recent Labs   Lab Test 06/14/23  0557 06/13/23  1320   * 101*   ALT 67 66   ALKPHOS 235* 251*   BILITOTAL 1.5* 1.3*     Most Recent 3 BNP's:Recent Labs   Lab Test 06/13/23  1320 05/16/23  1420 04/25/23  1553 04/13/23  1424 04/13/23  1012 01/24/23  0720 06/21/22  1000   NTBNPI 853  --  819 450   < >  --   --    NTBNP  --  739  --   --   --  444 431*    < > = values in this interval not displayed.       Clinically Significant Risk Factors Present on Admission              # Hypoalbuminemia: Lowest albumin = 2.7 g/dL at 6/14/2023  5:57 AM, will monitor as appropriate  # Coagulation Defect: INR = 1.46 (Ref range: 0.85 - 1.15) and/or PTT = N/A, will monitor for bleeding  # Drug Induced Platelet Defect: home medication list includes an antiplatelet medication  # Acute Kidney Injury, unspecified:  "based on a >150% or 0.3 mg/dL increase in last creatinine compared to past 90 day average, will monitor renal function  # Hypertension: Noted on problem list  # Chronic heart failure with reduced ejection fraction: last echo with EF <40%    # DMII: A1C = 7.9 % (Ref range: <5.7 %) within past 6 months    # Obesity: Estimated body mass index is 34.51 kg/m  as calculated from the following:    Height as of this encounter: 1.803 m (5' 11\").    Weight as of this encounter: 112.2 kg (247 lb 6.4 oz).           Cardiac Arrhythmia: Ventricular tachycardia   Cardiomyopathy  Hypotension, unspecified  Systolic acute    Other fluid overload    Ascites: Other ascites  Chronic Liver Disease: Alcoholic liver disease, unspecified  Chronic passive congestion of liver  Unspecified cirrhosis of liver  Liver Failure: Hepatorenal syndrome    Thrombocytopenia Including Purpuric, HIT, & Other Platelet Defects: Thrombocytopenia, unspecified    Acute kidney failure, unspecified    Chronic Fatigue and Other Debilities: Chronic fatigue, unspecified  Limitation of activities due to disability  Other reduced mobility      "

## 2023-06-14 NOTE — CONSULTS
[facility]    Nephrology Consultation     Date of Admission:  6/13/2023    Assessment & Plan     Neymar Rhodes is a 63 year old male who was admitted on 6/13/2023.     Assessment:  1) acute kidney injury: No evidence of any obstruction on CT.  April he had no evidence of any albuminuria and in April UA also without any hematuria.  Do not have any suspicion of intrinsic renal disease although in the differential could be renal sarcoidosis with known systemic sarcoid disease.  Bactrim can cause a potential interstitial nephritis but no evidence of this.  Likely with a variable GFR he is having hemodynamic effects related to his cardiomyopathy and now possibly having some effects related to his cirrhosis.  Either way he is having renal dysfunction secondary to likely heart or combination of heart and liver disease.  Thus, important to optimize renal hemodynamics.  Offloading and decongesting can be helpful but we may need to consider midodrine to raise his mean arterial pressure and renal perfusion.     Main acute on chronic complication related to his hypervolemic state.  Even with optimal filling pressures on right heart cath he had edema at that time which may suggest we could never get him truly euvolemic likely contributions of cirrhosis.     Discussed the spectrum of renal disease and even mentioned people who are diuretic refractory or unable to do medical management due to hypervolemia sometimes needing mechanical ultrafiltration with dialysis..  He reports his mother was on dialysis, so this is being poorly tolerated and he already states he would not consider dialysis if ever indicated.  Discussed that we would have a discussion if this ever needed to take place and he should keep open mind until knowing situation that might involve dialysis in the future.    2) cardiomyopathy low EF state.  PTA Entresto being held.  Complications with arrhythmias.    3) sarcoidosis: Has had a  slow tapering of prednisone, currently 10 mg daily along with methotrexate.  Remains on Bactrim although current dose of prednisone does not use reflecting need for ongoing PJP prophylaxis.    4) diabetes: PTA metformin being held appropriate with low GFR    5) radiological evidence of cirrhosis: Most likely related to congestive hepatopathy.    6) anemia: Hemoglobin today 7.7.  Had 31% iron sats in April.      Plan/Recs:  1) suggest doing diuretics as Bumex 2 mg twice daily  2) can discontinue prophylactic Bactrim with current prednisone dose of 10 mg daily  3) if continued relative hypotension, may benefit from initiation of midodrine to assist in optimizing renal perfusion  4) avoid contrast, nephrotoxins as able  5) Daily BMPs, strict I's and O's and daily weights  6) discussed with patient need for reducing sodium intake in his diet at home  7) checking spot UA with microscopy to look for any hematuria or pyuria  8)  will follow    DO [facility] - Nephrology  [phone]  --------------------------------------------------------------------------------------------  Reason for Consult     I was asked to see the patient for acute kidney injury      History is obtained from the patient, spouse and chart review.      History of Present Illness     Neymar Rhodes is a 63 year old male who presented to the emergency room yesterday with abdominal distention.  Patient with known nonischemic cardiomyopathy, sarcoidosis including cardiac sarcoid with chronic treatment with prednisone and methotrexate.  Had admissions in April this year with acute kidney injury.  First admission he did receive diuresis, had his PTA 60 mg of furosemide changed to Bumex.  Second mission related to syncope at [employer] and he is having ventricular arrhythmias.  Discharged on 2 mg Bumex twice daily.  On cardiology follow-up 5/16/2023 he had this decreased to daily for unclear reasons.  2 days later a right heart  cath showed optimal right and left-sided filling pressures.  Since the decrease with Bumex has had some steady increasing weights, abdominal distention.  He is in a poorly functional state, does not go outside his living situation going from bedroom to kitchen to living room as exertional activities.  He does weigh himself twice daily, checks home blood pressures.  Blood pressures run usually in the 80s but often 75-80 without any symptoms.  He has been seen and evaluated for ablative procedure July 17.    This admission he was given 1 L of fluids due to hypotension the emergency room.  He had a CT scan showing nodular liver, likely cirrhosis and also significant ascites with anasarca.  He is status post paracentesis today with  5 L of fluid removed.  Prior to admission he reports having decreased passing of gas but he did have a CT in the ER with no evidence of obstruction.    Patient does use pepper for seasoning, is trying to reduce his sodium intake.  Does admit to having chips and some high salty foods.    Past Medical History   I have reviewed this patient's medical history and updated it with pertinent information if needed.   Past Medical History:   Diagnosis Date     Ascending aorta dilatation (H)      Diverticulosis of colon (without mention of hemorrhage)      Essential hypertension, benign      Gout, unspecified      LBBB (left bundle branch block)      Morbid obesity (H)      Non-ischemic cardiomyopathy (H)      Nonrheumatic mitral valve regurgitation      NSTEMI (non-ST elevated myocardial infarction) (H)     cardiac cath 6/2018: mild non-obstructive CAD     Other and unspecified hyperlipidemia      Paroxysmal ventricular tachycardia (H)      Type II or unspecified type diabetes mellitus without mention of complication, not stated as uncontrolled        Past Surgical History   I have reviewed this patient's surgical history and updated it with pertinent information if needed.  Past Surgical History:    Procedure Laterality Date     CV CORONARY ANGIOGRAM N/A 10/2/2019    Procedure: Coronary Angiogram;  Surgeon: Kathy Almaguer MD;  Location:  HEART CARDIAC CATH LAB     CV LEFT HEART CATH N/A 10/2/2019    Procedure: Left Heart Cath;  Surgeon: Kathy Almaguer MD;  Location:  HEART CARDIAC CATH LAB     CV RIGHT HEART CATH MEASUREMENTS RECORDED N/A 10/2/2019    Procedure: Right Heart Cath;  Surgeon: Kathy Almaguer MD;  Location:  HEART CARDIAC CATH LAB     CV RIGHT HEART CATH MEASUREMENTS RECORDED N/A 5/18/2023    Procedure: Heart Cath Right Heart Cath;  Surgeon: Chinyere Amaral MD;  Location: [facility] HEART [facility]ARD[facility]AC CATH [facility]     ENDOBRONCHIAL ULTRASOUND FLEXIBLE N/A 12/19/2019    Procedure: Flexible bronchoscopy, endobronchial ultrasound, bronchial alveolar lavage;  Surgeon: Ranulfo Barcenas MD;  Location: [facility] OR     EP ICD N/A 10/4/2019    Procedure: EP ICD;  Surgeon: Jayy Dorman MD;  Location:  HEART CARDIAC CATH LAB     EP LEAD PLCMNT LV NEW ICD N/A 10/4/2019    Procedure: EP Lead Plcmnt LV New ICD;  Surgeon: Jayy Dorman MD;  Location:  HEART CARDIAC CATH LAB     HEART CATH CORONARY ANGIOGRAM WITHOUT PRESSURES  06/10/2018    normal coronary angiogram, nothing more than 10%     SURGICAL HISTORY OF -   2001    repair of colovesicular fistula     ZZC APPENDECTOMY  1980's       Prior to Admission Medications   Prior to Admission Medications   Prescriptions Last Dose Informant Patient Reported? Taking?   Continuous Blood Gluc Sensor (FREESTYLE JOCELIN 14 DAY SENSOR) MISC  Self No No   Sig: USE SENSOR EVERY 14 DAYS.   acetaminophen (TYLENOL) 500 MG tablet 6/13/2023 Self Yes Yes   Sig: Take 1,000 mg by mouth every morning   acetaminophen (TYLENOL) 500 MG tablet prn Self Yes Yes   Sig: Take 1,000 mg by mouth daily as needed for mild pain   amiodarone (PACERONE) 200 MG tablet 6/13/2023  Yes Yes   Sig: Take 200 mg by mouth daily   aspirin 81 MG tablet 6/13/2023 Self No Yes   Sig: Take 1  tablet (81 mg) by mouth daily   atorvastatin (LIPITOR) 20 MG tablet 6/12/2023 at hs Self No Yes   Sig: Take 1 tablet (20 mg) by mouth daily   betamethasone dipropionate (DIPROSONE) 0.05 % external cream prn Self No Yes   Sig: Apply topically 2 times daily Apply sparingly once or twice per day as needed to affected area until the skin is better, then stop; REPEAT AS NEEDED   Patient taking differently: Apply topically 2 times daily as needed Apply sparingly once or twice per day as needed to affected area until the skin is better, then stop; REPEAT AS NEEDED   blood glucose monitoring (ACCU-CHEK LUL PLUS) test strip  Self No No   Sig: Use to test blood sugar 1-3 times daily or as directed.   bumetanide (BUMEX) 2 MG tablet 6/13/2023  No Yes   Sig: Take 1 tablet (2 mg) by mouth daily   clotrimazole (LOTRIMIN) 1 % external cream prn Self No Yes   Sig: Apply sparingly once or twice per day as needed to affected area until the skin is better, then stop; REPEAT AS NEEDED   continuous blood glucose monitoring (FREESTYLE JOCELIN) sensor  Self No No   Sig: For use with Freestyle Jocelin Flash  for continuous monitioring of blood glucose levels. Replace sensor every 10 days.   fenofibrate (TRIGLIDE/LOFIBRA) 160 MG tablet 6/13/2023 at am Self No Yes   Sig: Take 1 tablet (160 mg) by mouth daily   ferrous sulfate (FEROSUL) 325 (65 Fe) MG tablet 6/12/2023 at am  No Yes   Sig: Take 1 tablet (325 mg) by mouth every other day (Absorbed best on an empty stomach. If stomach upset occurs, can take with meals.)   folic acid (FOLVITE) 1 MG tablet 6/13/2023 Self No Yes   Sig: Take 1 tablet (1 mg) by mouth daily   levothyroxine (SYNTHROID/LEVOTHROID) 50 MCG tablet 6/13/2023 Self No Yes   Sig: Take 1 tablet (50 mcg) by mouth daily   metFORMIN (GLUCOPHAGE XR) 500 MG 24 hr tablet 6/13/2023  No Yes   Sig: Take 2 tablets (1,000 mg) by mouth 2 times daily (with meals)   methotrexate 2.5 MG tablet 6/10/2023 at am Self No Yes   Sig: Take 6  tablets (15 mg) by mouth every 7 days   multivitamin, therapeutic (THERA-VIT) TABS tablet 6/13/2023 Self Yes Yes   Sig: Take 1 tablet by mouth daily   predniSONE (DELTASONE) 10 MG tablet 6/13/2023 Self No Yes   Sig: Take 1 tablet (10 mg) by mouth daily   sacubitril-valsartan (ENTRESTO) 24-26 MG per tablet 6/13/2023  No Yes   Sig: Take 1 tablet by mouth 2 times daily   sulfamethoxazole-trimethoprim (BACTRIM) 400-80 MG tablet 6/13/2023 Self No Yes   Sig: Take 1 tablet by mouth daily   vitamin C (ASCORBIC ACID) 500 MG tablet 6/13/2023 Self Yes Yes   Sig: Take 500 mg by mouth daily      Facility-Administered Medications: None     Allergies   Allergies   Allergen Reactions     No Known Drug Allergy        Social History   I have reviewed this patient's social history and updated it with pertinent information if needed. Neymar Larioster  reports that he has never smoked. He has never used smokeless tobacco. He reports current alcohol use. He reports that he does not use drugs.    Family History   I have reviewed this patient's family history and updated it with pertinent information if needed.   Family History   Problem Relation Age of Onset     Cancer Father 75        bladder cancer     Myocardial Infarction Father      Other - See Comments Father         smoking     Breast Cancer Mother      Breast Cancer Sister      Family History Negative Brother      Family History Negative Son      Family History Negative Son         fluttering/murmur     Asthma Son      Colon Cancer No family hx of      Patient reports mother was on hemodialysis, poorly tolerated and center thrice weekly treatments.    Review of Systems   The 10 point Review of Systems is negative other than noted in the HPI.   Patient admits to a small draining wounds right anterior shin.  Has a small wound and a groin rash reported.  Decreased appetite last few days but denies complete anorexia.      Physical Exam   Temp: 99  F (37.2  C) Temp src: Oral BP: (!)  86/54 Pulse: 79   Resp: 18 SpO2: 99 % O2 Device: None (Room air)    Vital Signs with Ranges  Temp:  [97.3  F (36.3  C)-99  F (37.2  C)] 99  F (37.2  C)  Pulse:  [64-87] 79  Resp:  [18-24] 18  BP: (73-99)/(47-67) 86/54  SpO2:  [96 %-100 %] 99 %  247 lbs 6.4 oz    GENERAL: healthy, alert, NAD.  Examined sitting in a chair  HEENT:  Normocephalic. No gross abnormalities.  Pupils equal.  MMM.   CV: RRR, 1/6 systolic murmurs, no clicks, gallops, or rubs.  Lower extremities with tense bilateral edema extending to above his knees.  Evidence of anasarca with some pitting edema on his pannus and abdominal wall.  RESP: Clear bilaterally with good efforts  GI: Abdomen soft/nt/nd, BS normal. No masses, organomegaly  MUSCULOSKELETAL: extremities nl - no gross deformities noted  SKIN: no suspicious lesions or rashes, dry to touch  NEURO:  Strength normal and symmetric.   PSYCH: mood good, affect appropriate  LYMPH: No palpable ant/post cervical and supraclavicular adenopathy    Data   BMP  Recent Labs   Lab 06/14/23  1049 06/14/23  0557 06/14/23  0202 06/13/23 2050 06/13/23 2001 06/13/23  1320   NA  --  139  --   --   --  133*   POTASSIUM  --  4.4  --   --   --  4.8   CHLORIDE  --  103  --   --   --  99   EDITH  --  8.8  --   --   --  8.7*   CO2  --  23  --   --   --  20*   BUN  --  51.5*  --   --   --  56.7*   CR  --  2.21*  --   --   --  2.55*   * 135* 155* 260*   < > 234*    < > = values in this interval not displayed.     [email or web address](phos:4)  CBC)  Recent Labs   Lab 06/14/23  0557 06/13/23  1320   WBC 5.9 6.5   HGB 7.7* 8.2*   HCT 23.2* 24.9*   MCV 95 94   * 173     Recent Labs   Lab 06/14/23  0557   *   ALT 67   ALKPHOS 235*   BILITOTAL 1.5*     Recent Labs   Lab 06/13/23  1320   INR 1.46*     No results found for: D2VIT, D3VIT, DTOT  Recent Labs   Lab 06/14/23  0557   HGB 7.7*   HCT 23.2*   MCV 95     No results for input(s): PTHI in the last 168 hours.

## 2023-06-14 NOTE — PROGRESS NOTES
[facility]  Hospitalist Progress Note        Jose Mcnamara MD   06/14/2023        Interval History:        - BP soft in 80s to 90s, patient asymptomatic and reports he runs low BP  - denies any chest pain or shortness of breath and respiratory status stable on room air  - Na 133-- 139; Cr 2.55-- 2.21, AST//67, lactic acid 3.2---> 1.6; Hb 8.2---7.7  - had US guided paracentesis 6/14 with removal of 5 lts of fluid  - evaluated by cardiology, resumed oral Bumex 2 mg BID (6/14), to keep off Entresto, also to continue PO Amiodarone given recurrent VT  - cardiology suggested nephrology consultation for acute renal failure         Assessment and Plan:        Neymar Rhodes is a 63 year old male with a history of HTN, type II DM, chronic HFrEF, NICM due to cardiac sarcoidosis, s/p CRT-D 2019, VT with device therapies, biopsy-proven pulmonary sarcoid, HLD, gout, diverticulosis, and obesity.  Admitted twice in April this year for JOSHI, had VT/V-fib cardiac arrest, s/p brief CPR and defibrillation with ICD on 4/15.  Patient now presents with worsening abdominal distention and leg swelling, admitted 6/13/23.    During last admission in April (4/13 to 4/19/23), there was concern for worsening cardiogenic shock given his symptoms and worsening renal function and he was started on dobutamine infusion however this led to essentially cardiac arrest and he was resuscitated.  He has been on amiodarone since then and received aggressive diuresis.  Subsequently he had another syncopal event leading to admission and device interrogation showed persistent and sustained episodes of V. tach.  EP was again consulted and amiodarone dose was increased.  Last seen by cardiology on 5/16. Interestingly he did have a repeat PET scan on 5/8/2023 for cardiac sarcoidosis given that cardiology recently increased his methotrexate dose and decreased prednisone to 10 mg daily and this showed no PET activity.  He saw  cardiology in follow-up on 5/16 in the clinic.  Bumex dose was decreased to 2 mg once a day. Also had a right heart cath on 05/18/2023 which showed normal cardiac chamber pressures.  Device interrogation on 5/16 showed 10 VT episodes treated by ATP, one VT episode treated by 41 J shock.    Referred to EP clinic and was seen on 5/24 there.  Patient is scheduled for VT ablation given VT recurrence despite amiodarone therapy.  This is scheduled to take place in 2 weeks at the [facility].     Ascites, s/p paracentesis (6/14/23)  Anasarca  Cardiopulmonary sarcoidosis with acute on chronic HFrEF, nonischemic cardiomyopathy  Elevated LFTs likely due to cardiac cirrhosis   HTN, now hypotensive  S/p CRT-D 2019  H/o VT/VF with device therapies  -recent hospitalization in April and subsequent outpatient course with cardiology/EP follow-up as noted above  - he is planned for VT ablation in 2 weeks at [facility] given VT recurrence despite amiodarone therapy  - last ECHO in April 2023 with EF 15-24 %  - presented with worsening abdominal distention, edema over past 6 to 8 weeks; no significant dyspnea on exertion and respiratory status stable on room air  - EKG with atrial sensed ventricular paced rhythm.  CT A/P showed morphology changes of cirrhosis with moderate volume ascites.  Labs significant for sodium 133, bicarb 20, creatinine 2.5, BUN 56, ALKP 251, , ALT 66, T. bili 1.3, INR 1.4, WBC 6.5, Hb 8.2, , initial lactate 3.2, repeat lactate 3.4, lipase 54, , Pro-Jose 0.3.  - was attempted for transfer to [facility] from ED (but no beds available)    - noted hypotensive in ED to SBP 70s-80s; was given 1 lts NS IV fluid bolus  - BP remains soft in 80s to 90s, patient asymptomatic and reports he runs low BP  - AST//67, lactic acid 3.2---> 1.6  - had US guided paracentesis 6/14 with removal of 5 lts of fluid  - evaluated by cardiology, resumed oral Bumex 2 mg BID (6/14), to keep off Entresto, also to  continue PO Amiodarone given recurrent VT  - cardiology suggested nephrology consultation for acute renal failure  - continue PTA aspirin, prednisone 10 mg daily, Bactrim prophylaxis  - holding PTA statin, fibrate, methotrexate with elevated LFTs  - 2 g sodium diet, 1800 mL fluid restriction     YASMEEN, possibly cardiorenal syndrome vs hypotension vs diuretics  Hyponatremia  - Creatinine has been worsening since April at which time it was around 1.2-1.6 range  - on admission Creatinine 2.5-->2.21; sodium 133---139  - Holding PTA Entresto; resume Bumex 6/14 as above  - nephrology consulted per cardiology recommendation  - paracentesis as above  - will monitor BMP  - Avoid nephrotoxins  - Holding PTA metformin     Type II DM  Last A1c 7.1 in January this year  -Hold PTA metformin given YASMEEN  -MD SSI ordered.  Titrate insulin as needed.    Chronic Anemia  - Hb during last hospitalization in April was around 8 to 9  - will resume PTA iron supplement  - Hb 8.2---7.7; no suggestion of active bleed; will monitor intermittently     Hypothyroidism  -Continue PTA levothyroxine     DVT Prophylaxis: Pneumatic Compression Devices  Code Status: Full Code    Diet: Combination Diet Moderate Consistent Carb (60 g CHO per Meal) Diet; 2 gm NA Diet  Fluid restriction 1800 ML FLUID      Disposition:   - pending nephrology evaluation, cardiology clearance    Clinically Significant Risk Factors Present on Admission              # Hypoalbuminemia: Lowest albumin = 2.7 g/dL at 6/14/2023  5:57 AM, will monitor as appropriate  # Coagulation Defect: INR = 1.46 (Ref range: 0.85 - 1.15) and/or PTT = N/A, will monitor for bleeding  # Drug Induced Platelet Defect: home medication list includes an antiplatelet medication  # Acute Kidney Injury, unspecified: based on a >150% or 0.3 mg/dL increase in last creatinine compared to past 90 day average, will monitor renal function  # Hypertension: Noted on problem list  # Chronic heart failure with reduced  "ejection fraction: last echo with EF <40%    # DMII: A1C = 7.9 % (Ref range: <5.7 %) within past 6 months    # Obesity: Estimated body mass index is 34.51 kg/m  as calculated from the following:    Height as of this encounter: 1.803 m (5' 11\").    Weight as of this encounter: 112.2 kg (247 lb 6.4 oz).               Page Me (7 am to 6 pm)    Care plan discussed with patient and nursing              Physical Exam:      Blood pressure (!) 81/51, pulse 78, temperature 98  F (36.7  C), temperature source Oral, resp. rate 22, height 1.803 m (5' 11\"), weight 112.2 kg (247 lb 6.4 oz), SpO2 99 %.  Vitals:    06/13/23 2042 06/14/23 0525   Weight: 113.3 kg (249 lb 11.2 oz) 112.2 kg (247 lb 6.4 oz)     Vital Signs with Ranges  Temp:  [97.3  F (36.3  C)-98.4  F (36.9  C)] 98  F (36.7  C)  Pulse:  [68-82] 78  Resp:  [20-24] 22  BP: (74-97)/(45-63) 81/51  SpO2:  [96 %-100 %] 99 %  I/O's Last 24 hours  I/O last 3 completed shifts:  In: 330 [P.O.:330]  Out: -     Constitutional: Alert, awake and oriented X 3; resting comfortably in no apparent distress         Oral cavity: Moist mucosa   Cardiovascular: Normal s1 s2, regular rate and rhythm, no murmur; ICD in place    Lungs: B/l clear to auscultation, no wheezes or crepitations   Abdomen: Soft, nt, ascites +, no guarding, rigidity or rebound; BS +   LE :  bilateral edema +   Musculoskeletal/Neuro Power 5/5 in all extremities; No focal neurological deficits noted   Psychiatry: normal mood and affect                    Medications:          albumin human  25-50 g Intravenous Once     amiodarone  200 mg Oral Daily     aspirin  81 mg Oral Daily     insulin aspart  1-7 Units Subcutaneous TID AC     insulin aspart  1-5 Units Subcutaneous At Bedtime     levothyroxine  50 mcg Oral QAM AC     predniSONE  10 mg Oral Daily     sodium chloride (PF)  3 mL Intracatheter Q8H     sulfamethoxazole-trimethoprim  1 tablet Oral Daily     PRN Meds: acetaminophen **OR** acetaminophen, sore throat, " glucose **OR** dextrose **OR** glucagon, glucose **OR** dextrose **OR** glucagon, lidocaine 4%, lidocaine (buffered or not buffered), melatonin, ondansetron **OR** ondansetron, phenol, sennosides, sodium chloride (PF)         Data:      All new lab and imaging data was reviewed.   Recent Labs   Lab Test 06/14/23  0557 06/13/23  1320 05/18/23  1221 05/18/23  0910 10/05/19  0527 10/04/19  0948   WBC 5.9 6.5  --  6.7   < > 6.7   HGB 7.7* 8.2* 8.3* 9.1*   < > 12.7*   MCV 95 94  --  98   < > 92   * 173  --  184   < > 217   INR  --  1.46*  --  1.35*  --  1.16*    < > = values in this interval not displayed.      Recent Labs   Lab Test 06/14/23  0557 06/14/23  0202 06/13/23 2050 06/13/23 2001 06/13/23  1320 05/18/23  0910     --   --   --  133* 135*   POTASSIUM 4.4  --   --   --  4.8 4.1   CHLORIDE 103  --   --   --  99 101   CO2 23  --   --   --  20* 21*   BUN 51.5*  --   --   --  56.7* 72.5*   CR 2.21*  --   --   --  2.55* 2.79*   ANIONGAP 13  --   --   --  14 13   EDITH 8.8  --   --   --  8.7* 9.2   * 155* 260*   < > 234* 192*    < > = values in this interval not displayed.     Recent Labs   Lab Test 10/01/19  2121 10/01/19  1745 10/01/19  1209 06/11/18  0040 06/10/18  2103   TROPI 0.071* 0.075* 0.069*   < >  --    TROPONIN  --   --   --   --  0.07    < > = values in this interval not displayed.

## 2023-06-14 NOTE — PROGRESS NOTES
RECEIVING UNIT ED HANDOFF REVIEW    ED Nurse Handoff Report was reviewed by: Marilia Caballero RN on June 13, 2023 at 7:47 PM

## 2023-06-14 NOTE — PROVIDER NOTIFICATION
MD Notification    Notified Person: MD    Notified Person Name: Kayla    Notification Date/Time: 06/14/23 3:56 AM    Notification Interaction: Vocera    Purpose of Notification: Last lactic was 2.8, down from previous 3.4. Do you want to recheck in AM? Also redraw VBGs based on prior results? Please advise. [phone] BRANDON Capellan    Orders Received: Lactic acid ordered, VBGs not needed

## 2023-06-15 NOTE — PROVIDER NOTIFICATION
MD Notification    Notified Person: MD    Notified Person Name: Dr. Romano     Notification Date/Time: 6/15 0843    Notification Interaction: text page    Purpose of Notification: Neymar Rhodes 89: BP's this AM 77/46 and 82/49. Do we think he needs the midodrine? Plan is to possibly discharge this afternoon so hospitalist is wondering what we should do in regards to the midodrine. Thanks    Orders Received: Dr. Romano will see pt soon and discuss midodrine with hospitalist    Comments:

## 2023-06-15 NOTE — PROGRESS NOTES
Patient seen and examined    - Cardiology notes that due to cardiorenal situation diuretics and volume status are tenuous and brittle, suggested to continue diuresis with Bumex 2 mg BID with 1800 ML fluid restriction and cards okay for discharge home today with outpatient follow-up at [facility] (has an ablation plan for 7/17/23 at [facility])  - nephrology suggested can discontinue prophylactic Bactrim with current prednisone dose of 10 mg daily  - nephrology also suggested we can consider Midodrine for hypotension, however cardiology recommended against Midodrine with his heart failure  - SBP has been soft in 80s -90s, asymptomatic , appears baseline and has tolerated current diuretics regimen in the past, will hold off on initiating Midodrine  - Cr 2.55-- 2.21-- 2.0  - AST/ALT---> 95/63, bilirubin slightly up--- 1.7  - Hb stable 8.2-- 7.7-- 8.1  - cleared by cardiology and nephrology for discharge home today    Discharge home today    Please see discharge summary for details

## 2023-06-15 NOTE — PROGRESS NOTES
Renal Medicine Progress Note            Assessment/Plan:     Neymar Rhodes is a 63 year old male who was admitted on 6/13/2023.      Assessment:  1) acute kidney injury: improving GFR with paracentesis, diuresis all suggesting hemodynamic effects and likely cardiorenal syndrome. Unclear how successfully with low EF state and liver cirrhosis we can get back to normal GFR or even more recent lowest value this year of creatinine of about 1.2.      2) cardiomyopathy low EF state.  PTA Entresto being held.  Complications with arrhythmias.     3) sarcoidosis: Has had a slow tapering of prednisone, currently 10 mg daily along with methotrexate.  Remains on Bactrim although current dose of prednisone does not use reflecting need for ongoing PJP prophylaxis.    Repeat UA ordered but no collected (he mixes frequently stool and urine)     4) diabetes: PTA metformin being held, can be used with GFR 30-45 at reduced dose.     5) radiological evidence of cirrhosis: Most likely related to congestive hepatopathy.     6) anemia: Hemoglobin today 8.1, stable.  Had 31% iron sats in April.        Plan/Recs:  1) suggest discharge on  Bumex 2 mg twice daily  2) can discontinue prophylactic Bactrim with current prednisone dose of 10 mg daily  3) suggest midodrine 5mg BID for relative hypotension  4) dietary sodium restriction at home discussed  5) needing follow up next week, if needed can be seen at [facility] follow up clinic for recheck labs, volume assessment      Jorge Romano DO  [facility]  Office: [phone]  Cell: [phone]        Interval History:     Pt sitting in chair, feeling better. Weight down signifinatly with paracentesis 5L yesterday. Unable to record urine output but reports urinating well, on bumex 2mg BID.           Medications and Allergies:       amiodarone  200 mg Oral Daily     aspirin  81 mg Oral Daily     bumetanide  2 mg Oral BID     ferrous sulfate  325 mg Oral Every  "Other Day     insulin aspart  1-7 Units Subcutaneous TID AC     insulin aspart  1-5 Units Subcutaneous At Bedtime     levothyroxine  50 mcg Oral QAM AC     predniSONE  10 mg Oral Daily     sodium chloride (PF)  3 mL Intracatheter Q8H     [Held by provider] sulfamethoxazole-trimethoprim  1 tablet Oral Daily        Allergies   Allergen Reactions     No Known Drug Allergy             Physical Exam:   Vitals were reviewed  BP (!) 82/49   Pulse 73   Temp 98.4  F (36.9  C) (Oral)   Resp 18   Ht 1.803 m (5' 11\")   Wt 105.9 kg (233 lb 8 oz)   SpO2 100%   BMI 32.57 kg/m      Wt Readings from Last 3 Encounters:   06/15/23 105.9 kg (233 lb 8 oz)   05/24/23 115.5 kg (254 lb 9.6 oz)   05/16/23 114.2 kg (251 lb 11.2 oz)       Intake/Output Summary (Last 24 hours) at 6/15/2023 0945  Last data filed at 6/15/2023 0800  Gross per 24 hour   Intake 1000 ml   Output --   Net 1000 ml       GENERAL APPEARANCE: sitting in chair, alert, in no distress  HEENT:  Eyes/ears/nose/neck grossly normal  RESP: lungs cta b c good efforts, no crackles, rhonchi or wheezes  CV: RRR, nl S1/S2, 1-2/6 systolic murmur   ABDOMEN: o/s/nt/nd, bs present  EXTREMITIES/SKIN: no c/c/rashes/lesions; 2-3+ edema  NEURO:  Grossly non-focal           Data:     BMP  Recent Labs   Lab 06/15/23  0737 06/15/23  0559 06/15/23  0203 06/14/23  2111 06/14/23  1049 06/14/23  0557 06/13/23 2001 06/13/23  1320   NA  --  138  --   --   --  139  --  133*   POTASSIUM  --  4.1  --   --   --  4.4  --  4.8   CHLORIDE  --  103  --   --   --  103  --  99   EDITH  --  8.6*  --   --   --  8.8  --  8.7*   CO2  --  22  --   --   --  23  --  20*   BUN  --  48.5*  --   --   --  51.5*  --  56.7*   CR  --  2.00*  --   --   --  2.21*  --  2.55*   * 167* 173* 209*   < > 135*   < > 234*    < > = values in this interval not displayed.     CBC  Recent Labs   Lab 06/15/23  0559 06/14/23  0557 06/13/23  1320   WBC 7.3 5.9 6.5   HGB 8.1* 7.7* 8.2*   HCT 24.2* 23.2* 24.9*   MCV 95 95 94 "   * 147* 173     Lab Results   Component Value Date    AST 95 (H) 06/15/2023    ALT 63 06/15/2023    ALKPHOS 251 (H) 06/15/2023    BILITOTAL 1.7 (H) 06/15/2023    BILICONJ 0.0 03/29/2007     Lab Results   Component Value Date    INR 1.46 (H) 06/13/2023       Attestation:  I have reviewed today's vital signs, notes, medications, labs and imaging.    DO [facility]  Office: [phone]  Cell: [phone]

## 2023-06-15 NOTE — PLAN OF CARE
Goal Outcome Evaluation:    [id number]      Neuro- A/Ox4  Tele/Cardiac- AV paced  Resp- on RA, LS w/ crackles in RLL; JOSHI  Activity- SBA  Pain- back pain managed w/ PRN Tylenol and   Drips- n/a  Drains/Tubes- PIV  Skin- anasarca, including ascites  GI/- WDL  Plan- plan to improve BP  Misc- Modcarb diet. 1800 fluid restriction.

## 2023-06-15 NOTE — DISCHARGE SUMMARY
[facility]  Discharge Summary        Neymar Rhodes MRN# [id number]   YOB: 1960 Age: 63 year old     Date of Admission:  6/13/2023  Date of Discharge:  6/15/2023  Admitting Physician:  No admitting provider for patient encounter.  Discharge Physician: Jose Mcnamara MD  Discharging Service: Hospitalist     Primary Provider: Jefry Harris  Primary Care Physician Phone Number: [phone]         Discharge Diagnoses/Problem Oriented Hospital Course (Providers):    Neymar Rhodes was admitted on 6/13/2023 by No admitting provider for patient encounter. and I would refer you to their history and physical.  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  Admitted twice in April this year for JOSHI, had VT/V-fib cardiac arrest, s/p brief CPR and defibrillation with ICD on 4/15.  Patient now presents with worsening abdominal distention and leg swelling, admitted 6/13/23.     During last admission in April (4/13 to 4/19/23), there was concern for worsening cardiogenic shock given his symptoms and worsening renal function and he was started on dobutamine infusion however this led to essentially cardiac arrest and he was resuscitated.  He has been on amiodarone since then and received aggressive diuresis.  Subsequently he had another syncopal event leading to admission and device interrogation showed persistent and sustained episodes of V. tach.  EP was again consulted and amiodarone dose was increased.  Last seen by cardiology on 5/16. Interestingly he did have a repeat PET scan on 5/8/2023 for cardiac sarcoidosis given that cardiology recently increased his methotrexate dose and decreased prednisone to 10 mg daily  and this showed no PET activity.  He saw cardiology in follow-up on 5/16 in the clinic.  Bumex dose was decreased to 2 mg once a day. Also had a right heart cath on 05/18/2023 which showed normal cardiac chamber pressures.  Device interrogation on 5/16 showed 10 VT episodes treated by ATP, one VT episode treated by 41 J shock.    Referred to EP clinic and was seen on 5/24 there.  Patient is scheduled for VT ablation given VT recurrence despite amiodarone therapy.  This is scheduled to take place in 2 weeks at the [facility].     Ascites, s/p paracentesis (6/14/23)  Anasarca  Cardiopulmonary sarcoidosis with acute on chronic HFrEF, nonischemic cardiomyopathy  Elevated LFTs likely due to cardiac cirrhosis   Chronic asymptomatic hypotension  S/p CRT-D 2019  H/o VT/VF with device therapies  - recent hospitalization in April and subsequent outpatient course with cardiology/EP follow-up as noted above  - he is planned for VT ablation in 2 weeks at [facility] (7/17/23) given VT recurrence despite amiodarone therapy  - last ECHO in April 2023 with EF 15-24 %  - presented with worsening abdominal distention, edema over past 6 to 8 weeks; no significant dyspnea on exertion and respiratory status stable on room air  - EKG with atrial sensed ventricular paced rhythm.  CT A/P showed morphology changes of cirrhosis with moderate volume ascites.  Labs significant for sodium 133, bicarb 20, creatinine 2.5, BUN 56, ALKP 251, , ALT 66, T. bili 1.3, INR 1.4, WBC 6.5, Hb 8.2, , initial lactate 3.2, repeat lactate 3.4, lipase 54, , Pro-Jose 0.3.  - was attempted for transfer to [facility] from ED (but no beds available)     - noted hypotensive in ED to SBP 70s-80s; was given 1 lts NS IV fluid bolus  - AST//67-- 95/63, lactic acid 3.2---> 1.6  - had US guided paracentesis 6/14 with removal of 5 lts of fluid  - evaluated by cardiology, resumed oral Bumex 2 mg BID (6/14), to keep off Entresto (discontinued on  discharge), also to continue PO Amiodarone given recurrent VT  - continue PTA aspirin, prednisone 10 mg daily  - nephrology suggested can discontinue prophylactic Bactrim with current prednisone dose of 10 mg daily  - holding PTA statin, methotrexate with elevated LFTs untuil repeat LFTs with PCP; continue fibrate  - 2 g sodium diet, 1800 mL fluid restriction    - Cardiology notes that due to cardiorenal situation diuretics and volume status are tenuous and brittle, suggested to continue diuresis with Bumex 2 mg BID with 1800 ML fluid restriction and cards okay for discharge home today with outpatient follow-up at [facility] (has an ablation plan for 7/17/23 at [facility])  - nephrology also suggested we can consider Midodrine for hypotension, however cardiology recommended against Midodrine with his heart failure  - SBP has been soft in 80s -90s, asymptomatic , appears baseline and has tolerated current diuretics regimen in the past, will hold off on initiating Midodrine    YASMEEN, possibly cardiorenal syndrome vs hypotension vs diuretics  Hyponatremia  - Creatinine has been worsening since April at which time it was around 1.2-1.6 range  - on admission Creatinine 2.5-->2.21---2.0; sodium 133---139  - d/roman PTA Entresto; resumed Bumex 6/14 at bid dose as above  - discontinues prophylactic Bactrim as above per nephrology recommendation   - paracentesis as above  - Avoid nephrotoxins  - Holding PTA metformin until repeat BMP on PCP follow-up     Type II DM  Last A1c 7.1 in January this year  - will continue to hold PTA metformin until repeat BMP on PCP follow-up     Chronic Anemia  - Hb during last hospitalization in April was around 8 to 9  - will resume PTA iron supplement  - Hb 8.2---7.7---8.1, stable; no suggestion of active bleed     Hypothyroidism  -Continue PTA levothyroxine     Code Status: Full Code     Clinically Significant Risk Factors              # Hypoalbuminemia: Lowest albumin = 2.7 g/dL at 6/14/2023   "5:57 AM, will monitor as appropriate  # Coagulation Defect: INR = 1.46 (Ref range: 0.85 - 1.15) and/or PTT = N/A, will monitor for bleeding  # Thrombocytopenia: Lowest platelets = 125 in last 2 days, will monitor for bleeding   # Hypertension: Noted on problem list  # Chronic heart failure with reduced ejection fraction: last echo with EF <40%      # DMII: A1C = 7.9 % (Ref range: <5.7 %) within past 6 months, PRESENT ON ADMISSION  # Obesity: Estimated body mass index is 32.57 kg/m  as calculated from the following:    Height as of this encounter: 1.803 m (5' 11\").    Weight as of this encounter: 105.9 kg (233 lb 8 oz)., PRESENT ON ADMISSION                     Brief Hospital Stay Summary Sent Home With Patient in AVS:        Reason for your hospital stay      You were admitted for evaluation of fluid overload from heart failure.   You had paracentesis done and your Bumex dose was increased to two times a   day.                 Pending Results:        Unresulted Labs Ordered in the Past 30 Days of this Admission     No orders found from 5/14/2023 to 6/14/2023.            Discharge Instructions and Follow-Up:      Follow-up Appointments     Follow-up and recommended labs and tests       Follow up with primary care provider, Jefry Harris, within 7   days for hospital follow- up.  The following labs/tests are recommended:   repeat CBC, BMP and LFTs.   (PCP please note the Lipitor, metformin and methotrexate has been held   until repeat BMP and LFTs on follow-up)    Follow up with Cardiology at [facility] as previously suggested.                 Discharge Disposition:      Discharged to home        Discharge Medications:        Current Discharge Medication List      CONTINUE these medications which have CHANGED    Details   atorvastatin (LIPITOR) 20 MG tablet Take 1 tablet (20 mg) by mouth daily (Hold until follow up with PCP for repeat LFTs)  Qty: 90 tablet, Refills: 3    Associated Diagnoses: Non-ischemic " cardiomyopathy (H); On amiodarone therapy      bumetanide (BUMEX) 2 MG tablet Take 1 tablet (2 mg) by mouth 2 times daily  Qty: 180 tablet, Refills: 3    Associated Diagnoses: Non-ischemic cardiomyopathy (H)      metFORMIN (GLUCOPHAGE XR) 500 MG 24 hr tablet Take 2 tablets (1,000 mg) by mouth 2 times daily (with meals) (Hold until follow up with PCP for repeat BMP to assure improved renal function)  Qty: 360 tablet, Refills: 0    Associated Diagnoses: Type 2 diabetes mellitus with other circulatory complication, with long-term current use of insulin (H)      methotrexate 2.5 MG tablet Take 6 tablets (15 mg) by mouth every 7 days  Qty: 75 tablet, Refills: 3    Comments: (Hold until follow up with PCP for repeat LFTs)  Associated Diagnoses: Chronic systolic heart failure (H); Cardiac sarcoidosis         CONTINUE these medications which have NOT CHANGED    Details   !! acetaminophen (TYLENOL) 500 MG tablet Take 1,000 mg by mouth daily as needed for mild pain      !! acetaminophen (TYLENOL) 500 MG tablet Take 1,000 mg by mouth every morning      amiodarone (PACERONE) 200 MG tablet Take 200 mg by mouth daily      aspirin 81 MG tablet Take 1 tablet (81 mg) by mouth daily  Qty: 30 tablet    Associated Diagnoses: Essential hypertension, benign      betamethasone dipropionate (DIPROSONE) 0.05 % external cream Apply topically 2 times daily Apply sparingly once or twice per day as needed to affected area until the skin is better, then stop; REPEAT AS NEEDED  Qty: 30 g, Refills: 1    Associated Diagnoses: Psoriasis      clotrimazole (LOTRIMIN) 1 % external cream Apply sparingly once or twice per day as needed to affected area until the skin is better, then stop; REPEAT AS NEEDED  Qty: 30 g, Refills: 0    Associated Diagnoses: Dermatophytosis of body      fenofibrate (TRIGLIDE/LOFIBRA) 160 MG tablet Take 1 tablet (160 mg) by mouth daily  Qty: 90 tablet, Refills: 3    Comments: PROFILE FOR FUTURE REFILLS UNTIL PATIENT CALLS FOR  REFILLS  Associated Diagnoses: Type 2 diabetes mellitus with other circulatory complication, with long-term current use of insulin (H); Hyperlipidemia LDL goal <100      ferrous sulfate (FEROSUL) 325 (65 Fe) MG tablet Take 1 tablet (325 mg) by mouth every other day (Absorbed best on an empty stomach. If stomach upset occurs, can take with meals.)  Qty: 15 tablet, Refills: 0    Associated Diagnoses: Iron deficiency anemia, unspecified iron deficiency anemia type      folic acid (FOLVITE) 1 MG tablet Take 1 tablet (1 mg) by mouth daily  Qty: 90 tablet, Refills: 3    Associated Diagnoses: Cardiac sarcoidosis; Chronic systolic heart failure (H)      levothyroxine (SYNTHROID/LEVOTHROID) 50 MCG tablet Take 1 tablet (50 mcg) by mouth daily  Qty: 90 tablet, Refills: 1    Comments: PROFILE FOR FUTURE REFILLS UNTIL PATIENT CALLS FOR REFILLS  Associated Diagnoses: Subclinical hypothyroidism      multivitamin, therapeutic (THERA-VIT) TABS tablet Take 1 tablet by mouth daily      predniSONE (DELTASONE) 10 MG tablet Take 1 tablet (10 mg) by mouth daily  Qty: 90 tablet, Refills: 3    Comments: New decreased dose.  Associated Diagnoses: Cardiac sarcoidosis; Chronic systolic heart failure (H)      vitamin C (ASCORBIC ACID) 500 MG tablet Take 500 mg by mouth daily      blood glucose monitoring (ACCU-CHEK LUL PLUS) test strip Use to test blood sugar 1-3 times daily or as directed.  Qty: 100 each, Refills: 11    Associated Diagnoses: Type 2 diabetes mellitus without complication (H)      !! Continuous Blood Gluc Sensor (FREESTYLE JOCELIN 14 DAY SENSOR) [facility] USE SENSOR EVERY 14 DAYS.  Qty: 6 each, Refills: 1    Associated Diagnoses: Type 2 diabetes mellitus without complication, with long-term current use of insulin (H)      !! continuous blood glucose monitoring (FREESTYLE JOCELIN) sensor For use with Freestyle Jocelin Flash  for continuous monitioring of blood glucose levels. Replace sensor every 10 days.  Qty: 3 each, Refills: 3  "   Associated Diagnoses: Type 2 diabetes mellitus without complication, without long-term current use of insulin (H)       !! - Potential duplicate medications found. Please discuss with provider.      STOP taking these medications       sacubitril-valsartan (ENTRESTO) 24-26 MG per tablet Comments:   Reason for Stopping:         sulfamethoxazole-trimethoprim (BACTRIM) 400-80 MG tablet Comments:   Reason for Stopping:                 Allergies:         Allergies   Allergen Reactions     No Known Drug Allergy            Consultations This Hospital Stay:      Consultation during this admission received from cardiology and nephrology        Condition and Physical on Discharge:        Discharge condition: Stable   Vitals: Blood pressure (!) 82/49, pulse 73, temperature 98.4  F (36.9  C), temperature source Oral, resp. rate 18, height 1.803 m (5' 11\"), weight 105.9 kg (233 lb 8 oz), SpO2 100 %.     Constitutional: Alert, awake and oriented X 3; resting comfortably in no apparent distress         Oral cavity: Moist mucosa   Cardiovascular: Normal s1 s2, regular rate and rhythm, no murmur; ICD in place    Lungs: B/l clear to auscultation, no wheezes or crepitations   Abdomen: Soft, nt, nd, no guarding, rigidity or rebound; BS +   LE :  bilateral edema ++   Musculoskeletal/Neuro Power 5/5 in all extremities; No focal neurological deficits noted   Psychiatry: normal mood and affect                 Discharge Time:      Greater than 30 minutes.        Image Results From This Hospital Stay (For Non-EPIC Providers):        Results for orders placed or performed during the hospital encounter of 06/13/23   CT Abdomen Pelvis w/o Contrast    Narrative    CT ABDOMEN PELVIS W/O CONTRAST 6/13/2023 3:10 PM    CLINICAL HISTORY: Abdominal distention.  Jaundice.  Probable ascites.   Liver or pancreatic mass.  TECHNIQUE: CT scan of the abdomen and pelvis was performed without IV  contrast. Multiplanar reformats were obtained. Dose " reduction  techniques were used.  CONTRAST: None.    COMPARISON: Report from CT 9/28/2001 (no images available)    FINDINGS:   LOWER CHEST: Pacemaker/automatic implantable cardiac defibrillator  leads partially visualized. Visualized lung bases are clear. No  significant pleural effusion.    HEPATOBILIARY: Nodular contour of the liver with widening of the  fissures. No focal liver lesions seen on this noncontrast CT.  Cholelithiasis without evidence of acute cholecystitis or biliary  obstruction.    PANCREAS: No definite focal mass, ductal dilation or surrounding fat  stranding.    SPLEEN: Normal.    ADRENAL GLANDS: Normal.    KIDNEYS/BLADDER: No hydronephrosis. Urinary bladder is unremarkable.    BOWEL: Prior partial sigmoid colectomy. Diverticulosis in the colon.  No acute inflammatory change. No obstruction.     LYMPH NODES: No suspicious lymphadenopathy.    VASCULATURE: Scattered calcified atherosclerosis.    PELVIC ORGANS: Normal.    OTHER: Moderate volume ascites throughout the abdomen and pelvis.  Multiple small fat and fluid containing ventral hernias without  evidence of complication. Moderate body wall edema.    MUSCULOSKELETAL: No acute bony abnormality. Healed bilateral rib  fractures.      Impression    IMPRESSION:   1.  Morphologic changes of cirrhosis with moderate volume ascites.  2.  No visualized solid organ mass on this noncontrast exam.    NATALIA WISE MD         SYSTEM ID:  [id number]   US Paracentesis with Albumin    Narrative    US PARACENTESIS WITH ALBUMIN 6/14/2023 9:50 AM    CLINICAL HISTORY: Worsening abdominal distention with ascites likely  secondary to cardiac cirrhosis    PROCEDURE: Informed consent obtained. Time out performed. The abdomen  was prepped and draped in a sterile fashion. 10 mL of 1% lidocaine was  infused into local soft tissues. A 5 Hungarian catheter system was  introduced into the abdominal ascites under ultrasound guidance.    5 liters of clear fluid were removed and  sent to lab if requested.    Patient tolerated procedure well.    Ultrasound imaging was obtained and placed in the patient's permanent  medical record.      Impression    IMPRESSION:  1.  Status post ultrasound-guided paracentesis.    BERONICA PTAEL MD         SYSTEM ID:  [id number]           Most Recent Lab Results In EPIC (For Non-EPIC Providers):    Most Recent 3 CBC's:  Recent Labs   Lab Test 06/15/23  0559 06/14/23  0557 06/13/23  1320   WBC 7.3 5.9 6.5   HGB 8.1* 7.7* 8.2*   MCV 95 95 94   * 147* 173      Most Recent 3 BMP's:  Recent Labs   Lab Test 06/15/23  0737 06/15/23  0559 06/15/23  0203 06/14/23  1049 06/14/23  0557 06/13/23 2001 06/13/23  1320   NA  --  138  --   --  139  --  133*   POTASSIUM  --  4.1  --   --  4.4  --  4.8   CHLORIDE  --  103  --   --  103  --  99   CO2  --  22  --   --  23  --  20*   BUN  --  48.5*  --   --  51.5*  --  56.7*   CR  --  2.00*  --   --  2.21*  --  2.55*   ANIONGAP  --  13  --   --  13  --  14   EDITH  --  8.6*  --   --  8.8  --  8.7*   * 167* 173*   < > 135*   < > 234*    < > = values in this interval not displayed.     Most Recent 3 Troponin's:  Recent Labs   Lab Test 10/01/19  2121 10/01/19  1745 10/01/19  1209 06/11/18  0040 06/10/18  2103   TROPI 0.071* 0.075* 0.069*   < >  --    TROPONIN  --   --   --   --  0.07    < > = values in this interval not displayed.     Most Recent 3 INR's:  Recent Labs   Lab Test 06/13/23  1320 05/18/23  0910 10/04/19  0948   INR 1.46* 1.35* 1.16*     Most Recent 2 LFT's:  Recent Labs   Lab Test 06/15/23  0559 06/14/23  0557   AST 95* 107*   ALT 63 67   ALKPHOS 251* 235*   BILITOTAL 1.7* 1.5*     Most Recent Cholesterol Panel:  Recent Labs   Lab Test 05/05/23  0959   CHOL 88   LDL 36   HDL 29*   TRIG 116     Most Recent 6 Bacteria Isolates From Any Culture (See EPIC Reports for Culture Details):  Recent Labs   Lab Test 12/19/19  1454   CULT Culture negative for acid fast bacilli  Assayed at [facility] [address]  [place] [address] [phone]  No growth after 4 weeks  Culture negative after 4 weeks  No Actinomyces species isolated  No growth     Most Recent TSH, T4 and HgbA1c:   Recent Labs   Lab Test 06/13/23 1931 05/05/23  0959   TSH  --  5.51*   T4  --  1.92*   A1C 7.9*  --

## 2023-06-15 NOTE — PLAN OF CARE
Neuro- A/Ox4  Most Recent Vitals- Temp: 98.4  F (36.9  C) Temp src: Oral BP: 91/54 Pulse: 73   Resp: 18 SpO2: 100 % O2 Device: None (Room air)   Tele/Cardiac- 100% V-paced w/ PVCs; possible issue w/ atrial sensing seen by pacer spike placement  Resp- on RA, LS w/ crackles in RML/RLL  Activity- SBA  Pain- int pain to L elbow relieved w/ PRN Tylenol, sore throat relieved w/ lozenges and throat spray  Drips- n/a  Drains/Tubes- PIV  Skin- newly opened scab on L elbow covered w/ mepilex; BLE edema  GI/- WDL  Plan- continue w/ diuresis and monitoring BP    Marilia Caballero RN

## 2023-06-15 NOTE — PROGRESS NOTES
Received Pt at 0700. Initial encounter worsening abdominal pain with distention, leg swelling, hypotension. A & O x4, able to make needs known. Blood pressure throughout the shift has been soft asymptomatic (Pt baseline, MD aware). Paracentesis procedure this morning 5L removed. Received albumin s/p paracentesis. TELE AV paced. Edema +2 edema on bilat lower extremity, noted weeping, and started on bumex this afternoon. On 2gm Na and consistent carb diet. 1800mL Fluid restriction. Received PRN lozenge and phenol for sore throat. Received PRN tylenol for back pain. Assist of 1 G/W. Continue tx plan.

## 2023-06-15 NOTE — PROGRESS NOTES
[facility] Cardiology Progress Note  Date of Service: 06/15/2023  Primary Cardiologist: Dr. 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 Batres at the [facility], refractory VT/VF on amiodarone with ICD in place, scheduled for upcoming VT ablation with Dr. Tobar, pulmonary sarcoidosis, chronic hypotension, chronic renal disease, and longstanding alcohol use (sober since 2019), who has had recent readmissions for acute congestive heart failure and ventricular arrhythmias, amiodarone up-titrated and scheduled for an upcoming VT ablation in 1 month. One month ago his Bumex was reduced from 2 mg BID to daily. He has now been readmitted on 6/13/2023 with acute HFrEF / ascites, undergoing 5L paracentesis. He also recently developed YASMEEN which persists despite Entresto reduction and was present at time of normal RHC last month. 4/2023 PET showed no evidence of active cardiac sarcoid. Recent device interrogation with appropriate BiVp burden and no arrhythmias.     Interim Hx: Bumex 2 mg BID re-started, Entresto stopped. Neph consulted, felt no intrinsic renal disease present, YASMEEN likely related to HF/low flow state. Bactrim stopped. Creat today improved at 2. Wt down to 233 lbs. Pt feeling significantly better, ready to go home. SBP 80's, asymptomatic.     Assessment:  1. Acute on Chronic HFrEF, sarcoidosis CMP, EF 25-30%, LVIDd 6.7 (4/2023 TTE).  2. Cirrhosis, ascites, hypoalbuminemia, s/p 5L paracentesis.  3. YASMEEN / CKD.   4. Asymptomatic chronic hypotension.  5. Sustained VT / VT syncope, with ICD in place on AMIO, with upcoming ablation (7/17/23).    Plan:   1. Continue bumex 2 mg BID. Maintain off Entresto for now. There was some discussion of possibly initiating midodrine however I  would recommend against this with his heart failure. Ambulatory inotropes may be his next step. I have messaged the [facility] Collins Foley to coordinate close follow up with Dr. Batres within the next couple of weeks.   2. Cardiology will sign off.     Plan was formulated under direction of Dr. Brambila.   Nicole Hayes PA-C  [facility]  Pager: [phone]  Text Page  (7:30am - 4pm M-F)   __________________________________________________________________________  Physical Exam   Temp: 98.4  F (36.9  C) Temp src: Oral BP: (!) 82/49 Pulse: 73   Resp: 18 SpO2: 100 % O2 Device: None (Room air)    Vitals:    06/13/23 2042 06/14/23 0525 06/15/23 0547   Weight: 113.3 kg (249 lb 11.2 oz) 112.2 kg (247 lb 6.4 oz) 105.9 kg (233 lb 8 oz)       GENERAL:  The patient is in no apparent distress.   NECK: CVP appears normal, no masses or thyromegaly.  PULMONARY:  There is a normal respiratory effort. Clear lungs to auscultation bilaterally.   CARDIOVASCULAR:  RRR, normal S1 S2, no m/r/g.  GI:  Non tender but distended abdomen.  EXTREMITIES:  3+ pitting edema into the thighs with weeping legs.  VASCULAR: 2+ Pulses bilaterally in upper and lower extremities.    Medications       amiodarone  200 mg Oral Daily     aspirin  81 mg Oral Daily     bumetanide  2 mg Oral BID     ferrous sulfate  325 mg Oral Every Other Day     insulin aspart  1-7 Units Subcutaneous TID AC     insulin aspart  1-5 Units Subcutaneous At Bedtime     levothyroxine  50 mcg Oral QAM AC     predniSONE  10 mg Oral Daily     sodium chloride (PF)  3 mL Intracatheter Q8H     [Held by provider] sulfamethoxazole-trimethoprim  1 tablet Oral Daily       Data   Most Recent 3 CBC's:Recent Labs   Lab Test 06/15/23  0559 06/14/23  0557 06/13/23  1320   WBC 7.3 5.9 6.5   HGB 8.1* 7.7* 8.2*   MCV 95 95 94   * 147* 173     Most Recent 3 BMP's:Recent Labs   Lab Test 06/15/23  0737 06/15/23  0559 06/15/23  0203 06/14/23  1049 06/14/23  0557 06/13/23 2001  06/13/23  1320   NA  --  138  --   --  139  --  133*   POTASSIUM  --  4.1  --   --  4.4  --  4.8   CHLORIDE  --  103  --   --  103  --  99   CO2  --  22  --   --  23  --  20*   BUN  --  48.5*  --   --  51.5*  --  56.7*   CR  --  2.00*  --   --  2.21*  --  2.55*   ANIONGAP  --  13  --   --  13  --  14   EDITH  --  8.6*  --   --  8.8  --  8.7*   * 167* 173*   < > 135*   < > 234*    < > = values in this interval not displayed.     Most Recent 2 LFT's:Recent Labs   Lab Test 06/15/23  0559 06/14/23  0557   AST 95* 107*   ALT 63 67   ALKPHOS 251* 235*   BILITOTAL 1.7* 1.5*       A total of 25 minutes was spent face-to-face or coordinating care of Neymarmary Rhodes. Over 50% of our time was spent counseling the patient and/or coordinating care.

## 2023-06-16 NOTE — PROGRESS NOTES
A&O x4. VSS on room air. Denies CP/SOB. C/o sore throat. Up independently. Pt discharged home with wife. AVS reviewed, all questions answered.

## 2023-06-17 NOTE — PROGRESS NOTES
Clinic Care Coordination Contact  [facility]: Post-Discharge Note  SITUATION                                                      Admission:    Admission Date: 06/13/23   Reason for Admission: You were admitted for evaluation of fluid overload from heart  failure. You had paracentesis done and your Bumex dose was  increased to two times a day.  Discharge:   Discharge Date: 06/15/23  Discharge Diagnosis: You were admitted for evaluation of fluid overload from heart  failure. You had paracentesis done and your Bumex dose was  increased to two times a day.    BACKGROUND                                                      Per hospital discharge summary and inpatient provider notes:    Neymar Rhodes is a 63 year old male with a history of HTN, type II DM, chronic HFrEF, NICM due to cardiac sarcoidosis, s/p CRT-D 2019, VT with device therapies, biopsy-proven pulmonary sarcoid, HLD, gout, diverticulosis, and obesity.  Admitted twice in April this year for JOSHI, had VT/V-fib cardiac arrest, s/p brief CPR and defibrillation with ICD on 4/15.  Patient now presents with worsening abdominal distention and leg swelling.    ASSESSMENT           Discharge Assessment  How are you doing now that you are home?: I am doing alright.  How are your symptoms? (Red Flag symptoms escalate to triage hotline per guidelines): Improved  Do you feel your condition is stable enough to be safe at home until your provider visit?: Yes  Does the patient have their discharge instructions? : Yes  Does the patient have questions regarding their discharge instructions? : No  Were you started on any new medications or were there changes to any of your previous medications? : Yes  Does the patient have all of their medications?: Yes  Do you have questions regarding any of your medications? : No  Discharge follow-up appointment scheduled within 14 calendar days? : Yes  Discharge Follow Up Appointment Date: 06/22/23  Discharge Follow Up  Appointment Scheduled with?: Primary Care Provider                PLAN                                                      Outpatient Plan: Follow up with primary care provider, Jefry Harris, within 7  days for hospital follow- up. The following labs/tests are  recommended: repeat CBC, BMP and LFTs.  (PCP please note the Lipitor, metformin and methotrexate has been  held until repeat BMP and LFTs on follow-up)  Follow up with Cardiology at [facility] as previously suggested     Future Appointments   Date Time Provider Department Center   6/22/2023 10:30 AM Jefry Harris MD [facility]   8/22/2023  8:30 AM  LAB [facility]   8/22/2023  9:00 AM UC CV DEVICE 1 [facility]   8/22/2023  9:30 AM Roberto Batres MD [facility]   10/18/2023  2:00 PM  LAB [facility]   10/18/2023  2:30 PM UC CV DEVICE 1 [facility]VCV [facility]   10/18/2023  3:00 PM Zackery De Guzman MD [facility]   11/22/2023 12:00 AM [facility]         For any urgent concerns, please contact our 24 hour nurse triage line: [phone])         MALCOLM Velasquez  [phone]  Connected Care Resource [facility]

## 2023-06-19 NOTE — TELEPHONE ENCOUNTER
Pt has hospital follow up visit with PCP on 06/22. Pt wants to know if he should have labs before appt, and if he needs to fast.     Per 06/13/2023 hospital discharge notes,    The following labs/tests are recommended:   repeat CBC, BMP and LFTs.     Future lab orders pended. Please advice if should have labs prior to 06/22. Pt would need a call back with providers advice. Ok to leave a detailed voice message.

## 2023-06-20 NOTE — TELEPHONE ENCOUNTER
It would be nice if he could get labs done before the appointment if possible and convenient for him, otherwise we can do it when he is here.   There are non-fasting labs, can be done any time of day.   Future orders placed.       Close encounter when done.

## 2023-06-21 NOTE — TELEPHONE ENCOUNTER
Called and poke with pt. Pt asked writer to speak to his wife (c2c on file).     Assisted with scheduling lab appt for today per pts wife request.

## 2023-06-22 NOTE — PATIENT INSTRUCTIONS
Rechecking thyroid labs today, you may need to stop the levothyroxine if the thyroid levels are elevated.      Resume Levemir insulin 15 units once per day     Take Short acting insulin (Novolog or Humalog) 3 units immediately before meals, do not take this dose if your glucose is below 100 at the time of the meal.      Monitor the glucose using the continuous glucose monitor.     Our goal is to get the glucose readings mostly below 250 to start, then between 100-200 most of the time.       Stay off metformin until further notice.      Stay off methotrexate until further notice.      Continue all other medications at the same doses.  Contact your usual pharmacy if you need refills.      Return to see me in approximately 1 week, sooner if needed.  Please get non-fasting labs done at the [facility] or any other [facility] Lab lab 1 days before this appointment.

## 2023-06-22 NOTE — PROGRESS NOTES
Assessment & Plan   Problem List Items Addressed This Visit     Type 2 diabetes mellitus with circulatory disorder, with long-term current use of insulin (H)    Relevant Medications    insulin aspart (NOVOLOG FLEXPEN) 100 UNIT/ML pen    insulin pen needle (32G X 4 MM) 32G X 4 MM miscellaneous    Other Relevant Orders    CBC with platelets (Completed)    Basic metabolic panel (Completed)    Cardiac sarcoidosis    Relevant Orders    CBC with platelets (Completed)    Basic metabolic panel (Completed)    Cardiac cirrhosis - Primary   Other Visit Diagnoses     Subclinical hypothyroidism        Relevant Orders    TSH with free T4 reflex (Completed)    T4 free (Completed)    Iron deficiency anemia due to chronic blood loss        Relevant Orders    Iron & Iron Binding Capacity (Completed)    CBC with platelets (Completed)    Basic metabolic panel (Completed)    Chronic systolic heart failure (H)        Relevant Orders    CBC with platelets (Completed)    Basic metabolic panel (Completed)              Rechecking thyroid labs today, you may need to stop the levothyroxine if the thyroid levels are elevated.        Resume Levemir insulin 15 units once per day       Take Short acting insulin (Novolog or Humalog) 3 units immediately before meals, do not take this dose if your glucose is below 100 at the time of the meal.        Monitor the glucose using the continuous glucose monitor.       Our goal is to get the glucose readings mostly below 250 to start, then between 100-200 most of the time.         Stay off metformin until further notice.        Stay off methotrexate until further notice.        Continue all other medications at the same doses.  Contact your usual pharmacy if you need refills.        Return to see me in approximately 1 week, sooner if needed.  Please get non-fasting labs done at the [facility] or any other [facility] Lab lab 1 days before this appointment.               MED REC  REQUIRED  Post Medication Reconciliation Status: discharge medications reconciled and changed, per note/orders      Jefry Harris MD  [facility] TASIA Blackmon is a 63 year old, presenting for the following health issues:  Hospital F/U         No data to display              History of Present Illness       Reason for visit:  Lab results            6/17/2023    12:15 PM   Post Discharge Outreach   Admission Date 6/13/2023   Reason for Admission You were admitted for evaluation of fluid overload from heart  failure. You had paracentesis done and your Bumex dose was  increased to two times a day.   Discharge Date 6/15/2023   Discharge Diagnosis You were admitted for evaluation of fluid overload from heart  failure. You had paracentesis done and your Bumex dose was  increased to two times a day.   How are you doing now that you are home? I am doing alright.   How are your symptoms? (Red Flag symptoms escalate to triage hotline per guidelines) Improved   Do you feel your condition is stable enough to be safe at home until your provider visit? Yes   Does the patient have their discharge instructions?  Yes   Does the patient have questions regarding their discharge instructions?  No   Were you started on any new medications or were there changes to any of your previous medications?  Yes   Does the patient have all of their medications? Yes   Do you have questions regarding any of your medications?  No   Discharge follow-up appointment scheduled within 14 calendar days?  Yes   Discharge Follow Up Appointment Date 6/22/2023   Discharge Follow Up Appointment Scheduled with? Primary Care Provider     Hospital Follow-up Visit:    Hospital/Nursing Home/IP Rehab Facility: [facility]  Date of Admission: 6/13/23  Date of Discharge: 6/15/23  Reason(s) for Admission: worsening abdominal distention and leg swelling,    Was your hospitalization related to  COVID-19? No   Problems taking medications regularly:  None  Medication changes since discharge: Yes-Bumex increase to BID  Problems adhering to non-medication therapy:  None    Summary of hospitalization:  [facility] discharge summary reviewed  Diagnostic Tests/Treatments reviewed.  Follow up needed: cardiology  Other Healthcare Providers Involved in Patient s Care:         cardiology  Update since discharge: stable.   Plan of care communicated with patient and wife     Since hospitla discharge, feeling tired, breathing OK, no orthopnea.   Appetite overall poor.     **I reviewed the information recorded in the patient's EPIC chart (including but not limited to medical history, surgical history, family history, problem list, medication list, and allergy list) and updated the information as indicated based on the patients reported information.           Review of Systems   Constitutional, HEENT, cardiovascular, pulmonary, gi and gu systems are negative, except as otherwise noted.      Objective    BP (!) 84/52   Pulse 77   Temp 97  F (36.1  C)   Wt 110.5 kg (243 lb 8 oz)   SpO2 97%   BMI 33.96 kg/m    Body mass index is 33.96 kg/m .  Physical Exam   GENERAL alert and no distress  EYES:  Normal sclera,conjunctiva, EOMI  HENT: oral and posterior pharynx without lesions or erythema, facies symmetric  NECK: Neck supple. No LAD, without thyroidmegaly.  RESP: Clear to ausculation bilaterally without wheezes or crackles. Normal BS in all fields.  CV: RRR normal S1S2 without murmurs, rubs or gallops.  LYMPH: no cervical lymph adenopathy appreciated  MS: extremities- no gross deformities of the visible extremities noted,   EXT:  2+ lower extremity edema  PSYCH: Alert and oriented times 3; speech- coherent  SKIN:  No obvious significant skin lesions on visible portions of face       Results for orders placed or performed in visit on 06/22/23   TSH with free T4 reflex     Status: Abnormal   Result Value Ref  Range    TSH 4.29 (H) 0.30 - 4.20 uIU/mL   Iron & Iron Binding Capacity     Status: Abnormal   Result Value Ref Range    Iron 81 61 - 157 ug/dL    Iron Binding Capacity 179 (L) 240 - 430 ug/dL    Iron Sat Index 45 15 - 46 %   T4 free     Status: Normal   Result Value Ref Range    Free T4 1.69 0.90 - 1.70 ng/dL

## 2023-06-26 NOTE — TELEPHONE ENCOUNTER
[facility]    Messaged scheduling team with request for arranging follow-up.     Slot held for him on 7/11 on Nicole Hayes PAC's schedule in BV. Orders placed.    Adelita Messer RN BSN   9:30 AM 06/26/23  [facility] nurse line M-F 8a-4p: [phone]

## 2023-06-26 NOTE — TELEPHONE ENCOUNTER
----- Message from Nicole Hayes PA-C sent at 6/26/2023  9:06 AM CDT -----  Yes, I have availability on July 11th. If this doesn't work for him, perhaps another  of  [facility] TAMMY would be appropriate?  I will route my RN's in this conversation to hold a slot for him that date.   Thanks Collins!  ______________________________________________________________    ----- Message -----  From: Collins Foley CMA  Sent: 6/23/2023   1:32 PM CDT  To: ANNABELLA Bryan,     I've been keeping an eye on Dr. Batres's schedule everyday and nothing has opened up before 7/17. I don't want to wait too long so Im starting to think that having him see you may be the best scenario. Is that still an option?      Collins   ----- Message -----  From: Nicole Hayes PA-C  Sent: 6/19/2023   6:52 AM CDT  To: Collins Foley CMA    Yeah, he definitely needs to be seen before his 7/17 ablation.  If there is no availability at the , I am happy to see him in my clinic. Just let me know.  Thanks!  ----- Message -----  From: Collins Foley CMA  Sent: 6/16/2023   6:07 PM CDT  To: Nicole Hayes PA-C    Thanks for the note Nicole. I gave our coordinators your message and they got him an appt befor his appt with Dr. De Guzman. But I will see about getting him in earlier.     Collins   ----- Message -----  From: Nicole Hayes PA-C  Sent: 6/14/2023   1:05 PM CDT  To: ABIGAIL Pelletier. This is an established pt of Dr. Batres who has had recent readmissions. I'm hoping he can get an appointment scheduled with him within the next two weeks, or at least prior to his upcoming VT ablation (will need to ensure he's optimized for it). Pt is not discharging yet from [facility], but will be likely by end of week. Thanks!!

## 2023-06-27 NOTE — TELEPHONE ENCOUNTER
Patient has [employer] and [employer] through an employer.    Jardiance/Farxiga: $0/mo.    Entresto: $0/mo.    Rama Kwon  Pharmacy Technician/Liaison, Discharge Pharmacy   [phone] (voice or text)  [email or web address]

## 2023-06-27 NOTE — TELEPHONE ENCOUNTER
Pt is scheduled for a [facility] appt on 07/05/23     Pt with a history of cardiomyopathy..  Medication list reviewed and pt is not currently on Entresto, Jardiance, Farxiga and Ivokana.    Please initiate coverage check for the above medications.

## 2023-06-28 NOTE — ED NOTES
NAYAN ferrara and MD at bedside with ultrasound. MD reports cardiac standstill and Time of death called at 1254.

## 2023-06-28 NOTE — ED TRIAGE NOTES
Patient brought in by EMS from clinic. EMS reports patient went to clinic not feeling well with increased edema in his legs. At clinic patient became unresponsive and had lost pulse and respirations. Patient was shocked 3 times at clinic. EMS found patient to be in continued arrest. EMS administered 3 amps of epinepherine, 300 mg of amiodarone, and 150 mg of amiodarone. Patient had ROSC for one minute per EMS and arrested. Patient received approximately 3 shocks per EMS. IO placed in right tibia, by medic prior to arriving in ED. Patient intubated by medic. Patient arrived to ED on NAYAN device. Significant other states patient does not want resuscitation. She requested MD to discontinue resuscitative efforts.     Patient has 7.5 ETT in place.

## 2023-06-28 NOTE — PROGRESS NOTES
SPIRITUAL HEALTH SERVICES Progress Note  [facility]    Referral: Paged for emotional support of family after death of Ptnt.    I visited with Neymar's wife Julisa and other family members.    They shared a reflective conversation with stories about Neymar and his fun personality. Julisa shared they are Jain, but Neymar also has  roots.    The family declined my offer of a ritual. I affirmed experiences, validated emotions, and encouraged self-care.    SH remains available.    Rev Roseann Basurto  Associate   [facility] Spiritual Health Phone Line [phone]  Spiritual Health Pager [phone]  [facility] (Tuesdays & Thursdays) [phone]

## 2023-06-28 NOTE — PROGRESS NOTES
Assessment & Plan     (I46.9) Cardiac arrest (H)  (primary encounter diagnosis)  Comment: sudden cardiac arrest here in the office.   See code note below    Plan:     (K76.1) Cardiac cirrhosis  Comment:   Plan:     (I42.8) Non-ischemic cardiomyopathy (H)  Comment:   Plan:     (Z79.899) On amiodarone therapy  Comment:   Plan:     (D50.9) Iron deficiency anemia, unspecified iron deficiency anemia type  Comment:   Plan:     (E11.59,  Z79.4) Type 2 diabetes mellitus with other circulatory complication, with long-term current use of insulin (H)  Comment: better, but needs more pre-meal short acting insulin, more long acting insulin will cause hypoglycemia  Plan:     (E03.8) Subclinical hypothyroidism  Comment:   Plan:     (I50.22) Chronic systolic heart failure (H)  Comment:   Plan:     (D86.85) Cardiac sarcoidosis  Comment:   Plan:     (R60.0) Bilateral lower extremity edema  Comment:   Plan:         MED REC REQUIRED  Post Medication Reconciliation Status:     Spent over 45 minutes with the patient during the enitre event.     Jefry Harris MD  [facility]        Milton Blackmon is a 63 year old, presenting for the following health issues:  RECHECK (Follow up DM, anemia, CHF)        6/28/2023    11:13 AM   Additional Questions   Roomed by Alisha VALENCIA CMA     Diabetes Follow-up    How often are you checking your blood sugar? Continuous glucose monitor  What time of day are you checking your blood sugars (select all that apply)?  Before meals and At bedtime  Have you had any blood sugars above 200?  Yes   Have you had any blood sugars below 70?  No    What symptoms do you notice when your blood sugar is low?  Other: feels tired    What concerns do you have today about your diabetes? Other: medication changes     Do you have any of these symptoms? (Select all that apply) itchiness Numbness in feet and Redness, sores, or blisters on feet  Left heel open wound    BP Readings  from Last 2 Encounters:   06/28/23 94/62   06/22/23 (!) 84/52     Hemoglobin A1C (%)   Date Value   06/13/2023 7.9 (H)   01/24/2023 7.1 (H)   05/26/2021 8.8 (H)   12/11/2020 7.1 (H)     LDL Cholesterol Calculated (mg/dL)   Date Value   05/05/2023 36   03/28/2022 44   07/22/2020 51   11/14/2019 58       Lab Results   Component Value Date    HGB 8.0 06/27/2023    HGB 7.8 06/21/2023    HGB 8.1 06/15/2023    HGB 7.7 06/14/2023    HGB 8.2 06/13/2023    HGB 8.3 05/18/2023    HGB 9.1 05/18/2023    HGB 9.2 05/16/2023    HGB 8.6 04/26/2023    HGB 8.8 04/25/2023    HGB 8.8 04/18/2023    HGB 8.5 04/16/2023    HGB 9.0 04/13/2023    HGB 9.9 01/24/2023    HGB 10.5 10/07/2022    HGB 10.2 09/30/2022    HGB 10.8 09/29/2022    HGB 10.5 06/21/2022    HGB 10.7 03/28/2022    HGB 10.1 03/23/2022    HGB 11.7 08/27/2021    HGB 12.0 09/16/2020    HGB 12.6 07/22/2020    HGB 13.3 12/18/2019    HGB 12.3 10/05/2019    HGB 12.7 10/04/2019    HGB 11.9 10/04/2019    HGB 11.6 10/02/2019    HGB 11.7 10/01/2019    HGB 13.4 06/20/2018    HGB 12.1 06/11/2018    HGB 14.0 06/10/2018    HGB 13.5 01/11/2016    HGB 12.9 06/22/2013    HGB 13.8 02/14/2013    HGB 13.6 03/08/2011    HGB 13.7 04/09/2010    HGB 13.4 09/03/2008    HGB 14.2 02/15/2008    HGB 14.5 10/24/2007    HGB 16.2 12/24/2002 6/17/2023    12:15 PM   Post Discharge Outreach   Admission Date 6/13/2023   Reason for Admission You were admitted for evaluation of fluid overload from heart  failure. You had paracentesis done and your Bumex dose was  increased to two times a day.   Discharge Date 6/15/2023   Discharge Diagnosis You were admitted for evaluation of fluid overload from heart  failure. You had paracentesis done and your Bumex dose was  increased to two times a day.   How are you doing now that you are home? I am doing alright.   How are your symptoms? (Red Flag symptoms escalate to triage hotline per guidelines) Improved   Do you feel your condition is stable enough to be  safe at home until your provider visit? Yes   Does the patient have their discharge instructions?  Yes   Does the patient have questions regarding their discharge instructions?  No   Were you started on any new medications or were there changes to any of your previous medications?  Yes   Does the patient have all of their medications? Yes   Do you have questions regarding any of your medications?  No   Discharge follow-up appointment scheduled within 14 calendar days?  Yes   Discharge Follow Up Appointment Date 6/22/2023   Discharge Follow Up Appointment Scheduled with? Primary Care Provider     Hospital Follow-up Visit:    Hospital/Nursing Home/IP Rehab Facility: [facility]    Follow from last week visit.     Reports still feeling fatigue, energy low, but similar to last week.   Appetite variable.     Glucose readings better, but still high    Over past one week time in target range () 40 % , 60% above, no readings below 70.   Baseline seems to be around 100-150 in between meals, rises above 300 after meals.     More bilateral lower leg swelling, now weeping.      Denies shortness of breath or orthopnea    Weight have been stable.      Wt Readings from Last 10 Encounters:   06/28/23 110.7 kg (244 lb)   06/22/23 110.5 kg (243 lb 8 oz)   06/15/23 105.9 kg (233 lb 8 oz)   05/24/23 115.5 kg (254 lb 9.6 oz)   05/16/23 114.2 kg (251 lb 11.2 oz)   05/01/23 114.1 kg (251 lb 9.6 oz)   04/28/23 112.8 kg (248 lb 11.2 oz)   04/19/23 117.5 kg (259 lb)   03/08/23 118.9 kg (262 lb 1.6 oz)   01/24/23 122.4 kg (269 lb 12.8 oz)            **I reviewed the information recorded in the patient's EPIC chart (including but not limited to medical history, surgical history, family history, problem list, medication list, and allergy list) and updated the information as indicated based on the patients reported information.           Review of Systems   Constitutional, HEENT, cardiovascular, pulmonary, gi and  "gu systems are negative, except as otherwise noted.      Objective    BP 94/62   Pulse 71   Temp 98.6  F (37  C) (Oral)   Ht 1.803 m (5' 11\")   Wt 110.7 kg (244 lb)   SpO2 100%   BMI 34.03 kg/m    Body mass index is 34.03 kg/m .  Physical Exam   Initially in the interview, the patient appeared to be breathing normally, speaking normally, normal mentation, responding to questions normally.     Bilateral lower extremity edema 3+ with weeping.     About 10 minutes into the visit, before I could perform exam, the patient suddenly became unresponsive, eyes rolling back in head, slumping in the wheelchair.  Minimally responsive to chest sternal rubs.    Had episodes of sudden noticeable twitching of his chest (ICD firing).  Making gurgling sounds.     I immediately got help from fellow providers, had someone call 911.   AICD pads placed onto the patients chest, was in VFIB, provided automated shocks.   Moved the patient to the floor with the help of 3 other people and started chest compressions and gave oxygen through face mask and then with Ambu Bag.   Paramedics arrived in under 4 minutes, upon arrival they placed on their monitor and administered shocks for VFib.   Manual CPR chest compressions continued the entire time until they could set up the automatic Salvatore chest compression machine.   One medic set up automatic chest compression machine, another set up interosseous IV line, administered ACLS meds including epinephrine, amiodarone, another medic intubated with good end tidal CO2 readings. Ventilation via Ambu bag in ETT continued.  Fire department and police arrived, assisted in lifting him from the floor onto a gurney (required 5 people), then brought down to ambulance for urgent transport to [facility].    Unfortunately, the patient never regained consciousness.    2 or 3 rounds of AICD shocks delivered before paramedic arrival, at least another 3-4 rounds of shocks by medics before " leaving.     Patient and wife had discussed advanced care directives, but did not finalize anything.      ADDENDUM:   Reviewed [facility] notes.   Arrival in the ER, ACLS cares continued, but the patient never regained consciousness.  After discussion with his wife, DNR requested and cares stopped.   The patient pronounced dead at 1254.

## 2023-06-28 NOTE — ED NOTES
Call received from Donor Coordinator and they will close case for tissue donation and eye donation will be calling in regards to eye donation

## 2023-06-28 NOTE — ED NOTES
Bed: [facility]  Expected date:   Expected time:   Means of arrival:   Comments:  Jose G 520 - 68 M cardiac arrest eta 1244

## 2023-06-28 NOTE — PROGRESS NOTES
RT note:  Patient arrived intubated with Salvatore on. RT took over bagging from EMS. CPR and bagging stopped per MD.

## 2023-06-28 NOTE — ED NOTES
Pilar Samuel, Donor Coordinator notified and states she will get back to writer in next hour and not to release body until after she returns call.

## 2023-06-28 NOTE — ED PROVIDER NOTES
History     Chief Complaint:  Cardiac Arrest       The history is provided by the EMS personnel and the spouse.      Neymar Rhodes is a 63 year old male who presents with Cardiac arrest. Patient presented to the clinic with increased edema in his legs. While at his clinic he experienced cardiac arrest. EMS was called at 1211. He was shocked 3 times at the clinic. The patient was shocked 4 times by EMS he was given 3 round of epi and 2 doses amiodarone. His blood sugar measured 300. The patient's wife states that over past 3 weeks he has had increased back pain and swelling in his legs. She states he does not want resuscitation which goes against prior care documents. He has a vast cardiac history including NSTEMI, CAD, cardiomyopathy and mitral valve regurgitation.      Independent Historian:   Spouse/Partner - They report primary history and EMS - They report supplemental history    Review of External Notes:   na     Medications:    Pacerone   Lipitor   Bumex   Triglide   Novolog   Levothyroxine  Metformin   Methorexate     Past Medical History:    Ascending aorta dilation   Diverticulosis   Gout   LBBB   Cardiomyopathy   Mitral valve regurgitation   STEMI   Hyperlipidemia   Diabetes Mellitus type II   CAD   CKD   Cardiac sarcoidosis   Cardiac cirrhosis   Liver failure    Past Surgical History:    Coronary angiogram   Left heart cath   Right heart cath x2  Endobronchial ultrasound   ICD   Lead placement   Angiogram   Fistula repair   Appendectomy     Physical Exam   No data found.     Physical Exam  GENERAL: airway in place, NAYAN being used  HEAD: atraumatic  EYES: pupils unreactive  ENT:  mucus membranes moist  NECK:  trachea midline, normal range of motion  RESPIRATORY: being artificially bagged  CVS: no pulse  ABDOMEN: soft  MUSCULOSKELETAL: no deformities  SKIN: warm and dry, no acute rashes or ulceration  NEURO: unresponsive, GCS 3          Emergency Department Course     Emergency Department Course &  Assessments:    Assessments:  1247 I obtained history and examined the patient as noted above.  1251 CPR was paused due to pulse check. No cardiac activity detected.   1254 I called time of death for the patient     Independent Interpretation (X-rays, CTs, rhythm strip):  None    Consultations/Discussion of Management or Tests:  None        Social Determinants of Health affecting care:   None    Disposition:  Time of death was called for the patient at 1254     Impression & Plan    CMS Diagnoses: None    Medical Decision Making:  Patient presents with significant cardiac history and has been shocked numerous times prior to arrival with approximately 50 minutes of CPR with 1 minute of ROSC.  Wife is at the bedside and is requesting to stop efforts.  She notes she has been struggling with health issues for quite some time and does not want any of this to occur although the paperwork has not been completed.  They been  for over 40 years.  Salvatore was stopped ultrasound that showed no cardiac activity.  She is survival rate after 50 minutes is exceedingly low and wife is requesting us to stop which I support.  Further efforts were discontinued.    Critical Care time:  was 15 minutes for this patient excluding procedures.    Diagnosis:    ICD-10-CM    1. Cardiac arrest (H)  I46.9          Scribe Disclosure:  I, Byron Dunn, am serving as a scribe at 1:02 PM on 6/28/2023 to document services personally performed by Anthony Anguiano MD based on my observations and the provider's statements to me.     IMaddi, am serving as a scribe at 1:11 PM on 6/28/2023 to document services personally performed by Anthony Anguiano MD based on my observations and the provider's statements to me.     6/28/2023   Anthony Anguiano MD Adams, Shaun L, MD  06/28/23 1261

## 2023-10-10 LAB
MDC_IDC_EPISODE_DTM: NORMAL
MDC_IDC_EPISODE_DURATION: 47 S
MDC_IDC_EPISODE_ID: NORMAL
MDC_IDC_EPISODE_TYPE: NORMAL
MDC_IDC_EPISODE_VENDOR_TYPE: NORMAL
MDC_IDC_LEAD_IMPLANT_DT: NORMAL
MDC_IDC_LEAD_LOCATION: NORMAL
MDC_IDC_LEAD_LOCATION_DETAIL_1: NORMAL
MDC_IDC_LEAD_MFG: NORMAL
MDC_IDC_LEAD_MODEL: NORMAL
MDC_IDC_LEAD_POLARITY_TYPE: NORMAL
MDC_IDC_LEAD_SERIAL: NORMAL
MDC_IDC_MSMT_BATTERY_DTM: NORMAL
MDC_IDC_MSMT_BATTERY_REMAINING_LONGEVITY: 78 MO
MDC_IDC_MSMT_BATTERY_REMAINING_PERCENTAGE: 92 %
MDC_IDC_MSMT_BATTERY_STATUS: NORMAL
MDC_IDC_MSMT_CAP_CHARGE_DTM: NORMAL
MDC_IDC_MSMT_CAP_CHARGE_DTM: NORMAL
MDC_IDC_MSMT_CAP_CHARGE_ENERGY: 41 J
MDC_IDC_MSMT_CAP_CHARGE_TIME: 10 S
MDC_IDC_MSMT_CAP_CHARGE_TIME: 7.1 S
MDC_IDC_MSMT_CAP_CHARGE_TYPE: NORMAL
MDC_IDC_MSMT_CAP_CHARGE_TYPE: NORMAL
MDC_IDC_MSMT_LEADCHNL_LV_IMPEDANCE_VALUE: 584 OHM
MDC_IDC_MSMT_LEADCHNL_RA_IMPEDANCE_VALUE: 601 OHM
MDC_IDC_MSMT_LEADCHNL_RA_PACING_THRESHOLD_AMPLITUDE: 0.5 V
MDC_IDC_MSMT_LEADCHNL_RA_PACING_THRESHOLD_PULSEWIDTH: 0.4 MS
MDC_IDC_MSMT_LEADCHNL_RV_IMPEDANCE_VALUE: 405 OHM
MDC_IDC_MSMT_LEADCHNL_RV_PACING_THRESHOLD_AMPLITUDE: 0.7 V
MDC_IDC_MSMT_LEADCHNL_RV_PACING_THRESHOLD_PULSEWIDTH: 0.4 MS
MDC_IDC_PG_IMPLANT_DTM: NORMAL
MDC_IDC_PG_MFG: NORMAL
MDC_IDC_PG_MODEL: NORMAL
MDC_IDC_PG_SERIAL: NORMAL
MDC_IDC_PG_TYPE: NORMAL
MDC_IDC_SESS_CLINIC_NAME: NORMAL
MDC_IDC_SESS_DTM: NORMAL
MDC_IDC_SESS_TYPE: NORMAL
MDC_IDC_SET_BRADY_AT_MODE_SWITCH_MODE: NORMAL
MDC_IDC_SET_BRADY_AT_MODE_SWITCH_RATE: 170 {BEATS}/MIN
MDC_IDC_SET_BRADY_LOWRATE: 60 {BEATS}/MIN
MDC_IDC_SET_BRADY_MAX_SENSOR_RATE: 130 {BEATS}/MIN
MDC_IDC_SET_BRADY_MAX_TRACKING_RATE: 130 {BEATS}/MIN
MDC_IDC_SET_BRADY_MODE: NORMAL
MDC_IDC_SET_BRADY_PAV_DELAY_HIGH: 200 MS
MDC_IDC_SET_BRADY_PAV_DELAY_LOW: 270 MS
MDC_IDC_SET_BRADY_SAV_DELAY_HIGH: 140 MS
MDC_IDC_SET_BRADY_SAV_DELAY_LOW: 190 MS
MDC_IDC_SET_CRT_LVRV_DELAY: 35 MS
MDC_IDC_SET_CRT_PACED_CHAMBERS: NORMAL
MDC_IDC_SET_LEADCHNL_LV_PACING_AMPLITUDE: 1.9 V
MDC_IDC_SET_LEADCHNL_LV_PACING_ANODE_ELECTRODE_1: NORMAL
MDC_IDC_SET_LEADCHNL_LV_PACING_ANODE_LOCATION_1: NORMAL
MDC_IDC_SET_LEADCHNL_LV_PACING_CAPTURE_MODE: NORMAL
MDC_IDC_SET_LEADCHNL_LV_PACING_CATHODE_ELECTRODE_1: NORMAL
MDC_IDC_SET_LEADCHNL_LV_PACING_CATHODE_LOCATION_1: NORMAL
MDC_IDC_SET_LEADCHNL_LV_PACING_PULSEWIDTH: 0.5 MS
MDC_IDC_SET_LEADCHNL_LV_SENSING_ADAPTATION_MODE: NORMAL
MDC_IDC_SET_LEADCHNL_LV_SENSING_ANODE_ELECTRODE_1: NORMAL
MDC_IDC_SET_LEADCHNL_LV_SENSING_ANODE_LOCATION_1: NORMAL
MDC_IDC_SET_LEADCHNL_LV_SENSING_CATHODE_ELECTRODE_1: NORMAL
MDC_IDC_SET_LEADCHNL_LV_SENSING_CATHODE_LOCATION_1: NORMAL
MDC_IDC_SET_LEADCHNL_LV_SENSING_SENSITIVITY: 1 MV
MDC_IDC_SET_LEADCHNL_RA_PACING_AMPLITUDE: 2 V
MDC_IDC_SET_LEADCHNL_RA_PACING_CAPTURE_MODE: NORMAL
MDC_IDC_SET_LEADCHNL_RA_PACING_POLARITY: NORMAL
MDC_IDC_SET_LEADCHNL_RA_PACING_PULSEWIDTH: 0.4 MS
MDC_IDC_SET_LEADCHNL_RA_SENSING_ADAPTATION_MODE: NORMAL
MDC_IDC_SET_LEADCHNL_RA_SENSING_POLARITY: NORMAL
MDC_IDC_SET_LEADCHNL_RA_SENSING_SENSITIVITY: 0.25 MV
MDC_IDC_SET_LEADCHNL_RV_PACING_AMPLITUDE: 2 V
MDC_IDC_SET_LEADCHNL_RV_PACING_CAPTURE_MODE: NORMAL
MDC_IDC_SET_LEADCHNL_RV_PACING_POLARITY: NORMAL
MDC_IDC_SET_LEADCHNL_RV_PACING_PULSEWIDTH: 0.4 MS
MDC_IDC_SET_LEADCHNL_RV_SENSING_ADAPTATION_MODE: NORMAL
MDC_IDC_SET_LEADCHNL_RV_SENSING_POLARITY: NORMAL
MDC_IDC_SET_LEADCHNL_RV_SENSING_SENSITIVITY: 0.5 MV
MDC_IDC_SET_ZONE_DETECTION_INTERVAL: 250 MS
MDC_IDC_SET_ZONE_DETECTION_INTERVAL: 333 MS
MDC_IDC_SET_ZONE_DETECTION_INTERVAL: 400 MS
MDC_IDC_SET_ZONE_TYPE: NORMAL
MDC_IDC_SET_ZONE_VENDOR_TYPE: NORMAL
MDC_IDC_STAT_AT_BURDEN_PERCENT: 0 %
MDC_IDC_STAT_AT_DTM_END: NORMAL
MDC_IDC_STAT_AT_DTM_START: NORMAL
MDC_IDC_STAT_BRADY_DTM_END: NORMAL
MDC_IDC_STAT_BRADY_DTM_START: NORMAL
MDC_IDC_STAT_BRADY_RA_PERCENT_PACED: 3 %
MDC_IDC_STAT_BRADY_RV_PERCENT_PACED: 96 %
MDC_IDC_STAT_CRT_DTM_END: NORMAL
MDC_IDC_STAT_CRT_DTM_START: NORMAL
MDC_IDC_STAT_CRT_LV_PERCENT_PACED: 95 %
MDC_IDC_STAT_EPISODE_RECENT_COUNT: 0
MDC_IDC_STAT_EPISODE_RECENT_COUNT: 2
MDC_IDC_STAT_EPISODE_RECENT_COUNT: 3
MDC_IDC_STAT_EPISODE_RECENT_COUNT_DTM_END: NORMAL
MDC_IDC_STAT_EPISODE_RECENT_COUNT_DTM_START: NORMAL
MDC_IDC_STAT_EPISODE_TYPE: NORMAL
MDC_IDC_STAT_EPISODE_VENDOR_TYPE: NORMAL
MDC_IDC_STAT_TACHYTHERAPY_ATP_DELIVERED_RECENT: 2
MDC_IDC_STAT_TACHYTHERAPY_ATP_DELIVERED_TOTAL: 21
MDC_IDC_STAT_TACHYTHERAPY_RECENT_DTM_END: NORMAL
MDC_IDC_STAT_TACHYTHERAPY_RECENT_DTM_START: NORMAL
MDC_IDC_STAT_TACHYTHERAPY_SHOCKS_ABORTED_RECENT: 0
MDC_IDC_STAT_TACHYTHERAPY_SHOCKS_ABORTED_TOTAL: 0
MDC_IDC_STAT_TACHYTHERAPY_SHOCKS_DELIVERED_RECENT: 0
MDC_IDC_STAT_TACHYTHERAPY_SHOCKS_DELIVERED_TOTAL: 4
MDC_IDC_STAT_TACHYTHERAPY_TOTAL_DTM_END: NORMAL
MDC_IDC_STAT_TACHYTHERAPY_TOTAL_DTM_START: NORMAL

## 2024-04-02 NOTE — TELEPHONE ENCOUNTER
This is a duplicate refill request, that has been refused to the pharmacy.   Parveen Aragon RN  [facility] Triage Nurse     
- - -

## 2024-08-01 NOTE — PROGRESS NOTES
Patient: Aubrey Duncan    Procedure: Procedure(s):  EXPLORATION OF RIGHT LOWER DISTAL ANTERIOR TIBIAL AND DORSALIS PEDIS       Anesthesia Type:  General    Note:  Disposition: Outpatient   Postop Pain Control: Uneventful            Sign Out: Well controlled pain   PONV: No   Neuro/Psych: Uneventful            Sign Out: Acceptable/Baseline neuro status   Airway/Respiratory: Uneventful            Sign Out: Acceptable/Baseline resp. status   CV/Hemodynamics: Uneventful            Sign Out: Acceptable CV status; No obvious hypovolemia; No obvious fluid overload   Other NRE: NONE   DID A NON-ROUTINE EVENT OCCUR? No           Last vitals:  Vitals Value Taken Time   /70 08/01/24 1130   Temp 36.7  C (98.1  F) 08/01/24 1000   Pulse 73 08/01/24 1136   Resp 20 08/01/24 1136   SpO2 97 % 08/01/24 1136   Vitals shown include unfiled device data.    Electronically Signed By: Osvaldo Rodriges MD  August 1, 2024  11:37 AM   SUBJECTIVE: Neymar Rhodes is a 62 year old male presenting with a chief complaint of diarrhea.  Onset of symptoms was 5 day(s) ago.  Course of illness is same.    Severity moderate  Current and Associated symptoms: n/v last weekend black loose stools, is taking Pepto Bismal  Treatment measures tried include Pepto bismal.  Predisposing factors include None.    Past Medical History:   Diagnosis Date     Ascending aorta dilatation (H)      Diverticulosis of colon (without mention of hemorrhage)      Essential hypertension, benign      Gout, unspecified      LBBB (left bundle branch block)      Morbid obesity (H)      Non-ischemic cardiomyopathy (H)      Nonrheumatic mitral valve regurgitation      NSTEMI (non-ST elevated myocardial infarction) (H)     cardiac cath 6/2018: mild non-obstructive CAD     Other and unspecified hyperlipidemia      Paroxysmal ventricular tachycardia (H)      Type II or unspecified type diabetes mellitus without mention of complication, not stated as uncontrolled      Allergies   Allergen Reactions     No Known Drug Allergies      Social History     Tobacco Use     Smoking status: Never Smoker     Smokeless tobacco: Never Used   Substance Use Topics     Alcohol use: Yes     Comment: once awhile       ROS:  SKIN: no rash  GI: no vomiting    OBJECTIVE:  /60   Pulse 60   Temp 97.2  F (36.2  C) (Tympanic)   Resp 18   Wt 125.2 kg (276 lb)   SpO2 99%   BMI 38.34 kg/m  GENERAL APPEARANCE: healthy, alert and no distress  ABDOMEN:  soft, nontender, no HSM or masses and bowel sounds normal  Rectal exam: no tenderness noted, stool guiac negative  SKIN: no suspicious lesions or rashes      ICD-10-CM    1. Diarrhea, unspecified type  R19.7 CBC with Platelets & Differential     Basic metabolic panel     Enteric Bacteria and Virus Panel by SALAS Stool   2. Abdominal pain, generalized  R10.84 CBC with Platelets & Differential     Basic metabolic panel     Enteric Bacteria and Virus Panel by SALAS  Stool     Occult blood stool POCT, Enter/Edit Result   3. Low hemoglobin  D64.9 Occult blood stool POCT, Enter/Edit Result   4. Black stools  K92.1 Occult blood stool POCT, Enter/Edit Result     Pt has f/u PCP next Monday  Fluids/Rest, f/u if worse/not any better

## (undated) DEVICE — TOTE ANGIO CORP PC15AT SAN32CC83O

## (undated) DEVICE — ENDO VALVE SUCTION ULTRASOUND BRONCH MAJ-1414

## (undated) DEVICE — DEFIB PRO-PADZ LVP LQD GEL ADULT 8900-2105-01

## (undated) DEVICE — CATH ANGIO JUDKINS JL4 6FRX100CM INFINITI 534620T

## (undated) DEVICE — 0.014IN X 190CM HI-TORQUE WHISPER WIRE VIEW, EDS CS-J W/HYDROCOAT HYDROPHILIC COATING

## (undated) DEVICE — CATH ANGIO INFINITI 3DRC 6FRX100CM 534676T

## (undated) DEVICE — SHEATH PRELUDE SNAP 13CM 9FR

## (undated) DEVICE — DRSG KERLIX 4 1/2"X4YDS ROLL 6715

## (undated) DEVICE — KIT MICROINTRODUCER VASCULAR  4FRX21GAX4CM

## (undated) DEVICE — INTRO SHEATH 6FRX10CM PINNACLE RSS602

## (undated) DEVICE — CATH VENOGRAM BLLN W/INFLATION 6225

## (undated) DEVICE — SOL NACL 0.9% IRRIG 1000ML BOTTLE 2F7124

## (undated) DEVICE — ENDO VALVE SUCTION BRONCH EVIS MAJ-209

## (undated) DEVICE — KIT ENDO FIRST STEP DISINFECTANT 200ML W/POUCH EP-4

## (undated) DEVICE — ENDO VALVE SYR NDL KIT ULTRASOUND BRONCH NA-201SX-4022-A

## (undated) DEVICE — MANIFOLD KIT ANGIO AUTOMATED 014613

## (undated) DEVICE — FCP BIOPSY SPYBITE 1.0MMX266CM 4627

## (undated) DEVICE — LUBRICANT INST KIT ENDO-LUBE 220-90

## (undated) DEVICE — Device

## (undated) DEVICE — KIT RIGHT HEART CATH 60130719

## (undated) DEVICE — INTRODUCER CATH VASC 5FRX10CM  MPIS-501-NT-U-SST

## (undated) DEVICE — CATH EP 60CM 5FR 2-8-2MM SPC-

## (undated) DEVICE — TUBING SUCTION 10'X3/16" N510

## (undated) DEVICE — CLOSURE ANGIOSEAL 6FR 610130

## (undated) DEVICE — PACK HEART RIGHT CUSTOM SAN32RHF18

## (undated) DEVICE — SYR 30ML SLIP TIP W/O NDL 302833

## (undated) DEVICE — KIT HAND CONTROL ANGIOTOUCH ACIST 65CM AT-P65

## (undated) DEVICE — PACK PCMKR PERM SRG PROC LF SAN32PC573

## (undated) DEVICE — SHEATH PRELUDE SNAP 13CM 6FR

## (undated) DEVICE — INTRO SFSHEATH WORLEY 40CM 9FR S CSGWORLEY109M

## (undated) DEVICE — ENDO TOOTH GUARD SAC2001

## (undated) DEVICE — 9FR X 45CM ACUITY PRO OUTER CORONARY SINUS CATHETER, HOOK

## (undated) DEVICE — INTRO SHEATH 7FRX10CM PINNACLE RSS702

## (undated) DEVICE — NDL BLUNT 18GA 1" W/O FILTER 305181

## (undated) DEVICE — NDL PERC ENTRY THINWALL 18GA 7.0" G00166

## (undated) DEVICE — CATH ANGIO INFINITI PIGTAIL 145 6 SH 6FRX110CM  534-652S

## (undated) DEVICE — ENDO VALVE BX EVIS MAJ-210

## (undated) DEVICE — SYR ANGIOGRAPHY MULTIUSE KIT ACIST 014612

## (undated) RX ORDER — HEPARIN SODIUM 1000 [USP'U]/ML
INJECTION, SOLUTION INTRAVENOUS; SUBCUTANEOUS
Status: DISPENSED
Start: 2018-06-11

## (undated) RX ORDER — LIDOCAINE HYDROCHLORIDE 10 MG/ML
INJECTION, SOLUTION EPIDURAL; INFILTRATION; INTRACAUDAL; PERINEURAL
Status: DISPENSED
Start: 2023-01-01

## (undated) RX ORDER — PROPOFOL 10 MG/ML
INJECTION, EMULSION INTRAVENOUS
Status: DISPENSED
Start: 2019-12-19

## (undated) RX ORDER — LIDOCAINE HYDROCHLORIDE 10 MG/ML
INJECTION, SOLUTION EPIDURAL; INFILTRATION; INTRACAUDAL; PERINEURAL
Status: DISPENSED
Start: 2019-10-02

## (undated) RX ORDER — VERAPAMIL HYDROCHLORIDE 2.5 MG/ML
INJECTION, SOLUTION INTRAVENOUS
Status: DISPENSED
Start: 2018-06-11

## (undated) RX ORDER — LIDOCAINE HYDROCHLORIDE 20 MG/ML
INJECTION, SOLUTION EPIDURAL; INFILTRATION; INTRACAUDAL; PERINEURAL
Status: DISPENSED
Start: 2019-12-19

## (undated) RX ORDER — PHENYLEPHRINE HCL IN 0.9% NACL 1 MG/10 ML
SYRINGE (ML) INTRAVENOUS
Status: DISPENSED
Start: 2019-12-19

## (undated) RX ORDER — ONDANSETRON 2 MG/ML
INJECTION INTRAMUSCULAR; INTRAVENOUS
Status: DISPENSED
Start: 2019-12-19

## (undated) RX ORDER — FENTANYL CITRATE 50 UG/ML
INJECTION, SOLUTION INTRAMUSCULAR; INTRAVENOUS
Status: DISPENSED
Start: 2018-06-11

## (undated) RX ORDER — LIDOCAINE 40 MG/G
CREAM TOPICAL
Status: DISPENSED
Start: 2023-01-01

## (undated) RX ORDER — FENTANYL CITRATE 50 UG/ML
INJECTION, SOLUTION INTRAMUSCULAR; INTRAVENOUS
Status: DISPENSED
Start: 2019-10-04

## (undated) RX ORDER — HEPARIN SODIUM 1000 [USP'U]/ML
INJECTION, SOLUTION INTRAVENOUS; SUBCUTANEOUS
Status: DISPENSED
Start: 2019-10-04

## (undated) RX ORDER — FENTANYL CITRATE 50 UG/ML
INJECTION, SOLUTION INTRAMUSCULAR; INTRAVENOUS
Status: DISPENSED
Start: 2019-10-02

## (undated) RX ORDER — FENTANYL CITRATE 50 UG/ML
INJECTION, SOLUTION INTRAMUSCULAR; INTRAVENOUS
Status: DISPENSED
Start: 2019-12-19

## (undated) RX ORDER — LIDOCAINE HYDROCHLORIDE 10 MG/ML
INJECTION, SOLUTION EPIDURAL; INFILTRATION; INTRACAUDAL; PERINEURAL
Status: DISPENSED
Start: 2019-10-04

## (undated) RX ORDER — ETOMIDATE 2 MG/ML
INJECTION INTRAVENOUS
Status: DISPENSED
Start: 2019-12-19

## (undated) RX ORDER — CEFAZOLIN SODIUM 1 G/3ML
INJECTION, POWDER, FOR SOLUTION INTRAMUSCULAR; INTRAVENOUS
Status: DISPENSED
Start: 2019-12-19

## (undated) RX ORDER — HEPARIN SODIUM 200 [USP'U]/100ML
INJECTION, SOLUTION INTRAVENOUS
Status: DISPENSED
Start: 2019-10-02

## (undated) RX ORDER — BUPIVACAINE HYDROCHLORIDE 2.5 MG/ML
INJECTION, SOLUTION EPIDURAL; INFILTRATION; INTRACAUDAL
Status: DISPENSED
Start: 2019-10-04

## (undated) RX ORDER — NITROGLYCERIN 5 MG/ML
VIAL (ML) INTRAVENOUS
Status: DISPENSED
Start: 2018-06-11